# Patient Record
Sex: MALE | Race: WHITE | Employment: OTHER | URBAN - METROPOLITAN AREA
[De-identification: names, ages, dates, MRNs, and addresses within clinical notes are randomized per-mention and may not be internally consistent; named-entity substitution may affect disease eponyms.]

---

## 2024-02-07 ENCOUNTER — HOSPITAL ENCOUNTER (INPATIENT)
Facility: CLINIC | Age: 69
Setting detail: SURGERY ADMIT
End: 2024-02-07
Attending: NEUROLOGICAL SURGERY | Admitting: NEUROLOGICAL SURGERY
Payer: COMMERCIAL

## 2024-03-20 RX ORDER — TAMSULOSIN HYDROCHLORIDE 0.4 MG/1
0.4 CAPSULE ORAL DAILY
COMMUNITY
End: 2024-04-08 | Stop reason: HOSPADM

## 2024-03-20 RX ORDER — FAMOTIDINE 20 MG/1
20 TABLET, FILM COATED ORAL DAILY
COMMUNITY
End: 2024-04-08 | Stop reason: HOSPADM

## 2024-04-23 ENCOUNTER — TRANSFERRED RECORDS (OUTPATIENT)
Dept: HEALTH INFORMATION MANAGEMENT | Facility: CLINIC | Age: 69
End: 2024-04-23

## 2024-04-30 ENCOUNTER — TRANSFERRED RECORDS (OUTPATIENT)
Dept: HEALTH INFORMATION MANAGEMENT | Facility: CLINIC | Age: 69
End: 2024-04-30
Payer: COMMERCIAL

## 2024-04-30 ENCOUNTER — MEDICAL CORRESPONDENCE (OUTPATIENT)
Dept: TRANSPLANT | Facility: CLINIC | Age: 69
End: 2024-04-30
Payer: COMMERCIAL

## 2024-05-01 ENCOUNTER — PRE VISIT (OUTPATIENT)
Dept: ONCOLOGY | Facility: CLINIC | Age: 69
End: 2024-05-01
Payer: COMMERCIAL

## 2024-05-01 ENCOUNTER — PATIENT OUTREACH (OUTPATIENT)
Dept: ONCOLOGY | Facility: CLINIC | Age: 69
End: 2024-05-01
Payer: COMMERCIAL

## 2024-05-01 ENCOUNTER — TELEPHONE (OUTPATIENT)
Dept: TRANSPLANT | Facility: CLINIC | Age: 69
End: 2024-05-01
Payer: COMMERCIAL

## 2024-05-01 DIAGNOSIS — D46.9 MDS (MYELODYSPLASTIC SYNDROME) (H): Primary | ICD-10-CM

## 2024-05-01 NOTE — PROGRESS NOTES
New Patient Oncology Nurse Navigator Note     Referring provider: Dr. Luz Foster      Referring Clinic/Organization: [facility]      Referred to (specialty:) Hematology/Oncology     Requested provider (if applicable): NA     Date Referral Received: May 1, 2024     Evaluation for:  MDS vs. AML     Clinical History (per Nurse review of records provided):      Patient seen at [facility] on 4/30/24 at [facility] by Dr. Foster.         Records Location: Care Everywhere, Media, and See Bookmarked material     Records Needed: BMBX Biopsy done at [facility] on 4/23/24. Need STAT.      Additional testing needed prior to consult: ?    Payor: BCBS / Plan: BCBS MEDICARE ADVANTAGE / Product Type: Medicare /     May 1, 2024  Called patient this afternoon to introduce self and role of the nurse navigator.   Per patient, he was scheduled for back surgery and when in for pre-op and had labs drawn which where off and had further follow up with PCP. Had consultation with Dr. Foster at [facility] on 4/16/24, BMBX on 4/13 at [facility], and CT CAP done on 4/25/24. He had follow up with Dr. Foster on 4/30/24 and referral was placed for second opinion at the [facility].     Provided patient with contact information. Informed them that we are needing to get BMBX results for review. Once those have been reviewed we can work on scheduling consultation. Patient and wife verbalized understanding and were grateful for the call and coordination of care.     May 2, 2024    Case Report Bone Marrow Pathology Report                      Case: [id number]                                  Authorizing Provider:  Luz Foster MD   Collected:           04/23/2024 0925              Ordering Location:     [facility] CENTRAL LAB            Received:            04/23/2024 2056              Pathologist:           Roque Orr                                                                   "         MD                                                                          Specimens:   A) - Bone Marrow, RIGHT                                                                             B) - Bone Marrow Aspirate (Heparinized), RIGHT                                                     C) - Bone Marrow Core Biopsy, RIGHT                                                                 D) - Peripheral Blood                                                                   Final Diagnosis BONE MARROW AND PERIPHERAL BLOOD:  1. Myelodysplastic syndrome with excess blasts-2 (WHO 2016); see comment for alternative classification of this myeloid neoplasm in forthcoming WHO/ICC 2022 classification systems     a. Bone marrow blasts:  10.8%     b. Bone marrow cellularity:  %     c. Peripheral blood:        - Blasts:  Rare on scanning (<1%)        - Mild normocytic anemia        - Left shifted neutropenia with dysgranulopoiesis        - Mild absolute lymphocytosis        - Moderate thrombocytopenia  2. Ancillary studies:     a. Cytogenetics:        - Provisional results reveal an apparently normal male karyotype        - Final results will be appended to this report     b. Molecular:        - [facility] Myeloid NGS comprehensive DNA gene panel: Positive for ASXL1, IDH1, RUNX1 and SRSF2 mutations (performed on N89-7089)  3. See comment   Electronically signed by Roque Orr MD on 4/28/2024 at  6:19 PM   Comment The classification of this myeloid neoplasm depends on the classification system used. In the current WHO 2016 system, this case is best classified as \"Myelodysplastic syndrome with excess blasts-2 (WHO 2016)\" and as \"Myelodysplastic neoplasm with increased blasts-2\" in the forthcoming WHO 2022 system. Of note, in the forthcoming ICC 2022 system, this case would be classified as \"Myelodysplastic syndrome/Acute myeloid leukemia with myelodysplasia related gene mutations (ASXL1, RUNX1, SRSF2)\". " Patients with 10-19% bone marrow blasts may be regarded as AML-equivalent for therapeutic considerations and from a clinical trial design perspective when appropriate.      Dr. Orr discussed the diagnosis with Dr. KEN Foster on 4/26/2024.    Case seen in consultation with Dr. Del Obrien.     Called patient this morning in follow up. BMBX results received. Patient transferred to [facility] to schedule at this time.     Jane THEODOREN, RN   Oncology Nurse Navigator   [facility]

## 2024-05-01 NOTE — TELEPHONE ENCOUNTER
Primary insurance is Medicare Parts A and B.Medicare number is [id number]    Patient is subscriber.  BCBS is secondary insurance.Policy number below is the Member ID Number.  Group number is [id number].

## 2024-05-01 NOTE — TELEPHONE ENCOUNTER
RECORDS STATUS - ALL OTHER DIAGNOSIS      Action    Action Taken 5/1/24  Spoke w/ pt - pt provided verbal authorization for [facility] - Pull. BMB report now viewable.     Pt advised only one image, conducted at [place] 4/2024  3:25 PM    5/2/24  Spoke w/ Bernardo @ [facility] - advised we have the most recent note & labs. Advised pt has appt scheduled 5/14/24 - after appt w/ Dr. Delcid.  10:55 AM       RECOR[facility]   DATE RECEIVED:    NOTES STATUS DETAILS   OFFICE NOTE from medical oncologist [facility] Dr. Luz Foster: 4/30/24   MEDICATION LIST [facility]   LABS     PATHOLOGY REPORTS [facility], Report in [facility] 4/23/24: [id number]   ANYTHING RELATED TO DIAGNOSIS Epic/ 4/25/24   GENONOMIC TESTING     TYPE: [facility] - [facility] 4/16/24: Myeloid NGS   IMAGING (NEED IMAGES & REPORT)     CT SCANS PACS 4/25/24: [facility]   FEDEX TRACKING ([facility] - Pathology)   TRACKING #: [id number]

## 2024-05-07 NOTE — PROGRESS NOTES
[facility]    HEMATOLOGY & ONCOLOGY  NEW CONSULTATION    Referring physician: Dr. Luz Foster MD ([facility])    PATIENT NAME: Leland Pearson MRN # [id number]  DATE OF VISIT: May 7, 2024 YOB: 1955    SUMMARY  Leland Pearson is a 69 year old male with PMH significant for GERD, HTN and recent diagnosis of MDS who presents for consultation.    3/27/24 Pt found to be thrombocytopenic on screening CBC prior to elective back surgery  WBC 4.5, Hb 13.7, MCV 86, Plts-clumped  LDH elevated 532  4/16/24 Consultation with Dr. Luz Foster ([facility]) prompted BMBx at [facility]   4/23/24 BMBx demonstrating MDS-EB2 (WHO 2016) with hypercellular marrow (%) with 10.8% blasts including rare circulating blasts  Karyotype: 46, XY  NGS:  ASXL1, IDH1, RUNX1, SRSF2 mutations  Counts: WBC 6.7, Hb 13.1, Plts 70, ANC 0.7  IPSS-M = High risk, IPSS-R = 5 = High risk    SUBJECTIVE  Pt reports that overall he has not noticed any significant changes in his health recently. Has had bruising but not spontaneous and not changed for many years in frequently or severity. Has lost weight but this was intention with effort to lose weight ~10-15lbs prior to back surgery. Denies any recent f/c/s or n/v/d. No bleeding.       PAST MEDICAL HISTORY  Past Medical History:   Diagnosis Date    Gastroesophageal reflux disease     Hypertension        FAMILY HISTORY  No family history on file.  Mother and sister with skin cancer ( basal cell)  Uncle passed from liver cancer     SOCIAL HISTORY  Social History     Socioeconomic History    Marital status:      Spouse name: Not on file    Number of children: Not on file    Years of education: Not on file    Highest education level: Not on file   Occupational History    Not on file   Tobacco Use    Smoking status: Never    Smokeless tobacco: Never   Substance and Sexual Activity    Alcohol use: Yes     Comment: socially    Drug use: No    Sexual activity: Not on file   Other Topics Concern  "   Parent/sibling w/ CABG, MI or angioplasty before 65F 55M? Not Asked   Social History Narrative    Not on file     Social Determinants of Health     Financial Resource Strain: Not on file   Food Insecurity: Not on file   Transportation Needs: Not on file   Physical Activity: Not on file   Stress: Not on file   Social Connections: Not on file   Interpersonal Safety: Not on file   Housing Stability: Not on file   5 brothers ages 53 thru 73. Two children. 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 Cut back on alcohol for back surger ~2 drinks a week. Never smoker     CURRENT OUTPATIENT MEDICATIONS  Current Outpatient Medications   Medication Sig Dispense Refill    glucosamine-chondroitin 500-400 MG CAPS Take 1 capsule by mouth daily      LISINOPRIL PO Take 5 mg by mouth daily       multivitamin, therapeutic with minerals (MULTI-VITAMIN) TABS Take 1 tablet by mouth daily         ALLERGIES  No Known Allergies    REVIEW OF SYSTEMS  A complete ROS was performed and was negative except as mentioned in HPI    PHYSICAL EXAM  BP (!) 145/82 (BP Location: Right arm, Patient Position: Sitting, Cuff Size: Adult Regular)   Pulse 81   Temp 97.9  F (36.6  C) (Oral)   Resp 16   Ht 1.837 m (6' 0.34\")   Wt 91.9 kg (202 lb 11.2 oz)   SpO2 98%   BMI 27.23 kg/m    @LASTSAO2(4)@  Wt Readings from Last 3 Encounters:   05/08/24 91.9 kg (202 lb 11.2 oz)   07/27/13 97.5 kg (215 lb)       Gen: alert, pleasant and conversational, NAD  HEET: NC/AT, EOMI w/ PERRL, anicteric sclera. OP clear. MMM.   Neck: supple no JVP  Lymph: No cervical, supraclavicular, axillary or inguinal LAD  CV: normal S1,S2 with RRR no m/r/g  Resp: lungs CTA bilaterally with adequate air movement to bases. No wheezes or crackles  Abd: soft NTND no organomegaly or masses. BS normoactive.   Ext: WWP no edema or cyanosis  Skin: no concerning lesions or rashes  Neuro: A&Ox4, no lateralizing sx. Grossly nonfocal.      LABORATORY AND IMAGING STUDIES     Latest Reference " Range & Units 05/08/24 11:56   Sodium 135 - 145 mmol/L 139   Potassium 3.4 - 5.3 mmol/L 4.4   Chloride 98 - 107 mmol/L 103   Carbon Dioxide (CO2) 22 - 29 mmol/L 27   Urea Nitrogen 8.0 - 23.0 mg/dL 14.7   Creatinine 0.67 - 1.17 mg/dL 0.84   GFR Estimate >60 mL/min/1.73m2 >90   Calcium 8.8 - 10.2 mg/dL 9.5   Anion Gap 7 - 15 mmol/L 9   Magnesium 1.7 - 2.3 mg/dL 2.1   Phosphorus 2.5 - 4.5 mg/dL 2.7   Albumin 3.5 - 5.2 g/dL 4.1   Protein Total 6.4 - 8.3 g/dL 7.6   Alkaline Phosphatase 40 - 150 U/L 82   ALT 0 - 70 U/L 21   AST 0 - 45 U/L 43   Bilirubin Total <=1.2 mg/dL 0.6   Ferritin 31 - 409 ng/mL 1,360 (H)   Glucose 70 - 99 mg/dL 110 (H)   Lactate Dehydrogenase 0 - 250 U/L 613 (H)   Uric Acid 3.4 - 7.0 mg/dL 5.2   WBC 4.0 - 11.0 10e3/uL 5.2   Hemoglobin 13.3 - 17.7 g/dL 13.4   Hematocrit 40.0 - 53.0 % 41.0   Platelet Count 150 - 450 10e3/uL 70 (L)   RBC Count 4.40 - 5.90 10e6/uL 4.59   MCV 78 - 100 fL 89   MCH 26.5 - 33.0 pg 29.2   MCHC 31.5 - 36.5 g/dL 32.7   RDW 10.0 - 15.0 % 15.9 (H)   % Neutrophils % 28 (P)   % Lymphocytes % 58 (P)   % Monocytes % 2 (P)   % Eosinophils % 1 (P)   % Basophils % 0 (P)   % Other Cells % 11 (P)   Absolute Basophils 0.0 - 0.2 10e3/uL 0.0 (P)   Absolute Neutrophil 1.6 - 8.3 10e3/uL 1.5 (L) (P)   Absolute Lymphocytes 0.8 - 5.3 10e3/uL 3.0 (P)   Absolute Monocytes 0.0 - 1.3 10e3/uL 0.1 (P)   Absolute Eosinophils 0.0 - 0.7 10e3/uL 0.1 (P)   Absolute Other Cells <=0.0 10e3/uL 0.6 (H) (P)   Absolute NRBCs 10e3/uL 0.0   NRBCs per 100 WBC <1 /100 0   RBC Morphology  Confirmed RBC Indices (P)   Platelet Morphology Automated Count Confirmed. Platelet morphology is normal.  Automated Count Confirmed. Platelet morphology is normal. (P)   Polychromasia None Seen  Slight ! (P)   Teardrop Cells None Seen  Slight ! (P)   Acanthocytes None Seen  Slight ! (P)   Dorota Cells None Seen  Slight ! (P)   (H): Data is abnormally high  (L): Data is abnormally low  !: Data is abnormal  (P): Preliminary      BMBx  "OS  Final Diagnosis BONE MARROW AND PERIPHERAL BLOOD:  1. Myelodysplastic syndrome with excess blasts-2 (WHO 2016); see comment for alternative classification of this myeloid neoplasm in forthcoming WHO/ICC 2022 classification systems     a. Bone marrow blasts:  10.8%     b. Bone marrow cellularity:  %     c. Peripheral blood:        - Blasts:  Rare on scanning (<1%)        - Mild normocytic anemia        - Left shifted neutropenia with dysgranulopoiesis        - Mild absolute lymphocytosis        - Moderate thrombocytopenia  2. Ancillary studies:     a. Cytogenetics:        - Provisional results reveal an apparently normal male karyotype        - Final results will be appended to this report     b. Molecular:        - [facility] Myeloid NGS comprehensive DNA gene panel: Positive for ASXL1, IDH1, RUNX1 and SRSF2 mutations (performed on Y12-0947)  3. See comment   Electronically signed by Roque Orr MD on 4/28/2024 at  6:19 PM   Comment The classification of this myeloid neoplasm depends on the classification system used. In the current WHO 2016 system, this case is best classified as \"Myelodysplastic syndrome with excess blasts-2 (WHO 2016)\" and as \"Myelodysplastic neoplasm with increased blasts-2\" in the forthcoming WHO 2022 system. Of note, in the forthcoming ICC 2022 system, this case would be classified as \"Myelodysplastic syndrome/Acute myeloid leukemia with myelodysplasia related gene mutations (ASXL1, RUNX1, SRSF2)\". Patients with 10-19% bone marrow blasts may be regarded as AML-equivalent for therapeutic considerations and from a clinical trial design perspective when appropriate.      Dr. Orr discussed the diagnosis with Dr. KEN Foster on 4/26/2024.    Case seen in consultation with Dr. Del Obrien.     BMBx differential    HEMATOLOGY PARAMETERS  Tested at:  [facility]           RESULTS  EXPECTED VALUES  WBC:     6.7      4.5-11x1000/cumm      RBC:     4.53     4.30-5.90 " mil/cumm  HGB:     13.1     13.5-17.5 gm/di     DECREASED  HCT:     41.3     37-53%                MCV:     91.0      fl           NORMOCYTIC  MCH:     28.9     26-34 pg              MCHC:    31.7     32-36 gm/dl         HYPOCHROMIC  RDW:     15.8     11.5-15.5%          ELEVATED  PLT:     70       140-440x1000/uL     DECREASED  MPV:     10.9     6.5-11 fl              Retic:   2.68     0.5-1.5%            ELEVATED    Differential    Tested at:  [facility]                  Absolute (%)    Expected (%)                  (x10*9/L)       (x10*9/L)  Neutrophils:    0.7 (10.4)      1.7-7.0 (42-72%)  DECREASED  Lymphocytes:    4.5 (67.2)      0.9-2.9 (20-44%)  ELEVATED  Monocytes:      0.3 (4.5)      <0.9 (0-11%)          Eosinophils:    0.1 (1.5)      <0.5 (0-2%)          Basophils:      0.0 (0)        <0.3 (<3.0%)          Imm Grans:      0.0 (0)        <0.3 (0-3%)           (Metas, Myelos,Pros)  Metamyelocytes: 0.6 (9)        <0.1 (<0.1%)       ELEVATED  Myelocytes:     0.5 (7.5)      <0.1 (<0.1%)       ELEVATED          BONE MARROW AND PERIPHERAL BLOOD:  1. Myelodysplastic syndrome with excess blasts-2 (WHO 2016); see comment for alternative classification of this myeloid neoplasm in forthcoming WHO/ICC 2022 classification systems     a. Bone marrow blasts:  10.8%     b. Bone marrow cellularity:  %     c. Peripheral blood:        - Blasts:  Rare on scanning (<1%)        - Mild normocytic anemia        - Left shifted neutropenia with dysgranulopoiesis        - Mild absolute lymphocytosis        - Moderate thrombocytopenia  2. Ancillary studies:     a. Cytogenetics:        - Provisional results reveal an apparently normal male karyotype        - Final results will be appended to this report     b. Molecular:        - [facility] Myeloid NGS comprehensive DNA gene panel: Positive for ASXL1, IDH1, RUNX1 and SRSF2 mutations (performed on E32-1099)  3. See comment     CT C/A/P 4/25/24 ([facility])  IMPRESSION:   1. No thoracic,  abdominal, or pelvic lymphadenopathy by size criteria.   2.  Juxtapleural pulmonary nodules along the left major fissure, likely   intrapulmonary lymph nodes. These do not require imaging follow-up, and are   considered benign.   3.  3 mm nephrolith in the left intrarenal collecting system. No delayed   nephrogram, hydronephrosis, or perinephric fluid collection.         ASSESSMENT AND PLAN  Leland Pearson is a 69 year old male with PMH significant for GERD, HTN and recent diagnosis of MDS who presents for consultation.    ECOG 2 / %    # High risk MDS - IPSS-M/R high risk with excess blasts.  NGS with high risk ASXL1, IDH1, RUNX1 as well as SRSF2 mutations.     We discussed at length the biology of MDS and the meaning of the high risk stratification. Per IPSS-M with todays labs he is high risk translating to median overall survival of 1.7 years and median time to AML transformation of 14.3% at 1 year. We discussed that this was an aggressive malignancy and is incurable with standard chemotherapy. Ultimately he will need to be considered for allogeneic transplant. I do not see any major risk factors for transplant and he has potential sibling donors. Will place referral.    In meantime, we discussed that in order to be considered for transplant we will need to debulk his disease as transplant requires blasts <5%. We discussed options including HMA and HMA + venetoclax. Given recent updates in Phase 1 studies showing accelerated and enhanced remission status in BMT candidates with addition of Venetoclax I recommended we include it pending insurance approval. Will reach out to Dr. Foster to see about coordinating care. I recommended Azacitidine as the HMA option and we described administration, schedule and potential side effects. Pts questions were answered.     PPx - plan for ACV. Add posa + levo with neutropenia.    # h/o Gout - no active flare. UA 5.2. Continue allopurinol.    Final plan  - Will reach out  to Dr. Foster to discuss coordinating care  - Plan for Azacitidine / Venetoclax. Will start PA process  - BMT referral      The longitudinal plan of care for the diagnosis(es)/condition(s) as documented were addressed during this visit. Due to the added complexity in care, I will continue to support Leland in the subsequent management and with ongoing continuity of care.      Total time spent on this encounter today including reviewing the EMR, documentation and direct patient care counselin minutes    Nael Delcid MD    Division of Hematology, Oncology and Transplantation  [facility]  P: [phone]

## 2024-05-08 ENCOUNTER — ONCOLOGY VISIT (OUTPATIENT)
Dept: ONCOLOGY | Facility: CLINIC | Age: 69
End: 2024-05-08
Attending: STUDENT IN AN ORGANIZED HEALTH CARE EDUCATION/TRAINING PROGRAM
Payer: COMMERCIAL

## 2024-05-08 ENCOUNTER — PATIENT OUTREACH (OUTPATIENT)
Dept: ONCOLOGY | Facility: CLINIC | Age: 69
End: 2024-05-08

## 2024-05-08 VITALS
HEIGHT: 72 IN | HEART RATE: 81 BPM | WEIGHT: 202.7 LBS | SYSTOLIC BLOOD PRESSURE: 145 MMHG | TEMPERATURE: 97.9 F | RESPIRATION RATE: 16 BRPM | OXYGEN SATURATION: 98 % | DIASTOLIC BLOOD PRESSURE: 82 MMHG | BODY MASS INDEX: 27.45 KG/M2

## 2024-05-08 DIAGNOSIS — D46.9 MDS (MYELODYSPLASTIC SYNDROME) (H): Primary | ICD-10-CM

## 2024-05-08 DIAGNOSIS — M10.9 GOUT, UNSPECIFIED CAUSE, UNSPECIFIED CHRONICITY, UNSPECIFIED SITE: ICD-10-CM

## 2024-05-08 LAB
ABO/RH(D): NORMAL
ALBUMIN SERPL BCG-MCNC: 4.1 G/DL (ref 3.5–5.2)
ALP SERPL-CCNC: 82 U/L (ref 40–150)
ALT SERPL W P-5'-P-CCNC: 21 U/L (ref 0–70)
ANION GAP SERPL CALCULATED.3IONS-SCNC: 9 MMOL/L (ref 7–15)
ANTIBODY SCREEN: NEGATIVE
AST SERPL W P-5'-P-CCNC: 43 U/L (ref 0–45)
BASOPHILS # BLD AUTO: ABNORMAL 10*3/UL
BASOPHILS NFR BLD AUTO: ABNORMAL %
BILIRUB SERPL-MCNC: 0.6 MG/DL
BUN SERPL-MCNC: 14.7 MG/DL (ref 8–23)
CALCIUM SERPL-MCNC: 9.5 MG/DL (ref 8.8–10.2)
CHLORIDE SERPL-SCNC: 103 MMOL/L (ref 98–107)
CREAT SERPL-MCNC: 0.84 MG/DL (ref 0.67–1.17)
DEPRECATED HCO3 PLAS-SCNC: 27 MMOL/L (ref 22–29)
EGFRCR SERPLBLD CKD-EPI 2021: >90 ML/MIN/1.73M2
EOSINOPHIL # BLD AUTO: ABNORMAL 10*3/UL
EOSINOPHIL NFR BLD AUTO: ABNORMAL %
ERYTHROCYTE [DISTWIDTH] IN BLOOD BY AUTOMATED COUNT: 15.9 % (ref 10–15)
FERRITIN SERPL-MCNC: 1360 NG/ML (ref 31–409)
GLUCOSE SERPL-MCNC: 110 MG/DL (ref 70–99)
HCT VFR BLD AUTO: 41 % (ref 40–53)
HGB BLD-MCNC: 13.4 G/DL (ref 13.3–17.7)
IMM GRANULOCYTES # BLD: ABNORMAL 10*3/UL
IMM GRANULOCYTES NFR BLD: ABNORMAL %
LDH SERPL L TO P-CCNC: 613 U/L (ref 0–250)
LYMPHOCYTES # BLD AUTO: ABNORMAL 10*3/UL
LYMPHOCYTES NFR BLD AUTO: ABNORMAL %
MAGNESIUM SERPL-MCNC: 2.1 MG/DL (ref 1.7–2.3)
MCH RBC QN AUTO: 29.2 PG (ref 26.5–33)
MCHC RBC AUTO-ENTMCNC: 32.7 G/DL (ref 31.5–36.5)
MCV RBC AUTO: 89 FL (ref 78–100)
MONOCYTES # BLD AUTO: ABNORMAL 10*3/UL
MONOCYTES NFR BLD AUTO: ABNORMAL %
NEUTROPHILS # BLD AUTO: ABNORMAL 10*3/UL
NEUTROPHILS NFR BLD AUTO: ABNORMAL %
NRBC # BLD AUTO: 0 10E3/UL
NRBC BLD AUTO-RTO: 0 /100
PHOSPHATE SERPL-MCNC: 2.7 MG/DL (ref 2.5–4.5)
PLATELET # BLD AUTO: 70 10E3/UL (ref 150–450)
POTASSIUM SERPL-SCNC: 4.4 MMOL/L (ref 3.4–5.3)
PROT SERPL-MCNC: 7.6 G/DL (ref 6.4–8.3)
RBC # BLD AUTO: 4.59 10E6/UL (ref 4.4–5.9)
SODIUM SERPL-SCNC: 139 MMOL/L (ref 135–145)
SPECIMEN EXPIRATION DATE: NORMAL
URATE SERPL-MCNC: 5.2 MG/DL (ref 3.4–7)
WBC # BLD AUTO: 5.2 10E3/UL (ref 4–11)

## 2024-05-08 PROCEDURE — 82728 ASSAY OF FERRITIN: CPT | Performed by: STUDENT IN AN ORGANIZED HEALTH CARE EDUCATION/TRAINING PROGRAM

## 2024-05-08 PROCEDURE — G2211 COMPLEX E/M VISIT ADD ON: HCPCS | Performed by: STUDENT IN AN ORGANIZED HEALTH CARE EDUCATION/TRAINING PROGRAM

## 2024-05-08 PROCEDURE — 36415 COLL VENOUS BLD VENIPUNCTURE: CPT | Performed by: STUDENT IN AN ORGANIZED HEALTH CARE EDUCATION/TRAINING PROGRAM

## 2024-05-08 PROCEDURE — G0463 HOSPITAL OUTPT CLINIC VISIT: HCPCS | Performed by: STUDENT IN AN ORGANIZED HEALTH CARE EDUCATION/TRAINING PROGRAM

## 2024-05-08 PROCEDURE — 85007 BL SMEAR W/DIFF WBC COUNT: CPT | Performed by: STUDENT IN AN ORGANIZED HEALTH CARE EDUCATION/TRAINING PROGRAM

## 2024-05-08 PROCEDURE — 83735 ASSAY OF MAGNESIUM: CPT | Performed by: STUDENT IN AN ORGANIZED HEALTH CARE EDUCATION/TRAINING PROGRAM

## 2024-05-08 PROCEDURE — 84550 ASSAY OF BLOOD/URIC ACID: CPT | Performed by: STUDENT IN AN ORGANIZED HEALTH CARE EDUCATION/TRAINING PROGRAM

## 2024-05-08 PROCEDURE — 86900 BLOOD TYPING SEROLOGIC ABO: CPT | Performed by: STUDENT IN AN ORGANIZED HEALTH CARE EDUCATION/TRAINING PROGRAM

## 2024-05-08 PROCEDURE — 84630 ASSAY OF ZINC: CPT | Performed by: STUDENT IN AN ORGANIZED HEALTH CARE EDUCATION/TRAINING PROGRAM

## 2024-05-08 PROCEDURE — 85027 COMPLETE CBC AUTOMATED: CPT | Performed by: STUDENT IN AN ORGANIZED HEALTH CARE EDUCATION/TRAINING PROGRAM

## 2024-05-08 PROCEDURE — 82525 ASSAY OF COPPER: CPT | Performed by: STUDENT IN AN ORGANIZED HEALTH CARE EDUCATION/TRAINING PROGRAM

## 2024-05-08 PROCEDURE — 84100 ASSAY OF PHOSPHORUS: CPT | Performed by: STUDENT IN AN ORGANIZED HEALTH CARE EDUCATION/TRAINING PROGRAM

## 2024-05-08 PROCEDURE — 82040 ASSAY OF SERUM ALBUMIN: CPT | Performed by: STUDENT IN AN ORGANIZED HEALTH CARE EDUCATION/TRAINING PROGRAM

## 2024-05-08 PROCEDURE — 81378 HLA I & II TYPING HR: CPT | Performed by: STUDENT IN AN ORGANIZED HEALTH CARE EDUCATION/TRAINING PROGRAM

## 2024-05-08 PROCEDURE — 99205 OFFICE O/P NEW HI 60 MIN: CPT | Performed by: STUDENT IN AN ORGANIZED HEALTH CARE EDUCATION/TRAINING PROGRAM

## 2024-05-08 PROCEDURE — 83615 LACTATE (LD) (LDH) ENZYME: CPT | Performed by: STUDENT IN AN ORGANIZED HEALTH CARE EDUCATION/TRAINING PROGRAM

## 2024-05-08 RX ORDER — FAMOTIDINE 20 MG/1
TABLET, FILM COATED ORAL
Status: ON HOLD | COMMUNITY
End: 2024-09-18

## 2024-05-08 RX ORDER — ALLOPURINOL 300 MG/1
TABLET ORAL
COMMUNITY
End: 2024-05-21

## 2024-05-08 RX ORDER — TAMSULOSIN HYDROCHLORIDE 0.4 MG/1
0.4 CAPSULE ORAL DAILY
COMMUNITY

## 2024-05-08 RX ORDER — CELECOXIB 200 MG/1
200 CAPSULE ORAL DAILY
Status: ON HOLD | COMMUNITY
Start: 2024-05-06 | End: 2024-09-18

## 2024-05-08 RX ORDER — LISINOPRIL 10 MG/1
1 TABLET ORAL DAILY
COMMUNITY
Start: 2024-04-12 | End: 2024-08-08

## 2024-05-08 ASSESSMENT — PAIN SCALES - GENERAL: PAINLEVEL: MILD PAIN (2)

## 2024-05-08 NOTE — LETTER
5/8/2024         RE: Leland Pearson  [address] [place] [address]        Dear Colleague,    Thank you for referring your patient, Leland Pearson, to the [facility]. Please see a copy of my visit note below.    [facility]    HEMATOLOGY & ONCOLOGY  NEW CONSULTATION    Referring physician: Dr. Luz Foster MD ([facility] [place]    PATIENT NAME: Leland Pearson MRN # [id number]  DATE OF VISIT: May 7, 2024 YOB: 1955    SUMMARY  Leland Pearson is a 69 year old male with PMH significant for GERD, HTN and recent diagnosis of MDS who presents for consultation.    3/27/24 Pt found to be thrombocytopenic on screening CBC prior to elective back surgery  WBC 4.5, Hb 13.7, MCV 86, Plts-clumped  LDH elevated 532  4/16/24 Consultation with Dr. Luz Foster ([facility]) prompted BMBx at [facility]   4/23/24 BMBx demonstrating MDS-EB2 (WHO 2016) with hypercellular marrow (%) with 10.8% blasts including rare circulating blasts  Karyotype: 46, XY  NGS:  ASXL1, IDH1, RUNX1, SRSF2 mutations  Counts: WBC 6.7, Hb 13.1, Plts 70, ANC 0.7  IPSS-M = High risk, IPSS-R = 5 = High risk    SUBJECTIVE  Pt reports that overall he has not noticed any significant changes in his health recently. Has had bruising but not spontaneous and not changed for many years in frequently or severity. Has lost weight but this was intention with effort to lose weight ~10-15lbs prior to back surgery. Denies any recent f/c/s or n/v/d. No bleeding.       PAST MEDICAL HISTORY  Past Medical History:   Diagnosis Date    Gastroesophageal reflux disease     Hypertension        FAMILY HISTORY  No family history on file.  Mother and sister with skin cancer ( basal cell)  Uncle passed from liver cancer     SOCIAL HISTORY  Social History     Socioeconomic History    Marital status:      Spouse name: Not on file    Number of children: Not on file    Years of education: Not on file    Highest education  "level: Not on file   Occupational History    Not on file   Tobacco Use    Smoking status: Never    Smokeless tobacco: Never   Substance and Sexual Activity    Alcohol use: Yes     Comment: socially    Drug use: No    Sexual activity: Not on file   Other Topics Concern    Parent/sibling w/ CABG, MI or angioplasty before 65F 55M? Not Asked   Social History Narrative    Not on file     Social Determinants of Health     Financial Resource Strain: Not on file   Food Insecurity: Not on file   Transportation Needs: Not on file   Physical Activity: Not on file   Stress: Not on file   Social Connections: Not on file   Interpersonal Safety: Not on file   Housing Stability: Not on file   5 brothers ages 53 thru 73. Two children. 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 Cut back on alcohol for back surger ~2 drinks a week. Never smoker     CURRENT OUTPATIENT MEDICATIONS  Current Outpatient Medications   Medication Sig Dispense Refill    glucosamine-chondroitin 500-400 MG CAPS Take 1 capsule by mouth daily      LISINOPRIL PO Take 5 mg by mouth daily       multivitamin, therapeutic with minerals (MULTI-VITAMIN) TABS Take 1 tablet by mouth daily         ALLERGIES  No Known Allergies    REVIEW OF SYSTEMS  A complete ROS was performed and was negative except as mentioned in HPI    PHYSICAL EXAM  BP (!) 145/82 (BP Location: Right arm, Patient Position: Sitting, Cuff Size: Adult Regular)   Pulse 81   Temp 97.9  F (36.6  C) (Oral)   Resp 16   Ht 1.837 m (6' 0.34\")   Wt 91.9 kg (202 lb 11.2 oz)   SpO2 98%   BMI 27.23 kg/m    @LASTSAO2(4)@  Wt Readings from Last 3 Encounters:   05/08/24 91.9 kg (202 lb 11.2 oz)   07/27/13 97.5 kg (215 lb)       Gen: alert, pleasant and conversational, NAD  HEET: NC/AT, EOMI w/ PERRL, anicteric sclera. OP clear. MMM.   Neck: supple no JVP  Lymph: No cervical, supraclavicular, axillary or inguinal LAD  CV: normal S1,S2 with RRR no m/r/g  Resp: lungs CTA bilaterally with adequate air movement to " bases. No wheezes or crackles  Abd: soft NTND no organomegaly or masses. BS normoactive.   Ext: WWP no edema or cyanosis  Skin: no concerning lesions or rashes  Neuro: A&Ox4, no lateralizing sx. Grossly nonfocal.      LABORATORY AND IMAGING STUDIES     Latest Reference Range & Units 05/08/24 11:56   Sodium 135 - 145 mmol/L 139   Potassium 3.4 - 5.3 mmol/L 4.4   Chloride 98 - 107 mmol/L 103   Carbon Dioxide (CO2) 22 - 29 mmol/L 27   Urea Nitrogen 8.0 - 23.0 mg/dL 14.7   Creatinine 0.67 - 1.17 mg/dL 0.84   GFR Estimate >60 mL/min/1.73m2 >90   Calcium 8.8 - 10.2 mg/dL 9.5   Anion Gap 7 - 15 mmol/L 9   Magnesium 1.7 - 2.3 mg/dL 2.1   Phosphorus 2.5 - 4.5 mg/dL 2.7   Albumin 3.5 - 5.2 g/dL 4.1   Protein Total 6.4 - 8.3 g/dL 7.6   Alkaline Phosphatase 40 - 150 U/L 82   ALT 0 - 70 U/L 21   AST 0 - 45 U/L 43   Bilirubin Total <=1.2 mg/dL 0.6   Ferritin 31 - 409 ng/mL 1,360 (H)   Glucose 70 - 99 mg/dL 110 (H)   Lactate Dehydrogenase 0 - 250 U/L 613 (H)   Uric Acid 3.4 - 7.0 mg/dL 5.2   WBC 4.0 - 11.0 10e3/uL 5.2   Hemoglobin 13.3 - 17.7 g/dL 13.4   Hematocrit 40.0 - 53.0 % 41.0   Platelet Count 150 - 450 10e3/uL 70 (L)   RBC Count 4.40 - 5.90 10e6/uL 4.59   MCV 78 - 100 fL 89   MCH 26.5 - 33.0 pg 29.2   MCHC 31.5 - 36.5 g/dL 32.7   RDW 10.0 - 15.0 % 15.9 (H)   % Neutrophils % 28 (P)   % Lymphocytes % 58 (P)   % Monocytes % 2 (P)   % Eosinophils % 1 (P)   % Basophils % 0 (P)   % Other Cells % 11 (P)   Absolute Basophils 0.0 - 0.2 10e3/uL 0.0 (P)   Absolute Neutrophil 1.6 - 8.3 10e3/uL 1.5 (L) (P)   Absolute Lymphocytes 0.8 - 5.3 10e3/uL 3.0 (P)   Absolute Monocytes 0.0 - 1.3 10e3/uL 0.1 (P)   Absolute Eosinophils 0.0 - 0.7 10e3/uL 0.1 (P)   Absolute Other Cells <=0.0 10e3/uL 0.6 (H) (P)   Absolute NRBCs 10e3/uL 0.0   NRBCs per 100 WBC <1 /100 0   RBC Morphology  Confirmed RBC Indices (P)   Platelet Morphology Automated Count Confirmed. Platelet morphology is normal.  Automated Count Confirmed. Platelet morphology is normal.  "(P)   Polychromasia None Seen  Slight ! (P)   Teardrop Cells None Seen  Slight ! (P)   Acanthocytes None Seen  Slight ! (P)   Dorota Cells None Seen  Slight ! (P)   (H): Data is abnormally high  (L): Data is abnormally low  !: Data is abnormal  (P): Preliminary      BMBx OSH  Final Diagnosis BONE MARROW AND PERIPHERAL BLOOD:  1. Myelodysplastic syndrome with excess blasts-2 (WHO 2016); see comment for alternative classification of this myeloid neoplasm in forthcoming WHO/ICC 2022 classification systems     a. Bone marrow blasts:  10.8%     b. Bone marrow cellularity:  %     c. Peripheral blood:        - Blasts:  Rare on scanning (<1%)        - Mild normocytic anemia        - Left shifted neutropenia with dysgranulopoiesis        - Mild absolute lymphocytosis        - Moderate thrombocytopenia  2. Ancillary studies:     a. Cytogenetics:        - Provisional results reveal an apparently normal male karyotype        - Final results will be appended to this report     b. Molecular:        - [facility] Myeloid NGS comprehensive DNA gene panel: Positive for ASXL1, IDH1, RUNX1 and SRSF2 mutations (performed on [id number])  3. See comment   Electronically signed by Roque Orr MD on 4/28/2024 at  6:19 PM   Comment The classification of this myeloid neoplasm depends on the classification system used. In the current WHO 2016 system, this case is best classified as \"Myelodysplastic syndrome with excess blasts-2 (WHO 2016)\" and as \"Myelodysplastic neoplasm with increased blasts-2\" in the forthcoming WHO 2022 system. Of note, in the forthcoming ICC 2022 system, this case would be classified as \"Myelodysplastic syndrome/Acute myeloid leukemia with myelodysplasia related gene mutations (ASXL1, RUNX1, SRSF2)\". Patients with 10-19% bone marrow blasts may be regarded as AML-equivalent for therapeutic considerations and from a clinical trial design perspective when appropriate.      Dr. Orr discussed the diagnosis " with Dr. KEN Foster on 4/26/2024.    Case seen in consultation with Dr. Del Obrien.     BMBx differential    HEMATOLOGY PARAMETERS  Tested at:  [facility]           RESULTS  EXPECTED VALUES  WBC:     6.7      4.5-11x1000/cumm      RBC:     4.53     4.30-5.90 mil/cumm  HGB:     13.1     13.5-17.5 gm/di     DECREASED  HCT:     41.3     37-53%                MCV:     91.0      fl           NORMOCYTIC  MCH:     28.9     26-34 pg              MCHC:    31.7     32-36 gm/dl         HYPOCHROMIC  RDW:     15.8     11.5-15.5%          ELEVATED  PLT:     70       140-440x1000/uL     DECREASED  MPV:     10.9     6.5-11 fl              Retic:   2.68     0.5-1.5%            ELEVATED    Differential    Tested at:  [facility]                  Absolute (%)    Expected (%)                  (x10*9/L)       (x10*9/L)  Neutrophils:    0.7 (10.4)      1.7-7.0 (42-72%)  DECREASED  Lymphocytes:    4.5 (67.2)      0.9-2.9 (20-44%)  ELEVATED  Monocytes:      0.3 (4.5)      <0.9 (0-11%)          Eosinophils:    0.1 (1.5)      <0.5 (0-2%)          Basophils:      0.0 (0)        <0.3 (<3.0%)          Imm Grans:      0.0 (0)        <0.3 (0-3%)           (Metas, Myelos,Pros)  Metamyelocytes: 0.6 (9)        <0.1 (<0.1%)       ELEVATED  Myelocytes:     0.5 (7.5)      <0.1 (<0.1%)       ELEVATED          BONE MARROW AND PERIPHERAL BLOOD:  1. Myelodysplastic syndrome with excess blasts-2 (WHO 2016); see comment for alternative classification of this myeloid neoplasm in forthcoming WHO/ICC 2022 classification systems     a. Bone marrow blasts:  10.8%     b. Bone marrow cellularity:  %     c. Peripheral blood:        - Blasts:  Rare on scanning (<1%)        - Mild normocytic anemia        - Left shifted neutropenia with dysgranulopoiesis        - Mild absolute lymphocytosis        - Moderate thrombocytopenia  2. Ancillary studies:     a. Cytogenetics:        - Provisional results reveal an apparently normal male karyotype        -  Final results will be appended to this report     b. Molecular:        - [facility] NGS comprehensive DNA gene panel: Positive for ASXL1, IDH1, RUNX1 and SRSF2 mutations (performed on [id number])  3. See comment     CT C/A/P 4/25/24 (OSH)  IMPRESSION:   1. No thoracic, abdominal, or pelvic lymphadenopathy by size criteria.   2.  Juxtapleural pulmonary nodules along the left major fissure, likely   intrapulmonary lymph nodes. These do not require imaging follow-up, and are   considered benign.   3.  3 mm nephrolith in the left intrarenal collecting system. No delayed   nephrogram, hydronephrosis, or perinephric fluid collection.         ASSESSMENT AND PLAN  Leland Pearson is a 69 year old male with PMH significant for GERD, HTN and recent diagnosis of MDS who presents for consultation.    ECOG 2 / %    # High risk MDS - IPSS-M/R high risk with excess blasts.  NGS with high risk ASXL1, IDH1, RUNX1 as well as SRSF2 mutations.     We discussed at length the biology of MDS and the meaning of the high risk stratification. Per IPSS-M with todays labs he is high risk translating to median overall survival of 1.7 years and median time to AML transformation of 14.3% at 1 year. We discussed that this was an aggressive malignancy and is incurable with standard chemotherapy. Ultimately he will need to be considered for allogeneic transplant. I do not see any major risk factors for transplant and he has potential sibling donors. Will place referral.    In meantime, we discussed that in order to be considered for transplant we will need to debulk his disease as transplant requires blasts <5%. We discussed options including HMA and HMA + venetoclax. Given recent updates in Phase 1 studies showing accelerated and enhanced remission status in BMT candidates with addition of Venetoclax I recommended we include it pending insurance approval. Will reach out to Dr. Foster to see about coordinating care. I recommended  Azacitidine as the HMA option and we described administration, schedule and potential side effects. Pts questions were answered.     PPx - plan for ACV. Add posa + levo with neutropenia.    # h/o Gout - no active flare. UA 5.2. Continue allopurinol.    Final plan  - Will reach out to Dr. Foster to discuss coordinating care  - Plan for Azacitidine / Venetoclax. Will start PA process  - BMT referral      The longitudinal plan of care for the diagnosis(es)/condition(s) as documented were addressed during this visit. Due to the added complexity in care, I will continue to support Leland in the subsequent management and with ongoing continuity of care.      Total time spent on this encounter today including reviewing the EMR, documentation and direct patient care counselin minutes    Nael Delcid MD    Division of Hematology, Oncology and Transplantation  [facility]  P: [phone]

## 2024-05-08 NOTE — NURSING NOTE
"Oncology Rooming Note    May 8, 2024 10:24 AM   Leland Pearson is a 69 year old male who presents for:    Chief Complaint   Patient presents with    Oncology Clinic Visit     New Onc Pt with MDS     Initial Vitals: BP (!) 145/82 (BP Location: Right arm, Patient Position: Sitting, Cuff Size: Adult Regular)   Pulse 81   Temp 97.9  F (36.6  C) (Oral)   Resp 16   Ht 1.837 m (6' 0.34\")   Wt 91.9 kg (202 lb 11.2 oz)   SpO2 98%   BMI 27.23 kg/m   Estimated body mass index is 27.23 kg/m  as calculated from the following:    Height as of this encounter: 1.837 m (6' 0.34\").    Weight as of this encounter: 91.9 kg (202 lb 11.2 oz). Body surface area is 2.17 meters squared.  Mild Pain (2) Comment: Data Unavailable   No LMP for male patient.  Allergies reviewed: Yes  Medications reviewed: Yes    Medications: Medication refills not needed today.  Pharmacy name entered into [employer]: [id number] [place] [address]    Frailty Screening:   Is the patient here for a new oncology consult visit in cancer care? 2. No      Clinical concerns:  None      Martin Isidro"

## 2024-05-08 NOTE — PROGRESS NOTES
"  [facility]: Cancer Care Initial Note                                    Discussion with Patient:                                                      Met with Leland and his Family after clinic visit/consultation appt with Dr. Delcid.   Pt  verbalizes understanding of Dr. Delcid's plan of care.       Introduced self and role of RN Care Coordinator at [facility].     Provided my contact information, [facility] phone number (which has options to talk with a Nurse available 24/7 - triage and RNCC via this option during business hours). Reviewed appropriate use of MyChart, not to be used for symptom reporting.   Reviewed [facility] care team members including midlevel providers in oncology dept and Dr. Delcid's usual clinic hours.     Provided overview of supportive services at [facility] including SW, oncology dietitian, oncology behavioural health providers (psychology, psychiatry, counseling), as well as the availability of [facility] as needed.     Reviewed sections of [facility] Cancer Care Guidebook, including \"Good to Know Numbers\" and \"When to Contact Your Provider.\"     Answered questions regarding: Plan for chemo next week.     Patient Voiced voiced understanding and appreciation of above information and denies any further questions, and he/she understands that I will follow and provide coordination as needed.             Assessment:                                                      Initial  Current living arrangement:: I live in a private home with family  Informal Support system:: Children;Significant other  Bed or wheelchair confined:: No  Mobility Status: Independent  Transportation means:: Regular car  Medication adherence problem (GOAL):: No  Knowledgeable about how to use meds:: Yes  Medication side effects suspected:: No  Pain Score: No Pain (0)    Plan of Care Education Review:   Assessment completed with:: Patient    Plan of Care Education   Yearly learning " assessment completed?: Yes (see Education tab)  Diagnosis:: MDS -High Risk  Does patient understand diagnosis?: Yes  Tx plan/regimen:: Aza 7 days with Hasmukh 14  Does patient understand treatment plan/regimen?: Yes  Preparing for treatment:: Reviewed treatment preparation information with patient (vascular access, day of chemo, visitor policy, what to bring, etc.)  Vascular access education provided for:: Peripheral IV (No IV on non lab days)  Side effect education:: Diarrhea/Constipation;Fatigue;Immune-mediated effects;Infection;Mylosuppression;Mouth sores;Lab value monitoring (anemia, neutropenia, thrombocytopenia);Nausea/Vomiting (Site tenderness)  Safety/self care at home reviewed with patient:: Yes  Coping - concerns/fears reviewed with patient:: Yes  Plan of Care:: MD follow-up appointment;Treatment schedule;Lab appointment  When to call provider:: Bleeding;Uncontrolled diarrhea/constipation;Increased shortness of breath;Uncontrolled nausea/vomiting;New/worsening pain;Shaking chills;Temperature >100.4F  Reasons for deferring treatment reviewed with patient:: Yes    Evaluation of Learning  Patient Education Provided: Yes  Readiness:: Acceptance  Method:: Explanation;Booklet/Handout;Literature  Response:: Verbalizes understanding           Intervention/Education provided during outreach:                                                       RNCC completed education and chemo teach on Aza and Hasmukh. Patient will receive an additional teach from oral chemo for Hasmukh. RNCC reviewed side effects, what to expect and plan for the days of chemo including lab work and eventually possible transfusions.      Reviewed when to call triage and triage phone number. Reviewed  not using RNCC voicemail or my chart for any urgent items. Patient stated an understanding of this information and did not have any other questions.        Follow up call in 1-2 weeks  Patient to follow up as scheduled at next appt  Patient to call/MyChart  message with updates  Confirmed patient has clinic and triage numbers    Signature:  Andria Shay RN

## 2024-05-09 ENCOUNTER — TELEPHONE (OUTPATIENT)
Dept: ONCOLOGY | Facility: CLINIC | Age: 69
End: 2024-05-09
Payer: COMMERCIAL

## 2024-05-09 ENCOUNTER — TELEPHONE (OUTPATIENT)
Dept: TRANSPLANT | Facility: CLINIC | Age: 69
End: 2024-05-09
Payer: COMMERCIAL

## 2024-05-09 ENCOUNTER — MYC MEDICAL ADVICE (OUTPATIENT)
Dept: ONCOLOGY | Facility: CLINIC | Age: 69
End: 2024-05-09
Payer: COMMERCIAL

## 2024-05-09 DIAGNOSIS — D46.9 MDS (MYELODYSPLASTIC SYNDROME) (H): Primary | ICD-10-CM

## 2024-05-09 DIAGNOSIS — Z79.899 ENCOUNTER FOR LONG-TERM (CURRENT) USE OF MEDICATIONS: ICD-10-CM

## 2024-05-09 NOTE — TELEPHONE ENCOUNTER
[employer] APPROVED    Medication: VENCLEXTA 100 MG PO TABS  Amount: $ 10,000  [facility] [place] Name: [facility] Phone: [phone]  [facility] Effective Date: 4/9/2024  Foundation Expiration Date: 4/8/2025  Patient Notified: Yes    Thank you,  Lupis Mejia Oncology Liaison  Phone: [phone]  Fax: [phone]

## 2024-05-09 NOTE — TELEPHONE ENCOUNTER
PA Initiation    Medication: VENCLEXTA 100 MG PO TABS  Insurance Company: [employer] - Phone [phone] Fax [phone]  Pharmacy Filling the Rx:    Filling Pharmacy Phone:    Filling Pharmacy Fax:    Start Date: 5/9/2024          Thank you,    No Schumacher  Oncology Pharmacy Liaison II  [email or web address]  Phone: [phone]  Fax: [phone]

## 2024-05-10 ENCOUNTER — TELEPHONE (OUTPATIENT)
Dept: ONCOLOGY | Facility: CLINIC | Age: 69
End: 2024-05-10
Payer: COMMERCIAL

## 2024-05-10 LAB
ACANTHOCYTES BLD QL SMEAR: SLIGHT
BASOPHILS # BLD MANUAL: 0 10E3/UL (ref 0–0.2)
BASOPHILS NFR BLD MANUAL: 0 %
BURR CELLS BLD QL SMEAR: SLIGHT
COPPER SERPL-MCNC: 108.1 UG/DL
DACRYOCYTES BLD QL SMEAR: SLIGHT
EOSINOPHIL # BLD MANUAL: 0.1 10E3/UL (ref 0–0.7)
EOSINOPHIL NFR BLD MANUAL: 1 %
LYMPHOCYTES # BLD MANUAL: 3 10E3/UL (ref 0.8–5.3)
LYMPHOCYTES NFR BLD MANUAL: 58 %
MONOCYTES # BLD MANUAL: 0.1 10E3/UL (ref 0–1.3)
MONOCYTES NFR BLD MANUAL: 2 %
NEUTROPHILS # BLD MANUAL: 1.5 10E3/UL (ref 1.6–8.3)
NEUTROPHILS NFR BLD MANUAL: 28 %
OTHER CELLS # BLD MANUAL: 0.6 10E3/UL
OTHER CELLS NFR BLD MANUAL: 11 %
PATH REV: ABNORMAL
PLAT MORPH BLD: ABNORMAL
POLYCHROMASIA BLD QL SMEAR: SLIGHT
RBC MORPH BLD: ABNORMAL
ZINC SERPL-MCNC: 74.7 UG/DL

## 2024-05-10 NOTE — ORAL ONC MGMT
Oral Chemotherapy Monitoring Program    [facility] provider: Dr Delcid  Drug: venetoclax    Lab Monitoring Plan        Subjective/Objective:  Leland Pearson is a 69 year old male contacted by phone for an initial visit for oral chemotherapy education.          Assessment:  Patient is appropriate to start therapy with first infusion appt (not scheduled yet, anticipated 5/20/24). Venetoclax order needs to be signed before can be released to pharmacy.    Plan:  Basic chemotherapy teaching was reviewed with the patient and the patient's family including indication, start date of therapy, dose, administration, adverse effects, missed doses, food and drug interactions, monitoring, side effect management, office contact information, and safe handling. Written materials were provided and all questions answered.    Follow-Up:  1 week following initial start of therapy    Robyn Eason PharmD  Oral Chemotherapy Monitoring Program  [facility]  [phone]  May 10, 2024            5/9/2024     9:00 AM 5/10/2024     2:00 PM   ORAL CHEMOTHERAPY   Assessment Type Initial Work up New Teach   Diagnosis Code Myelodysplastic Syndrome Myelodysplastic Syndrome   Providers Dr Bhavin Delcid   Clinic Name/Location [facility]   Is this patient followed by the [facility] OC team? No No   Drug Name Venclexta (venetoclax) Venclexta (venetoclax)   Dose 400 mg 400 mg   Current Schedule Daily Daily   Cycle Details 2 weeks on, 2 weeks off 2 weeks on, 2 weeks off   Planned next cycle start date 5/20/2024 5/20/2024   Any new drug interactions? No No   Is the dose as ordered appropriate for the patient?  Yes       Last PHQ-2 Score on record:        No data to display                Vitals:  BP:   BP Readings from Last 1 Encounters:   05/08/24 (!) 145/82     Wt Readings from Last 1 Encounters:   05/08/24 91.9 kg (202 lb 11.2 oz)     Estimated body surface area is 2.17 meters squared as calculated from the following:    Height as of  "5/8/24: 1.837 m (6' 0.34\").    Weight as of 5/8/24: 91.9 kg (202 lb 11.2 oz).    Labs:  _  Result Component Current Result Ref Range   Sodium 139 (5/8/2024) 135 - 145 mmol/L     _  Result Component Current Result Ref Range   Potassium 4.4 (5/8/2024) 3.4 - 5.3 mmol/L     _  Result Component Current Result Ref Range   Calcium 9.5 (5/8/2024) 8.8 - 10.2 mg/dL     _  Result Component Current Result Ref Range   Magnesium 2.1 (5/8/2024) 1.7 - 2.3 mg/dL     _  Result Component Current Result Ref Range   Phosphorus 2.7 (5/8/2024) 2.5 - 4.5 mg/dL     _  Result Component Current Result Ref Range   Albumin 4.1 (5/8/2024) 3.5 - 5.2 g/dL     _  Result Component Current Result Ref Range   Urea Nitrogen 14.7 (5/8/2024) 8.0 - 23.0 mg/dL     _  Result Component Current Result Ref Range   Creatinine 0.84 (5/8/2024) 0.67 - 1.17 mg/dL     _  Result Component Current Result Ref Range   AST 43 (5/8/2024) 0 - 45 U/L     _  Result Component Current Result Ref Range   ALT 21 (5/8/2024) 0 - 70 U/L     _  Result Component Current Result Ref Range   Bilirubin Total 0.6 (5/8/2024) <=1.2 mg/dL     _  Result Component Current Result Ref Range   WBC Count 5.2 (5/8/2024) 4.0 - 11.0 10e3/uL     _  Result Component Current Result Ref Range   Hemoglobin 13.4 (5/8/2024) 13.3 - 17.7 g/dL     _  Result Component Current Result Ref Range   Platelet Count 70 (L) (5/8/2024) 150 - 450 10e3/uL     _  Result Component Current Result Ref Range   Absolute Neutrophils 1.5 (L) (5/8/2024) 1.6 - 8.3 10e3/uL         "

## 2024-05-13 LAB
A*: NORMAL
A*LOCUS SEROLOGIC EQUIVALENT: 2
A*LOCUS: NORMAL
A*SEROLOGIC EQUIVALENT: 3
ABTEST METHOD: NORMAL
B*: NORMAL
B*LOCUS SEROLOGIC EQUIVALENT: 64
B*LOCUS: NORMAL
B*SEROLOGIC EQUIVALENT: 62
BW-1: NORMAL
C*: NORMAL
C*LOCUS SEROLOGIC EQUIVALENT: 10
C*LOCUS: NORMAL
C*SEROLOGIC EQUIVALENT: 8
DPA1*: NORMAL
DPB1*: NORMAL
DPB1*LOCUS NMDP: NORMAL
DPB1*LOCUS: NORMAL
DPB1*NMDP: NORMAL
DQA1*: NORMAL
DQA1*LOCUS: NORMAL
DQB1*: NORMAL
DQB1*LOCUS SEROLOGIC EQUIVALENT: 2
DQB1*LOCUS: NORMAL
DQB1*SEROLOGIC EQUIVALENT: 8
DRB1*: NORMAL
DRB1*LOCUS SEROLOGIC EQUIVALENT: 4
DRB1*LOCUS: NORMAL
DRB1*SEROLOGIC EQUIVALENT: 7
DRB4*: NORMAL
DRB4*LOCUS SEROLOGIC EQUIVALENT: 53
DRB4*LOCUS: NORMAL
DRB4*SEROLOGIC EQUIVALENT: 53
DRSSO TEST METHOD: NORMAL

## 2024-05-14 DIAGNOSIS — D46.9 MDS (MYELODYSPLASTIC SYNDROME) (H): Primary | ICD-10-CM

## 2024-05-15 ENCOUNTER — LAB (OUTPATIENT)
Dept: LAB | Facility: CLINIC | Age: 69
End: 2024-05-15
Payer: COMMERCIAL

## 2024-05-15 ENCOUNTER — PATIENT OUTREACH (OUTPATIENT)
Dept: ONCOLOGY | Facility: CLINIC | Age: 69
End: 2024-05-15

## 2024-05-15 DIAGNOSIS — D46.9 MDS (MYELODYSPLASTIC SYNDROME) (H): ICD-10-CM

## 2024-05-15 DIAGNOSIS — Z79.899 ENCOUNTER FOR LONG-TERM (CURRENT) USE OF MEDICATIONS: ICD-10-CM

## 2024-05-15 LAB
BASOPHILS # BLD AUTO: ABNORMAL 10*3/UL
BASOPHILS NFR BLD AUTO: ABNORMAL %
EOSINOPHIL # BLD AUTO: ABNORMAL 10*3/UL
EOSINOPHIL NFR BLD AUTO: ABNORMAL %
ERYTHROCYTE [DISTWIDTH] IN BLOOD BY AUTOMATED COUNT: 15.8 % (ref 10–15)
HCT VFR BLD AUTO: 40.8 % (ref 40–53)
HGB BLD-MCNC: 12.9 G/DL (ref 13.3–17.7)
IMM GRANULOCYTES # BLD: ABNORMAL 10*3/UL
IMM GRANULOCYTES NFR BLD: ABNORMAL %
LYMPHOCYTES # BLD AUTO: ABNORMAL 10*3/UL
LYMPHOCYTES NFR BLD AUTO: ABNORMAL %
MCH RBC QN AUTO: 28.9 PG (ref 26.5–33)
MCHC RBC AUTO-ENTMCNC: 31.6 G/DL (ref 31.5–36.5)
MCV RBC AUTO: 92 FL (ref 78–100)
MONOCYTES # BLD AUTO: ABNORMAL 10*3/UL
MONOCYTES NFR BLD AUTO: ABNORMAL %
NEUTROPHILS # BLD AUTO: ABNORMAL 10*3/UL
NEUTROPHILS NFR BLD AUTO: ABNORMAL %
NRBC # BLD AUTO: 0 10E3/UL
NRBC BLD AUTO-RTO: 0 /100
PLATELET # BLD AUTO: ABNORMAL 10*3/UL
RBC # BLD AUTO: 4.46 10E6/UL (ref 4.4–5.9)
WBC # BLD AUTO: 8 10E3/UL (ref 4–11)

## 2024-05-15 PROCEDURE — 85007 BL SMEAR W/DIFF WBC COUNT: CPT

## 2024-05-15 PROCEDURE — 84100 ASSAY OF PHOSPHORUS: CPT

## 2024-05-15 PROCEDURE — 84550 ASSAY OF BLOOD/URIC ACID: CPT

## 2024-05-15 PROCEDURE — 36415 COLL VENOUS BLD VENIPUNCTURE: CPT

## 2024-05-15 PROCEDURE — 85048 AUTOMATED LEUKOCYTE COUNT: CPT

## 2024-05-15 PROCEDURE — 80053 COMPREHEN METABOLIC PANEL: CPT

## 2024-05-15 PROCEDURE — 85041 AUTOMATED RBC COUNT: CPT

## 2024-05-15 PROCEDURE — 85014 HEMATOCRIT: CPT

## 2024-05-15 PROCEDURE — 85018 HEMOGLOBIN: CPT

## 2024-05-15 RX ORDER — ALLOPURINOL 300 MG/1
300 TABLET ORAL DAILY
Qty: 90 TABLET | Refills: 1 | Status: SHIPPED | OUTPATIENT
Start: 2024-05-15 | End: 2024-09-20

## 2024-05-15 RX ORDER — ACYCLOVIR 800 MG/1
800 TABLET ORAL EVERY 12 HOURS
Qty: 60 TABLET | Refills: 4 | Status: SHIPPED | OUTPATIENT
Start: 2024-05-15 | End: 2024-10-12

## 2024-05-15 NOTE — PROGRESS NOTES
[facility]: Cancer Care Plan of Care Education Note                                    Discussion with Patient:                                                      RNCC received my chart message from patient with a variety of questions. RNCC responded to all questions.          Goals          General     Functional (pt-stated)      Notes - Note created  5/17/2024  2:33 PM by Andria Shay RN     Goal Statement: I will use my clinic and care team resources as directed.  Date Goal set: 5/17/2024  Barriers: disease burden  Strengths: support, health awareness, and involvement with care team  Date to Achieve By: ongoing  Patient expressed understanding of goal: Yes  Action steps to achieve this goal:  I will contact triage with new, worsening or uncontrolled symptoms.   I will contact triage with temperature over 100.4  I will call with difficulties of scheduling and/or transportation.   I will request needed refills when there are 7 days of medication remaining.   I will not send urgent or symptomatic messages through [email or web address].   I will contact scheduling to arrange or make changes in my appointments.                 Assessment:                                                      Assessment completed with:: Patient    Plan of Care Education   Yearly learning assessment completed?: Yes (see Education tab)  Diagnosis:: MDS  Does patient understand diagnosis?: Yes  Tx plan/regimen:: Starting Aza/Hasmukh  Does patient understand treatment plan/regimen?: Yes  Safety/self care at home reviewed with patient:: Yes  Additional education provided for: : Neutropenic precautions;Other (comment) (Water intake, Air conditioning, being outside)    Evaluation of Learning  Patient Education Provided: Yes  Readiness:: Acceptance  Method:: Explanation  Response:: Verbalizes understanding    No assessment indicated    Intervention/Education provided during outreach:                                                       RNCC  educated patient on timing of dental appointments, Drinking extra water, the use of his air conditioning, using a cleanly lady and vaccines.       Patient to follow up as scheduled at next appt  Patient to call/[employer] message with updates  Confirmed patient has clinic and triage numbers    Signature:  Andria Shay RN

## 2024-05-16 LAB
ALBUMIN SERPL BCG-MCNC: 4.2 G/DL (ref 3.5–5.2)
ALP SERPL-CCNC: 86 U/L (ref 40–150)
ALT SERPL W P-5'-P-CCNC: 30 U/L (ref 0–70)
ANION GAP SERPL CALCULATED.3IONS-SCNC: 11 MMOL/L (ref 7–15)
AST SERPL W P-5'-P-CCNC: 47 U/L (ref 0–45)
BASOPHILS # BLD MANUAL: 0 10E3/UL (ref 0–0.2)
BASOPHILS NFR BLD MANUAL: 0 %
BILIRUB SERPL-MCNC: 0.6 MG/DL
BUN SERPL-MCNC: 13.2 MG/DL (ref 8–23)
CALCIUM SERPL-MCNC: 9.6 MG/DL (ref 8.8–10.2)
CHLORIDE SERPL-SCNC: 101 MMOL/L (ref 98–107)
CREAT SERPL-MCNC: 0.92 MG/DL (ref 0.67–1.17)
DEPRECATED HCO3 PLAS-SCNC: 26 MMOL/L (ref 22–29)
EGFRCR SERPLBLD CKD-EPI 2021: 89 ML/MIN/1.73M2
EOSINOPHIL # BLD MANUAL: 0 10E3/UL (ref 0–0.7)
EOSINOPHIL NFR BLD MANUAL: 0 %
GLUCOSE SERPL-MCNC: 92 MG/DL (ref 70–99)
LYMPHOCYTES # BLD MANUAL: 5 10E3/UL (ref 0.8–5.3)
LYMPHOCYTES NFR BLD MANUAL: 62 %
METAMYELOCYTES # BLD MANUAL: 0.1 10E3/UL
METAMYELOCYTES NFR BLD MANUAL: 1 %
MONOCYTES # BLD MANUAL: 0.6 10E3/UL (ref 0–1.3)
MONOCYTES NFR BLD MANUAL: 7 %
MYELOCYTES # BLD MANUAL: 0.1 10E3/UL
MYELOCYTES NFR BLD MANUAL: 1 %
NEUTROPHILS # BLD MANUAL: 2.3 10E3/UL (ref 1.6–8.3)
NEUTROPHILS NFR BLD MANUAL: 29 %
PATH REV: ABNORMAL
PHOSPHATE SERPL-MCNC: 3.6 MG/DL (ref 2.5–4.5)
PLAT MORPH BLD: ABNORMAL
POTASSIUM SERPL-SCNC: 4.6 MMOL/L (ref 3.4–5.3)
PROT SERPL-MCNC: 7.5 G/DL (ref 6.4–8.3)
RBC MORPH BLD: ABNORMAL
SMUDGE CELLS BLD QL SMEAR: PRESENT
SODIUM SERPL-SCNC: 138 MMOL/L (ref 135–145)
URATE SERPL-MCNC: 4.6 MG/DL (ref 3.4–7)

## 2024-05-17 ENCOUNTER — DOCUMENTATION ONLY (OUTPATIENT)
Dept: INFUSION THERAPY | Facility: CLINIC | Age: 69
End: 2024-05-17
Payer: COMMERCIAL

## 2024-05-17 ENCOUNTER — TELEPHONE (OUTPATIENT)
Dept: ONCOLOGY | Facility: CLINIC | Age: 69
End: 2024-05-17
Payer: COMMERCIAL

## 2024-05-17 ENCOUNTER — DOCUMENTATION ONLY (OUTPATIENT)
Facility: CLINIC | Age: 69
End: 2024-05-17
Payer: COMMERCIAL

## 2024-05-17 ENCOUNTER — PATIENT OUTREACH (OUTPATIENT)
Dept: ONCOLOGY | Facility: CLINIC | Age: 69
End: 2024-05-17
Payer: COMMERCIAL

## 2024-05-17 DIAGNOSIS — D46.9 MDS (MYELODYSPLASTIC SYNDROME) (H): Primary | ICD-10-CM

## 2024-05-17 RX ORDER — EPINEPHRINE 1 MG/ML
0.3 INJECTION, SOLUTION INTRAMUSCULAR; SUBCUTANEOUS EVERY 5 MIN PRN
Status: CANCELLED | OUTPATIENT
Start: 2024-05-24

## 2024-05-17 RX ORDER — DIPHENHYDRAMINE HYDROCHLORIDE 50 MG/ML
50 INJECTION INTRAMUSCULAR; INTRAVENOUS
Status: CANCELLED
Start: 2024-05-20

## 2024-05-17 RX ORDER — ALBUTEROL SULFATE 0.83 MG/ML
2.5 SOLUTION RESPIRATORY (INHALATION)
Status: CANCELLED | OUTPATIENT
Start: 2024-05-20

## 2024-05-17 RX ORDER — EPINEPHRINE 1 MG/ML
0.3 INJECTION, SOLUTION INTRAMUSCULAR; SUBCUTANEOUS EVERY 5 MIN PRN
Status: CANCELLED | OUTPATIENT
Start: 2024-05-22

## 2024-05-17 RX ORDER — METHYLPREDNISOLONE SODIUM SUCCINATE 125 MG/2ML
125 INJECTION, POWDER, LYOPHILIZED, FOR SOLUTION INTRAMUSCULAR; INTRAVENOUS
Status: CANCELLED
Start: 2024-05-24

## 2024-05-17 RX ORDER — METHYLPREDNISOLONE SODIUM SUCCINATE 125 MG/2ML
125 INJECTION, POWDER, LYOPHILIZED, FOR SOLUTION INTRAMUSCULAR; INTRAVENOUS
Status: CANCELLED
Start: 2024-05-26

## 2024-05-17 RX ORDER — EPINEPHRINE 1 MG/ML
0.3 INJECTION, SOLUTION INTRAMUSCULAR; SUBCUTANEOUS EVERY 5 MIN PRN
Status: CANCELLED | OUTPATIENT
Start: 2024-05-23

## 2024-05-17 RX ORDER — ALBUTEROL SULFATE 90 UG/1
1-2 AEROSOL, METERED RESPIRATORY (INHALATION)
Status: CANCELLED
Start: 2024-05-20

## 2024-05-17 RX ORDER — LORAZEPAM 2 MG/ML
0.5 INJECTION INTRAMUSCULAR EVERY 4 HOURS PRN
Status: CANCELLED | OUTPATIENT
Start: 2024-05-26

## 2024-05-17 RX ORDER — MEPERIDINE HYDROCHLORIDE 25 MG/ML
25 INJECTION INTRAMUSCULAR; INTRAVENOUS; SUBCUTANEOUS EVERY 30 MIN PRN
Status: CANCELLED | OUTPATIENT
Start: 2024-05-26

## 2024-05-17 RX ORDER — ALBUTEROL SULFATE 90 UG/1
1-2 AEROSOL, METERED RESPIRATORY (INHALATION)
Status: CANCELLED
Start: 2024-05-25

## 2024-05-17 RX ORDER — MEPERIDINE HYDROCHLORIDE 25 MG/ML
25 INJECTION INTRAMUSCULAR; INTRAVENOUS; SUBCUTANEOUS EVERY 30 MIN PRN
Status: CANCELLED | OUTPATIENT
Start: 2024-05-22

## 2024-05-17 RX ORDER — ALBUTEROL SULFATE 90 UG/1
1-2 AEROSOL, METERED RESPIRATORY (INHALATION)
Status: CANCELLED
Start: 2024-05-22

## 2024-05-17 RX ORDER — ALBUTEROL SULFATE 0.83 MG/ML
2.5 SOLUTION RESPIRATORY (INHALATION)
Status: CANCELLED | OUTPATIENT
Start: 2024-05-23

## 2024-05-17 RX ORDER — EPINEPHRINE 1 MG/ML
0.3 INJECTION, SOLUTION INTRAMUSCULAR; SUBCUTANEOUS EVERY 5 MIN PRN
Status: CANCELLED | OUTPATIENT
Start: 2024-05-26

## 2024-05-17 RX ORDER — ALBUTEROL SULFATE 0.83 MG/ML
2.5 SOLUTION RESPIRATORY (INHALATION)
Status: CANCELLED | OUTPATIENT
Start: 2024-05-22

## 2024-05-17 RX ORDER — MEPERIDINE HYDROCHLORIDE 25 MG/ML
25 INJECTION INTRAMUSCULAR; INTRAVENOUS; SUBCUTANEOUS EVERY 30 MIN PRN
Status: CANCELLED | OUTPATIENT
Start: 2024-05-23

## 2024-05-17 RX ORDER — MEPERIDINE HYDROCHLORIDE 25 MG/ML
25 INJECTION INTRAMUSCULAR; INTRAVENOUS; SUBCUTANEOUS EVERY 30 MIN PRN
Status: CANCELLED | OUTPATIENT
Start: 2024-05-24

## 2024-05-17 RX ORDER — ALBUTEROL SULFATE 90 UG/1
1-2 AEROSOL, METERED RESPIRATORY (INHALATION)
Status: CANCELLED
Start: 2024-05-21

## 2024-05-17 RX ORDER — DIPHENHYDRAMINE HYDROCHLORIDE 50 MG/ML
50 INJECTION INTRAMUSCULAR; INTRAVENOUS
Status: CANCELLED
Start: 2024-05-26

## 2024-05-17 RX ORDER — ALBUTEROL SULFATE 90 UG/1
1-2 AEROSOL, METERED RESPIRATORY (INHALATION)
Status: CANCELLED
Start: 2024-05-23

## 2024-05-17 RX ORDER — METHYLPREDNISOLONE SODIUM SUCCINATE 125 MG/2ML
125 INJECTION, POWDER, LYOPHILIZED, FOR SOLUTION INTRAMUSCULAR; INTRAVENOUS
Status: CANCELLED
Start: 2024-05-22

## 2024-05-17 RX ORDER — MEPERIDINE HYDROCHLORIDE 25 MG/ML
25 INJECTION INTRAMUSCULAR; INTRAVENOUS; SUBCUTANEOUS EVERY 30 MIN PRN
Status: CANCELLED | OUTPATIENT
Start: 2024-05-25

## 2024-05-17 RX ORDER — METHYLPREDNISOLONE SODIUM SUCCINATE 125 MG/2ML
125 INJECTION, POWDER, LYOPHILIZED, FOR SOLUTION INTRAMUSCULAR; INTRAVENOUS
Status: CANCELLED
Start: 2024-05-21

## 2024-05-17 RX ORDER — DIPHENHYDRAMINE HYDROCHLORIDE 50 MG/ML
50 INJECTION INTRAMUSCULAR; INTRAVENOUS
Status: CANCELLED
Start: 2024-05-22

## 2024-05-17 RX ORDER — EPINEPHRINE 1 MG/ML
0.3 INJECTION, SOLUTION INTRAMUSCULAR; SUBCUTANEOUS EVERY 5 MIN PRN
Status: CANCELLED | OUTPATIENT
Start: 2024-05-20

## 2024-05-17 RX ORDER — ALBUTEROL SULFATE 0.83 MG/ML
2.5 SOLUTION RESPIRATORY (INHALATION)
Status: CANCELLED | OUTPATIENT
Start: 2024-05-25

## 2024-05-17 RX ORDER — LORAZEPAM 2 MG/ML
0.5 INJECTION INTRAMUSCULAR EVERY 4 HOURS PRN
Status: CANCELLED | OUTPATIENT
Start: 2024-05-23

## 2024-05-17 RX ORDER — ALBUTEROL SULFATE 90 UG/1
1-2 AEROSOL, METERED RESPIRATORY (INHALATION)
Status: CANCELLED
Start: 2024-05-24

## 2024-05-17 RX ORDER — ALBUTEROL SULFATE 0.83 MG/ML
2.5 SOLUTION RESPIRATORY (INHALATION)
Status: CANCELLED | OUTPATIENT
Start: 2024-05-21

## 2024-05-17 RX ORDER — LORAZEPAM 2 MG/ML
0.5 INJECTION INTRAMUSCULAR EVERY 4 HOURS PRN
Status: CANCELLED | OUTPATIENT
Start: 2024-05-21

## 2024-05-17 RX ORDER — METHYLPREDNISOLONE SODIUM SUCCINATE 125 MG/2ML
125 INJECTION, POWDER, LYOPHILIZED, FOR SOLUTION INTRAMUSCULAR; INTRAVENOUS
Status: CANCELLED
Start: 2024-05-25

## 2024-05-17 RX ORDER — EPINEPHRINE 1 MG/ML
0.3 INJECTION, SOLUTION INTRAMUSCULAR; SUBCUTANEOUS EVERY 5 MIN PRN
Status: CANCELLED | OUTPATIENT
Start: 2024-05-21

## 2024-05-17 RX ORDER — LORAZEPAM 2 MG/ML
0.5 INJECTION INTRAMUSCULAR EVERY 4 HOURS PRN
Status: CANCELLED | OUTPATIENT
Start: 2024-05-24

## 2024-05-17 RX ORDER — METHYLPREDNISOLONE SODIUM SUCCINATE 125 MG/2ML
125 INJECTION, POWDER, LYOPHILIZED, FOR SOLUTION INTRAMUSCULAR; INTRAVENOUS
Status: CANCELLED
Start: 2024-05-20

## 2024-05-17 RX ORDER — LORAZEPAM 2 MG/ML
0.5 INJECTION INTRAMUSCULAR EVERY 4 HOURS PRN
Status: CANCELLED | OUTPATIENT
Start: 2024-05-22

## 2024-05-17 RX ORDER — DIPHENHYDRAMINE HYDROCHLORIDE 50 MG/ML
50 INJECTION INTRAMUSCULAR; INTRAVENOUS
Status: CANCELLED
Start: 2024-05-25

## 2024-05-17 RX ORDER — MEPERIDINE HYDROCHLORIDE 25 MG/ML
25 INJECTION INTRAMUSCULAR; INTRAVENOUS; SUBCUTANEOUS EVERY 30 MIN PRN
Status: CANCELLED | OUTPATIENT
Start: 2024-05-21

## 2024-05-17 RX ORDER — LORAZEPAM 2 MG/ML
0.5 INJECTION INTRAMUSCULAR EVERY 4 HOURS PRN
Status: CANCELLED | OUTPATIENT
Start: 2024-05-25

## 2024-05-17 RX ORDER — ALBUTEROL SULFATE 0.83 MG/ML
2.5 SOLUTION RESPIRATORY (INHALATION)
Status: CANCELLED | OUTPATIENT
Start: 2024-05-24

## 2024-05-17 RX ORDER — LORAZEPAM 2 MG/ML
0.5 INJECTION INTRAMUSCULAR EVERY 4 HOURS PRN
Status: CANCELLED | OUTPATIENT
Start: 2024-05-20

## 2024-05-17 RX ORDER — DIPHENHYDRAMINE HYDROCHLORIDE 50 MG/ML
50 INJECTION INTRAMUSCULAR; INTRAVENOUS
Status: CANCELLED
Start: 2024-05-21

## 2024-05-17 RX ORDER — MEPERIDINE HYDROCHLORIDE 25 MG/ML
25 INJECTION INTRAMUSCULAR; INTRAVENOUS; SUBCUTANEOUS EVERY 30 MIN PRN
Status: CANCELLED | OUTPATIENT
Start: 2024-05-20

## 2024-05-17 RX ORDER — ALBUTEROL SULFATE 0.83 MG/ML
2.5 SOLUTION RESPIRATORY (INHALATION)
Status: CANCELLED | OUTPATIENT
Start: 2024-05-26

## 2024-05-17 RX ORDER — ALBUTEROL SULFATE 90 UG/1
1-2 AEROSOL, METERED RESPIRATORY (INHALATION)
Status: CANCELLED
Start: 2024-05-26

## 2024-05-17 RX ORDER — DIPHENHYDRAMINE HYDROCHLORIDE 50 MG/ML
50 INJECTION INTRAMUSCULAR; INTRAVENOUS
Status: CANCELLED
Start: 2024-05-23

## 2024-05-17 RX ORDER — METHYLPREDNISOLONE SODIUM SUCCINATE 125 MG/2ML
125 INJECTION, POWDER, LYOPHILIZED, FOR SOLUTION INTRAMUSCULAR; INTRAVENOUS
Status: CANCELLED
Start: 2024-05-23

## 2024-05-17 RX ORDER — EPINEPHRINE 1 MG/ML
0.3 INJECTION, SOLUTION INTRAMUSCULAR; SUBCUTANEOUS EVERY 5 MIN PRN
Status: CANCELLED | OUTPATIENT
Start: 2024-05-25

## 2024-05-17 RX ORDER — DIPHENHYDRAMINE HYDROCHLORIDE 50 MG/ML
50 INJECTION INTRAMUSCULAR; INTRAVENOUS
Status: CANCELLED
Start: 2024-05-24

## 2024-05-17 NOTE — TELEPHONE ENCOUNTER
5/17/24 [facility] for patient to call me back to schedule his labs and vidaza.  Please forward patient to me to schedule.  Thank you,     Casie Mata  Complex   [facility]

## 2024-05-17 NOTE — PROGRESS NOTES
[facility]: Cancer Care Plan of Care Education Note                                    Discussion with Patient:                                                      RNCC called patient to discuss starting his on Acyclovir and the plan to repeat a bmbx after C1.         Goals          General     Functional (pt-stated)      Notes - Note created  5/17/2024  2:33 PM by Andria Shay RN     Goal Statement: I will use my clinic and care team resources as directed.  Date Goal set: 5/17/2024  Barriers: disease burden  Strengths: support, health awareness, and involvement with care team  Date to Achieve By: ongoing  Patient expressed understanding of goal: Yes  Action steps to achieve this goal:  I will contact triage with new, worsening or uncontrolled symptoms.   I will contact triage with temperature over 100.4  I will call with difficulties of scheduling and/or transportation.   I will request needed refills when there are 7 days of medication remaining.   I will not send urgent or symptomatic messages through [employer].   I will contact scheduling to arrange or make changes in my appointments.                 Assessment:                                                      Assessment completed with:: Patient    Plan of Care Education   Yearly learning assessment completed?: Yes (see Education tab)  Diagnosis:: MDS  Does patient understand diagnosis?: Yes  Tx plan/regimen:: Start Acyclovir  Does patient understand treatment plan/regimen?: Yes  Additional education provided for: : Change in medication/medication education (Start Acyclovir)    Evaluation of Learning  Patient Education Provided: Yes  Readiness:: Acceptance  Method:: Explanation  Response:: Verbalizes understanding    No assessment indicated    Intervention/Education provided during outreach:                                                       RNCC educated patient that we would like him to start on his Acyclovir now. RNCC stated that we will  have him still hold off on his Posa and Levo.   RNCC also stated that we will want to repeat a bmbx at the end of this cycle to ensure the chemo is effective.   Reviewed  his treatment schedule including times and dates for next week. Reviewed when to call triage and triage phone number. Reviewed  not using RNCC voicemail or my chart for any urgent items. Patient stated an understanding of this information and did not have any other questions.      Follow up call in 1-2 weeks  Patient to follow up as scheduled at next appt  Patient to call/[employer] message with updates  Confirmed patient has clinic and triage numbers    Signature:  Andria Shay RN   Nutrition, metabolism, and development symptoms

## 2024-05-20 ENCOUNTER — TELEPHONE (OUTPATIENT)
Dept: ONCOLOGY | Facility: CLINIC | Age: 69
End: 2024-05-20

## 2024-05-20 ENCOUNTER — TELEPHONE (OUTPATIENT)
Facility: CLINIC | Age: 69
End: 2024-05-20

## 2024-05-20 ENCOUNTER — INFUSION THERAPY VISIT (OUTPATIENT)
Dept: INFUSION THERAPY | Facility: CLINIC | Age: 69
End: 2024-05-20
Attending: STUDENT IN AN ORGANIZED HEALTH CARE EDUCATION/TRAINING PROGRAM
Payer: COMMERCIAL

## 2024-05-20 VITALS
TEMPERATURE: 98.4 F | SYSTOLIC BLOOD PRESSURE: 105 MMHG | HEART RATE: 84 BPM | RESPIRATION RATE: 16 BRPM | DIASTOLIC BLOOD PRESSURE: 68 MMHG | OXYGEN SATURATION: 98 %

## 2024-05-20 DIAGNOSIS — D46.9 MDS (MYELODYSPLASTIC SYNDROME) (H): Primary | ICD-10-CM

## 2024-05-20 DIAGNOSIS — T45.1X5A CHEMOTHERAPY-INDUCED NEUTROPENIA (H): ICD-10-CM

## 2024-05-20 DIAGNOSIS — D70.1 CHEMOTHERAPY-INDUCED NEUTROPENIA (H): ICD-10-CM

## 2024-05-20 LAB
ALBUMIN SERPL BCG-MCNC: 4 G/DL (ref 3.5–5.2)
ALP SERPL-CCNC: 87 U/L (ref 40–150)
ALT SERPL W P-5'-P-CCNC: 36 U/L (ref 0–70)
ANION GAP SERPL CALCULATED.3IONS-SCNC: 12 MMOL/L (ref 7–15)
AST SERPL W P-5'-P-CCNC: 47 U/L (ref 0–45)
BASOPHILS # BLD AUTO: ABNORMAL 10*3/UL
BASOPHILS # BLD MANUAL: 0 10E3/UL (ref 0–0.2)
BASOPHILS NFR BLD AUTO: ABNORMAL %
BASOPHILS NFR BLD MANUAL: 0 %
BILIRUB SERPL-MCNC: 1 MG/DL
BUN SERPL-MCNC: 11.6 MG/DL (ref 8–23)
CALCIUM SERPL-MCNC: 9.4 MG/DL (ref 8.8–10.2)
CHLORIDE SERPL-SCNC: 98 MMOL/L (ref 98–107)
CREAT SERPL-MCNC: 0.94 MG/DL (ref 0.67–1.17)
DEPRECATED HCO3 PLAS-SCNC: 25 MMOL/L (ref 22–29)
EGFRCR SERPLBLD CKD-EPI 2021: 87 ML/MIN/1.73M2
EOSINOPHIL # BLD AUTO: ABNORMAL 10*3/UL
EOSINOPHIL # BLD MANUAL: 0 10E3/UL (ref 0–0.7)
EOSINOPHIL NFR BLD AUTO: ABNORMAL %
EOSINOPHIL NFR BLD MANUAL: 0 %
ERYTHROCYTE [DISTWIDTH] IN BLOOD BY AUTOMATED COUNT: 15.6 % (ref 10–15)
GLUCOSE SERPL-MCNC: 114 MG/DL (ref 70–99)
HCT VFR BLD AUTO: 41.5 % (ref 40–53)
HGB BLD-MCNC: 13.4 G/DL (ref 13.3–17.7)
IMM GRANULOCYTES # BLD: ABNORMAL 10*3/UL
IMM GRANULOCYTES NFR BLD: ABNORMAL %
LYMPHOCYTES # BLD AUTO: ABNORMAL 10*3/UL
LYMPHOCYTES # BLD MANUAL: 2.4 10E3/UL (ref 0.8–5.3)
LYMPHOCYTES NFR BLD AUTO: ABNORMAL %
LYMPHOCYTES NFR BLD MANUAL: 71 %
MCH RBC QN AUTO: 29.2 PG (ref 26.5–33)
MCHC RBC AUTO-ENTMCNC: 32.3 G/DL (ref 31.5–36.5)
MCV RBC AUTO: 90 FL (ref 78–100)
METAMYELOCYTES # BLD MANUAL: 0.2 10E3/UL
METAMYELOCYTES NFR BLD MANUAL: 6 %
MONOCYTES # BLD AUTO: ABNORMAL 10*3/UL
MONOCYTES # BLD MANUAL: 0.1 10E3/UL (ref 0–1.3)
MONOCYTES NFR BLD AUTO: ABNORMAL %
MONOCYTES NFR BLD MANUAL: 4 %
MYELOCYTES # BLD MANUAL: 0.3 10E3/UL
MYELOCYTES NFR BLD MANUAL: 10 %
NEUTROPHILS # BLD AUTO: ABNORMAL 10*3/UL
NEUTROPHILS # BLD MANUAL: 0.3 10E3/UL (ref 1.6–8.3)
NEUTROPHILS NFR BLD AUTO: ABNORMAL %
NEUTROPHILS NFR BLD MANUAL: 9 %
NRBC # BLD AUTO: 0 10E3/UL
NRBC # BLD AUTO: 0 10E3/UL
NRBC BLD AUTO-RTO: 0 /100
NRBC BLD MANUAL-RTO: 1 %
PHOSPHATE SERPL-MCNC: 3 MG/DL (ref 2.5–4.5)
PLAT MORPH BLD: ABNORMAL
PLATELET # BLD AUTO: 73 10E3/UL (ref 150–450)
POTASSIUM SERPL-SCNC: 4.4 MMOL/L (ref 3.4–5.3)
PROT SERPL-MCNC: 7.6 G/DL (ref 6.4–8.3)
RBC # BLD AUTO: 4.59 10E6/UL (ref 4.4–5.9)
RBC MORPH BLD: ABNORMAL
SMUDGE CELLS BLD QL SMEAR: PRESENT
SODIUM SERPL-SCNC: 135 MMOL/L (ref 135–145)
URATE SERPL-MCNC: 4.9 MG/DL (ref 3.4–7)
WBC # BLD AUTO: 3.4 10E3/UL (ref 4–11)

## 2024-05-20 PROCEDURE — 85048 AUTOMATED LEUKOCYTE COUNT: CPT | Performed by: STUDENT IN AN ORGANIZED HEALTH CARE EDUCATION/TRAINING PROGRAM

## 2024-05-20 PROCEDURE — 85007 BL SMEAR W/DIFF WBC COUNT: CPT | Performed by: STUDENT IN AN ORGANIZED HEALTH CARE EDUCATION/TRAINING PROGRAM

## 2024-05-20 PROCEDURE — 250N000011 HC RX IP 250 OP 636: Mod: JW | Performed by: STUDENT IN AN ORGANIZED HEALTH CARE EDUCATION/TRAINING PROGRAM

## 2024-05-20 PROCEDURE — 84550 ASSAY OF BLOOD/URIC ACID: CPT | Performed by: STUDENT IN AN ORGANIZED HEALTH CARE EDUCATION/TRAINING PROGRAM

## 2024-05-20 PROCEDURE — 82040 ASSAY OF SERUM ALBUMIN: CPT | Performed by: STUDENT IN AN ORGANIZED HEALTH CARE EDUCATION/TRAINING PROGRAM

## 2024-05-20 PROCEDURE — 96401 CHEMO ANTI-NEOPL SQ/IM: CPT

## 2024-05-20 PROCEDURE — 84100 ASSAY OF PHOSPHORUS: CPT | Performed by: STUDENT IN AN ORGANIZED HEALTH CARE EDUCATION/TRAINING PROGRAM

## 2024-05-20 PROCEDURE — 36415 COLL VENOUS BLD VENIPUNCTURE: CPT | Performed by: STUDENT IN AN ORGANIZED HEALTH CARE EDUCATION/TRAINING PROGRAM

## 2024-05-20 RX ORDER — POSACONAZOLE 100 MG/1
300 TABLET, DELAYED RELEASE ORAL EVERY MORNING
Qty: 90 TABLET | Refills: 1 | Status: SHIPPED | OUTPATIENT
Start: 2024-05-20 | End: 2024-07-22

## 2024-05-20 RX ORDER — LEVOFLOXACIN 250 MG/1
250 TABLET, FILM COATED ORAL DAILY
Qty: 30 TABLET | Refills: 1 | Status: SHIPPED | OUTPATIENT
Start: 2024-05-20 | End: 2024-06-19

## 2024-05-20 RX ADMIN — AZACITIDINE 160 MG: 100 INJECTION, POWDER, LYOPHILIZED, FOR SOLUTION INTRAVENOUS; SUBCUTANEOUS at 10:15

## 2024-05-20 NOTE — TELEPHONE ENCOUNTER
Prior Authorization Retail Medication Request    Medication/Dose: Posaconazole  100mg   Diagnosis and ICD code (if different than what is on RX):  7417974  New/renewal/insurance change PA/secondary ins. PA:  Previously Tried and Failed:    Rationale:      Insurance   [place]   Insurance ID:  [id number]    Secondary (if applicable):  Insurance ID:      Pharmacy Information (if different than what is on RX)  Name:   Phone:    Fax:

## 2024-05-20 NOTE — TELEPHONE ENCOUNTER
Please advise on the following drug interaction that flagged with new rx for Posaconazole. Should we continue with this medication?       Title Venetoclax / Posaconazole    Risk Rating D: Consider therapy modification    Summary Posaconazole may increase the serum concentration of Venetoclax. Severity Major Reliability Rating Good    Patient Management Posaconazole is contraindicated during the initiation and ramp-up phase of venetoclax if used for chronic lymphocytic leukemia (CLL) or small lymphocytic lymphoma (SLL). If posaconazole use is unavoidable in patients being treated for CLL/SLL, the combination is contraindicated during initiation and ramp-up, but following the ramp-up phase, the venetoclax dose should be reduced to 70 mg daily if being used with posaconazole. If venetoclax is used for acute myeloid leukemia (AML), posaconazole may be used during the initiation and ramp-up phase, but the venetoclax dose should be reduced as follows: 10 mg on day 1, 20 mg on day 2, 50 mg on day 3, and 70 mg on day 4. The steady daily dose of venetoclax should be 70 mg/day when used together with posaconazole in patients with AML. Monitor patients receiving these combinations closely for evidence of excessive venetoclax effects (eg, hematologic toxicity, tumor lysis syndrome). Resume the previous venetoclax dose 2 to 3 days after posaconazole discontinuation.    Discussion In a clinical study summarized in venetoclax prescribing information, coadministration of the strong CY inhibitor posaconazole (300 mg daily) with low-dose venetoclax (50 mg or 100 mg daily) for 7 days resulted in a venetoclax Cmax and AUC that were 1.6- to 1.9-fold and 1.9- to 2.4-fold higher, respectively, compared with use of venetoclax 400 mg alone.1    Venetoclax prescribing information states that for patients being treated for CLL/SLL, use of posaconazole is contraindicated during the initiation and ramp-up phase; after the ramp-up phase the  steady daily dose of venetoclax should be reduced to 70 mg if used concomitantly with posaconazole.1 Among patients being treated for AML, posaconazole may be used during the initiation and ramp-up phase, but the venetoclax dose should be reduced to 10 mg on day 1, 20 mg on day 2, 50 mg on day 3, and 70 mg on day 4.1 The steady daily dose of venetoclax should be reduced to 70 mg in patients with AML receiving concomitant posaconazole. This dose adjustment was also evaluated in PBPK modeling study showing that venetoclax 70 mg combined with posaconazole (up to 500 mg daily) results in similar exposures to use of venetoclax alone.2    In contrast, in one small study of 40 patients with AML treated with venetoclax and taking CY inhibitors (posaconazole [90%], voriconazole [5%], fluconazole [5%]), no initiation and ramp-up phase dose reduction of venetoclax was employed during concomitant therapy and no patients experienced tumor lysis syndrome.3 Additionally, in that study and another, use of venetoclax 100 mg for the steady daily dose in combination with posaconazole was well tolerated.3,4 Use of a venetoclax dose of 100 mg was utilized due to improved feasibility (eg, minimize tablet burden, or in case of difficulty obtaining 70 mg dose).5    The likely mechanism for this interaction is inhibition of CY, an enzyme responsible for venetoclax metabolism    Sarah Miller, PharmD, [facility]  Pharmacy Specialist  [facility]  [phone]

## 2024-05-20 NOTE — TELEPHONE ENCOUNTER
Okay, thank you. We will fill order as written.     This rx does not require a prior authorization. It went thru insurance for $293.11 for 1 month supply. Please disregard prior authorization information needed.     Sarah Miller, PharmD, [facility]  Pharmacy Specialist  [facility]  [phone]

## 2024-05-20 NOTE — TELEPHONE ENCOUNTER
Hello, we received new rx for Posaconazole and drug interaction flags with Venclexta.  Please advise if would like to resume with medication.     Title Venetoclax / Posaconazole    Risk Rating D: Consider therapy modification    Summary Posaconazole may increase the serum concentration of Venetoclax. Severity Major Reliability Rating Good    Patient Management Posaconazole is contraindicated during the initiation and ramp-up phase of venetoclax if used for chronic lymphocytic leukemia (CLL) or small lymphocytic lymphoma (SLL). If posaconazole use is unavoidable in patients being treated for CLL/SLL, the combination is contraindicated during initiation and ramp-up, but following the ramp-up phase, the venetoclax dose should be reduced to 70 mg daily if being used with posaconazole. If venetoclax is used for acute myeloid leukemia (AML), posaconazole may be used during the initiation and ramp-up phase, but the venetoclax dose should be reduced as follows: 10 mg on day 1, 20 mg on day 2, 50 mg on day 3, and 70 mg on day 4. The steady daily dose of venetoclax should be 70 mg/day when used together with posaconazole in patients with AML. Monitor patients receiving these combinations closely for evidence of excessive venetoclax effects (eg, hematologic toxicity, tumor lysis syndrome). Resume the previous venetoclax dose 2 to 3 days after posaconazole discontinuation.    Discussion In a clinical study summarized in venetoclax prescribing information, coadministration of the strong CY inhibitor posaconazole (300 mg daily) with low-dose venetoclax (50 mg or 100 mg daily) for 7 days resulted in a venetoclax Cmax and AUC that were 1.6- to 1.9-fold and 1.9- to 2.4-fold higher, respectively, compared with use of venetoclax 400 mg alone.1    Venetoclax prescribing information states that for patients being treated for CLL/SLL, use of posaconazole is contraindicated during the initiation and ramp-up phase; after the ramp-up  phase the steady daily dose of venetoclax should be reduced to 70 mg if used concomitantly with posaconazole.1 Among patients being treated for AML, posaconazole may be used during the initiation and ramp-up phase, but the venetoclax dose should be reduced to 10 mg on day 1, 20 mg on day 2, 50 mg on day 3, and 70 mg on day 4.1 The steady daily dose of venetoclax should be reduced to 70 mg in patients with AML receiving concomitant posaconazole. This dose adjustment was also evaluated in PBPK modeling study showing that venetoclax 70 mg combined with posaconazole (up to 500 mg daily) results in similar exposures to use of venetoclax alone.2    In contrast, in one small study of 40 patients with AML treated with venetoclax and taking CY inhibitors (posaconazole [90%], voriconazole [5%], fluconazole [5%]), no initiation and ramp-up phase dose reduction of venetoclax was employed during concomitant therapy and no patients experienced tumor lysis syndrome.3 Additionally, in that study and another, use of venetoclax 100 mg for the steady daily dose in combination with posaconazole was well tolerated.3,4 Use of a venetoclax dose of 100 mg was utilized due to improved feasibility (eg, minimize tablet burden, or in case of difficulty obtaining 70 mg dose).5    The likely mechanism for this interaction is inhibition of CY, an enzyme responsible for venetoclax metabolism    Sarah Miller, PharmD, [facility]  Pharmacy Specialist  [facility]  [phone]

## 2024-05-20 NOTE — TELEPHONE ENCOUNTER
See other 5/20 encounter.     Thank you,    No Schumacher  Oncology Pharmacy Liaison THONG [email or web address]  Phone: [phone]  Fax: [phone],

## 2024-05-20 NOTE — TELEPHONE ENCOUNTER
Retail Pharmacy Prior Authorization Team   Phone: [phone]    PA Initiation    Med[facility]: [facility]  Insurance Company: BCBS Platinum Blue - Phone [phone] Fax [phone]  Pharmacy Filling the Rx: [facility] [place]  [place] [address] [facility]  Filling Pharmacy Phone: [phone]  Filling Pharmacy Fax: [phone]  Start Date: 5/20/2024

## 2024-05-20 NOTE — PROGRESS NOTES
Infusion Nursing Note:  Leland Pearson presents today for C1D1 Vidaza/Venetoclax.    Patient seen by provider today: No   present during visit today: Not Applicable.    Note: ANC 0.3. Per ciera Cui to proceed with treatment today. Will start LevAndria oneal RNCC will contact pt about picking up prescription.   Labs are primarily for Venetoclax ramp up, do not need to wait for future labs to give Vidaza at infusion appointments. [facility] pharmacists will be watching labs daily.      Intravenous Access:  Lab draw site L AC, Needle type butterfly, Gauge 23.    Treatment Conditions:  Lab Results   Component Value Date    HGB 13.4 05/20/2024    WBC 3.4 (L) 05/20/2024    ANEU 0.3 (LL) 05/20/2024    PLT 73 (L) 05/20/2024        Lab Results   Component Value Date     05/20/2024    POTASSIUM 4.4 05/20/2024    MAG 2.1 05/08/2024    CR 0.94 05/20/2024    HERBERT 9.4 05/20/2024    BILITOTAL 1.0 05/20/2024    ALBUMIN 4.0 05/20/2024    ALT 36 05/20/2024    AST 47 (H) 05/20/2024       Results reviewed, labs did NOT meet treatment parameters: ANC 0.3, see above.      Post Infusion Assessment:  Patient tolerated injection without incident.  Site patent and intact, free from redness, edema or discomfort.  No evidence of extravasations.       Discharge Plan:   Discharge instructions reviewed with: Patient and Family.  Patient and/or family verbalized understanding of discharge instructions and all questions answered.  AVS to patient via [employer].  Patient will return 5/21/24 for next appointment.   Patient discharged in stable condition accompanied by: wife.  Departure Mode: Ambulatory.      Lupis Jeter RN

## 2024-05-21 ENCOUNTER — INFUSION THERAPY VISIT (OUTPATIENT)
Dept: INFUSION THERAPY | Facility: CLINIC | Age: 69
End: 2024-05-21
Attending: STUDENT IN AN ORGANIZED HEALTH CARE EDUCATION/TRAINING PROGRAM
Payer: COMMERCIAL

## 2024-05-21 VITALS
DIASTOLIC BLOOD PRESSURE: 75 MMHG | RESPIRATION RATE: 18 BRPM | SYSTOLIC BLOOD PRESSURE: 121 MMHG | OXYGEN SATURATION: 97 % | TEMPERATURE: 98.2 F | HEART RATE: 97 BPM

## 2024-05-21 DIAGNOSIS — D46.9 MDS (MYELODYSPLASTIC SYNDROME) (H): Primary | ICD-10-CM

## 2024-05-21 LAB
ALBUMIN SERPL BCG-MCNC: 3.9 G/DL (ref 3.5–5.2)
ALP SERPL-CCNC: 82 U/L (ref 40–150)
ALT SERPL W P-5'-P-CCNC: 31 U/L (ref 0–70)
ANION GAP SERPL CALCULATED.3IONS-SCNC: 11 MMOL/L (ref 7–15)
AST SERPL W P-5'-P-CCNC: 41 U/L (ref 0–45)
BILIRUB SERPL-MCNC: 1 MG/DL
BUN SERPL-MCNC: 16.6 MG/DL (ref 8–23)
CALCIUM SERPL-MCNC: 9.6 MG/DL (ref 8.8–10.2)
CHLORIDE SERPL-SCNC: 98 MMOL/L (ref 98–107)
CREAT SERPL-MCNC: 0.96 MG/DL (ref 0.67–1.17)
DEPRECATED HCO3 PLAS-SCNC: 24 MMOL/L (ref 22–29)
EGFRCR SERPLBLD CKD-EPI 2021: 85 ML/MIN/1.73M2
ERYTHROCYTE [DISTWIDTH] IN BLOOD BY AUTOMATED COUNT: 15.5 % (ref 10–15)
GLUCOSE SERPL-MCNC: 111 MG/DL (ref 70–99)
HCT VFR BLD AUTO: 38.4 % (ref 40–53)
HGB BLD-MCNC: 12.9 G/DL (ref 13.3–17.7)
MCH RBC QN AUTO: 29.8 PG (ref 26.5–33)
MCHC RBC AUTO-ENTMCNC: 33.6 G/DL (ref 31.5–36.5)
MCV RBC AUTO: 89 FL (ref 78–100)
PHOSPHATE SERPL-MCNC: 2.9 MG/DL (ref 2.5–4.5)
PLATELET # BLD AUTO: 72 10E3/UL (ref 150–450)
POTASSIUM SERPL-SCNC: 4.3 MMOL/L (ref 3.4–5.3)
PROT SERPL-MCNC: 7.4 G/DL (ref 6.4–8.3)
RBC # BLD AUTO: 4.33 10E6/UL (ref 4.4–5.9)
SODIUM SERPL-SCNC: 133 MMOL/L (ref 135–145)
URATE SERPL-MCNC: 5.1 MG/DL (ref 3.4–7)
WBC # BLD AUTO: 4 10E3/UL (ref 4–11)

## 2024-05-21 PROCEDURE — 85007 BL SMEAR W/DIFF WBC COUNT: CPT | Performed by: STUDENT IN AN ORGANIZED HEALTH CARE EDUCATION/TRAINING PROGRAM

## 2024-05-21 PROCEDURE — 82040 ASSAY OF SERUM ALBUMIN: CPT | Performed by: STUDENT IN AN ORGANIZED HEALTH CARE EDUCATION/TRAINING PROGRAM

## 2024-05-21 PROCEDURE — 36415 COLL VENOUS BLD VENIPUNCTURE: CPT | Performed by: STUDENT IN AN ORGANIZED HEALTH CARE EDUCATION/TRAINING PROGRAM

## 2024-05-21 PROCEDURE — 85027 COMPLETE CBC AUTOMATED: CPT | Performed by: STUDENT IN AN ORGANIZED HEALTH CARE EDUCATION/TRAINING PROGRAM

## 2024-05-21 PROCEDURE — 84550 ASSAY OF BLOOD/URIC ACID: CPT | Performed by: STUDENT IN AN ORGANIZED HEALTH CARE EDUCATION/TRAINING PROGRAM

## 2024-05-21 PROCEDURE — 84100 ASSAY OF PHOSPHORUS: CPT | Performed by: STUDENT IN AN ORGANIZED HEALTH CARE EDUCATION/TRAINING PROGRAM

## 2024-05-21 PROCEDURE — 96401 CHEMO ANTI-NEOPL SQ/IM: CPT

## 2024-05-21 PROCEDURE — 250N000011 HC RX IP 250 OP 636: Mod: JW | Performed by: STUDENT IN AN ORGANIZED HEALTH CARE EDUCATION/TRAINING PROGRAM

## 2024-05-21 RX ORDER — PROCHLORPERAZINE MALEATE 5 MG
5 TABLET ORAL EVERY 6 HOURS PRN
Qty: 30 TABLET | Refills: 1 | Status: SHIPPED | OUTPATIENT
Start: 2024-05-21 | End: 2024-06-25

## 2024-05-21 RX ADMIN — AZACITIDINE 160 MG: 100 INJECTION, POWDER, LYOPHILIZED, FOR SOLUTION INTRAVENOUS; SUBCUTANEOUS at 10:41

## 2024-05-21 NOTE — PROGRESS NOTES
Oral Chemotherapy Monitoring Program  Lab Follow Up    Reviewed lab results from 5/21/24.        5/9/2024     9:00 AM 5/10/2024     2:00 PM 5/14/2024     9:00 AM 5/21/2024     1:00 PM   ORAL CHEMOTHERAPY   Assessment Type Initial Work up New Teach Refill Lab Monitoring   Diagnosis Code Myelodysplastic Syndrome Myelodysplastic Syndrome Myelodysplastic Syndrome Myelodysplastic Syndrome   Providers Dr Bhavin Delcid   [facility]   Is this patient followed by the [facility] OC team? No No No No   Drug Name Venclexta (venetoclax) Venclexta (venetoclax) Venclexta (venetoclax) Venclexta (venetoclax)   Dose 400 mg 400 mg 400 mg 400 mg   Current Schedule Daily Daily Daily Daily   Cycle Details 2 weeks on, 2 weeks off 2 weeks on, 2 weeks off 2 weeks on, 2 weeks off 2 weeks on, 2 weeks off   Planned next cycle start date 5/20/2024 5/20/2024 5/20/2024 5/20/2024   Any new drug interactions? No No     Is the dose as ordered appropriate for the patient?  Yes         Labs:  _  Result Component Current Result Ref Range   Sodium 133 (L) (5/21/2024) 135 - 145 mmol/L     _  Result Component Current Result Ref Range   Potassium 4.3 (5/21/2024) 3.4 - 5.3 mmol/L     _  Result Component Current Result Ref Range   Calcium 9.6 (5/21/2024) 8.8 - 10.2 mg/dL     _  Result Component Current Result Ref Range   Magnesium 2.1 (5/8/2024) 1.7 - 2.3 mg/dL     _  Result Component Current Result Ref Range   Phosphorus 2.9 (5/21/2024) 2.5 - 4.5 mg/dL     _  Result Component Current Result Ref Range   Albumin 3.9 (5/21/2024) 3.5 - 5.2 g/dL     _  Result Component Current Result Ref Range   Urea Nitrogen 16.6 (5/21/2024) 8.0 - 23.0 mg/dL     _  Result Component Current Result Ref Range   Creatinine 0.96 (5/21/2024) 0.67 - 1.17 mg/dL     _  Result Component Current Result Ref Range   AST 41 (5/21/2024) 0 - 45 U/L     _  Result Component Current Result Ref Range   ALT 31 (5/21/2024) 0 - 70 U/L      _  Result Component Current Result Ref Range   Bilirubin Total 1.0 (5/21/2024) <=1.2 mg/dL     _  Result Component Current Result Ref Range   WBC Count 4.0 (5/21/2024) 4.0 - 11.0 10e3/uL     _  Result Component Current Result Ref Range   Hemoglobin 12.9 (L) (5/21/2024) 13.3 - 17.7 g/dL     _  Result Component Current Result Ref Range   Platelet Count 72 (L) (5/21/2024) 150 - 450 10e3/uL     _  Result Component Current Result Ref Range   Absolute Neutrophils 0.6 (L) (5/21/2024) 1.6 - 8.3 10e3/uL     No results found for ANC within last 30 days.        Assessment & Plan:  No concerning lab abnormalities.    No changes with Venclexta needed at this time.    [employer] message sent to patient.      Roselyn Samaniego, PharmD, BCPS, BCOP  Oncology Clinical Pharmacy Specialist  [facility]  [phone]

## 2024-05-21 NOTE — PROGRESS NOTES
Pt reporting 1 episode of nausea and emesis. Otherwise doing well    Rx for compazine placed    Nael Delcid MD     Division of Hematology, Oncology and Transplantation  [facility]  P: [phone]

## 2024-05-22 ENCOUNTER — PATIENT OUTREACH (OUTPATIENT)
Dept: ONCOLOGY | Facility: CLINIC | Age: 69
End: 2024-05-22

## 2024-05-22 ENCOUNTER — INFUSION THERAPY VISIT (OUTPATIENT)
Dept: INFUSION THERAPY | Facility: CLINIC | Age: 69
End: 2024-05-22
Attending: STUDENT IN AN ORGANIZED HEALTH CARE EDUCATION/TRAINING PROGRAM
Payer: COMMERCIAL

## 2024-05-22 VITALS
OXYGEN SATURATION: 97 % | TEMPERATURE: 98.3 F | HEART RATE: 100 BPM | DIASTOLIC BLOOD PRESSURE: 74 MMHG | RESPIRATION RATE: 16 BRPM | SYSTOLIC BLOOD PRESSURE: 114 MMHG

## 2024-05-22 DIAGNOSIS — D46.9 MDS (MYELODYSPLASTIC SYNDROME) (H): Primary | ICD-10-CM

## 2024-05-22 LAB
ALBUMIN SERPL BCG-MCNC: 4 G/DL (ref 3.5–5.2)
ALP SERPL-CCNC: 77 U/L (ref 40–150)
ALT SERPL W P-5'-P-CCNC: 32 U/L (ref 0–70)
ANION GAP SERPL CALCULATED.3IONS-SCNC: 11 MMOL/L (ref 7–15)
AST SERPL W P-5'-P-CCNC: 46 U/L (ref 0–45)
BASOPHILS # BLD AUTO: ABNORMAL 10*3/UL
BASOPHILS # BLD MANUAL: 0 10E3/UL (ref 0–0.2)
BASOPHILS # BLD MANUAL: 0 10E3/UL (ref 0–0.2)
BASOPHILS NFR BLD AUTO: ABNORMAL %
BASOPHILS NFR BLD MANUAL: 0 %
BASOPHILS NFR BLD MANUAL: 0 %
BILIRUB SERPL-MCNC: 1 MG/DL
BLASTS # BLD MANUAL: 0.4 10E3/UL
BLASTS NFR BLD MANUAL: 9 %
BUN SERPL-MCNC: 17.7 MG/DL (ref 8–23)
CALCIUM SERPL-MCNC: 9.4 MG/DL (ref 8.8–10.2)
CHLORIDE SERPL-SCNC: 96 MMOL/L (ref 98–107)
CREAT SERPL-MCNC: 0.97 MG/DL (ref 0.67–1.17)
DEPRECATED HCO3 PLAS-SCNC: 24 MMOL/L (ref 22–29)
EGFRCR SERPLBLD CKD-EPI 2021: 84 ML/MIN/1.73M2
EOSINOPHIL # BLD AUTO: ABNORMAL 10*3/UL
EOSINOPHIL # BLD MANUAL: 0 10E3/UL (ref 0–0.7)
EOSINOPHIL # BLD MANUAL: 0 10E3/UL (ref 0–0.7)
EOSINOPHIL NFR BLD AUTO: ABNORMAL %
EOSINOPHIL NFR BLD MANUAL: 0 %
EOSINOPHIL NFR BLD MANUAL: 0 %
ERYTHROCYTE [DISTWIDTH] IN BLOOD BY AUTOMATED COUNT: 15.3 % (ref 10–15)
GLUCOSE SERPL-MCNC: 131 MG/DL (ref 70–99)
HCT VFR BLD AUTO: 38.5 % (ref 40–53)
HGB BLD-MCNC: 13.1 G/DL (ref 13.3–17.7)
IMM GRANULOCYTES # BLD: ABNORMAL 10*3/UL
IMM GRANULOCYTES NFR BLD: ABNORMAL %
LYMPHOCYTES # BLD AUTO: ABNORMAL 10*3/UL
LYMPHOCYTES # BLD MANUAL: 1.2 10E3/UL (ref 0.8–5.3)
LYMPHOCYTES # BLD MANUAL: 2 10E3/UL (ref 0.8–5.3)
LYMPHOCYTES NFR BLD AUTO: ABNORMAL %
LYMPHOCYTES NFR BLD MANUAL: 42 %
LYMPHOCYTES NFR BLD MANUAL: 51 %
MCH RBC QN AUTO: 29.6 PG (ref 26.5–33)
MCHC RBC AUTO-ENTMCNC: 34 G/DL (ref 31.5–36.5)
MCV RBC AUTO: 87 FL (ref 78–100)
METAMYELOCYTES # BLD MANUAL: 0 10E3/UL
METAMYELOCYTES # BLD MANUAL: 0.4 10E3/UL
METAMYELOCYTES NFR BLD MANUAL: 1 %
METAMYELOCYTES NFR BLD MANUAL: 15 %
MONOCYTES # BLD AUTO: ABNORMAL 10*3/UL
MONOCYTES # BLD MANUAL: 0.2 10E3/UL (ref 0–1.3)
MONOCYTES # BLD MANUAL: 0.5 10E3/UL (ref 0–1.3)
MONOCYTES NFR BLD AUTO: ABNORMAL %
MONOCYTES NFR BLD MANUAL: 13 %
MONOCYTES NFR BLD MANUAL: 8 %
MYELOCYTES # BLD MANUAL: 0.3 10E3/UL
MYELOCYTES # BLD MANUAL: 0.4 10E3/UL
MYELOCYTES NFR BLD MANUAL: 11 %
MYELOCYTES NFR BLD MANUAL: 12 %
NEUTROPHILS # BLD AUTO: ABNORMAL 10*3/UL
NEUTROPHILS # BLD MANUAL: 0.6 10E3/UL (ref 1.6–8.3)
NEUTROPHILS # BLD MANUAL: 0.7 10E3/UL (ref 1.6–8.3)
NEUTROPHILS NFR BLD AUTO: ABNORMAL %
NEUTROPHILS NFR BLD MANUAL: 15 %
NEUTROPHILS NFR BLD MANUAL: 23 %
NRBC # BLD AUTO: 0 10E3/UL
NRBC BLD AUTO-RTO: 0 /100
PATH REV: ABNORMAL
PHOSPHATE SERPL-MCNC: 2.8 MG/DL (ref 2.5–4.5)
PLAT MORPH BLD: ABNORMAL
PLAT MORPH BLD: ABNORMAL
PLATELET # BLD AUTO: 68 10E3/UL (ref 150–450)
POLYCHROMASIA BLD QL SMEAR: SLIGHT
POTASSIUM SERPL-SCNC: 4.3 MMOL/L (ref 3.4–5.3)
PROT SERPL-MCNC: 7.6 G/DL (ref 6.4–8.3)
RBC # BLD AUTO: 4.43 10E6/UL (ref 4.4–5.9)
RBC MORPH BLD: ABNORMAL
RBC MORPH BLD: ABNORMAL
SMUDGE CELLS BLD QL SMEAR: PRESENT
SODIUM SERPL-SCNC: 131 MMOL/L (ref 135–145)
URATE SERPL-MCNC: 5.1 MG/DL (ref 3.4–7)
WBC # BLD AUTO: 2.9 10E3/UL (ref 4–11)

## 2024-05-22 PROCEDURE — 85027 COMPLETE CBC AUTOMATED: CPT | Performed by: STUDENT IN AN ORGANIZED HEALTH CARE EDUCATION/TRAINING PROGRAM

## 2024-05-22 PROCEDURE — 250N000011 HC RX IP 250 OP 636: Mod: JW | Performed by: STUDENT IN AN ORGANIZED HEALTH CARE EDUCATION/TRAINING PROGRAM

## 2024-05-22 PROCEDURE — 85007 BL SMEAR W/DIFF WBC COUNT: CPT | Performed by: STUDENT IN AN ORGANIZED HEALTH CARE EDUCATION/TRAINING PROGRAM

## 2024-05-22 PROCEDURE — 96401 CHEMO ANTI-NEOPL SQ/IM: CPT

## 2024-05-22 PROCEDURE — 84100 ASSAY OF PHOSPHORUS: CPT | Performed by: STUDENT IN AN ORGANIZED HEALTH CARE EDUCATION/TRAINING PROGRAM

## 2024-05-22 PROCEDURE — 84550 ASSAY OF BLOOD/URIC ACID: CPT | Performed by: STUDENT IN AN ORGANIZED HEALTH CARE EDUCATION/TRAINING PROGRAM

## 2024-05-22 PROCEDURE — 82040 ASSAY OF SERUM ALBUMIN: CPT | Performed by: STUDENT IN AN ORGANIZED HEALTH CARE EDUCATION/TRAINING PROGRAM

## 2024-05-22 PROCEDURE — 36415 COLL VENOUS BLD VENIPUNCTURE: CPT | Performed by: STUDENT IN AN ORGANIZED HEALTH CARE EDUCATION/TRAINING PROGRAM

## 2024-05-22 RX ADMIN — AZACITIDINE 160 MG: 100 INJECTION, POWDER, LYOPHILIZED, FOR SOLUTION INTRAVENOUS; SUBCUTANEOUS at 08:54

## 2024-05-22 NOTE — PROGRESS NOTES
[facility]: Cancer Care Plan of Care Education Note                                    Discussion with Patient:                                                      RNCC calling patient to check in post chemo and increased Nausea.   RNCC spoke with patient and his wife. He stated he started the compazine and is feeling much better. RNCC spoke with patient about his POSA and Levo.     RNCC also stated we will plan to complete a bmbx after C1 and meet with Dr. Delcid.     RNCC stated patients sodium was also low.        Goals          General     Functional (pt-stated)      Notes - Note created  5/17/2024  2:33 PM by Andria Shay, RN     Goal Statement: I will use my clinic and care team resources as directed.  Date Goal set: 5/17/2024  Barriers: disease burden  Strengths: support, health awareness, and involvement with care team  Date to Achieve By: ongoing  Patient expressed understanding of goal: Yes  Action steps to achieve this goal:  I will contact triage with new, worsening or uncontrolled symptoms.   I will contact triage with temperature over 100.4  I will call with difficulties of scheduling and/or transportation.   I will request needed refills when there are 7 days of medication remaining.   I will not send urgent or symptomatic messages through [employer].   I will contact scheduling to arrange or make changes in my appointments.                 Assessment:                                                      Assessment completed with:: Patient    Plan of Care Education   Yearly learning assessment completed?: Yes (see Education tab)  Diagnosis:: MDS on Aza/Hasmukh  Does patient understand diagnosis?: Yes  Tx plan/regimen:: Low sodium, plan for bmbx after C1  Does patient understand treatment plan/regimen?: Yes  Side effect education:: Nausea/Vomiting  Safety/self care at home reviewed with patient:: Yes  Coping - concerns/fears reviewed with patient:: Yes  Plan of Care:: MD follow-up  appointment;Lab appointment  When to call provider:: Bleeding;Uncontrolled diarrhea/constipation;Increased shortness of breath;Uncontrolled nausea/vomiting;New/worsening pain;Shaking chills;Temperature >100.4F  Reasons for deferring treatment reviewed with patient:: Yes  Additional education provided for: : Other (comment) (Increase pedialyte)    Evaluation of Learning  Patient Education Provided: Yes  Readiness:: Acceptance  Method:: Explanation  Response:: Verbalizes understanding    No assessment indicated    Intervention/Education provided during outreach:                                                       RNCC provided education to patient. RNCC stated he should continue to take the Levo but do not start the POSA yet. RNCC stated he needed to mix in some pedialyte with all of his water. RNCC stated we will complete a bmbx at the end of C1.    Reviewed when to call triage and triage phone number. Reviewed  not using RNCC voicemail or my chart for any urgent items. Patient stated an understanding of this information and did not have any other questions.           Patient to follow up as scheduled at next appt  Patient to call/[employer] message with updates  Confirmed patient has clinic and triage numbers    Signature:  Andria Shay RN

## 2024-05-22 NOTE — TELEPHONE ENCOUNTER
Prior Authorization Approval    Medication: POSACONAZOLE 100 MG PO [facility]  Authorization Effective Date: 5/20/2024  Authorization Expiration Date: 11/20/2024  Approved Dose/Quantity:   Reference #:     Insurance Company: [employer] Platinum Blue - Phone [phone] Fax [phone]  Expected CoPay: $    CoPay Card Available:      Financial Assistance Needed:   Which Pharmacy is filling the prescription: [facility] [place] - [address] [address]  Pharmacy Notified:   Patient Notified:

## 2024-05-22 NOTE — PROGRESS NOTES
Infusion Nursing Note:  Leland Pearson presents today for C1D3 Vidaza.    Patient seen by provider today: No   present during visit today: Not Applicable.    Note: N/A.      Intravenous Access:  Lab draw site Right AC, Needle type Butterfly, Gauge 23.  Labs drawn without difficulty.    Treatment Conditions:  Not Applicable.      Post Infusion Assessment:  Patient tolerated injection without incident.  Site patent and intact, free from redness, edema or discomfort.  No evidence of extravasations.       Discharge Plan:   Patient declined prescription refills.  Discharge instructions reviewed with: Patient.  Patient and/or family verbalized understanding of discharge instructions and all questions answered.  AVS to patient via [employer].  Patient will return 5/23 for next appointment.   Patient discharged in stable condition accompanied by: self and wife.  Departure Mode: Ambulatory.      Charli Cohen RN

## 2024-05-23 ENCOUNTER — INFUSION THERAPY VISIT (OUTPATIENT)
Dept: INFUSION THERAPY | Facility: CLINIC | Age: 69
End: 2024-05-23
Attending: STUDENT IN AN ORGANIZED HEALTH CARE EDUCATION/TRAINING PROGRAM
Payer: COMMERCIAL

## 2024-05-23 ENCOUNTER — DOCUMENTATION ONLY (OUTPATIENT)
Dept: ONCOLOGY | Facility: CLINIC | Age: 69
End: 2024-05-23

## 2024-05-23 VITALS
DIASTOLIC BLOOD PRESSURE: 67 MMHG | OXYGEN SATURATION: 98 % | TEMPERATURE: 98.1 F | SYSTOLIC BLOOD PRESSURE: 100 MMHG | HEART RATE: 102 BPM | RESPIRATION RATE: 16 BRPM

## 2024-05-23 DIAGNOSIS — D46.9 MDS (MYELODYSPLASTIC SYNDROME) (H): Primary | ICD-10-CM

## 2024-05-23 LAB
ALBUMIN SERPL BCG-MCNC: 3.8 G/DL (ref 3.5–5.2)
ALP SERPL-CCNC: 81 U/L (ref 40–150)
ALT SERPL W P-5'-P-CCNC: 28 U/L (ref 0–70)
ANION GAP SERPL CALCULATED.3IONS-SCNC: 11 MMOL/L (ref 7–15)
AST SERPL W P-5'-P-CCNC: 34 U/L (ref 0–45)
BASOPHILS # BLD AUTO: ABNORMAL 10*3/UL
BASOPHILS # BLD MANUAL: 0 10E3/UL (ref 0–0.2)
BASOPHILS NFR BLD AUTO: ABNORMAL %
BASOPHILS NFR BLD MANUAL: 0 %
BILIRUB SERPL-MCNC: 0.8 MG/DL
BUN SERPL-MCNC: 19.5 MG/DL (ref 8–23)
CALCIUM SERPL-MCNC: 9.5 MG/DL (ref 8.8–10.2)
CHLORIDE SERPL-SCNC: 97 MMOL/L (ref 98–107)
CREAT SERPL-MCNC: 1.02 MG/DL (ref 0.67–1.17)
DEPRECATED HCO3 PLAS-SCNC: 23 MMOL/L (ref 22–29)
EGFRCR SERPLBLD CKD-EPI 2021: 79 ML/MIN/1.73M2
EOSINOPHIL # BLD AUTO: ABNORMAL 10*3/UL
EOSINOPHIL # BLD MANUAL: 0 10E3/UL (ref 0–0.7)
EOSINOPHIL NFR BLD AUTO: ABNORMAL %
EOSINOPHIL NFR BLD MANUAL: 0 %
ERYTHROCYTE [DISTWIDTH] IN BLOOD BY AUTOMATED COUNT: 15 % (ref 10–15)
GLUCOSE SERPL-MCNC: 123 MG/DL (ref 70–99)
HCT VFR BLD AUTO: 39.1 % (ref 40–53)
HGB BLD-MCNC: 13 G/DL (ref 13.3–17.7)
IMM GRANULOCYTES # BLD: ABNORMAL 10*3/UL
IMM GRANULOCYTES NFR BLD: ABNORMAL %
LYMPHOCYTES # BLD AUTO: ABNORMAL 10*3/UL
LYMPHOCYTES # BLD MANUAL: 1.3 10E3/UL (ref 0.8–5.3)
LYMPHOCYTES NFR BLD AUTO: ABNORMAL %
LYMPHOCYTES NFR BLD MANUAL: 48 %
MCH RBC QN AUTO: 29.7 PG (ref 26.5–33)
MCHC RBC AUTO-ENTMCNC: 33.2 G/DL (ref 31.5–36.5)
MCV RBC AUTO: 89 FL (ref 78–100)
METAMYELOCYTES # BLD MANUAL: 0.1 10E3/UL
METAMYELOCYTES NFR BLD MANUAL: 3 %
MONOCYTES # BLD AUTO: ABNORMAL 10*3/UL
MONOCYTES # BLD MANUAL: 0.4 10E3/UL (ref 0–1.3)
MONOCYTES NFR BLD AUTO: ABNORMAL %
MONOCYTES NFR BLD MANUAL: 16 %
MYELOCYTES # BLD MANUAL: 0.3 10E3/UL
MYELOCYTES NFR BLD MANUAL: 10 %
NEUTROPHILS # BLD AUTO: ABNORMAL 10*3/UL
NEUTROPHILS # BLD MANUAL: 0.6 10E3/UL (ref 1.6–8.3)
NEUTROPHILS NFR BLD AUTO: ABNORMAL %
NEUTROPHILS NFR BLD MANUAL: 23 %
NRBC # BLD AUTO: 0 10E3/UL
NRBC BLD AUTO-RTO: 0 /100
PHOSPHATE SERPL-MCNC: 2.9 MG/DL (ref 2.5–4.5)
PLAT MORPH BLD: ABNORMAL
PLATELET # BLD AUTO: 65 10E3/UL (ref 150–450)
POTASSIUM SERPL-SCNC: 4.4 MMOL/L (ref 3.4–5.3)
PROT SERPL-MCNC: 7.4 G/DL (ref 6.4–8.3)
RBC # BLD AUTO: 4.38 10E6/UL (ref 4.4–5.9)
RBC MORPH BLD: ABNORMAL
SMUDGE CELLS BLD QL SMEAR: PRESENT
SODIUM SERPL-SCNC: 131 MMOL/L (ref 135–145)
URATE SERPL-MCNC: 5.1 MG/DL (ref 3.4–7)
WBC # BLD AUTO: 2.7 10E3/UL (ref 4–11)

## 2024-05-23 PROCEDURE — 250N000011 HC RX IP 250 OP 636: Performed by: STUDENT IN AN ORGANIZED HEALTH CARE EDUCATION/TRAINING PROGRAM

## 2024-05-23 PROCEDURE — 85007 BL SMEAR W/DIFF WBC COUNT: CPT | Performed by: STUDENT IN AN ORGANIZED HEALTH CARE EDUCATION/TRAINING PROGRAM

## 2024-05-23 PROCEDURE — 36415 COLL VENOUS BLD VENIPUNCTURE: CPT | Performed by: STUDENT IN AN ORGANIZED HEALTH CARE EDUCATION/TRAINING PROGRAM

## 2024-05-23 PROCEDURE — 96401 CHEMO ANTI-NEOPL SQ/IM: CPT

## 2024-05-23 PROCEDURE — 84550 ASSAY OF BLOOD/URIC ACID: CPT | Performed by: STUDENT IN AN ORGANIZED HEALTH CARE EDUCATION/TRAINING PROGRAM

## 2024-05-23 PROCEDURE — 85027 COMPLETE CBC AUTOMATED: CPT | Performed by: STUDENT IN AN ORGANIZED HEALTH CARE EDUCATION/TRAINING PROGRAM

## 2024-05-23 PROCEDURE — 82040 ASSAY OF SERUM ALBUMIN: CPT | Performed by: STUDENT IN AN ORGANIZED HEALTH CARE EDUCATION/TRAINING PROGRAM

## 2024-05-23 PROCEDURE — 84100 ASSAY OF PHOSPHORUS: CPT | Performed by: STUDENT IN AN ORGANIZED HEALTH CARE EDUCATION/TRAINING PROGRAM

## 2024-05-23 RX ADMIN — AZACITIDINE 160 MG: 100 INJECTION, POWDER, LYOPHILIZED, FOR SOLUTION INTRAVENOUS; SUBCUTANEOUS at 09:32

## 2024-05-23 NOTE — PROGRESS NOTES
Infusion Nursing Note:  Leland THOMPSON Michel presents today for C1D4 Vidaza/Labs.    Patient seen by provider today: No   present during visit today: Not Applicable.    Note: Patient reports feeling well today; nausea has been controlled with Zofran, which patient took this morning prior to his appt.      Intravenous Access:  Lab draw site RAC, Needle type BF, Gauge 23.  Labs drawn without difficulty.    Treatment Conditions:  Not Applicable.      Post Infusion Assessment:  Patient tolerated injection without incident.       Discharge Plan:   Discharge instructions reviewed with: Patient and Family.  Patient and/or family verbalized understanding of discharge instructions and all questions answered.  AVS to patient via SCHAD.  Patient will return 5/24 at [facility] for next appointment.   Patient discharged in stable condition accompanied by: wife.  Departure Mode: Ambulatory.      Mario Abraham RN

## 2024-05-24 ENCOUNTER — INFUSION THERAPY VISIT (OUTPATIENT)
Dept: INFUSION THERAPY | Facility: CLINIC | Age: 69
End: 2024-05-24
Attending: STUDENT IN AN ORGANIZED HEALTH CARE EDUCATION/TRAINING PROGRAM
Payer: COMMERCIAL

## 2024-05-24 VITALS
HEART RATE: 88 BPM | TEMPERATURE: 98.7 F | OXYGEN SATURATION: 99 % | DIASTOLIC BLOOD PRESSURE: 78 MMHG | RESPIRATION RATE: 16 BRPM | SYSTOLIC BLOOD PRESSURE: 128 MMHG

## 2024-05-24 DIAGNOSIS — D46.9 MDS (MYELODYSPLASTIC SYNDROME) (H): Primary | ICD-10-CM

## 2024-05-24 PROCEDURE — 250N000011 HC RX IP 250 OP 636: Performed by: STUDENT IN AN ORGANIZED HEALTH CARE EDUCATION/TRAINING PROGRAM

## 2024-05-24 PROCEDURE — 96401 CHEMO ANTI-NEOPL SQ/IM: CPT

## 2024-05-24 RX ADMIN — AZACITIDINE 160 MG: 100 INJECTION, POWDER, LYOPHILIZED, FOR SOLUTION INTRAVENOUS; SUBCUTANEOUS at 13:00

## 2024-05-24 NOTE — PROGRESS NOTES
Echo tech in room.    Infusion Nursing Note:  Leland Pearson presents today for Vidaza.    Patient seen by provider today: No   present during visit today: Not Applicable.    Note: Patient reports having significant nausea after the first two doses. Started using Zofran with adequate relief.      Intravenous Access:  No Intravenous access/labs at this visit.    Treatment Conditions:  Labs noted from 5/23.      Post Infusion Assessment:  Patient tolerated injection without incident.       Discharge Plan:   [facility]  Patient will return 5/25 for next appointment.   Patient discharged in stable condition accompanied by: wife.  Departure Mode: Ambulatory.      Demetria Sharma RN

## 2024-05-25 ENCOUNTER — INFUSION THERAPY VISIT (OUTPATIENT)
Dept: ONCOLOGY | Facility: CLINIC | Age: 69
End: 2024-05-25
Attending: STUDENT IN AN ORGANIZED HEALTH CARE EDUCATION/TRAINING PROGRAM
Payer: COMMERCIAL

## 2024-05-25 VITALS
TEMPERATURE: 98.5 F | SYSTOLIC BLOOD PRESSURE: 115 MMHG | OXYGEN SATURATION: 98 % | RESPIRATION RATE: 16 BRPM | HEART RATE: 98 BPM | DIASTOLIC BLOOD PRESSURE: 76 MMHG

## 2024-05-25 DIAGNOSIS — D46.9 MDS (MYELODYSPLASTIC SYNDROME) (H): Primary | ICD-10-CM

## 2024-05-25 PROCEDURE — 250N000011 HC RX IP 250 OP 636: Mod: JZ | Performed by: STUDENT IN AN ORGANIZED HEALTH CARE EDUCATION/TRAINING PROGRAM

## 2024-05-25 PROCEDURE — 96401 CHEMO ANTI-NEOPL SQ/IM: CPT

## 2024-05-25 RX ADMIN — AZACITIDINE 160 MG: 100 INJECTION, POWDER, LYOPHILIZED, FOR SOLUTION INTRAVENOUS; SUBCUTANEOUS at 10:42

## 2024-05-25 NOTE — PROGRESS NOTES
Infusion Nursing Note:  Leland Pearson presents today for Cycle 1 Day 6 Azacitidine.    Patient seen by provider today: No   present during visit today: Not Applicable.    Note: Pt presents to infusion today feeling well. Pt denies having any fevers/chills, chest pain, sob, nausea/vomiting, bladder or bowel concerns.    Intravenous Access:  No Intravenous access/labs at this visit.    Treatment Conditions:  Not Applicable.      Post Infusion Assessment:  Patient tolerated injection without incident.       Discharge Plan:   Patient declined prescription refills.  Discharge instructions reviewed with: Patient and Family.  Patient and/or family verbalized understanding of discharge instructions and all questions answered.  AVS to patient via [employer].  Patient will return 5/26 for next appointment.   Patient discharged in stable condition accompanied by: self and wife.  Departure Mode: Ambulatory.      Manda David RN

## 2024-05-25 NOTE — PATIENT INSTRUCTIONS
[facility] Triage and after hours / weekends / holidays:  [phone]    Please call the triage or after hours line if you experience a temperature greater than or equal to 100.4, shaking chills, have uncontrolled nausea, vomiting and/or diarrhea, dizziness, shortness of breath, chest pain, bleeding, unexplained bruising, or if you have any other new/concerning symptoms, questions or concerns.      If you are having any concerning symptoms or wish to speak to a provider before your next infusion visit, please call triage to notify them so we can adequately serve you.     If you need a refill on a narcotic prescription or other medication, please call before your infusion appointment.

## 2024-05-26 ENCOUNTER — INFUSION THERAPY VISIT (OUTPATIENT)
Dept: ONCOLOGY | Facility: CLINIC | Age: 69
End: 2024-05-26
Attending: STUDENT IN AN ORGANIZED HEALTH CARE EDUCATION/TRAINING PROGRAM
Payer: COMMERCIAL

## 2024-05-26 VITALS
DIASTOLIC BLOOD PRESSURE: 78 MMHG | OXYGEN SATURATION: 99 % | RESPIRATION RATE: 16 BRPM | TEMPERATURE: 97.6 F | SYSTOLIC BLOOD PRESSURE: 131 MMHG | WEIGHT: 199.9 LBS | BODY MASS INDEX: 26.85 KG/M2 | HEART RATE: 79 BPM

## 2024-05-26 DIAGNOSIS — D46.9 MDS (MYELODYSPLASTIC SYNDROME) (H): Primary | ICD-10-CM

## 2024-05-26 PROCEDURE — 250N000011 HC RX IP 250 OP 636: Performed by: STUDENT IN AN ORGANIZED HEALTH CARE EDUCATION/TRAINING PROGRAM

## 2024-05-26 PROCEDURE — 96401 CHEMO ANTI-NEOPL SQ/IM: CPT

## 2024-05-26 RX ADMIN — AZACITIDINE 160 MG: 100 INJECTION, POWDER, LYOPHILIZED, FOR SOLUTION INTRAVENOUS; SUBCUTANEOUS at 10:24

## 2024-05-26 NOTE — PROGRESS NOTES
Infusion Nursing Note:  Leland Pearson presents today for Cycle 1 Day 7 azacitidine.    Patient seen by provider today: No   present during visit today: Not Applicable.    Note: Pt presents to infusion today feeling well. Pt denies having any fevers/chills, chest pain, sob, nausea/vomiting, bladder or bowel concerns.      Intravenous Access:  No Intravenous access/labs at this visit.    Treatment Conditions:  Not Applicable.      Post Infusion Assessment:  Patient tolerated injections to bilateral lower quadrants without incident.       Discharge Plan:   Patient declined prescription refills.  Discharge instructions reviewed with: Patient and Family.  Patient and/or family verbalized understanding of discharge instructions and all questions answered.  AVS to patient via [employer].  Patient will return 6/18 to [facility] for next appointment.   Patient discharged in stable condition accompanied by: self and wife.  Departure Mode: Ambulatory.      Manda David RN

## 2024-05-26 NOTE — PATIENT INSTRUCTIONS
[facility] Triage and after hours / weekends / holidays:  [phone]    Please call the triage or after hours line if you experience a temperature greater than or equal to 100.4, shaking chills, have uncontrolled nausea, vomiting and/or diarrhea, dizziness, shortness of breath, chest pain, bleeding, unexplained bruising, or if you have any other new/concerning symptoms, questions or concerns.      If you are having any concerning symptoms or wish to speak to a provider before your next infusion visit, please call triage to notify them so we can adequately serve you.     If you need a refill on a narcotic prescription or other medication, please call before your infusion appointment.                May 2024      Vince Monday Tuesday Wednesday Thursday Friday Saturday                  1     2     3     4       5     6     7     8    NEW ONCOLOGY  10:15 AM   (60 min.)   Nael Delcid MD   [facility] 9     10     11       12     13     14     15    LAB   1:45 PM   (15 min.)   EC LAB   [facility] 16     17     18       19     20    INFUSION 2 HR (120 MIN)   8:00 AM   (120 min.)    CANCER INFUSION NURSE   [facility] 21    INFUSION 1 HR (60 MIN)  10:00 AM   (60 min.)    CANCER INFUSION NURSE   [facility] 22    INFUSION 1 HR (60 MIN)   8:30 AM   (60 min.)    CANCER INFUSION NURSE   [facility] 23    INFUSION 1 HR (60 MIN)   9:00 AM   (60 min.)   SH CANCER INFUSION NURSE   [facility] 24    INFUSION 1 HR (60 MIN)  12:30 PM   (60 min.)   RH INFUSION CHAIR   [facility] 25    ONC INFUSION 1 HR (60 MIN)  10:00 AM   (60 min.)    ONC INFUSION NURSE   [facility]   26    ONC INFUSION 1 HR (60 MIN)  10:00 AM   (60 min.)    ONC INFUSION NURSE   [facility]  27     28    LAB   1:45 PM   (15 min.)   EC LAB   [facility] 29     30    LAB   1:45 PM   (15 min.)   EC LAB   [facility] 31 June 2024 Sunday Monday Tuesday Wednesday Thursday Friday Saturday                                 1       2     3    LAB   1:15 PM   (15 min.)   EC LAB   [facility] 4     5    LAB   1:45 PM   (15 min.)   EC LAB   [facility] 6     7     8       9     10     11    BMT NEW   2:00 PM   (60 min.)   Brenda Murphy MBBS   [facility] [place]    BMT NURSE COORD   3:00 PM   (30 min.)   Marybeth Chakraborty RN   [facility]    NURSE ONLY   3:15 PM   (30 min.)   1,  Bmt Nurse   [facility]    LAB PERIPHERAL   3:45 PM   (15 min.)   [facility] DRAW   [facility] 12    SOCIAL WORK TELEPHONE VISIT   1:45 PM   (60 min.)   Teena Rosa MSW   [facility] [place] 13    LAB PERIPHERAL   7:00 AM   (15 min.)   [facility] DRAW   [facility]    UMP BONE MARROW BIOPSY   7:45 AM   (90 min.)   Ela Dowling PA-C   [facility] 14     15       16     17     18    INFUSION 1 HR (60 MIN)   9:30 AM   (60 min.)    CANCER INFUSION NURSE   [facility] 19    INFUSION 1 HR (60 MIN)   9:30 AM   (60 min.)    CANCER INFUSION NURSE   [facility]    LAB PERIPHERAL  12:30 PM   (15 min.)   [facility] DRAW   [facility]    RETURN CCSL  12:45 PM   (30 min.)   Nael Delcid MD   [facility] 20    INFUSION 1 HR (60 MIN)   9:30 AM   (60 min.)    CANCER  INFUSION NURSE   [facility] 21    INFUSION 1 HR (60 MIN)   9:30 AM   (60 min.)    CANCER INFUSION NURSE   [facility] 22       23     24     25     26     27     28     29       30

## 2024-05-28 ENCOUNTER — TELEPHONE (OUTPATIENT)
Dept: ONCOLOGY | Facility: CLINIC | Age: 69
End: 2024-05-28

## 2024-05-28 ENCOUNTER — LAB (OUTPATIENT)
Dept: LAB | Facility: CLINIC | Age: 69
End: 2024-05-28
Payer: COMMERCIAL

## 2024-05-28 ENCOUNTER — NURSE TRIAGE (OUTPATIENT)
Dept: ONCOLOGY | Facility: CLINIC | Age: 69
End: 2024-05-28

## 2024-05-28 DIAGNOSIS — Z79.899 ENCOUNTER FOR LONG-TERM (CURRENT) USE OF MEDICATIONS: ICD-10-CM

## 2024-05-28 DIAGNOSIS — D46.9 MDS (MYELODYSPLASTIC SYNDROME) (H): ICD-10-CM

## 2024-05-28 LAB
BASOPHILS # BLD AUTO: ABNORMAL 10*3/UL
BASOPHILS # BLD MANUAL: 0 10E3/UL (ref 0–0.2)
BASOPHILS NFR BLD AUTO: ABNORMAL %
BASOPHILS NFR BLD MANUAL: 0 %
EOSINOPHIL # BLD AUTO: ABNORMAL 10*3/UL
EOSINOPHIL # BLD MANUAL: 0 10E3/UL (ref 0–0.7)
EOSINOPHIL NFR BLD AUTO: ABNORMAL %
EOSINOPHIL NFR BLD MANUAL: 0 %
ERYTHROCYTE [DISTWIDTH] IN BLOOD BY AUTOMATED COUNT: 15.4 % (ref 10–15)
HCT VFR BLD AUTO: 36.6 % (ref 40–53)
HGB BLD-MCNC: 11.8 G/DL (ref 13.3–17.7)
IMM GRANULOCYTES # BLD: ABNORMAL 10*3/UL
IMM GRANULOCYTES NFR BLD: ABNORMAL %
LYMPHOCYTES # BLD AUTO: ABNORMAL 10*3/UL
LYMPHOCYTES # BLD MANUAL: 2.8 10E3/UL (ref 0.8–5.3)
LYMPHOCYTES NFR BLD AUTO: ABNORMAL %
LYMPHOCYTES NFR BLD MANUAL: 67 %
MCH RBC QN AUTO: 29.5 PG (ref 26.5–33)
MCHC RBC AUTO-ENTMCNC: 32.2 G/DL (ref 31.5–36.5)
MCV RBC AUTO: 92 FL (ref 78–100)
METAMYELOCYTES # BLD MANUAL: 0 10E3/UL
METAMYELOCYTES NFR BLD MANUAL: 1 %
MONOCYTES # BLD AUTO: ABNORMAL 10*3/UL
MONOCYTES # BLD MANUAL: 0.1 10E3/UL (ref 0–1.3)
MONOCYTES NFR BLD AUTO: ABNORMAL %
MONOCYTES NFR BLD MANUAL: 3 %
MYELOCYTES # BLD MANUAL: 0.3 10E3/UL
MYELOCYTES NFR BLD MANUAL: 8 %
NEUTROPHILS # BLD AUTO: ABNORMAL 10*3/UL
NEUTROPHILS # BLD MANUAL: 0.9 10E3/UL (ref 1.6–8.3)
NEUTROPHILS NFR BLD AUTO: ABNORMAL %
NEUTROPHILS NFR BLD MANUAL: 21 %
NRBC # BLD AUTO: 0 10E3/UL
NRBC # BLD AUTO: 0.1 10E3/UL
NRBC BLD AUTO-RTO: 0 /100
NRBC BLD MANUAL-RTO: 2 %
PLAT MORPH BLD: ABNORMAL
PLATELET # BLD AUTO: 51 10E3/UL (ref 150–450)
RBC # BLD AUTO: 4 10E6/UL (ref 4.4–5.9)
RBC MORPH BLD: ABNORMAL
VARIANT LYMPHS BLD QL SMEAR: PRESENT
WBC # BLD AUTO: 4.2 10E3/UL (ref 4–11)

## 2024-05-28 PROCEDURE — 85007 BL SMEAR W/DIFF WBC COUNT: CPT

## 2024-05-28 PROCEDURE — 84550 ASSAY OF BLOOD/URIC ACID: CPT

## 2024-05-28 PROCEDURE — 85027 COMPLETE CBC AUTOMATED: CPT

## 2024-05-28 PROCEDURE — 80053 COMPREHEN METABOLIC PANEL: CPT

## 2024-05-28 PROCEDURE — 36415 COLL VENOUS BLD VENIPUNCTURE: CPT

## 2024-05-28 PROCEDURE — 84100 ASSAY OF PHOSPHORUS: CPT

## 2024-05-28 NOTE — TELEPHONE ENCOUNTER
DATE/TIME OF CALL RECEIVED FROM LAB:  05/28/24 at 2:00 PM   LAB TEST:  Plt 47 (preliminary)  Other labs: ANC 1.3, WBC 4.15  Last LAB VALUE:  05/23/24 Plt 65  PROVIDER NOTIFIED?: Yes  PROVIDER NAME:   DATE/TIME LAB VALUE REPORTED TO PROVIDER: 5/28/2024 1413  MECHANISM OF PROVIDER NOTIFICATION:  Message through [employer]  PROVIDER RESPONSE:   [id number]  acknowledging critical value. Nothing further needed from triage.

## 2024-05-28 NOTE — ORAL ONC MGMT
Oral Chemotherapy Monitoring Program    Subjective/Objective:  Leland Pearson is a 69 year old male contacted by phone for a follow-up visit for oral chemotherapy. Leland confirms taking venetoclax 400 mg daily for 14 days of each cycle. He is tolerating this well, though he has experienced some nausea with his cycle, especially at the beginning with his azacitidine injections. He is taking prochlorperazine 3 times daily, and is no longer having nausea. We discussed that he can probably try to decrease his prochlorperzine use, and he will see if dropping his midday dose makes any difference. Leland confirmed he is not taking posaconazole and asked whether he would be off it this whole cycle or when he should start. [employer] sent to Dr. Delcid to follow up on this.         5/9/2024     9:00 AM 5/10/2024     2:00 PM 5/14/2024     9:00 AM 5/21/2024     1:00 PM 5/23/2024     2:00 PM 5/28/2024    12:00 PM   ORAL CHEMOTHERAPY   Assessment Type Initial Work up New Teach Refill Lab Monitoring Lab Monitoring Initial Follow up   Diagnosis Code Myelodysplastic Syndrome Myelodysplastic Syndrome Myelodysplastic Syndrome Myelodysplastic Syndrome Myelodysplastic Syndrome Myelodysplastic Syndrome   Providers Dr Bhavin Delcid   [facility]   Is this patient followed by the [facility] OC team? No No No No No No   Drug Name Venclexta (venetoclax) Venclexta (venetoclax) Venclexta (venetoclax) Venclexta (venetoclax) Venclexta (venetoclax) Venclexta (venetoclax)   Dose 400 mg 400 mg 400 mg 400 mg 400 mg 400 mg   Current Schedule Daily Daily Daily Daily Daily Daily   Cycle Details 2 weeks on, 2 weeks off 2 weeks on, 2 weeks off 2 weeks on, 2 weeks off 2 weeks on, 2 weeks off 2 weeks on, 2 weeks off 2 weeks on, 2 weeks off   Start Date of Last Cycle      5/20/2024   Planned next cycle start date 5/20/2024 5/20/2024 5/20/2024 5/20/2024 5/20/2024  "6/17/2024   Doses missed in last 2 weeks      0   Adherence Assessment      Adherent   Adverse Effects     Neutropenia;Thrombocytopenia Nausea   Nausea      Resolved due to intervention   Neutropenia     Grade 3    Pharmacist Intervention(neutropenia)     No    Thrombocytopenia     Grade 2    Pharmacist Intervention(thrombocytopenia)     No    Any new drug interactions? No No       Is the dose as ordered appropriate for the patient?  Yes   Yes        Last PHQ-2 Score on record:        No data to display                Vitals:  BP:   BP Readings from Last 1 Encounters:   05/26/24 131/78     Wt Readings from Last 1 Encounters:   05/26/24 90.7 kg (199 lb 14.4 oz)     Estimated body surface area is 2.15 meters squared as calculated from the following:    Height as of 5/8/24: 1.837 m (6' 0.34\").    Weight as of 5/26/24: 90.7 kg (199 lb 14.4 oz).    Labs:  _  Result Component Current Result Ref Range   Sodium 131 (L) (5/23/2024) 135 - 145 mmol/L     _  Result Component Current Result Ref Range   Potassium 4.4 (5/23/2024) 3.4 - 5.3 mmol/L     _  Result Component Current Result Ref Range   Calcium 9.5 (5/23/2024) 8.8 - 10.2 mg/dL     _  Result Component Current Result Ref Range   Magnesium 2.1 (5/8/2024) 1.7 - 2.3 mg/dL     _  Result Component Current Result Ref Range   Phosphorus 2.9 (5/23/2024) 2.5 - 4.5 mg/dL     _  Result Component Current Result Ref Range   Albumin 3.8 (5/23/2024) 3.5 - 5.2 g/dL     _  Result Component Current Result Ref Range   Urea Nitrogen 19.5 (5/23/2024) 8.0 - 23.0 mg/dL     _  Result Component Current Result Ref Range   Creatinine 1.02 (5/23/2024) 0.67 - 1.17 mg/dL     _  Result Component Current Result Ref Range   AST 34 (5/23/2024) 0 - 45 U/L     _  Result Component Current Result Ref Range   ALT 28 (5/23/2024) 0 - 70 U/L     _  Result Component Current Result Ref Range   Bilirubin Total 0.8 (5/23/2024) <=1.2 mg/dL     _  Result Component Current Result Ref Range   WBC Count 2.7 (L) (5/23/2024) " 4.0 - 11.0 10e3/uL     _  Result Component Current Result Ref Range   Hemoglobin 13.0 (L) (5/23/2024) 13.3 - 17.7 g/dL     _  Result Component Current Result Ref Range   Platelet Count 65 (L) (5/23/2024) 150 - 450 10e3/uL     _  Result Component Current Result Ref Range   Absolute Neutrophils 0.6 (L) (5/23/2024) 1.6 - 8.3 10e3/uL     No results found for ANC within last 30 days.          Assessment/Plan:  Leland is started venetoclax on 5/20 and is tolerating well, with mild nausea completely controlled with prochlorperazine.     Follow-Up:  Inbasket to Dr. Delcid regarding posaconazole plan  5/28 and 5/30: labs    Refill Due:  6/17    Khloe Mattson, PharmD, BCOP  Hematology/Oncology Clinical Pharmacist  [facility]  [facility]  [phone]

## 2024-05-29 ENCOUNTER — TELEPHONE (OUTPATIENT)
Dept: ONCOLOGY | Facility: CLINIC | Age: 69
End: 2024-05-29
Payer: COMMERCIAL

## 2024-05-29 LAB
ALBUMIN SERPL BCG-MCNC: 4 G/DL (ref 3.5–5.2)
ALP SERPL-CCNC: 86 U/L (ref 40–150)
ALT SERPL W P-5'-P-CCNC: 25 U/L (ref 0–70)
ANION GAP SERPL CALCULATED.3IONS-SCNC: 7 MMOL/L (ref 7–15)
AST SERPL W P-5'-P-CCNC: 32 U/L (ref 0–45)
BILIRUB SERPL-MCNC: 0.6 MG/DL
BUN SERPL-MCNC: 16.9 MG/DL (ref 8–23)
CALCIUM SERPL-MCNC: 9.3 MG/DL (ref 8.8–10.2)
CHLORIDE SERPL-SCNC: 99 MMOL/L (ref 98–107)
CREAT SERPL-MCNC: 0.94 MG/DL (ref 0.67–1.17)
DEPRECATED HCO3 PLAS-SCNC: 27 MMOL/L (ref 22–29)
EGFRCR SERPLBLD CKD-EPI 2021: 87 ML/MIN/1.73M2
GLUCOSE SERPL-MCNC: 116 MG/DL (ref 70–99)
PHOSPHATE SERPL-MCNC: 3.1 MG/DL (ref 2.5–4.5)
POTASSIUM SERPL-SCNC: 4.7 MMOL/L (ref 3.4–5.3)
PROT SERPL-MCNC: 7 G/DL (ref 6.4–8.3)
SODIUM SERPL-SCNC: 133 MMOL/L (ref 135–145)
URATE SERPL-MCNC: 4 MG/DL (ref 3.4–7)

## 2024-05-30 ENCOUNTER — NURSE TRIAGE (OUTPATIENT)
Facility: CLINIC | Age: 69
End: 2024-05-30

## 2024-05-30 ENCOUNTER — LAB (OUTPATIENT)
Dept: LAB | Facility: CLINIC | Age: 69
End: 2024-05-30
Payer: COMMERCIAL

## 2024-05-30 DIAGNOSIS — D46.9 MDS (MYELODYSPLASTIC SYNDROME) (H): ICD-10-CM

## 2024-05-30 DIAGNOSIS — Z79.899 ENCOUNTER FOR LONG-TERM (CURRENT) USE OF MEDICATIONS: ICD-10-CM

## 2024-05-30 LAB
BASOPHILS # BLD AUTO: ABNORMAL 10*3/UL
BASOPHILS # BLD MANUAL: 0 10E3/UL (ref 0–0.2)
BASOPHILS NFR BLD AUTO: ABNORMAL %
BASOPHILS NFR BLD MANUAL: 0 %
EOSINOPHIL # BLD AUTO: ABNORMAL 10*3/UL
EOSINOPHIL # BLD MANUAL: 0 10E3/UL (ref 0–0.7)
EOSINOPHIL NFR BLD AUTO: ABNORMAL %
EOSINOPHIL NFR BLD MANUAL: 0 %
ERYTHROCYTE [DISTWIDTH] IN BLOOD BY AUTOMATED COUNT: 15.8 % (ref 10–15)
HCT VFR BLD AUTO: 35.4 % (ref 40–53)
HGB BLD-MCNC: 11.3 G/DL (ref 13.3–17.7)
IMM GRANULOCYTES # BLD: ABNORMAL 10*3/UL
IMM GRANULOCYTES NFR BLD: ABNORMAL %
LYMPHOCYTES # BLD AUTO: ABNORMAL 10*3/UL
LYMPHOCYTES # BLD MANUAL: 2.1 10E3/UL (ref 0.8–5.3)
LYMPHOCYTES NFR BLD AUTO: ABNORMAL %
LYMPHOCYTES NFR BLD MANUAL: 57 %
MCH RBC QN AUTO: 29.4 PG (ref 26.5–33)
MCHC RBC AUTO-ENTMCNC: 31.9 G/DL (ref 31.5–36.5)
MCV RBC AUTO: 92 FL (ref 78–100)
MONOCYTES # BLD AUTO: ABNORMAL 10*3/UL
MONOCYTES # BLD MANUAL: 0.1 10E3/UL (ref 0–1.3)
MONOCYTES NFR BLD AUTO: ABNORMAL %
MONOCYTES NFR BLD MANUAL: 4 %
MYELOCYTES # BLD MANUAL: 0.6 10E3/UL
MYELOCYTES NFR BLD MANUAL: 17 %
NEUTROPHILS # BLD AUTO: ABNORMAL 10*3/UL
NEUTROPHILS # BLD MANUAL: 0.8 10E3/UL (ref 1.6–8.3)
NEUTROPHILS NFR BLD AUTO: ABNORMAL %
NEUTROPHILS NFR BLD MANUAL: 22 %
NRBC # BLD AUTO: 0 10E3/UL
NRBC BLD AUTO-RTO: 0 /100
PLAT MORPH BLD: ABNORMAL
PLATELET # BLD AUTO: 42 10E3/UL (ref 150–450)
RBC # BLD AUTO: 3.85 10E6/UL (ref 4.4–5.9)
RBC MORPH BLD: ABNORMAL
SMUDGE CELLS BLD QL SMEAR: PRESENT
WBC # BLD AUTO: 3.6 10E3/UL (ref 4–11)

## 2024-05-30 PROCEDURE — 85007 BL SMEAR W/DIFF WBC COUNT: CPT

## 2024-05-30 PROCEDURE — 85027 COMPLETE CBC AUTOMATED: CPT

## 2024-05-30 PROCEDURE — 84550 ASSAY OF BLOOD/URIC ACID: CPT

## 2024-05-30 PROCEDURE — 80053 COMPREHEN METABOLIC PANEL: CPT

## 2024-05-30 PROCEDURE — 36415 COLL VENOUS BLD VENIPUNCTURE: CPT

## 2024-05-30 PROCEDURE — 84100 ASSAY OF PHOSPHORUS: CPT

## 2024-05-30 NOTE — ORAL ONC MGMT
Oral Chemotherapy Monitoring Program     Spoke with Leland and his wife Vani about Leland's venetoclax and posaconazole plan. Per Dr. Delcid, okay for Leland to start posaconazole now given he is beyond his venetoclax ramp-up doses. Leland confirmed that he had posaconazole on hand and would start his first dose in the evening 5/29. Explained in plain language that there is a drug-drug interaction between these medications resulting in effectively higher concentrations of venetoclax in the body and necessitating a dose reduction. Instructed Leland to decrease venetoclax to 100 mg with the start of posaconazole. He expressed understanding of this plan and confirmed he would take 1 tablet (100 mg) with his next dose on 5/29 (he takes the medication with his evening meal).    Also informed Leland and Vani that it will be important to keep posaconazole going throughout therapy to ensure adequate venetoclax levels at the 100 mg daily dose. Reviewed that his future cycles and refills will be adjusted to reflect 100 mg daily for this interaction. They are aware that his dose may change in the future if his antifungal plan/medication changes from posaconazole to an alternative.    Leland will have approximately 15 tablets of venetoclax leftover given this dose reduction, and he is aware to hold on to these for his next cycle. We will confirm the exact amount of medication he has on hand closer to his next cycle start date.    Gertrude Joshi, PharmD, [facility]  Oral Chemotherapy Monitoring Program  [facility]  [phone]

## 2024-05-30 NOTE — TELEPHONE ENCOUNTER
DATE:  5/30/2024   TIME OF RECEIPT FROM LAB:  1429  Critical lab value:  Plt 38, ; Other Hgb: 11.5, WBC: 3.64, ANC: 1.21, preliminary  Last lab values:  5/28/24: Plts: 51, Hgb: 11.8, WBC: 4.2, ANC: 0.9  No active blood/platelet plan  Provider Notified: Yes  Provider Name: Nael Delcid MD  Date/Time lab value reported to provider: 5/30/24 at 1431  Mechanism of provider notification: Annalise  Provider response: 1440: Acknowledged, no change in care at this time.  Routed to Care Team.

## 2024-05-31 LAB
ALBUMIN SERPL BCG-MCNC: 3.9 G/DL (ref 3.5–5.2)
ALP SERPL-CCNC: 82 U/L (ref 40–150)
ALT SERPL W P-5'-P-CCNC: 24 U/L (ref 0–70)
ANION GAP SERPL CALCULATED.3IONS-SCNC: 8 MMOL/L (ref 7–15)
AST SERPL W P-5'-P-CCNC: 31 U/L (ref 0–45)
BILIRUB SERPL-MCNC: 0.8 MG/DL
BUN SERPL-MCNC: 14.8 MG/DL (ref 8–23)
CALCIUM SERPL-MCNC: 9.2 MG/DL (ref 8.8–10.2)
CHLORIDE SERPL-SCNC: 101 MMOL/L (ref 98–107)
CREAT SERPL-MCNC: 1.05 MG/DL (ref 0.67–1.17)
DEPRECATED HCO3 PLAS-SCNC: 27 MMOL/L (ref 22–29)
EGFRCR SERPLBLD CKD-EPI 2021: 76 ML/MIN/1.73M2
GLUCOSE SERPL-MCNC: 95 MG/DL (ref 70–99)
PHOSPHATE SERPL-MCNC: 3.7 MG/DL (ref 2.5–4.5)
POTASSIUM SERPL-SCNC: 4.7 MMOL/L (ref 3.4–5.3)
PROT SERPL-MCNC: 6.8 G/DL (ref 6.4–8.3)
SODIUM SERPL-SCNC: 136 MMOL/L (ref 135–145)
URATE SERPL-MCNC: 4.7 MG/DL (ref 3.4–7)

## 2024-06-03 ENCOUNTER — LAB (OUTPATIENT)
Dept: LAB | Facility: CLINIC | Age: 69
End: 2024-06-03
Payer: COMMERCIAL

## 2024-06-03 ENCOUNTER — MYC MEDICAL ADVICE (OUTPATIENT)
Dept: ONCOLOGY | Facility: CLINIC | Age: 69
End: 2024-06-03

## 2024-06-03 ENCOUNTER — NURSE TRIAGE (OUTPATIENT)
Dept: ONCOLOGY | Facility: CLINIC | Age: 69
End: 2024-06-03

## 2024-06-03 DIAGNOSIS — Z79.899 ENCOUNTER FOR LONG-TERM (CURRENT) USE OF MEDICATIONS: ICD-10-CM

## 2024-06-03 DIAGNOSIS — D46.9 MDS (MYELODYSPLASTIC SYNDROME) (H): ICD-10-CM

## 2024-06-03 LAB
BASOPHILS # BLD AUTO: ABNORMAL 10*3/UL
BASOPHILS # BLD MANUAL: 0 10E3/UL (ref 0–0.2)
BASOPHILS NFR BLD AUTO: ABNORMAL %
BASOPHILS NFR BLD MANUAL: 0 %
EOSINOPHIL # BLD AUTO: ABNORMAL 10*3/UL
EOSINOPHIL # BLD MANUAL: 0 10E3/UL (ref 0–0.7)
EOSINOPHIL NFR BLD AUTO: ABNORMAL %
EOSINOPHIL NFR BLD MANUAL: 0 %
ERYTHROCYTE [DISTWIDTH] IN BLOOD BY AUTOMATED COUNT: 16 % (ref 10–15)
HCT VFR BLD AUTO: 32.9 % (ref 40–53)
HGB BLD-MCNC: 10.8 G/DL (ref 13.3–17.7)
IMM GRANULOCYTES # BLD: ABNORMAL 10*3/UL
IMM GRANULOCYTES NFR BLD: ABNORMAL %
LYMPHOCYTES # BLD AUTO: ABNORMAL 10*3/UL
LYMPHOCYTES # BLD MANUAL: 1.1 10E3/UL (ref 0.8–5.3)
LYMPHOCYTES NFR BLD AUTO: ABNORMAL %
LYMPHOCYTES NFR BLD MANUAL: 50 %
MCH RBC QN AUTO: 30.4 PG (ref 26.5–33)
MCHC RBC AUTO-ENTMCNC: 32.8 G/DL (ref 31.5–36.5)
MCV RBC AUTO: 93 FL (ref 78–100)
METAMYELOCYTES # BLD MANUAL: 0.2 10E3/UL
METAMYELOCYTES NFR BLD MANUAL: 7 %
MONOCYTES # BLD AUTO: ABNORMAL 10*3/UL
MONOCYTES # BLD MANUAL: 0.2 10E3/UL (ref 0–1.3)
MONOCYTES NFR BLD AUTO: ABNORMAL %
MONOCYTES NFR BLD MANUAL: 8 %
MYELOCYTES # BLD MANUAL: 0.2 10E3/UL
MYELOCYTES NFR BLD MANUAL: 7 %
NEUTROPHILS # BLD AUTO: ABNORMAL 10*3/UL
NEUTROPHILS # BLD MANUAL: 0.6 10E3/UL (ref 1.6–8.3)
NEUTROPHILS NFR BLD AUTO: ABNORMAL %
NEUTROPHILS NFR BLD MANUAL: 28 %
NRBC # BLD AUTO: 0 10E3/UL
NRBC BLD AUTO-RTO: 0 /100
PLAT MORPH BLD: ABNORMAL
PLATELET # BLD AUTO: 23 10E3/UL (ref 150–450)
RBC # BLD AUTO: 3.55 10E6/UL (ref 4.4–5.9)
RBC MORPH BLD: ABNORMAL
WBC # BLD AUTO: 2.2 10E3/UL (ref 4–11)

## 2024-06-03 PROCEDURE — 84550 ASSAY OF BLOOD/URIC ACID: CPT

## 2024-06-03 PROCEDURE — 80053 COMPREHEN METABOLIC PANEL: CPT

## 2024-06-03 PROCEDURE — 84100 ASSAY OF PHOSPHORUS: CPT

## 2024-06-03 PROCEDURE — 85007 BL SMEAR W/DIFF WBC COUNT: CPT

## 2024-06-03 PROCEDURE — 85027 COMPLETE CBC AUTOMATED: CPT

## 2024-06-03 PROCEDURE — 36415 COLL VENOUS BLD VENIPUNCTURE: CPT

## 2024-06-03 RX ORDER — EPINEPHRINE 1 MG/ML
0.3 INJECTION, SOLUTION INTRAMUSCULAR; SUBCUTANEOUS EVERY 5 MIN PRN
Status: CANCELLED | OUTPATIENT
Start: 2024-06-03

## 2024-06-03 RX ORDER — HEPARIN SODIUM,PORCINE 10 UNIT/ML
5-20 VIAL (ML) INTRAVENOUS DAILY PRN
Status: CANCELLED | OUTPATIENT
Start: 2024-06-03

## 2024-06-03 RX ORDER — DIPHENHYDRAMINE HYDROCHLORIDE 50 MG/ML
50 INJECTION INTRAMUSCULAR; INTRAVENOUS
Status: CANCELLED
Start: 2024-06-03

## 2024-06-03 RX ORDER — HEPARIN SODIUM (PORCINE) LOCK FLUSH IV SOLN 100 UNIT/ML 100 UNIT/ML
5 SOLUTION INTRAVENOUS
Status: CANCELLED | OUTPATIENT
Start: 2024-06-03

## 2024-06-03 NOTE — TELEPHONE ENCOUNTER
Oral Chemotherapy Monitoring Program  Lab Follow Up    Reviewed lab results from 5/30/24.        5/10/2024     2:00 PM 5/14/2024     9:00 AM 5/21/2024     1:00 PM 5/23/2024     2:00 PM 5/28/2024    12:00 PM 5/30/2024    10:00 AM 6/3/2024     1:00 PM   ORAL CHEMOTHERAPY   Assessment Type New Teach Refill Lab Monitoring Lab Monitoring Initial Follow up Other Lab Monitoring   Diagnosis Code Myelodysplastic Syndrome Myelodysplastic Syndrome Myelodysplastic Syndrome Myelodysplastic Syndrome Myelodysplastic Syndrome Myelodysplastic Syndrome Myelodysplastic Syndrome   Providers Dr Bhavin Delcid   [facility]   Is this patient followed by the [facility] OC team? No No No No No No No   Drug Name Venclexta (venetoclax) Venclexta (venetoclax) Venclexta (venetoclax) Venclexta (venetoclax) Venclexta (venetoclax) Venclexta (venetoclax) Venclexta (venetoclax)   Dose 400 mg 400 mg 400 mg 400 mg 400 mg 100 mg 100 mg   Current Schedule Daily Daily Daily Daily Daily Daily Daily   Cycle Details 2 weeks on, 2 weeks off 2 weeks on, 2 weeks off 2 weeks on, 2 weeks off 2 weeks on, 2 weeks off 2 weeks on, 2 weeks off 2 weeks on, 2 weeks off 2 weeks on, 2 weeks off   Start Date of Last Cycle     5/20/2024 5/20/2024    Planned next cycle start date 5/20/2024 5/20/2024 5/20/2024 5/20/2024 6/17/2024 6/17/2024    Doses missed in last 2 weeks     0     Adherence Assessment     Adherent     Adverse Effects    Neutropenia;Thrombocytopenia Nausea     Nausea     Resolved due to intervention     Neutropenia    Grade 3      Pharmacist Intervention(neutropenia)    No      Thrombocytopenia    Grade 2      Pharmacist Intervention(thrombocytopenia)    No      Any new drug interactions? No    No     Is the dose as ordered appropriate for the patient? Yes   Yes Yes         Labs:  _  Result Component Current Result Ref Range   Sodium 136 (5/30/2024)  135 - 145 mmol/L     _  Result Component Current Result Ref Range   Potassium 4.7 (5/30/2024) 3.4 - 5.3 mmol/L     _  Result Component Current Result Ref Range   Calcium 9.2 (5/30/2024) 8.8 - 10.2 mg/dL     _  Result Component Current Result Ref Range   Magnesium 2.1 (5/8/2024) 1.7 - 2.3 mg/dL     _  Result Component Current Result Ref Range   Phosphorus 3.7 (5/30/2024) 2.5 - 4.5 mg/dL     _  Result Component Current Result Ref Range   Albumin 3.9 (5/30/2024) 3.5 - 5.2 g/dL     _  Result Component Current Result Ref Range   Urea Nitrogen 14.8 (5/30/2024) 8.0 - 23.0 mg/dL     _  Result Component Current Result Ref Range   Creatinine 1.05 (5/30/2024) 0.67 - 1.17 mg/dL     _  Result Component Current Result Ref Range   AST 31 (5/30/2024) 0 - 45 U/L     _  Result Component Current Result Ref Range   ALT 24 (5/30/2024) 0 - 70 U/L     _  Result Component Current Result Ref Range   Bilirubin Total 0.8 (5/30/2024) <=1.2 mg/dL     _  Result Component Current Result Ref Range   WBC Count 3.6 (L) (5/30/2024) 4.0 - 11.0 10e3/uL     _  Result Component Current Result Ref Range   Hemoglobin 11.3 (L) (5/30/2024) 13.3 - 17.7 g/dL     _  Result Component Current Result Ref Range   Platelet Count 42 (LL) (5/30/2024) 150 - 450 10e3/uL     _  Result Component Current Result Ref Range   Absolute Neutrophils 0.8 (L) (5/30/2024) 1.6 - 8.3 10e3/uL     No results found for ANC within last 30 days.        Assessment & Plan:  No new concerning lab abnormalities.    No changes with Venclexta needed at this time.    [employer] message sent to patient.      Roselyn Samaniego, PharmD, BCPS, BCOP  Oncology Clinical Pharmacy Specialist  [facility]  [phone]

## 2024-06-03 NOTE — TELEPHONE ENCOUNTER
DATE/TIME OF CALL RECEIVED FROM LAB:  06/03/24 at 1:32 PM     LAB TEST & LAB VALUE:  Critical Plts 22  Other Hgb 10.7    Previous Labs from May 30th Plts 42    PROVIDER NOTIFIED?: Yes    PROVIDER NAME: Nael Bhavin     TIME LAB VALUE REPORTED TO PROVIDER:   2879    MECHANISM OF PROVIDER NOTIFICATION: Page    PROVIDER RESPONSE:   [id number] Per Dr Delcid aware, labs and infusion planned for Wed 6/5, care team has already made request for appts.     1534 This writer spoke to Leland, No active s/s of bleeding.  Leland and Vani wondering what levels for platelets to be concerned. Reminded pt to monitor for s/s of bleeding or bruising including new, unknown source of bruising, or bruises growing in size, petechia (looks like no none raised rash like someone dotted area with red/pink, fine point marker), nose bleeding, bleeding gums, black tarry stool, blood in urine, or any other bleeding re: potentially dropping plt count and may need to be rechecked earlier than planned.  Leland and Vani were asking since they are new to this, what other lab values or other concerns to be watchful of at home.   This writer educated on main lab values WBC, Hemoglobin, Platelets, ANC any values labeled as critical. Also explained role of triage nurses e.g if need refill on antinause med to call [phone] phone number and ask to speak to a triage nurse about refill of nausea meds.   Leland and Vani are also flexible with times for Wednesday 6/5 if that helps get pt scheduled for labs and transfusion for 6/5.

## 2024-06-04 ENCOUNTER — PATIENT OUTREACH (OUTPATIENT)
Dept: ONCOLOGY | Facility: CLINIC | Age: 69
End: 2024-06-04
Payer: COMMERCIAL

## 2024-06-04 LAB
ALBUMIN SERPL BCG-MCNC: 3.6 G/DL (ref 3.5–5.2)
ALP SERPL-CCNC: 89 U/L (ref 40–150)
ALT SERPL W P-5'-P-CCNC: 28 U/L (ref 0–70)
ANION GAP SERPL CALCULATED.3IONS-SCNC: 10 MMOL/L (ref 7–15)
AST SERPL W P-5'-P-CCNC: 33 U/L (ref 0–45)
BILIRUB SERPL-MCNC: 0.8 MG/DL
BUN SERPL-MCNC: 15.9 MG/DL (ref 8–23)
CALCIUM SERPL-MCNC: 9 MG/DL (ref 8.8–10.2)
CHLORIDE SERPL-SCNC: 101 MMOL/L (ref 98–107)
CREAT SERPL-MCNC: 0.9 MG/DL (ref 0.67–1.17)
DEPRECATED HCO3 PLAS-SCNC: 23 MMOL/L (ref 22–29)
EGFRCR SERPLBLD CKD-EPI 2021: >90 ML/MIN/1.73M2
GLUCOSE SERPL-MCNC: 97 MG/DL (ref 70–99)
PHOSPHATE SERPL-MCNC: 3.1 MG/DL (ref 2.5–4.5)
POTASSIUM SERPL-SCNC: 4.3 MMOL/L (ref 3.4–5.3)
PROT SERPL-MCNC: 6.7 G/DL (ref 6.4–8.3)
SODIUM SERPL-SCNC: 134 MMOL/L (ref 135–145)
URATE SERPL-MCNC: 4.9 MG/DL (ref 3.4–7)

## 2024-06-04 NOTE — PROGRESS NOTES
[facility]: Cancer Care Plan of Care Education Note                                    Discussion with Patient:                                                      RNCC called and spoke with patient.   RNCC stated that we need to plan to repeat labs tomorrow.        Goals          General     Functional (pt-stated)      Notes - Note created  5/17/2024  2:33 PM by Andria Shay RN     Goal Statement: I will use my clinic and care team resources as directed.  Date Goal set: 5/17/2024  Barriers: disease burden  Strengths: support, health awareness, and involvement with care team  Date to Achieve By: ongoing  Patient expressed understanding of goal: Yes  Action steps to achieve this goal:  I will contact triage with new, worsening or uncontrolled symptoms.   I will contact triage with temperature over 100.4  I will call with difficulties of scheduling and/or transportation.   I will request needed refills when there are 7 days of medication remaining.   I will not send urgent or symptomatic messages through [employer].   I will contact scheduling to arrange or make changes in my appointments.                 Assessment:                                                      Assessment completed with:: Patient    Plan of Care Education   Yearly learning assessment completed?: Yes (see Education tab)  Diagnosis:: MDS  Does patient understand diagnosis?: Yes  Tx plan/regimen:: Repeat labs Wednesday  Does patient understand treatment plan/regimen?: Yes  When to call provider:: Bleeding;Increased shortness of breath;Uncontrolled nausea/vomiting;New/worsening pain;Shaking chills;Temperature >100.4F;Uncontrolled diarrhea/constipation  Additional education provided for: : Neutropenic precautions;Bleeding precautions    Evaluation of Learning  Patient Education Provided: Yes  Readiness:: Acceptance  Method:: Explanation  Response:: Verbalizes understanding    No assessment indicated    Intervention/Education provided  during outreach:                                                       RNCC spoke with patient and stated that he need to plan to repeat his labs wednesday as his counts are slowly dropping.     RNCC educated him that yes this is to be expected, reminded him of his bleeding and neutropenic precautions.     Reviewed when to call triage and triage phone number. Reviewed  not using RNCC voicemail or my chart for any urgent items. Patient stated an understanding of this information and did not have any other questions.           Patient to follow up as scheduled at next appt  Patient to call/[employer] message with updates    Signature:  Andria Shay RN

## 2024-06-05 ENCOUNTER — LAB (OUTPATIENT)
Dept: LAB | Facility: CLINIC | Age: 69
End: 2024-06-05
Payer: COMMERCIAL

## 2024-06-05 ENCOUNTER — DOCUMENTATION ONLY (OUTPATIENT)
Dept: ONCOLOGY | Facility: CLINIC | Age: 69
End: 2024-06-05

## 2024-06-05 ENCOUNTER — NURSE TRIAGE (OUTPATIENT)
Facility: CLINIC | Age: 69
End: 2024-06-05

## 2024-06-05 DIAGNOSIS — Z79.899 ENCOUNTER FOR LONG-TERM (CURRENT) USE OF MEDICATIONS: ICD-10-CM

## 2024-06-05 DIAGNOSIS — D46.9 MDS (MYELODYSPLASTIC SYNDROME) (H): ICD-10-CM

## 2024-06-05 LAB
BASO STIPL BLD QL SMEAR: PRESENT
BASOPHILS # BLD AUTO: ABNORMAL 10*3/UL
BASOPHILS # BLD MANUAL: 0 10E3/UL (ref 0–0.2)
BASOPHILS NFR BLD AUTO: ABNORMAL %
BASOPHILS NFR BLD MANUAL: 0 %
EOSINOPHIL # BLD AUTO: ABNORMAL 10*3/UL
EOSINOPHIL # BLD MANUAL: 0 10E3/UL (ref 0–0.7)
EOSINOPHIL NFR BLD AUTO: ABNORMAL %
EOSINOPHIL NFR BLD MANUAL: 0 %
ERYTHROCYTE [DISTWIDTH] IN BLOOD BY AUTOMATED COUNT: 15.9 % (ref 10–15)
HCT VFR BLD AUTO: 33.4 % (ref 40–53)
HGB BLD-MCNC: 10.7 G/DL (ref 13.3–17.7)
IMM GRANULOCYTES # BLD: ABNORMAL 10*3/UL
IMM GRANULOCYTES NFR BLD: ABNORMAL %
LYMPHOCYTES # BLD AUTO: ABNORMAL 10*3/UL
LYMPHOCYTES # BLD MANUAL: 0.9 10E3/UL (ref 0.8–5.3)
LYMPHOCYTES NFR BLD AUTO: ABNORMAL %
LYMPHOCYTES NFR BLD MANUAL: 44 %
MCH RBC QN AUTO: 29.6 PG (ref 26.5–33)
MCHC RBC AUTO-ENTMCNC: 32 G/DL (ref 31.5–36.5)
MCV RBC AUTO: 93 FL (ref 78–100)
MONOCYTES # BLD AUTO: ABNORMAL 10*3/UL
MONOCYTES # BLD MANUAL: 0.1 10E3/UL (ref 0–1.3)
MONOCYTES NFR BLD AUTO: ABNORMAL %
MONOCYTES NFR BLD MANUAL: 3 %
MYELOCYTES # BLD MANUAL: 0 10E3/UL
MYELOCYTES NFR BLD MANUAL: 2 %
NEUTROPHILS # BLD AUTO: ABNORMAL 10*3/UL
NEUTROPHILS # BLD MANUAL: 1.1 10E3/UL (ref 1.6–8.3)
NEUTROPHILS NFR BLD AUTO: ABNORMAL %
NEUTROPHILS NFR BLD MANUAL: 51 %
NRBC # BLD AUTO: 0 10E3/UL
NRBC BLD AUTO-RTO: 0 /100
PLAT MORPH BLD: ABNORMAL
PLATELET # BLD AUTO: 19 10E3/UL (ref 150–450)
RBC # BLD AUTO: 3.61 10E6/UL (ref 4.4–5.9)
RBC MORPH BLD: ABNORMAL
VARIANT LYMPHS BLD QL SMEAR: PRESENT
WBC # BLD AUTO: 2.1 10E3/UL (ref 4–11)

## 2024-06-05 PROCEDURE — 85007 BL SMEAR W/DIFF WBC COUNT: CPT

## 2024-06-05 PROCEDURE — 84100 ASSAY OF PHOSPHORUS: CPT

## 2024-06-05 PROCEDURE — 36415 COLL VENOUS BLD VENIPUNCTURE: CPT

## 2024-06-05 PROCEDURE — 80053 COMPREHEN METABOLIC PANEL: CPT

## 2024-06-05 PROCEDURE — 85027 COMPLETE CBC AUTOMATED: CPT

## 2024-06-05 PROCEDURE — 84550 ASSAY OF BLOOD/URIC ACID: CPT

## 2024-06-05 NOTE — TELEPHONE ENCOUNTER
DATE:  6/5/2024   TIME OF RECEIPT FROM LAB:  1357  Critical lab value:  Plts: 17; Other Hgb: 11.0, WBC: 2.2, ANC: 0.81, all preliminary  Last lab values:  6/3/24: Plts: 23, Hgb: 10.8, WBC: 2.2, ANC: 0.6  Blood/Platelet Plan:  Hgb: </= 7.5, Plts: </= 15,000  No scheduled platelet transfusions.  Called pt who denies gum, nosebleeds; No petechiae, bruises; no headaches; no blood in urine or stool; Discussed thrombocytopenic precautions and pt advised to call back w/sx of bleeding; Pt and his wife voiced understanding.  Provider Notified: Yes  Provider Name: Nael Delcid MD  Date/Time lab value reported to provider: 6/5/24 at 1407  Mechanism of provider notification: Secure Chat  Provider response: [id number]: Per Dr. Delcid--needs labs and transfusion on Friday; pt is on the wait list now. Needs labs/transfusion scheduled for Monday--Per Andria, pt is on the wait list (infusion) for Monday and is aware of needing labs Monday.  Per Andria, she will call Leland and will inform him of plan of care for Friday.

## 2024-06-06 ENCOUNTER — PATIENT OUTREACH (OUTPATIENT)
Dept: ONCOLOGY | Facility: CLINIC | Age: 69
End: 2024-06-06
Payer: COMMERCIAL

## 2024-06-06 LAB
ALBUMIN SERPL BCG-MCNC: 3.6 G/DL (ref 3.5–5.2)
ALP SERPL-CCNC: 89 U/L (ref 40–150)
ALT SERPL W P-5'-P-CCNC: 26 U/L (ref 0–70)
ANION GAP SERPL CALCULATED.3IONS-SCNC: 8 MMOL/L (ref 7–15)
AST SERPL W P-5'-P-CCNC: 31 U/L (ref 0–45)
BILIRUB SERPL-MCNC: 0.8 MG/DL
BUN SERPL-MCNC: 13.7 MG/DL (ref 8–23)
CALCIUM SERPL-MCNC: 8.9 MG/DL (ref 8.8–10.2)
CHLORIDE SERPL-SCNC: 102 MMOL/L (ref 98–107)
CREAT SERPL-MCNC: 0.89 MG/DL (ref 0.67–1.17)
DEPRECATED HCO3 PLAS-SCNC: 24 MMOL/L (ref 22–29)
EGFRCR SERPLBLD CKD-EPI 2021: >90 ML/MIN/1.73M2
GLUCOSE SERPL-MCNC: 102 MG/DL (ref 70–99)
PHOSPHATE SERPL-MCNC: 3.2 MG/DL (ref 2.5–4.5)
POTASSIUM SERPL-SCNC: 4.8 MMOL/L (ref 3.4–5.3)
PROT SERPL-MCNC: 7 G/DL (ref 6.4–8.3)
SODIUM SERPL-SCNC: 134 MMOL/L (ref 135–145)
URATE SERPL-MCNC: 4.4 MG/DL (ref 3.4–7)

## 2024-06-06 NOTE — PROGRESS NOTES
[facility]: Cancer Care                                                                                          RNCC called to give patient a time for his infusion tomorrow. RNCC called and spoke to Leland. RNCC stated that we will plan for him to get repeat labs and a possible transfusion at 930 am tomorrow at [facility].     RNCC stated that we will base the plan for next week off of his lab values tomorrow.  Reviewed when to call triage and triage phone number. Reviewed  not using RNCC voicemail or my chart for any urgent items. Patient stated an understanding of this information and did not have any other questions.        Signature:  Andria Shay RN

## 2024-06-07 ENCOUNTER — PATIENT OUTREACH (OUTPATIENT)
Dept: ONCOLOGY | Facility: CLINIC | Age: 69
End: 2024-06-07

## 2024-06-07 ENCOUNTER — DOCUMENTATION ONLY (OUTPATIENT)
Dept: ONCOLOGY | Facility: CLINIC | Age: 69
End: 2024-06-07
Payer: COMMERCIAL

## 2024-06-07 ENCOUNTER — INFUSION THERAPY VISIT (OUTPATIENT)
Dept: INFUSION THERAPY | Facility: CLINIC | Age: 69
End: 2024-06-07
Attending: STUDENT IN AN ORGANIZED HEALTH CARE EDUCATION/TRAINING PROGRAM
Payer: COMMERCIAL

## 2024-06-07 VITALS
DIASTOLIC BLOOD PRESSURE: 69 MMHG | SYSTOLIC BLOOD PRESSURE: 121 MMHG | TEMPERATURE: 98.6 F | HEART RATE: 85 BPM | OXYGEN SATURATION: 97 %

## 2024-06-07 DIAGNOSIS — D46.9 MDS (MYELODYSPLASTIC SYNDROME) (H): Primary | ICD-10-CM

## 2024-06-07 LAB
ABO/RH(D): NORMAL
ACANTHOCYTES BLD QL SMEAR: NORMAL
ANTIBODY SCREEN: NEGATIVE
AUER BODIES BLD QL SMEAR: NORMAL
BASO STIPL BLD QL SMEAR: NORMAL
BASOPHILS # BLD AUTO: 0 10E3/UL (ref 0–0.2)
BASOPHILS NFR BLD AUTO: 0 %
BITE CELLS BLD QL SMEAR: NORMAL
BLISTER CELLS BLD QL SMEAR: NORMAL
BURR CELLS BLD QL SMEAR: NORMAL
DACRYOCYTES BLD QL SMEAR: NORMAL
ELLIPTOCYTES BLD QL SMEAR: NORMAL
EOSINOPHIL # BLD AUTO: 0 10E3/UL (ref 0–0.7)
EOSINOPHIL NFR BLD AUTO: 0 %
ERYTHROCYTE [DISTWIDTH] IN BLOOD BY AUTOMATED COUNT: 15.9 % (ref 10–15)
FRAGMENTS BLD QL SMEAR: NORMAL
HCT VFR BLD AUTO: 33.4 % (ref 40–53)
HGB BLD-MCNC: 11 G/DL (ref 13.3–17.7)
HGB C CRYSTALS: NORMAL
HOWELL-JOLLY BOD BLD QL SMEAR: NORMAL
IMM GRANULOCYTES # BLD: 0 10E3/UL
IMM GRANULOCYTES NFR BLD: 0 %
LYMPHOCYTES # BLD AUTO: 0.7 10E3/UL (ref 0.8–5.3)
LYMPHOCYTES NFR BLD AUTO: 54 %
MCH RBC QN AUTO: 30.2 PG (ref 26.5–33)
MCHC RBC AUTO-ENTMCNC: 32.9 G/DL (ref 31.5–36.5)
MCV RBC AUTO: 92 FL (ref 78–100)
MONOCYTES # BLD AUTO: 0.1 10E3/UL (ref 0–1.3)
MONOCYTES NFR BLD AUTO: 6 %
NEUTROPHILS # BLD AUTO: 0.5 10E3/UL (ref 1.6–8.3)
NEUTROPHILS NFR BLD AUTO: 40 %
NEUTS HYPERSEG BLD QL SMEAR: NORMAL
NRBC # BLD AUTO: 0 10E3/UL
NRBC BLD AUTO-RTO: 0 /100
PLAT MORPH BLD: NORMAL
PLATELET # BLD AUTO: 20 10E3/UL (ref 150–450)
POLYCHROMASIA BLD QL SMEAR: NORMAL
RBC # BLD AUTO: 3.64 10E6/UL (ref 4.4–5.9)
RBC AGGLUT BLD QL: NORMAL
RBC MORPH BLD: NORMAL
ROULEAUX BLD QL SMEAR: NORMAL
SICKLE CELLS BLD QL SMEAR: NORMAL
SMUDGE CELLS BLD QL SMEAR: NORMAL
SPECIMEN EXPIRATION DATE: NORMAL
SPHEROCYTES BLD QL SMEAR: NORMAL
STOMATOCYTES BLD QL SMEAR: NORMAL
TARGETS BLD QL SMEAR: NORMAL
TOXIC GRANULES BLD QL SMEAR: NORMAL
VARIANT LYMPHS BLD QL SMEAR: NORMAL
WBC # BLD AUTO: 1.3 10E3/UL (ref 4–11)

## 2024-06-07 PROCEDURE — 85025 COMPLETE CBC W/AUTO DIFF WBC: CPT | Performed by: STUDENT IN AN ORGANIZED HEALTH CARE EDUCATION/TRAINING PROGRAM

## 2024-06-07 PROCEDURE — 86923 COMPATIBILITY TEST ELECTRIC: CPT | Performed by: STUDENT IN AN ORGANIZED HEALTH CARE EDUCATION/TRAINING PROGRAM

## 2024-06-07 PROCEDURE — 86900 BLOOD TYPING SEROLOGIC ABO: CPT | Performed by: STUDENT IN AN ORGANIZED HEALTH CARE EDUCATION/TRAINING PROGRAM

## 2024-06-07 PROCEDURE — 36415 COLL VENOUS BLD VENIPUNCTURE: CPT | Performed by: STUDENT IN AN ORGANIZED HEALTH CARE EDUCATION/TRAINING PROGRAM

## 2024-06-07 RX ORDER — HEPARIN SODIUM (PORCINE) LOCK FLUSH IV SOLN 100 UNIT/ML 100 UNIT/ML
5 SOLUTION INTRAVENOUS
Status: CANCELLED | OUTPATIENT
Start: 2024-06-07

## 2024-06-07 RX ORDER — DIPHENHYDRAMINE HYDROCHLORIDE 50 MG/ML
50 INJECTION INTRAMUSCULAR; INTRAVENOUS
Status: CANCELLED
Start: 2024-06-07

## 2024-06-07 RX ORDER — EPINEPHRINE 1 MG/ML
0.3 INJECTION, SOLUTION INTRAMUSCULAR; SUBCUTANEOUS EVERY 5 MIN PRN
Status: CANCELLED | OUTPATIENT
Start: 2024-06-07

## 2024-06-07 RX ORDER — HEPARIN SODIUM,PORCINE 10 UNIT/ML
5-20 VIAL (ML) INTRAVENOUS DAILY PRN
Status: CANCELLED | OUTPATIENT
Start: 2024-06-07

## 2024-06-07 NOTE — PROGRESS NOTES
Oral Chemotherapy Monitoring Program  Lab Follow Up    Reviewed lab results from 6/5.        5/14/2024     9:00 AM 5/21/2024     1:00 PM 5/23/2024     2:00 PM 5/28/2024    12:00 PM 5/30/2024    10:00 AM 6/3/2024     1:00 PM 6/7/2024     9:00 AM   ORAL CHEMOTHERAPY   Assessment Type Refill Lab Monitoring Lab Monitoring Initial Follow up Other Lab Monitoring Lab Monitoring   Diagnosis Code Myelodysplastic Syndrome Myelodysplastic Syndrome Myelodysplastic Syndrome Myelodysplastic Syndrome Myelodysplastic Syndrome Myelodysplastic Syndrome Myelodysplastic Syndrome   Providers Dr Bhavin Delcid   [facility]   Is this patient followed by the [facility] team? No No No No No No No   Drug Name Venclexta (venetoclax) Venclexta (venetoclax) Venclexta (venetoclax) Venclexta (venetoclax) Venclexta (venetoclax) Venclexta (venetoclax) Venclexta (venetoclax)   Dose 400 mg 400 mg 400 mg 400 mg 100 mg 100 mg 100 mg   Current Schedule Daily Daily Daily Daily Daily Daily Daily   Cycle Details 2 weeks on, 2 weeks off 2 weeks on, 2 weeks off 2 weeks on, 2 weeks off 2 weeks on, 2 weeks off 2 weeks on, 2 weeks off 2 weeks on, 2 weeks off 2 weeks on, 2 weeks off   Start Date of Last Cycle    5/20/2024 5/20/2024     Planned next cycle start date 5/20/2024 5/20/2024 5/20/2024 6/17/2024 6/17/2024     Doses missed in last 2 weeks    0      Adherence Assessment    Adherent      Adverse Effects   Neutropenia;Thrombocytopenia Nausea      Nausea    Resolved due to intervention      Neutropenia   Grade 3       Pharmacist Intervention(neutropenia)   No       Thrombocytopenia   Grade 2       Pharmacist Intervention(thrombocytopenia)   No       Any new drug interactions?    No      Is the dose as ordered appropriate for the patient?   Yes Yes          Labs:  _  Result Component Current Result Ref Range   Sodium 134 (L) (6/5/2024) 135 - 145  mmol/L     _  Result Component Current Result Ref Range   Potassium 4.8 (6/5/2024) 3.4 - 5.3 mmol/L     _  Result Component Current Result Ref Range   Calcium 8.9 (6/5/2024) 8.8 - 10.2 mg/dL     _  Result Component Current Result Ref Range   Magnesium 2.1 (5/8/2024) 1.7 - 2.3 mg/dL     _  Result Component Current Result Ref Range   Phosphorus 3.2 (6/5/2024) 2.5 - 4.5 mg/dL     _  Result Component Current Result Ref Range   Albumin 3.6 (6/5/2024) 3.5 - 5.2 g/dL     _  Result Component Current Result Ref Range   Urea Nitrogen 13.7 (6/5/2024) 8.0 - 23.0 mg/dL     _  Result Component Current Result Ref Range   Creatinine 0.89 (6/5/2024) 0.67 - 1.17 mg/dL     _  Result Component Current Result Ref Range   AST 31 (6/5/2024) 0 - 45 U/L     _  Result Component Current Result Ref Range   ALT 26 (6/5/2024) 0 - 70 U/L     _  Result Component Current Result Ref Range   Bilirubin Total 0.8 (6/5/2024) <=1.2 mg/dL     _  Result Component Current Result Ref Range   WBC Count 2.1 (L) (6/5/2024) 4.0 - 11.0 10e3/uL     _  Result Component Current Result Ref Range   Hemoglobin 10.7 (L) (6/5/2024) 13.3 - 17.7 g/dL     _  Result Component Current Result Ref Range   Platelet Count 19 (LL) (6/5/2024) 150 - 450 10e3/uL     _  Result Component Current Result Ref Range   Absolute Neutrophils 1.1 (L) (6/5/2024) 1.6 - 8.3 10e3/uL     No results found for ANC within last 30 days.        Assessment & Plan:  No new concerning abnormalities. Low Platelet count is to be expected at this point in Leland's cycle.    Patient was contacted regarding their 6/5 lab results. They were also advised to monitor abnormal bleeding and bruising.    Follow-Up:  Leland's next lab draw and appointment are on 6/13    Noe Mann  Pharmacy Intern  Oral Chemotherapy Monitoring Program  [facility]  [phone]

## 2024-06-07 NOTE — PROGRESS NOTES
[facility]: Cancer Care                                                                                          RNCC called Leland and his wife to state that he will need to plan for labs and a possible platelet transfusion on Sunday at 10 am at the [facility].     Patient stated an understanding and had no other questions.       Signature:  Andria Shay RN

## 2024-06-07 NOTE — PROGRESS NOTES
Infusion Nursing Note:  Leland Pearson presents today for labs and possible blood/pl transfusion.    Patient seen by provider today: No   present during visit today: Not Applicable.    Note: Pt presents for labs and possible blood/platelet transfusion.  Did not meet parameters for transfusion.  Pt experiencing fatigue and some beginning mouth sores.  Counseled on continuing the rinses and to contact MD if worsens.  Please note transfusion consent witness signature missing, pt was asked if the signature was his on consent and RN signed as witness, this document to be sent for scanning.        Intravenous Access:  Lab draw site L AC, Needle type PIV, Gauge 22.  Peripheral IV placed.    Treatment Conditions:  Lab Results   Component Value Date    HGB 11.0 (L) 06/07/2024    WBC 1.3 (L) 06/07/2024    ANEU 1.1 (L) 06/05/2024    ANEUTAUTO 0.5 (L) 06/07/2024    PLT 20 (LL) 06/07/2024        Results reviewed, labs did NOT meet treatment parameters: Pl and Hgb did not meet parameters.      Post Infusion Assessment:  Site patent and intact, free from redness, edema or discomfort.  No evidence of extravasations.  Access discontinued per protocol.       Discharge Plan:   Patient declined prescription refills.  Discharge instructions reviewed with: Patient and Family.  Patient and/or family verbalized understanding of discharge instructions and all questions answered.  AVS to patient via [employer].  Patient will return 6/11/24 for next appointment.   Patient discharged in stable condition accompanied by: self and wife.  Departure Mode: Ambulatory.      Hillary Aviles RN

## 2024-06-08 LAB
BLD PROD TYP BPU: NORMAL
BLD PROD TYP BPU: NORMAL
BLOOD COMPONENT TYPE: NORMAL
BLOOD COMPONENT TYPE: NORMAL
CODING SYSTEM: NORMAL
CODING SYSTEM: NORMAL
CROSSMATCH: NORMAL
ISSUE DATE AND TIME: NORMAL
UNIT ABO/RH: NORMAL
UNIT ABO/RH: NORMAL
UNIT NUMBER: NORMAL
UNIT NUMBER: NORMAL
UNIT STATUS: NORMAL
UNIT STATUS: NORMAL
UNIT TYPE ISBT: 5100
UNIT TYPE ISBT: 5100

## 2024-06-08 RX ORDER — DIPHENHYDRAMINE HYDROCHLORIDE 50 MG/ML
50 INJECTION INTRAMUSCULAR; INTRAVENOUS
Status: CANCELLED
Start: 2024-06-08

## 2024-06-08 RX ORDER — HEPARIN SODIUM (PORCINE) LOCK FLUSH IV SOLN 100 UNIT/ML 100 UNIT/ML
5 SOLUTION INTRAVENOUS
Status: CANCELLED | OUTPATIENT
Start: 2024-06-08

## 2024-06-08 RX ORDER — EPINEPHRINE 1 MG/ML
0.3 INJECTION, SOLUTION INTRAMUSCULAR; SUBCUTANEOUS EVERY 5 MIN PRN
Status: CANCELLED | OUTPATIENT
Start: 2024-06-08

## 2024-06-08 RX ORDER — HEPARIN SODIUM,PORCINE 10 UNIT/ML
5-20 VIAL (ML) INTRAVENOUS DAILY PRN
Status: CANCELLED | OUTPATIENT
Start: 2024-06-08

## 2024-06-09 ENCOUNTER — INFUSION THERAPY VISIT (OUTPATIENT)
Dept: ONCOLOGY | Facility: CLINIC | Age: 69
End: 2024-06-09
Attending: STUDENT IN AN ORGANIZED HEALTH CARE EDUCATION/TRAINING PROGRAM
Payer: COMMERCIAL

## 2024-06-09 ENCOUNTER — APPOINTMENT (OUTPATIENT)
Dept: LAB | Facility: CLINIC | Age: 69
End: 2024-06-09
Attending: STUDENT IN AN ORGANIZED HEALTH CARE EDUCATION/TRAINING PROGRAM
Payer: COMMERCIAL

## 2024-06-09 VITALS
HEART RATE: 72 BPM | SYSTOLIC BLOOD PRESSURE: 126 MMHG | WEIGHT: 197.7 LBS | BODY MASS INDEX: 26.56 KG/M2 | OXYGEN SATURATION: 100 % | DIASTOLIC BLOOD PRESSURE: 76 MMHG | RESPIRATION RATE: 16 BRPM | TEMPERATURE: 98.2 F

## 2024-06-09 DIAGNOSIS — Z79.899 ENCOUNTER FOR LONG-TERM (CURRENT) USE OF MEDICATIONS: ICD-10-CM

## 2024-06-09 DIAGNOSIS — D46.9 MDS (MYELODYSPLASTIC SYNDROME) (H): Primary | ICD-10-CM

## 2024-06-09 LAB
ACANTHOCYTES BLD QL SMEAR: NORMAL
ALBUMIN SERPL BCG-MCNC: 3.7 G/DL (ref 3.5–5.2)
ALP SERPL-CCNC: 85 U/L (ref 40–150)
ALT SERPL W P-5'-P-CCNC: 21 U/L (ref 0–70)
ANION GAP SERPL CALCULATED.3IONS-SCNC: 9 MMOL/L (ref 7–15)
AST SERPL W P-5'-P-CCNC: 36 U/L (ref 0–45)
AUER BODIES BLD QL SMEAR: NORMAL
BASO STIPL BLD QL SMEAR: NORMAL
BASOPHILS # BLD AUTO: 0 10E3/UL (ref 0–0.2)
BASOPHILS NFR BLD AUTO: 0 %
BILIRUB SERPL-MCNC: 0.7 MG/DL
BITE CELLS BLD QL SMEAR: NORMAL
BLISTER CELLS BLD QL SMEAR: NORMAL
BUN SERPL-MCNC: 12.9 MG/DL (ref 8–23)
BURR CELLS BLD QL SMEAR: NORMAL
CALCIUM SERPL-MCNC: 8.8 MG/DL (ref 8.8–10.2)
CHLORIDE SERPL-SCNC: 105 MMOL/L (ref 98–107)
CREAT SERPL-MCNC: 0.79 MG/DL (ref 0.67–1.17)
DACRYOCYTES BLD QL SMEAR: NORMAL
DEPRECATED HCO3 PLAS-SCNC: 23 MMOL/L (ref 22–29)
EGFRCR SERPLBLD CKD-EPI 2021: >90 ML/MIN/1.73M2
ELLIPTOCYTES BLD QL SMEAR: NORMAL
EOSINOPHIL # BLD AUTO: 0 10E3/UL (ref 0–0.7)
EOSINOPHIL NFR BLD AUTO: 0 %
ERYTHROCYTE [DISTWIDTH] IN BLOOD BY AUTOMATED COUNT: 16 % (ref 10–15)
FRAGMENTS BLD QL SMEAR: NORMAL
GLUCOSE SERPL-MCNC: 108 MG/DL (ref 70–99)
HCT VFR BLD AUTO: 32.8 % (ref 40–53)
HGB BLD-MCNC: 10.9 G/DL (ref 13.3–17.7)
HGB C CRYSTALS: NORMAL
HOWELL-JOLLY BOD BLD QL SMEAR: NORMAL
IMM GRANULOCYTES # BLD: 0 10E3/UL
IMM GRANULOCYTES NFR BLD: 4 %
LYMPHOCYTES # BLD AUTO: 0.6 10E3/UL (ref 0.8–5.3)
LYMPHOCYTES NFR BLD AUTO: 56 %
MCH RBC QN AUTO: 29.9 PG (ref 26.5–33)
MCHC RBC AUTO-ENTMCNC: 33.2 G/DL (ref 31.5–36.5)
MCV RBC AUTO: 90 FL (ref 78–100)
MONOCYTES # BLD AUTO: 0.1 10E3/UL (ref 0–1.3)
MONOCYTES NFR BLD AUTO: 5 %
NEUTROPHILS # BLD AUTO: 0.4 10E3/UL (ref 1.6–8.3)
NEUTROPHILS NFR BLD AUTO: 35 %
NEUTS HYPERSEG BLD QL SMEAR: NORMAL
NRBC # BLD AUTO: 0 10E3/UL
NRBC BLD AUTO-RTO: 0 /100
PHOSPHATE SERPL-MCNC: 2.3 MG/DL (ref 2.5–4.5)
PLAT MORPH BLD: NORMAL
PLATELET # BLD AUTO: 18 10E3/UL (ref 150–450)
POLYCHROMASIA BLD QL SMEAR: NORMAL
POTASSIUM SERPL-SCNC: 4 MMOL/L (ref 3.4–5.3)
PROT SERPL-MCNC: 6.9 G/DL (ref 6.4–8.3)
RBC # BLD AUTO: 3.65 10E6/UL (ref 4.4–5.9)
RBC AGGLUT BLD QL: NORMAL
RBC MORPH BLD: NORMAL
ROULEAUX BLD QL SMEAR: NORMAL
SICKLE CELLS BLD QL SMEAR: NORMAL
SMUDGE CELLS BLD QL SMEAR: NORMAL
SODIUM SERPL-SCNC: 137 MMOL/L (ref 135–145)
SPHEROCYTES BLD QL SMEAR: NORMAL
STOMATOCYTES BLD QL SMEAR: NORMAL
TARGETS BLD QL SMEAR: NORMAL
TOXIC GRANULES BLD QL SMEAR: NORMAL
URATE SERPL-MCNC: 4.2 MG/DL (ref 3.4–7)
VARIANT LYMPHS BLD QL SMEAR: NORMAL
WBC # BLD AUTO: 1.1 10E3/UL (ref 4–11)

## 2024-06-09 PROCEDURE — 87529 HSV DNA AMP PROBE: CPT | Performed by: STUDENT IN AN ORGANIZED HEALTH CARE EDUCATION/TRAINING PROGRAM

## 2024-06-09 PROCEDURE — 36430 TRANSFUSION BLD/BLD COMPNT: CPT

## 2024-06-09 PROCEDURE — P9037 PLATE PHERES LEUKOREDU IRRAD: HCPCS | Performed by: STUDENT IN AN ORGANIZED HEALTH CARE EDUCATION/TRAINING PROGRAM

## 2024-06-09 PROCEDURE — 84550 ASSAY OF BLOOD/URIC ACID: CPT

## 2024-06-09 PROCEDURE — 85025 COMPLETE CBC W/AUTO DIFF WBC: CPT

## 2024-06-09 PROCEDURE — 87798 DETECT AGENT NOS DNA AMP: CPT | Performed by: STUDENT IN AN ORGANIZED HEALTH CARE EDUCATION/TRAINING PROGRAM

## 2024-06-09 PROCEDURE — 84100 ASSAY OF PHOSPHORUS: CPT

## 2024-06-09 PROCEDURE — 36415 COLL VENOUS BLD VENIPUNCTURE: CPT

## 2024-06-09 PROCEDURE — 80053 COMPREHEN METABOLIC PANEL: CPT

## 2024-06-09 ASSESSMENT — PAIN SCALES - GENERAL: PAINLEVEL: NO PAIN (0)

## 2024-06-09 NOTE — NURSING NOTE
Chief Complaint   Patient presents with    Blood Draw     Labs drawn via PIV by RN in lab.  VS taken       Labs drawn from PIV placed by RN. Line flushed with saline. Vitals taken. Pt checked in for appointment(s).    Jessica Jeter RN

## 2024-06-09 NOTE — PATIENT INSTRUCTIONS
Contact Numbers  [facility]: [phone] (for symptom and scheduling needs)    Please call the [facility] Triage line if you experience a temperature greater than or equal to 100.4, shaking chills, have uncontrolled nausea, vomiting and/or diarrhea, dizziness, shortness of breath, chest pain, bleeding, unexplained bruising, or if you have any other new/concerning symptoms, questions or concerns.     If you are having any concerning symptoms or wish to speak to a provider before your next infusion visit, please call your care triage to notify them so we can adequately serve you.     If you need a refill on a narcotic prescription or other medication, please call triage before your infusion appointment.

## 2024-06-10 ENCOUNTER — CARE COORDINATION (OUTPATIENT)
Dept: TRANSPLANT | Facility: CLINIC | Age: 69
End: 2024-06-10

## 2024-06-10 DIAGNOSIS — D46.9 MDS (MYELODYSPLASTIC SYNDROME) (H): Primary | ICD-10-CM

## 2024-06-10 LAB
ABO/RH(D): NORMAL
ANTIBODY SCREEN: NEGATIVE
HSV1 DNA SPEC QL NAA+PROBE: NOT DETECTED
HSV2 DNA SPEC QL NAA+PROBE: NOT DETECTED
SPECIMEN EXPIRATION DATE: NORMAL
VZV DNA SPEC QL NAA+PROBE: NOT DETECTED

## 2024-06-10 NOTE — PROGRESS NOTES
[facility] BMT and Cell Therapy Program  RN Coordinator Pre-Visit Documentation      Leland Pearson is a 69 year old male who has been referred to the [facility] BMT and Cell Therapy Program for hematopoietic cell transplant or immune effector cell therapy.      Referring MD Name: Dr Nael Delcid   Referring RN Coordinator: ANDRIA ZAMORANO     Reason for referral: MDS    Link to BMT & CT Program Algorithms      For allos only:    Previous HLA typing? Yes    Previous formal donor search? No    PRA needed? Yes    CMV IGG needed? Yes    and ABO needed? Yes    Potential family donors to type? Unknown    Need URD consents? Yes      All relevant clinical notes, labs, imaging, and pathology may be reviewed in Epic Bookmarks under name: Marybeth      Patient Care Team         Relationship Specialty Notifications Start End    Gunnar Lundy PCP - General   7/27/13     Phone: [phone] Fax: [phone]         [place] [id number]    Nael Delcid MD MD Hematology & Oncology Admissions 5/2/24     Phone: [phone] Fax: [phone]         [address] [facility] [address]    Andria Zamorano, RN Specialty Care Coordinator Hematology & Oncology All results, Admissions 5/17/24     Nael Delcid MD Assigned Cancer Care Provider   5/23/24     Phone: [phone] Fax: [phone] [address] [facility] [address]              Marybeth Chakraborty RN

## 2024-06-10 NOTE — PROGRESS NOTES
BMT/Cell Therapy Note  Jun 11, 2024     Referring provider: Dr. 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    4/1/24: WBC 6.4, Hb 13.5, Plts 64, ANC 1.89. LDH elevated 532    4/16/24 Consultation with Dr. Luz Foster ([facility]) prompted BMBx   4/23/24: BMBx demonstrating MDS-EB2 (WHO 2016) with hypercellular marrow (%) with 10.8% blasts including rare circulating blasts.  Flow myeloid blasts (4.1%)  Karyotype: 46, XY  NGS:  ASXL1, IDH1, RUNX1, SRSF2 mutations  Counts: WBC 6.7, Hb 13.1, Plts 70, ANC 0.7  IPSS-M = High risk (0.85)    BMBx planned for Thursday.    History     History of Present Illness/ Summary  Leland Pearson is a 69 year old male who presents for a new transplant evaluation. Here with wife, two daughters and son in law.     He started having drenching night sweats and fatigue in Jan 2024. Intentional weight loss. He was found to have thrombocytopenia on routine CBC done prior to back surgery. 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     His main limitation in activity is due to his back pain. He has been riding the stationary bike 20 min/ day for the past 3 months. Does not walk much, thinks that he can walk 15min. He feels like he gets more tired now than in the past after household work. Independent in ADLs. Wife reminds him to take his medications. Still drives.      Review of Systems: Negative except as mentioned above    Past Medical History   Back pain due to spinal stenosis, lumbosacral radiculopathy. Now improved.   Gout. Most recent flare was in April 2024  GERD  HTN  BPH  Right knee ACL repair in 2014  Low  back surgery in 2004 for herniated disc, complicated by infection requiring prolonged IV abx   Coronary artery calcifications seen on CT, stress test in 2023 negative    Family History    Mother, skin cancer at 85  Sister, skin cancer at 55  Paternal uncle, liver cancer    Social History   . Never smoker. Alcohol, socially - 5x/week, none since April 2024  5 brothers, 1 sister.  - Toi, 65. Kidney stones  - Mindi, 60. Hx of skin cancer, basal cell.   - Tj, 52. Has diabetes, type 1.     Two children: 43 years and 36 years, both daughters are here and healthy.    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 Was still working part time till last summer at [employer], 2-3 hours per day.     Medications   Current Outpatient Medications   Medication Sig Dispense Refill    acyclovir (ZOVIRAX) 800 MG tablet Take 1 tablet (800 mg) by mouth every 12 hours for 150 days 60 tablet 4    allopurinol (ZYLOPRIM) 300 MG tablet Take 1 tablet (300 mg) by mouth daily 90 tablet 1    celecoxib (CELEBREX) 200 MG capsule Take 200 mg by mouth daily      famotidine (PEPCID) 20 MG tablet Famotidine Oral    daily    active      glucosamine-chondroitin 500-400 MG CAPS Take 1 capsule by mouth daily (Patient not taking: Reported on 6/15/2024)      levofloxacin (LEVAQUIN) 250 MG tablet Take 1 tablet (250 mg) by mouth daily 30 tablet 1    lisinopril (ZESTRIL) 10 MG tablet Take 1 tablet by mouth daily at 2 pm      multivitamin, therapeutic with minerals (MULTI-VITAMIN) TABS Take 1 tablet by mouth daily      posaconazole (NOXAFIL) 100 MG DR tablet Take 3 tablets (300 mg) by mouth every morning 90 tablet 1    prochlorperazine (COMPAZINE) 5 MG tablet Take 1 tablet (5 mg) by mouth every 6 hours as needed for nausea or vomiting 30 tablet 1    tamsulosin (FLOMAX) 0.4 MG capsule Tamsulosin Oral    daily    active      [START ON 6/30/2024] venetoclax (VENCLEXTA) 100 MG tablet Take 1 tablet (100 mg) by mouth daily for 14 days Do not start  cycle until directed by care team. Take with food and water. (Patient not taking: Reported on 6/15/2024) 14 tablet 0    venetoclax (VENCLEXTA) 100 MG tablet Take 1 tablet (100 mg) on day 1, then 2 tablets (200 mg) on day 2, then 4 tablets (400 mg) once daily from day 3 to day 14. Do not start cycle until directed by care team. Take with food and water. 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 Not frail.     We discussed the process of transplant in detail. We discussed that the first step in the transplant process is identification of a donor. We will proceed with an unrelated donor search. I outlined the steps of the transplant process including pre-transplant workup, conditioning therapy, engraftment, post-transplant monitoring. We discussed the risks including GVHD, relapse, infections, graft failure and severe organ toxicities. Also dicussed the requirements of staying close to hospital and having a dedicated caregiver for the first 100 days post transplant.     We discussed that allogenic stem cell transplant is the only modality that offers a chance at long-term survival and potential cure. The estimated one year overall survival according to the CIBMTR calculator is 71%. The risk of relapse is 30-40%, the incidence of grade III and IV acute GVHD and moderate/severe chronic GVHD is 10-15% and non relapse mortality is 10-20%. We discussed that we cannot individualize a statistic.      Recommendations  Unrelated donor search, also type daughters  Increase activity, at least 150min/week of moderate aerobic exercise.   Cardiology consultation for calcifications in LAD     Physical Exam     Vital Signs: BP (!) 154/80 (BP Location: Right arm, Patient Position: Sitting, Cuff Size: Adult Regular)   Pulse 60   Temp 97.9  F (36.6  C) (Oral)   " Resp 16   Ht 1.843 m (6' 0.56\")   Wt 90.6 kg (199 lb 12.8 oz)   BMI 26.68 kg/m    Wt Readings from Last 4 Encounters:   06/13/24 89.5 kg (197 lb 6.4 oz)   06/12/24 89.9 kg (198 lb 4.8 oz)   06/11/24 90.6 kg (199 lb 12.8 oz)   06/09/24 89.7 kg (197 lb 11.2 oz)     KPS: 80% Can perform normal activity with effort, some signs of disease    General: Alert, no distress  Eyes: Conjunctiva normal  Respiratory: Normal respiratory effort.  Cardiovascular: Normal perfusion, no significant edema.  Psych: Mood and affect are appropriate.    BMT Fried Frailty          6/11/2024    16:09   Fried Frailty   Lost>10 pounds unintenionally last year N   Exhaustion Score 0   Slowness Score 0   Weakness/ Strength Score 0   Low Activity Level Score 0   Final Score Not Frail   Final Score Number 0   Sit Stand Assessment   Patient able to perform 5 chair stands Y   Chair Stands in seconds 19   Patient is able to perform stand with Feet Side by Side? Y   First attempt (in seconds): 10   Patient is able to perform Semi-Tandem Stand? Y   First attemp (in seconds): 10   Patient is able to perform Tandem Stand? Y   First attemp (in seconds): 10        Labs, Pathology and Imaging   LABS  Hematology Studies   Recent Labs   Lab Test 06/17/24  0814 06/15/24  0800 06/13/24  0650 06/12/24  1345 06/09/24  0948 06/07/24  0943 05/22/24  0838 05/21/24  1016 05/15/24  1339 05/08/24  1156   WBC 0.4* 0.5* 1.0*   < > 1.1* 1.3*   < > 4.0   < > 5.2   HGB 12.1* 11.1* 11.3*   < > 10.9* 11.0*   < > 12.9*   < > 13.4   HCT 36.2* 33.8* 33.7*   < > 32.8* 33.4*   < > 38.4*   < > 41.0   PLT 18* 15* 30*   < > 18* 20*   < > 72*   < > 70*   ANEUTAUTO  --   --   --   --  0.4* 0.5*  --   --   --   --    ALYMPAUTO  --   --   --   --  0.6* 0.7*  --   --   --   --    AMONOAUTO  --   --   --   --  0.1 0.1  --   --   --   --    AEOSAUTO  --   --   --   --  0.0 0.0  --   --   --   --    ABSBASO  --   --   --   --  0.0 0.0  --   --   --   --    JUSTINBCMAN 0.0 0.0 0.0   < > " 0.0 0.0   < >  --    < > 0.0   ABLA  --   --   --   --   --   --   --  0.4*  --   --     < > = values in this interval not displayed.     Metabolic Studies    Recent Labs   Lab Test 06/12/24  1345 06/09/24  0948 06/05/24  1348    137 134*   POTASSIUM 3.9 4.0 4.8   CHLORIDE 104 105 102   CO2 24 23 24   BUN 19.3 12.9 13.7   CR 0.79 0.79 0.89   * 108* 102*     Recent Labs   Lab Test 06/12/24  1345 06/09/24  0948 06/05/24  1348 05/15/24  1339 05/08/24  1156   MAG  --   --   --   --  2.1   PHOS 2.8 2.3* 3.2   < > 2.7    < > = values in this interval not displayed.      Hepatic Studies    Recent Labs   Lab Test 06/12/24  1345 06/09/24  0948 06/05/24  1348   BILITOTAL 0.7 0.7 0.8   ALKPHOS 90 85 89   AST 36 36 31   ALT 20 21 26   ALBUMIN 3.9 3.7 3.6       IMAGING  CT c/a/p 4/25/24    CHEST   Airways: The intrathoracic trachea is patent. There is no endobronchial mass or resorptive atelectasis.   Lungs: There is linear scarring in the inferior segment of the lingula.   Juxtapleural pulmonary nodules adjacent to the left major fissure may represent intrapulmonary lymph nodes. These measure 3 to 4 mm. These do not appear suspicious. 1 to 2 mm right upper lobe pulmonary nodule on series 3, image 53, considered benign.   Pleural spaces: No pleural or pericardial effusion.   Heart: Coronary artery calcifications, particularly of the LAD.   Pulmonary arteries: Normal caliber main pulmonary artery.   Aorta: Normal caliber thoracic aorta.   Lymph nodes: Nonenlarged axillary lymph nodes. In the left axilla, these measure up to 8 mm in short axis dimension. No mediastinal or hilar lymphadenopathy. No internal mammary adenopathy.   Thyroid: Thyroid gland is symmetric. No mass at the thoracic inlet.   Esophagus: There is no esophageal wall thickening or mass.   Chest wall: There is no chest wall mass or fluid collection.   Osseous: Degenerative disc disease at the endplates of the thoracic spine. No suspicious bone lesions  are seen. Manubrium/body of the sternum appear intact.     ABDOMEN AND PELVIS   Liver: Liver morphology is noncirrhotic. No focal liver lesion.   Biliary: Normal caliber biliary tree. No inflammatory changes adjacent to the gallbladder.   Pancreas: There is no pancreatic mass or pancreatic duct dilation. No glandular atrophy.   Spleen: Normal spleen size. No focal splenic lesion. The spleen measures up to 13.8 cm in maximum length.   Adrenals: No adrenal mass.   Kidneys: The nephrograms are symmetric. There is a 3 mm stone in the left kidney on series 7, image 47. No solid renal mass. No delayed nephrogram.   Urinary tracts: There is no obstructive urolith. No hydroureter or urothelial   thickening.   Urinary bladder: Urinary bladder and perivesical fat are normal. Reproductive:   Prostate and seminal vesicles are within normal limits.   Colon: There is colonic diverticulosis. No findings for diverticulitis. No mural thickening. No pericolonic edema.   Small bowel: Normal caliber small bowel. No mural stratification.   Appendix: No secondary signs for appendicitis. No calcified appendicolith.   Stomach: There is no gastric wall thickening or evidence for gastric outlet   obstruction.   Peritoneal cavity: No abdominal or pelvic ascites.   Lymph nodes: There is no inguinal, pelvic sidewall, or gastrohepatic ligament adenopathy.   Vasculature: Normal caliber abdominal aorta. Celiac artery and SMA are patent.   ISIS is patent.   Body wall: No abdominal wall mass or fluid collection.   Osseous: Degenerative disc disease in the lumbar spine. Discogenic sclerosis and disc height loss at multiple levels, particularly at the lumbosacral junction.     IMPRESSION:   1. No thoracic, abdominal, or pelvic lymphadenopathy by size criteria.   2.  Juxtapleural pulmonary nodules along the left major fissure, likely   intrapulmonary lymph nodes. These do not require imaging follow-up, and are considered benign.   3.  3 mm nephrolith in  the left intrarenal collecting system. No delayed   nephrogram, hydronephrosis, or perinephric fluid collection.     I spent 90 minutes in the care of this patient today, which included time necessary for preparation for the visit, obtaining history, ordering medications/tests/procedures as medically indicated, review of pertinent medical literature, counseling of the patient, communication of recommendations to the care team, and documentation time.    Brenda Murphy  Department of Hematology, Oncology and Transplantation  Pager 1300/Text via [employer]

## 2024-06-11 ENCOUNTER — LAB (OUTPATIENT)
Dept: LAB | Facility: CLINIC | Age: 69
End: 2024-06-11
Attending: STUDENT IN AN ORGANIZED HEALTH CARE EDUCATION/TRAINING PROGRAM
Payer: COMMERCIAL

## 2024-06-11 ENCOUNTER — ALLIED HEALTH/NURSE VISIT (OUTPATIENT)
Dept: TRANSPLANT | Facility: CLINIC | Age: 69
End: 2024-06-11
Attending: STUDENT IN AN ORGANIZED HEALTH CARE EDUCATION/TRAINING PROGRAM
Payer: COMMERCIAL

## 2024-06-11 ENCOUNTER — MEDICAL CORRESPONDENCE (OUTPATIENT)
Dept: TRANSPLANT | Facility: CLINIC | Age: 69
End: 2024-06-11

## 2024-06-11 VITALS
RESPIRATION RATE: 16 BRPM | BODY MASS INDEX: 26.48 KG/M2 | WEIGHT: 199.8 LBS | HEIGHT: 73 IN | SYSTOLIC BLOOD PRESSURE: 154 MMHG | DIASTOLIC BLOOD PRESSURE: 80 MMHG | HEART RATE: 60 BPM | TEMPERATURE: 97.9 F

## 2024-06-11 DIAGNOSIS — D46.9 MDS (MYELODYSPLASTIC SYNDROME) (H): Primary | ICD-10-CM

## 2024-06-11 DIAGNOSIS — D46.9 MDS (MYELODYSPLASTIC SYNDROME) (H): ICD-10-CM

## 2024-06-11 PROCEDURE — 86828 HLA CLASS I&II ANTIBODY QUAL: CPT

## 2024-06-11 PROCEDURE — 99417 PROLNG OP E/M EACH 15 MIN: CPT | Performed by: STUDENT IN AN ORGANIZED HEALTH CARE EDUCATION/TRAINING PROGRAM

## 2024-06-11 PROCEDURE — 86900 BLOOD TYPING SEROLOGIC ABO: CPT

## 2024-06-11 PROCEDURE — G0463 HOSPITAL OUTPT CLINIC VISIT: HCPCS | Performed by: STUDENT IN AN ORGANIZED HEALTH CARE EDUCATION/TRAINING PROGRAM

## 2024-06-11 PROCEDURE — 86644 CMV ANTIBODY: CPT

## 2024-06-11 PROCEDURE — 99215 OFFICE O/P EST HI 40 MIN: CPT | Performed by: STUDENT IN AN ORGANIZED HEALTH CARE EDUCATION/TRAINING PROGRAM

## 2024-06-11 PROCEDURE — 36415 COLL VENOUS BLD VENIPUNCTURE: CPT

## 2024-06-11 ASSESSMENT — PAIN SCALES - GENERAL: PAINLEVEL: NO PAIN (0)

## 2024-06-11 NOTE — NURSING NOTE
Chief Complaint   Patient presents with    Labs Only     Labs drawn via  by RN in lab.       Labs collected from venipuncture by RN.     Jessica Jeter RN

## 2024-06-11 NOTE — PROGRESS NOTES
Blood and Marrow Transplant - New Evaluation Appointment    Spoke with Catracho, patient's spouse and Daughters and Son in Law, following visit with Dr. Murphy. I explained the role of the nurse coordinator throughout the process, as well as general time line and expectations for next steps. We discussed the necessity of a caregiver and the program's proximity requirements. All questions were answered.     Plan: Allogeneic Transplant, pending results of upcoming BMBX,  Timeline Notes:If BMBX is good, bring to work up after next cycle, otherwise additional cycles may be indicated prior to txt.     Contact information provided for :  yes    Allo:  HLA typing drawn: Yes    PRA typing drawn:  Yes    CMV-IgG and ABO-Rh drawn or in record: Yes    Contact information provided for : Yes    Will sibling typing kits need to be sent? Yes- two daughters. They were present and provided with the office number to get kits coordinated.     Financial Release for URD search obtained:  Yes    Phase Status updated: yes

## 2024-06-11 NOTE — NURSING NOTE
"Oncology Rooming Note    June 11, 2024 2:00 PM   Leland Pearson is a 69 year old male who presents for:    Chief Complaint   Patient presents with    Oncology Clinic Visit     Myelodysplastic syndrome      Initial Vitals: BP (!) 154/80 (BP Location: Right arm, Patient Position: Sitting, Cuff Size: Adult Regular)   Pulse 60   Temp 97.9  F (36.6  C) (Oral)   Resp 16   Ht 1.843 m (6' 0.56\")   Wt 90.6 kg (199 lb 12.8 oz)   BMI 26.68 kg/m   Estimated body mass index is 26.68 kg/m  as calculated from the following:    Height as of this encounter: 1.843 m (6' 0.56\").    Weight as of this encounter: 90.6 kg (199 lb 12.8 oz). Body surface area is 2.15 meters squared.  No Pain (0) Comment: Data Unavailable   No LMP for male patient.  Allergies reviewed: Yes  Medications reviewed: Yes    Medications: Medication refills not needed today.  Pharmacy name entered into foc.us:    [id number] [place] [facility] [address] [facility] - [place], [place] - [facility] [address]    Frailty Screening:   Is the patient here for a new oncology consult visit in cancer care? 1. Yes. Over the past month, have you experienced difficulty or required a caregiver to assist with:   1. Balance, walking or general mobility (including any falls)? NO  2. Completion of self-care tasks such as bathing, dressing, toileting, grooming/hygiene?  NO  3. Concentration or memory that affects your daily life?  NO       Clinical concerns: none    Praveena Hodges"

## 2024-06-11 NOTE — LETTER
6/11/2024      Leland Pearson  [address] [place] [address]      Dear Colleague,    Thank you for referring your patient, Leland Pearson, to the [facility] BLOOD AND MARROW TRANSPLANT PROGRAM [place]. Please see a copy of my visit note below.    BMT/Cell Therapy Note  Jun 11, 2024     Referring provider: Dr. 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    4/1/24: WBC 6.4, Hb 13.5, Plts 64, ANC 1.89. LDH elevated 532    4/16/24 Consultation with Dr. Luz Foster ([facility]) prompted BMBx   4/23/24: BMBx demonstrating MDS-EB2 (WHO 2016) with hypercellular marrow (%) with 10.8% blasts including rare circulating blasts.  Flow myeloid blasts (4.1%)  Karyotype: 46, XY  NGS:  ASXL1, IDH1, RUNX1, SRSF2 mutations  Counts: WBC 6.7, Hb 13.1, Plts 70, ANC 0.7  IPSS-M = High risk (0.85)    BMBx planned for Thursday.    History     History of Present Illness/ Summary  Leland Pearson is a 69 year old male who presents for a new transplant evaluation. Here with wife, two daughters and son in law.     He started having drenching night sweats and fatigue in Jan 2024. Intentional weight loss. He was found to have thrombocytopenia on routine CBC done prior to back surgery. 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     His main limitation in activity is due to his back pain. He has been riding the stationary bike 20 min/ day for the past 3 months. Does not walk much, thinks that he can walk 15min. He feels like he gets more tired now than in the past after household work. Independent in ADLs. Wife reminds him to take his  medications. Still drives.      Review of Systems: Negative except as mentioned above    Past Medical History  Back pain due to spinal stenosis, lumbosacral radiculopathy. Now improved.   Gout. Most recent flare was in April 2024  GERD  HTN  BPH  Right knee ACL repair in 2014  Low back surgery in 2004 for herniated disc, complicated by infection requiring prolonged IV abx   Coronary artery calcifications seen on CT, stress test in 2023 negative    Family History   Mother, skin cancer at 85  Sister, skin cancer at 55  Paternal uncle, liver cancer    Social History  . Never smoker. Alcohol, socially - 5x/week, none since April 2024  5 brothers, 1 sister.  - Toi, 65. Kidney stones  - Mindi, 60. Hx of skin cancer, basal cell.   - Tj, 52. Has diabetes, type 1.     Two children: 43 years and 36 years, both daughters are here and healthy.    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 Was still working part time till last summer at [employer], 2-3 hours per day.     Medications  Current Outpatient Medications   Medication Sig Dispense Refill    acyclovir (ZOVIRAX) 800 MG tablet Take 1 tablet (800 mg) by mouth every 12 hours for 150 days 60 tablet 4    allopurinol (ZYLOPRIM) 300 MG tablet Take 1 tablet (300 mg) by mouth daily 90 tablet 1    celecoxib (CELEBREX) 200 MG capsule Take 200 mg by mouth daily      famotidine (PEPCID) 20 MG tablet Famotidine Oral    daily    active      glucosamine-chondroitin 500-400 MG CAPS Take 1 capsule by mouth daily (Patient not taking: Reported on 6/15/2024)      levofloxacin (LEVAQUIN) 250 MG tablet Take 1 tablet (250 mg) by mouth daily 30 tablet 1    lisinopril (ZESTRIL) 10 MG tablet Take 1 tablet by mouth daily at 2 pm      multivitamin, therapeutic with minerals (MULTI-VITAMIN) TABS Take 1 tablet by mouth daily      posaconazole (NOXAFIL) 100 MG DR tablet Take 3 tablets (300 mg) by mouth every morning 90 tablet 1    prochlorperazine (COMPAZINE) 5 MG tablet Take 1  tablet (5 mg) by mouth every 6 hours as needed for nausea or vomiting 30 tablet 1    tamsulosin (FLOMAX) 0.4 MG capsule Tamsulosin Oral    daily    active      [START ON 6/30/2024] venetoclax (VENCLEXTA) 100 MG tablet Take 1 tablet (100 mg) by mouth daily for 14 days Do not start cycle until directed by care team. Take with food and water. (Patient not taking: Reported on 6/15/2024) 14 tablet 0    venetoclax (VENCLEXTA) 100 MG tablet Take 1 tablet (100 mg) on day 1, then 2 tablets (200 mg) on day 2, then 4 tablets (400 mg) once daily from day 3 to day 14. Do not start cycle until directed by care team. Take with food and water. (Patient not taking: Reported on 6/9/2024) 51 tablet 0      Allergies   No Known Allergies         Assessment and Plan     MDS-EB2, IPSS-M 0.85 (high risk)  HCTCI score 1 (CAD, treated medically; assuming normal PFTs). Not frail.     We discussed the process of transplant in detail. We discussed that the first step in the transplant process is identification of a donor. We will proceed with an unrelated donor search. I outlined the steps of the transplant process including pre-transplant workup, conditioning therapy, engraftment, post-transplant monitoring. We discussed the risks including GVHD, relapse, infections, graft failure and severe organ toxicities. Also dicussed the requirements of staying close to hospital and having a dedicated caregiver for the first 100 days post transplant.     We discussed that allogenic stem cell transplant is the only modality that offers a chance at long-term survival and potential cure. The estimated one year overall survival according to the CIBMTR calculator is 71%. The risk of relapse is 30-40%, the incidence of grade III and IV acute GVHD and moderate/severe chronic GVHD is 10-15% and non relapse mortality is 10-20%. We discussed that we cannot individualize a statistic.      Recommendations  Unrelated donor search, also type daughters  Increase  "activity, at least 150min/week of moderate aerobic exercise.   Cardiology consultation for calcifications in LAD     Physical Exam     Vital Signs: BP (!) 154/80 (BP Location: Right arm, Patient Position: Sitting, Cuff Size: Adult Regular)   Pulse 60   Temp 97.9  F (36.6  C) (Oral)   Resp 16   Ht 1.843 m (6' 0.56\")   Wt 90.6 kg (199 lb 12.8 oz)   BMI 26.68 kg/m    Wt Readings from Last 4 Encounters:   06/13/24 89.5 kg (197 lb 6.4 oz)   06/12/24 89.9 kg (198 lb 4.8 oz)   06/11/24 90.6 kg (199 lb 12.8 oz)   06/09/24 89.7 kg (197 lb 11.2 oz)     KPS: 80% Can perform normal activity with effort, some signs of disease    General: Alert, no distress  Eyes: Conjunctiva normal  Respiratory: Normal respiratory effort.  Cardiovascular: Normal perfusion, no significant edema.  Psych: Mood and affect are appropriate.    BMT Fried Frailty          6/11/2024    16:09   Fried Frailty   Lost>10 pounds unintenionally last year N   Exhaustion Score 0   Slowness Score 0   Weakness/ Strength Score 0   Low Activity Level Score 0   Final Score Not Frail   Final Score Number 0   Sit Stand Assessment   Patient able to perform 5 chair stands Y   Chair Stands in seconds 19   Patient is able to perform stand with Feet Side by Side? Y   First attempt (in seconds): 10   Patient is able to perform Semi-Tandem Stand? Y   First attemp (in seconds): 10   Patient is able to perform Tandem Stand? Y   First attemp (in seconds): 10        Labs, Pathology and Imaging   LABS  Hematology Studies   Recent Labs   Lab Test 06/17/24  0814 06/15/24  0800 06/13/24  0650 06/12/24  1345 06/09/24  0948 06/07/24  0943 05/22/24  0838 05/21/24  1016 05/15/24  1339 05/08/24  1156   WBC 0.4* 0.5* 1.0*   < > 1.1* 1.3*   < > 4.0   < > 5.2   HGB 12.1* 11.1* 11.3*   < > 10.9* 11.0*   < > 12.9*   < > 13.4   HCT 36.2* 33.8* 33.7*   < > 32.8* 33.4*   < > 38.4*   < > 41.0   PLT 18* 15* 30*   < > 18* 20*   < > 72*   < > 70*   ANEUTAUTO  --   --   --   --  0.4* 0.5*  " --   --   --   --    ALYMPAUTO  --   --   --   --  0.6* 0.7*  --   --   --   --    AMONOAUTO  --   --   --   --  0.1 0.1  --   --   --   --    AEOSAUTO  --   --   --   --  0.0 0.0  --   --   --   --    ABSBASO  --   --   --   --  0.0 0.0  --   --   --   --    NRBCMAN 0.0 0.0 0.0   < > 0.0 0.0   < >  --    < > 0.0   ABLA  --   --   --   --   --   --   --  0.4*  --   --     < > = values in this interval not displayed.     Metabolic Studies    Recent Labs   Lab Test 06/12/24  1345 06/09/24  0948 06/05/24  1348    137 134*   POTASSIUM 3.9 4.0 4.8   CHLORIDE 104 105 102   CO2 24 23 24   BUN 19.3 12.9 13.7   CR 0.79 0.79 0.89   * 108* 102*     Recent Labs   Lab Test 06/12/24  1345 06/09/24  0948 06/05/24  1348 05/15/24  1339 05/08/24  1156   MAG  --   --   --   --  2.1   PHOS 2.8 2.3* 3.2   < > 2.7    < > = values in this interval not displayed.      Hepatic Studies    Recent Labs   Lab Test 06/12/24  1345 06/09/24  0948 06/05/24  1348   BILITOTAL 0.7 0.7 0.8   ALKPHOS 90 85 89   AST 36 36 31   ALT 20 21 26   ALBUMIN 3.9 3.7 3.6       IMAGING  CT c/a/p 4/25/24    CHEST   Airways: The intrathoracic trachea is patent. There is no endobronchial mass or resorptive atelectasis.   Lungs: There is linear scarring in the inferior segment of the lingula.   Juxtapleural pulmonary nodules adjacent to the left major fissure may represent intrapulmonary lymph nodes. These measure 3 to 4 mm. These do not appear suspicious. 1 to 2 mm right upper lobe pulmonary nodule on series 3, image 53, considered benign.   Pleural spaces: No pleural or pericardial effusion.   Heart: Coronary artery calcifications, particularly of the LAD.   Pulmonary arteries: Normal caliber main pulmonary artery.   Aorta: Normal caliber thoracic aorta.   Lymph nodes: Nonenlarged axillary lymph nodes. In the left axilla, these measure up to 8 mm in short axis dimension. No mediastinal or hilar lymphadenopathy. No internal mammary adenopathy.   Thyroid:  Thyroid gland is symmetric. No mass at the thoracic inlet.   Esophagus: There is no esophageal wall thickening or mass.   Chest wall: There is no chest wall mass or fluid collection.   Osseous: Degenerative disc disease at the endplates of the thoracic spine. No suspicious bone lesions are seen. Manubrium/body of the sternum appear intact.     ABDOMEN AND PELVIS   Liver: Liver morphology is noncirrhotic. No focal liver lesion.   Biliary: Normal caliber biliary tree. No inflammatory changes adjacent to the gallbladder.   Pancreas: There is no pancreatic mass or pancreatic duct dilation. No glandular atrophy.   Spleen: Normal spleen size. No focal splenic lesion. The spleen measures up to 13.8 cm in maximum length.   Adrenals: No adrenal mass.   Kidneys: The nephrograms are symmetric. There is a 3 mm stone in the left kidney on series 7, image 47. No solid renal mass. No delayed nephrogram.   Urinary tracts: There is no obstructive urolith. No hydroureter or urothelial   thickening.   Urinary bladder: Urinary bladder and perivesical fat are normal. Reproductive:   Prostate and seminal vesicles are within normal limits.   Colon: There is colonic diverticulosis. No findings for diverticulitis. No mural thickening. No pericolonic edema.   Small bowel: Normal caliber small bowel. No mural stratification.   Appendix: No secondary signs for appendicitis. No calcified appendicolith.   Stomach: There is no gastric wall thickening or evidence for gastric outlet   obstruction.   Peritoneal cavity: No abdominal or pelvic ascites.   Lymph nodes: There is no inguinal, pelvic sidewall, or gastrohepatic ligament adenopathy.   Vasculature: Normal caliber abdominal aorta. Celiac artery and SMA are patent.   ISIS is patent.   Body wall: No abdominal wall mass or fluid collection.   Osseous: Degenerative disc disease in the lumbar spine. Discogenic sclerosis and disc height loss at multiple levels, particularly at the lumbosacral  junction.     IMPRESSION:   1. No thoracic, abdominal, or pelvic lymphadenopathy by size criteria.   2.  Juxtapleural pulmonary nodules along the left major fissure, likely   intrapulmonary lymph nodes. These do not require imaging follow-up, and are considered benign.   3.  3 mm nephrolith in the left intrarenal collecting system. No delayed   nephrogram, hydronephrosis, or perinephric fluid collection.     I spent 90 minutes in the care of this patient today, which included time necessary for preparation for the visit, obtaining history, ordering medications/tests/procedures as medically indicated, review of pertinent medical literature, counseling of the patient, communication of recommendations to the care team, and documentation time.    Brenda Murphy  Department of Hematology, Oncology and Transplantation  Pager 1300/Text via [employer]

## 2024-06-11 NOTE — NURSING NOTE
[facility] Frailty assessment completed with patient in clinic. Patient had no questions and showed understanding of what assessment was being done. Paperwork given to provider.    Gabe Abarca on 6/11/2024 at 4:22 PM

## 2024-06-12 ENCOUNTER — VIRTUAL VISIT (OUTPATIENT)
Dept: TRANSPLANT | Facility: CLINIC | Age: 69
End: 2024-06-12
Attending: STUDENT IN AN ORGANIZED HEALTH CARE EDUCATION/TRAINING PROGRAM
Payer: COMMERCIAL

## 2024-06-12 ENCOUNTER — APPOINTMENT (OUTPATIENT)
Dept: LAB | Facility: CLINIC | Age: 69
End: 2024-06-12
Attending: STUDENT IN AN ORGANIZED HEALTH CARE EDUCATION/TRAINING PROGRAM
Payer: COMMERCIAL

## 2024-06-12 ENCOUNTER — PATIENT OUTREACH (OUTPATIENT)
Dept: ONCOLOGY | Facility: CLINIC | Age: 69
End: 2024-06-12

## 2024-06-12 ENCOUNTER — INFUSION THERAPY VISIT (OUTPATIENT)
Dept: INFUSION THERAPY | Facility: CLINIC | Age: 69
End: 2024-06-12
Attending: STUDENT IN AN ORGANIZED HEALTH CARE EDUCATION/TRAINING PROGRAM
Payer: COMMERCIAL

## 2024-06-12 VITALS
OXYGEN SATURATION: 100 % | SYSTOLIC BLOOD PRESSURE: 126 MMHG | BODY MASS INDEX: 26.48 KG/M2 | WEIGHT: 198.3 LBS | TEMPERATURE: 98.1 F | DIASTOLIC BLOOD PRESSURE: 77 MMHG | RESPIRATION RATE: 15 BRPM | HEART RATE: 69 BPM

## 2024-06-12 DIAGNOSIS — D46.9 MDS (MYELODYSPLASTIC SYNDROME) (H): Primary | ICD-10-CM

## 2024-06-12 DIAGNOSIS — Z71.9 VISIT FOR COUNSELING: Primary | ICD-10-CM

## 2024-06-12 LAB
ALBUMIN SERPL BCG-MCNC: 3.9 G/DL (ref 3.5–5.2)
ALP SERPL-CCNC: 90 U/L (ref 40–150)
ALT SERPL W P-5'-P-CCNC: 20 U/L (ref 0–70)
ANION GAP SERPL CALCULATED.3IONS-SCNC: 9 MMOL/L (ref 7–15)
AST SERPL W P-5'-P-CCNC: 36 U/L (ref 0–45)
BASOPHILS # BLD AUTO: ABNORMAL 10*3/UL
BASOPHILS # BLD MANUAL: 0 10E3/UL (ref 0–0.2)
BASOPHILS NFR BLD AUTO: ABNORMAL %
BASOPHILS NFR BLD MANUAL: 0 %
BILIRUB SERPL-MCNC: 0.7 MG/DL
BLD PROD TYP BPU: NORMAL
BLOOD COMPONENT TYPE: NORMAL
BUN SERPL-MCNC: 19.3 MG/DL (ref 8–23)
CALCIUM SERPL-MCNC: 9 MG/DL (ref 8.8–10.2)
CHLORIDE SERPL-SCNC: 104 MMOL/L (ref 98–107)
CMV IGG SERPL IA-ACNC: 0.32 U/ML
CMV IGG SERPL IA-ACNC: NORMAL
CODING SYSTEM: NORMAL
CREAT SERPL-MCNC: 0.79 MG/DL (ref 0.67–1.17)
DEPRECATED HCO3 PLAS-SCNC: 24 MMOL/L (ref 22–29)
EGFRCR SERPLBLD CKD-EPI 2021: >90 ML/MIN/1.73M2
EOSINOPHIL # BLD AUTO: ABNORMAL 10*3/UL
EOSINOPHIL # BLD MANUAL: 0 10E3/UL (ref 0–0.7)
EOSINOPHIL NFR BLD AUTO: ABNORMAL %
EOSINOPHIL NFR BLD MANUAL: 0 %
ERYTHROCYTE [DISTWIDTH] IN BLOOD BY AUTOMATED COUNT: 15.8 % (ref 10–15)
GLUCOSE SERPL-MCNC: 109 MG/DL (ref 70–99)
HCT VFR BLD AUTO: 33.2 % (ref 40–53)
HGB BLD-MCNC: 11.1 G/DL (ref 13.3–17.7)
IMM GRANULOCYTES # BLD: ABNORMAL 10*3/UL
IMM GRANULOCYTES NFR BLD: ABNORMAL %
ISSUE DATE AND TIME: NORMAL
LYMPHOCYTES # BLD AUTO: ABNORMAL 10*3/UL
LYMPHOCYTES # BLD MANUAL: 0.5 10E3/UL (ref 0.8–5.3)
LYMPHOCYTES NFR BLD AUTO: ABNORMAL %
LYMPHOCYTES NFR BLD MANUAL: 64 %
MCH RBC QN AUTO: 29.5 PG (ref 26.5–33)
MCHC RBC AUTO-ENTMCNC: 33.4 G/DL (ref 31.5–36.5)
MCV RBC AUTO: 88 FL (ref 78–100)
MONOCYTES # BLD AUTO: ABNORMAL 10*3/UL
MONOCYTES # BLD MANUAL: 0 10E3/UL (ref 0–1.3)
MONOCYTES NFR BLD AUTO: ABNORMAL %
MONOCYTES NFR BLD MANUAL: 0 %
NEUTROPHILS # BLD AUTO: ABNORMAL 10*3/UL
NEUTROPHILS # BLD MANUAL: 0.3 10E3/UL (ref 1.6–8.3)
NEUTROPHILS NFR BLD AUTO: ABNORMAL %
NEUTROPHILS NFR BLD MANUAL: 36 %
NRBC # BLD AUTO: 0 10E3/UL
NRBC BLD AUTO-RTO: 0 /100
PHOSPHATE SERPL-MCNC: 2.8 MG/DL (ref 2.5–4.5)
PLAT MORPH BLD: ABNORMAL
PLATELET # BLD AUTO: 18 10E3/UL (ref 150–450)
POTASSIUM SERPL-SCNC: 3.9 MMOL/L (ref 3.4–5.3)
PROT SERPL-MCNC: 7.1 G/DL (ref 6.4–8.3)
RBC # BLD AUTO: 3.76 10E6/UL (ref 4.4–5.9)
RBC MORPH BLD: ABNORMAL
SCR 1 TEST METHOD: NORMAL
SCR1 CELL: NORMAL
SCR1 COMMENTS: NORMAL
SCR1 RESULT: NORMAL
SCR2 CELL: NORMAL
SCR2 COMMENTS: NORMAL
SCR2 RESULT: NORMAL
SCR2 TEST METHOD: NORMAL
SODIUM SERPL-SCNC: 137 MMOL/L (ref 135–145)
UNIT ABO/RH: NORMAL
UNIT NUMBER: NORMAL
UNIT STATUS: NORMAL
UNIT TYPE ISBT: 1700
URATE SERPL-MCNC: 4.7 MG/DL (ref 3.4–7)
WBC # BLD AUTO: 0.8 10E3/UL (ref 4–11)

## 2024-06-12 PROCEDURE — 80053 COMPREHEN METABOLIC PANEL: CPT | Performed by: PHYSICIAN ASSISTANT

## 2024-06-12 PROCEDURE — 85027 COMPLETE CBC AUTOMATED: CPT | Performed by: STUDENT IN AN ORGANIZED HEALTH CARE EDUCATION/TRAINING PROGRAM

## 2024-06-12 PROCEDURE — 84100 ASSAY OF PHOSPHORUS: CPT | Performed by: PHYSICIAN ASSISTANT

## 2024-06-12 PROCEDURE — 36430 TRANSFUSION BLD/BLD COMPNT: CPT

## 2024-06-12 PROCEDURE — 84550 ASSAY OF BLOOD/URIC ACID: CPT | Performed by: PHYSICIAN ASSISTANT

## 2024-06-12 PROCEDURE — P9037 PLATE PHERES LEUKOREDU IRRAD: HCPCS | Performed by: STUDENT IN AN ORGANIZED HEALTH CARE EDUCATION/TRAINING PROGRAM

## 2024-06-12 PROCEDURE — 36415 COLL VENOUS BLD VENIPUNCTURE: CPT | Performed by: STUDENT IN AN ORGANIZED HEALTH CARE EDUCATION/TRAINING PROGRAM

## 2024-06-12 PROCEDURE — 85007 BL SMEAR W/DIFF WBC COUNT: CPT | Performed by: STUDENT IN AN ORGANIZED HEALTH CARE EDUCATION/TRAINING PROGRAM

## 2024-06-12 RX ORDER — HEPARIN SODIUM,PORCINE 10 UNIT/ML
5-20 VIAL (ML) INTRAVENOUS DAILY PRN
Status: CANCELLED | OUTPATIENT
Start: 2024-06-12

## 2024-06-12 RX ORDER — DIPHENHYDRAMINE HYDROCHLORIDE 50 MG/ML
50 INJECTION INTRAMUSCULAR; INTRAVENOUS
Status: CANCELLED
Start: 2024-06-12

## 2024-06-12 RX ORDER — EPINEPHRINE 1 MG/ML
0.3 INJECTION, SOLUTION INTRAMUSCULAR; SUBCUTANEOUS EVERY 5 MIN PRN
Status: CANCELLED | OUTPATIENT
Start: 2024-06-12

## 2024-06-12 RX ORDER — HEPARIN SODIUM (PORCINE) LOCK FLUSH IV SOLN 100 UNIT/ML 100 UNIT/ML
5 SOLUTION INTRAVENOUS
Status: CANCELLED | OUTPATIENT
Start: 2024-06-12

## 2024-06-12 ASSESSMENT — PAIN SCALES - GENERAL: PAINLEVEL: MILD PAIN (2)

## 2024-06-12 NOTE — PROGRESS NOTES
Time of Call: 2:06 PM    Person reporting results: Lab    Depart/facility results are coming from:Lab    Phone number:      CRITICAL RESULTS:   Platelet Count   Date Value Ref Range Status   2024 18 (LL) 150 - 450 10e3/uL Preliminary     WBC Count   Date Value Ref Range Status   2024 0.8 (LL) 4.0 - 11.0 10e3/uL Preliminary        Results taken by: Fabby Galindo RN                Diagnosis:       Ordering provider:  Ferny Rangel    Course of Action: notified provider, labs similar to previous results.     Blood Product Transfusion Nursing Note:    Leland Pearson presents today to [facility] for a PLT transfusion.  During today's [facility] appointment orders from Nael Lynn were completed.    Progress note:  ID verified by name and .  Assessment completed.  Vitals were stable throughout time in [facility].    verbal education given to patient/representative regarding transfusion and possible side effects.  Patient/representative verbalized understanding.     present during visit today: Not Applicable.    All pertinent labs reviewed prior to infusion: YES (see above)    Date of consent or authorization: 2024    Patient tolerated the procedure well    Transfusion given over approximately 1 hour     Discharge Plan:    Discharge instructions were reviewed with patient Yes  Patient/representative verbalized understanding of discharge instructions and all questions answered Yes.        Fabby Galindo RN    /63   Pulse 75   Temp 98.3  F (36.8  C) (Oral)   Resp 16   Wt 89.9 kg (198 lb 4.8 oz)   SpO2 98%   BMI 26.48 kg/m

## 2024-06-12 NOTE — PROGRESS NOTES
[facility]: Cancer Care                                                                                          RNCC called and spoke with patient. RNCC informed him we needed to proceed with labs and a possible transfusion today to ensure he met parameters tomorrow for his bmbx.     RNCC proposing patient come at 1:30 for labs and a 2:00 pm infusion. He is in agreement and stated an understanding of plan.     RNCC encouraged him to call with any questions or concerns.     Signature:  Andria Shay RN

## 2024-06-12 NOTE — NURSING NOTE
Chief Complaint   Patient presents with    Blood Draw     Vitals, blood draw, piv placed by MA. Pt checked into appt.     Brittni Andrade MA

## 2024-06-12 NOTE — PATIENT INSTRUCTIONS
Dear Leland Pearson    Thank you for choosing [facility] ([facility]) for your transfusion.  The following information is a summary of our appointment as well as important reminders.      If you are a transplant patient and require transplant education, please click on this link: [email or web address].    We look forward in seeing you on your next appointment here at [facility] ([facility]).  Please don t hesitate to call us at [phone] to reschedule any of your appointments or to speak with one of the [facility] registered nurses.  It was a pleasure taking care of you today.    Sincerely,    [facility]  [facility]  [address] [place] [address]  [id number]  Phone:  [phone]

## 2024-06-12 NOTE — LETTER
2024      Leland Pearson  [address]  [place] [address]      Dear Colleague,    Thank you for referring your patient, Leland Pearson, to the [facility] BLOOD AND MARROW TRANSPLANT PROGRAM [place]. Please see a copy of my visit note below.    Blood and Marrow Transplant   New Transplant Visit with   Clinical     Assessment completed on 2024 via virtual visit of living situation, support system, financial status, functional status, coping, stressors, need for resources and social work intervention provided as needed. Information for this assessment was provided by pt and pt's spouse Vani's report, consultation with medical team, and medical chart review.       Present:  Patient: Leland Pearson  Spouse: Vani Pearson  : KAMERON Jj, [facility]    Medical Team   Nurse Coordinator: Marybeth Chakraborty RN  BMT Physician: Brenda Murphy MD  Referring Physician: Nael Delcid MD    Diagnosis: Myelodysplastic Syndrome (MDS)  Diagnosis Date: 2024    Presenting Information:  Pt is a 69 year old male diagnosed with MDS. Pt was diagnosed on 2024. Pt presents for Allogeneic stem cell transplant discussion.    Contact Information:  Cell/Home Phone: [phone]    Special Needs:   No needs identified at this time.     Relocation Requirement:   Pt lives in [place], [place] ([place] minutes from [facility]) which is within the required distance of the hospital. Pt does not need to relocate.     Living Situation:   Pt lives in [place], [place] with spouse Vani.    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    Spouse/Partner Employed: No-Retired  Previous Occupation:      Insurance:   BCBS Medicare Advantage. No insurance concerns identified at this time. MYA provided information regarding the insurance authorization process and the role of the BMT Financial . SW provided contact info for the BMT Financial  and referred pt to them for future insurance questions.     Finances:   Pt's source of income is Social Security/Spouse's Pension. No financial concerns identified at this time.    Caregiver:   MYA discussed with the pt/spouse the caregiver role and expectation at length. Pt is agreeable to having a full time caregiver for the minimum of 100 days until cleared by the BMT Physician. Pt's identified caregivers are spouse Vani and family members as backup. Caregiver education and information provided. No caregiver concerns identified.     Healthcare Directive:  Yes. Pt has a health care directive and will bring it when they return to the BMT program.     Resources Provided:  -BMT Information Book  -BMT Resources Packet  -Healthcare Directive  -Honoring Choices - Your Rights: Making Your Own Health Care Treatment Decisions  -Caregiver Contract/Description  -Transplant Unit Description and Information   -Lodging Resources    Of Note: Resources to be mailed on 6/17/2024.    Identified Concerns:  No concerns identified at this time.     Summary:  Pt presents to [facility] regarding an Allogeneic stem cell transplant. Pt and pt's spouse asked good/appropriate questions regarding psychosocial factors related to BMT; all questions were addressed. Pt presented as pleasant. Pt's affect was appropriate. Patient indicated they are currently feeling overwhelmed with information.  CSW validated emotions and offered continued support as needed. Family's affect was pleasant and appropriate.    Plan:   MYA provided contact information and encouraged pt  to contact SW with any additional questions, concerns, resources and/or for support. SW will continue to follow pt to provide support and guidance with resources as needed.     KAMERON Jj, LGSW  [facility]  Adult Blood & Marrow Transplant   Phone: [phone]  VOCERA: BMT SW #4  Securely message with Vocera   Support Groups at [facility]: Social Work Services for Cancer Patients ([email or web address])

## 2024-06-12 NOTE — PROGRESS NOTES
Blood and Marrow Transplant   New Transplant Visit with   Clinical     Assessment completed on 2024 via virtual visit of living situation, support system, financial status, functional status, coping, stressors, need for resources and social work intervention provided as needed. Information for this assessment was provided by pt and pt's spouse Vani's report, consultation with medical team, and medical chart review.       Present:  Patient: Leland Pearson  Spouse: Vani Pearson  : KAMERON Jj, [facility]    Medical Team   Nurse Coordinator: Marybeth Chakraborty RN  BMT Physician: Brenda Murphy MD  Referring Physician: Nael Delcid MD    Diagnosis: Myelodysplastic Syndrome (MDS)  Diagnosis Date: 2024    Presenting Information:  Pt is a 69 year old male diagnosed with MDS. Pt was diagnosed on 2024. Pt presents for Allogeneic stem cell transplant discussion.    Contact Information:  Cell/Home Phone: [phone]    Special Needs:   No needs identified at this time.     Relocation Requirement:   Pt lives in [place], [place] (approximately [place] minutes from [facility]) which is within the required distance of the hospital. Pt does not need to relocate.     Living Situation:   Pt lives in [place], [place] with spouse Vani.    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    Spouse/Partner Employed: No-Retired  Previous Occupation:      Insurance:   [employer] Medicare Advantage. No insurance concerns identified at this time. SW provided information regarding the insurance authorization process and the role of the BMT  Financial . SW provided contact info for the BMT Financial  and referred pt to them for future insurance questions.     Finances:   Pt's source of income is Social Security/Spouse's Pension. No financial concerns identified at this time.    Caregiver:   SW discussed with the pt/spouse the caregiver role and expectation at length. Pt is agreeable to having a full time caregiver for the minimum of 100 days until cleared by the BMT Physician. Pt's identified caregivers are spouse Vani and family members as backup. Caregiver education and information provided. No caregiver concerns identified.     Healthcare Directive:  Yes. Pt has a health care directive and will bring it when they return to the BMT program.     Resources Provided:  -BMT Information Book  -BMT Resources Packet  -Healthcare Directive  -Honoring Choices - Your Rights: Making Your Own Health Care Treatment Decisions  -Caregiver Contract/Description  -Transplant Unit Description and Information   -Lodging Resources    Of Note: Resources to be mailed on 6/17/2024.    Identified Concerns:  No concerns identified at this time.     Summary:  Pt presents to [facility] regarding an Allogeneic stem cell transplant. Pt and pt's spouse asked good/appropriate questions regarding psychosocial factors related to BMT; all questions were addressed. Pt presented as pleasant. Pt's affect was appropriate. Patient indicated they are currently feeling overwhelmed with information.  CSW validated emotions and offered continued support as needed. Family's affect was pleasant and appropriate.    Plan:   SW provided contact information and encouraged pt to contact SW with any additional questions, concerns, resources and/or for support. SW will continue to follow pt to provide support and guidance with resources as needed.     KAMERON Jj, LGSW  [facility]  Adult Blood & Marrow Transplant   Phone:  [phone]  VOCERA: BMT SW #4  Securely message with Vocera   Support Groups at [facility]: Social Work Services for Cancer Patients ([email or web address])

## 2024-06-13 ENCOUNTER — OFFICE VISIT (OUTPATIENT)
Dept: ONCOLOGY | Facility: CLINIC | Age: 69
End: 2024-06-13
Attending: STUDENT IN AN ORGANIZED HEALTH CARE EDUCATION/TRAINING PROGRAM
Payer: COMMERCIAL

## 2024-06-13 ENCOUNTER — LAB (OUTPATIENT)
Dept: LAB | Facility: CLINIC | Age: 69
End: 2024-06-13
Attending: STUDENT IN AN ORGANIZED HEALTH CARE EDUCATION/TRAINING PROGRAM
Payer: COMMERCIAL

## 2024-06-13 VITALS
BODY MASS INDEX: 26.36 KG/M2 | WEIGHT: 197.4 LBS | OXYGEN SATURATION: 97 % | HEART RATE: 66 BPM | DIASTOLIC BLOOD PRESSURE: 94 MMHG | SYSTOLIC BLOOD PRESSURE: 123 MMHG | TEMPERATURE: 97.8 F | RESPIRATION RATE: 16 BRPM

## 2024-06-13 DIAGNOSIS — Z79.899 ENCOUNTER FOR LONG-TERM (CURRENT) USE OF MEDICATIONS: ICD-10-CM

## 2024-06-13 DIAGNOSIS — D46.9 MDS (MYELODYSPLASTIC SYNDROME) (H): ICD-10-CM

## 2024-06-13 DIAGNOSIS — D46.9 MDS (MYELODYSPLASTIC SYNDROME) (H): Primary | ICD-10-CM

## 2024-06-13 LAB
ABO/RH(D): NORMAL
ANTIBODY SCREEN: NEGATIVE
BASOPHILS # BLD AUTO: ABNORMAL 10*3/UL
BASOPHILS # BLD MANUAL: 0 10E3/UL (ref 0–0.2)
BASOPHILS NFR BLD AUTO: ABNORMAL %
BASOPHILS NFR BLD MANUAL: 0 %
EOSINOPHIL # BLD AUTO: ABNORMAL 10*3/UL
EOSINOPHIL # BLD MANUAL: 0 10E3/UL (ref 0–0.7)
EOSINOPHIL NFR BLD AUTO: ABNORMAL %
EOSINOPHIL NFR BLD MANUAL: 0 %
ERYTHROCYTE [DISTWIDTH] IN BLOOD BY AUTOMATED COUNT: 15.7 % (ref 10–15)
HCT VFR BLD AUTO: 33.7 % (ref 40–53)
HGB BLD-MCNC: 11.3 G/DL (ref 13.3–17.7)
IMM GRANULOCYTES # BLD: ABNORMAL 10*3/UL
IMM GRANULOCYTES NFR BLD: ABNORMAL %
INTERPRETATION: NORMAL
LOCATION OF TASK: NORMAL
LYMPHOCYTES # BLD AUTO: ABNORMAL 10*3/UL
LYMPHOCYTES # BLD MANUAL: 0.6 10E3/UL (ref 0.8–5.3)
LYMPHOCYTES NFR BLD AUTO: ABNORMAL %
LYMPHOCYTES NFR BLD MANUAL: 56 %
MCH RBC QN AUTO: 29.5 PG (ref 26.5–33)
MCHC RBC AUTO-ENTMCNC: 33.5 G/DL (ref 31.5–36.5)
MCV RBC AUTO: 88 FL (ref 78–100)
MONOCYTES # BLD AUTO: ABNORMAL 10*3/UL
MONOCYTES # BLD MANUAL: 0 10E3/UL (ref 0–1.3)
MONOCYTES NFR BLD AUTO: ABNORMAL %
MONOCYTES NFR BLD MANUAL: 0 %
NEUTROPHILS # BLD AUTO: ABNORMAL 10*3/UL
NEUTROPHILS # BLD MANUAL: 0.4 10E3/UL (ref 1.6–8.3)
NEUTROPHILS NFR BLD AUTO: ABNORMAL %
NEUTROPHILS NFR BLD MANUAL: 44 %
NRBC # BLD AUTO: 0 10E3/UL
NRBC BLD AUTO-RTO: 0 /100
PLAT MORPH BLD: ABNORMAL
PLATELET # BLD AUTO: 30 10E3/UL (ref 150–450)
RBC # BLD AUTO: 3.83 10E6/UL (ref 4.4–5.9)
RBC MORPH BLD: ABNORMAL
SIGNIFICANT RESULTS: NORMAL
SPECIMEN DESCRIPTION: NORMAL
SPECIMEN EXPIRATION DATE: NORMAL
TEST DETAILS, MDL: NORMAL
WBC # BLD AUTO: 1 10E3/UL (ref 4–11)

## 2024-06-13 PROCEDURE — 86900 BLOOD TYPING SEROLOGIC ABO: CPT

## 2024-06-13 PROCEDURE — 88305 TISSUE EXAM BY PATHOLOGIST: CPT | Mod: 26 | Performed by: PATHOLOGY

## 2024-06-13 PROCEDURE — 88311 DECALCIFY TISSUE: CPT | Mod: 26 | Performed by: PATHOLOGY

## 2024-06-13 PROCEDURE — 38222 DX BONE MARROW BX & ASPIR: CPT | Performed by: PHYSICIAN ASSISTANT

## 2024-06-13 PROCEDURE — 36415 COLL VENOUS BLD VENIPUNCTURE: CPT | Performed by: PHYSICIAN ASSISTANT

## 2024-06-13 PROCEDURE — 88275 CYTOGENETICS 100-300: CPT | Performed by: PHYSICIAN ASSISTANT

## 2024-06-13 PROCEDURE — 250N000011 HC RX IP 250 OP 636: Mod: JW | Performed by: PHYSICIAN ASSISTANT

## 2024-06-13 PROCEDURE — 88184 FLOWCYTOMETRY/ TC 1 MARKER: CPT | Performed by: PHYSICIAN ASSISTANT

## 2024-06-13 PROCEDURE — 88313 SPECIAL STAINS GROUP 2: CPT | Mod: 26 | Performed by: PATHOLOGY

## 2024-06-13 PROCEDURE — 96374 THER/PROPH/DIAG INJ IV PUSH: CPT | Mod: 59 | Performed by: PHYSICIAN ASSISTANT

## 2024-06-13 PROCEDURE — 99207 PR NO CHARGE LOS: CPT | Mod: 91 | Performed by: PHYSICIAN ASSISTANT

## 2024-06-13 PROCEDURE — 88185 FLOWCYTOMETRY/TC ADD-ON: CPT | Performed by: PHYSICIAN ASSISTANT

## 2024-06-13 PROCEDURE — 85027 COMPLETE CBC AUTOMATED: CPT | Performed by: PHYSICIAN ASSISTANT

## 2024-06-13 PROCEDURE — 85007 BL SMEAR W/DIFF WBC COUNT: CPT | Performed by: PHYSICIAN ASSISTANT

## 2024-06-13 PROCEDURE — 88291 CYTO/MOLECULAR REPORT: CPT | Performed by: MEDICAL GENETICS

## 2024-06-13 PROCEDURE — 88342 IMHCHEM/IMCYTCHM 1ST ANTB: CPT | Mod: TC,XU | Performed by: PHYSICIAN ASSISTANT

## 2024-06-13 PROCEDURE — 88341 IMHCHEM/IMCYTCHM EA ADD ANTB: CPT | Mod: 26 | Performed by: PATHOLOGY

## 2024-06-13 PROCEDURE — 86923 COMPATIBILITY TEST ELECTRIC: CPT | Performed by: STUDENT IN AN ORGANIZED HEALTH CARE EDUCATION/TRAINING PROGRAM

## 2024-06-13 PROCEDURE — 88342 IMHCHEM/IMCYTCHM 1ST ANTB: CPT | Mod: 26 | Performed by: PATHOLOGY

## 2024-06-13 PROCEDURE — 81450 HL NEO GSAP 5-50DNA/DNA&RNA: CPT | Performed by: PHYSICIAN ASSISTANT

## 2024-06-13 PROCEDURE — 85097 BONE MARROW INTERPRETATION: CPT | Performed by: PATHOLOGY

## 2024-06-13 PROCEDURE — 88311 DECALCIFY TISSUE: CPT | Mod: TC | Performed by: PHYSICIAN ASSISTANT

## 2024-06-13 PROCEDURE — G0452 MOLECULAR PATHOLOGY INTERPR: HCPCS | Mod: 26 | Performed by: STUDENT IN AN ORGANIZED HEALTH CARE EDUCATION/TRAINING PROGRAM

## 2024-06-13 PROCEDURE — 88237 TISSUE CULTURE BONE MARROW: CPT | Performed by: PHYSICIAN ASSISTANT

## 2024-06-13 PROCEDURE — 88189 FLOWCYTOMETRY/READ 16 & >: CPT | Mod: GC | Performed by: PATHOLOGY

## 2024-06-13 RX ADMIN — MIDAZOLAM HYDROCHLORIDE 1 MG: 1 INJECTION, SOLUTION INTRAMUSCULAR; INTRAVENOUS at 08:35

## 2024-06-13 ASSESSMENT — PAIN SCALES - GENERAL: PAINLEVEL: MILD PAIN (2)

## 2024-06-13 NOTE — PROGRESS NOTES
BMT ONC Adult Bone Marrow Biopsy Procedure Note  June 13, 2024  /78   Pulse 80   Temp 98.1  F (36.7  C) (Oral)   Resp 16   Wt 89.5 kg (197 lb 6.4 oz)   SpO2 99%   BMI 26.36 kg/m       Learning needs assessment complete within 12 months? YES    DIAGNOSIS: MDS     PROCEDURE: Unilateral Bone Marrow Biopsy and Unilateral Aspirate    LOCATION: [facility]    Patient s identification was positively verified by verbal identification and invasive procedure safety checklist was completed. Informed consent was obtained. Following the administration of Midazolam as pre-medication, patient was placed in the prone position and prepped and draped in a sterile manner. Approximately 10 cc of 1% Lidocaine was used over the left posterior iliac spine. Following this a 3 mm incision was made. Trephine bone marrow core(s) was (were) obtained from the [facility]. Bone marrow aspirates were obtained from the [facility]. Aspirates were sent for morphology, immunophenotyping, cytogenetics, and molecular diagnostics NGS. A total of approximately 14 ml of marrow was aspirated. Following this procedure a sterile dressing was applied to the bone marrow biopsy site(s). The patient was placed in the supine position to maintain pressure on the biopsy site. Post-procedure wound care instructions were given.     Complications: NO    Post-procedural pain assessment: 0 out of 10 on the numeric pain rating scale.     Interventions: NO    Length of procedure:20 minutes or less      Procedure performed by:   Ela Dowling PA-C  [facility]  [address] [place] [address]  [phone]

## 2024-06-13 NOTE — LETTER
6/13/2024      Leland Pearson  [address] [place] [address]      Dear Colleague,    Thank you for referring your patient, Leland Pearson, to the [facility]. Please see a copy of my visit note below.          BMT ONC Adult Bone Marrow Biopsy Procedure Note  June 13, 2024  /78   Pulse 80   Temp 98.1  F (36.7  C) (Oral)   Resp 16   Wt 89.5 kg (197 lb 6.4 oz)   SpO2 99%   BMI 26.36 kg/m       Learning needs assessment complete within 12 months? YES    DIAGNOSIS: MDS     PROCEDURE: Unilateral Bone Marrow Biopsy and Unilateral Aspirate    LOCATION: [facility] 2nd [facility]    Patient s identification was positively verified by verbal identification and invasive procedure safety checklist was completed. Informed consent was obtained. Following the administration of Midazolam as pre-medication, patient was placed in the prone position and prepped and draped in a sterile manner. Approximately 10 cc of 1% Lidocaine was used over the left posterior iliac spine. Following this a 3 mm incision was made. Trephine bone marrow core(s) was (were) obtained from the IC. Bone marrow aspirates were obtained from the [facility]. Aspirates were sent for morphology, immunophenotyping, cytogenetics, and molecular diagnostics NGS. A total of approximately 14 ml of marrow was aspirated. Following this procedure a sterile dressing was applied to the bone marrow biopsy site(s). The patient was placed in the supine position to maintain pressure on the biopsy site. Post-procedure wound care instructions were given.     Complications: NO    Post-procedural pain assessment: 0 out of 10 on the numeric pain rating scale.     Interventions: NO    Length of procedure:20 minutes or less      Procedure performed by:   Ela Dowling PA-C  [facility]  [address] [place] [id number]  [phone]        Again, thank you for allowing me to participate in the care of your patient.         Sincerely,        Ela Dowling PA-C

## 2024-06-13 NOTE — NURSING NOTE
"Oncology Rooming Note    June 13, 2024 7:39 AM   Leland Pearson is a 69 year old male who presents for:    Chief Complaint   Patient presents with    Blood Draw     Labs drawn via PIV by RN in lab. VS taken.      Initial Vitals: /78   Pulse 80   Temp 98.1  F (36.7  C) (Oral)   Resp 16   Wt 89.5 kg (197 lb 6.4 oz)   SpO2 99%   BMI 26.36 kg/m   Estimated body mass index is 26.36 kg/m  as calculated from the following:    Height as of 6/11/24: 1.843 m (6' 0.56\").    Weight as of this encounter: 89.5 kg (197 lb 6.4 oz). Body surface area is 2.14 meters squared.  Mild Pain (2) Comment: Data Unavailable   No LMP for male patient.  Allergies reviewed: Yes  Medications reviewed: Yes    Medications: Medication refills not needed today.  Pharmacy name entered into [employer]:    [id number] [address] [facility] - [place] [address]    Frailty Screening:   Is the patient here for a new oncology consult visit in cancer care? 2. No      Clinical concerns:N/A  Ela Dowling was NOT notified.    BMBX Teaching and Assessment       Teaching concerns addressed: Bone marrow biopsy and infection prevention.     Person(s) involved in teaching: Patient  Motivation Level  Asks Questions: Yes  Eager to Learn: Yes  Cooperative: Yes  Receptive (willing/able to accept information): Yes    Patient demonstrates understanding of the following:     Reason for the appointment, diagnosis and treatment plan: Yes  Knowledge of proper use of medications and conditions for which they are ordered (with special attention to potential side effects or drug interactions): Yes  Which situations necessitate calling provider and whom to contact: Yes    Teaching concerns addressed:   Reviewed activity restrictions if received premeds, potential for bleeding and actions to take if develops any of the issues below    Pain management techniques: Yes  Patient instructed on hand " hygiene: Yes  How and/when to access community resources: Yes    Infection Control:  Patient demonstrates understanding of the following:   Bone marrow procedure site care taught: Yes  Signs and symptoms of infection taught: Yes       Instructional Materials Used/Given: Pt instructed to keep bmbx site clean and dry for 24hrs. Pt educated to monitor site for signs of infection such as redness, rash, oozing, puss, bleeding, pain, and elevated temp. Pt instructed to go to call the [facility] triage line or go to the ER if any signs of infection should occur. Pt educated to not operate machinery if receiving versed. Pt and wife-Vani verbalize understanding.     Pre-procedure labs drawn via PIV. Post procedure: Patient vital signs stable, ambulating, site is clean, dry and intact prior to discharge and line removed. Pt discharged with Vani as .      Provider order received to administer Versed 1mg IVP as premed for BMBX. Procedural consent discussed and pt's signature obtained.  Allergies reviewed.  PT currently alert and oriented to plan of care.  Pt lying prone in stretcher.  Call light w/in reach.  Provider and  at bedside.      Ashlee Osei RN

## 2024-06-13 NOTE — NURSING NOTE
Chief Complaint   Patient presents with    Blood Draw     Labs drawn via PIV by RN in lab. VS taken.      Labs drawn via peripheral IV. Vital signs taken. Checked into next appointment.   Daphne Shipley RN

## 2024-06-14 LAB
BLD PROD TYP BPU: NORMAL
BLD PROD TYP BPU: NORMAL
BLOOD COMPONENT TYPE: NORMAL
BLOOD COMPONENT TYPE: NORMAL
CODING SYSTEM: NORMAL
CODING SYSTEM: NORMAL
CROSSMATCH: NORMAL
ISSUE DATE AND TIME: NORMAL
PATH REPORT.COMMENTS IMP SPEC: NORMAL
PATH REPORT.FINAL DX SPEC: NORMAL
PATH REPORT.FINAL DX SPEC: NORMAL
PATH REPORT.GROSS SPEC: NORMAL
PATH REPORT.MICROSCOPIC SPEC OTHER STN: NORMAL
PATH REPORT.RELEVANT HX SPEC: NORMAL
PATH REPORT.RELEVANT HX SPEC: NORMAL
UNIT ABO/RH: NORMAL
UNIT ABO/RH: NORMAL
UNIT NUMBER: NORMAL
UNIT NUMBER: NORMAL
UNIT STATUS: NORMAL
UNIT STATUS: NORMAL
UNIT TYPE ISBT: 5100
UNIT TYPE ISBT: 5100

## 2024-06-14 RX ORDER — HEPARIN SODIUM,PORCINE 10 UNIT/ML
5-20 VIAL (ML) INTRAVENOUS DAILY PRN
Status: CANCELLED | OUTPATIENT
Start: 2024-06-14

## 2024-06-14 RX ORDER — EPINEPHRINE 1 MG/ML
0.3 INJECTION, SOLUTION INTRAMUSCULAR; SUBCUTANEOUS EVERY 5 MIN PRN
Status: CANCELLED | OUTPATIENT
Start: 2024-06-14

## 2024-06-14 RX ORDER — DIPHENHYDRAMINE HYDROCHLORIDE 50 MG/ML
50 INJECTION INTRAMUSCULAR; INTRAVENOUS
Status: CANCELLED
Start: 2024-06-14

## 2024-06-14 RX ORDER — HEPARIN SODIUM (PORCINE) LOCK FLUSH IV SOLN 100 UNIT/ML 100 UNIT/ML
5 SOLUTION INTRAVENOUS
Status: CANCELLED | OUTPATIENT
Start: 2024-06-14

## 2024-06-15 ENCOUNTER — INFUSION THERAPY VISIT (OUTPATIENT)
Dept: ONCOLOGY | Facility: CLINIC | Age: 69
End: 2024-06-15
Attending: STUDENT IN AN ORGANIZED HEALTH CARE EDUCATION/TRAINING PROGRAM
Payer: COMMERCIAL

## 2024-06-15 VITALS
HEART RATE: 71 BPM | OXYGEN SATURATION: 98 % | SYSTOLIC BLOOD PRESSURE: 120 MMHG | RESPIRATION RATE: 16 BRPM | DIASTOLIC BLOOD PRESSURE: 74 MMHG | TEMPERATURE: 98.1 F

## 2024-06-15 DIAGNOSIS — D46.9 MDS (MYELODYSPLASTIC SYNDROME) (H): Primary | ICD-10-CM

## 2024-06-15 LAB
ABO/RH(D): NORMAL
ANTIBODY SCREEN: NEGATIVE
BASOPHILS # BLD AUTO: ABNORMAL 10*3/UL
BASOPHILS # BLD MANUAL: 0 10E3/UL (ref 0–0.2)
BASOPHILS NFR BLD AUTO: ABNORMAL %
BASOPHILS NFR BLD MANUAL: 0 %
EOSINOPHIL # BLD AUTO: ABNORMAL 10*3/UL
EOSINOPHIL # BLD MANUAL: 0 10E3/UL (ref 0–0.7)
EOSINOPHIL NFR BLD AUTO: ABNORMAL %
EOSINOPHIL NFR BLD MANUAL: 1 %
ERYTHROCYTE [DISTWIDTH] IN BLOOD BY AUTOMATED COUNT: 15.4 % (ref 10–15)
HCT VFR BLD AUTO: 33.8 % (ref 40–53)
HGB BLD-MCNC: 11.1 G/DL (ref 13.3–17.7)
IMM GRANULOCYTES # BLD: ABNORMAL 10*3/UL
IMM GRANULOCYTES NFR BLD: ABNORMAL %
LYMPHOCYTES # BLD AUTO: ABNORMAL 10*3/UL
LYMPHOCYTES # BLD MANUAL: 0.3 10E3/UL (ref 0.8–5.3)
LYMPHOCYTES NFR BLD AUTO: ABNORMAL %
LYMPHOCYTES NFR BLD MANUAL: 68 %
MCH RBC QN AUTO: 29.2 PG (ref 26.5–33)
MCHC RBC AUTO-ENTMCNC: 32.8 G/DL (ref 31.5–36.5)
MCV RBC AUTO: 89 FL (ref 78–100)
MONOCYTES # BLD AUTO: ABNORMAL 10*3/UL
MONOCYTES # BLD MANUAL: 0 10E3/UL (ref 0–1.3)
MONOCYTES NFR BLD AUTO: ABNORMAL %
MONOCYTES NFR BLD MANUAL: 1 %
NEUTROPHILS # BLD AUTO: ABNORMAL 10*3/UL
NEUTROPHILS # BLD MANUAL: 0.2 10E3/UL (ref 1.6–8.3)
NEUTROPHILS NFR BLD AUTO: ABNORMAL %
NEUTROPHILS NFR BLD MANUAL: 30 %
NRBC # BLD AUTO: 0 10E3/UL
NRBC # BLD AUTO: 0 10E3/UL
NRBC BLD AUTO-RTO: 0 /100
NRBC BLD MANUAL-RTO: 1 %
PLAT MORPH BLD: ABNORMAL
PLATELET # BLD AUTO: 15 10E3/UL (ref 150–450)
RBC # BLD AUTO: 3.8 10E6/UL (ref 4.4–5.9)
RBC MORPH BLD: ABNORMAL
SPECIMEN EXPIRATION DATE: NORMAL
WBC # BLD AUTO: 0.5 10E3/UL (ref 4–11)

## 2024-06-15 PROCEDURE — 36430 TRANSFUSION BLD/BLD COMPNT: CPT

## 2024-06-15 PROCEDURE — 85014 HEMATOCRIT: CPT | Performed by: STUDENT IN AN ORGANIZED HEALTH CARE EDUCATION/TRAINING PROGRAM

## 2024-06-15 PROCEDURE — 85007 BL SMEAR W/DIFF WBC COUNT: CPT | Performed by: STUDENT IN AN ORGANIZED HEALTH CARE EDUCATION/TRAINING PROGRAM

## 2024-06-15 PROCEDURE — 36415 COLL VENOUS BLD VENIPUNCTURE: CPT | Performed by: STUDENT IN AN ORGANIZED HEALTH CARE EDUCATION/TRAINING PROGRAM

## 2024-06-15 PROCEDURE — 86900 BLOOD TYPING SEROLOGIC ABO: CPT | Performed by: STUDENT IN AN ORGANIZED HEALTH CARE EDUCATION/TRAINING PROGRAM

## 2024-06-15 PROCEDURE — P9037 PLATE PHERES LEUKOREDU IRRAD: HCPCS | Performed by: STUDENT IN AN ORGANIZED HEALTH CARE EDUCATION/TRAINING PROGRAM

## 2024-06-15 NOTE — PROGRESS NOTES
Infusion Nursing Note:  Leland Pearson presents today for Platelet transfusion.  Patient spoke with provider today: No    Treatment Conditions:  Component      Latest Ref Rng 6/15/2024  8:00 AM   WBC      4.0 - 11.0 10e3/uL 0.5 (LL) (P)   RBC Count      4.40 - 5.90 10e6/uL 3.80 (L) (P)   Hemoglobin      13.3 - 17.7 g/dL 11.1 (L) (P)   Hematocrit      40.0 - 53.0 % 33.8 (L) (P)   MCV      78 - 100 fL 89 (P)   MCH      26.5 - 33.0 pg 29.2 (P)   MCHC      31.5 - 36.5 g/dL 32.8 (P)   RDW      10.0 - 15.0 % 15.4 (H) (P)   Platelet Count      150 - 450 10e3/uL 15 (LL) (P)   NRBCs per 100 WBC      <1 /100 0 (P)   Absolute NRBCs      10e3/uL 0.0 (P)      Legend:  (LL) Low Panic  (L) Low  (H) High  (P) Preliminary    Note:    No fevers/chills, SOB or cough. Reports fatigue. Mouth sores improving. No gum bleeding or nose bleeds.    Will get 1 unit platelets today for platelet count 15K.    Consent signed 6/7/24    Intravenous Access:  Peripheral IV placed.    Post Infusion Assessment:  Patient tolerated infusion without incident.  Blood return noted pre and post infusion.  Access discontinued per protocol.    Discharge Plan:   Patient declined prescription refills.  Discharge instructions reviewed with: Patient.  Patient and/or family verbalized understanding of discharge instructions and all questions answered.  AVS to patient via [employer].  Patient will return 6/17/24 for next appointment.   Patient discharged in stable condition accompanied by: wife.  Departure Mode: Ambulatory.    Catarina Mckeon RN

## 2024-06-17 ENCOUNTER — INFUSION THERAPY VISIT (OUTPATIENT)
Dept: INFUSION THERAPY | Facility: CLINIC | Age: 69
End: 2024-06-17
Attending: STUDENT IN AN ORGANIZED HEALTH CARE EDUCATION/TRAINING PROGRAM
Payer: COMMERCIAL

## 2024-06-17 VITALS
RESPIRATION RATE: 18 BRPM | SYSTOLIC BLOOD PRESSURE: 110 MMHG | OXYGEN SATURATION: 98 % | HEART RATE: 61 BPM | TEMPERATURE: 97.9 F | DIASTOLIC BLOOD PRESSURE: 73 MMHG

## 2024-06-17 DIAGNOSIS — D46.9 MDS (MYELODYSPLASTIC SYNDROME) (H): Primary | ICD-10-CM

## 2024-06-17 LAB
ACANTHOCYTES BLD QL SMEAR: NORMAL
AUER BODIES BLD QL SMEAR: NORMAL
BASO STIPL BLD QL SMEAR: NORMAL
BITE CELLS BLD QL SMEAR: NORMAL
BLD PROD TYP BPU: NORMAL
BLISTER CELLS BLD QL SMEAR: NORMAL
BLOOD COMPONENT TYPE: NORMAL
BURR CELLS BLD QL SMEAR: NORMAL
CODING SYSTEM: NORMAL
DACRYOCYTES BLD QL SMEAR: NORMAL
ELLIPTOCYTES BLD QL SMEAR: NORMAL
ERYTHROCYTE [DISTWIDTH] IN BLOOD BY AUTOMATED COUNT: 15.1 % (ref 10–15)
FRAGMENTS BLD QL SMEAR: NORMAL
GIANT PLATELETS BLD QL SMEAR: NORMAL
HCT VFR BLD AUTO: 36.2 % (ref 40–53)
HGB BLD-MCNC: 12.1 G/DL (ref 13.3–17.7)
HGB C CRYSTALS: NORMAL
HOWELL-JOLLY BOD BLD QL SMEAR: NORMAL
ISSUE DATE AND TIME: NORMAL
MCH RBC QN AUTO: 29.8 PG (ref 26.5–33)
MCHC RBC AUTO-ENTMCNC: 33.4 G/DL (ref 31.5–36.5)
MCV RBC AUTO: 89 FL (ref 78–100)
NEUTS HYPERSEG BLD QL SMEAR: NORMAL
NRBC # BLD AUTO: 0 10E3/UL
NRBC BLD AUTO-RTO: 0 /100
PATH REV: NORMAL
PLAT MORPH BLD: NORMAL
PLATELET # BLD AUTO: 18 10E3/UL (ref 150–450)
POLYCHROMASIA BLD QL SMEAR: NORMAL
RBC # BLD AUTO: 4.06 10E6/UL (ref 4.4–5.9)
RBC AGGLUT BLD QL: NORMAL
RBC MORPH BLD: NORMAL
ROULEAUX BLD QL SMEAR: NORMAL
SICKLE CELLS BLD QL SMEAR: NORMAL
SMUDGE CELLS BLD QL SMEAR: NORMAL
SPHEROCYTES BLD QL SMEAR: NORMAL
STOMATOCYTES BLD QL SMEAR: NORMAL
TARGETS BLD QL SMEAR: NORMAL
TOXIC GRANULES BLD QL SMEAR: NORMAL
UNIT ABO/RH: NORMAL
UNIT NUMBER: NORMAL
UNIT STATUS: NORMAL
UNIT TYPE ISBT: 6200
VARIANT LYMPHS BLD QL SMEAR: NORMAL
WBC # BLD AUTO: 0.4 10E3/UL (ref 4–11)

## 2024-06-17 PROCEDURE — P9037 PLATE PHERES LEUKOREDU IRRAD: HCPCS | Performed by: STUDENT IN AN ORGANIZED HEALTH CARE EDUCATION/TRAINING PROGRAM

## 2024-06-17 PROCEDURE — 85041 AUTOMATED RBC COUNT: CPT | Performed by: STUDENT IN AN ORGANIZED HEALTH CARE EDUCATION/TRAINING PROGRAM

## 2024-06-17 PROCEDURE — 36430 TRANSFUSION BLD/BLD COMPNT: CPT

## 2024-06-17 PROCEDURE — 36415 COLL VENOUS BLD VENIPUNCTURE: CPT | Performed by: STUDENT IN AN ORGANIZED HEALTH CARE EDUCATION/TRAINING PROGRAM

## 2024-06-17 RX ORDER — HEPARIN SODIUM,PORCINE 10 UNIT/ML
5-20 VIAL (ML) INTRAVENOUS DAILY PRN
Status: CANCELLED | OUTPATIENT
Start: 2024-06-17

## 2024-06-17 RX ORDER — DIPHENHYDRAMINE HYDROCHLORIDE 50 MG/ML
50 INJECTION INTRAMUSCULAR; INTRAVENOUS
Status: CANCELLED
Start: 2024-06-17

## 2024-06-17 RX ORDER — EPINEPHRINE 1 MG/ML
0.3 INJECTION, SOLUTION INTRAMUSCULAR; SUBCUTANEOUS EVERY 5 MIN PRN
Status: CANCELLED | OUTPATIENT
Start: 2024-06-17

## 2024-06-17 RX ORDER — HEPARIN SODIUM (PORCINE) LOCK FLUSH IV SOLN 100 UNIT/ML 100 UNIT/ML
5 SOLUTION INTRAVENOUS
Status: CANCELLED | OUTPATIENT
Start: 2024-06-17

## 2024-06-17 NOTE — PROGRESS NOTES
Infusion Nursing Note:  Leland Pearson presents today for labs, possible transfusions.    Patient seen by provider today: No   present during visit today: Not Applicable.    Note: na.      Intravenous Access:  Labs drawn without difficulty.  Peripheral IV placed.    Treatment Conditions:  Lab Results   Component Value Date    HGB 12.1 (L) 06/17/2024    WBC 0.4 (LL) 06/17/2024    ANEU 0.2 (LL) 06/15/2024    ANEUTAUTO 0.4 (LL) 06/09/2024    PLT 18 (LL) 06/17/2024        Blood transfusion consent signed 5/8/24.      Post Infusion Assessment:  Patient tolerated infusion without incident.  Blood return noted pre and post infusion.  Site patent and intact, free from redness, edema or discomfort.  No evidence of extravasations.  Access discontinued per protocol.       Discharge Plan:   Discharge instructions reviewed with: Patient and Family.  Patient and/or family verbalized understanding of discharge instructions and all questions answered.  AVS to patient via [employer].  Patient will return next week for next appointment.   Patient discharged in stable condition accompanied by: wife.  Departure Mode: Ambulatory.      Doug Cervantes, RN

## 2024-06-19 ENCOUNTER — ONCOLOGY VISIT (OUTPATIENT)
Dept: ONCOLOGY | Facility: CLINIC | Age: 69
End: 2024-06-19
Attending: STUDENT IN AN ORGANIZED HEALTH CARE EDUCATION/TRAINING PROGRAM
Payer: COMMERCIAL

## 2024-06-19 ENCOUNTER — APPOINTMENT (OUTPATIENT)
Dept: LAB | Facility: CLINIC | Age: 69
End: 2024-06-19
Attending: STUDENT IN AN ORGANIZED HEALTH CARE EDUCATION/TRAINING PROGRAM
Payer: COMMERCIAL

## 2024-06-19 VITALS
HEART RATE: 91 BPM | DIASTOLIC BLOOD PRESSURE: 80 MMHG | RESPIRATION RATE: 18 BRPM | OXYGEN SATURATION: 99 % | TEMPERATURE: 98 F | WEIGHT: 200.1 LBS | BODY MASS INDEX: 26.72 KG/M2 | SYSTOLIC BLOOD PRESSURE: 147 MMHG

## 2024-06-19 DIAGNOSIS — D46.9 MDS (MYELODYSPLASTIC SYNDROME) (H): ICD-10-CM

## 2024-06-19 PROCEDURE — G2211 COMPLEX E/M VISIT ADD ON: HCPCS | Performed by: STUDENT IN AN ORGANIZED HEALTH CARE EDUCATION/TRAINING PROGRAM

## 2024-06-19 PROCEDURE — 99215 OFFICE O/P EST HI 40 MIN: CPT | Performed by: STUDENT IN AN ORGANIZED HEALTH CARE EDUCATION/TRAINING PROGRAM

## 2024-06-19 PROCEDURE — G0463 HOSPITAL OUTPT CLINIC VISIT: HCPCS | Performed by: STUDENT IN AN ORGANIZED HEALTH CARE EDUCATION/TRAINING PROGRAM

## 2024-06-19 RX ORDER — MEPERIDINE HYDROCHLORIDE 25 MG/ML
25 INJECTION INTRAMUSCULAR; INTRAVENOUS; SUBCUTANEOUS EVERY 30 MIN PRN
Status: CANCELLED | OUTPATIENT
Start: 2024-07-02

## 2024-06-19 RX ORDER — ALBUTEROL SULFATE 90 UG/1
1-2 AEROSOL, METERED RESPIRATORY (INHALATION)
Status: CANCELLED
Start: 2024-07-08

## 2024-06-19 RX ORDER — METHYLPREDNISOLONE SODIUM SUCCINATE 125 MG/2ML
125 INJECTION, POWDER, LYOPHILIZED, FOR SOLUTION INTRAMUSCULAR; INTRAVENOUS
Status: CANCELLED
Start: 2024-07-03

## 2024-06-19 RX ORDER — DIPHENHYDRAMINE HYDROCHLORIDE 50 MG/ML
50 INJECTION INTRAMUSCULAR; INTRAVENOUS
Status: CANCELLED
Start: 2024-07-03

## 2024-06-19 RX ORDER — EPINEPHRINE 1 MG/ML
0.3 INJECTION, SOLUTION INTRAMUSCULAR; SUBCUTANEOUS EVERY 5 MIN PRN
Status: CANCELLED | OUTPATIENT
Start: 2024-07-05

## 2024-06-19 RX ORDER — DIPHENHYDRAMINE HYDROCHLORIDE 50 MG/ML
50 INJECTION INTRAMUSCULAR; INTRAVENOUS
Status: CANCELLED
Start: 2024-07-07

## 2024-06-19 RX ORDER — EPINEPHRINE 1 MG/ML
0.3 INJECTION, SOLUTION INTRAMUSCULAR; SUBCUTANEOUS EVERY 5 MIN PRN
Status: CANCELLED | OUTPATIENT
Start: 2024-07-08

## 2024-06-19 RX ORDER — LEVOFLOXACIN 250 MG/1
250 TABLET, FILM COATED ORAL DAILY
Qty: 30 TABLET | Refills: 1 | Status: ON HOLD | OUTPATIENT
Start: 2024-06-19 | End: 2024-09-18

## 2024-06-19 RX ORDER — METHYLPREDNISOLONE SODIUM SUCCINATE 125 MG/2ML
125 INJECTION, POWDER, LYOPHILIZED, FOR SOLUTION INTRAMUSCULAR; INTRAVENOUS
Status: CANCELLED
Start: 2024-07-07

## 2024-06-19 RX ORDER — EPINEPHRINE 1 MG/ML
0.3 INJECTION, SOLUTION INTRAMUSCULAR; SUBCUTANEOUS EVERY 5 MIN PRN
Status: CANCELLED | OUTPATIENT
Start: 2024-07-04

## 2024-06-19 RX ORDER — EPINEPHRINE 1 MG/ML
0.3 INJECTION, SOLUTION INTRAMUSCULAR; SUBCUTANEOUS EVERY 5 MIN PRN
Status: CANCELLED | OUTPATIENT
Start: 2024-07-06

## 2024-06-19 RX ORDER — METHYLPREDNISOLONE SODIUM SUCCINATE 125 MG/2ML
125 INJECTION, POWDER, LYOPHILIZED, FOR SOLUTION INTRAMUSCULAR; INTRAVENOUS
Status: CANCELLED
Start: 2024-07-08

## 2024-06-19 RX ORDER — LORAZEPAM 2 MG/ML
0.5 INJECTION INTRAMUSCULAR EVERY 4 HOURS PRN
Status: CANCELLED | OUTPATIENT
Start: 2024-07-05

## 2024-06-19 RX ORDER — ALBUTEROL SULFATE 90 UG/1
1-2 AEROSOL, METERED RESPIRATORY (INHALATION)
Status: CANCELLED
Start: 2024-07-02

## 2024-06-19 RX ORDER — LORAZEPAM 2 MG/ML
0.5 INJECTION INTRAMUSCULAR EVERY 4 HOURS PRN
Status: CANCELLED | OUTPATIENT
Start: 2024-07-08

## 2024-06-19 RX ORDER — DIPHENHYDRAMINE HYDROCHLORIDE 50 MG/ML
50 INJECTION INTRAMUSCULAR; INTRAVENOUS
Status: CANCELLED
Start: 2024-07-04

## 2024-06-19 RX ORDER — EPINEPHRINE 1 MG/ML
0.3 INJECTION, SOLUTION INTRAMUSCULAR; SUBCUTANEOUS EVERY 5 MIN PRN
Status: CANCELLED | OUTPATIENT
Start: 2024-07-03

## 2024-06-19 RX ORDER — METHYLPREDNISOLONE SODIUM SUCCINATE 125 MG/2ML
125 INJECTION, POWDER, LYOPHILIZED, FOR SOLUTION INTRAMUSCULAR; INTRAVENOUS
Status: CANCELLED
Start: 2024-07-06

## 2024-06-19 RX ORDER — METHYLPREDNISOLONE SODIUM SUCCINATE 125 MG/2ML
125 INJECTION, POWDER, LYOPHILIZED, FOR SOLUTION INTRAMUSCULAR; INTRAVENOUS
Status: CANCELLED
Start: 2024-07-05

## 2024-06-19 RX ORDER — DIPHENHYDRAMINE HYDROCHLORIDE 50 MG/ML
50 INJECTION INTRAMUSCULAR; INTRAVENOUS
Status: CANCELLED
Start: 2024-07-06

## 2024-06-19 RX ORDER — ALBUTEROL SULFATE 0.83 MG/ML
2.5 SOLUTION RESPIRATORY (INHALATION)
Status: CANCELLED | OUTPATIENT
Start: 2024-07-08

## 2024-06-19 RX ORDER — ALBUTEROL SULFATE 0.83 MG/ML
2.5 SOLUTION RESPIRATORY (INHALATION)
Status: CANCELLED | OUTPATIENT
Start: 2024-07-02

## 2024-06-19 RX ORDER — DIPHENHYDRAMINE HYDROCHLORIDE 50 MG/ML
50 INJECTION INTRAMUSCULAR; INTRAVENOUS
Status: CANCELLED
Start: 2024-07-08

## 2024-06-19 RX ORDER — MEPERIDINE HYDROCHLORIDE 25 MG/ML
25 INJECTION INTRAMUSCULAR; INTRAVENOUS; SUBCUTANEOUS EVERY 30 MIN PRN
Status: CANCELLED | OUTPATIENT
Start: 2024-07-05

## 2024-06-19 RX ORDER — ALBUTEROL SULFATE 90 UG/1
1-2 AEROSOL, METERED RESPIRATORY (INHALATION)
Status: CANCELLED
Start: 2024-07-06

## 2024-06-19 RX ORDER — ALBUTEROL SULFATE 90 UG/1
1-2 AEROSOL, METERED RESPIRATORY (INHALATION)
Status: CANCELLED
Start: 2024-07-07

## 2024-06-19 RX ORDER — LORAZEPAM 2 MG/ML
0.5 INJECTION INTRAMUSCULAR EVERY 4 HOURS PRN
Status: CANCELLED | OUTPATIENT
Start: 2024-07-03

## 2024-06-19 RX ORDER — ALBUTEROL SULFATE 0.83 MG/ML
2.5 SOLUTION RESPIRATORY (INHALATION)
Status: CANCELLED | OUTPATIENT
Start: 2024-07-07

## 2024-06-19 RX ORDER — LORAZEPAM 2 MG/ML
0.5 INJECTION INTRAMUSCULAR EVERY 4 HOURS PRN
Status: CANCELLED | OUTPATIENT
Start: 2024-07-06

## 2024-06-19 RX ORDER — LORAZEPAM 2 MG/ML
0.5 INJECTION INTRAMUSCULAR EVERY 4 HOURS PRN
Status: CANCELLED | OUTPATIENT
Start: 2024-07-07

## 2024-06-19 RX ORDER — EPINEPHRINE 1 MG/ML
0.3 INJECTION, SOLUTION INTRAMUSCULAR; SUBCUTANEOUS EVERY 5 MIN PRN
Status: CANCELLED | OUTPATIENT
Start: 2024-07-02

## 2024-06-19 RX ORDER — LORAZEPAM 2 MG/ML
0.5 INJECTION INTRAMUSCULAR EVERY 4 HOURS PRN
Status: CANCELLED | OUTPATIENT
Start: 2024-07-04

## 2024-06-19 RX ORDER — METHYLPREDNISOLONE SODIUM SUCCINATE 125 MG/2ML
125 INJECTION, POWDER, LYOPHILIZED, FOR SOLUTION INTRAMUSCULAR; INTRAVENOUS
Status: CANCELLED
Start: 2024-07-04

## 2024-06-19 RX ORDER — ALBUTEROL SULFATE 0.83 MG/ML
2.5 SOLUTION RESPIRATORY (INHALATION)
Status: CANCELLED | OUTPATIENT
Start: 2024-07-05

## 2024-06-19 RX ORDER — ALBUTEROL SULFATE 0.83 MG/ML
2.5 SOLUTION RESPIRATORY (INHALATION)
Status: CANCELLED | OUTPATIENT
Start: 2024-07-03

## 2024-06-19 RX ORDER — LORAZEPAM 2 MG/ML
0.5 INJECTION INTRAMUSCULAR EVERY 4 HOURS PRN
Status: CANCELLED | OUTPATIENT
Start: 2024-07-02

## 2024-06-19 RX ORDER — DIPHENHYDRAMINE HYDROCHLORIDE 50 MG/ML
50 INJECTION INTRAMUSCULAR; INTRAVENOUS
Status: CANCELLED
Start: 2024-07-02

## 2024-06-19 RX ORDER — MEPERIDINE HYDROCHLORIDE 25 MG/ML
25 INJECTION INTRAMUSCULAR; INTRAVENOUS; SUBCUTANEOUS EVERY 30 MIN PRN
Status: CANCELLED | OUTPATIENT
Start: 2024-07-03

## 2024-06-19 RX ORDER — ALBUTEROL SULFATE 90 UG/1
1-2 AEROSOL, METERED RESPIRATORY (INHALATION)
Status: CANCELLED
Start: 2024-07-05

## 2024-06-19 RX ORDER — MEPERIDINE HYDROCHLORIDE 25 MG/ML
25 INJECTION INTRAMUSCULAR; INTRAVENOUS; SUBCUTANEOUS EVERY 30 MIN PRN
Status: CANCELLED | OUTPATIENT
Start: 2024-07-07

## 2024-06-19 RX ORDER — METHYLPREDNISOLONE SODIUM SUCCINATE 125 MG/2ML
125 INJECTION, POWDER, LYOPHILIZED, FOR SOLUTION INTRAMUSCULAR; INTRAVENOUS
Status: CANCELLED
Start: 2024-07-02

## 2024-06-19 RX ORDER — MEPERIDINE HYDROCHLORIDE 25 MG/ML
25 INJECTION INTRAMUSCULAR; INTRAVENOUS; SUBCUTANEOUS EVERY 30 MIN PRN
Status: CANCELLED | OUTPATIENT
Start: 2024-07-04

## 2024-06-19 RX ORDER — MEPERIDINE HYDROCHLORIDE 25 MG/ML
25 INJECTION INTRAMUSCULAR; INTRAVENOUS; SUBCUTANEOUS EVERY 30 MIN PRN
Status: CANCELLED | OUTPATIENT
Start: 2024-07-06

## 2024-06-19 RX ORDER — MEPERIDINE HYDROCHLORIDE 25 MG/ML
25 INJECTION INTRAMUSCULAR; INTRAVENOUS; SUBCUTANEOUS EVERY 30 MIN PRN
Status: CANCELLED | OUTPATIENT
Start: 2024-07-08

## 2024-06-19 RX ORDER — EPINEPHRINE 1 MG/ML
0.3 INJECTION, SOLUTION INTRAMUSCULAR; SUBCUTANEOUS EVERY 5 MIN PRN
Status: CANCELLED | OUTPATIENT
Start: 2024-07-07

## 2024-06-19 RX ORDER — ALBUTEROL SULFATE 0.83 MG/ML
2.5 SOLUTION RESPIRATORY (INHALATION)
Status: CANCELLED | OUTPATIENT
Start: 2024-07-04

## 2024-06-19 RX ORDER — ALBUTEROL SULFATE 90 UG/1
1-2 AEROSOL, METERED RESPIRATORY (INHALATION)
Status: CANCELLED
Start: 2024-07-03

## 2024-06-19 RX ORDER — ALBUTEROL SULFATE 0.83 MG/ML
2.5 SOLUTION RESPIRATORY (INHALATION)
Status: CANCELLED | OUTPATIENT
Start: 2024-07-06

## 2024-06-19 RX ORDER — ALBUTEROL SULFATE 90 UG/1
1-2 AEROSOL, METERED RESPIRATORY (INHALATION)
Status: CANCELLED
Start: 2024-07-04

## 2024-06-19 RX ORDER — DIPHENHYDRAMINE HYDROCHLORIDE 50 MG/ML
50 INJECTION INTRAMUSCULAR; INTRAVENOUS
Status: CANCELLED
Start: 2024-07-05

## 2024-06-19 ASSESSMENT — PAIN SCALES - GENERAL: PAINLEVEL: NO PAIN (0)

## 2024-06-19 NOTE — NURSING NOTE
Chief Complaint   Patient presents with    Blood Draw     Labs drawn with PIV start by RN. Pt tolerated well. Vitals taken. Patient checked into next appointment.       Mary Bradley RN

## 2024-06-19 NOTE — PROGRESS NOTES
[facility]    HEMATOLOGY & ONCOLOGY  Follow up    Referring physician: Dr. Luz Foster MD ([facility])    PATIENT NAME: Leland Pearson MRN # [id number]  DATE OF VISIT: June 19, 2024 YOB: 1955    SUMMARY  Leland Pearson is a 69 year old male with PMH significant for GERD, HTN and recent diagnosis of MDS who presents for consultation.    3/27/24 Pt found to be thrombocytopenic on screening CBC prior to elective back surgery  WBC 4.5, Hb 13.7, MCV 86, Plts-clumped  LDH elevated 532  4/16/24 Consultation with Dr. 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 Murphy recommending transplant.  6/13/24 BMBx showing persistent MDS with 95% cellularity and <1% blasts  Flow: No increase in blasts and no abnormal immunophenotype  NGS/FISH/Cyto: Pending        SUBJECTIVE  Leland reports overall feeling ok. More fatigued after starting therapy but continues to have good appetite. Denies f/c/s or n/v/d.     CURRENT OUTPATIENT MEDICATIONS  Current Outpatient Medications   Medication Sig Dispense Refill    acyclovir (ZOVIRAX) 800 MG tablet Take 1 tablet (800 mg) by mouth every 12 hours for 150 days 60 tablet 4    allopurinol (ZYLOPRIM) 300 MG tablet Take 1 tablet (300 mg) by mouth daily 90 tablet 1    celecoxib (CELEBREX) 200 MG capsule Take 200 mg by mouth daily      famotidine (PEPCID) 20 MG tablet Famotidine Oral    daily    active      levofloxacin (LEVAQUIN) 250 MG tablet Take 1 tablet (250 mg) by mouth daily 30 tablet 1    lisinopril (ZESTRIL) 10 MG tablet Take 1 tablet by mouth daily at 2 pm      multivitamin, therapeutic with minerals (MULTI-VITAMIN) TABS  Take 1 tablet by mouth daily      posaconazole (NOXAFIL) 100 MG DR tablet Take 3 tablets (300 mg) by mouth every morning 90 tablet 1    prochlorperazine (COMPAZINE) 5 MG tablet Take 1 tablet (5 mg) by mouth every 6 hours as needed for nausea or vomiting 30 tablet 1    tamsulosin (FLOMAX) 0.4 MG capsule Tamsulosin Oral    daily    active      glucosamine-chondroitin 500-400 MG CAPS Take 1 capsule by mouth daily (Patient not taking: Reported on 6/15/2024)      [START ON 6/30/2024] venetoclax (VENCLEXTA) 100 MG tablet Take 1 tablet (100 mg) by mouth daily for 14 days Do not start cycle until directed by care team. Take with food and water. (Patient not taking: Reported on 6/15/2024) 14 tablet 0    venetoclax (VENCLEXTA) 100 MG tablet Take 1 tablet (100 mg) on day 1, then 2 tablets (200 mg) on day 2, then 4 tablets (400 mg) once daily from day 3 to day 14. Do not start cycle until directed by care team. Take with food and water. (Patient not taking: Reported on 6/9/2024) 51 tablet 0       ALLERGIES  No Known Allergies    REVIEW OF SYSTEMS  A complete ROS was performed and was negative except as mentioned in HPI    PHYSICAL EXAM  BP (!) 147/80 (BP Location: Right arm, Patient Position: Sitting, Cuff Size: Adult Regular)   Pulse 91   Temp 98  F (36.7  C) (Oral)   Resp 18   Wt 90.8 kg (200 lb 1.6 oz)   SpO2 99%   BMI 26.72 kg/m    @LASTSAO2(4)@  Wt Readings from Last 3 Encounters:   06/19/24 90.8 kg (200 lb 1.6 oz)   06/13/24 89.5 kg (197 lb 6.4 oz)   06/12/24 89.9 kg (198 lb 4.8 oz)       Gen: alert, pleasant and conversational, NAD  HEENT: NC/AT, EOMI, anicteric sclera. MMM.   CV: warm and well perfused  Resp: breathing comfortably on RA, no wheezes or cough  Abd: nondistended.   Skin: no concerning lesions or rashes on exposed skin  Neuro: A&Ox4, no lateralizing sx. Grossly nonfocal.  Psych: TP linear, mood/affect appropriate          LABORATORY AND IMAGING STUDIES     Latest Reference Range & Units 06/19/24 12:29    WBC 4.0 - 11.0 10e3/uL 0.6 (LL)   Hemoglobin 13.3 - 17.7 g/dL 11.8 (L)   Hematocrit 40.0 - 53.0 % 36.0 (L)   Platelet Count 150 - 450 10e3/uL 24 (LL)   RBC Count 4.40 - 5.90 10e6/uL 4.09 (L)   MCV 78 - 100 fL 88   MCH 26.5 - 33.0 pg 28.9   MCHC 31.5 - 36.5 g/dL 32.8   RDW 10.0 - 15.0 % 15.1 (H)   % Neutrophils % 10   % Lymphocytes % 84   % Monocytes % 6   % Eosinophils % 0   % Basophils % 0   Absolute Basophils 0.0 - 0.2 10e3/uL 0.0   Absolute Neutrophil 1.6 - 8.3 10e3/uL 0.1 (LL)   Absolute Lymphocytes 0.8 - 5.3 10e3/uL 0.5 (L)   Absolute Monocytes 0.0 - 1.3 10e3/uL 0.0   Absolute Eosinophils 0.0 - 0.7 10e3/uL 0.0   Absolute NRBCs 10e3/uL 0.0   NRBCs per 100 WBC <1 /100 0   RBC Morphology  Confirmed RBC Indices   Platelet Morphology Automated Count Confirmed. Platelet morphology is normal.  Automated Count Confirmed. Platelet morphology is normal.   ABO/Rh(D)  O POS   Antibody Screen Negative  Negative   SPECIMEN EXPIRATION DATE  [id number]   (LL): Data is critically low  (L): Data is abnormally low  (H): Data is abnormally high     Latest Reference Range & Units 06/12/24 13:45   Sodium 135 - 145 mmol/L 137   Potassium 3.4 - 5.3 mmol/L 3.9   Chloride 98 - 107 mmol/L 104   Carbon Dioxide (CO2) 22 - 29 mmol/L 24   Urea Nitrogen 8.0 - 23.0 mg/dL 19.3   Creatinine 0.67 - 1.17 mg/dL 0.79   GFR Estimate >60 mL/min/1.73m2 >90   Calcium 8.8 - 10.2 mg/dL 9.0   Anion Gap 7 - 15 mmol/L 9   Phosphorus 2.5 - 4.5 mg/dL 2.8   Albumin 3.5 - 5.2 g/dL 3.9   Protein Total 6.4 - 8.3 g/dL 7.1   Alkaline Phosphatase 40 - 150 U/L 90   ALT 0 - 70 U/L 20   AST 0 - 45 U/L 36   Bilirubin Total <=1.2 mg/dL 0.7   Glucose 70 - 99 mg/dL 109 (H)   Uric Acid 3.4 - 7.0 mg/dL 4.7   (H): Data is abnormally high      6/13/24 BMBx  Final Diagnosis   Bone marrow, posterior iliac crest, left decalcified trephine biopsy and touch imprint; left direct aspirate smear, concentrate aspirate smear, and peripheral blood smear:  Persistent/Recurrent  myelodysplastic syndrome with <1% blasts and the following features  - Hypercellular marrow (95% estimated cellularity), with therapy effect and trilineage hematopoesis showing rare dysgranulopoiesis, 4% ring sideroblasts, and no increase in blasts (<1%)  - Peripheral blood showing slight normochromic, normocytic anemia; marked leukopenia; neutropenia; lymphocytopenia; marked thrombocytopenia  - See comment   Electronically signed by Osbaldo Figueredo MD on 6/14/2024 at 11:57 AM   Comment  [facility]MAYO   The overall features are consistent with persistent/recurrent myelodysplastic syndrome. The cytologic features are not at a level definitve for dysplasia, and some of the features could be explained by recent treatment. However, the rare cytologic findings are similar to that reported in the diagnostic marrow and the bone marrow is markedly hypercellular indicative of ineffective hematopoiesis. Of note, blasts are not increased (<1%) and there is therapy effect present with many scattered macrophages throughout the marrow. Given the molecular findings at diagnosis, correlation with results of all ancillary blood and bone marrow studies, especially molecular, and clinical findings is recommended.      By separate report, ([id number]) concurrent flow cytometric immunophenotyping performed on the marrow aspirate shows no increase in myeloid blasts and no abnormal myeloid blast population.          ASSESSMENT AND PLAN  Leland Pearson is a 69 year old male with PMH significant for GERD, HTN and recent diagnosis of MDS who presents for ongoing mangement of High risk MDS.    # High risk MDS - IPSS-M/R high risk with excess blasts.  NGS with high risk ASXL1, IDH1, RUNX1 as well as SRSF2 mutations now s/p cycle #1 Aza/Hasmukh    We revived the bone marrow biopsy. While the cellularity and dysplasia hasn't had significant change, blasts dropped from 10.8% with abnormal immunophenotype to <1% with no abnormal immunophenotype detected by  flow. This is highly encouraging that the malignant stem cells are sensitive to BCL2 inhibition. The dysplasia will likely take time to filter out with the hopeful reestablishment of more normal hematopoiesis.     Plan will be to give 1 additional week to recover counts. Based on recovery rate may decrease dose of next cycle.        PPx - ACV. Posa + levo with neutropenia.    # h/o Gout - no active flare. Continue allopurinol.    Final plan  - Continue to C2 Hasmukh/Aza. Dose adjustment TBD  - Sent message to Dr. Murphy's team outlining response for planning purposes      The longitudinal plan of care for the diagnosis(es)/condition(s) as documented were addressed during this visit. Due to the added complexity in care, I will continue to support Leland in the subsequent management and with ongoing continuity of care.      Total time spent on this encounter today including reviewing the EMR, documentation and direct patient care counselin minutes    Nael Delcid MD    Division of Hematology, Oncology and Transplantation  [facility]  P: [phone]

## 2024-06-19 NOTE — LETTER
6/19/2024      Leland Pearson  [address] [place] [address]      Dear Colleague,    Thank you for referring your patient, Leland Pearson, to the [facility]. Please see a copy of my visit note below.    [facility]    HEMATOLOGY & ONCOLOGY  Follow up    Referring physician: Dr. Luz Foster MD ([facility] [place])    PATIENT NAME: Leland Pearson MRN # [id number]  DATE OF VISIT: June 19, 2024 YOB: 1955    SUMMARY  Leland Pearson is a 69 year old male with PMH significant for GERD, HTN and recent diagnosis of MDS who presents for consultation.    3/27/24 Pt found to be thrombocytopenic on screening CBC prior to elective back surgery  WBC 4.5, Hb 13.7, MCV 86, Plts-clumped  LDH elevated 532  4/16/24 Consultation with Dr. 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 Murphy recommending transplant.  6/13/24 BMBx showing persistent MDS with 95% cellularity and <1% blasts  Flow: No increase in blasts and no abnormal immunophenotype  NGS/FISH/Cyto: Pending        SUBJECTIVE  Leland reports overall feeling ok. More fatigued after starting therapy but continues to have good appetite. Denies f/c/s or n/v/d.     CURRENT OUTPATIENT MEDICATIONS  Current Outpatient Medications   Medication Sig Dispense Refill     acyclovir (ZOVIRAX) 800 MG tablet Take 1 tablet (800 mg) by mouth every 12 hours for 150 days 60 tablet 4     allopurinol (ZYLOPRIM) 300 MG tablet Take 1 tablet (300 mg) by mouth daily 90 tablet 1     celecoxib (CELEBREX) 200 MG capsule Take 200 mg by mouth daily       famotidine (PEPCID) 20 MG tablet  Famotidine Oral    daily    active       levofloxacin (LEVAQUIN) 250 MG tablet Take 1 tablet (250 mg) by mouth daily 30 tablet 1     lisinopril (ZESTRIL) 10 MG tablet Take 1 tablet by mouth daily at 2 pm       multivitamin, therapeutic with minerals (MULTI-VITAMIN) TABS Take 1 tablet by mouth daily       posaconazole (NOXAFIL) 100 MG DR tablet Take 3 tablets (300 mg) by mouth every morning 90 tablet 1     prochlorperazine (COMPAZINE) 5 MG tablet Take 1 tablet (5 mg) by mouth every 6 hours as needed for nausea or vomiting 30 tablet 1     tamsulosin (FLOMAX) 0.4 MG capsule Tamsulosin Oral    daily    active       glucosamine-chondroitin 500-400 MG CAPS Take 1 capsule by mouth daily (Patient not taking: Reported on 6/15/2024)       [START ON 6/30/2024] venetoclax (VENCLEXTA) 100 MG tablet Take 1 tablet (100 mg) by mouth daily for 14 days Do not start cycle until directed by care team. Take with food and water. (Patient not taking: Reported on 6/15/2024) 14 tablet 0     venetoclax (VENCLEXTA) 100 MG tablet Take 1 tablet (100 mg) on day 1, then 2 tablets (200 mg) on day 2, then 4 tablets (400 mg) once daily from day 3 to day 14. Do not start cycle until directed by care team. Take with food and water. (Patient not taking: Reported on 6/9/2024) 51 tablet 0       ALLERGIES  No Known Allergies    REVIEW OF SYSTEMS  A complete ROS was performed and was negative except as mentioned in HPI    PHYSICAL EXAM  BP (!) 147/80 (BP Location: Right arm, Patient Position: Sitting, Cuff Size: Adult Regular)   Pulse 91   Temp 98  F (36.7  C) (Oral)   Resp 18   Wt 90.8 kg (200 lb 1.6 oz)   SpO2 99%   BMI 26.72 kg/m    @LASTSAO2(4)@  Wt Readings from Last 3 Encounters:   06/19/24 90.8 kg (200 lb 1.6 oz)   06/13/24 89.5 kg (197 lb 6.4 oz)   06/12/24 89.9 kg (198 lb 4.8 oz)       Gen: alert, pleasant and conversational, NAD  HEENT: NC/AT, EOMI, anicteric sclera. MMM.   CV: warm and well perfused  Resp: breathing comfortably on RA, no  wheezes or cough  Abd: nondistended.   Skin: no concerning lesions or rashes on exposed skin  Neuro: A&Ox4, no lateralizing sx. Grossly nonfocal.  Psych: TP linear, mood/affect appropriate          LABORATORY AND IMAGING STUDIES     Latest Reference Range & Units 06/19/24 12:29   WBC 4.0 - 11.0 10e3/uL 0.6 (LL)   Hemoglobin 13.3 - 17.7 g/dL 11.8 (L)   Hematocrit 40.0 - 53.0 % 36.0 (L)   Platelet Count 150 - 450 10e3/uL 24 (LL)   RBC Count 4.40 - 5.90 10e6/uL 4.09 (L)   MCV 78 - 100 fL 88   MCH 26.5 - 33.0 pg 28.9   MCHC 31.5 - 36.5 g/dL 32.8   RDW 10.0 - 15.0 % 15.1 (H)   % Neutrophils % 10   % Lymphocytes % 84   % Monocytes % 6   % Eosinophils % 0   % Basophils % 0   Absolute Basophils 0.0 - 0.2 10e3/uL 0.0   Absolute Neutrophil 1.6 - 8.3 10e3/uL 0.1 (LL)   Absolute Lymphocytes 0.8 - 5.3 10e3/uL 0.5 (L)   Absolute Monocytes 0.0 - 1.3 10e3/uL 0.0   Absolute Eosinophils 0.0 - 0.7 10e3/uL 0.0   Absolute NRBCs 10e3/uL 0.0   NRBCs per 100 WBC <1 /100 0   RBC Morphology  Confirmed RBC Indices   Platelet Morphology Automated Count Confirmed. Platelet morphology is normal.  Automated Count Confirmed. Platelet morphology is normal.   ABO/Rh(D)  O POS   Antibody Screen Negative  Negative   SPECIMEN EXPIRATION DATE  [id number]   (LL): Data is critically low  (L): Data is abnormally low  (H): Data is abnormally high     Latest Reference Range & Units 06/12/24 13:45   Sodium 135 - 145 mmol/L 137   Potassium 3.4 - 5.3 mmol/L 3.9   Chloride 98 - 107 mmol/L 104   Carbon Dioxide (CO2) 22 - 29 mmol/L 24   Urea Nitrogen 8.0 - 23.0 mg/dL 19.3   Creatinine 0.67 - 1.17 mg/dL 0.79   GFR Estimate >60 mL/min/1.73m2 >90   Calcium 8.8 - 10.2 mg/dL 9.0   Anion Gap 7 - 15 mmol/L 9   Phosphorus 2.5 - 4.5 mg/dL 2.8   Albumin 3.5 - 5.2 g/dL 3.9   Protein Total 6.4 - 8.3 g/dL 7.1   Alkaline Phosphatase 40 - 150 U/L 90   ALT 0 - 70 U/L 20   AST 0 - 45 U/L 36   Bilirubin Total <=1.2 mg/dL 0.7   Glucose 70 - 99 mg/dL 109 (H)   Uric Acid 3.4 - 7.0  mg/dL 4.7   (H): Data is abnormally high      6/13/24 BMBx  Final Diagnosis   Bone marrow, posterior iliac crest, left decalcified trephine biopsy and touch imprint; left direct aspirate smear, concentrate aspirate smear, and peripheral blood smear:  Persistent/Recurrent myelodysplastic syndrome with <1% blasts and the following features  - Hypercellular marrow (95% estimated cellularity), with therapy effect and trilineage hematopoesis showing rare dysgranulopoiesis, 4% ring sideroblasts, and no increase in blasts (<1%)  - Peripheral blood showing slight normochromic, normocytic anemia; marked leukopenia; neutropenia; lymphocytopenia; marked thrombocytopenia  - See comment   Electronically signed by Osbaldo Figueredo MD on 6/14/2024 at 11:57 AM   Comment  [facility]MAY[facility]   The overall features are consistent with persistent/recurrent myelodysplastic syndrome. The cytologic features are not at a level definitve for dysplasia, and some of the features could be explained by recent treatment. However, the rare cytologic findings are similar to that reported in the diagnostic marrow and the bone marrow is markedly hypercellular indicative of ineffective hematopoiesis. Of note, blasts are not increased (<1%) and there is therapy effect present with many scattered macrophages throughout the marrow. Given the molecular findings at diagnosis, correlation with results of all ancillary blood and bone marrow studies, especially molecular, and clinical findings is recommended.      By separate report, ([id number]) concurrent flow cytometric immunophenotyping performed on the marrow aspirate shows no increase in myeloid blasts and no abnormal myeloid blast population.          ASSESSMENT AND PLAN  Leland Pearson is a 69 year old male with PMH significant for GERD, HTN and recent diagnosis of MDS who presents for ongoing mangement of High risk MDS.    # High risk MDS - IPSS-M/R high risk with excess blasts.  NGS with high risk ASXL1, IDH1,  RUNX1 as well as SRSF2 mutations now s/p cycle #1 Aza/Hasmukh    We revived the bone marrow biopsy. While the cellularity and dysplasia hasn't had significant change, blasts dropped from 10.8% with abnormal immunophenotype to <1% with no abnormal immunophenotype detected by flow. This is highly encouraging that the malignant stem cells are sensitive to BCL2 inhibition. The dysplasia will likely take time to filter out with the hopeful reestablishment of more normal hematopoiesis.     Plan will be to give 1 additional week to recover counts. Based on recovery rate may decrease dose of next cycle.        PPx - ACV. Posa + levo with neutropenia.    # h/o Gout - no active flare. Continue allopurinol.    Final plan  - Continue to C2 Hasmukh/Aza. Dose adjustment TBD  - Sent message to Dr. Murphy's team outlining response for planning purposes      The longitudinal plan of care for the diagnosis(es)/condition(s) as documented were addressed during this visit. Due to the added complexity in care, I will continue to support Leland in the subsequent management and with ongoing continuity of care.      Total time spent on this encounter today including reviewing the EMR, documentation and direct patient care counselin minutes    Nael Delcid MD    Division of Hematology, Oncology and Transplantation  [facility]  P: [phone]        Again, thank you for allowing me to participate in the care of your patient.        Sincerely,        Nael Delcid MD

## 2024-06-19 NOTE — NURSING NOTE
"Oncology Rooming Note    June 19, 2024 12:54 PM   Leland Pearson is a 69 year old male who presents for:    Chief Complaint   Patient presents with    Blood Draw     Labs drawn with PIV start by RN. Pt tolerated well. Vitals taken. Patient checked into next appointment.      Oncology Clinic Visit     MDS (myelodysplastic syndrome)     Initial Vitals: BP (!) 147/80 (BP Location: Right arm, Patient Position: Sitting, Cuff Size: Adult Regular)   Pulse 91   Temp 98  F (36.7  C) (Oral)   Resp 18   Wt 90.8 kg (200 lb 1.6 oz)   SpO2 99%   BMI 26.72 kg/m   Estimated body mass index is 26.72 kg/m  as calculated from the following:    Height as of 6/11/24: 1.843 m (6' 0.56\").    Weight as of this encounter: 90.8 kg (200 lb 1.6 oz). Body surface area is 2.16 meters squared.  No Pain (0) Comment: Data Unavailable   No LMP for male patient.  Allergies reviewed: Yes  Medications reviewed: Yes    Medications: Medication refills not needed today.  Pharmacy name entered into [employer]:    [id number] - [place], [place] - [address] [place] [address] [facility] [place], [place] - [address]    Frailty Screening:   Is the patient here for a new oncology consult visit in cancer care? 2. No      Clinical concerns: Sometimes gassy (burping) after eating.      Marika Leonardo, EMT  6/19/2024              "

## 2024-06-21 ENCOUNTER — INFUSION THERAPY VISIT (OUTPATIENT)
Dept: INFUSION THERAPY | Facility: CLINIC | Age: 69
End: 2024-06-21
Attending: INTERNAL MEDICINE
Payer: COMMERCIAL

## 2024-06-21 DIAGNOSIS — D46.9 MDS (MYELODYSPLASTIC SYNDROME) (H): Primary | ICD-10-CM

## 2024-06-21 LAB
ALBUMIN SERPL BCG-MCNC: 3.8 G/DL (ref 3.5–5.2)
ALP SERPL-CCNC: 121 U/L (ref 40–150)
ALT SERPL W P-5'-P-CCNC: 36 U/L (ref 0–70)
ANION GAP SERPL CALCULATED.3IONS-SCNC: 8 MMOL/L (ref 7–15)
AST SERPL W P-5'-P-CCNC: 34 U/L (ref 0–45)
BASOPHILS # BLD AUTO: 0 10E3/UL (ref 0–0.2)
BASOPHILS # BLD MANUAL: 0 10E3/UL (ref 0–0.2)
BASOPHILS NFR BLD AUTO: 0 %
BASOPHILS NFR BLD MANUAL: 0 %
BILIRUB SERPL-MCNC: 0.7 MG/DL
BUN SERPL-MCNC: 10.7 MG/DL (ref 8–23)
CALCIUM SERPL-MCNC: 9.1 MG/DL (ref 8.8–10.2)
CHLORIDE SERPL-SCNC: 103 MMOL/L (ref 98–107)
CREAT SERPL-MCNC: 0.77 MG/DL (ref 0.67–1.17)
DEPRECATED HCO3 PLAS-SCNC: 26 MMOL/L (ref 22–29)
EGFRCR SERPLBLD CKD-EPI 2021: >90 ML/MIN/1.73M2
EOSINOPHIL # BLD AUTO: 0 10E3/UL (ref 0–0.7)
EOSINOPHIL # BLD MANUAL: 0 10E3/UL (ref 0–0.7)
EOSINOPHIL NFR BLD AUTO: 0 %
EOSINOPHIL NFR BLD MANUAL: 0 %
ERYTHROCYTE [DISTWIDTH] IN BLOOD BY AUTOMATED COUNT: 14.8 % (ref 10–15)
GLUCOSE SERPL-MCNC: 87 MG/DL (ref 70–99)
HCT VFR BLD AUTO: 35.6 % (ref 40–53)
HGB BLD-MCNC: 11.7 G/DL (ref 13.3–17.7)
IMM GRANULOCYTES # BLD: 0 10E3/UL
IMM GRANULOCYTES NFR BLD: 0 %
LYMPHOCYTES # BLD AUTO: 0.6 10E3/UL (ref 0.8–5.3)
LYMPHOCYTES # BLD MANUAL: 0.7 10E3/UL (ref 0.8–5.3)
LYMPHOCYTES NFR BLD AUTO: 66 %
LYMPHOCYTES NFR BLD MANUAL: 74 %
MCH RBC QN AUTO: 29.5 PG (ref 26.5–33)
MCHC RBC AUTO-ENTMCNC: 32.9 G/DL (ref 31.5–36.5)
MCV RBC AUTO: 90 FL (ref 78–100)
MONOCYTES # BLD AUTO: 0.3 10E3/UL (ref 0–1.3)
MONOCYTES # BLD MANUAL: 0.2 10E3/UL (ref 0–1.3)
MONOCYTES NFR BLD AUTO: 28 %
MONOCYTES NFR BLD MANUAL: 21 %
NEUTROPHILS # BLD AUTO: 0.1 10E3/UL (ref 1.6–8.3)
NEUTROPHILS # BLD MANUAL: 0 10E3/UL (ref 1.6–8.3)
NEUTROPHILS NFR BLD AUTO: 6 %
NEUTROPHILS NFR BLD MANUAL: 5 %
NRBC # BLD AUTO: 0 10E3/UL
NRBC BLD AUTO-RTO: 0 /100
PHOSPHATE SERPL-MCNC: 2.1 MG/DL (ref 2.5–4.5)
PLAT MORPH BLD: ABNORMAL
PLATELET # BLD AUTO: 36 10E3/UL (ref 150–450)
POTASSIUM SERPL-SCNC: 4.1 MMOL/L (ref 3.4–5.3)
PROT SERPL-MCNC: 6.7 G/DL (ref 6.4–8.3)
RBC # BLD AUTO: 3.96 10E6/UL (ref 4.4–5.9)
RBC MORPH BLD: ABNORMAL
SODIUM SERPL-SCNC: 137 MMOL/L (ref 135–145)
URATE SERPL-MCNC: 3.7 MG/DL (ref 3.4–7)
WBC # BLD AUTO: 0.9 10E3/UL (ref 4–11)

## 2024-06-21 PROCEDURE — 84550 ASSAY OF BLOOD/URIC ACID: CPT | Performed by: STUDENT IN AN ORGANIZED HEALTH CARE EDUCATION/TRAINING PROGRAM

## 2024-06-21 PROCEDURE — 84100 ASSAY OF PHOSPHORUS: CPT | Performed by: STUDENT IN AN ORGANIZED HEALTH CARE EDUCATION/TRAINING PROGRAM

## 2024-06-21 PROCEDURE — 85007 BL SMEAR W/DIFF WBC COUNT: CPT | Performed by: INTERNAL MEDICINE

## 2024-06-21 PROCEDURE — 82565 ASSAY OF CREATININE: CPT | Performed by: STUDENT IN AN ORGANIZED HEALTH CARE EDUCATION/TRAINING PROGRAM

## 2024-06-21 PROCEDURE — 36415 COLL VENOUS BLD VENIPUNCTURE: CPT | Performed by: STUDENT IN AN ORGANIZED HEALTH CARE EDUCATION/TRAINING PROGRAM

## 2024-06-21 PROCEDURE — 85004 AUTOMATED DIFF WBC COUNT: CPT | Performed by: STUDENT IN AN ORGANIZED HEALTH CARE EDUCATION/TRAINING PROGRAM

## 2024-06-21 PROCEDURE — 82374 ASSAY BLOOD CARBON DIOXIDE: CPT | Performed by: STUDENT IN AN ORGANIZED HEALTH CARE EDUCATION/TRAINING PROGRAM

## 2024-06-21 NOTE — PROGRESS NOTES
Infusion Nursing Note:  Leland THOMPSON Michel presents today for labs/possible transfusion.    Patient seen by provider today: No   present during visit today: Not Applicable.    Note: Pt does not qualify for transfusion today.      Intravenous Access:  Labs drawn without difficulty.  Peripheral IV placed.    Treatment Conditions:  Lab Results   Component Value Date    HGB 11.7 (L) 06/21/2024    WBC 0.9 (LL) 06/21/2024    ANEU 0.1 (LL) 06/19/2024    ANEUTAUTO 0.1 (LL) 06/21/2024    PLT 36 (LL) 06/21/2024        Results reviewed, labs did NOT meet treatment parameters: platelet<20k.      Post Infusion Assessment:  Access discontinued per protocol.       Discharge Plan:   Discharge instructions reviewed with: Patient.  Patient and/or family verbalized understanding of discharge instructions and all questions answered.  AVS to patient via [email or web address].  Patient will return 6/24/24 for next appointment.   Patient discharged in stable condition accompanied by: self and wife.  Departure Mode: Ambulatory.      Jennifer Mccollum RN

## 2024-06-23 ENCOUNTER — HEALTH MAINTENANCE LETTER (OUTPATIENT)
Age: 69
End: 2024-06-23

## 2024-06-24 ENCOUNTER — INFUSION THERAPY VISIT (OUTPATIENT)
Dept: INFUSION THERAPY | Facility: CLINIC | Age: 69
End: 2024-06-24
Attending: STUDENT IN AN ORGANIZED HEALTH CARE EDUCATION/TRAINING PROGRAM
Payer: COMMERCIAL

## 2024-06-24 VITALS
OXYGEN SATURATION: 98 % | DIASTOLIC BLOOD PRESSURE: 76 MMHG | SYSTOLIC BLOOD PRESSURE: 126 MMHG | TEMPERATURE: 98.7 F | RESPIRATION RATE: 18 BRPM | HEART RATE: 74 BPM

## 2024-06-24 DIAGNOSIS — D46.9 MDS (MYELODYSPLASTIC SYNDROME) (H): ICD-10-CM

## 2024-06-24 LAB
ACANTHOCYTES BLD QL SMEAR: NORMAL
ALBUMIN SERPL BCG-MCNC: 3.8 G/DL (ref 3.5–5.2)
ALP SERPL-CCNC: 117 U/L (ref 40–150)
ALT SERPL W P-5'-P-CCNC: 29 U/L (ref 0–70)
ANION GAP SERPL CALCULATED.3IONS-SCNC: 10 MMOL/L (ref 7–15)
AST SERPL W P-5'-P-CCNC: 33 U/L (ref 0–45)
AUER BODIES BLD QL SMEAR: NORMAL
BASO STIPL BLD QL SMEAR: NORMAL
BASOPHILS # BLD AUTO: 0 10E3/UL (ref 0–0.2)
BASOPHILS NFR BLD AUTO: 1 %
BILIRUB SERPL-MCNC: 0.8 MG/DL
BITE CELLS BLD QL SMEAR: NORMAL
BLISTER CELLS BLD QL SMEAR: NORMAL
BUN SERPL-MCNC: 10 MG/DL (ref 8–23)
BURR CELLS BLD QL SMEAR: NORMAL
CALCIUM SERPL-MCNC: 9.2 MG/DL (ref 8.8–10.2)
CHLORIDE SERPL-SCNC: 105 MMOL/L (ref 98–107)
CREAT SERPL-MCNC: 0.77 MG/DL (ref 0.67–1.17)
DACRYOCYTES BLD QL SMEAR: NORMAL
DEPRECATED HCO3 PLAS-SCNC: 25 MMOL/L (ref 22–29)
EGFRCR SERPLBLD CKD-EPI 2021: >90 ML/MIN/1.73M2
ELLIPTOCYTES BLD QL SMEAR: NORMAL
EOSINOPHIL # BLD AUTO: 0 10E3/UL (ref 0–0.7)
EOSINOPHIL NFR BLD AUTO: 0 %
ERYTHROCYTE [DISTWIDTH] IN BLOOD BY AUTOMATED COUNT: 14.9 % (ref 10–15)
FRAGMENTS BLD QL SMEAR: NORMAL
GIANT PLATELETS BLD QL SMEAR: NORMAL
GLUCOSE SERPL-MCNC: 124 MG/DL (ref 70–99)
HCT VFR BLD AUTO: 36.2 % (ref 40–53)
HGB BLD-MCNC: 12 G/DL (ref 13.3–17.7)
HGB C CRYSTALS: NORMAL
HOWELL-JOLLY BOD BLD QL SMEAR: NORMAL
IMM GRANULOCYTES # BLD: 0 10E3/UL
IMM GRANULOCYTES NFR BLD: 0 %
LYMPHOCYTES # BLD AUTO: 0.7 10E3/UL (ref 0.8–5.3)
LYMPHOCYTES NFR BLD AUTO: 57 %
MCH RBC QN AUTO: 29.3 PG (ref 26.5–33)
MCHC RBC AUTO-ENTMCNC: 33.1 G/DL (ref 31.5–36.5)
MCV RBC AUTO: 88 FL (ref 78–100)
MONOCYTES # BLD AUTO: 0.5 10E3/UL (ref 0–1.3)
MONOCYTES NFR BLD AUTO: 39 %
NEUTROPHILS # BLD AUTO: 0 10E3/UL (ref 1.6–8.3)
NEUTROPHILS NFR BLD AUTO: 3 %
NEUTS HYPERSEG BLD QL SMEAR: NORMAL
NRBC # BLD AUTO: 0 10E3/UL
NRBC BLD AUTO-RTO: 0 /100
PATH REV: NORMAL
PLAT MORPH BLD: NORMAL
PLATELET # BLD AUTO: 92 10E3/UL (ref 150–450)
POLYCHROMASIA BLD QL SMEAR: NORMAL
POTASSIUM SERPL-SCNC: 4.5 MMOL/L (ref 3.4–5.3)
PROT SERPL-MCNC: 6.9 G/DL (ref 6.4–8.3)
RBC # BLD AUTO: 4.1 10E6/UL (ref 4.4–5.9)
RBC AGGLUT BLD QL: NORMAL
RBC MORPH BLD: NORMAL
ROULEAUX BLD QL SMEAR: NORMAL
SICKLE CELLS BLD QL SMEAR: NORMAL
SMUDGE CELLS BLD QL SMEAR: NORMAL
SODIUM SERPL-SCNC: 140 MMOL/L (ref 135–145)
SPHEROCYTES BLD QL SMEAR: NORMAL
STOMATOCYTES BLD QL SMEAR: NORMAL
TARGETS BLD QL SMEAR: NORMAL
TOXIC GRANULES BLD QL SMEAR: NORMAL
VARIANT LYMPHS BLD QL SMEAR: NORMAL
WBC # BLD AUTO: 1.2 10E3/UL (ref 4–11)

## 2024-06-24 PROCEDURE — 36415 COLL VENOUS BLD VENIPUNCTURE: CPT

## 2024-06-24 PROCEDURE — 80053 COMPREHEN METABOLIC PANEL: CPT | Performed by: STUDENT IN AN ORGANIZED HEALTH CARE EDUCATION/TRAINING PROGRAM

## 2024-06-24 PROCEDURE — 85025 COMPLETE CBC W/AUTO DIFF WBC: CPT | Performed by: STUDENT IN AN ORGANIZED HEALTH CARE EDUCATION/TRAINING PROGRAM

## 2024-06-24 NOTE — PROGRESS NOTES
Infusion Nursing Note:  Leland Pearson presents today for labs, possible transfusions.    Patient seen by provider today: No   present during visit today: Not Applicable.    Note: No transfusions needed today per parameters.      Intravenous Access:  Labs drawn without difficulty.  Peripheral IV placed.    Treatment Conditions:  Lab Results   Component Value Date    HGB 12.0 (L) 06/24/2024    WBC 1.2 (L) 06/24/2024    ANEU 0.0 (LL) 06/21/2024    ANEUTAUTO 0.1 (LL) 06/21/2024    PLT 92 (L) 06/24/2024        Lab Results   Component Value Date     06/24/2024    POTASSIUM 4.5 06/24/2024    MAG 2.1 05/08/2024    CR 0.77 06/24/2024    HERBERT 9.2 06/24/2024    BILITOTAL 0.8 06/24/2024    ALBUMIN 3.8 06/24/2024    ALT 29 06/24/2024    AST 33 06/24/2024         Post Infusion Assessment:  Site patent and intact, free from redness, edema or discomfort.  No evidence of extravasations.  Access discontinued per protocol.       Discharge Plan:   AVS to patient via MYCHART.  Patient will return as prev bianca for next appointment.   Patient discharged in stable condition accompanied by: wife.  Departure Mode: Ambulatory.      Doug Cervantes RN

## 2024-06-25 ENCOUNTER — MYC REFILL (OUTPATIENT)
Dept: ONCOLOGY | Facility: CLINIC | Age: 69
End: 2024-06-25
Payer: COMMERCIAL

## 2024-06-25 DIAGNOSIS — D46.9 MDS (MYELODYSPLASTIC SYNDROME) (H): ICD-10-CM

## 2024-06-25 NOTE — TELEPHONE ENCOUNTER
Pending Prescriptions:                       Disp   Refills    prochlorperazine (COMPAZINE) 5 MG tablet  30 tab*1            Sig: Take 1 tablet (5 mg) by mouth every 6 hours as           needed for nausea or vomiting    Last prescribing provider:     Last clinic visit date: 06/19/24 w/    Recommendations for requested medication (if none, N/A): N/A    Any other pertinent information (if none, N/A): N/A    Refilled: Y/N, if NO, why?

## 2024-06-26 RX ORDER — PROCHLORPERAZINE MALEATE 5 MG
5 TABLET ORAL EVERY 6 HOURS PRN
Qty: 30 TABLET | Refills: 1 | Status: SHIPPED | OUTPATIENT
Start: 2024-06-26 | End: 2024-07-29

## 2024-06-26 NOTE — ORAL ONC MGMT
Oral Chemotherapy Monitoring Program  Lab Follow Up    Reviewed lab results from 5/24.    Assessment & Plan:  No concerning abnormalities. Cytopenias are continuing to slowly recover as expected.  Per Dr. Delcid's note from 6/19, duration of next cycle may be adjusted pending count recovery - will watch for updates.  Patient was not contacted given he was seen in infusion.    Follow-Up:  7/1 tentative Vidaza C2D1    Gertrude Joshi, PharmD, BCOP  Oral Chemotherapy Monitoring Program  [facility]  [phone]            5/23/2024     2:00 PM 5/28/2024    12:00 PM 5/30/2024    10:00 AM 6/3/2024     1:00 PM 6/7/2024     9:00 AM 6/10/2024     9:00 AM 6/26/2024     3:00 PM   ORAL CHEMOTHERAPY   Assessment Type Lab Monitoring Initial Follow up Other Lab Monitoring Lab Monitoring Refill Lab Monitoring   Diagnosis Code Myelodysplastic Syndrome Myelodysplastic Syndrome Myelodysplastic Syndrome Myelodysplastic Syndrome Myelodysplastic Syndrome Myelodysplastic Syndrome Myelodysplastic Syndrome   Providers Dr Bhavin Delcid   [facility]   Is this patient followed by the [facility] OC team? No No No No No No No   Drug Name Venclexta (venetoclax) Venclexta (venetoclax) Venclexta (venetoclax) Venclexta (venetoclax) Venclexta (venetoclax) Venclexta (venetoclax) Venclexta (venetoclax)   Dose 400 mg 400 mg 100 mg 100 mg 100 mg 100 mg 100 mg   Current Schedule Daily Daily Daily Daily Daily Daily Daily   Cycle Details 2 weeks on, 2 weeks off 2 weeks on, 2 weeks off 2 weeks on, 2 weeks off 2 weeks on, 2 weeks off 2 weeks on, 2 weeks off 2 weeks on, 2 weeks off 2 weeks on, 2 weeks off   Start Date of Last Cycle  5/20/2024 5/20/2024 5/20/2024   Planned next cycle start date 5/20/2024 6/17/2024 6/17/2024       Doses missed in last 2 weeks  0        Adherence Assessment  Adherent        Adverse Effects  Neutropenia;Thrombocytopenia Nausea        Nausea  Resolved due to intervention        Neutropenia Grade 3         Pharmacist Intervention(neutropenia) No         Thrombocytopenia Grade 2         Pharmacist Intervention(thrombocytopenia) No         Any new drug interactions?  No        Is the dose as ordered appropriate for the patient? Yes Yes            Labs:  Result Component Current Result Ref Range   Sodium 140 (6/24/2024) 135 - 145 mmol/L     Result Component Current Result Ref Range   Potassium 4.5 (6/24/2024) 3.4 - 5.3 mmol/L     Result Component Current Result Ref Range   Calcium 9.2 (6/24/2024) 8.8 - 10.2 mg/dL     No results found for Mag within last 30 days.     Result Component Current Result Ref Range   Phosphorus 2.1 (L) (6/21/2024) 2.5 - 4.5 mg/dL     Result Component Current Result Ref Range   Albumin 3.8 (6/24/2024) 3.5 - 5.2 g/dL     Result Component Current Result Ref Range   Urea Nitrogen 10.0 (6/24/2024) 8.0 - 23.0 mg/dL     Result Component Current Result Ref Range   Creatinine 0.77 (6/24/2024) 0.67 - 1.17 mg/dL     Result Component Current Result Ref Range   AST 33 (6/24/2024) 0 - 45 U/L     Result Component Current Result Ref Range   ALT 29 (6/24/2024) 0 - 70 U/L     Result Component Current Result Ref Range   Bilirubin Total 0.8 (6/24/2024) <=1.2 mg/dL     Result Component Current Result Ref Range   WBC Count 1.2 (L) (6/24/2024) 4.0 - 11.0 10e3/uL     Result Component Current Result Ref Range   Hemoglobin 12.0 (L) (6/24/2024) 13.3 - 17.7 g/dL     Result Component Current Result Ref Range   Platelet Count 92 (L) (6/24/2024) 150 - 450 10e3/uL     Result Component Current Result Ref Range   Absolute Neutrophils 0.0 (LL) (6/21/2024) 1.6 - 8.3 10e3/uL     Result Component Current Result Ref Range   Absolute Neutrophils 0.0 (LL) (6/24/2024) 1.6 - 8.3 10e3/uL

## 2024-06-27 ENCOUNTER — INFUSION THERAPY VISIT (OUTPATIENT)
Dept: INFUSION THERAPY | Facility: CLINIC | Age: 69
End: 2024-06-27
Attending: STUDENT IN AN ORGANIZED HEALTH CARE EDUCATION/TRAINING PROGRAM
Payer: COMMERCIAL

## 2024-06-27 VITALS
TEMPERATURE: 98.3 F | HEART RATE: 66 BPM | SYSTOLIC BLOOD PRESSURE: 137 MMHG | RESPIRATION RATE: 20 BRPM | DIASTOLIC BLOOD PRESSURE: 80 MMHG | OXYGEN SATURATION: 99 %

## 2024-06-27 DIAGNOSIS — D46.9 MDS (MYELODYSPLASTIC SYNDROME) (H): Primary | ICD-10-CM

## 2024-06-27 LAB
BASOPHILS # BLD AUTO: 0 10E3/UL (ref 0–0.2)
BASOPHILS NFR BLD AUTO: 1 %
EOSINOPHIL # BLD AUTO: 0 10E3/UL (ref 0–0.7)
EOSINOPHIL NFR BLD AUTO: 0 %
ERYTHROCYTE [DISTWIDTH] IN BLOOD BY AUTOMATED COUNT: 14.9 % (ref 10–15)
HCT VFR BLD AUTO: 37.3 % (ref 40–53)
HGB BLD-MCNC: 12.1 G/DL (ref 13.3–17.7)
IMM GRANULOCYTES # BLD: 0 10E3/UL
IMM GRANULOCYTES NFR BLD: 0 %
LYMPHOCYTES # BLD AUTO: 0.9 10E3/UL (ref 0.8–5.3)
LYMPHOCYTES NFR BLD AUTO: 40 %
MCH RBC QN AUTO: 28.8 PG (ref 26.5–33)
MCHC RBC AUTO-ENTMCNC: 32.4 G/DL (ref 31.5–36.5)
MCV RBC AUTO: 89 FL (ref 78–100)
MONOCYTES # BLD AUTO: 1 10E3/UL (ref 0–1.3)
MONOCYTES NFR BLD AUTO: 45 %
NEUTROPHILS # BLD AUTO: 0.3 10E3/UL (ref 1.6–8.3)
NEUTROPHILS NFR BLD AUTO: 15 %
NRBC # BLD AUTO: 0 10E3/UL
NRBC BLD AUTO-RTO: 0 /100
PLAT MORPH BLD: NORMAL
PLATELET # BLD AUTO: 177 10E3/UL (ref 150–450)
RBC # BLD AUTO: 4.2 10E6/UL (ref 4.4–5.9)
RBC MORPH BLD: NORMAL
WBC # BLD AUTO: 2.2 10E3/UL (ref 4–11)

## 2024-06-27 PROCEDURE — 85025 COMPLETE CBC W/AUTO DIFF WBC: CPT | Performed by: STUDENT IN AN ORGANIZED HEALTH CARE EDUCATION/TRAINING PROGRAM

## 2024-06-27 PROCEDURE — 36415 COLL VENOUS BLD VENIPUNCTURE: CPT | Performed by: STUDENT IN AN ORGANIZED HEALTH CARE EDUCATION/TRAINING PROGRAM

## 2024-06-27 RX ORDER — EPINEPHRINE 1 MG/ML
0.3 INJECTION, SOLUTION INTRAMUSCULAR; SUBCUTANEOUS EVERY 5 MIN PRN
Status: CANCELLED | OUTPATIENT
Start: 2024-06-27

## 2024-06-27 RX ORDER — HEPARIN SODIUM (PORCINE) LOCK FLUSH IV SOLN 100 UNIT/ML 100 UNIT/ML
5 SOLUTION INTRAVENOUS
Status: CANCELLED | OUTPATIENT
Start: 2024-06-27

## 2024-06-27 RX ORDER — HEPARIN SODIUM,PORCINE 10 UNIT/ML
5-20 VIAL (ML) INTRAVENOUS DAILY PRN
Status: CANCELLED | OUTPATIENT
Start: 2024-06-27

## 2024-06-27 RX ORDER — DIPHENHYDRAMINE HYDROCHLORIDE 50 MG/ML
50 INJECTION INTRAMUSCULAR; INTRAVENOUS
Status: CANCELLED
Start: 2024-06-27

## 2024-06-27 ASSESSMENT — PAIN SCALES - GENERAL: PAINLEVEL: MODERATE PAIN (4)

## 2024-06-27 NOTE — PROGRESS NOTES
Infusion Nursing Note:  Leland Pearson presents today for labs and possible transfusion.    Patient seen by provider today: No   present during visit today: Not Applicable.    Note: Patient reports that he has active gout in his left big toe. Patient also states that he is taking his Allopurinol as prescribed. Dr. Delcid notified of patient's gout. No further orders received.       Intravenous Access:  Lab draw site RAC, Needle type Butterfly, Gauge 23.    Treatment Conditions:  Lab Results   Component Value Date    HGB 12.1 (L) 06/27/2024    WBC 2.2 (L) 06/27/2024    ANEU 0.0 (LL) 06/21/2024    ANEUTAUTO 0.3 (LL) 06/27/2024     06/27/2024            Post Infusion Assessment:  Patient did not require transfusion today.       Discharge Plan:   Prescription refills given for posaconazole.  Discharge instructions reviewed with: Patient and spouse.  Patient and family verbalized understanding of discharge instructions and all questions answered.  AVS to patient via [employer].  Patient will return 7/1/24 for next appointment.   Patient discharged in stable condition accompanied by: self and wife.  Departure Mode: Ambulatory.      Lorrie Aguilar RN

## 2024-07-01 ENCOUNTER — TELEPHONE (OUTPATIENT)
Dept: ONCOLOGY | Facility: CLINIC | Age: 69
End: 2024-07-01

## 2024-07-01 ENCOUNTER — INFUSION THERAPY VISIT (OUTPATIENT)
Dept: INFUSION THERAPY | Facility: CLINIC | Age: 69
End: 2024-07-01
Attending: STUDENT IN AN ORGANIZED HEALTH CARE EDUCATION/TRAINING PROGRAM
Payer: COMMERCIAL

## 2024-07-01 VITALS
OXYGEN SATURATION: 100 % | BODY MASS INDEX: 26.8 KG/M2 | HEIGHT: 73 IN | WEIGHT: 202.2 LBS | DIASTOLIC BLOOD PRESSURE: 75 MMHG | RESPIRATION RATE: 16 BRPM | SYSTOLIC BLOOD PRESSURE: 132 MMHG | HEART RATE: 6 BPM | TEMPERATURE: 98.1 F

## 2024-07-01 DIAGNOSIS — D46.9 MDS (MYELODYSPLASTIC SYNDROME) (H): Primary | ICD-10-CM

## 2024-07-01 DIAGNOSIS — Z79.899 ENCOUNTER FOR LONG-TERM (CURRENT) USE OF MEDICATIONS: ICD-10-CM

## 2024-07-01 LAB
ALBUMIN SERPL BCG-MCNC: 3.8 G/DL (ref 3.5–5.2)
ALP SERPL-CCNC: 137 U/L (ref 40–150)
ALT SERPL W P-5'-P-CCNC: 34 U/L (ref 0–70)
ANION GAP SERPL CALCULATED.3IONS-SCNC: 5 MMOL/L (ref 7–15)
AST SERPL W P-5'-P-CCNC: 54 U/L (ref 0–45)
BASOPHILS # BLD AUTO: 0 10E3/UL (ref 0–0.2)
BASOPHILS NFR BLD AUTO: 1 %
BILIRUB SERPL-MCNC: 0.4 MG/DL
BUN SERPL-MCNC: 9.2 MG/DL (ref 8–23)
CALCIUM SERPL-MCNC: 9.4 MG/DL (ref 8.8–10.2)
CHLORIDE SERPL-SCNC: 104 MMOL/L (ref 98–107)
CREAT SERPL-MCNC: 0.84 MG/DL (ref 0.67–1.17)
DEPRECATED HCO3 PLAS-SCNC: 30 MMOL/L (ref 22–29)
EGFRCR SERPLBLD CKD-EPI 2021: >90 ML/MIN/1.73M2
EOSINOPHIL # BLD AUTO: 0 10E3/UL (ref 0–0.7)
EOSINOPHIL NFR BLD AUTO: 0 %
ERYTHROCYTE [DISTWIDTH] IN BLOOD BY AUTOMATED COUNT: 14.7 % (ref 10–15)
GLUCOSE SERPL-MCNC: 107 MG/DL (ref 70–99)
HCT VFR BLD AUTO: 40.8 % (ref 40–53)
HGB BLD-MCNC: 13.2 G/DL (ref 13.3–17.7)
IMM GRANULOCYTES # BLD: 0 10E3/UL
IMM GRANULOCYTES NFR BLD: 1 %
LYMPHOCYTES # BLD AUTO: 0.9 10E3/UL (ref 0.8–5.3)
LYMPHOCYTES NFR BLD AUTO: 23 %
MCH RBC QN AUTO: 28.8 PG (ref 26.5–33)
MCHC RBC AUTO-ENTMCNC: 32.4 G/DL (ref 31.5–36.5)
MCV RBC AUTO: 89 FL (ref 78–100)
MONOCYTES # BLD AUTO: 1.1 10E3/UL (ref 0–1.3)
MONOCYTES NFR BLD AUTO: 29 %
NEUTROPHILS # BLD AUTO: 1.8 10E3/UL (ref 1.6–8.3)
NEUTROPHILS NFR BLD AUTO: 47 %
NRBC # BLD AUTO: 0 10E3/UL
NRBC BLD AUTO-RTO: 0 /100
PHOSPHATE SERPL-MCNC: 2.8 MG/DL (ref 2.5–4.5)
PLATELET # BLD AUTO: 255 10E3/UL (ref 150–450)
POTASSIUM SERPL-SCNC: 5.3 MMOL/L (ref 3.4–5.3)
PROT SERPL-MCNC: 7.1 G/DL (ref 6.4–8.3)
RBC # BLD AUTO: 4.59 10E6/UL (ref 4.4–5.9)
SODIUM SERPL-SCNC: 139 MMOL/L (ref 135–145)
URATE SERPL-MCNC: 4.1 MG/DL (ref 3.4–7)
WBC # BLD AUTO: 3.8 10E3/UL (ref 4–11)

## 2024-07-01 PROCEDURE — 80053 COMPREHEN METABOLIC PANEL: CPT | Performed by: STUDENT IN AN ORGANIZED HEALTH CARE EDUCATION/TRAINING PROGRAM

## 2024-07-01 PROCEDURE — 84100 ASSAY OF PHOSPHORUS: CPT | Performed by: STUDENT IN AN ORGANIZED HEALTH CARE EDUCATION/TRAINING PROGRAM

## 2024-07-01 PROCEDURE — 96401 CHEMO ANTI-NEOPL SQ/IM: CPT

## 2024-07-01 PROCEDURE — 84550 ASSAY OF BLOOD/URIC ACID: CPT | Performed by: STUDENT IN AN ORGANIZED HEALTH CARE EDUCATION/TRAINING PROGRAM

## 2024-07-01 PROCEDURE — 250N000011 HC RX IP 250 OP 636: Performed by: STUDENT IN AN ORGANIZED HEALTH CARE EDUCATION/TRAINING PROGRAM

## 2024-07-01 PROCEDURE — 36415 COLL VENOUS BLD VENIPUNCTURE: CPT

## 2024-07-01 PROCEDURE — 85025 COMPLETE CBC W/AUTO DIFF WBC: CPT | Performed by: STUDENT IN AN ORGANIZED HEALTH CARE EDUCATION/TRAINING PROGRAM

## 2024-07-01 RX ADMIN — AZACITIDINE 160 MG: 100 INJECTION, POWDER, LYOPHILIZED, FOR SOLUTION INTRAVENOUS; SUBCUTANEOUS at 13:34

## 2024-07-01 ASSESSMENT — PAIN SCALES - GENERAL: PAINLEVEL: NO PAIN (0)

## 2024-07-01 NOTE — TELEPHONE ENCOUNTER
Oral Chemotherapy Monitoring Program    Subjective/Objective:  Leland Pearson is a 69 year old male contacted by phone for a follow-up visit for oral chemotherapy.  Leland confirms continuing his current venetoclax (Venclexta) regimen of 100 mg daily for 2 weeks and then off for 2 weeks. He plans on starting his new cycle today. Leland experienced nausea and vomiting during the first 2 days of his last cycle and used took Compazine. He took 1 tablet of Compazine today before starting his new cycle in case he experiences nausea and vomiting again. Leland denies starting any new OTC medications and any missed doses. He denies diarrhea, constipation, fatigue. He has also not noticed any unusual bruising or bleeding.        5/28/2024    12:00 PM 5/30/2024    10:00 AM 6/3/2024     1:00 PM 6/7/2024     9:00 AM 6/10/2024     9:00 AM 6/26/2024     3:00 PM 7/1/2024     3:00 PM   ORAL CHEMOTHERAPY   Assessment Type Initial Follow up Other Lab Monitoring Lab Monitoring Refill Lab Monitoring Lab Monitoring;Monthly Follow up   Diagnosis Code Myelodysplastic Syndrome Myelodysplastic Syndrome Myelodysplastic Syndrome Myelodysplastic Syndrome Myelodysplastic Syndrome Myelodysplastic Syndrome Myelodysplastic Syndrome   Providers Dr Bhavin Delcid   [facility]   Is this patient followed by the [facility] OC team? No No No No No No No   Drug Name Venclexta (venetoclax) Venclexta (venetoclax) Venclexta (venetoclax) Venclexta (venetoclax) Venclexta (venetoclax) Venclexta (venetoclax) Venclexta (venetoclax)   Dose 400 mg 100 mg 100 mg 100 mg 100 mg 100 mg 100 mg   Current Schedule Daily Daily Daily Daily Daily Daily Daily   Cycle Details 2 weeks on, 2 weeks off 2 weeks on, 2 weeks off 2 weeks on, 2 weeks off 2 weeks on, 2 weeks off 2 weeks on, 2 weeks off 2 weeks on, 2 weeks off 2 weeks on, 2 weeks off   Start Date of Last Cycle  "5/20/2024 5/20/2024 5/20/2024    Planned next cycle start date 6/17/2024 6/17/2024 7/1/2024   Doses missed in last 2 weeks 0      0   Adherence Assessment Adherent      Adherent   Adverse Effects Nausea         Nausea Resolved due to intervention         Any new drug interactions? No         Is the dose as ordered appropriate for the patient? Yes             Last PHQ-2 Score on record:        No data to display                Vitals:  BP:   BP Readings from Last 1 Encounters:   07/01/24 132/75     Wt Readings from Last 1 Encounters:   07/01/24 91.7 kg (202 lb 3.2 oz)     Estimated body surface area is 2.17 meters squared as calculated from the following:    Height as of an earlier encounter on 7/1/24: 1.843 m (6' 0.56\").    Weight as of an earlier encounter on 7/1/24: 91.7 kg (202 lb 3.2 oz).    Labs:  _  Result Component Current Result Ref Range   Sodium 139 (7/1/2024) 135 - 145 mmol/L     _  Result Component Current Result Ref Range   Potassium 5.3 (7/1/2024) 3.4 - 5.3 mmol/L     _  Result Component Current Result Ref Range   Calcium 9.4 (7/1/2024) 8.8 - 10.2 mg/dL     No results found for Mag within last 30 days.     _  Result Component Current Result Ref Range   Phosphorus 2.8 (7/1/2024) 2.5 - 4.5 mg/dL     _  Result Component Current Result Ref Range   Albumin 3.8 (7/1/2024) 3.5 - 5.2 g/dL     _  Result Component Current Result Ref Range   Urea Nitrogen 9.2 (7/1/2024) 8.0 - 23.0 mg/dL     _  Result Component Current Result Ref Range   Creatinine 0.84 (7/1/2024) 0.67 - 1.17 mg/dL     _  Result Component Current Result Ref Range   AST 54 (H) (7/1/2024) 0 - 45 U/L     _  Result Component Current Result Ref Range   ALT 34 (7/1/2024) 0 - 70 U/L     _  Result Component Current Result Ref Range   Bilirubin Total 0.4 (7/1/2024) <=1.2 mg/dL     _  Result Component Current Result Ref Range   WBC Count 3.8 (L) (7/1/2024) 4.0 - 11.0 10e3/uL     _  Result Component Current Result Ref Range   Hemoglobin 13.2 (L) " (7/1/2024) 13.3 - 17.7 g/dL     _  Result Component Current Result Ref Range   Platelet Count 255 (7/1/2024) 150 - 450 10e3/uL     _  Result Component Current Result Ref Range   Absolute Neutrophils 0.0 (LL) (6/21/2024) 1.6 - 8.3 10e3/uL     _  Result Component Current Result Ref Range   Absolute Neutrophils 1.8 (7/1/2024) 1.6 - 8.3 10e3/uL          Assessment/Plan:  Patient is doing well on venetoclax. No concerning abnormalities with labs as well. Continue venetoclax as prescribed.    Follow-Up:  7/8 labs    Moraima Canas  Pharmacy Intern  Oral Chemotherapy Monitoring Program  [facility]  [phone].

## 2024-07-01 NOTE — PROGRESS NOTES
Infusion Nursing Note:  Leland Pearson presents today for labs, Vidaza.    Patient seen by provider today: No   present during visit today: Not Applicable.    Note: pt reports no new concerns.      Intravenous Access:  Lab draw site Left AC, Needle type bf, Gauge 23.    Treatment Conditions:  Lab Results   Component Value Date    HGB 13.2 (L) 07/01/2024    WBC 3.8 (L) 07/01/2024    ANEU 0.0 (LL) 06/21/2024    ANEUTAUTO 1.8 07/01/2024     07/01/2024        Lab Results   Component Value Date     07/01/2024    POTASSIUM 5.3 07/01/2024    MAG 2.1 05/08/2024    CR 0.84 07/01/2024    HERBERT 9.4 07/01/2024    BILITOTAL 0.4 07/01/2024    ALBUMIN 3.8 07/01/2024    ALT 34 07/01/2024    AST 54 (H) 07/01/2024       Results reviewed, labs MET treatment parameters, ok to proceed with treatment.      Post Infusion Assessment:  Patient tolerated injections without incident.       Discharge Plan:   Discharge instructions reviewed with: Patient and Family.  Patient and/or family verbalized understanding of discharge instructions and all questions answered.  Patient discharged in stable condition accompanied by: wife.  Departure Mode: Ambulatory.      Lucille Bertrand RN

## 2024-07-02 ENCOUNTER — INFUSION THERAPY VISIT (OUTPATIENT)
Dept: INFUSION THERAPY | Facility: CLINIC | Age: 69
End: 2024-07-02
Attending: INTERNAL MEDICINE
Payer: COMMERCIAL

## 2024-07-02 VITALS
DIASTOLIC BLOOD PRESSURE: 80 MMHG | OXYGEN SATURATION: 99 % | HEART RATE: 64 BPM | SYSTOLIC BLOOD PRESSURE: 132 MMHG | RESPIRATION RATE: 16 BRPM | TEMPERATURE: 97.8 F

## 2024-07-02 DIAGNOSIS — D46.9 MDS (MYELODYSPLASTIC SYNDROME) (H): Primary | ICD-10-CM

## 2024-07-02 PROCEDURE — 250N000011 HC RX IP 250 OP 636: Performed by: STUDENT IN AN ORGANIZED HEALTH CARE EDUCATION/TRAINING PROGRAM

## 2024-07-02 PROCEDURE — 96401 CHEMO ANTI-NEOPL SQ/IM: CPT

## 2024-07-02 RX ADMIN — AZACITIDINE 160 MG: 100 INJECTION, POWDER, LYOPHILIZED, FOR SOLUTION INTRAVENOUS; SUBCUTANEOUS at 10:07

## 2024-07-02 NOTE — PROGRESS NOTES
Infusion Nursing Note:  Leland THOMPSON Michel presents today for C2D2: Vidaza.    Patient seen by provider today: No   present during visit today: Not Applicable.    Note: Patient reports to having a difficult time last night with increased nausea with one episode of nausea.  Patient has been taking Compazine every 6 hours but still has nausea.  Patient is wanting to try taking the Compazine again tonight but if he gets nausea and or has vomiting again, he is open to trying a new antiemetic tomorrow. Inbasket sent to Dr. Delcid to see if a new antiemetic should be tried.    Also reporting in pain in his right wrist.  Patient thinks it might be r/t sleeping on his hand awkardly or possible gout.  Is currently on Allopurinol. Patient denies need for pain medication at this time.        Intravenous Access:  No Intravenous access/labs at this visit.    Treatment Conditions:  Lab Results   Component Value Date    HGB 13.2 (L) 07/01/2024    WBC 3.8 (L) 07/01/2024    ANEU 0.0 (LL) 06/21/2024    ANEUTAUTO 1.8 07/01/2024     07/01/2024        Lab Results   Component Value Date     07/01/2024    POTASSIUM 5.3 07/01/2024    MAG 2.1 05/08/2024    CR 0.84 07/01/2024    HERBERT 9.4 07/01/2024    BILITOTAL 0.4 07/01/2024    ALBUMIN 3.8 07/01/2024    ALT 34 07/01/2024    AST 54 (H) 07/01/2024       Results reviewed, labs MET treatment parameters, ok to proceed with treatment.      Post Infusion Assessment:  Patient tolerated 2 vidaza injections to bilateral sides of the abdomin without incident.       Discharge Plan:   Patient declined prescription refills.  Discharge instructions reviewed with: Patient and Family.  Patient and/or family verbalized understanding of discharge instructions and all questions answered.  AVS to patient via OpenDNS.  Patient will return 7/3/24 for next appointment.   Patient discharged in stable condition accompanied by: wife.  Departure Mode: Ambulatory.      Aria Zhou RN

## 2024-07-03 ENCOUNTER — INFUSION THERAPY VISIT (OUTPATIENT)
Dept: INFUSION THERAPY | Facility: CLINIC | Age: 69
End: 2024-07-03
Attending: STUDENT IN AN ORGANIZED HEALTH CARE EDUCATION/TRAINING PROGRAM
Payer: COMMERCIAL

## 2024-07-03 ENCOUNTER — PATIENT OUTREACH (OUTPATIENT)
Dept: ONCOLOGY | Facility: CLINIC | Age: 69
End: 2024-07-03
Payer: COMMERCIAL

## 2024-07-03 VITALS
TEMPERATURE: 98.2 F | SYSTOLIC BLOOD PRESSURE: 138 MMHG | HEART RATE: 77 BPM | OXYGEN SATURATION: 97 % | RESPIRATION RATE: 16 BRPM | DIASTOLIC BLOOD PRESSURE: 73 MMHG

## 2024-07-03 DIAGNOSIS — D46.9 MDS (MYELODYSPLASTIC SYNDROME) (H): Primary | ICD-10-CM

## 2024-07-03 LAB
ALBUMIN SERPL BCG-MCNC: 3.7 G/DL (ref 3.5–5.2)
ALP SERPL-CCNC: 118 U/L (ref 40–150)
ALT SERPL W P-5'-P-CCNC: 29 U/L (ref 0–70)
ANION GAP SERPL CALCULATED.3IONS-SCNC: 8 MMOL/L (ref 7–15)
AST SERPL W P-5'-P-CCNC: 43 U/L (ref 0–45)
BASOPHILS # BLD AUTO: 0 10E3/UL (ref 0–0.2)
BASOPHILS NFR BLD AUTO: 1 %
BILIRUB SERPL-MCNC: 0.5 MG/DL
BUN SERPL-MCNC: 16.6 MG/DL (ref 8–23)
CALCIUM SERPL-MCNC: 9.3 MG/DL (ref 8.8–10.2)
CHLORIDE SERPL-SCNC: 104 MMOL/L (ref 98–107)
CREAT SERPL-MCNC: 0.88 MG/DL (ref 0.67–1.17)
DEPRECATED HCO3 PLAS-SCNC: 26 MMOL/L (ref 22–29)
EGFRCR SERPLBLD CKD-EPI 2021: >90 ML/MIN/1.73M2
EOSINOPHIL # BLD AUTO: 0 10E3/UL (ref 0–0.7)
EOSINOPHIL NFR BLD AUTO: 0 %
ERYTHROCYTE [DISTWIDTH] IN BLOOD BY AUTOMATED COUNT: 14.6 % (ref 10–15)
GLUCOSE SERPL-MCNC: 107 MG/DL (ref 70–99)
HCT VFR BLD AUTO: 39.1 % (ref 40–53)
HGB BLD-MCNC: 12.8 G/DL (ref 13.3–17.7)
IMM GRANULOCYTES # BLD: 0 10E3/UL
IMM GRANULOCYTES NFR BLD: 0 %
LYMPHOCYTES # BLD AUTO: 0.6 10E3/UL (ref 0.8–5.3)
LYMPHOCYTES NFR BLD AUTO: 20 %
MCH RBC QN AUTO: 28.5 PG (ref 26.5–33)
MCHC RBC AUTO-ENTMCNC: 32.7 G/DL (ref 31.5–36.5)
MCV RBC AUTO: 87 FL (ref 78–100)
MONOCYTES # BLD AUTO: 0.8 10E3/UL (ref 0–1.3)
MONOCYTES NFR BLD AUTO: 25 %
NEUTROPHILS # BLD AUTO: 1.7 10E3/UL (ref 1.6–8.3)
NEUTROPHILS NFR BLD AUTO: 53 %
NRBC # BLD AUTO: 0 10E3/UL
NRBC BLD AUTO-RTO: 0 /100
PLATELET # BLD AUTO: 230 10E3/UL (ref 150–450)
POTASSIUM SERPL-SCNC: 4.4 MMOL/L (ref 3.4–5.3)
PROT SERPL-MCNC: 7 G/DL (ref 6.4–8.3)
RBC # BLD AUTO: 4.49 10E6/UL (ref 4.4–5.9)
SODIUM SERPL-SCNC: 138 MMOL/L (ref 135–145)
WBC # BLD AUTO: 3.1 10E3/UL (ref 4–11)

## 2024-07-03 PROCEDURE — 250N000011 HC RX IP 250 OP 636: Performed by: STUDENT IN AN ORGANIZED HEALTH CARE EDUCATION/TRAINING PROGRAM

## 2024-07-03 PROCEDURE — 36415 COLL VENOUS BLD VENIPUNCTURE: CPT

## 2024-07-03 PROCEDURE — 80053 COMPREHEN METABOLIC PANEL: CPT | Performed by: STUDENT IN AN ORGANIZED HEALTH CARE EDUCATION/TRAINING PROGRAM

## 2024-07-03 PROCEDURE — 85025 COMPLETE CBC W/AUTO DIFF WBC: CPT | Performed by: STUDENT IN AN ORGANIZED HEALTH CARE EDUCATION/TRAINING PROGRAM

## 2024-07-03 PROCEDURE — 96401 CHEMO ANTI-NEOPL SQ/IM: CPT

## 2024-07-03 RX ADMIN — AZACITIDINE 160 MG: 100 INJECTION, POWDER, LYOPHILIZED, FOR SOLUTION INTRAVENOUS; SUBCUTANEOUS at 13:13

## 2024-07-03 NOTE — PROGRESS NOTES
[facility]: Cancer Care Follow-Up Note                                    Discussion with Patient:                                                      RNCC received message from Mary CARROLL at the [facility] yesterday stating that patient had a lot of nausea on Day 1 and 2 of his Cycle. She instructed him to take his compazine as ordered every 6 hours vs just when needed.   RNCC calling patient to check in and follow up.         Goals          General     Functional (pt-stated)      Notes - Note created  5/17/2024  2:33 PM by Andria Shay RN     Goal Statement: I will use my clinic and care team resources as directed.  Date Goal set: 5/17/2024  Barriers: disease burden  Strengths: support, health awareness, and involvement with care team  Date to Achieve By: ongoing  Patient expressed understanding of goal: Yes  Action steps to achieve this goal:  I will contact triage with new, worsening or uncontrolled symptoms.   I will contact triage with temperature over 100.4  I will call with difficulties of scheduling and/or transportation.   I will request needed refills when there are 7 days of medication remaining.   I will not send urgent or symptomatic messages through LoginRadius.   I will contact scheduling to arrange or make changes in my appointments.                 Dates of Treatment:                                                      Infusion given in last 28 days       Administered MAR Action Medication Dose Rate Visit    07/01/2024 13:34 Given azaCITIDine (VIDAZA) subcutaneous injection 160 mg 160 mg   Infusion Therapy Visit on 07/01/2024 in [facility]    07/02/2024 10:07 Given azaCITIDine (VIDAZA) subcutaneous injection 160 mg 160 mg   Infusion Therapy Visit on 07/02/2024 in [facility]            Assessment:                                                      Patient stating he has turned around. Is not having any nausea or  vomiting. Has been able to tolerate a regular diet and keep everything down.       Intervention/Education provided during outreach:                                                       RNCC Reviewed when to call triage and triage phone number. RNCC stated that if anything changes he needs to call back. Reviewed  not using RNCC voicemail or my chart for any urgent items. Patient stated an understanding of this information and did not have any other questions.        Patient to follow up as scheduled at next appt  Patient to [email or web address] message with updates  Confirmed patient has clinic and triage numbers    Signature:  Andria Shay RN

## 2024-07-03 NOTE — PROGRESS NOTES
Infusion Nursing Note:  Leland DONNA Pearson presents today for Vidaza and labs.    Patient seen by provider today: No   present during visit today: Not Applicable.    Note: Pt reports nausea he was having yesterday has improved. He has been able to eat with out any issues. Also reports right wrist pain has improved a bit.      Intravenous Access:  Lab draw site L AC, Needle type butterfly, Gauge 23.    Treatment Conditions:  Not Applicable.      Post Infusion Assessment:  Patient tolerated injection without incident.  Site patent and intact, free from redness, edema or discomfort.  No evidence of extravasations.  Access discontinued per protocol.       Discharge Plan:   Discharge instructions reviewed with: Patient and Family.  Patient and/or family verbalized understanding of discharge instructions and all questions answered.  AVS to patient via Ticketfly.  Patient will return 7/4/24 to [facility] for next appointment.   Patient discharged in stable condition accompanied by: wife.  Departure Mode: Ambulatory.      Lupis Jeter RN

## 2024-07-04 ENCOUNTER — INFUSION THERAPY VISIT (OUTPATIENT)
Dept: ONCOLOGY | Facility: CLINIC | Age: 69
End: 2024-07-04
Attending: STUDENT IN AN ORGANIZED HEALTH CARE EDUCATION/TRAINING PROGRAM
Payer: COMMERCIAL

## 2024-07-04 VITALS
RESPIRATION RATE: 16 BRPM | SYSTOLIC BLOOD PRESSURE: 136 MMHG | TEMPERATURE: 98 F | DIASTOLIC BLOOD PRESSURE: 83 MMHG | HEART RATE: 68 BPM | OXYGEN SATURATION: 98 %

## 2024-07-04 DIAGNOSIS — D46.9 MDS (MYELODYSPLASTIC SYNDROME) (H): Primary | ICD-10-CM

## 2024-07-04 PROCEDURE — 96401 CHEMO ANTI-NEOPL SQ/IM: CPT

## 2024-07-04 PROCEDURE — 250N000011 HC RX IP 250 OP 636: Mod: JW | Performed by: STUDENT IN AN ORGANIZED HEALTH CARE EDUCATION/TRAINING PROGRAM

## 2024-07-04 RX ADMIN — AZACITIDINE 160 MG: 100 INJECTION, POWDER, LYOPHILIZED, FOR SOLUTION INTRAVENOUS; SUBCUTANEOUS at 11:32

## 2024-07-04 ASSESSMENT — PAIN SCALES - GENERAL: PAINLEVEL: NO PAIN (0)

## 2024-07-04 NOTE — PROGRESS NOTES
Infusion Nursing Note:  Leland Pearson presents today for Cycle 2 Day 4 Azacitidine.    Patient seen by provider today: No   present during visit today: Not Applicable.    Note: Patient denies any signs or symptoms of infection. Patient offers no changes or concerns overnight. Patient agreeable to treatment plan today.    Patient confirms he took PO Zofran at home prior to infusion.    Intravenous Access:  No Intravenous access/labs at this visit.    Treatment Conditions:  Lab Results   Component Value Date    HGB 12.8 (L) 07/03/2024    WBC 3.1 (L) 07/03/2024    ANEU 0.0 (LL) 06/21/2024    ANEUTAUTO 1.7 07/03/2024     07/03/2024        Lab Results   Component Value Date     07/03/2024    POTASSIUM 4.4 07/03/2024    MAG 2.1 05/08/2024    CR 0.88 07/03/2024    HERBERT 9.3 07/03/2024    BILITOTAL 0.5 07/03/2024    ALBUMIN 3.7 07/03/2024    ALT 29 07/03/2024    AST 43 07/03/2024     Results reviewed, labs MET treatment parameters, ok to proceed with treatment.    Post Infusion Assessment:  Patient tolerated 2 Azacitidine injections without incident into his bilateral abdominal tissue.     Discharge Plan:   Patient declined prescription refills.  Discharge instructions reviewed with: Patient.  Patient and/or family verbalized understanding of discharge instructions and all questions answered.  AVS to patient via writewithT.  Patient will return 07/05/24 for next appointment.   Patient discharged in stable condition accompanied by: self.  Departure Mode: Ambulatory.      Aria Lubin RN

## 2024-07-04 NOTE — PATIENT INSTRUCTIONS
[facility] Triage and after hours / weekends / holidays:  [phone]    Please call the triage or after hours line if you experience a temperature greater than or equal to 100.4, shaking chills, have uncontrolled nausea, vomiting and/or diarrhea, dizziness, shortness of breath, chest pain, bleeding, unexplained bruising, or if you have any other new/concerning symptoms, questions or concerns.      If you are having any concerning symptoms or wish to speak to a provider before your next infusion visit, please call triage to notify them so we can adequately serve you.     If you need a refill on a narcotic prescription or other medication, please call before your infusion appointment.

## 2024-07-05 ENCOUNTER — INFUSION THERAPY VISIT (OUTPATIENT)
Dept: INFUSION THERAPY | Facility: CLINIC | Age: 69
End: 2024-07-05
Attending: STUDENT IN AN ORGANIZED HEALTH CARE EDUCATION/TRAINING PROGRAM
Payer: COMMERCIAL

## 2024-07-05 VITALS
OXYGEN SATURATION: 98 % | SYSTOLIC BLOOD PRESSURE: 130 MMHG | DIASTOLIC BLOOD PRESSURE: 76 MMHG | BODY MASS INDEX: 26.64 KG/M2 | RESPIRATION RATE: 16 BRPM | HEIGHT: 73 IN | TEMPERATURE: 97.5 F | HEART RATE: 61 BPM | WEIGHT: 201 LBS

## 2024-07-05 DIAGNOSIS — D46.9 MDS (MYELODYSPLASTIC SYNDROME) (H): Primary | ICD-10-CM

## 2024-07-05 LAB
ALBUMIN SERPL BCG-MCNC: 3.7 G/DL (ref 3.5–5.2)
ALP SERPL-CCNC: 130 U/L (ref 40–150)
ALT SERPL W P-5'-P-CCNC: 28 U/L (ref 0–70)
ANION GAP SERPL CALCULATED.3IONS-SCNC: 8 MMOL/L (ref 7–15)
AST SERPL W P-5'-P-CCNC: 34 U/L (ref 0–45)
BASOPHILS # BLD AUTO: 0 10E3/UL (ref 0–0.2)
BASOPHILS NFR BLD AUTO: 1 %
BILIRUB SERPL-MCNC: 0.6 MG/DL
BUN SERPL-MCNC: 14.3 MG/DL (ref 8–23)
CALCIUM SERPL-MCNC: 9.3 MG/DL (ref 8.8–10.2)
CHLORIDE SERPL-SCNC: 105 MMOL/L (ref 98–107)
CREAT SERPL-MCNC: 0.91 MG/DL (ref 0.67–1.17)
DEPRECATED HCO3 PLAS-SCNC: 25 MMOL/L (ref 22–29)
EGFRCR SERPLBLD CKD-EPI 2021: >90 ML/MIN/1.73M2
EOSINOPHIL # BLD AUTO: 0 10E3/UL (ref 0–0.7)
EOSINOPHIL NFR BLD AUTO: 0 %
ERYTHROCYTE [DISTWIDTH] IN BLOOD BY AUTOMATED COUNT: 14.8 % (ref 10–15)
GLUCOSE SERPL-MCNC: 112 MG/DL (ref 70–99)
HCT VFR BLD AUTO: 40.6 % (ref 40–53)
HGB BLD-MCNC: 13.2 G/DL (ref 13.3–17.7)
IMM GRANULOCYTES # BLD: 0 10E3/UL
IMM GRANULOCYTES NFR BLD: 0 %
LYMPHOCYTES # BLD AUTO: 0.8 10E3/UL (ref 0.8–5.3)
LYMPHOCYTES NFR BLD AUTO: 22 %
MCH RBC QN AUTO: 28.4 PG (ref 26.5–33)
MCHC RBC AUTO-ENTMCNC: 32.5 G/DL (ref 31.5–36.5)
MCV RBC AUTO: 87 FL (ref 78–100)
MONOCYTES # BLD AUTO: 1 10E3/UL (ref 0–1.3)
MONOCYTES NFR BLD AUTO: 26 %
NEUTROPHILS # BLD AUTO: 2 10E3/UL (ref 1.6–8.3)
NEUTROPHILS NFR BLD AUTO: 51 %
NRBC # BLD AUTO: 0 10E3/UL
NRBC BLD AUTO-RTO: 0 /100
PLATELET # BLD AUTO: 209 10E3/UL (ref 150–450)
POTASSIUM SERPL-SCNC: 4.3 MMOL/L (ref 3.4–5.3)
PROT SERPL-MCNC: 6.7 G/DL (ref 6.4–8.3)
RBC # BLD AUTO: 4.65 10E6/UL (ref 4.4–5.9)
SODIUM SERPL-SCNC: 138 MMOL/L (ref 135–145)
WBC # BLD AUTO: 3.8 10E3/UL (ref 4–11)

## 2024-07-05 PROCEDURE — 36415 COLL VENOUS BLD VENIPUNCTURE: CPT

## 2024-07-05 PROCEDURE — 84450 TRANSFERASE (AST) (SGOT): CPT | Performed by: STUDENT IN AN ORGANIZED HEALTH CARE EDUCATION/TRAINING PROGRAM

## 2024-07-05 PROCEDURE — 96401 CHEMO ANTI-NEOPL SQ/IM: CPT

## 2024-07-05 PROCEDURE — 85025 COMPLETE CBC W/AUTO DIFF WBC: CPT | Performed by: STUDENT IN AN ORGANIZED HEALTH CARE EDUCATION/TRAINING PROGRAM

## 2024-07-05 PROCEDURE — 250N000011 HC RX IP 250 OP 636: Performed by: STUDENT IN AN ORGANIZED HEALTH CARE EDUCATION/TRAINING PROGRAM

## 2024-07-05 PROCEDURE — 82040 ASSAY OF SERUM ALBUMIN: CPT | Performed by: STUDENT IN AN ORGANIZED HEALTH CARE EDUCATION/TRAINING PROGRAM

## 2024-07-05 RX ADMIN — AZACITIDINE 160 MG: 100 INJECTION, POWDER, LYOPHILIZED, FOR SOLUTION INTRAVENOUS; SUBCUTANEOUS at 09:18

## 2024-07-05 NOTE — PROGRESS NOTES
Infusion Nursing Note:  Leland Pearson presents today for Labs+C2D5 Vidaza injections.    Patient seen by provider today: No   present during visit today: Not Applicable.    Note: Patient reports no new health changes or concerns since yesterday. Denies any signs or symptoms of infection. Patient agreeable to treatment plan today. Patient confirms he took PO Zofran at home prior to infusion.         Intravenous Access:  Lab draw site R AC, Needle type Butterfly, Gauge 23.  Labs drawn without difficulty.    Treatment Conditions:  Lab Results   Component Value Date    HGB 13.2 (L) 07/05/2024    WBC 3.8 (L) 07/05/2024    ANEU 0.0 (LL) 06/21/2024    ANEUTAUTO 2.0 07/05/2024     07/05/2024        Component Value Date     07/05/2024    POTASSIUM 4.3 07/05/2024    MAG 2.1 05/08/2024    CR 0.91 07/05/2024    HERBERT 9.3 07/05/2024    BILITOTAL 0.6 07/05/2024    ALBUMIN 3.7 07/05/2024    ALT 28 07/05/2024    AST 34 07/05/2024     Results reviewed, labs MET treatment parameters, ok to proceed with treatment.    Post Infusion Assessment:  Patient tolerated injection without incident.  Site patent and intact, free from redness, edema or discomfort.  Access discontinued per protocol.       Discharge Plan:   Patient declined prescription refills.  Discharge instructions reviewed with: Patient and Family.  Patient and/or family verbalized understanding of discharge instructions and all questions answered.  AVS to patient via AdsWizzT.  Patient will return 7/6/24 for next appointment.   Patient discharged in stable condition accompanied by: self.  Departure Mode: Ambulatory.      Liana Griffin, CARLY       I

## 2024-07-06 ENCOUNTER — INFUSION THERAPY VISIT (OUTPATIENT)
Dept: ONCOLOGY | Facility: CLINIC | Age: 69
End: 2024-07-06
Attending: STUDENT IN AN ORGANIZED HEALTH CARE EDUCATION/TRAINING PROGRAM
Payer: COMMERCIAL

## 2024-07-06 VITALS
OXYGEN SATURATION: 97 % | DIASTOLIC BLOOD PRESSURE: 84 MMHG | RESPIRATION RATE: 17 BRPM | HEART RATE: 68 BPM | SYSTOLIC BLOOD PRESSURE: 132 MMHG | TEMPERATURE: 97.6 F

## 2024-07-06 DIAGNOSIS — D46.9 MDS (MYELODYSPLASTIC SYNDROME) (H): Primary | ICD-10-CM

## 2024-07-06 PROCEDURE — 250N000011 HC RX IP 250 OP 636: Performed by: STUDENT IN AN ORGANIZED HEALTH CARE EDUCATION/TRAINING PROGRAM

## 2024-07-06 PROCEDURE — 96401 CHEMO ANTI-NEOPL SQ/IM: CPT

## 2024-07-06 RX ADMIN — AZACITIDINE 160 MG: 100 INJECTION, POWDER, LYOPHILIZED, FOR SOLUTION INTRAVENOUS; SUBCUTANEOUS at 10:29

## 2024-07-06 ASSESSMENT — PAIN SCALES - GENERAL: PAINLEVEL: NO PAIN (0)

## 2024-07-06 NOTE — PROGRESS NOTES
Infusion Nursing Note:  Leland Pearson presents today for Cycle 2 Day 6 Azacitidine.    Patient seen by provider today: No   present during visit today: Not Applicable.    Note: Patient denies any signs or symptoms of infection. Patient offers no complaints or concerns. Patient agreeable to treatment plan today.    Patient confirms taking PO Zofran at home prior to infusion.    Intravenous Access:  No Intravenous access/labs at this visit.    Treatment Conditions:  Lab Results   Component Value Date    HGB 13.2 (L) 07/05/2024    WBC 3.8 (L) 07/05/2024    ANEU 0.0 (LL) 06/21/2024    ANEUTAUTO 2.0 07/05/2024     07/05/2024        Lab Results   Component Value Date     07/05/2024    POTASSIUM 4.3 07/05/2024    MAG 2.1 05/08/2024    CR 0.91 07/05/2024    HERBERT 9.3 07/05/2024    BILITOTAL 0.6 07/05/2024    ALBUMIN 3.7 07/05/2024    ALT 28 07/05/2024    AST 34 07/05/2024     Results reviewed, labs MET treatment parameters, ok to proceed with treatment.    Post Infusion Assessment:  Patient tolerated 2 Azacitidine injections without incident into bilateral abdominal tissue.     Discharge Plan:   Patient declined prescription refills.  Discharge instructions reviewed with: Patient and Family.  Patient and/or family verbalized understanding of discharge instructions and all questions answered.  AVS to patient via Mashed PixelT.  Patient will return 07/07/24 for next appointment.   Patient discharged in stable condition accompanied by: self and wife.  Departure Mode: Ambulatory.      Aria Lubin RN

## 2024-07-06 NOTE — PATIENT INSTRUCTIONS
[facility] Triage and after hours / weekends / holidays:  [phone]    Please call the triage or after hours line if you experience a temperature greater than or equal to 100.4, shaking chills, have uncontrolled nausea, vomiting and/or diarrhea, dizziness, shortness of breath, chest pain, bleeding, unexplained bruising, or if you have any other new/concerning symptoms, questions or concerns.      If you are having any concerning symptoms or wish to speak to a provider before your next infusion visit, please call triage to notify them so we can adequately serve you.     If you need a refill on a narcotic prescription or other medication, please call before your infusion appointment.

## 2024-07-07 ENCOUNTER — INFUSION THERAPY VISIT (OUTPATIENT)
Dept: ONCOLOGY | Facility: CLINIC | Age: 69
End: 2024-07-07
Attending: STUDENT IN AN ORGANIZED HEALTH CARE EDUCATION/TRAINING PROGRAM
Payer: COMMERCIAL

## 2024-07-07 VITALS
TEMPERATURE: 97.7 F | DIASTOLIC BLOOD PRESSURE: 88 MMHG | OXYGEN SATURATION: 97 % | RESPIRATION RATE: 16 BRPM | SYSTOLIC BLOOD PRESSURE: 147 MMHG | HEART RATE: 70 BPM

## 2024-07-07 DIAGNOSIS — D46.9 MDS (MYELODYSPLASTIC SYNDROME) (H): Primary | ICD-10-CM

## 2024-07-07 PROCEDURE — 250N000011 HC RX IP 250 OP 636: Performed by: STUDENT IN AN ORGANIZED HEALTH CARE EDUCATION/TRAINING PROGRAM

## 2024-07-07 PROCEDURE — 96401 CHEMO ANTI-NEOPL SQ/IM: CPT

## 2024-07-07 RX ADMIN — AZACITIDINE 160 MG: 100 INJECTION, POWDER, LYOPHILIZED, FOR SOLUTION INTRAVENOUS; SUBCUTANEOUS at 10:21

## 2024-07-07 ASSESSMENT — PAIN SCALES - GENERAL: PAINLEVEL: MILD PAIN (2)

## 2024-07-07 NOTE — PROGRESS NOTES
Infusion Nursing Note:  Leland Pearson presents today for Cycle 2 Day 7 Azacitidine.    Patient seen by provider today: No   present during visit today: Not Applicable.    Note: Patient denies any signs or symptoms of infection. Patient endorses 2/10 right hip and lower back pain. Patient is currently taking Celebrex for the pain and denies any needs for pain intervention in infusion today. Patient offers no other complains or concerns. Patient agreeable to treatment plan today.    Intravenous Access:  No Intravenous access/labs at this visit.    Treatment Conditions:  Lab Results   Component Value Date    HGB 13.2 (L) 07/05/2024    WBC 3.8 (L) 07/05/2024    ANEU 0.0 (LL) 06/21/2024    ANEUTAUTO 2.0 07/05/2024     07/05/2024        Lab Results   Component Value Date     07/05/2024    POTASSIUM 4.3 07/05/2024    MAG 2.1 05/08/2024    CR 0.91 07/05/2024    HERBERT 9.3 07/05/2024    BILITOTAL 0.6 07/05/2024    ALBUMIN 3.7 07/05/2024    ALT 28 07/05/2024    AST 34 07/05/2024     Results reviewed, labs MET treatment parameters, ok to proceed with treatment.    Post Infusion Assessment:  Patient tolerated 2 Azacitidine injections without incident into bilateral abdominal tissue.     Discharge Plan:   Patient declined prescription refills.  Discharge instructions reviewed with: Patient and Family.  Patient and/or family verbalized understanding of discharge instructions and all questions answered.  AVS to patient via Pure360.  Patient will return 07/08/24 for a lab appointment.   Patient discharged in stable condition accompanied by: self and wife.  Departure Mode: Ambulatory.      Aria Lubin RN

## 2024-07-07 NOTE — PATIENT INSTRUCTIONS
[facility] Triage and after hours / weekends / holidays:  [phone]    Please call the triage or after hours line if you experience a temperature greater than or equal to 100.4, shaking chills, have uncontrolled nausea, vomiting and/or diarrhea, dizziness, shortness of breath, chest pain, bleeding, unexplained bruising, or if you have any other new/concerning symptoms, questions or concerns.      If you are having any concerning symptoms or wish to speak to a provider before your next infusion visit, please call triage to notify them so we can adequately serve you.     If you need a refill on a narcotic prescription or other medication, please call before your infusion appointment.

## 2024-07-08 ENCOUNTER — DOCUMENTATION ONLY (OUTPATIENT)
Dept: ONCOLOGY | Facility: CLINIC | Age: 69
End: 2024-07-08

## 2024-07-08 ENCOUNTER — LAB (OUTPATIENT)
Dept: INFUSION THERAPY | Facility: CLINIC | Age: 69
End: 2024-07-08
Attending: STUDENT IN AN ORGANIZED HEALTH CARE EDUCATION/TRAINING PROGRAM
Payer: COMMERCIAL

## 2024-07-08 DIAGNOSIS — D46.9 MDS (MYELODYSPLASTIC SYNDROME) (H): Primary | ICD-10-CM

## 2024-07-08 LAB
ABO/RH(D): NORMAL
ANTIBODY SCREEN: NEGATIVE
BASOPHILS # BLD AUTO: 0 10E3/UL (ref 0–0.2)
BASOPHILS NFR BLD AUTO: 0 %
CULTURE HARVEST COMPLETE DATE: NORMAL
EOSINOPHIL # BLD AUTO: 0 10E3/UL (ref 0–0.7)
EOSINOPHIL NFR BLD AUTO: 0 %
ERYTHROCYTE [DISTWIDTH] IN BLOOD BY AUTOMATED COUNT: 14.9 % (ref 10–15)
HCT VFR BLD AUTO: 42.6 % (ref 40–53)
HGB BLD-MCNC: 14.1 G/DL (ref 13.3–17.7)
IMM GRANULOCYTES # BLD: 0 10E3/UL
IMM GRANULOCYTES NFR BLD: 0 %
LYMPHOCYTES # BLD AUTO: 0.9 10E3/UL (ref 0.8–5.3)
LYMPHOCYTES NFR BLD AUTO: 24 %
MCH RBC QN AUTO: 28.5 PG (ref 26.5–33)
MCHC RBC AUTO-ENTMCNC: 33.1 G/DL (ref 31.5–36.5)
MCV RBC AUTO: 86 FL (ref 78–100)
MONOCYTES # BLD AUTO: 0.7 10E3/UL (ref 0–1.3)
MONOCYTES NFR BLD AUTO: 18 %
NEUTROPHILS # BLD AUTO: 2.3 10E3/UL (ref 1.6–8.3)
NEUTROPHILS NFR BLD AUTO: 58 %
NRBC # BLD AUTO: 0 10E3/UL
NRBC BLD AUTO-RTO: 0 /100
PLATELET # BLD AUTO: 178 10E3/UL (ref 150–450)
RBC # BLD AUTO: 4.95 10E6/UL (ref 4.4–5.9)
SPECIMEN EXPIRATION DATE: NORMAL
WBC # BLD AUTO: 3.9 10E3/UL (ref 4–11)

## 2024-07-08 PROCEDURE — 86900 BLOOD TYPING SEROLOGIC ABO: CPT | Performed by: STUDENT IN AN ORGANIZED HEALTH CARE EDUCATION/TRAINING PROGRAM

## 2024-07-08 PROCEDURE — 85025 COMPLETE CBC W/AUTO DIFF WBC: CPT | Performed by: STUDENT IN AN ORGANIZED HEALTH CARE EDUCATION/TRAINING PROGRAM

## 2024-07-08 PROCEDURE — 36415 COLL VENOUS BLD VENIPUNCTURE: CPT | Performed by: STUDENT IN AN ORGANIZED HEALTH CARE EDUCATION/TRAINING PROGRAM

## 2024-07-08 RX ORDER — HEPARIN SODIUM,PORCINE 10 UNIT/ML
5-20 VIAL (ML) INTRAVENOUS DAILY PRN
Status: CANCELLED | OUTPATIENT
Start: 2024-07-08

## 2024-07-08 RX ORDER — DIPHENHYDRAMINE HYDROCHLORIDE 50 MG/ML
50 INJECTION INTRAMUSCULAR; INTRAVENOUS
Status: CANCELLED
Start: 2024-07-08

## 2024-07-08 RX ORDER — EPINEPHRINE 1 MG/ML
0.3 INJECTION, SOLUTION INTRAMUSCULAR; SUBCUTANEOUS EVERY 5 MIN PRN
Status: CANCELLED | OUTPATIENT
Start: 2024-07-08

## 2024-07-08 RX ORDER — HEPARIN SODIUM (PORCINE) LOCK FLUSH IV SOLN 100 UNIT/ML 100 UNIT/ML
5 SOLUTION INTRAVENOUS
Status: CANCELLED | OUTPATIENT
Start: 2024-07-08

## 2024-07-08 NOTE — PROGRESS NOTES
Medical Assistant Note:  Leland Pearson presents today for blood draw.    Patient seen by provider today: No.   present during visit today: Not Applicable.    Concerns: No Concerns.    Procedure:  Lab draw site: rac, Needle type: bf, Gauge: 23.    Post Assessment:  Labs drawn without difficulty: Yes.    Discharge Plan:  Departure Mode: Ambulatory.    Face to Face Time: 5 min.    Yocasta Castillo, CMA

## 2024-07-08 NOTE — PROGRESS NOTES
Oral Chemotherapy Monitoring Program  Lab Follow Up    Reviewed lab results from 7/8.        5/30/2024    10:00 AM 6/3/2024     1:00 PM 6/7/2024     9:00 AM 6/10/2024     9:00 AM 6/26/2024     3:00 PM 7/1/2024     3:00 PM 7/8/2024     3:00 PM   ORAL CHEMOTHERAPY   Assessment Type Other Lab Monitoring Lab Monitoring Refill Lab Monitoring Lab Monitoring;Monthly Follow up Lab Monitoring   Diagnosis Code Myelodysplastic Syndrome Myelodysplastic Syndrome Myelodysplastic Syndrome Myelodysplastic Syndrome Myelodysplastic Syndrome Myelodysplastic Syndrome Myelodysplastic Syndrome   Providers Dr Bhavin Delcid   [facility]   Is this patient followed by the [facility] OC team? No No No No No No No   Drug Name Venclexta (venetoclax) Venclexta (venetoclax) Venclexta (venetoclax) Venclexta (venetoclax) Venclexta (venetoclax) Venclexta (venetoclax) Venclexta (venetoclax)   Dose 100 mg 100 mg 100 mg 100 mg 100 mg 100 mg 100 mg   Current Schedule Daily Daily Daily Daily Daily Daily Daily   Cycle Details 2 weeks on, 2 weeks off 2 weeks on, 2 weeks off 2 weeks on, 2 weeks off 2 weeks on, 2 weeks off 2 weeks on, 2 weeks off 2 weeks on, 2 weeks off 2 weeks on, 2 weeks off   Start Date of Last Cycle 5/20/2024 5/20/2024     Planned next cycle start date 6/17/2024 7/1/2024    Doses missed in last 2 weeks      0    Adherence Assessment      Adherent        Labs:  _  Result Component Current Result Ref Range   Sodium 138 (7/5/2024) 135 - 145 mmol/L     _  Result Component Current Result Ref Range   Potassium 4.3 (7/5/2024) 3.4 - 5.3 mmol/L     _  Result Component Current Result Ref Range   Calcium 9.3 (7/5/2024) 8.8 - 10.2 mg/dL     No results found for Mag within last 30 days.     _  Result Component Current Result Ref Range   Phosphorus 2.8 (7/1/2024) 2.5 - 4.5 mg/dL     _  Result Component Current Result Ref Range    Albumin 3.7 (7/5/2024) 3.5 - 5.2 g/dL     _  Result Component Current Result Ref Range   Urea Nitrogen 14.3 (7/5/2024) 8.0 - 23.0 mg/dL     _  Result Component Current Result Ref Range   Creatinine 0.91 (7/5/2024) 0.67 - 1.17 mg/dL     _  Result Component Current Result Ref Range   AST 34 (7/5/2024) 0 - 45 U/L     _  Result Component Current Result Ref Range   ALT 28 (7/5/2024) 0 - 70 U/L     _  Result Component Current Result Ref Range   Bilirubin Total 0.6 (7/5/2024) <=1.2 mg/dL     _  Result Component Current Result Ref Range   WBC Count 3.9 (L) (7/8/2024) 4.0 - 11.0 10e3/uL     _  Result Component Current Result Ref Range   Hemoglobin 14.1 (7/8/2024) 13.3 - 17.7 g/dL     _  Result Component Current Result Ref Range   Platelet Count 178 (7/8/2024) 150 - 450 10e3/uL     _  Result Component Current Result Ref Range   Absolute Neutrophils 0.0 (LL) (6/21/2024) 1.6 - 8.3 10e3/uL     _  Result Component Current Result Ref Range   Absolute Neutrophils 2.3 (7/8/2024) 1.6 - 8.3 10e3/uL        Assessment & Plan:  No concerning abnormalities.    Patient was contacted via Minitrade to continue to take their venetoclax during this time.    Follow-Up:  Lab appt: 7/11    Onc appt:not scheduled    Noe Mann  Pharmacy Intern  Oral Chemotherapy Monitoring Program  [facility]  [phone]

## 2024-07-10 LAB
ABO/RH(D): NORMAL
ANTIBODY SCREEN: NEGATIVE
INTERPRETATION: NORMAL
ISCN: NORMAL
METHODS: NORMAL
SPECIMEN EXPIRATION DATE: NORMAL

## 2024-07-11 ENCOUNTER — INFUSION THERAPY VISIT (OUTPATIENT)
Dept: INFUSION THERAPY | Facility: CLINIC | Age: 69
End: 2024-07-11
Attending: STUDENT IN AN ORGANIZED HEALTH CARE EDUCATION/TRAINING PROGRAM
Payer: COMMERCIAL

## 2024-07-11 DIAGNOSIS — D46.9 MDS (MYELODYSPLASTIC SYNDROME) (H): Primary | ICD-10-CM

## 2024-07-11 LAB
ALBUMIN SERPL BCG-MCNC: 3.8 G/DL (ref 3.5–5.2)
ALP SERPL-CCNC: 133 U/L (ref 40–150)
ALT SERPL W P-5'-P-CCNC: 36 U/L (ref 0–70)
ANION GAP SERPL CALCULATED.3IONS-SCNC: 6 MMOL/L (ref 7–15)
AST SERPL W P-5'-P-CCNC: 41 U/L (ref 0–45)
BASOPHILS # BLD AUTO: 0 10E3/UL (ref 0–0.2)
BASOPHILS NFR BLD AUTO: 0 %
BILIRUB SERPL-MCNC: 0.8 MG/DL
BUN SERPL-MCNC: 13.3 MG/DL (ref 8–23)
CALCIUM SERPL-MCNC: 9.4 MG/DL (ref 8.8–10.2)
CHLORIDE SERPL-SCNC: 104 MMOL/L (ref 98–107)
CREAT SERPL-MCNC: 0.88 MG/DL (ref 0.67–1.17)
DEPRECATED HCO3 PLAS-SCNC: 30 MMOL/L (ref 22–29)
EGFRCR SERPLBLD CKD-EPI 2021: >90 ML/MIN/1.73M2
EOSINOPHIL # BLD AUTO: 0 10E3/UL (ref 0–0.7)
EOSINOPHIL NFR BLD AUTO: 0 %
ERYTHROCYTE [DISTWIDTH] IN BLOOD BY AUTOMATED COUNT: 14.9 % (ref 10–15)
GLUCOSE SERPL-MCNC: 119 MG/DL (ref 70–99)
HCT VFR BLD AUTO: 40.6 % (ref 40–53)
HGB BLD-MCNC: 13.5 G/DL (ref 13.3–17.7)
IMM GRANULOCYTES # BLD: 0 10E3/UL
IMM GRANULOCYTES NFR BLD: 0 %
LYMPHOCYTES # BLD AUTO: 0.9 10E3/UL (ref 0.8–5.3)
LYMPHOCYTES NFR BLD AUTO: 24 %
MCH RBC QN AUTO: 28.4 PG (ref 26.5–33)
MCHC RBC AUTO-ENTMCNC: 33.3 G/DL (ref 31.5–36.5)
MCV RBC AUTO: 85 FL (ref 78–100)
MONOCYTES # BLD AUTO: 0.6 10E3/UL (ref 0–1.3)
MONOCYTES NFR BLD AUTO: 16 %
NEUTROPHILS # BLD AUTO: 2.1 10E3/UL (ref 1.6–8.3)
NEUTROPHILS NFR BLD AUTO: 59 %
NRBC # BLD AUTO: 0 10E3/UL
NRBC BLD AUTO-RTO: 0 /100
PLATELET # BLD AUTO: 142 10E3/UL (ref 150–450)
POTASSIUM SERPL-SCNC: 3.9 MMOL/L (ref 3.4–5.3)
PROT SERPL-MCNC: 6.7 G/DL (ref 6.4–8.3)
RBC # BLD AUTO: 4.76 10E6/UL (ref 4.4–5.9)
SODIUM SERPL-SCNC: 140 MMOL/L (ref 135–145)
WBC # BLD AUTO: 3.5 10E3/UL (ref 4–11)

## 2024-07-11 PROCEDURE — 82040 ASSAY OF SERUM ALBUMIN: CPT | Performed by: STUDENT IN AN ORGANIZED HEALTH CARE EDUCATION/TRAINING PROGRAM

## 2024-07-11 PROCEDURE — 85025 COMPLETE CBC W/AUTO DIFF WBC: CPT | Performed by: STUDENT IN AN ORGANIZED HEALTH CARE EDUCATION/TRAINING PROGRAM

## 2024-07-11 PROCEDURE — 86900 BLOOD TYPING SEROLOGIC ABO: CPT | Performed by: STUDENT IN AN ORGANIZED HEALTH CARE EDUCATION/TRAINING PROGRAM

## 2024-07-11 PROCEDURE — 36415 COLL VENOUS BLD VENIPUNCTURE: CPT | Performed by: STUDENT IN AN ORGANIZED HEALTH CARE EDUCATION/TRAINING PROGRAM

## 2024-07-11 NOTE — PROGRESS NOTES
Medical Assistant Note:  Leland Pearson presents today for lab draw.    Patient seen by provider today: No.   present during visit today: Not Applicable.    Concerns: No Concerns.    Procedure:  Lab draw site: LAC, Needle type: BF, Gauge: 21. Gauze and coban applied    Post Assessment:  Labs drawn without difficulty: Yes.    Discharge Plan:  Departure Mode: Ambulatory.    Face to Face Time: 5.    Patsy Brantley CMA

## 2024-07-11 NOTE — ORAL ONC MGMT
Oral Chemotherapy Monitoring Program  Lab Follow Up    Reviewed lab results from 7/11.        6/3/2024     1:00 PM 6/7/2024     9:00 AM 6/10/2024     9:00 AM 6/26/2024     3:00 PM 7/1/2024     3:00 PM 7/8/2024     3:00 PM 7/11/2024    12:00 PM   ORAL CHEMOTHERAPY   Assessment Type Lab Monitoring Lab Monitoring Refill Lab Monitoring Lab Monitoring;Monthly Follow up Lab Monitoring Lab Monitoring   Diagnosis Code Myelodysplastic Syndrome Myelodysplastic Syndrome Myelodysplastic Syndrome Myelodysplastic Syndrome Myelodysplastic Syndrome Myelodysplastic Syndrome Myelodysplastic Syndrome   Providers Dr Bhavin Delcid   [facility]   Is this patient followed by the [facility] OC team? No No No No No No No   Drug Name Venclexta (venetoclax) Venclexta (venetoclax) Venclexta (venetoclax) Venclexta (venetoclax) Venclexta (venetoclax) Venclexta (venetoclax) Venclexta (venetoclax)   Dose 100 mg 100 mg 100 mg 100 mg 100 mg 100 mg 100 mg   Current Schedule Daily Daily Daily Daily Daily Daily Daily   Cycle Details 2 weeks on, 2 weeks off 2 weeks on, 2 weeks off 2 weeks on, 2 weeks off 2 weeks on, 2 weeks off 2 weeks on, 2 weeks off 2 weeks on, 2 weeks off 2 weeks on, 2 weeks off   Start Date of Last Cycle    5/20/2024      Planned next cycle start date     7/1/2024     Doses missed in last 2 weeks     0     Adherence Assessment     Adherent         Labs:  _  Result Component Current Result Ref Range   Sodium 140 (7/11/2024) 135 - 145 mmol/L     _  Result Component Current Result Ref Range   Potassium 3.9 (7/11/2024) 3.4 - 5.3 mmol/L     _  Result Component Current Result Ref Range   Calcium 9.4 (7/11/2024) 8.8 - 10.2 mg/dL     No results found for Mag within last 30 days.     _  Result Component Current Result Ref Range   Phosphorus 2.8 (7/1/2024) 2.5 - 4.5 mg/dL     _  Result Component Current Result Ref Range   Albumin  3.8 (7/11/2024) 3.5 - 5.2 g/dL     _  Result Component Current Result Ref Range   Urea Nitrogen 13.3 (7/11/2024) 8.0 - 23.0 mg/dL     _  Result Component Current Result Ref Range   Creatinine 0.88 (7/11/2024) 0.67 - 1.17 mg/dL     _  Result Component Current Result Ref Range   AST 41 (7/11/2024) 0 - 45 U/L     _  Result Component Current Result Ref Range   ALT 36 (7/11/2024) 0 - 70 U/L     _  Result Component Current Result Ref Range   Bilirubin Total 0.8 (7/11/2024) <=1.2 mg/dL     _  Result Component Current Result Ref Range   WBC Count 3.5 (L) (7/11/2024) 4.0 - 11.0 10e3/uL     _  Result Component Current Result Ref Range   Hemoglobin 13.5 (7/11/2024) 13.3 - 17.7 g/dL     _  Result Component Current Result Ref Range   Platelet Count 142 (L) (7/11/2024) 150 - 450 10e3/uL     _  Result Component Current Result Ref Range   Absolute Neutrophils 0.0 (LL) (6/21/2024) 1.6 - 8.3 10e3/uL     _  Result Component Current Result Ref Range   Absolute Neutrophils 2.1 (7/11/2024) 1.6 - 8.3 10e3/uL        Assessment & Plan:  Results are concerning for Grade 1 thrombocytopenia. No dose adjustments needed at this time. Continue to monitor for possible platelet transfusion.    Continue Venclexta as prescribed.    Patient not contacted. Seen in infusion.      Follow-Up:  7/15 & 7/22 labs and possible transfusion  Cycle 3 tentatively 7/29    Tracie Lr PharmD  Oral Chemotherapy Monitoring Program  [facility]  [phone]

## 2024-07-11 NOTE — PROGRESS NOTES
Infusion Nursing Note:  Leland Pearson presents today for possible transfusion.    Patient seen by provider today: No   present during visit today: Not Applicable.    Note: pt does not meet parameters for transfusion. Lab results reviewed with pt. .      Intravenous Access:  No Intravenous access/labs at this visit.    Treatment Conditions:  Lab Results   Component Value Date    HGB 13.5 07/11/2024    WBC 3.5 (L) 07/11/2024    ANEU 0.0 (LL) 06/21/2024    ANEUTAUTO 2.1 07/11/2024     (L) 07/11/2024        Results reviewed, labs did NOT meet treatment parameters: for plt or prbc transfusion.      Post Infusion Assessment:  NA.       Discharge Plan:   Discharge instructions reviewed with: Patient and Family.  Patient and/or family verbalized understanding of discharge instructions and all questions answered.  Patient discharged in stable condition accompanied by: wife.  Departure Mode: Ambulatory.      Lucille Bertrand RN

## 2024-07-15 ENCOUNTER — INFUSION THERAPY VISIT (OUTPATIENT)
Dept: INFUSION THERAPY | Facility: CLINIC | Age: 69
End: 2024-07-15
Attending: INTERNAL MEDICINE
Payer: COMMERCIAL

## 2024-07-15 DIAGNOSIS — D46.9 MDS (MYELODYSPLASTIC SYNDROME) (H): Primary | ICD-10-CM

## 2024-07-15 LAB
ABO/RH(D): NORMAL
ALBUMIN SERPL BCG-MCNC: 3.8 G/DL (ref 3.5–5.2)
ALP SERPL-CCNC: 128 U/L (ref 40–150)
ALT SERPL W P-5'-P-CCNC: 35 U/L (ref 0–70)
ANION GAP SERPL CALCULATED.3IONS-SCNC: 7 MMOL/L (ref 7–15)
ANTIBODY SCREEN: NEGATIVE
AST SERPL W P-5'-P-CCNC: 49 U/L (ref 0–45)
BASOPHILS # BLD AUTO: 0 10E3/UL (ref 0–0.2)
BASOPHILS NFR BLD AUTO: 0 %
BILIRUB SERPL-MCNC: 0.7 MG/DL
BUN SERPL-MCNC: 10.1 MG/DL (ref 8–23)
CALCIUM SERPL-MCNC: 9.2 MG/DL (ref 8.8–10.2)
CHLORIDE SERPL-SCNC: 106 MMOL/L (ref 98–107)
CREAT SERPL-MCNC: 0.81 MG/DL (ref 0.67–1.17)
EGFRCR SERPLBLD CKD-EPI 2021: >90 ML/MIN/1.73M2
EOSINOPHIL # BLD AUTO: 0 10E3/UL (ref 0–0.7)
EOSINOPHIL NFR BLD AUTO: 0 %
ERYTHROCYTE [DISTWIDTH] IN BLOOD BY AUTOMATED COUNT: 15.8 % (ref 10–15)
GLUCOSE SERPL-MCNC: 104 MG/DL (ref 70–99)
HCO3 SERPL-SCNC: 26 MMOL/L (ref 22–29)
HCT VFR BLD AUTO: 39.5 % (ref 40–53)
HGB BLD-MCNC: 12.9 G/DL (ref 13.3–17.7)
IMM GRANULOCYTES # BLD: 0 10E3/UL
IMM GRANULOCYTES NFR BLD: 0 %
LYMPHOCYTES # BLD AUTO: 0.8 10E3/UL (ref 0.8–5.3)
LYMPHOCYTES NFR BLD AUTO: 30 %
MCH RBC QN AUTO: 27.8 PG (ref 26.5–33)
MCHC RBC AUTO-ENTMCNC: 32.7 G/DL (ref 31.5–36.5)
MCV RBC AUTO: 85 FL (ref 78–100)
MONOCYTES # BLD AUTO: 0.5 10E3/UL (ref 0–1.3)
MONOCYTES NFR BLD AUTO: 22 %
NEUTROPHILS # BLD AUTO: 1.2 10E3/UL (ref 1.6–8.3)
NEUTROPHILS NFR BLD AUTO: 48 %
NRBC # BLD AUTO: 0 10E3/UL
NRBC BLD AUTO-RTO: 0 /100
PLATELET # BLD AUTO: 119 10E3/UL (ref 150–450)
POTASSIUM SERPL-SCNC: 4.4 MMOL/L (ref 3.4–5.3)
PROT SERPL-MCNC: 6.8 G/DL (ref 6.4–8.3)
RBC # BLD AUTO: 4.64 10E6/UL (ref 4.4–5.9)
SODIUM SERPL-SCNC: 139 MMOL/L (ref 135–145)
SPECIMEN EXPIRATION DATE: NORMAL
WBC # BLD AUTO: 2.5 10E3/UL (ref 4–11)

## 2024-07-15 PROCEDURE — 36415 COLL VENOUS BLD VENIPUNCTURE: CPT | Performed by: STUDENT IN AN ORGANIZED HEALTH CARE EDUCATION/TRAINING PROGRAM

## 2024-07-15 PROCEDURE — 80053 COMPREHEN METABOLIC PANEL: CPT | Performed by: STUDENT IN AN ORGANIZED HEALTH CARE EDUCATION/TRAINING PROGRAM

## 2024-07-15 PROCEDURE — 86900 BLOOD TYPING SEROLOGIC ABO: CPT | Performed by: STUDENT IN AN ORGANIZED HEALTH CARE EDUCATION/TRAINING PROGRAM

## 2024-07-15 PROCEDURE — 85025 COMPLETE CBC W/AUTO DIFF WBC: CPT | Performed by: STUDENT IN AN ORGANIZED HEALTH CARE EDUCATION/TRAINING PROGRAM

## 2024-07-15 RX ORDER — HEPARIN SODIUM (PORCINE) LOCK FLUSH IV SOLN 100 UNIT/ML 100 UNIT/ML
5 SOLUTION INTRAVENOUS
Status: CANCELLED | OUTPATIENT
Start: 2024-07-15

## 2024-07-15 RX ORDER — DIPHENHYDRAMINE HYDROCHLORIDE 50 MG/ML
50 INJECTION INTRAMUSCULAR; INTRAVENOUS
Status: CANCELLED
Start: 2024-07-15

## 2024-07-15 RX ORDER — HEPARIN SODIUM,PORCINE 10 UNIT/ML
5-20 VIAL (ML) INTRAVENOUS DAILY PRN
Status: CANCELLED | OUTPATIENT
Start: 2024-07-15

## 2024-07-15 RX ORDER — EPINEPHRINE 1 MG/ML
0.3 INJECTION, SOLUTION INTRAMUSCULAR; SUBCUTANEOUS EVERY 5 MIN PRN
Status: CANCELLED | OUTPATIENT
Start: 2024-07-15

## 2024-07-15 NOTE — PROGRESS NOTES
Infusion Nursing Note:  Leland Pearson presents today for PIV labs and possible blood transfusion.    Patient seen by provider today: No   present during visit today: Not Applicable.    Note: Patient did not meet parameters for blood or platelet transfusion.      Intravenous Access:  Lab draw site R AC, Needle type butterfly, Gauge 23.  Labs drawn without difficulty.    Treatment Conditions:  Lab Results   Component Value Date    HGB 12.9 (L) 07/15/2024    WBC 2.5 (L) 07/15/2024    ANEU 0.0 (LL) 06/21/2024    ANEUTAUTO 1.2 (L) 07/15/2024     (L) 07/15/2024        Lab Results   Component Value Date     07/15/2024    POTASSIUM 4.4 07/15/2024    MAG 2.1 05/08/2024    CR 0.81 07/15/2024    HERBERT 9.2 07/15/2024    BILITOTAL 0.7 07/15/2024    ALBUMIN 3.8 07/15/2024    ALT 35 07/15/2024    AST 49 (H) 07/15/2024       Results reviewed, labs did NOT meet treatment parameters: Hgb 12.9 and Plt 119      Post Infusion Assessment:  Site patent and intact, free from redness, edema or discomfort.  No evidence of extravasations.       Discharge Plan:   Discharge instructions reviewed with: Patient.  Patient and/or family verbalized understanding of discharge instructions and all questions answered.  AVS to patient via Trilliant[employer].  Patient will return 7/18 for next appointment.   Patient discharged in stable condition accompanied by: self and wife.  Departure Mode: Ambulatory.      Steffany Hammond RN

## 2024-07-18 ENCOUNTER — INFUSION THERAPY VISIT (OUTPATIENT)
Dept: INFUSION THERAPY | Facility: CLINIC | Age: 69
End: 2024-07-18
Attending: STUDENT IN AN ORGANIZED HEALTH CARE EDUCATION/TRAINING PROGRAM
Payer: COMMERCIAL

## 2024-07-18 VITALS
HEART RATE: 63 BPM | DIASTOLIC BLOOD PRESSURE: 87 MMHG | OXYGEN SATURATION: 98 % | RESPIRATION RATE: 16 BRPM | SYSTOLIC BLOOD PRESSURE: 153 MMHG | TEMPERATURE: 97.8 F

## 2024-07-18 DIAGNOSIS — D46.9 MDS (MYELODYSPLASTIC SYNDROME) (H): ICD-10-CM

## 2024-07-18 LAB
ALBUMIN SERPL BCG-MCNC: 3.8 G/DL (ref 3.5–5.2)
ALP SERPL-CCNC: 136 U/L (ref 40–150)
ALT SERPL W P-5'-P-CCNC: 30 U/L (ref 0–70)
ANION GAP SERPL CALCULATED.3IONS-SCNC: 6 MMOL/L (ref 7–15)
AST SERPL W P-5'-P-CCNC: 43 U/L (ref 0–45)
BASOPHILS # BLD AUTO: 0 10E3/UL (ref 0–0.2)
BASOPHILS NFR BLD AUTO: 0 %
BILIRUB SERPL-MCNC: 0.6 MG/DL
BUN SERPL-MCNC: 10.8 MG/DL (ref 8–23)
CALCIUM SERPL-MCNC: 9.2 MG/DL (ref 8.8–10.4)
CHLORIDE SERPL-SCNC: 102 MMOL/L (ref 98–107)
CREAT SERPL-MCNC: 0.79 MG/DL (ref 0.67–1.17)
EGFRCR SERPLBLD CKD-EPI 2021: >90 ML/MIN/1.73M2
EOSINOPHIL # BLD AUTO: 0 10E3/UL (ref 0–0.7)
EOSINOPHIL NFR BLD AUTO: 0 %
ERYTHROCYTE [DISTWIDTH] IN BLOOD BY AUTOMATED COUNT: 15.9 % (ref 10–15)
GLUCOSE SERPL-MCNC: 93 MG/DL (ref 70–99)
HCO3 SERPL-SCNC: 28 MMOL/L (ref 22–29)
HCT VFR BLD AUTO: 39.7 % (ref 40–53)
HGB BLD-MCNC: 13.1 G/DL (ref 13.3–17.7)
IMM GRANULOCYTES # BLD: 0 10E3/UL
IMM GRANULOCYTES NFR BLD: 0 %
LYMPHOCYTES # BLD AUTO: 0.7 10E3/UL (ref 0.8–5.3)
LYMPHOCYTES NFR BLD AUTO: 28 %
MCH RBC QN AUTO: 28 PG (ref 26.5–33)
MCHC RBC AUTO-ENTMCNC: 33 G/DL (ref 31.5–36.5)
MCV RBC AUTO: 85 FL (ref 78–100)
MONOCYTES # BLD AUTO: 0.7 10E3/UL (ref 0–1.3)
MONOCYTES NFR BLD AUTO: 31 %
NEUTROPHILS # BLD AUTO: 0.9 10E3/UL (ref 1.6–8.3)
NEUTROPHILS NFR BLD AUTO: 40 %
NRBC # BLD AUTO: 0 10E3/UL
NRBC BLD AUTO-RTO: 0 /100
PLATELET # BLD AUTO: 96 10E3/UL (ref 150–450)
POTASSIUM SERPL-SCNC: 4.2 MMOL/L (ref 3.4–5.3)
PROT SERPL-MCNC: 6.7 G/DL (ref 6.4–8.3)
RBC # BLD AUTO: 4.68 10E6/UL (ref 4.4–5.9)
SODIUM SERPL-SCNC: 136 MMOL/L (ref 135–145)
WBC # BLD AUTO: 2.3 10E3/UL (ref 4–11)

## 2024-07-18 PROCEDURE — 85049 AUTOMATED PLATELET COUNT: CPT | Performed by: STUDENT IN AN ORGANIZED HEALTH CARE EDUCATION/TRAINING PROGRAM

## 2024-07-18 PROCEDURE — 82040 ASSAY OF SERUM ALBUMIN: CPT | Performed by: STUDENT IN AN ORGANIZED HEALTH CARE EDUCATION/TRAINING PROGRAM

## 2024-07-18 PROCEDURE — 36415 COLL VENOUS BLD VENIPUNCTURE: CPT

## 2024-07-18 NOTE — PROGRESS NOTES
Infusion Nursing Note:  Leland Pearson presents today for labs, possible transfusions.    Patient seen by provider today: No   present during visit today: Not Applicable.    Note: Patient will reach out to Dr. Delcid's nurse for an update as there is no appts for follow up with Bhavin or another round of vidaza. The hope is for a BMT soon.      Intravenous Access:  Lab draw site rac, Needle type bf, Gauge 23.  Labs drawn without difficulty.    Treatment Conditions:  Lab Results   Component Value Date    HGB 13.1 (L) 07/18/2024    WBC 2.3 (L) 07/18/2024    ANEU 0.0 (LL) 06/21/2024    ANEUTAUTO 0.9 (L) 07/18/2024    PLT 96 (L) 07/18/2024        Results reviewed, labs did NOT meet treatment parameters: no transfusions needed today.      Post Infusion Assessment:  Access discontinued per protocol.       Discharge Plan:   AVS to patient via MYCHART.  Patient will return as prev bianca for next appointment.   Patient discharged in stable condition accompanied by: wife.  Departure Mode: Ambulatory.      Doug Cervantes RN

## 2024-07-22 ENCOUNTER — INFUSION THERAPY VISIT (OUTPATIENT)
Dept: INFUSION THERAPY | Facility: CLINIC | Age: 69
End: 2024-07-22
Attending: STUDENT IN AN ORGANIZED HEALTH CARE EDUCATION/TRAINING PROGRAM
Payer: COMMERCIAL

## 2024-07-22 ENCOUNTER — DOCUMENTATION ONLY (OUTPATIENT)
Dept: ONCOLOGY | Facility: CLINIC | Age: 69
End: 2024-07-22

## 2024-07-22 ENCOUNTER — MYC MEDICAL ADVICE (OUTPATIENT)
Dept: ONCOLOGY | Facility: CLINIC | Age: 69
End: 2024-07-22

## 2024-07-22 VITALS
HEART RATE: 64 BPM | TEMPERATURE: 98.2 F | SYSTOLIC BLOOD PRESSURE: 146 MMHG | RESPIRATION RATE: 16 BRPM | OXYGEN SATURATION: 98 % | DIASTOLIC BLOOD PRESSURE: 82 MMHG

## 2024-07-22 DIAGNOSIS — D46.9 MDS (MYELODYSPLASTIC SYNDROME) (H): ICD-10-CM

## 2024-07-22 DIAGNOSIS — T45.1X5A CHEMOTHERAPY-INDUCED NEUTROPENIA (H): ICD-10-CM

## 2024-07-22 DIAGNOSIS — D70.1 CHEMOTHERAPY-INDUCED NEUTROPENIA (H): ICD-10-CM

## 2024-07-22 LAB
ALBUMIN SERPL BCG-MCNC: 3.9 G/DL (ref 3.5–5.2)
ALP SERPL-CCNC: 140 U/L (ref 40–150)
ALT SERPL W P-5'-P-CCNC: 26 U/L (ref 0–70)
ANION GAP SERPL CALCULATED.3IONS-SCNC: 8 MMOL/L (ref 7–15)
AST SERPL W P-5'-P-CCNC: 35 U/L (ref 0–45)
BASOPHILS # BLD AUTO: 0 10E3/UL (ref 0–0.2)
BASOPHILS NFR BLD AUTO: 1 %
BILIRUB SERPL-MCNC: 0.7 MG/DL
BUN SERPL-MCNC: 10.4 MG/DL (ref 8–23)
CALCIUM SERPL-MCNC: 9.5 MG/DL (ref 8.8–10.4)
CHLORIDE SERPL-SCNC: 103 MMOL/L (ref 98–107)
CREAT SERPL-MCNC: 0.82 MG/DL (ref 0.67–1.17)
EGFRCR SERPLBLD CKD-EPI 2021: >90 ML/MIN/1.73M2
EOSINOPHIL # BLD AUTO: 0 10E3/UL (ref 0–0.7)
EOSINOPHIL NFR BLD AUTO: 0 %
ERYTHROCYTE [DISTWIDTH] IN BLOOD BY AUTOMATED COUNT: 16.6 % (ref 10–15)
GLUCOSE SERPL-MCNC: 112 MG/DL (ref 70–99)
HCO3 SERPL-SCNC: 27 MMOL/L (ref 22–29)
HCT VFR BLD AUTO: 43.2 % (ref 40–53)
HGB BLD-MCNC: 14.8 G/DL (ref 13.3–17.7)
IMM GRANULOCYTES # BLD: 0 10E3/UL
IMM GRANULOCYTES NFR BLD: 1 %
LYMPHOCYTES # BLD AUTO: 0.7 10E3/UL (ref 0.8–5.3)
LYMPHOCYTES NFR BLD AUTO: 34 %
MCH RBC QN AUTO: 29 PG (ref 26.5–33)
MCHC RBC AUTO-ENTMCNC: 34.3 G/DL (ref 31.5–36.5)
MCV RBC AUTO: 85 FL (ref 78–100)
MONOCYTES # BLD AUTO: 0.4 10E3/UL (ref 0–1.3)
MONOCYTES NFR BLD AUTO: 21 %
NEUTROPHILS # BLD AUTO: 1 10E3/UL (ref 1.6–8.3)
NEUTROPHILS NFR BLD AUTO: 44 %
NRBC # BLD AUTO: 0 10E3/UL
NRBC BLD AUTO-RTO: 0 /100
PLATELET # BLD AUTO: 80 10E3/UL (ref 150–450)
POTASSIUM SERPL-SCNC: 4.2 MMOL/L (ref 3.4–5.3)
PROT SERPL-MCNC: 6.9 G/DL (ref 6.4–8.3)
RBC # BLD AUTO: 5.1 10E6/UL (ref 4.4–5.9)
SODIUM SERPL-SCNC: 138 MMOL/L (ref 135–145)
WBC # BLD AUTO: 2.2 10E3/UL (ref 4–11)

## 2024-07-22 PROCEDURE — 85025 COMPLETE CBC W/AUTO DIFF WBC: CPT | Performed by: STUDENT IN AN ORGANIZED HEALTH CARE EDUCATION/TRAINING PROGRAM

## 2024-07-22 PROCEDURE — 82040 ASSAY OF SERUM ALBUMIN: CPT | Performed by: STUDENT IN AN ORGANIZED HEALTH CARE EDUCATION/TRAINING PROGRAM

## 2024-07-22 PROCEDURE — 36415 COLL VENOUS BLD VENIPUNCTURE: CPT

## 2024-07-22 RX ORDER — POSACONAZOLE 100 MG/1
300 TABLET, DELAYED RELEASE ORAL EVERY MORNING
Qty: 90 TABLET | Refills: 1 | Status: ON HOLD | OUTPATIENT
Start: 2024-07-22 | End: 2024-09-18

## 2024-07-22 NOTE — PROGRESS NOTES
Infusion Nursing Note:  Leland THOMPSON Michel presents today for labs, possible transfusion.    Patient seen by provider today: No   present during visit today: Not Applicable.    Note: Still awaiting bone marrow transplant workup. Will continue bi weekly labs until workup begins.      Intravenous Access:  Lab draw site rac, Needle type bf, Gauge 23.  Labs drawn without difficulty.    Treatment Conditions:  Lab Results   Component Value Date    HGB 14.8 07/22/2024    WBC 2.2 (L) 07/22/2024    ANEU 0.0 (LL) 06/21/2024    ANEUTAUTO 1.0 (L) 07/22/2024    PLT 80 (L) 07/22/2024            Post Infusion Assessment:  N/A.       Discharge Plan:   [facility]  Patient will return 7/25 for next appointment.   Patient discharged in stable condition accompanied by: wife.  Departure Mode: Ambulatory.      Doug Cervantes RN     There are no Wet Read(s) to document.

## 2024-07-22 NOTE — TELEPHONE ENCOUNTER
Medication:posconazole  Last prescribing provider:Dr. rucker  Last clinic visit date: 6/19/2024   Recommendations for requested medication:ppx  Any other pertinent information:routed to Dr. Rucker for review

## 2024-07-22 NOTE — PROGRESS NOTES
Oral Chemotherapy Monitoring Program  Lab Follow Up    Reviewed lab results from 7/22/24 and 7/18/24.        6/7/2024     9:00 AM 6/10/2024     9:00 AM 6/26/2024     3:00 PM 7/1/2024     3:00 PM 7/8/2024     3:00 PM 7/11/2024    12:00 PM 7/22/2024     2:00 PM   ORAL CHEMOTHERAPY   Assessment Type Lab Monitoring Refill Lab Monitoring Lab Monitoring;Monthly Follow up Lab Monitoring Lab Monitoring Lab Monitoring   Diagnosis Code Myelodysplastic Syndrome Myelodysplastic Syndrome Myelodysplastic Syndrome Myelodysplastic Syndrome Myelodysplastic Syndrome Myelodysplastic Syndrome Myelodysplastic Syndrome   Providers Dr Bhavin Delcid   [facility]   Is this patient followed by the [facility] OC team? No No No No No No No   Drug Name Venclexta (venetoclax) Venclexta (venetoclax) Venclexta (venetoclax) Venclexta (venetoclax) Venclexta (venetoclax) Venclexta (venetoclax) Venclexta (venetoclax)   Dose 100 mg 100 mg 100 mg 100 mg 100 mg 100 mg 100 mg   Current Schedule Daily Daily Daily Daily Daily Daily Daily   Cycle Details 2 weeks on, 2 weeks off 2 weeks on, 2 weeks off 2 weeks on, 2 weeks off 2 weeks on, 2 weeks off 2 weeks on, 2 weeks off 2 weeks on, 2 weeks off 2 weeks on, 2 weeks off   Start Date of Last Cycle   5/20/2024       Planned next cycle start date    7/1/2024      Doses missed in last 2 weeks    0      Adherence Assessment    Adherent      Adverse Effects       Neutropenia;Thrombocytopenia   Neutropenia       Grade 2   Thrombocytopenia       Grade 1       Labs:  _  Result Component Current Result Ref Range   Sodium 138 (7/22/2024) 135 - 145 mmol/L     _  Result Component Current Result Ref Range   Potassium 4.2 (7/22/2024) 3.4 - 5.3 mmol/L     _  Result Component Current Result Ref Range   Calcium 9.5 (7/22/2024) 8.8 - 10.4 mg/dL     No results found for Mag within last 30 days.     _  Result Component  Current Result Ref Range   Phosphorus 2.8 (7/1/2024) 2.5 - 4.5 mg/dL     _  Result Component Current Result Ref Range   Albumin 3.9 (7/22/2024) 3.5 - 5.2 g/dL     _  Result Component Current Result Ref Range   Urea Nitrogen 10.4 (7/22/2024) 8.0 - 23.0 mg/dL     _  Result Component Current Result Ref Range   Creatinine 0.82 (7/22/2024) 0.67 - 1.17 mg/dL     _  Result Component Current Result Ref Range   AST 35 (7/22/2024) 0 - 45 U/L     _  Result Component Current Result Ref Range   ALT 26 (7/22/2024) 0 - 70 U/L     _  Result Component Current Result Ref Range   Bilirubin Total 0.7 (7/22/2024) <=1.2 mg/dL     _  Result Component Current Result Ref Range   WBC Count 2.2 (L) (7/22/2024) 4.0 - 11.0 10e3/uL     _  Result Component Current Result Ref Range   Hemoglobin 14.8 (7/22/2024) 13.3 - 17.7 g/dL     _  Result Component Current Result Ref Range   Platelet Count 80 (L) (7/22/2024) 150 - 450 10e3/uL     No results found for ANC within last 30 days.     _  Result Component Current Result Ref Range   Absolute Neutrophils 1.0 (L) (7/22/2024) 1.6 - 8.3 10e3/uL        Assessment & Plan:  Results are notable for thrombocytopenia grade 1 and neutropenia grade 2 on 7/22. No adjustments needed for now. Continue to monitor for possible platelet transfusion.   Patient is not contacted as was seen in infusion.    Follow-Up:  Lab draw with infusion 7/29    Jacquelyn Wray  Pharmacy Intern  Oral Chemotherapy Monitoring Program  [facility]  [phone]

## 2024-07-23 ENCOUNTER — TELEPHONE (OUTPATIENT)
Dept: CARDIOLOGY | Facility: CLINIC | Age: 69
End: 2024-07-23
Payer: COMMERCIAL

## 2024-07-23 DIAGNOSIS — Z01.818 PREOP EXAMINATION: Primary | ICD-10-CM

## 2024-07-23 DIAGNOSIS — D46.9 MDS (MYELODYSPLASTIC SYNDROME) (H): ICD-10-CM

## 2024-07-23 DIAGNOSIS — Z86.2 PERSONAL HISTORY OF DISEASES OF BLOOD AND BLOOD-FORMING ORGANS: ICD-10-CM

## 2024-07-23 DIAGNOSIS — Z11.59 SCREENING FOR VIRAL DISEASE: ICD-10-CM

## 2024-07-23 LAB — CULTURE HARVEST COMPLETE DATE: NORMAL

## 2024-07-23 NOTE — TELEPHONE ENCOUNTER
[facility] Call Center    Phone Message    May a detailed message be left on voicemail: yes     Reason for Call: Appointment Intake      Referring Provider Name: Brenda Murphy MBBS in  BMT     Diagnosis and/or Symptoms: Pre-Op for BMT transplant     Sending for review of scheduling - PT said he is having BMT tx - admit on 8/16, and TX on /23    Action Taken: Message routed to:  [facility] Cardio    Travel Screening: Not Applicable     Date of Service:

## 2024-07-23 NOTE — TELEPHONE ENCOUNTER
7/23/2024 6:14PM Whitley Arias  Patient confirmed scheduled appointment:  Date: 7/29/2024  Time: 3PM  Visit type: New Cardiology  Provider: Dr. Tran  [place] [address] [place] [id number]  Testing/imaging: NA  Additional notes: 7/23 Scheduled New Cardiology w/ Dr. Tran 7/29 at [facility]. Ok per Blayne PRADHAN to use held spot. Removed hold. CHRISTIANO Arias 7/23/2024 6:14PM

## 2024-07-24 ENCOUNTER — TELEPHONE (OUTPATIENT)
Dept: TRANSPLANT | Facility: CLINIC | Age: 69
End: 2024-07-24
Payer: COMMERCIAL

## 2024-07-24 NOTE — TELEPHONE ENCOUNTER
RECORDS RECEIVED FROM:    DATE RECEIVED:    NOTES STATUS DETAILS   OFFICE NOTE from referring provider  Internal Dr. Murphy   OFFICE NOTE from other cardiologists  N/A    RECORDS from hospital/ED N/A    MEDICATION LIST Internal    GENERAL CARDIO RECORDS   (ALL APPOINTMENT TYPES)     LABS (CBC,BMP,CMP, TSH) Internal    EKG (STRIPS & REPORTS) Received 3-27-24 in Epic   MONITORS (STRIPS & REPORTS) N/A    ECHOS (IMAGES AND REPORTS) In process 8-30-23 Requested   STRESS TESTS (IMAGES AND REPORTS) In process 8-30-23 Requested   cMRI (IMAGES AND REPORTS) N/A    Cardiac cath (IMAGES AND REPORTS) N/A    CT/CTA (IMAGES AND REPORTS) Received 4-25-24 in PACS     Action 7/24/24 [facility]  Fax #[phone]   Action Taken Requested echo stress 8-30-23    Resolved images in PACS 7/24

## 2024-07-25 ENCOUNTER — INFUSION THERAPY VISIT (OUTPATIENT)
Dept: INFUSION THERAPY | Facility: CLINIC | Age: 69
End: 2024-07-25
Attending: STUDENT IN AN ORGANIZED HEALTH CARE EDUCATION/TRAINING PROGRAM
Payer: COMMERCIAL

## 2024-07-25 DIAGNOSIS — D46.9 MDS (MYELODYSPLASTIC SYNDROME) (H): ICD-10-CM

## 2024-07-25 LAB
ALBUMIN SERPL BCG-MCNC: 3.9 G/DL (ref 3.5–5.2)
ALP SERPL-CCNC: 126 U/L (ref 40–150)
ALT SERPL W P-5'-P-CCNC: 28 U/L (ref 0–70)
ANION GAP SERPL CALCULATED.3IONS-SCNC: 5 MMOL/L (ref 7–15)
AST SERPL W P-5'-P-CCNC: 32 U/L (ref 0–45)
BASOPHILS # BLD AUTO: 0 10E3/UL (ref 0–0.2)
BASOPHILS NFR BLD AUTO: 1 %
BILIRUB SERPL-MCNC: 0.7 MG/DL
BUN SERPL-MCNC: 12 MG/DL (ref 8–23)
CALCIUM SERPL-MCNC: 9.3 MG/DL (ref 8.8–10.4)
CHLORIDE SERPL-SCNC: 103 MMOL/L (ref 98–107)
CREAT SERPL-MCNC: 0.85 MG/DL (ref 0.67–1.17)
EGFRCR SERPLBLD CKD-EPI 2021: >90 ML/MIN/1.73M2
EOSINOPHIL # BLD AUTO: 0 10E3/UL (ref 0–0.7)
EOSINOPHIL NFR BLD AUTO: 1 %
ERYTHROCYTE [DISTWIDTH] IN BLOOD BY AUTOMATED COUNT: 16.6 % (ref 10–15)
GLUCOSE SERPL-MCNC: 92 MG/DL (ref 70–99)
HCO3 SERPL-SCNC: 30 MMOL/L (ref 22–29)
HCT VFR BLD AUTO: 42 % (ref 40–53)
HGB BLD-MCNC: 14.3 G/DL (ref 13.3–17.7)
IMM GRANULOCYTES # BLD: 0 10E3/UL
IMM GRANULOCYTES NFR BLD: 1 %
LYMPHOCYTES # BLD AUTO: 0.6 10E3/UL (ref 0.8–5.3)
LYMPHOCYTES NFR BLD AUTO: 31 %
MCH RBC QN AUTO: 29.2 PG (ref 26.5–33)
MCHC RBC AUTO-ENTMCNC: 34 G/DL (ref 31.5–36.5)
MCV RBC AUTO: 86 FL (ref 78–100)
MONOCYTES # BLD AUTO: 0.1 10E3/UL (ref 0–1.3)
MONOCYTES NFR BLD AUTO: 5 %
NEUTROPHILS # BLD AUTO: 1.3 10E3/UL (ref 1.6–8.3)
NEUTROPHILS NFR BLD AUTO: 63 %
NRBC # BLD AUTO: 0 10E3/UL
NRBC BLD AUTO-RTO: 0 /100
PLATELET # BLD AUTO: 53 10E3/UL (ref 150–450)
POTASSIUM SERPL-SCNC: 4.1 MMOL/L (ref 3.4–5.3)
PROT SERPL-MCNC: 6.7 G/DL (ref 6.4–8.3)
RBC # BLD AUTO: 4.9 10E6/UL (ref 4.4–5.9)
SODIUM SERPL-SCNC: 138 MMOL/L (ref 135–145)
WBC # BLD AUTO: 2 10E3/UL (ref 4–11)

## 2024-07-25 PROCEDURE — 36415 COLL VENOUS BLD VENIPUNCTURE: CPT

## 2024-07-25 PROCEDURE — 82040 ASSAY OF SERUM ALBUMIN: CPT | Performed by: STUDENT IN AN ORGANIZED HEALTH CARE EDUCATION/TRAINING PROGRAM

## 2024-07-25 PROCEDURE — 85025 COMPLETE CBC W/AUTO DIFF WBC: CPT | Performed by: STUDENT IN AN ORGANIZED HEALTH CARE EDUCATION/TRAINING PROGRAM

## 2024-07-25 NOTE — PROGRESS NOTES
Leland Pearson presents today for labs, no transfusion needed.    Patient seen by provider today: No   present during visit today: Not Applicable.     Note: N/A.     Intravenous Access:  Lab draw site right ac, Needle type butterfly, Gauge 23  .  Labs drawn without difficulty.     Discharge Plan:   Patient was sent to home for future appointment.     Ecoh Thorne RN

## 2024-07-28 LAB
ABO/RH(D): NORMAL
ANTIBODY SCREEN: NEGATIVE
SPECIMEN EXPIRATION DATE: NORMAL

## 2024-07-29 ENCOUNTER — PRE VISIT (OUTPATIENT)
Dept: CARDIOLOGY | Facility: CLINIC | Age: 69
End: 2024-07-29
Payer: COMMERCIAL

## 2024-07-29 ENCOUNTER — LAB (OUTPATIENT)
Dept: INFUSION THERAPY | Facility: CLINIC | Age: 69
End: 2024-07-29
Attending: STUDENT IN AN ORGANIZED HEALTH CARE EDUCATION/TRAINING PROGRAM
Payer: COMMERCIAL

## 2024-07-29 ENCOUNTER — OFFICE VISIT (OUTPATIENT)
Dept: CARDIOLOGY | Facility: CLINIC | Age: 69
End: 2024-07-29
Attending: INTERNAL MEDICINE
Payer: COMMERCIAL

## 2024-07-29 VITALS
BODY MASS INDEX: 26.71 KG/M2 | HEART RATE: 76 BPM | WEIGHT: 200 LBS | OXYGEN SATURATION: 98 % | DIASTOLIC BLOOD PRESSURE: 95 MMHG | SYSTOLIC BLOOD PRESSURE: 169 MMHG

## 2024-07-29 DIAGNOSIS — D46.9 MDS (MYELODYSPLASTIC SYNDROME) (H): Primary | ICD-10-CM

## 2024-07-29 DIAGNOSIS — Z01.810 PRE-OPERATIVE CARDIOVASCULAR EXAMINATION: Primary | ICD-10-CM

## 2024-07-29 DIAGNOSIS — Z11.59 SCREENING FOR VIRAL DISEASE: ICD-10-CM

## 2024-07-29 DIAGNOSIS — D46.9 MDS (MYELODYSPLASTIC SYNDROME) (H): ICD-10-CM

## 2024-07-29 DIAGNOSIS — I10 BENIGN ESSENTIAL HYPERTENSION: ICD-10-CM

## 2024-07-29 DIAGNOSIS — Z01.818 PREOP EXAMINATION: ICD-10-CM

## 2024-07-29 DIAGNOSIS — Z79.899 ENCOUNTER FOR LONG-TERM (CURRENT) USE OF MEDICATIONS: ICD-10-CM

## 2024-07-29 DIAGNOSIS — Z86.2 PERSONAL HISTORY OF DISEASES OF BLOOD AND BLOOD-FORMING ORGANS: ICD-10-CM

## 2024-07-29 LAB
ALBUMIN SERPL BCG-MCNC: 3.9 G/DL (ref 3.5–5.2)
ALP SERPL-CCNC: 128 U/L (ref 40–150)
ALT SERPL W P-5'-P-CCNC: 24 U/L (ref 0–70)
ANION GAP SERPL CALCULATED.3IONS-SCNC: 5 MMOL/L (ref 7–15)
AST SERPL W P-5'-P-CCNC: 30 U/L (ref 0–45)
BASOPHILS # BLD AUTO: 0 10E3/UL (ref 0–0.2)
BASOPHILS NFR BLD AUTO: 1 %
BILIRUB SERPL-MCNC: 0.9 MG/DL
BUN SERPL-MCNC: 12.9 MG/DL (ref 8–23)
CALCIUM SERPL-MCNC: 9.3 MG/DL (ref 8.8–10.4)
CHLORIDE SERPL-SCNC: 103 MMOL/L (ref 98–107)
CREAT SERPL-MCNC: 0.92 MG/DL (ref 0.67–1.17)
EGFRCR SERPLBLD CKD-EPI 2021: 89 ML/MIN/1.73M2
EOSINOPHIL # BLD AUTO: 0 10E3/UL (ref 0–0.7)
EOSINOPHIL NFR BLD AUTO: 2 %
ERYTHROCYTE [DISTWIDTH] IN BLOOD BY AUTOMATED COUNT: 16.8 % (ref 10–15)
GLUCOSE SERPL-MCNC: 111 MG/DL (ref 70–99)
HCO3 SERPL-SCNC: 30 MMOL/L (ref 22–29)
HCT VFR BLD AUTO: 42.8 % (ref 40–53)
HGB BLD-MCNC: 14.4 G/DL (ref 13.3–17.7)
IMM GRANULOCYTES # BLD: 0 10E3/UL
IMM GRANULOCYTES NFR BLD: 0 %
LYMPHOCYTES # BLD AUTO: 0.7 10E3/UL (ref 0.8–5.3)
LYMPHOCYTES NFR BLD AUTO: 53 %
MCH RBC QN AUTO: 28.7 PG (ref 26.5–33)
MCHC RBC AUTO-ENTMCNC: 33.6 G/DL (ref 31.5–36.5)
MCV RBC AUTO: 85 FL (ref 78–100)
MONOCYTES # BLD AUTO: 0 10E3/UL (ref 0–1.3)
MONOCYTES NFR BLD AUTO: 2 %
NEUTROPHILS # BLD AUTO: 0.6 10E3/UL (ref 1.6–8.3)
NEUTROPHILS NFR BLD AUTO: 42 %
NRBC # BLD AUTO: 0 10E3/UL
NRBC BLD AUTO-RTO: 0 /100
PHOSPHATE SERPL-MCNC: 3 MG/DL (ref 2.5–4.5)
PLAT MORPH BLD: NORMAL
PLATELET # BLD AUTO: 23 10E3/UL (ref 150–450)
POTASSIUM SERPL-SCNC: 4.2 MMOL/L (ref 3.4–5.3)
PROT SERPL-MCNC: 6.9 G/DL (ref 6.4–8.3)
RBC # BLD AUTO: 5.02 10E6/UL (ref 4.4–5.9)
RBC MORPH BLD: NORMAL
SODIUM SERPL-SCNC: 138 MMOL/L (ref 135–145)
URATE SERPL-MCNC: 4.5 MG/DL (ref 3.4–7)
WBC # BLD AUTO: 1.3 10E3/UL (ref 4–11)

## 2024-07-29 PROCEDURE — 86900 BLOOD TYPING SEROLOGIC ABO: CPT | Performed by: STUDENT IN AN ORGANIZED HEALTH CARE EDUCATION/TRAINING PROGRAM

## 2024-07-29 PROCEDURE — 80053 COMPREHEN METABOLIC PANEL: CPT | Performed by: STUDENT IN AN ORGANIZED HEALTH CARE EDUCATION/TRAINING PROGRAM

## 2024-07-29 PROCEDURE — 84550 ASSAY OF BLOOD/URIC ACID: CPT | Performed by: STUDENT IN AN ORGANIZED HEALTH CARE EDUCATION/TRAINING PROGRAM

## 2024-07-29 PROCEDURE — G0463 HOSPITAL OUTPT CLINIC VISIT: HCPCS | Performed by: INTERNAL MEDICINE

## 2024-07-29 PROCEDURE — 36415 COLL VENOUS BLD VENIPUNCTURE: CPT

## 2024-07-29 PROCEDURE — 85025 COMPLETE CBC W/AUTO DIFF WBC: CPT | Performed by: STUDENT IN AN ORGANIZED HEALTH CARE EDUCATION/TRAINING PROGRAM

## 2024-07-29 PROCEDURE — 99204 OFFICE O/P NEW MOD 45 MIN: CPT | Performed by: INTERNAL MEDICINE

## 2024-07-29 PROCEDURE — 84100 ASSAY OF PHOSPHORUS: CPT | Performed by: STUDENT IN AN ORGANIZED HEALTH CARE EDUCATION/TRAINING PROGRAM

## 2024-07-29 ASSESSMENT — PAIN SCALES - GENERAL: PAINLEVEL: NO PAIN (0)

## 2024-07-29 NOTE — PATIENT INSTRUCTIONS
Thank you for visiting the [facility] today.      Today you met with:   Dr. Tran      Medication Changes:   None      Follow up Appointment Information:  Follow up with Dr. Tran as needed      Additional Instructions:    1. Continue staying active and eat a heart healthy, low sodium diet.     2. Please keep current list of medications with you at all times.     3. Remember to weigh yourself daily after voiding and write it down on a log. If you have gained/lost 2 pounds overnight or 5 pounds in a week contact us for medication adjustments or further instructions.    4. Please call us immediately if you have syncope (fainting or passing out), chest pain, worsening edema (swelling or weight gain), or general worsening in how you are feeling.     ---------------------------------------------------------------------------------------------------------------    If you have questions or concerns please contact us at: [phone], Option #1

## 2024-07-29 NOTE — NURSING NOTE
Reviewed Med list  Completed AVS  After patient left clinic, I sent patient Magic Leaphart message that Dr. Tran would like to to check BP's at home for a couple weeks & forward us those results as he may need to adjust his medications.  Sent staff msg to Dakotah PRADHAN LPN for follow up.  Hillary Hernandez RN on 7/29/2024 at 3:34 PM

## 2024-07-29 NOTE — NURSING NOTE
Chief Complaint   Patient presents with    New Patient     new - BMT eval       Vitals were taken, medications reconciled.     Fletcher Isidro, Visit Facilitator    2:54 PM

## 2024-07-29 NOTE — PROGRESS NOTES
I am delighted to see Leland Pearson in consultation.The primary encounter diagnosis was Pre-operative cardiovascular examination. Diagnoses of Preop examination, Personal history of diseases of blood and blood-forming organs, Screening for viral disease, MDS (myelodysplastic syndrome) (H), and Benign essential hypertension were also pertinent to this visit.   As you know, the patient is a 69 year old  male. He   has a past medical history of Gastroesophageal reflux disease and Hypertension..    On this visit, the patient states that he has excellent exercise tolerance.  The patient denies chest pressure/discomfort, dyspnea, palpitations, near-syncope, syncope, orthopnea, paroxysmal nocturnal dyspnea, and lower extermity edema.    The patient's cardiovascular risk factors include hypertension.    The following portions of the patient's history were reviewed and updated as appropriate: allergies, current medications, past family history, past medical history, past social history, past surgical history, and the problem list.    PMH: The patient's past medical history includes:    Past Medical History:   Diagnosis Date    Gastroesophageal reflux disease     Hypertension       Past Surgical History:   Procedure Laterality Date    COLONOSCOPY      ESOPHAGOSCOPY, GASTROSCOPY, DUODENOSCOPY (EGD), COMBINED  7/28/2013    Procedure: COMBINED ESOPHAGOSCOPY, GASTROSCOPY, DUODENOSCOPY (EGD), BIOPSY SINGLE OR MULTIPLE;;  Surgeon: Franklin Barrera MD;  Location: [facility]    ESOPHAGOSCOPY, GASTROSCOPY, DUODENOSCOPY (EGD), COMBINED  7/28/2013    Procedure: COMBINED ESOPHAGOSCOPY, GASTROSCOPY, DUODENOSCOPY (EGD), REMOVE FOREIGN BODY;;  Surgeon: Franklin Barrera MD;  Location: [facility]    ORTHOPEDIC SURGERY      04 micro discectomy, acl  repair       The patient's medications as of the current encounter are:     Current Outpatient Medications   Medication Sig Dispense Refill    acyclovir (ZOVIRAX) 800 MG tablet Take 1 tablet (800  mg) by mouth every 12 hours for 150 days 60 tablet 4    allopurinol (ZYLOPRIM) 300 MG tablet Take 1 tablet (300 mg) by mouth daily 90 tablet 1    celecoxib (CELEBREX) 200 MG capsule Take 200 mg by mouth daily      famotidine (PEPCID) 20 MG tablet Famotidine Oral    daily    active      levofloxacin (LEVAQUIN) 250 MG tablet Take 1 tablet (250 mg) by mouth daily 30 tablet 1    lisinopril (ZESTRIL) 10 MG tablet Take 1 tablet by mouth daily at 2 pm      multivitamin, therapeutic with minerals (MULTI-VITAMIN) TABS Take 1 tablet by mouth daily      posaconazole (NOXAFIL) 100 MG DR tablet Take 3 tablets (300 mg) by mouth every morning 90 tablet 1    tamsulosin (FLOMAX) 0.4 MG capsule Tamsulosin Oral    daily    active         Labs:     Lab on 07/29/2024   Component Date Value Ref Range Status    Uric Acid 07/29/2024 4.5  3.4 - 7.0 mg/dL Final    Phosphorus 07/29/2024 3.0  2.5 - 4.5 mg/dL Final    Sodium 07/29/2024 138  135 - 145 mmol/L Final    Potassium 07/29/2024 4.2  3.4 - 5.3 mmol/L Final    Carbon Dioxide (CO2) 07/29/2024 30 (H)  22 - 29 mmol/L Final    Anion Gap 07/29/2024 5 (L)  7 - 15 mmol/L Final    Urea Nitrogen 07/29/2024 12.9  8.0 - 23.0 mg/dL Final    Creatinine 07/29/2024 0.92  0.67 - 1.17 mg/dL Final    GFR Estimate 07/29/2024 89  >60 mL/min/1.73m2 Final    eGFR calculated using 2021 CKD-EPI equation.    Calcium 07/29/2024 9.3  8.8 - 10.4 mg/dL Final    Reference intervals for this test were updated on 7/16/2024 to reflect our healthy population more accurately. There may be differences in the flagging of prior results with similar values performed with this method. Those prior results can be interpreted in the context of the updated reference intervals.    Chloride 07/29/2024 103  98 - 107 mmol/L Final    Glucose 07/29/2024 111 (H)  70 - 99 mg/dL Final    Alkaline Phosphatase 07/29/2024 128  40 - 150 U/L Final    AST 07/29/2024 30  0 - 45 U/L Final    ALT 07/29/2024 24  0 - 70 U/L Final    Protein Total  07/29/2024 6.9  6.4 - 8.3 g/dL Final    Albumin 07/29/2024 3.9  3.5 - 5.2 g/dL Final    Bilirubin Total 07/29/2024 0.9  <=1.2 mg/dL Final    WBC Count 07/29/2024 1.3 (L)  4.0 - 11.0 10e3/uL Final    Preliminary ANC is 0.55    RBC Count 07/29/2024 5.02  4.40 - 5.90 10e6/uL Final    Hemoglobin 07/29/2024 14.4  13.3 - 17.7 g/dL Final    Hematocrit 07/29/2024 42.8  40.0 - 53.0 % Final    MCV 07/29/2024 85  78 - 100 fL Final    MCH 07/29/2024 28.7  26.5 - 33.0 pg Final    MCHC 07/29/2024 33.6  31.5 - 36.5 g/dL Final    RDW 07/29/2024 16.8 (H)  10.0 - 15.0 % Final    Platelet Count 07/29/2024 23 (LL)  150 - 450 10e3/uL Final    % Neutrophils 07/29/2024 42  % Final    % Lymphocytes 07/29/2024 53  % Final    % Monocytes 07/29/2024 2  % Final    % Eosinophils 07/29/2024 2  % Final    % Basophils 07/29/2024 1  % Final    % Immature Granulocytes 07/29/2024 0  % Final    NRBCs per 100 WBC 07/29/2024 0  <1 /100 Final    Absolute Neutrophils 07/29/2024 0.6 (L)  1.6 - 8.3 10e3/uL Final    Absolute Lymphocytes 07/29/2024 0.7 (L)  0.8 - 5.3 10e3/uL Final    Absolute Monocytes 07/29/2024 0.0  0.0 - 1.3 10e3/uL Final    Absolute Eosinophils 07/29/2024 0.0  0.0 - 0.7 10e3/uL Final    Absolute Basophils 07/29/2024 0.0  0.0 - 0.2 10e3/uL Final    Absolute Immature Granulocytes 07/29/2024 0.0  <=0.4 10e3/uL Final    Absolute NRBCs 07/29/2024 0.0  10e3/uL Final    ABO/RH(D) 07/29/2024 O POS   Final    Antibody Screen 07/29/2024 Negative  Negative Final    SPECIMEN EXPIRATION DATE 07/29/2024 [id number]   Final    RBC Morphology 07/29/2024 Confirmed RBC Indices   Final    Platelet Assessment 07/29/2024 Automated Count Confirmed. Platelet morphology is normal.  Automated Count Confirmed. Platelet morphology is normal. Final       Allergies:  No Known Allergies    Family History:   Family History   Problem Relation Age of Onset    No Known Problems Brother     No Known Problems Brother     No Known Problems Brother     No Known Problems  Brother     Diabetes Brother     No Known Problems Sister     No Known Problems Daughter     No Known Problems Daughter        Psychosocial history:  reports that he has never smoked. He has never been exposed to tobacco smoke. He has never used smokeless tobacco. He reports current alcohol use. He reports that he does not use drugs.    Review of systems: negative for, palpitations, exertional chest pain or pressure, paroxysmal nocturnal dyspnea, dyspnea on exertion, orthopnea, lower extremity edema, syncope or near-syncope, and exercise intolerance    In addition,   General: No change in weight, sleep or appetite.  Normal energy.  No fever or chills  Eyes: Negative for vision changes or eye problems  ENT: No problems with ears, nose or throat.  No difficulty swallowing.  Resp: No coughing, wheezing or shortness of breath  GI: No nausea, vomiting,  heartburn, abdominal pain, diarrhea, constipation or change in bowel habits  : No urinary frequency or dysuria, bladder or kidney problems  Musculoskeletal: No significant muscle or joint pains  Neurologic: No headaches, numbness, tingling, weakness, problems with balance or coordination  Psychiatric: No problems with anxiety, depression or mental health  Heme/immune/allergy: MDS  Endocrine: No history of thyroid disease, diabetes or other endocrine disorders  Skin: No rashes,worrisome lesions or skin problems  Vascular:  No claudication, lifestyle limiting or otherwise; no ischemic rest pain; no non-healing ulcers. No weakness, No loss of sensation        Physical examination  Vitals: BP (!) 184/100 (BP Location: Right arm, Patient Position: Chair, Cuff Size: Adult Regular)   Pulse 78   Wt 90.7 kg (200 lb)   SpO2 98%   BMI 26.71 kg/m    BMI= Body mass index is 26.71 kg/m .    In general, the patient is a pleasant male in no apparent distress.    HEENT: Normiocephalic and atraumatic.  PERRLA.  EOMI.  Sclerae white, not injected.     Neck: No adenopathy.  No  thyromegaly. Carotids +2/2 bilaterally without bruits.  No jugular venous distension.   Heart:  The PMI is in the 5th ICS in the midclavicular line. There is no heave. Regular rate and rhythm. Normal S1, S2 splits physiologically. No murmur, rub, click, or gallop.    Lungs: Clear to asculation.  No ronchi, wheezes, rales.  No dullness to percussion.   Abdomen: Soft, nontender, nondistended. No organomegaly. No AAA.  No bruits.   Extremities: No clubbing, cyanosis, or edema. The pulses were intact bilaterally.   Neurological: The neurological examination reveal a patient who was oriented to person, place, and time.  The remainder of the examination was nonfocal.    Cardiac tests include:    8/30/23 - stress echo - normal EF, no ischemia, exercise duration 8:59 on the Derek protocol    Assessment and Plan    Preop - The stress test is normal suggesting that this patient has a low risk of cardiovascular complications during BMT.   HTN - patients states his BP at home is 140/90  - consider increasing lisinopril  - repeat BP - 169/95   - told to follow up with PCP    The patient is to return  prn. The patient understood the treatment plan as outlined above.  There were no barriers to learning. Patient accompanied by wife.      Fadi Tran MD

## 2024-07-29 NOTE — PROGRESS NOTES
Medical Assistant Note:  Leland Paerson presents today for lab draw.    Patient seen by provider today: No.   present during visit today: Not Applicable.    Concerns: No Concerns.    Procedure:  Lab draw site: LAC, Needle type: BF, Gauge: 21. Gauze and coban applied    Post Assessment:  Labs drawn without difficulty: Yes.    Discharge Plan:  Departure Mode: Ambulatory.    Face to Face Time: 5.    Patsy Brantley CMA

## 2024-07-29 NOTE — LETTER
7/29/2024      RE: Leland Pearson  [address] [place] [address]       Dear Colleague,    Thank you for the opportunity to participate in the care of your patient, Leland Pearson, at the [facility] [place] at [facility]. Please see a copy of my visit note below.    I am delighted to see Leland Pearson in consultation.The primary encounter diagnosis was Pre-operative cardiovascular examination. Diagnoses of Preop examination, Personal history of diseases of blood and blood-forming organs, Screening for viral disease, MDS (myelodysplastic syndrome) (H), and Benign essential hypertension were also pertinent to this visit.   As you know, the patient is a 69 year old  male. He   has a past medical history of Gastroesophageal reflux disease and Hypertension..    On this visit, the patient states that he has excellent exercise tolerance.  The patient denies chest pressure/discomfort, dyspnea, palpitations, near-syncope, syncope, orthopnea, paroxysmal nocturnal dyspnea, and lower extermity edema.    The patient's cardiovascular risk factors include hypertension.    The following portions of the patient's history were reviewed and updated as appropriate: allergies, current medications, past family history, past medical history, past social history, past surgical history, and the problem list.    PMH: The patient's past medical history includes:    Past Medical History:   Diagnosis Date     Gastroesophageal reflux disease      Hypertension       Past Surgical History:   Procedure Laterality Date     COLONOSCOPY       ESOPHAGOSCOPY, GASTROSCOPY, DUODENOSCOPY (EGD), COMBINED  7/28/2013    Procedure: COMBINED ESOPHAGOSCOPY, GASTROSCOPY, DUODENOSCOPY (EGD), BIOPSY SINGLE OR MULTIPLE;;  Surgeon: Franklin Barrera MD;  Location: [facility]     ESOPHAGOSCOPY, GASTROSCOPY, DUODENOSCOPY (EGD), COMBINED  7/28/2013    Procedure: COMBINED  ESOPHAGOSCOPY, GASTROSCOPY, DUODENOSCOPY (EGD), REMOVE FOREIGN BODY;;  Surgeon: Franklin Barrera MD;  Location:  GI     ORTHOPEDIC SURGERY      04 micro discectomy, acl  repair       The patient's medications as of the current encounter are:     Current Outpatient Medications   Medication Sig Dispense Refill     acyclovir (ZOVIRAX) 800 MG tablet Take 1 tablet (800 mg) by mouth every 12 hours for 150 days 60 tablet 4     allopurinol (ZYLOPRIM) 300 MG tablet Take 1 tablet (300 mg) by mouth daily 90 tablet 1     celecoxib (CELEBREX) 200 MG capsule Take 200 mg by mouth daily       famotidine (PEPCID) 20 MG tablet Famotidine Oral    daily    active       levofloxacin (LEVAQUIN) 250 MG tablet Take 1 tablet (250 mg) by mouth daily 30 tablet 1     lisinopril (ZESTRIL) 10 MG tablet Take 1 tablet by mouth daily at 2 pm       multivitamin, therapeutic with minerals (MULTI-VITAMIN) TABS Take 1 tablet by mouth daily       posaconazole (NOXAFIL) 100 MG DR tablet Take 3 tablets (300 mg) by mouth every morning 90 tablet 1     tamsulosin (FLOMAX) 0.4 MG capsule Tamsulosin Oral    daily    active         Labs:     Lab on 07/29/2024   Component Date Value Ref Range Status     Uric Acid 07/29/2024 4.5  3.4 - 7.0 mg/dL Final     Phosphorus 07/29/2024 3.0  2.5 - 4.5 mg/dL Final     Sodium 07/29/2024 138  135 - 145 mmol/L Final     Potassium 07/29/2024 4.2  3.4 - 5.3 mmol/L Final     Carbon Dioxide (CO2) 07/29/2024 30 (H)  22 - 29 mmol/L Final     Anion Gap 07/29/2024 5 (L)  7 - 15 mmol/L Final     Urea Nitrogen 07/29/2024 12.9  8.0 - 23.0 mg/dL Final     Creatinine 07/29/2024 0.92  0.67 - 1.17 mg/dL Final     GFR Estimate 07/29/2024 89  >60 mL/min/1.73m2 Final    eGFR calculated using 2021 CKD-EPI equation.     Calcium 07/29/2024 9.3  8.8 - 10.4 mg/dL Final    Reference intervals for this test were updated on 7/16/2024 to reflect our healthy population more accurately. There may be differences in the flagging of prior results with  similar values performed with this method. Those prior results can be interpreted in the context of the updated reference intervals.     Chloride 07/29/2024 103  98 - 107 mmol/L Final     Glucose 07/29/2024 111 (H)  70 - 99 mg/dL Final     Alkaline Phosphatase 07/29/2024 128  40 - 150 U/L Final     AST 07/29/2024 30  0 - 45 U/L Final     ALT 07/29/2024 24  0 - 70 U/L Final     Protein Total 07/29/2024 6.9  6.4 - 8.3 g/dL Final     Albumin 07/29/2024 3.9  3.5 - 5.2 g/dL Final     Bilirubin Total 07/29/2024 0.9  <=1.2 mg/dL Final     WBC Count 07/29/2024 1.3 (L)  4.0 - 11.0 10e3/uL Final    Preliminary ANC is 0.55     RBC Count 07/29/2024 5.02  4.40 - 5.90 10e6/uL Final     Hemoglobin 07/29/2024 14.4  13.3 - 17.7 g/dL Final     Hematocrit 07/29/2024 42.8  40.0 - 53.0 % Final     MCV 07/29/2024 85  78 - 100 fL Final     MCH 07/29/2024 28.7  26.5 - 33.0 pg Final     MCHC 07/29/2024 33.6  31.5 - 36.5 g/dL Final     RDW 07/29/2024 16.8 (H)  10.0 - 15.0 % Final     Platelet Count 07/29/2024 23 (LL)  150 - 450 10e3/uL Final     % Neutrophils 07/29/2024 42  % Final     % Lymphocytes 07/29/2024 53  % Final     % Monocytes 07/29/2024 2  % Final     % Eosinophils 07/29/2024 2  % Final     % Basophils 07/29/2024 1  % Final     % Immature Granulocytes 07/29/2024 0  % Final     NRBCs per 100 WBC 07/29/2024 0  <1 /100 Final     Absolute Neutrophils 07/29/2024 0.6 (L)  1.6 - 8.3 10e3/uL Final     Absolute Lymphocytes 07/29/2024 0.7 (L)  0.8 - 5.3 10e3/uL Final     Absolute Monocytes 07/29/2024 0.0  0.0 - 1.3 10e3/uL Final     Absolute Eosinophils 07/29/2024 0.0  0.0 - 0.7 10e3/uL Final     Absolute Basophils 07/29/2024 0.0  0.0 - 0.2 10e3/uL Final     Absolute Immature Granulocytes 07/29/2024 0.0  <=0.4 10e3/uL Final     Absolute NRBCs 07/29/2024 0.0  10e3/uL Final     ABO/RH(D) 07/29/2024 O POS   Final     Antibody Screen 07/29/2024 Negative  Negative Final     SPECIMEN EXPIRATION DATE 07/29/2024 [id number]   Final     RBC  Morphology 07/29/2024 Confirmed RBC Indices   Final     Platelet Assessment 07/29/2024 Automated Count Confirmed. Platelet morphology is normal.  Automated Count Confirmed. Platelet morphology is normal. Final       Allergies:  No Known Allergies    Family History:   Family History   Problem Relation Age of Onset     No Known Problems Brother      No Known Problems Brother      No Known Problems Brother      No Known Problems Brother      Diabetes Brother      No Known Problems Sister      No Known Problems Daughter      No Known Problems Daughter        Psychosocial history:  reports that he has never smoked. He has never been exposed to tobacco smoke. He has never used smokeless tobacco. He reports current alcohol use. He reports that he does not use drugs.    Review of systems: negative for, palpitations, exertional chest pain or pressure, paroxysmal nocturnal dyspnea, dyspnea on exertion, orthopnea, lower extremity edema, syncope or near-syncope, and exercise intolerance    In addition,   General: No change in weight, sleep or appetite.  Normal energy.  No fever or chills  Eyes: Negative for vision changes or eye problems  ENT: No problems with ears, nose or throat.  No difficulty swallowing.  Resp: No coughing, wheezing or shortness of breath  GI: No nausea, vomiting,  heartburn, abdominal pain, diarrhea, constipation or change in bowel habits  : No urinary frequency or dysuria, bladder or kidney problems  Musculoskeletal: No significant muscle or joint pains  Neurologic: No headaches, numbness, tingling, weakness, problems with balance or coordination  Psychiatric: No problems with anxiety, depression or mental health  Heme/immune/allergy: MDS  Endocrine: No history of thyroid disease, diabetes or other endocrine disorders  Skin: No rashes,worrisome lesions or skin problems  Vascular:  No claudication, lifestyle limiting or otherwise; no ischemic rest pain; no non-healing ulcers. No weakness, No loss of  sensation        Physical examination  Vitals: BP (!) 184/100 (BP Location: Right arm, Patient Position: Chair, Cuff Size: Adult Regular)   Pulse 78   Wt 90.7 kg (200 lb)   SpO2 98%   BMI 26.71 kg/m    BMI= Body mass index is 26.71 kg/m .    In general, the patient is a pleasant male in no apparent distress.    HEENT: Normiocephalic and atraumatic.  PERRLA.  EOMI.  Sclerae white, not injected.     Neck: No adenopathy.  No thyromegaly. Carotids +2/2 bilaterally without bruits.  No jugular venous distension.   Heart:  The PMI is in the 5th ICS in the midclavicular line. There is no heave. Regular rate and rhythm. Normal S1, S2 splits physiologically. No murmur, rub, click, or gallop.    Lungs: Clear to asculation.  No ronchi, wheezes, rales.  No dullness to percussion.   Abdomen: Soft, nontender, nondistended. No organomegaly. No AAA.  No bruits.   Extremities: No clubbing, cyanosis, or edema. The pulses were intact bilaterally.   Neurological: The neurological examination reveal a patient who was oriented to person, place, and time.  The remainder of the examination was nonfocal.    Cardiac tests include:    8/30/23 - stress echo - normal EF, no ischemia, exercise duration 8:59 on the Derek protocol    Assessment and Plan    Preop - The stress test is normal suggesting that this patient has a low risk of cardiovascular complications during BMT.   HTN - patients states his BP at home is 140/90  - consider increasing lisinopril  - repeat BP - 169/95   - told to follow up with PCP    The patient is to return  prn. The patient understood the treatment plan as outlined above.  There were no barriers to learning. Patient accompanied by wife.      Fadi Tran MD     Please do not hesitate to contact me if you have any questions/concerns.     Sincerely,     Fadi Tran MD

## 2024-07-31 LAB
ABO/RH(D): NORMAL
ANTIBODY SCREEN: NEGATIVE
SPECIMEN EXPIRATION DATE: NORMAL

## 2024-08-01 ENCOUNTER — INFUSION THERAPY VISIT (OUTPATIENT)
Dept: INFUSION THERAPY | Facility: CLINIC | Age: 69
End: 2024-08-01
Attending: STUDENT IN AN ORGANIZED HEALTH CARE EDUCATION/TRAINING PROGRAM
Payer: COMMERCIAL

## 2024-08-01 VITALS
OXYGEN SATURATION: 100 % | SYSTOLIC BLOOD PRESSURE: 175 MMHG | RESPIRATION RATE: 16 BRPM | DIASTOLIC BLOOD PRESSURE: 78 MMHG | HEART RATE: 59 BPM | TEMPERATURE: 98 F

## 2024-08-01 DIAGNOSIS — Z11.59 SCREENING FOR VIRAL DISEASE: ICD-10-CM

## 2024-08-01 DIAGNOSIS — Z01.818 PREOP EXAMINATION: ICD-10-CM

## 2024-08-01 DIAGNOSIS — D46.9 MDS (MYELODYSPLASTIC SYNDROME) (H): Primary | ICD-10-CM

## 2024-08-01 DIAGNOSIS — Z86.2 PERSONAL HISTORY OF DISEASES OF BLOOD AND BLOOD-FORMING ORGANS: ICD-10-CM

## 2024-08-01 DIAGNOSIS — D46.9 MDS (MYELODYSPLASTIC SYNDROME) (H): ICD-10-CM

## 2024-08-01 LAB
BASOPHILS # BLD AUTO: 0 10E3/UL (ref 0–0.2)
BASOPHILS NFR BLD AUTO: 2 %
BLD PROD TYP BPU: NORMAL
BLOOD COMPONENT TYPE: NORMAL
CODING SYSTEM: NORMAL
EOSINOPHIL # BLD AUTO: 0 10E3/UL (ref 0–0.7)
EOSINOPHIL NFR BLD AUTO: 3 %
ERYTHROCYTE [DISTWIDTH] IN BLOOD BY AUTOMATED COUNT: 16.8 % (ref 10–15)
FERRITIN SERPL-MCNC: 460 NG/ML (ref 31–409)
HCT VFR BLD AUTO: 42.7 % (ref 40–53)
HGB BLD-MCNC: 14.6 G/DL (ref 13.3–17.7)
IMM GRANULOCYTES # BLD: 0 10E3/UL
IMM GRANULOCYTES NFR BLD: 0 %
ISSUE DATE AND TIME: NORMAL
LYMPHOCYTES # BLD AUTO: 0.6 10E3/UL (ref 0.8–5.3)
LYMPHOCYTES NFR BLD AUTO: 85 %
MCH RBC QN AUTO: 28.7 PG (ref 26.5–33)
MCHC RBC AUTO-ENTMCNC: 34.2 G/DL (ref 31.5–36.5)
MCV RBC AUTO: 84 FL (ref 78–100)
MONOCYTES # BLD AUTO: 0 10E3/UL (ref 0–1.3)
MONOCYTES NFR BLD AUTO: 3 %
NEUTROPHILS # BLD AUTO: 0.1 10E3/UL (ref 1.6–8.3)
NEUTROPHILS NFR BLD AUTO: 7 %
NRBC # BLD AUTO: 0 10E3/UL
NRBC BLD AUTO-RTO: 0 /100
PLAT MORPH BLD: NORMAL
PLATELET # BLD AUTO: 12 10E3/UL (ref 150–450)
RBC # BLD AUTO: 5.08 10E6/UL (ref 4.4–5.9)
RBC MORPH BLD: NORMAL
UNIT ABO/RH: NORMAL
UNIT NUMBER: NORMAL
UNIT STATUS: NORMAL
UNIT TYPE ISBT: 6200
WBC # BLD AUTO: 0.7 10E3/UL (ref 4–11)

## 2024-08-01 PROCEDURE — 86900 BLOOD TYPING SEROLOGIC ABO: CPT | Performed by: STUDENT IN AN ORGANIZED HEALTH CARE EDUCATION/TRAINING PROGRAM

## 2024-08-01 PROCEDURE — 82728 ASSAY OF FERRITIN: CPT | Performed by: STUDENT IN AN ORGANIZED HEALTH CARE EDUCATION/TRAINING PROGRAM

## 2024-08-01 PROCEDURE — P9037 PLATE PHERES LEUKOREDU IRRAD: HCPCS | Performed by: STUDENT IN AN ORGANIZED HEALTH CARE EDUCATION/TRAINING PROGRAM

## 2024-08-01 PROCEDURE — 36430 TRANSFUSION BLD/BLD COMPNT: CPT

## 2024-08-01 PROCEDURE — 85025 COMPLETE CBC W/AUTO DIFF WBC: CPT | Performed by: STUDENT IN AN ORGANIZED HEALTH CARE EDUCATION/TRAINING PROGRAM

## 2024-08-01 PROCEDURE — 36415 COLL VENOUS BLD VENIPUNCTURE: CPT

## 2024-08-01 RX ORDER — DIPHENHYDRAMINE HYDROCHLORIDE 50 MG/ML
50 INJECTION INTRAMUSCULAR; INTRAVENOUS
Status: CANCELLED
Start: 2024-08-01

## 2024-08-01 RX ORDER — EPINEPHRINE 1 MG/ML
0.3 INJECTION, SOLUTION INTRAMUSCULAR; SUBCUTANEOUS EVERY 5 MIN PRN
Status: CANCELLED | OUTPATIENT
Start: 2024-08-01

## 2024-08-01 RX ORDER — HEPARIN SODIUM,PORCINE 10 UNIT/ML
5-20 VIAL (ML) INTRAVENOUS DAILY PRN
Status: CANCELLED | OUTPATIENT
Start: 2024-08-01

## 2024-08-01 RX ORDER — HEPARIN SODIUM (PORCINE) LOCK FLUSH IV SOLN 100 UNIT/ML 100 UNIT/ML
5 SOLUTION INTRAVENOUS
Status: CANCELLED | OUTPATIENT
Start: 2024-08-01

## 2024-08-01 NOTE — PROGRESS NOTES
Medical Assistant Note:  Leland Pearson presents today for lab draw.    Patient seen by provider today: No.   present during visit today: Not Applicable.    Concerns: No Concerns.    Procedure:  Lab draw site: RAC, Needle type: BF, Gauge: 21. Gauze and coban applied    Post Assessment:  Labs drawn without difficulty: Yes.    Discharge Plan:  Departure Mode: Ambulatory.    Face to Face Time: 5.    Patsy Brantley CMA

## 2024-08-01 NOTE — PROGRESS NOTES
Infusion Nursing Note:  Leland Pearson presents today for 1 unit platelets.    Patient seen by provider today: No   present during visit today: Not Applicable.    Note: Pt reports to having constipation, having BM about 2 times a day but it's hard and he has to push hard. Advised pt try Miralax and contact provider if does not help. Thrombocytopenia (bleeding) precaution reviewed with pt.    Pt hypertensive, taking Lisinopril- been followed by cardiologist.       Intravenous Access:  Peripheral IV placed.    Treatment Conditions:  Lab Results   Component Value Date    HGB 14.6 08/01/2024    WBC 0.7 (LL) 08/01/2024    ANEU 0.0 (LL) 06/21/2024    ANEUTAUTO 0.1 (LL) 08/01/2024    PLT 12 (LL) 08/01/2024        Results reviewed, labs MET treatment parameters, ok to proceed with treatment.  Blood transfusion consent signed 6/7/2024.      Post Infusion Assessment:  Patient tolerated infusion without incident.  Blood return noted pre and post infusion.  Site patent and intact, free from redness, edema or discomfort.  No evidence of extravasations.  Access discontinued per protocol.       Discharge Plan:   Discharge instructions reviewed with: Patient and Family.  Patient and/or family verbalized understanding of discharge instructions and all questions answered.  AVS to patient via [employer].  Patient will return to [facility] for next appointment.   Patient discharged in stable condition accompanied by: self and wife.  Departure Mode: Ambulatory.      Jennifer Mccollum RN

## 2024-08-02 ENCOUNTER — MEDICAL CORRESPONDENCE (OUTPATIENT)
Dept: TRANSPLANT | Facility: CLINIC | Age: 69
End: 2024-08-02

## 2024-08-02 ENCOUNTER — OFFICE VISIT (OUTPATIENT)
Dept: ONCOLOGY | Facility: CLINIC | Age: 69
End: 2024-08-02
Attending: STUDENT IN AN ORGANIZED HEALTH CARE EDUCATION/TRAINING PROGRAM
Payer: COMMERCIAL

## 2024-08-02 ENCOUNTER — APPOINTMENT (OUTPATIENT)
Dept: LAB | Facility: CLINIC | Age: 69
End: 2024-08-02
Attending: STUDENT IN AN ORGANIZED HEALTH CARE EDUCATION/TRAINING PROGRAM
Payer: COMMERCIAL

## 2024-08-02 ENCOUNTER — MEDICAL CORRESPONDENCE (OUTPATIENT)
Dept: TRANSPLANT | Facility: CLINIC | Age: 69
End: 2024-08-02
Payer: COMMERCIAL

## 2024-08-02 VITALS
SYSTOLIC BLOOD PRESSURE: 163 MMHG | DIASTOLIC BLOOD PRESSURE: 81 MMHG | OXYGEN SATURATION: 98 % | RESPIRATION RATE: 16 BRPM | TEMPERATURE: 98.3 F | HEART RATE: 81 BPM

## 2024-08-02 DIAGNOSIS — D46.9 MDS (MYELODYSPLASTIC SYNDROME) (H): ICD-10-CM

## 2024-08-02 DIAGNOSIS — D46.9 MDS (MYELODYSPLASTIC SYNDROME) (H): Primary | ICD-10-CM

## 2024-08-02 LAB
ALBUMIN SERPL BCG-MCNC: 4.1 G/DL (ref 3.5–5.2)
ALP SERPL-CCNC: 132 U/L (ref 40–150)
ALT SERPL W P-5'-P-CCNC: 19 U/L (ref 0–70)
ANION GAP SERPL CALCULATED.3IONS-SCNC: 10 MMOL/L (ref 7–15)
AST SERPL W P-5'-P-CCNC: 32 U/L (ref 0–45)
BASOPHILS # BLD AUTO: ABNORMAL 10*3/UL
BASOPHILS # BLD MANUAL: 0 10E3/UL (ref 0–0.2)
BASOPHILS NFR BLD AUTO: ABNORMAL %
BASOPHILS NFR BLD MANUAL: 2 %
BILIRUB SERPL-MCNC: 0.9 MG/DL
BUN SERPL-MCNC: 12.4 MG/DL (ref 8–23)
CALCIUM SERPL-MCNC: 9.3 MG/DL (ref 8.8–10.4)
CHLORIDE SERPL-SCNC: 104 MMOL/L (ref 98–107)
CREAT SERPL-MCNC: 0.74 MG/DL (ref 0.67–1.17)
EGFRCR SERPLBLD CKD-EPI 2021: >90 ML/MIN/1.73M2
EOSINOPHIL # BLD AUTO: ABNORMAL 10*3/UL
EOSINOPHIL # BLD MANUAL: 0 10E3/UL (ref 0–0.7)
EOSINOPHIL NFR BLD AUTO: ABNORMAL %
EOSINOPHIL NFR BLD MANUAL: 2 %
ERYTHROCYTE [DISTWIDTH] IN BLOOD BY AUTOMATED COUNT: 17.1 % (ref 10–15)
GLUCOSE SERPL-MCNC: 106 MG/DL (ref 70–99)
HCO3 SERPL-SCNC: 24 MMOL/L (ref 22–29)
HCT VFR BLD AUTO: 40.5 % (ref 40–53)
HGB BLD-MCNC: 14 G/DL (ref 13.3–17.7)
IMM GRANULOCYTES # BLD: ABNORMAL 10*3/UL
IMM GRANULOCYTES NFR BLD: ABNORMAL %
INTERPRETATION: NORMAL
LOCATION OF TASK: NORMAL
LYMPHOCYTES # BLD AUTO: ABNORMAL 10*3/UL
LYMPHOCYTES # BLD MANUAL: 0.5 10E3/UL (ref 0.8–5.3)
LYMPHOCYTES NFR BLD AUTO: ABNORMAL %
LYMPHOCYTES NFR BLD MANUAL: 86 %
MCH RBC QN AUTO: 28.7 PG (ref 26.5–33)
MCHC RBC AUTO-ENTMCNC: 34.6 G/DL (ref 31.5–36.5)
MCV RBC AUTO: 83 FL (ref 78–100)
METAMYELOCYTES # BLD MANUAL: 0 10E3/UL
METAMYELOCYTES NFR BLD MANUAL: 2 %
MONOCYTES # BLD AUTO: ABNORMAL 10*3/UL
MONOCYTES # BLD MANUAL: 0 10E3/UL (ref 0–1.3)
MONOCYTES NFR BLD AUTO: ABNORMAL %
MONOCYTES NFR BLD MANUAL: 4 %
NEUTROPHILS # BLD AUTO: ABNORMAL 10*3/UL
NEUTROPHILS # BLD MANUAL: 0 10E3/UL (ref 1.6–8.3)
NEUTROPHILS NFR BLD AUTO: ABNORMAL %
NEUTROPHILS NFR BLD MANUAL: 4 %
NRBC # BLD AUTO: 0 10E3/UL
NRBC BLD AUTO-RTO: 0 /100
PATH REPORT.COMMENTS IMP SPEC: NORMAL
PATH REPORT.FINAL DX SPEC: NORMAL
PATH REPORT.MICROSCOPIC SPEC OTHER STN: NORMAL
PATH REPORT.RELEVANT HX SPEC: NORMAL
PLAT MORPH BLD: ABNORMAL
PLATELET # BLD AUTO: 24 10E3/UL (ref 150–450)
POTASSIUM SERPL-SCNC: 4.3 MMOL/L (ref 3.4–5.3)
PROT SERPL-MCNC: 7 G/DL (ref 6.4–8.3)
RBC # BLD AUTO: 4.88 10E6/UL (ref 4.4–5.9)
RBC MORPH BLD: ABNORMAL
SIGNIFICANT RESULTS: NORMAL
SODIUM SERPL-SCNC: 138 MMOL/L (ref 135–145)
SPECIMEN DESCRIPTION: NORMAL
TEST DETAILS, MDL: NORMAL
WBC # BLD AUTO: 0.6 10E3/UL (ref 4–11)

## 2024-08-02 PROCEDURE — 88189 FLOWCYTOMETRY/READ 16 & >: CPT | Mod: GC | Performed by: PATHOLOGY

## 2024-08-02 PROCEDURE — 85097 BONE MARROW INTERPRETATION: CPT | Performed by: PATHOLOGY

## 2024-08-02 PROCEDURE — 81175 ASXL1 FULL GENE SEQUENCE: CPT

## 2024-08-02 PROCEDURE — 85027 COMPLETE CBC AUTOMATED: CPT | Performed by: PHYSICIAN ASSISTANT

## 2024-08-02 PROCEDURE — 88291 CYTO/MOLECULAR REPORT: CPT | Mod: XU | Performed by: MEDICAL GENETICS

## 2024-08-02 PROCEDURE — 88342 IMHCHEM/IMCYTCHM 1ST ANTB: CPT | Mod: 26 | Performed by: PATHOLOGY

## 2024-08-02 PROCEDURE — 88311 DECALCIFY TISSUE: CPT | Mod: TC | Performed by: PHYSICIAN ASSISTANT

## 2024-08-02 PROCEDURE — 96374 THER/PROPH/DIAG INJ IV PUSH: CPT | Mod: 59 | Performed by: PHYSICIAN ASSISTANT

## 2024-08-02 PROCEDURE — 250N000011 HC RX IP 250 OP 636: Mod: JW | Performed by: PHYSICIAN ASSISTANT

## 2024-08-02 PROCEDURE — 88271 CYTOGENETICS DNA PROBE: CPT | Performed by: PHYSICIAN ASSISTANT

## 2024-08-02 PROCEDURE — 38222 DX BONE MARROW BX & ASPIR: CPT | Performed by: PHYSICIAN ASSISTANT

## 2024-08-02 PROCEDURE — 81120 IDH1 COMMON VARIANTS: CPT

## 2024-08-02 PROCEDURE — 88184 FLOWCYTOMETRY/ TC 1 MARKER: CPT | Performed by: PHYSICIAN ASSISTANT

## 2024-08-02 PROCEDURE — 88185 FLOWCYTOMETRY/TC ADD-ON: CPT | Performed by: PHYSICIAN ASSISTANT

## 2024-08-02 PROCEDURE — G0452 MOLECULAR PATHOLOGY INTERPR: HCPCS | Mod: 26 | Performed by: STUDENT IN AN ORGANIZED HEALTH CARE EDUCATION/TRAINING PROGRAM

## 2024-08-02 PROCEDURE — 88305 TISSUE EXAM BY PATHOLOGIST: CPT | Mod: 26 | Performed by: PATHOLOGY

## 2024-08-02 PROCEDURE — 81450 HL NEO GSAP 5-50DNA/DNA&RNA: CPT | Performed by: PHYSICIAN ASSISTANT

## 2024-08-02 PROCEDURE — 88311 DECALCIFY TISSUE: CPT | Mod: 26 | Performed by: PATHOLOGY

## 2024-08-02 PROCEDURE — 36415 COLL VENOUS BLD VENIPUNCTURE: CPT | Performed by: PHYSICIAN ASSISTANT

## 2024-08-02 PROCEDURE — 88313 SPECIAL STAINS GROUP 2: CPT | Mod: 26 | Performed by: PATHOLOGY

## 2024-08-02 PROCEDURE — 88237 TISSUE CULTURE BONE MARROW: CPT | Performed by: PHYSICIAN ASSISTANT

## 2024-08-02 PROCEDURE — 80053 COMPREHEN METABOLIC PANEL: CPT | Performed by: PHYSICIAN ASSISTANT

## 2024-08-02 PROCEDURE — 88341 IMHCHEM/IMCYTCHM EA ADD ANTB: CPT | Mod: 26 | Performed by: PATHOLOGY

## 2024-08-02 PROCEDURE — 85007 BL SMEAR W/DIFF WBC COUNT: CPT | Performed by: PHYSICIAN ASSISTANT

## 2024-08-02 RX ORDER — PROCHLORPERAZINE MALEATE 5 MG
10 TABLET ORAL EVERY 6 HOURS PRN
Status: ON HOLD | COMMUNITY
End: 2024-09-18

## 2024-08-02 RX ADMIN — MIDAZOLAM HYDROCHLORIDE 1 MG: 1 INJECTION, SOLUTION INTRAMUSCULAR; INTRAVENOUS at 10:00

## 2024-08-02 NOTE — NURSING NOTE
"Oncology Rooming Note    August 2, 2024 10:23 AM   Leland Pearson is a 69 year old male who presents for:    Chief Complaint   Patient presents with    Oncology Clinic Visit     BMBX related to MDS     Initial Vitals: BP (!) 173/88   Pulse 84   Temp 98.6  F (37  C) (Oral)   Resp 18   SpO2 98%  Estimated body mass index is 26.71 kg/m  as calculated from the following:    Height as of 7/5/24: 1.843 m (6' 0.56\").    Weight as of 7/29/24: 90.7 kg (200 lb). There is no height or weight on file to calculate BSA.  Data Unavailable Comment: Data Unavailable   No LMP for male patient.  Allergies reviewed: Yes  Medications reviewed: Yes    Medications: Medication refills not needed today.  Pharmacy name entered into "[employer]:    [id number] [address] [facility] - [address]     Frailty Screening:   Is the patient here for a new oncology consult visit in cancer care? 2. No      Clinical concerns: NA. Pt hypertensive. Pt states he's been checking blood pressure at home and is following recommendations from Cardiology.  Ela ANGEL was NOT notified.      Mark Grider RN      Pre-procedure labs drawn via right AC. Post procedure: pt tolerated 30 minute bedrest without issues. Patient vital signs stable, ambulating, site is clean, dry and intact prior to discharge and line removed. Pt discharged with wife Vani as .       Provider order received to administer Versed 1mg IVP as premed for BMBX. Procedural consent discussed and pt's signature obtained.  Allergies reviewed.  PT currently alert and oriented to plan of care.  Pt lying prone in stretcher.  Call light w/in reach.  Provider and  at bedside.       BMBX Teaching and Assessment       Teaching concerns addressed: Bone marrow biopsy and infection prevention.     Person(s) involved in teaching: Patient  Motivation Level  Asks Questions: Yes  Eager to Learn: " Yes  Cooperative: Yes  Receptive (willing/able to accept information): Yes    Patient demonstrates understanding of the following:     Reason for the appointment, diagnosis and treatment plan: Yes  Knowledge of proper use of medications and conditions for which they are ordered (with special attention to potential side effects or drug interactions): Yes  Which situations necessitate calling provider and whom to contact: Yes    Teaching concerns addressed:   Reviewed activity restrictions if received premeds, potential for bleeding and actions to take if develops any of the issues below    Pain management techniques: Yes  Patient instructed on hand hygiene: Yes  How and/when to access community resources: Yes    Infection Control:  Patient demonstrates understanding of the following:   Bone marrow procedure site care taught: Yes  Signs and symptoms of infection taught: Yes       Instructional Materials Used/Given: pt tolerated bedrest x30 minutes. Pt instructed to keep bmbx site clean and dry for 24hrs. Pt educated to monitor site for signs of infection such as redness, rash, oozing, puss, bleeding, pain, and elevated temp. Pt instructed to go to call the [facility] triage line or go to the ER if any signs of infection should occur. Pt educated to not operate machinery if receiving versed. Pt and wife Vani verbalize understanding.

## 2024-08-02 NOTE — PROGRESS NOTES
BMT ONC Adult Bone Marrow Biopsy Procedure Note  August 2, 2024  BP (!) 173/88   Pulse 84   Temp 98.6  F (37  C) (Oral)   Resp 18   SpO2 98%      Learning needs assessment complete within 12 months? YES    DIAGNOSIS: MDS     PROCEDURE: Unilateral Bone Marrow Biopsy and Unilateral Aspirate    LOCATION: [facility]  Patient s identification was positively verified by verbal identification and invasive procedure safety checklist was completed. Informed consent was obtained. Following the administration of 1 mg Midazolam as pre-medication, patient was placed in the prone position and prepped and draped in a sterile manner. Approximately 10 cc of 1% Lidocaine was used over the left posterior iliac spine. Following this a 3 mm incision was made. Trephine bone marrow core(s) was (were) obtained from the [facility]. Bone marrow aspirates were obtained from the IC. Aspirates were sent for morphology, immunophenotyping, cytogenetics, and molecular diagnostics NGS. A total of approximately 22 ml of marrow was aspirated. Following this procedure a sterile dressing was applied to the bone marrow biopsy site(s). The patient was placed in the supine position to maintain pressure on the biopsy site. Post-procedure wound care instructions were given.     Complications: NO    Post-procedural pain assessment: 1 out of 10 on the numeric pain rating scale.     Interventions: NO, declined need for Tylenol or ice pack    Length of procedure:20 minutes or less      Procedure performed by:   Ela Dowling PA-C, assisted by Sue DAHL   [facility] [address] [address]  [phone]

## 2024-08-02 NOTE — NURSING NOTE
"Oncology Rooming Note    August 2, 2024 11:26 AM   Leland Pearson is a 69 year old male who presents for:    Chief Complaint   Patient presents with    Oncology Clinic Visit     BMBX related to MDS     Initial Vitals: BP (!) 163/81   Pulse 81   Temp 98.3  F (36.8  C) (Oral)   Resp 16   SpO2 98%  Estimated body mass index is 26.71 kg/m  as calculated from the following:    Height as of 7/5/24: 1.843 m (6' 0.56\").    Weight as of 7/29/24: 90.7 kg (200 lb). There is no height or weight on file to calculate BSA.  Data Unavailable Comment: Data Unavailable   No LMP for male patient.  Allergies reviewed: Yes  Medications reviewed: Yes    Medications: Medication refills not needed today.  Pharmacy name entered into [employer]:    [id number] - [address] [facility] - [facility] [address]    Frailty Screening:   Is the patient here for a new oncology consult visit in cancer care? {Frailty screening Yes/No:230052}      Clinical concerns: *** {PROVIDER NOTIFIED?:605593}      Mark Grider RN              "

## 2024-08-02 NOTE — Clinical Note
8/2/2024      Leland Pearson  [address] [place] [address]      Dear Colleague,    Thank you for referring your patient, Leland Pearson, to the [facility]. Please see a copy of my visit note below.    BMT ONC Adult Bone Marrow Biopsy Procedure Note  August 2, 2024  BP (!) 173/88   Pulse 84   Temp 98.6  F (37  C) (Oral)   Resp 18   SpO2 98%      Learning needs assessment complete within 12 months? YES    DIAGNOSIS: MDS     PROCEDURE: Unilateral Bone Marrow Biopsy and Unilateral Aspirate    LOCATION: [facility] [place] [facility] 2nd [facility]  Patient s identification was positively verified by verbal identification and invasive procedure safety checklist was completed. Informed consent was obtained. Following the administration of 1 mg Midazolam as pre-medication, patient was placed in the prone position and prepped and draped in a sterile manner. Approximately 10 cc of 1% Lidocaine was used over the left posterior iliac spine. Following this a 3 mm incision was made. Trephine bone marrow core(s) was (were) obtained from the [facility]. Bone marrow aspirates were obtained from the [facility]. Aspirates were sent for morphology, immunophenotyping, cytogenetics, and molecular diagnostics NGS. A total of approximately 22 ml of marrow was aspirated. Following this procedure a sterile dressing was applied to the bone marrow biopsy site(s). The patient was placed in the supine position to maintain pressure on the biopsy site. Post-procedure wound care instructions were given.     Complications: NO    Post-procedural pain assessment: 1 out of 10 on the numeric pain rating scale.     Interventions: NO, declined need for Tylenol or ice pack    Length of procedure:20 minutes or less      Procedure performed by:   Ela Dowling PA-C  [facility]  [address] [place] [id number]  [phone]          Again, thank you for allowing me to participate in the care of your patient.         Sincerely,        Ela Dowling PA-C

## 2024-08-03 ENCOUNTER — NURSE TRIAGE (OUTPATIENT)
Dept: NURSING | Facility: CLINIC | Age: 69
End: 2024-08-03
Payer: COMMERCIAL

## 2024-08-03 NOTE — TELEPHONE ENCOUNTER
Nurse Triage SBAR    Is this a 2nd Level Triage? NO    Situation: sore throat     Background:   BM biopsy yesterday at 10:30 am    Assessment:   After dinner last night, pt developed red sore on the side of his tongue  Mild to moderate throat pain. Pt has done salt and baking soda rinses. Pt now on soft diet due to painful swallowing    T 97.9F  Neutrophils 0    Protocol Recommended Disposition:   See PCP Within 24 Hours    Recommendation:     Paged to provider      Provider consult indicated.     Reason for page: sore throat    Page sent to Dr. Brown by Answering Service at 11:17 am.     Provider called back at 11:24 am. Will review chart and will call FNA back.    Mary Ann Butler RN                Reason for Disposition   Diabetes mellitus or weak immune system (e.g., HIV positive, cancer chemo, splenectomy, organ transplant, chronic steroids)    Additional Information   Negative: SEVERE difficulty breathing (e.g., struggling for each breath, speaks in single words, stridor)   Negative: Sounds like a life-threatening emergency to the triager   Negative: [1] Diagnosed strep throat AND [2] taking antibiotic AND [3] symptoms continue   Negative: Throat culture results, call about   Negative: Productive cough is main symptom   Negative: Non-productive cough is main symptom   Negative: Hoarseness is main symptom   Negative: Runny nose is main symptom   Negative: Uvula swelling is main symptom   Negative: [1] Drooling or spitting out saliva (because can't swallow) AND [2] normal breathing   Negative: Unable to open mouth completely   Negative: [1] Difficulty breathing AND [2] not severe   Negative: Fever > 104 F (40 C)   Negative: [1] Refuses to drink anything AND [2] for > 12 hours   Negative: [1] Drinking very little AND [2] dehydration suspected (e.g., no urine > 12 hours, very dry mouth, very lightheaded)   Negative: Patient sounds very sick or weak to the triager   Negative: [1] Pus on tonsils (back of throat)  "AND [2]  fever AND [3] swollen neck lymph nodes (\"glands\")   Negative: SEVERE (e.g., excruciating) throat pain   Negative: [1] Rash AND [2] widespread (especially chest and abdomen)   Negative: Earache also present   Negative: Fever present > 3 days (72 hours)    Protocols used: Sore Throat-A-AH    "

## 2024-08-03 NOTE — TELEPHONE ENCOUNTER
Call received from on-call at 11:47 am.     Per Dr. Brown, continue to monitor if he develops any new symptoms like cough, diarrhea o fever. If these occur, call FNA back to page for on call Heme/onc    Okay to take ES Tylenol for sore throat.    Provider Recommendation Follow Up:   Reached patient/caregiver. Informed of provider's recommendations. Patient verbalized understanding and agrees with the plan.     Pt just took Celebrex at 12nn, FNA advised to wait 3 hrs. If still having sore throat, okay to take Tylenol 1000 mg TID PRN for sore throat.          Mary Ann Butler RN/[place] Nurse Advisor

## 2024-08-05 ENCOUNTER — LAB (OUTPATIENT)
Dept: LAB | Facility: CLINIC | Age: 69
End: 2024-08-05
Payer: COMMERCIAL

## 2024-08-05 ENCOUNTER — OFFICE VISIT (OUTPATIENT)
Dept: TRANSPLANT | Facility: CLINIC | Age: 69
End: 2024-08-05
Attending: STUDENT IN AN ORGANIZED HEALTH CARE EDUCATION/TRAINING PROGRAM
Payer: COMMERCIAL

## 2024-08-05 ENCOUNTER — APPOINTMENT (OUTPATIENT)
Dept: LAB | Facility: CLINIC | Age: 69
End: 2024-08-05
Attending: STUDENT IN AN ORGANIZED HEALTH CARE EDUCATION/TRAINING PROGRAM
Payer: COMMERCIAL

## 2024-08-05 VITALS — SYSTOLIC BLOOD PRESSURE: 140 MMHG | DIASTOLIC BLOOD PRESSURE: 86 MMHG

## 2024-08-05 VITALS
TEMPERATURE: 98 F | DIASTOLIC BLOOD PRESSURE: 85 MMHG | SYSTOLIC BLOOD PRESSURE: 187 MMHG | OXYGEN SATURATION: 97 % | RESPIRATION RATE: 16 BRPM | WEIGHT: 198 LBS | HEART RATE: 60 BPM | BODY MASS INDEX: 26.44 KG/M2

## 2024-08-05 DIAGNOSIS — D46.9 MDS (MYELODYSPLASTIC SYNDROME) (H): ICD-10-CM

## 2024-08-05 DIAGNOSIS — Z01.818 PREOP EXAMINATION: ICD-10-CM

## 2024-08-05 DIAGNOSIS — Z11.59 SCREENING FOR VIRAL DISEASE: ICD-10-CM

## 2024-08-05 DIAGNOSIS — Z86.2 PERSONAL HISTORY OF DISEASES OF BLOOD AND BLOOD-FORMING ORGANS: ICD-10-CM

## 2024-08-05 DIAGNOSIS — Z71.9 VISIT FOR COUNSELING: Primary | ICD-10-CM

## 2024-08-05 DIAGNOSIS — Z76.82 STEM CELL TRANSPLANT CANDIDATE: ICD-10-CM

## 2024-08-05 DIAGNOSIS — D46.9 MDS (MYELODYSPLASTIC SYNDROME) (H): Primary | ICD-10-CM

## 2024-08-05 DIAGNOSIS — Z79.899 ENCOUNTER FOR LONG-TERM (CURRENT) USE OF MEDICATIONS: ICD-10-CM

## 2024-08-05 LAB
ACANTHOCYTES BLD QL SMEAR: SLIGHT
ALBUMIN SERPL BCG-MCNC: 4.3 G/DL (ref 3.5–5.2)
ALBUMIN UR-MCNC: NEGATIVE MG/DL
ALP SERPL-CCNC: 128 U/L (ref 40–150)
ALT SERPL W P-5'-P-CCNC: 21 U/L (ref 0–70)
ANION GAP SERPL CALCULATED.3IONS-SCNC: 8 MMOL/L (ref 7–15)
APPEARANCE UR: CLEAR
APTT PPP: 33 SECONDS (ref 22–38)
AST SERPL W P-5'-P-CCNC: 31 U/L (ref 0–45)
AUER BODIES BLD QL SMEAR: ABNORMAL
BASO STIPL BLD QL SMEAR: ABNORMAL
BASOPHILS # BLD AUTO: 0 10E3/UL (ref 0–0.2)
BASOPHILS NFR BLD AUTO: 0 %
BILIRUB SERPL-MCNC: 1 MG/DL
BILIRUB UR QL STRIP: NEGATIVE
BITE CELLS BLD QL SMEAR: ABNORMAL
BLD PROD TYP BPU: NORMAL
BLISTER CELLS BLD QL SMEAR: ABNORMAL
BLOOD COMPONENT TYPE: NORMAL
BUN SERPL-MCNC: 14.8 MG/DL (ref 8–23)
BURR CELLS BLD QL SMEAR: ABNORMAL
CALCIUM SERPL-MCNC: 9.6 MG/DL (ref 8.8–10.4)
CHLORIDE SERPL-SCNC: 102 MMOL/L (ref 98–107)
CMV IGG SERPL IA-ACNC: 0.28 U/ML
CMV IGG SERPL IA-ACNC: NORMAL
CODING SYSTEM: NORMAL
COLOR UR AUTO: ABNORMAL
CREAT SERPL-MCNC: 0.82 MG/DL (ref 0.67–1.17)
DACRYOCYTES BLD QL SMEAR: ABNORMAL
EBV VCA IGG SER IA-ACNC: >750 U/ML
EBV VCA IGG SER IA-ACNC: POSITIVE
EGFRCR SERPLBLD CKD-EPI 2021: >90 ML/MIN/1.73M2
ELLIPTOCYTES BLD QL SMEAR: ABNORMAL
EOSINOPHIL # BLD AUTO: 0 10E3/UL (ref 0–0.7)
EOSINOPHIL NFR BLD AUTO: 4 %
ERYTHROCYTE [DISTWIDTH] IN BLOOD BY AUTOMATED COUNT: 17.2 % (ref 10–15)
FRAGMENTS BLD QL SMEAR: ABNORMAL
GLUCOSE SERPL-MCNC: 102 MG/DL (ref 70–99)
GLUCOSE UR STRIP-MCNC: NEGATIVE MG/DL
HBV CORE AB SERPL QL IA: NONREACTIVE
HBV SURFACE AB SERPL IA-ACNC: 89.1 M[IU]/ML
HBV SURFACE AB SERPL IA-ACNC: REACTIVE M[IU]/ML
HBV SURFACE AG SERPL QL IA: NONREACTIVE
HCO3 SERPL-SCNC: 28 MMOL/L (ref 22–29)
HCT VFR BLD AUTO: 41.6 % (ref 40–53)
HCV AB SERPL QL IA: NONREACTIVE
HGB BLD-MCNC: 13.9 G/DL (ref 13.3–17.7)
HGB C CRYSTALS: ABNORMAL
HGB UR QL STRIP: ABNORMAL
HIV 1+2 AB+HIV1 P24 AG SERPL QL IA: NONREACTIVE
HOWELL-JOLLY BOD BLD QL SMEAR: ABNORMAL
HSV1 IGG SERPL QL IA: 0.25 INDEX
HSV1 IGG SERPL QL IA: NORMAL
HSV2 IGG SERPL QL IA: 0.06 INDEX
HSV2 IGG SERPL QL IA: NORMAL
IMM GRANULOCYTES # BLD: 0 10E3/UL
IMM GRANULOCYTES NFR BLD: 0 %
INR PPP: 0.99 (ref 0.85–1.15)
ISSUE DATE AND TIME: NORMAL
KETONES UR STRIP-MCNC: NEGATIVE MG/DL
LAB DIRECTOR DISCLAIMER: NORMAL
LAB DIRECTOR METHODOLOGY: NORMAL
LAB DIRECTOR RESULTS: NORMAL
LEUKOCYTE ESTERASE UR QL STRIP: NEGATIVE
LYMPHOCYTES # BLD AUTO: 0.6 10E3/UL (ref 0.8–5.3)
LYMPHOCYTES NFR BLD AUTO: 84 %
MCH RBC QN AUTO: 28.2 PG (ref 26.5–33)
MCHC RBC AUTO-ENTMCNC: 33.4 G/DL (ref 31.5–36.5)
MCV RBC AUTO: 84 FL (ref 78–100)
MONOCYTES # BLD AUTO: 0 10E3/UL (ref 0–1.3)
MONOCYTES NFR BLD AUTO: 4 %
NEUTROPHILS # BLD AUTO: 0.1 10E3/UL (ref 1.6–8.3)
NEUTROPHILS NFR BLD AUTO: 8 %
NEUTS HYPERSEG BLD QL SMEAR: ABNORMAL
NITRATE UR QL: NEGATIVE
NRBC # BLD AUTO: 0 10E3/UL
NRBC BLD AUTO-RTO: 0 /100
PATH REPORT.COMMENTS IMP SPEC: NORMAL
PATH REPORT.COMMENTS IMP SPEC: NORMAL
PATH REPORT.FINAL DX SPEC: NORMAL
PATH REPORT.GROSS SPEC: NORMAL
PATH REPORT.MICROSCOPIC SPEC OTHER STN: NORMAL
PATH REPORT.MICROSCOPIC SPEC OTHER STN: NORMAL
PATH REPORT.RELEVANT HX SPEC: NORMAL
PH UR STRIP: 6.5 [PH] (ref 5–7)
PHOSPHATE SERPL-MCNC: 3.5 MG/DL (ref 2.5–4.5)
PLAT MORPH BLD: ABNORMAL
PLATELET # BLD AUTO: 12 10E3/UL (ref 150–450)
POLYCHROMASIA BLD QL SMEAR: ABNORMAL
POTASSIUM SERPL-SCNC: 4.6 MMOL/L (ref 3.4–5.3)
PROT SERPL-MCNC: 7.5 G/DL (ref 6.4–8.3)
RBC # BLD AUTO: 4.93 10E6/UL (ref 4.4–5.9)
RBC AGGLUT BLD QL: ABNORMAL
RBC MORPH BLD: ABNORMAL
RBC URINE: 1 /HPF
ROULEAUX BLD QL SMEAR: ABNORMAL
SICKLE CELLS BLD QL SMEAR: ABNORMAL
SMUDGE CELLS BLD QL SMEAR: ABNORMAL
SODIUM SERPL-SCNC: 138 MMOL/L (ref 135–145)
SP GR UR STRIP: 1.01 (ref 1–1.03)
SPECIMEN DESCRIPTION: NORMAL
SPHEROCYTES BLD QL SMEAR: ABNORMAL
STOMATOCYTES BLD QL SMEAR: ABNORMAL
T PALLIDUM AB SER QL: NONREACTIVE
TARGETS BLD QL SMEAR: ABNORMAL
TOXIC GRANULES BLD QL SMEAR: ABNORMAL
UNIT ABO/RH: NORMAL
UNIT NUMBER: NORMAL
UNIT STATUS: NORMAL
UNIT TYPE ISBT: 5100
URATE SERPL-MCNC: 4.1 MG/DL (ref 3.4–7)
UROBILINOGEN UR STRIP-MCNC: NORMAL MG/DL
VARIANT LYMPHS BLD QL SMEAR: ABNORMAL
VZV IGG SER QL IA: 3760 INDEX
VZV IGG SER QL IA: POSITIVE
WBC # BLD AUTO: 0.7 10E3/UL (ref 4–11)
WBC URINE: <1 /HPF

## 2024-08-05 PROCEDURE — 86695 HERPES SIMPLEX TYPE 1 TEST: CPT

## 2024-08-05 PROCEDURE — 86803 HEPATITIS C AB TEST: CPT

## 2024-08-05 PROCEDURE — 86665 EPSTEIN-BARR CAPSID VCA: CPT

## 2024-08-05 PROCEDURE — 86828 HLA CLASS I&II ANTIBODY QUAL: CPT

## 2024-08-05 PROCEDURE — 81378 HLA I & II TYPING HR: CPT

## 2024-08-05 PROCEDURE — 36415 COLL VENOUS BLD VENIPUNCTURE: CPT

## 2024-08-05 PROCEDURE — 87340 HEPATITIS B SURFACE AG IA: CPT

## 2024-08-05 PROCEDURE — G2211 COMPLEX E/M VISIT ADD ON: HCPCS

## 2024-08-05 PROCEDURE — 84550 ASSAY OF BLOOD/URIC ACID: CPT

## 2024-08-05 PROCEDURE — 85730 THROMBOPLASTIN TIME PARTIAL: CPT

## 2024-08-05 PROCEDURE — 84100 ASSAY OF PHOSPHORUS: CPT

## 2024-08-05 PROCEDURE — 86777 TOXOPLASMA ANTIBODY: CPT

## 2024-08-05 PROCEDURE — 81001 URINALYSIS AUTO W/SCOPE: CPT

## 2024-08-05 PROCEDURE — 87516 HEPATITIS B DNA AMP PROBE: CPT

## 2024-08-05 PROCEDURE — 86787 VARICELLA-ZOSTER ANTIBODY: CPT

## 2024-08-05 PROCEDURE — 999N001093 HLA VIRTUAL CROSSMATCH (VXM), LIVING DONOR

## 2024-08-05 PROCEDURE — 86780 TREPONEMA PALLIDUM: CPT

## 2024-08-05 PROCEDURE — 86704 HEP B CORE ANTIBODY TOTAL: CPT

## 2024-08-05 PROCEDURE — 85610 PROTHROMBIN TIME: CPT

## 2024-08-05 PROCEDURE — 99215 OFFICE O/P EST HI 40 MIN: CPT

## 2024-08-05 PROCEDURE — 81265 STR MARKERS SPECIMEN ANAL: CPT

## 2024-08-05 PROCEDURE — 82040 ASSAY OF SERUM ALBUMIN: CPT

## 2024-08-05 PROCEDURE — 86790 VIRUS ANTIBODY NOS: CPT

## 2024-08-05 PROCEDURE — 87389 HIV-1 AG W/HIV-1&-2 AB AG IA: CPT

## 2024-08-05 PROCEDURE — 86644 CMV ANTIBODY: CPT

## 2024-08-05 PROCEDURE — 86753 PROTOZOA ANTIBODY NOS: CPT

## 2024-08-05 PROCEDURE — 83020 HEMOGLOBIN ELECTROPHORESIS: CPT

## 2024-08-05 PROCEDURE — 86696 HERPES SIMPLEX TYPE 2 TEST: CPT

## 2024-08-05 PROCEDURE — 85025 COMPLETE CBC W/AUTO DIFF WBC: CPT

## 2024-08-05 PROCEDURE — G0463 HOSPITAL OUTPT CLINIC VISIT: HCPCS

## 2024-08-05 PROCEDURE — 93010 ELECTROCARDIOGRAM REPORT: CPT | Performed by: INTERNAL MEDICINE

## 2024-08-05 PROCEDURE — 93005 ELECTROCARDIOGRAM TRACING: CPT

## 2024-08-05 PROCEDURE — 86706 HEP B SURFACE ANTIBODY: CPT

## 2024-08-05 RX ORDER — HEPARIN SODIUM,PORCINE 10 UNIT/ML
5-20 VIAL (ML) INTRAVENOUS DAILY PRN
Status: CANCELLED | OUTPATIENT
Start: 2024-08-05

## 2024-08-05 RX ORDER — HEPARIN SODIUM (PORCINE) LOCK FLUSH IV SOLN 100 UNIT/ML 100 UNIT/ML
5 SOLUTION INTRAVENOUS
Status: CANCELLED | OUTPATIENT
Start: 2024-08-05

## 2024-08-05 RX ORDER — EPINEPHRINE 1 MG/ML
0.3 INJECTION, SOLUTION INTRAMUSCULAR; SUBCUTANEOUS EVERY 5 MIN PRN
Status: CANCELLED | OUTPATIENT
Start: 2024-08-05

## 2024-08-05 RX ORDER — DIPHENHYDRAMINE HYDROCHLORIDE 50 MG/ML
50 INJECTION INTRAMUSCULAR; INTRAVENOUS
Status: CANCELLED
Start: 2024-08-05

## 2024-08-05 ASSESSMENT — ANXIETY QUESTIONNAIRES
3. WORRYING TOO MUCH ABOUT DIFFERENT THINGS: NOT AT ALL
GAD7 TOTAL SCORE: 1
GAD7 TOTAL SCORE: 1
2. NOT BEING ABLE TO STOP OR CONTROL WORRYING: NOT AT ALL
6. BECOMING EASILY ANNOYED OR IRRITABLE: NOT AT ALL
5. BEING SO RESTLESS THAT IT IS HARD TO SIT STILL: NOT AT ALL
1. FEELING NERVOUS, ANXIOUS, OR ON EDGE: SEVERAL DAYS
7. FEELING AFRAID AS IF SOMETHING AWFUL MIGHT HAPPEN: NOT AT ALL
IF YOU CHECKED OFF ANY PROBLEMS ON THIS QUESTIONNAIRE, HOW DIFFICULT HAVE THESE PROBLEMS MADE IT FOR YOU TO DO YOUR WORK, TAKE CARE OF THINGS AT HOME, OR GET ALONG WITH OTHER PEOPLE: NOT DIFFICULT AT ALL

## 2024-08-05 ASSESSMENT — PAIN SCALES - GENERAL: PAINLEVEL: MILD PAIN (2)

## 2024-08-05 ASSESSMENT — PATIENT HEALTH QUESTIONNAIRE - PHQ9
5. POOR APPETITE OR OVEREATING: NOT AT ALL
SUM OF ALL RESPONSES TO PHQ QUESTIONS 1-9: 1

## 2024-08-05 NOTE — LETTER
8/5/2024      Leland Pearson  [address] [place] [address]      Dear Colleague,    Thank you for referring your patient, Leland Pearson, to the [facility] [place]. Please see a copy of my visit note below.    [facility]  BMTCT OPEN VISIT    August 5, 2024      Leland Pearson is a 69 year old male undergoing evaluation prior to hematopoietic cell transplant or immune effector cell therapy.    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 If yes, why? No    Treatment for diabetes? No    Today, the patient notes the following symptoms:  Review Of Systems  10pt ROS otherwise neg      Leland Pearson's History    Past Medical History:   Diagnosis Date     Gastroesophageal reflux disease      Hypertension        Past Surgical History:   Procedure Laterality Date     COLONOSCOPY       ESOPHAGOSCOPY, GASTROSCOPY, DUODENOSCOPY (EGD), COMBINED  7/28/2013    Procedure: COMBINED ESOPHAGOSCOPY, GASTROSCOPY, DUODENOSCOPY (EGD), BIOPSY SINGLE OR MULTIPLE;;  Surgeon: Franklin Barrera MD;  Location:  GI     ESOPHAGOSCOPY, GASTROSCOPY, DUODENOSCOPY (EGD), COMBINED  7/28/2013    Procedure: COMBINED ESOPHAGOSCOPY, GASTROSCOPY, DUODENOSCOPY (EGD), REMOVE FOREIGN BODY;;  Surgeon: Franklin Barrera MD;  Location:  GI     ORTHOPEDIC SURGERY      04 micro discectomy, acl  repair       Family History   Problem Relation Age of Onset     No Known Problems Brother      No Known Problems Brother      No Known Problems Brother      No Known Problems Brother      Diabetes Brother      No Known Problems Sister      No Known Problems Daughter      No Known Problems Daughter        Social History     Tobacco Use     Smoking status: Never     Passive exposure: Never      Smokeless tobacco: Never   Substance Use Topics     Alcohol use: Yes     Comment: socially           Leland Pearson's Medications and Allergies    Current Outpatient Medications   Medication Sig Dispense Refill     acyclovir (ZOVIRAX) 800 MG tablet Take 1 tablet (800 mg) by mouth every 12 hours for 150 days 60 tablet 4     allopurinol (ZYLOPRIM) 300 MG tablet Take 1 tablet (300 mg) by mouth daily 90 tablet 1     celecoxib (CELEBREX) 200 MG capsule Take 200 mg by mouth daily       famotidine (PEPCID) 20 MG tablet Famotidine Oral    daily    active       levofloxacin (LEVAQUIN) 250 MG tablet Take 1 tablet (250 mg) by mouth daily 30 tablet 1     lisinopril (ZESTRIL) 10 MG tablet Take 1 tablet by mouth daily At 12:00pm       multivitamin, therapeutic with minerals (MULTI-VITAMIN) TABS Take 1 tablet by mouth daily       posaconazole (NOXAFIL) 100 MG DR tablet Take 3 tablets (300 mg) by mouth every morning 90 tablet 1     prochlorperazine (COMPAZINE) 5 MG tablet Take 10 mg by mouth every 6 hours as needed for nausea or vomiting       tamsulosin (FLOMAX) 0.4 MG capsule Tamsulosin Oral    daily    active       No current facility-administered medications for this visit.        No Known Allergies        Physical Examination    BP (!) 187/85 (BP Location: Right arm, Patient Position: Sitting, Cuff Size: Adult Large)   Pulse 60   Temp 98  F (36.7  C) (Oral)   Resp 16   Wt 89.8 kg (198 lb)   SpO2 97%   BMI 26.44 kg/m      Exam:  Constitutional: healthy, alert, and no distress  Head: Normocephalic. No masses, lesions, tenderness or abnormalities  ENT: ENT exam normal, no neck nodes or sinus tenderness  Cardiovascular: negative  Respiratory: negative  Gastrointestinal: Abdomen soft, non-tender. BS normal. No masses, organomegaly  : Deferred  Musculoskeletal: extremities normal- no gross deformities noted, gait normal, and normal muscle tone  Skin: no suspicious lesions or rashes  Neurologic: Gait normal. Reflexes  normal and symmetric. Sensation grossly WNL.  Psychiatric: mentation appears normal and affect normal/bright         Frailty Screening  BMT Fried Frailty          8/5/2024    12:30 6/11/2024    16:09   Fried Frailty   Lost>10 pounds unintenionally last year N N   Exhaustion Score 0 0   Slowness Score 0 0   Weakness/ Strength Score 0 0   Low Activity Level Score 0 0   Final Score Not Frail Not Frail   Final Score Number 0 0   Sit Stand Assessment   Patient able to perform 5 chair stands Y Y   Chair Stands in seconds 7 19   Patient is able to perform stand with Feet Side by Side? Y Y   First attempt (in seconds): 10 10   Patient is able to perform Semi-Tandem Stand? Y Y   First attemp (in seconds): 10 10   Patient is able to perform Tandem Stand? Y Y   First attemp (in seconds): 10 10                Overall Assessment       I have reviewed the diagnostic data, medications, frailty screening, and general processes prior to BMTCT.  I have notified the Primary BMT Physician/and or Attending Physician in the clinic of any issues. We also discussed in detail the database and biorepository research for which Leland Pearson is eligible. We discussed the potential risks and potential benefits of each of these protocols individually. We explained potential alternatives to the protocols discussed. We explained to the patient that participation is voluntary and that consent may be withdrawn at any time.       Consents Signed:   Blood transfusion consent form-not available  Ethnicity form-not available  [facility] database  [facility] biorepository    [facility] BMTCT Database    Present during the discussion were pt and spouse. Copies of the signed consent forms will be provided to the patient on admission. No procedures specific to any studies were performed prior to the patient signing the consent form.    Leland Perason had the opportunity to ask questions, and I answered all of the questions to the best of my ability.    I spent 40  minutes in the care of this patient today, which included time necessary for preparation for the visit, obtaining history, ordering medications/tests/procedures as medically indicated, review of pertinent medical literature, counseling of the patient, communication of recommendations to the care team, and documentation time.    The longitudinal plan of care for the diagnosis(es)/condition(s) as documented were addressed during this visit. Due to the added complexity in care, I will continue to support Leland in the subsequent management and with ongoing continuity of care.      IFEANYI Haynes CNP      Again, thank you for allowing me to participate in the care of your patient.        Sincerely,        BMT Advanced Practice Provider

## 2024-08-05 NOTE — PROGRESS NOTES
[facility]  BMTCT OPEN VISIT    August 5, 2024      Leland Pearson is a 69 year old male undergoing evaluation prior to hematopoietic cell transplant or immune effector cell therapy.    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 If yes, why? No    Treatment for diabetes? No    Today, the patient notes the following symptoms:  Review Of Systems  10pt ROS otherwise neg      Leland Pearson's History    Past Medical History:   Diagnosis Date    Gastroesophageal reflux disease     Hypertension        Past Surgical History:   Procedure Laterality Date    COLONOSCOPY      ESOPHAGOSCOPY, GASTROSCOPY, DUODENOSCOPY (EGD), COMBINED  7/28/2013    Procedure: COMBINED ESOPHAGOSCOPY, GASTROSCOPY, DUODENOSCOPY (EGD), BIOPSY SINGLE OR MULTIPLE;;  Surgeon: Franklin Barrera MD;  Location:  GI    ESOPHAGOSCOPY, GASTROSCOPY, DUODENOSCOPY (EGD), COMBINED  7/28/2013    Procedure: COMBINED ESOPHAGOSCOPY, GASTROSCOPY, DUODENOSCOPY (EGD), REMOVE FOREIGN BODY;;  Surgeon: Franklin Barrera MD;  Location:  GI    ORTHOPEDIC SURGERY      04 micro discectomy, acl  repair       Family History   Problem Relation Age of Onset    No Known Problems Brother     No Known Problems Brother     No Known Problems Brother     No Known Problems Brother     Diabetes Brother     No Known Problems Sister     No Known Problems Daughter     No Known Problems Daughter        Social History     Tobacco Use    Smoking status: Never     Passive exposure: Never    Smokeless tobacco: Never   Substance Use Topics    Alcohol use: Yes     Comment: socially           Leland Pearson's Medications and Allergies    Current Outpatient Medications   Medication Sig Dispense Refill    acyclovir (ZOVIRAX) 800 MG tablet Take 1 tablet (800 mg) by mouth every 12 hours  for 150 days 60 tablet 4    allopurinol (ZYLOPRIM) 300 MG tablet Take 1 tablet (300 mg) by mouth daily 90 tablet 1    celecoxib (CELEBREX) 200 MG capsule Take 200 mg by mouth daily      famotidine (PEPCID) 20 MG tablet Famotidine Oral    daily    active      levofloxacin (LEVAQUIN) 250 MG tablet Take 1 tablet (250 mg) by mouth daily 30 tablet 1    lisinopril (ZESTRIL) 10 MG tablet Take 1 tablet by mouth daily At 12:00pm      multivitamin, therapeutic with minerals (MULTI-VITAMIN) TABS Take 1 tablet by mouth daily      posaconazole (NOXAFIL) 100 MG DR tablet Take 3 tablets (300 mg) by mouth every morning 90 tablet 1    prochlorperazine (COMPAZINE) 5 MG tablet Take 10 mg by mouth every 6 hours as needed for nausea or vomiting      tamsulosin (FLOMAX) 0.4 MG capsule Tamsulosin Oral    daily    active       No current facility-administered medications for this visit.        No Known Allergies        Physical Examination    BP (!) 187/85 (BP Location: Right arm, Patient Position: Sitting, Cuff Size: Adult Large)   Pulse 60   Temp 98  F (36.7  C) (Oral)   Resp 16   Wt 89.8 kg (198 lb)   SpO2 97%   BMI 26.44 kg/m      Exam:  Constitutional: healthy, alert, and no distress  Head: Normocephalic. No masses, lesions, tenderness or abnormalities  ENT: ENT exam normal, no neck nodes or sinus tenderness  Cardiovascular: negative  Respiratory: negative  Gastrointestinal: Abdomen soft, non-tender. BS normal. No masses, organomegaly  : Deferred  Musculoskeletal: extremities normal- no gross deformities noted, gait normal, and normal muscle tone  Skin: no suspicious lesions or rashes  Neurologic: Gait normal. Reflexes normal and symmetric. Sensation grossly WNL.  Psychiatric: mentation appears normal and affect normal/bright         Frailty Screening  BMT Fried Frailty          8/5/2024    12:30 6/11/2024    16:09   Fried Frailty   Lost>10 pounds unintenionally last year N N   Exhaustion Score 0 0   Slowness Score 0 0    Weakness/ Strength Score 0 0   Low Activity Level Score 0 0   Final Score Not Frail Not Frail   Final Score Number 0 0   Sit Stand Assessment   Patient able to perform 5 chair stands Y Y   Chair Stands in seconds 7 19   Patient is able to perform stand with Feet Side by Side? Y Y   First attempt (in seconds): 10 10   Patient is able to perform Semi-Tandem Stand? Y Y   First attemp (in seconds): 10 10   Patient is able to perform Tandem Stand? Y Y   First attemp (in seconds): 10 10                Overall Assessment       I have reviewed the diagnostic data, medications, frailty screening, and general processes prior to BMTCT.  I have notified the Primary BMT Physician/and or Attending Physician in the clinic of any issues. We also discussed in detail the database and biorepository research for which Leland Pearson is eligible. We discussed the potential risks and potential benefits of each of these protocols individually. We explained potential alternatives to the protocols discussed. We explained to the patient that participation is voluntary and that consent may be withdrawn at any time.       Consents Signed:   Blood transfusion consent form-not available  Ethnicity form-not available  [facility] database  [facility] biorepository    [facility] BMTCT Database    Present during the discussion were pt and spouse. Copies of the signed consent forms will be provided to the patient on admission. No procedures specific to any studies were performed prior to the patient signing the consent form.    Leland Pearson had the opportunity to ask questions, and I answered all of the questions to the best of my ability.    I spent 40 minutes in the care of this patient today, which included time necessary for preparation for the visit, obtaining history, ordering medications/tests/procedures as medically indicated, review of pertinent medical literature, counseling of the patient, communication of recommendations to the care team,  and documentation time.    The longitudinal plan of care for the diagnosis(es)/condition(s) as documented were addressed during this visit. Due to the added complexity in care, I will continue to support Leland in the subsequent management and with ongoing continuity of care.      IFEANYI Haynes CNP

## 2024-08-05 NOTE — NURSING NOTE
Chief Complaint   Patient presents with    Blood Draw     Labs drawn with  by rn.  VS taken.     Labs drawn with  by rn.  Pt tolerated well.  VS taken.  Pt checked in for next appt.    Urine collected and sent as well.    Neyda Swift RN

## 2024-08-05 NOTE — NURSING NOTE
I verified name and date of birth. Then performed an EKG and transmitted  the results right away.  Ivett Chapa MA      Pt performed the Fraility exercises. Questionnaire was filled out.     Ivett Chapa MA

## 2024-08-05 NOTE — PROGRESS NOTES
"BMT Teaching Flowsheet    Leland Pearson is a 69 year old male  The primary encounter diagnosis was MDS (myelodysplastic syndrome) (H). A diagnosis of Stem cell transplant candidate was also pertinent to this visit.    Teaching Topic: MT 2022-52    Person(s) involved in teaching: Patient, Spouse, and Children    Motivation Level  Asks Questions: Yes  Eager to Learn: Yes  Cooperative: Yes  Receptive (willing/able to accept information): Yes  Any cultural factors/Pent[employer]al beliefs that may influence understanding or compliance? No    Patient, Family, and Caregiver demonstrates understanding of the following:   Reason for the appointment, diagnosis and treatment plan: Yes  Knowledge of proper use of medications and conditions for which they are ordered (with special attention to potential side effects or drug interactions): Yes  Which situations necessitate calling provider and whom to contact: Yes  Proper use and care of (medical equipment, care aids, etc.) Yes  Pain management techniques: Yes  How and/when to access community resources: Yes    Infection Control:  Patient, Family, and Caregiver instructed on hand hygiene: Yes  Signs and symptoms of infection taught: Yes    Patient was given and reviewed BMT Allo Transplant Teaching Binder, including: medication pamphlets, sample treatment calendars, consents, contact information for \"When to Call for Help\" (including after-hours contact), hospital inpatient information, graft versus host disease (GVHD) informational, and post-transplant precautions and guidelines.  Signs, symptoms, and treatment of GVHD was reviewed specifically.  Patient was encouraged to call with any additional questions.    Patient was provided with handouts for caring for their central venous catheter (proper hand hygiene, changing end caps, flushing line, changing CVC dressing). Yes    Patient was encouraged to view step-by-step instructional videos for central venous catheter care and report " any questions or concerns. Yes    Time spent with patient: 120 minutes.  Specific Concerns: Patient had questions about prior authorizations for medications. Writer reached out to BMT finance team who will call patient.

## 2024-08-05 NOTE — LETTER
"8/5/2024      Leland Pearson  [address] [place] [address]      Dear Colleague,    Thank you for referring your patient, Leland Pearson, to the [facility] BLOOD AND MARROW TRANSPLANT PROGRAM [place]. Please see a copy of my visit note below.    BMT Teaching Flowsheet    Leland Pearson is a 69 year old male  The primary encounter diagnosis was MDS (myelodysplastic syndrome) (H). A diagnosis of Stem cell transplant candidate was also pertinent to this visit.    Teaching Topic: MT 2022-52    Person(s) involved in teaching: Patient, Spouse, and Children    Motivation Level  Asks Questions: Yes  Eager to Learn: Yes  Cooperative: Yes  Receptive (willing/able to accept information): Yes  Any cultural factors/Pent[employer]al beliefs that may influence understanding or compliance? No    Patient, Family, and Caregiver demonstrates understanding of the following:   Reason for the appointment, diagnosis and treatment plan: Yes  Knowledge of proper use of medications and conditions for which they are ordered (with special attention to potential side effects or drug interactions): Yes  Which situations necessitate calling provider and whom to contact: Yes  Proper use and care of (medical equipment, care aids, etc.) Yes  Pain management techniques: Yes  How and/when to access community resources: Yes    Infection Control:  Patient, Family, and Caregiver instructed on hand hygiene: Yes  Signs and symptoms of infection taught: Yes    Patient was given and reviewed BMT Allo Transplant Teaching Binder, including: medication pamphlets, sample treatment calendars, consents, contact information for \"When to Call for Help\" (including after-hours contact), hospital inpatient information, graft versus host disease (GVHD) informational, and post-transplant precautions and guidelines.  Signs, symptoms, and treatment of GVHD was reviewed specifically.  Patient was encouraged to call with any additional " questions.    Patient was provided with handouts for caring for their central venous catheter (proper hand hygiene, changing end caps, flushing line, changing CVC dressing). Yes    Patient was encouraged to view step-by-step instructional videos for central venous catheter care and report any questions or concerns. Yes    Time spent with patient: 120 minutes.  Specific Concerns: Patient had questions about prior authorizations for medications. Writer reached out to BMT finance team who will call patient.      Again, thank you for allowing me to participate in the care of your patient.        Sincerely,        Jignesh Aranda RN

## 2024-08-05 NOTE — PROGRESS NOTES
CLINICAL SOCIAL WORK   PSYCHOSOCIAL ASSESSMENT  BLOOD AND MARROW TRANSPLANT SERVICE      Assessment completed on August 5, 2024 of living situation, support system, financial status, functional status, coping, stressors, need for resources and social work intervention provided as needed.  Information for this assessment was provided by Pt and family report in addition to medical chart review and consultation with medical team.     Present at assessment: Patient, Leland Pearson, Vani Pearson, Vanesa Parmjit and Jennyamada Caballero were present for this assessment conducted by Mayi Busch, [facility] .     Diagnosis: Myelodysplastic Syndrome (MDS)    Date of Diagnosis: 5/2024    Transplant type: Allogeneic    Donor: Unrelated  allogeneic donor stem cell transplant     Physician: Brenda Murphy MD    Nurse Coordinator: Jignesh Aranda RN    Work-up Nurse Coordinator: Jignesh Aranda RN    : KAMERON Coelho, [facility]     Permanent Address:   [address] [place] [address] [address]  [place] [address]    Contact Information:  Pt Cell Phone: [phone]  Pt Email: [email or web address]  Pt's wife Vani Phone: [phone]    Presenting Information:  Leland is a 69 year old male diagnosed with MDS who presents for evaluation for an allogeneic transplant at the [facility] ([facility]).  Pt was accompanied to today's visit by his wife and 2 daughters.     Decision Making:   Self     Health Care Directive:   Provided Copy and sent for scanning into the pt's EMR    Relationship Status:    to his wife Vani for 49 years. Both indicate their relationship stable and supportive.    Special Lodging Needs: None identified at this time. Leland lives in [place], [place] and does not need to relocate. Vani noted that she is very concerned about driving alone to the hospital/clinic. Vani shared that she does not feel comfortable driving and although does feel better with someone with her, she  "still does not find this something she wants to do. She discussed possible options to stay closer, both while the pt is IP, as well as post BMT. We discussed the [facility], reviewed the expectations of the [facility] as well as local hotels. Ela and Vanesa also offered to help drive the pt, or have her stay with them to allow less stress for the pt. They will talk over options that will work for them.    Family/Support System: Pt endorsed a good support system including family and close friends who will be available to support Pt throughout transplant process.     Spouse: Vani Pearson    Children: Sarah \"Vanesa\" Parmjit and Jenny Caballero    Grandchildren: 1 Marlene    Parents:     Siblings: 5 brothers and 1 sister    Friends: Some close friends and family    Caregiver: SW discussed with pt the caregiver role and expectation at length. Pt is agreeable to having a full time caregiver for a minimum of 100 days until cleared by the BMT physician. Pt's identified caregivers are his wife and daughters. Pt signed the caregiver contract which will be scanned into the EMR. Caregiver education and resources provided. No caregiver concerns identified. Pt and Pt's wife confirmed understanding caregiving requirement, including driving restrictions, as discussed during psychosocial assessment.     Name & Numbers  Vani Caballero    Transportation Mode:  Private Car . Pt is aware of driving restrictions post-BMT and the need for the caregiver is to drive until cleared to drive by the  BMT physician. SW provided information on parking info and monthly parking pass options. Pt will utilize the hospital security shuttle for transportation to and from the [facility] and BMT Clinic/Hospital.    Insurance:  No Insurance issues identified.  Pt denied specific insurance concerns at this time. SW reiterated information about the BMT [employer]  should specific insurance questions " arise as Pt moves through transplant process.  Leland and Vani did note that he is in his donut hole right now, they are hopeful to be done with this soon.    Leland did shared that he got a denial for his bmbx- provided denial letter. This letter was sent to billing to review.     Sources of Income:  No income concerns identified  Leland is supported by savings and [employer]. Pt denied anticipation of financial hardship related to BMT at this time.  SW encouraged Pt to contact this SW for additional potential resources should financial situation change.     Employment:   Employer: Retired  Position: [employer]     Spouse's Employment:  Employer:  Retired  Position:     Mental Health: No mental health issues identified       PHQ-9 assessment, score was 1 ,which indicates no current signs of depression.  GAD7 assessment, score was 1, which indicates no current signs of anxiety.    Leland shared that he does not have a history of anxiety or depression. Overall he feels very well supported in his treatment so far. He shared that he does worry at time, or have some anxiety regarding what is to come, but is able to easily process these thoughts and feelings.     We talked about how some patients may see an increase in feelings of anxiety or depression while hospitalized for extended periods along with isolation. Encouraged Leland and Vani to let us know if they are noticing an increase in symptoms. We talked about the variety of modalities available to use as coping mechanisms (including but not limited to guided imagery, relaxation techniques, progressive muscle relaxation, counseling/talk therapy and medication).    Chemical Use: No issues identified.  Leland denied the use of tobacco, alcohol, marijuana or other drugs.   Based on the information provided, there appear to be no specific risks or concerns identified at this time.     Trauma/Loss/Abuse History: Multiple losses associated with cancer diagnosis  "and treatment, including health, employment, changes to physical appearance, etc.     Spirituality:  Patient does not identify with [place] community. Leland is not interested in seeing a vicky.     Coping: Pt noted that he is currently feeling \"positive, nervous, excited, and ready to begin\".  Pt shared that his main coping mechanisms are talking with friends and family, reading books, and exercise.  Pt noted that he also sharee by taking naps. SW and Pt discussed additional positive coping mechanisms that Pt can utilize while in the hospital.     Caregiver Coping: Pt's wife Vani noted that she is feeling \"positive, fearful, hopeful, worried, nervous, excited, ready to begin and focused\" at this time.  Vani noted that she sharee by talking with friends and family, reading books, and exercise.     Education Provided: Transplant process expectations, Caregiver requirements, Caregiver self-care, Financial issues related to transplant, Financial resources/grants available, Common psychosocial stressors pre/post transplant, Support group(s) available, Tour/layout of the inpatient unit/non-use of cell phones, Hospital resources available, Web site information, Resources for transplant patients and their families as well as the Clinical Social Work role.     Interventions Provided: Supportive counseling and education     Recreation/Leisure Activities:  Leland shared that he enjoys golfing, gardening, traveling, hunting, going to concerts and biking.    Plans for Hospital Stay Leisure:  During his IP stay Leland shared he enjoys reading, watching sports and playing cribbage.     Assessment and Recommendations for Team:  Pt is a 69 year old male diagnosed with MDS who is here undergoing preparation for a planned allogeneic transplant.     Pt is a pleasant and articulate male who feels comfortable communicating with the medical team. Pt is pleasant, calm and able to articulate concerns/coping mechanisms in an appropriate manner. " Leland was alert, interactive, and affect was full, they displayed appropriate eye contact, memory and thought processes. Pt has a strong supportive network of family and friends who are involved.     Pt will benefit from ongoing psychosocial support in regards to coping with the adjustment to the BMT process. CSW has discussed  psychosocial support options in regards to coping with the adjustment to the BMT process and support groups opportunities.      Pt has a good support system and a good caregiver plan. Pt verbalizes understanding of the transplant process and wanting to proceed. SW provided contact information and encouraged Pt to contact SW with questions, concerns, resources and for support. Per this assessment, I did not identify any barriers to this patient moving forward with transplant.        Important Information:   - Leland provided his HCD and this was sent for scanning  - Vani may want to stay at the [facility] while the pt is IP.    Follow up Planned:   Psychosocial support  Lodging referrals      KAMERON Coelho, DUKESW  [facility]  Phone: [phone]  Vocera- BMT SW 3

## 2024-08-06 ENCOUNTER — APPOINTMENT (OUTPATIENT)
Dept: LAB | Facility: CLINIC | Age: 69
End: 2024-08-06
Attending: STUDENT IN AN ORGANIZED HEALTH CARE EDUCATION/TRAINING PROGRAM
Payer: COMMERCIAL

## 2024-08-06 ENCOUNTER — ANCILLARY PROCEDURE (OUTPATIENT)
Dept: CT IMAGING | Facility: CLINIC | Age: 69
End: 2024-08-06
Attending: STUDENT IN AN ORGANIZED HEALTH CARE EDUCATION/TRAINING PROGRAM
Payer: COMMERCIAL

## 2024-08-06 ENCOUNTER — ANCILLARY PROCEDURE (OUTPATIENT)
Dept: CARDIOLOGY | Facility: CLINIC | Age: 69
End: 2024-08-06
Attending: STUDENT IN AN ORGANIZED HEALTH CARE EDUCATION/TRAINING PROGRAM
Payer: COMMERCIAL

## 2024-08-06 ENCOUNTER — INFUSION THERAPY VISIT (OUTPATIENT)
Dept: TRANSPLANT | Facility: CLINIC | Age: 69
End: 2024-08-06
Attending: STUDENT IN AN ORGANIZED HEALTH CARE EDUCATION/TRAINING PROGRAM
Payer: COMMERCIAL

## 2024-08-06 ENCOUNTER — ANCILLARY PROCEDURE (OUTPATIENT)
Dept: GENERAL RADIOLOGY | Facility: CLINIC | Age: 69
End: 2024-08-06
Attending: STUDENT IN AN ORGANIZED HEALTH CARE EDUCATION/TRAINING PROGRAM
Payer: COMMERCIAL

## 2024-08-06 VITALS
WEIGHT: 199 LBS | BODY MASS INDEX: 26.57 KG/M2 | DIASTOLIC BLOOD PRESSURE: 94 MMHG | RESPIRATION RATE: 16 BRPM | HEART RATE: 67 BPM | OXYGEN SATURATION: 100 % | SYSTOLIC BLOOD PRESSURE: 158 MMHG | TEMPERATURE: 98.1 F

## 2024-08-06 DIAGNOSIS — D46.9 MYELODYSPLASTIC SYNDROME, UNSPECIFIED (H): Primary | ICD-10-CM

## 2024-08-06 DIAGNOSIS — Z11.59 SCREENING FOR VIRAL DISEASE: ICD-10-CM

## 2024-08-06 DIAGNOSIS — Z01.818 PREOP EXAMINATION: ICD-10-CM

## 2024-08-06 DIAGNOSIS — D46.9 MDS (MYELODYSPLASTIC SYNDROME) (H): ICD-10-CM

## 2024-08-06 DIAGNOSIS — Z86.2 PERSONAL HISTORY OF DISEASES OF BLOOD AND BLOOD-FORMING ORGANS: ICD-10-CM

## 2024-08-06 DIAGNOSIS — D46.9 MDS (MYELODYSPLASTIC SYNDROME) (H): Primary | ICD-10-CM

## 2024-08-06 LAB
ACANTHOCYTES BLD QL SMEAR: NORMAL
AUER BODIES BLD QL SMEAR: NORMAL
BASO STIPL BLD QL SMEAR: NORMAL
BASOPHILS # BLD AUTO: 0 10E3/UL (ref 0–0.2)
BASOPHILS NFR BLD AUTO: 0 %
BITE CELLS BLD QL SMEAR: NORMAL
BLISTER CELLS BLD QL SMEAR: NORMAL
BURR CELLS BLD QL SMEAR: NORMAL
DACRYOCYTES BLD QL SMEAR: NORMAL
ELLIPTOCYTES BLD QL SMEAR: NORMAL
EOSINOPHIL # BLD AUTO: 0 10E3/UL (ref 0–0.7)
EOSINOPHIL NFR BLD AUTO: 4 %
ERYTHROCYTE [DISTWIDTH] IN BLOOD BY AUTOMATED COUNT: 16.9 % (ref 10–15)
FLOWPRA1 CELL: NORMAL
FLOWPRA1 COMMENTS: NORMAL
FLOWPRA1 RESULT: NORMAL
FLOWPRA1 TEST METHOD: NORMAL
FLOWPRA2 CELL: NORMAL
FLOWPRA2 COMMENTS: NORMAL
FLOWPRA2 RESULT: NORMAL
FLOWPRA2 TEST METHOD: NORMAL
FRAGMENTS BLD QL SMEAR: NORMAL
HCT VFR BLD AUTO: 39.8 % (ref 40–53)
HGB BLD-MCNC: 13.5 G/DL (ref 13.3–17.7)
HGB C CRYSTALS: NORMAL
HGB S BLD QL: NEGATIVE
HOWELL-JOLLY BOD BLD QL SMEAR: NORMAL
HTLV I+II AB SER QL IA: NEGATIVE
IMM GRANULOCYTES # BLD: 0 10E3/UL
IMM GRANULOCYTES NFR BLD: 0 %
LVEF ECHO: NORMAL
LYMPHOCYTES # BLD AUTO: 0.6 10E3/UL (ref 0.8–5.3)
LYMPHOCYTES NFR BLD AUTO: 84 %
MCH RBC QN AUTO: 28 PG (ref 26.5–33)
MCHC RBC AUTO-ENTMCNC: 33.9 G/DL (ref 31.5–36.5)
MCV RBC AUTO: 82 FL (ref 78–100)
MONOCYTES # BLD AUTO: 0 10E3/UL (ref 0–1.3)
MONOCYTES NFR BLD AUTO: 6 %
NEUTROPHILS # BLD AUTO: 0 10E3/UL (ref 1.6–8.3)
NEUTROPHILS NFR BLD AUTO: 6 %
NEUTS HYPERSEG BLD QL SMEAR: NORMAL
NRBC # BLD AUTO: 0 10E3/UL
NRBC BLD AUTO-RTO: 0 /100
PLAT MORPH BLD: NORMAL
PLATELET # BLD AUTO: 9 10E3/UL (ref 150–450)
POLYCHROMASIA BLD QL SMEAR: NORMAL
RBC # BLD AUTO: 4.83 10E6/UL (ref 4.4–5.9)
RBC AGGLUT BLD QL: NORMAL
RBC MORPH BLD: NORMAL
ROULEAUX BLD QL SMEAR: NORMAL
SICKLE CELLS BLD QL SMEAR: NORMAL
SMUDGE CELLS BLD QL SMEAR: NORMAL
SPHEROCYTES BLD QL SMEAR: NORMAL
STOMATOCYTES BLD QL SMEAR: NORMAL
T GONDII IGG SER-ACNC: <3 IU/ML
T GONDII IGM SER-ACNC: <3 AU/ML
TARGETS BLD QL SMEAR: NORMAL
TOXIC GRANULES BLD QL SMEAR: NORMAL
VARIANT LYMPHS BLD QL SMEAR: NORMAL
WBC # BLD AUTO: 0.7 10E3/UL (ref 4–11)

## 2024-08-06 PROCEDURE — 88321 CONSLTJ&REPRT SLD PREP ELSWR: CPT | Performed by: STUDENT IN AN ORGANIZED HEALTH CARE EDUCATION/TRAINING PROGRAM

## 2024-08-06 PROCEDURE — 36430 TRANSFUSION BLD/BLD COMPNT: CPT

## 2024-08-06 PROCEDURE — 93306 TTE W/DOPPLER COMPLETE: CPT | Performed by: INTERNAL MEDICINE

## 2024-08-06 PROCEDURE — 36415 COLL VENOUS BLD VENIPUNCTURE: CPT

## 2024-08-06 PROCEDURE — P9037 PLATE PHERES LEUKOREDU IRRAD: HCPCS | Performed by: STUDENT IN AN ORGANIZED HEALTH CARE EDUCATION/TRAINING PROGRAM

## 2024-08-06 PROCEDURE — 71046 X-RAY EXAM CHEST 2 VIEWS: CPT | Performed by: RADIOLOGY

## 2024-08-06 PROCEDURE — 85025 COMPLETE CBC W/AUTO DIFF WBC: CPT

## 2024-08-06 PROCEDURE — 71250 CT THORAX DX C-: CPT | Mod: GC | Performed by: RADIOLOGY

## 2024-08-06 RX ORDER — EPINEPHRINE 1 MG/ML
0.3 INJECTION, SOLUTION INTRAMUSCULAR; SUBCUTANEOUS EVERY 5 MIN PRN
Status: CANCELLED | OUTPATIENT
Start: 2024-08-06

## 2024-08-06 RX ORDER — HEPARIN SODIUM (PORCINE) LOCK FLUSH IV SOLN 100 UNIT/ML 100 UNIT/ML
5 SOLUTION INTRAVENOUS
Status: CANCELLED | OUTPATIENT
Start: 2024-08-06

## 2024-08-06 RX ORDER — DIPHENHYDRAMINE HYDROCHLORIDE 50 MG/ML
50 INJECTION INTRAMUSCULAR; INTRAVENOUS
Status: CANCELLED
Start: 2024-08-06

## 2024-08-06 RX ORDER — HEPARIN SODIUM,PORCINE 10 UNIT/ML
5-20 VIAL (ML) INTRAVENOUS DAILY PRN
Status: CANCELLED | OUTPATIENT
Start: 2024-08-06

## 2024-08-06 ASSESSMENT — PAIN SCALES - GENERAL: PAINLEVEL: NO PAIN (0)

## 2024-08-06 NOTE — PROGRESS NOTES
Infusion Nursing Note:  Leland Pearson presents today for possible infusion.    Patient seen by provider today: No   present during visit today: Not Applicable.    Note: Patient arrived for possible transfusion. Reports ongoing mouth sores but unchanged from yesterday. Denies chest pain, SOB, N/V, fever. ONC toxicity assessment completed, home medications and allergies reviewed. Labs monitored. Patient received 1U Platelets for count of 9K. No premedication given.      Intravenous Access:  Peripheral IV placed.    Treatment Conditions:  Lab Results   Component Value Date    HGB 13.5 08/06/2024    WBC 0.7 (LL) 08/06/2024    ANEU 0.0 (LL) 08/02/2024    ANEUTAUTO 0.0 (LL) 08/06/2024    PLT 9 (LL) 08/06/2024            Post Infusion Assessment:  Patient tolerated infusion without incident.  Blood return noted pre and post infusion.  Site patent and intact, free from redness, edema or discomfort.  No evidence of extravasations.  Access discontinued per protocol.       Discharge Plan:   Patient discharged in stable condition accompanied by: wife.  Departure Mode: Ambulatory.      Daphne Shipley RN

## 2024-08-06 NOTE — NURSING NOTE
Chief Complaint   Patient presents with    Blood Draw     Labs drawn from PIV placed by RN. Line flushed with saline. Vitals taken. Pt checked in for appointment(s).      Labs drawn from PIV placed by RN. Line flushed with saline. Vitals taken. Pt checked in for appointment(s).     Malu Bush RN

## 2024-08-07 ENCOUNTER — OFFICE VISIT (OUTPATIENT)
Dept: PULMONOLOGY | Facility: CLINIC | Age: 69
End: 2024-08-07
Payer: COMMERCIAL

## 2024-08-07 ENCOUNTER — APPOINTMENT (OUTPATIENT)
Dept: LAB | Facility: CLINIC | Age: 69
End: 2024-08-07
Attending: STUDENT IN AN ORGANIZED HEALTH CARE EDUCATION/TRAINING PROGRAM
Payer: COMMERCIAL

## 2024-08-07 ENCOUNTER — OFFICE VISIT (OUTPATIENT)
Dept: TRANSPLANT | Facility: CLINIC | Age: 69
End: 2024-08-07
Attending: STUDENT IN AN ORGANIZED HEALTH CARE EDUCATION/TRAINING PROGRAM
Payer: COMMERCIAL

## 2024-08-07 VITALS
OXYGEN SATURATION: 98 % | HEART RATE: 72 BPM | TEMPERATURE: 98 F | RESPIRATION RATE: 16 BRPM | DIASTOLIC BLOOD PRESSURE: 90 MMHG | WEIGHT: 200.2 LBS | BODY MASS INDEX: 26.74 KG/M2 | SYSTOLIC BLOOD PRESSURE: 181 MMHG

## 2024-08-07 DIAGNOSIS — D46.9 MDS (MYELODYSPLASTIC SYNDROME) (H): ICD-10-CM

## 2024-08-07 DIAGNOSIS — Z01.818 PREOP EXAMINATION: ICD-10-CM

## 2024-08-07 DIAGNOSIS — D46.9 MDS (MYELODYSPLASTIC SYNDROME) (H): Primary | ICD-10-CM

## 2024-08-07 DIAGNOSIS — Z11.59 SCREENING FOR VIRAL DISEASE: ICD-10-CM

## 2024-08-07 DIAGNOSIS — Z86.2 PERSONAL HISTORY OF DISEASES OF BLOOD AND BLOOD-FORMING ORGANS: ICD-10-CM

## 2024-08-07 DIAGNOSIS — Z76.82 STEM CELL TRANSPLANT CANDIDATE: ICD-10-CM

## 2024-08-07 LAB
ACANTHOCYTES BLD QL SMEAR: SLIGHT
ALBUMIN SERPL BCG-MCNC: 4.2 G/DL (ref 3.5–5.2)
ALP SERPL-CCNC: 132 U/L (ref 40–150)
ALT SERPL W P-5'-P-CCNC: 21 U/L (ref 0–70)
ANION GAP SERPL CALCULATED.3IONS-SCNC: 10 MMOL/L (ref 7–15)
AST SERPL W P-5'-P-CCNC: 33 U/L (ref 0–45)
ATRIAL RATE - MUSE: 59 BPM
BASOPHILS # BLD AUTO: ABNORMAL 10*3/UL
BASOPHILS # BLD MANUAL: 0 10E3/UL (ref 0–0.2)
BASOPHILS NFR BLD AUTO: ABNORMAL %
BASOPHILS NFR BLD MANUAL: 0 %
BILIRUB SERPL-MCNC: 0.9 MG/DL
BUN SERPL-MCNC: 16.2 MG/DL (ref 8–23)
CALCIUM SERPL-MCNC: 9.6 MG/DL (ref 8.8–10.4)
CHLORIDE SERPL-SCNC: 103 MMOL/L (ref 98–107)
COLLECT DURATION TIME UR: 24 H
COMMENT VXMB1: NORMAL
COMMENT VXMB2: NORMAL
CREAT 24H UR-MRATE: 1.57 G/SPEC (ref 0.98–2.2)
CREAT CL 24H UR+SERPL-VRATE: 133 ML/MIN (ref 66–143)
CREAT CL/1.73 SQ M 24H UR+SERPL-ARVRAT: 107 ML/MIN/1.7M2 (ref 110–180)
CREAT SERPL-MCNC: 0.82 MG/DL (ref 0.67–1.17)
CREAT SERPL-MCNC: 0.82 MG/DL (ref 0.67–1.17)
CREAT UR-MCNC: 51.3 MG/DL
CROSSMATCHDATEVXM: NORMAL
DIASTOLIC BLOOD PRESSURE - MUSE: NORMAL MMHG
DONOR VXM: NORMAL
EGFRCR SERPLBLD CKD-EPI 2021: >90 ML/MIN/1.73M2
EOSINOPHIL # BLD AUTO: ABNORMAL 10*3/UL
EOSINOPHIL # BLD MANUAL: 0 10E3/UL (ref 0–0.7)
EOSINOPHIL NFR BLD AUTO: ABNORMAL %
EOSINOPHIL NFR BLD MANUAL: 4 %
ERYTHROCYTE [DISTWIDTH] IN BLOOD BY AUTOMATED COUNT: 16.9 % (ref 10–15)
GLUCOSE SERPL-MCNC: 101 MG/DL (ref 70–99)
HBV DNA SERPL QL NAA+PROBE: NORMAL
HCO3 SERPL-SCNC: 26 MMOL/L (ref 22–29)
HCT VFR BLD AUTO: 38.5 % (ref 40–53)
HCV RNA SERPL QL NAA+PROBE: NORMAL
HGB BLD-MCNC: 13 G/DL (ref 13.3–17.7)
HIV1+2 RNA SERPL QL NAA+PROBE: NORMAL
IMM GRANULOCYTES # BLD: ABNORMAL 10*3/UL
IMM GRANULOCYTES NFR BLD: ABNORMAL %
INTERPRETATION ECG - MUSE: NORMAL
LYMPHOCYTES # BLD AUTO: ABNORMAL 10*3/UL
LYMPHOCYTES # BLD MANUAL: 0.6 10E3/UL (ref 0.8–5.3)
LYMPHOCYTES NFR BLD AUTO: ABNORMAL %
LYMPHOCYTES NFR BLD MANUAL: 80 %
MCH RBC QN AUTO: 28.2 PG (ref 26.5–33)
MCHC RBC AUTO-ENTMCNC: 33.8 G/DL (ref 31.5–36.5)
MCV RBC AUTO: 84 FL (ref 78–100)
MONOCYTES # BLD AUTO: ABNORMAL 10*3/UL
MONOCYTES # BLD MANUAL: 0 10E3/UL (ref 0–1.3)
MONOCYTES NFR BLD AUTO: ABNORMAL %
MONOCYTES NFR BLD MANUAL: 5 %
NEUTROPHILS # BLD AUTO: ABNORMAL 10*3/UL
NEUTROPHILS # BLD MANUAL: 0.1 10E3/UL (ref 1.6–8.3)
NEUTROPHILS NFR BLD AUTO: ABNORMAL %
NEUTROPHILS NFR BLD MANUAL: 11 %
NRBC # BLD AUTO: 0 10E3/UL
NRBC BLD AUTO-RTO: 0 /100
P AXIS - MUSE: -5 DEGREES
PLAT MORPH BLD: ABNORMAL
PLATELET # BLD AUTO: 40 10E3/UL (ref 150–450)
POTASSIUM SERPL-SCNC: 4.3 MMOL/L (ref 3.4–5.3)
PR INTERVAL - MUSE: 198 MS
PROT SERPL-MCNC: 7.3 G/DL (ref 6.4–8.3)
QRS DURATION - MUSE: 66 MS
QT - MUSE: 426 MS
QTC - MUSE: 421 MS
R AXIS - MUSE: -23 DEGREES
RBC # BLD AUTO: 4.61 10E6/UL (ref 4.4–5.9)
RBC MORPH BLD: ABNORMAL
RESULT VXM B1: NORMAL
RESULT VXM B2: NORMAL
RESULT VXM T1: NORMAL
RESULT VXM T2: NORMAL
SERUM DATE VXM B1: NORMAL
SERUM DATE VXM B2: NORMAL
SERUM DATE VXM T1: NORMAL
SERUM DATE VXM T2: NORMAL
SODIUM SERPL-SCNC: 139 MMOL/L (ref 135–145)
SPECIMEN VOL UR: 3067 ML
SYSTOLIC BLOOD PRESSURE - MUSE: NORMAL MMHG
T AXIS - MUSE: 8 DEGREES
VENTRICULAR RATE- MUSE: 59 BPM
WBC # BLD AUTO: 0.8 10E3/UL (ref 4–11)
WNV RNA SERPL DONR QL NAA+PROBE: NORMAL

## 2024-08-07 PROCEDURE — 94726 PLETHYSMOGRAPHY LUNG VOLUMES: CPT | Performed by: INTERNAL MEDICINE

## 2024-08-07 PROCEDURE — 82575 CREATININE CLEARANCE TEST: CPT | Performed by: PATHOLOGY

## 2024-08-07 PROCEDURE — 94375 RESPIRATORY FLOW VOLUME LOOP: CPT | Performed by: INTERNAL MEDICINE

## 2024-08-07 PROCEDURE — 85007 BL SMEAR W/DIFF WBC COUNT: CPT

## 2024-08-07 PROCEDURE — 85027 COMPLETE CBC AUTOMATED: CPT

## 2024-08-07 PROCEDURE — 36415 COLL VENOUS BLD VENIPUNCTURE: CPT

## 2024-08-07 PROCEDURE — 94729 DIFFUSING CAPACITY: CPT | Performed by: INTERNAL MEDICINE

## 2024-08-07 PROCEDURE — 84155 ASSAY OF PROTEIN SERUM: CPT

## 2024-08-07 ASSESSMENT — PAIN SCALES - GENERAL: PAINLEVEL: NO PAIN (0)

## 2024-08-07 NOTE — PROGRESS NOTES
Infusion Nursing Note:  Leland Pearson presents today for possible transfusion.    Patient seen by provider today: No   present during visit today: Not Applicable.    Note: Leland here feeling well, labs monitored, no infusion needs identified. Discussed results with pt/spouse, no concerns. PIV discontinued and pt discharged.      Intravenous Access:  Peripheral IV placed.    Treatment Conditions:  Lab Results   Component Value Date    HGB 13.0 (L) 08/07/2024    WBC 0.8 (LL) 08/07/2024    ANEU 0.1 (LL) 08/07/2024    ANEUTAUTO 0.0 (LL) 08/06/2024    PLT 40 (LL) 08/07/2024        Lab Results   Component Value Date     08/07/2024    POTASSIUM 4.3 08/07/2024    MAG 2.1 05/08/2024    CR 0.82 08/07/2024    HERBERT 9.6 08/07/2024    BILITOTAL 0.9 08/07/2024    ALBUMIN 4.2 08/07/2024    ALT 21 08/07/2024    AST 33 08/07/2024         Post Infusion Assessment:  Access discontinued per protocol.       Discharge Plan:   Patient discharged in stable condition accompanied by: wife.  Departure Mode: Ambulatory.      Siri Mei RN

## 2024-08-07 NOTE — PROGRESS NOTES
"Pharmacy Assessment - Pre-Stem Cell Transplant    Assessments & Recommendations:  1) Hold posaconazole for 7 days prior to admission.  2) Continue allopurinol indefinitely for gout.  3) Closely monitor platelets with celecoxib use. Consider holding on admission and use alternative while getting chemotherapy.  4) Closely monitor blood pressure with lisinopril and tamsulosin during admission. May need to hold or decrease dosing.    If this patient is admitted under observation, the patient may bring in their own supply of the following medication for use in the hospital:  1) None  -Per \"Medications Not Supplied by Pharmacy\" policy (available on [employer])    History of Present Illness:  Leland Pearson is a 69 year old year old male diagnosed with MDS.  he has been treated with Vidaza and venetoclax.  he is now being work up for Allogeneic Stem Cell Transplant on protocol 2022-52, which utilizes fludarabine/cyclophosphamide/TBI as a conditioning regimen.    Pertinent labs/tests:  Viral Testing:  CMV(-) / HSV(-) / EBV(+) / VZV (+)  Ejection Fraction: 55-60% (8/6/24)  QTc: 421 msec (8/5/24)    Weights:   Wt Readings from Last 3 Encounters:   08/07/24 90.8 kg (200 lb 3.2 oz)   08/06/24 90.3 kg (199 lb)   08/05/24 89.8 kg (198 lb)   Ideal body weight: 78.9 kg (173 lb 14.6 oz)  Adjusted ideal body weight: 83.7 kg (184 lb 6.8 oz)  % IBW:  114  There is no height or weight on file to calculate BMI.    Primary BMT Physician: Dr. Murphy  BMT RN Coordinator:  Jignesh Aranda    Past Medical History:  Past Medical History:   Diagnosis Date    Gastroesophageal reflux disease     Hypertension        Medication Allergies:  No Known Allergies    Current Medications (pre-admit):  Current Outpatient Medications   Medication Sig Dispense Refill    acyclovir (ZOVIRAX) 800 MG tablet Take 1 tablet (800 mg) by mouth every 12 hours for 150 days 60 tablet 4    allopurinol (ZYLOPRIM) 300 MG tablet Take 1 tablet (300 mg) by mouth daily 90 " tablet 1    celecoxib (CELEBREX) 200 MG capsule Take 200 mg by mouth daily      famotidine (PEPCID) 20 MG tablet Famotidine Oral    daily    active      levofloxacin (LEVAQUIN) 250 MG tablet Take 1 tablet (250 mg) by mouth daily 30 tablet 1    lisinopril (ZESTRIL) 10 MG tablet Take 1 tablet by mouth daily At 12:00pm      multivitamin, therapeutic with minerals (MULTI-VITAMIN) TABS Take 1 tablet by mouth daily      posaconazole (NOXAFIL) 100 MG DR tablet Take 3 tablets (300 mg) by mouth every morning 90 tablet 1    prochlorperazine (COMPAZINE) 5 MG tablet Take 10 mg by mouth every 6 hours as needed for nausea or vomiting      tamsulosin (FLOMAX) 0.4 MG capsule Tamsulosin Oral    daily    active         Herbal Medication/Nutritional Supplements:  Multivitamin    Smoking/Past Drug Use:  None    Nausea/Vomiting, Pain, or other issues:   None    Summary:  I met with Leland Pearson for approximately 45 minutes.  We discussed allergies, home medications, preparative regimen (fludarabine, cyclophosphamide, TBI), GVHD prophylaxis (post tx cyclophosphamide, sirolimus, mycophenolate), anti-infective prophylaxis (acyclovir, levofloxacin, micafungin, Bactrim), supportive medications (allopurinol, ursodiol, mucositis management, antiemetics, filgrastim), vaccines, med box/pharmacy expectations. I reviewed and updated home medication list and drug allergies.    MARI CUNNINGHAM [facility]

## 2024-08-07 NOTE — LETTER
"8/7/2024      Leland Pearson  [address] [place] [address]      Dear Colleague,    Thank you for referring your patient, Leland Pearson, to the [facility] BLOOD AND MARROW TRANSPLANT PROGRAM [place]. Please see a copy of my visit note below.    Pharmacy Assessment - Pre-Stem Cell Transplant    Assessments & Recommendations:  1) Hold posaconazole for 7 days prior to admission.  2) Continue allopurinol indefinitely for gout.  3) Closely monitor platelets with celecoxib use. Consider holding on admission and use alternative while getting chemotherapy.  4) Closely monitor blood pressure with lisinopril and tamsulosin during admission. May need to hold or decrease dosing.    If this patient is admitted under observation, the patient may bring in their own supply of the following medication for use in the hospital:  1) None  -Per \"Medications Not Supplied by Pharmacy\" policy (available on "[employer]    History of Present Illness:  Leland Pearson is a 69 year old year old male diagnosed with MDS.  he has been treated with Vidaza and venetoclax.  he is now being work up for Allogeneic Stem Cell Transplant on protocol 2022-52, which utilizes fludarabine/cyclophosphamide/TBI as a conditioning regimen.    Pertinent labs/tests:  Viral Testing:  CMV(-) / HSV(-) / EBV(+) / VZV (+)  Ejection Fraction: 55-60% (8/6/24)  QTc: 421 msec (8/5/24)    Weights:   Wt Readings from Last 3 Encounters:   08/07/24 90.8 kg (200 lb 3.2 oz)   08/06/24 90.3 kg (199 lb)   08/05/24 89.8 kg (198 lb)   Ideal body weight: 78.9 kg (173 lb 14.6 oz)  Adjusted ideal body weight: 83.7 kg (184 lb 6.8 oz)  % IBW:  114  There is no height or weight on file to calculate BMI.    Primary BMT Physician: Dr. Murphy  BMT RN Coordinator:  Jignesh Aranda    Past Medical History:  Past Medical History:   Diagnosis Date     Gastroesophageal reflux disease      Hypertension        Medication Allergies:  No Known Allergies    Current " Medications (pre-admit):  Current Outpatient Medications   Medication Sig Dispense Refill     acyclovir (ZOVIRAX) 800 MG tablet Take 1 tablet (800 mg) by mouth every 12 hours for 150 days 60 tablet 4     allopurinol (ZYLOPRIM) 300 MG tablet Take 1 tablet (300 mg) by mouth daily 90 tablet 1     celecoxib (CELEBREX) 200 MG capsule Take 200 mg by mouth daily       famotidine (PEPCID) 20 MG tablet Famotidine Oral    daily    active       levofloxacin (LEVAQUIN) 250 MG tablet Take 1 tablet (250 mg) by mouth daily 30 tablet 1     lisinopril (ZESTRIL) 10 MG tablet Take 1 tablet by mouth daily At 12:00pm       multivitamin, therapeutic with minerals (MULTI-VITAMIN) TABS Take 1 tablet by mouth daily       posaconazole (NOXAFIL) 100 MG DR tablet Take 3 tablets (300 mg) by mouth every morning 90 tablet 1     prochlorperazine (COMPAZINE) 5 MG tablet Take 10 mg by mouth every 6 hours as needed for nausea or vomiting       tamsulosin (FLOMAX) 0.4 MG capsule Tamsulosin Oral    daily    active         Herbal Medication/Nutritional Supplements:  Multivitamin    Smoking/Past Drug Use:  None    Nausea/Vomiting, Pain, or other issues:   None    Summary:  I met with Leland Pearson for approximately 45 minutes.  We discussed allergies, home medications, preparative regimen (fludarabine, cyclophosphamide, TBI), GVHD prophylaxis (post tx cyclophosphamide, sirolimus, mycophenolate), anti-infective prophylaxis (acyclovir, levofloxacin, micafungin, Bactrim), supportive medications (allopurinol, ursodiol, mucositis management, antiemetics, filgrastim), vaccines, med box/pharmacy expectations. I reviewed and updated home medication list and drug allergies.    MARI CUNNINGHAM [facility]           Again, thank you for allowing me to participate in the care of your patient.        Sincerely,        BMT Pharm D, [facility]

## 2024-08-08 ENCOUNTER — APPOINTMENT (OUTPATIENT)
Dept: RADIATION ONCOLOGY | Facility: CLINIC | Age: 69
End: 2024-08-08
Attending: STUDENT IN AN ORGANIZED HEALTH CARE EDUCATION/TRAINING PROGRAM
Payer: COMMERCIAL

## 2024-08-08 ENCOUNTER — OFFICE VISIT (OUTPATIENT)
Dept: TRANSPLANT | Facility: CLINIC | Age: 69
End: 2024-08-08
Attending: STUDENT IN AN ORGANIZED HEALTH CARE EDUCATION/TRAINING PROGRAM
Payer: COMMERCIAL

## 2024-08-08 VITALS
DIASTOLIC BLOOD PRESSURE: 94 MMHG | OXYGEN SATURATION: 100 % | TEMPERATURE: 97.9 F | SYSTOLIC BLOOD PRESSURE: 179 MMHG | RESPIRATION RATE: 16 BRPM | HEART RATE: 88 BPM | BODY MASS INDEX: 26.59 KG/M2 | WEIGHT: 199.08 LBS

## 2024-08-08 VITALS
SYSTOLIC BLOOD PRESSURE: 178 MMHG | BODY MASS INDEX: 26.57 KG/M2 | HEART RATE: 88 BPM | WEIGHT: 199 LBS | DIASTOLIC BLOOD PRESSURE: 98 MMHG

## 2024-08-08 DIAGNOSIS — Z01.818 PREOP EXAMINATION: ICD-10-CM

## 2024-08-08 DIAGNOSIS — D46.9 MDS (MYELODYSPLASTIC SYNDROME) (H): Primary | ICD-10-CM

## 2024-08-08 DIAGNOSIS — D46.9 MDS (MYELODYSPLASTIC SYNDROME) (H): ICD-10-CM

## 2024-08-08 DIAGNOSIS — Z86.2 PERSONAL HISTORY OF DISEASES OF BLOOD AND BLOOD-FORMING ORGANS: ICD-10-CM

## 2024-08-08 DIAGNOSIS — Z11.59 SCREENING FOR VIRAL DISEASE: ICD-10-CM

## 2024-08-08 LAB
BLD PROD TYP BPU: NORMAL
BLOOD COMPONENT TYPE: NORMAL
CODING SYSTEM: NORMAL
TRYPANOSOMA CRUZI: NORMAL
UNIT ABO/RH: NORMAL
UNIT NUMBER: NORMAL
UNIT STATUS: NORMAL
UNIT TYPE ISBT: 5100

## 2024-08-08 PROCEDURE — 77290 THER RAD SIMULAJ FIELD CPLX: CPT | Mod: 26 | Performed by: RADIOLOGY

## 2024-08-08 PROCEDURE — 77263 THER RADIOLOGY TX PLNG CPLX: CPT | Performed by: RADIOLOGY

## 2024-08-08 PROCEDURE — 99205 OFFICE O/P NEW HI 60 MIN: CPT | Mod: 25 | Performed by: RADIOLOGY

## 2024-08-08 PROCEDURE — 77290 THER RAD SIMULAJ FIELD CPLX: CPT | Performed by: RADIOLOGY

## 2024-08-08 PROCEDURE — G0463 HOSPITAL OUTPT CLINIC VISIT: HCPCS | Mod: 27 | Performed by: STUDENT IN AN ORGANIZED HEALTH CARE EDUCATION/TRAINING PROGRAM

## 2024-08-08 PROCEDURE — G0463 HOSPITAL OUTPT CLINIC VISIT: HCPCS | Performed by: RADIOLOGY

## 2024-08-08 PROCEDURE — 77470 SPECIAL RADIATION TREATMENT: CPT | Performed by: RADIOLOGY

## 2024-08-08 PROCEDURE — 99417 PROLNG OP E/M EACH 15 MIN: CPT | Performed by: STUDENT IN AN ORGANIZED HEALTH CARE EDUCATION/TRAINING PROGRAM

## 2024-08-08 PROCEDURE — 99215 OFFICE O/P EST HI 40 MIN: CPT | Performed by: STUDENT IN AN ORGANIZED HEALTH CARE EDUCATION/TRAINING PROGRAM

## 2024-08-08 RX ORDER — EPINEPHRINE 1 MG/ML
0.3 INJECTION, SOLUTION INTRAMUSCULAR; SUBCUTANEOUS EVERY 5 MIN PRN
Status: CANCELLED | OUTPATIENT
Start: 2024-08-08

## 2024-08-08 RX ORDER — HEPARIN SODIUM (PORCINE) LOCK FLUSH IV SOLN 100 UNIT/ML 100 UNIT/ML
5 SOLUTION INTRAVENOUS
Status: CANCELLED | OUTPATIENT
Start: 2024-08-08

## 2024-08-08 RX ORDER — LISINOPRIL 5 MG/1
15 TABLET ORAL DAILY
Qty: 90 TABLET | Refills: 0 | Status: ON HOLD | OUTPATIENT
Start: 2024-08-08 | End: 2024-09-18

## 2024-08-08 RX ORDER — HEPARIN SODIUM,PORCINE 10 UNIT/ML
5-20 VIAL (ML) INTRAVENOUS DAILY PRN
Status: CANCELLED | OUTPATIENT
Start: 2024-08-08

## 2024-08-08 RX ORDER — DIPHENHYDRAMINE HYDROCHLORIDE 50 MG/ML
50 INJECTION INTRAMUSCULAR; INTRAVENOUS
Status: CANCELLED
Start: 2024-08-08

## 2024-08-08 ASSESSMENT — PAIN SCALES - GENERAL: PAINLEVEL: NO PAIN (0)

## 2024-08-08 NOTE — PROGRESS NOTES
Aug 8, 2024     Leland Pearson  [phone] (home)   : 1955  MRN: [id number]       RADIATION ONCOLOGY CONSULT    CC: total body irradiation as part of bone marrow transplant procedure    History of Present Illness:  Mr. 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 He has been treated with Vidaza and venetoclax.  He is now being work up for Allogeneic Stem Cell Transplant on protocol . His history is as follows: He was found to be thrombocytopenic (WBC 4.5, Hb 13.7, MCV 86, Plts clumped) on screening CBC 3/27/24 prior to elective back surgery. He eventually underwent bone marrow biopsy 24 showing hypercellular marrow (%) with 10.8% blasts including rare circulating blasts. He started Aza (7) /Hasmukh (14) on 24. He had a consultation with Dr. Murphy on 24 recommending transplant. Repeat marrow biopsy on 24 showed persistent MDS with 95% cellularity and <1% blasts. Another marrow biopsy on 24 showed no definitive morphologic evidence of myelodysplastic syndrome.    Review of Systems: As per HPI; otherwise, a 10 system review is unremarkable    Past Medical History:   Diagnosis Date    Gastroesophageal reflux disease     Hypertension         Past Surgical History:   Procedure Laterality Date    COLONOSCOPY      ESOPHAGOSCOPY, GASTROSCOPY, DUODENOSCOPY (EGD), COMBINED  2013    Procedure: COMBINED ESOPHAGOSCOPY, GASTROSCOPY, DUODENOSCOPY (EGD), BIOPSY SINGLE OR MULTIPLE;;  Surgeon: Franklin Barrera MD;  Location:  GI    ESOPHAGOSCOPY, GASTROSCOPY, DUODENOSCOPY (EGD), COMBINED  2013    Procedure: COMBINED ESOPHAGOSCOPY, GASTROSCOPY, DUODENOSCOPY (EGD), REMOVE FOREIGN BODY;;  Surgeon: Franklin Barrera MD;  Location:  GI    ORTHOPEDIC SURGERY      04 micro discectomy, acl  repair        Current Medications:    Current Outpatient Medications:     acyclovir (ZOVIRAX) 800 MG tablet, Take 1 tablet (800 mg) by mouth every 12  hours for 150 days, Disp: 60 tablet, Rfl: 4    allopurinol (ZYLOPRIM) 300 MG tablet, Take 1 tablet (300 mg) by mouth daily, Disp: 90 tablet, Rfl: 1    celecoxib (CELEBREX) 200 MG capsule, Take 200 mg by mouth daily, Disp: , Rfl:     famotidine (PEPCID) 20 MG tablet, Famotidine Oral    daily    active, Disp: , Rfl:     levofloxacin (LEVAQUIN) 250 MG tablet, Take 1 tablet (250 mg) by mouth daily, Disp: 30 tablet, Rfl: 1    lisinopril (ZESTRIL) 10 MG tablet, Take 1 tablet by mouth daily At 12:00pm, Disp: , Rfl:     multivitamin, therapeutic with minerals (MULTI-VITAMIN) TABS, Take 1 tablet by mouth daily, Disp: , Rfl:     posaconazole (NOXAFIL) 100 MG DR tablet, Take 3 tablets (300 mg) by mouth every morning, Disp: 90 tablet, Rfl: 1    prochlorperazine (COMPAZINE) 5 MG tablet, Take 10 mg by mouth every 6 hours as needed for nausea or vomiting, Disp: , Rfl:     tamsulosin (FLOMAX) 0.4 MG capsule, Tamsulosin Oral    daily    active, Disp: , Rfl:     Allergies:  No Known Allergies     Social History:  Social History     Socioeconomic History    Marital status:      Spouse name: None    Number of children: None    Years of education: None    Highest education level: None   Tobacco Use    Smoking status: Never     Passive exposure: Never    Smokeless tobacco: Never   Substance and Sexual Activity    Alcohol use: Yes     Comment: socially    Drug use: No        Family History   Problem Relation Age of Onset    No Known Problems Brother     No Known Problems Brother     No Known Problems Brother     No Known Problems Brother     Diabetes Brother     No Known Problems Sister     No Known Problems Daughter     No Known Problems Daughter          Physical Exam:  ECO  BP (!) 178/98   Pulse 88   Wt 90.3 kg (199 lb)   BMI 26.57 kg/m  , Pain Score No Pain (0)/10  General: Alert and in no acute distress.  HEENT: Normocephalic, atraumatic. No visible scleral icterus.  Neck: Apparent full range of motion.  CV: Appears  well-perfused, with no visible cyanosis.  Lungs: Breathing easily on room air, with no difficulty completing full sentences  Extremities:  No visible edema of the upper extremities. Full range of motion at the shoulders and elbows.    Neuro: Alert and oriented; grossly nonfocal. Normal speech. Moving upper extremities equally.  Skin: No visible jaundice. No suspicious lesions of the visualized integuments.  Psych: Mood and affect are appropriate to given situation. Answers questions appropriately.      Labs:  Lab Results   Component Value Date    WBC 0.8 (LL) 08/07/2024    HGB 13.0 (L) 08/07/2024    HCT 38.5 (L) 08/07/2024    PLT 40 (LL) 08/07/2024    ALT 21 08/07/2024    AST 33 08/07/2024     08/07/2024    BUN 16.2 08/07/2024    CO2 26 08/07/2024    INR 0.99 08/05/2024    ALKPHOS 132 08/07/2024       Radiation Oncology Pre-Treatment Evaluation:  Pacemaker: denies  Prior radiation: denies  Pregnancy status: na  History of lupus, scleroderma, connective tissue disorders and collagen vascular disease: denies  Pain: 0/10  Pain Plan: NA  Intent of treatment: curative/palliative: curative, part of planned bone marrow transplant procedure  Side Effects of Radiation: We discussed in detail the management of treatment side effects and provided information regarding the self-care of the most common side effects.    Impression and Plan:   Leland Pearson is a 69 year old with MDS, seen for consultation to discuss the potential role for total body irradiation as part of planned bone marrow transplant procedure, as per protocol 2022-52    As per the protocol, I recommend 200 cGy to be delivered as total body irradiation (TBI) using photons prescribed to the midplane at the level of the umbillicus. The treatment will consist of a single treatment (fraction) using right and left lateral fields with the patient in in a semi-cumbent position with compensators.       We discussed the risks (short and long term as listed on  consent), benefits, and alternatives to radiotherapy.  The risks of radiation include but are not limited to: fatigue, skin irritation, hair loss, nausea/vomiting, diarrhea, mouth sores, parotitis, lowered blood cell counts, sterility/infertility, risk of injury to the organs of the body, hormonal and growth disturbances, cataracts, and tumors caused by radiation.  We provided educational material regarding self care of the most common side effects.     Mr. Pearson expressed clear understanding of intent and side effects of radiation. Mr. Pearson is in agreement with this plan and will proceed with simulation for radiation planning immediately after this visit.    We appreciate the opportunity to participate in Mr. Pearson's care. Mr. Pearson was encouraged to contact me with questions or concerns should they arise.    Laboratory data and pathology as noted above was reviewed. I reviewed previous medical records, which are summarized in the HPI. A total of 60 minutes were spent (including face to face time) during this visit, and more than 50% of the time was spent in counseling and coordination of care.     Bruno Ramos MD  Department of Radiation Oncology

## 2024-08-08 NOTE — PROGRESS NOTES
"Oncology Rooming Note    August 8, 2024 12:39 PM   Leland Pearson is a 69 year old male who presents for:    Chief Complaint   Patient presents with    Oncology Clinic Visit     Consult     Initial Vitals: There were no vitals taken for this visit. Estimated body mass index is 26.74 kg/m  as calculated from the following:    Height as of 7/5/24: 1.843 m (6' 0.56\").    Weight as of 8/7/24: 90.8 kg (200 lb 3.2 oz). There is no height or weight on file to calculate BSA.    No LMP for male patient.  Allergies reviewed: Yes  Medications reviewed: Yes    Medications: Medication refills not needed today.  Pharmacy name entered into [employer]:    [id number] [address] [facility] - [place], [place] - [facility] [address]    Frailty Screening:   Is the patient here for a new oncology consult visit in cancer care? 2. No      Clinical concerns: Consultation  Considerations for radiation treatment   Pregnancy status: Male   Implanted Cardiac Devices: No   Any previous radiation therapy: No      Carmita Desai RN              "

## 2024-08-08 NOTE — LETTER
2024      Leland Pearson  [address] [place] [address]      Dear Colleague,    Thank you for referring your patient, Leland Pearson, to the [facility] RADIATION ONCOLOGY. Please see a copy of my visit note below.    Aug 8, 2024     Leland Pearson  [phone] (home)   : 1955  MRN: [id number]       RADIATION ONCOLOGY CONSULT    CC: total body irradiation as part of bone marrow transplant procedure    History of Present Illness:  Mr. 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 He has been treated with Vidaza and venetoclax.  He is now being work up for Allogeneic Stem Cell Transplant on protocol . His history is as follows: He was found to be thrombocytopenic (WBC 4.5, Hb 13.7, MCV 86, Plts clumped) on screening CBC 3/27/24 prior to elective back surgery. He eventually underwent bone marrow biopsy 24 showing hypercellular marrow (%) with 10.8% blasts including rare circulating blasts. He started Aza (7) /Hasmukh (14) on 24. He had a consultation with Dr. Murphy on 24 recommending transplant. Repeat marrow biopsy on 24 showed persistent MDS with 95% cellularity and <1% blasts. Another marrow biopsy on 24 showed no definitive morphologic evidence of myelodysplastic syndrome.    Review of Systems: As per HPI; otherwise, a 10 system review is unremarkable    Past Medical History:   Diagnosis Date     Gastroesophageal reflux disease      Hypertension         Past Surgical History:   Procedure Laterality Date     COLONOSCOPY       ESOPHAGOSCOPY, GASTROSCOPY, DUODENOSCOPY (EGD), COMBINED  2013    Procedure: COMBINED ESOPHAGOSCOPY, GASTROSCOPY, DUODENOSCOPY (EGD), BIOPSY SINGLE OR MULTIPLE;;  Surgeon: Franklin Barrera MD;  Location:  GI     ESOPHAGOSCOPY, GASTROSCOPY, DUODENOSCOPY (EGD), COMBINED  2013    Procedure: COMBINED ESOPHAGOSCOPY, GASTROSCOPY, DUODENOSCOPY (EGD), REMOVE  FOREIGN BODY;;  Surgeon: Franklin Barrera MD;  Location:  GI     ORTHOPEDIC SURGERY      04 micro discectomy, acl  repair        Current Medications:    Current Outpatient Medications:      acyclovir (ZOVIRAX) 800 MG tablet, Take 1 tablet (800 mg) by mouth every 12 hours for 150 days, Disp: 60 tablet, Rfl: 4     allopurinol (ZYLOPRIM) 300 MG tablet, Take 1 tablet (300 mg) by mouth daily, Disp: 90 tablet, Rfl: 1     celecoxib (CELEBREX) 200 MG capsule, Take 200 mg by mouth daily, Disp: , Rfl:      famotidine (PEPCID) 20 MG tablet, Famotidine Oral    daily    active, Disp: , Rfl:      levofloxacin (LEVAQUIN) 250 MG tablet, Take 1 tablet (250 mg) by mouth daily, Disp: 30 tablet, Rfl: 1     lisinopril (ZESTRIL) 10 MG tablet, Take 1 tablet by mouth daily At 12:00pm, Disp: , Rfl:      multivitamin, therapeutic with minerals (MULTI-VITAMIN) TABS, Take 1 tablet by mouth daily, Disp: , Rfl:      posaconazole (NOXAFIL) 100 MG DR tablet, Take 3 tablets (300 mg) by mouth every morning, Disp: 90 tablet, Rfl: 1     prochlorperazine (COMPAZINE) 5 MG tablet, Take 10 mg by mouth every 6 hours as needed for nausea or vomiting, Disp: , Rfl:      tamsulosin (FLOMAX) 0.4 MG capsule, Tamsulosin Oral    daily    active, Disp: , Rfl:     Allergies:  No Known Allergies     Social History:  Social History     Socioeconomic History     Marital status:      Spouse name: None     Number of children: None     Years of education: None     Highest education level: None   Tobacco Use     Smoking status: Never     Passive exposure: Never     Smokeless tobacco: Never   Substance and Sexual Activity     Alcohol use: Yes     Comment: socially     Drug use: No        Family History   Problem Relation Age of Onset     No Known Problems Brother      No Known Problems Brother      No Known Problems Brother      No Known Problems Brother      Diabetes Brother      No Known Problems Sister      No Known Problems Daughter      No Known Problems  Daughter          Physical Exam:  ECO  BP (!) 178/98   Pulse 88   Wt 90.3 kg (199 lb)   BMI 26.57 kg/m  , Pain Score No Pain (0)/10  General: Alert and in no acute distress.  HEENT: Normocephalic, atraumatic. No visible scleral icterus.  Neck: Apparent full range of motion.  CV: Appears well-perfused, with no visible cyanosis.  Lungs: Breathing easily on room air, with no difficulty completing full sentences  Extremities:  No visible edema of the upper extremities. Full range of motion at the shoulders and elbows.    Neuro: Alert and oriented; grossly nonfocal. Normal speech. Moving upper extremities equally.  Skin: No visible jaundice. No suspicious lesions of the visualized integuments.  Psych: Mood and affect are appropriate to given situation. Answers questions appropriately.      Labs:  Lab Results   Component Value Date    WBC 0.8 (LL) 2024    HGB 13.0 (L) 2024    HCT 38.5 (L) 2024    PLT 40 (LL) 2024    ALT 21 2024    AST 33 2024     2024    BUN 16.2 2024    CO2 26 2024    INR 0.99 2024    ALKPHOS 132 2024       Radiation Oncology Pre-Treatment Evaluation:  Pacemaker: denies  Prior radiation: denies  Pregnancy status: na  History of lupus, scleroderma, connective tissue disorders and collagen vascular disease: denies  Pain: 0/10  Pain Plan: NA  Intent of treatment: curative/palliative: curative, part of planned bone marrow transplant procedure  Side Effects of Radiation: We discussed in detail the management of treatment side effects and provided information regarding the self-care of the most common side effects.    Impression and Plan:   Leland Pearson is a 69 year old with MDS, seen for consultation to discuss the potential role for total body irradiation as part of planned bone marrow transplant procedure, as per protocol     As per the protocol, I recommend 200 cGy to be delivered as total body irradiation (TBI) using  "photons prescribed to the midplane at the level of the umbillicus. The treatment will consist of a single treatment (fraction) using right and left lateral fields with the patient in in a semi-cumbent position with compensators.       We discussed the risks (short and long term as listed on consent), benefits, and alternatives to radiotherapy.  The risks of radiation include but are not limited to: fatigue, skin irritation, hair loss, nausea/vomiting, diarrhea, mouth sores, parotitis, lowered blood cell counts, sterility/infertility, risk of injury to the organs of the body, hormonal and growth disturbances, cataracts, and tumors caused by radiation.  We provided educational material regarding self care of the most common side effects.     Mr. Pearson expressed clear understanding of intent and side effects of radiation. Mr. Pearson is in agreement with this plan and will proceed with simulation for radiation planning immediately after this visit.    We appreciate the opportunity to participate in Mr. Pearson's care. Mr. Pearson was encouraged to contact me with questions or concerns should they arise.    Laboratory data and pathology as noted above was reviewed. I reviewed previous medical records, which are summarized in the HPI. A total of 60 minutes were spent (including face to face time) during this visit, and more than 50% of the time was spent in counseling and coordination of care.     Bruno Ramos MD  Department of Radiation Oncology     Oncology Rooming Note    August 8, 2024 12:39 PM   Leland Pearson is a 69 year old male who presents for:    Chief Complaint   Patient presents with     Oncology Clinic Visit     Consult     Initial Vitals: There were no vitals taken for this visit. Estimated body mass index is 26.74 kg/m  as calculated from the following:    Height as of 7/5/24: 1.843 m (6' 0.56\").    Weight as of 8/7/24: 90.8 kg (200 lb 3.2 oz). There is no height or weight on file to " calculate BSA.    No LMP for male patient.  Allergies reviewed: Yes  Medications reviewed: Yes    Medications: Medication refills not needed today.  Pharmacy name entered into [employer]:    [id number] - [place], [place] - [address] [place] [address] [facility] - [place], [place] - [facility] [address]    Frailty Screening:   Is the patient here for a new oncology consult visit in cancer care? 2. No      Clinical concerns: Consultation  Considerations for radiation treatment   Pregnancy status: Male   Implanted Cardiac Devices: No   Any previous radiation therapy: No      Carmita Desai RN                Again, thank you for allowing me to participate in the care of your patient.        Sincerely,        Bruno Ramos MD

## 2024-08-08 NOTE — NURSING NOTE
"Oncology Rooming Note    August 8, 2024 3:50 PM   Leland Pearson is a 69 year old male who presents for:    Chief Complaint   Patient presents with    Oncology Clinic Visit     MDS (myelodysplastic syndrome)         Initial Vitals: BP (!) 179/94 (BP Location: Right arm, Patient Position: Sitting, Cuff Size: Adult Regular)   Pulse 88   Temp 97.9  F (36.6  C) (Oral)   Resp 16   Wt 90.3 kg (199 lb 1.2 oz)   SpO2 100%   BMI 26.59 kg/m   Estimated body mass index is 26.59 kg/m  as calculated from the following:    Height as of 7/5/24: 1.843 m (6' 0.56\").    Weight as of this encounter: 90.3 kg (199 lb 1.2 oz). Body surface area is 2.15 meters squared.  No Pain (0) Comment: Data Unavailable   No LMP for male patient.  Allergies reviewed: Yes  Medications reviewed: Yes    Medications: Medication refills not needed today.  Pharmacy name entered into [employer]:    [id number] - [place], [place] - [address] [place] [address] [facility] - [place], [place] - [address]    Frailty Screening:   Is the patient here for a new oncology consult visit in cancer care? 2. No      Clinical concerns: none     Celina Mason            "

## 2024-08-08 NOTE — PROGRESS NOTES
BMT/Cell Therapy Note  Aug 8, 2024     Referring provider: Dr. 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 Intentional weight loss. He was found to have thrombocytopenia on routine CBC done prior to back surgery (was planned for laminectomy and fusion). On 4/1/24, WBC 6.4, Hb 13.5, Plts 64, ANC 1.89. LDH elevated 532. He underwent bone marrow biopsy (4/23/24) which showed MDS-EB2. Hypercellular marrow (%) with 10.8% blasts including rare circulating blasts. Flow showed 4% myeloid blasts. Normal karyotype. NGS (peripheral blood) was positive for ASXL1, IDH1, RUNX1, SRSF2 mutations (full report not available). Counts: WBC 6.7, Hb 13.1, Plts 70, ANC 0.7. IPSS-M = High risk (0.85).  He was treated with azacitidine 75mg/m2 for 7 days and venetoclax for 14 days (C1 on 5/20/24). Bone marrow after first cycle (6/13/24) showed persistent MDS, with hypercellular marrow with therapy effect, no increase in blasts. Normal karyotype. NGS: ASXL1 (38%), IDH1 (43%), RUNX1 (41%), SRSF1 (38%).   He received second cycle of aza/ angelia on 7/1/24. His pre-transplant BMBx was done on 8/2/24 and he was cytopenic at the time (CBC with WBC 0.6, Hb 14, platelet count 24). Hypocellular marrow (5%) with no increase in blasts. Cytogenetics, FISH and NGS is pending.     History     History of Present Illness/ Summary  Leland Pearson is a 69 year old male who presents for a pre-transplant evaluation. Here with wife, and daughter.     BP high in clinic. Has been checking it at home, usually in 140/90s. Takes lisinopril 10mg in the afternoon.  Asked him to increase it to 15mg daily. He is to follow up with his BP readings with cardiology as well.     Functional status has been fairly good. 25min on stationary bike 5x per week since Jan. Gets winded when going up two flights. Helps with the dishes and laundry. He can stand for only 10-15min limited by back pain. Can walk a couple of blocks but does not go out for walks, again due to back pain. Independent in ADLs. Wife reminds him to take his medications. Drives, manages finances, would be able to go to a grocery store.      Review of Systems: Negative except as mentioned above    Past Medical History   Back pain due to spinal stenosis, lumbosacral radiculopathy.   Gout. Most recent flare was in April 2024  GERD  HTN  BPH, on tamsulosin   Right knee ACL repair in 2014  Low back surgery in 2004 for herniated disc, complicated by infection requiring prolonged IV abx   Coronary artery calcifications seen on CT, stress test in 2023 negative    Family History    Mother, skin cancer at 85  Sister, skin cancer at 55  Paternal uncle, liver cancer    Social History   . Never smoker. Alcohol, socially - 5x/week, none since April 2024  5 brothers, 1 sister.  - Toi, 65. Kidney stones  - Mindi, 60. Hx of skin cancer, basal cell.   - Tj, 52. Has diabetes, type 1.     Two children: 43 years and 36 years, both daughters are healthy.    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 Was still working part time till last summer at [employer], 2-3 hours per day.     Medications   Current Outpatient Medications   Medication Sig Dispense Refill    lisinopril (ZESTRIL) 5 MG tablet Take 3 tablets (15 mg) by mouth daily for 30 days 90 tablet 0    acyclovir (ZOVIRAX) 800 MG tablet Take 1 tablet (800 mg) by mouth every 12 hours for 150 days 60 tablet 4    allopurinol (ZYLOPRIM) 300 MG tablet Take 1 tablet (300 mg) by mouth daily 90 tablet 1    celecoxib (CELEBREX) 200 MG capsule Take 200 mg by mouth daily       famotidine (PEPCID) 20 MG tablet Famotidine Oral    daily    active      levofloxacin (LEVAQUIN) 250 MG tablet Take 1 tablet (250 mg) by mouth daily 30 tablet 1    multivitamin, therapeutic with minerals (MULTI-VITAMIN) TABS Take 1 tablet by mouth daily      posaconazole (NOXAFIL) 100 MG DR tablet Take 3 tablets (300 mg) by mouth every morning 90 tablet 1    prochlorperazine (COMPAZINE) 5 MG tablet Take 10 mg by mouth every 6 hours as needed for nausea or vomiting      tamsulosin (FLOMAX) 0.4 MG capsule Tamsulosin Oral    daily    active        Allergies    No Known Allergies         Physical Exam     Vital Signs: BP (!) 179/94 (BP Location: Right arm, Patient Position: Sitting, Cuff Size: Adult Regular)   Pulse 88   Temp 97.9  F (36.6  C) (Oral)   Resp 16   Wt 90.3 kg (199 lb 1.2 oz)   SpO2 100%   BMI 26.59 kg/m    Wt Readings from Last 4 Encounters:   08/08/24 90.3 kg (199 lb 1.2 oz)   08/08/24 90.3 kg (199 lb)   08/07/24 90.8 kg (200 lb 3.2 oz)   08/06/24 90.3 kg (199 lb)     KPS: 80% Can perform normal activity with effort, some signs of disease    General: Alert, no distress  Eyes: Conjunctiva normal  Respiratory: Normal respiratory effort.  Cardiovascular: Normal perfusion, no significant edema.  Psych: Mood and affect are appropriate.    BMT Fried Frailty          8/5/2024    12:30 6/11/2024    16:09   Fried Frailty   Lost>10 pounds unintenionally last year N N   Exhaustion Score 0 0   Slowness Score 0 0   Weakness/ Strength Score 0 0   Low Activity Level Score 0 0   Final Score Not Frail Not Frail   Final Score Number 0 0   Sit Stand Assessment   Patient able to perform 5 chair stands Y Y   Chair Stands in seconds 7 19   Patient is able to perform stand with Feet Side by Side? Y Y   First attempt (in seconds): 10 10   Patient is able to perform Semi-Tandem Stand? Y Y   First attemp (in seconds): 10 10   Patient is able to perform Tandem Stand? Y Y   First attemp (in seconds): 10 10         Assessment and Plan     Leland Pearson is a 69 year old male with MDS-EB2 , currently     BMT/IEC PROTOCOL for MDS  - Chemo protocol: MT 2022-5  D-6: Flu 30mg/m2, Cy 50mg/kg  D-5 to D-2: Flu  D-1: TBI  D0: stem cell infusion  - Peripheral blood stem cell graft from 8/8 [facility] donor, ABO matched, Cell dose: TBD  - Engraftment monitoring: Peripheral blood and bone marrow chimerisms on D28, D100, 6 months, 1 and 2 years  - Restaging plan: BMBx with FISH, cytogenetics and NGS on D28, D100, 6 months, 1 and 2 years    HEME/COAG  - GCSF starts day +5, continue until ANC > 1500 for 3 consecutive days.   - Transfusion parameters: hemoglobin <7, platelets <10  - Relevant thrombosis or bleeding history: none    RISK OF GVHD  - Prophylaxis: PT Cy 50mg/kg on D+3 and D+4; MMF (D+5 to 35); Sirolimus (starting D+5, target level 5-10).    - Acute GVHD Treatment: None to date  - Chronic GVHD Treatment: None to date    ID  Prophylaxis plan:   - Bacterial: Levaquin 250mg from D-1 till engraftment (cefpodoxime if allergic)   - Fungal: micafungin 300mg 3x/week until D+45, followed by mold active azole. Pulm nodules present.   - PJP: Bactrim or Atovaquone or Pentamidine from D28. Toxoplasma negative   - Viral: Acyclovir     Monitoring:   - CMV weekly  - EBV every 2 weeks from D30 to D180    GI/NUTRITION  # GERD: Currently on famotidine, may hold once started on PPI  - Anti-emetics:  Per protocol  - Ulcer prophy: PPI daily   - VOD prophy: Ursodiol 300mg TID  - Dietician support to prevent malnutrition.    CARDIOVASCULAR  # HTN  - BP has been high (160-170s/ 70s) in recent clinic appointments. Increase lisinopril to 15mg daily     #  CAD: Coronary artery calcifications seen on CT, stress test in 2023 negative    - Risk of cardiomyopathy:  Baseline EF 55-60%.   - Risk of arrhythmia: Baseline EKG showed 421     RESPIRATORY  - Baseline PFTs: Normal   - Risk of respiratory complications: Frequent ambulation and incentive  spirometer.    RENAL/ELECTROLYTES/  - Risk of renal injury: IV hydration   - Electrolyte management: replace per sliding scale  - BPH: On flomax     DIABETES/ENDOCRINE  - Risk of steroid-induced hyperglyemia: Monitor BG, sliding scale if needed    MUSCULOSKELETAL/FRAILTY  - Baseline Frailty Score: Not frail (0)  - Daily PT/OT as needed while inpatient  - Gout: continue allopurinol     SYMPTOM MANAGEMENT  - Pain management: Takes celecoxib for MSK pain, once a day. Will need alternate pain management strategies once admitted    SOCIAL DETERMINANTS  - Caregiver: wife, Vani. Lives within the required distance from hospital but may elect to stay in [facility].   - Financial/insurance concerns: None    BMT and Cell Therapy Informed Consent Discussion     In today's visit, we discussed in detail the research for which Leland Pearson is eligible. We discussed the potential risks and potential benefits of each protocol individually. We explained potential alternatives to the protocols discussed. We explained to the patient that participation is voluntary and that consent may be withdrawn at any time.     The donor is ineligible since he got two tattoos in the past 12 months    The patient completed the last round of treatment on 7/1/24.    HCT-CI score: 1 (CAD) We counseled the patient about the impact of this on the risk of treatment related and overall mortality. The score fit within treatment protocol eligibility criteria.    KPS: 80% Can perform normal activity with effort, some signs of disease    Active infections:  none.    Prior infections that require additional special prophylaxis considerations: High dose micafungin followed by mold active azole due to presence of lung nodules.  I reviewed and discussed infectious disease evaluation with the patient and the management plan during treatment.    Reproductive status: What methods of birth control does the patient plan to use during the treatment period  beginning with conditioning and ending with the discontinuation of immune suppression (indicate with an X all that apply):  __ The patient is confirmed to be sterile or post-menopausal  _x_ Sexual abstinence  _x_ Condoms  __ Implants  __ Injectables  __ Oral contraceptives  __ Intrauterine devices (IUD)  __ Other (describe)    The patient received appropriate reproductive counseling and agreed with the need for effective contraception during the treatment procedures.    Pharmacy recommendations  1) Hold posaconazole for 7 days prior to admission.  2) Continue allopurinol indefinitely for gout.  3) Closely monitor platelets with celecoxib use. Consider holding on admission and use alternative while getting chemotherapy.  4) Closely monitor blood pressure with lisinopril and tamsulosin during admission. May need to hold or decrease dosing.    Dental health suitable to proceed: Yes      The informed consent process has occurred, as I have provided the patient with an explanation of their diagnosis as well as the prognosis of their disease. I discussed the risks, benefits and alternatives to treatment. Common and less common risks discussed with patient. The patient was given the opportunity to ask questions, and their questions were answered. .    After our detailed discussion above, the patient signed the following consents for treatment and protocols:  MT 2022-52    Known issues that I take into account for medical decisions, with salient changes to the plan considering these complexities noted above.    Patient Active Problem List   Diagnosis    MDS (myelodysplastic syndrome) (H)    Pre-operative cardiovascular examination    Benign essential hypertension         I spent 60 minutes in the care of this patient today, which included time necessary for preparation for the visit, obtaining history, ordering medications/tests/procedures as medically indicated, review of pertinent medical literature, counseling of the  patient, communication of recommendations to the care team, and documentation time.      ____________________________________________________________________    BMT/Cell Therapy Workup Summary    Workup Nurse Coordinator: Jignesh Aranda    Primary BMT Physician: Brenda Murphy  [facility] BMT Physician (if different):   Date of Summary:  08/08/2024    Patient Demographics     Patient ID:  Leland Pearson   Age:  69 year old   Sex:  male  Reason for Transplant: MDS  Protocol: MT 2022-52    Donor Characteristics     Self or Related or Unrelated Donor: [facility]  Donor Match: 8/8, DP permissive   Donor Age: 24  Donor Sex: F  Donor ABO/Rh: O+ (matched)  Donor CMV Serostatus: negative (matched)    Donor-Specific Antibodies:  No lab results found.    Virtual Crossmatch:    Recent Labs   Lab Test 08/05/24  1137   RESVXMT1 No Interp   RESVXMB1 No Interp       Blood Counts     Recent Labs   Lab Test 08/07/24  1328 08/06/24  1239 08/05/24  1200 08/02/24  0919 08/01/24  0805 05/22/24  0838 05/21/24  1016   WBC 0.8* 0.7* 0.7*   < > 0.7*   < > 4.0   HGB 13.0* 13.5 13.9   < > 14.6   < > 12.9*   HCT 38.5* 39.8* 41.6   < > 42.7   < > 38.4*   PLT 40* 9* 12*   < > 12*   < > 72*   ANEUTAUTO  --  0.0* 0.1*  --  0.1*   < >  --    ALYMPAUTO  --  0.6* 0.6*  --  0.6*   < >  --    AMONOAUTO  --  0.0 0.0  --  0.0   < >  --    AEOSAUTO  --  0.0 0.0  --  0.0   < >  --    ABSBASO  --  0.0 0.0  --  0.0   < >  --    NRBCMAN 0.0 0.0 0.0   < > 0.0   < >  --    ABLA  --   --   --   --   --   --  0.4*    < > = values in this interval not displayed.       Recent Labs   Lab Test 08/01/24  0805   ABORH O POS       Chemistries     Basic Panel  Recent Labs   Lab Test 08/07/24  1328 08/07/24  1313 08/05/24  1200 08/02/24  0919     --  138 138   POTASSIUM 4.3  --  4.6 4.3   CHLORIDE 103  --  102 104   CO2 26  --  28 24   BUN 16.2  --  14.8 12.4   CR 0.82 0.82 0.82 0.74   *  --  102* 106*        Calcium, Magnesium, Phosphorus  Recent Labs   Lab Test  08/07/24  1328 08/05/24  1200 08/02/24  0919 07/29/24  0811 07/03/24  1303 07/01/24  1208 05/15/24  1339 05/08/24  1156   HERBERT 9.6 9.6 9.3 9.3   < > 9.4   < > 9.5   MAG  --   --   --   --   --   --   --  2.1   PHOS  --  3.5  --  3.0  --  2.8   < > 2.7    < > = values in this interval not displayed.        LFTs  Recent Labs   Lab Test 08/07/24  1328 08/05/24  1200 08/02/24  0919   BILITOTAL 0.9 1.0 0.9   ALKPHOS 132 128 132   AST 33 31 32   ALT 21 21 19   ALBUMIN 4.2 4.3 4.1       LDH  Recent Labs   Lab Test 05/08/24  1156   *       Urine Studies     Recent Labs   Lab Test 08/05/24  1200   COLOR Straw   APPEARANCE Clear   URINEGLC Negative   URINEBILI Negative   URINEKETONE Negative   SG 1.006   UBLD Trace*   URINEPH 6.5   PROTEIN Negative   UUROI Normal   NITRITE Negative   LEUKEST Negative   RBCU 1   WBCU <1       Creatinine Clearance    Recent Labs   Lab Test 08/07/24  1313      WT 90.0      *   VOL 3,067   DUR 24.0   UCRR 51.3   UCR24 1.57       Infectious Disease Markers     [facility] IDM  Recent Labs   Lab Test 08/05/24  1200   TCRUZI Non-reactive       CMV  Recent Labs   Lab Test 08/05/24  1200   CMVIGG No detectable antibody.       EBV  Recent Labs   Lab Test 08/05/24  1200   EBVCAG Positive*       HSV 1/2  Recent Labs   Lab Test 08/05/24  1200   O9WVKRH 0.25   H1IGG No HSV-1 IgG antibodies detected.   W3ANBMX 0.06   H2IGG No HSV-2 IgG antibodies detected.       VZV  Recent Labs   Lab Test 08/05/24  1200   VZVIGG Positive       HTLV  No lab results found.    Toxoplasma  Recent Labs   Lab Test 08/05/24  1200   TOXAM <3.0   TOXGONGIAB <3.0       Bone Marrow Biopsy     Results for orders placed or performed in visit on 08/02/24 (from the past 8760 hour(s))   Bone marrow biopsy   Result Value    Final Diagnosis      Bone marrow, posterior iliac crest, left decalcified trephine biopsy and touch imprint; direct aspirate smear, and concentrated aspirate smear; and peripheral  blood smear:    -No definitive morphologic evidence of myelodysplastic syndrome  -Hypocellular marrow for age (cellularity estimated at 5%) with markedly reduced erythroid predominant hematopoiesis; megakaryopoiesis is essentially absent, and granulopoiesis is markedly reduced;  no overt dysplasia, and no increase in blasts  -Peripheral blood showing marked leukopenia with neutropenia and lymphopenia; marked thrombocytopenia  -See comment         Comment      Concurrent flow cytometry ([id number]) showed there was no increase in myeloid blasts or abnormal myeloid blast population.    Concurrent ancillary studies are in progress and will be reported separately. Correlation with the results of ancillary tests (including NGS) and clinical findings is recommended.      Clinical Information      From Epic electronic medical record; 69 year old male with a history of  MDS-EB2 with ASXL1, IDH1, RUNX1, an SRSF2 mutations . They have required transfusion support including multiple units  of platelets (most recent transfusion 8/1/2024). The most recent bone marrow biopsy ([id number]; 6/13/2024) showed a hypercellular marrow with trilineage hematopoiesis,  rare dysgranulopoiesis ,no increase in blasts, and no morphologic or immunophenotypic evidence of residual disease. Flow cytometry studies from that biopsy ([id number]) showed no increase in myeloid blasts and no abnormal myeloid blasts were identified. Molecular studies from that biopsy ([id number]) showed ASXL1 G646fs at 38%, IDH1 R132C at 43%, RUNX1 S402fs at 41%, and SRSF2 P95L at 38%. This bone marrow biopsy is being worked up for possible transplant.      Peripheral Hematologic Data      CBC WITH MANUAL DIFFERENTIAL (08/02/2024 09:33 AM CDT):     RESULT VALUE REF. RANGE UNITS    WBC Count   Hemoglobin  Hematocrit   Platelet Count   RBC Count  MCV  MCH  MCHC   RDW   0.6  ( LL )    14.0 (NORMAL)     40.5 (NORMAL)   24  ( LL )  4.88 (NORMAL)       83 (NORMAL)      28.7 (NORMAL)     34.6 (NORMAL)      17.1  ( H ) 4.0-11.0  13.3-17.7  40.0-53.0  150-450  4.40-5.90    26.5-33.0  31.5-36.5  10.0-15.0 10e3/uL  g/dL  %  10e3/uL  10e6/uL  fL  pg  g/dL  %   % Neutrophils  % Lymphocytes  % Monocytes  % Eosinophils  % Basophils  % Metamyelocytes  % Myelocytes  % Promyelocytes  % Blasts  % Plasma Cells  % Other Cells   Absolute Neutrophils    Absolute Lymphocytes   Absolute Monocytes  Absolute Eosinophils  Absolute Basophils  Absolute Metamyelocytes  Absolute Myelocytes  Absolute Promyelocytes  Absolute Blasts  Absolute Plasma Cells  Absolute Other Cells  NRBCs per 100 WBC  Absolute NRBCs 4  86  4  2  2  2              0.0  ( LL )      0.5  ( L )     0.0 (NORMAL)     0.0 (NORMAL)     0.0 (NORMAL)  0.0 (NORMAL)   ()   ()       ()   ()   ()   ()      () N/A  N/A  N/A  N/A  N/A  N/A  N/A  N/A  N/A  N/A  N/A  1.6-8.3  0.8-5.3  0.0-1.3  0.0-0.7  0.0-0.2  <=0.0  <=0.0  <=0.0  <=0.0  <=0.0  <=0.0  <=0  <=0.0 %  %  %  %  %  %  %  %  %  %  %  10e3/uL  10e3/uL  10e3/uL  10e3/uL  10e3/uL  10e3/uL  10e3/uL  10e3/uL  10e3/uL  10e3/uL  10e3/uL  %  10e3/uL               Microscopic Description      PERIPHERAL BLOOD  The red cells appear normochromic.  Poikilocytosis includes numerous echinocytes and occasional fragmented red blood cells (schistocytes, helmet cells and keratocytes [horn cells]). Polychromasia is not increased. Rouleaux formation is not increased. The morphology of the platelets is normal. Lymphocytes are polymorphous. Neutrophils show unremarkable cytoplasmic granularity and nuclear morphology.    BONE MARROW   Bone marrow aspirates and touch imprints of bone biopsy are reviewed.  (500 cells on bone marrow biopsy touch imprints  by AM)  Percent (%) Cell Population Reference Range (%)   0.4 Blasts  (0 - 1)   0.2 Neutrophil promyelocytes (2 - 4)   3.2 Neutrophils and precursors (54 - 63)   44.8 Erythroid precursors (18 - 24)   0.4 Monocytes (1- 1.5)   1.4 Eosinophils (1 - 3)   0  Basophils (0 - 1)   49.2 Lymphocytes (8 - 12)   0.4 Plasma cells (0 - 1.5)     The aspirate smears are hemodilute, therefore the differential is performed on the touch imprints.    Granulocytes are relatively decreased in number with progressive and complete maturation; no overt dysplasia is observed. Erythroid precursors are relatively increased in number with progressive and complete maturation;  rare multinucleated forms are  observed. Megakaryocytes are present and show an overall normal morphologic spectrum.      IRON STAINS:   Dacie iron stain on concentrate smears:   No definitive ringed sideroblasts are seen on scanning.     TREPHINE SECTIONS:  Hematoxylin and eosin stains are reviewed. The trephine core biopsy is suboptimal. The marrow cellularity is estimated at 5%. The cellular composition reflects the the marrow touch imprints differential. Megakaryocytes are rare to absent.    SPECIAL STAINS:  Special stains are performed on the core biopsy sections with appropriately reactive control tissues.    IMMUNOHISTOCHEMISTRY  Immunohistochemical stains are performed on the paraffin-embedded trephine sections with appropriately reactive control tissues.    CD34 - mostly stains vessels - very rare blasts    CD61 - megakaryocytes are rare to absent    GLUT-1 stains erythroid precursors within islands, as well as anucleate RBCs    Note: These immunohistochemical stains are deemed medically necessary. Some of the antigens may also be evaluated by flow cytometry. Concurrent evaluation by immunohistochemistry on clot and/or trephine sections is indicated in this case in order to correlate immunophenotype with cell morphology and determine extent of involvement, spatial pattern, and focality of potential disease distribution.      Gross Description      Procedure/Gross Description   Aspirate(s) and trephine(s) procured by STANLEY Aguirre    Specimen sent for Special Studies:         Flow Cytometry: left aspirate         Cytogenetics: left aspirate        Molecular Diagnostics: left aspirate          Biopsy aspiration site: left posterior iliac crest                                                      (Reference Range)          Amount of aspirate           4.7   mL        Fat and P.V. cell layer        trace    %               (1 - 3)        Particles                             trace   %        Myeloid-erythroid layer    trace    %               (5 - 8)          Clot Section: no    Trephine biopsy site: left posterior iliac crest    Designated left posterior iliac crest is 1 cylinder of gritty tissue, labeled with the patient's name and hospital number, obtained with 11 gauge needle and a length of 23 mm; entirely submitted in 1 cassette; acetic zinc formalin fixed, decalcified, processed, and stained for hematoxylin and eosin per laboratory protocol.        Performing Labs      The technical component of this testing was completed at [facility].     Stain controls for all stains resulted within this report have been reviewed and show appropriate reactivity.            Flow   Lab Results   Component Value Date    FLINTERP  08/02/2024     A. Iliac Crest, Bone Marrow Aspirate, Left:  -No increase in myeloid blasts and no abnormal myeloid blast population      COMDX  08/02/2024     Final interpretation requires correlation with results of other ancillary studies, morphologic, and clinical features.          Molecular Studies: Pending    Chest X-Ray - 2 view     Results for orders placed in visit on 08/06/24    XR CHEST 2 VIEWS    Status: Normal 8/6/2024    Narrative  Chest 2 views    INDICATION: Preoperative. Myelodysplastic syndrome.    COMPARISON: Outside CT chest abdomen pelvis 4/25/2024 there are no  prior plain films on PACS.    FINDINGS: Heart size and shape appear normal. The lungs and pulmonary  vasculature appear normal. Bony structures show mild  degenerative  changes in the thoracic spine without suspicious density pattern.    Impression  IMPRESSION: Mild thoracic spondylosis otherwise negative    BLAKE WESLEY MD      SYSTEM ID:  [id number]      Chest CT without Contrast     Results for orders placed in visit on 08/06/24    CT CHEST W/O CONTRAST    Status: Normal 8/6/2024    Narrative  CT CHEST W/O CONTRAST 8/6/2024 4:15 PM    History: Preop examination; Personal history of diseases of blood and  blood-forming organs; Screening for viral disease; MDS  (myelodysplastic syndrome) (H)    Comparison: None.    Technique: CT of the chest was obtained without intravenous contrast.  Axial, coronal, and sagittal reconstructions were obtained and  reviewed.    Contrast: None    Findings:    Lungs: Stable 2.4 mm nodule right lower lobe image 184 series 4.  Stable 4.5 mm nodule in the right major fissure image 107 series 4. No  pneumothorax, pleural effusion, focal airspace opacity, or suspicious  pulmonary nodule. Minimal atelectasis in the lingula.  Airways: Central tracheobronchial tree is clear.  Vessels: Main pulmonary artery and aorta are normal in caliber. Normal  three-vessel arch  Heart: Heart size is normal without pericardial effusion  Lymph nodes: No suspicious mediastinal or hilar lymphadenopathy.  Thyroid: Within normal limits.  Esophagus: Within normal limits    Upper abdomen: 5 mm renal stone in the left kidney. Minimal vascular  calcifications.    Bones and soft tissues: No suspicious axillary lymphadenopathy or soft  tissue mass. No suspicious osseous lesion. Multilevel degenerative  changes of the spine.    Impression  Impression:  1. No acute lung finding.  2. Left-sided 5 mm renal stone.    I have personally reviewed the examination and initial interpretation  and I agree with the findings.    LEONOR APPIAH MD      SYSTEM ID:  [id number]      PFTs     FVC%  Recent Labs   Lab Test 08/07/24  1515   [id number] 95       FEV1%  Recent Labs   Lab Test  24  1515   [id number] 91       DLCO%  Recent Labs   Lab Test 24  1515    110       EKG     ECG results from 24   EKG 12-lead complete w/read - Clinics     Value    Systolic Blood Pressure     Diastolic Blood Pressure     Ventricular Rate 59    Atrial Rate 59    UT Interval 198    QRS Duration 66        QTc 421    P Axis -5    R AXIS -23    T Axis 8    Interpretation ECG      Sinus bradycardia  Inferior infarct , age undetermined  Abnormal ECG  No previous ECGs available  Confirmed by MD CAROL, CORKY () on 2024 11:09:57 AM         ECHOCARDIOGRAM     Results for orders placed in visit on 24    ECHOCARDIOGRAM COMPLETE    Status: Normal 2024    Narrative  [id number]  [id number]  [id number]  [id number]^JULIÁN^YSABEL    [facility]  Diagnostic and Treatment-3rd Floor  [address] [place] [id number]    Name: NISREEN TAPIA  MRN: [id number]  : 1955  Study Date: 2024 03:27 PM  Age: 69 yrs  Gender: Male  Patient Location: [facility]  Reason For Study: Preop examination, Personal history of diseases of blood and  blo  Ordering Physician: YSABEL GALLEGO  Referring Physician: YSABEL GALLEGO  Performed By: Kirti Aggarwal    BSA: 2.1 m2  Height: 72 in  Weight: 199 lb  BP: 154/90 mmHg  ______________________________________________________________________________  Procedure  Echocardiogram with two-dimensional, color and spectral Doppler performed.  Contrast Optison. Optison (NDC #4392-6358-34) given intravenously. Patient was  given 5 ml mixture of 3 ml Optison and 6 ml saline. 4 ml wasted.  ______________________________________________________________________________  Interpretation Summary  Left ventricular size, wall motion and function are normal. The ejection  fraction is 55-60%.  Right ventricular function, chamber size, wall motion, and thickness are  normal.  No significant valvular abnormalities  present.  Previous study not available for comparison.  ______________________________________________________________________________  Left Ventricle  Left ventricular size, wall motion and function are normal. The ejection  fraction is 55-60%. Left ventricular size cannot evaluate. Left ventricular  wall thickness is normal. Left ventricular diastolic function is normal.    Right Ventricle  Right ventricular function, chamber size, wall motion, and thickness are  normal.    Atria  Both atria appear normal.    Mitral Valve  The mitral valve is normal. Trace mitral insufficiency is present.    Aortic Valve  Aortic valve is normal in structure and function.    Tricuspid Valve  The tricuspid valve is normal.    Pulmonic Valve  The pulmonic valve is normal.    Vessels  The aorta root is normal. The inferior vena cava was normal in size with  preserved respiratory variability.    Pericardium  No pericardial effusion is present.    Miscellaneous  No significant valvular abnormalities present.    Compared to Previous Study  Previous study not available for comparison.  ______________________________________________________________________________  MMode/2D Measurements & Calculations  IVSd: 0.92 cm  LVIDd: 4.7 cm  LVIDs: 2.9 cm  LVPWd: 0.97 cm  FS: 37.9 %  LV mass(C)d: 154.6 grams  LV mass(C)dI: 72.7 grams/m2  Ao root diam: 3.2 cm  asc Aorta Diam: 4.0 cm  LVOT diam: 2.3 cm  LVOT area: 4.1 cm2  Ao root diam index Ht(cm/m): 1.8  Ao root diam index BSA (cm/m2): 1.5  Asc Ao diam index BSA (cm/m2): 1.9  Asc Ao diam index Ht(cm/m): 2.2  LA Volume (BP): 50.4 ml    LA Volume Index (BP): 23.7 ml/m2  RWT: 0.41  TAPSE: 1.9 cm    Doppler Measurements & Calculations  MV E max caty: 33.5 cm/sec  MV A max caty: 84.4 cm/sec  MV E/A: 0.40  MV dec time: 0.22 sec  Ao V2 max: 117.4 cm/sec  Ao max P.5 mmHg  Ao V2 mean: 87.5 cm/sec  Ao mean PG: 3.3 mmHg  Ao V2 VTI: 24.3 cm  SERAFIN(I,D): 2.6 cm2  SERAFIN(V,D): 2.7 cm2  LV V1 max P.4 mmHg  LV V1  max: 77.1 cm/sec  LV V1 VTI: 15.1 cm  SV(LVOT): 62.3 ml  SI(LVOT): 29.3 ml/m2  AV Hermann Ratio (DI): 0.66    SERAFIN Index (cm2/m2): 1.2  E/E' av.9  Lateral E/e': 4.8  Medial E/e': 11.0  RV S Hermann: 12.0 cm/sec    ______________________________________________________________________________  Report approved by: Daja ARREOLA 2024 03:57 PM

## 2024-08-08 NOTE — LETTER
8/8/2024      Leland Pearson  [address] [place] [address]      Dear Colleague,    Thank you for referring your patient, Leland Pearson, to the [facility] BLOOD AND MARROW TRANSPLANT PROGRAM [place]. Please see a copy of my visit note below.    BMT/Cell Therapy Note  Aug 8, 2024     Referring provider: Dr. 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 Intentional weight loss. He was found to have thrombocytopenia on routine CBC done prior to back surgery (was planned for laminectomy and fusion). On 4/1/24, WBC 6.4, Hb 13.5, Plts 64, ANC 1.89. LDH elevated 532. He underwent bone marrow biopsy (4/23/24) which showed MDS-EB2. Hypercellular marrow (%) with 10.8% blasts including rare circulating blasts. Flow showed 4% myeloid blasts. Normal karyotype. NGS (peripheral blood) was positive for ASXL1, IDH1, RUNX1, SRSF2 mutations (full report not available). Counts: WBC 6.7, Hb 13.1, Plts 70, ANC 0.7. IPSS-M = High risk (0.85).  He was treated with azacitidine 75mg/m2 for 7 days and venetoclax for 14 days (C1 on 5/20/24). Bone marrow after first cycle (6/13/24) showed persistent MDS, with hypercellular marrow with therapy effect, no increase in blasts. Normal karyotype. NGS: ASXL1 (38%), IDH1 (43%), RUNX1 (41%), SRSF1 (38%).   He received second cycle of aza/ angelia on 7/1/24. His pre-transplant BMBx was done on 8/2/24 and he was cytopenic at the time (CBC with WBC 0.6, Hb 14, platelet count 24). Hypocellular marrow (5%) with no increase in blasts. Cytogenetics, FISH and NGS is pending.      History     History of Present Illness/ Summary  Leland Pearson is a 69 year old male who presents for a pre-transplant evaluation. Here with wife, and daughter.     BP high in clinic. Has been checking it at home, usually in 140/90s. Takes lisinopril 10mg in the afternoon. Asked him to increase it to 15mg daily. He is to follow up with his BP readings with cardiology as well.     Functional status has been fairly good. 25min on stationary bike 5x per week since Jan. Gets winded when going up two flights. Helps with the dishes and laundry. He can stand for only 10-15min limited by back pain. Can walk a couple of blocks but does not go out for walks, again due to back pain. Independent in ADLs. Wife reminds him to take his medications. Drives, manages finances, would be able to go to a grocery store.      Review of Systems: Negative except as mentioned above    Past Medical History  Back pain due to spinal stenosis, lumbosacral radiculopathy.   Gout. Most recent flare was in April 2024  GERD  HTN  BPH, on tamsulosin   Right knee ACL repair in 2014  Low back surgery in 2004 for herniated disc, complicated by infection requiring prolonged IV abx   Coronary artery calcifications seen on CT, stress test in 2023 negative    Family History   Mother, skin cancer at 85  Sister, skin cancer at 55  Paternal uncle, liver cancer    Social History  . Never smoker. Alcohol, socially - 5x/week, none since April 2024  5 brothers, 1 sister.  - Toi, 65. Kidney stones  - Mindi, 60. Hx of skin cancer, basal cell.   - Tj, 52. Has diabetes, type 1.     Two children: 43 years and 36 years, both daughters are healthy.    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 Was still working part time till last summer at [employer], 2-3 hours per day.     Medications  Current Outpatient Medications   Medication Sig Dispense Refill     lisinopril (ZESTRIL) 5 MG tablet Take 3 tablets (15 mg) by mouth daily for 30 days 90  tablet 0     acyclovir (ZOVIRAX) 800 MG tablet Take 1 tablet (800 mg) by mouth every 12 hours for 150 days 60 tablet 4     allopurinol (ZYLOPRIM) 300 MG tablet Take 1 tablet (300 mg) by mouth daily 90 tablet 1     celecoxib (CELEBREX) 200 MG capsule Take 200 mg by mouth daily       famotidine (PEPCID) 20 MG tablet Famotidine Oral    daily    active       levofloxacin (LEVAQUIN) 250 MG tablet Take 1 tablet (250 mg) by mouth daily 30 tablet 1     multivitamin, therapeutic with minerals (MULTI-VITAMIN) TABS Take 1 tablet by mouth daily       posaconazole (NOXAFIL) 100 MG DR tablet Take 3 tablets (300 mg) by mouth every morning 90 tablet 1     prochlorperazine (COMPAZINE) 5 MG tablet Take 10 mg by mouth every 6 hours as needed for nausea or vomiting       tamsulosin (FLOMAX) 0.4 MG capsule Tamsulosin Oral    daily    active        Allergies   No Known Allergies         Physical Exam     Vital Signs: BP (!) 179/94 (BP Location: Right arm, Patient Position: Sitting, Cuff Size: Adult Regular)   Pulse 88   Temp 97.9  F (36.6  C) (Oral)   Resp 16   Wt 90.3 kg (199 lb 1.2 oz)   SpO2 100%   BMI 26.59 kg/m    Wt Readings from Last 4 Encounters:   08/08/24 90.3 kg (199 lb 1.2 oz)   08/08/24 90.3 kg (199 lb)   08/07/24 90.8 kg (200 lb 3.2 oz)   08/06/24 90.3 kg (199 lb)     KPS: 80% Can perform normal activity with effort, some signs of disease    General: Alert, no distress  Eyes: Conjunctiva normal  Respiratory: Normal respiratory effort.  Cardiovascular: Normal perfusion, no significant edema.  Psych: Mood and affect are appropriate.    BMT Fried Frailty          8/5/2024    12:30 6/11/2024    16:09   Fried Frailty   Lost>10 pounds unintenionally last year N N   Exhaustion Score 0 0   Slowness Score 0 0   Weakness/ Strength Score 0 0   Low Activity Level Score 0 0   Final Score Not Frail Not Frail   Final Score Number 0 0   Sit Stand Assessment   Patient able to perform 5 chair stands Y Y   Chair Stands in seconds 7  19   Patient is able to perform stand with Feet Side by Side? Y Y   First attempt (in seconds): 10 10   Patient is able to perform Semi-Tandem Stand? Y Y   First attemp (in seconds): 10 10   Patient is able to perform Tandem Stand? Y Y   First attemp (in seconds): 10 10        Assessment and Plan     Leland Pearson is a 69 year old male with MDS-EB2 , currently     BMT/IEC PROTOCOL for MDS  - Chemo protocol: MT 2022-5  D-6: Flu 30mg/m2, Cy 50mg/kg  D-5 to D-2: Flu  D-1: TBI  D0: stem cell infusion  - Peripheral blood stem cell graft from 8/8 URD donor, ABO matched, Cell dose: TBD  - Engraftment monitoring: Peripheral blood and bone marrow chimerisms on D28, D100, 6 months, 1 and 2 years  - Restaging plan: BMBx with FISH, cytogenetics and NGS on D28, D100, 6 months, 1 and 2 years    HEME/COAG  - GCSF starts day +5, continue until ANC > 1500 for 3 consecutive days.   - Transfusion parameters: hemoglobin <7, platelets <10  - Relevant thrombosis or bleeding history: none    RISK OF GVHD  - Prophylaxis: PT Cy 50mg/kg on D+3 and D+4; MMF (D+5 to 35); Sirolimus (starting D+5, target level 5-10).    - Acute GVHD Treatment: None to date  - Chronic GVHD Treatment: None to date    ID  Prophylaxis plan:   - Bacterial: Levaquin 250mg from D-1 till engraftment (cefpodoxime if allergic)   - Fungal: micafungin 300mg 3x/week until D+45, followed by mold active azole. Pulm nodules present.   - PJP: Bactrim or Atovaquone or Pentamidine from D28. Toxoplasma negative   - Viral: Acyclovir     Monitoring:   - CMV weekly  - EBV every 2 weeks from D30 to D180    GI/NUTRITION  # GERD: Currently on famotidine, may hold once started on PPI  - Anti-emetics:  Per protocol  - Ulcer prophy: PPI daily   - VOD prophy: Ursodiol 300mg TID  - Dietician support to prevent malnutrition.    CARDIOVASCULAR  # HTN  - BP has been high (160-170s/ 70s) in recent clinic appointments. Increase lisinopril to 15mg daily     #  CAD: Coronary artery  calcifications seen on CT, stress test in 2023 negative    - Risk of cardiomyopathy:  Baseline EF 55-60%.   - Risk of arrhythmia: Baseline EKG showed 421     RESPIRATORY  - Baseline PFTs: Normal   - Risk of respiratory complications: Frequent ambulation and incentive spirometer.    RENAL/ELECTROLYTES/  - Risk of renal injury: IV hydration   - Electrolyte management: replace per sliding scale  - BPH: On flomax     DIABETES/ENDOCRINE  - Risk of steroid-induced hyperglyemia: Monitor BG, sliding scale if needed    MUSCULOSKELETAL/FRAILTY  - Baseline Frailty Score: Not frail (0)  - Daily PT/OT as needed while inpatient  - Gout: continue allopurinol     SYMPTOM MANAGEMENT  - Pain management: Takes celecoxib for MSK pain, once a day. Will need alternate pain management strategies once admitted    SOCIAL DETERMINANTS  - Caregiver: wife, Vani. Lives within the required distance from hospital but may elect to stay in [place].   - Financial/insurance concerns: None    BMT and Cell Therapy Informed Consent Discussion     In today's visit, we discussed in detail the research for which Leland Pearson is eligible. We discussed the potential risks and potential benefits of each protocol individually. We explained potential alternatives to the protocols discussed. We explained to the patient that participation is voluntary and that consent may be withdrawn at any time.     The patient completed the last round of treatment on 7/1/24.    HCT-CI score: 1 (CAD) We counseled the patient about the impact of this on the risk of treatment related and overall mortality. The score fit within treatment protocol eligibility criteria.    KPS: 80% Can perform normal activity with effort, some signs of disease    Active infections:  none.    Prior infections that require additional special prophylaxis considerations: High dose micafungin followed by mold active azole due to presence of lung nodules.  I reviewed and discussed infectious  disease evaluation with the patient and the management plan during treatment.    Reproductive status: What methods of birth control does the patient plan to use during the treatment period beginning with conditioning and ending with the discontinuation of immune suppression (indicate with an X all that apply):  __ The patient is confirmed to be sterile or post-menopausal  _x_ Sexual abstinence  _x_ Condoms  __ Implants  __ Injectables  __ Oral contraceptives  __ Intrauterine devices (IUD)  __ Other (describe)    The patient received appropriate reproductive counseling and agreed with the need for effective contraception during the treatment procedures.    Pharmacy recommendations  1) Hold posaconazole for 7 days prior to admission.  2) Continue allopurinol indefinitely for gout.  3) Closely monitor platelets with celecoxib use. Consider holding on admission and use alternative while getting chemotherapy.  4) Closely monitor blood pressure with lisinopril and tamsulosin during admission. May need to hold or decrease dosing.    Dental health suitable to proceed: Yes      The informed consent process has occurred, as I have provided the patient with an explanation of their diagnosis as well as the prognosis of their disease. I discussed the risks, benefits and alternatives to treatment. Common and less common risks discussed with patient. The patient was given the opportunity to ask questions, and their questions were answered. .    After our detailed discussion above, the patient signed the following consents for treatment and protocols:  MT 2022-52    Known issues that I take into account for medical decisions, with salient changes to the plan considering these complexities noted above.    Patient Active Problem List   Diagnosis     MDS (myelodysplastic syndrome) (H)     Pre-operative cardiovascular examination     Benign essential hypertension         I spent 60 minutes in the care of this patient today, which  included time necessary for preparation for the visit, obtaining history, ordering medications/tests/procedures as medically indicated, review of pertinent medical literature, counseling of the patient, communication of recommendations to the care team, and documentation time.      ____________________________________________________________________    BMT/Cell Therapy Workup Summary    Workup Nurse Coordinator: Jignesh Aranda    Primary BMT Physician: Brenda Murphy  [facility] BMT Physician (if different):   Date of Summary:  08/08/2024    Patient Demographics     Patient ID:  Leland Pearson   Age:  69 year old   Sex:  male  Reason for Transplant: MDS  Protocol: MT 2022-52    Donor Characteristics     Self or Related or Unrelated Donor: URD  Donor Match: 8/8, DP permissive   Donor Age: 24  Donor Sex: F  Donor ABO/Rh: O+ (matched)  Donor CMV Serostatus: negative (matched)    Donor-Specific Antibodies:  No lab results found.    Virtual Crossmatch:    Recent Labs   Lab Test 08/05/24  1137   RESVXMT1 No Interp   RESVXMB1 No Interp       Blood Counts     Recent Labs   Lab Test 08/07/24  1328 08/06/24  1239 08/05/24  1200 08/02/24  0919 08/01/24  0805 05/22/24  0838 05/21/24  1016   WBC 0.8* 0.7* 0.7*   < > 0.7*   < > 4.0   HGB 13.0* 13.5 13.9   < > 14.6   < > 12.9*   HCT 38.5* 39.8* 41.6   < > 42.7   < > 38.4*   PLT 40* 9* 12*   < > 12*   < > 72*   ANEUTAUTO  --  0.0* 0.1*  --  0.1*   < >  --    ALYMPAUTO  --  0.6* 0.6*  --  0.6*   < >  --    AMONOAUTO  --  0.0 0.0  --  0.0   < >  --    AEOSAUTO  --  0.0 0.0  --  0.0   < >  --    ABSBASO  --  0.0 0.0  --  0.0   < >  --    NRBCMAN 0.0 0.0 0.0   < > 0.0   < >  --    ABLA  --   --   --   --   --   --  0.4*    < > = values in this interval not displayed.       Recent Labs   Lab Test 08/01/24  0805   ABORH O POS       Chemistries     Basic Panel  Recent Labs   Lab Test 08/07/24  1328 08/07/24  1313 08/05/24  1200 08/02/24  0919     --  138 138   POTASSIUM 4.3  --  4.6  4.3   CHLORIDE 103  --  102 104   CO2 26  --  28 24   BUN 16.2  --  14.8 12.4   CR 0.82 0.82 0.82 0.74   *  --  102* 106*        Calcium, Magnesium, Phosphorus  Recent Labs   Lab Test 08/07/24  1328 08/05/24  1200 08/02/24  0919 07/29/24  0811 07/03/24  1303 07/01/24  1208 05/15/24  1339 05/08/24  1156   HERBERT 9.6 9.6 9.3 9.3   < > 9.4   < > 9.5   MAG  --   --   --   --   --   --   --  2.1   PHOS  --  3.5  --  3.0  --  2.8   < > 2.7    < > = values in this interval not displayed.        LFTs  Recent Labs   Lab Test 08/07/24  1328 08/05/24  1200 08/02/24  0919   BILITOTAL 0.9 1.0 0.9   ALKPHOS 132 128 132   AST 33 31 32   ALT 21 21 19   ALBUMIN 4.2 4.3 4.1       LDH  Recent Labs   Lab Test 05/08/24  1156   *       Urine Studies     Recent Labs   Lab Test 08/05/24  1200   COLOR Straw   APPEARANCE Clear   URINEGLC Negative   URINEBILI Negative   URINEKETONE Negative   SG 1.006   UBLD Trace*   URINEPH 6.5   PROTEIN Negative   UUROI Normal   NITRITE Negative   LEUKEST Negative   RBCU 1   WBCU <1       Creatinine Clearance    Recent Labs   Lab Test 08/07/24  1313      WT 90.0      *   VOL 3,067   DUR 24.0   UCRR 51.3   UCR24 1.57       Infectious Disease Markers     [facility] IDM  Recent Labs   Lab Test 08/05/24  1200   TCRUZI Non-reactive       CMV  Recent Labs   Lab Test 08/05/24  1200   CMVIGG No detectable antibody.       EBV  Recent Labs   Lab Test 08/05/24  1200   EBVCAG Positive*       HSV 1/2  Recent Labs   Lab Test 08/05/24  1200   G0YXIAK 0.25   H1IGG No HSV-1 IgG antibodies detected.   P3KSJIV 0.06   H2IGG No HSV-2 IgG antibodies detected.       VZV  Recent Labs   Lab Test 08/05/24  1200   VZVIGG Positive       HTLV  No lab results found.    Toxoplasma  Recent Labs   Lab Test 08/05/24  1200   TOXAM <3.0   TOXGONGIAB <3.0       Bone Marrow Biopsy     Results for orders placed or performed in visit on 08/02/24 (from the past 8760 hour(s))   Bone marrow biopsy   Result  Value    Final Diagnosis      Bone marrow, posterior iliac crest, left decalcified trephine biopsy and touch imprint; direct aspirate smear, and concentrated aspirate smear; and peripheral blood smear:    -No definitive morphologic evidence of myelodysplastic syndrome  -Hypocellular marrow for age (cellularity estimated at 5%) with markedly reduced erythroid predominant hematopoiesis; megakaryopoiesis is essentially absent, and granulopoiesis is markedly reduced;  no overt dysplasia, and no increase in blasts  -Peripheral blood showing marked leukopenia with neutropenia and lymphopenia; marked thrombocytopenia  -See comment         Comment      Concurrent flow cytometry ([id number]) showed there was no increase in myeloid blasts or abnormal myeloid blast population.    Concurrent ancillary studies are in progress and will be reported separately. Correlation with the results of ancillary tests (including NGS) and clinical findings is recommended.      Clinical Information      From Epic electronic medical record; 69 year old male with a history of  MDS-EB2 with ASXL1, IDH1, RUNX1, an SRSF2 mutations . They have required transfusion support including multiple units  of platelets (most recent transfusion 8/1/2024). The most recent bone marrow biopsy ([id number]; 6/13/2024) showed a hypercellular marrow with trilineage hematopoiesis,  rare dysgranulopoiesis ,no increase in blasts, and no morphologic or immunophenotypic evidence of residual disease. Flow cytometry studies from that biopsy ([id number]) showed no increase in myeloid blasts and no abnormal myeloid blasts were identified. Molecular studies from that biopsy ([id number]) showed ASXL1 G646fs at 38%, IDH1 R132C at 43%, RUNX1 S402fs at 41%, and SRSF2 P95L at 38%. This bone marrow biopsy is being worked up for possible transplant.      Peripheral Hematologic Data      CBC WITH MANUAL DIFFERENTIAL (08/02/2024 09:33 AM CDT):     RESULT VALUE REF. RANGE UNITS     WBC Count   Hemoglobin  Hematocrit   Platelet Count   RBC Count  MCV  MCH  MCHC   RDW   0.6  ( LL )    14.0 (NORMAL)     40.5 (NORMAL)   24  ( LL )  4.88 (NORMAL)       83 (NORMAL)     28.7 (NORMAL)     34.6 (NORMAL)      17.1  ( H ) 4.0-11.0  13.3-17.7  40.0-53.0  150-450  4.40-5.90    26.5-33.0  31.5-36.5  10.0-15.0 10e3/uL  g/dL  %  10e3/uL  10e6/uL  fL  pg  g/dL  %   % Neutrophils  % Lymphocytes  % Monocytes  % Eosinophils  % Basophils  % Metamyelocytes  % Myelocytes  % Promyelocytes  % Blasts  % Plasma Cells  % Other Cells   Absolute Neutrophils    Absolute Lymphocytes   Absolute Monocytes  Absolute Eosinophils  Absolute Basophils  Absolute Metamyelocytes  Absolute Myelocytes  Absolute Promyelocytes  Absolute Blasts  Absolute Plasma Cells  Absolute Other Cells  NRBCs per 100 WBC  Absolute NRBCs 4  86  4  2  2  2              0.0  ( LL )      0.5  ( L )     0.0 (NORMAL)     0.0 (NORMAL)     0.0 (NORMAL)  0.0 (NORMAL)   ()   ()       ()   ()   ()   ()      () N/A  N/A  N/A  N/A  N/A  N/A  N/A  N/A  N/A  N/A  N/A  1.6-8.3  0.8-5.3  0.0-1.3  0.0-0.7  0.0-0.2  <=0.0  <=0.0  <=0.0  <=0.0  <=0.0  <=0.0  <=0  <=0.0 %  %  %  %  %  %  %  %  %  %  %  10e3/uL  10e3/uL  10e3/uL  10e3/uL  10e3/uL  10e3/uL  10e3/uL  10e3/uL  10e3/uL  10e3/uL  10e3/uL  %  10e3/uL               Microscopic Description      PERIPHERAL BLOOD  The red cells appear normochromic.  Poikilocytosis includes numerous echinocytes and occasional fragmented red blood cells (schistocytes, helmet cells and keratocytes [horn cells]). Polychromasia is not increased. Rouleaux formation is not increased. The morphology of the platelets is normal. Lymphocytes are polymorphous. Neutrophils show unremarkable cytoplasmic granularity and nuclear morphology.    BONE MARROW   Bone marrow aspirates and touch imprints of bone biopsy are reviewed.  (500 cells on bone marrow biopsy touch imprints  by AM)  Percent (%) Cell Population Reference Range (%)   0.4 Blasts   (0 - 1)   0.2 Neutrophil promyelocytes (2 - 4)   3.2 Neutrophils and precursors (54 - 63)   44.8 Erythroid precursors (18 - 24)   0.4 Monocytes (1- 1.5)   1.4 Eosinophils (1 - 3)   0 Basophils (0 - 1)   49.2 Lymphocytes (8 - 12)   0.4 Plasma cells (0 - 1.5)     The aspirate smears are hemodilute, therefore the differential is performed on the touch imprints.    Granulocytes are relatively decreased in number with progressive and complete maturation; no overt dysplasia is observed. Erythroid precursors are relatively increased in number with progressive and complete maturation;  rare multinucleated forms are  observed. Megakaryocytes are present and show an overall normal morphologic spectrum.      IRON STAINS:   Dacie iron stain on concentrate smears:   No definitive ringed sideroblasts are seen on scanning.     TREPHINE SECTIONS:  Hematoxylin and eosin stains are reviewed. The trephine core biopsy is suboptimal. The marrow cellularity is estimated at 5%. The cellular composition reflects the the marrow touch imprints differential. Megakaryocytes are rare to absent.    SPECIAL STAINS:  Special stains are performed on the core biopsy sections with appropriately reactive control tissues.    IMMUNOHISTOCHEMISTRY  Immunohistochemical stains are performed on the paraffin-embedded trephine sections with appropriately reactive control tissues.    CD34 - mostly stains vessels - very rare blasts    CD61 - megakaryocytes are rare to absent    GLUT-1 stains erythroid precursors within islands, as well as anucleate RBCs    Note: These immunohistochemical stains are deemed medically necessary. Some of the antigens may also be evaluated by flow cytometry. Concurrent evaluation by immunohistochemistry on clot and/or trephine sections is indicated in this case in order to correlate immunophenotype with cell morphology and determine extent of involvement, spatial pattern, and focality of potential disease distribution.      Gross  Description      Procedure/Gross Description   Aspirate(s) and trephine(s) procured by STAN[facility] Aguirre    Specimen sent for Special Studies:         Flow Cytometry: left aspirate        Cytogenetics: left aspirate        Molecular Diagnostics: left aspirate          Biopsy aspiration site: left posterior iliac crest                                                      (Reference Range)          Amount of aspirate           4.7   mL        Fat and P.V. cell layer        trace    %               (1 - 3)        Particles                             trace   %        Myeloid-erythroid layer    trace    %               (5 - 8)          Clot Section: no    Trephine biopsy site: left posterior iliac crest    Designated left posterior iliac crest is 1 cylinder of gritty tissue, labeled with the patient's name and hospital number, obtained with 11 gauge needle and a length of 23 mm; entirely submitted in 1 cassette; acetic zinc formalin fixed, decalcified, processed, and stained for hematoxylin and eosin per laboratory protocol.        Performing Labs      The technical component of this testing was completed at [facility].     Stain controls for all stains resulted within this report have been reviewed and show appropriate reactivity.            Flow   Lab Results   Component Value Date    FLINTERP  08/02/2024     A. Iliac Crest, Bone Marrow Aspirate, Left:  -No increase in myeloid blasts and no abnormal myeloid blast population      COMDX  08/02/2024     Final interpretation requires correlation with results of other ancillary studies, morphologic, and clinical features.          Molecular Studies: Pending    Chest X-Ray - 2 view     Results for orders placed in visit on 08/06/24    XR CHEST 2 VIEWS    Status: Normal 8/6/2024    Narrative  Chest 2 views    INDICATION: Preoperative. Myelodysplastic syndrome.    COMPARISON: Outside CT chest abdomen pelvis  4/25/2024 there are no  prior plain films on PACS.    FINDINGS: Heart size and shape appear normal. The lungs and pulmonary  vasculature appear normal. Bony structures show mild degenerative  changes in the thoracic spine without suspicious density pattern.    Impression  IMPRESSION: Mild thoracic spondylosis otherwise negative    BLAKE WESLEY MD      SYSTEM ID:  [id number]      Chest CT without Contrast     Results for orders placed in visit on 08/06/24    CT CHEST W/O CONTRAST    Status: Normal 8/6/2024    Narrative  CT CHEST W/O CONTRAST 8/6/2024 4:15 PM    History: Preop examination; Personal history of diseases of blood and  blood-forming organs; Screening for viral disease; MDS  (myelodysplastic syndrome) (H)    Comparison: None.    Technique: CT of the chest was obtained without intravenous contrast.  Axial, coronal, and sagittal reconstructions were obtained and  reviewed.    Contrast: None    Findings:    Lungs: Stable 2.4 mm nodule right lower lobe image 184 series 4.  Stable 4.5 mm nodule in the right major fissure image 107 series 4. No  pneumothorax, pleural effusion, focal airspace opacity, or suspicious  pulmonary nodule. Minimal atelectasis in the lingula.  Airways: Central tracheobronchial tree is clear.  Vessels: Main pulmonary artery and aorta are normal in caliber. Normal  three-vessel arch  Heart: Heart size is normal without pericardial effusion  Lymph nodes: No suspicious mediastinal or hilar lymphadenopathy.  Thyroid: Within normal limits.  Esophagus: Within normal limits    Upper abdomen: 5 mm renal stone in the left kidney. Minimal vascular  calcifications.    Bones and soft tissues: No suspicious axillary lymphadenopathy or soft  tissue mass. No suspicious osseous lesion. Multilevel degenerative  changes of the spine.    Impression  Impression:  1. No acute lung finding.  2. Left-sided 5 mm renal stone.    I have personally reviewed the examination and initial interpretation  and I  agree with the findings.    LEONOR APPIAH MD      SYSTEM ID:  [id number]      PFTs     FVC%  Recent Labs   Lab Test 24 95       FEV1%  Recent Labs   Lab Test 24       DLCO%  Recent Labs   Lab Test 24  151 110       EKG     ECG results from 24   EKG 12-lead complete w/read - Clinics     Value    Systolic Blood Pressure     Diastolic Blood Pressure     Ventricular Rate 59    Atrial Rate 59    ID Interval 198    QRS Duration 66        QTc 421    P Axis -5    R AXIS -23    T Axis 8    Interpretation ECG      Sinus bradycardia  Inferior infarct , age undetermined  Abnormal ECG  No previous ECGs available  Confirmed by MD CAROL, CORKY () on 2024 11:09:57 AM         ECHOCARDIOGRAM     Results for orders placed in visit on 24    ECHOCARDIOGRAM COMPLETE    Status: Normal 2024    Narrative  [id number]  [id number]  [id number]  [id number]^JULIÁN^YSABEL    [facility]  Diagnostic and Treatment-3rd Floor  [address] [place] [id number]    Name: NISREEN TAPIA  MRN: [id number]  : 1955  Study Date: 2024 03:27 PM  Age: 69 yrs  Gender: Male  Patient Location: [facility]  Reason For Study: Preop examination, Personal history of diseases of blood and  blo  Ordering Physician: YSABEL GALLEGO  Referring Physician: YSABEL GALLEGO  Performed By: Kirti Aggarwal    BSA: 2.1 m2  Height: 72 in  Weight: 199 lb  BP: 154/90 mmHg  ______________________________________________________________________________  Procedure  Echocardiogram with two-dimensional, color and spectral Doppler performed.  Contrast Optison. Optison (NDC #1405-6419-66) given intravenously. Patient was  given 5 ml mixture of 3 ml Optison and 6 ml saline. 4 ml wasted.  ______________________________________________________________________________  Interpretation Summary  Left ventricular size, wall motion and function are  normal. The ejection  fraction is 55-60%.  Right ventricular function, chamber size, wall motion, and thickness are  normal.  No significant valvular abnormalities present.  Previous study not available for comparison.  ______________________________________________________________________________  Left Ventricle  Left ventricular size, wall motion and function are normal. The ejection  fraction is 55-60%. Left ventricular size cannot evaluate. Left ventricular  wall thickness is normal. Left ventricular diastolic function is normal.    Right Ventricle  Right ventricular function, chamber size, wall motion, and thickness are  normal.    Atria  Both atria appear normal.    Mitral Valve  The mitral valve is normal. Trace mitral insufficiency is present.    Aortic Valve  Aortic valve is normal in structure and function.    Tricuspid Valve  The tricuspid valve is normal.    Pulmonic Valve  The pulmonic valve is normal.    Vessels  The aorta root is normal. The inferior vena cava was normal in size with  preserved respiratory variability.    Pericardium  No pericardial effusion is present.    Miscellaneous  No significant valvular abnormalities present.    Compared to Previous Study  Previous study not available for comparison.  ______________________________________________________________________________  MMode/2D Measurements & Calculations  IVSd: 0.92 cm  LVIDd: 4.7 cm  LVIDs: 2.9 cm  LVPWd: 0.97 cm  FS: 37.9 %  LV mass(C)d: 154.6 grams  LV mass(C)dI: 72.7 grams/m2  Ao root diam: 3.2 cm  asc Aorta Diam: 4.0 cm  LVOT diam: 2.3 cm  LVOT area: 4.1 cm2  Ao root diam index Ht(cm/m): 1.8  Ao root diam index BSA (cm/m2): 1.5  Asc Ao diam index BSA (cm/m2): 1.9  Asc Ao diam index Ht(cm/m): 2.2  LA Volume (BP): 50.4 ml    LA Volume Index (BP): 23.7 ml/m2  RWT: 0.41  TAPSE: 1.9 cm    Doppler Measurements & Calculations  MV E max caty: 33.5 cm/sec  MV A max caty: 84.4 cm/sec  MV E/A: 0.40  MV dec time: 0.22 sec  Ao V2 max: 117.4  cm/sec  Ao max P.5 mmHg  Ao V2 mean: 87.5 cm/sec  Ao mean PG: 3.3 mmHg  Ao V2 VTI: 24.3 cm  SERAFIN(I,D): 2.6 cm2  SERAFIN(V,D): 2.7 cm2  LV V1 max P.4 mmHg  LV V1 max: 77.1 cm/sec  LV V1 VTI: 15.1 cm  SV(LVOT): 62.3 ml  SI(LVOT): 29.3 ml/m2  AV Hermann Ratio (DI): 0.66    SERAFIN Index (cm2/m2): 1.2  E/E' av.9  Lateral E/e': 4.8  Medial E/e': 11.0  RV S Hermann: 12.0 cm/sec    ______________________________________________________________________________  Report approved by: Daja ARREOLA 2024 03:57 PM        Again, thank you for allowing me to participate in the care of your patient.        Sincerely,        ONEIL Montero

## 2024-08-09 ENCOUNTER — INFUSION THERAPY VISIT (OUTPATIENT)
Dept: TRANSPLANT | Facility: CLINIC | Age: 69
End: 2024-08-09
Attending: STUDENT IN AN ORGANIZED HEALTH CARE EDUCATION/TRAINING PROGRAM
Payer: COMMERCIAL

## 2024-08-09 ENCOUNTER — APPOINTMENT (OUTPATIENT)
Dept: LAB | Facility: CLINIC | Age: 69
End: 2024-08-09
Attending: STUDENT IN AN ORGANIZED HEALTH CARE EDUCATION/TRAINING PROGRAM
Payer: COMMERCIAL

## 2024-08-09 VITALS
BODY MASS INDEX: 26.48 KG/M2 | SYSTOLIC BLOOD PRESSURE: 159 MMHG | OXYGEN SATURATION: 98 % | HEART RATE: 86 BPM | RESPIRATION RATE: 18 BRPM | DIASTOLIC BLOOD PRESSURE: 97 MMHG | TEMPERATURE: 98.2 F | WEIGHT: 198.3 LBS

## 2024-08-09 DIAGNOSIS — D46.9 MDS (MYELODYSPLASTIC SYNDROME) (H): Primary | ICD-10-CM

## 2024-08-09 DIAGNOSIS — Z79.899 ENCOUNTER FOR LONG-TERM (CURRENT) USE OF MEDICATIONS: ICD-10-CM

## 2024-08-09 LAB
A*: NORMAL
A*LOCUS SEROLOGIC EQUIVALENT: 2
A*LOCUS: NORMAL
A*SEROLOGIC EQUIVALENT: 3
ABNGS COMMENTS: NORMAL
ABTEST METHOD: NORMAL
ACANTHOCYTES BLD QL SMEAR: NORMAL
ALBUMIN SERPL BCG-MCNC: 4 G/DL (ref 3.5–5.2)
ALP SERPL-CCNC: 135 U/L (ref 40–150)
ALT SERPL W P-5'-P-CCNC: 29 U/L (ref 0–70)
ANION GAP SERPL CALCULATED.3IONS-SCNC: 10 MMOL/L (ref 7–15)
AST SERPL W P-5'-P-CCNC: 35 U/L (ref 0–45)
AUER BODIES BLD QL SMEAR: NORMAL
B*: NORMAL
B*LOCUS SEROLOGIC EQUIVALENT: 64
B*LOCUS: NORMAL
B*SEROLOGIC EQUIVALENT: 62
BASO STIPL BLD QL SMEAR: NORMAL
BASOPHILS # BLD AUTO: 0 10E3/UL (ref 0–0.2)
BASOPHILS NFR BLD AUTO: 1 %
BILIRUB SERPL-MCNC: 0.8 MG/DL
BITE CELLS BLD QL SMEAR: NORMAL
BLISTER CELLS BLD QL SMEAR: NORMAL
BUN SERPL-MCNC: 21.7 MG/DL (ref 8–23)
BURR CELLS BLD QL SMEAR: NORMAL
BW-1: NORMAL
C*: NORMAL
C*LOCUS SEROLOGIC EQUIVALENT: 10
C*LOCUS: NORMAL
C*SEROLOGIC EQUIVALENT: 8
CALCIUM SERPL-MCNC: 9.3 MG/DL (ref 8.8–10.4)
CHLORIDE SERPL-SCNC: 106 MMOL/L (ref 98–107)
CREAT SERPL-MCNC: 0.8 MG/DL (ref 0.67–1.17)
DACRYOCYTES BLD QL SMEAR: NORMAL
DPA1*: NORMAL
DPB1*: NORMAL
DPB1*LOCUS NMDP: NORMAL
DPB1*LOCUS: NORMAL
DPB1*NMDP: NORMAL
DQA1*: NORMAL
DQA1*LOCUS: NORMAL
DQB1*: NORMAL
DQB1*LOCUS SEROLOGIC EQUIVALENT: 2
DQB1*LOCUS: NORMAL
DQB1*SEROLOGIC EQUIVALENT: 8
DRB1*: NORMAL
DRB1*LOCUS SEROLOGIC EQUIVALENT: 4
DRB1*LOCUS: NORMAL
DRB1*SEROLOGIC EQUIVALENT: 7
DRB4*: NORMAL
DRB4*LOCUS SEROLOGIC EQUIVALENT: 53
DRB4*LOCUS: NORMAL
DRB4*SEROLOGIC EQUIVALENT: 53
DRNGS COMMENTS: NORMAL
DRSSO TEST METHOD: NORMAL
EGFRCR SERPLBLD CKD-EPI 2021: >90 ML/MIN/1.73M2
ELLIPTOCYTES BLD QL SMEAR: NORMAL
EOSINOPHIL # BLD AUTO: 0.1 10E3/UL (ref 0–0.7)
EOSINOPHIL NFR BLD AUTO: 9 %
ERYTHROCYTE [DISTWIDTH] IN BLOOD BY AUTOMATED COUNT: 17.1 % (ref 10–15)
FRAGMENTS BLD QL SMEAR: NORMAL
GLUCOSE SERPL-MCNC: 115 MG/DL (ref 70–99)
HCO3 SERPL-SCNC: 24 MMOL/L (ref 22–29)
HCT VFR BLD AUTO: 36.9 % (ref 40–53)
HGB BLD-MCNC: 12.8 G/DL (ref 13.3–17.7)
HGB C CRYSTALS: NORMAL
HOWELL-JOLLY BOD BLD QL SMEAR: NORMAL
IMM GRANULOCYTES # BLD: 0 10E3/UL
IMM GRANULOCYTES NFR BLD: 0 %
LYMPHOCYTES # BLD AUTO: 0.6 10E3/UL (ref 0.8–5.3)
LYMPHOCYTES NFR BLD AUTO: 71 %
MCH RBC QN AUTO: 28.6 PG (ref 26.5–33)
MCHC RBC AUTO-ENTMCNC: 34.7 G/DL (ref 31.5–36.5)
MCV RBC AUTO: 83 FL (ref 78–100)
MONOCYTES # BLD AUTO: 0.1 10E3/UL (ref 0–1.3)
MONOCYTES NFR BLD AUTO: 7 %
NEUTROPHILS # BLD AUTO: 0.1 10E3/UL (ref 1.6–8.3)
NEUTROPHILS NFR BLD AUTO: 12 %
NEUTS HYPERSEG BLD QL SMEAR: NORMAL
NRBC # BLD AUTO: 0 10E3/UL
NRBC BLD AUTO-RTO: 0 /100
PHOSPHATE SERPL-MCNC: 3.1 MG/DL (ref 2.5–4.5)
PLAT MORPH BLD: NORMAL
PLATELET # BLD AUTO: 27 10E3/UL (ref 150–450)
POLYCHROMASIA BLD QL SMEAR: NORMAL
POTASSIUM SERPL-SCNC: 4.2 MMOL/L (ref 3.4–5.3)
PROT SERPL-MCNC: 7 G/DL (ref 6.4–8.3)
RBC # BLD AUTO: 4.47 10E6/UL (ref 4.4–5.9)
RBC AGGLUT BLD QL: NORMAL
RBC MORPH BLD: NORMAL
ROULEAUX BLD QL SMEAR: NORMAL
SICKLE CELLS BLD QL SMEAR: NORMAL
SMUDGE CELLS BLD QL SMEAR: NORMAL
SODIUM SERPL-SCNC: 140 MMOL/L (ref 135–145)
SPHEROCYTES BLD QL SMEAR: NORMAL
STOMATOCYTES BLD QL SMEAR: NORMAL
TARGETS BLD QL SMEAR: NORMAL
TOXIC GRANULES BLD QL SMEAR: NORMAL
URATE SERPL-MCNC: 3.9 MG/DL (ref 3.4–7)
VARIANT LYMPHS BLD QL SMEAR: NORMAL
WBC # BLD AUTO: 0.8 10E3/UL (ref 4–11)

## 2024-08-09 PROCEDURE — 84100 ASSAY OF PHOSPHORUS: CPT

## 2024-08-09 PROCEDURE — 36415 COLL VENOUS BLD VENIPUNCTURE: CPT

## 2024-08-09 PROCEDURE — 84550 ASSAY OF BLOOD/URIC ACID: CPT

## 2024-08-09 PROCEDURE — 80053 COMPREHEN METABOLIC PANEL: CPT

## 2024-08-09 PROCEDURE — 85025 COMPLETE CBC W/AUTO DIFF WBC: CPT

## 2024-08-09 ASSESSMENT — PAIN SCALES - GENERAL: PAINLEVEL: NO PAIN (0)

## 2024-08-09 NOTE — PROGRESS NOTES
Infusion Nursing Note:  Leland Pearson presents today for scheduled infusion.    Patient seen by provider today: No   present during visit today: Not Applicable.    Note: Labs monitored. No blood products needed today. Patient is feeling well today. Denies s/s of bleeding, denies shortness of breath, denies fatigue, denies fevers, denies nausea and vomiting. Patient states she is feeling overall well today and feels comfortable with plan to call triage over the weekend with any questions or concerns.      Intravenous Access:  Peripheral IV placed.    Treatment Conditions:  Lab Results   Component Value Date    HGB 12.8 (L) 08/09/2024    WBC 0.8 (LL) 08/09/2024    ANEU 0.1 (LL) 08/07/2024    ANEUTAUTO 0.1 (LL) 08/09/2024    PLT 27 (LL) 08/09/2024        Lab Results   Component Value Date     08/09/2024    POTASSIUM 4.2 08/09/2024    MAG 2.1 05/08/2024    CR 0.80 08/09/2024    HERBERT 9.3 08/09/2024    BILITOTAL 0.8 08/09/2024    ALBUMIN 4.0 08/09/2024    ALT 29 08/09/2024    AST 35 08/09/2024       Results reviewed, labs MET treatment parameters, ok to proceed with treatment.      Post Infusion Assessment:  Site patent and intact, free from redness, edema or discomfort.  Access discontinued per protocol.       Discharge Plan:   Patient declined prescription refills.  Patient discharged in stable condition accompanied by: self and wife.  Departure Mode: Ambulatory.      Camille Shore RN

## 2024-08-09 NOTE — NURSING NOTE
Chief Complaint   Patient presents with    Blood Draw     Labs drawn via PIV placed by RN in lab, vitals taken.       Labs drawn from PIV placed by RN. Line flushed with saline. Vitals taken. Pt checked in for appointment(s).     Soha Jeter RN

## 2024-08-10 ENCOUNTER — NURSE TRIAGE (OUTPATIENT)
Dept: NURSING | Facility: CLINIC | Age: 69
End: 2024-08-10
Payer: COMMERCIAL

## 2024-08-11 NOTE — TELEPHONE ENCOUNTER
"Patient calling. Yesterday he pinched the knuckle over his thumb. Today, it looks like a blood blister over the area that was pinched. He was able to manage the pain with ice and acetaminophen. He is wondering if he needs to be seen before Monday, when he is already coming in for an infusion.     He is a bone marrow transplant program. Temperature 97.4.    Blister is intact. Care advice given for home care. Patient was encouraged to keep it covered to protect/pad the blister.     Robyn Stuart RN  [facility]  August 10, 2024, 8:28 PM    Reason for Disposition   Normal blood blister from pinching injury to skin    Additional Information   Negative: Sounds like a life-threatening emergency to the triager   Negative: Followed a thermal burn (e.g., hot object, scald from boiling water)   Negative: Followed a chemical burn   Negative: Followed frostbite   Negative: Poison ivy, oak, or sumac rash suspected (e.g., itchy rash after contact with poison ivy)   Negative: Shingles suspected (i.e., painful rash, multiple small blisters grouped together in one area of body; dermatomal distribution)   Negative: [1] Diabetes mellitus AND [2] boil, ulcer, or infection of foot   Negative: Blisters on other parts of the body (besides hands and feet)   Negative: Patient sounds very sick or weak to the triager   Negative: [1] Looks infected (spreading redness, red streak, pus) AND [2] fever   Negative: [1] Looks infected AND [2] large red area (> 2 in. or 5 cm)   Negative: [1] Foul-smelling odor from foot AND [2] new or increased   Negative: [1] Purple or black skin on foot or toe AND [2] new or increased   Negative: Looks like a boil or deep ulcer   Negative: [1] Looks infected (spreading redness, red streak, pus) AND [2] no fever   Negative: [1] Blister on foot AND [2] diabetes mellitus or peripheral vascular disease (\"poor circulation\")   Negative: [1] Severe pain or large blister AND [2] caller wants doctor to drain " blister   Negative: [1] Cause unknown AND [2] painful blister on fingertip   Negative: [1] Cause unknown AND [2] new blisters are developing   Negative: [1] Cause unknown AND [2] no new blisters   Negative: [1] Hand blisters are a chronic problem (recurrent or ongoing AND present > 4 weeks) AND [2] cause unknown   Negative: Normal hot spot from friction   Negative: Normal blister from friction    Protocols used: Blister - Foot and Hand-A-AH

## 2024-08-12 ENCOUNTER — INFUSION THERAPY VISIT (OUTPATIENT)
Dept: INFUSION THERAPY | Facility: CLINIC | Age: 69
End: 2024-08-12
Attending: STUDENT IN AN ORGANIZED HEALTH CARE EDUCATION/TRAINING PROGRAM
Payer: COMMERCIAL

## 2024-08-12 VITALS
DIASTOLIC BLOOD PRESSURE: 91 MMHG | TEMPERATURE: 97.7 F | RESPIRATION RATE: 18 BRPM | OXYGEN SATURATION: 100 % | SYSTOLIC BLOOD PRESSURE: 148 MMHG | HEART RATE: 65 BPM

## 2024-08-12 DIAGNOSIS — D46.9 MDS (MYELODYSPLASTIC SYNDROME) (H): Primary | ICD-10-CM

## 2024-08-12 LAB
ACANTHOCYTES BLD QL SMEAR: SLIGHT
ACANTHOCYTES BLD QL SMEAR: SLIGHT
ALBUMIN SERPL BCG-MCNC: 4.1 G/DL (ref 3.5–5.2)
ALP SERPL-CCNC: 138 U/L (ref 40–150)
ALT SERPL W P-5'-P-CCNC: 29 U/L (ref 0–70)
ANION GAP SERPL CALCULATED.3IONS-SCNC: 8 MMOL/L (ref 7–15)
AST SERPL W P-5'-P-CCNC: 29 U/L (ref 0–45)
BASOPHILS # BLD MANUAL: 0 10E3/UL (ref 0–0.2)
BASOPHILS NFR BLD MANUAL: 0 %
BILIRUB SERPL-MCNC: 0.7 MG/DL
BLD PROD TYP BPU: NORMAL
BLOOD COMPONENT TYPE: NORMAL
BUN SERPL-MCNC: 12.2 MG/DL (ref 8–23)
CALCIUM SERPL-MCNC: 9.6 MG/DL (ref 8.8–10.4)
CHLORIDE SERPL-SCNC: 103 MMOL/L (ref 98–107)
CODING SYSTEM: NORMAL
CREAT SERPL-MCNC: 0.77 MG/DL (ref 0.67–1.17)
DLCOCOR-%PRED-PRE: 110 %
DLCOCOR-PRE: 30.91 ML/MIN/MMHG
DLCOUNC-%PRED-PRE: 104 %
DLCOUNC-PRE: 29.42 ML/MIN/MMHG
DLCOUNC-PRED: 28.1 ML/MIN/MMHG
EGFRCR SERPLBLD CKD-EPI 2021: >90 ML/MIN/1.73M2
EOSINOPHIL # BLD MANUAL: 0 10E3/UL (ref 0–0.7)
EOSINOPHIL NFR BLD MANUAL: 1 %
ERV-%PRED-PRE: 36 %
ERV-PRE: 0.59 L
ERV-PRED: 1.63 L
ERYTHROCYTE [DISTWIDTH] IN BLOOD BY AUTOMATED COUNT: 17 % (ref 10–15)
EXPTIME-PRE: 8.56 SEC
FEF2575-%PRED-PRE: 80 %
FEF2575-PRE: 2 L/SEC
FEF2575-PRED: 2.47 L/SEC
FEFMAX-%PRED-PRE: 93 %
FEFMAX-PRE: 8.46 L/SEC
FEFMAX-PRED: 9 L/SEC
FEV1-%PRED-PRE: 91 %
FEV1-PRE: 2.99 L
FEV1FEV6-PRE: 75 %
FEV1FEV6-PRED: 78 %
FEV1FVC-PRE: 72 %
FEV1FVC-PRED: 76 %
FEV1SVC-PRE: 72 %
FEV1SVC-PRED: 73 %
FIFMAX-PRE: 7.68 L/SEC
FRCPLETH-%PRED-PRE: 87 %
FRCPLETH-PRE: 3.41 L
FRCPLETH-PRED: 3.88 L
FVC-%PRED-PRE: 95 %
FVC-PRE: 4.12 L
FVC-PRED: 4.31 L
GLUCOSE SERPL-MCNC: 119 MG/DL (ref 70–99)
HCO3 SERPL-SCNC: 27 MMOL/L (ref 22–29)
HCT VFR BLD AUTO: 36.9 % (ref 40–53)
HGB BLD-MCNC: 13 G/DL (ref 13.3–17.7)
IC-%PRED-PRE: 108 %
IC-PRE: 3.54 L
IC-PRED: 3.25 L
ISSUE DATE AND TIME: NORMAL
LYMPHOCYTES # BLD MANUAL: 0.6 10E3/UL (ref 0.8–5.3)
LYMPHOCYTES NFR BLD MANUAL: 72 %
MCH RBC QN AUTO: 29 PG (ref 26.5–33)
MCHC RBC AUTO-ENTMCNC: 35.2 G/DL (ref 31.5–36.5)
MCV RBC AUTO: 82 FL (ref 78–100)
MONOCYTES # BLD MANUAL: 0 10E3/UL (ref 0–1.3)
MONOCYTES NFR BLD MANUAL: 6 %
NEUTROPHILS # BLD MANUAL: 0.2 10E3/UL (ref 1.6–8.3)
NEUTROPHILS NFR BLD MANUAL: 21 %
NRBC # BLD AUTO: 0 10E3/UL
NRBC BLD AUTO-RTO: 0 /100
PATH REPORT.COMMENTS IMP SPEC: ABNORMAL
PATH REPORT.COMMENTS IMP SPEC: YES
PATH REPORT.FINAL DX SPEC: ABNORMAL
PATH REPORT.GROSS SPEC: ABNORMAL
PATH REPORT.MICROSCOPIC SPEC OTHER STN: ABNORMAL
PATH REPORT.RELEVANT HX SPEC: ABNORMAL
PATH REPORT.RELEVANT HX SPEC: ABNORMAL
PATH REPORT.SITE OF ORIGIN SPEC: ABNORMAL
PLAT MORPH BLD: ABNORMAL
PLAT MORPH BLD: ABNORMAL
PLATELET # BLD AUTO: 11 10E3/UL (ref 150–450)
POTASSIUM SERPL-SCNC: 4.1 MMOL/L (ref 3.4–5.3)
PROT SERPL-MCNC: 7.2 G/DL (ref 6.4–8.3)
RBC # BLD AUTO: 4.48 10E6/UL (ref 4.4–5.9)
RBC MORPH BLD: ABNORMAL
RBC MORPH BLD: ABNORMAL
RVPLETH-%PRED-PRE: 102 %
RVPLETH-PRE: 2.82 L
RVPLETH-PRED: 2.75 L
SODIUM SERPL-SCNC: 138 MMOL/L (ref 135–145)
TLCPLETH-%PRED-PRE: 89 %
TLCPLETH-PRE: 6.95 L
TLCPLETH-PRED: 7.74 L
UNIT ABO/RH: NORMAL
UNIT NUMBER: NORMAL
UNIT STATUS: NORMAL
UNIT TYPE ISBT: 6200
VA-%PRED-PRE: 90 %
VA-PRE: 6.42 L
VC-%PRED-PRE: 91 %
VC-PRE: 4.13 L
VC-PRED: 4.51 L
WBC # BLD AUTO: 0.8 10E3/UL (ref 4–11)

## 2024-08-12 PROCEDURE — 80053 COMPREHEN METABOLIC PANEL: CPT | Performed by: STUDENT IN AN ORGANIZED HEALTH CARE EDUCATION/TRAINING PROGRAM

## 2024-08-12 PROCEDURE — 36430 TRANSFUSION BLD/BLD COMPNT: CPT

## 2024-08-12 PROCEDURE — 85007 BL SMEAR W/DIFF WBC COUNT: CPT | Performed by: STUDENT IN AN ORGANIZED HEALTH CARE EDUCATION/TRAINING PROGRAM

## 2024-08-12 PROCEDURE — 36415 COLL VENOUS BLD VENIPUNCTURE: CPT

## 2024-08-12 PROCEDURE — P9037 PLATE PHERES LEUKOREDU IRRAD: HCPCS | Performed by: STUDENT IN AN ORGANIZED HEALTH CARE EDUCATION/TRAINING PROGRAM

## 2024-08-12 PROCEDURE — 85027 COMPLETE CBC AUTOMATED: CPT | Performed by: STUDENT IN AN ORGANIZED HEALTH CARE EDUCATION/TRAINING PROGRAM

## 2024-08-12 RX ORDER — EPINEPHRINE 1 MG/ML
0.3 INJECTION, SOLUTION INTRAMUSCULAR; SUBCUTANEOUS EVERY 5 MIN PRN
Status: CANCELLED | OUTPATIENT
Start: 2024-08-12

## 2024-08-12 RX ORDER — HEPARIN SODIUM (PORCINE) LOCK FLUSH IV SOLN 100 UNIT/ML 100 UNIT/ML
5 SOLUTION INTRAVENOUS
Status: CANCELLED | OUTPATIENT
Start: 2024-08-12

## 2024-08-12 RX ORDER — DIPHENHYDRAMINE HYDROCHLORIDE 50 MG/ML
50 INJECTION INTRAMUSCULAR; INTRAVENOUS
Status: CANCELLED
Start: 2024-08-12

## 2024-08-12 RX ORDER — HEPARIN SODIUM,PORCINE 10 UNIT/ML
5-20 VIAL (ML) INTRAVENOUS DAILY PRN
Status: CANCELLED | OUTPATIENT
Start: 2024-08-12

## 2024-08-12 ASSESSMENT — PAIN SCALES - GENERAL
PAINLEVEL: NO PAIN (0)
PAINLEVEL: NO PAIN (0)

## 2024-08-12 NOTE — PROGRESS NOTES
Infusion Nursing Note:  Leland DONNA Pearson presents today for labs/1 dose platelets.    Patient seen by provider today: No   present during visit today: Not Applicable.    Note: N/A.      Intravenous Access:  Labs drawn without difficulty.  Peripheral IV placed.    Treatment Conditions:  Lab Results   Component Value Date    HGB 13.0 (L) 08/12/2024    WBC 0.8 (LL) 08/12/2024    ANEU 0.2 (LL) 08/12/2024    ANEUTAUTO 0.1 (LL) 08/09/2024    PLT 11 (LL) 08/12/2024        Blood transfusion consent signed 6/7/24.      Post Infusion Assessment:  Patient tolerated infusion without incident.  Blood return noted pre and post infusion.  Site patent and intact, free from redness, edema or discomfort.  No evidence of extravasations.  Access discontinued per protocol.       Discharge Plan:   Discharge instructions reviewed with: Patient and Family.  Patient and/or family verbalized understanding of discharge instructions and all questions answered.  AVS to patient via [employer].  Patient will return 8/15/24 for next appointment.   Patient discharged in stable condition accompanied by: wife.  Departure Mode: Ambulatory.      Mary Moreno RN

## 2024-08-13 ENCOUNTER — CARE COORDINATION (OUTPATIENT)
Dept: TRANSPLANT | Facility: CLINIC | Age: 69
End: 2024-08-13
Payer: COMMERCIAL

## 2024-08-13 DIAGNOSIS — D46.9 MDS (MYELODYSPLASTIC SYNDROME) (H): Primary | ICD-10-CM

## 2024-08-13 NOTE — PROGRESS NOTES
Received triage message from Oncology nurse regarding Leland's newly injured thumb (see picture). I called Leland regarding this, he states his thumb is still purple but not swollen. When he pinched it on Saturday it was throbbing which has since subsided. He declines throbbing itching or discharge from the site and feels it has improved overall since Saturday. Per Dr. Murphy, have patient continue to monitor which he was agreeable to.

## 2024-08-14 ENCOUNTER — CARE COORDINATION (OUTPATIENT)
Dept: TRANSPLANT | Facility: CLINIC | Age: 69
End: 2024-08-14
Payer: COMMERCIAL

## 2024-08-14 ENCOUNTER — DOCUMENTATION ONLY (OUTPATIENT)
Dept: OTHER | Facility: CLINIC | Age: 69
End: 2024-08-14
Payer: COMMERCIAL

## 2024-08-14 DIAGNOSIS — D46.9 MDS (MYELODYSPLASTIC SYNDROME) (H): Primary | ICD-10-CM

## 2024-08-14 LAB
ABO/RH(D): NORMAL
ANTIBODY SCREEN: NEGATIVE
SPECIMEN EXPIRATION DATE: NORMAL

## 2024-08-14 NOTE — PROGRESS NOTES
Pre-admission Nursing Assessment     Patient is contacted via telephone to review health status in preparation for planned admission on: 8/14.    Does patient endorse any of the following (If yes to any question MUST provide characteristics including: location, description, duration, onset as appropriate):    New or worsening pain? No  Recent fever or other infectious symptoms, including but not limited to: runny nose, cough, localized swelling, redness, abnormal discharge, swollen or tender lymph nodes, increased fatigue. No  New rash? No  New onset N/V/D? No  New bleeding from any site? No  New injury? Yes: see triage note from 8/13  Has the patient had any known contact in the past 3 days with a sick contact? No    Has your health changed in any ways since you were last seen in our facility by BMT provider/VJ? No    Patient has had or will have CBC/CMP draw within 7 days of admission on date: yes 8/12.     Inpatient Admission Information:      Admit Date:  8/16   Diagnosis:  MDS   Transplant Type:  Allo   Protocol:  [id number]   Sedated bmbx needed?  No   NMDP lab (lab 7033) needed?  Yes  **If YES, VJ to please order with admission labs.  **If YES, this lab MUST BE DRAWN PRIOR TO ANY BMT PREP (chemo/TBI).   Notes:         New Eval Work-Up   MD Jeffrey Aranda         Consult Type Date   1 Cardiology 7/29   2     3           Long Term Follow-Up   MD Jeffrey Aranda      EOC updated? Yes  Care team updated? Yes

## 2024-08-14 NOTE — ORAL ONC MGMT
[facility] Oral Chemotherapy Monitoring Program    Thank you for the opportunity to be a part in the care of this patient's oral chemotherapy. The oncology pharmacy will no longer be following this patient for oral chemotherapy. If there are any questions or the plan changes, feel free to contact us.        6/10/2024     9:00 AM 6/26/2024     3:00 PM 7/1/2024     3:00 PM 7/8/2024     3:00 PM 7/11/2024    12:00 PM 7/22/2024     2:00 PM 8/14/2024     2:00 PM   ORAL CHEMOTHERAPY   Assessment Type Refill Lab Monitoring Lab Monitoring;Monthly Follow up Lab Monitoring Lab Monitoring Lab Monitoring Discontinuation   Stop Date       8/14/2024   Reason for Discontinuation       Pursuing stem cell transplant   Diagnosis Code Myelodysplastic Syndrome Myelodysplastic Syndrome Myelodysplastic Syndrome Myelodysplastic Syndrome Myelodysplastic Syndrome Myelodysplastic Syndrome Myelodysplastic Syndrome   Providers Dr Bhavin Delcid   [facility]   Is this patient followed by the [facility] OC team? No No No No No No No   Drug Name Venclexta (venetoclax) Venclexta (venetoclax) Venclexta (venetoclax) Venclexta (venetoclax) Venclexta (venetoclax) Venclexta (venetoclax) Venclexta (venetoclax)   Dose 100 mg 100 mg 100 mg 100 mg 100 mg 100 mg    Current Schedule Daily Daily Daily Daily Daily Daily    Cycle Details 2 weeks on, 2 weeks off 2 weeks on, 2 weeks off 2 weeks on, 2 weeks off 2 weeks on, 2 weeks off 2 weeks on, 2 weeks off 2 weeks on, 2 weeks off    Start Date of Last Cycle  5/20/2024        Planned next cycle start date   7/1/2024       Doses missed in last 2 weeks   0       Adherence Assessment   Adherent       Adverse Effects      Neutropenia;Thrombocytopenia    Neutropenia      Grade 2    Thrombocytopenia      Grade 1        Tracie Ware, PharmD  Oral Chemotherapy  Monitoring Program  [facility]  [phone]

## 2024-08-15 ENCOUNTER — LAB (OUTPATIENT)
Dept: INFUSION THERAPY | Facility: CLINIC | Age: 69
End: 2024-08-15
Attending: STUDENT IN AN ORGANIZED HEALTH CARE EDUCATION/TRAINING PROGRAM
Payer: COMMERCIAL

## 2024-08-15 VITALS
DIASTOLIC BLOOD PRESSURE: 92 MMHG | RESPIRATION RATE: 16 BRPM | TEMPERATURE: 97.8 F | HEART RATE: 73 BPM | OXYGEN SATURATION: 98 % | SYSTOLIC BLOOD PRESSURE: 158 MMHG

## 2024-08-15 DIAGNOSIS — D46.9 MDS (MYELODYSPLASTIC SYNDROME) (H): Primary | ICD-10-CM

## 2024-08-15 LAB
BASOPHILS # BLD AUTO: 0 10E3/UL (ref 0–0.2)
BASOPHILS NFR BLD AUTO: 0 %
BLD PROD TYP BPU: NORMAL
BLOOD COMPONENT TYPE: NORMAL
CODING SYSTEM: NORMAL
EOSINOPHIL # BLD AUTO: 0 10E3/UL (ref 0–0.7)
EOSINOPHIL NFR BLD AUTO: 1 %
ERYTHROCYTE [DISTWIDTH] IN BLOOD BY AUTOMATED COUNT: 17.3 % (ref 10–15)
HCT VFR BLD AUTO: 39 % (ref 40–53)
HGB BLD-MCNC: 13.6 G/DL (ref 13.3–17.7)
IMM GRANULOCYTES # BLD: 0 10E3/UL
IMM GRANULOCYTES NFR BLD: 0 %
ISSUE DATE AND TIME: NORMAL
LYMPHOCYTES # BLD AUTO: 1 10E3/UL (ref 0.8–5.3)
LYMPHOCYTES NFR BLD AUTO: 71 %
MCH RBC QN AUTO: 29 PG (ref 26.5–33)
MCHC RBC AUTO-ENTMCNC: 34.9 G/DL (ref 31.5–36.5)
MCV RBC AUTO: 83 FL (ref 78–100)
MONOCYTES # BLD AUTO: 0.1 10E3/UL (ref 0–1.3)
MONOCYTES NFR BLD AUTO: 10 %
NEUTROPHILS # BLD AUTO: 0.2 10E3/UL (ref 1.6–8.3)
NEUTROPHILS NFR BLD AUTO: 17 %
NRBC # BLD AUTO: 0 10E3/UL
NRBC BLD AUTO-RTO: 0 /100
PLAT MORPH BLD: NORMAL
PLATELET # BLD AUTO: 14 10E3/UL (ref 150–450)
RBC # BLD AUTO: 4.69 10E6/UL (ref 4.4–5.9)
RBC MORPH BLD: NORMAL
UNIT ABO/RH: NORMAL
UNIT NUMBER: NORMAL
UNIT STATUS: NORMAL
UNIT TYPE ISBT: 6200
WBC # BLD AUTO: 1.4 10E3/UL (ref 4–11)

## 2024-08-15 PROCEDURE — 36430 TRANSFUSION BLD/BLD COMPNT: CPT

## 2024-08-15 PROCEDURE — 86900 BLOOD TYPING SEROLOGIC ABO: CPT | Performed by: STUDENT IN AN ORGANIZED HEALTH CARE EDUCATION/TRAINING PROGRAM

## 2024-08-15 PROCEDURE — 36415 COLL VENOUS BLD VENIPUNCTURE: CPT

## 2024-08-15 PROCEDURE — P9037 PLATE PHERES LEUKOREDU IRRAD: HCPCS | Performed by: STUDENT IN AN ORGANIZED HEALTH CARE EDUCATION/TRAINING PROGRAM

## 2024-08-15 PROCEDURE — 85025 COMPLETE CBC W/AUTO DIFF WBC: CPT | Performed by: STUDENT IN AN ORGANIZED HEALTH CARE EDUCATION/TRAINING PROGRAM

## 2024-08-15 RX ORDER — HEPARIN SODIUM,PORCINE 10 UNIT/ML
5-20 VIAL (ML) INTRAVENOUS DAILY PRN
Status: CANCELLED | OUTPATIENT
Start: 2024-08-15

## 2024-08-15 RX ORDER — DIPHENHYDRAMINE HYDROCHLORIDE 50 MG/ML
50 INJECTION INTRAMUSCULAR; INTRAVENOUS
Status: CANCELLED
Start: 2024-08-15

## 2024-08-15 RX ORDER — HEPARIN SODIUM (PORCINE) LOCK FLUSH IV SOLN 100 UNIT/ML 100 UNIT/ML
5 SOLUTION INTRAVENOUS
Status: CANCELLED | OUTPATIENT
Start: 2024-08-15

## 2024-08-15 RX ORDER — EPINEPHRINE 1 MG/ML
0.3 INJECTION, SOLUTION INTRAMUSCULAR; SUBCUTANEOUS EVERY 5 MIN PRN
Status: CANCELLED | OUTPATIENT
Start: 2024-08-15

## 2024-08-15 NOTE — PROGRESS NOTES
Infusion Nursing Note:  Leland Pearson presents today for Platelets.    Patient seen by provider today: No   present during visit today: Not Applicable.    Note: N/A.      Intravenous Access:  Peripheral IV placed.    Treatment Conditions:  Lab Results   Component Value Date    HGB 13.6 08/15/2024    WBC 1.4 (L) 08/15/2024    ANEU 0.2 (LL) 08/12/2024    ANEUTAUTO 0.1 (LL) 08/09/2024    PLT 14 (LL) 08/15/2024        Results reviewed, labs MET treatment parameters, ok to proceed with treatment.  Blood transfusion consent signed 5/8/24.      Post Infusion Assessment:  Patient tolerated infusion without incident.  Blood return noted pre and post infusion.  Site patent and intact, free from redness, edema or discomfort.  No evidence of extravasations.  Access discontinued per protocol.       Discharge Plan:   Discharge instructions reviewed with: Patient and Family.  Patient and/or family verbalized understanding of discharge instructions and all questions answered.  AVS to patient via [employer].  Patient will return PRN for next appointment.   Patient discharged in stable condition accompanied by: wife.  Departure Mode: Ambulatory.      Mario Abraham RN

## 2024-08-16 ENCOUNTER — HOSPITAL ENCOUNTER (INPATIENT)
Facility: CLINIC | Age: 69
LOS: 34 days | Discharge: HOME | DRG: 014 | End: 2024-09-19
Attending: INTERNAL MEDICINE | Admitting: INTERNAL MEDICINE
Payer: COMMERCIAL

## 2024-08-16 ENCOUNTER — MEDICAL CORRESPONDENCE (OUTPATIENT)
Dept: TRANSPLANT | Facility: CLINIC | Age: 69
End: 2024-08-16
Payer: COMMERCIAL

## 2024-08-16 ENCOUNTER — APPOINTMENT (OUTPATIENT)
Dept: INTERVENTIONAL RADIOLOGY/VASCULAR | Facility: CLINIC | Age: 69
DRG: 014 | End: 2024-08-16
Attending: STUDENT IN AN ORGANIZED HEALTH CARE EDUCATION/TRAINING PROGRAM
Payer: COMMERCIAL

## 2024-08-16 DIAGNOSIS — D46.9 MDS (MYELODYSPLASTIC SYNDROME) (H): Primary | ICD-10-CM

## 2024-08-16 DIAGNOSIS — Z94.84 HISTORY OF PERIPHERAL STEM CELL TRANSPLANT (H): ICD-10-CM

## 2024-08-16 LAB
ABO/RH(D): NORMAL
ACANTHOCYTES BLD QL SMEAR: NORMAL
ALBUMIN SERPL BCG-MCNC: 4.1 G/DL (ref 3.5–5.2)
ALP SERPL-CCNC: 124 U/L (ref 40–150)
ALT SERPL W P-5'-P-CCNC: 19 U/L (ref 0–70)
ANION GAP SERPL CALCULATED.3IONS-SCNC: 9 MMOL/L (ref 7–15)
ANTIBODY SCREEN: NEGATIVE
APTT PPP: 28 SECONDS (ref 22–38)
AST SERPL W P-5'-P-CCNC: 29 U/L (ref 0–45)
AUER BODIES BLD QL SMEAR: NORMAL
BASO STIPL BLD QL SMEAR: NORMAL
BASOPHILS # BLD AUTO: 0 10E3/UL (ref 0–0.2)
BASOPHILS NFR BLD AUTO: 0 %
BILIRUB SERPL-MCNC: 0.7 MG/DL
BITE CELLS BLD QL SMEAR: NORMAL
BLISTER CELLS BLD QL SMEAR: NORMAL
BUN SERPL-MCNC: 25.4 MG/DL (ref 8–23)
BURR CELLS BLD QL SMEAR: NORMAL
CALCIUM SERPL-MCNC: 9.5 MG/DL (ref 8.8–10.4)
CHLORIDE SERPL-SCNC: 104 MMOL/L (ref 98–107)
CREAT SERPL-MCNC: 0.98 MG/DL (ref 0.67–1.17)
CULTURE HARVEST COMPLETE DATE: NORMAL
CULTURE HARVEST COMPLETE DATE: NORMAL
DACRYOCYTES BLD QL SMEAR: NORMAL
EGFRCR SERPLBLD CKD-EPI 2021: 83 ML/MIN/1.73M2
ELLIPTOCYTES BLD QL SMEAR: NORMAL
EOSINOPHIL # BLD AUTO: 0 10E3/UL (ref 0–0.7)
EOSINOPHIL NFR BLD AUTO: 2 %
ERYTHROCYTE [DISTWIDTH] IN BLOOD BY AUTOMATED COUNT: 17.2 % (ref 10–15)
FRAGMENTS BLD QL SMEAR: NORMAL
GLUCOSE SERPL-MCNC: 107 MG/DL (ref 70–99)
HCO3 SERPL-SCNC: 24 MMOL/L (ref 22–29)
HCT VFR BLD AUTO: 37.3 % (ref 40–53)
HGB BLD-MCNC: 12.7 G/DL (ref 13.3–17.7)
HGB C CRYSTALS: NORMAL
HOWELL-JOLLY BOD BLD QL SMEAR: NORMAL
IMM GRANULOCYTES # BLD: 0 10E3/UL
IMM GRANULOCYTES NFR BLD: 1 %
INR PPP: 0.93 (ref 0.85–1.15)
LACTATE SERPL-SCNC: 0.8 MMOL/L (ref 0.7–2)
LYMPHOCYTES # BLD AUTO: 0.8 10E3/UL (ref 0.8–5.3)
LYMPHOCYTES NFR BLD AUTO: 61 %
MAGNESIUM SERPL-MCNC: 2 MG/DL (ref 1.7–2.3)
MCH RBC QN AUTO: 28.8 PG (ref 26.5–33)
MCHC RBC AUTO-ENTMCNC: 34 G/DL (ref 31.5–36.5)
MCV RBC AUTO: 85 FL (ref 78–100)
MONOCYTES # BLD AUTO: 0.2 10E3/UL (ref 0–1.3)
MONOCYTES NFR BLD AUTO: 13 %
NEUTROPHILS # BLD AUTO: 0.3 10E3/UL (ref 1.6–8.3)
NEUTROPHILS NFR BLD AUTO: 23 %
NEUTS HYPERSEG BLD QL SMEAR: NORMAL
NRBC # BLD AUTO: 0 10E3/UL
NRBC BLD AUTO-RTO: 0 /100
PLAT MORPH BLD: NORMAL
PLATELET # BLD AUTO: 35 10E3/UL (ref 150–450)
POLYCHROMASIA BLD QL SMEAR: NORMAL
POTASSIUM SERPL-SCNC: 4.4 MMOL/L (ref 3.4–5.3)
PROT SERPL-MCNC: 7 G/DL (ref 6.4–8.3)
RBC # BLD AUTO: 4.41 10E6/UL (ref 4.4–5.9)
RBC AGGLUT BLD QL: NORMAL
RBC MORPH BLD: NORMAL
ROULEAUX BLD QL SMEAR: NORMAL
SICKLE CELLS BLD QL SMEAR: NORMAL
SMUDGE CELLS BLD QL SMEAR: NORMAL
SODIUM SERPL-SCNC: 137 MMOL/L (ref 135–145)
SPECIMEN EXPIRATION DATE: NORMAL
SPECIMEN STATUS: NORMAL
SPHEROCYTES BLD QL SMEAR: NORMAL
STOMATOCYTES BLD QL SMEAR: NORMAL
TARGETS BLD QL SMEAR: NORMAL
TOXIC GRANULES BLD QL SMEAR: NORMAL
URATE SERPL-MCNC: 4.2 MG/DL (ref 3.4–7)
VARIANT LYMPHS BLD QL SMEAR: NORMAL
WBC # BLD AUTO: 1.3 10E3/UL (ref 4–11)

## 2024-08-16 PROCEDURE — 36415 COLL VENOUS BLD VENIPUNCTURE: CPT

## 2024-08-16 PROCEDURE — 99152 MOD SED SAME PHYS/QHP 5/>YRS: CPT | Mod: GC | Performed by: RADIOLOGY

## 2024-08-16 PROCEDURE — 250N000011 HC RX IP 250 OP 636

## 2024-08-16 PROCEDURE — 83605 ASSAY OF LACTIC ACID: CPT

## 2024-08-16 PROCEDURE — 76937 US GUIDE VASCULAR ACCESS: CPT | Mod: 26 | Performed by: RADIOLOGY

## 2024-08-16 PROCEDURE — 85730 THROMBOPLASTIN TIME PARTIAL: CPT

## 2024-08-16 PROCEDURE — 272N000504 HC NEEDLE CR4

## 2024-08-16 PROCEDURE — 87081 CULTURE SCREEN ONLY: CPT

## 2024-08-16 PROCEDURE — 250N000009 HC RX 250

## 2024-08-16 PROCEDURE — 250N000011 HC RX IP 250 OP 636: Performed by: NURSE PRACTITIONER

## 2024-08-16 PROCEDURE — 77001 FLUOROGUIDE FOR VEIN DEVICE: CPT | Mod: 26 | Performed by: RADIOLOGY

## 2024-08-16 PROCEDURE — 85041 AUTOMATED RBC COUNT: CPT

## 2024-08-16 PROCEDURE — S5010 5% DEXTROSE AND 0.45% SALINE: HCPCS

## 2024-08-16 PROCEDURE — 250N000011 HC RX IP 250 OP 636: Performed by: RADIOLOGY

## 2024-08-16 PROCEDURE — 258N000003 HC RX IP 258 OP 636

## 2024-08-16 PROCEDURE — 84550 ASSAY OF BLOOD/URIC ACID: CPT

## 2024-08-16 PROCEDURE — 206N000001 HC R&B BMT UMMC

## 2024-08-16 PROCEDURE — 83735 ASSAY OF MAGNESIUM: CPT

## 2024-08-16 PROCEDURE — C1751 CATH, INF, PER/CENT/MIDLINE: HCPCS

## 2024-08-16 PROCEDURE — 82040 ASSAY OF SERUM ALBUMIN: CPT

## 2024-08-16 PROCEDURE — C1769 GUIDE WIRE: HCPCS

## 2024-08-16 PROCEDURE — 999N000128 HC STATISTIC PERIPHERAL IV START W/O US GUIDANCE

## 2024-08-16 PROCEDURE — 02HV33Z INSERTION OF INFUSION DEVICE INTO SUPERIOR VENA CAVA, PERCUTANEOUS APPROACH: ICD-10-PCS | Performed by: RADIOLOGY

## 2024-08-16 PROCEDURE — 0JH63XZ INSERTION OF TUNNELED VASCULAR ACCESS DEVICE INTO CHEST SUBCUTANEOUS TISSUE AND FASCIA, PERCUTANEOUS APPROACH: ICD-10-PCS | Performed by: RADIOLOGY

## 2024-08-16 PROCEDURE — 99152 MOD SED SAME PHYS/QHP 5/>YRS: CPT

## 2024-08-16 PROCEDURE — 250N000013 HC RX MED GY IP 250 OP 250 PS 637: Performed by: INTERNAL MEDICINE

## 2024-08-16 PROCEDURE — 85610 PROTHROMBIN TIME: CPT

## 2024-08-16 PROCEDURE — 36558 INSERT TUNNELED CV CATH: CPT | Mod: GC | Performed by: RADIOLOGY

## 2024-08-16 PROCEDURE — 86900 BLOOD TYPING SEROLOGIC ABO: CPT

## 2024-08-16 PROCEDURE — 250N000013 HC RX MED GY IP 250 OP 250 PS 637

## 2024-08-16 RX ORDER — TRAMADOL HYDROCHLORIDE 50 MG/1
50 TABLET ORAL EVERY 6 HOURS PRN
Status: DISCONTINUED | OUTPATIENT
Start: 2024-08-16 | End: 2024-09-11

## 2024-08-16 RX ORDER — DEXAMETHASONE 4 MG/1
8 TABLET ORAL ONCE
Status: COMPLETED | OUTPATIENT
Start: 2024-08-21 | End: 2024-08-21

## 2024-08-16 RX ORDER — NALOXONE HYDROCHLORIDE 0.4 MG/ML
0.2 INJECTION, SOLUTION INTRAMUSCULAR; INTRAVENOUS; SUBCUTANEOUS
Status: DISCONTINUED | OUTPATIENT
Start: 2024-08-16 | End: 2024-08-16 | Stop reason: HOSPADM

## 2024-08-16 RX ORDER — URSODIOL 300 MG/1
300 CAPSULE ORAL 3 TIMES DAILY
Status: DISCONTINUED | OUTPATIENT
Start: 2024-08-16 | End: 2024-09-19 | Stop reason: HOSPADM

## 2024-08-16 RX ORDER — FLUMAZENIL 0.1 MG/ML
0.2 INJECTION, SOLUTION INTRAVENOUS
Status: DISCONTINUED | OUTPATIENT
Start: 2024-08-16 | End: 2024-08-16

## 2024-08-16 RX ORDER — FUROSEMIDE 10 MG/ML
10 INJECTION INTRAMUSCULAR; INTRAVENOUS
Status: DISPENSED | OUTPATIENT
Start: 2024-08-17 | End: 2024-08-18

## 2024-08-16 RX ORDER — NALOXONE HYDROCHLORIDE 0.4 MG/ML
0.2 INJECTION, SOLUTION INTRAMUSCULAR; INTRAVENOUS; SUBCUTANEOUS
Status: DISCONTINUED | OUTPATIENT
Start: 2024-08-16 | End: 2024-08-16

## 2024-08-16 RX ORDER — DIPHENHYDRAMINE HCL 25 MG
25 CAPSULE ORAL ONCE
Status: COMPLETED | OUTPATIENT
Start: 2024-08-23 | End: 2024-08-23

## 2024-08-16 RX ORDER — NALOXONE HYDROCHLORIDE 0.4 MG/ML
0.4 INJECTION, SOLUTION INTRAMUSCULAR; INTRAVENOUS; SUBCUTANEOUS
Status: DISCONTINUED | OUTPATIENT
Start: 2024-08-16 | End: 2024-08-16

## 2024-08-16 RX ORDER — ALLOPURINOL 300 MG/1
300 TABLET ORAL DAILY
Status: DISCONTINUED | OUTPATIENT
Start: 2024-08-16 | End: 2024-08-16

## 2024-08-16 RX ORDER — FUROSEMIDE 10 MG/ML
10 INJECTION INTRAMUSCULAR; INTRAVENOUS
Status: DISPENSED | OUTPATIENT
Start: 2024-08-26 | End: 2024-08-28

## 2024-08-16 RX ORDER — FLUMAZENIL 0.1 MG/ML
0.2 INJECTION, SOLUTION INTRAVENOUS
Status: DISCONTINUED | OUTPATIENT
Start: 2024-08-16 | End: 2024-08-16 | Stop reason: HOSPADM

## 2024-08-16 RX ORDER — DEXTROSE MONOHYDRATE AND SODIUM CHLORIDE 5; .45 G/100ML; G/100ML
1000 INJECTION, SOLUTION INTRAVENOUS CONTINUOUS
Status: DISCONTINUED | OUTPATIENT
Start: 2024-08-25 | End: 2024-08-26

## 2024-08-16 RX ORDER — TAMSULOSIN HYDROCHLORIDE 0.4 MG/1
0.4 CAPSULE ORAL DAILY
Status: DISCONTINUED | OUTPATIENT
Start: 2024-08-16 | End: 2024-09-19 | Stop reason: HOSPADM

## 2024-08-16 RX ORDER — PANTOPRAZOLE SODIUM 40 MG/1
40 TABLET, DELAYED RELEASE ORAL DAILY
Status: DISCONTINUED | OUTPATIENT
Start: 2024-08-16 | End: 2024-09-19 | Stop reason: HOSPADM

## 2024-08-16 RX ORDER — HEPARIN SODIUM,PORCINE 10 UNIT/ML
5 VIAL (ML) INTRAVENOUS
Status: COMPLETED | OUTPATIENT
Start: 2024-08-16 | End: 2024-08-16

## 2024-08-16 RX ORDER — ACETAMINOPHEN 325 MG/1
650 TABLET ORAL ONCE
Status: COMPLETED | OUTPATIENT
Start: 2024-08-23 | End: 2024-08-23

## 2024-08-16 RX ORDER — DEXTROSE MONOHYDRATE AND SODIUM CHLORIDE 5; .45 G/100ML; G/100ML
1000 INJECTION, SOLUTION INTRAVENOUS CONTINUOUS
Status: DISCONTINUED | OUTPATIENT
Start: 2024-08-16 | End: 2024-08-17

## 2024-08-16 RX ORDER — ACETAMINOPHEN 325 MG/1
325-650 TABLET ORAL EVERY 4 HOURS PRN
Status: DISCONTINUED | OUTPATIENT
Start: 2024-08-16 | End: 2024-09-19 | Stop reason: HOSPADM

## 2024-08-16 RX ORDER — CEFAZOLIN SODIUM 2 G/100ML
2 INJECTION, SOLUTION INTRAVENOUS
Status: COMPLETED | OUTPATIENT
Start: 2024-08-16 | End: 2024-08-16

## 2024-08-16 RX ORDER — CEFEPIME HYDROCHLORIDE 2 G/1
2 INJECTION, POWDER, FOR SOLUTION INTRAVENOUS
Status: DISCONTINUED | OUTPATIENT
Start: 2024-08-16 | End: 2024-09-02

## 2024-08-16 RX ORDER — ONDANSETRON 8 MG/1
8 TABLET, FILM COATED ORAL EVERY 8 HOURS
Status: COMPLETED | OUTPATIENT
Start: 2024-08-26 | End: 2024-08-29

## 2024-08-16 RX ORDER — FUROSEMIDE 10 MG/ML
20 INJECTION INTRAMUSCULAR; INTRAVENOUS EVERY 8 HOURS PRN
Status: DISPENSED | OUTPATIENT
Start: 2024-08-17 | End: 2024-08-18

## 2024-08-16 RX ORDER — FENTANYL CITRATE 50 UG/ML
25-50 INJECTION, SOLUTION INTRAMUSCULAR; INTRAVENOUS EVERY 5 MIN PRN
Status: DISCONTINUED | OUTPATIENT
Start: 2024-08-16 | End: 2024-08-16

## 2024-08-16 RX ORDER — SIROLIMUS 2 MG/1
12 TABLET, FILM COATED ORAL ONCE
Status: COMPLETED | OUTPATIENT
Start: 2024-08-28 | End: 2024-08-28

## 2024-08-16 RX ORDER — ALLOPURINOL 300 MG/1
300 TABLET ORAL DAILY
Status: DISCONTINUED | OUTPATIENT
Start: 2024-08-16 | End: 2024-09-19 | Stop reason: HOSPADM

## 2024-08-16 RX ORDER — MEPERIDINE HYDROCHLORIDE 50 MG/ML
25-50 INJECTION INTRAMUSCULAR; INTRAVENOUS; SUBCUTANEOUS
Status: ACTIVE | OUTPATIENT
Start: 2024-08-23 | End: 2024-08-24

## 2024-08-16 RX ORDER — ONDANSETRON 8 MG/1
8 TABLET, FILM COATED ORAL EVERY 8 HOURS
Status: COMPLETED | OUTPATIENT
Start: 2024-08-17 | End: 2024-08-23

## 2024-08-16 RX ORDER — SIROLIMUS 2 MG/1
6 TABLET, FILM COATED ORAL DAILY
Status: DISCONTINUED | OUTPATIENT
Start: 2024-08-29 | End: 2024-09-10

## 2024-08-16 RX ORDER — PROCHLORPERAZINE MALEATE 5 MG
5 TABLET ORAL EVERY 6 HOURS PRN
Status: DISCONTINUED | OUTPATIENT
Start: 2024-08-16 | End: 2024-09-19 | Stop reason: HOSPADM

## 2024-08-16 RX ORDER — FENTANYL CITRATE 50 UG/ML
25-50 INJECTION, SOLUTION INTRAMUSCULAR; INTRAVENOUS EVERY 5 MIN PRN
Status: DISCONTINUED | OUTPATIENT
Start: 2024-08-16 | End: 2024-08-16 | Stop reason: HOSPADM

## 2024-08-16 RX ORDER — FUROSEMIDE 10 MG/ML
20 INJECTION INTRAMUSCULAR; INTRAVENOUS EVERY 8 HOURS PRN
Status: DISPENSED | OUTPATIENT
Start: 2024-08-26 | End: 2024-08-28

## 2024-08-16 RX ORDER — URSODIOL 300 MG/1
300 CAPSULE ORAL 3 TIMES DAILY
Status: DISCONTINUED | OUTPATIENT
Start: 2024-08-16 | End: 2024-08-16

## 2024-08-16 RX ADMIN — MIDAZOLAM 0.5 MG: 1 INJECTION INTRAMUSCULAR; INTRAVENOUS at 15:28

## 2024-08-16 RX ADMIN — DEXTROSE AND SODIUM CHLORIDE 1000 ML: 5; 450 INJECTION, SOLUTION INTRAVENOUS at 21:33

## 2024-08-16 RX ADMIN — TAMSULOSIN HYDROCHLORIDE 0.4 MG: 0.4 CAPSULE ORAL at 17:48

## 2024-08-16 RX ADMIN — URSODIOL 300 MG: 300 CAPSULE ORAL at 17:53

## 2024-08-16 RX ADMIN — MIDAZOLAM 1 MG: 1 INJECTION INTRAMUSCULAR; INTRAVENOUS at 15:13

## 2024-08-16 RX ADMIN — FENTANYL CITRATE 50 MCG: 50 INJECTION, SOLUTION INTRAMUSCULAR; INTRAVENOUS at 15:23

## 2024-08-16 RX ADMIN — Medication 5 ML: at 15:29

## 2024-08-16 RX ADMIN — MIDAZOLAM 1 MG: 1 INJECTION INTRAMUSCULAR; INTRAVENOUS at 15:23

## 2024-08-16 RX ADMIN — PANTOPRAZOLE SODIUM 40 MG: 40 TABLET, DELAYED RELEASE ORAL at 17:45

## 2024-08-16 RX ADMIN — FENTANYL CITRATE 50 MCG: 50 INJECTION, SOLUTION INTRAMUSCULAR; INTRAVENOUS at 15:13

## 2024-08-16 RX ADMIN — LISINOPRIL 15 MG: 10 TABLET ORAL at 17:45

## 2024-08-16 RX ADMIN — CEFAZOLIN SODIUM 2 G: 2 INJECTION, SOLUTION INTRAVENOUS at 14:58

## 2024-08-16 RX ADMIN — URSODIOL 300 MG: 300 CAPSULE ORAL at 21:32

## 2024-08-16 RX ADMIN — LIDOCAINE HYDROCHLORIDE 10 ML: 10 INJECTION, SOLUTION EPIDURAL; INFILTRATION; INTRACAUDAL; PERINEURAL at 15:27

## 2024-08-16 RX ADMIN — FENTANYL CITRATE 50 MCG: 50 INJECTION, SOLUTION INTRAMUSCULAR; INTRAVENOUS at 15:28

## 2024-08-16 ASSESSMENT — ACTIVITIES OF DAILY LIVING (ADL)
VISION_MANAGEMENT: NEARSIGHTED
CHANGE_IN_FUNCTIONAL_STATUS_SINCE_ONSET_OF_CURRENT_ILLNESS/INJURY: NO
WEAR_GLASSES_OR_BLIND: YES
ADLS_ACUITY_SCORE: 20
TOILETING_ISSUES: NO
DOING_ERRANDS_INDEPENDENTLY_DIFFICULTY: NO
WALKING_OR_CLIMBING_STAIRS_DIFFICULTY: NO
DIFFICULTY_EATING/SWALLOWING: NO
ADLS_ACUITY_SCORE: 20
FALL_HISTORY_WITHIN_LAST_SIX_MONTHS: NO
ADLS_ACUITY_SCORE: 20
HEARING_DIFFICULTY_OR_DEAF: NO
DIFFICULTY_COMMUNICATING: NO
ADLS_ACUITY_SCORE: 20
ADLS_ACUITY_SCORE: 20
ADLS_ACUITY_SCORE: 33
CONCENTRATING,_REMEMBERING_OR_MAKING_DECISIONS_DIFFICULTY: NO
ADLS_ACUITY_SCORE: 35
ADLS_ACUITY_SCORE: 20
DRESSING/BATHING_DIFFICULTY: NO
ADLS_ACUITY_SCORE: 20

## 2024-08-16 NOTE — PROGRESS NOTES
"CLINICAL NUTRITION SERVICES - ASSESSMENT NOTE     Nutrition Prescription    RECOMMENDATIONS FOR MDs/PROVIDERS TO ORDER:  Recommend advancing diet as medically appropriate     Malnutrition Status:    Patient does not meet two of the established criteria necessary for diagnosing malnutrition    Recommendations already ordered by Registered Dietitian (RD):  None at this time due to NPO status     Future/Additional Recommendations:  -Monitor PO intake once diet is advanced  -Will order Ensure Enlive at 2pm daily once diet is advanced   -Monitor wt trends  -Monitor labs     REASON FOR ASSESSMENT  Leland Pearson is a/an 69 year old male assessed by the dietitian for Nutrition Risk Monitoring- new Allo        CLINICAL HISTORY  Per H&P, \" 69 year old male, currently day -7 GANGA MUD for MDS.\" PMH also significant for: GERD, HTN.       NUTRITION HISTORY  Met with pt and his wife at bedside. Pt reports appetite was good PTA (typically eats 2-3 meals + snacks + 1 premier protein shake daily). Pt notes he has had nausea during his first round of chemo, none since. Pt denies C/D or taste changes.     CURRENT NUTRITION ORDERS  Diet: NPO- line placement   Intake/Tolerance: N/A- pt just admitted to  today.     LABS  Labs reviewed. Notable for:  BUN 25.4  Glucose 107    MEDICATIONS  Medications reviewed.  Protonix  Ursodiol  PRN Compazine     ANTHROPOMETRICS  Height: 184 cm (6' .441\")  Most Recent Weight: 90.2 kg (198 lb 14.4 oz)- pt is currently 111.5% of IBW  IBW: 80.9 kg  BMI: Overweight BMI 25-29.9  Weight History: Pt reports UBW of ~195#, notes he weighed closer to 215# until the start of 2024. In January 2024, pt began intentionally losing wt in preparation for his previously planned back surgery (increased exercise).   Dosing Weight: 90.2 kg (actual wt)  Care Everywhere Records  05/08/24 91.9 kg (202 lb 11.2 oz)       ASSESSED NUTRITION NEEDS  Estimated Energy Needs: 7752-6034 kcals/day (25 - 30 kcals/kg)  Justification: " Increased needs  Estimated Protein Needs: 108-135 grams protein/day (1.2 - 1.5 grams of pro/kg)  Justification: Increased needs  Estimated Fluid Needs: 1170-0189 mL/day (25 - 30 mL/kg)   Justification: Maintenance    PHYSICAL FINDINGS  See malnutrition section below.     MALNUTRITION  % Intake: No decreased intake noted  % Weight Loss: Weight loss does not meet criteria  Subcutaneous Fat Loss: None observed  Muscle Loss: None observed  Fluid Accumulation/Edema: None noted  Malnutrition Diagnosis: Patient does not meet two of the established criteria necessary for diagnosing malnutrition    NUTRITION DIAGNOSIS  Predicted inadequate nutrient intake (kcal/protein) related to upcoming BMT with potential for side effects to affect ability to take adequate PO.       INTERVENTIONS  Implementation  Nutrition Education: Provided education on BMT nutrition, food safety, menu hacks, ONS options at [facility].         Goals  Once diet is advanced- Patient to consume % of nutritionally adequate meal trays TID, or the equivalent with supplements/snacks.     Monitoring/Evaluation  Progress toward goals will be monitored and evaluated per protocol.  Maire Reese RD (Maggie), LD- 5C Clinical Dietitian   Available on [employer]  No longer available by paging

## 2024-08-16 NOTE — PRE-PROCEDURE
GENERAL PRE-PROCEDURE:   Procedure:  Tunneled CVC placement  Date/Time:  8/16/2024 3:10 PM    Written consent obtained?: Yes    Risks and benefits: Risks, benefits and alternatives were discussed    Consent given by:  Patient  Patient states understanding of procedure being performed: Yes    Patient's understanding of procedure matches consent: Yes    Procedure consent matches procedure scheduled: Yes    Expected level of sedation:  Moderate  Appropriately NPO:  Yes  ASA Class:  2  Mallampati  :  Grade 1- soft palate, uvula, tonsillar pillars, and posterior pharyngeal wall visible  Lungs:  Lungs clear with good breath sounds bilaterally  Heart:  Normal heart sounds and rate  History & Physical reviewed:  History and physical reviewed and no updates needed  Statement of review:  I have reviewed the lab findings, diagnostic data, medications, and the plan for sedation

## 2024-08-16 NOTE — H&P
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     Transplant Essential Data:   Diagnosis MDS    BMTCT Type Allogeneic    Prep Regimen Flu/Cy/TBI    Donor Match and  Source 8/8 DP permissive PBSCT    GVHD Prophylaxis PtCy/MMF/Siro    Primary BMT MD Dr. Murphy    Clinical Trials [id number]       HPI: Leland presents today for admission for his transplant. He is feeling well with no new medical complaints. He has no recent sick contacts. He has back pain that limits his walking- he is really only able to tolerate standing for about 15 minutes at a time before requiring a break. He does complete about 25 minutes on a stationary bike at a time- would like a bike in his room if possible. No recent nausea. Appetite has been good. Wife is at bedside. He is working to try to be able to watch the [employer] while here in the hospital.       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      Virtual Crossmatch:    Recent Labs   Lab Test 08/05/24  1137   RESVXMT1 No Interp    RESVXMB1 No Interp         Blood Counts       Recent Labs   Lab Test 08/16/24  0920 08/15/24  0930 08/12/24  1028 08/09/24  0827 05/22/24  0838 05/21/24  1016   HGB 12.7* 13.6 13.0* 12.8*   < > 12.9*   HCT 37.3* 39.0* 36.9* 36.9*   < > 38.4*   WBC 1.3* 1.4* 0.8* 0.8*   < > 4.0   ANEUTAUTO 0.3* 0.2*  --  0.1*   < >  --    ALYMPAUTO 0.8 1.0  --  0.6*   < >  --    AMONOAUTO 0.2 0.1  --  0.1   < >  --    AEOSAUTO 0.0 0.0  --  0.1   < >  --    ABSBASO 0.0 0.0  --  0.0   < >  --    NRBCMAN 0.0 0.0 0.0 0.0   < >  --    ABLA  --   --   --   --   --  0.4*   PLT 35* 14* 11* 27*   < > 72*    < > = values in this interval not displayed.         Recent Labs   Lab Test 08/16/24  0920   ABORH O POS         No lab results found.      Chemistries     Basic Panel  Recent Labs   Lab Test 08/16/24  0920 08/12/24  1028 08/09/24  0827    138 140   POTASSIUM 4.4 4.1 4.2   CHLORIDE 104 103 106   CO2 24 27 24   BUN 25.4* 12.2 21.7   CR 0.98 0.77 0.80   * 119* 115*        Calcium, Magnesium, Phosphorus  Recent Labs   Lab Test 08/16/24  0920 08/12/24  1028 08/09/24  0827 08/07/24  1328 08/05/24  1200 08/02/24  0919 07/29/24  0811 05/15/24  1339 05/08/24  1156   HERBERT 9.5 9.6 9.3   < > 9.6   < > 9.3   < > 9.5   MAG 2.0  --   --   --   --   --   --   --  2.1   PHOS  --   --  3.1  --  3.5  --  3.0   < > 2.7    < > = values in this interval not displayed.        LFTs  Recent Labs   Lab Test 08/16/24  0920 08/12/24  1028 08/09/24  0827   BILITOTAL 0.7 0.7 0.8   ALKPHOS 124 138 135   AST 29 29 35   ALT 19 29 29   ALBUMIN 4.1 4.1 4.0       LDH  Recent Labs   Lab Test 05/08/24  1156   *       B2-Microglobulin  No lab results found.    Vitamin D  No lab results found.      Urine Studies       Recent Labs   Lab Test 08/05/24  1200   COLOR Straw   APPEARANCE Clear   URINEGLC Negative   URINEBILI Negative   URINEKETONE Negative   SG 1.006   UBLD Trace*   URINEPH 6.5   PROTEIN Negative   UUROI Normal   NITRITE Negative   LEUKEST  Negative   RBCU 1   WBCU <1       Creatinine Clearance    Recent Labs   Lab Test 08/07/24  1313      WT 90.0      *   VOL 3,067   DUR 24.0   UCRR 51.3   UCR24 1.57         Infectious Disease Markers     [facility] IDM    Recent Labs   Lab Test 08/05/24  1200   TCRUZI Non-reactive       CMV  Recent Labs   Lab Test 08/05/24  1200   CMVIGG No detectable antibody.         EBV    Recent Labs   Lab Test 08/05/24  1200   EBVCAG Positive*       HSV 1/2    Recent Labs   Lab Test 08/05/24  1200   I7EUTAE 0.25   H1IGG No HSV-1 IgG antibodies detected.   I2EDWFX 0.06   H2IGG No HSV-2 IgG antibodies detected.         VZV    Recent Labs   Lab Test 08/05/24  1200   VZVIGG Positive         HTLV    No lab results found.      Toxoplasma  (not routinely checked)      COVID    No lab results found.      Bone Marrow Biopsy       Morphology  Bone marrow, posterior iliac crest, left decalcified trephine biopsy and touch imprint; direct aspirate smear, and concentrated aspirate smear; and peripheral blood smear:     -No definitive morphologic evidence of myelodysplastic syndrome  -Hypocellular marrow for age (cellularity estimated at 5%) with markedly reduced erythroid predominant hematopoiesis; megakaryopoiesis is essentially absent, and granulopoiesis is markedly reduced;  no overt dysplasia, and no increase in blasts  -Peripheral blood showing marked leukopenia with neutropenia and lymphopenia; marked thrombocytopenia  -See comment                   Electronically signed by Jarrod Rosario MD on 8/5/2024 at  5:46 PM   Comment  [facility]   Concurrent flow cytometry ([id number]) showed there was no increase in myeloid blasts or abnormal myeloid blast population.     Concurrent ancillary studies are in progress and will be reported separately. Correlation with the results of ancillary tests (including NGS) and clinical findings is recommended.   Clinical Information  [facility]   From Epic electronic medical  record; 69 year old male with a history of  MDS-EB2 with ASXL1, IDH1, RUNX1, an SRSF2 mutations . They have required transfusion support including multiple units  of platelets (most recent transfusion 8/1/2024). The most recent bone marrow biopsy ([id number]; 6/13/2024) showed a hypercellular marrow with trilineage hematopoiesis,  rare dysgranulopoiesis ,no increase in blasts, and no morphologic or immunophenotypic evidence of residual disease. Flow cytometry studies from that biopsy ([id number]) showed no increase in myeloid blasts and no abnormal myeloid blasts were identified. Molecular studies from that biopsy ([id number]) showed ASXL1 G646fs at 38%, IDH1 R132C at 43%, RUNX1 S402fs at 41%, and SRSF2 P95L at 38%. This bone marrow biopsy is being worked up for possible transplant.   Peripheral Hematologic Data  [facility]   CBC WITH MANUAL DIFFERENTIAL (08/02/2024 09:33 AM CDT):                                               RESULT VALUE REF. RANGE UNITS    WBC Count   Hemoglobin  Hematocrit   Platelet Count   RBC Count  MCV  MCH  MCHC   RDW   0.6                  ( LL )    14.0                 (NORMAL)     40.5                 (NORMAL)   24                  ( LL )  4.88                 (NORMAL)       83                 (NORMAL)     28.7                 (NORMAL)     34.6                 (NORMAL)      17.1                  ( H ) 4.0-11.0  13.3-17.7  40.0-53.0  150-450  4.40-5.90    26.5-33.0  31.5-36.5  10.0-15.0 10e3/uL  g/dL  %  10e3/uL  10e6/uL  fL  pg  g/dL  %   % Neutrophils  % Lymphocytes  % Monocytes  % Eosinophils  % Basophils  % Metamyelocytes  % Myelocytes  % Promyelocytes  % Blasts  % Plasma Cells  % Other Cells   Absolute Neutrophils    Absolute Lymphocytes   Absolute Monocytes  Absolute Eosinophils  Absolute Basophils  Absolute Metamyelocytes  Absolute Myelocytes  Absolute Promyelocytes  Absolute Blasts  Absolute Plasma Cells  Absolute Other Cells  NRBCs per 100 WBC  Absolute NRBCs  4  86  4  2  2  2                                  0.0                  ( LL )      0.5                  ( L )     0.0                 (NORMAL)     0.0                 (NORMAL)     0.0                 (NORMAL)  0.0                 (NORMAL)                   ()                   ()                       ()                   ()                   ()                   ()                      () N/A  N/A  N/A  N/A  N/A  N/A  N/A  N/A  N/A  N/A  N/A  1.6-8.3  0.8-5.3  0.0-1.3  0.0-0.7  0.0-0.2  <=0.0  <=0.0  <=0.0  <=0.0  <=0.0  <=0.0  <=0  <=0.0 %  %  %  %  %  %  %  %  %  %  %  10e3/uL  10e3/uL  10e3/uL  10e3/uL  10e3/uL  10e3/uL  10e3/uL  10e3/uL  10e3/uL  10e3/uL  10e3/uL  %  10e3/uL                Microscopic Description  [facility]   PERIPHERAL BLOOD  The red cells appear normochromic.  Poikilocytosis includes numerous echinocytes and occasional fragmented red blood cells (schistocytes, helmet cells and keratocytes [horn cells]). Polychromasia is not increased. Rouleaux formation is not increased. The morphology of the platelets is normal. Lymphocytes are polymorphous. Neutrophils show unremarkable cytoplasmic granularity and nuclear morphology.     BONE MARROW   Bone marrow aspirates and touch imprints of bone biopsy are reviewed.  (500 cells on bone marrow biopsy touch imprints  by AM)  Percent (%) Cell Population Reference Range (%)   0.4 Blasts  (0 - 1)   0.2 Neutrophil promyelocytes (2 - 4)   3.2 Neutrophils and precursors (54 - 63)   44.8 Erythroid precursors (18 - 24)   0.4 Monocytes (1- 1.5)   1.4 Eosinophils (1 - 3)   0 Basophils (0 - 1)   49.2 Lymphocytes (8 - 12)   0.4 Plasma cells (0 - 1.5)      The aspirate smears are hemodilute, therefore the differential is performed on the touch imprints.     Granulocytes are relatively decreased in number with progressive and complete maturation; no overt dysplasia is observed. Erythroid precursors are relatively increased in number with progressive and complete  maturation;  rare multinucleated forms are  observed. Megakaryocytes are present and show an overall normal morphologic spectrum.       IRON STAINS:   Dacie iron stain on concentrate smears:   No definitive ringed sideroblasts are seen on scanning.      TREPHINE SECTIONS:  Hematoxylin and eosin stains are reviewed. The trephine core biopsy is suboptimal. The marrow cellularity is estimated at 5%. The cellular composition reflects the the marrow touch imprints differential. Megakaryocytes are rare to absent.     SPECIAL STAINS:  Special stains are performed on the core biopsy sections with appropriately reactive control tissues.     IMMUNOHISTOCHEMISTRY  Immunohistochemical stains are performed on the paraffin-embedded trephine sections with appropriately reactive control tissues.     CD34 - mostly stains vessels - very rare blasts     CD61 - megakaryocytes are rare to absent     GLUT-1 stains erythroid precursors within islands, as well as anucleate RBCs           Flow Cytometry  Flow Interpretation   A. Iliac Crest, Bone Marrow Aspirate, Left:  -No increase in myeloid blasts and no abnormal myeloid blast population   Electronically signed by Jarrod Rosario MD on 8/2/2024 at  2:24 PM   Comment    Final interpretation requires correlation with results of other ancillary studies, morphologic, and clinical features.    Flow Phenotypic Data    Unless otherwise indicated, percentages reported below are based on the total number of CD45 positive viable leukocytes. If applicable, percentage of plasma cells is from total viable nucleated cells.     1.7% cells in the blast gate (CD45 dim and low side scatter blast gate). There is no aberrant immunophenotype on the myeloid blasts.     0.2% CD34 positive blasts     Case was reviewed by the following:  Pathology Fellow: Gabriele Durand MD  A resident/fellow was involved in the selection of testing, review of flow scattergrams, and/or interpretation of this  case.  I, as the senior physician, attest that I: (i) confirmed appropriate testing, (ii) examined the relevant flow scattergrams for the specimen(s); and (ii) rendered or confirmed the interpretation(s).   Flow Processing Information    Multi-color flow analysis is performed for the following markers: CD3, CD7, CD10, CD11b, CD13, CD14, CD15, CD16, CD19, CD33, CD34, CD38, CD45, CD56, CD64, , and HLA-DR. Cells are gated to isolate populations (CD45 versus side scatter and forward scatter versus side scatter), to exclude debris (forward scatter versus side scatter) and to exclude cell doublets (forward scatter height versus forward scatter width and side scatter height versus side scatter width). Forward scatter varies with cell size. Side scatter varies with the amount of cytoplasmic granules. Intensity for CD45 usually increases as hematolymphoid cells mature.   Clinical Information    69 year old male with history of MDS         Molecular Studies    Significant Results    Detected Alterations of Known or Potential Pathogenicity: None     TMB Score: None   Interpretation    No mutations were identified in the genes listed below. Therefore, there is no molecular evidence of residual disease.     This patient's previously characterized pathogenic mutations in ASXL1, IDH1, RUNX1 and SRSF2 were not identified in the current bone marrow aspirate. See below for information regarding the validated limits of detection for this sequencing assay.     This result is consistent with concurrent morphologic and flow cytometry studies that reported no evidence of disease (see separate reports from same collection date).     Correlation with clinical information, morphology and other diagnostic tests is indicated.     ---------------------------------------------------------------------     Genes tested by Custom Panel V3 (Fiz):  ASXL1; IDH1; RUNX1; SRSF2              Chest X-Ray - 2 view     Results for orders placed in  visit on 08/06/24    XR CHEST 2 VIEWS    Status: Normal 8/6/2024    Narrative  Chest 2 views    INDICATION: Preoperative. Myelodysplastic syndrome.    COMPARISON: Outside CT chest abdomen pelvis 4/25/2024 there are no  prior plain films on PACS.    FINDINGS: Heart size and shape appear normal. The lungs and pulmonary  vasculature appear normal. Bony structures show mild degenerative  changes in the thoracic spine without suspicious density pattern.    Impression  IMPRESSION: Mild thoracic spondylosis otherwise negative    BLAKE WESLEY MD      SYSTEM ID:  [id number]        Chest CT without Contrast       Results for orders placed in visit on 08/06/24    CT CHEST W/O CONTRAST    Status: Normal 8/6/2024    Narrative  CT CHEST W/O CONTRAST 8/6/2024 4:15 PM    History: Preop examination; Personal history of diseases of blood and  blood-forming organs; Screening for viral disease; MDS  (myelodysplastic syndrome) (H)    Comparison: None.    Technique: CT of the chest was obtained without intravenous contrast.  Axial, coronal, and sagittal reconstructions were obtained and  reviewed.    Contrast: None    Findings:    Lungs: Stable 2.4 mm nodule right lower lobe image 184 series 4.  Stable 4.5 mm nodule in the right major fissure image 107 series 4. No  pneumothorax, pleural effusion, focal airspace opacity, or suspicious  pulmonary nodule. Minimal atelectasis in the lingula.  Airways: Central tracheobronchial tree is clear.  Vessels: Main pulmonary artery and aorta are normal in caliber. Normal  three-vessel arch  Heart: Heart size is normal without pericardial effusion  Lymph nodes: No suspicious mediastinal or hilar lymphadenopathy.  Thyroid: Within normal limits.  Esophagus: Within normal limits    Upper abdomen: 5 mm renal stone in the left kidney. Minimal vascular  calcifications.    Bones and soft tissues: No suspicious axillary lymphadenopathy or soft  tissue mass. No suspicious osseous lesion. Multilevel  degenerative  changes of the spine.    Impression  Impression:  1. No acute lung finding.  2. Left-sided 5 mm renal stone.    I have personally reviewed the examination and initial interpretation  and I agree with the findings.    LEONOR APPIAH MD      SYSTEM ID:  [id number]        PFTs     FVC%  Recent Labs   Lab Test 24  1515    95       FEV1%  Recent Labs   Lab Test 24  1515    91       DLCO%  Recent Labs   Lab Test 24  1515    110       EKG       ECG results from 24   EKG 12-lead complete w/read - Clinics     Value    Systolic Blood Pressure     Diastolic Blood Pressure     Ventricular Rate 59    Atrial Rate 59    ID Interval 198    QRS Duration 66        QTc 421    P Axis -5    R AXIS -23    T Axis 8    Interpretation ECG      Sinus bradycardia  Inferior infarct , age undetermined  Abnormal ECG  No previous ECGs available  Confirmed by MD CAROL, CORKY () on 2024 11:09:57 AM           ECHOCARDIOGRAM       Results for orders placed in visit on 24    ECHOCARDIOGRAM COMPLETE    Status: Normal 2024    Narrative  [id number]  [id number]  [id number]  [id number]^JULIÁN^YSABEL    [facility]  Diagnostic and Treatment-3rd Floor  [address] [place] [id number]    Name: NISREEN TAPIA  MRN: [id number]  : 1955  Study Date: 2024 03:27 PM  Age: 69 yrs  Gender: Male  Patient Location: [facility]  Reason For Study: Preop examination, Personal history of diseases of blood and  blo  Ordering Physician: YSABEL GALLEGO  Referring Physician: YSABEL GALLEGO  Performed By: Kirti Aggarwal    BSA: 2.1 m2  Height: 72 in  Weight: 199 lb  BP: 154/90 mmHg  ______________________________________________________________________________  Procedure  Echocardiogram with two-dimensional, color and spectral Doppler performed.  Contrast Optison. Optison (NDC #7536-1784-11) given intravenously. Patient was  given 5 ml  mixture of 3 ml Optison and 6 ml saline. 4 ml wasted.  ______________________________________________________________________________  Interpretation Summary  Left ventricular size, wall motion and function are normal. The ejection  fraction is 55-60%.  Right ventricular function, chamber size, wall motion, and thickness are  normal.  No significant valvular abnormalities present.  Previous study not available for comparison.  ______________________________________________________________________________  Left Ventricle  Left ventricular size, wall motion and function are normal. The ejection  fraction is 55-60%. Left ventricular size cannot evaluate. Left ventricular  wall thickness is normal. Left ventricular diastolic function is normal.    Right Ventricle  Right ventricular function, chamber size, wall motion, and thickness are  normal.    Atria  Both atria appear normal.    Mitral Valve  The mitral valve is normal. Trace mitral insufficiency is present.    Aortic Valve  Aortic valve is normal in structure and function.    Tricuspid Valve  The tricuspid valve is normal.    Pulmonic Valve  The pulmonic valve is normal.    Vessels  The aorta root is normal. The inferior vena cava was normal in size with  preserved respiratory variability.    Pericardium  No pericardial effusion is present.    Miscellaneous  No significant valvular abnormalities present.    Compared to Previous Study  Previous study not available for comparison.  ______________________________________________________________________________  MMode/2D Measurements & Calculations  IVSd: 0.92 cm  LVIDd: 4.7 cm  LVIDs: 2.9 cm  LVPWd: 0.97 cm  FS: 37.9 %  LV mass(C)d: 154.6 grams  LV mass(C)dI: 72.7 grams/m2  Ao root diam: 3.2 cm  asc Aorta Diam: 4.0 cm  LVOT diam: 2.3 cm  LVOT area: 4.1 cm2  Ao root diam index Ht(cm/m): 1.8  Ao root diam index BSA (cm/m2): 1.5  Asc Ao diam index BSA (cm/m2): 1.9  Asc Ao diam index Ht(cm/m): 2.2  LA Volume (BP): 50.4  ml    LA Volume Index (BP): 23.7 ml/m2  RWT: 0.41  TAPSE: 1.9 cm    Doppler Measurements & Calculations  MV E max hermann: 33.5 cm/sec  MV A max hermann: 84.4 cm/sec  MV E/A: 0.40  MV dec time: 0.22 sec  Ao V2 max: 117.4 cm/sec  Ao max P.5 mmHg  Ao V2 mean: 87.5 cm/sec  Ao mean PG: 3.3 mmHg  Ao V2 VTI: 24.3 cm  SERAFIN(I,D): 2.6 cm2  SERAFIN(V,D): 2.7 cm2  LV V1 max P.4 mmHg  LV V1 max: 77.1 cm/sec  LV V1 VTI: 15.1 cm  SV(LVOT): 62.3 ml  SI(LVOT): 29.3 ml/m2  AV Hermann Ratio (DI): 0.66    SERAFIN Index (cm2/m2): 1.2  E/E' av.9  Lateral E/e': 4.8  Medial E/e': 11.0  RV S Hermann: 12.0 cm/sec    ______________________________________________________________________________  Report approved by: Daja ARREOLA 2024 03:57 PM        I have assessed all abnormal lab values for their clinical significance and any values considered clinically significant have been addressed in the assessment and plan    Family History:   Family History   Problem Relation Age of Onset    No Known Problems Brother     No Known Problems Brother     No Known Problems Brother     No Known Problems Brother     Diabetes Brother     No Known Problems Sister     No Known Problems Daughter     No Known Problems Daughter        Social History:   Social History     Socioeconomic History    Marital status:      Spouse name: Not on file    Number of children: Not on file    Years of education: Not on file    Highest education level: Not on file   Occupational History    Not on file   Tobacco Use    Smoking status: Never     Passive exposure: Never    Smokeless tobacco: Never   Substance and Sexual Activity    Alcohol use: Yes     Comment: socially    Drug use: No    Sexual activity: Not on file   Other Topics Concern    Parent/sibling w/ CABG, MI or angioplasty before 65F 55M? Not Asked   Social History Narrative    Not on file     Social Determinants of Health     Financial Resource Strain: Not on file   Food Insecurity: Not on file   Transportation  Needs: Not on file   Physical Activity: Not on file   Stress: Not on file   Social Connections: Not on file   Interpersonal Safety: Not on file   Housing Stability: Not on file       Past Medical History:   Past Medical History:   Diagnosis Date    Gastroesophageal reflux disease     Hypertension         Past Surgical History:   Past Surgical History:   Procedure Laterality Date    COLONOSCOPY      ESOPHAGOSCOPY, GASTROSCOPY, DUODENOSCOPY (EGD), COMBINED  7/28/2013    Procedure: COMBINED ESOPHAGOSCOPY, GASTROSCOPY, DUODENOSCOPY (EGD), BIOPSY SINGLE OR MULTIPLE;;  Surgeon: Franklin Barrera MD;  Location:  GI    ESOPHAGOSCOPY, GASTROSCOPY, DUODENOSCOPY (EGD), COMBINED  7/28/2013    Procedure: COMBINED ESOPHAGOSCOPY, GASTROSCOPY, DUODENOSCOPY (EGD), REMOVE FOREIGN BODY;;  Surgeon: Franklin Barrera MD;  Location:  GI    ORTHOPEDIC SURGERY      04 micro discectomy, acl  repair       Allergies: No Known Allergies    Home Medications      Prior to Admission medications    Medication Sig Start Date End Date Taking? Authorizing Provider   acyclovir (ZOVIRAX) 800 MG tablet Take 1 tablet (800 mg) by mouth every 12 hours for 150 days 5/15/24 10/12/24 Yes Nael Delcid MD   allopurinol (ZYLOPRIM) 300 MG tablet Take 1 tablet (300 mg) by mouth daily 5/15/24  Yes Nael Delcid MD   celecoxib (CELEBREX) 200 MG capsule Take 200 mg by mouth daily 5/6/24  Yes Reported, Patient   famotidine (PEPCID) 20 MG tablet Famotidine Oral    daily    active   Yes Reported, Patient   levofloxacin (LEVAQUIN) 250 MG tablet Take 1 tablet (250 mg) by mouth daily 6/19/24  Yes Nael Delcid MD   lisinopril (ZESTRIL) 5 MG tablet Take 3 tablets (15 mg) by mouth daily for 30 days 8/8/24 9/7/24 Yes Brenda Murphy MBBS   multivitamin, therapeutic with minerals (MULTI-VITAMIN) TABS Take 1 tablet by mouth daily   Yes Reported, Patient   posaconazole (NOXAFIL) 100 MG DR tablet Take 3 tablets (300 mg) by mouth every morning 7/22/24  Yes Bhavin  "Nael MARIE MD   prochlorperazine (COMPAZINE) 5 MG tablet Take 10 mg by mouth every 6 hours as needed for nausea or vomiting   Yes Reported, Patient   tamsulosin (FLOMAX) 0.4 MG capsule Tamsulosin Oral    daily    active   Yes Reported, Patient       Review of Systems    Review of Systems:  CONSTITUTIONAL: NEGATIVE for fever, chills, change in weight  INTEGUMENTARY/SKIN: NEGATIVE for worrisome rashes, moles or lesions. Bruising noted to BUE.   EYES: NEGATIVE for vision changes or irritation  ENT/MOUTH: NEGATIVE for ear, mouth and throat problems  RESP: NEGATIVE for significant cough or SOB  CV: NEGATIVE for chest pain, palpitations or peripheral edema  GI: NEGATIVE for nausea, abdominal pain, heartburn, or change in bowel habits  : NEGATIVE for frequency, dysuria, or hematuria  MUSCULOSKELETAL:back pain  NEURO: NEGATIVE for weakness, dizziness or paresthesias  ENDOCRINE: NEGATIVE for temperature intolerance, skin/hair changes  HEME/ALLERGY: NEGATIVE for bleeding problems  PSYCHIATRIC: NEGATIVE for changes in mood or affect    PHYSICAL EXAM      Weight     Wt Readings from Last 3 Encounters:   08/16/24 90.2 kg (198 lb 14.4 oz)   08/09/24 89.9 kg (198 lb 4.8 oz)   08/08/24 90.3 kg (199 lb 1.2 oz)        KPS: 80    /70   Pulse 63   Temp 98  F (36.7  C) (Oral)   Resp 16   Ht 1.84 m (6' 0.44\")   Wt 90.2 kg (198 lb 14.4 oz)   SpO2 99%   BMI 26.65 kg/m       General: NAD   Eyes: MAYA, sclera anicteric   Nose/Mouth/Throat: OP clear, buccal mucosa moist, no ulcerations   Lungs: CTA bilaterally  Cardiovascular: RRR, no M/R/G   Abdominal/Rectal: +BS, soft, NT, ND  Lymphatics: No edema  Skin: No rashes or petechaie  Neuro: A&O x4  Additional Findings: Estrada site NT, no drainage.    Current aGVHD staging:  Skin 0, UGI 0, LGI 0, Liver 0 (keep in note through day +180 for allos)      LABS AND IMAGING: I have assessed all abnormal lab values for their clinical significance and any values considered clinically " significant have been addressed in the assessment and plan.        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     BMT/IEC PROTOCOL for MDS  - Chemo protocol: MT 2022-5  D-6: Flu 30mg/m2, Cy 50mg/kg  D-5 to D-2: Flu  D-1: TBI  D0: stem cell infusion  - Peripheral blood stem cell graft from 8/8 URD donor, ABO matched, Cell dose: TBD  - Engraftment monitoring: Peripheral blood and bone marrow chimerisms on D28, D100, 6 months, 1 and 2 years  - Restaging plan: BMBx with FISH, cytogenetics and NGS on D28, D100, 6 months, 1 and 2 years     HEME/COAG  -Pancytopenia secondary to disease process  - GCSF starts day +5, continue until ANC > 1500 for 3 consecutive days.   - Transfusion parameters: hemoglobin <7, platelets <10  - Relevant thrombosis or bleeding history: none     RISK OF GVHD  - Prophylaxis: PT Cy 50mg/kg on D+3 and D+4; MMF (D+5 to 35); Sirolimus (starting D+5, target level 5-10).    - Acute GVHD Treatment: None to date  - Chronic GVHD Treatment: None to date     ID  Prophylaxis plan:   - Bacterial: Levaquin 250mg from D-1 till engraftment    - Fungal: micafungin 300mg 3x/week until D+45, followed by mold active azole. Pulm nodules present on workup CT.   - PJP: Bactrim to start at D+28. Toxoplasma negative   - Viral: Acyclovir 800mg BID. No need for letermovir as donor and recip are CMV negative.      Monitoring:   - CMV weekly  - EBV every 2 weeks from  D30 to D180     GI/NUTRITION  # GERD: Protonix  - Anti-emetics:  Zofran, dex scheduled during chemotherapy. Compazine, lorazepam available PRN.   - Ulcer prophy: Protonix  - VOD prophy: Ursodiol 300mg TID  - Dietician support to prevent malnutrition.     CARDIOVASCULAR  # HTN  - BP has been high (160-170s/ 70s) in recent clinic appointments. Recently increased lisinopril to 15mg daily. Monitor and increase PRN.      #  CAD: Coronary artery calcifications seen on CT, stress test in 2023 negative     - Risk of cardiomyopathy:  Baseline EF 55-60%.   - Risk of arrhythmia: Baseline EKG showed 421      RESPIRATORY  - Baseline PFTs: Normal   - Risk of respiratory complications: Frequent ambulation and incentive spirometer.     RENAL/ELECTROLYTES/  - Risk of renal injury: IV hydration   - Electrolyte management: replace per sliding scale  - BPH: Continue home flomax.     DIABETES/ENDOCRINE  - Risk of steroid-induced hyperglyemia: Monitor BG, sliding scale if needed     MUSCULOSKELETAL/FRAILTY  - Baseline Frailty Score: Not frail (0)  - Daily PT/OT as needed while inpatient  - Gout: continue allopurinol      SYMPTOM MANAGEMENT  - Pain management: Outpatient has been taking celecoxib for MSK pain, once a day. Inpatient will try Tramadol - available PRN     SOCIAL DETERMINANTS  - Caregiver: wife, Vani. Lives within the required distance from hospital but may elect to stay in [facility].   - Financial/insurance concerns: None      Known issues that I take into account for medical decisions, with salient changes to the plan considering these complexities noted above.    Patient Active Problem List   Diagnosis    MDS (myelodysplastic syndrome) (H)    Pre-operative cardiovascular examination    Benign essential hypertension     Clinically Significant Risk Factors Present on Admission                # Thrombocytopenia: Lowest platelets = 14 in last 2 days, will monitor for bleeding   # Hypertension: Noted on problem list        "  # Overweight: Estimated body mass index is 26.65 kg/m  as calculated from the following:    Height as of this encounter: 1.84 m (6' 0.44\").    Weight as of this encounter: 90.2 kg (198 lb 14.4 oz).           Medically Ready for Discharge: Anticipated in 5+ Days     I spent 60 minutes in the care of this patient today, which included time necessary for preparation for the visit, obtaining history, ordering medications/tests/procedures as medically indicated, review of pertinent medical literature, counseling of the patient, communication of recommendations to the care team, and documentation time.      Kaitlyn Aguilar NP    "

## 2024-08-16 NOTE — PHARMACY-ADMISSION MEDICATION HISTORY
Pharmacist Admission Medication History    Admission medication history was completed in clinic dated 8/7/24. The information provided in this note is only as accurate as the sources available at the time of the update. Last doses prior to admission documented.    Information Source(s): Patient, Clinic records, Hospital records, and dispense report  via in-person    Pertinent Information: Patient held posaconazole prior to admission as directed in clinic.    Changes made to PTA medication list:  Added: None  Deleted: None  Changed: None    Allergies reviewed with patient and updates made in EHR: yes    Medication History Completed By: MARI CUNNINGHAM RPH 8/16/2024 11:26 AM    PTA Med List   Medication Sig Last Dose    acyclovir (ZOVIRAX) 800 MG tablet Take 1 tablet (800 mg) by mouth every 12 hours for 150 days 8/16/2024 at 0700    allopurinol (ZYLOPRIM) 300 MG tablet Take 1 tablet (300 mg) by mouth daily 8/16/2024 at 0630    celecoxib (CELEBREX) 200 MG capsule Take 200 mg by mouth daily Past Month    famotidine (PEPCID) 20 MG tablet Famotidine Oral    daily    active 8/16/2024 at 0630    levofloxacin (LEVAQUIN) 250 MG tablet Take 1 tablet (250 mg) by mouth daily 8/16/2024 at 063    lisinopril (ZESTRIL) 5 MG tablet Take 3 tablets (15 mg) by mouth daily for 30 days 8/15/2024 at 1200    multivitamin, therapeutic with minerals (MULTI-VITAMIN) TABS Take 1 tablet by mouth daily 8/15/2024 at 2100    posaconazole (NOXAFIL) 100 MG DR tablet Take 3 tablets (300 mg) by mouth every morning Past Month    prochlorperazine (COMPAZINE) 5 MG tablet Take 10 mg by mouth every 6 hours as needed for nausea or vomiting More than a month    tamsulosin (FLOMAX) 0.4 MG capsule Tamsulosin Oral    daily    active 8/15/2024 at 1700

## 2024-08-16 NOTE — CONSULTS
Assessment completed in BMT Clinic on 8/5/2024, please see info below for inpatient review.    CLINICAL SOCIAL WORK   PSYCHOSOCIAL ASSESSMENT  BLOOD AND MARROW TRANSPLANT SERVICE        Assessment completed on August 5, 2024 of living situation, support system, financial status, functional status, coping, stressors, need for resources and social work intervention provided as needed.  Information for this assessment was provided by Pt and family report in addition to medical chart review and consultation with medical team.      Present at assessment: Patient, Leland THOMPSON Michel, Vani Michel, Vanesa Parnell and Jenny Caballero were present for this assessment conducted by Mayi Busch, BMT .      Diagnosis: Myelodysplastic Syndrome (MDS)     Date of Diagnosis: 5/2024     Transplant type: Allogeneic     Donor: Unrelated  allogeneic donor stem cell transplant      Physician: Brenda Murphy MD     Nurse Coordinator: Jignesh Aranda RN     Work-up Nurse Coordinator: Jignesh Aranda RN     : Mayi Busch [facility]      Permanent Address:   [address] [place] [address] [address]  [place] [address]     Contact Information:  Pt Cell Phone: [phone]  Pt Email: [email or web address]  Pt's wife Vani Phone: [phone]     Presenting Information:  Leland is a 69 year old male diagnosed with MDS who presents for evaluation for an allogeneic transplant at the [facility] ([facility]  Pt was accompanied to today's visit by his wife and 2 daughters.      Decision Making:   Self      Health Care Directive:   Provided Copy and sent for scanning into the pt's EMR     Relationship Status:    to his wife Vani for 49 years. Both indicate their relationship stable and supportive.     Special Lodging Needs: None identified at this time. Leland lives in [place], [place] and does not need to relocate. Vani noted that she is very concerned about driving alone to the hospital/clinic.  "Vani shared that she does not feel comfortable driving and although does feel better with someone with her, she still does not find this something she wants to do. She discussed possible options to stay closer, both while the pt is IP, as well as post BMT. We discussed the [facility], reviewed the expectations of the [facility] as well as local hotels. Ela and Vanesa also offered to help drive the pt, or have her stay with them to allow less stress for the pt. They will talk over options that will work for them.     Family/Support System: Pt endorsed a good support system including family and close friends who will be available to support Pt throughout transplant process.      Spouse: Vani Michel     Children: Sarah \"Vanesa\" Parmjit and Jenny Federico     Grandchildren: 1 Marlene     Parents:      Siblings: 5 brothers and 1 sister     Friends: Some close friends and family     Caregiver: SW discussed with pt the caregiver role and expectation at length. Pt is agreeable to having a full time caregiver for a minimum of 100 days until cleared by the BMT physician. Pt's identified caregivers are his wife and daughters. Pt signed the caregiver contract which will be scanned into the EMR. Caregiver education and resources provided. No caregiver concerns identified. Pt and Pt's wife confirmed understanding caregiving requirement, including driving restrictions, as discussed during psychosocial assessment.      Name & Numbers  Vani Alexis Vivek  Ela Caballero     Transportation Mode:  Private Car . Pt is aware of driving restrictions post-BMT and the need for the caregiver is to drive until cleared to drive by the  BMT physician. SW provided information on parking info and monthly parking pass options. Pt will utilize the [employer] for transportation to and from the [facility] and BMT Clinic/Hospital.     Insurance:  No Insurance issues identified.  Pt denied specific insurance " concerns at this time. SW reiterated information about the BMT Financial  should specific insurance questions arise as Pt moves through transplant process.  Leland and Vani did note that he is in his donut hole right now, they are hopeful to be done with this soon.     Leland did shared that he got a denial for his bmbx- provided denial letter. This letter was sent to billing to review.      Sources of Income:  No income concerns identified  Leland is supported by savings and long term. Pt denied anticipation of financial hardship related to BMT at this time.  SW encouraged Pt to contact this SW for additional potential resources should financial situation change.      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  GAD7 assessment, score was 1, which indicates no current signs of anxiety.     Leland shared that he does not have a history of anxiety or depression. Overall he feels very well supported in his treatment so far. He shared that he does worry at time, or have some anxiety regarding what is to come, but is able to easily process these thoughts and feelings.      We talked about how some patients may see an increase in feelings of anxiety or depression while hospitalized for extended periods along with isolation. Encouraged Leland and Vani to let us know if they are noticing an increase in symptoms. We talked about the variety of modalities available to use as coping mechanisms (including but not limited to guided imagery, relaxation techniques, progressive muscle relaxation, counseling/talk therapy and medication).     Chemical Use: No issues identified.  Leland denied the use of tobacco, alcohol, marijuana or other drugs.   Based on the information provided, there appear to be no  "specific risks or concerns identified at this time.      Trauma/Loss/Abuse History: Multiple losses associated with cancer diagnosis and treatment, including health, employment, changes to physical appearance, etc.      Spirituality:  Patient does not identify with [place] community. Leland is not interested in seeing a vicky.      Coping: Pt noted that he is currently feeling \"positive, nervous, excited, and ready to begin\".  Pt shared that his main coping mechanisms are talking with friends and family, reading books, and exercise.  Pt noted that he also sharee by taking naps. SW and Pt discussed additional positive coping mechanisms that Pt can utilize while in the hospital.      Caregiver Coping: Pt's wife Vani noted that she is feeling \"positive, fearful, hopeful, worried, nervous, excited, ready to begin and focused\" at this time.  Vani noted that she sharee by talking with friends and family, reading books, and exercise.      Education Provided: Transplant process expectations, Caregiver requirements, Caregiver self-care, Financial issues related to transplant, Financial resources/grants available, Common psychosocial stressors pre/post transplant, Support group(s) available, Tour/layout of the inpatient unit/non-use of cell phones, Hospital resources available, Web site information, Resources for transplant patients and their families as well as the Clinical Social Work role.      Interventions Provided: Supportive counseling and education      Recreation/Leisure Activities:  Leland shared that he enjoys golfing, gardening, traveling, hunting, going to concerts and biking.     Plans for Hospital Stay Leisure:  During his IP stay Leland shared he enjoys reading, watching sports and playing cribbage.      Assessment and Recommendations for Team:  Pt is a 69 year old male diagnosed with MDS who is here undergoing preparation for a planned allogeneic transplant.      Pt is a pleasant and articulate male who feels " comfortable communicating with the medical team. Pt is pleasant, calm and able to articulate concerns/coping mechanisms in an appropriate manner. Leland was alert, interactive, and affect was full, they displayed appropriate eye contact, memory and thought processes. Pt has a strong supportive network of family and friends who are involved.      Pt will benefit from ongoing psychosocial support in regards to coping with the adjustment to the BMT process. CSW has discussed  psychosocial support options in regards to coping with the adjustment to the BMT process and support groups opportunities.       Pt has a good support system and a good caregiver plan. Pt verbalizes understanding of the transplant process and wanting to proceed. SW provided contact information and encouraged Pt to contact SW with questions, concerns, resources and for support. Per this assessment, I did not identify any barriers to this patient moving forward with transplant.          Important Information:   - Leland provided his HCD and this was sent for scanning  - Vani may want to stay at the [facility] while the pt is IP.     Follow up Planned:   Psychosocial support  Lodging referrals        KAMERON Coelho, [facility] Medical CenterSW  [facility]  Phone: [phone]  Vocera- BMT SW 3         KAMERON Jj, [facility]  Adult Blood & Marrow Transplant   Phone: [phone]  [email or web address] SEARCHABLE: BMT SW #4  Securely message with [employer]   Support Groups at [facility]: Social Work Services for Cancer Patients ([email or web address])

## 2024-08-16 NOTE — PROGRESS NOTES
Patient admitted to:  room 5435  Admitted from: home  Arrived by:personal vehicle  Reason for admission: chemotherapy, radiation and stem cell transplant  Patient accompanied by: wife Vani  Belongings:with patient in his room  Teaching:call light, bed controls, unit routines, labs draw time 0400 and mouth cares  Skin double check completed by: Blossom Shah RN and Pilo Reinoso RN

## 2024-08-16 NOTE — PROGRESS NOTES
BMT 5C Admission Care Coordination Note    Leland Pearson is a 69 year old male being admitted today for 2022-52.     Past Medical History:   Diagnosis Date    Gastroesophageal reflux disease     Hypertension      Last bmbx in clinic    Diagnosis: MDS    Protocol: 2022-52    Donor Source: Allogeneic - Unrelated Donor     Clinical Notes: n/a    Caregiver:   Vani Doyle  Ela Michel Caballero    Long-term BMT MD/NC/SW: Jeffery/Jignesh/Mayi    Discharge location: Home    [  ] Home Infusion Company: will send [facility] referral    [  ] Pharmacy needs: Micafungin (home vs clinic)     Sirolimus: PA needed     MMF: PA needed     Prevymis: CMV neg    [ x ] Can fill meds at [facility] pharmacy (BMT benefits soft check): Yes   If not, preferred pharmacy at discharge:     [  ] PT/OT recommendations: PT/OT consult placed    [ x ] 1st time discharge? Yes    [  ] Discharge teach    [  ] Micafungin teach    [  ] Weekly Lab Day Preference?    [  ] D0 BAN scheduling notification    [ x ] clonoSEQ testing needed? no    [ x ] Car-T wallet card- n/a    I will continue to follow patient for discharge planning.    Claudia Finch  BMT Nurse Coordinator  Phone: [phone]  Pager: [phone]

## 2024-08-16 NOTE — CONSULTS
"    Interventional Radiology  [facility] Consult Service Note  08/16/24   8:44 AM    Consult Requested: \"CVC for BMT, tunneled, nonvalved, double lumen CVC\"    Recommendations/Plan:    Patient is on IR schedule tentatively 8/16 for a TCVC placement for BMT (Estrada).   Labs pending. Platelets must be >20.  Orders entered for procedure, NPO status, and pre procedure IV antibiotics.   Consent will be done prior to procedure.     Please contact the IR charge RN at [phone] for estimated time of procedure. Timing of procedure TBD based on staffing/schedule and triage.    Case and imaging discussed with IR attending, Dr. Brambila.    History of Present Illness:  Leland Pearson is a 69 year old male with past medical history of MDS admitted for allo BMT. IR is consulted for TCVC placement for IV medications.    Pertinent Imaging Reviewed:         Expected date of discharge:  TBD    Vitals:   /87 (BP Location: Right arm)   Pulse 109   Temp 98.5  F (36.9  C) (Oral)   Resp 20   Ht 1.84 m (6' 0.44\")   Wt 90.2 kg (198 lb 14.4 oz)   SpO2 98%   BMI 26.65 kg/m      Pertinent Labs:   Lab Results   Component Value Date    WBC 1.4 (L) 08/15/2024    WBC 0.8 (LL) 08/12/2024    WBC 0.8 (LL) 08/09/2024     Lab Results   Component Value Date    HGB 13.6 08/15/2024    HGB 13.0 08/12/2024    HGB 12.8 08/09/2024     Lab Results   Component Value Date    PLT 14 08/15/2024    PLT 11 08/12/2024    PLT 27 08/09/2024     Lab Results   Component Value Date    INR 0.99 08/05/2024    PTT 33 08/05/2024     Lab Results   Component Value Date    POTASSIUM 4.1 08/12/2024        COVID-19 Antibody Results, Testing for Immunity           No data to display              COVID-19 PCR Results           No data to display                IFEANYI Kimbrough CNP  Interventional Radiology  Pager: [phone]    "

## 2024-08-16 NOTE — IR NOTE
Patient Name: Leland Pearson  Medical Record Number: [id number]  Today's Date: 8/16/2024    Procedure: Right tunneled line placement  Proceduralist: Dr. Brambila, Dr. Ivory  Pathology present: na    Procedure Start: 1515  Procedure end: 1538  Sedation medications administered:     Fentanyl 150 mcg  Versed 2.5 mg  Sedation time: 25 minutes (1513 - 1538)     Report given to: Pilo CARROLL  : gayle    Other Notes: Pt arrived to IR room 1 from . Consent reviewed. Pt denies any questions or concerns regarding procedure. Pt positioned supine and monitored per protocol.     Pt tolerated procedure without any noted complications.     9.5 Fr Estrada placed in the right chest with image guidance and ok for immediate use. Double lumen flushed with 2.5 mls of 10 units/ml of Heparin.    Pt transferred back to .

## 2024-08-17 LAB
ACANTHOCYTES BLD QL SMEAR: ABNORMAL
ANION GAP SERPL CALCULATED.3IONS-SCNC: 7 MMOL/L (ref 7–15)
AUER BODIES BLD QL SMEAR: ABNORMAL
BASO STIPL BLD QL SMEAR: ABNORMAL
BASOPHILS # BLD AUTO: 0 10E3/UL (ref 0–0.2)
BASOPHILS NFR BLD AUTO: 0 %
BITE CELLS BLD QL SMEAR: ABNORMAL
BLISTER CELLS BLD QL SMEAR: ABNORMAL
BUN SERPL-MCNC: 17.3 MG/DL (ref 8–23)
BURR CELLS BLD QL SMEAR: ABNORMAL
C DIFF TOX B STL QL: NEGATIVE
CALCIUM SERPL-MCNC: 8.8 MG/DL (ref 8.8–10.4)
CHLORIDE SERPL-SCNC: 103 MMOL/L (ref 98–107)
CREAT SERPL-MCNC: 0.74 MG/DL (ref 0.67–1.17)
DACRYOCYTES BLD QL SMEAR: ABNORMAL
EGFRCR SERPLBLD CKD-EPI 2021: >90 ML/MIN/1.73M2
ELLIPTOCYTES BLD QL SMEAR: ABNORMAL
EOSINOPHIL # BLD AUTO: 0 10E3/UL (ref 0–0.7)
EOSINOPHIL NFR BLD AUTO: 1 %
ERYTHROCYTE [DISTWIDTH] IN BLOOD BY AUTOMATED COUNT: 17.1 % (ref 10–15)
FRAGMENTS BLD QL SMEAR: ABNORMAL
GLUCOSE SERPL-MCNC: 104 MG/DL (ref 70–99)
HCO3 SERPL-SCNC: 26 MMOL/L (ref 22–29)
HCT VFR BLD AUTO: 31 % (ref 40–53)
HGB BLD-MCNC: 10.6 G/DL (ref 13.3–17.7)
HGB C CRYSTALS: ABNORMAL
HOWELL-JOLLY BOD BLD QL SMEAR: ABNORMAL
IMM GRANULOCYTES # BLD: 0 10E3/UL
IMM GRANULOCYTES NFR BLD: 0 %
LYMPHOCYTES # BLD AUTO: 0.7 10E3/UL (ref 0.8–5.3)
LYMPHOCYTES NFR BLD AUTO: 63 %
MAGNESIUM SERPL-MCNC: 2 MG/DL (ref 1.7–2.3)
MCH RBC QN AUTO: 28.3 PG (ref 26.5–33)
MCHC RBC AUTO-ENTMCNC: 34.2 G/DL (ref 31.5–36.5)
MCV RBC AUTO: 83 FL (ref 78–100)
MONOCYTES # BLD AUTO: 0.2 10E3/UL (ref 0–1.3)
MONOCYTES NFR BLD AUTO: 15 %
NEUTROPHILS # BLD AUTO: 0.2 10E3/UL (ref 1.6–8.3)
NEUTROPHILS NFR BLD AUTO: 21 %
NEUTS HYPERSEG BLD QL SMEAR: ABNORMAL
NRBC # BLD AUTO: 0 10E3/UL
NRBC BLD AUTO-RTO: 0 /100
PHOSPHATE SERPL-MCNC: 4.3 MG/DL (ref 2.5–4.5)
PLAT MORPH BLD: ABNORMAL
PLATELET # BLD AUTO: 23 10E3/UL (ref 150–450)
POLYCHROMASIA BLD QL SMEAR: SLIGHT
POTASSIUM SERPL-SCNC: 3.7 MMOL/L (ref 3.4–5.3)
POTASSIUM SERPL-SCNC: 3.9 MMOL/L (ref 3.4–5.3)
RBC # BLD AUTO: 3.74 10E6/UL (ref 4.4–5.9)
RBC AGGLUT BLD QL: ABNORMAL
RBC MORPH BLD: ABNORMAL
ROULEAUX BLD QL SMEAR: ABNORMAL
SICKLE CELLS BLD QL SMEAR: ABNORMAL
SMUDGE CELLS BLD QL SMEAR: ABNORMAL
SODIUM SERPL-SCNC: 132 MMOL/L (ref 135–145)
SODIUM SERPL-SCNC: 136 MMOL/L (ref 135–145)
SPHEROCYTES BLD QL SMEAR: ABNORMAL
STOMATOCYTES BLD QL SMEAR: ABNORMAL
TARGETS BLD QL SMEAR: SLIGHT
TOXIC GRANULES BLD QL SMEAR: ABNORMAL
VARIANT LYMPHS BLD QL SMEAR: ABNORMAL
WBC # BLD AUTO: 1.1 10E3/UL (ref 4–11)

## 2024-08-17 PROCEDURE — 80048 BASIC METABOLIC PNL TOTAL CA: CPT

## 2024-08-17 PROCEDURE — 83735 ASSAY OF MAGNESIUM: CPT | Performed by: INTERNAL MEDICINE

## 2024-08-17 PROCEDURE — 250N000013 HC RX MED GY IP 250 OP 250 PS 637: Performed by: INTERNAL MEDICINE

## 2024-08-17 PROCEDURE — 85049 AUTOMATED PLATELET COUNT: CPT

## 2024-08-17 PROCEDURE — 87493 C DIFF AMPLIFIED PROBE: CPT

## 2024-08-17 PROCEDURE — 84132 ASSAY OF SERUM POTASSIUM: CPT

## 2024-08-17 PROCEDURE — 3E04305 INTRODUCTION OF OTHER ANTINEOPLASTIC INTO CENTRAL VEIN, PERCUTANEOUS APPROACH: ICD-10-PCS | Performed by: INTERNAL MEDICINE

## 2024-08-17 PROCEDURE — 250N000013 HC RX MED GY IP 250 OP 250 PS 637

## 2024-08-17 PROCEDURE — 206N000001 HC R&B BMT UMMC

## 2024-08-17 PROCEDURE — S5010 5% DEXTROSE AND 0.45% SALINE: HCPCS

## 2024-08-17 PROCEDURE — 250N000011 HC RX IP 250 OP 636: Performed by: PEDIATRICS

## 2024-08-17 PROCEDURE — 84100 ASSAY OF PHOSPHORUS: CPT | Performed by: INTERNAL MEDICINE

## 2024-08-17 PROCEDURE — 84295 ASSAY OF SERUM SODIUM: CPT

## 2024-08-17 PROCEDURE — 258N000003 HC RX IP 258 OP 636

## 2024-08-17 PROCEDURE — 250N000011 HC RX IP 250 OP 636

## 2024-08-17 PROCEDURE — 250N000012 HC RX MED GY IP 250 OP 636 PS 637

## 2024-08-17 PROCEDURE — 258N000003 HC RX IP 258 OP 636: Performed by: INTERNAL MEDICINE

## 2024-08-17 PROCEDURE — 258N000003 HC RX IP 258 OP 636: Performed by: PEDIATRICS

## 2024-08-17 PROCEDURE — 250N000011 HC RX IP 250 OP 636: Performed by: INTERNAL MEDICINE

## 2024-08-17 PROCEDURE — 99207 PR NO BILLABLE SERVICE THIS VISIT: CPT | Performed by: PEDIATRICS

## 2024-08-17 RX ORDER — HEPARIN SODIUM,PORCINE 10 UNIT/ML
5-20 VIAL (ML) INTRAVENOUS EVERY 24 HOURS
Status: DISCONTINUED | OUTPATIENT
Start: 2024-08-17 | End: 2024-08-24

## 2024-08-17 RX ORDER — HEPARIN SODIUM,PORCINE 10 UNIT/ML
5-20 VIAL (ML) INTRAVENOUS
Status: DISCONTINUED | OUTPATIENT
Start: 2024-08-17 | End: 2024-09-19 | Stop reason: HOSPADM

## 2024-08-17 RX ORDER — POTASSIUM CHLORIDE 750 MG/1
20 TABLET, EXTENDED RELEASE ORAL ONCE
Status: COMPLETED | OUTPATIENT
Start: 2024-08-17 | End: 2024-08-17

## 2024-08-17 RX ORDER — FUROSEMIDE 10 MG/ML
20 INJECTION INTRAMUSCULAR; INTRAVENOUS ONCE
Status: COMPLETED | OUTPATIENT
Start: 2024-08-17 | End: 2024-08-17

## 2024-08-17 RX ORDER — POLYETHYLENE GLYCOL 3350 17 G/17G
17 POWDER, FOR SOLUTION ORAL 2 TIMES DAILY PRN
Status: DISCONTINUED | OUTPATIENT
Start: 2024-08-17 | End: 2024-09-11

## 2024-08-17 RX ORDER — SULFAMETHOXAZOLE/TRIMETHOPRIM 800-160 MG
1 TABLET ORAL
Status: DISCONTINUED | OUTPATIENT
Start: 2024-09-23 | End: 2024-09-19 | Stop reason: HOSPADM

## 2024-08-17 RX ORDER — SODIUM CHLORIDE 9 MG/ML
INJECTION, SOLUTION INTRAVENOUS CONTINUOUS
Status: ACTIVE | OUTPATIENT
Start: 2024-08-17 | End: 2024-08-18

## 2024-08-17 RX ORDER — SENNOSIDES 8.6 MG
8.6 TABLET ORAL 2 TIMES DAILY PRN
Status: DISCONTINUED | OUTPATIENT
Start: 2024-08-17 | End: 2024-09-11

## 2024-08-17 RX ORDER — LEVOFLOXACIN 250 MG/1
250 TABLET, FILM COATED ORAL
Status: DISCONTINUED | OUTPATIENT
Start: 2024-08-17 | End: 2024-09-02

## 2024-08-17 RX ORDER — ACYCLOVIR 400 MG/1
800 TABLET ORAL 2 TIMES DAILY
Status: DISCONTINUED | OUTPATIENT
Start: 2024-08-19 | End: 2024-09-18

## 2024-08-17 RX ADMIN — ONDANSETRON HYDROCHLORIDE 8 MG: 8 TABLET, FILM COATED ORAL at 16:19

## 2024-08-17 RX ADMIN — DEXTROSE AND SODIUM CHLORIDE 1000 ML: 5; 450 INJECTION, SOLUTION INTRAVENOUS at 03:45

## 2024-08-17 RX ADMIN — URSODIOL 300 MG: 300 CAPSULE ORAL at 19:56

## 2024-08-17 RX ADMIN — PANTOPRAZOLE SODIUM 40 MG: 40 TABLET, DELAYED RELEASE ORAL at 07:58

## 2024-08-17 RX ADMIN — SODIUM CHLORIDE: 9 INJECTION, SOLUTION INTRAVENOUS at 17:59

## 2024-08-17 RX ADMIN — FUROSEMIDE 20 MG: 10 INJECTION, SOLUTION INTRAMUSCULAR; INTRAVENOUS at 16:19

## 2024-08-17 RX ADMIN — LISINOPRIL 15 MG: 10 TABLET ORAL at 13:33

## 2024-08-17 RX ADMIN — MESNA 4510 MG: 100 INJECTION, SOLUTION INTRAVENOUS at 07:54

## 2024-08-17 RX ADMIN — CYCLOPHOSPHAMIDE 4510 MG: 2 INJECTION, POWDER, FOR SOLUTION INTRAVENOUS; ORAL at 10:13

## 2024-08-17 RX ADMIN — POTASSIUM CHLORIDE 20 MEQ: 750 TABLET, EXTENDED RELEASE ORAL at 07:59

## 2024-08-17 RX ADMIN — URSODIOL 300 MG: 300 CAPSULE ORAL at 07:58

## 2024-08-17 RX ADMIN — URSODIOL 300 MG: 300 CAPSULE ORAL at 13:34

## 2024-08-17 RX ADMIN — DEXAMETHASONE 10 MG: 2 TABLET ORAL at 07:59

## 2024-08-17 RX ADMIN — TAMSULOSIN HYDROCHLORIDE 0.4 MG: 0.4 CAPSULE ORAL at 16:19

## 2024-08-17 RX ADMIN — ONDANSETRON HYDROCHLORIDE 8 MG: 8 TABLET, FILM COATED ORAL at 07:59

## 2024-08-17 RX ADMIN — MICAFUNGIN SODIUM 150 MG: 50 INJECTION, POWDER, LYOPHILIZED, FOR SOLUTION INTRAVENOUS at 13:39

## 2024-08-17 RX ADMIN — ALLOPURINOL 300 MG: 300 TABLET ORAL at 07:58

## 2024-08-17 RX ADMIN — DEXTROSE AND SODIUM CHLORIDE 1000 ML: 5; 450 INJECTION, SOLUTION INTRAVENOUS at 16:20

## 2024-08-17 RX ADMIN — FLUDARABINE PHOSPHATE 65 MG: 25 INJECTION, SOLUTION INTRAVENOUS at 08:52

## 2024-08-17 RX ADMIN — LEVOFLOXACIN 250 MG: 250 TABLET, FILM COATED ORAL at 13:34

## 2024-08-17 RX ADMIN — ONDANSETRON HYDROCHLORIDE 8 MG: 8 TABLET, FILM COATED ORAL at 23:03

## 2024-08-17 RX ADMIN — FUROSEMIDE 20 MG: 10 INJECTION, SOLUTION INTRAMUSCULAR; INTRAVENOUS at 21:12

## 2024-08-17 ASSESSMENT — ACTIVITIES OF DAILY LIVING (ADL)
ADLS_ACUITY_SCORE: 20
ADLS_ACUITY_SCORE: 20
ADLS_ACUITY_SCORE: 21
ADLS_ACUITY_SCORE: 20
ADLS_ACUITY_SCORE: 21
ADLS_ACUITY_SCORE: 21
ADLS_ACUITY_SCORE: 20
ADLS_ACUITY_SCORE: 21
ADLS_ACUITY_SCORE: 20
ADLS_ACUITY_SCORE: 21
ADLS_ACUITY_SCORE: 20
ADLS_ACUITY_SCORE: 21

## 2024-08-17 NOTE — PROGRESS NOTES
Stop time on MAR & chart I & O  Chemo drug Fludarabine  Tolerated Well  Intervention Monitored infusion  Response  Plan: Give next dose tomorrow.

## 2024-08-17 NOTE — PROGRESS NOTES
Cross cover:   Sodium returned at 132.     I changed the D5 +NS to NS adjust IV hydration rate to achieve total IV fluid rate ([place] + IV fluid) equal to 225 ml/hr [normal total IV fluid rate is 100-150 ml/m2/hr (150-300 ml/hr)]. Notify MD for further IV fluid orders. To continue for 24 hrs after last Cyclophosphamide dose. Adjust IV hydration rate to achieve total IV fluid rate ([place] + IV fluid) equal to 225 ml/hr [normal total IV fluid rate is 100-150 ml/m2/hr (150-300 ml/hr)].     Kristen Brooks MD

## 2024-08-17 NOTE — PROGRESS NOTES
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     Diagnosis MDS/AML  HCT Type Allogeneic     Prep Regimen Flu/Cy/TBI  Donor Match & Source 8/8 DP permissive PBSC    GVHD Prophylaxis PTCy/MMF/Siro  Primary BMT MD Dr. Murphy    Clinical Trials [id number]       INTERVAL  HISTORY   Leland is doing well, feeling good. No new medical complaints. Of note, he has back pain that limits his walking. He is really only able to tolerate standing for about 15 minutes at a time before requiring a break. He does complete about 25 minutes on a stationary bike at a time and would like a bike in his room if possible. No recent nausea. Appetite has been good.     Review of Systems: 10 point ROS negative except as noted above.    Medications  Current Facility-Administered Medications   Medication Dose Route Frequency Provider Last Rate Last Admin    acetaminophen (TYLENOL) tablet 325-650 mg  325-650 mg Oral Q4H PRN Bessie Lazo PA-C        [START ON 8/23/2024] acetaminophen (TYLENOL) tablet 650 mg  650 mg Oral Once Kaitlyn Aguilar NP        allopurinol (ZYLOPRIM) tablet 300 mg  300 mg Oral Daily Kaitlyn Aguilar NP   300 mg at 08/17/24 0758    ceFEPIme (MAXIPIME) 2 g vial to attach to  mL bag for ADULTS or NS 50 mL bag for PEDS  2 g Intravenous Once PRN Bessie Lazo PA-C        Chemotherapy Infusing-Continuous Infusion   Does not apply Q8H Markus Mendez MD   Given at 08/17/24 0800    [START ON 8/26/2024] Chemotherapy Infusing-Continuous Infusion   Does not apply Q8H Markus Mendez MD        [START ON 8/26/2024] cycloPHOSphamide (CYTOXAN) 3,930 mg in sodium chloride 0.9 % 500 mL infusion  50 mg/kg (Treatment Plan Ideal) Intravenous Q24H Markus Mendez MD        cycloPHOSphamide (CYTOXAN) 4,510 mg in sodium chloride  0.9 % 500 mL infusion  50 mg/kg (Treatment Plan Recorded) Intravenous Once Markus Mendez  mL/hr at 08/17/24 1013 4,510 mg at 08/17/24 1013    dexAMETHasone (DECADRON) tablet 10 mg  10 mg Oral Daily Kaitlyn Aguilar NP   10 mg at 08/17/24 0759    [START ON 8/22/2024] dexAMETHasone (DECADRON) tablet 6 mg  6 mg Oral Once Kaitlyn Aguilar NP        [START ON 8/21/2024] dexAMETHasone (DECADRON) tablet 8 mg  8 mg Oral Once Kaitlyn Aguilar NP        dextrose 5% and 0.45% NaCl infusion  1,000 mL Intravenous Continuous Kaitlyn Aguilar  mL/hr at 08/17/24 0345 1,000 mL at 08/17/24 0345    [START ON 8/25/2024] dextrose 5% and 0.45% NaCl infusion  1,000 mL Intravenous Continuous Kaitlyn Aguilar NP        [START ON 8/23/2024] diphenhydrAMINE (BENADRYL) capsule 25 mg  25 mg Oral Once Kaitlyn Aguilar NP        [START ON 8/28/2024] filgrastim-aafi (NIVESTYM) injection 450 mcg  5 mcg/kg (Treatment Plan Recorded) Subcutaneous Daily at 8 pm Kaitlyn Aguilar NP        FLUdarabine (FLUDARA) 65 mg in sodium chloride 0.9 % 112.6 mL infusion  30 mg/m2 (Treatment Plan Recorded) Intravenous Q24H Markus Mendez .6 mL/hr at 08/17/24 0852 65 mg at 08/17/24 0852    furosemide (LASIX) injection 10 mg  10 mg Intravenous Q2H PRN Kaitlyn Aguilar NP        [START ON 8/26/2024] furosemide (LASIX) injection 10 mg  10 mg Intravenous Q2H PRN Kaitlyn Aguilar NP        furosemide (LASIX) injection 20 mg  20 mg Intravenous Q8H PRN Kaitlyn Aguilar NP        [START ON 8/26/2024] furosemide (LASIX) injection 20 mg  20 mg Intravenous Q8H PRN Kaitlyn Aguilar NP        heparin lock flush 10 unit/mL injection 5-20 mL  5-20 mL Intracatheter Q24H Markus Mendez MD        heparin lock flush 10 unit/mL injection 5-20 mL  5-20 mL Intracatheter Q1H PRN Markus Mendez MD        levofloxacin (LEVAQUIN) tablet 250 mg  250 mg Oral Daily at 10 am Kaitlyn Aguilar NP        lisinopril (ZESTRIL) tablet 15 mg  15 mg  Oral Daily Bessie Lazo PA-C   15 mg at 08/16/24 1745    [START ON 8/28/2024] MEDICATION INSTRUCTION   Does not apply Continuous PRN Kaitlyn Aguilar NP        [START ON 8/23/2024] meperidine (DEMEROL) injection 25-50 mg  25-50 mg Intravenous Once PRN Kaitlyn Aguilar NP        [START ON 8/26/2024] mesna (MESNEX) 3,930 mg in sodium chloride 0.9 % 1,089.3 mL infusion  50 mg/kg (Treatment Plan Ideal) Intravenous Q24H Markus Mendez MD        mesna (MESNEX) 4,510 mg in sodium chloride 0.9 % 1,095.1 mL infusion  50 mg/kg (Treatment Plan Recorded) Intravenous Once Markus Mendez MD 45.6 mL/hr at 08/17/24 0754 4,510 mg at 08/17/24 0754    micafungin (MYCAMINE) 150 mg in sodium chloride 0.9 % 100 mL intermittent infusion  150 mg Intravenous Daily Kaitlyn Aguilar NP        [START ON 8/28/2024] mycophenolate mofetil (CELLCEPT) 1,250 mg in D5W intermittent infusion  1,250 mg Intravenous Q12H [facility] (10/22) Kaitlyn Aguilar NP        ondansetron (ZOFRAN) tablet 8 mg  8 mg Oral Q8H Kaitlyn Aguilar NP   8 mg at 08/17/24 0759    [START ON 8/26/2024] ondansetron (ZOFRAN) tablet 8 mg  8 mg Oral Q8H Kaitlyn Aguilar NP        pantoprazole (PROTONIX) EC tablet 40 mg  40 mg Oral Daily Bessie Lazo PA-C   40 mg at 08/17/24 0758    polyethylene glycol (MIRALAX) Packet 17 g  17 g Oral BID PRN Aziza Mendieta APRN CNP        prochlorperazine (COMPAZINE) injection 5 mg  5 mg Intravenous Q6H PRN Bessie Lazo PA-C        Or    prochlorperazine (COMPAZINE) tablet 5 mg  5 mg Oral Q6H PRN Bessie Lazo PA-C        sennosides (SENOKOT) tablet 8.6 mg  8.6 mg Oral BID PRN Aziza Mendieta APRN CNP        [START ON 8/28/2024] sirolimus (GENERIC EQUIVALENT) tablet 12 mg  12 mg Oral Once Kaitlyn Aguilar NP        [START ON 8/29/2024] sirolimus (GENERIC EQUIVALENT) tablet 6 mg  6 mg Oral Daily Kaitlyn Aguilar NP        sodium chloride (PF) 0.9% PF flush 10-20 mL  10-20 mL Intracatheter q1 min prn Markus Mendez MD      "   sodium chloride (PF) 0.9% PF flush 10-40 mL  10-40 mL Intracatheter Q8H Markus Mendez MD   20 mL at 08/17/24 0336    [START ON 9/23/2024] sulfamethoxazole-trimethoprim (BACTRIM DS) 800-160 MG per tablet 1 tablet  1 tablet Oral Q Mon Tues BID Kaitlyn Aguilar, NP        tamsulosin (FLOMAX) capsule 0.4 mg  0.4 mg Oral Daily Bessie Lazo PA-C   0.4 mg at 08/16/24 1748    traMADol (ULTRAM) tablet 50 mg  50 mg Oral Q6H PRN Kaitlyn Aguilar NP        ursodiol (ACTIGALL) capsule 300 mg  300 mg Oral TID Markus Mendez MD   300 mg at 08/17/24 0758       PHYSICAL EXAM     Weight In/Out     Wt Readings from Last 3 Encounters:   08/17/24 90 kg (198 lb 8 oz)   08/09/24 89.9 kg (198 lb 4.8 oz)   08/08/24 90.3 kg (199 lb 1.2 oz)      I/O last 3 completed shifts:  In: 3240 [P.O.:1340; I.V.:1900]  Out: 2150 [Urine:2150]     KPS:  80    BP (!) 158/90 (BP Location: Right arm)   Pulse 80   Temp 97.7  F (36.5  C) (Oral)   Resp 16   Ht 1.84 m (6' 0.44\")   Wt 90 kg (198 lb 8 oz)   SpO2 97%   BMI 26.59 kg/m       General: Pleasant, resting comfortably, NAD   HEENT: MAYA, sclera anicteric, OP clear, buccal mucosa moist, no ulcerations   Lungs: CTA bilaterally  Cardiovascular: RRR, no M/R/G   Abdominal/Rectal: +BS, soft, NT, ND  Lymphatics: No edema  Skin: WWP, no rashes or petechiae  Neuro: A&O, appropriately interactive, no focal deficits  Additional Findings: Estrada site CDI, no drainage    Current aGVHD staging:  Will start after transplant OR Skin 0, UGI 0, LGI 0, Liver 0 (keep in note through day +180, delete this line if auto or CAR-T)    LABS AND IMAGING: I have assessed all abnormal lab values for their clinical significance and any values considered clinically significant have been addressed in the assessment and plan.      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    BMT/IEC PROTOCOL for MDS  - Chemo protocol: MT 2022-5  D-6: Flu 30mg/m2, Cy 50mg/kg  D-5 to D-2: Flu  D-1: TBI  D0: stem cell infusion  - Peripheral blood stem cell graft from 8/8 URD donor, ABO matched, Cell dose: TBD  - Engraftment monitoring: Peripheral blood and bone marrow chimerisms on D28, D100, 6 months, 1 and 2 years  - Restaging plan: BMBx with FISH, cytogenetics and NGS on D28, D100, 6 months, 1 and 2 years     HEME/COAG  # Pancytopenia secondary to disease process  - GCSF starts day +5, continue until ANC > 1500 for 3 consecutive days.   - Transfusion parameters: hemoglobin <7, platelets <10  - Relevant thrombosis or bleeding history: none     RISK OF GVHD  - Prophylaxis: PTCy 50mg/kg on D+3 and D+4; MMF (D+5 to 35); Sirolimus (starting D+5, target level 5-10)  - Acute GVHD Treatment: None to date  - Chronic GVHD Treatment: None to date     ID  Prophylaxis plan:   - Bacterial: Levaquin 250mg from D-1 till engraftment    - Fungal: micafungin 300mg 3x/week until D+45, followed by mold active azole. Pulm nodules present on workup CT.   - PJP: Bactrim to start at D+28. Toxoplasma negative.   - Viral: Acyclovir 800mg BID. No need for letermovir as donor and recipient are CMV negative.      Monitoring:   - CMV weekly  - EBV every 2 weeks from D30 to D180     GI/NUTRITION  # GERD: Protonix  - Anti-emetics: Zofran, dex scheduled during chemotherapy. Compazine, lorazepam available PRN.   - Ulcer prophy: Protonix  - VOD prophy: Ursodiol 300mg TID  - Dietician support to prevent malnutrition  -  "Miralax and senna PRN constipation - has been taking Miralax for constipation at home with good results     CARDIOVASCULAR  # HTN  - BP has been high (160-170s/ 70s) in recent clinic appointments. Recently increased lisinopril to 15mg daily. Monitor and increase PRN.      # CAD: Coronary artery calcifications seen on CT, stress test in 2023 negative     - Risk of cardiomyopathy:  Baseline EF 55-60%.   - Risk of arrhythmia: Baseline EKG showed 421      RESPIRATORY  - Baseline PFTs: Normal   - Risk of respiratory complications: Frequent ambulation and incentive spirometer     RENAL/ELECTROLYTES/  - Risk of renal injury: IV hydration   - Electrolyte management: replace per sliding scale  - BPH: Continue home flomax.     DIABETES/ENDOCRINE  - Risk of steroid-induced hyperglyemia: Monitor BG, sliding scale if needed     MUSCULOSKELETAL/FRAILTY  - Baseline Frailty Score: Not frail (0)  - Daily PT/OT as needed while inpatient  - Gout: continue allopurinol      SYMPTOM MANAGEMENT  - Pain management: Outpatient has been taking celecoxib for MSK pain, once a day. Inpatient will try Tramadol - available PRN     SOCIAL DETERMINANTS  - Caregiver: wife, Vani. Lives within the required distance from hospital but may elect to stay in [facility].   - Financial/insurance concerns: None    Clinically Significant Risk Factors                # Thrombocytopenia: Lowest platelets = 23 in last 2 days, will monitor for bleeding   # Hypertension: Noted on problem list           # Overweight: Estimated body mass index is 26.59 kg/m  as calculated from the following:    Height as of this encounter: 1.84 m (6' 0.44\").    Weight as of this encounter: 90 kg (198 lb 8 oz)., PRESENT ON ADMISSION                    I spent 30 minutes in the care of this patient today, which included time necessary for preparation for the visit, obtaining history, ordering medications/tests/procedures as medically indicated, review of pertinent medical literature, " counseling of the patient, communication of recommendations to the care team, and documentation time.    Aziza Mendieta DNP, APRN, NP-C  [facility] Mar[facility] Transplant

## 2024-08-17 NOTE — PLAN OF CARE
"BP (!) 142/88 (BP Location: Right arm)   Pulse 70   Temp 98.2  F (36.8  C) (Oral)   Resp 16   Ht 1.84 m (6' 0.44\")   Wt 90.2 kg (198 lb 14.4 oz)   SpO2 96%   BMI 26.65 kg/m        VSS. Afebrile. Alert and oriented 4x. Denies pain, no nausea and vomiting upon assessment noted. Independent In room. Cytoxan flush started at 225ml/hr. Patient educated of the procedure tonight.  Voids spontaneously and adequately.    Oral K replacement due in AM. Still needs a stool sample to rule out Cdiff.     Problem: Adult Inpatient Plan of Care  Goal: Plan of Care Review  Description: The Plan of Care Review/Shift note should be completed every shift.  The Outcome Evaluation is a brief statement about your assessment that the patient is improving, declining, or no change.  This information will be displayed automatically on your shift  note.  Outcome: Progressing  Goal: Patient-Specific Goal (Individualized)  Description: You can add care plan individualizations to a care plan. Examples of Individualization might be:  \"Parent requests to be called daily at 9am for status\", \"I have a hard time hearing out of my right ear\", or \"Do not touch me to wake me up as it startles  me\".  Outcome: Progressing  Goal: Absence of Hospital-Acquired Illness or Injury  Outcome: Progressing  Intervention: Identify and Manage Fall Risk  Recent Flowsheet Documentation  Taken 8/16/2024 2100 by Irma Boucher RN  Safety Promotion/Fall Prevention: safety round/check completed  Intervention: Prevent Skin Injury  Recent Flowsheet Documentation  Taken 8/16/2024 2100 by Irma Boucher RN  Body Position: position changed independently  Device Skin Pressure Protection: tubing/devices free from skin contact  Intervention: Prevent Infection  Recent Flowsheet Documentation  Taken 8/16/2024 2100 by Irma Boucher RN  Infection Prevention:   environmental surveillance performed   personal protective equipment utilized   rest/sleep " promoted  Goal: Optimal Comfort and Wellbeing  Outcome: Progressing  Goal: Readiness for Transition of Care  Outcome: Progressing   Goal Outcome Evaluation:

## 2024-08-17 NOTE — PLAN OF CARE
Goal Outcome Evaluation:    Patient with no complaints, eating and drinking well. Intake greater than output by greater than 800 ml for shift, next nurse to give 10 mg lasix.

## 2024-08-17 NOTE — PROGRESS NOTES
Stop time on MAR & chart I & O  Chemo drug Cytoxan  Tolerated Well  Intervention Monitored infusion and urine output  Response  Plan Monitor urine output and weight  Lab: Na, K at 1600  Wt/intervention This evening

## 2024-08-18 LAB
ANION GAP SERPL CALCULATED.3IONS-SCNC: 7 MMOL/L (ref 7–15)
BACTERIA SPEC CULT: NORMAL
BASOPHILS # BLD AUTO: 0 10E3/UL (ref 0–0.2)
BASOPHILS NFR BLD AUTO: 0 %
BLD PROD TYP BPU: NORMAL
BLOOD COMPONENT TYPE: NORMAL
BUN SERPL-MCNC: 15.7 MG/DL (ref 8–23)
CALCIUM SERPL-MCNC: 8.6 MG/DL (ref 8.8–10.4)
CHLORIDE SERPL-SCNC: 97 MMOL/L (ref 98–107)
CMV DNA SPEC NAA+PROBE-ACNC: NOT DETECTED IU/ML
CODING SYSTEM: NORMAL
CREAT SERPL-MCNC: 0.67 MG/DL (ref 0.67–1.17)
EGFRCR SERPLBLD CKD-EPI 2021: >90 ML/MIN/1.73M2
EOSINOPHIL # BLD AUTO: 0 10E3/UL (ref 0–0.7)
EOSINOPHIL NFR BLD AUTO: 0 %
ERYTHROCYTE [DISTWIDTH] IN BLOOD BY AUTOMATED COUNT: 16.8 % (ref 10–15)
GLUCOSE SERPL-MCNC: 121 MG/DL (ref 70–99)
HCO3 SERPL-SCNC: 25 MMOL/L (ref 22–29)
HCT VFR BLD AUTO: 28.3 % (ref 40–53)
HGB BLD-MCNC: 10.1 G/DL (ref 13.3–17.7)
IMM GRANULOCYTES # BLD: 0 10E3/UL
IMM GRANULOCYTES NFR BLD: 1 %
ISSUE DATE AND TIME: NORMAL
LYMPHOCYTES # BLD AUTO: 0.4 10E3/UL (ref 0.8–5.3)
LYMPHOCYTES NFR BLD AUTO: 28 %
MAGNESIUM SERPL-MCNC: 1.7 MG/DL (ref 1.7–2.3)
MCH RBC QN AUTO: 28.9 PG (ref 26.5–33)
MCHC RBC AUTO-ENTMCNC: 35.7 G/DL (ref 31.5–36.5)
MCV RBC AUTO: 81 FL (ref 78–100)
MONOCYTES # BLD AUTO: 0.2 10E3/UL (ref 0–1.3)
MONOCYTES NFR BLD AUTO: 12 %
NEUTROPHILS # BLD AUTO: 0.8 10E3/UL (ref 1.6–8.3)
NEUTROPHILS NFR BLD AUTO: 59 %
NRBC # BLD AUTO: 0 10E3/UL
NRBC BLD AUTO-RTO: 0 /100
PHOSPHATE SERPL-MCNC: 3.2 MG/DL (ref 2.5–4.5)
PLATELET # BLD AUTO: 16 10E3/UL (ref 150–450)
POTASSIUM SERPL-SCNC: 3.8 MMOL/L (ref 3.4–5.3)
POTASSIUM SERPL-SCNC: 3.8 MMOL/L (ref 3.4–5.3)
POTASSIUM SERPL-SCNC: 4 MMOL/L (ref 3.4–5.3)
RBC # BLD AUTO: 3.5 10E6/UL (ref 4.4–5.9)
SODIUM SERPL-SCNC: 129 MMOL/L (ref 135–145)
SODIUM SERPL-SCNC: 129 MMOL/L (ref 135–145)
SODIUM SERPL-SCNC: 136 MMOL/L (ref 135–145)
UNIT ABO/RH: NORMAL
UNIT NUMBER: NORMAL
UNIT STATUS: NORMAL
UNIT TYPE ISBT: 6200
WBC # BLD AUTO: 1.3 10E3/UL (ref 4–11)

## 2024-08-18 PROCEDURE — 206N000001 HC R&B BMT UMMC

## 2024-08-18 PROCEDURE — 258N000003 HC RX IP 258 OP 636: Performed by: PEDIATRICS

## 2024-08-18 PROCEDURE — 84295 ASSAY OF SERUM SODIUM: CPT

## 2024-08-18 PROCEDURE — 84132 ASSAY OF SERUM POTASSIUM: CPT

## 2024-08-18 PROCEDURE — 258N000003 HC RX IP 258 OP 636: Performed by: INTERNAL MEDICINE

## 2024-08-18 PROCEDURE — 250N000012 HC RX MED GY IP 250 OP 636 PS 637

## 2024-08-18 PROCEDURE — 250N000011 HC RX IP 250 OP 636

## 2024-08-18 PROCEDURE — 250N000011 HC RX IP 250 OP 636: Performed by: INTERNAL MEDICINE

## 2024-08-18 PROCEDURE — 250N000013 HC RX MED GY IP 250 OP 250 PS 637: Performed by: INTERNAL MEDICINE

## 2024-08-18 PROCEDURE — 250N000013 HC RX MED GY IP 250 OP 250 PS 637: Performed by: NURSE PRACTITIONER

## 2024-08-18 PROCEDURE — 83735 ASSAY OF MAGNESIUM: CPT | Performed by: INTERNAL MEDICINE

## 2024-08-18 PROCEDURE — 250N000013 HC RX MED GY IP 250 OP 250 PS 637

## 2024-08-18 PROCEDURE — P9037 PLATE PHERES LEUKOREDU IRRAD: HCPCS

## 2024-08-18 PROCEDURE — 80048 BASIC METABOLIC PNL TOTAL CA: CPT

## 2024-08-18 PROCEDURE — 85025 COMPLETE CBC W/AUTO DIFF WBC: CPT

## 2024-08-18 PROCEDURE — 84100 ASSAY OF PHOSPHORUS: CPT | Performed by: INTERNAL MEDICINE

## 2024-08-18 PROCEDURE — 258N000003 HC RX IP 258 OP 636

## 2024-08-18 RX ORDER — POLYETHYLENE GLYCOL 3350 17 G/17G
17 POWDER, FOR SOLUTION ORAL ONCE
Status: COMPLETED | OUTPATIENT
Start: 2024-08-18 | End: 2024-08-18

## 2024-08-18 RX ORDER — POLYETHYLENE GLYCOL 3350 17 G/17G
17 POWDER, FOR SOLUTION ORAL AT BEDTIME
Status: DISCONTINUED | OUTPATIENT
Start: 2024-08-18 | End: 2024-09-02

## 2024-08-18 RX ORDER — POTASSIUM CHLORIDE 29.8 MG/ML
20 INJECTION INTRAVENOUS ONCE
Status: COMPLETED | OUTPATIENT
Start: 2024-08-18 | End: 2024-08-18

## 2024-08-18 RX ORDER — FUROSEMIDE 10 MG/ML
40 INJECTION INTRAMUSCULAR; INTRAVENOUS ONCE
Status: COMPLETED | OUTPATIENT
Start: 2024-08-18 | End: 2024-08-18

## 2024-08-18 RX ORDER — METHOCARBAMOL 500 MG/1
500 TABLET, FILM COATED ORAL 3 TIMES DAILY
Status: DISCONTINUED | OUTPATIENT
Start: 2024-08-18 | End: 2024-09-19 | Stop reason: HOSPADM

## 2024-08-18 RX ADMIN — PANTOPRAZOLE SODIUM 40 MG: 40 TABLET, DELAYED RELEASE ORAL at 08:10

## 2024-08-18 RX ADMIN — Medication 5 ML: at 12:41

## 2024-08-18 RX ADMIN — ONDANSETRON HYDROCHLORIDE 8 MG: 8 TABLET, FILM COATED ORAL at 08:10

## 2024-08-18 RX ADMIN — FUROSEMIDE 10 MG: 10 INJECTION, SOLUTION INTRAMUSCULAR; INTRAVENOUS at 05:23

## 2024-08-18 RX ADMIN — URSODIOL 300 MG: 300 CAPSULE ORAL at 13:36

## 2024-08-18 RX ADMIN — Medication 5 ML: at 16:25

## 2024-08-18 RX ADMIN — SODIUM CHLORIDE: 9 INJECTION, SOLUTION INTRAVENOUS at 04:12

## 2024-08-18 RX ADMIN — Medication 5 ML: at 12:40

## 2024-08-18 RX ADMIN — ALLOPURINOL 300 MG: 300 TABLET ORAL at 08:10

## 2024-08-18 RX ADMIN — ONDANSETRON HYDROCHLORIDE 8 MG: 8 TABLET, FILM COATED ORAL at 16:22

## 2024-08-18 RX ADMIN — POLYETHYLENE GLYCOL 3350 17 G: 17 POWDER, FOR SOLUTION ORAL at 11:05

## 2024-08-18 RX ADMIN — ONDANSETRON HYDROCHLORIDE 8 MG: 8 TABLET, FILM COATED ORAL at 23:51

## 2024-08-18 RX ADMIN — SODIUM CHLORIDE: 9 INJECTION, SOLUTION INTRAVENOUS at 10:55

## 2024-08-18 RX ADMIN — URSODIOL 300 MG: 300 CAPSULE ORAL at 08:11

## 2024-08-18 RX ADMIN — FLUDARABINE PHOSPHATE 65 MG: 25 INJECTION, SOLUTION INTRAVENOUS at 09:05

## 2024-08-18 RX ADMIN — POTASSIUM CHLORIDE 20 MEQ: 29.8 INJECTION, SOLUTION INTRAVENOUS at 05:23

## 2024-08-18 RX ADMIN — LEVOFLOXACIN 250 MG: 250 TABLET, FILM COATED ORAL at 11:58

## 2024-08-18 RX ADMIN — LISINOPRIL 15 MG: 10 TABLET ORAL at 11:58

## 2024-08-18 RX ADMIN — URSODIOL 300 MG: 300 CAPSULE ORAL at 20:59

## 2024-08-18 RX ADMIN — TAMSULOSIN HYDROCHLORIDE 0.4 MG: 0.4 CAPSULE ORAL at 16:23

## 2024-08-18 RX ADMIN — DEXAMETHASONE 10 MG: 2 TABLET ORAL at 08:10

## 2024-08-18 RX ADMIN — METHOCARBAMOL 500 MG: 500 TABLET ORAL at 11:01

## 2024-08-18 RX ADMIN — METHOCARBAMOL 500 MG: 500 TABLET ORAL at 16:22

## 2024-08-18 RX ADMIN — FUROSEMIDE 40 MG: 10 INJECTION, SOLUTION INTRAVENOUS at 09:52

## 2024-08-18 RX ADMIN — MICAFUNGIN SODIUM 150 MG: 50 INJECTION, POWDER, LYOPHILIZED, FOR SOLUTION INTRAVENOUS at 10:55

## 2024-08-18 RX ADMIN — POLYETHYLENE GLYCOL 3350 17 G: 17 POWDER, FOR SOLUTION ORAL at 20:58

## 2024-08-18 ASSESSMENT — ACTIVITIES OF DAILY LIVING (ADL)
ADLS_ACUITY_SCORE: 20

## 2024-08-18 NOTE — PROGRESS NOTES
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     Diagnosis MDS/AML  HCT Type Allogeneic     Prep Regimen Flu/Cy/TBI  Donor Match & Source 8/8 DP permissive PBSC    GVHD Prophylaxis PTCy/MMF/Siro  Primary BMT MD Dr. Murphy    Clinical Trials [id number]       INTERVAL  HISTORY   Leland is doing well, continues to feel pretty good. Does endorse neck/shoulder tension that he thinks is contributing to headache (of note, has baseline back pain). Also reports constipation for which he'll take some Miralax today. No fever, chills, SOB, chest pain, nausea/vomiting. Appetite has been good. Up ad sergo. Mild ankle puffiness noted. Good UOP wth lasix.     Review of Systems: 10 point ROS negative except as noted above.    Medications  Current Facility-Administered Medications   Medication Dose Route Frequency Provider Last Rate Last Admin    acetaminophen (TYLENOL) tablet 325-650 mg  325-650 mg Oral Q4H PRN Bessie Lazo PA-C        [START ON 8/23/2024] acetaminophen (TYLENOL) tablet 650 mg  650 mg Oral Once Kaitlyn Aguilar NP        [START ON 8/19/2024] acyclovir (ZOVIRAX) tablet 800 mg  800 mg Oral BID Aziza Mendieta APRN CNP        allopurinol (ZYLOPRIM) tablet 300 mg  300 mg Oral Daily Kaitlyn Aguilar NP   300 mg at 08/18/24 0810    ceFEPIme (MAXIPIME) 2 g vial to attach to  mL bag for ADULTS or NS 50 mL bag for PEDS  2 g Intravenous Once PRN Bessie Lazo PA-C        [START ON 8/26/2024] Chemotherapy Infusing-Continuous Infusion   Does not apply Q8H Markus Mendez MD        [START ON 8/26/2024] cycloPHOSphamide (CYTOXAN) 3,930 mg in sodium chloride 0.9 % 500 mL infusion  50 mg/kg (Treatment Plan Ideal) Intravenous Q24H Markus Mendez MD        dexAMETHasone (DECADRON) tablet 10 mg  10 mg Oral Daily  Kaitlyn Aguilar NP   10 mg at 08/18/24 0810    [START ON 8/22/2024] dexAMETHasone (DECADRON) tablet 6 mg  6 mg Oral Once Kaitlyn Aguilar NP        [START ON 8/21/2024] dexAMETHasone (DECADRON) tablet 8 mg  8 mg Oral Once Kaitlyn Aguilar NP        [Held by provider] dextrose 5% and 0.45% NaCl infusion  1,000 mL Intravenous Continuous Kaitlyn Aguilar NP   Stopped at 08/18/24 0513    [START ON 8/23/2024] diphenhydrAMINE (BENADRYL) capsule 25 mg  25 mg Oral Once Kaitlyn Aguilar NP        [START ON 8/28/2024] filgrastim-aafi (NIVESTYM) injection 450 mcg  5 mcg/kg (Treatment Plan Recorded) Subcutaneous Daily at 8 pm Kaitlyn Aguilar NP        FLUdarabine (FLUDARA) 65 mg in sodium chloride 0.9 % 112.6 mL infusion  30 mg/m2 (Treatment Plan Recorded) Intravenous Q24H Markus Mendez .6 mL/hr at 08/18/24 0905 65 mg at 08/18/24 0905    furosemide (LASIX) injection 10 mg  10 mg Intravenous Q2H PRN Kaitlyn Aguilar NP   10 mg at 08/18/24 0523    [START ON 8/26/2024] furosemide (LASIX) injection 10 mg  10 mg Intravenous Q2H PRN Kaitlyn Aguilar NP        furosemide (LASIX) injection 20 mg  20 mg Intravenous Q8H PRN Kaitlyn Aguilar NP   20 mg at 08/17/24 1619    [START ON 8/26/2024] furosemide (LASIX) injection 20 mg  20 mg Intravenous Q8H PRN Kaitlyn Aguilar NP        heparin lock flush 10 unit/mL injection 5-20 mL  5-20 mL Intracatheter Q24H Markus Mendez MD        heparin lock flush 10 unit/mL injection 5-20 mL  5-20 mL Intracatheter Q1H PRN Markus Mendez MD        levofloxacin (LEVAQUIN) tablet 250 mg  250 mg Oral Daily at 10 am Kaitlyn Aguilar NP   250 mg at 08/17/24 1334    lisinopril (ZESTRIL) tablet 15 mg  15 mg Oral Daily Bessie Lazo PA-C   15 mg at 08/17/24 1333    [START ON 8/28/2024] MEDICATION INSTRUCTION   Does not apply Continuous PRN Kaitlyn Aguilar NP        [START ON 8/23/2024] meperidine (DEMEROL) injection 25-50 mg  25-50 mg Intravenous Once PRN Kaitlyn Aguilar  CIELO Ramsey        [START ON 8/26/2024] mesna (MESNEX) 3,930 mg in sodium chloride 0.9 % 1,089.3 mL infusion  50 mg/kg (Treatment Plan Ideal) Intravenous Q24H Markus Mendez MD        methocarbamol (ROBAXIN) tablet 500 mg  500 mg Oral TID Aziza Mendieta APRN CNP   500 mg at 08/18/24 1101    micafungin (MYCAMINE) 150 mg in sodium chloride 0.9 % 100 mL intermittent infusion  150 mg Intravenous Daily Kaitlyn Aguilar  mL/hr at 08/18/24 1055 150 mg at 08/18/24 1055    [START ON 8/28/2024] mycophenolate mofetil (CELLCEPT) 1,250 mg in D5W intermittent infusion  1,250 mg Intravenous Q12H [facility] (10/22) Kaitlyn Aguilar NP        ondansetron (ZOFRAN) tablet 8 mg  8 mg Oral Q8H Kaitlyn Aguilar NP   8 mg at 08/18/24 0810    [START ON 8/26/2024] ondansetron (ZOFRAN) tablet 8 mg  8 mg Oral Q8H Kaitlyn Aguilar NP        pantoprazole (PROTONIX) EC tablet 40 mg  40 mg Oral Daily Bessie Lazo PA-C   40 mg at 08/18/24 0810    polyethylene glycol (MIRALAX) Packet 17 g  17 g Oral At Bedtime Aziza Mendieta APRN CNP        polyethylene glycol (MIRALAX) Packet 17 g  17 g Oral BID PRN Aziza Mendieta APRN CNP        prochlorperazine (COMPAZINE) injection 5 mg  5 mg Intravenous Q6H PRN Bessie Lazo PA-C        Or    prochlorperazine (COMPAZINE) tablet 5 mg  5 mg Oral Q6H PRN Bessie Lazo PA-C        sennosides (SENOKOT) tablet 8.6 mg  8.6 mg Oral BID PRN Aziza Mendieta APRN CNP        [START ON 8/28/2024] sirolimus (GENERIC EQUIVALENT) tablet 12 mg  12 mg Oral Once Kaitlyn Aguilar NP        [START ON 8/29/2024] sirolimus (GENERIC EQUIVALENT) tablet 6 mg  6 mg Oral Daily Kaitlyn Aguilar, NP        sodium chloride (PF) 0.9% PF flush 10-20 mL  10-20 mL Intracatheter q1 min prn Markus Mendez MD        sodium chloride (PF) 0.9% PF flush 10-40 mL  10-40 mL Intracatheter Q8H Markus Mendez MD   20 mL at 08/17/24 0336    sodium chloride 0.9 % infusion   Intravenous Continuous Kristen Brooks   "mL/hr at 08/18/24 1055 New Bag at 08/18/24 1055    [START ON 9/23/2024] sulfamethoxazole-trimethoprim (BACTRIM DS) 800-160 MG per tablet 1 tablet  1 tablet Oral Q Mon Tues BID Kaitlyn Aguilar NP        tamsulosin (FLOMAX) capsule 0.4 mg  0.4 mg Oral Daily Bessie Lazo PA-C   0.4 mg at 08/17/24 1619    traMADol (ULTRAM) tablet 50 mg  50 mg Oral Q6H PRN Kaitlyn Aguilar NP        ursodiol (ACTIGALL) capsule 300 mg  300 mg Oral TID Markus Mendez MD   300 mg at 08/18/24 0811       PHYSICAL EXAM     Weight In/Out     Wt Readings from Last 3 Encounters:   08/17/24 93.6 kg (206 lb 6.4 oz)   08/09/24 89.9 kg (198 lb 4.8 oz)   08/08/24 90.3 kg (199 lb 1.2 oz)      I/O last 3 completed shifts:  In: 7970.8 [P.O.:2120; I.V.:4235; IV Piggyback:1615.8]  Out: 8300 [Urine:8300]     KPS:  80    /82 (BP Location: Left arm)   Pulse 77   Temp 97.7  F (36.5  C) (Oral)   Resp 16   Ht 1.84 m (6' 0.44\")   Wt 93.6 kg (206 lb 6.4 oz)   SpO2 97%   BMI 27.65 kg/m       General: Pleasant, up in chair, NAD   HEENT: MAYA, sclera anicteric, OP clear, buccal mucosa moist, no ulcerations   Lungs: CTA bilaterally  Cardiovascular: RRR, no M/R/G   Abdominal/Rectal: +BS, soft, NT, ND  Lymphatics: Trace b/l sock line edema  Skin: WWP, no rashes or petechiae  Neuro: A&O, appropriately interactive, speech clear, no focal deficits  Additional Findings: Estrada site CDI, no drainage    Current aGVHD staging:  Will start after transplant OR Skin 0, UGI 0, LGI 0, Liver 0 (keep in note through day +180, delete this line if auto or CAR-T)    LABS AND IMAGING: I have assessed all abnormal lab values for their clinical significance and any values considered clinically significant have been addressed in the assessment and plan.      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     BMT/IEC PROTOCOL for MDS  - Chemo protocol: MT 2022-5  D-6: Flu 30mg/m2, Cy 50mg/kg  D-5 to D-2: Flu  D-1: TBI  D0: stem cell infusion  - Peripheral blood stem cell graft from 8/8 URD donor, ABO matched, Cell dose: TBD  - Engraftment monitoring: Peripheral blood and bone marrow chimerisms on D28, D100, 6 months, 1 and 2 years  - Restaging plan: BMBx with FISH, cytogenetics and NGS on D28, D100, 6 months, 1 and 2 years     HEME/COAG  # Pancytopenia secondary to disease process  - GCSF starts day +5, continue until ANC > 1500 for 3 consecutive days  - Transfusion parameters: hemoglobin <7, platelets <10  - Relevant thrombosis or bleeding history: none     RISK OF GVHD  - Prophylaxis: PTCy 50mg/kg on D+3 and D+4; MMF (D+5 to 35); Sirolimus (starting D+5, target level 5-10)  - Acute GVHD Treatment: None to date  - Chronic GVHD Treatment: None to date     ID  Prophylaxis plan:   - Bacterial: Levaquin 250mg from D-1 till engraftment    - Fungal: Micafungin 300mg 3x/week until D+45, followed by mold active azole. Pulm nodules present on workup CT.   - PJP: Bactrim to start at D+28. Toxoplasma negative.   - Viral: Acyclovir 800mg BID. No need for letermovir as donor and recipient are CMV negative.      Monitoring:   - CMV weekly  - EBV every 2 weeks from D30 to D180     GI/NUTRITION  # GERD: Protonix  - Anti-emetics: Zofran, dex scheduled during chemotherapy. Compazine, lorazepam available PRN.   - Ulcer prophy: Protonix  - VOD prophy: Ursodiol 300mg TID  - Dietician  support to prevent malnutrition  - Miralax and senna PRN constipation. Add Miralax scheduled at bedtime.      CARDIOVASCULAR  # HTN  - BP has been high (160-170s/ 70s) in recent clinic appointments. Recently increased lisinopril to 15mg daily. Monitor and increase PRN.      # CAD: Coronary artery calcifications seen on CT, stress test in 2023 negative     - Risk of cardiomyopathy: Baseline EF 55-60%.   - Risk of arrhythmia: Baseline EKG showed 421      RESPIRATORY  - Baseline PFTs: Normal   - Risk of respiratory complications: Frequent ambulation and incentive spirometer     RENAL/ELECTROLYTES/  - Risk of renal injury: IV hydration   - Electrolyte management: replace per sliding scale  - BPH: Continue home flomax.  - Lasix x2 yesterday for low UOP during cytoxan. Give 40mg IV x1 this AM for weight gain and mild edema. Monitor.   - Na checks BID     DIABETES/ENDOCRINE  - Risk of steroid-induced hyperglyemia: Monitor BG, sliding scale if needed     MUSCULOSKELETAL/FRAILTY  - Baseline Frailty Score: Not frail (0)  - Daily PT/OT as needed while inpatient  - Gout: continue allopurinol      SYMPTOM MANAGEMENT  - Pain management: Outpatient has been taking celecoxib for MSK pain, once a day. Inpatient will try Tramadol - available PRN. Add robaxin PRN.      SOCIAL DETERMINANTS  - Caregiver: wife, Vani. Lives within the required distance from hospital but may elect to stay in [place].   - Financial/insurance concerns: None    Medically Ready for Discharge: Anticipated in 5+ Days      Clinically Significant Risk Factors         # Hyponatremia: Lowest Na = 129 mmol/L in last 2 days, will monitor as appropriate  # Hypocalcemia: Lowest Ca = 8.6 mg/dL in last 2 days, will monitor and replace as appropriate       # Thrombocytopenia: Lowest platelets = 16 in last 2 days, will monitor for bleeding   # Hypertension: Noted on problem list           # Overweight: Estimated body mass index is 27.65 kg/m  as calculated from the  "following:    Height as of this encounter: 1.84 m (6' 0.44\").    Weight as of this encounter: 93.6 kg (206 lb 6.4 oz)., PRESENT ON ADMISSION                I spent 35 minutes in the care of this patient today, which included time necessary for preparation for the visit, obtaining history, ordering medications/tests/procedures as medically indicated, review of pertinent medical literature, counseling of the patient, communication of recommendations to the care team, and documentation time.    Aziza Mendieta DNP, APRN, NP-C  [facility]                   "

## 2024-08-18 NOTE — PLAN OF CARE
Goal Outcome Evaluation:    Patient finished his IV flush at 1230. Given 40 mg lasix for elevated weight this morning. He had a good response to lasix, 3 liters out in a couple of hours. He will get a weight check this evening along with a NAK check. Robaxin given for neck discomfort and this did help alleviate it.

## 2024-08-18 NOTE — PLAN OF CARE
"Hours of care: 9594-3494    Neuro: A&Ox4.   Vitals: Afebrile, VSS.   Respiratory: RA, lung sounds clear, denies SOB  GI/: Voiding spontaneously, pt on cytoxan flush. Needed lasix for shift total 1x, missing 2 hr void 1x, and for increased weight 1x. No BM this shift.  Diet/appetite: Tolerating high calorie/ high protein diet . Denies nausea.   Activity: Up independently     Pain: Denies   Skin: No new deficits noted.  Lines: CVC, flush has NS + mesna running @ 225 mL/hr. Flush switched to NS for low Na. Cytoxan flush will finish at noon today.  Drains: none  Replacements: K and 1 unit of platelets given    Plan: Continue with current POC. Report changes to primary team.          Problem: Adult Inpatient Plan of Care  Goal: Plan of Care Review  Description: The Plan of Care Review/Shift note should be completed every shift.  The Outcome Evaluation is a brief statement about your assessment that the patient is improving, declining, or no change.  This information will be displayed automatically on your shift  note.  Outcome: Progressing  Flowsheets (Taken 8/17/2024 1912)  Plan of Care Reviewed With: patient  Overall Patient Progress: no change  Goal: Patient-Specific Goal (Individualized)  Description: You can add care plan individualizations to a care plan. Examples of Individualization might be:  \"Parent requests to be called daily at 9am for status\", \"I have a hard time hearing out of my right ear\", or \"Do not touch me to wake me up as it startles  me\".  Outcome: Progressing  Goal: Absence of Hospital-Acquired Illness or Injury  Outcome: Progressing  Intervention: Identify and Manage Fall Risk  Recent Flowsheet Documentation  Taken 8/17/2024 1600 by Ambreen Sales, RN  Safety Promotion/Fall Prevention: safety round/check completed  Intervention: Prevent Skin Injury  Recent Flowsheet Documentation  Taken 8/17/2024 1600 by Ambreen Sales, RN  Body Position: position changed independently  Goal: Optimal " Comfort and Wellbeing  Outcome: Progressing  Goal: Readiness for Transition of Care  Outcome: Progressing     Problem: Risk for Delirium  Goal: Optimal Coping  Outcome: Progressing  Goal: Improved Behavioral Control  Outcome: Progressing  Intervention: Minimize Safety Risk  Recent Flowsheet Documentation  Taken 8/17/2024 1600 by Ambreen Sales RN  Enhanced Safety Measures: room near unit station  Goal: Improved Attention and Thought Clarity  Outcome: Progressing  Goal: Improved Sleep  Outcome: Progressing     Problem: Stem Cell/Bone Marrow Transplant  Goal: Optimal Coping with Transplant  Outcome: Progressing  Goal: Symptom-Free Urinary Elimination  Outcome: Progressing  Goal: Diarrhea Symptom Control  Outcome: Progressing  Goal: Improved Activity Tolerance  Outcome: Progressing  Intervention: Promote Improved Energy  Recent Flowsheet Documentation  Taken 8/17/2024 1600 by Ambreen Sales RN  Activity Management: activity adjusted per tolerance  Goal: Blood Counts Within Acceptable Range  Outcome: Progressing  Intervention: Monitor and Manage Hematologic Symptoms  Recent Flowsheet Documentation  Taken 8/17/2024 1600 by Ambreen Sales RN  Medication Review/Management: medications reviewed  Goal: Absence of Hypersensitivity Reaction  Outcome: Progressing  Goal: Absence of Infection  Outcome: Progressing  Goal: Improved Oral Mucous Membrane Health and Integrity  Outcome: Progressing  Goal: Nausea and Vomiting Symptom Relief  Outcome: Progressing  Goal: Optimal Nutrition Intake  Outcome: Progressing   Goal Outcome Evaluation:      Plan of Care Reviewed With: patient    Overall Patient Progress: no changeOverall Patient Progress: no change

## 2024-08-18 NOTE — PROGRESS NOTES
Stop time on MAR & chart I & O  Chemo drug Fludarabine  Tolerated well  Intervention monitored infusion  Response   Plan give next dose tomorrow     Skin normal color for race, warm, dry and intact. No evidence of rash.

## 2024-08-18 NOTE — PROGRESS NOTES
Cross cover:  Called for increasing weight. Getting significant IV fluids. Was given IV lasix 20 mg at 4 pm due to poor output. Responded well > 2L out, but still rising weight (did have big dinner).    - repeat furosemide dose 20 mg now IV    Kristen Brooks MD

## 2024-08-19 ENCOUNTER — APPOINTMENT (OUTPATIENT)
Dept: OCCUPATIONAL THERAPY | Facility: CLINIC | Age: 69
DRG: 014 | End: 2024-08-19
Payer: COMMERCIAL

## 2024-08-19 LAB
ADDITIONAL COMMENTS: NORMAL
ANION GAP SERPL CALCULATED.3IONS-SCNC: 8 MMOL/L (ref 7–15)
BASOPHILS # BLD AUTO: 0 10E3/UL (ref 0–0.2)
BASOPHILS NFR BLD AUTO: 0 %
BUN SERPL-MCNC: 20 MG/DL (ref 8–23)
CALCIUM SERPL-MCNC: 9 MG/DL (ref 8.8–10.4)
CHLORIDE SERPL-SCNC: 102 MMOL/L (ref 98–107)
CREAT SERPL-MCNC: 0.76 MG/DL (ref 0.67–1.17)
EGFRCR SERPLBLD CKD-EPI 2021: >90 ML/MIN/1.73M2
EOSINOPHIL # BLD AUTO: 0 10E3/UL (ref 0–0.7)
EOSINOPHIL NFR BLD AUTO: 0 %
ERYTHROCYTE [DISTWIDTH] IN BLOOD BY AUTOMATED COUNT: 17.1 % (ref 10–15)
GLUCOSE SERPL-MCNC: 100 MG/DL (ref 70–99)
HCO3 SERPL-SCNC: 27 MMOL/L (ref 22–29)
HCT VFR BLD AUTO: 29.6 % (ref 40–53)
HGB BLD-MCNC: 10.1 G/DL (ref 13.3–17.7)
HOLD SPECIMEN: NORMAL
IMM GRANULOCYTES # BLD: 0 10E3/UL
IMM GRANULOCYTES NFR BLD: 1 %
INTERPRETATION: NORMAL
ISCN: NORMAL
LYMPHOCYTES # BLD AUTO: 0.1 10E3/UL (ref 0.8–5.3)
LYMPHOCYTES NFR BLD AUTO: 7 %
MAGNESIUM SERPL-MCNC: 2.1 MG/DL (ref 1.7–2.3)
MCH RBC QN AUTO: 28.1 PG (ref 26.5–33)
MCHC RBC AUTO-ENTMCNC: 34.1 G/DL (ref 31.5–36.5)
MCV RBC AUTO: 83 FL (ref 78–100)
METHODS: NORMAL
MONOCYTES # BLD AUTO: 0.1 10E3/UL (ref 0–1.3)
MONOCYTES NFR BLD AUTO: 6 %
NEUTROPHILS # BLD AUTO: 1.1 10E3/UL (ref 1.6–8.3)
NEUTROPHILS NFR BLD AUTO: 86 %
NRBC # BLD AUTO: 0 10E3/UL
NRBC BLD AUTO-RTO: 0 /100
PHOSPHATE SERPL-MCNC: 3.3 MG/DL (ref 2.5–4.5)
PLATELET # BLD AUTO: 46 10E3/UL (ref 150–450)
POTASSIUM SERPL-SCNC: 3.7 MMOL/L (ref 3.4–5.3)
POTASSIUM SERPL-SCNC: 3.7 MMOL/L (ref 3.4–5.3)
RBC # BLD AUTO: 3.59 10E6/UL (ref 4.4–5.9)
SODIUM SERPL-SCNC: 137 MMOL/L (ref 135–145)
SODIUM SERPL-SCNC: 137 MMOL/L (ref 135–145)
WBC # BLD AUTO: 1.3 10E3/UL (ref 4–11)

## 2024-08-19 PROCEDURE — 97530 THERAPEUTIC ACTIVITIES: CPT | Mod: GO

## 2024-08-19 PROCEDURE — 250N000011 HC RX IP 250 OP 636: Mod: JZ | Performed by: INTERNAL MEDICINE

## 2024-08-19 PROCEDURE — 84295 ASSAY OF SERUM SODIUM: CPT

## 2024-08-19 PROCEDURE — 258N000003 HC RX IP 258 OP 636

## 2024-08-19 PROCEDURE — 206N000001 HC R&B BMT UMMC

## 2024-08-19 PROCEDURE — 250N000013 HC RX MED GY IP 250 OP 250 PS 637

## 2024-08-19 PROCEDURE — 258N000003 HC RX IP 258 OP 636: Performed by: INTERNAL MEDICINE

## 2024-08-19 PROCEDURE — 250N000011 HC RX IP 250 OP 636

## 2024-08-19 PROCEDURE — 85025 COMPLETE CBC W/AUTO DIFF WBC: CPT

## 2024-08-19 PROCEDURE — 250N000013 HC RX MED GY IP 250 OP 250 PS 637: Performed by: INTERNAL MEDICINE

## 2024-08-19 PROCEDURE — 83735 ASSAY OF MAGNESIUM: CPT | Performed by: INTERNAL MEDICINE

## 2024-08-19 PROCEDURE — 250N000013 HC RX MED GY IP 250 OP 250 PS 637: Performed by: NURSE PRACTITIONER

## 2024-08-19 PROCEDURE — 97165 OT EVAL LOW COMPLEX 30 MIN: CPT | Mod: GO

## 2024-08-19 PROCEDURE — 80048 BASIC METABOLIC PNL TOTAL CA: CPT

## 2024-08-19 PROCEDURE — 84100 ASSAY OF PHOSPHORUS: CPT | Performed by: INTERNAL MEDICINE

## 2024-08-19 PROCEDURE — 250N000012 HC RX MED GY IP 250 OP 636 PS 637

## 2024-08-19 RX ORDER — POTASSIUM CHLORIDE 750 MG/1
20 TABLET, EXTENDED RELEASE ORAL ONCE
Status: COMPLETED | OUTPATIENT
Start: 2024-08-19 | End: 2024-08-19

## 2024-08-19 RX ADMIN — MICAFUNGIN SODIUM 150 MG: 50 INJECTION, POWDER, LYOPHILIZED, FOR SOLUTION INTRAVENOUS at 10:05

## 2024-08-19 RX ADMIN — METHOCARBAMOL 500 MG: 500 TABLET ORAL at 09:04

## 2024-08-19 RX ADMIN — METHOCARBAMOL 500 MG: 500 TABLET ORAL at 15:00

## 2024-08-19 RX ADMIN — URSODIOL 300 MG: 300 CAPSULE ORAL at 20:13

## 2024-08-19 RX ADMIN — LEVOFLOXACIN 250 MG: 250 TABLET, FILM COATED ORAL at 10:05

## 2024-08-19 RX ADMIN — ONDANSETRON HYDROCHLORIDE 8 MG: 8 TABLET, FILM COATED ORAL at 17:47

## 2024-08-19 RX ADMIN — ONDANSETRON HYDROCHLORIDE 8 MG: 8 TABLET, FILM COATED ORAL at 09:04

## 2024-08-19 RX ADMIN — POTASSIUM CHLORIDE 20 MEQ: 750 TABLET, EXTENDED RELEASE ORAL at 09:04

## 2024-08-19 RX ADMIN — ALLOPURINOL 300 MG: 300 TABLET ORAL at 09:04

## 2024-08-19 RX ADMIN — FLUDARABINE PHOSPHATE 65 MG: 25 INJECTION, SOLUTION INTRAVENOUS at 09:09

## 2024-08-19 RX ADMIN — METHOCARBAMOL 500 MG: 500 TABLET ORAL at 20:13

## 2024-08-19 RX ADMIN — Medication 5 ML: at 03:21

## 2024-08-19 RX ADMIN — PANTOPRAZOLE SODIUM 40 MG: 40 TABLET, DELAYED RELEASE ORAL at 09:04

## 2024-08-19 RX ADMIN — DEXAMETHASONE 10 MG: 2 TABLET ORAL at 09:03

## 2024-08-19 RX ADMIN — TAMSULOSIN HYDROCHLORIDE 0.4 MG: 0.4 CAPSULE ORAL at 17:47

## 2024-08-19 RX ADMIN — POLYETHYLENE GLYCOL 3350 17 G: 17 POWDER, FOR SOLUTION ORAL at 23:30

## 2024-08-19 RX ADMIN — URSODIOL 300 MG: 300 CAPSULE ORAL at 09:04

## 2024-08-19 RX ADMIN — LISINOPRIL 15 MG: 10 TABLET ORAL at 11:24

## 2024-08-19 RX ADMIN — URSODIOL 300 MG: 300 CAPSULE ORAL at 15:00

## 2024-08-19 RX ADMIN — ACYCLOVIR 800 MG: 800 TABLET ORAL at 20:13

## 2024-08-19 RX ADMIN — ACYCLOVIR 800 MG: 800 TABLET ORAL at 09:04

## 2024-08-19 RX ADMIN — ONDANSETRON HYDROCHLORIDE 8 MG: 8 TABLET, FILM COATED ORAL at 23:30

## 2024-08-19 ASSESSMENT — ACTIVITIES OF DAILY LIVING (ADL)
ADLS_ACUITY_SCORE: 20
PREVIOUS_RESPONSIBILITIES: HOUSEKEEPING;SHOPPING;YARDWORK;MEDICATION MANAGEMENT;FINANCES;DRIVING
ADLS_ACUITY_SCORE: 20

## 2024-08-19 NOTE — PLAN OF CARE
Goal Outcome Evaluation:      Plan of Care Reviewed With: patient, spouse    Overall Patient Progress: no changeOverall Patient Progress: no change     Day -5. Post Cytoxan flush completed on day shift. Large urine output this evening. Na recheck = 136. K+ recheck = 4.0. Lasix given on the day shift-weight is down 2.27kg from this morning. Close to baseline weight. Up independently. Minor aching pain on back of neck controlled with Robaxin. Declined this evenings second scheduled dose. Denies nausea. Good appetite. Will receive Fludarabine tomorrow. Continue Plan of Care.

## 2024-08-19 NOTE — PLAN OF CARE
"/87 (BP Location: Left arm, Cuff Size: Adult Regular)   Pulse 78   Temp 98.1  F (36.7  C) (Oral)   Resp 16   Ht 1.84 m (6' 0.44\")   Wt 90.3 kg (199 lb)   SpO2 99%   BMI 26.66 kg/m    Pt's AVSS, Al&OX4, maintaining sat's in the upper 90's, denied pain and nausea all night. Pt is up independently in room and voiding good amount. Right chest CVC intact and hep locked. Am lab stable with Hgb 10.1, Plt 46, WBC 1.3, Mag 2.1, Phos 3.3, , K+ 3.7 and Na+ 137.  Oral K+ replacement was order to be taken with AM meds. Patient had uneventful night. Keep monitoring pt as ordered and notify MD with any new changes.   Problem: Adult Inpatient Plan of Care  Goal: Plan of Care Review  Description: The Plan of Care Review/Shift note should be completed every shift.  The Outcome Evaluation is a brief statement about your assessment that the patient is improving, declining, or no change.  This information will be displayed automatically on your shift  note.  Outcome: Progressing  Goal: Patient-Specific Goal (Individualized)  Description: You can add care plan individualizations to a care plan. Examples of Individualization might be:  \"Parent requests to be called daily at 9am for status\", \"I have a hard time hearing out of my right ear\", or \"Do not touch me to wake me up as it startles  me\".  Outcome: Progressing  Goal: Absence of Hospital-Acquired Illness or Injury  Outcome: Progressing  Intervention: Identify and Manage Fall Risk  Recent Flowsheet Documentation  Taken 8/19/2024 0000 by Batsheva Whipple RN  Safety Promotion/Fall Prevention: clutter free environment maintained  Intervention: Prevent Skin Injury  Recent Flowsheet Documentation  Taken 8/19/2024 0000 by Batsheva Whipple RN  Body Position: position changed independently  Intervention: Prevent Infection  Recent Flowsheet Documentation  Taken 8/19/2024 0000 by Batsheva Whipple RN  Infection Prevention: rest/sleep promoted  Goal: Optimal Comfort and " Wellbeing  Outcome: Progressing  Goal: Readiness for Transition of Care  Outcome: Progressing     Problem: Risk for Delirium  Goal: Optimal Coping  Outcome: Progressing  Intervention: Optimize Psychosocial Adjustment to Delirium  Recent Flowsheet Documentation  Taken 8/19/2024 0000 by Batsheva Whipple RN  Supportive Measures: active listening utilized  Goal: Improved Behavioral Control  Outcome: Progressing  Goal: Improved Attention and Thought Clarity  Outcome: Progressing  Goal: Improved Sleep  Outcome: Progressing     Problem: Stem Cell/Bone Marrow Transplant  Goal: Optimal Coping with Transplant  Outcome: Progressing  Intervention: Optimize Patient/Family Adjustment to Transplant  Recent Flowsheet Documentation  Taken 8/19/2024 0000 by Batsheva Whipple RN  Supportive Measures: active listening utilized  Goal: Symptom-Free Urinary Elimination  Outcome: Progressing  Goal: Diarrhea Symptom Control  Outcome: Progressing  Goal: Improved Activity Tolerance  Outcome: Progressing  Intervention: Promote Improved Energy  Recent Flowsheet Documentation  Taken 8/19/2024 0000 by Batsheva Whipple RN  Activity Management: activity adjusted per tolerance  Environmental Support:   calm environment promoted   rest periods encouraged  Goal: Blood Counts Within Acceptable Range  Outcome: Progressing  Goal: Absence of Hypersensitivity Reaction  Outcome: Progressing  Goal: Absence of Infection  Outcome: Progressing  Intervention: Prevent and Manage Infection  Recent Flowsheet Documentation  Taken 8/19/2024 0000 by Batsheva Whipple RN  Infection Prevention: rest/sleep promoted  Goal: Improved Oral Mucous Membrane Health and Integrity  Outcome: Progressing  Goal: Nausea and Vomiting Symptom Relief  Outcome: Progressing  Goal: Optimal Nutrition Intake  Outcome: Progressing   Goal Outcome Evaluation:

## 2024-08-19 NOTE — PLAN OF CARE
VSS. Tmax: 98.2 F.  Circulatory: Electrolyte protocol, potassium given. Due for recheck in morning.  Respiratory: Sating >99 on RA.  GI/: Adequate urine output. BM not noted today.  Diet/appetite: Tolerating regular diet. Eating well.  Activity:  Independent, up to chair.  Pain: Denies pain today.  Skin: No new deficits noted.  LDA's: CVC DL, heparin locked.    Plan: Chemo due tomorrow. Continue with POC. Notify primary team with changes.

## 2024-08-19 NOTE — PROGRESS NOTES
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     Diagnosis MDS/AML  HCT Type Allogeneic     Prep Regimen Flu/Cy/TBI  Donor Match & Source 8/8 DP permissive PBSC    GVHD Prophylaxis PTCy/MMF/Siro  Primary BMT MD Dr. Murphy    Clinical Trials [id number]       INTERVAL  HISTORY   Leland is doing well, continues to feel pretty good. His neck/shoulder pain is improved with muscle relaxant. His stools have been loosening slightly. No fever, chills, SOB, chest pain, or nausea/vomiting. Walking in halls, eating well. Tolerating prep well.     Review of Systems: 10 point ROS negative except as noted above.    PHYSICAL EXAM     Weight In/Out     Wt Readings from Last 3 Encounters:   08/19/24 90.1 kg (198 lb 9.6 oz)   08/09/24 89.9 kg (198 lb 4.8 oz)   08/08/24 90.3 kg (199 lb 1.2 oz)      I/O last 3 completed shifts:  In: 4264.2 [P.O.:2220; I.V.:1382; IV Piggyback:180.2]  Out: 8340 [Urine:8340]     KPS:  80    BP (!) 142/83 (BP Location: Left arm)   Pulse 90   Temp 97.7  F (36.5  C) (Oral)   Resp 16   Ht 1.84 m (6' 0.44\")   Wt 90.1 kg (198 lb 9.6 oz)   SpO2 99%   BMI 26.61 kg/m       General: Pleasant, up in chair, NAD   HEENT: MAYA, sclera anicteric, OP clear, buccal mucosa moist, no ulcerations   Lungs: CTA bilaterally  Cardiovascular: RRR, no M/R/G   Abdominal/Rectal: +BS, soft, NT, ND  Lymphatics: No edema  Skin: WWP, no rashes or petechiae  Neuro: A&O, appropriately interactive, speech clear, no focal deficits  Additional Findings: Estrada site CDI, no drainage    Current aGVHD staging:  Will start after transplant OR Skin 0, UGI 0, LGI 0, Liver 0     LABS AND IMAGING: I have assessed all abnormal lab values for their clinical significance and any values considered clinically significant have been addressed in " the assessment and plan.      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     BMT/IEC PROTOCOL for MDS  - Chemo protocol: MT 2022-5  D-6: Flu 30mg/m2, Cy 50mg/kg  D-5 to D-2: Flu  D-1: TBI  D0: stem cell infusion  - Peripheral blood stem cell graft from 8/8 URD donor, ABO matched, Cell dose: TBD  - Engraftment monitoring: Peripheral blood and bone marrow chimerisms on D28, D100, 6 months, 1 and 2 years  - Restaging plan: BMBx with FISH, cytogenetics and NGS on D28, D100, 6 months, 1 and 2 years     HEME/COAG  # Pancytopenia secondary to disease process  - GCSF starts day +5, continue until ANC > 1500 for 3 consecutive days  - Transfusion parameters: hemoglobin <7, platelets <10  - Relevant thrombosis or bleeding history: none     RISK OF GVHD  - Prophylaxis: PTCy 50mg/kg on D+3 and D+4; MMF (D+5 to 35); Sirolimus (starting D+5, target level 5-10)  - Acute GVHD Treatment: None to date  - Chronic GVHD Treatment: None to date     ID  Prophylaxis plan:   - Bacterial: Levaquin 250mg from D-1 till engraftment    - Fungal: Micafungin 300mg 3x/week until D+45, followed by mold active azole. Pulm nodules present on workup CT.   - PJP: Bactrim to start at D+28. Toxoplasma negative.   - Viral: Acyclovir 800mg BID. No need for letermovir as donor and recipient are CMV negative.      Monitoring:   - CMV weekly  - EBV every 2  weeks from D30 to D180     GI/NUTRITION  # GERD: Protonix  - Anti-emetics: Zofran, dex scheduled during chemotherapy. Compazine, lorazepam available PRN.   - Ulcer prophy: Protonix  - VOD prophy: Ursodiol 300mg TID  - Dietician support to prevent malnutrition  - Miralax and senna PRN constipation. Add Miralax scheduled at bedtime.      CARDIOVASCULAR  # HTN  - BP has been high (160-170s/ 70s) in recent clinic appointments. Recently increased lisinopril to 15mg daily. Monitor and increase PRN.      # CAD: Coronary artery calcifications seen on CT, stress test in 2023 negative     - Risk of cardiomyopathy: Baseline EF 55-60%.   - Risk of arrhythmia: Baseline EKG showed 421      RESPIRATORY  - Baseline PFTs: Normal   - Risk of respiratory complications: Frequent ambulation and incentive spirometer     RENAL/ELECTROLYTES/  - Risk of renal injury: IV hydration   - Electrolyte management: replace per sliding scale  - BPH: Continue home flomax.  - Lasix x2 8/17 for low UOP during cytoxan. Give 40mg IV x1 8/18 for weight gain and mild edema. Monitor.   - Na checks BID with      DIABETES/ENDOCRINE  - Risk of steroid-induced hyperglyemia: Monitor BG, sliding scale if needed     MUSCULOSKELETAL/FRAILTY  - Baseline Frailty Score: Not frail (0)  - Daily PT/OT as needed while inpatient  - Gout: continue allopurinol      SYMPTOM MANAGEMENT  - Pain management: Outpatient has been taking celecoxib for MSK pain, once a day. Inpatient will try Tramadol - available PRN. Add robaxin PRN.      SOCIAL DETERMINANTS  - Caregiver: wife, Vani. Lives within the required distance from hospital but may elect to stay in [facility].   - Financial/insurance concerns: None    Medically Ready for Discharge: Anticipated in 5+ Days      Clinically Significant Risk Factors         # Hyponatremia: Lowest Na = 129 mmol/L in last 2 days, will monitor as appropriate        # Thrombocytopenia: Lowest platelets = 16 in last 2 days, will monitor for  "bleeding   # Hypertension: Noted on problem list           # Overweight: Estimated body mass index is 26.61 kg/m  as calculated from the following:    Height as of this encounter: 1.84 m (6' 0.44\").    Weight as of this encounter: 90.1 kg (198 lb 9.6 oz)., PRESENT ON ADMISSION           I spent 30 minutes in the care of this patient today, which included time necessary for preparation for the visit, obtaining history, ordering medications/tests/procedures as medically indicated, review of pertinent medical literature, counseling of the patient, communication of recommendations to the care team, and documentation time.    Kaitlyn Aguilar, CNP  [facility] or Pager [phone]                     "

## 2024-08-19 NOTE — PROGRESS NOTES
Chemo drug: Fludarabine.   Tolerated: Well; no immediate issues.   Intervention: Premeds administered as ordered. Positive blood return pre- and post- infusion.   Response: NA.   Plan: Continue to monitor. Fludarabine again tomorrow.   Lab: Routine labs as scheduled.

## 2024-08-20 LAB
ABO/RH(D): NORMAL
ANION GAP SERPL CALCULATED.3IONS-SCNC: 8 MMOL/L (ref 7–15)
ANTIBODY SCREEN: NEGATIVE
BASOPHILS # BLD AUTO: ABNORMAL 10*3/UL
BASOPHILS # BLD MANUAL: 0 10E3/UL (ref 0–0.2)
BASOPHILS NFR BLD AUTO: ABNORMAL %
BASOPHILS NFR BLD MANUAL: 0 %
BUN SERPL-MCNC: 19.6 MG/DL (ref 8–23)
CALCIUM SERPL-MCNC: 9.1 MG/DL (ref 8.8–10.4)
CHLORIDE SERPL-SCNC: 103 MMOL/L (ref 98–107)
CREAT SERPL-MCNC: 0.76 MG/DL (ref 0.67–1.17)
EGFRCR SERPLBLD CKD-EPI 2021: >90 ML/MIN/1.73M2
EOSINOPHIL # BLD AUTO: ABNORMAL 10*3/UL
EOSINOPHIL # BLD MANUAL: 0 10E3/UL (ref 0–0.7)
EOSINOPHIL NFR BLD AUTO: ABNORMAL %
EOSINOPHIL NFR BLD MANUAL: 0 %
ERYTHROCYTE [DISTWIDTH] IN BLOOD BY AUTOMATED COUNT: 17.3 % (ref 10–15)
GLUCOSE SERPL-MCNC: 96 MG/DL (ref 70–99)
HCO3 SERPL-SCNC: 27 MMOL/L (ref 22–29)
HCT VFR BLD AUTO: 30.6 % (ref 40–53)
HGB BLD-MCNC: 10.6 G/DL (ref 13.3–17.7)
IMM GRANULOCYTES # BLD: ABNORMAL 10*3/UL
IMM GRANULOCYTES NFR BLD: ABNORMAL %
LYMPHOCYTES # BLD AUTO: ABNORMAL 10*3/UL
LYMPHOCYTES # BLD MANUAL: 0 10E3/UL (ref 0.8–5.3)
LYMPHOCYTES NFR BLD AUTO: ABNORMAL %
LYMPHOCYTES NFR BLD MANUAL: 0 %
MAGNESIUM SERPL-MCNC: 2.2 MG/DL (ref 1.7–2.3)
MCH RBC QN AUTO: 28.5 PG (ref 26.5–33)
MCHC RBC AUTO-ENTMCNC: 34.6 G/DL (ref 31.5–36.5)
MCV RBC AUTO: 82 FL (ref 78–100)
MONOCYTES # BLD AUTO: ABNORMAL 10*3/UL
MONOCYTES # BLD MANUAL: 0 10E3/UL (ref 0–1.3)
MONOCYTES NFR BLD AUTO: ABNORMAL %
MONOCYTES NFR BLD MANUAL: 1 %
NEUTROPHILS # BLD AUTO: ABNORMAL 10*3/UL
NEUTROPHILS # BLD MANUAL: 1.2 10E3/UL (ref 1.6–8.3)
NEUTROPHILS NFR BLD AUTO: ABNORMAL %
NEUTROPHILS NFR BLD MANUAL: 99 %
NRBC # BLD AUTO: 0 10E3/UL
NRBC BLD AUTO-RTO: 0 /100
PHOSPHATE SERPL-MCNC: 3.4 MG/DL (ref 2.5–4.5)
PLAT MORPH BLD: ABNORMAL
PLATELET # BLD AUTO: 33 10E3/UL (ref 150–450)
POLYCHROMASIA BLD QL SMEAR: SLIGHT
POTASSIUM SERPL-SCNC: 4 MMOL/L (ref 3.4–5.3)
RBC # BLD AUTO: 3.72 10E6/UL (ref 4.4–5.9)
RBC MORPH BLD: ABNORMAL
SODIUM SERPL-SCNC: 138 MMOL/L (ref 135–145)
SPECIMEN EXPIRATION DATE: NORMAL
WBC # BLD AUTO: 1.2 10E3/UL (ref 4–11)

## 2024-08-20 PROCEDURE — 250N000013 HC RX MED GY IP 250 OP 250 PS 637: Performed by: NURSE PRACTITIONER

## 2024-08-20 PROCEDURE — 250N000013 HC RX MED GY IP 250 OP 250 PS 637

## 2024-08-20 PROCEDURE — 80048 BASIC METABOLIC PNL TOTAL CA: CPT

## 2024-08-20 PROCEDURE — 83735 ASSAY OF MAGNESIUM: CPT | Performed by: INTERNAL MEDICINE

## 2024-08-20 PROCEDURE — 258N000003 HC RX IP 258 OP 636

## 2024-08-20 PROCEDURE — 250N000013 HC RX MED GY IP 250 OP 250 PS 637: Performed by: INTERNAL MEDICINE

## 2024-08-20 PROCEDURE — 250N000012 HC RX MED GY IP 250 OP 636 PS 637

## 2024-08-20 PROCEDURE — 84100 ASSAY OF PHOSPHORUS: CPT | Performed by: INTERNAL MEDICINE

## 2024-08-20 PROCEDURE — 258N000003 HC RX IP 258 OP 636: Performed by: INTERNAL MEDICINE

## 2024-08-20 PROCEDURE — 86900 BLOOD TYPING SEROLOGIC ABO: CPT

## 2024-08-20 PROCEDURE — 85007 BL SMEAR W/DIFF WBC COUNT: CPT

## 2024-08-20 PROCEDURE — 206N000001 HC R&B BMT UMMC

## 2024-08-20 PROCEDURE — 250N000011 HC RX IP 250 OP 636

## 2024-08-20 PROCEDURE — 250N000011 HC RX IP 250 OP 636: Mod: JZ | Performed by: INTERNAL MEDICINE

## 2024-08-20 PROCEDURE — 85027 COMPLETE CBC AUTOMATED: CPT

## 2024-08-20 RX ADMIN — POLYETHYLENE GLYCOL 3350 17 G: 17 POWDER, FOR SOLUTION ORAL at 22:59

## 2024-08-20 RX ADMIN — LISINOPRIL 15 MG: 10 TABLET ORAL at 12:39

## 2024-08-20 RX ADMIN — ONDANSETRON HYDROCHLORIDE 8 MG: 8 TABLET, FILM COATED ORAL at 23:00

## 2024-08-20 RX ADMIN — ONDANSETRON HYDROCHLORIDE 8 MG: 8 TABLET, FILM COATED ORAL at 17:27

## 2024-08-20 RX ADMIN — URSODIOL 300 MG: 300 CAPSULE ORAL at 07:48

## 2024-08-20 RX ADMIN — FLUDARABINE PHOSPHATE 65 MG: 25 INJECTION, SOLUTION INTRAVENOUS at 09:05

## 2024-08-20 RX ADMIN — DEXAMETHASONE 10 MG: 2 TABLET ORAL at 07:48

## 2024-08-20 RX ADMIN — ALLOPURINOL 300 MG: 300 TABLET ORAL at 07:48

## 2024-08-20 RX ADMIN — PANTOPRAZOLE SODIUM 40 MG: 40 TABLET, DELAYED RELEASE ORAL at 07:48

## 2024-08-20 RX ADMIN — METHOCARBAMOL 500 MG: 500 TABLET ORAL at 07:48

## 2024-08-20 RX ADMIN — METHOCARBAMOL 500 MG: 500 TABLET ORAL at 14:44

## 2024-08-20 RX ADMIN — URSODIOL 300 MG: 300 CAPSULE ORAL at 20:44

## 2024-08-20 RX ADMIN — METHOCARBAMOL 500 MG: 500 TABLET ORAL at 20:44

## 2024-08-20 RX ADMIN — ACYCLOVIR 800 MG: 800 TABLET ORAL at 07:48

## 2024-08-20 RX ADMIN — MICAFUNGIN SODIUM 150 MG: 50 INJECTION, POWDER, LYOPHILIZED, FOR SOLUTION INTRAVENOUS at 10:36

## 2024-08-20 RX ADMIN — ONDANSETRON HYDROCHLORIDE 8 MG: 8 TABLET, FILM COATED ORAL at 07:48

## 2024-08-20 RX ADMIN — ACYCLOVIR 800 MG: 800 TABLET ORAL at 20:44

## 2024-08-20 RX ADMIN — TAMSULOSIN HYDROCHLORIDE 0.4 MG: 0.4 CAPSULE ORAL at 17:27

## 2024-08-20 RX ADMIN — LEVOFLOXACIN 250 MG: 250 TABLET, FILM COATED ORAL at 10:36

## 2024-08-20 RX ADMIN — URSODIOL 300 MG: 300 CAPSULE ORAL at 14:44

## 2024-08-20 ASSESSMENT — ACTIVITIES OF DAILY LIVING (ADL)
ADLS_ACUITY_SCORE: 20
ADLS_ACUITY_SCORE: 21
ADLS_ACUITY_SCORE: 20
ADLS_ACUITY_SCORE: 21

## 2024-08-20 NOTE — PROGRESS NOTES
VS baseline, no pain was expressed, tolerated well chemo therapy well, had good oral intake and voided well

## 2024-08-20 NOTE — PROGRESS NOTES
"   08/19/24 1400   Appointment Info   Signing Clinician's Name / Credentials (OT) Kiana Garza, OTR/L   Rehab Comments (OT) PT holding until D8 (8/31)   Living Environment   People in Home spouse   Current Living Arrangements house   Home Accessibility stairs within home   Number of Stairs, Within Home, Primary greater than 10 stairs   Transportation Anticipated family or friend will provide   Living Environment Comments Pt lives in multi level home w/ spouse in [place]. Flight of stairs to lower level where exercise area located, flight to upper level where bedroom/bathroom located. WIS in bathroom.   Self-Care   Usual Activity Tolerance good   Current Activity Tolerance moderate   Regular Exercise Yes   Activity/Exercise Type strength training;biking   Equipment Currently Used at Home none   Fall history within last six months no   Activity/Exercise/Self-Care Comment Pt reports IND w/ all ADL at baseline, active and reports exercising several times/wk w/ stationary bike and light hand weights.   Instrumental Activities of Daily Living (IADL)   Previous Responsibilities housekeeping;shopping;yardwork;medication management;finances;driving   IADL Comments Pt IND w/ all IADL at baseline, enjoys biking, gardening, golfing. Has been limited by low back pain over course of last year, but reports this has improved recently w/ change in activity.   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, RUNX1, SRSF2) admitted on 8/16/2024 for allogeneic transplant, currently day -4 of his HCT\"   Existing Precautions/Restrictions fall;immunosuppressed   Left Upper Extremity (Weight-bearing Status) " full weight-bearing (FWB)   Right Upper Extremity (Weight-bearing Status) full weight-bearing (FWB)   Left Lower Extremity (Weight-bearing Status) full weight-bearing (FWB)   Right Lower Extremity (Weight-bearing Status) full weight-bearing (FWB)   General Observations and Info Activity: ambulate BMT adult   Cognitive Status Examination   Orientation Status orientation to person, place and time   Cognitive Status Comments Cognition appears WNL based on interview and observation   Visual Perception   Visual Impairment/Limitations corrective lenses full-time   Sensory   Sensory Quick Adds sensation intact   Posture   Posture not impaired   Range of Motion Comprehensive   General Range of Motion no range of motion deficits identified   Strength Comprehensive (MMT)   General Manual Muscle Testing (MMT) Assessment no strength deficits identified   Coordination   Upper Extremity Coordination No deficits were identified   Bed Mobility   Comment (Bed Mobility) Per clinical judgement, IND   Transfers   Transfer Comments IND   Balance   Balance Comments IND no AE   Activities of Daily Living   BADL Assessment/Intervention bathing;lower body dressing;grooming;toileting   Bathing Assessment/Intervention   Comment, (Bathing) Per clinical judgement and pt report, IND   Lower Body Dressing Assessment/Training   Comment, (Lower Body Dressing) Per clinical judgement and pt report, IND   Grooming Assessment/Training   Comment, (Grooming) Per clinical judgement and pt report, IND   Toileting   Comment, (Toileting) Per clinical judgement and pt report, IND   Clinical Impression   Criteria for Skilled Therapeutic Interventions Met (OT) Yes, treatment indicated   OT Diagnosis Increased risk for deconditioning due to prolonged hospital stay   Influenced by the following impairments activity tolerance, fatigue, immunosuppression   OT Problem List-Impairments impacting ADL activity tolerance impaired;strength   Assessment of Occupational  Performance 1-3 Performance Deficits   Identified Performance Deficits home mgmt, higher level IADL   Planned Therapy Interventions (OT) strengthening;home program guidelines;progressive activity/exercise;risk factor education   Clinical Decision Making Complexity (OT) problem focused assessment/low complexity   Risk & Benefits of therapy have been explained evaluation/treatment results reviewed;care plan/treatment goals reviewed;risks/benefits reviewed;current/potential barriers reviewed;participants voiced agreement with care plan;participants included;patient;spouse/significant other   OT Total Evaluation Time   OT Eval, Low Complexity Minutes (69340) 8   OT Goals   Therapy Frequency (OT) 2 times/week   OT Predicted Duration/Target Date for Goal Attainment 09/16/24   OT Goals Aerobic Activity;OT Goal 1;OT Goal 2   OT: Perform aerobic activity with stable cardiovascular response 15 minutes;ambulation;NuStep   OT: Goal 1 Patient will independently state the impact of Hgb, Plt, and WBC levels on safe activity engagement.   OT: Goal 2 Patient will independently demonstrate full body strengthening home exercise program.   Therapeutic Activities   Therapeutic Activity Minutes (37381) 26   Symptoms noted during/after treatment none   Treatment Detail/Skilled Intervention Pt greeted sitting up in recliner upon arrival, agreeable to therapy session. OT eval completed and treatment initiated. Th edu on activity guidelines for allo SCT including the impact of Hgb, Plt, and WBC on safe activity engagement and fatigue levels. Discussed CRF and basic fatigue mgmt strategies, also discussed adapting activities to meet fatigue level, awareness of times of day energy levels may fluctuate and balancing activity and rest. Edu in therapy role while IP to help pt maintain strength, endurance, safety, and IND w/ all ADL and mobility. Pt reporting IND'ly ambulatitng hallways w/ spouse, interested in upright bike for room. Pt placed on  waitlist for equipment. Th edu in  BUE and BLE HEP, reviewing handouts. Pt expressing interest in hand weights to cont HEP already engaged in at baseline, Th providing 5# weight per pt request and edu in closely monitoring platelets prior to engagement in any resistive ex, pt and spouse verbalizing understanding. Ambulated to rehab gym and reviewed use of NuStep, oriented pt to adjustment of equipment for proper fit and use, pt verbalizing understanding. Ambulated remainder of full lap back to room, left up IND in room.   OT Discharge Planning   OT Plan hallway mob/NuStep; review BUE/BLE HEP (has 5# weights in room); pt interested in upright bike for room - placed on waitlist   OT Discharge Recommendation (DC Rec) home with assist   OT Rationale for DC Rec Pt currently IND and at ADL baseline, ambulating within room and hallways IND w/o device. Pending response to tx and medical status, anticipate pt will be safe to return home with assist from spouse for I/ADL completion as needed.   OT Brief overview of current status IND   Total Session Time   Timed Code Treatment Minutes 26   Total Session Time (sum of timed and untimed services) 34

## 2024-08-20 NOTE — PLAN OF CARE
Shift Events: Wife and daughter at bedside in early evening. Pt slept most of the night with no complaints.     Neuro: Alert & Oriented x4. Calm, pleasant, and cooperative.   Cardiac/Tele: Vital signs stable. -140's systolic, 80's diastolic. Sinus rhythm. Afebrile.  Respiratory: Sats >95% on room air.  GI/: Continent. Voids spontaneously with adequate urine output. No BM this shift.  Diet/Appetite: Regular diet, tolerating well. No reports of nausea.  Skin: No new deficits noted.   LDAs: Right double lumen PICC - both heparin locked. CVC - HD Line.   Activity: Independent in room, hallway, bathroom.  Pain: Denies any pain this shift.     P: Continue to monitor and notify team with changes.    Shift: 1900 - 0730.    Plan of Care Reviewed With: patient    Overall Patient Progress: no change

## 2024-08-20 NOTE — PROGRESS NOTES
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     Diagnosis MDS/AML  HCT Type Allogeneic     Prep Regimen Flu/Cy/TBI  Donor Match & Source 8/8 DP permissive PBSC    GVHD Prophylaxis PTCy/MMF/Siro  Primary BMT MD Dr. Murphy    Clinical Trials [id number]       INTERVAL  HISTORY   Leland continues to do well. He has no new complaints this morning. He was able to walk 4 laps this morning- he noted that his back starts to tighten up a bit after the third lap. No pain once he sat down. No n/v/d/c. No fevers. Tolerating chemo well.      Review of Systems: 10 point ROS negative except as noted above.    PHYSICAL EXAM     Weight In/Out     Wt Readings from Last 3 Encounters:   08/20/24 88.7 kg (195 lb 9.6 oz)   08/09/24 89.9 kg (198 lb 4.8 oz)   08/08/24 90.3 kg (199 lb 1.2 oz)      I/O last 3 completed shifts:  In: 2952 [P.O.:2740; I.V.:100; IV Piggyback:112]  Out: 5200 [Urine:5200]     KPS:  80    BP (!) 142/91 (BP Location: Left arm)   Pulse 74   Temp 97.9  F (36.6  C) (Oral)   Resp 16   Ht 1.84 m (6' 0.44\")   Wt 88.7 kg (195 lb 9.6 oz)   SpO2 98%   BMI 26.21 kg/m       General: Pleasant, up in chair, NAD   HEENT: MAYA, sclera anicteric, OP clear, buccal mucosa moist, no ulcerations   Lungs: CTA bilaterally  Cardiovascular: RRR, no M/R/G   Abdominal/Rectal: +BS, soft, NT, ND  Lymphatics: No edema  Skin: WWP, no rashes or petechiae  Neuro: A&O, appropriately interactive, speech clear, no focal deficits  Additional Findings: Estrada site CDI, no drainage    Current aGVHD staging:  Will start after transplant OR Skin 0, UGI 0, LGI 0, Liver 0     LABS AND IMAGING: I have assessed all abnormal lab values for their clinical significance and any values considered clinically significant have been addressed in the " assessment and plan.      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     BMT/IEC PROTOCOL for MDS  - Chemo protocol: MT 2022-5  D-6: Flu 30mg/m2, Cy 50mg/kg  D-5 to D-2: Flu  D-1: TBI  D0: stem cell infusion  - Peripheral blood stem cell graft from 8/8 URD donor, ABO matched, Cell dose: TBD  - Engraftment monitoring: Peripheral blood and bone marrow chimerisms on D28, D100, 6 months, 1 and 2 years  - Restaging plan: BMBx with FISH, cytogenetics and NGS on D28, D100, 6 months, 1 and 2 years     HEME/COAG  # Pancytopenia secondary to disease process  - GCSF starts day +5, continue until ANC > 1500 for 3 consecutive days  - Transfusion parameters: hemoglobin <7, platelets <10  - Relevant thrombosis or bleeding history: none     RISK OF GVHD  - Prophylaxis: PTCy 50mg/kg on D+3 and D+4; MMF (D+5 to 35); Sirolimus (starting D+5, target level 5-10)  - Acute GVHD Treatment: None to date  - Chronic GVHD Treatment: None to date     ID  Prophylaxis plan:   - Bacterial: Levaquin 250mg while neutropenic    - Fungal: Micafungin 300mg 3x/week until D+45, followed by mold active azole. Pulm nodules present on workup CT.   - PJP: Bactrim to start at D+28. Toxoplasma negative.   - Viral: Acyclovir 800mg BID. No need for letermovir as donor and recipient are CMV negative.      Monitoring:   - CMV weekly  - EBV every 2 weeks from D30 to D180     GI/NUTRITION  # GERD: Protonix  -  Anti-emetics: Zofran, dex scheduled during chemotherapy. Compazine, lorazepam available PRN.   - Ulcer prophy: Protonix  - VOD prophy: Ursodiol 300mg TID  - Dietician support to prevent malnutrition  - Miralax and senna PRN constipation. For now, Miralax scheduled at bedtime.      CARDIOVASCULAR  # HTN  - BP has been high (160-170s/ 70s) in recent clinic appointments. Recently increased lisinopril to 15mg daily. Monitor and increase PRN.      # CAD: Coronary artery calcifications seen on CT, stress test in 2023 negative     - Risk of cardiomyopathy: Baseline EF 55-60%.   - Risk of arrhythmia: Baseline EKG showed 421      RESPIRATORY  - Baseline PFTs: Normal   - Risk of respiratory complications: Frequent ambulation and incentive spirometer     RENAL/ELECTROLYTES/  - Risk of renal injury: IV hydration   - Electrolyte management: replace per sliding scale  - BPH: Continue home flomax.  - Lasix x2 8/17 for low UOP during cytoxan. Given 40mg IV x1 8/18 for weight gain and mild edema.  - Na checks BID with cytoxan flush.      DIABETES/ENDOCRINE  - Risk of steroid-induced hyperglyemia: Monitor BG, sliding scale if needed     MUSCULOSKELETAL/FRAILTY  - Baseline Frailty Score: Not frail (0)  - Daily PT/OT as needed while inpatient  - Gout: continue allopurinol      SYMPTOM MANAGEMENT  - Pain management: Outpatient has been taking celecoxib for MSK pain, once a day. Inpatient will try Tramadol - available PRN. Add robaxin PRN.      SOCIAL DETERMINANTS  - Caregiver: wife, Vani. Lives within the required distance from hospital but may elect to stay in [facility].   - Financial/insurance concerns: None    Medically Ready for Discharge: Anticipated in 5+ Days      Clinically Significant Risk Factors                # Thrombocytopenia: Lowest platelets = 33 in last 2 days, will monitor for bleeding   # Hypertension: Noted on problem list           # Overweight: Estimated body mass index is 26.21 kg/m  as calculated from the  "following:    Height as of this encounter: 1.84 m (6' 0.44\").    Weight as of this encounter: 88.7 kg (195 lb 9.6 oz).            I spent 40 minutes in the care of this patient today, which included time necessary for preparation for the visit, obtaining history, ordering medications/tests/procedures as medically indicated, review of pertinent medical literature, counseling of the patient, communication of recommendations to the care team, and documentation time.    Kaitlyn Aguilar, CNP  [facility] or Pager [phone]                     "

## 2024-08-21 ENCOUNTER — APPOINTMENT (OUTPATIENT)
Dept: OCCUPATIONAL THERAPY | Facility: CLINIC | Age: 69
DRG: 014 | End: 2024-08-21
Attending: INTERNAL MEDICINE
Payer: COMMERCIAL

## 2024-08-21 LAB
ANION GAP SERPL CALCULATED.3IONS-SCNC: 8 MMOL/L (ref 7–15)
BASOPHILS # BLD AUTO: ABNORMAL 10*3/UL
BASOPHILS # BLD MANUAL: 0 10E3/UL (ref 0–0.2)
BASOPHILS NFR BLD AUTO: ABNORMAL %
BASOPHILS NFR BLD MANUAL: 0 %
BUN SERPL-MCNC: 22.4 MG/DL (ref 8–23)
CALCIUM SERPL-MCNC: 9.1 MG/DL (ref 8.8–10.4)
CHLORIDE SERPL-SCNC: 101 MMOL/L (ref 98–107)
CREAT SERPL-MCNC: 0.77 MG/DL (ref 0.67–1.17)
EGFRCR SERPLBLD CKD-EPI 2021: >90 ML/MIN/1.73M2
EOSINOPHIL # BLD AUTO: ABNORMAL 10*3/UL
EOSINOPHIL # BLD MANUAL: 0 10E3/UL (ref 0–0.7)
EOSINOPHIL NFR BLD AUTO: ABNORMAL %
EOSINOPHIL NFR BLD MANUAL: 2 %
ERYTHROCYTE [DISTWIDTH] IN BLOOD BY AUTOMATED COUNT: 17.1 % (ref 10–15)
GLUCOSE SERPL-MCNC: 106 MG/DL (ref 70–99)
HCO3 SERPL-SCNC: 27 MMOL/L (ref 22–29)
HCT VFR BLD AUTO: 28.1 % (ref 40–53)
HGB BLD-MCNC: 9.9 G/DL (ref 13.3–17.7)
IMM GRANULOCYTES # BLD: ABNORMAL 10*3/UL
IMM GRANULOCYTES NFR BLD: ABNORMAL %
LYMPHOCYTES # BLD AUTO: ABNORMAL 10*3/UL
LYMPHOCYTES # BLD MANUAL: 0 10E3/UL (ref 0.8–5.3)
LYMPHOCYTES NFR BLD AUTO: ABNORMAL %
LYMPHOCYTES NFR BLD MANUAL: 6 %
MAGNESIUM SERPL-MCNC: 2 MG/DL (ref 1.7–2.3)
MCH RBC QN AUTO: 28.9 PG (ref 26.5–33)
MCHC RBC AUTO-ENTMCNC: 35.2 G/DL (ref 31.5–36.5)
MCV RBC AUTO: 82 FL (ref 78–100)
MONOCYTES # BLD AUTO: ABNORMAL 10*3/UL
MONOCYTES # BLD MANUAL: 0 10E3/UL (ref 0–1.3)
MONOCYTES NFR BLD AUTO: ABNORMAL %
MONOCYTES NFR BLD MANUAL: 0 %
NEUTROPHILS # BLD AUTO: ABNORMAL 10*3/UL
NEUTROPHILS # BLD MANUAL: 0.7 10E3/UL (ref 1.6–8.3)
NEUTROPHILS NFR BLD AUTO: ABNORMAL %
NEUTROPHILS NFR BLD MANUAL: 92 %
NRBC # BLD AUTO: 0 10E3/UL
NRBC BLD AUTO-RTO: 0 /100
PHOSPHATE SERPL-MCNC: 4.2 MG/DL (ref 2.5–4.5)
PLAT MORPH BLD: ABNORMAL
PLATELET # BLD AUTO: 23 10E3/UL (ref 150–450)
POTASSIUM SERPL-SCNC: 3.7 MMOL/L (ref 3.4–5.3)
RBC # BLD AUTO: 3.43 10E6/UL (ref 4.4–5.9)
RBC MORPH BLD: ABNORMAL
SODIUM SERPL-SCNC: 136 MMOL/L (ref 135–145)
TARGETS BLD QL SMEAR: SLIGHT
WBC # BLD AUTO: 0.8 10E3/UL (ref 4–11)

## 2024-08-21 PROCEDURE — 258N000003 HC RX IP 258 OP 636: Performed by: INTERNAL MEDICINE

## 2024-08-21 PROCEDURE — 250N000012 HC RX MED GY IP 250 OP 636 PS 637

## 2024-08-21 PROCEDURE — 250N000013 HC RX MED GY IP 250 OP 250 PS 637

## 2024-08-21 PROCEDURE — 85007 BL SMEAR W/DIFF WBC COUNT: CPT

## 2024-08-21 PROCEDURE — 250N000011 HC RX IP 250 OP 636: Mod: JZ | Performed by: INTERNAL MEDICINE

## 2024-08-21 PROCEDURE — 258N000003 HC RX IP 258 OP 636

## 2024-08-21 PROCEDURE — 250N000013 HC RX MED GY IP 250 OP 250 PS 637: Performed by: NURSE PRACTITIONER

## 2024-08-21 PROCEDURE — 97110 THERAPEUTIC EXERCISES: CPT | Mod: GO

## 2024-08-21 PROCEDURE — 83735 ASSAY OF MAGNESIUM: CPT | Performed by: STUDENT IN AN ORGANIZED HEALTH CARE EDUCATION/TRAINING PROGRAM

## 2024-08-21 PROCEDURE — 84100 ASSAY OF PHOSPHORUS: CPT | Performed by: STUDENT IN AN ORGANIZED HEALTH CARE EDUCATION/TRAINING PROGRAM

## 2024-08-21 PROCEDURE — 250N000013 HC RX MED GY IP 250 OP 250 PS 637: Performed by: INTERNAL MEDICINE

## 2024-08-21 PROCEDURE — 206N000001 HC R&B BMT UMMC

## 2024-08-21 PROCEDURE — 80048 BASIC METABOLIC PNL TOTAL CA: CPT

## 2024-08-21 PROCEDURE — 250N000011 HC RX IP 250 OP 636

## 2024-08-21 PROCEDURE — 85027 COMPLETE CBC AUTOMATED: CPT

## 2024-08-21 RX ORDER — POTASSIUM CHLORIDE 750 MG/1
20 TABLET, EXTENDED RELEASE ORAL ONCE
Status: COMPLETED | OUTPATIENT
Start: 2024-08-21 | End: 2024-08-21

## 2024-08-21 RX ADMIN — TAMSULOSIN HYDROCHLORIDE 0.4 MG: 0.4 CAPSULE ORAL at 16:12

## 2024-08-21 RX ADMIN — ACYCLOVIR 800 MG: 800 TABLET ORAL at 07:43

## 2024-08-21 RX ADMIN — URSODIOL 300 MG: 300 CAPSULE ORAL at 19:35

## 2024-08-21 RX ADMIN — ONDANSETRON HYDROCHLORIDE 8 MG: 8 TABLET, FILM COATED ORAL at 08:33

## 2024-08-21 RX ADMIN — ACYCLOVIR 800 MG: 800 TABLET ORAL at 19:35

## 2024-08-21 RX ADMIN — METHOCARBAMOL 500 MG: 500 TABLET ORAL at 14:11

## 2024-08-21 RX ADMIN — LISINOPRIL 15 MG: 10 TABLET ORAL at 11:51

## 2024-08-21 RX ADMIN — METHOCARBAMOL 500 MG: 500 TABLET ORAL at 19:35

## 2024-08-21 RX ADMIN — METHOCARBAMOL 500 MG: 500 TABLET ORAL at 07:43

## 2024-08-21 RX ADMIN — POTASSIUM CHLORIDE 20 MEQ: 750 TABLET, EXTENDED RELEASE ORAL at 07:43

## 2024-08-21 RX ADMIN — FLUDARABINE PHOSPHATE 65 MG: 25 INJECTION, SOLUTION INTRAVENOUS at 09:27

## 2024-08-21 RX ADMIN — PANTOPRAZOLE SODIUM 40 MG: 40 TABLET, DELAYED RELEASE ORAL at 07:43

## 2024-08-21 RX ADMIN — MICAFUNGIN SODIUM 150 MG: 50 INJECTION, POWDER, LYOPHILIZED, FOR SOLUTION INTRAVENOUS at 10:40

## 2024-08-21 RX ADMIN — URSODIOL 300 MG: 300 CAPSULE ORAL at 07:43

## 2024-08-21 RX ADMIN — URSODIOL 300 MG: 300 CAPSULE ORAL at 14:11

## 2024-08-21 RX ADMIN — DEXAMETHASONE 8 MG: 4 TABLET ORAL at 08:34

## 2024-08-21 RX ADMIN — ALLOPURINOL 300 MG: 300 TABLET ORAL at 07:43

## 2024-08-21 RX ADMIN — Medication 5 ML: at 03:29

## 2024-08-21 RX ADMIN — POLYETHYLENE GLYCOL 3350 17 G: 17 POWDER, FOR SOLUTION ORAL at 22:05

## 2024-08-21 RX ADMIN — ONDANSETRON HYDROCHLORIDE 8 MG: 8 TABLET, FILM COATED ORAL at 16:12

## 2024-08-21 RX ADMIN — LEVOFLOXACIN 250 MG: 250 TABLET, FILM COATED ORAL at 10:39

## 2024-08-21 RX ADMIN — Medication 5 ML: at 11:48

## 2024-08-21 ASSESSMENT — ACTIVITIES OF DAILY LIVING (ADL)
ADLS_ACUITY_SCORE: 21
ADLS_ACUITY_SCORE: 20
ADLS_ACUITY_SCORE: 21
ADLS_ACUITY_SCORE: 20
ADLS_ACUITY_SCORE: 21
ADLS_ACUITY_SCORE: 20
ADLS_ACUITY_SCORE: 21

## 2024-08-21 NOTE — PROGRESS NOTES
Hours of care: 5948-2352     Neuro: A&Ox4.   Vitals: Afebrile, VSS.    Respiratory: RA, lung sounds clear, denies SOB  GI/: Voiding spontaneously. No BM this shift.  Diet/appetite: Tolerating high calorie/ high protein diet . Denies nausea.   Activity: Up independently     Pain: Denies   Skin: No new deficits noted.  Lines: CVC heparin locked  Drains: none  Replacements: K tablets scheduled for 8 am.     Plan: Continue with current POC. Report changes to primary team.

## 2024-08-21 NOTE — PROGRESS NOTES
Stop time on MAR & chart I & O  Chemo drug: fludarabine  Tolerated: Well  Intervention: Blood return confirmed pre/post infusion, premeds decadron & zofran give.  Response: Denies N/V/other side effects at this time.   Plan: Continue w BMT calendar/plan of care & notify MD zuluaga changes. Plan for TBI x1 tomorrow.

## 2024-08-21 NOTE — PROGRESS NOTES
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     Diagnosis MDS/AML  HCT Type Allogeneic     Prep Regimen Flu/Cy/TBI  Donor Match & Source 8/8 DP permissive PBSC    GVHD Prophylaxis PTCy/MMF/Siro  Primary BMT MD Dr. Murphy    Clinical Trials [id number]       INTERVAL  HISTORY   Leland continues to do well. He has no new complaints this morning. Walking often in the hallway. No n/v/d/c. No fevers. Tolerating chemo well.      Review of Systems: 10 point ROS negative except as noted above.    PHYSICAL EXAM     Weight In/Out     Wt Readings from Last 3 Encounters:   08/21/24 89.8 kg (197 lb 14.4 oz)   08/09/24 89.9 kg (198 lb 4.8 oz)   08/08/24 90.3 kg (199 lb 1.2 oz)      I/O last 3 completed shifts:  In: 1300 [P.O.:1300]  Out: 4000 [Urine:4000]     KPS:  80    /86 (BP Location: Left arm)   Pulse 66   Temp 97.7  F (36.5  C) (Oral)   Resp 18   Ht 1.84 m (6' 0.44\")   Wt 89.8 kg (197 lb 14.4 oz)   SpO2 100%   BMI 26.51 kg/m       General: Pleasant, up in chair, NAD   HEENT: MAYA, sclera anicteric, OP clear, buccal mucosa moist, no ulcerations   Lungs: CTA bilaterally  Cardiovascular: RRR, no M/R/G   Abdominal/Rectal: +BS, soft, NT, ND  Lymphatics: No edema  Skin: WWP, no rashes or petechiae  Neuro: A&O, appropriately interactive, speech clear, no focal deficits  Additional Findings: Estrada site CDI, no drainage    Current aGVHD staging:  Will start after transplant OR Skin 0, UGI 0, LGI 0, Liver 0     LABS AND IMAGING: I have assessed all abnormal lab values for their clinical significance and any values considered clinically significant have been addressed in the assessment and plan.      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     BMT/IEC PROTOCOL for MDS  - Chemo protocol: MT 2022-5  D-6: Flu 30mg/m2, Cy 50mg/kg  D-5 to D-2: Flu  D-1: TBI  D0: stem cell infusion  - Peripheral blood stem cell graft from 8/8 URD donor, ABO matched, Cell dose: TBD  - Engraftment monitoring: Peripheral blood and bone marrow chimerisms on D28, D100, 6 months, 1 and 2 years  - Restaging plan: BMBx with FISH, cytogenetics and NGS on D28, D100, 6 months, 1 and 2 years     HEME/COAG  # Pancytopenia secondary to disease process  - GCSF starts day +5, continue until ANC > 1500 for 3 consecutive days  - Transfusion parameters: hemoglobin <7, platelets <10  - Relevant thrombosis or bleeding history: none     RISK OF GVHD  - Prophylaxis: PTCy 50mg/kg on D+3 and D+4; MMF (D+5 to 35); Sirolimus (starting D+5, target level 5-10)  - Acute GVHD Treatment: None to date  - Chronic GVHD Treatment: None to date     ID  Prophylaxis plan:   - Bacterial: Levaquin 250mg while neutropenic    - Fungal: Micafungin 300mg 3x/week until D+45, followed by mold active azole. Pulm nodules present on workup CT.   - PJP: Bactrim to start at D+28. Toxoplasma negative.   - Viral: Acyclovir 800mg BID. No need for letermovir as donor and recipient are CMV negative.      Monitoring:   - CMV weekly  - EBV every 2 weeks from D30 to D180     GI/NUTRITION  # GERD: Protonix  - Anti-emetics: Zofran, dex scheduled during chemotherapy. Compazine, lorazepam available PRN.   - Ulcer prophy: Protonix  - VOD prophy: Ursodiol  "300mg TID  - Dietician support to prevent malnutrition  - Miralax and senna PRN constipation. For now, Miralax scheduled at bedtime.      CARDIOVASCULAR  # HTN  - BP has been high (160-170s/ 70s) in recent clinic appointments. Recently increased lisinopril to 15mg daily. Monitor and increase PRN.      # CAD: Coronary artery calcifications seen on CT, stress test in 2023 negative     - Risk of cardiomyopathy: Baseline EF 55-60%.   - Risk of arrhythmia: Baseline EKG showed 421      RESPIRATORY  - Baseline PFTs: Normal   - Risk of respiratory complications: Frequent ambulation and incentive spirometer     RENAL/ELECTROLYTES/  - Risk of renal injury: IV hydration   - Electrolyte management: replace per sliding scale  - BPH: Continue home flomax.     DIABETES/ENDOCRINE  - Risk of steroid-induced hyperglyemia: Monitor BG, sliding scale if needed     MUSCULOSKELETAL/FRAILTY  - Baseline Frailty Score: Not frail (0)  - Daily PT/OT as needed while inpatient  - Gout: continue allopurinol      SYMPTOM MANAGEMENT  - Pain management: Outpatient has been taking celecoxib for MSK pain, once a day. Inpatient will try Tramadol - available PRN. Add robaxin PRN.      SOCIAL DETERMINANTS  - Caregiver: wife, Vani. Lives within the required distance from hospital but may elect to stay in [place].   - Financial/insurance concerns: None    Medically Ready for Discharge: Anticipated in 5+ Days      Clinically Significant Risk Factors                # Thrombocytopenia: Lowest platelets = 23 in last 2 days, will monitor for bleeding   # Hypertension: Noted on problem list           # Overweight: Estimated body mass index is 26.51 kg/m  as calculated from the following:    Height as of this encounter: 1.84 m (6' 0.44\").    Weight as of this encounter: 89.8 kg (197 lb 14.4 oz).            I spent 40 minutes in the care of this patient today, which included time necessary for preparation for the visit, obtaining history, ordering " medications/tests/procedures as medically indicated, review of pertinent medical literature, counseling of the patient, communication of recommendations to the care team, and documentation time.    Bashir Isidro PA-C [phone]

## 2024-08-21 NOTE — PLAN OF CARE
"/83 (BP Location: Left arm)   Pulse 75   Temp 97.9  F (36.6  C) (Oral)   Resp 18   Ht 1.84 m (6' 0.44\")   Wt 89.8 kg (197 lb 14.4 oz)   SpO2 99%   BMI 26.51 kg/m       Assumed cares [id number]    Afebrile. VSS on RA. A&Ox4, up independently.    Denies pain/N/V.     Drinking fluids well. Ate meals well. Received fludarabine this shift, blood return confirmed pre/post infusion, premedicated w zofran & decadron, tolerated well, denies side effects at this time. Per pt- did have some light headedness after other doses of chemo, but said he will call if this occurs again.    20 mEq Kcl given PO.    Voiding well. X3 medium-large soft BM's this shift.    Shower/CHG/linens/caps/dressing change done today. Plan for TBI x1 tomorrow. Continue w plan of care & notify MD zuluaga changes.     Problem: Adult Inpatient Plan of Care  Goal: Plan of Care Review  Description: The Plan of Care Review/Shift note should be completed every shift.  The Outcome Evaluation is a brief statement about your assessment that the patient is improving, declining, or no change.  This information will be displayed automatically on your shift  note.  Outcome: Progressing  Goal: Patient-Specific Goal (Individualized)  Description: You can add care plan individualizations to a care plan. Examples of Individualization might be:  \"Parent requests to be called daily at 9am for status\", \"I have a hard time hearing out of my right ear\", or \"Do not touch me to wake me up as it startles  me\".  Outcome: Progressing  Goal: Absence of Hospital-Acquired Illness or Injury  Outcome: Progressing  Intervention: Identify and Manage Fall Risk  Recent Flowsheet Documentation  Taken 8/21/2024 1600 by Jadyn Harrison RN  Safety Promotion/Fall Prevention:   assistive device/personal items within reach   chemotherapeutic precautions   clutter free environment maintained   nonskid shoes/slippers when out of bed   patient and family education   room near nurse's " station   room organization consistent   safety round/check completed  Taken 8/21/2024 1500 by Jadyn Harrison RN  Safety Promotion/Fall Prevention:   assistive device/personal items within reach   chemotherapeutic precautions   clutter free environment maintained   nonskid shoes/slippers when out of bed   patient and family education   room near nurse's station   room organization consistent   safety round/check completed  Taken 8/21/2024 1400 by Jadyn Harrison RN  Safety Promotion/Fall Prevention: safety round/check completed  Taken 8/21/2024 1200 by Jadyn Harrison RN  Safety Promotion/Fall Prevention:   assistive device/personal items within reach   chemotherapeutic precautions   clutter free environment maintained   nonskid shoes/slippers when out of bed   patient and family education   room near nurse's station   room organization consistent   safety round/check completed  Taken 8/21/2024 1000 by Jadyn Harrison RN  Safety Promotion/Fall Prevention: safety round/check completed  Taken 8/21/2024 0800 by Jadyn Harrison RN  Safety Promotion/Fall Prevention:   assistive device/personal items within reach   chemotherapeutic precautions   clutter free environment maintained   nonskid shoes/slippers when out of bed   patient and family education   room near nurse's station   room organization consistent   safety round/check completed  Intervention: Prevent Skin Injury  Recent Flowsheet Documentation  Taken 8/21/2024 1500 by aJdyn Harrison RN  Body Position: position changed independently  Skin Protection:   adhesive use limited   transparent dressing maintained  Device Skin Pressure Protection: adhesive use limited  Taken 8/21/2024 0800 by Jadyn Harrison RN  Body Position: position changed independently  Skin Protection:   adhesive use limited   transparent dressing maintained  Device Skin Pressure Protection: adhesive use limited  Intervention: Prevent and Manage VTE  (Venous Thromboembolism) Risk  Recent Flowsheet Documentation  Taken 8/21/2024 1500 by Jadyn Harrison RN  VTE Prevention/Management: SCDs off (sequential compression devices)  Taken 8/21/2024 0800 by Jadyn Harrison RN  VTE Prevention/Management: SCDs off (sequential compression devices)  Intervention: Prevent Infection  Recent Flowsheet Documentation  Taken 8/21/2024 1600 by Jadyn Harrison RN  Infection Prevention:   cohorting utilized   environmental surveillance performed   equipment surfaces disinfected   hand hygiene promoted   personal protective equipment utilized   rest/sleep promoted   single patient room provided   visitors restricted/screened  Taken 8/21/2024 1500 by Jadyn Harrison RN  Infection Prevention:   cohorting utilized   environmental surveillance performed   equipment surfaces disinfected   hand hygiene promoted   personal protective equipment utilized   rest/sleep promoted   single patient room provided   visitors restricted/screened  Taken 8/21/2024 1200 by Jadyn Harrison RN  Infection Prevention:   cohorting utilized   environmental surveillance performed   equipment surfaces disinfected   hand hygiene promoted   personal protective equipment utilized   rest/sleep promoted   single patient room provided   visitors restricted/screened  Taken 8/21/2024 0800 by Jadyn Harrison RN  Infection Prevention:   cohorting utilized   environmental surveillance performed   equipment surfaces disinfected   hand hygiene promoted   personal protective equipment utilized   rest/sleep promoted   single patient room provided   visitors restricted/screened  Goal: Optimal Comfort and Wellbeing  Outcome: Progressing  Goal: Readiness for Transition of Care  Outcome: Progressing     Problem: Risk for Delirium  Goal: Optimal Coping  Outcome: Progressing  Intervention: Optimize Psychosocial Adjustment to Delirium  Recent Flowsheet Documentation  Taken 8/21/2024 1500 by   Jadyn Saldivar RN  Supportive Measures:   active listening utilized   relaxation techniques promoted   self-care encouraged   verbalization of feelings encouraged  Family/Support System Care: involvement promoted  Taken 8/21/2024 0800 by Jadyn Harrison RN  Supportive Measures:   active listening utilized   relaxation techniques promoted   self-care encouraged   verbalization of feelings encouraged  Family/Support System Care: involvement promoted  Goal: Improved Behavioral Control  Outcome: Progressing  Intervention: Minimize Safety Risk  Recent Flowsheet Documentation  Taken 8/21/2024 1600 by Jadyn Harrison RN  Enhanced Safety Measures:   review medications for side effects with activity   room near unit station  Taken 8/21/2024 1500 by Jadyn Harrison RN  Enhanced Safety Measures:   review medications for side effects with activity   room near unit station  Taken 8/21/2024 1200 by Jadyn Harrison RN  Enhanced Safety Measures: room near unit station  Taken 8/21/2024 0800 by Jadyn Harrison RN  Enhanced Safety Measures:   review medications for side effects with activity   room near unit station  Goal: Improved Attention and Thought Clarity  Outcome: Progressing  Goal: Improved Sleep  Outcome: Progressing  Intervention: Promote Sleep  Recent Flowsheet Documentation  Taken 8/21/2024 1500 by Jadyn Harrison RN  Sleep/Rest Enhancement:   comfort measures   natural light exposure provided   relaxation techniques promoted  Taken 8/21/2024 0800 by Jadyn Harrison RN  Sleep/Rest Enhancement:   comfort measures   natural light exposure provided   relaxation techniques promoted     Problem: Stem Cell/Bone Marrow Transplant  Goal: Optimal Coping with Transplant  Outcome: Progressing  Intervention: Optimize Patient/Family Adjustment to Transplant  Recent Flowsheet Documentation  Taken 8/21/2024 1500 by Jadyn Harrison RN  Supportive Measures:   active listening utilized    relaxation techniques promoted   self-care encouraged   verbalization of feelings encouraged  Family/Support System Care: involvement promoted  Taken 8/21/2024 0800 by Jadyn Harrison RN  Supportive Measures:   active listening utilized   relaxation techniques promoted   self-care encouraged   verbalization of feelings encouraged  Family/Support System Care: involvement promoted  Goal: Symptom-Free Urinary Elimination  Outcome: Progressing  Intervention: Prevent or Manage Bladder Irritation  Recent Flowsheet Documentation  Taken 8/21/2024 1500 by Jadyn Harrison RN  Urinary Elimination Promotion: frequent voiding encouraged  Hyperhydration Management: fluids provided  Taken 8/21/2024 0800 by Jadyn Harrison RN  Urinary Elimination Promotion: frequent voiding encouraged  Hyperhydration Management: fluids provided  Goal: Diarrhea Symptom Control  Outcome: Progressing  Intervention: Manage Diarrhea  Recent Flowsheet Documentation  Taken 8/21/2024 1500 by Jadyn Harrison RN  Skin Protection:   adhesive use limited   transparent dressing maintained  Fluid/Electrolyte Management: fluids provided  Perineal Care: perineal hygiene encouraged  Taken 8/21/2024 0800 by Jadyn Harrison RN  Skin Protection:   adhesive use limited   transparent dressing maintained  Fluid/Electrolyte Management: fluids provided  Perineal Care: perineal hygiene encouraged  Goal: Improved Activity Tolerance  Outcome: Progressing  Intervention: Promote Improved Energy  Recent Flowsheet Documentation  Taken 8/21/2024 1500 by Jadyn Harrison RN  Fatigue Management:   paced activity encouraged   frequent rest breaks encouraged  Sleep/Rest Enhancement:   comfort measures   natural light exposure provided   relaxation techniques promoted  Activity Management:   activity adjusted per tolerance   up ad sergo  Environmental Support:   calm environment promoted   distractions minimized   environmental consistency promoted    personal routine supported   rest periods encouraged  Taken 8/21/2024 0800 by Jadyn Harrison RN  Fatigue Management:   paced activity encouraged   frequent rest breaks encouraged  Sleep/Rest Enhancement:   comfort measures   natural light exposure provided   relaxation techniques promoted  Activity Management:   activity adjusted per tolerance   up ad sergo  Environmental Support:   calm environment promoted   distractions minimized   environmental consistency promoted   personal routine supported   rest periods encouraged  Goal: Blood Counts Within Acceptable Range  Outcome: Progressing  Intervention: Monitor and Manage Hematologic Symptoms  Recent Flowsheet Documentation  Taken 8/21/2024 1600 by Jadyn Harrison RN  Bleeding Precautions:   blood pressure closely monitored   gentle oral care promoted   monitored for signs of bleeding  Medication Review/Management:   medications reviewed   high-risk medications identified  Taken 8/21/2024 1500 by Jadyn Harrison RN  Sleep/Rest Enhancement:   comfort measures   natural light exposure provided   relaxation techniques promoted  Bleeding Precautions:   blood pressure closely monitored   gentle oral care promoted   monitored for signs of bleeding  Medication Review/Management:   medications reviewed   high-risk medications identified  Taken 8/21/2024 1200 by Jadyn Harrison RN  Bleeding Precautions: blood pressure closely monitored  Medication Review/Management: medications reviewed  Taken 8/21/2024 0800 by Jadyn Harrison RN  Sleep/Rest Enhancement:   comfort measures   natural light exposure provided   relaxation techniques promoted  Bleeding Precautions:   blood pressure closely monitored   gentle oral care promoted   monitored for signs of bleeding  Medication Review/Management:   medications reviewed   high-risk medications identified  Goal: Absence of Hypersensitivity Reaction  Outcome: Progressing  Goal: Absence of Infection  Outcome:  Progressing  Intervention: Prevent and Manage Infection  Recent Flowsheet Documentation  Taken 8/21/2024 1600 by Jadyn Harrison RN  Infection Prevention:   cohorting utilized   environmental surveillance performed   equipment surfaces disinfected   hand hygiene promoted   personal protective equipment utilized   rest/sleep promoted   single patient room provided   visitors restricted/screened  Infection Management: aseptic technique maintained  Isolation Precautions:   cytotoxic precautions maintained   protective environment maintained  Taken 8/21/2024 1500 by Jadyn Harrison RN  Infection Prevention:   cohorting utilized   environmental surveillance performed   equipment surfaces disinfected   hand hygiene promoted   personal protective equipment utilized   rest/sleep promoted   single patient room provided   visitors restricted/screened  Infection Management: aseptic technique maintained  Isolation Precautions:   cytotoxic precautions maintained   protective environment maintained  Taken 8/21/2024 1200 by Jadyn Harrison RN  Infection Prevention:   cohorting utilized   environmental surveillance performed   equipment surfaces disinfected   hand hygiene promoted   personal protective equipment utilized   rest/sleep promoted   single patient room provided   visitors restricted/screened  Infection Management: aseptic technique maintained  Isolation Precautions:   cytotoxic precautions maintained   protective environment maintained  Taken 8/21/2024 0800 by Jadyn Harrison RN  Infection Prevention:   cohorting utilized   environmental surveillance performed   equipment surfaces disinfected   hand hygiene promoted   personal protective equipment utilized   rest/sleep promoted   single patient room provided   visitors restricted/screened  Infection Management: aseptic technique maintained  Isolation Precautions:   cytotoxic precautions maintained   protective environment maintained  Goal:  Improved Oral Mucous Membrane Health and Integrity  Outcome: Progressing  Intervention: Promote Oral Comfort and Health  Recent Flowsheet Documentation  Taken 8/21/2024 1500 by Jadyn Harrison RN  Oral Mucous Membrane Protection:   lip/mouth moisturizer applied   nonirritating oral fluids promoted   nonirritating oral foods promoted  Oral Care: oral rinse provided  Taken 8/21/2024 0800 by Jadyn Harrison RN  Oral Mucous Membrane Protection:   lip/mouth moisturizer applied   nonirritating oral fluids promoted   nonirritating oral foods promoted  Oral Care: oral rinse provided  Goal: Nausea and Vomiting Symptom Relief  Outcome: Progressing  Intervention: Prevent and Manage Nausea and Vomiting  Recent Flowsheet Documentation  Taken 8/21/2024 1500 by Jadyn Harrison RN  Nausea/Vomiting Interventions: (denies at this time) other (see comments)  Taken 8/21/2024 0800 by Jadyn Harrison RN  Nausea/Vomiting Interventions: (denies at this time) other (see comments)  Goal: Optimal Nutrition Intake  Outcome: Progressing  Intervention: Minimize and Manage Barriers to Oral Intake  Recent Flowsheet Documentation  Taken 8/21/2024 1500 by Jadyn Harrison RN  Oral Nutrition Promotion:   physical activity promoted   rest periods promoted   social interaction promoted  Taken 8/21/2024 0800 by Jadyn Harrison RN  Oral Nutrition Promotion:   physical activity promoted   rest periods promoted   social interaction promoted   Goal Outcome Evaluation:

## 2024-08-22 ENCOUNTER — APPOINTMENT (OUTPATIENT)
Dept: RADIATION ONCOLOGY | Facility: CLINIC | Age: 69
End: 2024-08-22
Attending: STUDENT IN AN ORGANIZED HEALTH CARE EDUCATION/TRAINING PROGRAM
Payer: COMMERCIAL

## 2024-08-22 LAB
ACANTHOCYTES BLD QL SMEAR: NORMAL
ANION GAP SERPL CALCULATED.3IONS-SCNC: 9 MMOL/L (ref 7–15)
AUER BODIES BLD QL SMEAR: NORMAL
BASO STIPL BLD QL SMEAR: NORMAL
BASOPHILS # BLD AUTO: ABNORMAL 10*3/UL
BASOPHILS NFR BLD AUTO: ABNORMAL %
BITE CELLS BLD QL SMEAR: NORMAL
BLD PROD TYP BPU: NORMAL
BLISTER CELLS BLD QL SMEAR: NORMAL
BLOOD COMPONENT TYPE: NORMAL
BUN SERPL-MCNC: 21.2 MG/DL (ref 8–23)
BURR CELLS BLD QL SMEAR: NORMAL
CALCIUM SERPL-MCNC: 9.3 MG/DL (ref 8.8–10.4)
CHLORIDE SERPL-SCNC: 102 MMOL/L (ref 98–107)
CODING SYSTEM: NORMAL
CREAT SERPL-MCNC: 0.74 MG/DL (ref 0.67–1.17)
DACRYOCYTES BLD QL SMEAR: NORMAL
EGFRCR SERPLBLD CKD-EPI 2021: >90 ML/MIN/1.73M2
ELLIPTOCYTES BLD QL SMEAR: NORMAL
EOSINOPHIL # BLD AUTO: ABNORMAL 10*3/UL
EOSINOPHIL NFR BLD AUTO: ABNORMAL %
ERYTHROCYTE [DISTWIDTH] IN BLOOD BY AUTOMATED COUNT: 17.1 % (ref 10–15)
FRAGMENTS BLD QL SMEAR: NORMAL
GLUCOSE SERPL-MCNC: 100 MG/DL (ref 70–99)
HCO3 SERPL-SCNC: 27 MMOL/L (ref 22–29)
HCT VFR BLD AUTO: 27.9 % (ref 40–53)
HGB BLD-MCNC: 9.7 G/DL (ref 13.3–17.7)
HGB C CRYSTALS: NORMAL
HOWELL-JOLLY BOD BLD QL SMEAR: NORMAL
IMM GRANULOCYTES # BLD: ABNORMAL 10*3/UL
IMM GRANULOCYTES NFR BLD: ABNORMAL %
ISSUE DATE AND TIME: NORMAL
LYMPHOCYTES # BLD AUTO: ABNORMAL 10*3/UL
LYMPHOCYTES NFR BLD AUTO: ABNORMAL %
MAGNESIUM SERPL-MCNC: 2.1 MG/DL (ref 1.7–2.3)
MCH RBC QN AUTO: 28.3 PG (ref 26.5–33)
MCHC RBC AUTO-ENTMCNC: 34.8 G/DL (ref 31.5–36.5)
MCV RBC AUTO: 81 FL (ref 78–100)
MONOCYTES # BLD AUTO: ABNORMAL 10*3/UL
MONOCYTES NFR BLD AUTO: ABNORMAL %
NEUTROPHILS # BLD AUTO: ABNORMAL 10*3/UL
NEUTROPHILS NFR BLD AUTO: ABNORMAL %
NEUTS HYPERSEG BLD QL SMEAR: NORMAL
PHOSPHATE SERPL-MCNC: 4.3 MG/DL (ref 2.5–4.5)
PLAT MORPH BLD: NORMAL
PLATELET # BLD AUTO: 15 10E3/UL (ref 150–450)
POLYCHROMASIA BLD QL SMEAR: NORMAL
POTASSIUM SERPL-SCNC: 4.1 MMOL/L (ref 3.4–5.3)
RBC # BLD AUTO: 3.43 10E6/UL (ref 4.4–5.9)
RBC AGGLUT BLD QL: NORMAL
RBC MORPH BLD: NORMAL
ROULEAUX BLD QL SMEAR: NORMAL
SICKLE CELLS BLD QL SMEAR: NORMAL
SMUDGE CELLS BLD QL SMEAR: NORMAL
SODIUM SERPL-SCNC: 138 MMOL/L (ref 135–145)
SPHEROCYTES BLD QL SMEAR: NORMAL
STOMATOCYTES BLD QL SMEAR: NORMAL
TARGETS BLD QL SMEAR: NORMAL
TOXIC GRANULES BLD QL SMEAR: NORMAL
UNIT ABO/RH: NORMAL
UNIT NUMBER: NORMAL
UNIT STATUS: NORMAL
UNIT TYPE ISBT: 5100
VARIANT LYMPHS BLD QL SMEAR: NORMAL
WBC # BLD AUTO: 0.3 10E3/UL (ref 4–11)

## 2024-08-22 PROCEDURE — 250N000013 HC RX MED GY IP 250 OP 250 PS 637: Performed by: NURSE PRACTITIONER

## 2024-08-22 PROCEDURE — 250N000012 HC RX MED GY IP 250 OP 636 PS 637

## 2024-08-22 PROCEDURE — 77412 RADIATION TX DELIVERY LVL 3: CPT | Performed by: RADIOLOGY

## 2024-08-22 PROCEDURE — 85027 COMPLETE CBC AUTOMATED: CPT

## 2024-08-22 PROCEDURE — 206N000001 HC R&B BMT UMMC

## 2024-08-22 PROCEDURE — 77336 RADIATION PHYSICS CONSULT: CPT | Performed by: RADIOLOGY

## 2024-08-22 PROCEDURE — 80048 BASIC METABOLIC PNL TOTAL CA: CPT

## 2024-08-22 PROCEDURE — 250N000013 HC RX MED GY IP 250 OP 250 PS 637: Performed by: INTERNAL MEDICINE

## 2024-08-22 PROCEDURE — 250N000011 HC RX IP 250 OP 636

## 2024-08-22 PROCEDURE — 250N000013 HC RX MED GY IP 250 OP 250 PS 637

## 2024-08-22 PROCEDURE — 77332 RADIATION TREATMENT AID(S): CPT | Performed by: RADIOLOGY

## 2024-08-22 PROCEDURE — P9037 PLATE PHERES LEUKOREDU IRRAD: HCPCS

## 2024-08-22 PROCEDURE — 258N000003 HC RX IP 258 OP 636

## 2024-08-22 PROCEDURE — 83735 ASSAY OF MAGNESIUM: CPT | Performed by: INTERNAL MEDICINE

## 2024-08-22 PROCEDURE — 84100 ASSAY OF PHOSPHORUS: CPT | Performed by: INTERNAL MEDICINE

## 2024-08-22 RX ADMIN — MICAFUNGIN SODIUM 150 MG: 50 INJECTION, POWDER, LYOPHILIZED, FOR SOLUTION INTRAVENOUS at 10:07

## 2024-08-22 RX ADMIN — URSODIOL 300 MG: 300 CAPSULE ORAL at 19:47

## 2024-08-22 RX ADMIN — ALLOPURINOL 300 MG: 300 TABLET ORAL at 08:49

## 2024-08-22 RX ADMIN — DEXAMETHASONE 6 MG: 2 TABLET ORAL at 08:49

## 2024-08-22 RX ADMIN — ACYCLOVIR 800 MG: 800 TABLET ORAL at 19:46

## 2024-08-22 RX ADMIN — URSODIOL 300 MG: 300 CAPSULE ORAL at 08:49

## 2024-08-22 RX ADMIN — ONDANSETRON HYDROCHLORIDE 8 MG: 8 TABLET, FILM COATED ORAL at 08:50

## 2024-08-22 RX ADMIN — ACYCLOVIR 800 MG: 800 TABLET ORAL at 08:50

## 2024-08-22 RX ADMIN — ONDANSETRON HYDROCHLORIDE 8 MG: 8 TABLET, FILM COATED ORAL at 00:38

## 2024-08-22 RX ADMIN — ONDANSETRON HYDROCHLORIDE 8 MG: 8 TABLET, FILM COATED ORAL at 17:36

## 2024-08-22 RX ADMIN — TAMSULOSIN HYDROCHLORIDE 0.4 MG: 0.4 CAPSULE ORAL at 17:36

## 2024-08-22 RX ADMIN — URSODIOL 300 MG: 300 CAPSULE ORAL at 15:14

## 2024-08-22 RX ADMIN — METHOCARBAMOL 500 MG: 500 TABLET ORAL at 15:14

## 2024-08-22 RX ADMIN — LEVOFLOXACIN 250 MG: 250 TABLET, FILM COATED ORAL at 12:42

## 2024-08-22 RX ADMIN — METHOCARBAMOL 500 MG: 500 TABLET ORAL at 19:46

## 2024-08-22 RX ADMIN — METHOCARBAMOL 500 MG: 500 TABLET ORAL at 08:49

## 2024-08-22 RX ADMIN — LISINOPRIL 15 MG: 10 TABLET ORAL at 12:42

## 2024-08-22 RX ADMIN — PANTOPRAZOLE SODIUM 40 MG: 40 TABLET, DELAYED RELEASE ORAL at 08:50

## 2024-08-22 ASSESSMENT — ACTIVITIES OF DAILY LIVING (ADL)
ADLS_ACUITY_SCORE: 20
ADLS_ACUITY_SCORE: 21
ADLS_ACUITY_SCORE: 20
ADLS_ACUITY_SCORE: 21
ADLS_ACUITY_SCORE: 20

## 2024-08-22 NOTE — PLAN OF CARE
"Afebrile, hypertensive (140s-150s/80s). Pt denies pain, nausea and vomiting. No PRNs given. No Replacements needed. Pt scheduled for TBI today. Pt resting between cares.        Problem: Adult Inpatient Plan of Care  Goal: Plan of Care Review  Description: The Plan of Care Review/Shift note should be completed every shift.  The Outcome Evaluation is a brief statement about your assessment that the patient is improving, declining, or no change.  This information will be displayed automatically on your shift  note.  Outcome: Progressing  Goal: Patient-Specific Goal (Individualized)  Description: You can add care plan individualizations to a care plan. Examples of Individualization might be:  \"Parent requests to be called daily at 9am for status\", \"I have a hard time hearing out of my right ear\", or \"Do not touch me to wake me up as it startles  me\".  Outcome: Progressing  Goal: Absence of Hospital-Acquired Illness or Injury  Outcome: Progressing  Intervention: Identify and Manage Fall Risk  Recent Flowsheet Documentation  Taken 8/22/2024 0421 by Jayshree Tyson, RN  Safety Promotion/Fall Prevention:   safety round/check completed   activity supervised   assistive device/personal items within reach   room organization consistent   nonskid shoes/slippers when out of bed  Taken 8/22/2024 0230 by Jayshree Tyson, RN  Safety Promotion/Fall Prevention: safety round/check completed  Taken 8/22/2024 0036 by Jayshree Tyson, RN  Safety Promotion/Fall Prevention:   safety round/check completed   activity supervised   assistive device/personal items within reach   room organization consistent   nonskid shoes/slippers when out of bed  Taken 8/21/2024 2206 by Jayshree Tyson, RN  Safety Promotion/Fall Prevention: safety round/check completed  Taken 8/21/2024 1934 by Jayshree Tyson, RN  Safety Promotion/Fall Prevention:   safety round/check completed   activity supervised   assistive device/personal items within reach   " room organization consistent   nonskid shoes/slippers when out of bed  Intervention: Prevent Skin Injury  Recent Flowsheet Documentation  Taken 8/22/2024 0036 by Jayshree Tyson RN  Body Position: position changed independently  Taken 8/21/2024 1934 by Jayshree Tyson RN  Body Position: position changed independently  Intervention: Prevent and Manage VTE (Venous Thromboembolism) Risk  Recent Flowsheet Documentation  Taken 8/21/2024 1934 by Jayshree Tyson RN  VTE Prevention/Management: SCDs off (sequential compression devices)  Intervention: Prevent Infection  Recent Flowsheet Documentation  Taken 8/22/2024 0421 by Jayshree Tyson RN  Infection Prevention:   cohorting utilized   environmental surveillance performed   equipment surfaces disinfected   hand hygiene promoted   personal protective equipment utilized   rest/sleep promoted   single patient room provided   visitors restricted/screened  Taken 8/22/2024 0036 by Jayshree Tyson RN  Infection Prevention:   cohorting utilized   environmental surveillance performed   equipment surfaces disinfected   hand hygiene promoted   personal protective equipment utilized   rest/sleep promoted   single patient room provided   visitors restricted/screened  Taken 8/21/2024 1934 by Jayshree Tyson RN  Infection Prevention:   cohorting utilized   environmental surveillance performed   equipment surfaces disinfected   hand hygiene promoted   personal protective equipment utilized   rest/sleep promoted   single patient room provided   visitors restricted/screened  Goal: Optimal Comfort and Wellbeing  Outcome: Progressing  Goal: Readiness for Transition of Care  Outcome: Progressing     Problem: Risk for Delirium  Goal: Optimal Coping  Outcome: Progressing  Goal: Improved Behavioral Control  Outcome: Progressing  Intervention: Minimize Safety Risk  Recent Flowsheet Documentation  Taken 8/22/2024 0421 by Jayshree Tyson RN  Enhanced Safety Measures:   room near  unit station   review medications for side effects with activity  Taken 8/22/2024 0036 by Jayshree Tyson RN  Enhanced Safety Measures:   room near unit station   review medications for side effects with activity  Taken 8/21/2024 1934 by Jayshree Tyson RN  Enhanced Safety Measures:   room near unit station   review medications for side effects with activity  Goal: Improved Attention and Thought Clarity  Outcome: Progressing  Goal: Improved Sleep  Outcome: Progressing     Problem: Stem Cell/Bone Marrow Transplant  Goal: Optimal Coping with Transplant  Outcome: Progressing  Goal: Symptom-Free Urinary Elimination  Outcome: Progressing  Goal: Diarrhea Symptom Control  Outcome: Progressing  Goal: Improved Activity Tolerance  Outcome: Progressing  Intervention: Promote Improved Energy  Recent Flowsheet Documentation  Taken 8/21/2024 1934 by Jayshree Tyson RN  Activity Management: up ad sergo  Goal: Blood Counts Within Acceptable Range  Outcome: Progressing  Intervention: Monitor and Manage Hematologic Symptoms  Recent Flowsheet Documentation  Taken 8/22/2024 0421 by Jayshree Tyson RN  Bleeding Precautions:   blood pressure closely monitored   gentle oral care promoted   monitored for signs of bleeding  Medication Review/Management:   medications reviewed   high-risk medications identified  Taken 8/22/2024 0036 by Jayshree Tyson RN  Bleeding Precautions:   blood pressure closely monitored   gentle oral care promoted   monitored for signs of bleeding  Medication Review/Management:   medications reviewed   high-risk medications identified  Taken 8/21/2024 1934 by Jayshree Tyson RN  Bleeding Precautions:   blood pressure closely monitored   gentle oral care promoted   monitored for signs of bleeding  Medication Review/Management:   medications reviewed   high-risk medications identified  Goal: Absence of Hypersensitivity Reaction  Outcome: Progressing  Goal: Absence of Infection  Outcome:  Progressing  Intervention: Prevent and Manage Infection  Recent Flowsheet Documentation  Taken 8/22/2024 0421 by Jayshree Tyson, RN  Infection Prevention:   cohorting utilized   environmental surveillance performed   equipment surfaces disinfected   hand hygiene promoted   personal protective equipment utilized   rest/sleep promoted   single patient room provided   visitors restricted/screened  Infection Management: aseptic technique maintained  Isolation Precautions:   cytotoxic precautions maintained   protective environment maintained  Taken 8/22/2024 0036 by Jayshree Tyson, RN  Infection Prevention:   cohorting utilized   environmental surveillance performed   equipment surfaces disinfected   hand hygiene promoted   personal protective equipment utilized   rest/sleep promoted   single patient room provided   visitors restricted/screened  Infection Management: aseptic technique maintained  Isolation Precautions:   cytotoxic precautions maintained   protective environment maintained  Taken 8/21/2024 1934 by Jayshree Tyson, RN  Infection Prevention:   cohorting utilized   environmental surveillance performed   equipment surfaces disinfected   hand hygiene promoted   personal protective equipment utilized   rest/sleep promoted   single patient room provided   visitors restricted/screened  Infection Management: aseptic technique maintained  Isolation Precautions:   cytotoxic precautions maintained   protective environment maintained  Goal: Improved Oral Mucous Membrane Health and Integrity  Outcome: Progressing  Goal: Nausea and Vomiting Symptom Relief  Outcome: Progressing  Intervention: Prevent and Manage Nausea and Vomiting  Recent Flowsheet Documentation  Taken 8/21/2024 1934 by Jayshree Tyson, RN  Nausea/Vomiting Interventions: (pt denies) other (see comments)  Goal: Optimal Nutrition Intake  Outcome: Progressing

## 2024-08-22 NOTE — PROGRESS NOTES
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     Diagnosis MDS/AML  HCT Type Allogeneic     Prep Regimen Flu/Cy/TBI  Donor Match & Source 8/8 DP permissive PBSC    GVHD Prophylaxis PTCy/MMF/Siro  Primary BMT MD Dr. Murphy    Clinical Trials [id number]       INTERVAL  HISTORY   Leland continues to do well. He has no new complaints this morning. He is ready for TBI this afternoon. No n/v/d/c. No fevers. Tolerating prep for transplant well, no acute concerns.      Review of Systems: 10 point ROS negative except as noted above.    PHYSICAL EXAM     Weight In/Out     Wt Readings from Last 3 Encounters:   08/22/24 88.3 kg (194 lb 9.6 oz)   08/09/24 89.9 kg (198 lb 4.8 oz)   08/08/24 90.3 kg (199 lb 1.2 oz)      I/O last 3 completed shifts:  In: 2200 [P.O.:2100; I.V.:100]  Out: 4150 [Urine:4150]     KPS:  80    /84 (BP Location: Left arm)   Pulse 84   Temp 98  F (36.7  C) (Oral)   Resp 18   Ht 1.84 m (6' 0.44\")   Wt 88.3 kg (194 lb 9.6 oz)   SpO2 97%   BMI 26.07 kg/m       General: Pleasant, up in chair, NAD   HEENT: MAYA, sclera anicteric, OP clear, buccal mucosa moist, no ulcerations   Lungs: CTA bilaterally  Cardiovascular: RRR, no M/R/G   Abdominal/Rectal: +BS, soft, NT, ND  Lymphatics: No edema  Skin: WWP, no rashes or petechiae  Neuro: A&O, appropriately interactive, speech clear, no focal deficits  Additional Findings: Estrada site CDI, no drainage    Current aGVHD staging:  Will start after transplant OR Skin 0, UGI 0, LGI 0, Liver 0     LABS AND IMAGING: I have assessed all abnormal lab values for their clinical significance and any values considered clinically significant have been addressed in the assessment and plan.      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     BMT/IEC PROTOCOL for MDS  - Chemo protocol: MT 2022-5  D-6: Flu 30mg/m2, Cy 50mg/kg  D-5 to D-2: Flu  D-1: TBI  D0: stem cell infusion  - Peripheral blood stem cell graft from 8/8 URD donor, ABO matched, Cell dose: TBD  - Engraftment monitoring: Peripheral blood and bone marrow chimerisms on D28, D100, 6 months, 1 and 2 years  - Restaging plan: BMBx with FISH, cytogenetics and NGS on D28, D100, 6 months, 1 and 2 years     HEME/COAG  # Pancytopenia secondary to disease process  - GCSF starts day +5, continue until ANC > 2500 for 2 consecutive days  - Transfusion parameters: hemoglobin <7, platelets <10  - Relevant thrombosis or bleeding history: none     RISK OF GVHD  - Prophylaxis: PTCy 50mg/kg on D+3 and D+4; MMF (D+5 to 35); Sirolimus (starting D+5, target level 5-10)  - Acute GVHD Treatment: None to date  - Chronic GVHD Treatment: None to date     ID  Prophylaxis plan:   - Bacterial: Levaquin 250mg while neutropenic    - Fungal: Micafungin 300mg 3x/week until D+45, followed by mold active azole. Pulm nodules present on workup CT.   - PJP: Bactrim to start at D+28. Toxoplasma negative.   - Viral: Acyclovir 800mg BID. No need for letermovir as donor and recipient are CMV negative.      Monitoring:   - CMV weekly  - EBV every 2 weeks from D30 to D180     GI/NUTRITION  # GERD: Protonix  - Anti-emetics: Zofran, dex scheduled during chemotherapy. Compazine, lorazepam available PRN.   -  "Ulcer prophy: Protonix  - VOD prophy: Ursodiol 300mg TID  - Dietician support to prevent malnutrition  - Miralax and senna PRN constipation. For now, Miralax scheduled at bedtime.      CARDIOVASCULAR  # HTN  - BP has been high (160-170s/ 70s) in recent clinic appointments. Recently increased lisinopril to 15mg daily. Monitor and increase PRN.      # CAD: Coronary artery calcifications seen on CT, stress test in 2023 negative     - Risk of cardiomyopathy: Baseline EF 55-60%.   - Risk of arrhythmia: Baseline EKG showed 421      RESPIRATORY  - Baseline PFTs: Normal   - Risk of respiratory complications: Frequent ambulation and incentive spirometer     RENAL/ELECTROLYTES/  - Risk of renal injury: IV hydration   - Electrolyte management: replace per sliding scale  - BPH: Continue home flomax.     DIABETES/ENDOCRINE  - Risk of steroid-induced hyperglyemia: Monitor BG, sliding scale if needed     MUSCULOSKELETAL/FRAILTY  - Baseline Frailty Score: Not frail (0)  - Daily PT/OT as needed while inpatient  - Gout: continue allopurinol      SYMPTOM MANAGEMENT  - Pain management: Outpatient has been taking celecoxib for MSK pain, once a day. Inpatient will try Tramadol - available PRN. Add robaxin PRN.      SOCIAL DETERMINANTS  - Caregiver: wife, Vani. Lives within the required distance from hospital but may elect to stay in [place].   - Financial/insurance concerns: None    Medically Ready for Discharge: Anticipated in 5+ Days      Clinically Significant Risk Factors                # Thrombocytopenia: Lowest platelets = 15 in last 2 days, will monitor for bleeding   # Hypertension: Noted on problem list           # Overweight: Estimated body mass index is 26.07 kg/m  as calculated from the following:    Height as of this encounter: 1.84 m (6' 0.44\").    Weight as of this encounter: 88.3 kg (194 lb 9.6 oz).            I spent 40 minutes in the care of this patient today, which included time necessary for preparation for the " visit, obtaining history, ordering medications/tests/procedures as medically indicated, review of pertinent medical literature, counseling of the patient, communication of recommendations to the care team, and documentation time.    Kaitlyn Aguilar, CNP  Vocera or Pager [phone]

## 2024-08-22 NOTE — PROGRESS NOTES
VS normal range, no pain was expressed, tolerated well TBI, had good oral intake, tomorrow stem cell transplant day

## 2024-08-23 ENCOUNTER — DOCUMENTATION ONLY (OUTPATIENT)
Dept: ONCOLOGY | Facility: CLINIC | Age: 69
End: 2024-08-23
Payer: COMMERCIAL

## 2024-08-23 LAB
ABO/RH(D): NORMAL
ANION GAP SERPL CALCULATED.3IONS-SCNC: 10 MMOL/L (ref 7–15)
ANTIBODY SCREEN: NEGATIVE
BASOPHILS # BLD AUTO: ABNORMAL 10*3/UL
BASOPHILS NFR BLD AUTO: ABNORMAL %
BILL ONLY ALLO PBPC PROCESSING: NORMAL
BUN SERPL-MCNC: 22.1 MG/DL (ref 8–23)
CALCIUM SERPL-MCNC: 9.4 MG/DL (ref 8.8–10.4)
CHLORIDE SERPL-SCNC: 102 MMOL/L (ref 98–107)
CREAT SERPL-MCNC: 0.79 MG/DL (ref 0.67–1.17)
EGFRCR SERPLBLD CKD-EPI 2021: >90 ML/MIN/1.73M2
EOSINOPHIL # BLD AUTO: ABNORMAL 10*3/UL
EOSINOPHIL NFR BLD AUTO: ABNORMAL %
ERYTHROCYTE [DISTWIDTH] IN BLOOD BY AUTOMATED COUNT: 16.9 % (ref 10–15)
GLUCOSE SERPL-MCNC: 100 MG/DL (ref 70–99)
HCO3 SERPL-SCNC: 26 MMOL/L (ref 22–29)
HCT VFR BLD AUTO: 27.9 % (ref 40–53)
HGB BLD-MCNC: 9.7 G/DL (ref 13.3–17.7)
IMM GRANULOCYTES # BLD: ABNORMAL 10*3/UL
IMM GRANULOCYTES NFR BLD: ABNORMAL %
LYMPHOCYTES # BLD AUTO: ABNORMAL 10*3/UL
LYMPHOCYTES NFR BLD AUTO: ABNORMAL %
MAGNESIUM SERPL-MCNC: 2.1 MG/DL (ref 1.7–2.3)
MCH RBC QN AUTO: 28 PG (ref 26.5–33)
MCHC RBC AUTO-ENTMCNC: 34.8 G/DL (ref 31.5–36.5)
MCV RBC AUTO: 81 FL (ref 78–100)
MONOCYTES # BLD AUTO: ABNORMAL 10*3/UL
MONOCYTES NFR BLD AUTO: ABNORMAL %
NEUTROPHILS # BLD AUTO: ABNORMAL 10*3/UL
NEUTROPHILS NFR BLD AUTO: ABNORMAL %
PHOSPHATE SERPL-MCNC: 4.7 MG/DL (ref 2.5–4.5)
PLATELET # BLD AUTO: 27 10E3/UL (ref 150–450)
POTASSIUM SERPL-SCNC: 4.1 MMOL/L (ref 3.4–5.3)
RBC # BLD AUTO: 3.46 10E6/UL (ref 4.4–5.9)
SODIUM SERPL-SCNC: 138 MMOL/L (ref 135–145)
SPECIMEN EXPIRATION DATE: NORMAL
WBC # BLD AUTO: 0.1 10E3/UL (ref 4–11)

## 2024-08-23 PROCEDURE — 206N000001 HC R&B BMT UMMC

## 2024-08-23 PROCEDURE — 250N000013 HC RX MED GY IP 250 OP 250 PS 637: Performed by: INTERNAL MEDICINE

## 2024-08-23 PROCEDURE — 83735 ASSAY OF MAGNESIUM: CPT

## 2024-08-23 PROCEDURE — 80048 BASIC METABOLIC PNL TOTAL CA: CPT

## 2024-08-23 PROCEDURE — 250N000011 HC RX IP 250 OP 636

## 2024-08-23 PROCEDURE — 250N000013 HC RX MED GY IP 250 OP 250 PS 637

## 2024-08-23 PROCEDURE — 85027 COMPLETE CBC AUTOMATED: CPT

## 2024-08-23 PROCEDURE — 38214 VOLUME DEPLETE OF HARVEST: CPT | Performed by: INTERNAL MEDICINE

## 2024-08-23 PROCEDURE — 30243Y3 TRANSFUSION OF ALLOGENEIC UNRELATED HEMATOPOIETIC STEM CELLS INTO CENTRAL VEIN, PERCUTANEOUS APPROACH: ICD-10-PCS | Performed by: INTERNAL MEDICINE

## 2024-08-23 PROCEDURE — 815N000004 HC ACQUISITION BONE MARROW NMDP

## 2024-08-23 PROCEDURE — 250N000011 HC RX IP 250 OP 636: Performed by: INTERNAL MEDICINE

## 2024-08-23 PROCEDURE — 84100 ASSAY OF PHOSPHORUS: CPT | Performed by: INTERNAL MEDICINE

## 2024-08-23 PROCEDURE — 86900 BLOOD TYPING SEROLOGIC ABO: CPT

## 2024-08-23 PROCEDURE — 250N000013 HC RX MED GY IP 250 OP 250 PS 637: Performed by: NURSE PRACTITIONER

## 2024-08-23 PROCEDURE — 258N000003 HC RX IP 258 OP 636

## 2024-08-23 RX ADMIN — METHOCARBAMOL 500 MG: 500 TABLET ORAL at 13:03

## 2024-08-23 RX ADMIN — TAMSULOSIN HYDROCHLORIDE 0.4 MG: 0.4 CAPSULE ORAL at 16:52

## 2024-08-23 RX ADMIN — ONDANSETRON HYDROCHLORIDE 8 MG: 8 TABLET, FILM COATED ORAL at 08:59

## 2024-08-23 RX ADMIN — PANTOPRAZOLE SODIUM 40 MG: 40 TABLET, DELAYED RELEASE ORAL at 09:00

## 2024-08-23 RX ADMIN — URSODIOL 300 MG: 300 CAPSULE ORAL at 08:59

## 2024-08-23 RX ADMIN — ACYCLOVIR 800 MG: 800 TABLET ORAL at 20:41

## 2024-08-23 RX ADMIN — ACYCLOVIR 800 MG: 800 TABLET ORAL at 08:59

## 2024-08-23 RX ADMIN — ONDANSETRON HYDROCHLORIDE 8 MG: 8 TABLET, FILM COATED ORAL at 16:52

## 2024-08-23 RX ADMIN — LEVOFLOXACIN 250 MG: 250 TABLET, FILM COATED ORAL at 11:06

## 2024-08-23 RX ADMIN — ONDANSETRON HYDROCHLORIDE 8 MG: 8 TABLET, FILM COATED ORAL at 00:32

## 2024-08-23 RX ADMIN — METHOCARBAMOL 500 MG: 500 TABLET ORAL at 20:41

## 2024-08-23 RX ADMIN — DIPHENHYDRAMINE HYDROCHLORIDE 25 MG: 25 CAPSULE ORAL at 13:23

## 2024-08-23 RX ADMIN — ACETAMINOPHEN 650 MG: 325 TABLET ORAL at 13:23

## 2024-08-23 RX ADMIN — ALLOPURINOL 300 MG: 300 TABLET ORAL at 08:59

## 2024-08-23 RX ADMIN — LISINOPRIL 15 MG: 10 TABLET ORAL at 13:03

## 2024-08-23 RX ADMIN — METHOCARBAMOL 500 MG: 500 TABLET ORAL at 08:59

## 2024-08-23 RX ADMIN — MICAFUNGIN SODIUM 150 MG: 50 INJECTION, POWDER, LYOPHILIZED, FOR SOLUTION INTRAVENOUS at 11:06

## 2024-08-23 RX ADMIN — URSODIOL 300 MG: 300 CAPSULE ORAL at 13:03

## 2024-08-23 RX ADMIN — Medication 5 ML: at 03:07

## 2024-08-23 RX ADMIN — URSODIOL 300 MG: 300 CAPSULE ORAL at 20:41

## 2024-08-23 ASSESSMENT — ACTIVITIES OF DAILY LIVING (ADL)
ADLS_ACUITY_SCORE: 23
ADLS_ACUITY_SCORE: 20
ADLS_ACUITY_SCORE: 23
ADLS_ACUITY_SCORE: 20
ADLS_ACUITY_SCORE: 23
ADLS_ACUITY_SCORE: 20
ADLS_ACUITY_SCORE: 23
ADLS_ACUITY_SCORE: 20

## 2024-08-23 NOTE — PROGRESS NOTES
Type of transplant: Donor: Allogeneic - Unrelated  Product:   BMT INFUSION DOCUMENTATION (Last 48 Hours)       BMT/Cellular Product Infusion       Row Name 08/23/24 1100                [REMOVED] Product 08/23/24 1246 HPC, Apheresis    Product Details Product Release Date: 08/23/24  -AD Product Release Time: 1246  -MS Product Type: HPC, Apheresis  -AD DIN: [id number]  -AD Product Description Code: [id number]  -AD Volume Dispensed (mL): 193 mL  -AD Completion Date (RN to complete): 08/23/24  -AC Completion Time (RN to complete): 1416  -AC    Checked by (Patient RN) Christel Mcgarry  -AC       Checked by (Witness) Lamar Rosa RN  -ANTHONY       Product Volume Infused (mL) 193 mL  -AC       Flush Volume (mL) 50 mL  -AC       Volume Dispensed (mL) --                 User Key  (r) = Recorded By, (t) = Taken By, (c) = Cosigned By      Initials Name Effective Dates    Jennyfer Penaloza 01/08/24 -     AD Do, Lacie T 01/08/24 -     AC Christel cMgarry RN 08/16/22 -     Lamar Philip RN 01/08/24 -                   Preparation: RN Documentation  Patient was premedicated as ordered: yes  Line Type: central line, right  Patient Stable Prior to Infusion: yes  Time Infusion Started: 1400  Teaching: side effects/monitoring  Tolerated/Reaction: Patient tolerance of product infusion  Immediate suspected transfusion reaction to the product: none  Did patient have prior history of similar signs/symptoms during this hospitalization?: NA  Symptoms during/after infusion: other (comment) (N/A)  Did the patient tolerate the infusion well: yes  Medications and treatment for symptoms: N/A  Did the symptoms resolve?:  (N/A)  Enter comments if clots, leaks, broken bag, infusion delays, other issues with bag/infusion: N/A  Flush until: n/a   Plan: remain hospitalized until engraftment

## 2024-08-23 NOTE — PROGRESS NOTES
Infusion Procedure Note    Type: Allogeneic transplant  Diagnosis: AML  Indication for Infusion: Reconstitution of hematopoiesis (transplant graft)  Reviewed and Confirmed for the Infusion: Consent previously obtained  Donor: Unrelated  Product Characteristics, Cell Dose and Volume: as described on the infusion form ([id number]).  Patient was Premedicated as Ordered: Yes  Complications: See infusion form    I was present at the beginning of the infusion and available on the floor/unit/clinic through the entire infusion.    Markus Mendez MD

## 2024-08-23 NOTE — PROGRESS NOTES
PA Initiation    Medication: MYCOPHENOLATE MOFETIL (GENERIC EQUIV) 250 MG PO CAPS  Insurance Company: [employer] - Phone [phone] Fax [phone]  Start Date: 8/23/2024    PA initiated for B vs D determination        Sepideh Kenyon  [facility] Liaison  Phone [phone]  Fax [phone]  Available on Teams & Vocera

## 2024-08-23 NOTE — PLAN OF CARE
"Oriented x4. Orthos + this morning, now up SBA, calling appropriately. Endorsed some lightheadedness upon standing. Afebrile. Denied pain. AUOP, 3 BMs today. Feels like his constipation is resolving. Walked halls multiple times today. Good appetite. Denies nausea. Tolerated transplant well. Feeling tired after the benadryl and napped on and off this afternoon. Showered. No CHG thru 8/24 d/t TBI. CVC HL. Wife here and supportive of patient.       Problem: Adult Inpatient Plan of Care  Goal: Plan of Care Review  Description: The Plan of Care Review/Shift note should be completed every shift.  The Outcome Evaluation is a brief statement about your assessment that the patient is improving, declining, or no change.  This information will be displayed automatically on your shift  note.  Outcome: Progressing  Flowsheets (Taken 8/23/2024 1808)  Plan of Care Reviewed With:   patient   spouse  Overall Patient Progress: improving  Goal: Patient-Specific Goal (Individualized)  Description: You can add care plan individualizations to a care plan. Examples of Individualization might be:  \"Parent requests to be called daily at 9am for status\", \"I have a hard time hearing out of my right ear\", or \"Do not touch me to wake me up as it startles  me\".  Outcome: Progressing  Goal: Absence of Hospital-Acquired Illness or Injury  Outcome: Progressing  Intervention: Identify and Manage Fall Risk  Recent Flowsheet Documentation  Taken 8/23/2024 1900 by Christel Mcgarry, RN  Safety Promotion/Fall Prevention: safety round/check completed  Taken 8/23/2024 1800 by Christel Mcgarry, RN  Safety Promotion/Fall Prevention: safety round/check completed  Taken 8/23/2024 1600 by Christel Mcgarry, RN  Safety Promotion/Fall Prevention:   safety round/check completed   activity supervised   assistive device/personal items within reach   room organization consistent   clutter free environment maintained   check orthostatic blood pressure   increased rounding and " observation   increase visualization of patient   lighting adjusted   nonskid shoes/slippers when out of bed   patient and family education   room near nurse's station   supervised activity  Taken 8/23/2024 1500 by Christel Mcgarry RN  Safety Promotion/Fall Prevention: safety round/check completed  Taken 8/23/2024 1400 by Christel Mcgarry RN  Safety Promotion/Fall Prevention: safety round/check completed  Taken 8/23/2024 1300 by Christel Mcgarry RN  Safety Promotion/Fall Prevention: safety round/check completed  Taken 8/23/2024 1200 by Christel Mcgarry RN  Safety Promotion/Fall Prevention:   safety round/check completed   activity supervised   assistive device/personal items within reach   room organization consistent   clutter free environment maintained   check orthostatic blood pressure   increased rounding and observation   increase visualization of patient   lighting adjusted   nonskid shoes/slippers when out of bed   patient and family education   room near nurse's station   supervised activity  Taken 8/23/2024 0906 by Christel Mcgarry RN  Safety Promotion/Fall Prevention:   safety round/check completed   activity supervised   assistive device/personal items within reach   room organization consistent   clutter free environment maintained   check orthostatic blood pressure   increased rounding and observation   increase visualization of patient   lighting adjusted   nonskid shoes/slippers when out of bed   patient and family education   room near nurse's station   supervised activity  Intervention: Prevent Skin Injury  Recent Flowsheet Documentation  Taken 8/23/2024 0915 by Christel Mcgarry RN  Body Position: position changed independently  Intervention: Prevent Infection  Recent Flowsheet Documentation  Taken 8/23/2024 1600 by Christel Mcgarry RN  Infection Prevention:   environmental surveillance performed   equipment surfaces disinfected   hand hygiene promoted   personal protective equipment utilized   rest/sleep  promoted   single patient room provided   visitors restricted/screened  Taken 8/23/2024 1200 by Christel Mcgarry RN  Infection Prevention:   environmental surveillance performed   equipment surfaces disinfected   hand hygiene promoted   personal protective equipment utilized   rest/sleep promoted   single patient room provided   visitors restricted/screened  Taken 8/23/2024 0906 by Christel Mcgarry RN  Infection Prevention:   environmental surveillance performed   equipment surfaces disinfected   hand hygiene promoted   personal protective equipment utilized   rest/sleep promoted   single patient room provided   visitors restricted/screened  Goal: Optimal Comfort and Wellbeing  Outcome: Progressing  Goal: Readiness for Transition of Care  Outcome: Progressing     Problem: Risk for Delirium  Goal: Optimal Coping  Outcome: Progressing  Goal: Improved Behavioral Control  Outcome: Progressing  Intervention: Minimize Safety Risk  Recent Flowsheet Documentation  Taken 8/23/2024 1600 by Christel Mcgarry RN  Enhanced Safety Measures:   room near unit station   monitor patients coagulation values   patient/family teach back on injury risk   review medications for side effects with activity  Taken 8/23/2024 1200 by Christel Mcgarry RN  Enhanced Safety Measures:   room near unit station   monitor patients coagulation values   patient/family teach back on injury risk   review medications for side effects with activity  Taken 8/23/2024 0906 by Christel Mcgarry RN  Enhanced Safety Measures:   room near unit station   monitor patients coagulation values   patient/family teach back on injury risk   review medications for side effects with activity  Goal: Improved Attention and Thought Clarity  Outcome: Progressing  Goal: Improved Sleep  Outcome: Progressing     Problem: Stem Cell/Bone Marrow Transplant  Goal: Optimal Coping with Transplant  Outcome: Progressing  Goal: Symptom-Free Urinary Elimination  Outcome: Progressing  Goal: Diarrhea  Symptom Control  Outcome: Progressing  Intervention: Manage Diarrhea  Recent Flowsheet Documentation  Taken 8/23/2024 0915 by Christel Mcgarry RN  Perineal Care: perineal hygiene encouraged  Goal: Improved Activity Tolerance  Outcome: Progressing  Intervention: Promote Improved Energy  Recent Flowsheet Documentation  Taken 8/23/2024 0915 by Christel Mcgarry RN  Activity Management:   activity adjusted per tolerance   activity encouraged  Taken 8/23/2024 0906 by Christel Mcgarry RN  Activity Management: activity adjusted per tolerance  Goal: Blood Counts Within Acceptable Range  Outcome: Progressing  Intervention: Monitor and Manage Hematologic Symptoms  Recent Flowsheet Documentation  Taken 8/23/2024 1600 by Christel Mcgarry RN  Bleeding Precautions:   blood pressure closely monitored   coagulation study results reviewed   foot protection facilitated   monitored for signs of bleeding   gentle oral care promoted  Medication Review/Management: medications reviewed  Taken 8/23/2024 1200 by Christel Mcgarry RN  Bleeding Precautions:   blood pressure closely monitored   coagulation study results reviewed   foot protection facilitated   monitored for signs of bleeding   gentle oral care promoted  Medication Review/Management: medications reviewed  Taken 8/23/2024 0906 by Christel Mcgarry RN  Bleeding Precautions:   blood pressure closely monitored   coagulation study results reviewed   foot protection facilitated   monitored for signs of bleeding   gentle oral care promoted  Medication Review/Management: medications reviewed  Goal: Absence of Hypersensitivity Reaction  Outcome: Progressing  Goal: Absence of Infection  Outcome: Progressing  Intervention: Prevent and Manage Infection  Recent Flowsheet Documentation  Taken 8/23/2024 1600 by Christel Mcgarry RN  Infection Prevention:   environmental surveillance performed   equipment surfaces disinfected   hand hygiene promoted   personal protective equipment utilized   rest/sleep  promoted   single patient room provided   visitors restricted/screened  Infection Management: aseptic technique maintained  Isolation Precautions: protective environment maintained  Taken 8/23/2024 1200 by Christel Mcgarry RN  Infection Prevention:   environmental surveillance performed   equipment surfaces disinfected   hand hygiene promoted   personal protective equipment utilized   rest/sleep promoted   single patient room provided   visitors restricted/screened  Infection Management: aseptic technique maintained  Isolation Precautions: protective environment maintained  Taken 8/23/2024 0906 by Christel Mcgarry RN  Infection Prevention:   environmental surveillance performed   equipment surfaces disinfected   hand hygiene promoted   personal protective equipment utilized   rest/sleep promoted   single patient room provided   visitors restricted/screened  Infection Management: aseptic technique maintained  Isolation Precautions: protective environment maintained  Goal: Improved Oral Mucous Membrane Health and Integrity  Outcome: Progressing  Goal: Nausea and Vomiting Symptom Relief  Outcome: Progressing  Goal: Optimal Nutrition Intake  Outcome: Progressing   Goal Outcome Evaluation:      Plan of Care Reviewed With: patient, spouse    Overall Patient Progress: improvingOverall Patient Progress: improving

## 2024-08-23 NOTE — PROGRESS NOTES
Prior Authorization Approval    Medication: SIROLIMUS (GENERIC EQUIV) 1 MG PO TABS  Authorization Effective Date: 5/5/2024  Authorization Expiration Date: until further notice   Approved Dose/Quantity: 2mg  /  120 tabs  Reference #: [id number]   Insurance Company: [employer] - Phone [phone] Fax [phone]  Copay: $85.21      When patients receive transplants that were paid by Medicare Part A, immunosuppressants are paid by Medicare Part B. If a patient did not have Medicare at the time of transplant then immunosuppressants are paid by Medicare Part D. This was a prior authorization to determine if Medicare Part B or D should pay. The approval letters for Medicare B advantage plans will always show as a denial for Medicare D.               Sepideh Kenyon  [facility] Pharmacy Liaison  Phone [phone]  Fax [phone]  Available on Teams & [employer]

## 2024-08-23 NOTE — PROGRESS NOTES
CLINICAL NUTRITION SERVICES - REASSESSMENT NOTE     Nutrition Prescription    RECOMMENDATIONS FOR MDs/PROVIDERS TO ORDER:  None at this time     Malnutrition Status:    Patient does not meet two of the established criteria necessary for diagnosing malnutrition    Recommendations already ordered by Registered Dietitian (RD):  PRN snacks/supplements    Future/Additional Recommendations:  -Monitor PO intake   -Monitor wt trends  -Monitor labs     EVALUATION OF THE PROGRESS TOWARD GOALS   Diet: High Kcal/High Protein, Ensure Enlive w/ice cream w/meals     Intake: 100% at meals per RN flowsheets. Pt reports he has been having 3 meals + an Ensure with ice cream daily.      NEW FINDINGS   Pt is day 0 for his Allo PBSC. Met with pt and his wife at bedside. Pt reports appetite remains great. He denies any N/V or taste changes presently. Pt notes plans to have lunch from outside today and inquired about options he had picked for food safety.       GI:  Pt reports stools have been regular for the last few days, denies any current C/D. LBM 8/21.     SKIN:  No concerns noted.     LABS:  Reviewed. Notable for:  Phosphorus 4.7  Glucose 100    MEDICATIONS:  Reviewed.  Acyclovir  Chemo-Mesna  Cytoxan  Zofran  Protonix  Miralax  Sirolimus  Ursodiol  D5 and 0.45% NaCl @ 225 mL/hr  PRN Lasix, Miralax, Compazine, Senokot     WEIGHTS:  Pt's have decreased since admission- down 2% since admission 1 week ago. This is likely due to fluid shifts, pt has received Lasix.   Vitals:    08/19/24 0840 08/20/24 0752 08/21/24 0822 08/22/24 0729   Weight: 90.1 kg (198 lb 9.6 oz) 88.7 kg (195 lb 9.6 oz) 89.8 kg (197 lb 14.4 oz) 88.3 kg (194 lb 9.6 oz)    08/23/24 0839   Weight: 88.4 kg (194 lb 14.4 oz)         MALNUTRITION  % Intake: No decreased intake noted  % Weight Loss: Weight loss does not meet criteria- likely due to fluids shifts   Subcutaneous Fat Loss: None observed  Muscle Loss: None observed  Fluid Accumulation/Edema: None  noted  Malnutrition Diagnosis: Patient does not meet two of the established criteria necessary for diagnosing malnutrition    Previous Goals   Once diet is advanced- Patient to consume % of nutritionally adequate meal trays TID, or the equivalent with supplements/snacks.   Evaluation: Met    Previous Nutrition Diagnosis  Predicted inadequate nutrient intake (kcal/protein) related to upcoming BMT with potential for side effects to affect ability to take adequate PO.   Evaluation: ongoing    CURRENT NUTRITION DIAGNOSIS  Predicted inadequate nutrient intake (kcal/protein) related to upcoming BMT with potential for side effects to affect ability to take adequate PO.     INTERVENTIONS  Implementation  Medical food supplement therapy- ordered PRN snacks/supplements     Goals  Patient to consume % of nutritionally adequate meal trays TID, or the equivalent with supplements/snacks.    Monitoring/Evaluation  Progress toward goals will be monitored and evaluated per protocol.    Marie Reese RD (Maggie), LD- 5C Clinical Dietitian   Available on [facility]  No longer available by paging

## 2024-08-23 NOTE — PROGRESS NOTES
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     Diagnosis MDS/AML  HCT Type Allogeneic     Prep Regimen Flu/Cy/TBI  Donor Match & Source 8/8 DP permissive PBSC    GVHD Prophylaxis PTCy/MMF/Siro  Primary BMT MD Dr. Murphy    Clinical Trials [id number]       INTERVAL  HISTORY   Leland continues to do well. He has no new complaints this morning. +Orthostatic VS today, mild LH. SCT today.      Review of Systems: 10 point ROS negative except as noted above.    PHYSICAL EXAM     Weight In/Out     Wt Readings from Last 3 Encounters:   08/23/24 88.4 kg (194 lb 14.4 oz)   08/09/24 89.9 kg (198 lb 4.8 oz)   08/08/24 90.3 kg (199 lb 1.2 oz)      I/O last 3 completed shifts:  In: 2498 [P.O.:2300]  Out: 4050 [Urine:4050]     KPS:  80    /82 (BP Location: Left arm)   Pulse 95   Temp 97.7  F (36.5  C) (Oral)   Resp 16   Ht 1.84 m (6' 0.44\")   Wt 88.4 kg (194 lb 14.4 oz)   SpO2 100%   BMI 26.11 kg/m       General: Pleasant, up in chair, NAD   HEENT: MAYA, sclera anicteric, OP clear, buccal mucosa moist, no ulcerations   Lungs: CTA bilaterally  Cardiovascular: RRR, no M/R/G   Abdominal/Rectal: +BS, soft, NT, ND  Lymphatics: No edema  Skin: WWP, no rashes or petechiae  Neuro: A&O, appropriately interactive, speech clear, no focal deficits  Additional Findings: Estrada site CDI, no drainage    Current aGVHD staging:  Will start after transplant OR Skin 0, UGI 0, LGI 0, Liver 0     LABS AND IMAGING: I have assessed all abnormal lab values for their clinical significance and any values considered clinically significant have been addressed in the assessment and plan.      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     BMT/IEC PROTOCOL for MDS  - Chemo protocol: MT 2022-5  D-6: Flu 30mg/m2, Cy 50mg/kg  D-5 to D-2: Flu  D-1: TBI  D0: stem cell infusion  - Peripheral blood stem cell graft from 8/8 URD donor, ABO matched, Cell dose: TBD  - Engraftment monitoring: Peripheral blood and bone marrow chimerisms on D28, D100, 6 months, 1 and 2 years  - Restaging plan: BMBx with FISH, cytogenetics and NGS on D28, D100, 6 months, 1 and 2 years     HEME/COAG  # Pancytopenia secondary to disease process  - GCSF starts day +5, continue until ANC > 2500 for 2 consecutive days  - Transfusion parameters: hemoglobin <7, platelets <10  - Relevant thrombosis or bleeding history: none     RISK OF GVHD  - Prophylaxis: PTCy 50mg/kg on D+3 and D+4; MMF (D+5 to 35); Sirolimus (starting D+5, target level 5-10)  - Acute GVHD Treatment: None to date  - Chronic GVHD Treatment: None to date     ID  Prophylaxis plan:   - Bacterial: Levaquin 250mg while neutropenic    - Fungal: Micafungin 300mg 3x/week until D+45, followed by mold active azole. Pulm nodules present on workup CT.   - PJP: Bactrim to start at D+28. Toxoplasma negative.   - Viral: Acyclovir 800mg BID. No need for letermovir as donor and recipient are CMV negative.      Monitoring:   - CMV weekly  - EBV every 2 weeks from D30 to D180     GI/NUTRITION  # GERD: Protonix  - Anti-emetics: Zofran, dex scheduled during chemotherapy. Compazine, lorazepam available PRN.   - Ulcer prophy: Protonix  - VOD prophy: Ursodiol 300mg TID  - Dietician support to  "prevent malnutrition  - Miralax and senna PRN constipation. For now, Miralax scheduled at bedtime.      CARDIOVASCULAR  # HTN  - BP has been high (160-170s/ 70s) in recent clinic appointments. Recently increased lisinopril to 15mg daily. Monitor and increase PRN.      # CAD: Coronary artery calcifications seen on CT, stress test in 2023 negative     - Risk of cardiomyopathy: Baseline EF 55-60%.   - Risk of arrhythmia: Baseline EKG showed 421      RESPIRATORY  - Baseline PFTs: Normal   - Risk of respiratory complications: Frequent ambulation and incentive spirometer     RENAL/ELECTROLYTES/  - Risk of renal injury: IV hydration   - Electrolyte management: replace per sliding scale  - BPH: Continue home flomax.     DIABETES/ENDOCRINE  - Risk of steroid-induced hyperglyemia: Monitor BG, sliding scale if needed     MUSCULOSKELETAL/FRAILTY  - Baseline Frailty Score: Not frail (0)  - Daily PT/OT as needed while inpatient  - Gout: continue allopurinol      SYMPTOM MANAGEMENT  - Pain management: Outpatient has been taking celecoxib for MSK pain, once a day. Inpatient will try Tramadol - available PRN. Add robaxin PRN.      SOCIAL DETERMINANTS  - Caregiver: wife, Vani. Lives within the required distance from hospital but may elect to stay in [place].   - Financial/insurance concerns: None    Medically Ready for Discharge: Anticipated in 5+ Days      Clinically Significant Risk Factors                # Thrombocytopenia: Lowest platelets = 15 in last 2 days, will monitor for bleeding   # Hypertension: Noted on problem list           # Overweight: Estimated body mass index is 26.11 kg/m  as calculated from the following:    Height as of this encounter: 1.84 m (6' 0.44\").    Weight as of this encounter: 88.4 kg (194 lb 14.4 oz).            I spent 30 minutes in the care of this patient today, which included time necessary for preparation for the visit, obtaining history, ordering medications/tests/procedures as medically " indicated, review of pertinent medical literature, counseling of the patient, communication of recommendations to the care team, and documentation time.    Bashir Isidro PA-C [phone]

## 2024-08-23 NOTE — PROGRESS NOTES
PA Initiation    Medication: SIROLIMUS (GENERIC EQUIV) 1 MG PO TABS  Insurance Company: [employer] - Phone [phone] Fax [phone]   Start Date: 8/23/2024    PA initiated for B vs D determination.          Sepideh Kenyon  [facility] Pharmacy Liaison  Phone [phone]  Fax [phone]  Available on Teams & Vocera

## 2024-08-23 NOTE — PROVIDER NOTIFICATION
Bashir Isidro PA-C notified of positive orthostatic vitals.   Lyin/87, .  Sittin/82, .   Standin/65  + for lightheadedness.   Patient placed as SBA w/GB.     Response: Have him call when he gets up.

## 2024-08-23 NOTE — PLAN OF CARE
"Afebrile, hypertensive (140s/90s), OVSS. Pt denies pain, nausea and vomiting. No PRNs given. No replacements needed. BMT today around 2-3pm. Pt resting between cares.       Problem: Adult Inpatient Plan of Care  Goal: Plan of Care Review  Description: The Plan of Care Review/Shift note should be completed every shift.  The Outcome Evaluation is a brief statement about your assessment that the patient is improving, declining, or no change.  This information will be displayed automatically on your shift  note.  Outcome: Progressing  Goal: Patient-Specific Goal (Individualized)  Description: You can add care plan individualizations to a care plan. Examples of Individualization might be:  \"Parent requests to be called daily at 9am for status\", \"I have a hard time hearing out of my right ear\", or \"Do not touch me to wake me up as it startles  me\".  Outcome: Progressing  Goal: Absence of Hospital-Acquired Illness or Injury  Outcome: Progressing  Intervention: Identify and Manage Fall Risk  Recent Flowsheet Documentation  Taken 8/23/2024 0305 by Jayshree Tyson, RN  Safety Promotion/Fall Prevention:   safety round/check completed   activity supervised   assistive device/personal items within reach   room organization consistent   clutter free environment maintained  Taken 8/23/2024 0245 by Jayshree Tyson, RN  Safety Promotion/Fall Prevention:   safety round/check completed   activity supervised   assistive device/personal items within reach   room organization consistent   clutter free environment maintained  Taken 8/23/2024 0031 by Jayshree Tyson, RN  Safety Promotion/Fall Prevention:   safety round/check completed   activity supervised   assistive device/personal items within reach   room organization consistent   clutter free environment maintained  Taken 8/22/2024 2210 by Jayshree Tyson, RN  Safety Promotion/Fall Prevention: safety round/check completed  Taken 8/22/2024 1900 by Jayshree Tyson, RN  Safety " Promotion/Fall Prevention:   safety round/check completed   activity supervised   assistive device/personal items within reach   room organization consistent   clutter free environment maintained  Intervention: Prevent Skin Injury  Recent Flowsheet Documentation  Taken 8/22/2024 1900 by Jayshree Tyson RN  Body Position: position changed independently  Intervention: Prevent and Manage VTE (Venous Thromboembolism) Risk  Recent Flowsheet Documentation  Taken 8/22/2024 1900 by Jayshree Tyson RN  VTE Prevention/Management: SCDs off (sequential compression devices)  Intervention: Prevent Infection  Recent Flowsheet Documentation  Taken 8/23/2024 0305 by Jayshree Tyson RN  Infection Prevention:   cohorting utilized   environmental surveillance performed   equipment surfaces disinfected   hand hygiene promoted   personal protective equipment utilized   rest/sleep promoted   single patient room provided  Taken 8/23/2024 0031 by Jayshree Tyson RN  Infection Prevention:   cohorting utilized   environmental surveillance performed   equipment surfaces disinfected   hand hygiene promoted   personal protective equipment utilized   rest/sleep promoted   single patient room provided  Taken 8/22/2024 1900 by Jayshree Tyson RN  Infection Prevention:   cohorting utilized   environmental surveillance performed   equipment surfaces disinfected   hand hygiene promoted   personal protective equipment utilized   rest/sleep promoted   single patient room provided  Goal: Optimal Comfort and Wellbeing  Outcome: Progressing  Goal: Readiness for Transition of Care  Outcome: Progressing     Problem: Risk for Delirium  Goal: Optimal Coping  Outcome: Progressing  Goal: Improved Behavioral Control  Outcome: Progressing  Intervention: Minimize Safety Risk  Recent Flowsheet Documentation  Taken 8/23/2024 0305 by Jayshree Tyson RN  Enhanced Safety Measures: room near unit station  Taken 8/23/2024 0031 by Jayshree Tyson  RN  Enhanced Safety Measures: room near unit station  Taken 8/22/2024 1900 by Jayshree Tyson RN  Enhanced Safety Measures: room near unit station  Goal: Improved Attention and Thought Clarity  Outcome: Progressing  Goal: Improved Sleep  Outcome: Progressing     Problem: Stem Cell/Bone Marrow Transplant  Goal: Optimal Coping with Transplant  Outcome: Progressing  Goal: Symptom-Free Urinary Elimination  Outcome: Progressing  Goal: Diarrhea Symptom Control  Outcome: Progressing  Goal: Improved Activity Tolerance  Outcome: Progressing  Intervention: Promote Improved Energy  Recent Flowsheet Documentation  Taken 8/22/2024 1900 by Jayshree Tyson RN  Activity Management: up ad sergo  Goal: Blood Counts Within Acceptable Range  Outcome: Progressing  Intervention: Monitor and Manage Hematologic Symptoms  Recent Flowsheet Documentation  Taken 8/23/2024 0305 by Jayshree Tyson RN  Bleeding Precautions: blood pressure closely monitored  Medication Review/Management: medications reviewed  Taken 8/23/2024 0031 by Jayshree Tyson RN  Bleeding Precautions: blood pressure closely monitored  Medication Review/Management: medications reviewed  Taken 8/22/2024 1900 by Jayshree Tyson RN  Bleeding Precautions: blood pressure closely monitored  Medication Review/Management: medications reviewed  Goal: Absence of Hypersensitivity Reaction  Outcome: Progressing  Goal: Absence of Infection  Outcome: Progressing  Intervention: Prevent and Manage Infection  Recent Flowsheet Documentation  Taken 8/23/2024 0305 by Jayshree Tyson RN  Infection Prevention:   cohorting utilized   environmental surveillance performed   equipment surfaces disinfected   hand hygiene promoted   personal protective equipment utilized   rest/sleep promoted   single patient room provided  Infection Management: aseptic technique maintained  Isolation Precautions: protective environment maintained  Taken 8/23/2024 0031 by Jayshree Tyson RN  Infection  Prevention:   cohorting utilized   environmental surveillance performed   equipment surfaces disinfected   hand hygiene promoted   personal protective equipment utilized   rest/sleep promoted   single patient room provided  Infection Management: aseptic technique maintained  Isolation Precautions: protective environment maintained  Taken 8/22/2024 1900 by Jayshree Tyson RN  Infection Prevention:   cohorting utilized   environmental surveillance performed   equipment surfaces disinfected   hand hygiene promoted   personal protective equipment utilized   rest/sleep promoted   single patient room provided  Infection Management: aseptic technique maintained  Isolation Precautions: protective environment maintained  Goal: Improved Oral Mucous Membrane Health and Integrity  Outcome: Progressing  Goal: Nausea and Vomiting Symptom Relief  Outcome: Progressing  Intervention: Prevent and Manage Nausea and Vomiting  Recent Flowsheet Documentation  Taken 8/22/2024 1900 by Jayshree Tyson RN  Nausea/Vomiting Interventions: (pt denies) other (see comments)  Goal: Optimal Nutrition Intake  Outcome: Progressing

## 2024-08-23 NOTE — PROGRESS NOTES
BMT/Cellular Allogeneic Product Infusion       Patient Vitals for the past 24 hrs:   Temp Temp src Pulse Resp BP   08/22/24 1558 98.5  F (36.9  C) Oral -- -- 131/83   08/22/24 1955 97.5  F (36.4  C) Oral 88 16 126/88   08/23/24 0031 98.2  F (36.8  C) Oral 90 16 (!) 144/97   08/23/24 0302 97.9  F (36.6  C) Oral 70 18 (!) 139/90   08/23/24 0835 97.8  F (36.6  C) Oral 105 16 --      BMT INFUSION DOCUMENTATION (Last 48 Hours)       BMT/Cellular Product Infusion       Row Name                  Product 08/23/24 1246 HPC, Apheresis    Product Details Product Release Date: 08/23/24  -AD Product Release Time: 1246  -MS Product Type: HPC, Apheresis  -AD DIN: [id number]  -AD Product Description Code: [id number]  -AD Volume Dispensed (mL): 193 mL  -AD    Checked by (Patient RN) --       Checked by (Witness) --       Product Volume Infused (mL) --       Flush Volume (mL) --       Volume Dispensed (mL) --                 User Key  (r) = Recorded By, (t) = Taken By, (c) = Cosigned By      Initials Name Effective Dates    Jennyfer Penaloza 01/08/24 -     AD Do, [facility] T 01/08/24 -                   Allogeneic Donor Eligibility Determination and Summary of Records: Ineligible  Special release form completed: yes  Comment: Special Release signed by Dr.Punita Murphy on 08/13/2024  Type of Infusion: Allogeneic      Baseline Pre-Infusion Evaluation (to be completed by Provider):   Dyspnea: Grade 0 - none  Hypoxia: Grade 0 - not present  Fever: Grade 0 - afebrile  Chills: Grade 0 - none  Febrile Neutropenia: Grade 0 - not present  Sinus Bradycardia: Grade 0 - none  Hypertension: Grade 1 - prehypertension (systolic -139 mm Hg or diastolic BP 80-89 mm Hg)  Hypotension: Grade 0 - none  Chest Pain: Grade 0 - none  Bronchospasm: Grade 0 - none  Pain: Grade 0 - none  Rash: Grade 0 - None  Neurologic Specify: none    If adverse reactions, events or complications occur (fever greater than 2 degrees fahrenheit increase, and severe  reactions of the following types: chills, dyspnea, bronchospasm, hyper/hypotension, hypoxia, bradycardia, chest pain, back/flank pain, hypoxia, and any other reaction deemed severe or life threatening; any instance of product bag breakage or unusual product appearance)    Any other events that are >= grade 3, then immediately contact the BMT Attending physician, the Cell Therapy Laboratory Medical Director (pager [phone]) and the Cell Therapy Laboratory ([phone]).  After midnight, holidays & weekends contact the [facility] on the appropriate campus ([facility] [place]: [phone]; [facility] [place]: [phone]).    Galileo Isidro PA-C

## 2024-08-24 LAB
ACANTHOCYTES BLD QL SMEAR: ABNORMAL
ANION GAP SERPL CALCULATED.3IONS-SCNC: 8 MMOL/L (ref 7–15)
AUER BODIES BLD QL SMEAR: ABNORMAL
BASO STIPL BLD QL SMEAR: ABNORMAL
BASOPHILS # BLD AUTO: ABNORMAL 10*3/UL
BASOPHILS NFR BLD AUTO: ABNORMAL %
BITE CELLS BLD QL SMEAR: ABNORMAL
BLISTER CELLS BLD QL SMEAR: ABNORMAL
BUN SERPL-MCNC: 22.8 MG/DL (ref 8–23)
BURR CELLS BLD QL SMEAR: ABNORMAL
CALCIUM SERPL-MCNC: 8.8 MG/DL (ref 8.8–10.4)
CHLORIDE SERPL-SCNC: 100 MMOL/L (ref 98–107)
CREAT SERPL-MCNC: 0.78 MG/DL (ref 0.67–1.17)
DACRYOCYTES BLD QL SMEAR: ABNORMAL
EGFRCR SERPLBLD CKD-EPI 2021: >90 ML/MIN/1.73M2
ELLIPTOCYTES BLD QL SMEAR: SLIGHT
EOSINOPHIL # BLD AUTO: ABNORMAL 10*3/UL
EOSINOPHIL NFR BLD AUTO: ABNORMAL %
ERYTHROCYTE [DISTWIDTH] IN BLOOD BY AUTOMATED COUNT: 16.7 % (ref 10–15)
FRAGMENTS BLD QL SMEAR: ABNORMAL
GLUCOSE SERPL-MCNC: 94 MG/DL (ref 70–99)
HCO3 SERPL-SCNC: 27 MMOL/L (ref 22–29)
HCT VFR BLD AUTO: 27 % (ref 40–53)
HGB BLD-MCNC: 9.5 G/DL (ref 13.3–17.7)
HGB C CRYSTALS: ABNORMAL
HOWELL-JOLLY BOD BLD QL SMEAR: ABNORMAL
IMM GRANULOCYTES # BLD: ABNORMAL 10*3/UL
IMM GRANULOCYTES NFR BLD: ABNORMAL %
LYMPHOCYTES # BLD AUTO: ABNORMAL 10*3/UL
LYMPHOCYTES NFR BLD AUTO: ABNORMAL %
MAGNESIUM SERPL-MCNC: 1.9 MG/DL (ref 1.7–2.3)
MCH RBC QN AUTO: 28.4 PG (ref 26.5–33)
MCHC RBC AUTO-ENTMCNC: 35.2 G/DL (ref 31.5–36.5)
MCV RBC AUTO: 81 FL (ref 78–100)
MONOCYTES # BLD AUTO: ABNORMAL 10*3/UL
MONOCYTES NFR BLD AUTO: ABNORMAL %
NEUTROPHILS # BLD AUTO: ABNORMAL 10*3/UL
NEUTROPHILS NFR BLD AUTO: ABNORMAL %
NEUTS HYPERSEG BLD QL SMEAR: ABNORMAL
PHOSPHATE SERPL-MCNC: 5 MG/DL (ref 2.5–4.5)
PLAT MORPH BLD: ABNORMAL
PLATELET # BLD AUTO: 28 10E3/UL (ref 150–450)
POLYCHROMASIA BLD QL SMEAR: ABNORMAL
POTASSIUM SERPL-SCNC: 4.2 MMOL/L (ref 3.4–5.3)
RBC # BLD AUTO: 3.34 10E6/UL (ref 4.4–5.9)
RBC AGGLUT BLD QL: ABNORMAL
RBC MORPH BLD: ABNORMAL
ROULEAUX BLD QL SMEAR: ABNORMAL
SICKLE CELLS BLD QL SMEAR: ABNORMAL
SMUDGE CELLS BLD QL SMEAR: ABNORMAL
SODIUM SERPL-SCNC: 135 MMOL/L (ref 135–145)
SPHEROCYTES BLD QL SMEAR: ABNORMAL
STOMATOCYTES BLD QL SMEAR: ABNORMAL
TARGETS BLD QL SMEAR: ABNORMAL
TOXIC GRANULES BLD QL SMEAR: ABNORMAL
VARIANT LYMPHS BLD QL SMEAR: ABNORMAL
WBC # BLD AUTO: 0.1 10E3/UL (ref 4–11)

## 2024-08-24 PROCEDURE — 258N000003 HC RX IP 258 OP 636

## 2024-08-24 PROCEDURE — 250N000013 HC RX MED GY IP 250 OP 250 PS 637

## 2024-08-24 PROCEDURE — 250N000013 HC RX MED GY IP 250 OP 250 PS 637: Performed by: NURSE PRACTITIONER

## 2024-08-24 PROCEDURE — 250N000013 HC RX MED GY IP 250 OP 250 PS 637: Performed by: INTERNAL MEDICINE

## 2024-08-24 PROCEDURE — 80048 BASIC METABOLIC PNL TOTAL CA: CPT

## 2024-08-24 PROCEDURE — 85027 COMPLETE CBC AUTOMATED: CPT

## 2024-08-24 PROCEDURE — 250N000011 HC RX IP 250 OP 636: Performed by: INTERNAL MEDICINE

## 2024-08-24 PROCEDURE — 84100 ASSAY OF PHOSPHORUS: CPT | Performed by: STUDENT IN AN ORGANIZED HEALTH CARE EDUCATION/TRAINING PROGRAM

## 2024-08-24 PROCEDURE — 206N000001 HC R&B BMT UMMC

## 2024-08-24 PROCEDURE — 250N000011 HC RX IP 250 OP 636

## 2024-08-24 PROCEDURE — 83735 ASSAY OF MAGNESIUM: CPT | Performed by: STUDENT IN AN ORGANIZED HEALTH CARE EDUCATION/TRAINING PROGRAM

## 2024-08-24 RX ORDER — HEPARIN SODIUM,PORCINE 10 UNIT/ML
5-20 VIAL (ML) INTRAVENOUS EVERY 24 HOURS
Status: DISCONTINUED | OUTPATIENT
Start: 2024-08-25 | End: 2024-09-19 | Stop reason: HOSPADM

## 2024-08-24 RX ORDER — HEPARIN SODIUM,PORCINE 10 UNIT/ML
5-20 VIAL (ML) INTRAVENOUS
Status: DISCONTINUED | OUTPATIENT
Start: 2024-08-24 | End: 2024-09-19 | Stop reason: HOSPADM

## 2024-08-24 RX ORDER — HEPARIN SODIUM,PORCINE 10 UNIT/ML
5-20 VIAL (ML) INTRAVENOUS EVERY 24 HOURS
Status: DISCONTINUED | OUTPATIENT
Start: 2024-08-24 | End: 2024-08-24

## 2024-08-24 RX ADMIN — URSODIOL 300 MG: 300 CAPSULE ORAL at 14:16

## 2024-08-24 RX ADMIN — METHOCARBAMOL 500 MG: 500 TABLET ORAL at 20:56

## 2024-08-24 RX ADMIN — TAMSULOSIN HYDROCHLORIDE 0.4 MG: 0.4 CAPSULE ORAL at 17:21

## 2024-08-24 RX ADMIN — LISINOPRIL 15 MG: 10 TABLET ORAL at 12:33

## 2024-08-24 RX ADMIN — ACYCLOVIR 800 MG: 800 TABLET ORAL at 20:56

## 2024-08-24 RX ADMIN — URSODIOL 300 MG: 300 CAPSULE ORAL at 09:16

## 2024-08-24 RX ADMIN — LEVOFLOXACIN 250 MG: 250 TABLET, FILM COATED ORAL at 11:35

## 2024-08-24 RX ADMIN — METHOCARBAMOL 500 MG: 500 TABLET ORAL at 14:16

## 2024-08-24 RX ADMIN — ACYCLOVIR 800 MG: 800 TABLET ORAL at 09:15

## 2024-08-24 RX ADMIN — MICAFUNGIN SODIUM 150 MG: 50 INJECTION, POWDER, LYOPHILIZED, FOR SOLUTION INTRAVENOUS at 11:35

## 2024-08-24 RX ADMIN — Medication 5 ML: at 13:04

## 2024-08-24 RX ADMIN — METHOCARBAMOL 500 MG: 500 TABLET ORAL at 09:16

## 2024-08-24 RX ADMIN — ALLOPURINOL 300 MG: 300 TABLET ORAL at 09:16

## 2024-08-24 RX ADMIN — PANTOPRAZOLE SODIUM 40 MG: 40 TABLET, DELAYED RELEASE ORAL at 09:15

## 2024-08-24 RX ADMIN — Medication 5 ML: at 03:20

## 2024-08-24 RX ADMIN — PROCHLORPERAZINE MALEATE 5 MG: 5 TABLET ORAL at 17:26

## 2024-08-24 RX ADMIN — URSODIOL 300 MG: 300 CAPSULE ORAL at 20:56

## 2024-08-24 ASSESSMENT — ACTIVITIES OF DAILY LIVING (ADL)
ADLS_ACUITY_SCORE: 23

## 2024-08-24 NOTE — PROGRESS NOTES
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     Diagnosis MDS/AML  HCT Type Allogeneic     Prep Regimen Flu/Cy/TBI  Donor Match & Source 8/8 DP permissive PBSC    GVHD Prophylaxis PTCy/MMF/Siro  Primary BMT MD Dr. Murphy    Clinical Trials [id number]       INTERVAL  HISTORY   Leland received his transplant yesterday. He denied any symptoms or side effects and felt well this morning. He is eating and drinking well and continues to ambulate regularly.     Review of Systems: 10 point ROS negative except as noted above.    PHYSICAL EXAM     Weight In/Out     Wt Readings from Last 3 Encounters:   08/24/24 89.2 kg (196 lb 11.2 oz)   08/09/24 89.9 kg (198 lb 4.8 oz)   08/08/24 90.3 kg (199 lb 1.2 oz)      I/O last 3 completed shifts:  In: 813 [P.O.:480; I.V.:140; Blood:193]  Out: 1600 [Urine:1600]     KPS:  80    /75 (BP Location: Right arm, Cuff Size: Adult Regular)   Pulse 92   Temp 97.9  F (36.6  C) (Oral)   Resp 16   Ht 1.84 m (6' 0.44\")   Wt 89.2 kg (196 lb 11.2 oz)   SpO2 98%   BMI 26.35 kg/m       General: Pleasant, sitting in up in chair, NAD   HEENT: sclera anicteric, OP clear, buccal mucosa moist, no ulcerations   Lungs: CTAB  Cardiovascular: RRR, no M/R/G   Abdominal/Rectal: +BS, soft, NT, ND  Lymphatics: No edema  Skin: WWP, no rashes or petechiae  Neuro: A&O, appropriately interactive, speech clear, moving all extremities   Additional Findings: Estrada site CDI, no drainage    Current aGVHD staging:  Skin 0, UGI 0, LGI 0, Liver 0     LABS AND IMAGING: I have assessed all abnormal lab values for their clinical significance and any values considered clinically significant have been addressed in the assessment and plan.      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     BMT/IEC PROTOCOL for MDS  - Chemo protocol: MT 2022-5  D-6: Flu 30mg/m2, Cy 50mg/kg  D-5 to D-2: Flu  D-1: TBI  D0: stem cell infusion  - Peripheral blood stem cell graft from 8/8 URD donor, ABO matched, Cell dose: TBD  - Engraftment monitoring: Peripheral blood and bone marrow chimerisms on D28, D100, 6 months, 1 and 2 years  - Restaging plan: BMBx with FISH, cytogenetics and NGS on D28, D100, 6 months, 1 and 2 years     HEME/COAG  # Pancytopenia secondary to disease process  - GCSF starts day +5, continue until ANC > 2500 for 2 consecutive days  - Transfusion parameters: hemoglobin <7, platelets <10  - Relevant thrombosis or bleeding history: none     RISK OF GVHD  - Prophylaxis: PTCy 50mg/kg on D+3 and D+4; MMF (D+5 to 35); Sirolimus (starting D+5, target level 5-10)  - Acute GVHD Treatment: None to date  - Chronic GVHD Treatment: None to date     ID  Prophylaxis plan:   - Bacterial: Levaquin 250mg while neutropenic    - Fungal: Micafungin 300mg 3x/week until D+45, followed by mold active azole. Pulm nodules present on workup CT.   - PJP: Bactrim to start at D+28. Toxoplasma negative.   - Viral: Acyclovir 800mg BID. No need for letermovir as donor and recipient are CMV negative.      Monitoring:   - CMV weekly, negative 8/17   - EBV every 2 weeks from D30 to D180     GI/NUTRITION  # GERD: Protonix  - Anti-emetics: Zofran, dex scheduled during chemotherapy. Compazine, lorazepam available  "PRN.   - Ulcer prophy: Protonix  - VOD prophy: Ursodiol 300mg TID  - Dietician support to prevent malnutrition  - Miralax and senna PRN constipation. For now, Miralax scheduled at bedtime.      CARDIOVASCULAR  # HTN  - BP has been high (160-170s/ 70s) in recent clinic appointments. Recently increased lisinopril to 15mg daily. Monitor and increase PRN.      # CAD: Coronary artery calcifications seen on CT, stress test in 2023 negative     - Risk of cardiomyopathy: Baseline EF 55-60%.   - Risk of arrhythmia: Baseline EKG showed 421      RESPIRATORY  - Baseline PFTs: Normal   - Risk of respiratory complications: Frequent ambulation and incentive spirometer     RENAL/ELECTROLYTES/  - Risk of renal injury: IV hydration   - Electrolyte management: replace per sliding scale  - BPH: Continue home flomax.     DIABETES/ENDOCRINE  - Risk of steroid-induced hyperglyemia: Monitor BG, sliding scale if needed     MUSCULOSKELETAL/FRAILTY  - Baseline Frailty Score: Not frail (0)  - Daily PT/OT as needed while inpatient  - Gout: continue allopurinol      SYMPTOM MANAGEMENT  - Pain management: Outpatient has been taking celecoxib for MSK pain, once a day. Inpatient will try Tramadol - available PRN. Added robaxin PRN.      SOCIAL DETERMINANTS  - Caregiver: wife, Vani. Lives within the required distance from hospital but may elect to stay in [place].   - Financial/insurance concerns: None    Medically Ready for Discharge: Anticipated in 5+ Days      Clinically Significant Risk Factors                # Thrombocytopenia: Lowest platelets = 27 in last 2 days, will monitor for bleeding   # Hypertension: Noted on problem list           # Overweight: Estimated body mass index is 26.35 kg/m  as calculated from the following:    Height as of this encounter: 1.84 m (6' 0.44\").    Weight as of this encounter: 89.2 kg (196 lb 11.2 oz).            Patient was staffed with Dr. Andrea Hooper MD   Hematology/Oncology Fellow " [id number]  Pager: [phone]

## 2024-08-24 NOTE — PLAN OF CARE
"/75 (BP Location: Right arm, Cuff Size: Adult Regular)   Pulse 92   Temp 98.4  F (36.9  C) (Oral)   Resp 16   Ht 1.84 m (6' 0.44\")   Wt 88.4 kg (194 lb 14.4 oz)   SpO2 98%   BMI 26.11 kg/m    Pt's AVSS, Al&Ox4, no c/o pain or nausea all night and x2 stool during the shift. Pt is up with SBA and voiding good amount. CVC intact and Heparin locked. Am lab stable with K+ 4.2, Mag 1.9, BG 94, WBC 0.1, Hgb 9.5, Plt28, and elevated Phos 5.0. Pt had uneventful night. Keep monitoring pt as ordered and notify MD with any new changes.   Problem: Adult Inpatient Plan of Care  Goal: Plan of Care Review  Description: The Plan of Care Review/Shift note should be completed every shift.  The Outcome Evaluation is a brief statement about your assessment that the patient is improving, declining, or no change.  This information will be displayed automatically on your shift  note.  Outcome: Progressing  Goal: Patient-Specific Goal (Individualized)  Description: You can add care plan individualizations to a care plan. Examples of Individualization might be:  \"Parent requests to be called daily at 9am for status\", \"I have a hard time hearing out of my right ear\", or \"Do not touch me to wake me up as it startles  me\".  Outcome: Progressing  Goal: Absence of Hospital-Acquired Illness or Injury  Outcome: Progressing  Intervention: Identify and Manage Fall Risk  Recent Flowsheet Documentation  Taken 8/24/2024 0000 by Batsheva Whipple RN  Safety Promotion/Fall Prevention:   activity supervised   clutter free environment maintained  Taken 8/23/2024 2041 by Batsheva Whipple RN  Safety Promotion/Fall Prevention:   activity supervised   clutter free environment maintained  Intervention: Prevent Skin Injury  Recent Flowsheet Documentation  Taken 8/24/2024 0000 by Batsheva Whipple RN  Body Position: position changed independently  Taken 8/23/2024 2041 by Batsheva Whipple RN  Body Position: position changed " independently  Goal: Optimal Comfort and Wellbeing  Outcome: Progressing  Goal: Readiness for Transition of Care  Outcome: Progressing     Problem: Risk for Delirium  Goal: Optimal Coping  Outcome: Progressing  Goal: Improved Behavioral Control  Outcome: Progressing  Intervention: Minimize Safety Risk  Recent Flowsheet Documentation  Taken 8/24/2024 0000 by Batsheva Whipple RN  Enhanced Safety Measures: room near unit station  Taken 8/23/2024 2041 by Batsheva Whipple RN  Enhanced Safety Measures: room near unit station  Goal: Improved Attention and Thought Clarity  Outcome: Progressing  Goal: Improved Sleep  Outcome: Progressing     Problem: Stem Cell/Bone Marrow Transplant  Goal: Optimal Coping with Transplant  Outcome: Progressing  Goal: Symptom-Free Urinary Elimination  Outcome: Progressing  Goal: Diarrhea Symptom Control  Outcome: Progressing  Goal: Improved Activity Tolerance  Outcome: Progressing  Intervention: Promote Improved Energy  Recent Flowsheet Documentation  Taken 8/24/2024 0000 by Batsheva Whipple RN  Activity Management: activity adjusted per tolerance  Taken 8/23/2024 2041 by Batsheva Whipple RN  Activity Management: activity adjusted per tolerance  Goal: Blood Counts Within Acceptable Range  Outcome: Progressing  Intervention: Monitor and Manage Hematologic Symptoms  Recent Flowsheet Documentation  Taken 8/24/2024 0000 by Batsheva Whipple RN  Bleeding Precautions: blood pressure closely monitored  Taken 8/23/2024 2041 by Batsheva Whipple RN  Bleeding Precautions: blood pressure closely monitored  Goal: Absence of Hypersensitivity Reaction  Outcome: Progressing  Goal: Absence of Infection  Outcome: Progressing  Goal: Improved Oral Mucous Membrane Health and Integrity  Outcome: Progressing  Goal: Nausea and Vomiting Symptom Relief  Outcome: Progressing  Goal: Optimal Nutrition Intake  Outcome: Progressing   Goal Outcome Evaluation:

## 2024-08-24 NOTE — PLAN OF CARE
Temp: 98.4  F (36.9  C) Temp src: Oral BP: 121/71 Pulse: 95   Resp: 16 SpO2: 98 % O2 Device: None (Room air)      Pt A&Ox4.  AVSS.  HR tachy after activity.  Pt walked 4 laps then took a shower.  Pt reported feeling lightheaded and fatigued after all the activity.  Pt took a nap afterwards.  Fall risk education given.  Pt has mild nausea; compazine given with relief noted.  Pt had 1 loose stool after lunch; no BM noted after dinner.  Wife at bedside.  Calling appropriately.        Problem: Stem Cell/Bone Marrow Transplant  Goal: Optimal Coping with Transplant  Outcome: Progressing  Intervention: Optimize Patient/Family Adjustment to Transplant  Recent Flowsheet Documentation  Taken 8/24/2024 1700 by Echo Barrera RN  Supportive Measures:   active listening utilized   decision-making supported   goal-setting facilitated  Goal: Symptom-Free Urinary Elimination  Outcome: Progressing  Intervention: Prevent or Manage Bladder Irritation  Recent Flowsheet Documentation  Taken 8/24/2024 1700 by Echo Barrera RN  Pain Management Interventions:   heat applied   repositioned  Urinary Elimination Promotion: frequent voiding encouraged  Hyperhydration Management: fluids provided  Goal: Diarrhea Symptom Control  Outcome: Progressing  Intervention: Manage Diarrhea  Recent Flowsheet Documentation  Taken 8/24/2024 1700 by Echo Barrera RN  Skin Protection: adhesive use limited  Perineal Care: perineal hygiene encouraged  Goal: Improved Activity Tolerance  Outcome: Progressing  Intervention: Promote Improved Energy  Recent Flowsheet Documentation  Taken 8/24/2024 1700 by Echo Barrera RN  Fatigue Management:   activity assistance provided   frequent rest breaks encouraged   paced activity encouraged  Activity Management:   activity adjusted per tolerance   ambulated to bathroom   up in chair  Environmental Support:   calm environment promoted   distractions minimized   environmental consistency promoted   personal routine  supported   rest periods encouraged  Goal: Blood Counts Within Acceptable Range  Outcome: Progressing  Intervention: Monitor and Manage Hematologic Symptoms  Recent Flowsheet Documentation  Taken 8/24/2024 1700 by Echo Barrera RN  Bleeding Precautions:   blood pressure closely monitored   gentle oral care promoted   monitored for signs of bleeding  Medication Review/Management:   medications reviewed   high-risk medications identified  Goal: Absence of Hypersensitivity Reaction  Outcome: Progressing  Goal: Absence of Infection  Outcome: Progressing  Intervention: Prevent and Manage Infection  Recent Flowsheet Documentation  Taken 8/24/2024 1700 by Echo Barrera RN  Infection Prevention:   equipment surfaces disinfected   hand hygiene promoted   rest/sleep promoted   single patient room provided  Infection Management: aseptic technique maintained  Isolation Precautions: protective environment maintained  Goal: Improved Oral Mucous Membrane Health and Integrity  Outcome: Progressing  Intervention: Promote Oral Comfort and Health  Recent Flowsheet Documentation  Taken 8/24/2024 1700 by Echo Barrera RN  Oral Care: oral rinse provided  Goal: Nausea and Vomiting Symptom Relief  Outcome: Progressing  Intervention: Prevent and Manage Nausea and Vomiting  Recent Flowsheet Documentation  Taken 8/24/2024 1700 by Echo Barrera RN  Nausea/Vomiting Interventions: antiemetic  Goal: Optimal Nutrition Intake  Outcome: Progressing  Intervention: Minimize and Manage Barriers to Oral Intake  Recent Flowsheet Documentation  Taken 8/24/2024 1700 by Echo Barrera RN  Oral Nutrition Promotion:   physical activity promoted   rest periods promoted

## 2024-08-25 LAB
ACANTHOCYTES BLD QL SMEAR: ABNORMAL
ANION GAP SERPL CALCULATED.3IONS-SCNC: 8 MMOL/L (ref 7–15)
AUER BODIES BLD QL SMEAR: ABNORMAL
BASO STIPL BLD QL SMEAR: ABNORMAL
BASOPHILS # BLD AUTO: ABNORMAL 10*3/UL
BASOPHILS NFR BLD AUTO: ABNORMAL %
BITE CELLS BLD QL SMEAR: ABNORMAL
BLD PROD TYP BPU: NORMAL
BLISTER CELLS BLD QL SMEAR: ABNORMAL
BLOOD COMPONENT TYPE: NORMAL
BUN SERPL-MCNC: 17.7 MG/DL (ref 8–23)
BURR CELLS BLD QL SMEAR: ABNORMAL
CALCIUM SERPL-MCNC: 8.9 MG/DL (ref 8.8–10.4)
CHLORIDE SERPL-SCNC: 102 MMOL/L (ref 98–107)
CMV DNA SPEC NAA+PROBE-ACNC: NOT DETECTED IU/ML
CODING SYSTEM: NORMAL
CREAT SERPL-MCNC: 0.73 MG/DL (ref 0.67–1.17)
DACRYOCYTES BLD QL SMEAR: ABNORMAL
EGFRCR SERPLBLD CKD-EPI 2021: >90 ML/MIN/1.73M2
ELLIPTOCYTES BLD QL SMEAR: ABNORMAL
EOSINOPHIL # BLD AUTO: ABNORMAL 10*3/UL
EOSINOPHIL NFR BLD AUTO: ABNORMAL %
ERYTHROCYTE [DISTWIDTH] IN BLOOD BY AUTOMATED COUNT: 16.7 % (ref 10–15)
FRAGMENTS BLD QL SMEAR: SLIGHT
GLUCOSE SERPL-MCNC: 105 MG/DL (ref 70–99)
HCO3 SERPL-SCNC: 26 MMOL/L (ref 22–29)
HCT VFR BLD AUTO: 26.2 % (ref 40–53)
HGB BLD-MCNC: 9.2 G/DL (ref 13.3–17.7)
HGB C CRYSTALS: ABNORMAL
HOWELL-JOLLY BOD BLD QL SMEAR: ABNORMAL
IMM GRANULOCYTES # BLD: ABNORMAL 10*3/UL
IMM GRANULOCYTES NFR BLD: ABNORMAL %
ISSUE DATE AND TIME: NORMAL
LYMPHOCYTES # BLD AUTO: ABNORMAL 10*3/UL
LYMPHOCYTES NFR BLD AUTO: ABNORMAL %
MAGNESIUM SERPL-MCNC: 1.9 MG/DL (ref 1.7–2.3)
MCH RBC QN AUTO: 28.3 PG (ref 26.5–33)
MCHC RBC AUTO-ENTMCNC: 35.1 G/DL (ref 31.5–36.5)
MCV RBC AUTO: 81 FL (ref 78–100)
MONOCYTES # BLD AUTO: ABNORMAL 10*3/UL
MONOCYTES NFR BLD AUTO: ABNORMAL %
NEUTROPHILS # BLD AUTO: ABNORMAL 10*3/UL
NEUTROPHILS NFR BLD AUTO: ABNORMAL %
NEUTS HYPERSEG BLD QL SMEAR: ABNORMAL
PHOSPHATE SERPL-MCNC: 3.5 MG/DL (ref 2.5–4.5)
PLAT MORPH BLD: ABNORMAL
PLATELET # BLD AUTO: 16 10E3/UL (ref 150–450)
POLYCHROMASIA BLD QL SMEAR: ABNORMAL
POTASSIUM SERPL-SCNC: 4.1 MMOL/L (ref 3.4–5.3)
RBC # BLD AUTO: 3.25 10E6/UL (ref 4.4–5.9)
RBC AGGLUT BLD QL: ABNORMAL
RBC MORPH BLD: ABNORMAL
ROULEAUX BLD QL SMEAR: ABNORMAL
SICKLE CELLS BLD QL SMEAR: ABNORMAL
SMUDGE CELLS BLD QL SMEAR: ABNORMAL
SODIUM SERPL-SCNC: 136 MMOL/L (ref 135–145)
SPHEROCYTES BLD QL SMEAR: ABNORMAL
STOMATOCYTES BLD QL SMEAR: ABNORMAL
TARGETS BLD QL SMEAR: ABNORMAL
TOXIC GRANULES BLD QL SMEAR: ABNORMAL
UNIT ABO/RH: NORMAL
UNIT NUMBER: NORMAL
UNIT STATUS: NORMAL
UNIT TYPE ISBT: 5100
VARIANT LYMPHS BLD QL SMEAR: ABNORMAL
WBC # BLD AUTO: 0.2 10E3/UL (ref 4–11)

## 2024-08-25 PROCEDURE — 83735 ASSAY OF MAGNESIUM: CPT | Performed by: INTERNAL MEDICINE

## 2024-08-25 PROCEDURE — P9037 PLATE PHERES LEUKOREDU IRRAD: HCPCS

## 2024-08-25 PROCEDURE — 250N000011 HC RX IP 250 OP 636: Performed by: INTERNAL MEDICINE

## 2024-08-25 PROCEDURE — 84100 ASSAY OF PHOSPHORUS: CPT | Performed by: INTERNAL MEDICINE

## 2024-08-25 PROCEDURE — 250N000011 HC RX IP 250 OP 636: Performed by: RADIOLOGY

## 2024-08-25 PROCEDURE — 250N000013 HC RX MED GY IP 250 OP 250 PS 637

## 2024-08-25 PROCEDURE — 258N000003 HC RX IP 258 OP 636

## 2024-08-25 PROCEDURE — 80048 BASIC METABOLIC PNL TOTAL CA: CPT

## 2024-08-25 PROCEDURE — S5010 5% DEXTROSE AND 0.45% SALINE: HCPCS

## 2024-08-25 PROCEDURE — 85027 COMPLETE CBC AUTOMATED: CPT

## 2024-08-25 PROCEDURE — 250N000013 HC RX MED GY IP 250 OP 250 PS 637: Performed by: NURSE PRACTITIONER

## 2024-08-25 PROCEDURE — 206N000001 HC R&B BMT UMMC

## 2024-08-25 PROCEDURE — 250N000011 HC RX IP 250 OP 636

## 2024-08-25 PROCEDURE — 250N000013 HC RX MED GY IP 250 OP 250 PS 637: Performed by: INTERNAL MEDICINE

## 2024-08-25 RX ADMIN — METHOCARBAMOL 500 MG: 500 TABLET ORAL at 19:41

## 2024-08-25 RX ADMIN — URSODIOL 300 MG: 300 CAPSULE ORAL at 19:41

## 2024-08-25 RX ADMIN — MICAFUNGIN SODIUM 150 MG: 50 INJECTION, POWDER, LYOPHILIZED, FOR SOLUTION INTRAVENOUS at 11:11

## 2024-08-25 RX ADMIN — URSODIOL 300 MG: 300 CAPSULE ORAL at 08:06

## 2024-08-25 RX ADMIN — ALLOPURINOL 300 MG: 300 TABLET ORAL at 08:06

## 2024-08-25 RX ADMIN — URSODIOL 300 MG: 300 CAPSULE ORAL at 14:44

## 2024-08-25 RX ADMIN — Medication 5 ML: at 06:50

## 2024-08-25 RX ADMIN — Medication 10 ML: at 03:11

## 2024-08-25 RX ADMIN — DEXTROSE AND SODIUM CHLORIDE 1000 ML: 5; 450 INJECTION, SOLUTION INTRAVENOUS at 22:15

## 2024-08-25 RX ADMIN — METHOCARBAMOL 500 MG: 500 TABLET ORAL at 08:06

## 2024-08-25 RX ADMIN — METHOCARBAMOL 500 MG: 500 TABLET ORAL at 14:44

## 2024-08-25 RX ADMIN — TAMSULOSIN HYDROCHLORIDE 0.4 MG: 0.4 CAPSULE ORAL at 17:12

## 2024-08-25 RX ADMIN — Medication 5 ML: at 13:17

## 2024-08-25 RX ADMIN — DEXTROSE AND SODIUM CHLORIDE 1000 ML: 5; 450 INJECTION, SOLUTION INTRAVENOUS at 22:16

## 2024-08-25 RX ADMIN — ACYCLOVIR 800 MG: 800 TABLET ORAL at 19:41

## 2024-08-25 RX ADMIN — LEVOFLOXACIN 250 MG: 250 TABLET, FILM COATED ORAL at 11:11

## 2024-08-25 RX ADMIN — PANTOPRAZOLE SODIUM 40 MG: 40 TABLET, DELAYED RELEASE ORAL at 08:06

## 2024-08-25 RX ADMIN — ACYCLOVIR 800 MG: 800 TABLET ORAL at 08:06

## 2024-08-25 RX ADMIN — POLYETHYLENE GLYCOL 3350 17 G: 17 POWDER, FOR SOLUTION ORAL at 22:25

## 2024-08-25 RX ADMIN — PROCHLORPERAZINE MALEATE 5 MG: 5 TABLET ORAL at 17:11

## 2024-08-25 ASSESSMENT — ACTIVITIES OF DAILY LIVING (ADL)
ADLS_ACUITY_SCORE: 22
ADLS_ACUITY_SCORE: 23
ADLS_ACUITY_SCORE: 22
ADLS_ACUITY_SCORE: 22
ADLS_ACUITY_SCORE: 23
ADLS_ACUITY_SCORE: 22
ADLS_ACUITY_SCORE: 22
ADLS_ACUITY_SCORE: 23
ADLS_ACUITY_SCORE: 23
ADLS_ACUITY_SCORE: 22
ADLS_ACUITY_SCORE: 23

## 2024-08-25 NOTE — PROGRESS NOTES
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     Diagnosis MDS/AML  HCT Type Allogeneic     Prep Regimen Flu/Cy/TBI  Donor Match & Source 8/8 DP permissive PBSC    GVHD Prophylaxis PTCy/MMF/Siro  Primary BMT MD Dr. Murphy    Clinical Trials [id number]       INTERVAL  HISTORY   Leland feels well overall today. Yesterday he walked in the hallway and then took a shower - he noted some fatigue and lightheadedness with his shower. Denied feeling flushed, dizzy, or unsteady on his feet. Eating and drinking well, estimates that he drank 3 pitchers of water yesterday. Had 2-3 loose bowel movements yesterday.     Review of Systems: 10 point ROS negative except as noted above.    PHYSICAL EXAM     Weight In/Out     Wt Readings from Last 3 Encounters:   08/25/24 89.3 kg (196 lb 14.4 oz)   08/09/24 89.9 kg (198 lb 4.8 oz)   08/08/24 90.3 kg (199 lb 1.2 oz)      I/O last 3 completed shifts:  In: 2042 [P.O.:1720; I.V.:100]  Out: 3650 [Urine:3650]     KPS:  80    /79   Pulse 89   Temp 98.5  F (36.9  C) (Oral)   Resp 16   Ht 1.84 m (6' 0.44\")   Wt 89.3 kg (196 lb 14.4 oz)   SpO2 98%   BMI 26.38 kg/m       General: Pleasant, laying in bed comfortably, NAD   HEENT: sclera anicteric, OP clear, buccal mucosa moist, no ulcerations   Lungs: CTAB, no wheezing or crackles   Cardiovascular: RRR, no M/R/G   Abdominal/Rectal: +BS, soft, NT, ND  Lymphatics: No edema  Skin: WWP, no rashes or petechiae  Neuro: A&O, appropriately interactive, speech clear, moving all extremities   Additional Findings: Estrada site CDI, no drainage    Current aGVHD staging:  Skin 0, UGI 0, LGI 0, Liver 0     LABS AND IMAGING: I have assessed all abnormal lab values for their clinical significance and any values considered clinically significant have " been addressed in the assessment and plan.      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     BMT/IEC PROTOCOL for MDS  - Chemo protocol: MT 2022-5  D-6: Flu 30mg/m2, Cy 50mg/kg  D-5 to D-2: Flu  D-1: TBI  D0: stem cell infusion  - Peripheral blood stem cell graft from 8/8 URD donor, ABO matched, Cell dose: TBD  - Engraftment monitoring: Peripheral blood and bone marrow chimerisms on D28, D100, 6 months, 1 and 2 years  - Restaging plan: BMBx with FISH, cytogenetics and NGS on D28, D100, 6 months, 1 and 2 years     HEME/COAG  # Pancytopenia secondary to disease process  - GCSF starts day +5, continue until ANC > 2500 for 2 consecutive days  - Transfusion parameters: hemoglobin <7, platelets <10  - Relevant thrombosis or bleeding history: none     RISK OF GVHD  - Prophylaxis: PTCy 50mg/kg on D+3 and D+4; MMF (D+5 to 35); Sirolimus (starting D+5, target level 5-10)  - Acute GVHD Treatment: None to date  - Chronic GVHD Treatment: None to date  - Starting IVF in PM of 8/25 in anticipation of PTCy      ID  Prophylaxis plan:   - Bacterial: Levaquin 250mg while neutropenic    - Fungal: Micafungin 300mg 3x/week until D+45, followed by mold active azole. Pulm nodules present on workup CT.   - PJP: Bactrim to start at D+28. Toxoplasma negative.   - Viral: Acyclovir 800mg BID. No need for letermovir as donor and recipient are CMV negative.      Monitoring:   - CMV weekly,  negative 8/17, 8/24 in process   - EBV every 2 weeks from D30 to D180     GI/NUTRITION  # GERD: Protonix  - Anti-emetics: Zofran, dex scheduled during chemotherapy. Compazine, lorazepam available PRN.   - Ulcer prophy: Protonix  - VOD prophy: Ursodiol 300mg TID  - Dietician support to prevent malnutrition  - Miralax and senna PRN constipation. For now, Miralax scheduled at bedtime.      CARDIOVASCULAR  # HTN  - BP has been high (160-170s/ 70s) in recent clinic appointments. Recently increased lisinopril to 15mg daily. Monitor and increase PRN.      # CAD: Coronary artery calcifications seen on CT, stress test in 2023 negative     - Risk of cardiomyopathy: Baseline EF 55-60%.   - Risk of arrhythmia: Baseline EKG showed 421      RESPIRATORY  - Baseline PFTs: Normal   - Risk of respiratory complications: Frequent ambulation and incentive spirometer     RENAL/ELECTROLYTES/  - Risk of renal injury: IV hydration   - Electrolyte management: replace per sliding scale  - BPH: Continue home flomax.     DIABETES/ENDOCRINE  - Risk of steroid-induced hyperglyemia: Monitor BG, sliding scale if needed     MUSCULOSKELETAL/FRAILTY  - Baseline Frailty Score: Not frail (0)  - Daily PT/OT as needed while inpatient  - Gout: continue allopurinol      SYMPTOM MANAGEMENT  - Pain management: Outpatient has been taking celecoxib for MSK pain, once a day. Inpatient will try Tramadol - available PRN. Added robaxin TID      SOCIAL DETERMINANTS  - Caregiver: wife, Vani. Lives within the required distance from hospital but may elect to stay in [place].   - Financial/insurance concerns: None    Medically Ready for Discharge: Anticipated in 5+ Days      Clinically Significant Risk Factors                # Thrombocytopenia: Lowest platelets = 16 in last 2 days, will monitor for bleeding   # Hypertension: Noted on problem list           # Overweight: Estimated body mass index is 26.38 kg/m  as calculated from the following:    Height as of this  "encounter: 1.84 m (6' 0.44\").    Weight as of this encounter: 89.3 kg (196 lb 14.4 oz).            Patient was staffed with Dr. Andrea Hooper MD   Hematology/Oncology Fellow PGY6  Pager: [phone]            "

## 2024-08-25 NOTE — PLAN OF CARE
"      AVSS.Pt is up independent in room. Pt had been stand by assist but denied any dizziness and orthostatic vitals improved compared to previous day. Continues to have some stiffness in his neck and shoulders, scheduled robaxin helpful per pt. Complained of some mild nausea around 1700, PRN compazine given x 1 with noted relief. Appetite and oral intake are fair. Pt voiding adequately and BM x 1. Per pt BM was back to normal consistency. Pt was up walking the halls throughout the day. Pt refused 12pm Lisinopril due to some concern of BP's being lower than his usual. Discussed with pt that BP's were within parameters and that his BP could become elevated again without med and pt expressed understanding. Plan for pt is to start pre cytoxan flush tonight. Continue plan of care.           Problem: Adult Inpatient Plan of Care  Goal: Plan of Care Review  Description: The Plan of Care Review/Shift note should be completed every shift.  The Outcome Evaluation is a brief statement about your assessment that the patient is improving, declining, or no change.  This information will be displayed automatically on your shift  note.  Outcome: Progressing  Goal: Patient-Specific Goal (Individualized)  Description: You can add care plan individualizations to a care plan. Examples of Individualization might be:  \"Parent requests to be called daily at 9am for status\", \"I have a hard time hearing out of my right ear\", or \"Do not touch me to wake me up as it startles  me\".  Outcome: Progressing  Goal: Absence of Hospital-Acquired Illness or Injury  Outcome: Progressing  Intervention: Identify and Manage Fall Risk  Recent Flowsheet Documentation  Taken 8/25/2024 1200 by Tiki Ruvalcaba RN  Safety Promotion/Fall Prevention:   assistive device/personal items within reach   clutter free environment maintained   nonskid shoes/slippers when out of bed   patient and family education   room organization consistent   safety round/check " completed   room near nurse's station  Taken 8/25/2024 1013 by Tiki Ruvalcaba RN  Safety Promotion/Fall Prevention: safety round/check completed  Taken 8/25/2024 0800 by Tiki Ruvalcaba RN  Safety Promotion/Fall Prevention:   assistive device/personal items within reach   clutter free environment maintained   nonskid shoes/slippers when out of bed   patient and family education   room near nurse's station   room organization consistent   safety round/check completed  Intervention: Prevent Skin Injury  Recent Flowsheet Documentation  Taken 8/25/2024 0800 by Tiki Ruvalcaba RN  Body Position: position changed independently  Skin Protection: adhesive use limited  Device Skin Pressure Protection: adhesive use limited  Intervention: Prevent and Manage VTE (Venous Thromboembolism) Risk  Recent Flowsheet Documentation  Taken 8/25/2024 0800 by Tiki Ruvalcaba RN  VTE Prevention/Management: SCDs off (sequential compression devices)  Intervention: Prevent Infection  Recent Flowsheet Documentation  Taken 8/25/2024 1200 by Tiki Ruvalcaba RN  Infection Prevention: equipment surfaces disinfected  Taken 8/25/2024 0800 by Tiki Ruvalcaba RN  Infection Prevention: equipment surfaces disinfected  Goal: Optimal Comfort and Wellbeing  Outcome: Progressing  Goal: Readiness for Transition of Care  Outcome: Progressing     Problem: Risk for Delirium  Goal: Optimal Coping  Outcome: Progressing  Intervention: Optimize Psychosocial Adjustment to Delirium  Recent Flowsheet Documentation  Taken 8/25/2024 0800 by iTki Ruvalcaba RN  Supportive Measures: active listening utilized  Goal: Improved Behavioral Control  Outcome: Progressing  Intervention: Minimize Safety Risk  Recent Flowsheet Documentation  Taken 8/25/2024 1200 by Tiki Ruvalcaba RN  Enhanced Safety Measures: room near unit station  Taken 8/25/2024 0800 by Tiki Ruvalcaba RN  Enhanced Safety Measures: room near unit station  Goal: Improved  Attention and Thought Clarity  Outcome: Progressing  Goal: Improved Sleep  Outcome: Progressing  Intervention: Promote Sleep  Recent Flowsheet Documentation  Taken 8/25/2024 0800 by Tiki Ruvalcaba RN  Sleep/Rest Enhancement: comfort measures     Problem: Stem Cell/Bone Marrow Transplant  Goal: Optimal Coping with Transplant  Outcome: Progressing  Intervention: Optimize Patient/Family Adjustment to Transplant  Recent Flowsheet Documentation  Taken 8/25/2024 0800 by Tiki Ruvalcaba RN  Supportive Measures: active listening utilized  Goal: Symptom-Free Urinary Elimination  Outcome: Progressing  Intervention: Prevent or Manage Bladder Irritation  Recent Flowsheet Documentation  Taken 8/25/2024 0800 by Tiki Ruvalcaba RN  Urinary Elimination Promotion: frequent voiding encouraged  Hyperhydration Management: fluids provided  Goal: Diarrhea Symptom Control  Outcome: Progressing  Intervention: Manage Diarrhea  Recent Flowsheet Documentation  Taken 8/25/2024 0800 by Tiki Ruvalcaba RN  Skin Protection: adhesive use limited  Perineal Care: perineal hygiene encouraged  Goal: Improved Activity Tolerance  Outcome: Progressing  Intervention: Promote Improved Energy  Recent Flowsheet Documentation  Taken 8/25/2024 0800 by Tiki Ruvalcaba RN  Fatigue Management: frequent rest breaks encouraged  Sleep/Rest Enhancement: comfort measures  Activity Management: activity adjusted per tolerance  Environmental Support: calm environment promoted  Goal: Blood Counts Within Acceptable Range  Outcome: Progressing  Intervention: Monitor and Manage Hematologic Symptoms  Recent Flowsheet Documentation  Taken 8/25/2024 1200 by Tiki Ruvalcaba RN  Bleeding Precautions: blood pressure closely monitored  Medication Review/Management: medications reviewed  Taken 8/25/2024 0800 by Tiki Ruvalcaba RN  Sleep/Rest Enhancement: comfort measures  Bleeding Precautions: blood pressure closely monitored  Medication  Review/Management: medications reviewed  Goal: Absence of Hypersensitivity Reaction  Outcome: Progressing  Goal: Absence of Infection  Outcome: Progressing  Intervention: Prevent and Manage Infection  Recent Flowsheet Documentation  Taken 8/25/2024 1200 by Tiki Ruvalcaba RN  Infection Prevention: equipment surfaces disinfected  Infection Management: aseptic technique maintained  Isolation Precautions: protective environment maintained  Taken 8/25/2024 0800 by Tiki Ruvalcaba RN  Infection Prevention: equipment surfaces disinfected  Infection Management: aseptic technique maintained  Isolation Precautions: protective environment maintained  Goal: Improved Oral Mucous Membrane Health and Integrity  Outcome: Progressing  Intervention: Promote Oral Comfort and Health  Recent Flowsheet Documentation  Taken 8/25/2024 0800 by Tiki Ruvalcaba RN  Oral Mucous Membrane Protection: lip/mouth moisturizer applied  Oral Care: oral rinse provided  Goal: Nausea and Vomiting Symptom Relief  Outcome: Progressing  Goal: Optimal Nutrition Intake  Outcome: Progressing   Goal Outcome Evaluation:

## 2024-08-25 NOTE — PLAN OF CARE
"/80   Pulse 100   Temp 98.4  F (36.9  C) (Oral)   Resp 16   Ht 1.84 m (6' 0.44\")   Wt 89.2 kg (196 lb 11.2 oz)   SpO2 97%   BMI 26.35 kg/m       Patient afebrile, vital signs stable, no reports of pain or nausea. Patient received platelets this morning. Patient SBA overnight. Continue with plan of care.    Goal Outcome Evaluation:  Problem: Adult Inpatient Plan of Care  Goal: Plan of Care Review  Description: The Plan of Care Review/Shift note should be completed every shift.  The Outcome Evaluation is a brief statement about your assessment that the patient is improving, declining, or no change.  This information will be displayed automatically on your shift  note.  Outcome: Progressing  Flowsheets (Taken 8/25/2024 0329)  Plan of Care Reviewed With: patient  Overall Patient Progress: improving  Goal: Optimal Comfort and Wellbeing  8/25/2024 0329 by Johnatohn Mcintosh RN  Outcome: Progressing  8/25/2024 0328 by Johnathon Mcintosh RN  Outcome: Progressing     Problem: Risk for Delirium  Goal: Optimal Coping  8/25/2024 0329 by Johnathon Mcintosh RN  Outcome: Progressing  8/25/2024 0328 by Johnathon Mcintosh RN  Outcome: Progressing  Goal: Improved Behavioral Control  8/25/2024 0329 by Johnathon Mcintosh RN  Outcome: Progressing  8/25/2024 0328 by Johnathon Mcintosh RN  Outcome: Progressing  Goal: Improved Attention and Thought Clarity  8/25/2024 0329 by Johnathon Mcintosh RN  Outcome: Progressing  8/25/2024 0328 by Johnathon Mcintosh RN  Outcome: Progressing  Goal: Improved Sleep  8/25/2024 0329 by Johnathon Mcintosh RN  Outcome: Progressing  8/25/2024 0328 by Johnathon Mcintosh RN  Outcome: Progressing     Problem: Stem Cell/Bone Marrow Transplant  Goal: Optimal Coping with Transplant  8/25/2024 0329 by Johnathon Mcintosh RN  Outcome: Progressing  8/25/2024 0328 by Johnathon Mcintosh RN  Outcome: Progressing  Goal: Symptom-Free Urinary Elimination  8/25/2024 0329 by Johnathon Mcintosh RN  Outcome: " Progressing  8/25/2024 0328 by Johnathon Mcintosh RN  Outcome: Progressing  Goal: Improved Activity Tolerance  Outcome: Progressing  Intervention: Promote Improved Energy  Recent Flowsheet Documentation  Taken 8/25/2024 0000 by Johnathon Mcintosh RN  Activity Management:   activity adjusted per tolerance   ambulated to bathroom   up in chair  Taken 8/24/2024 2000 by Johnathon Mcintosh RN  Activity Management:   activity adjusted per tolerance   ambulated to bathroom   up in chair  Goal: Blood Counts Within Acceptable Range  Outcome: Progressing  Intervention: Monitor and Manage Hematologic Symptoms  Recent Flowsheet Documentation  Taken 8/25/2024 0000 by Johnathon Mcintosh RN  Medication Review/Management:   medications reviewed   high-risk medications identified  Taken 8/24/2024 2000 by Johnathon Mcintosh RN  Medication Review/Management:   medications reviewed   high-risk medications identified  Goal: Absence of Hypersensitivity Reaction  Outcome: Progressing  Goal: Absence of Infection  Outcome: Progressing  Intervention: Prevent and Manage Infection  Recent Flowsheet Documentation  Taken 8/25/2024 0000 by Johnathon Mcintosh RN  Infection Prevention:   equipment surfaces disinfected   hand hygiene promoted   rest/sleep promoted   single patient room provided  Taken 8/24/2024 2000 by Johnathon Mcintosh RN  Infection Prevention:   equipment surfaces disinfected   hand hygiene promoted   rest/sleep promoted   single patient room provided  Goal: Improved Oral Mucous Membrane Health and Integrity  Outcome: Progressing  Goal: Nausea and Vomiting Symptom Relief  8/25/2024 0329 by Johnathon Mcintosh RN  Outcome: Progressing  8/25/2024 0328 by Johnathon Mcintosh RN  Outcome: Progressing  Goal: Optimal Nutrition Intake  Outcome: Progressing

## 2024-08-26 ENCOUNTER — APPOINTMENT (OUTPATIENT)
Dept: OCCUPATIONAL THERAPY | Facility: CLINIC | Age: 69
DRG: 014 | End: 2024-08-26
Attending: INTERNAL MEDICINE
Payer: COMMERCIAL

## 2024-08-26 LAB
ABO/RH(D): NORMAL
ACANTHOCYTES BLD QL SMEAR: ABNORMAL
ALBUMIN SERPL BCG-MCNC: 3.5 G/DL (ref 3.5–5.2)
ALP SERPL-CCNC: 97 U/L (ref 40–150)
ALT SERPL W P-5'-P-CCNC: 10 U/L (ref 0–70)
ANION GAP SERPL CALCULATED.3IONS-SCNC: 7 MMOL/L (ref 7–15)
ANTIBODY SCREEN: NEGATIVE
AST SERPL W P-5'-P-CCNC: 14 U/L (ref 0–45)
AUER BODIES BLD QL SMEAR: ABNORMAL
BASO STIPL BLD QL SMEAR: ABNORMAL
BASOPHILS # BLD AUTO: ABNORMAL 10*3/UL
BASOPHILS NFR BLD AUTO: ABNORMAL %
BILIRUB DIRECT SERPL-MCNC: 0.25 MG/DL (ref 0–0.3)
BILIRUB SERPL-MCNC: 0.8 MG/DL
BITE CELLS BLD QL SMEAR: ABNORMAL
BLISTER CELLS BLD QL SMEAR: ABNORMAL
BUN SERPL-MCNC: 14.6 MG/DL (ref 8–23)
BURR CELLS BLD QL SMEAR: ABNORMAL
CALCIUM SERPL-MCNC: 8.9 MG/DL (ref 8.8–10.4)
CHLORIDE SERPL-SCNC: 102 MMOL/L (ref 98–107)
CREAT SERPL-MCNC: 0.68 MG/DL (ref 0.67–1.17)
DACRYOCYTES BLD QL SMEAR: ABNORMAL
EGFRCR SERPLBLD CKD-EPI 2021: >90 ML/MIN/1.73M2
ELLIPTOCYTES BLD QL SMEAR: SLIGHT
EOSINOPHIL # BLD AUTO: ABNORMAL 10*3/UL
EOSINOPHIL NFR BLD AUTO: ABNORMAL %
ERYTHROCYTE [DISTWIDTH] IN BLOOD BY AUTOMATED COUNT: 16.8 % (ref 10–15)
FRAGMENTS BLD QL SMEAR: ABNORMAL
GLUCOSE SERPL-MCNC: 123 MG/DL (ref 70–99)
HCO3 SERPL-SCNC: 25 MMOL/L (ref 22–29)
HCT VFR BLD AUTO: 25.5 % (ref 40–53)
HGB BLD-MCNC: 8.8 G/DL (ref 13.3–17.7)
HGB C CRYSTALS: ABNORMAL
HOWELL-JOLLY BOD BLD QL SMEAR: ABNORMAL
IMM GRANULOCYTES # BLD: ABNORMAL 10*3/UL
IMM GRANULOCYTES NFR BLD: ABNORMAL %
INR PPP: 0.99 (ref 0.85–1.15)
LACTATE SERPL-SCNC: 0.8 MMOL/L (ref 0.7–2)
LACTATE SERPL-SCNC: 0.8 MMOL/L (ref 0.7–2)
LYMPHOCYTES # BLD AUTO: ABNORMAL 10*3/UL
LYMPHOCYTES NFR BLD AUTO: ABNORMAL %
MAGNESIUM SERPL-MCNC: 1.9 MG/DL (ref 1.7–2.3)
MCH RBC QN AUTO: 28 PG (ref 26.5–33)
MCHC RBC AUTO-ENTMCNC: 34.5 G/DL (ref 31.5–36.5)
MCV RBC AUTO: 81 FL (ref 78–100)
MONOCYTES # BLD AUTO: ABNORMAL 10*3/UL
MONOCYTES NFR BLD AUTO: ABNORMAL %
NEUTROPHILS # BLD AUTO: ABNORMAL 10*3/UL
NEUTROPHILS NFR BLD AUTO: ABNORMAL %
NEUTS HYPERSEG BLD QL SMEAR: ABNORMAL
PHOSPHATE SERPL-MCNC: 3.2 MG/DL (ref 2.5–4.5)
PLAT MORPH BLD: ABNORMAL
PLATELET # BLD AUTO: 33 10E3/UL (ref 150–450)
POLYCHROMASIA BLD QL SMEAR: ABNORMAL
POTASSIUM SERPL-SCNC: 3.8 MMOL/L (ref 3.4–5.3)
POTASSIUM SERPL-SCNC: 4.1 MMOL/L (ref 3.4–5.3)
PROT SERPL-MCNC: 6.2 G/DL (ref 6.4–8.3)
RBC # BLD AUTO: 3.14 10E6/UL (ref 4.4–5.9)
RBC AGGLUT BLD QL: ABNORMAL
RBC MORPH BLD: ABNORMAL
ROULEAUX BLD QL SMEAR: ABNORMAL
SICKLE CELLS BLD QL SMEAR: ABNORMAL
SMUDGE CELLS BLD QL SMEAR: ABNORMAL
SODIUM SERPL-SCNC: 132 MMOL/L (ref 135–145)
SODIUM SERPL-SCNC: 134 MMOL/L (ref 135–145)
SPECIMEN EXPIRATION DATE: NORMAL
SPHEROCYTES BLD QL SMEAR: ABNORMAL
STOMATOCYTES BLD QL SMEAR: ABNORMAL
TARGETS BLD QL SMEAR: ABNORMAL
TOXIC GRANULES BLD QL SMEAR: ABNORMAL
VARIANT LYMPHS BLD QL SMEAR: ABNORMAL
WBC # BLD AUTO: 0.1 10E3/UL (ref 4–11)

## 2024-08-26 PROCEDURE — 258N000003 HC RX IP 258 OP 636

## 2024-08-26 PROCEDURE — 83605 ASSAY OF LACTIC ACID: CPT | Performed by: INTERNAL MEDICINE

## 2024-08-26 PROCEDURE — 82040 ASSAY OF SERUM ALBUMIN: CPT

## 2024-08-26 PROCEDURE — 250N000013 HC RX MED GY IP 250 OP 250 PS 637

## 2024-08-26 PROCEDURE — 84295 ASSAY OF SERUM SODIUM: CPT

## 2024-08-26 PROCEDURE — 250N000013 HC RX MED GY IP 250 OP 250 PS 637: Performed by: INTERNAL MEDICINE

## 2024-08-26 PROCEDURE — 250N000011 HC RX IP 250 OP 636: Performed by: INTERNAL MEDICINE

## 2024-08-26 PROCEDURE — 258N000003 HC RX IP 258 OP 636: Performed by: INTERNAL MEDICINE

## 2024-08-26 PROCEDURE — 85027 COMPLETE CBC AUTOMATED: CPT

## 2024-08-26 PROCEDURE — 84132 ASSAY OF SERUM POTASSIUM: CPT

## 2024-08-26 PROCEDURE — 250N000011 HC RX IP 250 OP 636

## 2024-08-26 PROCEDURE — 250N000013 HC RX MED GY IP 250 OP 250 PS 637: Performed by: NURSE PRACTITIONER

## 2024-08-26 PROCEDURE — 97110 THERAPEUTIC EXERCISES: CPT | Mod: GO

## 2024-08-26 PROCEDURE — 206N000001 HC R&B BMT UMMC

## 2024-08-26 PROCEDURE — 83735 ASSAY OF MAGNESIUM: CPT

## 2024-08-26 PROCEDURE — 86900 BLOOD TYPING SEROLOGIC ABO: CPT

## 2024-08-26 PROCEDURE — 84100 ASSAY OF PHOSPHORUS: CPT

## 2024-08-26 PROCEDURE — 83605 ASSAY OF LACTIC ACID: CPT | Performed by: HOSPITALIST

## 2024-08-26 PROCEDURE — 82248 BILIRUBIN DIRECT: CPT

## 2024-08-26 PROCEDURE — 85610 PROTHROMBIN TIME: CPT

## 2024-08-26 PROCEDURE — S5010 5% DEXTROSE AND 0.45% SALINE: HCPCS

## 2024-08-26 RX ORDER — POTASSIUM CHLORIDE 29.8 MG/ML
20 INJECTION INTRAVENOUS ONCE
Status: COMPLETED | OUTPATIENT
Start: 2024-08-26 | End: 2024-08-26

## 2024-08-26 RX ORDER — DEXTROSE MONOHYDRATE AND SODIUM CHLORIDE 5; .9 G/100ML; G/100ML
INJECTION, SOLUTION INTRAVENOUS CONTINUOUS
Status: DISCONTINUED | OUTPATIENT
Start: 2024-08-26 | End: 2024-08-27

## 2024-08-26 RX ADMIN — URSODIOL 300 MG: 300 CAPSULE ORAL at 20:28

## 2024-08-26 RX ADMIN — METHOCARBAMOL 500 MG: 500 TABLET ORAL at 07:55

## 2024-08-26 RX ADMIN — DEXTROSE AND SODIUM CHLORIDE: 5; 900 INJECTION, SOLUTION INTRAVENOUS at 18:26

## 2024-08-26 RX ADMIN — ONDANSETRON HYDROCHLORIDE 8 MG: 8 TABLET, FILM COATED ORAL at 09:31

## 2024-08-26 RX ADMIN — FUROSEMIDE 20 MG: 10 INJECTION, SOLUTION INTRAMUSCULAR; INTRAVENOUS at 14:42

## 2024-08-26 RX ADMIN — LEVOFLOXACIN 250 MG: 250 TABLET, FILM COATED ORAL at 12:10

## 2024-08-26 RX ADMIN — CYCLOPHOSPHAMIDE 3930 MG: 2 INJECTION, POWDER, FOR SOLUTION INTRAVENOUS; ORAL at 10:06

## 2024-08-26 RX ADMIN — MICAFUNGIN SODIUM 150 MG: 50 INJECTION, POWDER, LYOPHILIZED, FOR SOLUTION INTRAVENOUS at 12:14

## 2024-08-26 RX ADMIN — URSODIOL 300 MG: 300 CAPSULE ORAL at 14:41

## 2024-08-26 RX ADMIN — TAMSULOSIN HYDROCHLORIDE 0.4 MG: 0.4 CAPSULE ORAL at 16:58

## 2024-08-26 RX ADMIN — POTASSIUM CHLORIDE 20 MEQ: 29.8 INJECTION, SOLUTION INTRAVENOUS at 18:25

## 2024-08-26 RX ADMIN — METHOCARBAMOL 500 MG: 500 TABLET ORAL at 20:28

## 2024-08-26 RX ADMIN — ACYCLOVIR 800 MG: 800 TABLET ORAL at 20:28

## 2024-08-26 RX ADMIN — ALLOPURINOL 300 MG: 300 TABLET ORAL at 07:55

## 2024-08-26 RX ADMIN — METHOCARBAMOL 500 MG: 500 TABLET ORAL at 14:41

## 2024-08-26 RX ADMIN — PANTOPRAZOLE SODIUM 40 MG: 40 TABLET, DELAYED RELEASE ORAL at 07:55

## 2024-08-26 RX ADMIN — DEXTROSE AND SODIUM CHLORIDE 1000 ML: 5; 450 INJECTION, SOLUTION INTRAVENOUS at 17:02

## 2024-08-26 RX ADMIN — ACYCLOVIR 800 MG: 800 TABLET ORAL at 07:55

## 2024-08-26 RX ADMIN — DEXTROSE AND SODIUM CHLORIDE 1000 ML: 5; 450 INJECTION, SOLUTION INTRAVENOUS at 14:41

## 2024-08-26 RX ADMIN — MESNA 3930 MG: 100 INJECTION, SOLUTION INTRAVENOUS at 07:54

## 2024-08-26 RX ADMIN — DEXTROSE AND SODIUM CHLORIDE 1000 ML: 5; 450 INJECTION, SOLUTION INTRAVENOUS at 04:13

## 2024-08-26 RX ADMIN — URSODIOL 300 MG: 300 CAPSULE ORAL at 07:55

## 2024-08-26 RX ADMIN — DEXTROSE AND SODIUM CHLORIDE 1000 ML: 5; 450 INJECTION, SOLUTION INTRAVENOUS at 04:12

## 2024-08-26 RX ADMIN — PROCHLORPERAZINE MALEATE 5 MG: 5 TABLET ORAL at 17:57

## 2024-08-26 RX ADMIN — ONDANSETRON HYDROCHLORIDE 8 MG: 8 TABLET, FILM COATED ORAL at 16:58

## 2024-08-26 ASSESSMENT — ACTIVITIES OF DAILY LIVING (ADL)
ADLS_ACUITY_SCORE: 22

## 2024-08-26 NOTE — PLAN OF CARE
VSS. Afebrile. Up ad sergo. Reports stiffness in neck. Scheduled robaxin provides relief. Patient received cytoxan today. Lasix 20mg given x1. Weight up 1.7kg. Sodium 132 & paged hospitalist & changed fluids to D5NS. Needs 20mEq of potassium this evening. Compazine given x1 for nausea.     Problem: Adult Inpatient Plan of Care  Goal: Absence of Hospital-Acquired Illness or Injury  Intervention: Identify and Manage Fall Risk  Recent Flowsheet Documentation  Taken 8/26/2024 1600 by Claudette Martinez RN  Safety Promotion/Fall Prevention: safety round/check completed     Problem: Adult Inpatient Plan of Care  Goal: Absence of Hospital-Acquired Illness or Injury  Intervention: Prevent Skin Injury  Recent Flowsheet Documentation  Taken 8/26/2024 1600 by Claudette Martinez RN  Body Position: position changed independently     Problem: Adult Inpatient Plan of Care  Goal: Absence of Hospital-Acquired Illness or Injury  Intervention: Prevent Infection  Recent Flowsheet Documentation  Taken 8/26/2024 1600 by Claudette Martinez RN  Infection Prevention:   cohorting utilized   personal protective equipment utilized   Goal Outcome Evaluation:

## 2024-08-26 NOTE — PROGRESS NOTES
Prior Authorization Approval    Medication: MYCOPHENOLATE MOFETIL (GENERIC EQUIV) 250 MG PO CAPS  Authorization Effective Date: 5/25/2024  Authorization Expiration Date: until further notice    Approved Dose/Quantity: 250 mg /    240 caps  Reference #: [id number]   Insurance Company: [employer] - Phone [phone] Fax [phone]  Expected CoPay: $ 12.8      When patients receive transplants that were paid by Medicare Part A, immunosuppressants are paid by Medicare Part B. If a patient did not have Medicare at the time of transplant then immunosuppressants are paid by Medicare Part D. This was a prior authorization to determine if Medicare Part B or D should pay. The approval letters for Medicare B advantage plans will always show as a denial for Medicare D.              Sepideh Kenyon  [facility] Liaison  Phone [phone]  Fax [phone]  Available on Teams & Annalise

## 2024-08-26 NOTE — PLAN OF CARE
"/80 (BP Location: Left arm)   Pulse 89   Temp 98.6  F (37  C) (Oral)   Resp 16   Ht 1.84 m (6' 0.44\")   Wt 89.3 kg (196 lb 14.4 oz)   SpO2 97%   BMI 26.38 kg/m       Pt afebrile, vital signs stable, no reports of pain or nausea. Patient started flush for cytoxin last night. Patient triggered lactic and it resulted at 0.8. No replacements this morning. Patient assist level independent. Continue with plan of care.    Goal Outcome Evaluation:      Plan of Care Reviewed With: patient    Overall Patient Progress: improvingOverall Patient Progress: improving               "

## 2024-08-26 NOTE — PROGRESS NOTES
"MD Sharmaine Bernal messaged on Vocera: \"Hi there, patient is on a D51/2NS fluid flush and his sodium check is 132 FYI\"   "

## 2024-08-26 NOTE — PROGRESS NOTES
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     Diagnosis MDS/AML  HCT Type Allogeneic     Prep Regimen Flu/Cy/TBI  Donor Match & Source 8/8 DP permissive PBSC    GVHD Prophylaxis PTCy/MMF/Siro  Primary BMT MD Dr. Murphy    Clinical Trials [id number]       INTERVAL  HISTORY   Leland feels well overall again today. He started his cytoxan flush overnight and is tolerating it well so far. Denies nausea/vomiting/diarrhea. Eating ok. Denies dizziness and lightheadedness. No fevers or new infectious concerns.     Review of Systems: 10 point ROS negative except as noted above.    PHYSICAL EXAM     Weight In/Out     Wt Readings from Last 3 Encounters:   08/26/24 88.7 kg (195 lb 9.6 oz)   08/09/24 89.9 kg (198 lb 4.8 oz)   08/08/24 90.3 kg (199 lb 1.2 oz)      I/O last 3 completed shifts:  In: 3815 [P.O.:1690; I.V.:2125]  Out: 3400 [Urine:3400]     KPS:  80    /88 (BP Location: Left arm)   Pulse 102   Temp 98.4  F (36.9  C) (Oral)   Resp 16   Ht 1.84 m (6' 0.44\")   Wt 88.7 kg (195 lb 9.6 oz)   SpO2 100%   BMI 26.21 kg/m       General: Pleasant, laying in bed comfortably, NAD   HEENT: sclera anicteric, OP clear, buccal mucosa moist, no ulcerations   Lungs: CTAB, no wheezing or crackles   Cardiovascular: RRR, no M/R/G   Abdominal/Rectal: +BS, soft, NT, ND  Lymphatics: No edema  Skin: WWP, no rashes or petechiae  Neuro: A&O, appropriately interactive, speech clear, moving all extremities   Additional Findings: Estrada site CDI, no drainage    Current aGVHD staging:  Skin 0, UGI 0, LGI 0, Liver 0     LABS AND IMAGING: I have assessed all abnormal lab values for their clinical significance and any values considered clinically significant have been addressed in the assessment and plan.      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     BMT/IEC PROTOCOL for MDS  - Chemo protocol: MT 2022-5  D-6: Flu 30mg/m2, Cy 50mg/kg  D-5 to D-2: Flu  D-1: TBI  D0: stem cell infusion  - Peripheral blood stem cell graft from 8/8 URD donor, ABO matched, Cell dose: TBD  - Engraftment monitoring: Peripheral blood and bone marrow chimerisms on D28, D100, 6 months, 1 and 2 years  - Restaging plan: BMBx with FISH, cytogenetics and NGS on D28, D100, 6 months, 1 and 2 years     HEME/COAG  # Pancytopenia secondary to disease process  - GCSF starts day +5, continue until ANC > 2500 for 2 consecutive days  - Transfusion parameters: hemoglobin <7, platelets <10  - Relevant thrombosis or bleeding history: none     RISK OF GVHD  - Prophylaxis: PTCy 50mg/kg on D+3 and D+4; MMF (D+5 to 35); Sirolimus (starting D+5, target level 5-10)  - Acute GVHD Treatment: None to date  - Chronic GVHD Treatment: None to date     ID  Prophylaxis plan:   - Bacterial: Levaquin 250mg while neutropenic    - Fungal: Micafungin 300mg 3x/week until D+45, followed by mold active azole. Pulm nodules present on workup CT.   - PJP: Bactrim to start at D+28. Toxoplasma negative.   - Viral: Acyclovir 800mg BID. No need for letermovir as donor and recipient are CMV negative.      Monitoring:   - CMV weekly, negative 8/17, 8/24 in process   - EBV every 2 weeks from D30 to D180     GI/NUTRITION  # GERD: Protonix  - Anti-emetics:  "Zofran, dex scheduled during chemotherapy. Compazine, lorazepam available PRN.   - Ulcer prophy: Protonix  - VOD prophy: Ursodiol 300mg TID  - Dietician support to prevent malnutrition  - Miralax and senna PRN constipation. For now, Miralax scheduled at bedtime.      CARDIOVASCULAR  # HTN  - BP has been high (160-170s/ 70s) in recent clinic appointments. Recently increased lisinopril to 15mg daily. Monitor and increase PRN. Hold starting 8/26 with orthostatics and dizziness during shower yesterday.      # CAD: Coronary artery calcifications seen on CT, stress test in 2023 negative     - Risk of cardiomyopathy: Baseline EF 55-60%.   - Risk of arrhythmia: Baseline EKG showed 421      RESPIRATORY  - Baseline PFTs: Normal   - Risk of respiratory complications: Frequent ambulation and incentive spirometer     RENAL/ELECTROLYTES/  - Risk of renal injury: IV hydration   - Electrolyte management: replace per sliding scale  - BPH: Continue home flomax.     DIABETES/ENDOCRINE  - Risk of steroid-induced hyperglyemia: Monitor BG, sliding scale if needed     MUSCULOSKELETAL/FRAILTY  - Baseline Frailty Score: Not frail (0)  - Daily PT/OT as needed while inpatient  - Gout: continue allopurinol      SYMPTOM MANAGEMENT  - Pain management: Outpatient has been taking celecoxib for MSK pain, once a day. Inpatient will try Tramadol - available PRN. Added robaxin TID      SOCIAL DETERMINANTS  - Caregiver: wife, Vani. Lives within the required distance from hospital but may elect to stay in [facility].   - Financial/insurance concerns: None    Medically Ready for Discharge: Anticipated in 5+ Days      Clinically Significant Risk Factors                # Thrombocytopenia: Lowest platelets = 16 in last 2 days, will monitor for bleeding   # Hypertension: Noted on problem list           # Overweight: Estimated body mass index is 26.21 kg/m  as calculated from the following:    Height as of this encounter: 1.84 m (6' 0.44\").    Weight as of " this encounter: 88.7 kg (195 lb 9.6 oz).            I spent 35 minutes in the care of this patient today, which included time necessary for preparation for the visit, obtaining history, ordering medications/tests/procedures as medically indicated, review of pertinent medical literature, counseling of the patient, communication of recommendations to the care team, and documentation time.    Kaitlyn Aguilar, CNP  Vocera or Pager [phone]

## 2024-08-26 NOTE — CONSULTS
Discharge Pharmacy Test Claim    Patient's [employer]BS [place] Medicare Advantage plan covers sirolimus and mycophenolate. Expected monthly copay for each is listed below.    Test Claim Copay   Sirolimus 85.21   Mycophenolate  12.80       Sepideh Kenyon  [facility] Liaison  Phone: [phone] Fax: [phone]  Available on Teams & Vocera

## 2024-08-26 NOTE — PROGRESS NOTES
Chemo drug: Cytoxan flush  Tolerated: Well, no complaints.  Intervention: Scheduled zofran, 20mg lasix x1  Response: Good output  Plan: Cytoxan tomorrow & continues cytoxan flush  Lab: Na, K--Na 132, fluids changed to D5NS, K-needs 20mEq of potassium  Wt/intervention: Weight up 1.7kg--does not meet call parameters

## 2024-08-27 LAB
ACANTHOCYTES BLD QL SMEAR: NORMAL
ANION GAP SERPL CALCULATED.3IONS-SCNC: 7 MMOL/L (ref 7–15)
AUER BODIES BLD QL SMEAR: NORMAL
BASO STIPL BLD QL SMEAR: NORMAL
BASOPHILS # BLD AUTO: ABNORMAL 10*3/UL
BASOPHILS NFR BLD AUTO: ABNORMAL %
BITE CELLS BLD QL SMEAR: NORMAL
BLD PROD TYP BPU: NORMAL
BLISTER CELLS BLD QL SMEAR: NORMAL
BLOOD COMPONENT TYPE: NORMAL
BUN SERPL-MCNC: 11.4 MG/DL (ref 8–23)
BURR CELLS BLD QL SMEAR: NORMAL
CALCIUM SERPL-MCNC: 8.3 MG/DL (ref 8.8–10.4)
CHLORIDE SERPL-SCNC: 99 MMOL/L (ref 98–107)
CODING SYSTEM: NORMAL
CREAT SERPL-MCNC: 0.63 MG/DL (ref 0.67–1.17)
DACRYOCYTES BLD QL SMEAR: NORMAL
EGFRCR SERPLBLD CKD-EPI 2021: >90 ML/MIN/1.73M2
ELLIPTOCYTES BLD QL SMEAR: NORMAL
EOSINOPHIL # BLD AUTO: ABNORMAL 10*3/UL
EOSINOPHIL NFR BLD AUTO: ABNORMAL %
ERYTHROCYTE [DISTWIDTH] IN BLOOD BY AUTOMATED COUNT: 16.5 % (ref 10–15)
FRAGMENTS BLD QL SMEAR: NORMAL
GLUCOSE SERPL-MCNC: 133 MG/DL (ref 70–99)
HCO3 SERPL-SCNC: 23 MMOL/L (ref 22–29)
HCT VFR BLD AUTO: 20.9 % (ref 40–53)
HGB BLD-MCNC: 7.6 G/DL (ref 13.3–17.7)
HGB C CRYSTALS: NORMAL
HOWELL-JOLLY BOD BLD QL SMEAR: NORMAL
IMM GRANULOCYTES # BLD: ABNORMAL 10*3/UL
IMM GRANULOCYTES NFR BLD: ABNORMAL %
ISSUE DATE AND TIME: NORMAL
LYMPHOCYTES # BLD AUTO: ABNORMAL 10*3/UL
LYMPHOCYTES NFR BLD AUTO: ABNORMAL %
MAGNESIUM SERPL-MCNC: 1.6 MG/DL (ref 1.7–2.3)
MCH RBC QN AUTO: 28.7 PG (ref 26.5–33)
MCHC RBC AUTO-ENTMCNC: 36.4 G/DL (ref 31.5–36.5)
MCV RBC AUTO: 79 FL (ref 78–100)
MONOCYTES # BLD AUTO: ABNORMAL 10*3/UL
MONOCYTES NFR BLD AUTO: ABNORMAL %
NEUTROPHILS # BLD AUTO: ABNORMAL 10*3/UL
NEUTROPHILS NFR BLD AUTO: ABNORMAL %
NEUTS HYPERSEG BLD QL SMEAR: NORMAL
PHOSPHATE SERPL-MCNC: 2.4 MG/DL (ref 2.5–4.5)
PLAT MORPH BLD: NORMAL
PLATELET # BLD AUTO: 16 10E3/UL (ref 150–450)
POLYCHROMASIA BLD QL SMEAR: NORMAL
POTASSIUM SERPL-SCNC: 3.7 MMOL/L (ref 3.4–5.3)
POTASSIUM SERPL-SCNC: 3.9 MMOL/L (ref 3.4–5.3)
POTASSIUM SERPL-SCNC: 3.9 MMOL/L (ref 3.4–5.3)
RBC # BLD AUTO: 2.65 10E6/UL (ref 4.4–5.9)
RBC AGGLUT BLD QL: NORMAL
RBC MORPH BLD: NORMAL
ROULEAUX BLD QL SMEAR: NORMAL
SICKLE CELLS BLD QL SMEAR: NORMAL
SMUDGE CELLS BLD QL SMEAR: NORMAL
SODIUM SERPL-SCNC: 128 MMOL/L (ref 135–145)
SODIUM SERPL-SCNC: 129 MMOL/L (ref 135–145)
SODIUM SERPL-SCNC: 129 MMOL/L (ref 135–145)
SPHEROCYTES BLD QL SMEAR: NORMAL
STOMATOCYTES BLD QL SMEAR: NORMAL
TARGETS BLD QL SMEAR: NORMAL
TOXIC GRANULES BLD QL SMEAR: NORMAL
UNIT ABO/RH: NORMAL
UNIT NUMBER: NORMAL
UNIT STATUS: NORMAL
UNIT TYPE ISBT: 5100
VARIANT LYMPHS BLD QL SMEAR: NORMAL
WBC # BLD AUTO: <0.1 10E3/UL (ref 4–11)

## 2024-08-27 PROCEDURE — 83735 ASSAY OF MAGNESIUM: CPT | Performed by: STUDENT IN AN ORGANIZED HEALTH CARE EDUCATION/TRAINING PROGRAM

## 2024-08-27 PROCEDURE — 85027 COMPLETE CBC AUTOMATED: CPT

## 2024-08-27 PROCEDURE — 250N000013 HC RX MED GY IP 250 OP 250 PS 637: Performed by: NURSE PRACTITIONER

## 2024-08-27 PROCEDURE — 80048 BASIC METABOLIC PNL TOTAL CA: CPT

## 2024-08-27 PROCEDURE — 258N000003 HC RX IP 258 OP 636: Performed by: INTERNAL MEDICINE

## 2024-08-27 PROCEDURE — 84100 ASSAY OF PHOSPHORUS: CPT | Performed by: STUDENT IN AN ORGANIZED HEALTH CARE EDUCATION/TRAINING PROGRAM

## 2024-08-27 PROCEDURE — 250N000011 HC RX IP 250 OP 636

## 2024-08-27 PROCEDURE — 250N000013 HC RX MED GY IP 250 OP 250 PS 637: Performed by: INTERNAL MEDICINE

## 2024-08-27 PROCEDURE — 250N000013 HC RX MED GY IP 250 OP 250 PS 637

## 2024-08-27 PROCEDURE — 258N000003 HC RX IP 258 OP 636

## 2024-08-27 PROCEDURE — 250N000011 HC RX IP 250 OP 636: Performed by: INTERNAL MEDICINE

## 2024-08-27 PROCEDURE — P9037 PLATE PHERES LEUKOREDU IRRAD: HCPCS

## 2024-08-27 PROCEDURE — 250N000009 HC RX 250: Performed by: INTERNAL MEDICINE

## 2024-08-27 PROCEDURE — 84295 ASSAY OF SERUM SODIUM: CPT

## 2024-08-27 PROCEDURE — 206N000001 HC R&B BMT UMMC

## 2024-08-27 PROCEDURE — 84132 ASSAY OF SERUM POTASSIUM: CPT

## 2024-08-27 RX ORDER — SODIUM CHLORIDE 9 MG/ML
INJECTION, SOLUTION INTRAVENOUS CONTINUOUS
Status: DISCONTINUED | OUTPATIENT
Start: 2024-08-27 | End: 2024-09-02

## 2024-08-27 RX ORDER — POTASSIUM PHOS IN 0.9 % NACL 15MMOL/250
15 PLASTIC BAG, INJECTION (ML) INTRAVENOUS ONCE
Status: COMPLETED | OUTPATIENT
Start: 2024-08-27 | End: 2024-08-27

## 2024-08-27 RX ORDER — POTASSIUM CHLORIDE 29.8 MG/ML
20 INJECTION INTRAVENOUS ONCE
Status: COMPLETED | OUTPATIENT
Start: 2024-08-27 | End: 2024-08-27

## 2024-08-27 RX ORDER — FUROSEMIDE 10 MG/ML
20 INJECTION INTRAMUSCULAR; INTRAVENOUS ONCE
Status: COMPLETED | OUTPATIENT
Start: 2024-08-27 | End: 2024-08-27

## 2024-08-27 RX ADMIN — METHOCARBAMOL 500 MG: 500 TABLET ORAL at 13:53

## 2024-08-27 RX ADMIN — MESNA 3930 MG: 100 INJECTION, SOLUTION INTRAVENOUS at 07:06

## 2024-08-27 RX ADMIN — URSODIOL 300 MG: 300 CAPSULE ORAL at 21:06

## 2024-08-27 RX ADMIN — PANTOPRAZOLE SODIUM 40 MG: 40 TABLET, DELAYED RELEASE ORAL at 07:44

## 2024-08-27 RX ADMIN — POTASSIUM PHOSPHATE, MONOBASIC POTASSIUM PHOSPHATE, DIBASIC 15 MMOL: 224; 236 INJECTION, SOLUTION, CONCENTRATE INTRAVENOUS at 05:19

## 2024-08-27 RX ADMIN — FUROSEMIDE 20 MG: 10 INJECTION, SOLUTION INTRAMUSCULAR; INTRAVENOUS at 16:34

## 2024-08-27 RX ADMIN — TRAMADOL HYDROCHLORIDE 50 MG: 50 TABLET, COATED ORAL at 11:56

## 2024-08-27 RX ADMIN — ACYCLOVIR 800 MG: 800 TABLET ORAL at 07:44

## 2024-08-27 RX ADMIN — METHOCARBAMOL 500 MG: 500 TABLET ORAL at 21:06

## 2024-08-27 RX ADMIN — CYCLOPHOSPHAMIDE 3930 MG: 2 INJECTION, POWDER, FOR SOLUTION INTRAVENOUS; ORAL at 10:10

## 2024-08-27 RX ADMIN — MICAFUNGIN SODIUM 150 MG: 50 INJECTION, POWDER, LYOPHILIZED, FOR SOLUTION INTRAVENOUS at 11:44

## 2024-08-27 RX ADMIN — POLYETHYLENE GLYCOL 3350 17 G: 17 POWDER, FOR SOLUTION ORAL at 21:06

## 2024-08-27 RX ADMIN — DEXTROSE AND SODIUM CHLORIDE: 5; 900 INJECTION, SOLUTION INTRAVENOUS at 03:28

## 2024-08-27 RX ADMIN — METHOCARBAMOL 500 MG: 500 TABLET ORAL at 07:44

## 2024-08-27 RX ADMIN — ONDANSETRON HYDROCHLORIDE 8 MG: 8 TABLET, FILM COATED ORAL at 16:34

## 2024-08-27 RX ADMIN — FUROSEMIDE 20 MG: 10 INJECTION, SOLUTION INTRAMUSCULAR; INTRAVENOUS at 11:32

## 2024-08-27 RX ADMIN — POTASSIUM CHLORIDE 20 MEQ: 29.8 INJECTION, SOLUTION INTRAVENOUS at 21:56

## 2024-08-27 RX ADMIN — SODIUM CHLORIDE: 9 INJECTION, SOLUTION INTRAVENOUS at 20:26

## 2024-08-27 RX ADMIN — TRAMADOL HYDROCHLORIDE 50 MG: 50 TABLET, COATED ORAL at 03:28

## 2024-08-27 RX ADMIN — SODIUM CHLORIDE: 9 INJECTION, SOLUTION INTRAVENOUS at 04:51

## 2024-08-27 RX ADMIN — TAMSULOSIN HYDROCHLORIDE 0.4 MG: 0.4 CAPSULE ORAL at 16:34

## 2024-08-27 RX ADMIN — SODIUM CHLORIDE: 9 INJECTION, SOLUTION INTRAVENOUS at 18:00

## 2024-08-27 RX ADMIN — ONDANSETRON HYDROCHLORIDE 8 MG: 8 TABLET, FILM COATED ORAL at 00:42

## 2024-08-27 RX ADMIN — TRAMADOL HYDROCHLORIDE 50 MG: 50 TABLET, COATED ORAL at 18:03

## 2024-08-27 RX ADMIN — LEVOFLOXACIN 250 MG: 250 TABLET, FILM COATED ORAL at 11:32

## 2024-08-27 RX ADMIN — ALLOPURINOL 300 MG: 300 TABLET ORAL at 07:44

## 2024-08-27 RX ADMIN — URSODIOL 300 MG: 300 CAPSULE ORAL at 13:53

## 2024-08-27 RX ADMIN — ACYCLOVIR 800 MG: 800 TABLET ORAL at 21:06

## 2024-08-27 RX ADMIN — ONDANSETRON HYDROCHLORIDE 8 MG: 8 TABLET, FILM COATED ORAL at 09:21

## 2024-08-27 RX ADMIN — URSODIOL 300 MG: 300 CAPSULE ORAL at 07:44

## 2024-08-27 RX ADMIN — FUROSEMIDE 10 MG: 10 INJECTION, SOLUTION INTRAMUSCULAR; INTRAVENOUS at 08:23

## 2024-08-27 ASSESSMENT — ACTIVITIES OF DAILY LIVING (ADL)
ADLS_ACUITY_SCORE: 22

## 2024-08-27 NOTE — PROGRESS NOTES
Chemo drug: Cytoxan  Tolerated: Well, no complaints  Intervention: Scheduled anti-emetics  Response: Denies nausea.  Plan: Cytoxan flush until 8/28/24 @ 1250  Lab: Na, K: Due at 1600  Wt/intervention: Due at 1600

## 2024-08-27 NOTE — PLAN OF CARE
2824-1193  VSS. Afebrile. Up ad sergo. Walked in hallways. Reports continued stiff neck. Robaxin scheduled given & provides relief. Tramadol given x1. Lasix 10mg given x1. Lasix 20mg given x1 per provider. Patient will need 20mg lasix x1 for I>O shift total. Patient had temporary bloody nose, but stopped quickly. Orthostatic positive this AM & notified provider, but no change in orders. Cytoxan flush to continue tomorrow 8/28/24 until 1250.  Problem: Risk for Delirium  Goal: Optimal Coping  Outcome: Progressing  Goal: Improved Behavioral Control  Outcome: Progressing  Goal: Improved Attention and Thought Clarity  Outcome: Progressing  Goal: Improved Sleep  Outcome: Progressing   Goal Outcome Evaluation:

## 2024-08-27 NOTE — PROGRESS NOTES
Hours of care: 0920-2971     Neuro: A&Ox4.   Vitals: Afebrile, VSS.  Lactic triggered result: 0.8.   Respiratory: RA, lung sounds clear, denies SOB  GI/: Voiding spontaneously. No BM this shift.  Diet/appetite: Tolerating high calorie/ high protein diet . Denies nausea. No appetite.  Activity: Up independently     Pain: Reported 5/10 neck pain, causing headache. Tramadol admin, reported 1/10 after.  Skin: No new deficits noted.  Lines: CVC right side  Drains: none  Replacements: Platelets & Phos infusing. Na 129, provider changed IV fluids to NS.      Plan: Continue with current POC. Report changes to primary team. Cytoxin flush continue until tomorrow afternoon, meeting voiding parameters.

## 2024-08-27 NOTE — PROVIDER NOTIFICATION
"Provider notified    \"Pt Na 129 this morning, on D5 + 0.9% NaCl for cytoxan flush.\"    Fluids changed to NS per new order.   "

## 2024-08-27 NOTE — PROGRESS NOTES
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     Diagnosis MDS/AML  HCT Type Allogeneic     Prep Regimen Flu/Cy/TBI  Donor Match & Source 8/8 DP permissive PBSC    GVHD Prophylaxis PTCy/MMF/Siro  Primary BMT MD Dr. Murphy    Clinical Trials [id number]       INTERVAL  HISTORY   Leland is fatigued this morning. Difficult night with frequent voiding with cytoxan flush. He offers no there complaints. Denies n/v/d/c. Eating ok. No fevers or new infectious concerns.     Review of Systems: 10 point ROS negative except as noted above.    PHYSICAL EXAM     Weight In/Out     Wt Readings from Last 3 Encounters:   08/27/24 91.8 kg (202 lb 6.4 oz)   08/09/24 89.9 kg (198 lb 4.8 oz)   08/08/24 90.3 kg (199 lb 1.2 oz)      I/O last 3 completed shifts:  In: 7974.8 [P.O.:1940; I.V.:4511; IV Piggyback:1523.8]  Out: 5550 [Urine:5550]     KPS:  80    BP (!) 144/86   Pulse 90   Temp 98.7  F (37.1  C) (Oral)   Resp 16   Ht 1.84 m (6' 0.44\")   Wt 91.8 kg (202 lb 6.4 oz)   SpO2 96%   BMI 27.12 kg/m       General: Pleasant, laying in bed comfortably, NAD   HEENT: sclera anicteric, OP clear, buccal mucosa moist, no ulcerations   Lungs: CTAB, no wheezing or crackles   Cardiovascular: RRR, no M/R/G   Abdominal/Rectal: +BS, soft, NT, ND  Lymphatics: No edema  Skin: WWP, no rashes or petechiae  Neuro: A&O, appropriately interactive, speech clear, moving all extremities   Additional Findings: Estrada site CDI, no drainage    Current aGVHD staging:  Skin 0, UGI 0, LGI 0, Liver 0     LABS AND IMAGING: I have assessed all abnormal lab values for their clinical significance and any values considered clinically significant have been addressed in the assessment and plan.      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     BMT/IEC PROTOCOL for MDS  - Chemo protocol: MT 2022-5  D-6: Flu 30mg/m2, Cy 50mg/kg  D-5 to D-2: Flu  D-1: TBI  D0: stem cell infusion  - Peripheral blood stem cell graft from 8/8 URD donor, ABO matched, Cell dose: TBD  - Engraftment monitoring: Peripheral blood and bone marrow chimerisms on D28, D100, 6 months, 1 and 2 years  - Restaging plan: BMBx with FISH, cytogenetics and NGS on D28, D100, 6 months, 1 and 2 years     HEME/COAG  # Pancytopenia secondary to disease process  - GCSF starts day +5, continue until ANC > 2500 for 2 consecutive days  - Transfusion parameters: hemoglobin <7, platelets <10  - Relevant thrombosis or bleeding history: none     RISK OF GVHD  - Prophylaxis: PTCy 50mg/kg on D+3 and D+4; MMF (D+5 to 35); Sirolimus (starting D+5, target level 5-10)  - Acute GVHD Treatment: None to date  - Chronic GVHD Treatment: None to date     ID  Prophylaxis plan:   - Bacterial: Levaquin 250mg while neutropenic    - Fungal: Micafungin 300mg 3x/week until D+45, followed by mold active azole. Pulm nodules present on workup CT.   - PJP: Bactrim to start at D+28. Toxoplasma negative.   - Viral: Acyclovir 800mg BID. No need for letermovir as donor and recipient are CMV negative.      Monitoring:   - CMV weekly, negative 8/17, 8/24 in process   - EBV every 2 weeks from D30 to D180     GI/NUTRITION  # GERD:  Protonix  - Anti-emetics: Zofran, dex scheduled during chemotherapy. Compazine, lorazepam available PRN.   - Ulcer prophy: Protonix  - VOD prophy: Ursodiol 300mg TID  - Dietician support to prevent malnutrition  - Miralax and senna PRN constipation. For now, Miralax scheduled at bedtime.      CARDIOVASCULAR  # HTN  - BP has been high (160-170s/ 70s) in recent clinic appointments. Recently increased lisinopril to 15mg daily. Monitor and increase PRN. Hold starting 8/26 with orthostatics and dizziness during shower 8/25.      # CAD: Coronary artery calcifications seen on CT, stress test in 2023 negative     - Risk of cardiomyopathy: Baseline EF 55-60%.   - Risk of arrhythmia: Baseline EKG showed 421      RESPIRATORY  - Baseline PFTs: Normal   - Risk of respiratory complications: Frequent ambulation and incentive spirometer     RENAL/ELECTROLYTES/  - Risk of renal injury: IV hydration   - Electrolyte management: replace per sliding scale  - BPH: Continue home flomax.  - Hyponatremia- secondary to SIADH with cytoxan flush. Changed fluids to NS.   - Hypervolemia secondary to cytoxan flush. Additional lasix 20mg IV x1 today, continue to monitor volume status closely.      DIABETES/ENDOCRINE  - Risk of steroid-induced hyperglyemia: Monitor BG, sliding scale if needed     MUSCULOSKELETAL/FRAILTY  - Baseline Frailty Score: Not frail (0)  - Daily PT/OT as needed while inpatient  - Gout: continue allopurinol      SYMPTOM MANAGEMENT  - Pain management: Outpatient has been taking celecoxib for MSK pain, once a day. Inpatient will try Tramadol - available PRN. Added robaxin TID      SOCIAL DETERMINANTS  - Caregiver: wife, Vani. Lives within the required distance from hospital but may elect to stay in [facility].   - Financial/insurance concerns: None    Medically Ready for Discharge: Anticipated in 5+ Days      Clinically Significant Risk Factors         # Hyponatremia: Lowest Na = 129 mmol/L in last 2 days, will monitor as  "appropriate  # Hypocalcemia: Lowest Ca = 8.3 mg/dL in last 2 days, will monitor and replace as appropriate   # Hypomagnesemia: Lowest Mg = 1.6 mg/dL in last 2 days, will replace as needed     # Thrombocytopenia: Lowest platelets = 16 in last 2 days, will monitor for bleeding   # Hypertension: Noted on problem list           # Overweight: Estimated body mass index is 27.12 kg/m  as calculated from the following:    Height as of this encounter: 1.84 m (6' 0.44\").    Weight as of this encounter: 91.8 kg (202 lb 6.4 oz).            I spent 30 minutes in the care of this patient today, which included time necessary for preparation for the visit, obtaining history, ordering medications/tests/procedures as medically indicated, review of pertinent medical literature, counseling of the patient, communication of recommendations to the care team, and documentation time.    Kaitlyn Aguilar, CNP  Vocera or Pager [phone]              "

## 2024-08-28 LAB
ACANTHOCYTES BLD QL SMEAR: NORMAL
ANION GAP SERPL CALCULATED.3IONS-SCNC: 7 MMOL/L (ref 7–15)
AUER BODIES BLD QL SMEAR: NORMAL
BASO STIPL BLD QL SMEAR: NORMAL
BASOPHILS # BLD AUTO: ABNORMAL 10*3/UL
BASOPHILS NFR BLD AUTO: ABNORMAL %
BITE CELLS BLD QL SMEAR: NORMAL
BLISTER CELLS BLD QL SMEAR: NORMAL
BUN SERPL-MCNC: 9.6 MG/DL (ref 8–23)
BURR CELLS BLD QL SMEAR: NORMAL
CALCIUM SERPL-MCNC: 8.6 MG/DL (ref 8.8–10.4)
CHLORIDE SERPL-SCNC: 99 MMOL/L (ref 98–107)
CREAT SERPL-MCNC: 0.62 MG/DL (ref 0.67–1.17)
DACRYOCYTES BLD QL SMEAR: NORMAL
EGFRCR SERPLBLD CKD-EPI 2021: >90 ML/MIN/1.73M2
ELLIPTOCYTES BLD QL SMEAR: NORMAL
EOSINOPHIL # BLD AUTO: ABNORMAL 10*3/UL
EOSINOPHIL NFR BLD AUTO: ABNORMAL %
ERYTHROCYTE [DISTWIDTH] IN BLOOD BY AUTOMATED COUNT: 16.3 % (ref 10–15)
FRAGMENTS BLD QL SMEAR: NORMAL
GLUCOSE SERPL-MCNC: 109 MG/DL (ref 70–99)
HCO3 SERPL-SCNC: 25 MMOL/L (ref 22–29)
HCT VFR BLD AUTO: 21.2 % (ref 40–53)
HGB BLD-MCNC: 7.4 G/DL (ref 13.3–17.7)
HGB C CRYSTALS: NORMAL
HOWELL-JOLLY BOD BLD QL SMEAR: NORMAL
IMM GRANULOCYTES # BLD: ABNORMAL 10*3/UL
IMM GRANULOCYTES NFR BLD: ABNORMAL %
LYMPHOCYTES # BLD AUTO: ABNORMAL 10*3/UL
LYMPHOCYTES NFR BLD AUTO: ABNORMAL %
MAGNESIUM SERPL-MCNC: 1.6 MG/DL (ref 1.7–2.3)
MCH RBC QN AUTO: 27.7 PG (ref 26.5–33)
MCHC RBC AUTO-ENTMCNC: 34.9 G/DL (ref 31.5–36.5)
MCV RBC AUTO: 79 FL (ref 78–100)
MONOCYTES # BLD AUTO: ABNORMAL 10*3/UL
MONOCYTES NFR BLD AUTO: ABNORMAL %
NEUTROPHILS # BLD AUTO: ABNORMAL 10*3/UL
NEUTROPHILS NFR BLD AUTO: ABNORMAL %
NEUTS HYPERSEG BLD QL SMEAR: NORMAL
PHOSPHATE SERPL-MCNC: 2.6 MG/DL (ref 2.5–4.5)
PLAT MORPH BLD: NORMAL
PLATELET # BLD AUTO: 29 10E3/UL (ref 150–450)
POLYCHROMASIA BLD QL SMEAR: NORMAL
POTASSIUM SERPL-SCNC: 4.3 MMOL/L (ref 3.4–5.3)
RBC # BLD AUTO: 2.67 10E6/UL (ref 4.4–5.9)
RBC AGGLUT BLD QL: NORMAL
RBC MORPH BLD: NORMAL
ROULEAUX BLD QL SMEAR: NORMAL
SICKLE CELLS BLD QL SMEAR: NORMAL
SMUDGE CELLS BLD QL SMEAR: NORMAL
SODIUM SERPL-SCNC: 131 MMOL/L (ref 135–145)
SPHEROCYTES BLD QL SMEAR: NORMAL
STOMATOCYTES BLD QL SMEAR: NORMAL
TARGETS BLD QL SMEAR: NORMAL
TOXIC GRANULES BLD QL SMEAR: NORMAL
VARIANT LYMPHS BLD QL SMEAR: NORMAL
WBC # BLD AUTO: <0.1 10E3/UL (ref 4–11)

## 2024-08-28 PROCEDURE — 250N000012 HC RX MED GY IP 250 OP 636 PS 637

## 2024-08-28 PROCEDURE — 250N000011 HC RX IP 250 OP 636: Performed by: INTERNAL MEDICINE

## 2024-08-28 PROCEDURE — 258N000003 HC RX IP 258 OP 636: Performed by: INTERNAL MEDICINE

## 2024-08-28 PROCEDURE — 250N000013 HC RX MED GY IP 250 OP 250 PS 637

## 2024-08-28 PROCEDURE — 258N000003 HC RX IP 258 OP 636

## 2024-08-28 PROCEDURE — 206N000001 HC R&B BMT UMMC

## 2024-08-28 PROCEDURE — 84100 ASSAY OF PHOSPHORUS: CPT | Performed by: INTERNAL MEDICINE

## 2024-08-28 PROCEDURE — 250N000013 HC RX MED GY IP 250 OP 250 PS 637: Performed by: NURSE PRACTITIONER

## 2024-08-28 PROCEDURE — 83735 ASSAY OF MAGNESIUM: CPT

## 2024-08-28 PROCEDURE — 99418 PROLNG IP/OBS E/M EA 15 MIN: CPT

## 2024-08-28 PROCEDURE — 250N000011 HC RX IP 250 OP 636

## 2024-08-28 PROCEDURE — 85027 COMPLETE CBC AUTOMATED: CPT

## 2024-08-28 PROCEDURE — 84295 ASSAY OF SERUM SODIUM: CPT

## 2024-08-28 PROCEDURE — 99233 SBSQ HOSP IP/OBS HIGH 50: CPT | Mod: FS

## 2024-08-28 PROCEDURE — 84132 ASSAY OF SERUM POTASSIUM: CPT

## 2024-08-28 PROCEDURE — 250N000013 HC RX MED GY IP 250 OP 250 PS 637: Performed by: INTERNAL MEDICINE

## 2024-08-28 RX ORDER — FUROSEMIDE 10 MG/ML
20 INJECTION INTRAMUSCULAR; INTRAVENOUS ONCE
Status: COMPLETED | OUTPATIENT
Start: 2024-08-28 | End: 2024-08-28

## 2024-08-28 RX ORDER — CLINDAMYCIN PHOSPHATE 11.9 MG/ML
SOLUTION TOPICAL 2 TIMES DAILY
Status: DISCONTINUED | OUTPATIENT
Start: 2024-08-28 | End: 2024-09-08

## 2024-08-28 RX ADMIN — MYCOPHENOLATE MOFETIL 1250 MG: 500 INJECTION, POWDER, LYOPHILIZED, FOR SOLUTION INTRAVENOUS at 22:14

## 2024-08-28 RX ADMIN — FILGRASTIM-AAFI 480 MCG: 480 INJECTION, SOLUTION INTRAVENOUS; SUBCUTANEOUS at 20:15

## 2024-08-28 RX ADMIN — PANTOPRAZOLE SODIUM 40 MG: 40 TABLET, DELAYED RELEASE ORAL at 08:08

## 2024-08-28 RX ADMIN — SODIUM CHLORIDE: 9 INJECTION, SOLUTION INTRAVENOUS at 08:12

## 2024-08-28 RX ADMIN — TRAMADOL HYDROCHLORIDE 50 MG: 50 TABLET, COATED ORAL at 00:09

## 2024-08-28 RX ADMIN — ONDANSETRON HYDROCHLORIDE 8 MG: 8 TABLET, FILM COATED ORAL at 08:08

## 2024-08-28 RX ADMIN — METHOCARBAMOL 500 MG: 500 TABLET ORAL at 20:15

## 2024-08-28 RX ADMIN — MYCOPHENOLATE MOFETIL 1250 MG: 500 INJECTION, POWDER, LYOPHILIZED, FOR SOLUTION INTRAVENOUS at 11:29

## 2024-08-28 RX ADMIN — ACYCLOVIR 800 MG: 800 TABLET ORAL at 08:08

## 2024-08-28 RX ADMIN — METHOCARBAMOL 500 MG: 500 TABLET ORAL at 08:08

## 2024-08-28 RX ADMIN — METHOCARBAMOL 500 MG: 500 TABLET ORAL at 13:40

## 2024-08-28 RX ADMIN — URSODIOL 300 MG: 300 CAPSULE ORAL at 13:41

## 2024-08-28 RX ADMIN — LEVOFLOXACIN 250 MG: 250 TABLET, FILM COATED ORAL at 11:29

## 2024-08-28 RX ADMIN — ONDANSETRON HYDROCHLORIDE 8 MG: 8 TABLET, FILM COATED ORAL at 00:09

## 2024-08-28 RX ADMIN — URSODIOL 300 MG: 300 CAPSULE ORAL at 08:08

## 2024-08-28 RX ADMIN — URSODIOL 300 MG: 300 CAPSULE ORAL at 20:15

## 2024-08-28 RX ADMIN — TAMSULOSIN HYDROCHLORIDE 0.4 MG: 0.4 CAPSULE ORAL at 17:15

## 2024-08-28 RX ADMIN — CLINDAMYCIN PHOSPHATE: 10 SOLUTION TOPICAL at 17:16

## 2024-08-28 RX ADMIN — ONDANSETRON HYDROCHLORIDE 8 MG: 8 TABLET, FILM COATED ORAL at 17:15

## 2024-08-28 RX ADMIN — ACYCLOVIR 800 MG: 800 TABLET ORAL at 20:15

## 2024-08-28 RX ADMIN — POLYETHYLENE GLYCOL 3350 17 G: 17 POWDER, FOR SOLUTION ORAL at 22:14

## 2024-08-28 RX ADMIN — FUROSEMIDE 20 MG: 10 INJECTION, SOLUTION INTRAMUSCULAR; INTRAVENOUS at 11:28

## 2024-08-28 RX ADMIN — MICAFUNGIN SODIUM 150 MG: 50 INJECTION, POWDER, LYOPHILIZED, FOR SOLUTION INTRAVENOUS at 11:29

## 2024-08-28 RX ADMIN — CLINDAMYCIN PHOSPHATE: 10 SOLUTION TOPICAL at 20:16

## 2024-08-28 RX ADMIN — ALLOPURINOL 300 MG: 300 TABLET ORAL at 08:08

## 2024-08-28 RX ADMIN — SIROLIMUS 12 MG: 2 TABLET ORAL at 08:08

## 2024-08-28 ASSESSMENT — ACTIVITIES OF DAILY LIVING (ADL)
ADLS_ACUITY_SCORE: 22

## 2024-08-28 NOTE — PLAN OF CARE
"BP (!) 149/93 (BP Location: Left arm)   Pulse 114   Temp 98.4  F (36.9  C) (Oral)   Resp 18   Ht 1.84 m (6' 0.44\")   Wt 92.2 kg (203 lb 3.2 oz)   SpO2 98%   BMI 27.22 kg/m      Neuro: A&Ox4.   Cardiac: Hypertensive, but within order parameters. VSS.   Respiratory: Sating >92% on RA.  GI/: Voiding spontaneously. No BM this shift.  Diet/appetite: Regular diet.  Activity: Up ad sergo.  Pain: At acceptable level on current regimen.   Skin: No new deficits noted.  LDA's: DL CVC    Plan: No acute events overnight. Pt reported intermittent neck aching/soreness - prn Tramadol given once this shift per pt request. No other complaints. Flush infusing at ordered rate. Pt met all q2h voiding parameters, no lasix given this shift. No replacements indicated with AM labs.  Continue with POC. Notify primary team with changes.    Goal Outcome Evaluation:      Plan of Care Reviewed With: patient                 "

## 2024-08-28 NOTE — PLAN OF CARE
Goal Outcome Evaluation:      Plan of Care Reviewed With: patient, spouse    Overall Patient Progress: no changeOverall Patient Progress: no change  Pt denied nausea, but had no appetite and ate little.  Complained of pain in his neck - Ultram given x 1 with good relief.  Would like another dose at midnight.  Has a new chest and back rash; infrequent red lesion, not itchy.  Sodiums are low (MD notified regarding Na+ 128; no further orders).  Potassium replaced.  Mouth with ridges.  Lasix given for I>O by 800 ml/shift - good response.  Continues on cytoxan flush and mesna infusion.  Orthostatics done per protocol: very slight drop in BP - standing.

## 2024-08-28 NOTE — PROGRESS NOTES
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     Diagnosis MDS/AML  HCT Type Allogeneic     Prep Regimen Flu/Cy/TBI  Donor Match & Source 8/8 DP permissive PBSC    GVHD Prophylaxis PTCy/MMF/Siro  Primary BMT MD Dr. Murphy    Clinical Trials [id number]       INTERVAL  HISTORY   Leland is fatigued again this morning. He noted a new scarce, follicular rash to his chest and back. This is not itchy or irritating to him. No nausea, but worsening appetite and some food aversion beginning. He did eat well this morning. Denies vomiting, diarrhea, and constipation. No fevers or new infectious concerns. Difficult night with frequent voiding with cytoxan flush. Knows plan to start GCSF, MMF, and sirolimus today.     Review of Systems: 10 point ROS negative except as noted above.    PHYSICAL EXAM     Weight In/Out     Wt Readings from Last 3 Encounters:   08/28/24 91.3 kg (201 lb 4.8 oz)   08/09/24 89.9 kg (198 lb 4.8 oz)   08/08/24 90.3 kg (199 lb 1.2 oz)      I/O last 3 completed shifts:  In: 8661.1 [P.O.:1940; I.V.:4617.5; IV Piggyback:1614.6]  Out: 7750 [Urine:7750]     KPS:  80    /78 (BP Location: Left arm)   Pulse 97   Temp 98.3  F (36.8  C) (Oral)   Resp 16   Ht 1.84 m (6' 0.44\")   Wt 91.3 kg (201 lb 4.8 oz)   SpO2 100%   BMI 26.97 kg/m       General: Pleasant, laying in bed comfortably, NAD   HEENT: sclera anicteric, OP clear, buccal mucosa moist, no ulcerations   Lungs: CTAB, no wheezing or crackles   Cardiovascular: RRR, no M/R/G   Abdominal/Rectal: +BS, soft, NT, ND  Lymphatics: No edema  Skin: rare follicular rash noted to chest, back on 8/28.  Neuro: A&O, appropriately interactive, speech clear, moving all extremities   Additional Findings: Estrada site CDI, no drainage    Current aGVHD staging:  Skin " 0, UGI 0, LGI 0, Liver 0     LABS AND IMAGING: I have assessed all abnormal lab values for their clinical significance and any values considered clinically significant have been addressed in the assessment and plan.      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     BMT/IEC PROTOCOL for MDS  - Chemo protocol: MT 2022-5  D-6: Flu 30mg/m2, Cy 50mg/kg  D-5 to D-2: Flu  D-1: TBI  D0: stem cell infusion  - Peripheral blood stem cell graft from 8/8 URD donor, ABO matched, Cell dose: TBD  - Engraftment monitoring: Peripheral blood and bone marrow chimerisms on D28, D100, 6 months, 1 and 2 years  - Restaging plan: BMBx with FISH, cytogenetics and NGS on D28, D100, 6 months, 1 and 2 years     HEME/COAG  # Pancytopenia secondary to disease process  - GCSF starts day +5, continue until ANC > 2500 for 2 consecutive days  - Transfusion parameters: hemoglobin <7, platelets <10  - Relevant thrombosis or bleeding history: none     RISK OF GVHD  - Prophylaxis: PTCy 50mg/kg on D+3 and D+4; MMF (D+5 to 35); Sirolimus (starting D+5, target level 5-10)  - Acute GVHD Treatment: None to date  - Chronic GVHD Treatment: None to date     ID  Prophylaxis plan:   - Bacterial: Levaquin 250mg while neutropenic    - Fungal: Micafungin 300mg 3x/week until D+45, followed by mold active azole. Pulm nodules present on workup CT.    - PJP: Bactrim to start at D+28. Toxoplasma negative.   - Viral: Acyclovir 800mg BID. No need for letermovir as donor and recipient are CMV negative.      Monitoring:   - CMV weekly, negative 8/17, 8/24 in process   - EBV every 2 weeks from D30 to D180     GI/NUTRITION  # GERD: Protonix  - Anti-emetics: Zofran, dex scheduled during chemotherapy. Compazine, lorazepam available PRN.   - Ulcer prophy: Protonix  - VOD prophy: Ursodiol 300mg TID  - Dietician support to prevent malnutrition  - Miralax and senna PRN constipation. For now, Miralax scheduled at bedtime.      CARDIOVASCULAR  # HTN  - BP has been high (160-170s/ 70s) in recent clinic appointments. Recently increased lisinopril to 15mg daily. Monitor and increase PRN. Hold starting 8/26 with orthostatics and dizziness during shower 8/25.      # CAD: Coronary artery calcifications seen on CT, stress test in 2023 negative     - Risk of cardiomyopathy: Baseline EF 55-60%.   - Risk of arrhythmia: Baseline EKG showed 421      RESPIRATORY  - Baseline PFTs: Normal   - Risk of respiratory complications: Frequent ambulation and incentive spirometer     RENAL/ELECTROLYTES/  - Risk of renal injury: IV hydration   - Electrolyte management: replace per sliding scale  - BPH: Continue home flomax.  - Hyponatremia- secondary to SIADH with cytoxan flush. Changed fluids to NS, improving.  - Hypervolemia secondary to cytoxan flush. Additional lasix 20mg IV x1 8/27 and 8/28, continue to monitor volume status closely.      DIABETES/ENDOCRINE  - Risk of steroid-induced hyperglyemia: Monitor BG, sliding scale if needed     MUSCULOSKELETAL/FRAILTY  - Baseline Frailty Score: Not frail (0)  - Daily PT/OT as needed while inpatient  - Gout: continue allopurinol      SYMPTOM MANAGEMENT  - Pain management: Outpatient has been taking celecoxib for MSK pain, once a day. Inpatient will try Tramadol - available PRN. Added robaxin TID   - Follicular rash to chest, back- Start topical  "clindamycin 8/28.      SOCIAL DETERMINANTS  - Caregiver: wife, Vani. Lives within the required distance from hospital but may elect to stay in [facility].   - Financial/insurance concerns: None    Medically Ready for Discharge: Anticipated in 5+ Days      Clinically Significant Risk Factors         # Hyponatremia: Lowest Na = 128 mmol/L in last 2 days, will monitor as appropriate    # Hypomagnesemia: Lowest Mg = 1.6 mg/dL in last 2 days, will replace as needed     # Thrombocytopenia: Lowest platelets = 16 in last 2 days, will monitor for bleeding   # Hypertension: Noted on problem list           # Overweight: Estimated body mass index is 26.97 kg/m  as calculated from the following:    Height as of this encounter: 1.84 m (6' 0.44\").    Weight as of this encounter: 91.3 kg (201 lb 4.8 oz).            I spent 30 minutes in the care of this patient today, which included time necessary for preparation for the visit, obtaining history, ordering medications/tests/procedures as medically indicated, review of pertinent medical literature, counseling of the patient, communication of recommendations to the care team, and documentation time.    Kaitlyn Aguilar, CNP  [employer] or Pager [phone]      Physician Attestation     I, Timothy Skaggs MD, saw and evaluated Leland Pearson as part of a shared APRN/PA visit. I personally performed the substantive portion of the medical decision making for this visit - please see the VJ s documentation for full details.    Key management decisions made by me and carried out under my direction include:   69y M with MDS/AML with ASXL1, RUNX1, IDH1, SRSF2 mutations, who achieved a low blast hypocellular state, and is now day +5 s/p GANGA (Flu/Cy/TBI) 8/8 URD (23yF, O+/O+, CMV -/-, DP permissive, 6.06 x106 CD34/kg) PB SCT with PTCy/siro/MMF GVHD ppx. He has tolerated conditioning, stem cell infusion, and PTCy reasonably well, without fever. He is a bit fatigued today and has a new follicular rash. " Will treat with some topical clinda. Start GCSF, MMF, siro today. Repeat another dose of lasix today and PTCy fluids complete today. Will continue to monitor closely for further complications of transplant and continue supportive care.    On the date of service, 08/28/2024, I spent 45 minutes on the patient unit personally reviewing medical records and medications, reviewing vital signs, labs, and imaging results as summarized above, obtaining a history from the patient, performing a physical exam, counseling and educating the patient on the diagnosis and treatment, evaluating a potentially life or organ threatening problem, intensively monitoring treatments with high risk of toxicity, coordinating care, and documenting in the electronic medical record.    Thank you for allowing me to participate in the care of this patient. Please do not hesitate to contact me if there are any concerns or questions.     Timothy Skaggs MD   of Medicine  Classical Hematology and Blood and Marrow Transplantation  Division of Hematology, Oncology, and Transplantation  [facility]

## 2024-08-29 ENCOUNTER — ONCOLOGY VISIT (OUTPATIENT)
Dept: RADIATION ONCOLOGY | Facility: CLINIC | Age: 69
End: 2024-08-29
Payer: COMMERCIAL

## 2024-08-29 LAB
ABO/RH(D): NORMAL
ACANTHOCYTES BLD QL SMEAR: SLIGHT
ALBUMIN SERPL BCG-MCNC: 3.1 G/DL (ref 3.5–5.2)
ALP SERPL-CCNC: 79 U/L (ref 40–150)
ALT SERPL W P-5'-P-CCNC: 10 U/L (ref 0–70)
ANION GAP SERPL CALCULATED.3IONS-SCNC: 8 MMOL/L (ref 7–15)
ANTIBODY SCREEN: NEGATIVE
AST SERPL W P-5'-P-CCNC: 15 U/L (ref 0–45)
AUER BODIES BLD QL SMEAR: ABNORMAL
BASO STIPL BLD QL SMEAR: ABNORMAL
BASOPHILS # BLD AUTO: ABNORMAL 10*3/UL
BASOPHILS NFR BLD AUTO: ABNORMAL %
BILIRUB SERPL-MCNC: 0.7 MG/DL
BITE CELLS BLD QL SMEAR: ABNORMAL
BLD PROD TYP BPU: NORMAL
BLISTER CELLS BLD QL SMEAR: ABNORMAL
BLOOD COMPONENT TYPE: NORMAL
BUN SERPL-MCNC: 12.5 MG/DL (ref 8–23)
BURR CELLS BLD QL SMEAR: ABNORMAL
CALCIUM SERPL-MCNC: 8.7 MG/DL (ref 8.8–10.4)
CHLORIDE SERPL-SCNC: 100 MMOL/L (ref 98–107)
CODING SYSTEM: NORMAL
CREAT SERPL-MCNC: 0.66 MG/DL (ref 0.67–1.17)
DACRYOCYTES BLD QL SMEAR: ABNORMAL
EGFRCR SERPLBLD CKD-EPI 2021: >90 ML/MIN/1.73M2
ELLIPTOCYTES BLD QL SMEAR: ABNORMAL
EOSINOPHIL # BLD AUTO: ABNORMAL 10*3/UL
EOSINOPHIL NFR BLD AUTO: ABNORMAL %
ERYTHROCYTE [DISTWIDTH] IN BLOOD BY AUTOMATED COUNT: 16.3 % (ref 10–15)
FRAGMENTS BLD QL SMEAR: SLIGHT
GLUCOSE SERPL-MCNC: 107 MG/DL (ref 70–99)
HCO3 SERPL-SCNC: 25 MMOL/L (ref 22–29)
HCT VFR BLD AUTO: 20.2 % (ref 40–53)
HGB BLD-MCNC: 7.3 G/DL (ref 13.3–17.7)
HGB C CRYSTALS: ABNORMAL
HOWELL-JOLLY BOD BLD QL SMEAR: ABNORMAL
IMM GRANULOCYTES # BLD: ABNORMAL 10*3/UL
IMM GRANULOCYTES NFR BLD: ABNORMAL %
ISSUE DATE AND TIME: NORMAL
LACTATE SERPL-SCNC: 1.7 MMOL/L (ref 0.7–2)
LYMPHOCYTES # BLD AUTO: ABNORMAL 10*3/UL
LYMPHOCYTES NFR BLD AUTO: ABNORMAL %
MAGNESIUM SERPL-MCNC: 1.8 MG/DL (ref 1.7–2.3)
MCH RBC QN AUTO: 28.2 PG (ref 26.5–33)
MCHC RBC AUTO-ENTMCNC: 36.1 G/DL (ref 31.5–36.5)
MCV RBC AUTO: 78 FL (ref 78–100)
MONOCYTES # BLD AUTO: ABNORMAL 10*3/UL
MONOCYTES NFR BLD AUTO: ABNORMAL %
NEUTROPHILS # BLD AUTO: ABNORMAL 10*3/UL
NEUTROPHILS NFR BLD AUTO: ABNORMAL %
NEUTS HYPERSEG BLD QL SMEAR: ABNORMAL
PHOSPHATE SERPL-MCNC: 2.9 MG/DL (ref 2.5–4.5)
PLAT MORPH BLD: ABNORMAL
PLATELET # BLD AUTO: 16 10E3/UL (ref 150–450)
POLYCHROMASIA BLD QL SMEAR: ABNORMAL
POTASSIUM SERPL-SCNC: 4 MMOL/L (ref 3.4–5.3)
POTASSIUM SERPL-SCNC: 4 MMOL/L (ref 3.4–5.3)
PROT SERPL-MCNC: 5.8 G/DL (ref 6.4–8.3)
RBC # BLD AUTO: 2.59 10E6/UL (ref 4.4–5.9)
RBC AGGLUT BLD QL: ABNORMAL
RBC MORPH BLD: ABNORMAL
ROULEAUX BLD QL SMEAR: ABNORMAL
SICKLE CELLS BLD QL SMEAR: ABNORMAL
SMUDGE CELLS BLD QL SMEAR: ABNORMAL
SODIUM SERPL-SCNC: 133 MMOL/L (ref 135–145)
SODIUM SERPL-SCNC: 133 MMOL/L (ref 135–145)
SPECIMEN EXPIRATION DATE: NORMAL
SPHEROCYTES BLD QL SMEAR: ABNORMAL
STOMATOCYTES BLD QL SMEAR: ABNORMAL
TARGETS BLD QL SMEAR: ABNORMAL
TOXIC GRANULES BLD QL SMEAR: ABNORMAL
UNIT ABO/RH: NORMAL
UNIT NUMBER: NORMAL
UNIT STATUS: NORMAL
UNIT TYPE ISBT: 5100
VARIANT LYMPHS BLD QL SMEAR: ABNORMAL
WBC # BLD AUTO: <0.1 10E3/UL (ref 4–11)

## 2024-08-29 PROCEDURE — 250N000011 HC RX IP 250 OP 636

## 2024-08-29 PROCEDURE — 250N000012 HC RX MED GY IP 250 OP 636 PS 637

## 2024-08-29 PROCEDURE — 83605 ASSAY OF LACTIC ACID: CPT | Performed by: INTERNAL MEDICINE

## 2024-08-29 PROCEDURE — 83735 ASSAY OF MAGNESIUM: CPT | Performed by: INTERNAL MEDICINE

## 2024-08-29 PROCEDURE — 86900 BLOOD TYPING SEROLOGIC ABO: CPT

## 2024-08-29 PROCEDURE — P9037 PLATE PHERES LEUKOREDU IRRAD: HCPCS

## 2024-08-29 PROCEDURE — 250N000013 HC RX MED GY IP 250 OP 250 PS 637

## 2024-08-29 PROCEDURE — 84100 ASSAY OF PHOSPHORUS: CPT | Performed by: INTERNAL MEDICINE

## 2024-08-29 PROCEDURE — 206N000001 HC R&B BMT UMMC

## 2024-08-29 PROCEDURE — 250N000013 HC RX MED GY IP 250 OP 250 PS 637: Performed by: NURSE PRACTITIONER

## 2024-08-29 PROCEDURE — 99233 SBSQ HOSP IP/OBS HIGH 50: CPT | Mod: FS

## 2024-08-29 PROCEDURE — 99418 PROLNG IP/OBS E/M EA 15 MIN: CPT

## 2024-08-29 PROCEDURE — 250N000011 HC RX IP 250 OP 636: Mod: JZ | Performed by: INTERNAL MEDICINE

## 2024-08-29 PROCEDURE — 86923 COMPATIBILITY TEST ELECTRIC: CPT

## 2024-08-29 PROCEDURE — 258N000003 HC RX IP 258 OP 636

## 2024-08-29 PROCEDURE — 250N000013 HC RX MED GY IP 250 OP 250 PS 637: Performed by: INTERNAL MEDICINE

## 2024-08-29 PROCEDURE — 80053 COMPREHEN METABOLIC PANEL: CPT

## 2024-08-29 PROCEDURE — 85027 COMPLETE CBC AUTOMATED: CPT

## 2024-08-29 RX ADMIN — TRAMADOL HYDROCHLORIDE 50 MG: 50 TABLET, COATED ORAL at 17:09

## 2024-08-29 RX ADMIN — SODIUM CHLORIDE 1000 ML: 9 INJECTION, SOLUTION INTRAVENOUS at 10:08

## 2024-08-29 RX ADMIN — TRAMADOL HYDROCHLORIDE 50 MG: 50 TABLET, COATED ORAL at 23:08

## 2024-08-29 RX ADMIN — TRAMADOL HYDROCHLORIDE 50 MG: 50 TABLET, COATED ORAL at 07:46

## 2024-08-29 RX ADMIN — PANTOPRAZOLE SODIUM 40 MG: 40 TABLET, DELAYED RELEASE ORAL at 07:46

## 2024-08-29 RX ADMIN — LEVOFLOXACIN 250 MG: 250 TABLET, FILM COATED ORAL at 10:33

## 2024-08-29 RX ADMIN — ACETAMINOPHEN 650 MG: 325 TABLET ORAL at 02:20

## 2024-08-29 RX ADMIN — FILGRASTIM-AAFI 480 MCG: 480 INJECTION, SOLUTION INTRAVENOUS; SUBCUTANEOUS at 19:44

## 2024-08-29 RX ADMIN — ACYCLOVIR 800 MG: 800 TABLET ORAL at 19:42

## 2024-08-29 RX ADMIN — MYCOPHENOLATE MOFETIL 1250 MG: 500 INJECTION, POWDER, LYOPHILIZED, FOR SOLUTION INTRAVENOUS at 22:57

## 2024-08-29 RX ADMIN — METHOCARBAMOL 500 MG: 500 TABLET ORAL at 19:42

## 2024-08-29 RX ADMIN — METHOCARBAMOL 500 MG: 500 TABLET ORAL at 14:57

## 2024-08-29 RX ADMIN — CLINDAMYCIN PHOSPHATE: 10 SOLUTION TOPICAL at 19:48

## 2024-08-29 RX ADMIN — ALLOPURINOL 300 MG: 300 TABLET ORAL at 07:46

## 2024-08-29 RX ADMIN — URSODIOL 300 MG: 300 CAPSULE ORAL at 19:42

## 2024-08-29 RX ADMIN — MYCOPHENOLATE MOFETIL 1250 MG: 500 INJECTION, POWDER, LYOPHILIZED, FOR SOLUTION INTRAVENOUS at 09:09

## 2024-08-29 RX ADMIN — ONDANSETRON HYDROCHLORIDE 8 MG: 8 TABLET, FILM COATED ORAL at 01:13

## 2024-08-29 RX ADMIN — METHOCARBAMOL 500 MG: 500 TABLET ORAL at 07:46

## 2024-08-29 RX ADMIN — URSODIOL 300 MG: 300 CAPSULE ORAL at 14:57

## 2024-08-29 RX ADMIN — SIROLIMUS 6 MG: 2 TABLET ORAL at 07:55

## 2024-08-29 RX ADMIN — MICAFUNGIN SODIUM 150 MG: 50 INJECTION, POWDER, LYOPHILIZED, FOR SOLUTION INTRAVENOUS at 10:04

## 2024-08-29 RX ADMIN — ACYCLOVIR 800 MG: 800 TABLET ORAL at 07:46

## 2024-08-29 RX ADMIN — CLINDAMYCIN PHOSPHATE: 10 SOLUTION TOPICAL at 07:47

## 2024-08-29 RX ADMIN — TAMSULOSIN HYDROCHLORIDE 0.4 MG: 0.4 CAPSULE ORAL at 17:07

## 2024-08-29 RX ADMIN — URSODIOL 300 MG: 300 CAPSULE ORAL at 07:46

## 2024-08-29 ASSESSMENT — ACTIVITIES OF DAILY LIVING (ADL)
ADLS_ACUITY_SCORE: 22

## 2024-08-29 NOTE — PLAN OF CARE
"/86   Pulse 90   Temp 98.2  F (36.8  C) (Oral)   Resp 16   Ht 1.84 m (6' 0.44\")   Wt 90.5 kg (199 lb 8 oz)   SpO2 97%   BMI 26.73 kg/m        VSS. Afebrile. Alert and oriented 4x.  Up independent in room. Denies pain upon assessment. No nausea or vomiting noted. No SOB. Patient stated he is just tired from the cytoxan flush last night and want to sleep more tonight. Voids spontaneously and adequately.    Complained of HA 2/10 pain scale. PRN Tylenol given with relief.    Platelets of 16, with blood consent. 1 unit of platelets transfused.    Problem: Adult Inpatient Plan of Care  Goal: Plan of Care Review  Description: The Plan of Care Review/Shift note should be completed every shift.  The Outcome Evaluation is a brief statement about your assessment that the patient is improving, declining, or no change.  This information will be displayed automatically on your shift  note.  Outcome: Progressing  Goal: Patient-Specific Goal (Individualized)  Description: You can add care plan individualizations to a care plan. Examples of Individualization might be:  \"Parent requests to be called daily at 9am for status\", \"I have a hard time hearing out of my right ear\", or \"Do not touch me to wake me up as it startles  me\".  Outcome: Progressing  Goal: Absence of Hospital-Acquired Illness or Injury  Outcome: Progressing  Intervention: Identify and Manage Fall Risk  Recent Flowsheet Documentation  Taken 8/28/2024 2000 by Irma Boucher RN  Safety Promotion/Fall Prevention: safety round/check completed  Intervention: Prevent Skin Injury  Recent Flowsheet Documentation  Taken 8/28/2024 2000 by Irma Boucher RN  Body Position: position changed independently  Skin Protection: adhesive use limited  Device Skin Pressure Protection: tubing/devices free from skin contact  Intervention: Prevent and Manage VTE (Venous Thromboembolism) Risk  Recent Flowsheet Documentation  Taken 8/28/2024 2000 by Irma Boucher" Linda, RN  VTE Prevention/Management: SCDs off (sequential compression devices)  Intervention: Prevent Infection  Recent Flowsheet Documentation  Taken 8/28/2024 2000 by Irma Boucher, RN  Infection Prevention:   environmental surveillance performed   rest/sleep promoted  Goal: Optimal Comfort and Wellbeing  Outcome: Progressing  Goal: Readiness for Transition of Care  Outcome: Progressing   Goal Outcome Evaluation:

## 2024-08-29 NOTE — PROGRESS NOTES
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     Diagnosis MDS/AML  HCT Type Allogeneic     Prep Regimen Flu/Cy/TBI  Donor Match & Source 8/8 DP permissive PBSC    GVHD Prophylaxis PTCy/MMF/Siro  Primary BMT MD Dr. Murphy    Clinical Trials [id number]       INTERVAL  HISTORY   Leland is fatigued again this morning. He has no specific complaints beyond just fatigue from the cytoxan flush. Orthostatic today. Rash is improving. Eating well. No n/v/d/c. No fevers or new infectious symptoms.      Review of Systems: 10 point ROS negative except as noted above.    PHYSICAL EXAM     Weight In/Out     Wt Readings from Last 3 Encounters:   08/29/24 89 kg (196 lb 4.8 oz)   08/09/24 89.9 kg (198 lb 4.8 oz)   08/08/24 90.3 kg (199 lb 1.2 oz)      I/O last 3 completed shifts:  In: 2402.5 [P.O.:320; I.V.:2082.5]  Out: 5500 [Urine:5500]     KPS:  80    /79 (BP Location: Left arm)   Pulse 90   Temp 97.8  F (36.6  C) (Oral)   Resp 16   Ht 1.84 m (6' 0.44\")   Wt 89 kg (196 lb 4.8 oz)   SpO2 97%   BMI 26.30 kg/m       General: Pleasant, laying in bed comfortably, NAD   HEENT: sclera anicteric, OP clear, buccal mucosa moist, no ulcerations   Lungs: CTAB, no wheezing or crackles   Cardiovascular: RRR, no M/R/G   Abdominal/Rectal: +BS, soft, NT, ND  Lymphatics: No edema  Skin: rare follicular rash noted to chest, back on 8/28.   Neuro: A&O, appropriately interactive, speech clear, moving all extremities   Additional Findings: Estrada site CDI, no drainage    Current aGVHD staging:  Skin 0, UGI 0, LGI 0, Liver 0     LABS AND IMAGING: I have assessed all abnormal lab values for their clinical significance and any values considered clinically significant have been addressed in the assessment and plan.      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     BMT/IEC PROTOCOL for MDS  - Chemo protocol: MT 2022-5  D-6: Flu 30mg/m2, Cy 50mg/kg  D-5 to D-2: Flu  D-1: TBI  D0: stem cell infusion  - Peripheral blood stem cell graft from 8/8 URD donor, ABO matched, Cell dose: TBD  - Engraftment monitoring: Peripheral blood and bone marrow chimerisms on D28, D100, 6 months, 1 and 2 years  - Restaging plan: BMBx with FISH, cytogenetics and NGS on D28, D100, 6 months, 1 and 2 years     HEME/COAG  # Pancytopenia secondary to disease process  - GCSF starts day +5, continue until ANC > 2500 for 2 consecutive days  - Transfusion parameters: hemoglobin <7, platelets <10  - Relevant thrombosis or bleeding history: none     RISK OF GVHD  - Prophylaxis: PTCy 50mg/kg on D+3 and D+4; MMF (D+5 to 35); Sirolimus (starting D+5, target level 5-10)  - Acute GVHD Treatment: None to date  - Chronic GVHD Treatment: None to date     ID  Prophylaxis plan:   - Bacterial: Levaquin 250mg while neutropenic    - Fungal: Micafungin 300mg 3x/week until D+45, followed by mold active azole. Pulm nodules present on workup CT.   - PJP: Bactrim to start at D+28. Toxoplasma negative.   - Viral: Acyclovir 800mg BID. No need for letermovir as donor and recipient are CMV negative.      Monitoring:   - CMV weekly, negative 8/17, 8/24 in process   - EBV every 2 weeks  from D30 to D180     GI/NUTRITION  # GERD: Protonix  - Anti-emetics: Zofran, dex scheduled during chemotherapy. Compazine, lorazepam available PRN.   - Ulcer prophy: Protonix  - VOD prophy: Ursodiol 300mg TID  - Dietician support to prevent malnutrition  - Miralax and senna PRN constipation. For now, Miralax scheduled at bedtime.      CARDIOVASCULAR  # HTN  - BP has been high (160-170s/ 70s) in recent clinic appointments. Recently increased lisinopril to 15mg daily. Monitor and increase PRN. Hold starting 8/26 with orthostatics and dizziness during shower 8/25.   - Orthostatic hypotension- 1L IVF today     # CAD: Coronary artery calcifications seen on CT, stress test in 2023 negative     - Risk of cardiomyopathy: Baseline EF 55-60%.   - Risk of arrhythmia: Baseline EKG showed 421      RESPIRATORY  - Baseline PFTs: Normal   - Risk of respiratory complications: Frequent ambulation and incentive spirometer     RENAL/ELECTROLYTES/  - Risk of renal injury: IV hydration   - Electrolyte management: replace per sliding scale  - BPH: Continue home flomax.  - Hyponatremia- secondary to SIADH with cytoxan flush. Changed fluids to NS, improving.  - Hypervolemia secondary to cytoxan flush. Additional lasix 20mg IV x1 8/27 and 8/28, continue to monitor volume status closely.      DIABETES/ENDOCRINE  - Risk of steroid-induced hyperglyemia: Monitor BG, sliding scale if needed     MUSCULOSKELETAL/FRAILTY  - Baseline Frailty Score: Not frail (0)  - Daily PT/OT as needed while inpatient  - Gout: continue allopurinol      SYMPTOM MANAGEMENT  - Pain management: Outpatient has been taking celecoxib for MSK pain, once a day. Inpatient will try Tramadol - available PRN. Added robaxin TID   - Follicular rash to chest, back- Start topical clindamycin 8/28.      SOCIAL DETERMINANTS  - Caregiver: wife, Vani. Lives within the required distance from hospital but may elect to stay in [facility].   - Financial/insurance concerns:  "None    Medically Ready for Discharge: Anticipated in 5+ Days      Clinically Significant Risk Factors         # Hyponatremia: Lowest Na = 128 mmol/L in last 2 days, will monitor as appropriate    # Hypomagnesemia: Lowest Mg = 1.6 mg/dL in last 2 days, will replace as needed   # Hypoalbuminemia: Lowest albumin = 3.1 g/dL at 8/29/2024  3:27 AM, will monitor as appropriate   # Thrombocytopenia: Lowest platelets = 16 in last 2 days, will monitor for bleeding   # Hypertension: Noted on problem list           # Overweight: Estimated body mass index is 26.3 kg/m  as calculated from the following:    Height as of this encounter: 1.84 m (6' 0.44\").    Weight as of this encounter: 89 kg (196 lb 4.8 oz).            I spent 30 minutes in the care of this patient today, which included time necessary for preparation for the visit, obtaining history, ordering medications/tests/procedures as medically indicated, review of pertinent medical literature, counseling of the patient, communication of recommendations to the care team, and documentation time.    Kaitlyn Aguilar, CNP  [employer] or Pager [phone]      Physician Attestation     I, Timothy Skaggs MD, saw and evaluated Leland Pearson as part of a shared APRN/PA visit. I personally performed the substantive portion of the medical decision making for this visit - please see the VJ s documentation for full details.    Key management decisions made by me and carried out under my direction include:   69y M with MDS/AML with ASXL1, RUNX1, IDH1, SRSF2 mutations, who achieved a low blast hypocellular state, and is now day +6 s/p GANGA (Flu/Cy/TBI) 8/8 URD (23yF, O+/O+, CMV -/-, DP permissive, 6.06 x106 CD34/kg) PB SCT with PTCy/siro/MMF GVHD ppx. He has tolerated conditioning, stem cell infusion, and PTCy reasonably well, without fever. He continues to be fatigued today. Continue supportive cares and GCSF, MMF, siro. He was a bit orthostatic today so will give some fluids back. Will " continue to monitor closely for further complications of transplant and continue supportive care.    On the date of service, 08/29/2024, I spent 40 minutes on the patient unit personally reviewing medical records and medications, reviewing vital signs, labs, and imaging results as summarized above, obtaining a history from the patient, performing a physical exam, counseling and educating the patient on the diagnosis and treatment, evaluating a potentially life or organ threatening problem, intensively monitoring treatments with high risk of toxicity, coordinating care, and documenting in the electronic medical record.    Thank you for allowing me to participate in the care of this patient. Please do not hesitate to contact me if there are any concerns or questions.     Timothy Skaggs MD   of Medicine  Classical Hematology and Blood and Marrow Transplantation  Division of Hematology, Oncology, and Transplantation  [facility]

## 2024-08-29 NOTE — PLAN OF CARE
Patient vital signs stable, no complaints today except the lack of sleep during Cytoxan flush. He was given additional lasix weight down this evening. No complaints of nausea, eating well. Patient slept between cares, wife at bedside, support provided. Continue to monitor.  Problem: Adult Inpatient Plan of Care  Goal: Absence of Hospital-Acquired Illness or Injury  Outcome: Progressing  Intervention: Identify and Manage Fall Risk  Recent Flowsheet Documentation  Taken 8/28/2024 0900 by Jevon Price, RN  Safety Promotion/Fall Prevention:   assistive device/personal items within reach   nonskid shoes/slippers when out of bed   safety round/check completed   Goal Outcome Evaluation:      Plan of Care Reviewed With: patient

## 2024-08-29 NOTE — LETTER
8/29/2024      Leland Pearson  [id number] [place] [id number]      Dear Colleague,    Thank you for referring your patient, Leland Pearson, to the [facility]. Please see a copy of my visit note below.    No notes on file    Again, thank you for allowing me to participate in the care of your patient.        Sincerely,        Bruno Ramos MD

## 2024-08-30 LAB
ACANTHOCYTES BLD QL SMEAR: NORMAL
ANION GAP SERPL CALCULATED.3IONS-SCNC: 6 MMOL/L (ref 7–15)
AUER BODIES BLD QL SMEAR: NORMAL
BASO STIPL BLD QL SMEAR: NORMAL
BASOPHILS # BLD AUTO: ABNORMAL 10*3/UL
BASOPHILS NFR BLD AUTO: ABNORMAL %
BITE CELLS BLD QL SMEAR: NORMAL
BLD PROD TYP BPU: NORMAL
BLISTER CELLS BLD QL SMEAR: NORMAL
BLOOD COMPONENT TYPE: NORMAL
BUN SERPL-MCNC: 14.5 MG/DL (ref 8–23)
BURR CELLS BLD QL SMEAR: NORMAL
CALCIUM SERPL-MCNC: 8.6 MG/DL (ref 8.8–10.4)
CHLORIDE SERPL-SCNC: 101 MMOL/L (ref 98–107)
CODING SYSTEM: NORMAL
CREAT SERPL-MCNC: 0.59 MG/DL (ref 0.67–1.17)
CROSSMATCH: NORMAL
DACRYOCYTES BLD QL SMEAR: NORMAL
EGFRCR SERPLBLD CKD-EPI 2021: >90 ML/MIN/1.73M2
ELLIPTOCYTES BLD QL SMEAR: NORMAL
EOSINOPHIL # BLD AUTO: ABNORMAL 10*3/UL
EOSINOPHIL NFR BLD AUTO: ABNORMAL %
ERYTHROCYTE [DISTWIDTH] IN BLOOD BY AUTOMATED COUNT: 16.2 % (ref 10–15)
FRAGMENTS BLD QL SMEAR: NORMAL
GLUCOSE SERPL-MCNC: 103 MG/DL (ref 70–99)
HCO3 SERPL-SCNC: 27 MMOL/L (ref 22–29)
HCT VFR BLD AUTO: 19.7 % (ref 40–53)
HGB BLD-MCNC: 6.9 G/DL (ref 13.3–17.7)
HGB C CRYSTALS: NORMAL
HOWELL-JOLLY BOD BLD QL SMEAR: NORMAL
IMM GRANULOCYTES # BLD: ABNORMAL 10*3/UL
IMM GRANULOCYTES NFR BLD: ABNORMAL %
ISSUE DATE AND TIME: NORMAL
LACTATE SERPL-SCNC: 0.5 MMOL/L (ref 0.7–2)
LACTATE SERPL-SCNC: 0.9 MMOL/L (ref 0.7–2)
LYMPHOCYTES # BLD AUTO: ABNORMAL 10*3/UL
LYMPHOCYTES NFR BLD AUTO: ABNORMAL %
MAGNESIUM SERPL-MCNC: 1.9 MG/DL (ref 1.7–2.3)
MCH RBC QN AUTO: 27.6 PG (ref 26.5–33)
MCHC RBC AUTO-ENTMCNC: 35 G/DL (ref 31.5–36.5)
MCV RBC AUTO: 79 FL (ref 78–100)
MONOCYTES # BLD AUTO: ABNORMAL 10*3/UL
MONOCYTES NFR BLD AUTO: ABNORMAL %
NEUTROPHILS # BLD AUTO: ABNORMAL 10*3/UL
NEUTROPHILS NFR BLD AUTO: ABNORMAL %
NEUTS HYPERSEG BLD QL SMEAR: NORMAL
PHOSPHATE SERPL-MCNC: 3.2 MG/DL (ref 2.5–4.5)
PLAT MORPH BLD: NORMAL
PLATELET # BLD AUTO: 24 10E3/UL (ref 150–450)
POLYCHROMASIA BLD QL SMEAR: NORMAL
POTASSIUM SERPL-SCNC: 3.9 MMOL/L (ref 3.4–5.3)
RBC # BLD AUTO: 2.5 10E6/UL (ref 4.4–5.9)
RBC AGGLUT BLD QL: NORMAL
RBC MORPH BLD: NORMAL
ROULEAUX BLD QL SMEAR: NORMAL
SICKLE CELLS BLD QL SMEAR: NORMAL
SIROLIMUS BLD-MCNC: 3 UG/L (ref 5–15)
SMUDGE CELLS BLD QL SMEAR: NORMAL
SODIUM SERPL-SCNC: 134 MMOL/L (ref 135–145)
SPHEROCYTES BLD QL SMEAR: NORMAL
STOMATOCYTES BLD QL SMEAR: NORMAL
TARGETS BLD QL SMEAR: NORMAL
TME LAST DOSE: ABNORMAL H
TME LAST DOSE: ABNORMAL H
TOXIC GRANULES BLD QL SMEAR: NORMAL
UNIT ABO/RH: NORMAL
UNIT NUMBER: NORMAL
UNIT STATUS: NORMAL
UNIT TYPE ISBT: 9500
VARIANT LYMPHS BLD QL SMEAR: NORMAL
WBC # BLD AUTO: <0.1 10E3/UL (ref 4–11)

## 2024-08-30 PROCEDURE — 999N000147 HC STATISTIC PT IP EVAL DEFER

## 2024-08-30 PROCEDURE — 80195 ASSAY OF SIROLIMUS: CPT

## 2024-08-30 PROCEDURE — 250N000011 HC RX IP 250 OP 636: Mod: JZ

## 2024-08-30 PROCEDURE — 250N000012 HC RX MED GY IP 250 OP 636 PS 637

## 2024-08-30 PROCEDURE — 250N000013 HC RX MED GY IP 250 OP 250 PS 637

## 2024-08-30 PROCEDURE — 250N000013 HC RX MED GY IP 250 OP 250 PS 637: Performed by: NURSE PRACTITIONER

## 2024-08-30 PROCEDURE — 85048 AUTOMATED LEUKOCYTE COUNT: CPT

## 2024-08-30 PROCEDURE — 250N000013 HC RX MED GY IP 250 OP 250 PS 637: Performed by: INTERNAL MEDICINE

## 2024-08-30 PROCEDURE — 250N000011 HC RX IP 250 OP 636: Performed by: INTERNAL MEDICINE

## 2024-08-30 PROCEDURE — 83605 ASSAY OF LACTIC ACID: CPT | Performed by: INTERNAL MEDICINE

## 2024-08-30 PROCEDURE — 84100 ASSAY OF PHOSPHORUS: CPT | Performed by: INTERNAL MEDICINE

## 2024-08-30 PROCEDURE — 83735 ASSAY OF MAGNESIUM: CPT

## 2024-08-30 PROCEDURE — 206N000001 HC R&B BMT UMMC

## 2024-08-30 PROCEDURE — 99233 SBSQ HOSP IP/OBS HIGH 50: CPT | Mod: FS | Performed by: INTERNAL MEDICINE

## 2024-08-30 PROCEDURE — 80048 BASIC METABOLIC PNL TOTAL CA: CPT

## 2024-08-30 PROCEDURE — 99418 PROLNG IP/OBS E/M EA 15 MIN: CPT | Performed by: INTERNAL MEDICINE

## 2024-08-30 PROCEDURE — 258N000003 HC RX IP 258 OP 636

## 2024-08-30 PROCEDURE — P9040 RBC LEUKOREDUCED IRRADIATED: HCPCS

## 2024-08-30 PROCEDURE — 83605 ASSAY OF LACTIC ACID: CPT | Performed by: STUDENT IN AN ORGANIZED HEALTH CARE EDUCATION/TRAINING PROGRAM

## 2024-08-30 RX ORDER — SIROLIMUS 2 MG/1
6 TABLET, FILM COATED ORAL ONCE
Status: COMPLETED | OUTPATIENT
Start: 2024-08-30 | End: 2024-08-30

## 2024-08-30 RX ADMIN — PANTOPRAZOLE SODIUM 40 MG: 40 TABLET, DELAYED RELEASE ORAL at 08:33

## 2024-08-30 RX ADMIN — MICAFUNGIN SODIUM 150 MG: 50 INJECTION, POWDER, LYOPHILIZED, FOR SOLUTION INTRAVENOUS at 10:35

## 2024-08-30 RX ADMIN — METHOCARBAMOL 500 MG: 500 TABLET ORAL at 13:58

## 2024-08-30 RX ADMIN — LEVOFLOXACIN 250 MG: 250 TABLET, FILM COATED ORAL at 10:35

## 2024-08-30 RX ADMIN — TAMSULOSIN HYDROCHLORIDE 0.4 MG: 0.4 CAPSULE ORAL at 17:18

## 2024-08-30 RX ADMIN — CLINDAMYCIN PHOSPHATE: 10 SOLUTION TOPICAL at 08:35

## 2024-08-30 RX ADMIN — URSODIOL 300 MG: 300 CAPSULE ORAL at 13:57

## 2024-08-30 RX ADMIN — FILGRASTIM-AAFI 480 MCG: 480 INJECTION, SOLUTION INTRAVENOUS; SUBCUTANEOUS at 19:53

## 2024-08-30 RX ADMIN — DICLOFENAC SODIUM 2 G: 10 GEL TOPICAL at 19:54

## 2024-08-30 RX ADMIN — PROCHLORPERAZINE MALEATE 5 MG: 5 TABLET ORAL at 17:18

## 2024-08-30 RX ADMIN — PROCHLORPERAZINE MALEATE 5 MG: 5 TABLET ORAL at 23:14

## 2024-08-30 RX ADMIN — Medication 5 ML: at 04:20

## 2024-08-30 RX ADMIN — SIROLIMUS 6 MG: 2 TABLET ORAL at 08:33

## 2024-08-30 RX ADMIN — DICLOFENAC SODIUM 2 G: 10 GEL TOPICAL at 10:35

## 2024-08-30 RX ADMIN — CLINDAMYCIN PHOSPHATE: 10 SOLUTION TOPICAL at 19:54

## 2024-08-30 RX ADMIN — MYCOPHENOLATE MOFETIL 1250 MG: 500 INJECTION, POWDER, LYOPHILIZED, FOR SOLUTION INTRAVENOUS at 10:35

## 2024-08-30 RX ADMIN — ACYCLOVIR 800 MG: 800 TABLET ORAL at 19:53

## 2024-08-30 RX ADMIN — METHOCARBAMOL 500 MG: 500 TABLET ORAL at 19:53

## 2024-08-30 RX ADMIN — ACYCLOVIR 800 MG: 800 TABLET ORAL at 08:33

## 2024-08-30 RX ADMIN — TRAMADOL HYDROCHLORIDE 50 MG: 50 TABLET, COATED ORAL at 08:36

## 2024-08-30 RX ADMIN — DICLOFENAC SODIUM 2 G: 10 GEL TOPICAL at 15:39

## 2024-08-30 RX ADMIN — METHOCARBAMOL 500 MG: 500 TABLET ORAL at 08:33

## 2024-08-30 RX ADMIN — URSODIOL 300 MG: 300 CAPSULE ORAL at 08:33

## 2024-08-30 RX ADMIN — URSODIOL 300 MG: 300 CAPSULE ORAL at 19:53

## 2024-08-30 RX ADMIN — SIROLIMUS 6 MG: 2 TABLET ORAL at 15:37

## 2024-08-30 RX ADMIN — ALLOPURINOL 300 MG: 300 TABLET ORAL at 08:36

## 2024-08-30 RX ADMIN — MYCOPHENOLATE MOFETIL 1250 MG: 500 INJECTION, POWDER, LYOPHILIZED, FOR SOLUTION INTRAVENOUS at 22:07

## 2024-08-30 ASSESSMENT — ACTIVITIES OF DAILY LIVING (ADL)
ADLS_ACUITY_SCORE: 22
ADLS_ACUITY_SCORE: 23
ADLS_ACUITY_SCORE: 23
ADLS_ACUITY_SCORE: 22
ADLS_ACUITY_SCORE: 23
ADLS_ACUITY_SCORE: 23
ADLS_ACUITY_SCORE: 22
ADLS_ACUITY_SCORE: 23
ADLS_ACUITY_SCORE: 22

## 2024-08-30 NOTE — PROGRESS NOTES
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     Diagnosis MDS/AML  HCT Type Allogeneic     Prep Regimen Flu/Cy/TBI  Donor Match & Source 8/8 DP permissive PBSC    GVHD Prophylaxis PTCy/MMF/Siro  Primary BMT MD Dr. Murphy    Clinical Trials [id number]       INTERVAL  HISTORY     HA maybe more persistent and a bit worse when waking. It has been ongoing since admission. It is mostly above his eyes, neck pain 2/2 spinal stenosis and chronic back/neck pain. He thinks HA is worse since PTCy. No vision changes, numbness tingling, weakness, AMS. Tramadol improves pain to 2/10. Will try voltaren gel and ice packs today. Will ask PT if any ideas for exercises or stretches.  No N/V/D. Eating better.    Review of Systems: 10 point ROS negative except as noted above.    PHYSICAL EXAM     Weight In/Out     Wt Readings from Last 3 Encounters:   08/30/24 89.4 kg (197 lb 3.2 oz)   08/09/24 89.9 kg (198 lb 4.8 oz)   08/08/24 90.3 kg (199 lb 1.2 oz)      I/O last 3 completed shifts:  In: 1970 [I.V.:1732]  Out: 2125 [Urine:2125]     KPS:  80    /83   Pulse 98   Temp 98.3  F (36.8  C) (Oral)   Resp 18   Ht 1.84 m (6' 0.44\")   Wt 89.4 kg (197 lb 3.2 oz)   SpO2 98%   BMI 26.42 kg/m       General:  NAD   Lungs: CTAB, no wheezing or crackles   Cardiovascular: RRR, no M/R/G   Abdominal/Rectal: +BS, soft, NT, ND  Lymphatics: No edema  Skin: improving follicular rash noted to chest, back on 8/28.   Neuro: A&O, appropriately interactive, speech clear, moving all extremities   Additional Findings: Estrada site CDI, no drainage    Current aGVHD staging:  Skin 0, UGI 0, LGI 0, Liver 0     LABS AND IMAGING: I have assessed all abnormal lab values for their clinical significance and any values considered clinically significant have " been addressed in the assessment and plan.      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     BMT/IEC PROTOCOL for MDS  - Chemo protocol: MT 2022-5  D-6: Flu 30mg/m2, Cy 50mg/kg  D-5 to D-2: Flu  D-1: TBI  D0: stem cell infusion  - Peripheral blood stem cell graft from 8/8 URD donor, ABO matched, Cell dose: TBD  - Engraftment monitoring: Peripheral blood and bone marrow chimerisms on D28, D100, 6 months, 1 and 2 years  - Restaging plan: BMBx with FISH, cytogenetics and NGS on D28, D100, 6 months, 1 and 2 years     HEME/COAG  # Pancytopenia secondary to disease process  - GCSF starts day +5, continue until ANC > 2500 for 2 consecutive days  - Transfusion parameters: hemoglobin <7, platelets <10     RISK OF GVHD  - Prophylaxis: PTCy 50mg/kg on D+3 and D+4; MMF (D+5 to 35); Sirolimus (starting D+5, target level 5-10)     ID  Prophylaxis plan:   - Bacterial: Levaquin 250mg while neutropenic    - Fungal: Micafungin 300mg 3x/week until D+45, followed by mold active azole. Pulm nodules present on workup CT.   - PJP: Bactrim to start at D+28. Toxoplasma negative.   - Viral: Acyclovir 800mg BID. No need for letermovir as donor and recipient are CMV negative.      Monitoring:   - CMV weekly, negative 8/17, 8/24 in process   - EBV every 2 weeks from D30 to D180     GI/NUTRITION  # GERD: Protonix  - Anti-emetics: Zofran, dex scheduled during chemotherapy.  Compazine, lorazepam available PRN.   - Ulcer prophy: Protonix  - VOD prophy: Ursodiol 300mg TID  - Dietician support to prevent malnutrition  - Miralax and senna PRN constipation. For now, Miralax scheduled at bedtime.      CARDIOVASCULAR  # HTN- BP has been high (160-170s/ 70s) in recent clinic appointments. Recently increased lisinopril to 15mg daily. Monitor and increase PRN. Hold starting 8/26 with orthostatics and dizziness during shower 8/25.   # Orthostatic hypotension- 1L IVF 8/29  # CAD: Coronary artery calcifications seen on CT, stress test in 2023 negative     - Risk of cardiomyopathy: Baseline EF 55-60%.   - Risk of arrhythmia: Baseline EKG showed 421      RESPIRATORY  - Baseline PFTs: Normal   - Risk of respiratory complications: Frequent ambulation and incentive spirometer     RENAL/ELECTROLYTES/  #Hyponatremia- improved with IVF.  - Risk of renal injury: IV hydration   - Electrolyte management: replace per sliding scale  - BPH: Continue home flomax.  - Hyponatremia- secondary to SIADH with cytoxan flush. Changed fluids to NS, improving.  - Hypervolemia secondary to cytoxan flush. Additional lasix 20mg IV x1 8/27 and 8/28, continue to monitor volume status closely.      DIABETES/ENDOCRINE  - Risk of steroid-induced hyperglyemia: Monitor BG, sliding scale if needed     MUSCULOSKELETAL/FRAILTY  - Baseline Frailty Score: Not frail (0)  - Daily PT/OT as needed while inpatient  - Gout: continue allopurinol      SYMPTOM MANAGEMENT  #HA: no red flag symptoms. Pt has history of spinal stenosis, lumbosacral radiculopathy likely exacerbating. Tramadol PRN. Voltaren gel, ice and PT asked for suggestions. In the setting of thrombocytopenia, consider CT head if changes in symptoms or worsens.  - Pain management: Outpatient has been taking celecoxib for MSK pain, once a day. Inpatient will try Tramadol - available PRN. Added robaxin TID   - Follicular rash to chest, back- Start topical clindamycin 8/28.     "  SOCIAL DETERMINANTS  - Caregiver: wife, Vani. Lives within the required distance from hospital but may elect to stay in [facility].   - Financial/insurance concerns: None    Medically Ready for Discharge: Anticipated in 5+ Days      Clinically Significant Risk Factors              # Hypoalbuminemia: Lowest albumin = 3.1 g/dL at 8/29/2024  3:27 AM, will monitor as appropriate   # Thrombocytopenia: Lowest platelets = 16 in last 2 days, will monitor for bleeding   # Hypertension: Noted on problem list           # Overweight: Estimated body mass index is 26.42 kg/m  as calculated from the following:    Height as of this encounter: 1.84 m (6' 0.44\").    Weight as of this encounter: 89.4 kg (197 lb 3.2 oz).            I spent 30 minutes in the care of this patient today, which included time necessary for preparation for the visit, obtaining history, ordering medications/tests/procedures as medically indicated, review of pertinent medical literature, counseling of the patient, communication of recommendations to the care team, and documentation time.    Bessie Lazo PA-C  , [facility]      Physician Attestation     I, Timothy Skaggs MD, saw and evaluated Leland THOMPSON Michel as part of a shared APRN/PA visit. I personally performed the substantive portion of the medical decision making for this visit - please see the VJ s documentation for full details.    Key management decisions made by me and carried out under my direction include:   69y M with MDS/AML with ASXL1, RUNX1, IDH1, SRSF2 mutations, who achieved a low blast hypocellular state, and is now day +7 s/p GANGA (Flu/Cy/TBI) 8/8 URD (23yF, O+/O+, CMV -/-, DP permissive, 6.06 x106 CD34/kg) PB SCT with PTCy/siro/MMF GVHD ppx. He has tolerated conditioning, stem cell infusion, and PTCy reasonably well, without fever. He continues to be fatigued, but appears to be in better spirits today. Continue supportive cares. Getting voltaren for some likely MSK-related headache. Will " continue to monitor closely for further complications of transplant and continue supportive care.    On the date of service, 08/30/2024, I spent 40 minutes on the patient unit personally reviewing medical records and medications, reviewing vital signs, labs, and imaging results as summarized above, obtaining a history from the patient, performing a physical exam, counseling and educating the patient on the diagnosis and treatment, evaluating a potentially life or organ threatening problem, intensively monitoring treatments with high risk of toxicity, coordinating care, and documenting in the electronic medical record.    Thank you for allowing me to participate in the care of this patient. Please do not hesitate to contact me if there are any concerns or questions.     Timothy Skaggs MD   of Medicine  Classical Hematology and Blood and Marrow Transplantation  Division of Hematology, Oncology, and Transplantation  [facility]

## 2024-08-30 NOTE — PROCEDURES
Radiotherapy Treatment Summary          Date of Report: 2024     PATIENT: NISREEN TAPIA  MEDICAL RECORD NO: [id number]  : 1955     DIAGNOSIS: D46.9 Myelodysplastic syndrome, unspecified  INTENT OF RADIOTHERAPY: Cure per BMT protocol      Details of the treatments summarized below are found in records kept in the Department of Radiation Oncology at [facility].     Treatment Summary:  Radiation Oncology - Course: 1 Protocol:   Treatment Site Modality Dose (cGy) per Fraction Number of Fractions Total Dose (cGy) Dates of Treatment Elapsed Days   TBI 18 X 200 1 200 24 1          COMMENTS:                      Mr. Woodson tolerated radiotherapy well with no reported side effects.     ED visits/hospitalizations: per BMT protocol     Missed treatments: none     Acute Toxicity Profile by CTC v5.0: none     PAIN MANAGEMENT: na                             FOLLOW UP PLAN: Per BMT team.                              Staff Physician: Bruno Ramos M.D.     CC:  Brenda Murphy MD              Radiation Oncology:  [facility] [address] [place] [address]

## 2024-08-30 NOTE — PLAN OF CARE
Patient vital signs stable, a little orthostatic this morning but asymptomatic. 1 liter NS given. Lactic acid 1.7 this morning His appetite has been good today. He denies nausea. His neck pain has been bothersome, Tramadol x 2 today. Wife at bedside most of the day, support provided. Continue to monitor.  Problem: Adult Inpatient Plan of Care  Goal: Plan of Care Review  Description: The Plan of Care Review/Shift note should be completed every shift.  The Outcome Evaluation is a brief statement about your assessment that the patient is improving, declining, or no change.  This information will be displayed automatically on your shift  note.  Outcome: Progressing   Goal Outcome Evaluation:      Plan of Care Reviewed With: patient

## 2024-08-30 NOTE — PLAN OF CARE
"/76 (BP Location: Left arm)   Pulse 95   Temp 98.1  F (36.7  C) (Oral)   Resp 16   Ht 1.84 m (6' 0.44\")   Wt 89 kg (196 lb 4.8 oz)   SpO2 96%   BMI 26.30 kg/m       Patient afebrile, vital signs stable, patient received tramadol x1 for head ache and back pain. Pain was managed. Patient triggered lactic and it resulted at 0.5. Pt currently getting blood this morning. Patient assist level independent. Continue with plan of care.    Goal Outcome Evaluation:      Plan of Care Reviewed With: patient    Overall Patient Progress: no changeOverall Patient Progress: no change       Problem: Adult Inpatient Plan of Care  Goal: Optimal Comfort and Wellbeing  Outcome: Progressing  Intervention: Monitor Pain and Promote Comfort  Recent Flowsheet Documentation  Taken 8/29/2024 2308 by Johnathon Mcintosh RN  Pain Management Interventions: medication (see MAR)     Problem: Risk for Delirium  Goal: Optimal Coping  Outcome: Progressing  Goal: Improved Behavioral Control  Outcome: Progressing  Intervention: Minimize Safety Risk  Recent Flowsheet Documentation  Taken 8/30/2024 0400 by Johnathon Mcintosh RN  Enhanced Safety Measures: review medications for side effects with activity  Taken 8/30/2024 0000 by Johnathon Mcintosh RN  Enhanced Safety Measures: review medications for side effects with activity  Taken 8/29/2024 2000 by Johnathon Mcintosh RN  Enhanced Safety Measures: review medications for side effects with activity  Goal: Improved Attention and Thought Clarity  Outcome: Progressing  Goal: Improved Sleep  Outcome: Progressing     Problem: Stem Cell/Bone Marrow Transplant  Goal: Optimal Coping with Transplant  Outcome: Progressing  Goal: Symptom-Free Urinary Elimination  Outcome: Progressing  Intervention: Prevent or Manage Bladder Irritation  Recent Flowsheet Documentation  Taken 8/29/2024 2308 by Johnathon Mcintosh RN  Pain Management Interventions: medication (see MAR)  Goal: Diarrhea Symptom Control  Outcome: " Progressing  Goal: Improved Activity Tolerance  Outcome: Progressing  Intervention: Promote Improved Energy  Recent Flowsheet Documentation  Taken 8/30/2024 0400 by Johnathon Mcintosh, RN  Activity Management: up ad sergo  Taken 8/30/2024 0000 by Johnathon Mcintosh, RN  Activity Management: up ad sergo  Taken 8/29/2024 2000 by Johnathon Mcintosh, RN  Activity Management: up ad sergo  Goal: Blood Counts Within Acceptable Range  Outcome: Progressing  Goal: Absence of Hypersensitivity Reaction  Outcome: Progressing  Goal: Absence of Infection  Outcome: Progressing  Goal: Improved Oral Mucous Membrane Health and Integrity  Outcome: Progressing  Goal: Nausea and Vomiting Symptom Relief  Outcome: Progressing  Goal: Optimal Nutrition Intake  Outcome: Progressing

## 2024-08-30 NOTE — PLAN OF CARE
Physical Therapy: Orders received. Chart reviewed and discussed with care team.? Physical Therapy not indicated due to per OT, pt moving IND and spouse able to assist with cares as needed; no acute PT needs at the moment.? Defer discharge recommendations to OT and care team.? Will complete orders.

## 2024-08-30 NOTE — PROGRESS NOTES
CLINICAL NUTRITION SERVICES - REASSESSMENT NOTE     Nutrition Prescription    RECOMMENDATIONS FOR MDs/PROVIDERS TO ORDER:  None at this time     Malnutrition Status:    Patient does not meet two of the established criteria necessary for diagnosing malnutrition    Recommendations already ordered by Registered Dietitian (RD):  PRN snacks/supplements     Future/Additional Recommendations:  -Monitor PO intake   -Monitor wt trends  -Monitor labs     EVALUATION OF THE PROGRESS TOWARD GOALS   Diet: High Kcal/High Protein, Ensure Enlive w/ice cream w/meals, PRN snacks/supplements     Intake: Variable intake ranging from 0-100% per RN flowsheets.     NEW FINDINGS   Pt is day 7 s/p Allo PBSC. Met with pt and his wife alongside RN's at bedside. Pt reports appetite is getting better- was decreased day 3-5 after his Allo. Pt denies any nausea or taste changes today (he notes smells of food were unappealing when PO appetite was low). Pt continues to have an Ensure mixed with ice cream daily. Pt and his wife denied any nutrition questions/concerns today.       GI:  LBM 8/29. Pt reports stools have been loose, did not take Miralax last night as a result.     SKIN:  Rash on back/chest, no other concerns noted     LABS:  Reviewed.  Na 134  Cr 0.59  Ca 8.6  Glucose 103    MEDICATIONS:  Reviewed.  Acyclovir  Protonix  Miralax  Sirolimus  Ursodiol  NaCl @ 225 mL/hr  PRN Miralax, Compazine, Senokot    WEIGHTS:  Wt's have slightly fluctuated during admission  Vitals:    08/27/24 1657 08/28/24 0724 08/28/24 1737 08/29/24 0946   Weight: 92.2 kg (203 lb 3.2 oz) 91.3 kg (201 lb 4.8 oz) 90.5 kg (199 lb 8 oz) 89 kg (196 lb 4.8 oz)    08/30/24 0902   Weight: 89.4 kg (197 lb 3.2 oz)       MALNUTRITION  % Intake: Decreased intake does not meet criteria  % Weight Loss: None noted  Subcutaneous Fat Loss: None observed  Muscle Loss: None observed  Fluid Accumulation/Edema: None noted  Malnutrition Diagnosis: Patient does not meet two of the  established criteria necessary for diagnosing malnutrition    Previous Goals   Patient to consume % of nutritionally adequate meal trays TID, or the equivalent with supplements/snacks.   Evaluation: Not met due to decreased PO intake x 2 days this week     Previous Nutrition Diagnosis  Predicted inadequate nutrient intake (kcal/protein) related to upcoming BMT with potential for side effects to affect ability to take adequate PO.   Evaluation: ongoing    CURRENT NUTRITION DIAGNOSIS  Predicted inadequate nutrient intake (kcal/protein) related to recent BMT with potential for side effects to affect ability to take adequate PO.     INTERVENTIONS  Implementation  Medical food supplement therapy- continue current orders     Goals  Patient to consume % of nutritionally adequate meal trays TID, or the equivalent with supplements/snacks.    Monitoring/Evaluation  Progress toward goals will be monitored and evaluated per protocol.    Marie Reese RD (Maggie), LD- 5C Clinical Dietitian   Available on [email or web address]  No longer available by paging

## 2024-08-30 NOTE — PLAN OF CARE
"/79 (BP Location: Left arm)   Pulse 102   Temp 98.6  F (37  C) (Oral)   Resp 16   Ht 1.84 m (6' 0.44\")   Wt 89.4 kg (197 lb 3.2 oz)   SpO2 100%   BMI 26.42 kg/m      AVSS on RA. Pt reporting headache and neck pain this morning. Relief with Voltaren gel and tramadol x1. Mild nausea this afternoon, pt given compazine x1. Pt up ad sergo, ambulated in halls multiples times throughout shift. Pt eating % of meals, see flowsheet for intake. One stool, pt voiding spontaneously. Sirolimus level low at 3, additional 6mg given. CVC insertion site red and swollen this evening, photo in media tab. Continue POC.    Problem: Adult Inpatient Plan of Care  Goal: Optimal Comfort and Wellbeing  Outcome: Progressing     Problem: Stem Cell/Bone Marrow Transplant  Goal: Nausea and Vomiting Symptom Relief  Outcome: Progressing  "

## 2024-08-31 DIAGNOSIS — D46.9 MDS (MYELODYSPLASTIC SYNDROME) (H): ICD-10-CM

## 2024-08-31 LAB
ACANTHOCYTES BLD QL SMEAR: NORMAL
ANION GAP SERPL CALCULATED.3IONS-SCNC: 7 MMOL/L (ref 7–15)
AUER BODIES BLD QL SMEAR: NORMAL
BASO STIPL BLD QL SMEAR: NORMAL
BASOPHILS # BLD AUTO: ABNORMAL 10*3/UL
BASOPHILS NFR BLD AUTO: ABNORMAL %
BITE CELLS BLD QL SMEAR: NORMAL
BLD PROD TYP BPU: NORMAL
BLISTER CELLS BLD QL SMEAR: NORMAL
BLOOD COMPONENT TYPE: NORMAL
BUN SERPL-MCNC: 15.5 MG/DL (ref 8–23)
BURR CELLS BLD QL SMEAR: NORMAL
CALCIUM SERPL-MCNC: 8.5 MG/DL (ref 8.8–10.4)
CHLORIDE SERPL-SCNC: 101 MMOL/L (ref 98–107)
CODING SYSTEM: NORMAL
CREAT SERPL-MCNC: 0.63 MG/DL (ref 0.67–1.17)
DACRYOCYTES BLD QL SMEAR: NORMAL
EGFRCR SERPLBLD CKD-EPI 2021: >90 ML/MIN/1.73M2
ELLIPTOCYTES BLD QL SMEAR: NORMAL
EOSINOPHIL # BLD AUTO: ABNORMAL 10*3/UL
EOSINOPHIL NFR BLD AUTO: ABNORMAL %
ERYTHROCYTE [DISTWIDTH] IN BLOOD BY AUTOMATED COUNT: 15.7 % (ref 10–15)
FRAGMENTS BLD QL SMEAR: NORMAL
GLUCOSE SERPL-MCNC: 109 MG/DL (ref 70–99)
HCO3 SERPL-SCNC: 26 MMOL/L (ref 22–29)
HCT VFR BLD AUTO: 22.1 % (ref 40–53)
HGB BLD-MCNC: 7.8 G/DL (ref 13.3–17.7)
HGB C CRYSTALS: NORMAL
HOWELL-JOLLY BOD BLD QL SMEAR: NORMAL
IMM GRANULOCYTES # BLD: ABNORMAL 10*3/UL
IMM GRANULOCYTES NFR BLD: ABNORMAL %
ISSUE DATE AND TIME: NORMAL
LACTATE SERPL-SCNC: 1 MMOL/L (ref 0.7–2)
LYMPHOCYTES # BLD AUTO: ABNORMAL 10*3/UL
LYMPHOCYTES NFR BLD AUTO: ABNORMAL %
MAGNESIUM SERPL-MCNC: 1.8 MG/DL (ref 1.7–2.3)
MCH RBC QN AUTO: 27.5 PG (ref 26.5–33)
MCHC RBC AUTO-ENTMCNC: 35.3 G/DL (ref 31.5–36.5)
MCV RBC AUTO: 78 FL (ref 78–100)
MONOCYTES # BLD AUTO: ABNORMAL 10*3/UL
MONOCYTES NFR BLD AUTO: ABNORMAL %
NEUTROPHILS # BLD AUTO: ABNORMAL 10*3/UL
NEUTROPHILS NFR BLD AUTO: ABNORMAL %
NEUTS HYPERSEG BLD QL SMEAR: NORMAL
PHOSPHATE SERPL-MCNC: 3.4 MG/DL (ref 2.5–4.5)
PLAT MORPH BLD: NORMAL
PLATELET # BLD AUTO: 13 10E3/UL (ref 150–450)
POLYCHROMASIA BLD QL SMEAR: NORMAL
POTASSIUM SERPL-SCNC: 3.8 MMOL/L (ref 3.4–5.3)
RBC # BLD AUTO: 2.84 10E6/UL (ref 4.4–5.9)
RBC AGGLUT BLD QL: NORMAL
RBC MORPH BLD: NORMAL
ROULEAUX BLD QL SMEAR: NORMAL
SICKLE CELLS BLD QL SMEAR: NORMAL
SMUDGE CELLS BLD QL SMEAR: NORMAL
SODIUM SERPL-SCNC: 134 MMOL/L (ref 135–145)
SPHEROCYTES BLD QL SMEAR: NORMAL
STOMATOCYTES BLD QL SMEAR: NORMAL
TARGETS BLD QL SMEAR: NORMAL
TOXIC GRANULES BLD QL SMEAR: NORMAL
UNIT ABO/RH: NORMAL
UNIT NUMBER: NORMAL
UNIT STATUS: NORMAL
UNIT TYPE ISBT: 7300
VARIANT LYMPHS BLD QL SMEAR: NORMAL
WBC # BLD AUTO: <0.1 10E3/UL (ref 4–11)

## 2024-08-31 PROCEDURE — 99418 PROLNG IP/OBS E/M EA 15 MIN: CPT | Performed by: INTERNAL MEDICINE

## 2024-08-31 PROCEDURE — 250N000011 HC RX IP 250 OP 636: Mod: JZ | Performed by: INTERNAL MEDICINE

## 2024-08-31 PROCEDURE — 99233 SBSQ HOSP IP/OBS HIGH 50: CPT | Mod: FS | Performed by: INTERNAL MEDICINE

## 2024-08-31 PROCEDURE — 206N000001 HC R&B BMT UMMC

## 2024-08-31 PROCEDURE — 258N000003 HC RX IP 258 OP 636

## 2024-08-31 PROCEDURE — 85027 COMPLETE CBC AUTOMATED: CPT

## 2024-08-31 PROCEDURE — 250N000013 HC RX MED GY IP 250 OP 250 PS 637: Performed by: NURSE PRACTITIONER

## 2024-08-31 PROCEDURE — 84100 ASSAY OF PHOSPHORUS: CPT | Performed by: INTERNAL MEDICINE

## 2024-08-31 PROCEDURE — 83605 ASSAY OF LACTIC ACID: CPT | Performed by: PHYSICIAN ASSISTANT

## 2024-08-31 PROCEDURE — 250N000013 HC RX MED GY IP 250 OP 250 PS 637: Performed by: INTERNAL MEDICINE

## 2024-08-31 PROCEDURE — 250N000012 HC RX MED GY IP 250 OP 636 PS 637

## 2024-08-31 PROCEDURE — 80048 BASIC METABOLIC PNL TOTAL CA: CPT

## 2024-08-31 PROCEDURE — 83735 ASSAY OF MAGNESIUM: CPT | Performed by: INTERNAL MEDICINE

## 2024-08-31 PROCEDURE — 250N000013 HC RX MED GY IP 250 OP 250 PS 637

## 2024-08-31 PROCEDURE — 250N000011 HC RX IP 250 OP 636: Mod: JZ

## 2024-08-31 PROCEDURE — P9037 PLATE PHERES LEUKOREDU IRRAD: HCPCS

## 2024-08-31 PROCEDURE — 250N000011 HC RX IP 250 OP 636: Performed by: RADIOLOGY

## 2024-08-31 PROCEDURE — 250N000011 HC RX IP 250 OP 636: Performed by: INTERNAL MEDICINE

## 2024-08-31 RX ORDER — POTASSIUM CHLORIDE 29.8 MG/ML
20 INJECTION INTRAVENOUS ONCE
Status: COMPLETED | OUTPATIENT
Start: 2024-08-31 | End: 2024-08-31

## 2024-08-31 RX ADMIN — CLINDAMYCIN PHOSPHATE: 10 SOLUTION TOPICAL at 07:29

## 2024-08-31 RX ADMIN — PANTOPRAZOLE SODIUM 40 MG: 40 TABLET, DELAYED RELEASE ORAL at 07:30

## 2024-08-31 RX ADMIN — URSODIOL 300 MG: 300 CAPSULE ORAL at 14:49

## 2024-08-31 RX ADMIN — DICLOFENAC SODIUM 2 G: 10 GEL TOPICAL at 07:29

## 2024-08-31 RX ADMIN — METHOCARBAMOL 500 MG: 500 TABLET ORAL at 14:49

## 2024-08-31 RX ADMIN — MICAFUNGIN SODIUM 150 MG: 50 INJECTION, POWDER, LYOPHILIZED, FOR SOLUTION INTRAVENOUS at 09:31

## 2024-08-31 RX ADMIN — ACYCLOVIR 800 MG: 800 TABLET ORAL at 07:30

## 2024-08-31 RX ADMIN — TAMSULOSIN HYDROCHLORIDE 0.4 MG: 0.4 CAPSULE ORAL at 17:32

## 2024-08-31 RX ADMIN — PROCHLORPERAZINE MALEATE 5 MG: 5 TABLET ORAL at 14:48

## 2024-08-31 RX ADMIN — MYCOPHENOLATE MOFETIL 1250 MG: 500 INJECTION, POWDER, LYOPHILIZED, FOR SOLUTION INTRAVENOUS at 21:59

## 2024-08-31 RX ADMIN — METHOCARBAMOL 500 MG: 500 TABLET ORAL at 07:30

## 2024-08-31 RX ADMIN — MYCOPHENOLATE MOFETIL 1250 MG: 500 INJECTION, POWDER, LYOPHILIZED, FOR SOLUTION INTRAVENOUS at 09:30

## 2024-08-31 RX ADMIN — FILGRASTIM-AAFI 480 MCG: 480 INJECTION, SOLUTION INTRAVENOUS; SUBCUTANEOUS at 20:25

## 2024-08-31 RX ADMIN — Medication 10 ML: at 14:00

## 2024-08-31 RX ADMIN — POTASSIUM CHLORIDE 20 MEQ: 29.8 INJECTION, SOLUTION INTRAVENOUS at 05:58

## 2024-08-31 RX ADMIN — SIROLIMUS 6 MG: 2 TABLET ORAL at 07:30

## 2024-08-31 RX ADMIN — CLINDAMYCIN PHOSPHATE: 10 SOLUTION TOPICAL at 20:25

## 2024-08-31 RX ADMIN — DICLOFENAC SODIUM 2 G: 10 GEL TOPICAL at 20:25

## 2024-08-31 RX ADMIN — METHOCARBAMOL 500 MG: 500 TABLET ORAL at 20:25

## 2024-08-31 RX ADMIN — URSODIOL 300 MG: 300 CAPSULE ORAL at 20:25

## 2024-08-31 RX ADMIN — LEVOFLOXACIN 250 MG: 250 TABLET, FILM COATED ORAL at 09:31

## 2024-08-31 RX ADMIN — URSODIOL 300 MG: 300 CAPSULE ORAL at 07:30

## 2024-08-31 RX ADMIN — DICLOFENAC SODIUM 2 G: 10 GEL TOPICAL at 17:32

## 2024-08-31 RX ADMIN — ACYCLOVIR 800 MG: 800 TABLET ORAL at 20:25

## 2024-08-31 RX ADMIN — ALLOPURINOL 300 MG: 300 TABLET ORAL at 07:30

## 2024-08-31 ASSESSMENT — ACTIVITIES OF DAILY LIVING (ADL)
ADLS_ACUITY_SCORE: 22
ADLS_ACUITY_SCORE: 23
ADLS_ACUITY_SCORE: 23
ADLS_ACUITY_SCORE: 22
ADLS_ACUITY_SCORE: 23
ADLS_ACUITY_SCORE: 22
ADLS_ACUITY_SCORE: 23
ADLS_ACUITY_SCORE: 22
ADLS_ACUITY_SCORE: 23
ADLS_ACUITY_SCORE: 22
ADLS_ACUITY_SCORE: 23
ADLS_ACUITY_SCORE: 22
ADLS_ACUITY_SCORE: 23
ADLS_ACUITY_SCORE: 23

## 2024-08-31 NOTE — PROGRESS NOTES
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     Diagnosis MDS/AML  HCT Type Allogeneic     Prep Regimen Flu/Cy/TBI  Donor Match & Source 8/8 DP permissive PBSC    GVHD Prophylaxis PTCy/MMF/Siro  Primary BMT MD Dr. Murphy    Clinical Trials [id number]       INTERVAL  HISTORY     Overall doing okay.  No major medical concerns.  Already ate breakfast  Had BM but more on constipated side.     Review of Systems: 10 point ROS negative except as noted above.    PHYSICAL EXAM     Weight In/Out     Wt Readings from Last 3 Encounters:   08/30/24 89.4 kg (197 lb 3.2 oz)   08/09/24 89.9 kg (198 lb 4.8 oz)   08/08/24 90.3 kg (199 lb 1.2 oz)      I/O last 3 completed shifts:  In: 1745 [P.O.:720; I.V.:725]  Out: 2950 [Urine:2950]     KPS:  80    /74   Pulse 97   Temp 98.2  F (36.8  C) (Oral)   Resp 16   Ht 1.84 m (6' 0.44\")   Wt 89.4 kg (197 lb 3.2 oz)   SpO2 98%   BMI 26.42 kg/m       General:  NAD  HEENT: no oral lesions but on tongue noted small area that is sore to him but no major exam findings.    Lungs: CTAB, no wheezing or crackles   Cardiovascular: RRR, no M/R/G   Lymphatics: No edema  Skin: No rash   Neuro: A&O, appropriately interactive, speech clear, moving all extremities   Additional Findings: Estrada site CDI, no drainage    Current aGVHD staging:  Skin 0, UGI 0, LGI 0, Liver 0     LABS AND IMAGING: I have assessed all abnormal lab values for their clinical significance and any values considered clinically significant have been addressed in the assessment and plan.      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     BMT/IEC PROTOCOL for MDS  - Chemo protocol: MT 2022-5  D-6: Flu 30mg/m2, Cy 50mg/kg  D-5 to D-2: Flu  D-1: TBI  D0: stem cell infusion  - Peripheral blood stem cell graft from 8/8 URD donor, ABO matched, Cell dose: TBD  - Engraftment monitoring: Peripheral blood and bone marrow chimerisms on D28, D100, 6 months, 1 and 2 years  - Restaging plan: BMBx with FISH, cytogenetics and NGS on D28, D100, 6 months, 1 and 2 years     HEME/COAG  # Pancytopenia secondary to disease process  - GCSF starts day +5, continue until ANC > 2500 for 2 consecutive days  - Transfusion parameters: hemoglobin <7, platelets <10     RISK OF GVHD  - Prophylaxis: PTCy 50mg/kg on D+3 and D+4; MMF (D+5 to 35); Sirolimus (starting D+5, target level 5-10).  - 8/30 siro level low. Bolused and will repeat level on Monday.      ID  Prophylaxis plan:   - Bacterial: Levaquin 250mg while neutropenic    - Fungal: Micafungin 300mg 3x/week until D+45, followed by mold active azole. Pulm nodules present on workup CT.   - PJP: Bactrim to start at D+28. Toxoplasma negative.   - Viral: Acyclovir 800mg BID. No need for letermovir as donor and recipient are CMV negative.      Monitoring:   - CMV weekly, still to be drawn today.   - EBV every 2 weeks from D30 to D180     GI/NUTRITION  # GERD: Protonix  - Anti-emetics: Zofran, dex scheduled during chemotherapy. Compazine, lorazepam available PRN.   - Ulcer prophy: Protonix  - VOD prophy: Ursodiol 300mg TID  - Dietician support to prevent malnutrition  - Miralax and senna PRN constipation. For now, Miralax scheduled at bedtime.     "  CARDIOVASCULAR  # HTN- lisinopril 15mg daily. Dc'd starting 8/26 with orthostatics and some dizziness.  # CAD: Coronary artery calcifications seen on CT, stress test in 2023 negative     - Risk of cardiomyopathy: Baseline EF 55-60%.   - Risk of arrhythmia: Baseline EKG showed 421       RENAL/ELECTROLYTES/  - Electrolyte management: replace per sliding scale  - BPH: Continue home flomax.      MUSCULOSKELETAL/FRAILTY  - Baseline Frailty Score: Not frail (0)  - Daily PT/OT as needed while inpatient  - Gout: continue allopurinol      Neuro   #HA: no red flag symptoms. Pt has history of spinal stenosis, lumbosacral radiculopathy likely exacerbating. Tramadol PRN. Voltaren gel, ice and PT asked for suggestions. In the setting of thrombocytopenia, consider CT head if changes in symptoms or worsens.  - Pain management: Outpatient has been taking celecoxib for MSK pain, once a day. Inpatient will try Tramadol - available PRN. Added robaxin TID     Derm:  - Follicular rash to chest, back- Started topical clindamycin 8/28.      SOCIAL DETERMINANTS  - Caregiver: wife, Vani. Lives within the required distance from hospital but may elect to [facility].   - Financial/insurance concerns: None    Medically Ready for Discharge: Anticipated in 5+ Days      Clinically Significant Risk Factors              # Hypoalbuminemia: Lowest albumin = 3.1 g/dL at 8/29/2024  3:27 AM, will monitor as appropriate   # Thrombocytopenia: Lowest platelets = 13 in last 2 days, will monitor for bleeding   # Hypertension: Noted on problem list           # Overweight: Estimated body mass index is 26.42 kg/m  as calculated from the following:    Height as of this encounter: 1.84 m (6' 0.44\").    Weight as of this encounter: 89.4 kg (197 lb 3.2 oz).            I spent 30 minutes in the care of this patient today, which included time necessary for preparation for the visit, obtaining history, ordering medications/tests/procedures as medically " indicated, review of pertinent medical literature, counseling of the patient, communication of recommendations to the care team, and documentation time.    Eunice Spicer PA-C  [phone]    Physician Attestation     I, Timothy Skaggs MD, saw and evaluated Leland Pearson as part of a shared APRN/PA visit. I personally performed the substantive portion of the medical decision making for this visit - please see the VJ s documentation for full details.    Key management decisions made by me and carried out under my direction include:   69y M with MDS/AML with ASXL1, RUNX1, IDH1, SRSF2 mutations, who achieved a low blast hypocellular state, and is now day +8 s/p GANGA (Flu/Cy/TBI) 8/8 URD (23yF, O+/O+, CMV -/-, DP permissive, 6.06 x106 CD34/kg) PB SCT with PTCy/siro/MMF GVHD ppx. He has tolerated conditioning, stem cell infusion, and PTCy reasonably well, without fever. He is doing relatively well today, feeling pretty well. Continue supportive cares. Getting voltaren for some likely MSK-related headache. Will continue to monitor closely for further complications of transplant and continue supportive care.    On the date of service, 08/31/2024, I spent 40 minutes on the patient unit personally reviewing medical records and medications, reviewing vital signs, labs, and imaging results as summarized above, obtaining a history from the patient, performing a physical exam, counseling and educating the patient on the diagnosis and treatment, evaluating a potentially life or organ threatening problem, intensively monitoring treatments with high risk of toxicity, coordinating care, and documenting in the electronic medical record.    Thank you for allowing me to participate in the care of this patient. Please do not hesitate to contact me if there are any concerns or questions.     Timothy Skaggs MD   of Medicine  Classical Hematology and Blood and Marrow Transplantation  Division of Hematology, Oncology, and  Transplantation  [facility]

## 2024-08-31 NOTE — PLAN OF CARE
"BP (!) 146/87 (BP Location: Left arm, Cuff Size: Adult Regular)   Pulse 111   Temp 99  F (37.2  C) (Oral)   Resp 16   Ht 1.84 m (6' 0.44\")   Wt 89.4 kg (197 lb 3.2 oz)   SpO2 96%   BMI 26.42 kg/m      Neuro: A&Ox4. No new deficits noted.   Cardiac: Afebrile with t-max-100.0. OVSS.   Respiratory: Sating at 96% on RA. Denies SOB, chest pain, lightheaded and dizziness.   GI/: mild nausea with no emesis, PRN compazine given x1 with relief.  Adequate urine output. BM X o on this shift.   Diet/appetite: high kcal/high protein diet.   Activity:  up independently.   Pain: At acceptable level on current regimen. Neck pain managed with Voltaren gel.    Skin: No new deficits noted.  LDA's: right double lumen CVC.     Plan: pt triggered sepsis protocol, lactic acid-0.9. plt and potassium currently infusing, recheck K+ level next AM. No other replacement needed. Continue with POC. Notify primary team with changes.   Problem: Adult Inpatient Plan of Care  Goal: Plan of Care Review  Description: The Plan of Care Review/Shift note should be completed every shift.  The Outcome Evaluation is a brief statement about your assessment that the patient is improving, declining, or no change.  This information will be displayed automatically on your shift  note.  Outcome: Progressing  Flowsheets (Taken 8/31/2024 0131)  Plan of Care Reviewed With: patient  Overall Patient Progress: no change     Problem: Stem Cell/Bone Marrow Transplant  Goal: Nausea and Vomiting Symptom Relief  Outcome: Progressing   Goal Outcome Evaluation:      Plan of Care Reviewed With: patient    Overall Patient Progress: no changeOverall Patient Progress: no change           "

## 2024-08-31 NOTE — PLAN OF CARE
"/84   Pulse 96   Temp 98.1  F (36.7  C) (Oral)   Resp 16   Ht 1.84 m (6' 0.44\")   Wt 89.3 kg (196 lb 14.4 oz)   SpO2 100%   BMI 26.38 kg/m      AVSS on RA. Pt denies pain. Neck pain fully managed with scheduled Voltaren gel. Intermittent mild nausea, relief with compazine x1. Pt more fatigued today, but was able to shower and ambulate in halls. Eating 75% of meals. Sore noted on R side of tongue, pt instructed to be diligent with oral cares. Declined magic mouthwash. 3 stools, progressively softer/looser. Small nose bleed this morning on prior shift, pt received a unit of platelets this morning. Continue POC.    Problem: Adult Inpatient Plan of Care  Goal: Optimal Comfort and Wellbeing  Outcome: Progressing     Problem: Stem Cell/Bone Marrow Transplant  Goal: Diarrhea Symptom Control  Outcome: Progressing  "

## 2024-09-01 LAB
ABO/RH(D): NORMAL
ACANTHOCYTES BLD QL SMEAR: ABNORMAL
ANION GAP SERPL CALCULATED.3IONS-SCNC: 7 MMOL/L (ref 7–15)
ANTIBODY SCREEN: NEGATIVE
AUER BODIES BLD QL SMEAR: ABNORMAL
BASO STIPL BLD QL SMEAR: ABNORMAL
BASOPHILS # BLD AUTO: ABNORMAL 10*3/UL
BASOPHILS NFR BLD AUTO: ABNORMAL %
BITE CELLS BLD QL SMEAR: ABNORMAL
BLD PROD TYP BPU: NORMAL
BLISTER CELLS BLD QL SMEAR: ABNORMAL
BLOOD COMPONENT TYPE: NORMAL
BUN SERPL-MCNC: 13.8 MG/DL (ref 8–23)
BURR CELLS BLD QL SMEAR: ABNORMAL
CALCIUM SERPL-MCNC: 8.6 MG/DL (ref 8.8–10.4)
CHLORIDE SERPL-SCNC: 101 MMOL/L (ref 98–107)
CMV DNA SPEC NAA+PROBE-ACNC: NOT DETECTED IU/ML
CODING SYSTEM: NORMAL
CREAT SERPL-MCNC: 0.63 MG/DL (ref 0.67–1.17)
DACRYOCYTES BLD QL SMEAR: ABNORMAL
EGFRCR SERPLBLD CKD-EPI 2021: >90 ML/MIN/1.73M2
ELLIPTOCYTES BLD QL SMEAR: ABNORMAL
EOSINOPHIL # BLD AUTO: ABNORMAL 10*3/UL
EOSINOPHIL NFR BLD AUTO: ABNORMAL %
ERYTHROCYTE [DISTWIDTH] IN BLOOD BY AUTOMATED COUNT: 15.7 % (ref 10–15)
FRAGMENTS BLD QL SMEAR: ABNORMAL
GLUCOSE SERPL-MCNC: 116 MG/DL (ref 70–99)
HCO3 SERPL-SCNC: 26 MMOL/L (ref 22–29)
HCT VFR BLD AUTO: 21.6 % (ref 40–53)
HGB BLD-MCNC: 7.6 G/DL (ref 13.3–17.7)
HGB C CRYSTALS: ABNORMAL
HOWELL-JOLLY BOD BLD QL SMEAR: ABNORMAL
IMM GRANULOCYTES # BLD: ABNORMAL 10*3/UL
IMM GRANULOCYTES NFR BLD: ABNORMAL %
ISSUE DATE AND TIME: NORMAL
LACTATE SERPL-SCNC: 0.8 MMOL/L (ref 0.7–2)
LYMPHOCYTES # BLD AUTO: ABNORMAL 10*3/UL
LYMPHOCYTES NFR BLD AUTO: ABNORMAL %
MAGNESIUM SERPL-MCNC: 1.8 MG/DL (ref 1.7–2.3)
MCH RBC QN AUTO: 27.2 PG (ref 26.5–33)
MCHC RBC AUTO-ENTMCNC: 35.2 G/DL (ref 31.5–36.5)
MCV RBC AUTO: 77 FL (ref 78–100)
MONOCYTES # BLD AUTO: ABNORMAL 10*3/UL
MONOCYTES NFR BLD AUTO: ABNORMAL %
NEUTROPHILS # BLD AUTO: ABNORMAL 10*3/UL
NEUTROPHILS NFR BLD AUTO: ABNORMAL %
NEUTS HYPERSEG BLD QL SMEAR: ABNORMAL
PHOSPHATE SERPL-MCNC: 3.1 MG/DL (ref 2.5–4.5)
PLAT MORPH BLD: ABNORMAL
PLATELET # BLD AUTO: 10 10E3/UL (ref 150–450)
POLYCHROMASIA BLD QL SMEAR: ABNORMAL
POTASSIUM SERPL-SCNC: 3.8 MMOL/L (ref 3.4–5.3)
RBC # BLD AUTO: 2.79 10E6/UL (ref 4.4–5.9)
RBC AGGLUT BLD QL: ABNORMAL
RBC MORPH BLD: ABNORMAL
ROULEAUX BLD QL SMEAR: ABNORMAL
SICKLE CELLS BLD QL SMEAR: ABNORMAL
SMUDGE CELLS BLD QL SMEAR: ABNORMAL
SODIUM SERPL-SCNC: 134 MMOL/L (ref 135–145)
SPECIMEN EXPIRATION DATE: NORMAL
SPHEROCYTES BLD QL SMEAR: ABNORMAL
STOMATOCYTES BLD QL SMEAR: ABNORMAL
TARGETS BLD QL SMEAR: SLIGHT
TOXIC GRANULES BLD QL SMEAR: ABNORMAL
UNIT ABO/RH: NORMAL
UNIT NUMBER: NORMAL
UNIT STATUS: NORMAL
UNIT TYPE ISBT: 5100
VARIANT LYMPHS BLD QL SMEAR: ABNORMAL
WBC # BLD AUTO: <0.1 10E3/UL (ref 4–11)

## 2024-09-01 PROCEDURE — 206N000001 HC R&B BMT UMMC

## 2024-09-01 PROCEDURE — 84100 ASSAY OF PHOSPHORUS: CPT | Performed by: INTERNAL MEDICINE

## 2024-09-01 PROCEDURE — 99418 PROLNG IP/OBS E/M EA 15 MIN: CPT | Performed by: INTERNAL MEDICINE

## 2024-09-01 PROCEDURE — 258N000003 HC RX IP 258 OP 636

## 2024-09-01 PROCEDURE — 250N000013 HC RX MED GY IP 250 OP 250 PS 637: Performed by: INTERNAL MEDICINE

## 2024-09-01 PROCEDURE — 86900 BLOOD TYPING SEROLOGIC ABO: CPT

## 2024-09-01 PROCEDURE — 250N000011 HC RX IP 250 OP 636

## 2024-09-01 PROCEDURE — 83605 ASSAY OF LACTIC ACID: CPT | Performed by: INTERNAL MEDICINE

## 2024-09-01 PROCEDURE — 250N000013 HC RX MED GY IP 250 OP 250 PS 637

## 2024-09-01 PROCEDURE — 83735 ASSAY OF MAGNESIUM: CPT | Performed by: INTERNAL MEDICINE

## 2024-09-01 PROCEDURE — 85027 COMPLETE CBC AUTOMATED: CPT

## 2024-09-01 PROCEDURE — P9037 PLATE PHERES LEUKOREDU IRRAD: HCPCS

## 2024-09-01 PROCEDURE — 250N000011 HC RX IP 250 OP 636: Performed by: INTERNAL MEDICINE

## 2024-09-01 PROCEDURE — 250N000012 HC RX MED GY IP 250 OP 636 PS 637

## 2024-09-01 PROCEDURE — 250N000013 HC RX MED GY IP 250 OP 250 PS 637: Performed by: NURSE PRACTITIONER

## 2024-09-01 PROCEDURE — 86923 COMPATIBILITY TEST ELECTRIC: CPT

## 2024-09-01 PROCEDURE — 80048 BASIC METABOLIC PNL TOTAL CA: CPT

## 2024-09-01 PROCEDURE — 99233 SBSQ HOSP IP/OBS HIGH 50: CPT | Mod: FS | Performed by: INTERNAL MEDICINE

## 2024-09-01 RX ORDER — POTASSIUM CHLORIDE 29.8 MG/ML
20 INJECTION INTRAVENOUS ONCE
Status: COMPLETED | OUTPATIENT
Start: 2024-09-01 | End: 2024-09-01

## 2024-09-01 RX ADMIN — METHOCARBAMOL 500 MG: 500 TABLET ORAL at 15:02

## 2024-09-01 RX ADMIN — PROCHLORPERAZINE MALEATE 5 MG: 5 TABLET ORAL at 17:43

## 2024-09-01 RX ADMIN — LEVOFLOXACIN 250 MG: 250 TABLET, FILM COATED ORAL at 10:35

## 2024-09-01 RX ADMIN — TRAMADOL HYDROCHLORIDE 50 MG: 50 TABLET, COATED ORAL at 20:04

## 2024-09-01 RX ADMIN — METHOCARBAMOL 500 MG: 500 TABLET ORAL at 20:04

## 2024-09-01 RX ADMIN — URSODIOL 300 MG: 300 CAPSULE ORAL at 08:34

## 2024-09-01 RX ADMIN — POTASSIUM CHLORIDE 20 MEQ: 29.8 INJECTION, SOLUTION INTRAVENOUS at 06:11

## 2024-09-01 RX ADMIN — DICLOFENAC SODIUM 2 G: 10 GEL TOPICAL at 20:07

## 2024-09-01 RX ADMIN — MICAFUNGIN SODIUM 150 MG: 50 INJECTION, POWDER, LYOPHILIZED, FOR SOLUTION INTRAVENOUS at 10:35

## 2024-09-01 RX ADMIN — ACYCLOVIR 800 MG: 800 TABLET ORAL at 20:04

## 2024-09-01 RX ADMIN — CLINDAMYCIN PHOSPHATE: 10 SOLUTION TOPICAL at 08:35

## 2024-09-01 RX ADMIN — MYCOPHENOLATE MOFETIL 1250 MG: 500 INJECTION, POWDER, LYOPHILIZED, FOR SOLUTION INTRAVENOUS at 22:46

## 2024-09-01 RX ADMIN — MYCOPHENOLATE MOFETIL 1250 MG: 500 INJECTION, POWDER, LYOPHILIZED, FOR SOLUTION INTRAVENOUS at 10:35

## 2024-09-01 RX ADMIN — ACYCLOVIR 800 MG: 800 TABLET ORAL at 08:34

## 2024-09-01 RX ADMIN — DICLOFENAC SODIUM 2 G: 10 GEL TOPICAL at 15:03

## 2024-09-01 RX ADMIN — PANTOPRAZOLE SODIUM 40 MG: 40 TABLET, DELAYED RELEASE ORAL at 08:34

## 2024-09-01 RX ADMIN — CLINDAMYCIN PHOSPHATE: 10 SOLUTION TOPICAL at 20:07

## 2024-09-01 RX ADMIN — URSODIOL 300 MG: 300 CAPSULE ORAL at 15:02

## 2024-09-01 RX ADMIN — SIROLIMUS 6 MG: 2 TABLET ORAL at 08:34

## 2024-09-01 RX ADMIN — URSODIOL 300 MG: 300 CAPSULE ORAL at 20:04

## 2024-09-01 RX ADMIN — ALLOPURINOL 300 MG: 300 TABLET ORAL at 08:34

## 2024-09-01 RX ADMIN — TRAMADOL HYDROCHLORIDE 50 MG: 50 TABLET, COATED ORAL at 08:46

## 2024-09-01 RX ADMIN — FILGRASTIM-AAFI 480 MCG: 480 INJECTION, SOLUTION INTRAVENOUS; SUBCUTANEOUS at 20:05

## 2024-09-01 RX ADMIN — DICLOFENAC SODIUM 2 G: 10 GEL TOPICAL at 08:35

## 2024-09-01 RX ADMIN — TAMSULOSIN HYDROCHLORIDE 0.4 MG: 0.4 CAPSULE ORAL at 17:43

## 2024-09-01 RX ADMIN — METHOCARBAMOL 500 MG: 500 TABLET ORAL at 08:34

## 2024-09-01 RX ADMIN — Medication 5 ML: at 06:45

## 2024-09-01 ASSESSMENT — ACTIVITIES OF DAILY LIVING (ADL)
ADLS_ACUITY_SCORE: 22

## 2024-09-01 NOTE — PLAN OF CARE
"/75 (BP Location: Left arm)   Pulse 106   Temp 99.1  F (37.3  C) (Oral)   Resp 16   Ht 1.84 m (6' 0.44\")   Wt 88.8 kg (195 lb 11.2 oz)   SpO2 100%   BMI 26.22 kg/m      AVSS on RA. Pt reported headache and neck pain, relief with tramadol x1 and scheduled voltaren gel. Mild nausea in the late afternoon, same time nearly every day this week. Pt given compazine with relief, requested we schedule something for nausea starting tomorrow ~4pm. 2 loose stools, pt voiding spontaneously. Continue POC.     Problem: Adult Inpatient Plan of Care  Goal: Optimal Comfort and Wellbeing  Outcome: Progressing     Problem: Stem Cell/Bone Marrow Transplant  Goal: Diarrhea Symptom Control  Outcome: Progressing     Problem: Stem Cell/Bone Marrow Transplant  Goal: Absence of Infection  Outcome: Progressing  "

## 2024-09-01 NOTE — PROGRESS NOTES
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     Diagnosis MDS/AML  HCT Type Allogeneic     Prep Regimen Flu/Cy/TBI  Donor Match & Source 8/8 DP permissive PBSC    GVHD Prophylaxis PTCy/MMF/Siro  Primary BMT MD Dr. Murphy    Clinical Trials [id number]       INTERVAL  HISTORY     Overall doing okay.  No major medical concerns.  Lots of disruptions overnight.   Up eating breakfast      Review of Systems: 10 point ROS negative except as noted above.    PHYSICAL EXAM     Weight In/Out     Wt Readings from Last 3 Encounters:   09/01/24 88.8 kg (195 lb 11.2 oz)   08/09/24 89.9 kg (198 lb 4.8 oz)   08/08/24 90.3 kg (199 lb 1.2 oz)      I/O last 3 completed shifts:  In: 2329 [P.O.:960; I.V.:898]  Out: 2050 [Urine:2050]     KPS:  80    /81 (BP Location: Left arm, Cuff Size: Adult Regular)   Pulse 101   Temp 99.1  F (37.3  C) (Oral)   Resp 16   Ht 1.84 m (6' 0.44\")   Wt 88.8 kg (195 lb 11.2 oz)   SpO2 97%   BMI 26.22 kg/m       General:  NAD   Lungs: breathing comfortably on RA  Lymphatics: No edema  Skin: No rash   Neuro: A&O, appropriately interactive, speech clear, moving all extremities   Additional Findings: Sean     Current aGVHD staging:  Skin 0, UGI 0, LGI 0, Liver 0     LABS AND IMAGING: I have assessed all abnormal lab values for their clinical significance and any values considered clinically significant have been addressed in the assessment and plan.      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     BMT/IEC PROTOCOL for MDS  - Chemo protocol: MT 2022-5  D-6: Flu 30mg/m2, Cy 50mg/kg  D-5 to D-2: Flu  D-1: TBI  D0: stem cell infusion  - Peripheral blood stem cell graft from 8/8 URD donor, ABO matched, Cell dose: TBD  - Engraftment monitoring: Peripheral blood and bone marrow chimerisms on D28, D100, 6 months, 1 and 2 years  - Restaging plan: BMBx with FISH, cytogenetics and NGS on D28, D100, 6 months, 1 and 2 years     HEME/COAG  # Pancytopenia secondary to disease process  - GCSF starts day +5, continue until ANC > 2500 for 2 consecutive days  - Transfusion parameters: hemoglobin <7, platelets <10  - plts today.     RISK OF GVHD  - Prophylaxis: PTCy 50mg/kg on D+3 and D+4; MMF (D+5 to 35); Sirolimus (starting D+5, target level 5-10).  - 8/30 siro level low. Bolused and will repeat level on Monday.      ID  Prophylaxis plan:   - Bacterial: Levaquin 250mg while neutropenic    - Fungal: Micafungin 300mg 3x/week until D+45, followed by mold active azole. Pulm nodules present on workup CT.   - PJP: Bactrim to start at D+28. Toxoplasma negative.   - Viral: Acyclovir 800mg BID. No need for letermovir as donor and recipient are CMV negative.      Monitoring:   - CMV weekly, pending  - EBV every 2 weeks from D30 to D180     GI/NUTRITION  # GERD: Protonix  - Anti-emetics: Zofran, dex scheduled during chemotherapy. Compazine, lorazepam available PRN.   - Ulcer prophy: Protonix  - VOD prophy: Ursodiol 300mg TID  - Dietician support to prevent malnutrition  - Miralax and senna PRN constipation. For now, Miralax scheduled at bedtime.      CARDIOVASCULAR  # HTN- lisinopril 15mg daily. Dc'd starting 8/26 with orthostatics and some dizziness.  # CAD:  "Coronary artery calcifications seen on CT, stress test in 2023 negative     - Risk of cardiomyopathy: Baseline EF 55-60%.   - Risk of arrhythmia: Baseline EKG showed 421       RENAL/ELECTROLYTES/  - Electrolyte management: replace per sliding scale  - BPH: Continue home flomax.      MUSCULOSKELETAL/FRAILTY  - Baseline Frailty Score: Not frail (0)  - Daily PT/OT as needed while inpatient  - Gout: continue allopurinol      Neuro   #HA: no red flag symptoms. Pt has history of spinal stenosis, lumbosacral radiculopathy likely exacerbating. Tramadol PRN. Voltaren gel, ice and PT asked for suggestions. In the setting of thrombocytopenia, consider CT head if changes in symptoms or worsens.  - Pain management: Outpatient has been taking celecoxib for MSK pain, once a day. Inpatient will try Tramadol - available PRN. Added robaxin TID     Derm:  - Follicular rash to chest, back- Started topical clindamycin 8/28.      SOCIAL DETERMINANTS  - Caregiver: wife, Vani. Lives within the required distance from hospital but may elect to stay in [place].   - Financial/insurance concerns: None    Medically Ready for Discharge: Anticipated in 5+ Days      Clinically Significant Risk Factors              # Hypoalbuminemia: Lowest albumin = 3.1 g/dL at 8/29/2024  3:27 AM, will monitor as appropriate   # Thrombocytopenia: Lowest platelets = 10 in last 2 days, will monitor for bleeding   # Hypertension: Noted on problem list           # Overweight: Estimated body mass index is 26.22 kg/m  as calculated from the following:    Height as of this encounter: 1.84 m (6' 0.44\").    Weight as of this encounter: 88.8 kg (195 lb 11.2 oz).            I spent 30 minutes in the care of this patient today, which included time necessary for preparation for the visit, obtaining history, ordering medications/tests/procedures as medically indicated, review of pertinent medical literature, counseling of the patient, communication of recommendations to " the care team, and documentation time.    Eunice Spicer PA-C  [phone]    Physician Attestation     I, Timothy Skaggs MD, saw and evaluated Lleand Pearson as part of a shared APRN/PA visit. I personally performed the substantive portion of the medical decision making for this visit - please see the VJ s documentation for full details.    Key management decisions made by me and carried out under my direction include:   69y M with MDS/AML with ASXL1, RUNX1, IDH1, SRSF2 mutations, who achieved a low blast hypocellular state, and is now day +9 s/p AGNGA (Flu/Cy/TBI) 8/8 URD (23yF, O+/O+, CMV -/-, DP permissive, 6.06 x106 CD34/kg) PB SCT with PTCy/siro/MMF GVHD ppx. He has tolerated conditioning, stem cell infusion, and PTCy reasonably well, without fever. He is doing relatively well today, feels well, eating, walking, no significant symptoms. Continue supportive cares. Will continue to monitor closely for further complications of transplant and continue supportive care.    On the date of service, 09/01/2024, I spent 35 minutes on the patient unit personally reviewing medical records and medications, reviewing vital signs, labs, and imaging results as summarized above, obtaining a history from the patient, performing a physical exam, counseling and educating the patient on the diagnosis and treatment, evaluating a potentially life or organ threatening problem, intensively monitoring treatments with high risk of toxicity, coordinating care, and documenting in the electronic medical record.    Thank you for allowing me to participate in the care of this patient. Please do not hesitate to contact me if there are any concerns or questions.     Timothy Skaggs MD   of Medicine  Classical Hematology and Blood and Marrow Transplantation  Division of Hematology, Oncology, and Transplantation  [facility]

## 2024-09-01 NOTE — PLAN OF CARE
"Goal Outcome Evaluation:         Blood pressure 130/81, pulse 101, temperature 99.1  F (37.3  C), temperature source Oral, resp. rate 16, height 1.84 m (6' 0.44\"), weight 89.3 kg (196 lb 14.4 oz), SpO2 97%.    Pt with temp max 99.6 oral,  HR tachycardic 105 to 107 bpm. Pt ia A/O x 4, gets up independently to the bathroom and he is voiding freely flakita urine. Pt is on room air with 02 saturations WNL. No nose bleed noted/reported, He denies pain and nausea and declined 20:00 Miralax.  Upper body rash resolving with topical lotion application  Low platelet level 10, this morning and he got a unit of platelet and tolerated it well. 20 mEq of potassium IV infusing for level of 3.8. Pt triggered SIRS for sepsis screening protocol and lactic Acid was 0.8. All caps and lines changed this morning. CVC dressing is C/D/I and with positive blood returns. Continue to monitor pt and follow plan of care.      Problem: Adult Inpatient Plan of Care  Goal: Plan of Care Review  Description: The Plan of Care Review/Shift note should be completed every shift.  The Outcome Evaluation is a brief statement about your assessment that the patient is improving, declining, or no change.  This information will be displayed automatically on your shift  note.  Outcome: Progressing  Goal: Patient-Specific Goal (Individualized)  Description: You can add care plan individualizations to a care plan. Examples of Individualization might be:  \"Parent requests to be called daily at 9am for status\", \"I have a hard time hearing out of my right ear\", or \"Do not touch me to wake me up as it startles  me\".  Outcome: Progressing  Goal: Absence of Hospital-Acquired Illness or Injury  Outcome: Progressing  Intervention: Identify and Manage Fall Risk  Recent Flowsheet Documentation  Taken 9/1/2024 0400 by Kasey Quiros RN  Safety Promotion/Fall Prevention:   clutter free environment maintained   lighting adjusted   room near nurse's station   room " organization consistent   patient and family education   increased rounding and observation   increase visualization of patient  Taken 8/31/2024 2000 by Kasey Quiros RN  Safety Promotion/Fall Prevention:   clutter free environment maintained   lighting adjusted   room near nurse's station   room organization consistent   patient and family education   increased rounding and observation   increase visualization of patient  Intervention: Prevent and Manage VTE (Venous Thromboembolism) Risk  Recent Flowsheet Documentation  Taken 9/1/2024 0400 by Kasey Quiros RN  VTE Prevention/Management: SCDs off (sequential compression devices)  Taken 8/31/2024 2000 by Kasey Quiros RN  VTE Prevention/Management: SCDs off (sequential compression devices)  Intervention: Prevent Infection  Recent Flowsheet Documentation  Taken 9/1/2024 0400 by Kasey Quiros RN  Infection Prevention:   single patient room provided   rest/sleep promoted   equipment surfaces disinfected   hand hygiene promoted   personal protective equipment utilized   visitors restricted/screened  Taken 8/31/2024 2000 by Ksaey Quiros RN  Infection Prevention:   single patient room provided   rest/sleep promoted   equipment surfaces disinfected   hand hygiene promoted   personal protective equipment utilized   visitors restricted/screened  Goal: Optimal Comfort and Wellbeing  Outcome: Progressing

## 2024-09-02 ENCOUNTER — APPOINTMENT (OUTPATIENT)
Dept: GENERAL RADIOLOGY | Facility: CLINIC | Age: 69
DRG: 014 | End: 2024-09-02
Payer: COMMERCIAL

## 2024-09-02 LAB
ACANTHOCYTES BLD QL SMEAR: ABNORMAL
ALBUMIN SERPL BCG-MCNC: 3.2 G/DL (ref 3.5–5.2)
ALBUMIN UR-MCNC: 20 MG/DL
ALP SERPL-CCNC: 72 U/L (ref 40–150)
ALT SERPL W P-5'-P-CCNC: 10 U/L (ref 0–70)
ANION GAP SERPL CALCULATED.3IONS-SCNC: 7 MMOL/L (ref 7–15)
APPEARANCE UR: CLEAR
AST SERPL W P-5'-P-CCNC: 16 U/L (ref 0–45)
AUER BODIES BLD QL SMEAR: ABNORMAL
BASO STIPL BLD QL SMEAR: ABNORMAL
BASOPHILS # BLD AUTO: ABNORMAL 10*3/UL
BASOPHILS NFR BLD AUTO: ABNORMAL %
BILIRUB DIRECT SERPL-MCNC: 0.31 MG/DL (ref 0–0.3)
BILIRUB SERPL-MCNC: 0.8 MG/DL
BILIRUB UR QL STRIP: NEGATIVE
BITE CELLS BLD QL SMEAR: ABNORMAL
BLD PROD TYP BPU: NORMAL
BLISTER CELLS BLD QL SMEAR: ABNORMAL
BLOOD COMPONENT TYPE: NORMAL
BUN SERPL-MCNC: 15.3 MG/DL (ref 8–23)
BURR CELLS BLD QL SMEAR: ABNORMAL
CALCIUM SERPL-MCNC: 8.6 MG/DL (ref 8.8–10.4)
CHLORIDE SERPL-SCNC: 99 MMOL/L (ref 98–107)
CODING SYSTEM: NORMAL
COLOR UR AUTO: ABNORMAL
CREAT SERPL-MCNC: 0.65 MG/DL (ref 0.67–1.17)
DACRYOCYTES BLD QL SMEAR: ABNORMAL
EGFRCR SERPLBLD CKD-EPI 2021: >90 ML/MIN/1.73M2
ELLIPTOCYTES BLD QL SMEAR: ABNORMAL
ENTEROCOCCUS FAECALIS: NOT DETECTED
ENTEROCOCCUS FAECIUM: NOT DETECTED
EOSINOPHIL # BLD AUTO: ABNORMAL 10*3/UL
EOSINOPHIL NFR BLD AUTO: ABNORMAL %
ERYTHROCYTE [DISTWIDTH] IN BLOOD BY AUTOMATED COUNT: 15.8 % (ref 10–15)
FRAGMENTS BLD QL SMEAR: SLIGHT
GLUCOSE SERPL-MCNC: 128 MG/DL (ref 70–99)
GLUCOSE UR STRIP-MCNC: NEGATIVE MG/DL
HCO3 SERPL-SCNC: 24 MMOL/L (ref 22–29)
HCT VFR BLD AUTO: 21.2 % (ref 40–53)
HGB BLD-MCNC: 7.5 G/DL (ref 13.3–17.7)
HGB C CRYSTALS: ABNORMAL
HGB UR QL STRIP: NEGATIVE
HOWELL-JOLLY BOD BLD QL SMEAR: ABNORMAL
IMM GRANULOCYTES # BLD: ABNORMAL 10*3/UL
IMM GRANULOCYTES NFR BLD: ABNORMAL %
INR PPP: 1.06 (ref 0.85–1.15)
ISSUE DATE AND TIME: NORMAL
KETONES UR STRIP-MCNC: NEGATIVE MG/DL
LACTATE SERPL-SCNC: 0.6 MMOL/L (ref 0.7–2)
LACTATE SERPL-SCNC: 0.7 MMOL/L (ref 0.7–2)
LEUKOCYTE ESTERASE UR QL STRIP: NEGATIVE
LISTERIA SPECIES (DETECTED/NOT DETECTED): NOT DETECTED
LYMPHOCYTES # BLD AUTO: ABNORMAL 10*3/UL
LYMPHOCYTES NFR BLD AUTO: ABNORMAL %
MAGNESIUM SERPL-MCNC: 1.6 MG/DL (ref 1.7–2.3)
MCH RBC QN AUTO: 27.9 PG (ref 26.5–33)
MCHC RBC AUTO-ENTMCNC: 35.4 G/DL (ref 31.5–36.5)
MCV RBC AUTO: 79 FL (ref 78–100)
MONOCYTES # BLD AUTO: ABNORMAL 10*3/UL
MONOCYTES NFR BLD AUTO: ABNORMAL %
MUCOUS THREADS #/AREA URNS LPF: PRESENT /LPF
NEUTROPHILS # BLD AUTO: ABNORMAL 10*3/UL
NEUTROPHILS NFR BLD AUTO: ABNORMAL %
NEUTS HYPERSEG BLD QL SMEAR: ABNORMAL
NITRATE UR QL: NEGATIVE
PH UR STRIP: 7 [PH] (ref 5–7)
PHOSPHATE SERPL-MCNC: 2.3 MG/DL (ref 2.5–4.5)
PLAT MORPH BLD: ABNORMAL
PLATELET # BLD AUTO: 15 10E3/UL (ref 150–450)
POLYCHROMASIA BLD QL SMEAR: ABNORMAL
POTASSIUM SERPL-SCNC: 3.9 MMOL/L (ref 3.4–5.3)
PROT SERPL-MCNC: 6.1 G/DL (ref 6.4–8.3)
RBC # BLD AUTO: 2.69 10E6/UL (ref 4.4–5.9)
RBC AGGLUT BLD QL: ABNORMAL
RBC MORPH BLD: ABNORMAL
RBC URINE: 1 /HPF
ROULEAUX BLD QL SMEAR: ABNORMAL
SICKLE CELLS BLD QL SMEAR: ABNORMAL
SIROLIMUS BLD-MCNC: 5.7 UG/L (ref 5–15)
SMUDGE CELLS BLD QL SMEAR: ABNORMAL
SODIUM SERPL-SCNC: 130 MMOL/L (ref 135–145)
SP GR UR STRIP: 1.02 (ref 1–1.03)
SPERM #/AREA URNS HPF: PRESENT /HPF
SPHEROCYTES BLD QL SMEAR: ABNORMAL
STAPHYLOCOCCUS AUREUS: NOT DETECTED
STAPHYLOCOCCUS EPIDERMIDIS: NOT DETECTED
STAPHYLOCOCCUS LUGDUNENSIS: NOT DETECTED
STAPHYLOCOCCUS SPECIES: NOT DETECTED
STOMATOCYTES BLD QL SMEAR: ABNORMAL
STREPTOCOCCUS AGALACTIAE: NOT DETECTED
STREPTOCOCCUS ANGINOSUS GROUP: NOT DETECTED
STREPTOCOCCUS PNEUMONIAE: NOT DETECTED
STREPTOCOCCUS PYOGENES: NOT DETECTED
STREPTOCOCCUS SPECIES: DETECTED
TARGETS BLD QL SMEAR: ABNORMAL
TME LAST DOSE: NORMAL H
TME LAST DOSE: NORMAL H
TOXIC GRANULES BLD QL SMEAR: ABNORMAL
UNIT ABO/RH: NORMAL
UNIT NUMBER: NORMAL
UNIT STATUS: NORMAL
UNIT TYPE ISBT: 5100
UROBILINOGEN UR STRIP-MCNC: NORMAL MG/DL
VARIANT LYMPHS BLD QL SMEAR: ABNORMAL
WBC # BLD AUTO: <0.1 10E3/UL (ref 4–11)
WBC URINE: 2 /HPF

## 2024-09-02 PROCEDURE — 83605 ASSAY OF LACTIC ACID: CPT | Performed by: INTERNAL MEDICINE

## 2024-09-02 PROCEDURE — 81001 URINALYSIS AUTO W/SCOPE: CPT

## 2024-09-02 PROCEDURE — 85027 COMPLETE CBC AUTOMATED: CPT

## 2024-09-02 PROCEDURE — 250N000011 HC RX IP 250 OP 636: Performed by: INTERNAL MEDICINE

## 2024-09-02 PROCEDURE — 250N000013 HC RX MED GY IP 250 OP 250 PS 637

## 2024-09-02 PROCEDURE — 250N000013 HC RX MED GY IP 250 OP 250 PS 637: Performed by: NURSE PRACTITIONER

## 2024-09-02 PROCEDURE — 250N000011 HC RX IP 250 OP 636: Mod: JZ

## 2024-09-02 PROCEDURE — 250N000012 HC RX MED GY IP 250 OP 636 PS 637: Performed by: INTERNAL MEDICINE

## 2024-09-02 PROCEDURE — P9037 PLATE PHERES LEUKOREDU IRRAD: HCPCS

## 2024-09-02 PROCEDURE — 258N000003 HC RX IP 258 OP 636

## 2024-09-02 PROCEDURE — 250N000013 HC RX MED GY IP 250 OP 250 PS 637: Performed by: PHYSICIAN ASSISTANT

## 2024-09-02 PROCEDURE — 87103 BLOOD FUNGUS CULTURE: CPT

## 2024-09-02 PROCEDURE — 250N000013 HC RX MED GY IP 250 OP 250 PS 637: Performed by: INTERNAL MEDICINE

## 2024-09-02 PROCEDURE — 250N000011 HC RX IP 250 OP 636: Mod: JZ | Performed by: INTERNAL MEDICINE

## 2024-09-02 PROCEDURE — 82248 BILIRUBIN DIRECT: CPT

## 2024-09-02 PROCEDURE — 99418 PROLNG IP/OBS E/M EA 15 MIN: CPT | Performed by: INTERNAL MEDICINE

## 2024-09-02 PROCEDURE — 87149 DNA/RNA DIRECT PROBE: CPT

## 2024-09-02 PROCEDURE — 258N000003 HC RX IP 258 OP 636: Performed by: PHYSICIAN ASSISTANT

## 2024-09-02 PROCEDURE — 80195 ASSAY OF SIROLIMUS: CPT | Performed by: INTERNAL MEDICINE

## 2024-09-02 PROCEDURE — 85610 PROTHROMBIN TIME: CPT

## 2024-09-02 PROCEDURE — 99233 SBSQ HOSP IP/OBS HIGH 50: CPT | Mod: FS | Performed by: INTERNAL MEDICINE

## 2024-09-02 PROCEDURE — 71046 X-RAY EXAM CHEST 2 VIEWS: CPT

## 2024-09-02 PROCEDURE — 250N000009 HC RX 250: Performed by: INTERNAL MEDICINE

## 2024-09-02 PROCEDURE — 250N000012 HC RX MED GY IP 250 OP 636 PS 637

## 2024-09-02 PROCEDURE — 71046 X-RAY EXAM CHEST 2 VIEWS: CPT | Mod: 26 | Performed by: RADIOLOGY

## 2024-09-02 PROCEDURE — 84100 ASSAY OF PHOSPHORUS: CPT

## 2024-09-02 PROCEDURE — 250N000011 HC RX IP 250 OP 636: Performed by: STUDENT IN AN ORGANIZED HEALTH CARE EDUCATION/TRAINING PROGRAM

## 2024-09-02 PROCEDURE — 80053 COMPREHEN METABOLIC PANEL: CPT

## 2024-09-02 PROCEDURE — 206N000001 HC R&B BMT UMMC

## 2024-09-02 PROCEDURE — 250N000011 HC RX IP 250 OP 636: Performed by: RADIOLOGY

## 2024-09-02 PROCEDURE — 36415 COLL VENOUS BLD VENIPUNCTURE: CPT

## 2024-09-02 PROCEDURE — 258N000003 HC RX IP 258 OP 636: Performed by: INTERNAL MEDICINE

## 2024-09-02 PROCEDURE — 83735 ASSAY OF MAGNESIUM: CPT

## 2024-09-02 RX ORDER — POTASSIUM PHOS IN 0.9 % NACL 15MMOL/250
15 PLASTIC BAG, INJECTION (ML) INTRAVENOUS ONCE
Status: COMPLETED | OUTPATIENT
Start: 2024-09-02 | End: 2024-09-02

## 2024-09-02 RX ORDER — PROCHLORPERAZINE MALEATE 5 MG
5 TABLET ORAL DAILY
Status: DISCONTINUED | OUTPATIENT
Start: 2024-09-02 | End: 2024-09-19 | Stop reason: HOSPADM

## 2024-09-02 RX ORDER — VANCOMYCIN HYDROCHLORIDE
1250 EVERY 12 HOURS
Status: DISCONTINUED | OUTPATIENT
Start: 2024-09-03 | End: 2024-09-04

## 2024-09-02 RX ORDER — MAGNESIUM SULFATE HEPTAHYDRATE 40 MG/ML
2 INJECTION, SOLUTION INTRAVENOUS ONCE
Status: COMPLETED | OUTPATIENT
Start: 2024-09-02 | End: 2024-09-02

## 2024-09-02 RX ORDER — CEFEPIME HYDROCHLORIDE 2 G/1
2 INJECTION, POWDER, FOR SOLUTION INTRAVENOUS EVERY 8 HOURS
Status: DISCONTINUED | OUTPATIENT
Start: 2024-09-02 | End: 2024-09-13

## 2024-09-02 RX ADMIN — POTASSIUM PHOSPHATE, MONOBASIC POTASSIUM PHOSPHATE, DIBASIC 15 MMOL: 224; 236 INJECTION, SOLUTION, CONCENTRATE INTRAVENOUS at 06:45

## 2024-09-02 RX ADMIN — ACYCLOVIR 800 MG: 800 TABLET ORAL at 19:42

## 2024-09-02 RX ADMIN — PANTOPRAZOLE SODIUM 40 MG: 40 TABLET, DELAYED RELEASE ORAL at 08:29

## 2024-09-02 RX ADMIN — TAMSULOSIN HYDROCHLORIDE 0.4 MG: 0.4 CAPSULE ORAL at 16:52

## 2024-09-02 RX ADMIN — ACETAMINOPHEN 650 MG: 325 TABLET ORAL at 04:57

## 2024-09-02 RX ADMIN — CEFEPIME HYDROCHLORIDE 2 G: 2 INJECTION, POWDER, FOR SOLUTION INTRAVENOUS at 13:40

## 2024-09-02 RX ADMIN — ACETAMINOPHEN 650 MG: 325 TABLET ORAL at 19:41

## 2024-09-02 RX ADMIN — CEFEPIME HYDROCHLORIDE 2 G: 2 INJECTION, POWDER, FOR SOLUTION INTRAVENOUS at 19:41

## 2024-09-02 RX ADMIN — ACETAMINOPHEN 650 MG: 325 TABLET ORAL at 14:49

## 2024-09-02 RX ADMIN — PROCHLORPERAZINE MALEATE 5 MG: 5 TABLET ORAL at 16:04

## 2024-09-02 RX ADMIN — Medication 5 ML: at 13:39

## 2024-09-02 RX ADMIN — TRAMADOL HYDROCHLORIDE 50 MG: 50 TABLET, COATED ORAL at 11:33

## 2024-09-02 RX ADMIN — DICLOFENAC SODIUM 2 G: 10 GEL TOPICAL at 19:46

## 2024-09-02 RX ADMIN — DICLOFENAC SODIUM 2 G: 10 GEL TOPICAL at 16:05

## 2024-09-02 RX ADMIN — ALLOPURINOL 300 MG: 300 TABLET ORAL at 08:29

## 2024-09-02 RX ADMIN — DICLOFENAC SODIUM 2 G: 10 GEL TOPICAL at 08:29

## 2024-09-02 RX ADMIN — URSODIOL 300 MG: 300 CAPSULE ORAL at 19:42

## 2024-09-02 RX ADMIN — ACETAMINOPHEN 650 MG: 325 TABLET ORAL at 10:13

## 2024-09-02 RX ADMIN — ACYCLOVIR 800 MG: 800 TABLET ORAL at 08:29

## 2024-09-02 RX ADMIN — SIROLIMUS 6 MG: 2 TABLET ORAL at 08:29

## 2024-09-02 RX ADMIN — CEFEPIME HYDROCHLORIDE 2 G: 2 INJECTION, POWDER, FOR SOLUTION INTRAVENOUS at 04:57

## 2024-09-02 RX ADMIN — SODIUM CHLORIDE 1750 MG: 9 INJECTION, SOLUTION INTRAVENOUS at 17:17

## 2024-09-02 RX ADMIN — MYCOPHENOLATE MOFETIL 1250 MG: 500 INJECTION, POWDER, LYOPHILIZED, FOR SOLUTION INTRAVENOUS at 22:14

## 2024-09-02 RX ADMIN — DICLOFENAC SODIUM 2 G: 10 GEL TOPICAL at 13:40

## 2024-09-02 RX ADMIN — MICAFUNGIN SODIUM 150 MG: 50 INJECTION, POWDER, LYOPHILIZED, FOR SOLUTION INTRAVENOUS at 10:21

## 2024-09-02 RX ADMIN — MAGNESIUM SULFATE HEPTAHYDRATE 2 G: 2 INJECTION, SOLUTION INTRAVENOUS at 16:54

## 2024-09-02 RX ADMIN — CLINDAMYCIN PHOSPHATE: 10 SOLUTION TOPICAL at 08:29

## 2024-09-02 RX ADMIN — METHOCARBAMOL 500 MG: 500 TABLET ORAL at 19:42

## 2024-09-02 RX ADMIN — URSODIOL 300 MG: 300 CAPSULE ORAL at 08:29

## 2024-09-02 RX ADMIN — METHOCARBAMOL 500 MG: 500 TABLET ORAL at 08:29

## 2024-09-02 RX ADMIN — SODIUM CHLORIDE 1000 ML: 9 INJECTION, SOLUTION INTRAVENOUS at 11:42

## 2024-09-02 RX ADMIN — CLINDAMYCIN PHOSPHATE: 10 SOLUTION TOPICAL at 19:46

## 2024-09-02 RX ADMIN — METHOCARBAMOL 500 MG: 500 TABLET ORAL at 13:40

## 2024-09-02 RX ADMIN — URSODIOL 300 MG: 300 CAPSULE ORAL at 13:40

## 2024-09-02 RX ADMIN — FILGRASTIM-AAFI 480 MCG: 480 INJECTION, SOLUTION INTRAVENOUS; SUBCUTANEOUS at 19:46

## 2024-09-02 RX ADMIN — SIROLIMUS 3 MG: 2 TABLET ORAL at 15:32

## 2024-09-02 RX ADMIN — MYCOPHENOLATE MOFETIL 1250 MG: 500 INJECTION, POWDER, LYOPHILIZED, FOR SOLUTION INTRAVENOUS at 11:33

## 2024-09-02 ASSESSMENT — ACTIVITIES OF DAILY LIVING (ADL)
ADLS_ACUITY_SCORE: 25
ADLS_ACUITY_SCORE: 22
ADLS_ACUITY_SCORE: 25
ADLS_ACUITY_SCORE: 22
ADLS_ACUITY_SCORE: 22
ADLS_ACUITY_SCORE: 24
ADLS_ACUITY_SCORE: 22
ADLS_ACUITY_SCORE: 24
ADLS_ACUITY_SCORE: 25
ADLS_ACUITY_SCORE: 22
ADLS_ACUITY_SCORE: 22
ADLS_ACUITY_SCORE: 24
ADLS_ACUITY_SCORE: 25
ADLS_ACUITY_SCORE: 25
ADLS_ACUITY_SCORE: 22
ADLS_ACUITY_SCORE: 22
ADLS_ACUITY_SCORE: 25
ADLS_ACUITY_SCORE: 22
ADLS_ACUITY_SCORE: 25
ADLS_ACUITY_SCORE: 24

## 2024-09-02 NOTE — PHARMACY-VANCOMYCIN DOSING SERVICE
Pharmacy Vancomycin Initial Note  Date of Service 2024  Patient's  1955  69 year old, male    Indication: Bacteremia    Current estimated CrCl = Estimated Creatinine Clearance: 132.4 mL/min (A) (based on SCr of 0.65 mg/dL (L)).    Creatinine for last 3 days  2024:  3:08 AM Creatinine 0.63 mg/dL  2024:  3:28 AM Creatinine 0.63 mg/dL  2024:  4:44 AM Creatinine 0.65 mg/dL    Recent Vancomycin Level(s) for last 3 days  No results found for requested labs within last 3 days.      Vancomycin IV Administrations (past 72 hours)        No vancomycin orders with administrations in past 72 hours.                    Nephrotoxins and other renal medications (From now, onward)      Start     Dose/Rate Route Frequency Ordered Stop    24 0500  vancomycin (VANCOCIN) 1,250 mg in 0.9% NaCl 250 mL intermittent infusion         1,250 mg  166.7 mL/hr over 90 Minutes Intravenous EVERY 12 HOURS 24 1639      24 1700  vancomycin (VANCOCIN) 1,750 mg in sodium chloride 0.9 % 250 mL intermittent infusion         1,750 mg (central catheter)  over 90 Minutes Intravenous ONCE 24 1638      24 0800  acyclovir (ZOVIRAX) tablet 800 mg         800 mg Oral 2 TIMES DAILY 24 1412              Contrast Orders - past 72 hours (72h ago, onward)      None            [employer] Prediction of Planned Initial Vancomycin Regimen  Loading dose: 1750 mg at 17:00 2024.  Regimen: 1250 mg IV every 12 hours.  Start time: 05:00 on 2024  Exposure target: AUC24 (range)400-600 mg/L.hr   AUC24,ss: 512 mg/L.hr  Probability of AUC24 > 400: 74 %  Ctrough,ss: 15.7 mg/L  Probability of Ctrough,ss > 20: 31 %  Probability of nephrotoxicity (Lodise VIRI ): 11 %     Plan:  Start vancomycin 1750 mg IV once followed by 1250 mg IV q12h.   Vancomycin monitoring method: AUC  Vancomycin therapeutic monitoring goal: 400-600 mg*h/L  Pharmacy will check vancomycin levels as appropriate in 1-3 Days.    Serum  creatinine levels will be ordered daily for the first week of therapy and at least twice weekly for subsequent weeks.      Chantel Armenta, ManishaD

## 2024-09-02 NOTE — INTERIM SUMMARY
Brief cross cover note:     Paged by RN regarding positive blood cultures. Blood cx from 9/2 growing G+ cocci in 3 of 4 bottles. Pt already on cefepime, given IVF earlier by different provider for soft BP. Will add vanc and order AM blood cultures for tomorrow.     Charli Cartagena,  09/02/24 at 4:31 PM

## 2024-09-02 NOTE — PLAN OF CARE
"[id number]    /72 (BP Location: Left arm)   Pulse 106   Temp (!) 103  F (39.4  C) (Oral)   Resp 18   Ht 1.84 m (6' 0.44\")   Wt 88.8 kg (195 lb 11.2 oz)   SpO2 97%   BMI 26.22 kg/m       Tmax 103 for first fever. Red & Purple lumen + Peripheral cultures in process. Urine culture collected. CXR done. Cefepime administered. Tylenol administered. Recheck 99.1. Intermittent tachycardia 100-120. OVSS on RA. Triggered Sepsis. Lactic 0.7. C/O neck pain, tramadol administered x 1 per pt. request. Voltaren gel applied. Denies nausea, pain, numbness or tingling. No stools overnight. Voiding adequately. Plt. 15, will need plt infusion. Phos 2.3, phos infusing. No other replacements needed. Continue plan of care.    Goal: Patient-Specific Goal (Individualized)    Problem: Adult Inpatient Plan of Care  Goal: Absence of Hospital-Acquired Illness or Injury  Intervention: Identify and Manage Fall Risk  Recent Flowsheet Documentation  Taken 9/2/2024 0000 by Vandana Frances RN  Safety Promotion/Fall Prevention:   safety round/check completed   nonskid shoes/slippers when out of bed   lighting adjusted   patient and family education   room near nurse's station   room organization consistent   clutter free environment maintained  Taken 9/1/2024 2300 by Vandana Frances RN  Safety Promotion/Fall Prevention: safety round/check completed  Taken 9/1/2024 2100 by Vandana Frances RN  Safety Promotion/Fall Prevention: safety round/check completed  Taken 9/1/2024 2000 by Vandana Frances RN  Safety Promotion/Fall Prevention:   safety round/check completed   nonskid shoes/slippers when out of bed   lighting adjusted   patient and family education   room near nurse's station   room organization consistent   clutter free environment maintained     Problem: Adult Inpatient Plan of Care  Goal: Absence of Hospital-Acquired Illness or Injury  Intervention: Prevent Infection  Recent Flowsheet Documentation  Taken 9/2/2024 " 0000 by Vandana Frances RN  Infection Prevention:   single patient room provided   visitors restricted/screened   rest/sleep promoted   personal protective equipment utilized   hand hygiene promoted   equipment surfaces disinfected  Taken 9/1/2024 2000 by Vandana Frances RN  Infection Prevention:   single patient room provided   visitors restricted/screened   rest/sleep promoted   personal protective equipment utilized   hand hygiene promoted   equipment surfaces disinfected     Problem: Adult Inpatient Plan of Care  Goal: Absence of Hospital-Acquired Illness or Injury  Intervention: Prevent and Manage VTE (Venous Thromboembolism) Risk  Recent Flowsheet Documentation  Taken 9/1/2024 2000 by Vandana Frances RN  VTE Prevention/Management: SCDs off (sequential compression devices)   Outcome: Progressing

## 2024-09-02 NOTE — PROGRESS NOTES
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     Diagnosis MDS/AML  HCT Type Allogeneic     Prep Regimen Flu/Cy/TBI  Donor Match & Source 8/8 DP permissive PBSC    GVHD Prophylaxis PTCy/MMF/Siro  Primary BMT MD Dr. Murphy    Clinical Trials [id number]       INTERVAL  HISTORY     Fever overnight but no noteworthy medical complaints to localize fever to. Leland's wife is bedside this am.     Review of Systems: 10 point ROS negative except as noted above.    PHYSICAL EXAM     Weight In/Out     Wt Readings from Last 3 Encounters:   09/02/24 88.5 kg (195 lb)   08/09/24 89.9 kg (198 lb 4.8 oz)   08/08/24 90.3 kg (199 lb 1.2 oz)      I/O last 3 completed shifts:  In: 1627.5 [P.O.:480; I.V.:1147.5]  Out: 1645 [Urine:1645]     KPS:  80    /70 (BP Location: Left arm)   Pulse 106   Temp 99  F (37.2  C) (Oral)   Resp 20   Ht 1.84 m (6' 0.44\")   Wt 88.5 kg (195 lb)   SpO2 97%   BMI 26.13 kg/m       General:  NAD   HEENT: +aphthous clean based ulcer noted on tongue.   Lungs: BCTA  CV: RRR  Lymphatics: No edema  Skin: +now minimal folliculitis on back  Neuro: A&O, appropriately interactive, speech clear, moving all extremities   Additional Findings: Sean     Current aGVHD staging:  Skin 0, UGI 0, LGI 0, Liver 0     LABS AND IMAGING: I have assessed all abnormal lab values for their clinical significance and any values considered clinically significant have been addressed in the assessment and plan.      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     BMT/IEC PROTOCOL for MDS  - Chemo protocol: MT 2022-5  D-6: Flu 30mg/m2, Cy 50mg/kg  D-5 to D-2: Flu  D-1: TBI  D0: stem cell infusion  - Peripheral blood stem cell graft from 8/8 URD donor, ABO matched, Cell dose: TBD  - Engraftment monitoring: Peripheral blood and bone marrow chimerisms on D28, D100, 6 months, 1 and 2 years  - Restaging plan: BMBx with FISH, cytogenetics and NGS on D28, D100, 6 months, 1 and 2 years     HEME/COAG  # Pancytopenia secondary to disease process  - GCSF starts day +5, continue until ANC > 2500 for 2 consecutive days  - Transfusion parameters: hemoglobin <7, platelets <10     RISK OF GVHD  - Prophylaxis: PTCy 50mg/kg on D+3 and D+4; MMF (D+5 to 35); Sirolimus (starting D+5, target level 5-10).  - Siro level pending.      ID  - Febrile Neutropenia 9/2 am 103: Leland looks clinically well. No localizing symptoms. Cefepime started. BC's pending. CXR looks okay (final read pending). UA neg. Lactic normal. CMV neg.     Prophylaxis plan:   - Bacterial: Levaquin 250mg while neutropenic Now on hold with cefepime   - Fungal: Micafungin 300mg 3x/week until D+45, followed by mold active azole. Pulm nodules present on workup CT.   - PJP: Bactrim to start at D+28. Toxoplasma negative.   - Viral: Acyclovir 800mg BID. No need for letermovir as donor and recipient are CMV negative.      Monitoring:   - CMV weekly, 8/31 neg  - EBV every 2 weeks from D30 to D180     GI/NUTRITION  # GERD: Protonix  - Anti-emetics: Zofran, dex scheduled during chemotherapy. Compazine, lorazepam available PRN.   - Ulcer prophy: Protonix  - VOD prophy: Ursodiol 300mg TID  - Dietician support to prevent  [Weight management counseling provided] : Weight management [Healthy eating counseling provided] : healthy eating malnutrition  - Miralax and senna PRN constipation. For now, Miralax scheduled at bedtime.      CARDIOVASCULAR  # HTN- lisinopril 15mg daily. Dc'd starting 8/26 with orthostatics and some dizziness.  # CAD: Coronary artery calcifications seen on CT, stress test in 2023 negative     - Risk of cardiomyopathy: Baseline EF 55-60%.   - Risk of arrhythmia: Baseline EKG showed 421       RENAL/ELECTROLYTES/  - Electrolyte management: replace per sliding scale  - BPH: Continue home flomax.      MUSCULOSKELETAL/FRAILTY  - Baseline Frailty Score: Not frail (0)  - Daily PT/OT as needed while inpatient  - Gout: continue allopurinol      Neuro   #HA: no red flag symptoms. Pt has history of spinal stenosis, lumbosacral radiculopathy likely exacerbating. Tramadol PRN. Voltaren gel, ice and PT asked for suggestions. In the setting of thrombocytopenia, consider CT head if changes in symptoms or worsens. No currently complaints of HA.   - Pain management: Outpatient has been taking celecoxib for MSK pain, once a day. Inpatient will try Tramadol - available PRN. Added robaxin TID     Derm:  - Follicular rash to chest, back- Started topical clindamycin 8/28. Much improved.      SOCIAL DETERMINANTS  - Caregiver: wife, Vani. Lives within the required distance from hospital but may elect to stay in [place] [facility].   - Financial/insurance concerns: None    Medically Ready for Discharge: Anticipated in 5+ Days      Clinically Significant Risk Factors          # Hypocalcemia: Lowest Ca = 8.6 mg/dL in last 2 days, will monitor and replace as appropriate   # Hypomagnesemia: Lowest Mg = 1.6 mg/dL in last 2 days, will replace as needed   # Hypoalbuminemia: Lowest albumin = 3.1 g/dL at 8/29/2024  3:27 AM, will monitor as appropriate   # Thrombocytopenia: Lowest platelets = 10 in last 2 days, will monitor for bleeding   # Hypertension: Noted on problem list           # Overweight: Estimated body mass index is 26.13 kg/m  as calculated from the  "following:    Height as of this encounter: 1.84 m (6' 0.44\").    Weight as of this encounter: 88.5 kg (195 lb).            I spent 30 minutes in the care of this patient today, which included time necessary for preparation for the visit, obtaining history, ordering medications/tests/procedures as medically indicated, review of pertinent medical literature, counseling of the patient, communication of recommendations to the care team, and documentation time.    STANLEY Bennett-MAYELIN  [phone]    Physician Attestation     I, Timothy Skaggs MD, saw and evaluated Leland Pearson as part of a shared APRN/PA visit. I personally performed the substantive portion of the medical decision making for this visit - please see the VJ s documentation for full details.    Key management decisions made by me and carried out under my direction include:   69y M with MDS/AML with ASXL1, RUNX1, IDH1, SRSF2 mutations, who achieved a low blast hypocellular state, and is now day +10 s/p GANGA (Flu/Cy/TBI) 8/8 URD (23yF, O+/O+, CMV -/-, DP permissive, 6.06 x106 CD34/kg) PB SCT with PTCy/siro/MMF GVHD ppx. He had his first fever of this admission this morning and also has developed rectal pain. CXR and UA unremarkable. Ucx and Bcx pending. Exam unremarkable. Started cefepime today. Add topical therapies for rectal pain. Appetite and energy are also decreased a bit today. Continue supportive cares. Will continue to monitor closely for further complications of transplant.    On the date of service, 09/02/2024, I spent 45 minutes on the patient unit personally reviewing medical records and medications, reviewing vital signs, labs, and imaging results as summarized above, obtaining a history from the patient, performing a physical exam, counseling and educating the patient on the diagnosis and treatment, evaluating a potentially life or organ threatening problem, intensively monitoring treatments with high risk of toxicity, coordinating care, and " [None] : None [Good understanding] : Patient has a good understanding of lifestyle changes and the steps needed to achieve self management goals documenting in the electronic medical record.    Thank you for allowing me to participate in the care of this patient. Please do not hesitate to contact me if there are any concerns or questions.     Timothy Skaggs MD   of Medicine  Classical Hematology and Blood and Marrow Transplantation  Division of Hematology, Oncology, and Transplantation  [facility]

## 2024-09-02 NOTE — PLAN OF CARE
"/69   Pulse 115   Temp 99.6  F (37.6  C) (Oral)   Resp 20   Ht 1.84 m (6' 0.44\")   Wt 88.5 kg (195 lb)   SpO2 100%   BMI 26.13 kg/m      Tmax 99.6. -110s. Orthos positive and provider notified. 1L bolus given and will recheck this afternoon. Pt SBA and calling appropriately. Pt reports chronic neck/back pain managed w/tylenol x3, bianca robaxin, and voltaren w/mild relief. Pt declines further intervention at this time. Denies nausea. Compazine bianca for 1600. Fair appetite. R CVC at TKo. Rash noted on back and clinda lotion applied. Voiding adequately. X1 BM reported. Prep H added to MAR and education provided. Siro 5.7 and additional dose given. Plts given. Wife at bedside. Will continue with POC.     Goal Outcome Evaluation:      Plan of Care Reviewed With: patient, spouse    Overall Patient Progress: no changeOverall Patient Progress: no change    Outcome Evaluation: Pt continues to be fatigued w/low grade fever and plans to rest today.      Problem: Adult Inpatient Plan of Care  Goal: Plan of Care Review  Description: The Plan of Care Review/Shift note should be completed every shift.  The Outcome Evaluation is a brief statement about your assessment that the patient is improving, declining, or no change.  This information will be displayed automatically on your shift  note.  Outcome: Progressing  Flowsheets (Taken 9/2/2024 3844)  Outcome Evaluation: Pt continues to be fatigued w/low grade fever and plans to rest today.  Plan of Care Reviewed With:   patient   spouse  Overall Patient Progress: no change     Problem: Stem Cell/Bone Marrow Transplant  Goal: Optimal Coping with Transplant  Outcome: Progressing  Intervention: Optimize Patient/Family Adjustment to Transplant  Recent Flowsheet Documentation  Taken 9/2/2024 3194 by Blossom Shah, RN  Supportive Measures:   active listening utilized   relaxation techniques promoted   self-care encouraged   verbalization of feelings encouraged  Goal: " Diarrhea Symptom Control  Outcome: Progressing  Intervention: Manage Diarrhea  Recent Flowsheet Documentation  Taken 9/2/2024 0826 by Blossom Shah RN  Skin Protection:   adhesive use limited   protective footwear used   transparent dressing maintained  Fluid/Electrolyte Management: fluids provided  Goal: Improved Activity Tolerance  Outcome: Progressing  Intervention: Promote Improved Energy  Recent Flowsheet Documentation  Taken 9/2/2024 0826 by Blossom Shah RN  Fatigue Management:   activity schedule adjusted   frequent rest breaks encouraged   paced activity encouraged  Activity Management:   activity adjusted per tolerance   up ad sergo  Environmental Support:   calm environment promoted   rest periods encouraged  Goal: Nausea and Vomiting Symptom Relief  Outcome: Progressing  Intervention: Prevent and Manage Nausea and Vomiting  Recent Flowsheet Documentation  Taken 9/2/2024 0826 by Blossom Shah RN  Nausea/Vomiting Interventions: antiemetic  Goal: Optimal Nutrition Intake  Outcome: Progressing  Intervention: Minimize and Manage Barriers to Oral Intake  Recent Flowsheet Documentation  Taken 9/2/2024 0826 by Blossom Shah RN  Oral Nutrition Promotion: rest periods promoted

## 2024-09-02 NOTE — PLAN OF CARE
Patient 102.8 this evening other vital signs stable. MD notified of temp and positive blood cultures from both lines, patient started on Vancomycin. Wife at bedside, support provided. Continue to monitor closely.  Problem: Adult Inpatient Plan of Care  Goal: Plan of Care Review  Description: The Plan of Care Review/Shift note should be completed every shift.  The Outcome Evaluation is a brief statement about your assessment that the patient is improving, declining, or no change.  This information will be displayed automatically on your shift  note.  Outcome: Progressing   Goal Outcome Evaluation:

## 2024-09-02 NOTE — PROGRESS NOTES
Gold Night crosscover:    Paged to notify patient fevered to 103F, ordered peripheral culture and line cultures from each lumen per signout, UC and xray ordered, cefepime started.     Meri Viera MD  Securely message with [facility]) (more info)  Text page via [facility] Paging/Directory

## 2024-09-03 LAB
ACANTHOCYTES BLD QL SMEAR: ABNORMAL
ANION GAP SERPL CALCULATED.3IONS-SCNC: 8 MMOL/L (ref 7–15)
AUER BODIES BLD QL SMEAR: ABNORMAL
BASO STIPL BLD QL SMEAR: ABNORMAL
BASOPHILS # BLD AUTO: ABNORMAL 10*3/UL
BASOPHILS NFR BLD AUTO: ABNORMAL %
BITE CELLS BLD QL SMEAR: ABNORMAL
BLD PROD TYP BPU: NORMAL
BLISTER CELLS BLD QL SMEAR: ABNORMAL
BLOOD COMPONENT TYPE: NORMAL
BUN SERPL-MCNC: 14.6 MG/DL (ref 8–23)
BURR CELLS BLD QL SMEAR: ABNORMAL
C DIFF TOX B STL QL: NEGATIVE
CA-I BLD-MCNC: 4.6 MG/DL (ref 4.4–5.2)
CALCIUM SERPL-MCNC: 8.1 MG/DL (ref 8.8–10.4)
CHLORIDE SERPL-SCNC: 100 MMOL/L (ref 98–107)
CODING SYSTEM: NORMAL
CREAT SERPL-MCNC: 0.69 MG/DL (ref 0.67–1.17)
CROSSMATCH: NORMAL
DACRYOCYTES BLD QL SMEAR: ABNORMAL
EGFRCR SERPLBLD CKD-EPI 2021: >90 ML/MIN/1.73M2
ELLIPTOCYTES BLD QL SMEAR: ABNORMAL
ENTEROCOCCUS FAECALIS: NOT DETECTED
ENTEROCOCCUS FAECIUM: NOT DETECTED
EOSINOPHIL # BLD AUTO: ABNORMAL 10*3/UL
EOSINOPHIL NFR BLD AUTO: ABNORMAL %
ERYTHROCYTE [DISTWIDTH] IN BLOOD BY AUTOMATED COUNT: 15.7 % (ref 10–15)
FRAGMENTS BLD QL SMEAR: SLIGHT
GLUCOSE SERPL-MCNC: 123 MG/DL (ref 70–99)
HCO3 SERPL-SCNC: 22 MMOL/L (ref 22–29)
HCT VFR BLD AUTO: 19.6 % (ref 40–53)
HGB BLD-MCNC: 7 G/DL (ref 13.3–17.7)
HGB C CRYSTALS: ABNORMAL
HOWELL-JOLLY BOD BLD QL SMEAR: ABNORMAL
IMM GRANULOCYTES # BLD: ABNORMAL 10*3/UL
IMM GRANULOCYTES NFR BLD: ABNORMAL %
ISSUE DATE AND TIME: NORMAL
LACTATE SERPL-SCNC: 0.7 MMOL/L (ref 0.7–2)
LISTERIA SPECIES (DETECTED/NOT DETECTED): NOT DETECTED
LYMPHOCYTES # BLD AUTO: ABNORMAL 10*3/UL
LYMPHOCYTES NFR BLD AUTO: ABNORMAL %
MAGNESIUM SERPL-MCNC: 1.9 MG/DL (ref 1.7–2.3)
MCH RBC QN AUTO: 27.8 PG (ref 26.5–33)
MCHC RBC AUTO-ENTMCNC: 35.7 G/DL (ref 31.5–36.5)
MCV RBC AUTO: 78 FL (ref 78–100)
MONOCYTES # BLD AUTO: ABNORMAL 10*3/UL
MONOCYTES NFR BLD AUTO: ABNORMAL %
NEUTROPHILS # BLD AUTO: ABNORMAL 10*3/UL
NEUTROPHILS NFR BLD AUTO: ABNORMAL %
NEUTS HYPERSEG BLD QL SMEAR: ABNORMAL
OSMOLALITY SERPL: 279 MMOL/KG (ref 280–301)
OSMOLALITY UR: 470 MMOL/KG (ref 100–1200)
PHOSPHATE SERPL-MCNC: 1.9 MG/DL (ref 2.5–4.5)
PHOSPHATE SERPL-MCNC: 2.1 MG/DL (ref 2.5–4.5)
PLAT MORPH BLD: ABNORMAL
PLATELET # BLD AUTO: 26 10E3/UL (ref 150–450)
POLYCHROMASIA BLD QL SMEAR: ABNORMAL
POTASSIUM SERPL-SCNC: 3.9 MMOL/L (ref 3.4–5.3)
RBC # BLD AUTO: 2.52 10E6/UL (ref 4.4–5.9)
RBC AGGLUT BLD QL: ABNORMAL
RBC MORPH BLD: ABNORMAL
ROULEAUX BLD QL SMEAR: ABNORMAL
SICKLE CELLS BLD QL SMEAR: ABNORMAL
SMUDGE CELLS BLD QL SMEAR: ABNORMAL
SODIUM SERPL-SCNC: 130 MMOL/L (ref 135–145)
SODIUM UR-SCNC: 27 MMOL/L
SPHEROCYTES BLD QL SMEAR: ABNORMAL
STAPHYLOCOCCUS AUREUS: NOT DETECTED
STAPHYLOCOCCUS EPIDERMIDIS: DETECTED
STAPHYLOCOCCUS LUGDUNENSIS: NOT DETECTED
STOMATOCYTES BLD QL SMEAR: ABNORMAL
STREPTOCOCCUS AGALACTIAE: NOT DETECTED
STREPTOCOCCUS ANGINOSUS GROUP: NOT DETECTED
STREPTOCOCCUS PNEUMONIAE: NOT DETECTED
STREPTOCOCCUS PYOGENES: NOT DETECTED
STREPTOCOCCUS SPECIES: NOT DETECTED
TARGETS BLD QL SMEAR: SLIGHT
TOXIC GRANULES BLD QL SMEAR: ABNORMAL
UNIT ABO/RH: NORMAL
UNIT NUMBER: NORMAL
UNIT STATUS: NORMAL
UNIT TYPE ISBT: 9500
VARIANT LYMPHS BLD QL SMEAR: ABNORMAL
WBC # BLD AUTO: <0.1 10E3/UL (ref 4–11)

## 2024-09-03 PROCEDURE — 84100 ASSAY OF PHOSPHORUS: CPT | Performed by: INTERNAL MEDICINE

## 2024-09-03 PROCEDURE — 87493 C DIFF AMPLIFIED PROBE: CPT

## 2024-09-03 PROCEDURE — 206N000001 HC R&B BMT UMMC

## 2024-09-03 PROCEDURE — 83935 ASSAY OF URINE OSMOLALITY: CPT

## 2024-09-03 PROCEDURE — 250N000013 HC RX MED GY IP 250 OP 250 PS 637: Performed by: INTERNAL MEDICINE

## 2024-09-03 PROCEDURE — 83930 ASSAY OF BLOOD OSMOLALITY: CPT

## 2024-09-03 PROCEDURE — P9040 RBC LEUKOREDUCED IRRADIATED: HCPCS

## 2024-09-03 PROCEDURE — 258N000003 HC RX IP 258 OP 636

## 2024-09-03 PROCEDURE — 250N000011 HC RX IP 250 OP 636: Performed by: INTERNAL MEDICINE

## 2024-09-03 PROCEDURE — 36415 COLL VENOUS BLD VENIPUNCTURE: CPT

## 2024-09-03 PROCEDURE — 82330 ASSAY OF CALCIUM: CPT

## 2024-09-03 PROCEDURE — 250N000011 HC RX IP 250 OP 636

## 2024-09-03 PROCEDURE — 250N000009 HC RX 250: Performed by: INTERNAL MEDICINE

## 2024-09-03 PROCEDURE — 83735 ASSAY OF MAGNESIUM: CPT | Performed by: INTERNAL MEDICINE

## 2024-09-03 PROCEDURE — 250N000012 HC RX MED GY IP 250 OP 636 PS 637

## 2024-09-03 PROCEDURE — 250N000013 HC RX MED GY IP 250 OP 250 PS 637

## 2024-09-03 PROCEDURE — 87040 BLOOD CULTURE FOR BACTERIA: CPT

## 2024-09-03 PROCEDURE — 99233 SBSQ HOSP IP/OBS HIGH 50: CPT | Mod: FS

## 2024-09-03 PROCEDURE — 85027 COMPLETE CBC AUTOMATED: CPT

## 2024-09-03 PROCEDURE — 250N000013 HC RX MED GY IP 250 OP 250 PS 637: Performed by: PHYSICIAN ASSISTANT

## 2024-09-03 PROCEDURE — 250N000013 HC RX MED GY IP 250 OP 250 PS 637: Performed by: NURSE PRACTITIONER

## 2024-09-03 PROCEDURE — 250N000011 HC RX IP 250 OP 636: Mod: JZ | Performed by: INTERNAL MEDICINE

## 2024-09-03 PROCEDURE — 83605 ASSAY OF LACTIC ACID: CPT | Performed by: INTERNAL MEDICINE

## 2024-09-03 PROCEDURE — 99418 PROLNG IP/OBS E/M EA 15 MIN: CPT

## 2024-09-03 PROCEDURE — 84300 ASSAY OF URINE SODIUM: CPT

## 2024-09-03 PROCEDURE — 258N000003 HC RX IP 258 OP 636: Performed by: INTERNAL MEDICINE

## 2024-09-03 PROCEDURE — 82565 ASSAY OF CREATININE: CPT

## 2024-09-03 PROCEDURE — 87040 BLOOD CULTURE FOR BACTERIA: CPT | Performed by: STUDENT IN AN ORGANIZED HEALTH CARE EDUCATION/TRAINING PROGRAM

## 2024-09-03 RX ORDER — POTASSIUM PHOS IN 0.9 % NACL 15MMOL/250
15 PLASTIC BAG, INJECTION (ML) INTRAVENOUS ONCE
Status: COMPLETED | OUTPATIENT
Start: 2024-09-03 | End: 2024-09-03

## 2024-09-03 RX ORDER — MEPERIDINE HYDROCHLORIDE 25 MG/ML
50 INJECTION INTRAMUSCULAR; INTRAVENOUS; SUBCUTANEOUS
Status: DISCONTINUED | OUTPATIENT
Start: 2024-09-03 | End: 2024-09-11

## 2024-09-03 RX ORDER — NALOXONE HYDROCHLORIDE 0.4 MG/ML
0.4 INJECTION, SOLUTION INTRAMUSCULAR; INTRAVENOUS; SUBCUTANEOUS
Status: DISCONTINUED | OUTPATIENT
Start: 2024-09-03 | End: 2024-09-15

## 2024-09-03 RX ORDER — NALOXONE HYDROCHLORIDE 0.4 MG/ML
0.4 INJECTION, SOLUTION INTRAMUSCULAR; INTRAVENOUS; SUBCUTANEOUS
Status: DISCONTINUED | OUTPATIENT
Start: 2024-09-03 | End: 2024-09-19 | Stop reason: HOSPADM

## 2024-09-03 RX ORDER — NALOXONE HYDROCHLORIDE 0.4 MG/ML
0.2 INJECTION, SOLUTION INTRAMUSCULAR; INTRAVENOUS; SUBCUTANEOUS
Status: DISCONTINUED | OUTPATIENT
Start: 2024-09-03 | End: 2024-09-19 | Stop reason: HOSPADM

## 2024-09-03 RX ORDER — LOPERAMIDE HCL 2 MG
2 CAPSULE ORAL 4 TIMES DAILY PRN
Status: DISCONTINUED | OUTPATIENT
Start: 2024-09-03 | End: 2024-09-04

## 2024-09-03 RX ORDER — LIDOCAINE HYDROCHLORIDE 20 MG/ML
JELLY TOPICAL EVERY 4 HOURS PRN
Status: DISCONTINUED | OUTPATIENT
Start: 2024-09-03 | End: 2024-09-11

## 2024-09-03 RX ORDER — NALOXONE HYDROCHLORIDE 0.4 MG/ML
0.2 INJECTION, SOLUTION INTRAMUSCULAR; INTRAVENOUS; SUBCUTANEOUS
Status: DISCONTINUED | OUTPATIENT
Start: 2024-09-03 | End: 2024-09-15

## 2024-09-03 RX ORDER — MEPERIDINE HYDROCHLORIDE 25 MG/ML
25 INJECTION INTRAMUSCULAR; INTRAVENOUS; SUBCUTANEOUS
Status: DISCONTINUED | OUTPATIENT
Start: 2024-09-03 | End: 2024-09-11

## 2024-09-03 RX ADMIN — ALLOPURINOL 300 MG: 300 TABLET ORAL at 07:50

## 2024-09-03 RX ADMIN — LOPERAMIDE HYDROCHLORIDE 2 MG: 2 CAPSULE ORAL at 14:49

## 2024-09-03 RX ADMIN — CEFEPIME HYDROCHLORIDE 2 G: 2 INJECTION, POWDER, FOR SOLUTION INTRAVENOUS at 12:28

## 2024-09-03 RX ADMIN — PANTOPRAZOLE SODIUM 40 MG: 40 TABLET, DELAYED RELEASE ORAL at 07:51

## 2024-09-03 RX ADMIN — SIROLIMUS 6 MG: 2 TABLET ORAL at 07:51

## 2024-09-03 RX ADMIN — METHOCARBAMOL 500 MG: 500 TABLET ORAL at 19:35

## 2024-09-03 RX ADMIN — ACYCLOVIR 800 MG: 800 TABLET ORAL at 07:50

## 2024-09-03 RX ADMIN — MYCOPHENOLATE MOFETIL 1250 MG: 500 INJECTION, POWDER, LYOPHILIZED, FOR SOLUTION INTRAVENOUS at 09:50

## 2024-09-03 RX ADMIN — ACETAMINOPHEN 650 MG: 325 TABLET ORAL at 21:55

## 2024-09-03 RX ADMIN — URSODIOL 300 MG: 300 CAPSULE ORAL at 19:35

## 2024-09-03 RX ADMIN — MICAFUNGIN SODIUM 150 MG: 50 INJECTION, POWDER, LYOPHILIZED, FOR SOLUTION INTRAVENOUS at 09:44

## 2024-09-03 RX ADMIN — Medication 3 CAPSULE: at 09:46

## 2024-09-03 RX ADMIN — SODIUM CHLORIDE 1000 ML: 9 INJECTION, SOLUTION INTRAVENOUS at 15:15

## 2024-09-03 RX ADMIN — METHOCARBAMOL 500 MG: 500 TABLET ORAL at 07:51

## 2024-09-03 RX ADMIN — VANCOMYCIN HYDROCHLORIDE 1250 MG: 10 INJECTION, POWDER, LYOPHILIZED, FOR SOLUTION INTRAVENOUS at 04:32

## 2024-09-03 RX ADMIN — DICLOFENAC SODIUM 2 G: 10 GEL TOPICAL at 14:01

## 2024-09-03 RX ADMIN — ACETAMINOPHEN 650 MG: 325 TABLET ORAL at 02:28

## 2024-09-03 RX ADMIN — TAMSULOSIN HYDROCHLORIDE 0.4 MG: 0.4 CAPSULE ORAL at 17:30

## 2024-09-03 RX ADMIN — POTASSIUM PHOSPHATE, MONOBASIC POTASSIUM PHOSPHATE, DIBASIC 9 MMOL: 224; 236 INJECTION, SOLUTION, CONCENTRATE INTRAVENOUS at 07:27

## 2024-09-03 RX ADMIN — PROCHLORPERAZINE EDISYLATE 5 MG: 5 INJECTION INTRAMUSCULAR; INTRAVENOUS at 14:48

## 2024-09-03 RX ADMIN — FILGRASTIM-AAFI 480 MCG: 480 INJECTION, SOLUTION INTRAVENOUS; SUBCUTANEOUS at 19:35

## 2024-09-03 RX ADMIN — POTASSIUM PHOSPHATE, MONOBASIC POTASSIUM PHOSPHATE, DIBASIC 9 MMOL: 224; 236 INJECTION, SOLUTION, CONCENTRATE INTRAVENOUS at 05:21

## 2024-09-03 RX ADMIN — ACETAMINOPHEN 650 MG: 325 TABLET ORAL at 07:29

## 2024-09-03 RX ADMIN — PROCHLORPERAZINE MALEATE 5 MG: 5 TABLET ORAL at 17:31

## 2024-09-03 RX ADMIN — ACYCLOVIR 800 MG: 800 TABLET ORAL at 19:35

## 2024-09-03 RX ADMIN — CEFEPIME HYDROCHLORIDE 2 G: 2 INJECTION, POWDER, FOR SOLUTION INTRAVENOUS at 03:36

## 2024-09-03 RX ADMIN — MYCOPHENOLATE MOFETIL 1250 MG: 500 INJECTION, POWDER, LYOPHILIZED, FOR SOLUTION INTRAVENOUS at 21:54

## 2024-09-03 RX ADMIN — LOPERAMIDE HYDROCHLORIDE 2 MG: 2 CAPSULE ORAL at 12:28

## 2024-09-03 RX ADMIN — METHOCARBAMOL 500 MG: 500 TABLET ORAL at 14:02

## 2024-09-03 RX ADMIN — DICLOFENAC SODIUM 2 G: 10 GEL TOPICAL at 09:44

## 2024-09-03 RX ADMIN — Medication 5 ML: at 03:36

## 2024-09-03 RX ADMIN — URSODIOL 300 MG: 300 CAPSULE ORAL at 14:02

## 2024-09-03 RX ADMIN — CEFEPIME HYDROCHLORIDE 2 G: 2 INJECTION, POWDER, FOR SOLUTION INTRAVENOUS at 21:09

## 2024-09-03 RX ADMIN — VANCOMYCIN HYDROCHLORIDE 1250 MG: 10 INJECTION, POWDER, LYOPHILIZED, FOR SOLUTION INTRAVENOUS at 17:33

## 2024-09-03 RX ADMIN — POTASSIUM PHOSPHATE, MONOBASIC POTASSIUM PHOSPHATE, DIBASIC 15 MMOL: 224; 236 INJECTION, SOLUTION, CONCENTRATE INTRAVENOUS at 17:33

## 2024-09-03 RX ADMIN — ACETAMINOPHEN 650 MG: 325 TABLET ORAL at 14:02

## 2024-09-03 RX ADMIN — CLINDAMYCIN PHOSPHATE: 10 SOLUTION TOPICAL at 09:44

## 2024-09-03 RX ADMIN — MEPERIDINE HYDROCHLORIDE 25 MG: 25 INJECTION INTRAMUSCULAR; INTRAVENOUS; SUBCUTANEOUS at 14:47

## 2024-09-03 RX ADMIN — URSODIOL 300 MG: 300 CAPSULE ORAL at 07:51

## 2024-09-03 ASSESSMENT — ACTIVITIES OF DAILY LIVING (ADL)
ADLS_ACUITY_SCORE: 25

## 2024-09-03 NOTE — PROVIDER NOTIFICATION
Provider notified - Meri Viera - Patient still has a fever of 102.5, needs RN+BC this morning. Do you want to put an OK for me to give with fever or do you want to wait for it to come down?     Per MD - can we wait at least an hour and see if fever improves before blood? He's right at threshold for transfusion so should be safe to wait

## 2024-09-03 NOTE — PLAN OF CARE
Nursing Note  Shift: [id number]    BMT Day +11    General: T-max 103.0F, Tylenol given x2 with relief down to 98.9F. Tachycardic 110-120's with fevers but otherwise regular rate. Rigors, demerol given. Continuous pulse ox placed after demerol, satting well on room air. A&Ox4. Reports mild chronic neck pain.   GI/: Patient having frequent and urgent, watery BMs. Metamucil and imodium given. Voiding well, orange colored. Tolerating normal diet. Sudden belching with 1 episode of emesis and dry heaving. IV compazine given with relief.  Skin: Rash on back. Has 2 visibile mouth/tongue sores. Saline rinse encouraged and used.   Activity: SBA.   Access: R CVC - infusing  Central line dressing changed, dressing was not intact (2/3 off) when assessed in the morning.     Blood products: 1 unit PRBCs given for HGB 7.0  Electrolytes: phos replaced, recheck was still low, second bag given. Recheck in AM.    Shift Events: Spiked fever, fever work up last completed yesterday. Notified provider, no new interventions. Serial peripheral blood culture already obtained today.   Critical result: Gram positive cocci in clusters from peripheral culture 9/2/24    Plan: Continue with plan of care. Notify primary team with changes.     Problem: Adult Inpatient Plan of Care  Goal: Absence of Hospital-Acquired Illness or Injury  Outcome: Progressing  Intervention: Identify and Manage Fall Risk  Recent Flowsheet Documentation  Taken 9/3/2024 1230 by Hillary Mcintosh RN  Safety Promotion/Fall Prevention: safety round/check completed  Taken 9/3/2024 0830 by Hillary Mcintosh RN  Safety Promotion/Fall Prevention: safety round/check completed  Intervention: Prevent Skin Injury  Recent Flowsheet Documentation  Taken 9/3/2024 1230 by Hillary Mcintosh RN  Body Position: position changed independently  Taken 9/3/2024 0830 by Hillary Mcintosh RN  Body Position: position changed independently  Intervention: Prevent Infection  Recent Flowsheet  Documentation  Taken 9/3/2024 1230 by Hillary Mcintosh RN  Infection Prevention:   rest/sleep promoted   hand hygiene promoted  Taken 9/3/2024 0830 by Hillary Mcintosh RN  Infection Prevention:   rest/sleep promoted   hand hygiene promoted     Problem: Stem Cell/Bone Marrow Transplant  Goal: Optimal Coping with Transplant  Outcome: Progressing  Intervention: Optimize Patient/Family Adjustment to Transplant  Recent Flowsheet Documentation  Taken 9/3/2024 0830 by Hillary Mcintosh RN  Supportive Measures:   active listening utilized   relaxation techniques promoted   self-care encouraged   verbalization of feelings encouraged  Family/Support System Care: involvement promoted  Goal: Symptom-Free Urinary Elimination  Outcome: Progressing  Goal: Diarrhea Symptom Control  Outcome: Not Progressing  Goal: Improved Activity Tolerance  Outcome: Not Progressing  Intervention: Promote Improved Energy  Recent Flowsheet Documentation  Taken 9/3/2024 1230 by Hillary Mcintosh RN  Activity Management: activity adjusted per tolerance  Taken 9/3/2024 0830 by Hillary Mcintosh RN  Activity Management: activity adjusted per tolerance  Environmental Support:   calm environment promoted   rest periods encouraged   personal routine supported  Goal: Blood Counts Within Acceptable Range  Outcome: Progressing  Goal: Absence of Hypersensitivity Reaction  Outcome: Progressing  Goal: Absence of Infection  Outcome: Not Progressing  Intervention: Prevent and Manage Infection  Recent Flowsheet Documentation  Taken 9/3/2024 1230 by Hillary Mcintosh RN  Infection Prevention:   rest/sleep promoted   hand hygiene promoted  Isolation Precautions: protective environment maintained  Taken 9/3/2024 0830 by Hillary Mcintosh RN  Infection Prevention:   rest/sleep promoted   hand hygiene promoted  Isolation Precautions: protective environment maintained  Goal: Improved Oral Mucous Membrane Health and Integrity  Outcome: Progressing  Goal:  Nausea and Vomiting Symptom Relief  Outcome: Not Progressing  Goal: Optimal Nutrition Intake  Outcome: Progressing   Goal Outcome Evaluation:

## 2024-09-03 NOTE — PLAN OF CARE
"/63   Pulse (!) 123   Temp (!) 102.5  F (39.2  C) (Oral)   Resp 16   Ht 1.84 m (6' 0.44\")   Wt 88.5 kg (195 lb)   SpO2 98%   BMI 26.13 kg/m      TMAX 103.1, tachycardic, OVSS. Gave tylenol and ice packs. Fever work-up 9/2/24. Did 1 set of blood cultures (red lumen) this morning as 9.2.24 cultures came back with growth. Pt has no complaints of nausea. Pain 0-2/10, tylenol given x2. Rash on back and chest is gone. Voiding adequately. Had BM x3. Pt is SBA and calls appropriately, education on bed alarm due to fall risk, refused.     Replacements this morning is phos and RBC. First bag of phos running, second bag due at 725, recheck 2-4 hours post infusion.   MD requested to wait til fever came down for RBC to be transfused. Both lines in use until 635 am with vanco and phos, will need RBC this morning.         Problem: Stem Cell/Bone Marrow Transplant  Goal: Diarrhea Symptom Control  Outcome: Not Progressing  Intervention: Manage Diarrhea  Recent Flowsheet Documentation  Taken 9/2/2024 1930 by Imtiaz Fairbanks, RN  Skin Protection:   adhesive use limited   protective footwear used   transparent dressing maintained  Fluid/Electrolyte Management: fluids provided  Perineal Care: perineal hygiene encouraged  Goal: Absence of Infection  Outcome: Not Progressing  Intervention: Prevent and Manage Infection  Recent Flowsheet Documentation  Taken 9/3/2024 0005 by Imtiaz Fairbanks, RN  Infection Prevention:   rest/sleep promoted   hand hygiene promoted  Isolation Precautions: protective environment maintained  Taken 9/2/2024 1930 by Imtiaz Fairbanks, RN  Infection Prevention:   rest/sleep promoted   hand hygiene promoted  Infection Management: aseptic technique maintained  Isolation Precautions: protective environment maintained     Problem: Stem Cell/Bone Marrow Transplant  Goal: Absence of Infection  Outcome: Not Progressing  Intervention: Prevent and Manage Infection  Recent Flowsheet Documentation  Taken 9/3/2024 0005 by " "Roen, Imtiaz M, RN  Infection Prevention:   rest/sleep promoted   hand hygiene promoted  Isolation Precautions: protective environment maintained  Taken 9/2/2024 1930 by Imtiaz Fairbanks RN  Infection Prevention:   rest/sleep promoted   hand hygiene promoted  Infection Management: aseptic technique maintained  Isolation Precautions: protective environment maintained     Problem: Adult Inpatient Plan of Care  Goal: Plan of Care Review  Description: The Plan of Care Review/Shift note should be completed every shift.  The Outcome Evaluation is a brief statement about your assessment that the patient is improving, declining, or no change.  This information will be displayed automatically on your shift  note.  Outcome: Progressing  Goal: Patient-Specific Goal (Individualized)  Description: You can add care plan individualizations to a care plan. Examples of Individualization might be:  \"Parent requests to be called daily at 9am for status\", \"I have a hard time hearing out of my right ear\", or \"Do not touch me to wake me up as it startles  me\".  Outcome: Progressing  Goal: Absence of Hospital-Acquired Illness or Injury  Outcome: Progressing  Intervention: Identify and Manage Fall Risk  Recent Flowsheet Documentation  Taken 9/3/2024 0440 by Imtiaz Fairbanks, CARLY  Safety Promotion/Fall Prevention: safety round/check completed  Taken 9/3/2024 0400 by Imtiaz Fairbanks RN  Safety Promotion/Fall Prevention: safety round/check completed  Taken 9/3/2024 0317 by Imtiaz Fairbanks, RN  Safety Promotion/Fall Prevention:   safety round/check completed   room organization consistent   nonskid shoes/slippers when out of bed   patient and family education   assistive device/personal items within reach   clutter free environment maintained  Taken 9/3/2024 0205 by Imtiaz Fairbanks, RN  Safety Promotion/Fall Prevention: safety round/check completed  Taken 9/3/2024 0005 by Imtiaz Fairbanks, RN  Safety Promotion/Fall Prevention:   safety round/check completed   room " organization consistent   room near nurse's station   nonskid shoes/slippers when out of bed   patient and family education   assistive device/personal items within reach   clutter free environment maintained  Taken 9/2/2024 2214 by Imtiaz Fairbanks RN  Safety Promotion/Fall Prevention: safety round/check completed  Taken 9/2/2024 1930 by Imtiaz Fairbanks RN  Safety Promotion/Fall Prevention:   safety round/check completed   room organization consistent   nonskid shoes/slippers when out of bed   patient and family education   assistive device/personal items within reach   clutter free environment maintained  Intervention: Prevent Skin Injury  Recent Flowsheet Documentation  Taken 9/2/2024 1930 by Imtiaz Fairbanks RN  Body Position:   position changed independently   sitting up in bed  Skin Protection:   adhesive use limited   protective footwear used   transparent dressing maintained  Device Skin Pressure Protection:   adhesive use limited   tubing/devices free from skin contact  Intervention: Prevent and Manage VTE (Venous Thromboembolism) Risk  Recent Flowsheet Documentation  Taken 9/2/2024 1930 by Imtiaz Fairbanks RN  VTE Prevention/Management: SCDs off (sequential compression devices)  Intervention: Prevent Infection  Recent Flowsheet Documentation  Taken 9/3/2024 0005 by Imtiaz Fairbanks RN  Infection Prevention:   rest/sleep promoted   hand hygiene promoted  Taken 9/2/2024 1930 by Imtiaz Fairbanks RN  Infection Prevention:   rest/sleep promoted   hand hygiene promoted  Goal: Optimal Comfort and Wellbeing  Outcome: Progressing  Intervention: Monitor Pain and Promote Comfort  Recent Flowsheet Documentation  Taken 9/2/2024 1933 by Imtiaz Fairbanks RN  Pain Management Interventions: medication (see MAR)  Goal: Readiness for Transition of Care  Outcome: Progressing     Problem: Risk for Delirium  Goal: Optimal Coping  Outcome: Progressing  Intervention: Optimize Psychosocial Adjustment to Delirium  Recent Flowsheet Documentation  Taken  9/2/2024 1930 by Imtiaz Fairbanks RN  Supportive Measures:   active listening utilized   relaxation techniques promoted   self-care encouraged   verbalization of feelings encouraged  Goal: Improved Behavioral Control  Outcome: Progressing  Intervention: Prevent and Manage Agitation  Recent Flowsheet Documentation  Taken 9/2/2024 1930 by Imtiaz Fiarbanks RN  Environment Familiarity/Consistency:   familiar objects from home provided   personal clothing/items utilized  Intervention: Minimize Safety Risk  Recent Flowsheet Documentation  Taken 9/2/2024 1930 by Imtiaz Fairbanks RN  Communication Enhancement Strategies: call light answered in person  Enhanced Safety Measures:   review medications for side effects with activity   pain management  Goal: Improved Attention and Thought Clarity  Outcome: Progressing  Intervention: Maximize Cognitive Function  Recent Flowsheet Documentation  Taken 9/2/2024 1930 by Imtiaz Fairbanks RN  Sensory Stimulation Regulation: lighting decreased  Reorientation Measures:   clock in view   familiar social contact encouraged  Goal: Improved Sleep  Outcome: Progressing  Intervention: Promote Sleep  Recent Flowsheet Documentation  Taken 9/2/2024 1930 by Imtiaz Fairbanks RN  Sleep/Rest Enhancement:   awakenings minimized   room darkened   regular sleep/rest pattern promoted   relaxation techniques promoted     Problem: Stem Cell/Bone Marrow Transplant  Goal: Optimal Coping with Transplant  Outcome: Progressing  Intervention: Optimize Patient/Family Adjustment to Transplant  Recent Flowsheet Documentation  Taken 9/2/2024 1930 by Imtiaz Fairbanks RN  Supportive Measures:   active listening utilized   relaxation techniques promoted   self-care encouraged   verbalization of feelings encouraged  Goal: Symptom-Free Urinary Elimination  Outcome: Progressing  Intervention: Prevent or Manage Bladder Irritation  Recent Flowsheet Documentation  Taken 9/2/2024 1933 by Imtiaz Fairbanks RN  Pain Management Interventions:  medication (see MAR)  Taken 9/2/2024 1930 by Imitaz Fairbanks RN  Urinary Elimination Promotion: voiding relaxation promoted  Hyperhydration Management: fluids provided  Goal: Improved Activity Tolerance  Outcome: Progressing  Intervention: Promote Improved Energy  Recent Flowsheet Documentation  Taken 9/2/2024 1930 by Imtiaz Fairbanks RN  Fatigue Management:   activity schedule adjusted   frequent rest breaks encouraged   paced activity encouraged  Sleep/Rest Enhancement:   awakenings minimized   room darkened   regular sleep/rest pattern promoted   relaxation techniques promoted  Activity Management: activity adjusted per tolerance  Environmental Support:   calm environment promoted   rest periods encouraged   personal routine supported  Goal: Blood Counts Within Acceptable Range  Outcome: Progressing  Intervention: Monitor and Manage Hematologic Symptoms  Recent Flowsheet Documentation  Taken 9/2/2024 1930 by Imtiaz Fairbanks RN  Sleep/Rest Enhancement:   awakenings minimized   room darkened   regular sleep/rest pattern promoted   relaxation techniques promoted  Bleeding Precautions:   blood pressure closely monitored   monitored for signs of bleeding   gentle oral care promoted  Medication Review/Management: medications reviewed  Goal: Absence of Hypersensitivity Reaction  Outcome: Progressing  Goal: Improved Oral Mucous Membrane Health and Integrity  Outcome: Progressing  Intervention: Promote Oral Comfort and Health  Recent Flowsheet Documentation  Taken 9/2/2024 1930 by Imtiaz Fairbanks RN  Oral Mucous Membrane Protection: nonirritating oral fluids promoted  Oral Care: oral rinse provided  Goal: Nausea and Vomiting Symptom Relief  Outcome: Progressing  Goal: Optimal Nutrition Intake  Outcome: Progressing  Intervention: Minimize and Manage Barriers to Oral Intake  Recent Flowsheet Documentation  Taken 9/2/2024 1930 by Imtiaz Fairbanks RN  Oral Nutrition Promotion: rest periods promoted   Goal Outcome Evaluation:

## 2024-09-03 NOTE — PROGRESS NOTES
09/03/24 0748   Significant Event   Significant Event Critical result/value   Critical Test Results/Notification   Critical Lab Result (Lab Name and Value) Peripheral blood culture drawn 9/2 @05:33am growing Gram + cocci and clusters   What Time Did The Lab Notify You? 0745   Provider Notified yes   Date of Provider Notification 09/03/24   Time of Provider Notification 0748   Mechanism of Provider notification verbal (face-to-face)   What Provider Did You Notify? Bessie Lazo PA-C   Response aware;other (see comment)  (No change in POC. Pt already on IV Vanco. Continue daily blood cultures.)       10:20 - VJ notified re: FYI, blood culture drawn yesterday AM came back positive MRSE per IDDL.

## 2024-09-03 NOTE — PROGRESS NOTES
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     Diagnosis MDS/AML  HCT Type Allogeneic     Prep Regimen Flu/Cy/TBI  Donor Match & Source 8/8 DP permissive PBSC    GVHD Prophylaxis PTCy/MMF/Siro  Primary BMT MD Dr. Murphy    Clinical Trials [id number]       INTERVAL  HISTORY     Remains febrile, tmax 103.1, tachy to 120's with this. BP stable. Now vitals WNL. BC+, Vanco started.   He is tired. Diarrhea over last few days very liquid stool x3 with rectal pain. No hematochezia, BRBPR  Eating ok, appetite decreased, but able to get down a few meals.   No N/V.   Small amount of mucositis in the mouth.    Review of Systems: 10 point ROS negative except as noted above.    PHYSICAL EXAM     Weight In/Out     Wt Readings from Last 3 Encounters:   09/03/24 89.4 kg (197 lb 1.6 oz)   08/09/24 89.9 kg (198 lb 4.8 oz)   08/08/24 90.3 kg (199 lb 1.2 oz)      I/O last 3 completed shifts:  In: 3489 [P.O.:1080; I.V.:2137]  Out: 1000 [Urine:1000]     KPS:  80    /67 (BP Location: Left arm)   Pulse 93   Temp 98.3  F (36.8  C) (Oral)   Resp 16   Ht 1.84 m (6' 0.44\")   Wt 89.4 kg (197 lb 1.6 oz)   SpO2 99%   BMI 26.41 kg/m       General:  NAD   HEENT: +aphthous clean based ulcer noted on tongue.   Lungs: BCTA  CV: RRR  Abdomen: slight tenderness to palpitation on LLQ, soft ND, +BS  Lymphatics: No edema  Skin: +now minimal folliculitis on back  Neuro: A&O, appropriately interactive, speech clear, moving all extremities   Additional Findings: Estrada     Current aGVHD staging:  Skin 0, UGI 0, LGI 0, Liver 0     LABS AND IMAGING: I have assessed all abnormal lab values for their clinical significance and any values considered clinically significant have been addressed in the assessment and plan.      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     BMT/IEC PROTOCOL for MDS  - Chemo protocol: MT 2022-5  D-6: Flu 30mg/m2, Cy 50mg/kg  D-5 to D-2: Flu  D-1: TBI  D0: stem cell infusion  - Peripheral blood stem cell graft from 8/8 URD donor, ABO matched, Cell dose: TBD  - Engraftment monitoring: Peripheral blood and bone marrow chimerisms on D28, D100, 6 months, 1 and 2 years  - Restaging plan: BMBx with FISH, cytogenetics and NGS on D28, D100, 6 months, 1 and 2 years     HEME/COAG  # Pancytopenia secondary to disease process  - GCSF starts day +5, continue until ANC > 2500 for 2 consecutive days  - Transfusion parameters: hemoglobin <7, platelets <10     RISK OF GVHD  - Prophylaxis: PTCy 50mg/kg on D+3 and D+4; MMF (D+5 to 35); Sirolimus (starting D+5, target level 5-10).  - Siro level = 5.7 given bolus (9/3)     ID  #Streptococcus mitis bacteremia (2/2 bottles) - Cefepime (9/1-x)  #MRSE bacteremia (1/2) -Vanco (9/2-x)   -sensitivities pending   -trend cultures x3 days   #Febrile Neutropenia (9/2-x)   *see BC above, otherwise unremarkable  #Diarrhea- c.diff recheck pending    Prophylaxis plan:   - Bacterial: Levaquin 250mg while neutropenic Now on hold with cefepime   - Fungal: Micafungin 300mg 3x/week until D+45, followed by mold active azole. Pulm nodules present on workup CT.   - PJP: Bactrim to start at D+28. Toxoplasma negative.   - Viral: Acyclovir 800mg BID. No  need for letermovir as donor and recipient are CMV negative.      Monitoring:   - CMV weekly, 8/31 neg  - EBV every 2 weeks from D30 to D180     GI/NUTRITION  # Diarrhea (9/3): c.diff recheck pending. Metamucil. PRN imodium.  # GERD: Protonix  - Anti-emetics: Zofran, dex scheduled during chemotherapy. Compazine, lorazepam available PRN.   - Ulcer prophy: Protonix  - VOD prophy: Ursodiol 300mg TID  - Dietician support to prevent malnutrition  - Miralax and senna PRN constipation. For now, Miralax scheduled at bedtime.      CARDIOVASCULAR  # HTN- lisinopril 15mg daily. Dc'd starting 8/26 with orthostatics and some dizziness.  # CAD: Coronary artery calcifications seen on CT, stress test in 2023 negative     - Risk of cardiomyopathy: Baseline EF 55-60%.   - Risk of arrhythmia: Baseline EKG showed 421       RENAL/ELECTROLYTES/  #Hyponatremia: osms urine/serum, Na urine/serum indicate hypovolemic hyponatremia. Encourage PO, will give IVF as necessary and slow down stools.  - Electrolyte management: replace per sliding scale  - BPH: Continue home flomax.      MUSCULOSKELETAL/FRAILTY  - Baseline Frailty Score: Not frail (0)  - Daily PT/OT as needed while inpatient  - Gout: continue allopurinol      Neuro   #HA: no red flag symptoms. Pt has history of spinal stenosis, lumbosacral radiculopathy likely exacerbating. Tramadol PRN. Voltaren gel, ice and PT asked for suggestions. In the setting of thrombocytopenia, consider CT head if changes in symptoms or worsens. No currently complaints of HA.   - Pain management: Outpatient has been taking celecoxib for MSK pain, once a day. Inpatient will try Tramadol - available PRN. Added robaxin TID     Derm:  - Follicular rash to chest, back- Started topical clindamycin 8/28. Much improved.      SOCIAL DETERMINANTS  - Caregiver: wife, Vani. Lives within the required distance from hospital but may elect to stay in [facility].   - Financial/insurance concerns: None    Medically Ready  "for Discharge: Anticipated in 5+ Days      Clinically Significant Risk Factors          # Hypocalcemia: Lowest Ca = 8.1 mg/dL in last 2 days, will monitor and replace as appropriate   # Hypomagnesemia: Lowest Mg = 1.6 mg/dL in last 2 days, will replace as needed   # Hypoalbuminemia: Lowest albumin = 3.1 g/dL at 8/29/2024  3:27 AM, will monitor as appropriate   # Thrombocytopenia: Lowest platelets = 15 in last 2 days, will monitor for bleeding   # Hypertension: Noted on problem list           # Overweight: Estimated body mass index is 26.41 kg/m  as calculated from the following:    Height as of this encounter: 1.84 m (6' 0.44\").    Weight as of this encounter: 89.4 kg (197 lb 1.6 oz).            I spent 30 minutes in the care of this patient today, which included time necessary for preparation for the visit, obtaining history, ordering medications/tests/procedures as medically indicated, review of pertinent medical literature, counseling of the patient, communication of recommendations to the care team, and documentation time.    Bessie Lazo PA-C  [phone]    Physician Attestation     I, Timothy Skaggs MD, saw and evaluated Leland Pearson as part of a shared APRN/PA visit. I personally performed the substantive portion of the medical decision making for this visit - please see the VJ s documentation for full details.    Key management decisions made by me and carried out under my direction include:   69y M with MDS/AML with ASXL1, RUNX1, IDH1, SRSF2 mutations, who achieved a low blast hypocellular state, and is now day +11 s/p GANGA (Flu/Cy/TBI) 8/8 URD (23yF, O+/O+, CMV -/-, DP permissive, 6.06 x106 CD34/kg) PB SCT with PTCy/siro/MMF GVHD ppx. He had his first fever of this admission on 9/2, and has developed rectal pain. ID workup shows GPC+ in blood with Verigene showing non-agalactiae/pneumococcus/anginosis strep species, and blood culture showing strep mitis and MRSE bacteremias. He is still febrile. Appetite and " energy are also decreased today. Vanc added to cefepime last night. Using topical therapies for rectal pain with incomplete benefit. Continue supportive cares. Will continue to monitor closely for further complications of transplant.    On the date of service, 09/03/2024, I spent 45 minutes on the patient unit personally reviewing medical records and medications, reviewing vital signs, labs, and imaging results as summarized above, obtaining a history from the patient, performing a physical exam, counseling and educating the patient on the diagnosis and treatment, evaluating a potentially life or organ threatening problem, intensively monitoring treatments with high risk of toxicity, coordinating care, and documenting in the electronic medical record.    Thank you for allowing me to participate in the care of this patient. Please do not hesitate to contact me if there are any concerns or questions.     Timothy Skaggs MD   of Medicine  Classical Hematology and Blood and Marrow Transplantation  Division of Hematology, Oncology, and Transplantation  [facility]

## 2024-09-03 NOTE — PROVIDER NOTIFICATION
Provider notified (Meri Viera) - temp of 102.0 - tylenol and ice packs given, blood cultures ordered for this morning  Recheck of 103.1    Per MD - OK to draw blood cultures with AM labs

## 2024-09-04 ENCOUNTER — APPOINTMENT (OUTPATIENT)
Dept: OCCUPATIONAL THERAPY | Facility: CLINIC | Age: 69
DRG: 014 | End: 2024-09-04
Attending: INTERNAL MEDICINE
Payer: COMMERCIAL

## 2024-09-04 LAB
ABO/RH(D): NORMAL
ACANTHOCYTES BLD QL SMEAR: ABNORMAL
ANION GAP SERPL CALCULATED.3IONS-SCNC: 10 MMOL/L (ref 7–15)
ANTIBODY SCREEN: NEGATIVE
AUER BODIES BLD QL SMEAR: ABNORMAL
BASO STIPL BLD QL SMEAR: ABNORMAL
BASOPHILS # BLD AUTO: ABNORMAL 10*3/UL
BASOPHILS NFR BLD AUTO: ABNORMAL %
BITE CELLS BLD QL SMEAR: ABNORMAL
BLD PROD TYP BPU: NORMAL
BLISTER CELLS BLD QL SMEAR: ABNORMAL
BLOOD COMPONENT TYPE: NORMAL
BUN SERPL-MCNC: 14.1 MG/DL (ref 8–23)
BURR CELLS BLD QL SMEAR: SLIGHT
CALCIUM SERPL-MCNC: 8.4 MG/DL (ref 8.8–10.4)
CHLORIDE SERPL-SCNC: 104 MMOL/L (ref 98–107)
CODING SYSTEM: NORMAL
CREAT SERPL-MCNC: 0.66 MG/DL (ref 0.67–1.17)
DACRYOCYTES BLD QL SMEAR: ABNORMAL
EGFRCR SERPLBLD CKD-EPI 2021: >90 ML/MIN/1.73M2
ELLIPTOCYTES BLD QL SMEAR: ABNORMAL
EOSINOPHIL # BLD AUTO: ABNORMAL 10*3/UL
EOSINOPHIL NFR BLD AUTO: ABNORMAL %
ERYTHROCYTE [DISTWIDTH] IN BLOOD BY AUTOMATED COUNT: 15.5 % (ref 10–15)
FRAGMENTS BLD QL SMEAR: ABNORMAL
GLUCOSE SERPL-MCNC: 113 MG/DL (ref 70–99)
HCO3 SERPL-SCNC: 21 MMOL/L (ref 22–29)
HCT VFR BLD AUTO: 21.5 % (ref 40–53)
HGB BLD-MCNC: 7.5 G/DL (ref 13.3–17.7)
HGB C CRYSTALS: ABNORMAL
HOWELL-JOLLY BOD BLD QL SMEAR: ABNORMAL
IMM GRANULOCYTES # BLD: ABNORMAL 10*3/UL
IMM GRANULOCYTES NFR BLD: ABNORMAL %
ISSUE DATE AND TIME: NORMAL
LACTATE SERPL-SCNC: 0.7 MMOL/L (ref 0.7–2)
LACTATE SERPL-SCNC: 0.9 MMOL/L (ref 0.7–2)
LYMPHOCYTES # BLD AUTO: ABNORMAL 10*3/UL
LYMPHOCYTES NFR BLD AUTO: ABNORMAL %
MAGNESIUM SERPL-MCNC: 1.8 MG/DL (ref 1.7–2.3)
MCH RBC QN AUTO: 27.7 PG (ref 26.5–33)
MCHC RBC AUTO-ENTMCNC: 34.9 G/DL (ref 31.5–36.5)
MCV RBC AUTO: 79 FL (ref 78–100)
MONOCYTES # BLD AUTO: ABNORMAL 10*3/UL
MONOCYTES NFR BLD AUTO: ABNORMAL %
NEUTROPHILS # BLD AUTO: ABNORMAL 10*3/UL
NEUTROPHILS NFR BLD AUTO: ABNORMAL %
NEUTS HYPERSEG BLD QL SMEAR: ABNORMAL
PHOSPHATE SERPL-MCNC: 2 MG/DL (ref 2.5–4.5)
PLAT MORPH BLD: ABNORMAL
PLATELET # BLD AUTO: 17 10E3/UL (ref 150–450)
PLATELET # BLD AUTO: 23 10E3/UL (ref 150–450)
POLYCHROMASIA BLD QL SMEAR: ABNORMAL
POTASSIUM SERPL-SCNC: 4 MMOL/L (ref 3.4–5.3)
RBC # BLD AUTO: 2.71 10E6/UL (ref 4.4–5.9)
RBC AGGLUT BLD QL: ABNORMAL
RBC MORPH BLD: ABNORMAL
ROULEAUX BLD QL SMEAR: ABNORMAL
SICKLE CELLS BLD QL SMEAR: ABNORMAL
SIROLIMUS BLD-MCNC: 9.8 UG/L (ref 5–15)
SMUDGE CELLS BLD QL SMEAR: ABNORMAL
SODIUM SERPL-SCNC: 135 MMOL/L (ref 135–145)
SPECIMEN EXPIRATION DATE: NORMAL
SPHEROCYTES BLD QL SMEAR: ABNORMAL
STOMATOCYTES BLD QL SMEAR: ABNORMAL
TARGETS BLD QL SMEAR: SLIGHT
TME LAST DOSE: NORMAL H
TME LAST DOSE: NORMAL H
TOXIC GRANULES BLD QL SMEAR: ABNORMAL
UNIT ABO/RH: NORMAL
UNIT NUMBER: NORMAL
UNIT STATUS: NORMAL
UNIT TYPE ISBT: 5100
VANCOMYCIN SERPL-MCNC: 6 UG/ML
VARIANT LYMPHS BLD QL SMEAR: ABNORMAL
WBC # BLD AUTO: <0.1 10E3/UL (ref 4–11)

## 2024-09-04 PROCEDURE — 97530 THERAPEUTIC ACTIVITIES: CPT | Mod: GO

## 2024-09-04 PROCEDURE — 87040 BLOOD CULTURE FOR BACTERIA: CPT | Performed by: STUDENT IN AN ORGANIZED HEALTH CARE EDUCATION/TRAINING PROGRAM

## 2024-09-04 PROCEDURE — 83735 ASSAY OF MAGNESIUM: CPT

## 2024-09-04 PROCEDURE — 99233 SBSQ HOSP IP/OBS HIGH 50: CPT | Mod: FS

## 2024-09-04 PROCEDURE — 84100 ASSAY OF PHOSPHORUS: CPT | Performed by: INTERNAL MEDICINE

## 2024-09-04 PROCEDURE — 250N000012 HC RX MED GY IP 250 OP 636 PS 637

## 2024-09-04 PROCEDURE — 86900 BLOOD TYPING SEROLOGIC ABO: CPT

## 2024-09-04 PROCEDURE — 250N000013 HC RX MED GY IP 250 OP 250 PS 637: Performed by: INTERNAL MEDICINE

## 2024-09-04 PROCEDURE — 83605 ASSAY OF LACTIC ACID: CPT | Performed by: INTERNAL MEDICINE

## 2024-09-04 PROCEDURE — 36415 COLL VENOUS BLD VENIPUNCTURE: CPT | Performed by: INTERNAL MEDICINE

## 2024-09-04 PROCEDURE — 85049 AUTOMATED PLATELET COUNT: CPT | Performed by: STUDENT IN AN ORGANIZED HEALTH CARE EDUCATION/TRAINING PROGRAM

## 2024-09-04 PROCEDURE — 250N000013 HC RX MED GY IP 250 OP 250 PS 637

## 2024-09-04 PROCEDURE — 85027 COMPLETE CBC AUTOMATED: CPT

## 2024-09-04 PROCEDURE — 250N000009 HC RX 250: Performed by: INTERNAL MEDICINE

## 2024-09-04 PROCEDURE — 80195 ASSAY OF SIROLIMUS: CPT | Performed by: INTERNAL MEDICINE

## 2024-09-04 PROCEDURE — 86923 COMPATIBILITY TEST ELECTRIC: CPT

## 2024-09-04 PROCEDURE — 87040 BLOOD CULTURE FOR BACTERIA: CPT | Performed by: INTERNAL MEDICINE

## 2024-09-04 PROCEDURE — 250N000011 HC RX IP 250 OP 636: Performed by: INTERNAL MEDICINE

## 2024-09-04 PROCEDURE — 80202 ASSAY OF VANCOMYCIN: CPT | Performed by: INTERNAL MEDICINE

## 2024-09-04 PROCEDURE — 206N000001 HC R&B BMT UMMC

## 2024-09-04 PROCEDURE — 250N000011 HC RX IP 250 OP 636

## 2024-09-04 PROCEDURE — 250N000013 HC RX MED GY IP 250 OP 250 PS 637: Performed by: PHYSICIAN ASSISTANT

## 2024-09-04 PROCEDURE — 99418 PROLNG IP/OBS E/M EA 15 MIN: CPT

## 2024-09-04 PROCEDURE — 258N000003 HC RX IP 258 OP 636

## 2024-09-04 PROCEDURE — 97535 SELF CARE MNGMENT TRAINING: CPT | Mod: GO

## 2024-09-04 PROCEDURE — 258N000003 HC RX IP 258 OP 636: Performed by: INTERNAL MEDICINE

## 2024-09-04 PROCEDURE — P9037 PLATE PHERES LEUKOREDU IRRAD: HCPCS

## 2024-09-04 PROCEDURE — 250N000009 HC RX 250: Performed by: STUDENT IN AN ORGANIZED HEALTH CARE EDUCATION/TRAINING PROGRAM

## 2024-09-04 PROCEDURE — 250N000013 HC RX MED GY IP 250 OP 250 PS 637: Performed by: NURSE PRACTITIONER

## 2024-09-04 PROCEDURE — 80048 BASIC METABOLIC PNL TOTAL CA: CPT

## 2024-09-04 RX ORDER — POTASSIUM PHOS IN 0.9 % NACL 15MMOL/250
15 PLASTIC BAG, INJECTION (ML) INTRAVENOUS ONCE
Status: COMPLETED | OUTPATIENT
Start: 2024-09-04 | End: 2024-09-04

## 2024-09-04 RX ORDER — OXYMETAZOLINE HYDROCHLORIDE 0.05 G/100ML
2 SPRAY NASAL 2 TIMES DAILY
Status: COMPLETED | OUTPATIENT
Start: 2024-09-04 | End: 2024-09-05

## 2024-09-04 RX ORDER — LISINOPRIL 5 MG/1
15 TABLET ORAL DAILY
Qty: 270 TABLET | Refills: 1 | OUTPATIENT
Start: 2024-09-04 | End: 2024-10-04

## 2024-09-04 RX ORDER — LOPERAMIDE HCL 2 MG
2 CAPSULE ORAL 4 TIMES DAILY PRN
Status: DISCONTINUED | OUTPATIENT
Start: 2024-09-04 | End: 2024-09-08

## 2024-09-04 RX ORDER — LOPERAMIDE HCL 2 MG
2 CAPSULE ORAL 2 TIMES DAILY
Status: DISCONTINUED | OUTPATIENT
Start: 2024-09-04 | End: 2024-09-05

## 2024-09-04 RX ADMIN — ALLOPURINOL 300 MG: 300 TABLET ORAL at 09:53

## 2024-09-04 RX ADMIN — DICLOFENAC SODIUM 2 G: 10 GEL TOPICAL at 10:05

## 2024-09-04 RX ADMIN — LOPERAMIDE HYDROCHLORIDE 2 MG: 2 CAPSULE ORAL at 12:58

## 2024-09-04 RX ADMIN — ACETAMINOPHEN 650 MG: 325 TABLET ORAL at 03:32

## 2024-09-04 RX ADMIN — METHOCARBAMOL 500 MG: 500 TABLET ORAL at 19:49

## 2024-09-04 RX ADMIN — ACYCLOVIR 800 MG: 800 TABLET ORAL at 09:53

## 2024-09-04 RX ADMIN — PANTOPRAZOLE SODIUM 40 MG: 40 TABLET, DELAYED RELEASE ORAL at 09:53

## 2024-09-04 RX ADMIN — ACYCLOVIR 800 MG: 800 TABLET ORAL at 19:49

## 2024-09-04 RX ADMIN — CEFEPIME HYDROCHLORIDE 2 G: 2 INJECTION, POWDER, FOR SOLUTION INTRAVENOUS at 04:53

## 2024-09-04 RX ADMIN — FILGRASTIM-AAFI 480 MCG: 480 INJECTION, SOLUTION INTRAVENOUS; SUBCUTANEOUS at 19:49

## 2024-09-04 RX ADMIN — URSODIOL 300 MG: 300 CAPSULE ORAL at 13:01

## 2024-09-04 RX ADMIN — ACETAMINOPHEN 650 MG: 325 TABLET ORAL at 12:58

## 2024-09-04 RX ADMIN — METHOCARBAMOL 500 MG: 500 TABLET ORAL at 12:59

## 2024-09-04 RX ADMIN — OXYMETAZOLINE HCL 2 SPRAY: 0.05 SPRAY NASAL at 19:48

## 2024-09-04 RX ADMIN — MYCOPHENOLATE MOFETIL 1250 MG: 500 INJECTION, POWDER, LYOPHILIZED, FOR SOLUTION INTRAVENOUS at 10:10

## 2024-09-04 RX ADMIN — CLINDAMYCIN PHOSPHATE: 10 SOLUTION TOPICAL at 10:04

## 2024-09-04 RX ADMIN — VANCOMYCIN HYDROCHLORIDE 1250 MG: 10 INJECTION, POWDER, LYOPHILIZED, FOR SOLUTION INTRAVENOUS at 04:52

## 2024-09-04 RX ADMIN — Medication 3 CAPSULE: at 09:50

## 2024-09-04 RX ADMIN — CEFEPIME HYDROCHLORIDE 2 G: 2 INJECTION, POWDER, FOR SOLUTION INTRAVENOUS at 19:48

## 2024-09-04 RX ADMIN — METHOCARBAMOL 500 MG: 500 TABLET ORAL at 09:51

## 2024-09-04 RX ADMIN — PROCHLORPERAZINE MALEATE 5 MG: 5 TABLET ORAL at 16:50

## 2024-09-04 RX ADMIN — MICAFUNGIN SODIUM 150 MG: 50 INJECTION, POWDER, LYOPHILIZED, FOR SOLUTION INTRAVENOUS at 11:13

## 2024-09-04 RX ADMIN — TRAMADOL HYDROCHLORIDE 50 MG: 50 TABLET, COATED ORAL at 16:50

## 2024-09-04 RX ADMIN — TAMSULOSIN HYDROCHLORIDE 0.4 MG: 0.4 CAPSULE ORAL at 16:59

## 2024-09-04 RX ADMIN — URSODIOL 300 MG: 300 CAPSULE ORAL at 19:49

## 2024-09-04 RX ADMIN — SIROLIMUS 6 MG: 2 TABLET ORAL at 09:52

## 2024-09-04 RX ADMIN — LOPERAMIDE HYDROCHLORIDE 2 MG: 2 CAPSULE ORAL at 19:49

## 2024-09-04 RX ADMIN — VANCOMYCIN HYDROCHLORIDE 1750 MG: 1 INJECTION, POWDER, LYOPHILIZED, FOR SOLUTION INTRAVENOUS at 17:50

## 2024-09-04 RX ADMIN — POTASSIUM PHOSPHATE, MONOBASIC POTASSIUM PHOSPHATE, DIBASIC 15 MMOL: 224; 236 INJECTION, SOLUTION, CONCENTRATE INTRAVENOUS at 06:42

## 2024-09-04 RX ADMIN — Medication 10 ML: at 03:20

## 2024-09-04 RX ADMIN — CEFEPIME HYDROCHLORIDE 2 G: 2 INJECTION, POWDER, FOR SOLUTION INTRAVENOUS at 12:52

## 2024-09-04 RX ADMIN — URSODIOL 300 MG: 300 CAPSULE ORAL at 09:51

## 2024-09-04 RX ADMIN — MYCOPHENOLATE MOFETIL 1250 MG: 500 INJECTION, POWDER, LYOPHILIZED, FOR SOLUTION INTRAVENOUS at 21:17

## 2024-09-04 ASSESSMENT — ACTIVITIES OF DAILY LIVING (ADL)
ADLS_ACUITY_SCORE: 25

## 2024-09-04 NOTE — PROGRESS NOTES
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     Diagnosis MDS/AML  HCT Type Allogeneic     Prep Regimen Flu/Cy/TBI  Donor Match & Source 8/8 DP permissive PBSC    GVHD Prophylaxis PTCy/MMF/Siro  Primary BMT MD Dr. Murphy    Clinical Trials [id number]       INTERVAL  HISTORY     Fever up to 102.9. Improved this AM to 100.5, BP stable. HR tachy with fevers.   No new infectious symptoms. Loose stools continue. Metamucil, imodium prn.  Rectal pain 5-6/10. Has not tried lidocaine or tramadol.   Vomiting x1 brought on all of a sudden, resolved with IV compazine.  Appetite downtrending, 1 meal in yesterday.    Review of Systems: 10 point ROS negative except as noted above.    PHYSICAL EXAM     Weight In/Out     Wt Readings from Last 3 Encounters:   09/04/24 91.2 kg (201 lb 1.6 oz)   08/09/24 89.9 kg (198 lb 4.8 oz)   08/08/24 90.3 kg (199 lb 1.2 oz)      I/O last 3 completed shifts:  In: 3744 [I.V.:2232; IV Piggyback:1000]  Out: 1640 [Urine:1540; Other:100]     KPS:  80    /75 (BP Location: Left arm)   Pulse 111   Temp 97.6  F (36.4  C) (Oral)   Resp 18   Ht 1.84 m (6' 0.44\")   Wt 91.2 kg (201 lb 1.6 oz)   SpO2 99%   BMI 26.94 kg/m       General:  NAD   HEENT: +aphthous clean based ulcer noted on tongue.   Lungs: BCTA  CV: RRR  Abdomen: slight tenderness to palpitation on epigastrium/RLQ, soft ND, +BS  Lymphatics: No edema  Skin: +now minimal folliculitis on back  Neuro: A&O, appropriately interactive, speech clear, moving all extremities   Additional Findings: Estrada     Current aGVHD staging:  Skin 0, UGI 0, LGI 0, Liver 0     LABS AND IMAGING: I have assessed all abnormal lab values for their clinical significance and any values considered clinically significant have been addressed in the assessment " and plan.      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     BMT/IEC PROTOCOL for MDS  - Chemo protocol: MT 2022-5  D-6: Flu 30mg/m2, Cy 50mg/kg  D-5 to D-2: Flu  D-1: TBI  D0: stem cell infusion  - Peripheral blood stem cell graft from 8/8 URD donor, ABO matched, Cell dose: TBD  - Engraftment monitoring: Peripheral blood and bone marrow chimerisms on D28, D100, 6 months, 1 and 2 years  - Restaging plan: BMBx with FISH, cytogenetics and NGS on D28, D100, 6 months, 1 and 2 years     HEME/COAG  # Pancytopenia secondary to disease process  - GCSF starts day +5, continue until ANC > 2500 for 2 consecutive days  - Transfusion parameters: hemoglobin <7, platelets <10     RISK OF GVHD  - Prophylaxis: PTCy 50mg/kg on D+3 and D+4; MMF (D+5 to 35); Sirolimus (starting D+5, target level 5-10).  - Siro level = 5.7 given bolus (9/2)     ID  #Streptococcus mitis bacteremia (2/2 bottles) - Cefepime (9/1-x)  #MRSE bacteremia (1/2) -Vanco (9/2-x)   -sensitivities pending   -trend cultures x3 days   #Febrile Neutropenia (9/2-x)   *see BC above, otherwise unremarkable  #Diarrhea- c.diff negative    Prophylaxis plan:   - Bacterial: Levaquin 250mg while neutropenic Now on hold with cefepime   - Fungal: Micafungin 300mg 3x/week until D+45, followed by mold active azole. Pulm nodules present on workup CT.   - PJP: Bactrim to start at D+28. Toxoplasma negative.   - Viral:  Acyclovir 800mg BID. No need for letermovir as donor and recipient are CMV negative.      Monitoring:   - CMV weekly, 8/31 neg  - EBV every 2 weeks from D30 to D180     GI/NUTRITION  # Diarrhea (9/3): c.diff recheck negative. Metamucil. 2mg BID imodium + PRN QID  # GERD: Protonix  - Anti-emetics: Zofran, dex scheduled during chemotherapy. Compazine, lorazepam available PRN.   - Ulcer prophy: Protonix  - VOD prophy: Ursodiol 300mg TID  - Dietician support to prevent malnutrition  - Miralax and senna PRN constipation. For now, Miralax scheduled at bedtime.      CARDIOVASCULAR  # HTN- lisinopril 15mg daily. Dc'd starting 8/26 with orthostatics and some dizziness.  # CAD: Coronary artery calcifications seen on CT, stress test in 2023 negative  - Risk of cardiomyopathy: Baseline EF 55-60%.   - Risk of arrhythmia: Baseline EKG showed 421       RENAL/ELECTROLYTES/  #Hyponatremia: osms urine/serum, Na urine/serum indicate hypovolemic hyponatremia. Encourage PO, will give IVF as necessary and slow down stools. RESOLVED WITH IVF.  - Electrolyte management: replace per sliding scale  - BPH: Continue home flomax.      MUSCULOSKELETAL/FRAILTY  - Baseline Frailty Score: Not frail (0)  - Daily PT/OT as needed while inpatient  - Gout: continue allopurinol      Neuro   #HA: no red flag symptoms. Pt has history of spinal stenosis, lumbosacral radiculopathy likely exacerbating. Tramadol PRN. Voltaren gel, ice and PT asked for suggestions. In the setting of thrombocytopenia, consider CT head if changes in symptoms or worsens. No currently complaints of HA.   - Pain management: Outpatient has been taking celecoxib for MSK pain, once a day. Inpatient will try Tramadol - available PRN. Added robaxin TID     Derm:  - Follicular rash to chest, back- Started topical clindamycin 8/28. Much improved.      SOCIAL DETERMINANTS  - Caregiver: wife, Vani. Lives within the required distance from hospital but may elect to stay in [facility].   -  "Financial/insurance concerns: None    Medically Ready for Discharge: Anticipated in 5+ Days      Clinically Significant Risk Factors              # Hypoalbuminemia: Lowest albumin = 3.1 g/dL at 8/29/2024  3:27 AM, will monitor as appropriate   # Thrombocytopenia: Lowest platelets = 17 in last 2 days, will monitor for bleeding   # Hypertension: Noted on problem list           # Overweight: Estimated body mass index is 26.94 kg/m  as calculated from the following:    Height as of this encounter: 1.84 m (6' 0.44\").    Weight as of this encounter: 91.2 kg (201 lb 1.6 oz).            I spent 30 minutes in the care of this patient today, which included time necessary for preparation for the visit, obtaining history, ordering medications/tests/procedures as medically indicated, review of pertinent medical literature, counseling of the patient, communication of recommendations to the care team, and documentation time.    Bessie Lazo PA-C  [phone]    Physician Attestation     I, Timothy Skaggs MD, saw and evaluated Leland THOMPSON Michel as part of a shared APRN/PA visit. I personally performed the substantive portion of the medical decision making for this visit - please see the VJ s documentation for full details.    Key management decisions made by me and carried out under my direction include:   69y M with MDS/AML with ASXL1, RUNX1, IDH1, SRSF2 mutations, who achieved a low blast hypocellular state, and is now day +12 s/p GANGA (Flu/Cy/TBI) 8/8 URD (23yF, O+/O+, CMV -/-, DP permissive, 6.06 x106 CD34/kg) PB SCT with PTCy/siro/MMF GVHD ppx. He had his first fever of this admission on 9/2, and has developed rectal pain. ID workup shows GPC+ in blood with Verigene showing non-agalactiae/pneumococcus/anginosis strep species, and blood culture showing strep mitis and MRSE bacteremias. He is still febrile. Appetite and energy are also decreased today. Vanc added to cefepime last night. Using topical therapies for rectal pain with " incomplete benefit. Continue supportive cares. Will continue to monitor closely for further complications of transplant.    On the date of service, 09/04/2024, I spent 45 minutes on the patient unit personally reviewing medical records and medications, reviewing vital signs, labs, and imaging results as summarized above, obtaining a history from the patient, performing a physical exam, counseling and educating the patient on the diagnosis and treatment, evaluating a potentially life or organ threatening problem, intensively monitoring treatments with high risk of toxicity, coordinating care, and documenting in the electronic medical record.    Thank you for allowing me to participate in the care of this patient. Please do not hesitate to contact me if there are any concerns or questions.     Timothy Skaggs MD   of Medicine  Classical Hematology and Blood and Marrow Transplantation  Division of Hematology, Oncology, and Transplantation  [facility]

## 2024-09-04 NOTE — PHARMACY-VANCOMYCIN DOSING SERVICE
Pharmacy Vancomycin Note  Date of Service 2024  Patient's  1955   69 year old, male    Indication: Strep Bacteremia / MRSE Bacteremia   Day of Therapy: 3  Current vancomycin regimen:  1250 mg IV q12h  Current vancomycin monitoring method: AUC  Current vancomycin therapeutic monitoring goal: 400-600 mg*h/L    InsightRX Prediction of Current Vancomycin Regimen  Regimen: 1250 mg IV every 12 hours.  Start time: 16:52 on 2024  Exposure target: AUC24 (range)400-600 mg/L.hr   AUC24,ss: 312 mg/L.hr  Probability of AUC24 > 400: 6 %  Ctrough,ss: 6.1 mg/L  Probability of Ctrough,ss > 20: 0 %  Probability of nephrotoxicity (Lodise VIRI ): 4 %    Current estimated CrCl = Estimated Creatinine Clearance: 131.7 mL/min (A) (based on SCr of 0.66 mg/dL (L)).    Creatinine for last 3 days  2024:  4:44 AM Creatinine 0.65 mg/dL  9/3/2024:  3:35 AM Creatinine 0.69 mg/dL  2024:  3:18 AM Creatinine 0.66 mg/dL    Recent Vancomycin Levels (past 3 days)  2024:  3:18 AM Vancomycin 6.0 ug/mL    Vancomycin IV Administrations (past 72 hours)                     vancomycin (VANCOCIN) 1,250 mg in 0.9% NaCl 250 mL intermittent infusion (mg) 1,250 mg New Bag 24 0452     1,250 mg New Bag 24 1733     1,250 mg New Bag  0432    vancomycin (VANCOCIN) 1,750 mg in sodium chloride 0.9 % 250 mL intermittent infusion (mg) 1,750 mg New Bag 24 1717                    Nephrotoxins and other renal medications (From now, onward)      Start     Dose/Rate Route Frequency Ordered Stop    24 0500  vancomycin (VANCOCIN) 1,250 mg in 0.9% NaCl 250 mL intermittent infusion         1,250 mg  166.7 mL/hr over 90 Minutes Intravenous EVERY 12 HOURS 24 1639      24 0800  acyclovir (ZOVIRAX) tablet 800 mg         800 mg Oral 2 TIMES DAILY 24 1412                 Contrast Orders - past 72 hours (72h ago, onward)      None            Interpretation of levels and current regimen:  Vancomycin level is  reflective of AUC less than 400    Has serum creatinine changed greater than 50% in last 72 hours: No    Urine output:  good urine output    Renal Function: Stable    InsightRX Prediction of Planned New Vancomycin Regimen  Regimen: 1750 mg IV every 12 hours.  Start time: 16:52 on 09/04/2024  Exposure target: AUC24 (range)400-600 mg/L.hr   AUC24,ss: 436 mg/L.hr  Probability of AUC24 > 400: 70 %  Ctrough,ss: 8.6 mg/L  Probability of Ctrough,ss > 20: 0 %  Probability of nephrotoxicity (Lodise VIRI 2009): 5 %    Plan:  Increase vancomycin dose to 1750 mg IV every 12 hours.   Vancomycin monitoring method: AUC  Vancomycin therapeutic monitoring goal: 400-600 mg*h/L  Pharmacy will check vancomycin levels as appropriate in 1-3 Days.  Serum creatinine levels will be ordered daily for the first week of therapy and at least twice weekly for subsequent weeks.    Josh Canela, ManishaD

## 2024-09-04 NOTE — PLAN OF CARE
"/68   Pulse 108   Temp 98.5  F (36.9  C) (Oral)   Resp 18   Ht 1.84 m (6' 0.44\")   Wt 89.4 kg (197 lb 1.6 oz)   SpO2 96%   BMI 26.41 kg/m      Leland Pearson had a generally restful night night, reporting intermediate sleep. PICC/CVC/Central line intact. Abnormal vital signs: Up to 102.9 fever, tachycardia, hypertension. Stahl 45, refusing bed alarm, education provided. 0/10 pain. Irregular assessment findings include: fever, diarrhea. Irregular labs: Ph 2.0, plt 17, both ordered per protocol. Plts completed and KPh infusing at end of shift.    Communications:   To Dr. Boogie Hunt: FYI- temp 102.9, workup done, on abx, tylenol given.  Seen 21:24 but no response    Problem: Adult Inpatient Plan of Care  Goal: Plan of Care Review  Description: The Plan of Care Review/Shift note should be completed every shift.  The Outcome Evaluation is a brief statement about your assessment that the patient is improving, declining, or no change.  This information will be displayed automatically on your shift  note.  Flowsheets (Taken 9/4/2024 0523)  Plan of Care Reviewed With: patient  Overall Patient Progress: no change  Goal: Patient-Specific Goal (Individualized)  Description: You can add care plan individualizations to a care plan. Examples of Individualization might be:  \"Parent requests to be called daily at 9am for status\", \"I have a hard time hearing out of my right ear\", or \"Do not touch me to wake me up as it startles  me\".  Outcome: Progressing  Flowsheets (Taken 9/4/2024 0523)  Patient/Family-Specific Goals (Include Timeframe): Nursing will cluster cares to minimize interruptions to sleep  Goal: Absence of Hospital-Acquired Illness or Injury  Intervention: Identify and Manage Fall Risk  Recent Flowsheet Documentation  Taken 9/3/2024 2200 by Levar Baez, RN  Safety Promotion/Fall Prevention: safety round/check completed  Taken 9/3/2024 2100 by Levar Baez, RN  Safety Promotion/Fall " Prevention: safety round/check completed  Taken 9/3/2024 1940 by Sasha, Levar K., RN  Safety Promotion/Fall Prevention:   clutter free environment maintained   nonskid shoes/slippers when out of bed   safety round/check completed  Intervention: Prevent Skin Injury  Recent Flowsheet Documentation  Taken 9/3/2024 1940 by Sasha, Levar K., RN  Body Position: position changed independently  Skin Protection:   adhesive use limited   protective footwear used   transparent dressing maintained  Device Skin Pressure Protection:   adhesive use limited   tubing/devices free from skin contact  Intervention: Prevent Infection  Recent Flowsheet Documentation  Taken 9/3/2024 1940 by Levar Baez RN  Infection Prevention:   environmental surveillance performed   hand hygiene promoted   personal protective equipment utilized   rest/sleep promoted   single patient room provided     Problem: Risk for Delirium  Goal: Improved Behavioral Control  Intervention: Minimize Safety Risk  Recent Flowsheet Documentation  Taken 9/3/2024 1940 by Levar Baez RN  Enhanced Safety Measures: review medications for side effects with activity     Problem: Stem Cell/Bone Marrow Transplant  Goal: Diarrhea Symptom Control  Intervention: Manage Diarrhea  Recent Flowsheet Documentation  Taken 9/3/2024 1940 by Levar Baez RN  Skin Protection:   adhesive use limited   protective footwear used   transparent dressing maintained  Fluid/Electrolyte Management: fluids provided  Goal: Improved Activity Tolerance  Intervention: Promote Improved Energy  Recent Flowsheet Documentation  Taken 9/3/2024 2100 by Levar Baez RN  Activity Management:   ambulated to bathroom   back to bed  Taken 9/3/2024 1940 by Levar Baez RN  Activity Management:   ambulated to bathroom   back to bed  Goal: Blood Counts Within Acceptable Range  Intervention: Monitor and Manage Hematologic Symptoms  Recent Flowsheet Documentation  Taken  9/3/2024 1940 by Levar Baez, RN  Medication Review/Management: medications reviewed  Goal: Absence of Infection  Intervention: Prevent and Manage Infection  Recent Flowsheet Documentation  Taken 9/3/2024 1940 by Levar Baez, RN  Infection Prevention:   environmental surveillance performed   hand hygiene promoted   personal protective equipment utilized   rest/sleep promoted   single patient room provided  Isolation Precautions: protective environment maintained  Goal: Improved Oral Mucous Membrane Health and Integrity  Intervention: Promote Oral Comfort and Health  Recent Flowsheet Documentation  Taken 9/3/2024 1940 by Levar Baez, RN  Oral Care: oral rinse provided   Goal Outcome Evaluation:      Plan of Care Reviewed With: patient    Overall Patient Progress: no change

## 2024-09-05 ENCOUNTER — ENROLLMENT (OUTPATIENT)
Dept: HOME HEALTH SERVICES | Facility: HOME HEALTH | Age: 69
End: 2024-09-05
Payer: COMMERCIAL

## 2024-09-05 LAB
ACANTHOCYTES BLD QL SMEAR: ABNORMAL
ALBUMIN SERPL BCG-MCNC: 2.9 G/DL (ref 3.5–5.2)
ALP SERPL-CCNC: 54 U/L (ref 40–150)
ALT SERPL W P-5'-P-CCNC: 19 U/L (ref 0–70)
ANION GAP SERPL CALCULATED.3IONS-SCNC: 11 MMOL/L (ref 7–15)
AST SERPL W P-5'-P-CCNC: 36 U/L (ref 0–45)
AUER BODIES BLD QL SMEAR: ABNORMAL
BASO STIPL BLD QL SMEAR: ABNORMAL
BASOPHILS # BLD AUTO: ABNORMAL 10*3/UL
BASOPHILS NFR BLD AUTO: ABNORMAL %
BILIRUB SERPL-MCNC: 0.6 MG/DL
BITE CELLS BLD QL SMEAR: ABNORMAL
BLISTER CELLS BLD QL SMEAR: ABNORMAL
BUN SERPL-MCNC: 13.7 MG/DL (ref 8–23)
BURR CELLS BLD QL SMEAR: SLIGHT
CALCIUM SERPL-MCNC: 8 MG/DL (ref 8.8–10.4)
CHLORIDE SERPL-SCNC: 101 MMOL/L (ref 98–107)
CREAT SERPL-MCNC: 0.8 MG/DL (ref 0.67–1.17)
DACRYOCYTES BLD QL SMEAR: ABNORMAL
EGFRCR SERPLBLD CKD-EPI 2021: >90 ML/MIN/1.73M2
ELLIPTOCYTES BLD QL SMEAR: ABNORMAL
EOSINOPHIL # BLD AUTO: ABNORMAL 10*3/UL
EOSINOPHIL NFR BLD AUTO: ABNORMAL %
ERYTHROCYTE [DISTWIDTH] IN BLOOD BY AUTOMATED COUNT: 15.8 % (ref 10–15)
FRAGMENTS BLD QL SMEAR: ABNORMAL
GLUCOSE SERPL-MCNC: 133 MG/DL (ref 70–99)
HCO3 SERPL-SCNC: 20 MMOL/L (ref 22–29)
HCT VFR BLD AUTO: 20.7 % (ref 40–53)
HGB BLD-MCNC: 7.1 G/DL (ref 13.3–17.7)
HGB C CRYSTALS: ABNORMAL
HOWELL-JOLLY BOD BLD QL SMEAR: ABNORMAL
IMM GRANULOCYTES # BLD: ABNORMAL 10*3/UL
IMM GRANULOCYTES NFR BLD: ABNORMAL %
LACTATE SERPL-SCNC: 1.1 MMOL/L (ref 0.7–2)
LYMPHOCYTES # BLD AUTO: ABNORMAL 10*3/UL
LYMPHOCYTES NFR BLD AUTO: ABNORMAL %
MAGNESIUM SERPL-MCNC: 1.6 MG/DL (ref 1.7–2.3)
MCH RBC QN AUTO: 27.3 PG (ref 26.5–33)
MCHC RBC AUTO-ENTMCNC: 34.3 G/DL (ref 31.5–36.5)
MCV RBC AUTO: 80 FL (ref 78–100)
MONOCYTES # BLD AUTO: ABNORMAL 10*3/UL
MONOCYTES NFR BLD AUTO: ABNORMAL %
NEUTROPHILS # BLD AUTO: ABNORMAL 10*3/UL
NEUTROPHILS NFR BLD AUTO: ABNORMAL %
NEUTS HYPERSEG BLD QL SMEAR: ABNORMAL
PHOSPHATE SERPL-MCNC: 1.2 MG/DL (ref 2.5–4.5)
PHOSPHATE SERPL-MCNC: 1.5 MG/DL (ref 2.5–4.5)
PLAT MORPH BLD: ABNORMAL
PLATELET # BLD AUTO: 28 10E3/UL (ref 150–450)
POLYCHROMASIA BLD QL SMEAR: ABNORMAL
POTASSIUM SERPL-SCNC: 3.4 MMOL/L (ref 3.4–5.3)
POTASSIUM SERPL-SCNC: 3.9 MMOL/L (ref 3.4–5.3)
PROT SERPL-MCNC: 5.8 G/DL (ref 6.4–8.3)
RBC # BLD AUTO: 2.6 10E6/UL (ref 4.4–5.9)
RBC AGGLUT BLD QL: ABNORMAL
RBC MORPH BLD: ABNORMAL
ROULEAUX BLD QL SMEAR: ABNORMAL
SICKLE CELLS BLD QL SMEAR: ABNORMAL
SMUDGE CELLS BLD QL SMEAR: ABNORMAL
SODIUM SERPL-SCNC: 132 MMOL/L (ref 135–145)
SPHEROCYTES BLD QL SMEAR: ABNORMAL
STOMATOCYTES BLD QL SMEAR: ABNORMAL
TARGETS BLD QL SMEAR: ABNORMAL
TOXIC GRANULES BLD QL SMEAR: ABNORMAL
VARIANT LYMPHS BLD QL SMEAR: ABNORMAL
WBC # BLD AUTO: <0.1 10E3/UL (ref 4–11)

## 2024-09-05 PROCEDURE — 250N000013 HC RX MED GY IP 250 OP 250 PS 637

## 2024-09-05 PROCEDURE — 250N000011 HC RX IP 250 OP 636: Performed by: INTERNAL MEDICINE

## 2024-09-05 PROCEDURE — 83735 ASSAY OF MAGNESIUM: CPT | Performed by: INTERNAL MEDICINE

## 2024-09-05 PROCEDURE — 84100 ASSAY OF PHOSPHORUS: CPT | Performed by: STUDENT IN AN ORGANIZED HEALTH CARE EDUCATION/TRAINING PROGRAM

## 2024-09-05 PROCEDURE — 84100 ASSAY OF PHOSPHORUS: CPT | Performed by: INTERNAL MEDICINE

## 2024-09-05 PROCEDURE — 250N000013 HC RX MED GY IP 250 OP 250 PS 637: Performed by: NURSE PRACTITIONER

## 2024-09-05 PROCEDURE — 85027 COMPLETE CBC AUTOMATED: CPT

## 2024-09-05 PROCEDURE — 250N000011 HC RX IP 250 OP 636

## 2024-09-05 PROCEDURE — 250N000012 HC RX MED GY IP 250 OP 636 PS 637

## 2024-09-05 PROCEDURE — 99418 PROLNG IP/OBS E/M EA 15 MIN: CPT

## 2024-09-05 PROCEDURE — 250N000009 HC RX 250: Performed by: STUDENT IN AN ORGANIZED HEALTH CARE EDUCATION/TRAINING PROGRAM

## 2024-09-05 PROCEDURE — 250N000013 HC RX MED GY IP 250 OP 250 PS 637: Performed by: INTERNAL MEDICINE

## 2024-09-05 PROCEDURE — 87040 BLOOD CULTURE FOR BACTERIA: CPT | Performed by: INTERNAL MEDICINE

## 2024-09-05 PROCEDURE — 83605 ASSAY OF LACTIC ACID: CPT | Performed by: INTERNAL MEDICINE

## 2024-09-05 PROCEDURE — 258N000003 HC RX IP 258 OP 636: Performed by: INTERNAL MEDICINE

## 2024-09-05 PROCEDURE — 99233 SBSQ HOSP IP/OBS HIGH 50: CPT | Mod: FS

## 2024-09-05 PROCEDURE — 36415 COLL VENOUS BLD VENIPUNCTURE: CPT | Performed by: INTERNAL MEDICINE

## 2024-09-05 PROCEDURE — 84132 ASSAY OF SERUM POTASSIUM: CPT | Performed by: INTERNAL MEDICINE

## 2024-09-05 PROCEDURE — 250N000009 HC RX 250: Performed by: INTERNAL MEDICINE

## 2024-09-05 PROCEDURE — 250N000013 HC RX MED GY IP 250 OP 250 PS 637: Performed by: PHYSICIAN ASSISTANT

## 2024-09-05 PROCEDURE — 258N000003 HC RX IP 258 OP 636: Performed by: STUDENT IN AN ORGANIZED HEALTH CARE EDUCATION/TRAINING PROGRAM

## 2024-09-05 PROCEDURE — 258N000003 HC RX IP 258 OP 636

## 2024-09-05 PROCEDURE — 80053 COMPREHEN METABOLIC PANEL: CPT

## 2024-09-05 PROCEDURE — 206N000001 HC R&B BMT UMMC

## 2024-09-05 PROCEDURE — 99223 1ST HOSP IP/OBS HIGH 75: CPT | Mod: GC | Performed by: INTERNAL MEDICINE

## 2024-09-05 PROCEDURE — 250N000011 HC RX IP 250 OP 636: Mod: JZ | Performed by: INTERNAL MEDICINE

## 2024-09-05 RX ORDER — LOPERAMIDE HCL 2 MG
2 CAPSULE ORAL 4 TIMES DAILY
Status: DISCONTINUED | OUTPATIENT
Start: 2024-09-05 | End: 2024-09-08

## 2024-09-05 RX ORDER — POTASSIUM CHLORIDE 29.8 MG/ML
20 INJECTION INTRAVENOUS
Status: DISCONTINUED | OUTPATIENT
Start: 2024-09-05 | End: 2024-09-05

## 2024-09-05 RX ORDER — POTASSIUM CHLORIDE 29.8 MG/ML
20 INJECTION INTRAVENOUS
Status: COMPLETED | OUTPATIENT
Start: 2024-09-05 | End: 2024-09-05

## 2024-09-05 RX ORDER — POTASSIUM PHOS IN 0.9 % NACL 15MMOL/250
15 PLASTIC BAG, INJECTION (ML) INTRAVENOUS
Status: COMPLETED | OUTPATIENT
Start: 2024-09-05 | End: 2024-09-05

## 2024-09-05 RX ORDER — LIDOCAINE 40 MG/G
CREAM TOPICAL
Status: DISCONTINUED | OUTPATIENT
Start: 2024-09-05 | End: 2024-09-11

## 2024-09-05 RX ORDER — POTASSIUM CHLORIDE 29.8 MG/ML
20 INJECTION INTRAVENOUS
Status: DISPENSED | OUTPATIENT
Start: 2024-09-05 | End: 2024-09-05

## 2024-09-05 RX ORDER — CALCIUM CARBONATE/VITAMIN D3 600 MG-10
2 TABLET ORAL 2 TIMES DAILY WITH MEALS
Status: DISCONTINUED | OUTPATIENT
Start: 2024-09-05 | End: 2024-09-19 | Stop reason: HOSPADM

## 2024-09-05 RX ADMIN — CEFEPIME HYDROCHLORIDE 2 G: 2 INJECTION, POWDER, FOR SOLUTION INTRAVENOUS at 03:05

## 2024-09-05 RX ADMIN — POTASSIUM PHOSPHATE, MONOBASIC POTASSIUM PHOSPHATE, DIBASIC 15 MMOL: 224; 236 INJECTION, SOLUTION, CONCENTRATE INTRAVENOUS at 20:23

## 2024-09-05 RX ADMIN — DICLOFENAC SODIUM 2 G: 10 GEL TOPICAL at 08:58

## 2024-09-05 RX ADMIN — MYCOPHENOLATE MOFETIL 1250 MG: 500 INJECTION, POWDER, LYOPHILIZED, FOR SOLUTION INTRAVENOUS at 23:01

## 2024-09-05 RX ADMIN — FILGRASTIM-AAFI 480 MCG: 480 INJECTION, SOLUTION INTRAVENOUS; SUBCUTANEOUS at 20:24

## 2024-09-05 RX ADMIN — LOPERAMIDE HYDROCHLORIDE 2 MG: 2 CAPSULE ORAL at 02:41

## 2024-09-05 RX ADMIN — ACETAMINOPHEN 650 MG: 325 TABLET ORAL at 02:57

## 2024-09-05 RX ADMIN — MEPERIDINE HYDROCHLORIDE 25 MG: 25 INJECTION INTRAMUSCULAR; INTRAVENOUS; SUBCUTANEOUS at 09:25

## 2024-09-05 RX ADMIN — LOPERAMIDE HYDROCHLORIDE 2 MG: 2 CAPSULE ORAL at 06:07

## 2024-09-05 RX ADMIN — MICAFUNGIN SODIUM 150 MG: 50 INJECTION, POWDER, LYOPHILIZED, FOR SOLUTION INTRAVENOUS at 09:00

## 2024-09-05 RX ADMIN — TAMSULOSIN HYDROCHLORIDE 0.4 MG: 0.4 CAPSULE ORAL at 16:22

## 2024-09-05 RX ADMIN — URSODIOL 300 MG: 300 CAPSULE ORAL at 13:33

## 2024-09-05 RX ADMIN — VANCOMYCIN HYDROCHLORIDE 1750 MG: 1 INJECTION, POWDER, LYOPHILIZED, FOR SOLUTION INTRAVENOUS at 03:05

## 2024-09-05 RX ADMIN — PANTOPRAZOLE SODIUM 40 MG: 40 TABLET, DELAYED RELEASE ORAL at 08:56

## 2024-09-05 RX ADMIN — URSODIOL 300 MG: 300 CAPSULE ORAL at 20:24

## 2024-09-05 RX ADMIN — CALCIUM CARBONATE 600 MG (1,500 MG)-VITAMIN D3 400 UNIT TABLET 2 TABLET: at 17:53

## 2024-09-05 RX ADMIN — METHOCARBAMOL 500 MG: 500 TABLET ORAL at 13:33

## 2024-09-05 RX ADMIN — VANCOMYCIN HYDROCHLORIDE 1750 MG: 1 INJECTION, POWDER, LYOPHILIZED, FOR SOLUTION INTRAVENOUS at 16:23

## 2024-09-05 RX ADMIN — POTASSIUM PHOSPHATE, MONOBASIC POTASSIUM PHOSPHATE, DIBASIC 15 MMOL: 224; 236 INJECTION, SOLUTION, CONCENTRATE INTRAVENOUS at 17:16

## 2024-09-05 RX ADMIN — PROCHLORPERAZINE MALEATE 5 MG: 5 TABLET ORAL at 16:22

## 2024-09-05 RX ADMIN — CEFEPIME HYDROCHLORIDE 2 G: 2 INJECTION, POWDER, FOR SOLUTION INTRAVENOUS at 20:23

## 2024-09-05 RX ADMIN — Medication 15 MMOL: at 09:07

## 2024-09-05 RX ADMIN — URSODIOL 300 MG: 300 CAPSULE ORAL at 08:56

## 2024-09-05 RX ADMIN — ALLOPURINOL 300 MG: 300 TABLET ORAL at 08:56

## 2024-09-05 RX ADMIN — POTASSIUM CHLORIDE 20 MEQ: 29.8 INJECTION, SOLUTION INTRAVENOUS at 13:33

## 2024-09-05 RX ADMIN — Medication 3 CAPSULE: at 08:55

## 2024-09-05 RX ADMIN — CALCIUM CARBONATE 600 MG (1,500 MG)-VITAMIN D3 400 UNIT TABLET 2 TABLET: at 08:56

## 2024-09-05 RX ADMIN — ACETAMINOPHEN 650 MG: 325 TABLET ORAL at 11:55

## 2024-09-05 RX ADMIN — ACYCLOVIR 800 MG: 800 TABLET ORAL at 20:24

## 2024-09-05 RX ADMIN — ACYCLOVIR 800 MG: 800 TABLET ORAL at 08:56

## 2024-09-05 RX ADMIN — SIROLIMUS 6 MG: 2 TABLET ORAL at 08:55

## 2024-09-05 RX ADMIN — MYCOPHENOLATE MOFETIL 1250 MG: 500 INJECTION, POWDER, LYOPHILIZED, FOR SOLUTION INTRAVENOUS at 09:00

## 2024-09-05 RX ADMIN — LOPERAMIDE HYDROCHLORIDE 2 MG: 2 CAPSULE ORAL at 11:55

## 2024-09-05 RX ADMIN — METHOCARBAMOL 500 MG: 500 TABLET ORAL at 08:56

## 2024-09-05 RX ADMIN — LOPERAMIDE HYDROCHLORIDE 2 MG: 2 CAPSULE ORAL at 08:57

## 2024-09-05 RX ADMIN — METHOCARBAMOL 500 MG: 500 TABLET ORAL at 20:24

## 2024-09-05 RX ADMIN — POTASSIUM CHLORIDE 20 MEQ: 29.8 INJECTION, SOLUTION INTRAVENOUS at 11:58

## 2024-09-05 RX ADMIN — LOPERAMIDE HYDROCHLORIDE 2 MG: 2 CAPSULE ORAL at 20:24

## 2024-09-05 RX ADMIN — LOPERAMIDE HYDROCHLORIDE 2 MG: 2 CAPSULE ORAL at 16:23

## 2024-09-05 RX ADMIN — Medication 15 MMOL: at 05:53

## 2024-09-05 RX ADMIN — CEFEPIME HYDROCHLORIDE 2 G: 2 INJECTION, POWDER, FOR SOLUTION INTRAVENOUS at 11:56

## 2024-09-05 ASSESSMENT — ACTIVITIES OF DAILY LIVING (ADL)
ADLS_ACUITY_SCORE: 25

## 2024-09-05 ASSESSMENT — ENCOUNTER SYMPTOMS
FEVER: 1
CHILLS: 1

## 2024-09-05 NOTE — CONSULTS
[facility]  Transplant Infectious Disease Consult Note - New Patient     Patient:  Leland Pearson, Date of birth 1955, Medical record number [id number]  Date of Visit:  09/05/2024  Consult requested by Dr. Timothy Skaggs for evaluation of persistent neutropenic fevers         Assessment and Recommendations:   Recommendations:  Continue IV Cefepime for strep mitis bacteremia  Recommend discontinuation of Vancomycin as MRSE peripheral culture seems most likely to be contamination  Would recommend removal of CVC if feasible  Non-urgent TTE to evaluate valves given strep mitis bacteremia and persistent fever  Would recommend enteric stool PCR to further evaluate ongoing loose stool and possible GI translocation  If fever continues to persist over the next couple of days, we will consider expanded imaging including CT chest/abdomen/pelvis, as well as expanded diagnostics including for viral and fungal etiologies    Case discussed with transplant ID attending, Dr. Iftikhar Dennis  Thank you very much for this consultation. Transplant Infectious Disease will continue to follow with you.    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  Initial evaluation reveals Streptococcus mitis bacteremia from central venous catheter as well as single culture of MRSE from peripheral blood.  Patient was started on vancomycin and cefepime but has been persistently febrile since 9/2/2024.  Additional evaluation includes negative chest x-ray, negative urinalysis, negative C. difficile PCR.    Suspect source of persistent neutropenic fever is likely due to patient's Streptococcus mitis catheter associated bacteremia.  Given only a single bottle of MRSE was isolated from the peripheral blood and resulted in number of hours after strep mitis, suspect this is likely  contaminant.  Given that the strep mitis grew from both lumens of the CVC within 12 hours, highly suspect that this line is infected.  Would recommend removal of CVC at this time.     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  Fevers have been quite high upwards of 103 and occurring multiple times daily.  Current source at this time is presumed to be bacteremia given isolation of Streptococcus mitis and Staphylococcus epidermidis and blood culture from 9/2/2024.  Repeat blood cultures have been negative to date.  Additional workup including chest x-ray, urinalysis, and C. difficile have all been unremarkable/negative.  Patient has been on appropriate antibiotic therapy with vancomycin and cefepime since onset of fevers on 9/2/2024.    Suspect most likely etiology of patient's ongoing fevers is his Streptococcus mitis bacteremia.  I suspect that his MRSE is likely contaminant at this time based on the fact that her only grown 1 of 2 peripheral blood culture bottles and resulted as positive several hours after his strep mitis cultures.  Would recommend continuation of cefepime at this time to target strep mitis, but can discontinue vancomycin at this time.  Additionally given the fact that the Streptococcus mitis was isolated from the CVC and tends to be a rather sticky organism, would recommend removal of catheter at this time.  Additionally given persistence of fever over the last several days, would recommend nonurgent transthoracic echocardiogram to further evaluate valves for possible infective endocarditis.    Etiology of  Streptococcus mitis is unclear at this time.  This is commonly an oral or GI normal dustin.  Patient does have a single oral lesion which may be the portal of entry.  He has also had significant ongoing diarrhea for the last several days.  Would recommend enteric stool panel to further evaluate diarrhea as well as potentially identify ongoing infectious process that may make it easier for bacterial translocation of normal dustin of the bloodstream.    Transplant checklist  - QTc interval: 421 (8/5/2024)  - Bacterial coverage: Vancomycin and cefepime  - Pneumocystis prophylaxis: TMP-SMX M,Tu  - Serostatus & viral prophylaxis: CMV D-/R-, EBV+, HSV-.  Acyclovir prophylaxis  - Fungal prophylaxis: Micafungin  - Toxo IgM negative,  - Gamma globulin status: Unknown           History of Infectious Disease Illness:   Patient is a 69-year-old male with MDS/AML now status post matched unrelated donor transplant on 8/23/2024.  Infectious disease team is being consulted due to persistent neutropenic fevers since 9/2/2024.    Patient was admitted on 8/16/2024 for preparation of BMT.  He then underwent bone marrow transplant on 8/23/2024.  Transplant was peripheral stem cell graft from on related donor.  Donor CMV negative, recipient CMV negative, recipient EBV positive, recipient HSV 1/2 IgG negative.  Patient has also had intermittent neutropenia prior to grafting.  He was neutropenic from 7/29/2024 to 8/18/2024.  He has since been neutropenic from 8/21/2024 to current.    Patient then developed fever to 103F on the morning of 9/2.  Neutropenic fever workup was initiated and patient was started on cefepime and vancomycin.  Chest x-ray obtained demonstrated no airspace disease.  Urinalysis was largely unremarkable.  Blood cultures were collected from both central venous catheter (tunneled double-lumen right internal jugular) and peripheral.  Both blood cultures from CBC resulted positive for strep mitis/oralis and peripheral blood  culture was positive for Staph epidermidis.  Patient has since had multiple repeat blood cultures on 9/3, 9/4, and 9/5 all of which are currently no growth to date.  PCR completed on 9/3 also negative. Patient has continued to have high-grade fevers daily despite what appears to be appropriate antimicrobial therapy and no more positive blood cultures.    Symptomatically, patient is predominantly complaining of ongoing loose watery stools, sometimes up to 3-4 times per day with associated rectal pain.  This is been evaluated with C. difficile testing which was negative on 9/3.  Otherwise he denies any ongoing headache, chest pain, cough, shortness of breath, abdominal pain, nausea, vomiting, change in urinary frequency, or dysuria.  He does notice when he has a fever and states it is often accompanied by chills and rigors.    Review of Systems:  CONSTITUTIONAL: Positive for fever, chills, rigors  EYES: negative for icterus or acute vision changes  ENT:  negative for acute hearing loss, sore throat  RESPIRATORY:  negative for cough, sputum or dyspnea  CARDIOVASCULAR:  negative for chest pain, palpitations  GASTROINTESTINAL: Positive for diarrhea.  Negative for nausea, vomiting, or constipation  GENITOURINARY:  negative for dysuria or hematuria  HEME:  No easy bruising or bleeding  INTEGUMENT:  negative for rash or pruritus  NEURO:  Negative for headache or tremor    Social/Environmental History:  Diagnosed with MDS/AML in March 2024.  No travel outside of states since that time.  Previously would work frequently in his garden, none since diagnosis of MDS  No recent exposure to soil, lakes, or prolonged outdoor exposure where he could have come in contact with ticks    Past Medical History:   Diagnosis Date    Gastroesophageal reflux disease     Hypertension        Past Surgical History:   Procedure Laterality Date    COLONOSCOPY      ESOPHAGOSCOPY, GASTROSCOPY, DUODENOSCOPY (EGD), COMBINED  7/28/2013    Procedure:  COMBINED ESOPHAGOSCOPY, GASTROSCOPY, DUODENOSCOPY (EGD), BIOPSY SINGLE OR MULTIPLE;;  Surgeon: Franklin Barrera MD;  Location:  GI    ESOPHAGOSCOPY, GASTROSCOPY, DUODENOSCOPY (EGD), COMBINED  7/28/2013    Procedure: COMBINED ESOPHAGOSCOPY, GASTROSCOPY, DUODENOSCOPY (EGD), REMOVE FOREIGN BODY;;  Surgeon: Franklin Barrera MD;  Location:  GI    IR CVC TUNNEL PLACEMENT > 5 YRS OF AGE  8/16/2024    ORTHOPEDIC SURGERY      04 micro discectomy, acl  repair       Family History   Problem Relation Age of Onset    No Known Problems Brother     No Known Problems Brother     No Known Problems Brother     No Known Problems Brother     Diabetes Brother     No Known Problems Sister     No Known Problems Daughter     No Known Problems Daughter        Social History     Social History Narrative    Not on file     Social History     Tobacco Use    Smoking status: Never     Passive exposure: Never    Smokeless tobacco: Never   Substance Use Topics    Alcohol use: Yes     Comment: socially    Drug use: No       Immunization History   Administered Date(s) Administered    COVID-19 12+ (2023-24) (Pfizer) 10/11/2023    COVID-19 Bivalent 18+ (Moderna) 10/03/2022    COVID-19 Monovalent 18+ (Moderna) 02/04/2021, 03/04/2021, 10/25/2021, 04/08/2022       Patient Active Problem List   Diagnosis    MDS (myelodysplastic syndrome) (H)    Pre-operative cardiovascular examination    Benign essential hypertension            Current Medications & Allergies:     Current Facility-Administered Medications   Medication Dose Route Frequency Provider Last Rate Last Admin    acyclovir (ZOVIRAX) tablet 800 mg  800 mg Oral BID Aziza Mendieta APRN CNP   800 mg at 09/05/24 0856    allopurinol (ZYLOPRIM) tablet 300 mg  300 mg Oral Daily Kaitlyn Aguilar NP   300 mg at 09/05/24 0856    calcium carbonate-vitamin D (CALTRATE) 600-10 MG-MCG per tablet 2 tablet  2 tablet Oral BID w/meals Bessie Lazo PA-C   2 tablet at 09/05/24 0856    ceFEPIme (MAXIPIME) 2 g  vial to attach to  mL bag for ADULTS or NS 50 mL bag for PEDS  2 g Intravenous Q8H Meri Viera  mL/hr at 09/05/24 0305 2 g at 09/05/24 0305    clindamycin (CLEOCIN T) 1 % solution   Topical BID Kaitlyn Aguilar NP   Given at 09/04/24 1004    diclofenac (VOLTAREN) 1 % topical gel 2 g  2 g Topical 4x Daily Bessie Lazo PA-C   2 g at 09/05/24 0858    filgrastim-aafi (NIVESTYM) injection 480 mcg  480 mcg Subcutaneous Daily at 8 pm Markus Mendez MD   480 mcg at 09/04/24 1949    heparin lock flush 10 unit/mL injection 5-20 mL  5-20 mL Intracatheter Q24H Markus Mendez MD   5 mL at 09/03/24 0336    loperamide (IMODIUM) capsule 2 mg  2 mg Oral BID Bessie Lazo PA-C   2 mg at 09/05/24 0857    methocarbamol (ROBAXIN) tablet 500 mg  500 mg Oral TID Aziza Mendieta APRN CNP   500 mg at 09/05/24 0856    micafungin (MYCAMINE) 150 mg in sodium chloride 0.9 % 100 mL intermittent infusion  150 mg Intravenous Daily Kaitlyn Aguilar  mL/hr at 09/05/24 0900 150 mg at 09/05/24 0900    mycophenolate mofetil (CELLCEPT) 1,250 mg in D5W intermittent infusion  1,250 mg Intravenous Q12H Counts include 234 beds at the [facility] (10/22) Kaitlyn Aguilar .3 mL/hr at 09/05/24 0900 1,250 mg at 09/05/24 0900    oxymetazoline (AFRIN) 0.05 % spray 2 spray  2 spray Both Nostrils BID Mahendra Siddiqui MD   2 spray at 09/04/24 1948    pantoprazole (PROTONIX) EC tablet 40 mg  40 mg Oral Daily Bessie Lazo PA-C   40 mg at 09/05/24 0856    potassium chloride 20 mEq in 50 mL intermittent infusion  20 mEq Intravenous Q1H Timothy Skaggs MD        Potassium Phosphate 15 mmol in NS intermittent infusion 15 mmol  15 mmol Intravenous Q2H Timothy Skaggs MD   15 mmol at 09/05/24 0907    prochlorperazine (COMPAZINE) tablet 5 mg  5 mg Oral Daily Eunice Spicer PA-C   5 mg at 09/04/24 1650    psyllium (METAMUCIL/KONSYL) capsule 3 capsule  3 capsule Oral Daily Bessie Lazo PA-C   3 capsule at 09/05/24 0855    sirolimus (GENERIC EQUIVALENT)  tablet 6 mg  6 mg Oral Daily Kaitlyn Aguilar NP   6 mg at 09/05/24 0855    [START ON 9/23/2024] sulfamethoxazole-trimethoprim (BACTRIM DS) 800-160 MG per tablet 1 tablet  1 tablet Oral Q Mon Tues BID Kaitlyn Aguilar, NP        tamsulosin (FLOMAX) capsule 0.4 mg  0.4 mg Oral Daily Bessie Lazo PA-C   0.4 mg at 09/04/24 1659    ursodiol (ACTIGALL) capsule 300 mg  300 mg Oral TID Markus Mendez MD   300 mg at 09/05/24 0856    vancomycin (VANCOCIN) 1,750 mg in sodium chloride 0.9 % 500 mL intermittent infusion  1,750 mg Intravenous Q12H Timothy Skaggs  mL/hr at 09/05/24 0305 1,750 mg at 09/05/24 0305       Infusions/Drips:    Current Facility-Administered Medications   Medication Dose Route Frequency Provider Last Rate Last Admin       No Known Allergies         Physical Exam:   Patient Vitals for the past 24 hrs:   BP Temp Temp src Pulse Resp SpO2 Weight   09/05/24 0844 (!) 142/81 98  F (36.7  C) Oral 98 16 99 % --   09/05/24 0825 -- -- -- -- -- -- 91.5 kg (201 lb 12.8 oz)   09/05/24 0604 -- 97.8  F (36.6  C) -- -- -- -- --   09/05/24 0444 -- 99.3  F (37.4  C) Oral 103 -- 96 % --   09/05/24 0256 (!) 141/82 (!) 103  F (39.4  C) Oral -- 16 94 % --   09/04/24 2357 (!) 151/84 (!) 100.8  F (38.2  C) Oral 119 20 97 % --   09/04/24 1941 (!) 146/85 98.3  F (36.8  C) Oral 118 18 100 % --   09/04/24 1539 122/71 97.9  F (36.6  C) Oral 106 18 100 % --   09/04/24 1258 -- (!) 103.1  F (39.5  C) -- -- -- -- --   09/04/24 1247 -- (!) 103.9  F (39.9  C) -- -- -- -- --   09/04/24 1224 (!) 152/72 (!) 102.7  F (39.3  C) Oral 113 18 98 % --   09/04/24 1100 -- 97.6  F (36.4  C) Oral -- -- -- --   09/04/24 0944 -- 99.1  F (37.3  C) Oral -- -- -- --     Ranges for vital signs:  Temp:  [97.6  F (36.4  C)-103.9  F (39.9  C)] 98  F (36.7  C)  Pulse:  [] 98  Resp:  [16-20] 16  BP: (122-152)/(71-85) 142/81  SpO2:  [94 %-100 %] 99 %  Vitals:    09/03/24 0739 09/04/24 0844 09/05/24 0825   Weight: 89.4 kg (197 lb 1.6 oz) 91.2  kg (201 lb 1.6 oz) 91.5 kg (201 lb 12.8 oz)       Physical Examination:  GENERAL: Lying in bed, no acute distress.  Nontoxic-appearing  HEAD:  Head is normocephalic, atraumatic   EYES:  Eyes have anicteric sclerae   ENT:  Oropharynx is moist.  Aphthous ulcer on tongue  LUNGS:  Clear to auscultation bilateral.   CARDIOVASCULAR:  Regular rate and rhythm with no murmur  ABDOMEN:  Normal bowel sounds, soft, nontender.   SKIN: Tunneled CVC in right chest.  Nonerythematous, with no tenderness to palpation or obvious drainage  NEUROLOGIC:  Grossly nonfocal. Active x4 extremities         Laboratory Data:       Metabolic Studies       Recent Labs   Lab Test 09/05/24  0254 09/04/24  1658 09/04/24  0318 09/04/24  0049   *  --  135  --    POTASSIUM 3.4  --  4.0  --    CHLORIDE 101  --  104  --    CO2 20*  --  21*  --    ANIONGAP 11  --  10  --    BUN 13.7  --  14.1  --    CR 0.80  --  0.66*  --    GFRESTIMATED >90  --  >90  --    *  --  113*  --    HERBERT 8.0*  --  8.4*  --    PHOS 1.2*  --  2.0*  --    MAG 1.6*  --  1.8  --    LACT  --  0.9  --  0.7       Hepatic Studies    Recent Labs   Lab Test 09/05/24  0254 09/02/24  0444 05/15/24  1339 05/08/24  1156   BILITOTAL 0.6 0.8   < > 0.6   DBIL  --  0.31*   < >  --    ALKPHOS 54 72   < > 82   PROTTOTAL 5.8* 6.1*   < > 7.6   ALBUMIN 2.9* 3.2*   < > 4.1   AST 36 16   < > 43   ALT 19 10   < > 21   LDH  --   --   --  613*    < > = values in this interval not displayed.       Gout Labs      Recent Labs   Lab Test 08/16/24  0920 08/09/24  0827 08/05/24  1200 07/29/24  0811 07/01/24  1208   URIC 4.2 3.9 4.1 4.5 4.1       Hematology Studies   Recent Labs   Lab Test 09/05/24  0254 09/04/24  1649 09/04/24  0318 09/03/24  0335 09/02/24  0444 08/22/24  0407 08/21/24  0352 08/20/24  0418 08/19/24  0327 08/18/24  0424 05/22/24  0838 05/21/24  1016   WBC <0.1*  --  <0.1* <0.1* <0.1*   < > 0.8* 1.2* 1.3* 1.3*   < > 4.0   ABLA  --   --   --   --   --   --   --   --   --   --   --  0.4*  "  BLST  --   --   --   --   --   --   --   --   --   --   --  9   ANEU  --   --   --   --   --   --  0.7* 1.2*  --   --    < > 0.6*   ANEUTAUTO  --   --   --   --   --   --   --   --  1.1* 0.8*   < >  --    ALYM  --   --   --   --   --   --  0.0* 0.0*  --   --    < > 2.0   ALYMPAUTO  --   --   --   --   --   --   --   --  0.1* 0.4*   < >  --    DAVIAN  --   --   --   --   --   --  0.0 0.0  --   --    < > 0.5   AMONOAUTO  --   --   --   --   --   --   --   --  0.1 0.2   < >  --    AEOS  --   --   --   --   --   --  0.0 0.0  --   --    < > 0.0   AEOSAUTO  --   --   --   --   --   --   --   --  0.0 0.0   < >  --    ABSBASO  --   --   --   --   --   --   --   --  0.0 0.0   < >  --    HGB 7.1*  --  7.5* 7.0* 7.5*   < > 9.9* 10.6* 10.1* 10.1*   < > 12.9*   HCT 20.7*  --  21.5* 19.6* 21.2*   < > 28.1* 30.6* 29.6* 28.3*   < > 38.4*   PLT 28* 23* 17* 26* 15*   < > 23* 33* 46* 16*   < > 72*    < > = values in this interval not displayed.       Clotting Studies    Recent Labs   Lab Test 09/02/24  0444 08/26/24  0407 08/16/24  0920 08/05/24  1200   INR 1.06 0.99 0.93 0.99   PTT  --   --  28 33       Iron Testing    Recent Labs   Lab Test 09/05/24  0254 08/02/24  0919 08/01/24  0805   ZARIA  --   --  460*   MCV 80   < > 84    < > = values in this interval not displayed.       Urine Studies     Recent Labs   Lab Test 09/02/24  0454 08/05/24  1200   URINEPH 7.0 6.5   NITRITE Negative Negative   LEUKEST Negative Negative   WBCU 2 <1       Medication levels    Recent Labs   Lab Test 09/04/24  1001 09/04/24  0318   VANCOMYCIN  --  6.0   RAPAMY 9.8  --        CSF testing   No lab results found.    Invalid input(s): \"CADAM\", \"EVPCR\", \"ENTPCR\", \"ENTEROVIRUS\"    Body fluid stats  No lab results found.    Microbiology:  Fungal testing  No lab results found.    Invalid input(s): \"HIFUN\", \"FUNGL\"    Last Culture results   Culture   Date Value Ref Range Status   09/04/2024 No growth after 1 day  Preliminary   09/03/2024 No growth after 1 day  " Preliminary   09/03/2024 No growth after 2 days  Preliminary   09/02/2024 Positive on the 1st day of incubation (A)  Preliminary   09/02/2024 Staphylococcus epidermidis (AA)  Preliminary     Comment:     1 of 2 bottles  The recovery of this organism from a single blood culture bottle most likely represents contamination. If susceptibility testing is needed, please refer to the antibiogram or contact [facility].   09/02/2024 Positive on the 1st day of incubation (A)  Preliminary   09/02/2024 Streptococcus mitis/oralis (AA)  Preliminary     Comment:     2 of 2 bottles  Referred to research section for identification.   09/02/2024 Positive on the 1st day of incubation (A)  Preliminary   09/02/2024 Streptococcus mitis/oralis (AA)  Preliminary     Comment:     2 of 2 bottles  Referred to research section for identification.  Susceptibilities done on previous cultures   08/16/2024   Final    No Vancomycin Resistant Enterococcus species isolated           Last checks of Clostridioides difficile testing  Recent Labs   Lab Test 09/03/24  1140 08/17/24  0906   CDBPCT Negative Negative       Syphilis Testing    Treponema Antibody Total   Date Value Ref Range Status   08/05/2024 Nonreactive Nonreactive Final       Quantiferon testing   Recent Labs   Lab Test 08/21/24  0352 08/20/24  0418   LYMPH 6 0       Viral loads    Recent Labs   Lab Test 08/31/24  1453 08/24/24  1416 08/17/24  1621 06/09/24  1124   CMVQNT Not Detected Not Detected   < >  --    HSDNA1  --   --   --  Not Detected   HSDNA2  --   --   --  Not Detected    < > = values in this interval not displayed.       CMV viral loads    Recent Labs   Lab Test 08/31/24  1453 08/24/24  1416 08/17/24  1621   CMVQNT Not Detected Not Detected Not Detected       Hepatitis B Testing     Recent Labs   Lab Test 08/05/24  1200   AUSAB 89.10   HBCAB Nonreactive   HEPBANG Nonreactive        Hepatitis C Antibody   Date Value Ref Range Status   08/05/2024 Nonreactive Nonreactive Final      Comment:     A nonreactive screening test result does not exclude the possibility of exposure to or infection with HCV. Nonreactive screening test results in individuals with prior exposure to HCV may be due to antibody levels below the limit of detection of this assay or lack of reactivity to the HCV antigens used in this assay. Patients with recent HCV infections (<3 months from time of exposure) may have false-negative HCV antibody results due to the time needed for seroconversion (average of 8 to 9 weeks).       CMV Antibody IgG   Date Value Ref Range Status   08/05/2024 No detectable antibody. No detectable antibody.  Final   06/11/2024 No detectable antibody. No detectable antibody.  Final     Varicella Zoster Antibody IgG   Date Value Ref Range Status   08/05/2024 Positive  Final     Comment:     Suggests previous exposure or immunization and probable immunity.     EBV Capsid Antibody IgG   Date Value Ref Range Status   08/05/2024 Positive (A) No detectable antibody. Final     Comment:     Suggests recent or past exposure.     Herpes Simplex Virus Type 1 IgG Antibody   Date Value Ref Range Status   08/05/2024 No HSV-1 IgG antibodies detected. No HSV-1 IgG antibodies detected Final     Herpes Simplex Virus Type 2 IgG Antibody   Date Value Ref Range Status   08/05/2024 No HSV-2 IgG antibodies detected. No HSV-2 IgG antibodies detected Final

## 2024-09-05 NOTE — PLAN OF CARE
"BP (!) 141/82 (BP Location: Left arm)   Pulse 103   Temp 97.8  F (36.6  C)   Resp 16   Ht 1.84 m (6' 0.44\")   Wt 91.2 kg (201 lb 1.6 oz)   SpO2 96%   BMI 26.94 kg/m      Leland Pearson had a tiring night, reporting poor sleep related to abdominal discomfort/diarrhea. LTCVC intact. Abnormal vital signs: Tmax 103, tachycardia, hypertension. 3/10 pain in the lower back and abdomen. Patient refused voltaren and clindamycin, education provided. Irregular assessment findings include: Diarrhea (1 episode of incontinence), hyperactive bowel sounds, rash (resolving), and epistaxis. Irregular labs include: magnesium, potassium, phosphorus, ordered per protocol. Plan for the day consists of: 1 more bag of KPh and two bags of KCl.     Problem: Adult Inpatient Plan of Care  Goal: Plan of Care Review  Description: The Plan of Care Review/Shift note should be completed every shift.  The Outcome Evaluation is a brief statement about your assessment that the patient is improving, declining, or no change.  This information will be displayed automatically on your shift  note.  Outcome: Progressing  Flowsheets (Taken 9/5/2024 0328)  Plan of Care Reviewed With: patient  Overall Patient Progress: no change  Goal: Patient-Specific Goal (Individualized)  Description: You can add care plan individualizations to a care plan. Examples of Individualization might be:  \"Parent requests to be called daily at 9am for status\", \"I have a hard time hearing out of my right ear\", or \"Do not touch me to wake me up as it startles  me\".  Outcome: Progressing  Flowsheets (Taken 9/5/2024 0328)  Patient/Family-Specific Goals (Include Timeframe): Nursing will cluster cares to maximize sleep  Goal: Absence of Hospital-Acquired Illness or Injury  Intervention: Identify and Manage Fall Risk  Recent Flowsheet Documentation  Taken 9/4/2024 1942 by Sasha, Levar K., RN  Safety Promotion/Fall Prevention:   check orthostatic blood pressure   clutter free " environment maintained   nonskid shoes/slippers when out of bed   safety round/check completed  Intervention: Prevent Skin Injury  Recent Flowsheet Documentation  Taken 9/4/2024 1941 by Levar Baez RN  Body Position: position changed independently  Skin Protection:   adhesive use limited   protective footwear used   transparent dressing maintained  Device Skin Pressure Protection:   adhesive use limited   tubing/devices free from skin contact  Intervention: Prevent Infection  Recent Flowsheet Documentation  Taken 9/4/2024 1941 by Levar Baez RN  Infection Prevention:   environmental surveillance performed   hand hygiene promoted   personal protective equipment utilized   rest/sleep promoted   single patient room provided     Problem: Risk for Delirium  Goal: Improved Behavioral Control  Intervention: Minimize Safety Risk  Recent Flowsheet Documentation  Taken 9/4/2024 1942 by Levar Baez RN  Enhanced Safety Measures: review medications for side effects with activity  Goal: Improved Sleep  Intervention: Promote Sleep  Recent Flowsheet Documentation  Taken 9/4/2024 1941 by Levar Baez RN  Sleep/Rest Enhancement: awakenings minimized     Problem: Stem Cell/Bone Marrow Transplant  Goal: Diarrhea Symptom Control  Intervention: Manage Diarrhea  Recent Flowsheet Documentation  Taken 9/4/2024 1941 by Levar Baez RN  Skin Protection:   adhesive use limited   protective footwear used   transparent dressing maintained  Perineal Care: perineal hygiene encouraged  Goal: Improved Activity Tolerance  Intervention: Promote Improved Energy  Recent Flowsheet Documentation  Taken 9/4/2024 1941 by Levar Baez RN  Sleep/Rest Enhancement: awakenings minimized  Activity Management:   ambulated to bathroom   back to bed  Goal: Blood Counts Within Acceptable Range  Intervention: Monitor and Manage Hematologic Symptoms  Recent Flowsheet Documentation  Taken 9/4/2024 1942 by Sasha  Levar COKER, RN  Bleeding Precautions:   blood pressure closely monitored   coagulation study results reviewed   foot protection facilitated   gentle oral care promoted  Medication Review/Management: medications reviewed  Taken 9/4/2024 1941 by Levar Baez, RN  Sleep/Rest Enhancement: awakenings minimized  Goal: Absence of Infection  Intervention: Prevent and Manage Infection  Recent Flowsheet Documentation  Taken 9/4/2024 1942 by Levar Baez, RN  Infection Management: aseptic technique maintained  Taken 9/4/2024 1941 by Levar Baez, RN  Infection Prevention:   environmental surveillance performed   hand hygiene promoted   personal protective equipment utilized   rest/sleep promoted   single patient room provided  Isolation Precautions: protective environment maintained  Goal: Improved Oral Mucous Membrane Health and Integrity  Intervention: Promote Oral Comfort and Health  Recent Flowsheet Documentation  Taken 9/4/2024 1941 by Levar Baez, RN  Oral Care: oral rinse provided   Goal Outcome Evaluation:      Plan of Care Reviewed With: patient    Overall Patient Progress: no change

## 2024-09-05 NOTE — PROGRESS NOTES
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     Diagnosis MDS/AML  HCT Type Allogeneic     Prep Regimen Flu/Cy/TBI  Donor Match & Source 8/8 DP permissive PBSC    GVHD Prophylaxis PTCy/MMF/Siro  Primary BMT MD Dr. Murphy    Clinical Trials [id number]       INTERVAL  HISTORY     Still febrile tmax 103.9. Rectal pain a bit better with topicals. Diarrhea continues, wants to increase imodium. No repeat + cultures since (9/2)  No N/V. Ate a full meal yesterday.   Mucositis not very bothersome.    Review of Systems: 10 point ROS negative except as noted above.    PHYSICAL EXAM     Weight In/Out     Wt Readings from Last 3 Encounters:   09/05/24 91.5 kg (201 lb 12.8 oz)   08/09/24 89.9 kg (198 lb 4.8 oz)   08/08/24 90.3 kg (199 lb 1.2 oz)      I/O last 3 completed shifts:  In: 3555.5 [P.O.:1280; I.V.:2275.5]  Out: 1275 [Urine:1125; Stool:150]     KPS:  80    BP (!) 141/80   Pulse 105   Temp (!) 101.7  F (38.7  C) (Oral)   Resp 18   Ht 1.84 m (6' 0.44\")   Wt 91.5 kg (201 lb 12.8 oz)   SpO2 96%   BMI 27.04 kg/m       General:  NAD   HEENT: +aphthous clean based ulcer noted on tongue.   Lungs: BCTA  CV: RRR  Abdomen: slight tenderness to palpitation on epigastrium/RLQ, soft ND, +BS  Lymphatics: No edema  Skin: +now minimal folliculitis on back  Neuro: A&O, appropriately interactive, speech clear, moving all extremities   Additional Findings: Estrada     Current aGVHD staging:  Skin 0, UGI 0, LGI 0, Liver 0     LABS AND IMAGING: I have assessed all abnormal lab values for their clinical significance and any values considered clinically significant have been addressed in the assessment and plan.      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     BMT/IEC PROTOCOL for MDS  - Chemo protocol: MT 2022-5  D-6: Flu 30mg/m2, Cy 50mg/kg  D-5 to D-2: Flu  D-1: TBI  D0: stem cell infusion  - Peripheral blood stem cell graft from 8/8 URD donor, ABO matched, Cell dose: TBD  - Engraftment monitoring: Peripheral blood and bone marrow chimerisms on D28, D100, 6 months, 1 and 2 years  - Restaging plan: BMBx with FISH, cytogenetics and NGS on D28, D100, 6 months, 1 and 2 years     HEME/COAG  # Pancytopenia secondary to disease process  - GCSF starts day +5, continue until ANC > 2500 for 2 consecutive days  - Transfusion parameters: hemoglobin <7, platelets <10     RISK OF GVHD  - Prophylaxis: PTCy 50mg/kg on D+3 and D+4; MMF (D+5 to 35); Sirolimus (starting D+5, target level 5-10).  - Siro level = 9.8 (9/4)     ID  #Streptococcus mitis bacteremia (2/2 bottles) - Cefepime (9/1-x)  #MRSE bacteremia (1/2) -Vanco (9/2-x)   -sensitivities pending   -trend cultures x3 days   #Febrile Neutropenia (9/2-x)   *see BC above, otherwise unremarkable  #Diarrhea- c.diff negative  #Rectal pain   -Consider additional anaerobic coverage with flagyl. Ctap if persistently febrile. Cvc removal if further positive cultures. Will discuss with ID.    Prophylaxis plan:   - Bacterial: Levaquin 250mg while neutropenic Now on hold with cefepime   - Fungal: Micafungin 300mg 3x/week until D+45, followed by mold active azole. Pulm nodules present on workup CT.   - PJP: Bactrim to start at D+28.  Toxoplasma negative.   - Viral: Acyclovir 800mg BID. No need for letermovir as donor and recipient are CMV negative.      Monitoring:   - CMV weekly, 8/31 neg  - EBV every 2 weeks from D30 to D180     GI/NUTRITION  # Diarrhea (9/3): c.diff recheck negative. Metamucil. 2mg. QID Imodium 2mg. 2mg qid prn.  # GERD: Protonix  - Anti-emetics: Zofran, dex scheduled during chemotherapy. Compazine, lorazepam available PRN.   - Ulcer prophy: Protonix  - VOD prophy: Ursodiol 300mg TID  - Dietician support to prevent malnutrition  - Miralax and senna PRN constipation. For now, Miralax scheduled at bedtime.      CARDIOVASCULAR  # HTN- lisinopril 15mg daily. Dc'd starting 8/26 with orthostatics and some dizziness.  # CAD: Coronary artery calcifications seen on CT, stress test in 2023 negative  - Risk of cardiomyopathy: Baseline EF 55-60%.   - Risk of arrhythmia: Baseline EKG showed 421       RENAL/ELECTROLYTES/  #Hyponatremia: osms urine/serum, Na urine/serum indicate hypovolemic hyponatremia. Encourage PO, will give IVF as necessary and slow down stools. RESOLVED WITH IVF. Stably waxing and waning.  - Electrolyte management: replace per sliding scale  - BPH: Continue home flomax.      MUSCULOSKELETAL/FRAILTY  - Baseline Frailty Score: Not frail (0)  - Daily PT/OT as needed while inpatient  - Gout: continue allopurinol      Neuro   #HA: no red flag symptoms. Pt has history of spinal stenosis, lumbosacral radiculopathy likely exacerbating. Tramadol PRN. Voltaren gel, ice and PT asked for suggestions. In the setting of thrombocytopenia, consider CT head if changes in symptoms or worsens. No currently complaints of HA.   - Pain management: Outpatient has been taking celecoxib for MSK pain, once a day. Inpatient will try Tramadol - available PRN. Added robaxin TID     Derm:  - Follicular rash to chest, back- Started topical clindamycin 8/28. Much improved.      SOCIAL DETERMINANTS  - Caregiver: wife, Vani. Lives within the  "required distance from hospital but may elect to stay in [place].   - Financial/insurance concerns: None    Medically Ready for Discharge: Anticipated in 5+ Days      Clinically Significant Risk Factors            # Hypomagnesemia: Lowest Mg = 1.6 mg/dL in last 2 days, will replace as needed   # Hypoalbuminemia: Lowest albumin = 2.9 g/dL at 9/5/2024  2:54 AM, will monitor as appropriate   # Thrombocytopenia: Lowest platelets = 17 in last 2 days, will monitor for bleeding   # Hypertension: Noted on problem list           # Overweight: Estimated body mass index is 27.04 kg/m  as calculated from the following:    Height as of this encounter: 1.84 m (6' 0.44\").    Weight as of this encounter: 91.5 kg (201 lb 12.8 oz).            I spent 30 minutes in the care of this patient today, which included time necessary for preparation for the visit, obtaining history, ordering medications/tests/procedures as medically indicated, review of pertinent medical literature, counseling of the patient, communication of recommendations to the care team, and documentation time.    Bessie Lazo PA-C  [phone]  "

## 2024-09-05 NOTE — PROGRESS NOTES
BMT CLINICAL SOCIAL WORK NOTE:    Focus: Supportive Counseling    Data: Pt is a 69 year old male, currently day +13 s/p Allo BMT.    Interventions: Clinical  (CSW) met with Pt to assess coping, provide supportive counseling and assist with resources as needed. Also in the pt's room today was his wife Vani.  Pt shared that he was not feeling the greatest today, most of the conversation was with his wife. Vani asked about relocation and being close post discharge, we discussed options including the [facility] [place], [place] and other local options. She is interested in being placed on the [place] list which CSW did. Currently she is staying local with their daughters who are bringing her to visit each day. CSW provided empathic listening, validation of concerns, and encouragement. CSW encouraged Pt to contact CSW for support, questions and/or resources.     Assessment: Pt presented as tired, but engaged at times.  Pt appears to be coping appropriately at this time. Pt continues to be supported by his wife and daughters.     Plan: CSW will continue to provide supportive counseling and assistance with resources as needed. CSW will continue to collaborate with multidisciplinary team regarding Pt's plan of care.     KAMERON Coelho, [facility]  Phone: [phone]  Vocera- BMT SW 3

## 2024-09-06 ENCOUNTER — APPOINTMENT (OUTPATIENT)
Dept: CARDIOLOGY | Facility: CLINIC | Age: 69
DRG: 014 | End: 2024-09-06
Payer: COMMERCIAL

## 2024-09-06 ENCOUNTER — APPOINTMENT (OUTPATIENT)
Dept: INTERVENTIONAL RADIOLOGY/VASCULAR | Facility: CLINIC | Age: 69
DRG: 014 | End: 2024-09-06
Attending: NURSE PRACTITIONER
Payer: COMMERCIAL

## 2024-09-06 LAB
ACANTHOCYTES BLD QL SMEAR: ABNORMAL
ADV 40+41 DNA STL QL NAA+NON-PROBE: NEGATIVE
ANION GAP SERPL CALCULATED.3IONS-SCNC: 7 MMOL/L (ref 7–15)
ASTRO TYP 1-8 RNA STL QL NAA+NON-PROBE: NEGATIVE
AUER BODIES BLD QL SMEAR: ABNORMAL
BASO STIPL BLD QL SMEAR: ABNORMAL
BASOPHILS # BLD AUTO: ABNORMAL 10*3/UL
BASOPHILS NFR BLD AUTO: ABNORMAL %
BITE CELLS BLD QL SMEAR: ABNORMAL
BLD PROD TYP BPU: NORMAL
BLD PROD TYP BPU: NORMAL
BLISTER CELLS BLD QL SMEAR: ABNORMAL
BLOOD COMPONENT TYPE: NORMAL
BLOOD COMPONENT TYPE: NORMAL
BUN SERPL-MCNC: 15.3 MG/DL (ref 8–23)
BURR CELLS BLD QL SMEAR: SLIGHT
C CAYETANENSIS DNA STL QL NAA+NON-PROBE: NEGATIVE
CALCIUM SERPL-MCNC: 7.7 MG/DL (ref 8.8–10.4)
CAMPYLOBACTER DNA SPEC NAA+PROBE: NEGATIVE
CHLORIDE SERPL-SCNC: 105 MMOL/L (ref 98–107)
CODING SYSTEM: NORMAL
CODING SYSTEM: NORMAL
CREAT SERPL-MCNC: 0.76 MG/DL (ref 0.67–1.17)
CROSSMATCH: NORMAL
CRYPTOSP DNA STL QL NAA+NON-PROBE: NEGATIVE
DACRYOCYTES BLD QL SMEAR: ABNORMAL
E COLI O157 DNA STL QL NAA+NON-PROBE: NORMAL
E HISTOLYT DNA STL QL NAA+NON-PROBE: NEGATIVE
EAEC ASTA GENE ISLT QL NAA+PROBE: NEGATIVE
EC STX1+STX2 GENES STL QL NAA+NON-PROBE: NEGATIVE
EGFRCR SERPLBLD CKD-EPI 2021: >90 ML/MIN/1.73M2
ELLIPTOCYTES BLD QL SMEAR: SLIGHT
EOSINOPHIL # BLD AUTO: ABNORMAL 10*3/UL
EOSINOPHIL NFR BLD AUTO: ABNORMAL %
EPEC EAE GENE STL QL NAA+NON-PROBE: NEGATIVE
ERYTHROCYTE [DISTWIDTH] IN BLOOD BY AUTOMATED COUNT: 16.1 % (ref 10–15)
ETEC LTA+ST1A+ST1B TOX ST NAA+NON-PROBE: NEGATIVE
FRAGMENTS BLD QL SMEAR: ABNORMAL
G LAMBLIA DNA STL QL NAA+NON-PROBE: NEGATIVE
GLUCOSE SERPL-MCNC: 116 MG/DL (ref 70–99)
HCO3 SERPL-SCNC: 22 MMOL/L (ref 22–29)
HCT VFR BLD AUTO: 17.4 % (ref 40–53)
HGB BLD-MCNC: 6.1 G/DL (ref 13.3–17.7)
HGB BLD-MCNC: 7.3 G/DL (ref 13.3–17.7)
HGB C CRYSTALS: ABNORMAL
HOWELL-JOLLY BOD BLD QL SMEAR: ABNORMAL
IMM GRANULOCYTES # BLD: ABNORMAL 10*3/UL
IMM GRANULOCYTES NFR BLD: ABNORMAL %
ISSUE DATE AND TIME: NORMAL
ISSUE DATE AND TIME: NORMAL
LACTATE SERPL-SCNC: 1 MMOL/L (ref 0.7–2)
LACTATE SERPL-SCNC: 1.6 MMOL/L (ref 0.7–2)
LVEF ECHO: NORMAL
LYMPHOCYTES # BLD AUTO: ABNORMAL 10*3/UL
LYMPHOCYTES NFR BLD AUTO: ABNORMAL %
MAGNESIUM SERPL-MCNC: 1.7 MG/DL (ref 1.7–2.3)
MCH RBC QN AUTO: 27.7 PG (ref 26.5–33)
MCHC RBC AUTO-ENTMCNC: 35.1 G/DL (ref 31.5–36.5)
MCV RBC AUTO: 79 FL (ref 78–100)
MONOCYTES # BLD AUTO: ABNORMAL 10*3/UL
MONOCYTES NFR BLD AUTO: ABNORMAL %
NEUTROPHILS # BLD AUTO: ABNORMAL 10*3/UL
NEUTROPHILS NFR BLD AUTO: ABNORMAL %
NEUTS HYPERSEG BLD QL SMEAR: ABNORMAL
NOROVIRUS GI+II RNA STL QL NAA+NON-PROBE: NEGATIVE
P SHIGELLOIDES DNA STL QL NAA+NON-PROBE: NEGATIVE
PHOSPHATE SERPL-MCNC: 1.7 MG/DL (ref 2.5–4.5)
PHOSPHATE SERPL-MCNC: 1.7 MG/DL (ref 2.5–4.5)
PHOSPHATE SERPL-MCNC: 2.3 MG/DL (ref 2.5–4.5)
PLAT MORPH BLD: ABNORMAL
PLATELET # BLD AUTO: 13 10E3/UL (ref 150–450)
POLYCHROMASIA BLD QL SMEAR: ABNORMAL
POTASSIUM SERPL-SCNC: 4 MMOL/L (ref 3.4–5.3)
RBC # BLD AUTO: 2.2 10E6/UL (ref 4.4–5.9)
RBC AGGLUT BLD QL: ABNORMAL
RBC MORPH BLD: ABNORMAL
ROULEAUX BLD QL SMEAR: ABNORMAL
RVA RNA STL QL NAA+NON-PROBE: NEGATIVE
SALMONELLA SP RPOD STL QL NAA+PROBE: NEGATIVE
SAPO I+II+IV+V RNA STL QL NAA+NON-PROBE: NEGATIVE
SHIGELLA SP+EIEC IPAH ST NAA+NON-PROBE: NEGATIVE
SICKLE CELLS BLD QL SMEAR: ABNORMAL
SMUDGE CELLS BLD QL SMEAR: ABNORMAL
SODIUM SERPL-SCNC: 134 MMOL/L (ref 135–145)
SPHEROCYTES BLD QL SMEAR: ABNORMAL
STOMATOCYTES BLD QL SMEAR: ABNORMAL
TARGETS BLD QL SMEAR: SLIGHT
TOXIC GRANULES BLD QL SMEAR: ABNORMAL
UNIT ABO/RH: NORMAL
UNIT ABO/RH: NORMAL
UNIT NUMBER: NORMAL
UNIT NUMBER: NORMAL
UNIT STATUS: NORMAL
UNIT STATUS: NORMAL
UNIT TYPE ISBT: 6200
UNIT TYPE ISBT: 9500
V CHOLERAE DNA SPEC QL NAA+PROBE: NEGATIVE
VANCOMYCIN SERPL-MCNC: 10.3 UG/ML
VARIANT LYMPHS BLD QL SMEAR: ABNORMAL
VIBRIO DNA SPEC NAA+PROBE: NEGATIVE
WBC # BLD AUTO: <0.1 10E3/UL (ref 4–11)
Y ENTEROCOL DNA STL QL NAA+PROBE: NEGATIVE

## 2024-09-06 PROCEDURE — 250N000011 HC RX IP 250 OP 636

## 2024-09-06 PROCEDURE — 999N000128 HC STATISTIC PERIPHERAL IV START W/O US GUIDANCE

## 2024-09-06 PROCEDURE — 99233 SBSQ HOSP IP/OBS HIGH 50: CPT | Mod: FS | Performed by: INTERNAL MEDICINE

## 2024-09-06 PROCEDURE — 85027 COMPLETE CBC AUTOMATED: CPT

## 2024-09-06 PROCEDURE — 99233 SBSQ HOSP IP/OBS HIGH 50: CPT | Mod: GC | Performed by: INTERNAL MEDICINE

## 2024-09-06 PROCEDURE — 36589 REMOVAL TUNNELED CV CATH: CPT

## 2024-09-06 PROCEDURE — 250N000013 HC RX MED GY IP 250 OP 250 PS 637

## 2024-09-06 PROCEDURE — 83605 ASSAY OF LACTIC ACID: CPT | Performed by: STUDENT IN AN ORGANIZED HEALTH CARE EDUCATION/TRAINING PROGRAM

## 2024-09-06 PROCEDURE — 250N000011 HC RX IP 250 OP 636: Mod: JZ | Performed by: INTERNAL MEDICINE

## 2024-09-06 PROCEDURE — 258N000003 HC RX IP 258 OP 636: Performed by: INTERNAL MEDICINE

## 2024-09-06 PROCEDURE — 250N000013 HC RX MED GY IP 250 OP 250 PS 637: Performed by: INTERNAL MEDICINE

## 2024-09-06 PROCEDURE — 250N000013 HC RX MED GY IP 250 OP 250 PS 637: Performed by: PHYSICIAN ASSISTANT

## 2024-09-06 PROCEDURE — 83735 ASSAY OF MAGNESIUM: CPT

## 2024-09-06 PROCEDURE — 206N000001 HC R&B BMT UMMC

## 2024-09-06 PROCEDURE — P9037 PLATE PHERES LEUKOREDU IRRAD: HCPCS

## 2024-09-06 PROCEDURE — 250N000009 HC RX 250: Performed by: STUDENT IN AN ORGANIZED HEALTH CARE EDUCATION/TRAINING PROGRAM

## 2024-09-06 PROCEDURE — 36589 REMOVAL TUNNELED CV CATH: CPT | Performed by: STUDENT IN AN ORGANIZED HEALTH CARE EDUCATION/TRAINING PROGRAM

## 2024-09-06 PROCEDURE — 250N000012 HC RX MED GY IP 250 OP 636 PS 637

## 2024-09-06 PROCEDURE — 83605 ASSAY OF LACTIC ACID: CPT | Performed by: INTERNAL MEDICINE

## 2024-09-06 PROCEDURE — 250N000009 HC RX 250: Performed by: INTERNAL MEDICINE

## 2024-09-06 PROCEDURE — 93306 TTE W/DOPPLER COMPLETE: CPT

## 2024-09-06 PROCEDURE — 02PYX3Z REMOVAL OF INFUSION DEVICE FROM GREAT VESSEL, EXTERNAL APPROACH: ICD-10-PCS | Performed by: STUDENT IN AN ORGANIZED HEALTH CARE EDUCATION/TRAINING PROGRAM

## 2024-09-06 PROCEDURE — 258N000003 HC RX IP 258 OP 636

## 2024-09-06 PROCEDURE — 93306 TTE W/DOPPLER COMPLETE: CPT | Mod: 26 | Performed by: INTERNAL MEDICINE

## 2024-09-06 PROCEDURE — 87507 IADNA-DNA/RNA PROBE TQ 12-25: CPT

## 2024-09-06 PROCEDURE — 84100 ASSAY OF PHOSPHORUS: CPT

## 2024-09-06 PROCEDURE — 250N000011 HC RX IP 250 OP 636: Mod: JZ

## 2024-09-06 PROCEDURE — 80202 ASSAY OF VANCOMYCIN: CPT | Performed by: INTERNAL MEDICINE

## 2024-09-06 PROCEDURE — 250N000011 HC RX IP 250 OP 636: Performed by: INTERNAL MEDICINE

## 2024-09-06 PROCEDURE — 84100 ASSAY OF PHOSPHORUS: CPT | Performed by: INTERNAL MEDICINE

## 2024-09-06 PROCEDURE — 36415 COLL VENOUS BLD VENIPUNCTURE: CPT

## 2024-09-06 PROCEDURE — P9040 RBC LEUKOREDUCED IRRADIATED: HCPCS

## 2024-09-06 PROCEDURE — 80048 BASIC METABOLIC PNL TOTAL CA: CPT

## 2024-09-06 PROCEDURE — 85018 HEMOGLOBIN: CPT

## 2024-09-06 PROCEDURE — 250N000013 HC RX MED GY IP 250 OP 250 PS 637: Performed by: NURSE PRACTITIONER

## 2024-09-06 PROCEDURE — 0JPT3XZ REMOVAL OF TUNNELED VASCULAR ACCESS DEVICE FROM TRUNK SUBCUTANEOUS TISSUE AND FASCIA, PERCUTANEOUS APPROACH: ICD-10-PCS | Performed by: STUDENT IN AN ORGANIZED HEALTH CARE EDUCATION/TRAINING PROGRAM

## 2024-09-06 PROCEDURE — 250N000009 HC RX 250

## 2024-09-06 RX ORDER — FUROSEMIDE 10 MG/ML
20 INJECTION INTRAMUSCULAR; INTRAVENOUS ONCE
Status: COMPLETED | OUTPATIENT
Start: 2024-09-06 | End: 2024-09-06

## 2024-09-06 RX ORDER — POTASSIUM PHOS IN 0.9 % NACL 15MMOL/250
15 PLASTIC BAG, INJECTION (ML) INTRAVENOUS
Status: DISPENSED | OUTPATIENT
Start: 2024-09-06 | End: 2024-09-06

## 2024-09-06 RX ORDER — LIDOCAINE 40 MG/G
CREAM TOPICAL
Status: DISCONTINUED | OUTPATIENT
Start: 2024-09-06 | End: 2024-09-11

## 2024-09-06 RX ORDER — POTASSIUM PHOS IN 0.9 % NACL 15MMOL/250
15 PLASTIC BAG, INJECTION (ML) INTRAVENOUS
Status: COMPLETED | OUTPATIENT
Start: 2024-09-06 | End: 2024-09-06

## 2024-09-06 RX ADMIN — FILGRASTIM-AAFI 480 MCG: 480 INJECTION, SOLUTION INTRAVENOUS; SUBCUTANEOUS at 20:20

## 2024-09-06 RX ADMIN — CALCIUM CARBONATE 600 MG (1,500 MG)-VITAMIN D3 400 UNIT TABLET 2 TABLET: at 18:07

## 2024-09-06 RX ADMIN — METHOCARBAMOL 500 MG: 500 TABLET ORAL at 14:40

## 2024-09-06 RX ADMIN — POTASSIUM PHOSPHATE, MONOBASIC POTASSIUM PHOSPHATE, DIBASIC 15 MMOL: 224; 236 INJECTION, SOLUTION, CONCENTRATE INTRAVENOUS at 01:36

## 2024-09-06 RX ADMIN — METHOCARBAMOL 500 MG: 500 TABLET ORAL at 20:20

## 2024-09-06 RX ADMIN — ALLOPURINOL 300 MG: 300 TABLET ORAL at 09:06

## 2024-09-06 RX ADMIN — METHOCARBAMOL 500 MG: 500 TABLET ORAL at 09:05

## 2024-09-06 RX ADMIN — URSODIOL 300 MG: 300 CAPSULE ORAL at 20:20

## 2024-09-06 RX ADMIN — MICAFUNGIN SODIUM 150 MG: 50 INJECTION, POWDER, LYOPHILIZED, FOR SOLUTION INTRAVENOUS at 09:43

## 2024-09-06 RX ADMIN — URSODIOL 300 MG: 300 CAPSULE ORAL at 09:07

## 2024-09-06 RX ADMIN — LOPERAMIDE HYDROCHLORIDE 2 MG: 2 CAPSULE ORAL at 16:26

## 2024-09-06 RX ADMIN — SIROLIMUS 6 MG: 2 TABLET ORAL at 09:09

## 2024-09-06 RX ADMIN — CALCIUM CARBONATE 600 MG (1,500 MG)-VITAMIN D3 400 UNIT TABLET 2 TABLET: at 09:07

## 2024-09-06 RX ADMIN — PANTOPRAZOLE SODIUM 40 MG: 40 TABLET, DELAYED RELEASE ORAL at 09:07

## 2024-09-06 RX ADMIN — TAMSULOSIN HYDROCHLORIDE 0.4 MG: 0.4 CAPSULE ORAL at 16:25

## 2024-09-06 RX ADMIN — FUROSEMIDE 20 MG: 10 INJECTION, SOLUTION INTRAMUSCULAR; INTRAVENOUS at 12:33

## 2024-09-06 RX ADMIN — LIDOCAINE HYDROCHLORIDE 3 ML: 10 INJECTION, SOLUTION EPIDURAL; INFILTRATION; INTRACAUDAL; PERINEURAL at 15:51

## 2024-09-06 RX ADMIN — ACYCLOVIR 800 MG: 800 TABLET ORAL at 20:20

## 2024-09-06 RX ADMIN — VANCOMYCIN HYDROCHLORIDE 1750 MG: 1 INJECTION, POWDER, LYOPHILIZED, FOR SOLUTION INTRAVENOUS at 03:48

## 2024-09-06 RX ADMIN — MYCOPHENOLATE MOFETIL 1250 MG: 500 INJECTION, POWDER, LYOPHILIZED, FOR SOLUTION INTRAVENOUS at 21:30

## 2024-09-06 RX ADMIN — URSODIOL 300 MG: 300 CAPSULE ORAL at 14:41

## 2024-09-06 RX ADMIN — POTASSIUM PHOSPHATE, MONOBASIC POTASSIUM PHOSPHATE, DIBASIC 15 MMOL: 224; 236 INJECTION, SOLUTION, CONCENTRATE INTRAVENOUS at 18:07

## 2024-09-06 RX ADMIN — CEFEPIME HYDROCHLORIDE 2 G: 2 INJECTION, POWDER, FOR SOLUTION INTRAVENOUS at 20:20

## 2024-09-06 RX ADMIN — POTASSIUM PHOSPHATE, MONOBASIC POTASSIUM PHOSPHATE, DIBASIC 15 MMOL: 224; 236 INJECTION, SOLUTION, CONCENTRATE INTRAVENOUS at 06:02

## 2024-09-06 RX ADMIN — MYCOPHENOLATE MOFETIL 1250 MG: 500 INJECTION, POWDER, LYOPHILIZED, FOR SOLUTION INTRAVENOUS at 09:37

## 2024-09-06 RX ADMIN — Medication 3 CAPSULE: at 09:06

## 2024-09-06 RX ADMIN — ACYCLOVIR 800 MG: 800 TABLET ORAL at 09:07

## 2024-09-06 RX ADMIN — ACETAMINOPHEN 650 MG: 325 TABLET ORAL at 01:50

## 2024-09-06 RX ADMIN — CEFEPIME HYDROCHLORIDE 2 G: 2 INJECTION, POWDER, FOR SOLUTION INTRAVENOUS at 12:26

## 2024-09-06 RX ADMIN — PROCHLORPERAZINE MALEATE 5 MG: 5 TABLET ORAL at 16:25

## 2024-09-06 RX ADMIN — CEFEPIME HYDROCHLORIDE 2 G: 2 INJECTION, POWDER, FOR SOLUTION INTRAVENOUS at 05:23

## 2024-09-06 ASSESSMENT — ACTIVITIES OF DAILY LIVING (ADL)
ADLS_ACUITY_SCORE: 25
ADLS_ACUITY_SCORE: 24
ADLS_ACUITY_SCORE: 25
ADLS_ACUITY_SCORE: 24
ADLS_ACUITY_SCORE: 25
ADLS_ACUITY_SCORE: 24
ADLS_ACUITY_SCORE: 25
ADLS_ACUITY_SCORE: 25
ADLS_ACUITY_SCORE: 24
ADLS_ACUITY_SCORE: 25
ADLS_ACUITY_SCORE: 24
ADLS_ACUITY_SCORE: 24
ADLS_ACUITY_SCORE: 25
ADLS_ACUITY_SCORE: 25
ADLS_ACUITY_SCORE: 24

## 2024-09-06 NOTE — CONSULTS
"    Interventional Radiology  [place] Consult Service Note  09/06/24   8:54 AM    Consult Requested: \"CVC removal 2/2 to strep bacteremia cultured from CVC, requires line holiday per ID\"    Recommendations/Plan:    Patient is on IR schedule 9/6 for a RIJ TCVC removal.   Platelets 13 this AM. Pt has received platelets already this AM.  Orders entered for procedure, No NPO required.  Consent will be done prior to procedure.     Please contact the IR charge RN at [phone] for estimated time of procedure.     Case and imaging discussed with IR attending, Dr. Arias.  Recommendations were reviewed with Bessie Lazo PA-C.    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 IR placed a RIJ TCVC on 8/16. We are now consulted for removal given bacteremia.     Diagnostic labs entered by: NA    Pertinent Imaging Reviewed:         Expected date of discharge:  TBD    Vitals:   /82   Pulse 92   Temp 97.9  F (36.6  C)   Resp 18   Ht 1.84 m (6' 0.44\")   Wt 94.3 kg (207 lb 12.8 oz)   SpO2 94%   BMI 27.84 kg/m      Pertinent Labs:   Lab Results   Component Value Date    WBC <0.1 (LL) 09/06/2024    WBC <0.1 (LL) 09/05/2024    WBC <0.1 (LL) 09/04/2024     Lab Results   Component Value Date    HGB 6.1 09/06/2024    HGB 7.1 09/05/2024    HGB 7.5 09/04/2024     Lab Results   Component Value Date    PLT 13 09/06/2024    PLT 28 09/05/2024    PLT 23 09/04/2024     Lab Results   Component Value Date    INR 1.06 09/02/2024    PTT 28 08/16/2024     Lab Results   Component Value Date    POTASSIUM 4.0 09/06/2024        COVID-19 Antibody Results, Testing for Immunity           No data to display              COVID-19 PCR Results           No data to display                IFEANYI Kimbrough CNP  Interventional " Radiology  Pager: [phone]

## 2024-09-06 NOTE — PLAN OF CARE
T-max 100.7, PRN tylenol given, blood cx not due per protocol. Vital signs stable overnight. Phosphorus bag 2 hung for the pts level of 1.5 from previous shift. Phosphorus level was 1.7 after redraw, final bag currently running. Platelets given for a level of 13, RBC currently transfusing for a level of 6.1.         Pt triggered sepsis protocol, LA was 1.0. Replacements needed: Plts and Hgb    Problem: Adult Inpatient Plan of Care  Goal: Optimal Comfort and Wellbeing  Outcome: Not Progressing  Intervention: Monitor Pain and Promote Comfort  Recent Flowsheet Documentation  Taken 9/5/2024 2035 by Yolanda Dyson, RN  Pain Management Interventions: medication (see MAR)     Problem: Risk for Delirium  Goal: Improved Sleep  Outcome: Not Progressing  Intervention: Promote Sleep  Recent Flowsheet Documentation  Taken 9/5/2024 2030 by Yolanda Dyson, RN  Sleep/Rest Enhancement: awakenings minimized     Problem: Stem Cell/Bone Marrow Transplant  Goal: Improved Activity Tolerance  Outcome: Not Progressing  Intervention: Promote Improved Energy  Recent Flowsheet Documentation  Taken 9/5/2024 2030 by Yolanda Dyson, RN  Sleep/Rest Enhancement: awakenings minimized  Activity Management: activity adjusted per tolerance  Environmental Support:   calm environment promoted   rest periods encouraged   personal routine supported  Goal: Blood Counts Within Acceptable Range  Outcome: Not Progressing  Intervention: Monitor and Manage Hematologic Symptoms  Recent Flowsheet Documentation  Taken 9/6/2024 0200 by Yolanda Dyson, RN  Bleeding Precautions:   blood pressure closely monitored   coagulation study results reviewed   foot protection facilitated   gentle oral care promoted  Medication Review/Management: medications reviewed  Taken 9/5/2024 2030 by Yolanda Dyson, RN  Sleep/Rest Enhancement: awakenings minimized  Bleeding Precautions:   blood pressure closely monitored   coagulation study results reviewed   foot protection  "facilitated   gentle oral care promoted  Medication Review/Management: medications reviewed  Goal: Absence of Infection  Outcome: Not Progressing  Intervention: Prevent and Manage Infection  Recent Flowsheet Documentation  Taken 9/6/2024 0200 by Yolanda Dyson, RN  Infection Management: aseptic technique maintained  Taken 9/5/2024 2030 by Yolanda Dyson, RN  Infection Prevention:   environmental surveillance performed   hand hygiene promoted   personal protective equipment utilized   rest/sleep promoted   single patient room provided  Infection Management: aseptic technique maintained  Isolation Precautions: protective environment maintained     Problem: Adult Inpatient Plan of Care  Goal: Plan of Care Review  Description: The Plan of Care Review/Shift note should be completed every shift.  The Outcome Evaluation is a brief statement about your assessment that the patient is improving, declining, or no change.  This information will be displayed automatically on your shift  note.  Outcome: Progressing  Goal: Patient-Specific Goal (Individualized)  Description: You can add care plan individualizations to a care plan. Examples of Individualization might be:  \"Parent requests to be called daily at 9am for status\", \"I have a hard time hearing out of my right ear\", or \"Do not touch me to wake me up as it startles  me\".  Outcome: Progressing  Goal: Absence of Hospital-Acquired Illness or Injury  Outcome: Progressing  Intervention: Identify and Manage Fall Risk  Recent Flowsheet Documentation  Taken 9/6/2024 0200 by Yolanda Dyson, RN  Safety Promotion/Fall Prevention: safety round/check completed  Taken 9/5/2024 2030 by Yolanda Dyson, RN  Safety Promotion/Fall Prevention: safety round/check completed  Intervention: Prevent Skin Injury  Recent Flowsheet Documentation  Taken 9/5/2024 2030 by Yolanda Dyson, RN  Body Position: position changed independently  Skin Protection:   adhesive use limited   protective footwear " used   transparent dressing maintained  Device Skin Pressure Protection:   adhesive use limited   tubing/devices free from skin contact  Intervention: Prevent Infection  Recent Flowsheet Documentation  Taken 9/5/2024 2030 by Yolanda Dyson RN  Infection Prevention:   environmental surveillance performed   hand hygiene promoted   personal protective equipment utilized   rest/sleep promoted   single patient room provided  Goal: Readiness for Transition of Care  Outcome: Progressing     Problem: Risk for Delirium  Goal: Optimal Coping  Outcome: Progressing  Intervention: Optimize Psychosocial Adjustment to Delirium  Recent Flowsheet Documentation  Taken 9/5/2024 2030 by Yolanda Dyson RN  Supportive Measures:   active listening utilized   relaxation techniques promoted   self-care encouraged   verbalization of feelings encouraged  Family/Support System Care: involvement promoted     Problem: Stem Cell/Bone Marrow Transplant  Goal: Optimal Coping with Transplant  Outcome: Progressing  Intervention: Optimize Patient/Family Adjustment to Transplant  Recent Flowsheet Documentation  Taken 9/5/2024 2030 by Yolanda Dyson RN  Supportive Measures:   active listening utilized   relaxation techniques promoted   self-care encouraged   verbalization of feelings encouraged  Family/Support System Care: involvement promoted  Goal: Symptom-Free Urinary Elimination  Outcome: Progressing  Intervention: Prevent or Manage Bladder Irritation  Recent Flowsheet Documentation  Taken 9/5/2024 2035 by Yolanda Dyson RN  Pain Management Interventions: medication (see MAR)  Goal: Diarrhea Symptom Control  Outcome: Progressing  Intervention: Manage Diarrhea  Recent Flowsheet Documentation  Taken 9/5/2024 2030 by Yolanda Dyson RN  Skin Protection:   adhesive use limited   protective footwear used   transparent dressing maintained  Goal: Absence of Hypersensitivity Reaction  Outcome: Progressing  Goal: Improved Oral Mucous Membrane Health and  Integrity  Outcome: Progressing  Goal: Nausea and Vomiting Symptom Relief  Outcome: Progressing  Goal: Optimal Nutrition Intake  Outcome: Progressing

## 2024-09-06 NOTE — PROGRESS NOTES
[facility]  Transplant Infectious Disease Progress Note     Patient:  Leland Pearson, Date of birth 1955, Medical record number [id number]  Date of Visit:  09/06/2024  Consult requested by Dr. Timothy Skaggs for evaluation of persistent neutropenic fevers         Assessment and Recommendations:   Recommendations:  Continue IV Cefepime for strep mitis bacteremia  CVC removed on 9/6.  Would recommend line holiday until he is afebrile for at least 48 to 72 hours.  Consider replacement early next week if fevers resolve  No evidence of valvular vegetations on echo 9/6  Enteric stool panel pending, will follow    Case discussed with transplant ID attending, Dr. Iftikhar Dennis  Thank you very much for this consultation. Transplant Infectious Disease will continue to follow with you.    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  Initial evaluation reveals Streptococcus mitis bacteremia from central venous catheter as well as single culture of MRSE from peripheral blood.  Patient was started on vancomycin and cefepime but has been persistently febrile since 9/2/2024.  Additional evaluation includes negative chest x-ray, negative urinalysis, negative C. difficile PCR.  Most likely etiology of persistent fevers is strep mitis bacteremia.  Patient had catheter removed on 9/6 and echocardiogram performed on 9/6 without evidence of valvular vegetations.  Fever curve improving on cefepime    Micheal Lucero MD  Infectious Disease Fellow  Most easily reached on [employer]  Pager #[phone]         Infectious Disease Issues:   Persistent neutropenic fevers  Streptococcus mitis/oralis bacteremia, isolated from CVC  Patient with persistent neutropenic fevers beginning on 9/2/2024 and continuing to current.  Fevers have been quite high upwards of 103 and occurring  multiple times daily.  Workup thus far has been positive for isolation of Streptococcus mitis and Staphylococcus epidermidis and blood culture from 9/2/2024.  Repeat blood cultures have been negative to date.  Additional workup including chest x-ray, urinalysis, and C. difficile have all been unremarkable/negative.  Patient has been on appropriate antibiotic therapy with vancomycin and cefepime since onset of fevers on 9/2/2024.    Suspect most likely etiology of patient's ongoing fevers is his Streptococcus mitis bacteremia.  I suspect that his MRSE is likely contaminant at this time based on the fact that her only grown 1 of 2 peripheral blood culture bottles and resulted as positive several hours after his strep mitis cultures.  Vancomycin was discontinued on 9/5.  Requested the patient's CVC be removed, which was performed on 9/6.  Patient underwent transthoracic echocardiogram on 9/6 as well with no evidence of valvular vegetations.  Fever curve overall is improving.  Continue cefepime for now, anticipate approximately 2-week course given prolonged fevers    Etiology of Streptococcus mitis is unclear at this time.  This is commonly an oral or GI normal dustin.  Patient does have a single oral lesion which may be the portal of entry.  He has also had significant ongoing diarrhea for the last several days.  Would recommend enteric stool panel to further evaluate diarrhea as well as potentially identify ongoing infectious process that may make it easier for bacterial translocation of normal dustin of the bloodstream.    Staphylococcus epidermidis isolated in single peripheral blood culture bottle  Isolated on peripheral blood culture from 9/2.  Not recovered from blood cultures drawn from central venous catheter on the same day.  These cultures also resulted as positive a number of hours after strep mitis have been isolated from CVC cultures.  I suspect that this is most likely contaminant as outlined above.   Vancomycin discontinued on 9/5.    MDS/AML status post matched unrelated donor transplant on 8/23/2024  Pancytopenia  Patient remains neutropenic at this time with white blood cell count of less than 0.1.  He is currently 14 days post transplant.  Hopeful for engraftment soon with subsequent steady increase in blood counts.    Transplant checklist  - QTc interval: 421 (8/5/2024)  - Bacterial coverage: Vancomycin and cefepime  - Pneumocystis prophylaxis: TMP-SMX M,Tu  - Serostatus & viral prophylaxis: CMV D-/R-, EBV+, HSV-.  Acyclovir prophylaxis  - Fungal prophylaxis: Micafungin  - Toxo IgM negative,  - Gamma globulin status: Unknown           Interval History:   Tmax 100.7 in the last 24 hours.  Much improved fever curve  Patient states overall he feels better in the last 24 hours as well  Central venous catheter was removed with interventional radiology earlier today  Patient also states that his diarrhea has lessened and has only had 1 bowel movement thus far today    Past Medical History:   Diagnosis Date    Gastroesophageal reflux disease     Hypertension        Past Surgical History:   Procedure Laterality Date    COLONOSCOPY      ESOPHAGOSCOPY, GASTROSCOPY, DUODENOSCOPY (EGD), COMBINED  7/28/2013    Procedure: COMBINED ESOPHAGOSCOPY, GASTROSCOPY, DUODENOSCOPY (EGD), BIOPSY SINGLE OR MULTIPLE;;  Surgeon: Franklin Barrera MD;  Location:  GI    ESOPHAGOSCOPY, GASTROSCOPY, DUODENOSCOPY (EGD), COMBINED  7/28/2013    Procedure: COMBINED ESOPHAGOSCOPY, GASTROSCOPY, DUODENOSCOPY (EGD), REMOVE FOREIGN BODY;;  Surgeon: Franklin Barrera MD;  Location:  GI    IR CVC TUNNEL PLACEMENT > 5 YRS OF AGE  8/16/2024    ORTHOPEDIC SURGERY      04 micro discectomy, acl  repair       Family History   Problem Relation Age of Onset    No Known Problems Brother     No Known Problems Brother     No Known Problems Brother     No Known Problems Brother     Diabetes Brother     No Known Problems Sister     No Known Problems Daughter      No Known Problems Daughter        Social History     Social History Narrative    Not on file     Social History     Tobacco Use    Smoking status: Never     Passive exposure: Never    Smokeless tobacco: Never   Substance Use Topics    Alcohol use: Yes     Comment: socially    Drug use: No       Immunization History   Administered Date(s) Administered    COVID-19 12+ (Pfizer) 10/11/2023    COVID-19 Bivalent 18+ (Moderna) 10/03/2022    COVID-19 Monovalent 18+ (Moderna) 02/04/2021, 03/04/2021, 10/25/2021, 04/08/2022       Patient Active Problem List   Diagnosis    MDS (myelodysplastic syndrome) (H)    Pre-operative cardiovascular examination    Benign essential hypertension            Current Medications & Allergies:     Current Facility-Administered Medications   Medication Dose Route Frequency Provider Last Rate Last Admin    acyclovir (ZOVIRAX) tablet 800 mg  800 mg Oral BID Aziza Mendieta APRN CNP   800 mg at 09/06/24 0907    allopurinol (ZYLOPRIM) tablet 300 mg  300 mg Oral Daily Kaitlyn Aguilar NP   300 mg at 09/06/24 0906    calcium carbonate-vitamin D (CALTRATE) 600-10 MG-MCG per tablet 2 tablet  2 tablet Oral BID w/meals Bessie Lazo PA-C   2 tablet at 09/06/24 0907    ceFEPIme (MAXIPIME) 2 g vial to attach to  mL bag for ADULTS or NS 50 mL bag for PEDS  2 g Intravenous Q8H Meri Viera  mL/hr at 09/05/24 0305 2 g at 09/06/24 1226    clindamycin (CLEOCIN T) 1 % solution   Topical BID Kaitlyn Aguilar NP   Given at 09/04/24 1004    diclofenac (VOLTAREN) 1 % topical gel 2 g  2 g Topical 4x Daily Bessie Lazo PA-C   2 g at 09/05/24 0858    filgrastim-aafi (NIVESTYM) injection 480 mcg  480 mcg Subcutaneous Daily at 8 pm Markus Mendez MD   480 mcg at 09/05/24 2024    heparin lock flush 10 unit/mL injection 5-20 mL  5-20 mL Intracatheter Q24H Markus Mendez MD   5 mL at 09/03/24 0336    loperamide (IMODIUM) capsule 2 mg  2 mg Oral 4x Daily Bessie Lazo PA-C   2 mg at 09/05/24  2024    methocarbamol (ROBAXIN) tablet 500 mg  500 mg Oral TID AnamWhitley kongIFEANYI Guillen CNP   500 mg at 09/06/24 0905    micafungin (MYCAMINE) 150 mg in sodium chloride 0.9 % 100 mL intermittent infusion  150 mg Intravenous Daily Kaitlyn Aguilar  mL/hr at 09/06/24 0943 150 mg at 09/06/24 0943    mycophenolate mofetil (CELLCEPT) 1,250 mg in D5W intermittent infusion  1,250 mg Intravenous Q12H [facility] (10/22) Kaitlyn Aguilar .3 mL/hr at 09/06/24 0937 1,250 mg at 09/06/24 0937    pantoprazole (PROTONIX) EC tablet 40 mg  40 mg Oral Daily Bessie aLzo PA-C   40 mg at 09/06/24 0907    prochlorperazine (COMPAZINE) tablet 5 mg  5 mg Oral Daily Eunice Spicer PA-C   5 mg at 09/05/24 1622    psyllium (METAMUCIL/KONSYL) capsule 3 capsule  3 capsule Oral Daily Bessie Lazo PA-C   3 capsule at 09/06/24 0906    sirolimus (GENERIC EQUIVALENT) tablet 6 mg  6 mg Oral Daily Kaitlyn Aguilar NP   6 mg at 09/06/24 0909    sodium chloride (PF) 0.9% PF flush 3 mL  3 mL Intracatheter Q8H Bessie Lazo PA-C        [START ON 9/23/2024] sulfamethoxazole-trimethoprim (BACTRIM DS) 800-160 MG per tablet 1 tablet  1 tablet Oral Q Mon Tues BID Kaitlyn Aguilar NP        tamsulosin (FLOMAX) capsule 0.4 mg  0.4 mg Oral Daily Bessie Lazo PA-C   0.4 mg at 09/05/24 1622    ursodiol (ACTIGALL) capsule 300 mg  300 mg Oral TID Markus Mendez MD   300 mg at 09/06/24 0907       Infusions/Drips:    Current Facility-Administered Medications   Medication Dose Route Frequency Provider Last Rate Last Admin       No Known Allergies         Physical Exam:   Patient Vitals for the past 24 hrs:   BP Temp Temp src Pulse Resp SpO2 Weight   09/06/24 1152 (!) 143/84 97.6  F (36.4  C) Oral 100 18 97 % --   09/06/24 0915 (!) 137/93 98  F (36.7  C) Oral 93 17 97 % --   09/06/24 0745 -- -- -- -- -- -- 94.3 kg (207 lb 12.8 oz)   09/06/24 0728 137/82 97.9  F (36.6  C) -- 92 18 94 % --   09/06/24 0702 (!) 149/86 97.6  F (36.4  C) Oral 89 18 97 %  --   09/06/24 0600 129/72 97.6  F (36.4  C) Axillary -- 20 -- --   09/06/24 0531 (!) 151/76 97.5  F (36.4  C) Axillary -- 20 97 % --   09/06/24 0300 134/74 97.9  F (36.6  C) Axillary -- 18 -- --   09/06/24 0142 (!) 143/68 (!) 100.7  F (38.2  C) Axillary -- 16 -- --   09/05/24 2359 138/76 99.9  F (37.7  C) Oral -- 16 -- --   09/05/24 2007 (!) 141/85 98.2  F (36.8  C) Oral 96 16 99 % --   09/05/24 1643 130/71 98.1  F (36.7  C) Oral -- 20 -- --     Ranges for vital signs:  Temp:  [97.5  F (36.4  C)-100.7  F (38.2  C)] 97.6  F (36.4  C)  Pulse:  [] 100  Resp:  [16-20] 18  BP: (129-151)/(68-93) 143/84  SpO2:  [94 %-99 %] 97 %  Vitals:    09/04/24 0844 09/05/24 0825 09/06/24 0745   Weight: 91.2 kg (201 lb 1.6 oz) 91.5 kg (201 lb 12.8 oz) 94.3 kg (207 lb 12.8 oz)       Physical Examination: (Exam at 10 AM, before removal of CVC)  GENERAL: Lying in bed, no acute distress.  Nontoxic-appearing  HEAD:  Head is normocephalic, atraumatic   ENT:  Oropharynx is moist.  Aphthous ulcer on tongue  LUNGS:  Clear to auscultation bilateral.   CARDIOVASCULAR:  Regular rate and rhythm with no murmur  ABDOMEN:  Normal bowel sounds, soft, nontender.   SKIN: Tunneled CVC in right chest.  Nonerythematous, with no tenderness to palpation or obvious drainage  NEUROLOGIC:  Grossly nonfocal. Active x4 extremities         Laboratory Data:       Metabolic Studies       Recent Labs   Lab Test 09/06/24  1219 09/06/24  0337 09/06/24  0148 09/05/24  2358 09/05/24  1628 09/05/24  1442 09/05/24  0922 09/05/24  0254   NA  --  134*  --   --   --   --   --  132*   POTASSIUM  --  4.0  --   --  3.9  --   --  3.4   CHLORIDE  --  105  --   --   --   --   --  101   CO2  --  22  --   --   --   --   --  20*   ANIONGAP  --  7  --   --   --   --   --  11   BUN  --  15.3  --   --   --   --   --  13.7   CR  --  0.76  --   --   --   --   --  0.80   GFRESTIMATED  --  >90  --   --   --   --   --  >90   GLC  --  116*  --   --   --   --   --  133*   HERBERT  --  7.7*  --    --   --   --   --  8.0*   PHOS 1.7*  --   --    < >  --    < >  --  1.2*   MAG  --  1.7  --   --   --   --   --  1.6*   LACT  --   --  1.0  --   --   --  1.1  --     < > = values in this interval not displayed.       Hepatic Studies    Recent Labs   Lab Test 09/05/24  0254 09/02/24  0444 05/15/24  1339 05/08/24  1156   BILITOTAL 0.6 0.8   < > 0.6   DBIL  --  0.31*   < >  --    ALKPHOS 54 72   < > 82   PROTTOTAL 5.8* 6.1*   < > 7.6   ALBUMIN 2.9* 3.2*   < > 4.1   AST 36 16   < > 43   ALT 19 10   < > 21   LDH  --   --   --  613*    < > = values in this interval not displayed.       Gout Labs      Recent Labs   Lab Test 08/16/24  0920 08/09/24  0827 08/05/24  1200 07/29/24  0811 07/01/24  1208   URIC 4.2 3.9 4.1 4.5 4.1       Hematology Studies   Recent Labs   Lab Test 09/06/24  1219 09/06/24  0337 09/05/24  0254 09/04/24  1649 09/04/24  0318 09/03/24  0335 08/22/24  0407 08/21/24  0352 08/20/24  0418 08/19/24  0327 08/18/24  0424 05/22/24  0838 05/21/24  1016   WBC  --  <0.1* <0.1*  --  <0.1* <0.1*   < > 0.8* 1.2* 1.3* 1.3*   < > 4.0   ABLA  --   --   --   --   --   --   --   --   --   --   --   --  0.4*   BLST  --   --   --   --   --   --   --   --   --   --   --   --  9   ANEU  --   --   --   --   --   --   --  0.7* 1.2*  --   --    < > 0.6*   ANEUTAUTO  --   --   --   --   --   --   --   --   --  1.1* 0.8*   < >  --    ALYM  --   --   --   --   --   --   --  0.0* 0.0*  --   --    < > 2.0   ALYMPAUTO  --   --   --   --   --   --   --   --   --  0.1* 0.4*   < >  --    DAVIAN  --   --   --   --   --   --   --  0.0 0.0  --   --    < > 0.5   AMONOAUTO  --   --   --   --   --   --   --   --   --  0.1 0.2   < >  --    AEOS  --   --   --   --   --   --   --  0.0 0.0  --   --    < > 0.0   AEOSAUTO  --   --   --   --   --   --   --   --   --  0.0 0.0   < >  --    ABSBASO  --   --   --   --   --   --   --   --   --  0.0 0.0   < >  --    HGB 7.3* 6.1* 7.1*  --  7.5* 7.0*   < > 9.9* 10.6* 10.1* 10.1*   < > 12.9*   HCT  --   "17.4* 20.7*  --  21.5* 19.6*   < > 28.1* 30.6* 29.6* 28.3*   < > 38.4*   PLT  --  13* 28*   < > 17* 26*   < > 23* 33* 46* 16*   < > 72*    < > = values in this interval not displayed.       Clotting Studies    Recent Labs   Lab Test 09/02/24  0444 08/26/24  0407 08/16/24  0920 08/05/24  1200   INR 1.06 0.99 0.93 0.99   PTT  --   --  28 33       Iron Testing    Recent Labs   Lab Test 09/06/24  0337 08/02/24  0919 08/01/24  0805   ZARIA  --   --  460*   MCV 79   < > 84    < > = values in this interval not displayed.       Urine Studies     Recent Labs   Lab Test 09/02/24  0454 08/05/24  1200   URINEPH 7.0 6.5   NITRITE Negative Negative   LEUKEST Negative Negative   WBCU 2 <1       Medication levels    Recent Labs   Lab Test 09/06/24  0337 09/04/24  1001   VANCOMYCIN 10.3  --    RAPAMY  --  9.8       CSF testing   No lab results found.    Invalid input(s): \"CADAM\", \"EVPCR\", \"ENTPCR\", \"ENTEROVIRUS\"    Body fluid stats  No lab results found.    Microbiology:  Fungal testing  No lab results found.    Invalid input(s): \"HIFUN\", \"FUNGL\"    Last Culture results   Culture   Date Value Ref Range Status   09/05/2024 No growth after 1 day  Preliminary   09/04/2024 No growth after 2 days  Preliminary   09/03/2024 No growth after 3 days  Preliminary   09/03/2024 No growth after 3 days  Preliminary   09/02/2024 Positive on the 1st day of incubation (A)  Preliminary   09/02/2024 Staphylococcus epidermidis (AA)  Preliminary     Comment:     1 of 2 bottles  The recovery of this organism from a single blood culture bottle most likely represents contamination. If susceptibility testing is needed, please refer to the antibiogram or contact [facility].   09/02/2024 Positive on the 1st day of incubation (A)  Preliminary   09/02/2024 Streptococcus mitis (AA)  Preliminary     Comment:     2 of 2 bottles     09/02/2024 Positive on the 1st day of incubation (A)  Preliminary   09/02/2024 Streptococcus mitis (AA)  Preliminary     Comment:     2 of 2 " bottles    Susceptibilities done on previous cultures   08/16/2024   Final    No Vancomycin Resistant Enterococcus species isolated           Last checks of Clostridioides difficile testing  Recent Labs   Lab Test 09/03/24  1140 08/17/24  0906   CDBPCT Negative Negative       Syphilis Testing    Treponema Antibody Total   Date Value Ref Range Status   08/05/2024 Nonreactive Nonreactive Final       Quantiferon testing   Recent Labs   Lab Test 08/21/24  0352 08/20/24  0418   LYMPH 6 0       Viral loads    Recent Labs   Lab Test 08/31/24  1453 08/24/24  1416 08/17/24  1621 06/09/24  1124   CMVQNT Not Detected Not Detected   < >  --    HSDNA1  --   --   --  Not Detected   HSDNA2  --   --   --  Not Detected    < > = values in this interval not displayed.       CMV viral loads    Recent Labs   Lab Test 08/31/24  1453 08/24/24  1416 08/17/24  1621   CMVQNT Not Detected Not Detected Not Detected       Hepatitis B Testing     Recent Labs   Lab Test 08/05/24  1200   AUSAB 89.10   HBCAB Nonreactive   HEPBANG Nonreactive        Hepatitis C Antibody   Date Value Ref Range Status   08/05/2024 Nonreactive Nonreactive Final     Comment:     A nonreactive screening test result does not exclude the possibility of exposure to or infection with HCV. Nonreactive screening test results in individuals with prior exposure to HCV may be due to antibody levels below the limit of detection of this assay or lack of reactivity to the HCV antigens used in this assay. Patients with recent HCV infections (<3 months from time of exposure) may have false-negative HCV antibody results due to the time needed for seroconversion (average of 8 to 9 weeks).       CMV Antibody IgG   Date Value Ref Range Status   08/05/2024 No detectable antibody. No detectable antibody.  Final   06/11/2024 No detectable antibody. No detectable antibody.  Final     Varicella Zoster Antibody IgG   Date Value Ref Range Status   08/05/2024 Positive  Final     Comment:      Suggests previous exposure or immunization and probable immunity.     EBV Capsid Antibody IgG   Date Value Ref Range Status   08/05/2024 Positive (A) No detectable antibody. Final     Comment:     Suggests recent or past exposure.     Herpes Simplex Virus Type 1 IgG Antibody   Date Value Ref Range Status   08/05/2024 No HSV-1 IgG antibodies detected. No HSV-1 IgG antibodies detected Final     Herpes Simplex Virus Type 2 IgG Antibody   Date Value Ref Range Status   08/05/2024 No HSV-2 IgG antibodies detected. No HSV-2 IgG antibodies detected Final

## 2024-09-06 NOTE — IR NOTE
Patient Name: Leland Pearson  Medical Record Number: [id number]  Today's Date: 9/6/2024    Procedure: Tunneled line removal   Proceduralist: Dr. Puckett  Pathology present: na    Procedure Start: 1548  Procedure end: 1554  Sedation medications administered: Local lidocaine     Report given to: Sotero Agustin  : gayle    Other Notes: Pt arrived to IR room 7 from . Consent reviewed. Pt denies any questions or concerns regarding procedure. Pt positioned supine and monitored per protocol.     Pt tolerated procedure without any noted complications.     No culture per IR NP.    Pt transferred back to .

## 2024-09-06 NOTE — PROGRESS NOTES
CLINICAL NUTRITION SERVICES - REASSESSMENT NOTE     Nutrition Prescription    RECOMMENDATIONS FOR MDs/PROVIDERS TO ORDER:  None at present.    Malnutrition Status:    Patient does not meet two of the established criteria necessary for diagnosing malnutrition but is at risk for malnutrition    Recommendations already ordered by Registered Dietitian (RD):  Modified supplement: vanilla Ensure Enlive with ice cream daily  Continue supplements PRN    Future/Additional Recommendations:  Monitor oral intake, weight, supplement preferences.     EVALUATION OF THE PROGRESS TOWARD GOALS   Diet: High Kcal/High Protein  Ensure Enlive with ice cream daily  Supplements PRN  Intake: Pt consuming % of meals per flowsheet. Pt ordering 1-2 meals a day per [employer] [facility].     NEW FINDINGS   Met with pt and his wife. Pt was eating breakfast during visit. Pt's appetite is not what it used to be. This began when chemo started ~2 weeks ago. He's eating 50% of normal. He's eating 2 meals a day, same as what he did at home. Food is not always appealing to him. The Ensure with ice cream doesn't always sound good, so he doesn't drink it everyday. He would like to try the vanilla flavor. He gets some outside food and has snacks at bedside.    Weight:  Last wt (9/6): 94.3 kg  Wt elevated compared to admit wt  No significant wt loss PTA  Wt Readings from Last 45 Encounters:   09/06/24 94.3 kg (207 lb 12.8 oz)   08/09/24 89.9 kg (198 lb 4.8 oz)   08/08/24 90.3 kg (199 lb 1.2 oz)   08/08/24 90.3 kg (199 lb)   08/07/24 90.8 kg (200 lb 3.2 oz)   08/06/24 90.3 kg (199 lb)   08/05/24 89.8 kg (198 lb)   07/29/24 90.7 kg (200 lb)   07/05/24 91.2 kg (201 lb)   07/01/24 91.7 kg (202 lb 3.2 oz)   06/19/24 90.8 kg (200 lb 1.6 oz)   06/13/24 89.5 kg (197 lb 6.4 oz)   06/12/24 89.9 kg (198 lb 4.8 oz)   06/11/24 90.6 kg (199 lb 12.8 oz)   06/09/24 89.7 kg (197 lb 11.2 oz)   05/26/24 90.7 kg (199 lb 14.4 oz)   05/08/24 91.9 kg (202 lb 11.2 oz)     Labs:  Na  "134 (L).  9/6 phos 1.7 (L).  WBC <0.1 (L).    Medications:  Calcium carbonate-vitamin D  Imodium 2 mg QID  Compazine daily  Psyllium  Sirolimus  Vancomycin  PRN miralax, compazine, senna    GI:  Stooling 1-3x daily per I/O. LBM 9/5 per flowsheet.  Diarrhea    Skin: no significant findings    Respiratory: RA    MALNUTRITION  % Intake: < 75% for > 7 days (moderate)  % Weight Loss: None noted  Subcutaneous Fat Loss: None observed  Muscle Loss: None observed  Fluid Accumulation/Edema: None noted  Malnutrition Diagnosis: Patient does not meet two of the established criteria necessary for diagnosing malnutrition but is at risk for malnutrition    Previous Goals   Patient to consume % of nutritionally adequate meal trays TID, or the equivalent with supplements/snacks.  Evaluation: Not met    Previous Nutrition Diagnosis  Predicted inadequate nutrient intake (kcal/protein) related to recent BMT with potential for side effects to affect ability to take adequate PO.   Evaluation: Declining    CURRENT NUTRITION DIAGNOSIS  Inadequate oral intake related to decreased appetite as evidenced by pt report of intake at 50%.      INTERVENTIONS  Implementation  Medical food supplement therapy    Goals  Patient to consume % of nutritionally adequate meal trays TID, or the equivalent with supplements/snacks.    Monitoring/Evaluation  Progress toward goals will be monitored and evaluated per protocol.    Jose Luis Florian RD, LD  Available on [email or web address]  Weekend/Holiday PILAR [employer] - \"Weekend Clinical Dietitian\"  No longer available by paging   "

## 2024-09-06 NOTE — PROGRESS NOTES
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     Diagnosis MDS/AML  HCT Type Allogeneic     Prep Regimen Flu/Cy/TBI  Donor Match & Source 8/8 DP permissive PBSC    GVHD Prophylaxis PTCy/MMF/Siro  Primary BMT MD Dr. Murphy    Clinical Trials [id number]       INTERVAL  HISTORY     Tmax 101.7 yesterday morning, fever yesterday afternoon, but has been afebrile since. BP and HR stable.  Diarrhea improved with scheduled imodium. No belly pain. Rectal pain resolved.  No N/V. Ate well.       Review of Systems: 10 point ROS negative except as noted above.    PHYSICAL EXAM     Weight In/Out     Wt Readings from Last 3 Encounters:   09/06/24 94.3 kg (207 lb 12.8 oz)   08/09/24 89.9 kg (198 lb 4.8 oz)   08/08/24 90.3 kg (199 lb 1.2 oz)      I/O last 3 completed shifts:  In: 3482 [P.O.:240; I.V.:3242]  Out: 2575 [Urine:2500; Stool:75]     KPS:  80    BP (!) 137/93 (BP Location: Left arm)   Pulse 93   Temp 98  F (36.7  C) (Oral)   Resp 17   Ht 1.84 m (6' 0.44\")   Wt 94.3 kg (207 lb 12.8 oz)   SpO2 97%   BMI 27.84 kg/m       General:  NAD   HEENT: +aphthous clean based ulcer noted on tongue.   Lungs: BCTA  CV: RRR  Abdomen: slight tenderness to palpitation on epigastrium/RLQ, soft ND, +BS  Lymphatics: No edema  Skin: folliculitis on back is resolving  Neuro: A&O, appropriately interactive, speech clear, moving all extremities   Additional Findings: Estrada     Current aGVHD staging:  Skin 0, UGI 0, LGI 0, Liver 0     LABS AND IMAGING: I have assessed all abnormal lab values for their clinical significance and any values considered clinically significant have been addressed in the assessment and plan.      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     BMT/IEC PROTOCOL for MDS  - Chemo protocol: MT 2022-5  D-6: Flu 30mg/m2, Cy 50mg/kg  D-5 to D-2: Flu  D-1: TBI  D0: stem cell infusion  - Peripheral blood stem cell graft from 8/8 URD donor, ABO matched, Cell dose: TBD  - Engraftment monitoring: Peripheral blood and bone marrow chimerisms on D28, D100, 6 months, 1 and 2 years  - Restaging plan: BMBx with FISH, cytogenetics and NGS on D28, D100, 6 months, 1 and 2 years     HEME/COAG  # Pancytopenia secondary to disease process  - GCSF starts day +5, continue until ANC > 2500 for 2 consecutive days  - Transfusion parameters: hemoglobin <7, platelets <10     RISK OF GVHD  - Prophylaxis: PTCy 50mg/kg on D+3 and D+4; MMF (D+5 to 35); Sirolimus (starting D+5, target level 5-10).  - Siro level = 9.8 (9/4)     ID  #Streptococcus mitis bacteremia (2/2 bottles) - Cefepime (9/1-x)   -ID following, recommends CVC removal and line holiday, echo, continue cefepime  #MRSE bacteremia likely contaminant because peripheral stick, grew late. will discontinue (1/2) -Vanco (9/2-9/6)   -sensitivities pending   -trend cultures x3 days negative so far  #Febrile Neutropenia (9/2-x)   *see BC above, otherwise unremarkable  #Diarrhea- c.diff negative   -enteric panel pending  #Rectal pain      Prophylaxis plan:   - Bacterial: Levaquin 250mg while neutropenic Now on hold with cefepime   - Fungal: Micafungin 300mg 3x/week until D+45, followed by mold active azole. Pulm nodules present on workup CT.    - PJP: Bactrim to start at D+28. Toxoplasma negative.   - Viral: Acyclovir 800mg BID. No need for letermovir as donor and recipient are CMV negative.      Monitoring:   - CMV weekly, 8/31 neg  - EBV every 2 weeks from D30 to D180     GI/NUTRITION  # Diarrhea (9/3): c.diff recheck negative. Metamucil. 2mg. QID Imodium 2mg. 2mg qid prn.  # GERD: Protonix  - Anti-emetics: Zofran, dex scheduled during chemotherapy. Compazine, lorazepam available PRN.   - Ulcer prophy: Protonix  - VOD prophy: Ursodiol 300mg TID  - Dietician support to prevent malnutrition  - Miralax and senna PRN constipation. For now, Miralax scheduled at bedtime.      CARDIOVASCULAR  # HTN- lisinopril 15mg daily. Dc'd starting 8/26 with orthostatics and some dizziness.  # CAD: Coronary artery calcifications seen on CT, stress test in 2023 negative  - Risk of cardiomyopathy: Baseline EF 55-60%.   - Risk of arrhythmia: Baseline EKG showed 421       RENAL/ELECTROLYTES/  #Hypervolemia- up 6lbs since (9/5) will give 20mg lasix   #Hyponatremia: osms urine/serum, Na urine/serum indicate hypovolemic hyponatremia. Encourage PO, will give IVF as necessary and slow down stools. RESOLVED WITH IVF. Stably waxing and waning.  - Electrolyte management: replace per sliding scale  - BPH: Continue home flomax.      MUSCULOSKELETAL/FRAILTY  - Baseline Frailty Score: Not frail (0)  - Daily PT/OT as needed while inpatient  - Gout: continue allopurinol      Neuro   #HA: no red flag symptoms. Pt has history of spinal stenosis, lumbosacral radiculopathy likely exacerbating. Tramadol PRN. Voltaren gel, ice and PT asked for suggestions. In the setting of thrombocytopenia, consider CT head if changes in symptoms or worsens. No currently complaints of HA.   - Pain management: Outpatient has been taking celecoxib for MSK pain, once a day. Inpatient will try Tramadol - available PRN. Added robaxin TID     Derm:  - Follicular rash to chest, back- Started topical clindamycin  "8/28. Much improved.      SOCIAL DETERMINANTS  - Caregiver: wife, Vani. Lives within the required distance from hospital but may elect to stay in [facility].   - Financial/insurance concerns: None    Medically Ready for Discharge: Anticipated in 5+ Days      Clinically Significant Risk Factors            # Hypomagnesemia: Lowest Mg = 1.6 mg/dL in last 2 days, will replace as needed   # Hypoalbuminemia: Lowest albumin = 2.9 g/dL at 9/5/2024  2:54 AM, will monitor as appropriate   # Thrombocytopenia: Lowest platelets = 13 in last 2 days, will monitor for bleeding   # Hypertension: Noted on problem list           # Overweight: Estimated body mass index is 27.84 kg/m  as calculated from the following:    Height as of this encounter: 1.84 m (6' 0.44\").    Weight as of this encounter: 94.3 kg (207 lb 12.8 oz).            I spent 30 minutes in the care of this patient today, which included time necessary for preparation for the visit, obtaining history, ordering medications/tests/procedures as medically indicated, review of pertinent medical literature, counseling of the patient, communication of recommendations to the care team, and documentation time.    Bessie Lazo PA-C  [phone]  "

## 2024-09-06 NOTE — PHARMACY-VANCOMYCIN DOSING SERVICE
Pharmacy Vancomycin Note  Date of Service 2024  Patient's  1955   69 year old, male    Indication:  Strep bacteremia/MRSE bacteremia  Day of Therapy: 4  Current vancomycin regimen:  1750 mg IV every 12 hours  Current vancomycin monitoring method: AUC  Current vancomycin therapeutic monitoring goal: 400-600 mg*h/L    Insight[employer] Prediction of Current Vancomycin Regimen  Loading dose: N/A  Regimen: 1750 mg IV every 12 hours.  Start time: 15:48 on 2024  Exposure target: AUC24 (range)400-600 mg/L.hr   AUC24,ss: 463 mg/L.hr  Probability of AUC24 > 400: 85 %  Ctrough,ss: 9.7 mg/L  Probability of Ctrough,ss > 20: 0 %  Probability of nephrotoxicity (Lodise VIRI ): 6 %    Current estimated CrCl = Estimated Creatinine Clearance: 120.6 mL/min (based on SCr of 0.76 mg/dL).    Creatinine for last 3 days  2024:  3:18 AM Creatinine 0.66 mg/dL  2024:  2:54 AM Creatinine 0.80 mg/dL  2024:  3:37 AM Creatinine 0.76 mg/dL    Recent Vancomycin Levels (past 3 days)  2024:  3:18 AM Vancomycin 6.0 ug/mL  2024:  3:37 AM Vancomycin 10.3 ug/mL    Vancomycin IV Administrations (past 72 hours)                     vancomycin (VANCOCIN) 1,750 mg in sodium chloride 0.9 % 500 mL intermittent infusion (mg) 1,750 mg New Bag 24 0348     1,750 mg New Bag 24 1623     1,750 mg New Bag  0305     1,750 mg New Bag 24 1750    vancomycin (VANCOCIN) 1,250 mg in 0.9% NaCl 250 mL intermittent infusion (mg) 1,250 mg New Bag 24 0452     1,250 mg New Bag 24 1733                    Nephrotoxins and other renal medications (From now, onward)      Start     Dose/Rate Route Frequency Ordered Stop    24 1700  vancomycin (VANCOCIN) 1,750 mg in sodium chloride 0.9 % 500 mL intermittent infusion         1,750 mg  over 120 Minutes Intravenous EVERY 12 HOURS 24 0802      24 0800  acyclovir (ZOVIRAX) tablet 800 mg         800 mg Oral 2 TIMES DAILY 24 141                  Contrast Orders - past 72 hours (72h ago, onward)      None            Interpretation of levels and current regimen:  Vancomycin level is reflective of -600    Has serum creatinine changed greater than 50% in last 72 hours: No    Urine output:  good urine output    Renal Function: Stable    Plan:  Continue Current Dose  Vancomycin monitoring method: AUC  Vancomycin therapeutic monitoring goal: 400-600 mg*h/L  Pharmacy will check vancomycin levels as appropriate in 3-5 Days.  Serum creatinine levels will be ordered daily for the first week of therapy and at least twice weekly for subsequent weeks.    Tj Ramesh, PharmD Candidate

## 2024-09-06 NOTE — PROCEDURES
Brief Op Note    Name: Leland Pearson   Admission Date: 2024  MRN: [id number]    Attending Physician: Dr. Kj Puckett  Resident Physician: N/A  Advanced Practice Provider: N/A    Room/Bed: 5419/5419-01  Sex: Male  : 1955   Age: 69 year old    Diagnosis and Procedure  Pre-Operative Diagnosis: Bacteremia  Post-Operative Diagnosis: Same    Procedure:  Tunneled central venous catheter removal  Anesthesia: None    Procedure Data  Technique:  None  Findings: Technically successful removal of tunneled, cuffed, low-flow central venous catheter.   EBL: < 5 mL  Specimen Submitted: None  Complications: None  Drains: None    Condition/Disposition: Stable into care of anesthesia/sedation team; full report to follow.    For the final procedural/operative note, please look under: Chart Review > Imaging    Kj Puckett MD

## 2024-09-07 LAB
ABO/RH(D): NORMAL
ACANTHOCYTES BLD QL SMEAR: NORMAL
ALBUMIN SERPL BCG-MCNC: 2.8 G/DL (ref 3.5–5.2)
ALBUMIN UR-MCNC: 50 MG/DL
ALP SERPL-CCNC: 53 U/L (ref 40–150)
ALT SERPL W P-5'-P-CCNC: 18 U/L (ref 0–70)
ANION GAP SERPL CALCULATED.3IONS-SCNC: 11 MMOL/L (ref 7–15)
ANTIBODY SCREEN: NEGATIVE
APPEARANCE UR: CLEAR
AST SERPL W P-5'-P-CCNC: 30 U/L (ref 0–45)
AUER BODIES BLD QL SMEAR: NORMAL
BASO STIPL BLD QL SMEAR: NORMAL
BASOPHILS # BLD AUTO: ABNORMAL 10*3/UL
BASOPHILS NFR BLD AUTO: ABNORMAL %
BILIRUB DIRECT SERPL-MCNC: 0.28 MG/DL (ref 0–0.3)
BILIRUB SERPL-MCNC: 0.7 MG/DL
BILIRUB UR QL STRIP: NEGATIVE
BITE CELLS BLD QL SMEAR: NORMAL
BLISTER CELLS BLD QL SMEAR: NORMAL
BUN SERPL-MCNC: 9.8 MG/DL (ref 8–23)
BURR CELLS BLD QL SMEAR: NORMAL
CALCIUM SERPL-MCNC: 8.3 MG/DL (ref 8.8–10.4)
CHLORIDE SERPL-SCNC: 100 MMOL/L (ref 98–107)
COLOR UR AUTO: YELLOW
CREAT SERPL-MCNC: 0.63 MG/DL (ref 0.67–1.17)
DACRYOCYTES BLD QL SMEAR: NORMAL
EGFRCR SERPLBLD CKD-EPI 2021: >90 ML/MIN/1.73M2
ELLIPTOCYTES BLD QL SMEAR: NORMAL
EOSINOPHIL # BLD AUTO: ABNORMAL 10*3/UL
EOSINOPHIL NFR BLD AUTO: ABNORMAL %
ERYTHROCYTE [DISTWIDTH] IN BLOOD BY AUTOMATED COUNT: 17.9 % (ref 10–15)
FRAGMENTS BLD QL SMEAR: NORMAL
GLUCOSE SERPL-MCNC: 120 MG/DL (ref 70–99)
GLUCOSE UR STRIP-MCNC: NEGATIVE MG/DL
HCO3 SERPL-SCNC: 22 MMOL/L (ref 22–29)
HCT VFR BLD AUTO: 22.7 % (ref 40–53)
HGB BLD-MCNC: 7.8 G/DL (ref 13.3–17.7)
HGB C CRYSTALS: NORMAL
HGB UR QL STRIP: ABNORMAL
HOWELL-JOLLY BOD BLD QL SMEAR: NORMAL
HYALINE CASTS: 5 /LPF
IMM GRANULOCYTES # BLD: ABNORMAL 10*3/UL
IMM GRANULOCYTES NFR BLD: ABNORMAL %
KETONES UR STRIP-MCNC: NEGATIVE MG/DL
LACTATE SERPL-SCNC: 1.5 MMOL/L (ref 0.7–2)
LACTATE SERPL-SCNC: 2.3 MMOL/L (ref 0.7–2)
LEUKOCYTE ESTERASE UR QL STRIP: NEGATIVE
LYMPHOCYTES # BLD AUTO: ABNORMAL 10*3/UL
LYMPHOCYTES NFR BLD AUTO: ABNORMAL %
MAGNESIUM SERPL-MCNC: 1.8 MG/DL (ref 1.7–2.3)
MCH RBC QN AUTO: 26.7 PG (ref 26.5–33)
MCHC RBC AUTO-ENTMCNC: 34.4 G/DL (ref 31.5–36.5)
MCV RBC AUTO: 78 FL (ref 78–100)
MONOCYTES # BLD AUTO: ABNORMAL 10*3/UL
MONOCYTES NFR BLD AUTO: ABNORMAL %
MUCOUS THREADS #/AREA URNS LPF: PRESENT /LPF
NEUTROPHILS # BLD AUTO: ABNORMAL 10*3/UL
NEUTROPHILS NFR BLD AUTO: ABNORMAL %
NEUTS HYPERSEG BLD QL SMEAR: NORMAL
NITRATE UR QL: NEGATIVE
PH UR STRIP: 6 [PH] (ref 5–7)
PLAT MORPH BLD: NORMAL
PLATELET # BLD AUTO: 21 10E3/UL (ref 150–450)
POLYCHROMASIA BLD QL SMEAR: NORMAL
POTASSIUM SERPL-SCNC: 4 MMOL/L (ref 3.4–5.3)
PROCALCITONIN SERPL IA-MCNC: 0.37 NG/ML
PROT SERPL-MCNC: 5.8 G/DL (ref 6.4–8.3)
RBC # BLD AUTO: 2.92 10E6/UL (ref 4.4–5.9)
RBC AGGLUT BLD QL: NORMAL
RBC MORPH BLD: NORMAL
RBC URINE: 5 /HPF
ROULEAUX BLD QL SMEAR: NORMAL
SICKLE CELLS BLD QL SMEAR: NORMAL
SIROLIMUS BLD-MCNC: 18.1 UG/L (ref 5–15)
SMUDGE CELLS BLD QL SMEAR: NORMAL
SODIUM SERPL-SCNC: 133 MMOL/L (ref 135–145)
SP GR UR STRIP: 1.02 (ref 1–1.03)
SPECIMEN EXPIRATION DATE: NORMAL
SPHEROCYTES BLD QL SMEAR: NORMAL
STOMATOCYTES BLD QL SMEAR: NORMAL
TARGETS BLD QL SMEAR: NORMAL
TME LAST DOSE: ABNORMAL H
TME LAST DOSE: ABNORMAL H
TOXIC GRANULES BLD QL SMEAR: NORMAL
UROBILINOGEN UR STRIP-MCNC: NORMAL MG/DL
VARIANT LYMPHS BLD QL SMEAR: NORMAL
WBC # BLD AUTO: 0.1 10E3/UL (ref 4–11)
WBC URINE: 1 /HPF

## 2024-09-07 PROCEDURE — 87040 BLOOD CULTURE FOR BACTERIA: CPT | Performed by: PHYSICIAN ASSISTANT

## 2024-09-07 PROCEDURE — 83735 ASSAY OF MAGNESIUM: CPT

## 2024-09-07 PROCEDURE — 250N000011 HC RX IP 250 OP 636: Mod: JZ | Performed by: INTERNAL MEDICINE

## 2024-09-07 PROCEDURE — 80195 ASSAY OF SIROLIMUS: CPT | Performed by: INTERNAL MEDICINE

## 2024-09-07 PROCEDURE — 250N000013 HC RX MED GY IP 250 OP 250 PS 637: Performed by: PHYSICIAN ASSISTANT

## 2024-09-07 PROCEDURE — 86900 BLOOD TYPING SEROLOGIC ABO: CPT

## 2024-09-07 PROCEDURE — 99232 SBSQ HOSP IP/OBS MODERATE 35: CPT | Performed by: PHYSICIAN ASSISTANT

## 2024-09-07 PROCEDURE — 250N000013 HC RX MED GY IP 250 OP 250 PS 637: Performed by: NURSE PRACTITIONER

## 2024-09-07 PROCEDURE — 36415 COLL VENOUS BLD VENIPUNCTURE: CPT | Performed by: PHYSICIAN ASSISTANT

## 2024-09-07 PROCEDURE — 82247 BILIRUBIN TOTAL: CPT | Performed by: PHYSICIAN ASSISTANT

## 2024-09-07 PROCEDURE — 80048 BASIC METABOLIC PNL TOTAL CA: CPT

## 2024-09-07 PROCEDURE — 250N000011 HC RX IP 250 OP 636: Mod: JZ

## 2024-09-07 PROCEDURE — 84145 PROCALCITONIN (PCT): CPT | Performed by: PHYSICIAN ASSISTANT

## 2024-09-07 PROCEDURE — 250N000013 HC RX MED GY IP 250 OP 250 PS 637: Performed by: INTERNAL MEDICINE

## 2024-09-07 PROCEDURE — 250N000011 HC RX IP 250 OP 636: Performed by: INTERNAL MEDICINE

## 2024-09-07 PROCEDURE — 99233 SBSQ HOSP IP/OBS HIGH 50: CPT | Mod: FS | Performed by: INTERNAL MEDICINE

## 2024-09-07 PROCEDURE — 80053 COMPREHEN METABOLIC PANEL: CPT

## 2024-09-07 PROCEDURE — 83605 ASSAY OF LACTIC ACID: CPT

## 2024-09-07 PROCEDURE — 258N000003 HC RX IP 258 OP 636

## 2024-09-07 PROCEDURE — 250N000013 HC RX MED GY IP 250 OP 250 PS 637

## 2024-09-07 PROCEDURE — 81001 URINALYSIS AUTO W/SCOPE: CPT

## 2024-09-07 PROCEDURE — 85027 COMPLETE CBC AUTOMATED: CPT

## 2024-09-07 PROCEDURE — 206N000001 HC R&B BMT UMMC

## 2024-09-07 PROCEDURE — 250N000009 HC RX 250

## 2024-09-07 PROCEDURE — 250N000012 HC RX MED GY IP 250 OP 636 PS 637

## 2024-09-07 PROCEDURE — 36415 COLL VENOUS BLD VENIPUNCTURE: CPT

## 2024-09-07 RX ORDER — SALIVA STIMULANT COMB. NO.3
2 SPRAY, NON-AEROSOL (ML) MUCOUS MEMBRANE 4 TIMES DAILY
Status: DISCONTINUED | OUTPATIENT
Start: 2024-09-07 | End: 2024-09-19 | Stop reason: HOSPADM

## 2024-09-07 RX ORDER — OXYMETAZOLINE HYDROCHLORIDE 0.05 G/100ML
2 SPRAY NASAL 2 TIMES DAILY PRN
Status: DISCONTINUED | OUTPATIENT
Start: 2024-09-07 | End: 2024-09-11

## 2024-09-07 RX ORDER — LIDOCAINE 40 MG/G
CREAM TOPICAL
Status: DISCONTINUED | OUTPATIENT
Start: 2024-09-07 | End: 2024-09-07

## 2024-09-07 RX ORDER — DIPHENHYDRAMINE HYDROCHLORIDE AND LIDOCAINE HYDROCHLORIDE AND ALUMINUM HYDROXIDE AND MAGNESIUM HYDRO
10 KIT EVERY 6 HOURS PRN
Status: DISCONTINUED | OUTPATIENT
Start: 2024-09-07 | End: 2024-09-11

## 2024-09-07 RX ORDER — POTASSIUM PHOS IN 0.9 % NACL 15MMOL/250
15 PLASTIC BAG, INJECTION (ML) INTRAVENOUS ONCE
Status: COMPLETED | OUTPATIENT
Start: 2024-09-07 | End: 2024-09-07

## 2024-09-07 RX ADMIN — URSODIOL 300 MG: 300 CAPSULE ORAL at 20:07

## 2024-09-07 RX ADMIN — SODIUM CHLORIDE, POTASSIUM CHLORIDE, SODIUM LACTATE AND CALCIUM CHLORIDE 500 ML: 600; 310; 30; 20 INJECTION, SOLUTION INTRAVENOUS at 12:32

## 2024-09-07 RX ADMIN — METHOCARBAMOL 500 MG: 500 TABLET ORAL at 14:04

## 2024-09-07 RX ADMIN — URSODIOL 300 MG: 300 CAPSULE ORAL at 14:04

## 2024-09-07 RX ADMIN — SIROLIMUS 6 MG: 2 TABLET ORAL at 08:53

## 2024-09-07 RX ADMIN — URSODIOL 300 MG: 300 CAPSULE ORAL at 08:52

## 2024-09-07 RX ADMIN — METHOCARBAMOL 500 MG: 500 TABLET ORAL at 20:08

## 2024-09-07 RX ADMIN — Medication 3 CAPSULE: at 08:52

## 2024-09-07 RX ADMIN — ACYCLOVIR 800 MG: 800 TABLET ORAL at 08:52

## 2024-09-07 RX ADMIN — METHOCARBAMOL 500 MG: 500 TABLET ORAL at 08:52

## 2024-09-07 RX ADMIN — CEFEPIME HYDROCHLORIDE 2 G: 2 INJECTION, POWDER, FOR SOLUTION INTRAVENOUS at 11:52

## 2024-09-07 RX ADMIN — Medication 2 SPRAY: at 20:09

## 2024-09-07 RX ADMIN — CEFEPIME HYDROCHLORIDE 2 G: 2 INJECTION, POWDER, FOR SOLUTION INTRAVENOUS at 20:09

## 2024-09-07 RX ADMIN — CALCIUM CARBONATE 600 MG (1,500 MG)-VITAMIN D3 400 UNIT TABLET 2 TABLET: at 08:52

## 2024-09-07 RX ADMIN — OXYMETAZOLINE HCL 2 SPRAY: 0.05 SPRAY NASAL at 11:52

## 2024-09-07 RX ADMIN — LOPERAMIDE HYDROCHLORIDE 2 MG: 2 CAPSULE ORAL at 16:47

## 2024-09-07 RX ADMIN — Medication 2 SPRAY: at 16:48

## 2024-09-07 RX ADMIN — ACYCLOVIR 800 MG: 800 TABLET ORAL at 20:09

## 2024-09-07 RX ADMIN — FILGRASTIM-AAFI 480 MCG: 480 INJECTION, SOLUTION INTRAVENOUS; SUBCUTANEOUS at 20:09

## 2024-09-07 RX ADMIN — LOPERAMIDE HYDROCHLORIDE 2 MG: 2 CAPSULE ORAL at 11:56

## 2024-09-07 RX ADMIN — LOPERAMIDE HYDROCHLORIDE 2 MG: 2 CAPSULE ORAL at 20:08

## 2024-09-07 RX ADMIN — MYCOPHENOLATE MOFETIL 1250 MG: 500 INJECTION, POWDER, LYOPHILIZED, FOR SOLUTION INTRAVENOUS at 11:08

## 2024-09-07 RX ADMIN — MYCOPHENOLATE MOFETIL 1250 MG: 500 INJECTION, POWDER, LYOPHILIZED, FOR SOLUTION INTRAVENOUS at 22:08

## 2024-09-07 RX ADMIN — ALLOPURINOL 300 MG: 300 TABLET ORAL at 08:53

## 2024-09-07 RX ADMIN — TAMSULOSIN HYDROCHLORIDE 0.4 MG: 0.4 CAPSULE ORAL at 16:48

## 2024-09-07 RX ADMIN — Medication 15 MMOL: at 00:23

## 2024-09-07 RX ADMIN — PROCHLORPERAZINE MALEATE 5 MG: 5 TABLET ORAL at 16:48

## 2024-09-07 RX ADMIN — LOPERAMIDE HYDROCHLORIDE 2 MG: 2 CAPSULE ORAL at 08:53

## 2024-09-07 RX ADMIN — Medication 2 SPRAY: at 11:08

## 2024-09-07 RX ADMIN — CALCIUM CARBONATE 600 MG (1,500 MG)-VITAMIN D3 400 UNIT TABLET 2 TABLET: at 20:08

## 2024-09-07 RX ADMIN — MICAFUNGIN SODIUM 150 MG: 50 INJECTION, POWDER, LYOPHILIZED, FOR SOLUTION INTRAVENOUS at 09:01

## 2024-09-07 RX ADMIN — PANTOPRAZOLE SODIUM 40 MG: 40 TABLET, DELAYED RELEASE ORAL at 08:52

## 2024-09-07 RX ADMIN — CEFEPIME HYDROCHLORIDE 2 G: 2 INJECTION, POWDER, FOR SOLUTION INTRAVENOUS at 04:47

## 2024-09-07 ASSESSMENT — ACTIVITIES OF DAILY LIVING (ADL)
ADLS_ACUITY_SCORE: 24

## 2024-09-07 NOTE — PROGRESS NOTES
09/07/24 1100   Call Information   Date of Call 09/07/24   Time of Call 1119   Name of person requesting the team Daphne   Title of person requesting team RN   RRT Arrival time 1122   Time RRT ended 1135   Reason for call   Type of RRT Adult   Primary reason for call Sepsis suspected   Sepsis Suspected Elevated Lactate level;Heart Rate > 100;BMT/Oncology patient with WBC<0.5 or >12 or ANC <500   Was patient transferred from the ED, ICU, or PACU within last 24 hours prior to RRT call? No   SBAR   Situation LA 2.3   Background per provider note...hx of hypertension..recent diagnosis of MDS/AML now status post matched unrelated donor transplant on 8/23/2024 with now development of persistent neutropenic fever.  Initial evaluation reveals Streptococcus mitis bacteremia from central venous catheter as well as single culture of MRSE from peripheral blood.  Patient was started on vancomycin and cefepime but has been persistently febrile since 9/2/2024.  Additional evaluation includes negative chest x-ray, negative urinalysis, negative C. difficile PCR.   Notable History/Conditions Hypertension;Cancer   Assessment pt is vitally stable.  alert and oriented. currently denies pain.   Interventions Labs   Patient Outcome   Patient Outcome Stabilized on unit   RRT Team   Date Attending Physician notified 09/07/24   Time Attending Physician notified 1119   Physician(s) Roseline DICK   Lead RN Vanesa BARRAGAN   Post RRT Intervention Assessment   Post RRT Assessment Stable/Improved   Date Follow Up Done 09/07/24   Time Follow Up Done 1315   Comments recheck LA 1.5

## 2024-09-07 NOTE — PROVIDER NOTIFICATION
"Provider STANLEY Cortes verbally notified of patient's positive orthostatic BPs this AM.     Patient denies lightheadedness or dizziness. Patient educated to call for help when feeling unsteady or symptomatic.     Provider STANLEY Cortes verbally notified of patient's lactic acid of 2.3. RRT called.     Repeat peripheral blood culture and LR 500ml bolus ordered. LR 500ml bolus administered per order. Blood culture collected by .    Provider STANLEY Cortes text paged via [employer] \"Patient's weight is down 2kg from yesterday. Weight is up to 2kg since admission. He has fair appetite and oral intake of fluids.\"    Provider stated no further intervention needed since patient is already receiving LR bolus.     Provider STANLEY Cortes verbally notified of patient's right nasal bleeding.     Provider ordered afrin. Afrin, gauze and pressure applied with resolution of nasal bleeding.  "

## 2024-09-07 NOTE — PROGRESS NOTES
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     Diagnosis MDS/AML  HCT Type Allogeneic     Prep Regimen Flu/Cy/TBI  Donor Match & Source 8/8 DP permissive PBSC    GVHD Prophylaxis PTCy/MMF/Siro  Primary BMT MD Dr. Murphy    Clinical Trials [id number]       INTERVAL  HISTORY     Febrile x1 100.8, defervesced without antipyretics, BP stable.  No N/V, stools stable. Tenderness in belly diffusely, LLQ worse. No rectal pain, hematochezia.  Ate blizzard from DQ x1. Otherwise not interested in eating.      Review of Systems: 10 point ROS negative except as noted above.    PHYSICAL EXAM     Weight In/Out     Wt Readings from Last 3 Encounters:   09/07/24 92 kg (202 lb 12.8 oz)   08/09/24 89.9 kg (198 lb 4.8 oz)   08/08/24 90.3 kg (199 lb 1.2 oz)      I/O last 3 completed shifts:  In: 2067 [P.O.:240; I.V.:1612]  Out: 3800 [Urine:3800]     KPS:  80    BP (!) 141/90 (BP Location: Right arm)   Pulse 93   Temp 98.7  F (37.1  C) (Oral)   Resp 18   Ht 1.84 m (6' 0.44\")   Wt 92 kg (202 lb 12.8 oz)   SpO2 98%   BMI 27.17 kg/m       General:  NAD   HEENT: +aphthous clean based ulcer noted on tongue.   Lungs: BCTA  CV: RRR  Abdomen: tender to deep palpitation LLQ otherwise nontender belly, soft ND, +BS  Lymphatics: No edema  Skin: folliculitis on back is resolving  Neuro: A&O, appropriately interactive, speech clear, moving all extremities   Additional Findings: Estrada removed. 2 PIV    Current aGVHD staging:  Skin 0, UGI 0, LGI 0, Liver 0     LABS AND IMAGING: I have assessed all abnormal lab values for their clinical significance and any values considered clinically significant have been addressed in the assessment and plan.      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     BMT/IEC PROTOCOL for MDS  - Chemo protocol: MT 2022-5  D-6: Flu 30mg/m2, Cy 50mg/kg  D-5 to D-2: Flu  D-1: TBI  D0: stem cell infusion  - Peripheral blood stem cell graft from 8/8 URD donor, ABO matched, Cell dose: TBD  - Engraftment monitoring: Peripheral blood and bone marrow chimerisms on D28, D100, 6 months, 1 and 2 years  - Restaging plan: BMBx with FISH, cytogenetics and NGS on D28, D100, 6 months, 1 and 2 years     HEME/COAG  # Pancytopenia secondary to disease process  - GCSF starts day +5, continue until ANC > 2500 for 2 consecutive days  - Transfusion parameters: hemoglobin <7, platelets <10     RISK OF GVHD  - Prophylaxis: PTCy 50mg/kg on D+3 and D+4; MMF (D+5 to 35); Sirolimus (starting D+5, target level 5-10).  - Siro level = 9.8 (9/4) recheck today (9/7)     ID  #Streptococcus mitis bacteremia (2/2 bottles) - Cefepime (9/1-x)   -ID following, recommends CVC removal and line holiday, echo, continue cefepime. Currently 2 PIV. PICC can be placed Monday 9/9.  #MRSE bacteremia likely contaminant because peripheral stick, grew late. will discontinue (1/2) -Vanco (9/2-9/6)   -sensitivities pending   -trend cultures x3 days negative so far  #Febrile Neutropenia (9/2-x)   *see BC above, otherwise unremarkable  #Diarrhea- c.diff negative   -enteric panel pending  #Rectal pain      Prophylaxis plan:   - Bacterial: Levaquin 250mg while neutropenic Now on hold with cefepime   - Fungal:  Micafungin 300mg 3x/week until D+45, followed by mold active azole. Pulm nodules present on workup CT.   - PJP: Bactrim to start at D+28. Toxoplasma negative.   - Viral: Acyclovir 800mg BID. No need for letermovir as donor and recipient are CMV negative.      Monitoring:   - CMV weekly, 8/31 neg  - EBV every 2 weeks from D30 to D180     GI/NUTRITION  #Hemorrhoids- prep H, lidocaine  # Diarrhea (9/3): c.diff recheck negative. Metamucil. 2mg. QID Imodium 2mg. 2mg qid prn.  # GERD: Protonix  - Anti-emetics: Zofran, dex scheduled during chemotherapy. Compazine, lorazepam available PRN.   - Ulcer prophy: Protonix  - VOD prophy: Ursodiol 300mg TID  - Dietician support to prevent malnutrition  - Miralax and senna PRN constipation. For now, Miralax scheduled at bedtime.      CARDIOVASCULAR  # HTN- lisinopril 15mg daily. Dc'd starting 8/26 with orthostatics and some dizziness.  # CAD: Coronary artery calcifications seen on CT, stress test in 2023 negative  - Risk of cardiomyopathy: Baseline EF 55-60%.   - Risk of arrhythmia: Baseline EKG showed 421       RENAL/ELECTROLYTES/  #Hypervolemia- up 6lbs since (9/5) will give 20mg lasix. Resolved.  #Hyponatremia: osms urine/serum, Na urine/serum indicate hypovolemic hyponatremia. Encourage PO, will give IVF as necessary and slow down stools. RESOLVED WITH IVF. Stably waxing and waning.  - Electrolyte management: replace per sliding scale  - BPH: Continue home flomax.      MUSCULOSKELETAL/FRAILTY  - Baseline Frailty Score: Not frail (0)  - Daily PT/OT as needed while inpatient  - Gout: continue allopurinol      Neuro   #HA: no red flag symptoms. Pt has history of spinal stenosis, lumbosacral radiculopathy likely exacerbating. Tramadol PRN. Voltaren gel, ice and PT asked for suggestions. In the setting of thrombocytopenia, consider CT head if changes in symptoms or worsens. No currently complaints of HA.   - Pain management: Outpatient has been taking celecoxib for MSK pain, once a  "day. Inpatient will try Tramadol - available PRN. Added robaxin TID     Derm:  - Follicular rash to chest, back- Started topical clindamycin 8/28. Much improved.      SOCIAL DETERMINANTS  - Caregiver: wife, Vani. Lives within the required distance from hospital but may elect to stay in [facility].   - Financial/insurance concerns: None    Medically Ready for Discharge: Anticipated in 5+ Days      Clinically Significant Risk Factors              # Hypoalbuminemia: Lowest albumin = 2.9 g/dL at 9/5/2024  2:54 AM, will monitor as appropriate   # Thrombocytopenia: Lowest platelets = 13 in last 2 days, will monitor for bleeding   # Hypertension: Noted on problem list           # Overweight: Estimated body mass index is 27.17 kg/m  as calculated from the following:    Height as of this encounter: 1.84 m (6' 0.44\").    Weight as of this encounter: 92 kg (202 lb 12.8 oz).            I spent 30 minutes in the care of this patient today, which included time necessary for preparation for the visit, obtaining history, ordering medications/tests/procedures as medically indicated, review of pertinent medical literature, counseling of the patient, communication of recommendations to the care team, and documentation time.    Bessie Lazo PA-C  [phone]  "

## 2024-09-07 NOTE — CODE/RAPID RESPONSE
Rapid Response Team Note    Assessment   A rapid response was called on Leland Pearson due to SIRS/Sepsis trigger. This presentation is unclear, but possibly related to intravascular depletion from recent lasix use and diarrhea (C. Diff and enteric panel negative), but sepsis on differential with febrile neutropenia, though fever curve is down trending and patient on appropriate abx with Cefepime for strep mitis bacteremia with removal of CVC yesterday. Lactic was previous normal, so with rise leads to suspicion for new infectious process. Patient is reporting new dysuria today.     Plan   -  Blood culture, UA. Mild RUQ abdominal pain, will add on LFTs. If diarrhea worsens or if patient develops worse abd pain may need to retest for C. Diff   -  Agree with IVF, 500 ml LR bolus.   -  Repeat lactic acid in 3 hours  -  Continue cefepime for now, low threshold to broaden if develops hemodynamic instability or new infectious source identified.   -  The  BMT  primary team was able to be reached and they are in agreement with the above plan.  -  Disposition: The patient will remain on the current unit. We will continue to monitor this patient closely.  -  Reassessment and plan follow-up will be performed by the primary team    Roseline Strong PA-C  Rapid Response Team VJ  Securely message with [employer]     Medical Decision Making       25 MINUTES SPENT BY ME on the date of service doing chart review, history, exam, documentation & further activities per the note.      Hospital Course   Brief Summary of events leading to rapid response:   Leland Pearson is a 68 yo M with PMHx of MDS/AML admitted on 8/16/2024 for allogenic bone marrow transplant, with hospital course c/b febrile neutropenia 2/2 Strep mitus bacteremia (2/2 bottles), possibly related to CVC catheter that was subsequently removed on 9/6, currently on cefepime.     RRT was called for lactic of 2.3, triggered by sepsis BPA.     Patient reports feeling  generally unwell. Developed bloody nose this morning. States he has been having fevers, though this has been improving, only had a fever to 100.8F last night previously 102F. Denies any CP, SOB, cough, or N/V. Reports having diarrhea, which has been slightly more formed today. Patient reports urinating frequently after lasix. Reports dysuria, today that is new. Denies any abdominal pain, until his rounding team was palpating on his abdomen. Denies any flank pain, or back pain. Previous rash has been improving. No reported joint pain or swelling. Mucositis present but has also improved.        Physical Exam   Vital Signs: Temp: 97.7  F (36.5  C) Temp src: Oral BP: 124/79 Pulse: 94   Resp: 18 SpO2: 97 % O2 Device: None (Room air)    Weight: 202 lbs 12.8 oz  Exam:   GENERAL: Alert and awake. Answering questions appropriately.  NAD, lying in chair.   HEENT: Anicteric sclera. Mucous membranes moist   CARDIOVASCULAR: RRR. S1, S2. No murmurs, rubs, or gallops.   RESPIRATORY: Effort normal on RA. Clear to auscultation bilaterally, no rales, rhonchi or wheezes  GI: Abdomen soft, Mild TTP in RUQ, negative doyle's sign. No rebound or guarding. Normoactive BS.  NEUROLOGICAL: CN II-XII grossly intact.   SKIN: Intact. Warm and dry. No jaundice.    Significant Results and Procedures   See chart.     Sepsis Evaluation   The patient is known to have an infection.    NO EVIDENCE OF SEPSIS at this time.  Vital sign, physical exam, and lab findings are due to on appropriate abx.

## 2024-09-07 NOTE — PLAN OF CARE
"/86 (BP Location: Right arm)   Pulse 93   Temp 98.8  F (37.1  C) (Oral)   Resp 18   Ht 1.84 m (6' 0.44\")   Wt 92 kg (202 lb 12.8 oz)   SpO2 95%   BMI 27.17 kg/m      VSS except for positive orthostatic BPs. Afebrile. Orthostatic BPs positive in AM. Patient is asymptomatic. Up independently.     Patient had 2-3/10 neck pain that radiated to his back. Patient denies pain this afternoon. Rest and relaxation promoted. Patient declined prn Voltaren gel.     Patient has fair appetite. Supplements encouraged. Patient had multiple soft/loose BMs. Scheduled imodium given. No further replacements indicated this shift.    Patient had lactic of 2.3. RRT called. Repeat peripheral blood culture collected. 500ml LR bolus given per order. Repeat lactic is 1.5    Patient had right nasal bleeding. Gauze and pressure applied with nose clamp. Afrin nasal spray administered. Nose bleed since resolved.     Continue with plan of care and notify provider with changes.      Goal Outcome Evaluation:      Plan of Care Reviewed With: patient  Overall Patient Progress: no change       Problem: Adult Inpatient Plan of Care  Goal: Plan of Care Review  Description: The Plan of Care Review/Shift note should be completed every shift.  The Outcome Evaluation is a brief statement about your assessment that the patient is improving, declining, or no change.  This information will be displayed automatically on your shift  note.  Outcome: Progressing  Flowsheets (Taken 9/7/2024 1416)  Plan of Care Reviewed With: patient  Overall Patient Progress: no change  Goal: Patient-Specific Goal (Individualized)  Description: You can add care plan individualizations to a care plan. Examples of Individualization might be:  \"Parent requests to be called daily at 9am for status\", \"I have a hard time hearing out of my right ear\", or \"Do not touch me to wake me up as it startles  me\".  Outcome: Progressing  Goal: Absence of Hospital-Acquired Illness or " Injury  Outcome: Progressing  Intervention: Identify and Manage Fall Risk  Recent Flowsheet Documentation  Taken 9/7/2024 1500 by Scarlett Engle RN  Safety Promotion/Fall Prevention:   safety round/check completed   clutter free environment maintained   nonskid shoes/slippers when out of bed   patient and family education   room near nurse's station   room organization consistent   assistive device/personal items within reach  Taken 9/7/2024 1230 by Scarlett Engle RN  Safety Promotion/Fall Prevention:   safety round/check completed   clutter free environment maintained   nonskid shoes/slippers when out of bed   patient and family education   room near nurse's station   room organization consistent   assistive device/personal items within reach  Intervention: Prevent Skin Injury  Recent Flowsheet Documentation  Taken 9/7/2024 1230 by Scarlett Engle RN  Body Position: position changed independently  Taken 9/7/2024 0800 by Scarlett Engle RN  Skin Protection:   adhesive use limited   protective footwear used   transparent dressing maintained  Device Skin Pressure Protection:   adhesive use limited   tubing/devices free from skin contact  Intervention: Prevent and Manage VTE (Venous Thromboembolism) Risk  Recent Flowsheet Documentation  Taken 9/7/2024 0800 by Scarlett Engle RN  VTE Prevention/Management: (ambulation promoted) SCDs off (sequential compression devices)  Intervention: Prevent Infection  Recent Flowsheet Documentation  Taken 9/7/2024 1500 by Scarlett Engle RN  Infection Prevention:   environmental surveillance performed   hand hygiene promoted   personal protective equipment utilized   rest/sleep promoted   single patient room provided  Taken 9/7/2024 1230 by Scarlett Engle RN  Infection Prevention:   environmental surveillance performed   hand hygiene promoted   personal protective equipment utilized   rest/sleep promoted   single patient room provided  Taken  9/7/2024 0800 by Scarlett Engle RN  Infection Prevention:   environmental surveillance performed   hand hygiene promoted   personal protective equipment utilized   rest/sleep promoted   single patient room provided  Goal: Optimal Comfort and Wellbeing  Outcome: Progressing  Intervention: Monitor Pain and Promote Comfort  Recent Flowsheet Documentation  Taken 9/7/2024 0911 by Scarlett Engle RN  Pain Management Interventions:   rest   relaxation techniques promoted   medication offered but refused  Goal: Readiness for Transition of Care  Outcome: Progressing     Problem: Risk for Delirium  Goal: Optimal Coping  Outcome: Progressing  Intervention: Optimize Psychosocial Adjustment to Delirium  Recent Flowsheet Documentation  Taken 9/7/2024 0800 by Scarlett Engle RN  Supportive Measures:   active listening utilized   relaxation techniques promoted   self-care encouraged   verbalization of feelings encouraged  Family/Support System Care:   involvement promoted   presence promoted   self-care encouraged  Goal: Improved Sleep  Outcome: Progressing  Intervention: Promote Sleep  Recent Flowsheet Documentation  Taken 9/7/2024 0800 by Scarlett Engle RN  Sleep/Rest Enhancement: consistent schedule promoted     Problem: Stem Cell/Bone Marrow Transplant  Goal: Optimal Coping with Transplant  Outcome: Progressing  Intervention: Optimize Patient/Family Adjustment to Transplant  Recent Flowsheet Documentation  Taken 9/7/2024 0800 by Scarlett Engle RN  Supportive Measures:   active listening utilized   relaxation techniques promoted   self-care encouraged   verbalization of feelings encouraged  Family/Support System Care:   involvement promoted   presence promoted   self-care encouraged  Goal: Symptom-Free Urinary Elimination  Outcome: Progressing  Intervention: Prevent or Manage Bladder Irritation  Recent Flowsheet Documentation  Taken 9/7/2024 0911 by Scarlett Engle RN  Pain Management  Interventions:   rest   relaxation techniques promoted   medication offered but refused  Taken 9/7/2024 0800 by Scarlett Engle RN  Urinary Elimination Promotion: voiding relaxation promoted  Hyperhydration Management: fluids provided  Goal: Diarrhea Symptom Control  Outcome: Progressing  Intervention: Manage Diarrhea  Recent Flowsheet Documentation  Taken 9/7/2024 0800 by Scarlett Engle RN  Skin Protection:   adhesive use limited   protective footwear used   transparent dressing maintained  Fluid/Electrolyte Management: fluids provided  Goal: Improved Activity Tolerance  Outcome: Progressing  Intervention: Promote Improved Energy  Recent Flowsheet Documentation  Taken 9/7/2024 1230 by Scarlett Engle RN  Activity Management:   activity adjusted per tolerance   ambulated in room  Taken 9/7/2024 0800 by Scarlett Engle RN  Fatigue Management:   activity schedule adjusted   frequent rest breaks encouraged  Sleep/Rest Enhancement: consistent schedule promoted  Environmental Support:   calm environment promoted   rest periods encouraged   personal routine supported  Goal: Blood Counts Within Acceptable Range  Outcome: Progressing  Intervention: Monitor and Manage Hematologic Symptoms  Recent Flowsheet Documentation  Taken 9/7/2024 1500 by Scarlett Engle RN  Bleeding Precautions:   monitored for signs of bleeding   gentle oral care promoted  Medication Review/Management: medications reviewed  Taken 9/7/2024 1230 by Scarlett Engle RN  Bleeding Precautions:   monitored for signs of bleeding   gentle oral care promoted  Medication Review/Management: medications reviewed  Taken 9/7/2024 0800 by Scarlett Engle RN  Sleep/Rest Enhancement: consistent schedule promoted  Bleeding Precautions:   monitored for signs of bleeding   gentle oral care promoted  Medication Review/Management: medications reviewed  Goal: Absence of Hypersensitivity Reaction  Outcome: Progressing  Goal: Absence of  Infection  Outcome: Progressing  Intervention: Prevent and Manage Infection  Recent Flowsheet Documentation  Taken 9/7/2024 1500 by Scarlett Engle RN  Infection Prevention:   environmental surveillance performed   hand hygiene promoted   personal protective equipment utilized   rest/sleep promoted   single patient room provided  Infection Management: aseptic technique maintained  Isolation Precautions: protective environment maintained  Taken 9/7/2024 1230 by Scarlett Engle RN  Infection Prevention:   environmental surveillance performed   hand hygiene promoted   personal protective equipment utilized   rest/sleep promoted   single patient room provided  Infection Management: aseptic technique maintained  Isolation Precautions: protective environment maintained  Taken 9/7/2024 0800 by Scarlett Engle RN  Infection Prevention:   environmental surveillance performed   hand hygiene promoted   personal protective equipment utilized   rest/sleep promoted   single patient room provided  Infection Management: aseptic technique maintained  Isolation Precautions: protective environment maintained  Goal: Improved Oral Mucous Membrane Health and Integrity  Outcome: Progressing  Goal: Nausea and Vomiting Symptom Relief  Outcome: Progressing  Intervention: Prevent and Manage Nausea and Vomiting  Recent Flowsheet Documentation  Taken 9/7/2024 0800 by Scarlett Engle RN  Nausea/Vomiting Interventions: antiemetic  Goal: Optimal Nutrition Intake  Outcome: Progressing  Intervention: Minimize and Manage Barriers to Oral Intake  Recent Flowsheet Documentation  Taken 9/7/2024 0800 by Scarlett Engle RN  Oral Nutrition Promotion: rest periods promoted

## 2024-09-07 NOTE — PLAN OF CARE
VSS. Afebrile. Tmax 99. Denies pain. Up ad sergo. CVC removed. Hemoglobin recheck 7.3. Phosphorus recheck 1.7. Additional dose of phosphorus infusing with recheck post-completion. Wife present at bedside.  Problem: Risk for Delirium  Goal: Optimal Coping  Outcome: Progressing     Problem: Risk for Delirium  Goal: Improved Sleep  Outcome: Progressing     Problem: Adult Inpatient Plan of Care  Goal: Plan of Care Review  Description: The Plan of Care Review/Shift note should be completed every shift.  The Outcome Evaluation is a brief statement about your assessment that the patient is improving, declining, or no change.  This information will be displayed automatically on your shift  note.  Outcome: Progressing   Goal Outcome Evaluation:

## 2024-09-07 NOTE — PLAN OF CARE
"Hours of care: 9361-4405    Neuro: A&Ox4.   Vitals: MT of 100.8, VSS.   Respiratory: RA, lung sounds clear, denies SOB  GI/: Voiding spontaneously. No BM this shift.  Diet/appetite: Tolerating high calorie/ high protein diet . Denies nausea.  Activity: Up independently     Pain: Denies   Skin: No new deficits noted.  Lines: 2x PIV  Drains: none  Replacements: K phos replaced at beginning of shift    Plan: Continue with current POC. Report changes to primary team.        Problem: Adult Inpatient Plan of Care  Goal: Plan of Care Review  Description: The Plan of Care Review/Shift note should be completed every shift.  The Outcome Evaluation is a brief statement about your assessment that the patient is improving, declining, or no change.  This information will be displayed automatically on your shift  note.  Outcome: Progressing  Flowsheets (Taken 9/6/2024 2238)  Plan of Care Reviewed With: patient  Overall Patient Progress: no change  Goal: Patient-Specific Goal (Individualized)  Description: You can add care plan individualizations to a care plan. Examples of Individualization might be:  \"Parent requests to be called daily at 9am for status\", \"I have a hard time hearing out of my right ear\", or \"Do not touch me to wake me up as it startles  me\".  Outcome: Progressing  Goal: Absence of Hospital-Acquired Illness or Injury  Outcome: Progressing  Intervention: Identify and Manage Fall Risk  Recent Flowsheet Documentation  Taken 9/6/2024 2000 by Ambreen Sales, RN  Safety Promotion/Fall Prevention: safety round/check completed  Intervention: Prevent Skin Injury  Recent Flowsheet Documentation  Taken 9/6/2024 2000 by Ambreen Sales, RN  Body Position: position changed independently  Goal: Optimal Comfort and Wellbeing  Outcome: Progressing  Goal: Readiness for Transition of Care  Outcome: Progressing     Problem: Risk for Delirium  Goal: Optimal Coping  Outcome: Progressing  Goal: Improved Sleep  Outcome: " Progressing     Problem: Stem Cell/Bone Marrow Transplant  Goal: Optimal Coping with Transplant  Outcome: Progressing  Goal: Symptom-Free Urinary Elimination  Outcome: Progressing  Goal: Diarrhea Symptom Control  Outcome: Progressing  Goal: Improved Activity Tolerance  Outcome: Progressing  Intervention: Promote Improved Energy  Recent Flowsheet Documentation  Taken 9/6/2024 2000 by Ambreen Sales, RN  Activity Management: up ad sergo  Goal: Blood Counts Within Acceptable Range  Outcome: Progressing  Intervention: Monitor and Manage Hematologic Symptoms  Recent Flowsheet Documentation  Taken 9/6/2024 2000 by Ambreen Sales, RN  Medication Review/Management: medications reviewed  Goal: Absence of Hypersensitivity Reaction  Outcome: Progressing  Goal: Absence of Infection  Outcome: Progressing  Goal: Improved Oral Mucous Membrane Health and Integrity  Outcome: Progressing  Goal: Nausea and Vomiting Symptom Relief  Outcome: Progressing  Goal: Optimal Nutrition Intake  Outcome: Progressing   Goal Outcome Evaluation:      Plan of Care Reviewed With: patient    Overall Patient Progress: no changeOverall Patient Progress: no change

## 2024-09-08 LAB
ACANTHOCYTES BLD QL SMEAR: ABNORMAL
ANION GAP SERPL CALCULATED.3IONS-SCNC: 9 MMOL/L (ref 7–15)
AUER BODIES BLD QL SMEAR: ABNORMAL
BACTERIA BLD CULT: NO GROWTH
BACTERIA BLD CULT: NO GROWTH
BASO STIPL BLD QL SMEAR: ABNORMAL
BASOPHILS # BLD AUTO: ABNORMAL 10*3/UL
BASOPHILS NFR BLD AUTO: ABNORMAL %
BITE CELLS BLD QL SMEAR: ABNORMAL
BLD PROD TYP BPU: NORMAL
BLISTER CELLS BLD QL SMEAR: ABNORMAL
BLOOD COMPONENT TYPE: NORMAL
BUN SERPL-MCNC: 10 MG/DL (ref 8–23)
BURR CELLS BLD QL SMEAR: SLIGHT
CALCIUM SERPL-MCNC: 8.4 MG/DL (ref 8.8–10.4)
CHLORIDE SERPL-SCNC: 101 MMOL/L (ref 98–107)
CMV DNA SPEC NAA+PROBE-ACNC: NOT DETECTED IU/ML
CODING SYSTEM: NORMAL
CREAT SERPL-MCNC: 0.63 MG/DL (ref 0.67–1.17)
DACRYOCYTES BLD QL SMEAR: ABNORMAL
EGFRCR SERPLBLD CKD-EPI 2021: >90 ML/MIN/1.73M2
ELLIPTOCYTES BLD QL SMEAR: ABNORMAL
EOSINOPHIL # BLD AUTO: ABNORMAL 10*3/UL
EOSINOPHIL NFR BLD AUTO: ABNORMAL %
ERYTHROCYTE [DISTWIDTH] IN BLOOD BY AUTOMATED COUNT: 17.3 % (ref 10–15)
FRAGMENTS BLD QL SMEAR: ABNORMAL
GLUCOSE BLDC GLUCOMTR-MCNC: 102 MG/DL (ref 70–99)
GLUCOSE SERPL-MCNC: 109 MG/DL (ref 70–99)
HCO3 SERPL-SCNC: 24 MMOL/L (ref 22–29)
HCT VFR BLD AUTO: 23.5 % (ref 40–53)
HGB BLD-MCNC: 7.8 G/DL (ref 13.3–17.7)
HGB C CRYSTALS: ABNORMAL
HOLD SPECIMEN: NORMAL
HOWELL-JOLLY BOD BLD QL SMEAR: ABNORMAL
IMM GRANULOCYTES # BLD: ABNORMAL 10*3/UL
IMM GRANULOCYTES NFR BLD: ABNORMAL %
ISSUE DATE AND TIME: NORMAL
LACTATE SERPL-SCNC: 1.4 MMOL/L (ref 0.7–2)
LYMPHOCYTES # BLD AUTO: ABNORMAL 10*3/UL
LYMPHOCYTES NFR BLD AUTO: ABNORMAL %
MAGNESIUM SERPL-MCNC: 1.9 MG/DL (ref 1.7–2.3)
MCH RBC QN AUTO: 26.5 PG (ref 26.5–33)
MCHC RBC AUTO-ENTMCNC: 33.2 G/DL (ref 31.5–36.5)
MCV RBC AUTO: 80 FL (ref 78–100)
MONOCYTES # BLD AUTO: ABNORMAL 10*3/UL
MONOCYTES NFR BLD AUTO: ABNORMAL %
NEUTROPHILS # BLD AUTO: ABNORMAL 10*3/UL
NEUTROPHILS NFR BLD AUTO: ABNORMAL %
NEUTS HYPERSEG BLD QL SMEAR: ABNORMAL
PHOSPHATE SERPL-MCNC: 2.3 MG/DL (ref 2.5–4.5)
PLAT MORPH BLD: ABNORMAL
PLATELET # BLD AUTO: 14 10E3/UL (ref 150–450)
POLYCHROMASIA BLD QL SMEAR: ABNORMAL
POTASSIUM SERPL-SCNC: 4.1 MMOL/L (ref 3.4–5.3)
RBC # BLD AUTO: 2.94 10E6/UL (ref 4.4–5.9)
RBC AGGLUT BLD QL: ABNORMAL
RBC MORPH BLD: ABNORMAL
ROULEAUX BLD QL SMEAR: ABNORMAL
SICKLE CELLS BLD QL SMEAR: ABNORMAL
SIROLIMUS BLD-MCNC: 15.9 UG/L (ref 5–15)
SMUDGE CELLS BLD QL SMEAR: ABNORMAL
SODIUM SERPL-SCNC: 134 MMOL/L (ref 135–145)
SPHEROCYTES BLD QL SMEAR: ABNORMAL
STOMATOCYTES BLD QL SMEAR: ABNORMAL
TARGETS BLD QL SMEAR: ABNORMAL
TME LAST DOSE: ABNORMAL H
TME LAST DOSE: ABNORMAL H
TOXIC GRANULES BLD QL SMEAR: ABNORMAL
UNIT ABO/RH: NORMAL
UNIT NUMBER: NORMAL
UNIT STATUS: NORMAL
UNIT TYPE ISBT: 6200
VARIANT LYMPHS BLD QL SMEAR: ABNORMAL
WBC # BLD AUTO: 0.2 10E3/UL (ref 4–11)

## 2024-09-08 PROCEDURE — 85027 COMPLETE CBC AUTOMATED: CPT

## 2024-09-08 PROCEDURE — 83605 ASSAY OF LACTIC ACID: CPT

## 2024-09-08 PROCEDURE — P9037 PLATE PHERES LEUKOREDU IRRAD: HCPCS

## 2024-09-08 PROCEDURE — 250N000013 HC RX MED GY IP 250 OP 250 PS 637

## 2024-09-08 PROCEDURE — 250N000011 HC RX IP 250 OP 636: Mod: JZ

## 2024-09-08 PROCEDURE — 250N000013 HC RX MED GY IP 250 OP 250 PS 637: Performed by: INTERNAL MEDICINE

## 2024-09-08 PROCEDURE — 258N000003 HC RX IP 258 OP 636

## 2024-09-08 PROCEDURE — 80048 BASIC METABOLIC PNL TOTAL CA: CPT

## 2024-09-08 PROCEDURE — 83735 ASSAY OF MAGNESIUM: CPT

## 2024-09-08 PROCEDURE — 250N000011 HC RX IP 250 OP 636: Mod: JZ | Performed by: INTERNAL MEDICINE

## 2024-09-08 PROCEDURE — 36415 COLL VENOUS BLD VENIPUNCTURE: CPT

## 2024-09-08 PROCEDURE — 250N000011 HC RX IP 250 OP 636: Performed by: INTERNAL MEDICINE

## 2024-09-08 PROCEDURE — 206N000001 HC R&B BMT UMMC

## 2024-09-08 PROCEDURE — 84100 ASSAY OF PHOSPHORUS: CPT

## 2024-09-08 PROCEDURE — 99233 SBSQ HOSP IP/OBS HIGH 50: CPT | Mod: FS | Performed by: INTERNAL MEDICINE

## 2024-09-08 PROCEDURE — 250N000013 HC RX MED GY IP 250 OP 250 PS 637: Performed by: PHYSICIAN ASSISTANT

## 2024-09-08 PROCEDURE — 80195 ASSAY OF SIROLIMUS: CPT

## 2024-09-08 PROCEDURE — 250N000013 HC RX MED GY IP 250 OP 250 PS 637: Performed by: NURSE PRACTITIONER

## 2024-09-08 RX ORDER — LOPERAMIDE HCL 2 MG
2 CAPSULE ORAL 2 TIMES DAILY
Status: DISCONTINUED | OUTPATIENT
Start: 2024-09-08 | End: 2024-09-10

## 2024-09-08 RX ORDER — CLINDAMYCIN PHOSPHATE 11.9 MG/ML
SOLUTION TOPICAL 2 TIMES DAILY PRN
Status: DISCONTINUED | OUTPATIENT
Start: 2024-09-08 | End: 2024-09-11

## 2024-09-08 RX ORDER — PHENAZOPYRIDINE HYDROCHLORIDE 200 MG/1
200 TABLET, FILM COATED ORAL
Status: DISCONTINUED | OUTPATIENT
Start: 2024-09-08 | End: 2024-09-10

## 2024-09-08 RX ORDER — LOPERAMIDE HCL 2 MG
2 CAPSULE ORAL 4 TIMES DAILY PRN
Status: DISCONTINUED | OUTPATIENT
Start: 2024-09-08 | End: 2024-09-11

## 2024-09-08 RX ADMIN — CALCIUM CARBONATE 600 MG (1,500 MG)-VITAMIN D3 400 UNIT TABLET 2 TABLET: at 08:22

## 2024-09-08 RX ADMIN — TAMSULOSIN HYDROCHLORIDE 0.4 MG: 0.4 CAPSULE ORAL at 16:27

## 2024-09-08 RX ADMIN — URSODIOL 300 MG: 300 CAPSULE ORAL at 08:22

## 2024-09-08 RX ADMIN — URSODIOL 300 MG: 300 CAPSULE ORAL at 20:06

## 2024-09-08 RX ADMIN — Medication 3 CAPSULE: at 08:22

## 2024-09-08 RX ADMIN — ALLOPURINOL 300 MG: 300 TABLET ORAL at 08:22

## 2024-09-08 RX ADMIN — PANTOPRAZOLE SODIUM 40 MG: 40 TABLET, DELAYED RELEASE ORAL at 08:22

## 2024-09-08 RX ADMIN — PHENAZOPYRIDINE 200 MG: 200 TABLET ORAL at 18:20

## 2024-09-08 RX ADMIN — CEFEPIME HYDROCHLORIDE 2 G: 2 INJECTION, POWDER, FOR SOLUTION INTRAVENOUS at 20:06

## 2024-09-08 RX ADMIN — Medication 2 SPRAY: at 16:27

## 2024-09-08 RX ADMIN — METHOCARBAMOL 500 MG: 500 TABLET ORAL at 08:22

## 2024-09-08 RX ADMIN — CEFEPIME HYDROCHLORIDE 2 G: 2 INJECTION, POWDER, FOR SOLUTION INTRAVENOUS at 04:19

## 2024-09-08 RX ADMIN — METHOCARBAMOL 500 MG: 500 TABLET ORAL at 15:00

## 2024-09-08 RX ADMIN — Medication 2 SPRAY: at 20:04

## 2024-09-08 RX ADMIN — ACYCLOVIR 800 MG: 800 TABLET ORAL at 20:06

## 2024-09-08 RX ADMIN — MYCOPHENOLATE MOFETIL 1250 MG: 500 INJECTION, POWDER, LYOPHILIZED, FOR SOLUTION INTRAVENOUS at 10:53

## 2024-09-08 RX ADMIN — Medication 2 SPRAY: at 08:24

## 2024-09-08 RX ADMIN — FILGRASTIM-AAFI 480 MCG: 480 INJECTION, SOLUTION INTRAVENOUS; SUBCUTANEOUS at 20:07

## 2024-09-08 RX ADMIN — MYCOPHENOLATE MOFETIL 1250 MG: 500 INJECTION, POWDER, LYOPHILIZED, FOR SOLUTION INTRAVENOUS at 21:47

## 2024-09-08 RX ADMIN — ACYCLOVIR 800 MG: 800 TABLET ORAL at 08:22

## 2024-09-08 RX ADMIN — Medication 2 SPRAY: at 12:41

## 2024-09-08 RX ADMIN — PHENAZOPYRIDINE 200 MG: 200 TABLET ORAL at 12:41

## 2024-09-08 RX ADMIN — POTASSIUM & SODIUM PHOSPHATES POWDER PACK 280-160-250 MG 1 PACKET: 280-160-250 PACK at 12:41

## 2024-09-08 RX ADMIN — CALCIUM CARBONATE 600 MG (1,500 MG)-VITAMIN D3 400 UNIT TABLET 2 TABLET: at 18:20

## 2024-09-08 RX ADMIN — PROCHLORPERAZINE MALEATE 5 MG: 5 TABLET ORAL at 16:27

## 2024-09-08 RX ADMIN — URSODIOL 300 MG: 300 CAPSULE ORAL at 15:00

## 2024-09-08 RX ADMIN — MICAFUNGIN SODIUM 150 MG: 50 INJECTION, POWDER, LYOPHILIZED, FOR SOLUTION INTRAVENOUS at 10:53

## 2024-09-08 RX ADMIN — POTASSIUM & SODIUM PHOSPHATES POWDER PACK 280-160-250 MG 1 PACKET: 280-160-250 PACK at 16:27

## 2024-09-08 RX ADMIN — LOPERAMIDE HYDROCHLORIDE 2 MG: 2 CAPSULE ORAL at 20:06

## 2024-09-08 RX ADMIN — LOPERAMIDE HYDROCHLORIDE 2 MG: 2 CAPSULE ORAL at 08:22

## 2024-09-08 RX ADMIN — CEFEPIME HYDROCHLORIDE 2 G: 2 INJECTION, POWDER, FOR SOLUTION INTRAVENOUS at 12:41

## 2024-09-08 RX ADMIN — POTASSIUM & SODIUM PHOSPHATES POWDER PACK 280-160-250 MG 1 PACKET: 280-160-250 PACK at 06:43

## 2024-09-08 RX ADMIN — METHOCARBAMOL 500 MG: 500 TABLET ORAL at 20:14

## 2024-09-08 ASSESSMENT — ACTIVITIES OF DAILY LIVING (ADL)
ADLS_ACUITY_SCORE: 24

## 2024-09-08 NOTE — PROGRESS NOTES
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     Diagnosis MDS/AML  HCT Type Allogeneic     Prep Regimen Flu/Cy/TBI  Donor Match & Source 8/8 DP permissive PBSC    GVHD Prophylaxis PTCy/MMF/Siro  Primary BMT MD Dr. Murphy    Clinical Trials [id number]       INTERVAL  HISTORY     Afebrile x24 hours. No new infectious work up.  No N/V. Ate two meals. No diarrhea. Slight abdominal tenderness.  Dysuria continues, frequency continues. UA mostly unremarkable, slight blood, but no evidence of retention or clots.    Review of Systems: 10 point ROS negative except as noted above.    PHYSICAL EXAM     Weight In/Out     Wt Readings from Last 3 Encounters:   09/08/24 89.8 kg (197 lb 15.6 oz)   08/09/24 89.9 kg (198 lb 4.8 oz)   08/08/24 90.3 kg (199 lb 1.2 oz)      I/O last 3 completed shifts:  In: 3492 [P.O.:1700; I.V.:1587]  Out: 4050 [Urine:4050]     KPS:  80    /74 (BP Location: Right arm)   Pulse 100   Temp 97.3  F (36.3  C) (Axillary)   Resp 16   Ht 1.84 m (6' 0.44\")   Wt 89.8 kg (197 lb 15.6 oz)   SpO2 97%   BMI 26.52 kg/m       General:  NAD   HEENT: +aphthous clean based ulcer noted on tongue.   Lungs: BCTA  CV: RRR, no MRG   Abdomen: tender to deep palpitation RLQ and LLQ otherwise nontender belly, soft ND, +BS  Lymphatics: No edema  Neuro: A&O, appropriately interactive, speech clear, moving all extremities   Additional Findings: Estrada removed. 2 PIV    Current aGVHD staging:  Skin 0, UGI 0, LGI 0, Liver 0     LABS AND IMAGING: I have assessed all abnormal lab values for their clinical significance and any values considered clinically significant have been addressed in the assessment and plan.      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     BMT/IEC PROTOCOL for MDS  - Chemo protocol: MT 2022-5  D-6: Flu 30mg/m2, Cy 50mg/kg  D-5 to D-2: Flu  D-1: TBI  D0: stem cell infusion  - Peripheral blood stem cell graft from 8/8 URD donor, ABO matched, Cell dose: TBD  - Engraftment monitoring: Peripheral blood and bone marrow chimerisms on D28, D100, 6 months, 1 and 2 years  - Restaging plan: BMBx with FISH, cytogenetics and NGS on D28, D100, 6 months, 1 and 2 years     HEME/COAG  # Pancytopenia secondary to disease process  - GCSF starts day +5, continue until ANC > 2500 for 2 consecutive days  - Transfusion parameters: hemoglobin <7, platelets <20 (epistaxis)     RISK OF GVHD  - Prophylaxis: PTCy 50mg/kg on D+3 and D+4; MMF (D+5 to 35); Sirolimus (starting D+5, target level 5-10).  - Siro level =>18, held and recheck (9/8)     ID  #Streptococcus mitis bacteremia (2/2 bottles) - Cefepime (9/1-x)   -ABX duration plan: per ID likely will require 14 days following CVC removal. Sensitive to ceftriaxone q24hrs, can transition to non- pseudomonal coverage once she has engrafted.   -ID following, recommends CVC removal and line holiday, echo, continue cefepime. Currently 2 PIV. PICC can be placed Monday 9/9.  #MRSE bacteremia likely contaminant because peripheral stick, grew late. will discontinue (1/2) -Vanco (9/2-9/6)   -trend cultures x3 days negative so far  #Febrile Neutropenia (9/2-x)   *see BC above, otherwise unremarkable  #Diarrhea- c.diff  negative   -enteric panel pending  #Rectal pain- resolved    Prophylaxis plan:   - Bacterial: Levaquin 250mg while neutropenic Now on hold with cefepime   - Fungal: Micafungin 300mg 3x/week until D+45, followed by mold active azole. Pulm nodules present on workup CT.   - PJP: Bactrim to start at D+28. Toxoplasma negative.   - Viral: Acyclovir 800mg BID. No need for letermovir as donor and recipient are CMV negative.      Monitoring:   - CMV weekly, 8/31 neg  - EBV every 2 weeks from D30 to D180     GI/NUTRITION  #Hemorrhoids- prep H, lidocaine  #Diarrhea (9/3): c.diff recheck negative. Metamucil. 2mg. BID Imodium 2mg. 2mg qid prn. Pulling back with improvement of stools.  # GERD: Protonix  - Anti-emetics: Zofran, dex scheduled during chemotherapy. Compazine, lorazepam available PRN.   - Ulcer prophy: Protonix  - VOD prophy: Ursodiol 300mg TID  - Dietician support to prevent malnutrition  - Miralax and senna PRN constipation. For now, Miralax scheduled at bedtime.      CARDIOVASCULAR  # HTN- lisinopril 15mg daily. Dc'd starting 8/26 with orthostatics and some dizziness.  # CAD: Coronary artery calcifications seen on CT, stress test in 2023 negative  - Risk of cardiomyopathy: Baseline EF 55-60%.   - Risk of arrhythmia: Baseline EKG showed 421       RENAL/ELECTROLYTES/  #Hypervolemia- up 6lbs since (9/5) will give 20mg lasix. Resolved.  #LA = 2.3 (9/7)- given 500mL of IVF and resolved  #Hyponatremia: osms urine/serum, Na urine/serum indicate hypovolemic hyponatremia. Encourage PO, will give IVF as necessary and slow down stools. RESOLVED WITH IVF. Stably waxing and waning.  - Electrolyte management: replace per sliding scale  - BPH: Continue home flomax.      MUSCULOSKELETAL/FRAILTY  - Baseline Frailty Score: Not frail (0)  - Daily PT/OT as needed while inpatient  - Gout: continue allopurinol      Neuro   #HA: no red flag symptoms. Pt has history of spinal stenosis, lumbosacral radiculopathy likely exacerbating.  "Tramadol PRN. Voltaren gel, ice and PT asked for suggestions. In the setting of thrombocytopenia, consider CT head if changes in symptoms or worsens. No currently complaints of HA.   - Pain management: Outpatient has been taking celecoxib for MSK pain, once a day. Inpatient will try Tramadol - available PRN. Added robaxin TID     Derm:  - Follicular rash to chest, back- Started topical clindamycin 8/28. Much improved.      SOCIAL DETERMINANTS  - Caregiver: wife, Vani. Lives within the required distance from hospital but may elect to stay in [place] [facility].   - Financial/insurance concerns: None    Medically Ready for Discharge: Anticipated in 5+ Days      Clinically Significant Risk Factors              # Hypoalbuminemia: Lowest albumin = 2.8 g/dL at 9/7/2024  7:13 AM, will monitor as appropriate   # Thrombocytopenia: Lowest platelets = 14 in last 2 days, will monitor for bleeding   # Hypertension: Noted on problem list           # Overweight: Estimated body mass index is 26.52 kg/m  as calculated from the following:    Height as of this encounter: 1.84 m (6' 0.44\").    Weight as of this encounter: 89.8 kg (197 lb 15.6 oz).            I spent 30 minutes in the care of this patient today, which included time necessary for preparation for the visit, obtaining history, ordering medications/tests/procedures as medically indicated, review of pertinent medical literature, counseling of the patient, communication of recommendations to the care team, and documentation time.    Bessie Lazo PA-C  [phone]  "

## 2024-09-08 NOTE — PLAN OF CARE
"/82 (BP Location: Right arm)   Pulse 95   Temp 98  F (36.7  C) (Oral)   Resp 16   Ht 1.84 m (6' 0.44\")   Wt 89.8 kg (197 lb 15.6 oz)   SpO2 95%   BMI 26.52 kg/m  '    VSS on room air. Alert and oriented x4. Patient has burning with urination. Patient has fair appetite. Supplements encouraged. Patient has soft/loose BMs. Scheduled imodium given per order. PIVs saline locked. Phosphorous replaced orally; recheck tomorrow. No further replacements this shift. Continue with plan of care. Notify provider with any changes.     Goal Outcome Evaluation:      Plan of Care Reviewed With: patient    Overall Patient Progress: no change\      Problem: Adult Inpatient Plan of Care  Goal: Plan of Care Review  Description: The Plan of Care Review/Shift note should be completed every shift.  The Outcome Evaluation is a brief statement about your assessment that the patient is improving, declining, or no change.  This information will be displayed automatically on your shift  note.  Outcome: Progressing  Flowsheets (Taken 9/8/2024 1410)  Plan of Care Reviewed With:   patient   spouse  Overall Patient Progress: improving  Goal: Patient-Specific Goal (Individualized)  Description: You can add care plan individualizations to a care plan. Examples of Individualization might be:  \"Parent requests to be called daily at 9am for status\", \"I have a hard time hearing out of my right ear\", or \"Do not touch me to wake me up as it startles  me\".  Outcome: Progressing  Goal: Absence of Hospital-Acquired Illness or Injury  Outcome: Progressing  Intervention: Identify and Manage Fall Risk  Recent Flowsheet Documentation  Taken 9/8/2024 1230 by Scarlett Engle, RN  Safety Promotion/Fall Prevention: safety round/check completed  Taken 9/8/2024 0830 by Scarlett Engle, RN  Safety Promotion/Fall Prevention: safety round/check completed  Intervention: Prevent Skin Injury  Recent Flowsheet Documentation  Taken 9/8/2024 0830 by Jd" Gulijk, Scarlett, RN  Skin Protection:   adhesive use limited   protective footwear used   transparent dressing maintained  Device Skin Pressure Protection:   adhesive use limited   tubing/devices free from skin contact  Intervention: Prevent and Manage VTE (Venous Thromboembolism) Risk  Recent Flowsheet Documentation  Taken 9/8/2024 0830 by Scarlett Engle RN  VTE Prevention/Management: (ambulation promoted) SCDs off (sequential compression devices)  Intervention: Prevent Infection  Recent Flowsheet Documentation  Taken 9/8/2024 1500 by Scarlett Engle RN  Infection Prevention:   environmental surveillance performed   hand hygiene promoted   personal protective equipment utilized   rest/sleep promoted   single patient room provided  Taken 9/8/2024 1230 by Scarlett Engle RN  Infection Prevention:   environmental surveillance performed   hand hygiene promoted   personal protective equipment utilized   rest/sleep promoted   single patient room provided  Taken 9/8/2024 0830 by Scarlett Engle RN  Infection Prevention:   environmental surveillance performed   hand hygiene promoted   personal protective equipment utilized   rest/sleep promoted   single patient room provided  Goal: Optimal Comfort and Wellbeing  Outcome: Progressing  Goal: Readiness for Transition of Care  Outcome: Progressing     Problem: Risk for Delirium  Goal: Optimal Coping  Outcome: Progressing  Intervention: Optimize Psychosocial Adjustment to Delirium  Recent Flowsheet Documentation  Taken 9/8/2024 0830 by Scarlett Engle RN  Supportive Measures:   active listening utilized   relaxation techniques promoted   self-care encouraged   verbalization of feelings encouraged  Family/Support System Care:   involvement promoted   presence promoted   self-care encouraged  Goal: Improved Sleep  Outcome: Progressing  Intervention: Promote Sleep  Recent Flowsheet Documentation  Taken 9/8/2024 0830 by Scarlett Engle RN  Sleep/Rest  Enhancement: consistent schedule promoted     Problem: Stem Cell/Bone Marrow Transplant  Goal: Optimal Coping with Transplant  Outcome: Progressing  Intervention: Optimize Patient/Family Adjustment to Transplant  Recent Flowsheet Documentation  Taken 9/8/2024 0830 by Scarlett Engle RN  Supportive Measures:   active listening utilized   relaxation techniques promoted   self-care encouraged   verbalization of feelings encouraged  Family/Support System Care:   involvement promoted   presence promoted   self-care encouraged  Goal: Symptom-Free Urinary Elimination  Outcome: Progressing  Intervention: Prevent or Manage Bladder Irritation  Recent Flowsheet Documentation  Taken 9/8/2024 0830 by Scarlett Engle RN  Urinary Elimination Promotion: voiding relaxation promoted  Hyperhydration Management: fluids provided  Goal: Diarrhea Symptom Control  Outcome: Progressing  Intervention: Manage Diarrhea  Recent Flowsheet Documentation  Taken 9/8/2024 0830 by Scarlett Engle RN  Skin Protection:   adhesive use limited   protective footwear used   transparent dressing maintained  Fluid/Electrolyte Management: fluids provided  Goal: Improved Activity Tolerance  Outcome: Progressing  Intervention: Promote Improved Energy  Recent Flowsheet Documentation  Taken 9/8/2024 0830 by Scarlett Engle RN  Fatigue Management:   activity schedule adjusted   frequent rest breaks encouraged  Sleep/Rest Enhancement: consistent schedule promoted  Activity Management:   activity adjusted per tolerance   ambulated in room  Environmental Support:   calm environment promoted   rest periods encouraged   personal routine supported  Goal: Blood Counts Within Acceptable Range  Outcome: Progressing  Intervention: Monitor and Manage Hematologic Symptoms  Recent Flowsheet Documentation  Taken 9/8/2024 1230 by Scarlett Engle RN  Bleeding Precautions:   monitored for signs of bleeding   gentle oral care promoted  Medication  Review/Management: medications reviewed  Taken 9/8/2024 0830 by Scarlett Engle RN  Sleep/Rest Enhancement: consistent schedule promoted  Bleeding Precautions:   monitored for signs of bleeding   gentle oral care promoted  Medication Review/Management: medications reviewed  Goal: Absence of Hypersensitivity Reaction  Outcome: Progressing  Goal: Absence of Infection  Outcome: Progressing  Intervention: Prevent and Manage Infection  Recent Flowsheet Documentation  Taken 9/8/2024 1500 by Scarlett Engle RN  Infection Prevention:   environmental surveillance performed   hand hygiene promoted   personal protective equipment utilized   rest/sleep promoted   single patient room provided  Isolation Precautions: protective environment maintained  Taken 9/8/2024 1230 by Scarlett Engle RN  Infection Prevention:   environmental surveillance performed   hand hygiene promoted   personal protective equipment utilized   rest/sleep promoted   single patient room provided  Infection Management: aseptic technique maintained  Isolation Precautions: protective environment maintained  Taken 9/8/2024 0830 by Scarlett Engle RN  Infection Prevention:   environmental surveillance performed   hand hygiene promoted   personal protective equipment utilized   rest/sleep promoted   single patient room provided  Infection Management: aseptic technique maintained  Isolation Precautions: protective environment maintained  Goal: Improved Oral Mucous Membrane Health and Integrity  Outcome: Progressing  Goal: Nausea and Vomiting Symptom Relief  Outcome: Progressing  Intervention: Prevent and Manage Nausea and Vomiting  Recent Flowsheet Documentation  Taken 9/8/2024 0830 by Scarlett Engle RN  Nausea/Vomiting Interventions: antiemetic  Goal: Optimal Nutrition Intake  Outcome: Progressing  Intervention: Minimize and Manage Barriers to Oral Intake  Recent Flowsheet Documentation  Taken 9/8/2024 0830 by Scarlett Engle  RN  Oral Nutrition Promotion: rest periods promoted

## 2024-09-08 NOTE — PLAN OF CARE
"Goal Outcome Evaluation: Patient was afebrile through shift and asymptomatic. Orthostatic vital signs were negative on PM shift. Patient's appetite is good. His fluid intake and urine output were good during shift. Also, no bowel movements occurred and patient continues to take scheduled imodium. Patient had no pain, nausea, or concerns per patient. Peripheral IV sites continues to flush well and no S&S of infection at this time. Patient needed both phosphorus replacement (orally for three doses) and 1 unit of platelets as his parameter is 20 and his morning lab value was only  14. Patient tolerated platelets infusion well.       Plan of Care Reviewed With: patient    Overall Patient Progress: improvingOverall Patient Progress: improving      Problem: Adult Inpatient Plan of Care  Goal: Plan of Care Review  Description: The Plan of Care Review/Shift note should be completed every shift.  The Outcome Evaluation is a brief statement about your assessment that the patient is improving, declining, or no change.  This information will be displayed automatically on your shift  note.  Outcome: Progressing  Flowsheets (Taken 9/8/2024 0226)  Plan of Care Reviewed With: patient  Overall Patient Progress: improving  Goal: Patient-Specific Goal (Individualized)  Description: You can add care plan individualizations to a care plan. Examples of Individualization might be:  \"Parent requests to be called daily at 9am for status\", \"I have a hard time hearing out of my right ear\", or \"Do not touch me to wake me up as it startles  me\".  Outcome: Progressing  Goal: Absence of Hospital-Acquired Illness or Injury  Outcome: Progressing  Intervention: Identify and Manage Fall Risk  Recent Flowsheet Documentation  Taken 9/8/2024 0007 by Jacinta Naik, RN  Safety Promotion/Fall Prevention: safety round/check completed  Taken 9/7/2024 2200 by Jacinta Naik, RN  Safety Promotion/Fall Prevention: safety round/check completed  Taken " 9/7/2024 2018 by Jacinta Naik, RN  Safety Promotion/Fall Prevention:   check orthostatic blood pressure   clutter free environment maintained   nonskid shoes/slippers when out of bed   patient and family education   room near nurse's station   safety round/check completed  Intervention: Prevent Skin Injury  Recent Flowsheet Documentation  Taken 9/7/2024 2018 by Jacinta Naik, RN  Skin Protection:   adhesive use limited   protective footwear used   transparent dressing maintained  Device Skin Pressure Protection:   adhesive use limited   tubing/devices free from skin contact  Intervention: Prevent and Manage VTE (Venous Thromboembolism) Risk  Recent Flowsheet Documentation  Taken 9/7/2024 2018 by Jacinta Naik RN  VTE Prevention/Management: (ambulationed encouraged) SCDs off (sequential compression devices)  Intervention: Prevent Infection  Recent Flowsheet Documentation  Taken 9/8/2024 0007 by Jacinta Naik, RN  Infection Prevention:   environmental surveillance performed   equipment surfaces disinfected   hand hygiene promoted   personal protective equipment utilized   rest/sleep promoted   single patient room provided  Taken 9/7/2024 2018 by Jacinta Naik RN  Infection Prevention:   environmental surveillance performed   equipment surfaces disinfected   hand hygiene promoted   personal protective equipment utilized   rest/sleep promoted   single patient room provided  Goal: Optimal Comfort and Wellbeing  Outcome: Progressing  Goal: Readiness for Transition of Care  Outcome: Progressing     Problem: Risk for Delirium  Goal: Optimal Coping  Outcome: Progressing  Intervention: Optimize Psychosocial Adjustment to Delirium  Recent Flowsheet Documentation  Taken 9/7/2024 2018 by Jacinta Naik, RN  Supportive Measures:   active listening utilized   decision-making supported   relaxation techniques promoted   self-care encouraged  Family/Support System Care:   involvement promoted   self-care  encouraged   support provided  Goal: Improved Sleep  Outcome: Progressing     Problem: Stem Cell/Bone Marrow Transplant  Goal: Optimal Coping with Transplant  Outcome: Progressing  Intervention: Optimize Patient/Family Adjustment to Transplant  Recent Flowsheet Documentation  Taken 9/7/2024 2018 by Jacinta Naik RN  Supportive Measures:   active listening utilized   decision-making supported   relaxation techniques promoted   self-care encouraged  Family/Support System Care:   involvement promoted   self-care encouraged   support provided  Goal: Symptom-Free Urinary Elimination  Outcome: Progressing  Intervention: Prevent or Manage Bladder Irritation  Recent Flowsheet Documentation  Taken 9/7/2024 2018 by Jacinta Naik RN  Urinary Elimination Promotion: voiding relaxation promoted  Hyperhydration Management: fluids provided  Goal: Diarrhea Symptom Control  Outcome: Progressing  Intervention: Manage Diarrhea  Recent Flowsheet Documentation  Taken 9/7/2024 2018 by Jacinta Naik RN  Skin Protection:   adhesive use limited   protective footwear used   transparent dressing maintained  Fluid/Electrolyte Management: fluids provided  Goal: Improved Activity Tolerance  Outcome: Progressing  Intervention: Promote Improved Energy  Recent Flowsheet Documentation  Taken 9/7/2024 2018 by Jacinta Naik RN  Fatigue Management:   activity schedule adjusted   frequent rest breaks encouraged  Environmental Support:   calm environment promoted   rest periods encouraged   personal routine supported  Goal: Blood Counts Within Acceptable Range  Outcome: Progressing  Intervention: Monitor and Manage Hematologic Symptoms  Recent Flowsheet Documentation  Taken 9/7/2024 2018 by Jacinta Naik RN  Bleeding Precautions:   blood pressure closely monitored   gentle oral care promoted   monitored for signs of bleeding  Medication Review/Management: medications reviewed  Goal: Absence of Hypersensitivity Reaction  Outcome:  Progressing  Goal: Absence of Infection  Outcome: Progressing  Intervention: Prevent and Manage Infection  Recent Flowsheet Documentation  Taken 9/8/2024 0007 by Jacinta Naik, RN  Infection Prevention:   environmental surveillance performed   equipment surfaces disinfected   hand hygiene promoted   personal protective equipment utilized   rest/sleep promoted   single patient room provided  Isolation Precautions: protective environment maintained  Taken 9/7/2024 2018 by Jacinta Naik, RN  Infection Prevention:   environmental surveillance performed   equipment surfaces disinfected   hand hygiene promoted   personal protective equipment utilized   rest/sleep promoted   single patient room provided  Infection Management: aseptic technique maintained  Isolation Precautions: protective environment maintained  Goal: Improved Oral Mucous Membrane Health and Integrity  Outcome: Progressing  Goal: Nausea and Vomiting Symptom Relief  Outcome: Progressing  Goal: Optimal Nutrition Intake  Outcome: Progressing  Intervention: Minimize and Manage Barriers to Oral Intake  Recent Flowsheet Documentation  Taken 9/7/2024 2018 by Jacinta Naik, RN  Oral Nutrition Promotion: rest periods promoted

## 2024-09-09 ENCOUNTER — TELEPHONE (OUTPATIENT)
Dept: TRANSPLANT | Facility: CLINIC | Age: 69
End: 2024-09-09

## 2024-09-09 PROBLEM — A49.1 INFECTION DUE TO STREPTOCOCCUS MITIS GROUP: Status: ACTIVE | Noted: 2024-09-02

## 2024-09-09 PROBLEM — Z94.84 HISTORY OF PERIPHERAL STEM CELL TRANSPLANT (H): Status: ACTIVE | Noted: 2024-08-23

## 2024-09-09 PROBLEM — R35.1 NOCTURIA: Status: ACTIVE | Noted: 2024-09-09

## 2024-09-09 PROBLEM — Z79.2 ADMINISTRATION OF LONG-TERM PROPHYLACTIC ANTIBIOTICS: Status: ACTIVE | Noted: 2024-09-09

## 2024-09-09 LAB
ALBUMIN SERPL BCG-MCNC: 3.2 G/DL (ref 3.5–5.2)
ALBUMIN UR-MCNC: 300 MG/DL
ALP SERPL-CCNC: 58 U/L (ref 40–150)
ALT SERPL W P-5'-P-CCNC: 19 U/L (ref 0–70)
ANION GAP SERPL CALCULATED.3IONS-SCNC: 10 MMOL/L (ref 7–15)
APPEARANCE UR: ABNORMAL
AST SERPL W P-5'-P-CCNC: 25 U/L (ref 0–45)
BACTERIA BLD CULT: NO GROWTH
BACTERIA BLD CULT: NO GROWTH
BASOPHILS # BLD AUTO: ABNORMAL 10*3/UL
BASOPHILS NFR BLD AUTO: ABNORMAL %
BILIRUB DIRECT SERPL-MCNC: 0.24 MG/DL (ref 0–0.3)
BILIRUB SERPL-MCNC: 0.5 MG/DL
BILIRUB UR QL STRIP: ABNORMAL
BLD PROD TYP BPU: NORMAL
BLOOD COMPONENT TYPE: NORMAL
BUN SERPL-MCNC: 10.5 MG/DL (ref 8–23)
CALCIUM SERPL-MCNC: 8.7 MG/DL (ref 8.8–10.4)
CHLORIDE SERPL-SCNC: 102 MMOL/L (ref 98–107)
CODING SYSTEM: NORMAL
COLOR UR AUTO: ABNORMAL
CREAT SERPL-MCNC: 0.6 MG/DL (ref 0.67–1.17)
EGFRCR SERPLBLD CKD-EPI 2021: >90 ML/MIN/1.73M2
EOSINOPHIL # BLD AUTO: ABNORMAL 10*3/UL
EOSINOPHIL NFR BLD AUTO: ABNORMAL %
ERYTHROCYTE [DISTWIDTH] IN BLOOD BY AUTOMATED COUNT: 17 % (ref 10–15)
GLUCOSE SERPL-MCNC: 115 MG/DL (ref 70–99)
GLUCOSE UR STRIP-MCNC: NEGATIVE MG/DL
HCO3 SERPL-SCNC: 24 MMOL/L (ref 22–29)
HCT VFR BLD AUTO: 23.2 % (ref 40–53)
HGB BLD-MCNC: 7.9 G/DL (ref 13.3–17.7)
HGB UR QL STRIP: ABNORMAL
IMM GRANULOCYTES # BLD: ABNORMAL 10*3/UL
IMM GRANULOCYTES NFR BLD: ABNORMAL %
INR PPP: 1.06 (ref 0.85–1.15)
ISSUE DATE AND TIME: NORMAL
KETONES UR STRIP-MCNC: NEGATIVE MG/DL
LACTATE SERPL-SCNC: 1.3 MMOL/L (ref 0.7–2)
LEUKOCYTE ESTERASE UR QL STRIP: NEGATIVE
LYMPHOCYTES # BLD AUTO: ABNORMAL 10*3/UL
LYMPHOCYTES NFR BLD AUTO: ABNORMAL %
MAGNESIUM SERPL-MCNC: 2.1 MG/DL (ref 1.7–2.3)
MCH RBC QN AUTO: 26.7 PG (ref 26.5–33)
MCHC RBC AUTO-ENTMCNC: 34.1 G/DL (ref 31.5–36.5)
MCV RBC AUTO: 78 FL (ref 78–100)
MONOCYTES # BLD AUTO: ABNORMAL 10*3/UL
MONOCYTES NFR BLD AUTO: ABNORMAL %
NEUTROPHILS # BLD AUTO: ABNORMAL 10*3/UL
NEUTROPHILS NFR BLD AUTO: ABNORMAL %
NITRATE UR QL: POSITIVE
PH UR STRIP: 7 [PH] (ref 5–7)
PHOSPHATE SERPL-MCNC: 2.7 MG/DL (ref 2.5–4.5)
PLATELET # BLD AUTO: 19 10E3/UL (ref 150–450)
POTASSIUM SERPL-SCNC: 4.2 MMOL/L (ref 3.4–5.3)
PROT SERPL-MCNC: 6.3 G/DL (ref 6.4–8.3)
RBC # BLD AUTO: 2.96 10E6/UL (ref 4.4–5.9)
RBC URINE: >182 /HPF
SIROLIMUS BLD-MCNC: 14.5 UG/L (ref 5–15)
SODIUM SERPL-SCNC: 136 MMOL/L (ref 135–145)
SP GR UR STRIP: 1.03 (ref 1–1.03)
TME LAST DOSE: NORMAL H
TME LAST DOSE: NORMAL H
UNIT ABO/RH: NORMAL
UNIT NUMBER: NORMAL
UNIT STATUS: NORMAL
UNIT TYPE ISBT: 6200
UROBILINOGEN UR STRIP-MCNC: 12 MG/DL
WBC # BLD AUTO: 0.4 10E3/UL (ref 4–11)
WBC URINE: 1 /HPF

## 2024-09-09 PROCEDURE — 250N000011 HC RX IP 250 OP 636: Mod: JZ

## 2024-09-09 PROCEDURE — 36569 INSJ PICC 5 YR+ W/O IMAGING: CPT

## 2024-09-09 PROCEDURE — 87086 URINE CULTURE/COLONY COUNT: CPT | Performed by: HOSPITALIST

## 2024-09-09 PROCEDURE — 36415 COLL VENOUS BLD VENIPUNCTURE: CPT | Performed by: PHYSICIAN ASSISTANT

## 2024-09-09 PROCEDURE — 250N000013 HC RX MED GY IP 250 OP 250 PS 637

## 2024-09-09 PROCEDURE — 80195 ASSAY OF SIROLIMUS: CPT | Performed by: INTERNAL MEDICINE

## 2024-09-09 PROCEDURE — 85027 COMPLETE CBC AUTOMATED: CPT

## 2024-09-09 PROCEDURE — 99233 SBSQ HOSP IP/OBS HIGH 50: CPT | Mod: FS | Performed by: INTERNAL MEDICINE

## 2024-09-09 PROCEDURE — 84100 ASSAY OF PHOSPHORUS: CPT

## 2024-09-09 PROCEDURE — 250N000011 HC RX IP 250 OP 636: Performed by: INTERNAL MEDICINE

## 2024-09-09 PROCEDURE — 250N000013 HC RX MED GY IP 250 OP 250 PS 637: Performed by: INTERNAL MEDICINE

## 2024-09-09 PROCEDURE — 250N000013 HC RX MED GY IP 250 OP 250 PS 637: Performed by: NURSE PRACTITIONER

## 2024-09-09 PROCEDURE — 36415 COLL VENOUS BLD VENIPUNCTURE: CPT

## 2024-09-09 PROCEDURE — 250N000011 HC RX IP 250 OP 636: Mod: JZ | Performed by: INTERNAL MEDICINE

## 2024-09-09 PROCEDURE — 87799 DETECT AGENT NOS DNA QUANT: CPT | Performed by: PHYSICIAN ASSISTANT

## 2024-09-09 PROCEDURE — 250N000009 HC RX 250: Performed by: PHYSICIAN ASSISTANT

## 2024-09-09 PROCEDURE — 83735 ASSAY OF MAGNESIUM: CPT

## 2024-09-09 PROCEDURE — 80053 COMPREHEN METABOLIC PANEL: CPT

## 2024-09-09 PROCEDURE — P9037 PLATE PHERES LEUKOREDU IRRAD: HCPCS

## 2024-09-09 PROCEDURE — 36415 COLL VENOUS BLD VENIPUNCTURE: CPT | Performed by: INTERNAL MEDICINE

## 2024-09-09 PROCEDURE — 258N000003 HC RX IP 258 OP 636

## 2024-09-09 PROCEDURE — 272N000473 HC KIT, VPS RHYTHM STYLET

## 2024-09-09 PROCEDURE — 83605 ASSAY OF LACTIC ACID: CPT | Performed by: PHYSICIAN ASSISTANT

## 2024-09-09 PROCEDURE — 250N000013 HC RX MED GY IP 250 OP 250 PS 637: Performed by: PHYSICIAN ASSISTANT

## 2024-09-09 PROCEDURE — 85610 PROTHROMBIN TIME: CPT

## 2024-09-09 PROCEDURE — 82248 BILIRUBIN DIRECT: CPT

## 2024-09-09 PROCEDURE — 272N000451 HC KIT SHRLOCK 5FR POWER PICC DOUBLE LUMEN

## 2024-09-09 PROCEDURE — 206N000001 HC R&B BMT UMMC

## 2024-09-09 PROCEDURE — 81001 URINALYSIS AUTO W/SCOPE: CPT | Performed by: HOSPITALIST

## 2024-09-09 PROCEDURE — 99233 SBSQ HOSP IP/OBS HIGH 50: CPT | Mod: 24 | Performed by: INTERNAL MEDICINE

## 2024-09-09 PROCEDURE — 250N000011 HC RX IP 250 OP 636: Performed by: PHYSICIAN ASSISTANT

## 2024-09-09 RX ORDER — HEPARIN SODIUM,PORCINE 10 UNIT/ML
5-20 VIAL (ML) INTRAVENOUS EVERY 24 HOURS
Status: DISCONTINUED | OUTPATIENT
Start: 2024-09-09 | End: 2024-09-19 | Stop reason: HOSPADM

## 2024-09-09 RX ORDER — HEPARIN SODIUM,PORCINE 10 UNIT/ML
5-20 VIAL (ML) INTRAVENOUS
Status: DISCONTINUED | OUTPATIENT
Start: 2024-09-09 | End: 2024-09-19 | Stop reason: HOSPADM

## 2024-09-09 RX ORDER — LIDOCAINE 40 MG/G
CREAM TOPICAL
Status: DISCONTINUED | OUTPATIENT
Start: 2024-09-09 | End: 2024-09-11

## 2024-09-09 RX ADMIN — Medication 2 SPRAY: at 20:03

## 2024-09-09 RX ADMIN — ACYCLOVIR 800 MG: 800 TABLET ORAL at 08:19

## 2024-09-09 RX ADMIN — FILGRASTIM-AAFI 480 MCG: 480 INJECTION, SOLUTION INTRAVENOUS; SUBCUTANEOUS at 19:54

## 2024-09-09 RX ADMIN — Medication 2 SPRAY: at 17:21

## 2024-09-09 RX ADMIN — PROCHLORPERAZINE MALEATE 5 MG: 5 TABLET ORAL at 17:20

## 2024-09-09 RX ADMIN — TAMSULOSIN HYDROCHLORIDE 0.4 MG: 0.4 CAPSULE ORAL at 17:21

## 2024-09-09 RX ADMIN — PHENAZOPYRIDINE 200 MG: 200 TABLET ORAL at 13:54

## 2024-09-09 RX ADMIN — Medication 2 SPRAY: at 08:19

## 2024-09-09 RX ADMIN — METHOCARBAMOL 500 MG: 500 TABLET ORAL at 17:21

## 2024-09-09 RX ADMIN — LIDOCAINE HYDROCHLORIDE 5 ML: 10 INJECTION, SOLUTION EPIDURAL; INFILTRATION; INTRACAUDAL; PERINEURAL at 15:41

## 2024-09-09 RX ADMIN — URSODIOL 300 MG: 300 CAPSULE ORAL at 19:54

## 2024-09-09 RX ADMIN — URSODIOL 300 MG: 300 CAPSULE ORAL at 17:20

## 2024-09-09 RX ADMIN — Medication 2 SPRAY: at 13:52

## 2024-09-09 RX ADMIN — ACYCLOVIR 800 MG: 800 TABLET ORAL at 19:53

## 2024-09-09 RX ADMIN — MYCOPHENOLATE MOFETIL 1250 MG: 500 INJECTION, POWDER, LYOPHILIZED, FOR SOLUTION INTRAVENOUS at 21:13

## 2024-09-09 RX ADMIN — ACETAMINOPHEN 650 MG: 325 TABLET ORAL at 00:24

## 2024-09-09 RX ADMIN — CALCIUM CARBONATE 600 MG (1,500 MG)-VITAMIN D3 400 UNIT TABLET 2 TABLET: at 17:22

## 2024-09-09 RX ADMIN — LOPERAMIDE HYDROCHLORIDE 2 MG: 2 CAPSULE ORAL at 08:18

## 2024-09-09 RX ADMIN — PANTOPRAZOLE SODIUM 40 MG: 40 TABLET, DELAYED RELEASE ORAL at 08:19

## 2024-09-09 RX ADMIN — PHENAZOPYRIDINE 200 MG: 200 TABLET ORAL at 08:19

## 2024-09-09 RX ADMIN — ALLOPURINOL 300 MG: 300 TABLET ORAL at 08:19

## 2024-09-09 RX ADMIN — URSODIOL 300 MG: 300 CAPSULE ORAL at 08:19

## 2024-09-09 RX ADMIN — ACETAMINOPHEN 650 MG: 325 TABLET ORAL at 19:53

## 2024-09-09 RX ADMIN — MICAFUNGIN SODIUM 150 MG: 50 INJECTION, POWDER, LYOPHILIZED, FOR SOLUTION INTRAVENOUS at 12:31

## 2024-09-09 RX ADMIN — CEFEPIME HYDROCHLORIDE 2 G: 2 INJECTION, POWDER, FOR SOLUTION INTRAVENOUS at 04:05

## 2024-09-09 RX ADMIN — CEFEPIME HYDROCHLORIDE 2 G: 2 INJECTION, POWDER, FOR SOLUTION INTRAVENOUS at 13:52

## 2024-09-09 RX ADMIN — METHOCARBAMOL 500 MG: 500 TABLET ORAL at 19:54

## 2024-09-09 RX ADMIN — HEPARIN, PORCINE (PF) 10 UNIT/ML INTRAVENOUS SYRINGE 5 ML: at 17:24

## 2024-09-09 RX ADMIN — CALCIUM CARBONATE 600 MG (1,500 MG)-VITAMIN D3 400 UNIT TABLET 2 TABLET: at 08:18

## 2024-09-09 RX ADMIN — PHENAZOPYRIDINE 200 MG: 200 TABLET ORAL at 17:20

## 2024-09-09 RX ADMIN — CEFEPIME HYDROCHLORIDE 2 G: 2 INJECTION, POWDER, FOR SOLUTION INTRAVENOUS at 19:56

## 2024-09-09 RX ADMIN — ACETAMINOPHEN 650 MG: 325 TABLET ORAL at 12:36

## 2024-09-09 RX ADMIN — MYCOPHENOLATE MOFETIL 1250 MG: 500 INJECTION, POWDER, LYOPHILIZED, FOR SOLUTION INTRAVENOUS at 09:45

## 2024-09-09 RX ADMIN — LOPERAMIDE HYDROCHLORIDE 2 MG: 2 CAPSULE ORAL at 19:54

## 2024-09-09 RX ADMIN — METHOCARBAMOL 500 MG: 500 TABLET ORAL at 08:50

## 2024-09-09 RX ADMIN — Medication 3 CAPSULE: at 08:18

## 2024-09-09 ASSESSMENT — ACTIVITIES OF DAILY LIVING (ADL)
ADLS_ACUITY_SCORE: 24

## 2024-09-09 NOTE — PROGRESS NOTES
VS baseline, continue to have voiding pain, took PRN tylenol, voided fair 1 or 2 drop of blood was noted sent test for BK replace PICC line today

## 2024-09-09 NOTE — PLAN OF CARE
Goal Outcome Evaluation: AVSS. Patient remained afebrile during shift. He continues to received IV Cefepime for ABX Q8H. He did have pain & burning with urination throughout the night and was given PRN Tylenol which provided some relief. Patient had good fluid intake. Also, his urine output increased overnight as he is on scheduled Pyridium for urinary discomfort. Appetite continues to be fair and no nausea or vomiting occurred throughout shift. Possible PICC placement on 09/09/24 as blood cultures continue to be negative. Morning labs waiting to be drawn, for any possible replacements.       Plan of Care Reviewed With: patient    Overall Patient Progress: no changeOverall Patient Progress: no change

## 2024-09-09 NOTE — PROGRESS NOTES
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     Diagnosis MDS/AML  HCT Type Allogeneic     Prep Regimen Flu/Cy/TBI  Donor Match & Source 8/8 DP permissive PBSC    GVHD Prophylaxis PTCy/MMF/Siro  Primary BMT MD Dr. Murphy    Clinical Trials [id number]       INTERVAL  HISTORY   Appetite remains lower than normal but eating/drinking some. No n/v/d/c. Bothered by urinary urge/frequency and dysuria. No hematuria, spasms, or clots. No fevers. He is fatigued and feels more weak having    Review of Systems: 10 point ROS negative except as noted above.    PHYSICAL EXAM     KPS:  70    /83   Pulse 111   Temp 98  F (36.7  C) (Oral)   Resp 16   Ht 1.84 m (6' 0.44\")   Wt 87.1 kg (192 lb)   SpO2 99%   BMI 25.72 kg/m       General:  NAD   HEENT: +aphthous clean based ulcer noted on R ant/lat tongue.   Lungs: CTAB  CV: RRR, no MRG   Abdomen: +bs, soft, NT/ND. Discomfort with suprapubic palpation but more so that he needs to urinate. Lymphatics: No edema  Neuro: A&O, appropriately interactive, speech clear, moving all extremities   Skin: no new rash; flat scattered remnants of rash on scalp (per wife)  Additional Findings: Estrada removed. 2 PIV R arm    Current aGVHD staging:  start after engraftment (BMT Assessment tab)    LABS AND IMAGING: I have assessed all abnormal lab values for their clinical significance and any values considered clinically significant have been addressed in the assessment and plan.      Lab Results   Component Value Date    WBC 0.4 (LL) 09/09/2024    ANEU 0.7 (L) 08/21/2024    HGB 7.9 (L) 09/09/2024    HCT 23.2 (L) 09/09/2024    PLT 19 (LL) 09/09/2024     09/09/2024    POTASSIUM 4.2 09/09/2024    CHLORIDE 102 09/09/2024    CO2 24 09/09/2024     (H) 09/09/2024    BUN 10.5 " 09/09/2024    CR 0.60 (L) 09/09/2024    MAG 2.1 09/09/2024    INR 1.06 09/09/2024    BILITOTAL 0.5 09/09/2024    AST 25 09/09/2024    ALT 19 09/09/2024    ALKPHOS 58 09/09/2024    PROTTOTAL 6.3 (L) 09/09/2024    ALBUMIN 3.2 (L) 09/09/2024       ASSESSMENT AND PLAN     Leland Pearson is a 69 year old male with 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     BMT/IEC PROTOCOL for MDS  - Chemo protocol: MT 2022-5; Flu/Cy/TBI  - Peripheral blood stem cell graft from 8/8 URD donor, ABO matched, Cell dose: 6.06 x10^6 CD34/kg  - Engraftment monitoring: Peripheral blood and bone marrow chimerisms on D28, D100, 6 months, 1 and 2 years  - Restaging plan: BMBx with FISH, cytogenetics and NGS on D28, D100, 6 months, 1 and 2 years  *D21 BMbx scheduled 9/13 at 11am on 5C (also has scheduled oupt in the event of discharge prior to that). Naida ARAMBULA listed as proceduralist (update prn)     HEME/COAG  # Pancytopenia 2/2 chemo/BMT  - GCSF started day +5, continue until ANC > 2500 for 2 consecutive days. WBC up to 400 today.  - Transfusion parameters: hemoglobin <7, platelets <20 (epistaxis). Plts today.     RISK OF GVHD  - Prophylaxis: PTCy 50mg/kg on D+3 and D+4; MMF (D+5 to 35); Sirolimus (starting D+5, target level 5-10).  - Siro held pending repeat level 9/9 (has been supratherapeutic).      ID  #Streptococcus mitis bacteremia (2/2 bottles) - Cefepime (9/1-x)   -ABX duration plan: per ID likely will require 14 days following CVC removal. Sensitive to ceftriaxone q24hrs, can transition to non- pseudomonal coverage once she has engrafted.   -ID following, recommended CVC removal and line holiday, echo, continue cefepime. Currently 2 PIV. PICC can be placed Monday 9/9; ordered.  #MRSE bacteremia likely contaminant because peripheral stick, grew late. will discontinue (1/2) -Vanco (9/2-9/6)   -trend cultures x3 days negative so far  #Febrile Neutropenia (9/2-x)   *see BC above,  otherwise unremarkable  #Diarrhea- resolved.   -c.diff neg 9/3 & enteric panel neg 9/6  #Rectal pain- resolved    Prophylaxis plan:   - Bacterial: Levaquin  (held while on Cefepime)   - Fungal: Micafungin until D+45, followed by mold active azole. Pulm nodules present on workup CT.   - PJP: Bactrim to start at D+28. Toxoplasma negative.   - Viral: Acyclovir 800mg BID. No need for letermovir as donor and recipient are CMV negative.      Monitoring:   - CMV weekly, neg 9/9  - EBV every 2 weeks from D30 to D180     GI/NUTRITION  #Hemorrhoids- prep H, lidocaine  #Diarrhea (9/3): c.diff recheck negative. Metamucil daily with prn Imodium.  # GERD: Protonix  - Anti-emetics: Zofran, dex scheduled during chemotherapy. Compazine, lorazepam available PRN.   - Ulcer prophy: Protonix  - VOD prophy: Ursodiol   - Dietician support to prevent malnutrition  - Miralax and senna PRN constipation.     CARDIOVASCULAR  # HTN- PTA lisinopril stopped 8/26 d/t symptomatic orthostasis.   # CAD: Coronary artery calcifications seen on CT, stress test in 2023 negative  - Risk of cardiomyopathy: Baseline EF 55-60%.   - Risk of arrhythmia: Baseline EKG showed 421       RENAL/ELECTROLYTES/  #Hypervolemia- up 6lbs since (9/5) will give 20mg lasix. Resolved.  #LA = 2.3 (9/7)- given 500mL of IVF and resolved  #Hyponatremia: osms urine/serum, Na urine/serum indicate hypovolemic hyponatremia. Encourage PO, will give IVF as necessary and slow down stools. RESOLVED WITH IVF. Stably waxing and waning.  - Electrolyte management: replace per sliding scale  - BPH: Continue PTA flomax.  #urinary urge/frequency/dysuria: repeat UA today, check urine BK. Cont Pyridium tid.      MUSCULOSKELETAL/FRAILTY  - Baseline Frailty Score: Not frail (0)  - Daily PT/OT as needed while inpatient  - Gout: continue allopurinol      Neuro   #HA: no red flag symptoms. Pt has history of spinal stenosis, lumbosacral radiculopathy likely exacerbating. Tramadol PRN. Voltaren gel,  "ice and PT asked for suggestions. In the setting of thrombocytopenia, consider CT head if changes in symptoms or worsens. No currently complaints of HA.   - Pain management: Outpatient has been taking celecoxib for MSK pain, once a day. Inpatient will try Tramadol - available PRN. Added robaxin TID this admission.    Derm:  - Follicular rash to chest, back- Started topical clindamycin 8/28. Much improved.      SOCIAL DETERMINANTS  - Caregiver: wife, Vani. Lives within the required distance from hospital but may elect to stay in [place].   - Financial/insurance concerns: None    Medically Ready for Discharge: Anticipated in 5+ Days      Clinically Significant Risk Factors          # Hypocalcemia: Lowest Ca = 8.4 mg/dL in last 2 days, will monitor and replace as appropriate     # Hypoalbuminemia: Lowest albumin = 2.8 g/dL at 9/7/2024  7:13 AM, will monitor as appropriate   # Thrombocytopenia: Lowest platelets = 14 in last 2 days, will monitor for bleeding   # Hypertension: Noted on problem list           # Overweight: Estimated body mass index is 25.72 kg/m  as calculated from the following:    Height as of this encounter: 1.84 m (6' 0.44\").    Weight as of this encounter: 87.1 kg (192 lb).            I spent 40 minutes in the care of this patient today, which included time necessary for preparation for the visit, obtaining history, ordering medications/tests/procedures as medically indicated, review of pertinent medical literature, counseling of the patient, communication of recommendations to the care team, and documentation time.    Bernadette Green PA-C    "

## 2024-09-09 NOTE — TELEPHONE ENCOUNTER
Lucita Aguilar, RN  P Bmt Adult  [facility]  Cc: Jignesh Aranda, RN  Anu,    This patient is ready to schedule for BAN visits.    LT RNCC: Jignesh  MD: Jeffrey    Bmbx: In clinic  Weekly lab day preference:  TBD    Clonoseq: No    Admitting VJ: Kaitlyn Ramsey Jeff    Restage per protocol. Orders are scanned into the media tab.  For allos only: please schedule with primary MD at D28, D60, D100, and D180.    Thanks!    Lucita Aguilar RN, MSN  BMT Nurse Coordinator  Phone: [phone]

## 2024-09-09 NOTE — PROGRESS NOTES
[facility]  Transplant Infectious Disease Progress Note     Patient:  Leland Pearson, Date of birth 1955, Medical record number [id number]  Date of Visit:  09/09/2024  Consult requested by Dr. Timothy Skaggs for evaluation of persistent neutropenic fevers         Assessment and Recommendations:   Recommendations:  Continue IV Cefepime for strep mitis bacteremia. Will plan to 14 day course from removal of CVC, with end date of 9/20/2024  Okay for PICC placement today  Given increased urinary frequency and dysuria, would obtain UA, urine culture, BK virus PCR from both blood and urine.    Case discussed with transplant ID attending, Dr. Manriquez  Thank you very much for this consultation. Transplant Infectious Disease will continue to follow with you.    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  Initial evaluation reveals Streptococcus mitis bacteremia from central venous catheter as well as single culture of MRSE from peripheral blood.  Patient was started on vancomycin and cefepime but has been persistently febrile since 9/2/2024.  Additional evaluation includes negative chest x-ray, negative urinalysis, negative C. difficile PCR.  Most likely etiology of persistent fevers is strep mitis bacteremia.  Patient had catheter removed on 9/6 and echocardiogram performed on 9/6 without evidence of valvular vegetations.  Fever have since resolved and patient is doing well on Cefepime.    Micheal Lucero MD  Infectious Disease Fellow  Most easily reached on [employer]  Pager #[phone]         Infectious Disease Issues:   Persistent neutropenic fevers  Streptococcus mitis/oralis bacteremia, isolated from CVC  Patient with persistent neutropenic fevers beginning on 9/2/2024 and continuing to current.  Fevers have been quite high upwards of 103 and occurring  multiple times daily.  Workup thus far has been positive for isolation of Streptococcus mitis and Staphylococcus epidermidis and blood culture from 9/2/2024.  Repeat blood cultures have been negative to date.  Additional workup including chest x-ray, urinalysis, and C. difficile have all been unremarkable/negative.  Patient has been on appropriate antibiotic therapy with vancomycin and cefepime since onset of fevers on 9/2/2024.    Suspect most likely etiology of patient's ongoing fevers is his Streptococcus mitis bacteremia.  I suspect that his MRSE is likely contaminant at this time based on the fact that her only grown 1 of 2 peripheral blood culture bottles and resulted as positive several hours after his strep mitis cultures.  Vancomycin was discontinued on 9/5.  Requested the patient's CVC be removed, which was performed on 9/6.  Patient underwent transthoracic echocardiogram on 9/6 as well with no evidence of valvular vegetations.  Fever curve overall is improving.  Continue cefepime for now, anticipate approximately 2-week     Dysuria  Increased Urinary Frequency  Patient with increased nocturnal urinary frequency and dysuria for the last 2 days.  Etiology unclear at this time, but given time course in relation to his transplant would be concern for BK virus reactivation.  Would recommend checking urinalysis and urine culture as well as BK virus PCR from both blood and urine.    Staphylococcus epidermidis isolated in single peripheral blood culture bottle  Isolated on peripheral blood culture from 9/2.  Not recovered from blood cultures drawn from central venous catheter on the same day.  These cultures also resulted as positive a number of hours after strep mitis have been isolated from CVC cultures.  I suspect that this is most likely contaminant as outlined above.  Vancomycin discontinued on 9/5.    MDS/AML status post matched unrelated donor transplant on 8/23/2024  Pancytopenia  WBC count steadily  increasing, now up to 0.4.  Hopeful that this will continue to rise    Transplant checklist  - QTc interval: 421 (8/5/2024)  - Bacterial coverage: Cefepime  - Pneumocystis prophylaxis: TMP-SMX M,Tu  - Serostatus & viral prophylaxis: CMV D-/R-, EBV+, HSV-.  Acyclovir prophylaxis  - Fungal prophylaxis: Micafungin  - Toxo IgM negative,  - Gamma globulin status: Unknown           Interval History:   Afebrile for the last 48 hours  No longer experiencing rigors or chills  Has complained of increased nocturnal urinary frequency and intermittent dysuria for the last day.  No associated suprapubic tenderness, flank pain, or hematuria    Past Medical History:   Diagnosis Date    Gastroesophageal reflux disease     Hypertension        Past Surgical History:   Procedure Laterality Date    COLONOSCOPY      ESOPHAGOSCOPY, GASTROSCOPY, DUODENOSCOPY (EGD), COMBINED  7/28/2013    Procedure: COMBINED ESOPHAGOSCOPY, GASTROSCOPY, DUODENOSCOPY (EGD), BIOPSY SINGLE OR MULTIPLE;;  Surgeon: Franklin Barrera MD;  Location: [facility]    ESOPHAGOSCOPY, GASTROSCOPY, DUODENOSCOPY (EGD), COMBINED  7/28/2013    Procedure: COMBINED ESOPHAGOSCOPY, GASTROSCOPY, DUODENOSCOPY (EGD), REMOVE FOREIGN BODY;;  Surgeon: Franklin Barrera MD;  Location: [facility]    IR CVC TUNNEL PLACEMENT > 5 YRS OF AGE  8/16/2024    IR CVC TUNNEL REMOVAL RIGHT  9/6/2024    ORTHOPEDIC SURGERY      04 micro discectomy, acl  repair       Family History   Problem Relation Age of Onset    No Known Problems Brother     No Known Problems Brother     No Known Problems Brother     No Known Problems Brother     Diabetes Brother     No Known Problems Sister     No Known Problems Daughter     No Known Problems Daughter        Social History     Social History Narrative    Not on file     Social History     Tobacco Use    Smoking status: Never     Passive exposure: Never    Smokeless tobacco: Never   Substance Use Topics    Alcohol use: Yes     Comment: socially    Drug use: No        Immunization History   Administered Date(s) Administered    COVID-19 12+ (Pfizer) 10/11/2023    COVID-19 Bivalent 18+ (Moderna) 10/03/2022    COVID-19 Monovalent 18+ (Moderna) 02/04/2021, 03/04/2021, 10/25/2021, 04/08/2022       Patient Active Problem List   Diagnosis    MDS (myelodysplastic syndrome) (H)    Pre-operative cardiovascular examination    Benign essential hypertension            Current Medications & Allergies:     Current Facility-Administered Medications   Medication Dose Route Frequency Provider Last Rate Last Admin    acyclovir (ZOVIRAX) tablet 800 mg  800 mg Oral BID Aziza Mendieta APRN CNP   800 mg at 09/09/24 0819    allopurinol (ZYLOPRIM) tablet 300 mg  300 mg Oral Daily Kaitlyn Aguilar, NP   300 mg at 09/09/24 0819    artificial saliva (BIOTENE MT) solution 2 spray  2 spray Swish & Spit 4x Daily Bessie Lazo PA-C   2 spray at 09/09/24 0819    calcium carbonate-vitamin D (CALTRATE) 600-10 MG-MCG per tablet 2 tablet  2 tablet Oral BID w/meals Bessie Lazo PA-C   2 tablet at 09/09/24 0818    ceFEPIme (MAXIPIME) 2 g vial to attach to  mL bag for ADULTS or NS 50 mL bag for PEDS  2 g Intravenous Q8H Meri Viera  mL/hr at 09/05/24 0305 2 g at 09/09/24 0405    filgrastim-aafi (NIVESTYM) injection 480 mcg  480 mcg Subcutaneous Daily at 8 pm Markus Mendez MD   480 mcg at 09/08/24 2007    heparin lock flush 10 unit/mL injection 5-20 mL  5-20 mL Intracatheter Q24H Markus Mendez MD   5 mL at 09/03/24 0336    loperamide (IMODIUM) capsule 2 mg  2 mg Oral BID Bessie Lazo PA-C   2 mg at 09/09/24 0818    methocarbamol (ROBAXIN) tablet 500 mg  500 mg Oral TID Aziza Mendieta APRN CNP   500 mg at 09/09/24 0850    micafungin (MYCAMINE) 150 mg in sodium chloride 0.9 % 100 mL intermittent infusion  150 mg Intravenous Daily Kaitlyn Aguilar,  mL/hr at 09/09/24 1231 150 mg at 09/09/24 1231    mycophenolate mofetil (CELLCEPT) 1,250 mg in D5W intermittent infusion   1,250 mg Intravenous Q12H [facility] (10/22) Kaitlyn Aguilar .3 mL/hr at 09/09/24 0945 1,250 mg at 09/09/24 0945    pantoprazole (PROTONIX) EC tablet 40 mg  40 mg Oral Daily Bessie Lazo PA-C   40 mg at 09/09/24 0819    phenazopyridine (PYRIDIUM) tablet 200 mg  200 mg Oral TID w/meals Bessie Lazo PA-C   200 mg at 09/09/24 0819    prochlorperazine (COMPAZINE) tablet 5 mg  5 mg Oral Daily Eunice Spicer PA-C   5 mg at 09/08/24 1627    psyllium (METAMUCIL/KONSYL) capsule 3 capsule  3 capsule Oral Daily Bessie Lazo PA-C   3 capsule at 09/09/24 0818    [Held by provider] sirolimus (GENERIC EQUIVALENT) tablet 6 mg  6 mg Oral Daily Kaitlyn Aguilar NP   6 mg at 09/07/24 0853    sodium chloride (PF) 0.9% PF flush 3 mL  3 mL Intracatheter Q8H Roseline Strong PA-C   3 mL at 09/09/24 0405    sodium chloride (PF) 0.9% PF flush 3 mL  3 mL Intracatheter Q8H Yaa Baer MD   3 mL at 09/09/24 0019    sodium chloride (PF) 0.9% PF flush 3 mL  3 mL Intracatheter Q8H Bessie Lazo PA-C   3 mL at 09/09/24 0019    [START ON 9/23/2024] sulfamethoxazole-trimethoprim (BACTRIM DS) 800-160 MG per tablet 1 tablet  1 tablet Oral Q Mon Tues BID Kaitlyn Aguilar NP        tamsulosin (FLOMAX) capsule 0.4 mg  0.4 mg Oral Daily Bessie Lazo PA-C   0.4 mg at 09/08/24 1627    ursodiol (ACTIGALL) capsule 300 mg  300 mg Oral TID Markus Mendez MD   300 mg at 09/09/24 0819       Infusions/Drips:    Current Facility-Administered Medications   Medication Dose Route Frequency Provider Last Rate Last Admin       No Known Allergies         Physical Exam:   Patient Vitals for the past 24 hrs:   BP Temp Temp src Pulse Resp SpO2 Weight   09/09/24 1135 130/86 98.3  F (36.8  C) Oral 89 16 97 % --   09/09/24 0935 130/83 98  F (36.7  C) Oral 111 -- 99 % --   09/09/24 0912 130/70 98  F (36.7  C) -- 97 16 -- --   09/09/24 0839 -- -- -- -- -- -- 87.1 kg (192 lb)   09/09/24 0813 127/82 98.7  F (37.1  C) Oral -- 16 97 % --   09/09/24 0408  123/77 98.2  F (36.8  C) Oral -- 18 -- --   09/09/24 0024 133/81 98.4  F (36.9  C) Oral -- 18 97 % --   09/08/24 1957 135/73 98  F (36.7  C) Oral 95 16 96 % --   09/08/24 1621 134/82 98  F (36.7  C) Oral 95 16 95 % --   09/08/24 1347 (!) 142/83 98.2  F (36.8  C) Oral 92 16 96 % --   09/08/24 1315 134/80 97.9  F (36.6  C) Oral -- 16 97 % --     Ranges for vital signs:  Temp:  [97.9  F (36.6  C)-98.7  F (37.1  C)] 98.3  F (36.8  C)  Pulse:  [] 89  Resp:  [16-18] 16  BP: (123-142)/(70-86) 130/86  SpO2:  [95 %-99 %] 97 %  Vitals:    09/07/24 0800 09/08/24 0745 09/09/24 0839   Weight: 92 kg (202 lb 12.8 oz) 89.8 kg (197 lb 15.6 oz) 87.1 kg (192 lb)       Physical Examination:   GENERAL: Lying in bed, no acute distress.  Nontoxic-appearing  HEAD:  Head is normocephalic, atraumatic   ENT:  Oropharynx is moist.  LUNGS:  Clear to auscultation bilateral.   CARDIOVASCULAR:  Regular rate and rhythm with no murmur  ABDOMEN:  Normal bowel sounds, soft, nontender.  No suprapubic tenderness or flank pain  NEUROLOGIC:  Grossly nonfocal. Active x4 extremities         Laboratory Data:       Metabolic Studies       Recent Labs   Lab Test 09/09/24  1120 09/09/24  0615 09/08/24  1738 09/08/24  1346 09/08/24  0358 09/07/24  1103 09/07/24  0713   NA  --  136  --   --  134*  --  133*   POTASSIUM  --  4.2  --   --  4.1  --  4.0   CHLORIDE  --  102  --   --  101  --  100   CO2  --  24  --   --  24  --  22   ANIONGAP  --  10  --   --  9  --  11   BUN  --  10.5  --   --  10.0  --  9.8   CR  --  0.60*  --   --  0.63*  --  0.63*   GFRESTIMATED  --  >90  --   --  >90  --  >90   GLC  --  115*   < >  --  109*  --  120*   HERBERT  --  8.7*  --   --  8.4*  --  8.3*   PHOS  --  2.7  --   --  2.3*  --   --    MAG  --  2.1  --   --  1.9  --  1.8   LACT 1.3  --   --  1.4  --    < >  --    PCAL  --   --   --   --   --   --  0.37    < > = values in this interval not displayed.       Hepatic Studies    Recent Labs   Lab Test 09/09/24  0615 09/07/24  0749  05/15/24  1339 05/08/24  1156   BILITOTAL 0.5 0.7   < > 0.6   DBIL 0.24 0.28   < >  --    ALKPHOS 58 53   < > 82   PROTTOTAL 6.3* 5.8*   < > 7.6   ALBUMIN 3.2* 2.8*   < > 4.1   AST 25 30   < > 43   ALT 19 18   < > 21   LDH  --   --   --  613*    < > = values in this interval not displayed.       Gout Labs      Recent Labs   Lab Test 08/16/24  0920 08/09/24  0827 08/05/24  1200 07/29/24  0811 07/01/24  1208   URIC 4.2 3.9 4.1 4.5 4.1       Hematology Studies   Recent Labs   Lab Test 09/09/24  0615 09/08/24  0358 09/07/24  0713 09/06/24  1219 09/06/24  0337 08/22/24  0407 08/21/24  0352 08/20/24  0418 08/19/24  0327 08/18/24  0424 05/22/24  0838 05/21/24  1016   WBC 0.4* 0.2* 0.1*  --  <0.1*   < > 0.8* 1.2* 1.3* 1.3*   < > 4.0   ABLA  --   --   --   --   --   --   --   --   --   --   --  0.4*   BLST  --   --   --   --   --   --   --   --   --   --   --  9   ANEU  --   --   --   --   --   --  0.7* 1.2*  --   --    < > 0.6*   ANEUTAUTO  --   --   --   --   --   --   --   --  1.1* 0.8*   < >  --    ALYM  --   --   --   --   --   --  0.0* 0.0*  --   --    < > 2.0   ALYMPAUTO  --   --   --   --   --   --   --   --  0.1* 0.4*   < >  --    DAVIAN  --   --   --   --   --   --  0.0 0.0  --   --    < > 0.5   AMONOAUTO  --   --   --   --   --   --   --   --  0.1 0.2   < >  --    AEOS  --   --   --   --   --   --  0.0 0.0  --   --    < > 0.0   AEOSAUTO  --   --   --   --   --   --   --   --  0.0 0.0   < >  --    ABSBASO  --   --   --   --   --   --   --   --  0.0 0.0   < >  --    HGB 7.9* 7.8* 7.8*   < > 6.1*   < > 9.9* 10.6* 10.1* 10.1*   < > 12.9*   HCT 23.2* 23.5* 22.7*  --  17.4*   < > 28.1* 30.6* 29.6* 28.3*   < > 38.4*   PLT 19* 14* 21*  --  13*   < > 23* 33* 46* 16*   < > 72*    < > = values in this interval not displayed.       Clotting Studies    Recent Labs   Lab Test 09/09/24  0615 09/02/24  0444 08/26/24  0407 08/16/24  0920   INR 1.06 1.06 0.99 0.93   PTT  --   --   --  28       Iron Testing    Recent Labs   Lab Test  "09/09/24  0615 08/02/24  0919 08/01/24  0805   ZARIA  --   --  460*   MCV 78   < > 84    < > = values in this interval not displayed.       Urine Studies     Recent Labs   Lab Test 09/07/24  1230 09/02/24  0454 08/05/24  1200   URINEPH 6.0 7.0 6.5   NITRITE Negative Negative Negative   LEUKEST Negative Negative Negative   WBCU 1 2 <1       Medication levels    Recent Labs   Lab Test 09/08/24  0733 09/07/24  0713 09/06/24  0337   VANCOMYCIN  --   --  10.3   RAPAMY 15.9*   < >  --     < > = values in this interval not displayed.       CSF testing   No lab results found.    Invalid input(s): \"CADAM\", \"EVPCR\", \"ENTPCR\", \"ENTEROVIRUS\"    Body fluid stats  No lab results found.    Microbiology:  Fungal testing  No lab results found.    Invalid input(s): \"HIFUN\", \"FUNGL\"    Last Culture results   Culture   Date Value Ref Range Status   09/07/2024 No growth after 1 day  Preliminary   09/05/2024 No growth after 4 days  Preliminary   09/04/2024 No Growth  Final   09/03/2024 No Growth  Final   09/03/2024 No Growth  Final   09/02/2024 Positive on the 1st day of incubation (A)  Preliminary   09/02/2024 Staphylococcus epidermidis (AA)  Preliminary     Comment:     1 of 2 bottles  The recovery of this organism from a single blood culture bottle most likely represents contamination. If susceptibility testing is needed, please refer to the antibiogram or contact [facility].   09/02/2024 Positive on the 1st day of incubation (A)  Preliminary   09/02/2024 Streptococcus mitis (AA)  Preliminary     Comment:     2 of 2 bottles     09/02/2024 Positive on the 1st day of incubation (A)  Preliminary   09/02/2024 Streptococcus mitis (AA)  Preliminary     Comment:     2 of 2 bottles    Susceptibilities done on previous cultures   08/16/2024   Final    No Vancomycin Resistant Enterococcus species isolated           Last checks of Clostridioides difficile testing  Recent Labs   Lab Test 09/03/24  1140 08/17/24  0906   CDBPCT Negative Negative "       Syphilis Testing    Treponema Antibody Total   Date Value Ref Range Status   08/05/2024 Nonreactive Nonreactive Final       Quantiferon testing   Recent Labs   Lab Test 08/21/24  0352 08/20/24  0418   LYMPH 6 0       Viral loads    Recent Labs   Lab Test 09/07/24  1103 08/31/24  1453 08/17/24  1621 06/09/24  1124   CMVQNT Not Detected Not Detected   < >  --    HSDNA1  --   --   --  Not Detected   HSDNA2  --   --   --  Not Detected    < > = values in this interval not displayed.       CMV viral loads    Recent Labs   Lab Test 09/07/24  1103 08/31/24  1453 08/24/24  1416 08/17/24  1621   CMVQNT Not Detected Not Detected Not Detected Not Detected       Hepatitis B Testing     Recent Labs   Lab Test 08/05/24  1200   AUSAB 89.10   HBCAB Nonreactive   HEPBANG Nonreactive        Hepatitis C Antibody   Date Value Ref Range Status   08/05/2024 Nonreactive Nonreactive Final     Comment:     A nonreactive screening test result does not exclude the possibility of exposure to or infection with HCV. Nonreactive screening test results in individuals with prior exposure to HCV may be due to antibody levels below the limit of detection of this assay or lack of reactivity to the HCV antigens used in this assay. Patients with recent HCV infections (<3 months from time of exposure) may have false-negative HCV antibody results due to the time needed for seroconversion (average of 8 to 9 weeks).       CMV Antibody IgG   Date Value Ref Range Status   08/05/2024 No detectable antibody. No detectable antibody.  Final   06/11/2024 No detectable antibody. No detectable antibody.  Final     Varicella Zoster Antibody IgG   Date Value Ref Range Status   08/05/2024 Positive  Final     Comment:     Suggests previous exposure or immunization and probable immunity.     EBV Capsid Antibody IgG   Date Value Ref Range Status   08/05/2024 Positive (A) No detectable antibody. Final     Comment:     Suggests recent or past exposure.     Herpes  Simplex Virus Type 1 IgG Antibody   Date Value Ref Range Status   08/05/2024 No HSV-1 IgG antibodies detected. No HSV-1 IgG antibodies detected Final     Herpes Simplex Virus Type 2 IgG Antibody   Date Value Ref Range Status   08/05/2024 No HSV-2 IgG antibodies detected. No HSV-2 IgG antibodies detected Final

## 2024-09-09 NOTE — PROCEDURES
[facility]    Double Lumen PICC Placement    Date/Time: 9/9/2024 3:29 PM    Performed by: Sumit Partida RN  Authorized by: Bernadette Green PA-C  Indications: vascular access      UNIVERSAL PROTOCOL   Site Marked: Yes  Prior Images Obtained and Reviewed:  Yes  Required items: Required blood products, implants, devices and special equipment available    Patient identity confirmed:  Verbally with patient, arm band, provided demographic data, hospital-assigned identification number and anonymous protocol, patient vented/unresponsive  NA - No sedation, light sedation, or local anesthesia  Confirmation Checklist:  Patient's identity using two indicators, relevant allergies, procedure was appropriate and matched the consent or emergent situation and correct equipment/implants were available  Time out: Immediately prior to the procedure a time out was called (Sumit)    Universal Protocol: the Joint Commission Universal Protocol was followed    Preparation: Patient was prepped and draped in usual sterile fashion       ANESTHESIA    Anesthesia:  Local infiltration  Local Anesthetic:  Lidocaine 1% without epinephrine  Anesthetic Total (mL):  5      SEDATION    Patient Sedated: No        Preparation: skin prepped with ChloraPrep  Skin prep agent: skin prep agent completely dried prior to procedure  Sterile barriers: maximum sterile barriers were used: cap, mask, sterile gown, sterile gloves, and large sterile sheet  Hand hygiene: hand hygiene performed prior to central venous catheter insertion  Type of line used: PICC  Catheter type: double lumen  Lumen type: non-valved and power PICC  Lumen Identification: Purple and Red  Catheter size: 5 Fr  Brand: Bard  Lot number: [id number]  Placement method: venipuncture, MST, ultrasound and tip navigation system  Number of attempts: 1  Difficulty threading catheter: no  Successful placement: yes  Orientation: right  Catheter to Vein  (%): 31  Location: basilic vein (0.57cm)  Tip Location: SVC  Arm circumference: adults 10 cm  Extremity circumference: 29  Visible catheter length: 5  Total catheter length: 49  Dressing and securement: adhesive securement device, alcohol impregnated caps, blood cleaned with CHG, blood removed, chlorhexidine patch applied, fixation device, gloves changed prior to final dressing, glue, line secured, secure lock, securement device, site cleansed and transparent securement dressing  Post procedure assessment: blood return through all ports, free fluid flow and placement verified by 3CG technology  PROCEDURE Describe Procedure: Right basilic vein 0.57cm dm. 5cm external.Placement verified by Nguyễn 3CG.PICC okay to use.  Disposal: sharps and needle count correct at the end of procedure, needles and guidewire disposed in sharps container

## 2024-09-09 NOTE — PLAN OF CARE
"  Problem: Risk for Delirium  Goal: Improved Sleep  9/9/2024 0454 by Jacinta Naik, RN  Outcome: Not Progressing  Intervention: Promote Sleep  Recent Flowsheet Documentation  Taken 9/8/2024 1957 by Jacinta Naik, RN  Sleep/Rest Enhancement: consistent schedule promoted     Problem: Stem Cell/Bone Marrow Transplant  Goal: Symptom-Free Urinary Elimination  9/9/2024 0454 by Jacinta Naik, RN  Outcome: Not Progressing  Intervention: Prevent or Manage Bladder Irritation  Recent Flowsheet Documentation  Taken 9/9/2024 0024 by Jacinta Naik, RN  Pain Management Interventions:   medication (see MAR)   distraction   emotional support  Taken 9/8/2024 1957 by Jacinta Naik, RN  Urinary Elimination Promotion: voiding relaxation promoted  Hyperhydration Management: fluids provided      Problem: Adult Inpatient Plan of Care  Goal: Plan of Care Review  Description: The Plan of Care Review/Shift note should be completed every shift.  The Outcome Evaluation is a brief statement about your assessment that the patient is improving, declining, or no change.  This information will be displayed automatically on your shift  note.  9/9/2024 0454 by Jacinta Naik, RN  Outcome: Progressing  Flowsheets (Taken 9/9/2024 0454)  Plan of Care Reviewed With: patient  Overall Patient Progress: no change  Goal: Patient-Specific Goal (Individualized)  Description: You can add care plan individualizations to a care plan. Examples of Individualization might be:  \"Parent requests to be called daily at 9am for status\", \"I have a hard time hearing out of my right ear\", or \"Do not touch me to wake me up as it startles  me\".  9/9/2024 0454 by Jacinta Naik, RN  Outcome: Progressing  Goal: Absence of Hospital-Acquired Illness or Injury  9/9/2024 0454 by Jacinta Naik, RN  Outcome: Progressing  Intervention: Identify and Manage Fall Risk  Recent Flowsheet Documentation  Taken 9/9/2024 0410 by Jacinta Naik, RN  Safety Promotion/Fall " Prevention:   safety round/check completed   nonskid shoes/slippers when out of bed   check orthostatic blood pressure  Taken 9/9/2024 0025 by Jacinta Naik RN  Safety Promotion/Fall Prevention:   safety round/check completed   nonskid shoes/slippers when out of bed   check orthostatic blood pressure  Intervention: Prevent Skin Injury  Recent Flowsheet Documentation  Taken 9/8/2024 1957 by Jacinta Naik, RN  Skin Protection:   adhesive use limited   protective footwear used   transparent dressing maintained  Device Skin Pressure Protection:   adhesive use limited   tubing/devices free from skin contact  Intervention: Prevent and Manage VTE (Venous Thromboembolism) Risk  Recent Flowsheet Documentation  Taken 9/8/2024 1957 by Jacinta Naik, RN  VTE Prevention/Management: (Up ad sergo & ambulation promoted) SCDs off (sequential compression devices)  Intervention: Prevent Infection  Recent Flowsheet Documentation  Taken 9/9/2024 0410 by Jacinta Naik RN  Infection Prevention:   environmental surveillance performed   hand hygiene promoted   rest/sleep promoted   single patient room provided  Taken 9/9/2024 0025 by Jacinta Naik RN  Infection Prevention:   environmental surveillance performed   hand hygiene promoted   rest/sleep promoted   single patient room provided  Taken 9/8/2024 1957 by Jcainta Naik RN  Infection Prevention:   environmental surveillance performed   hand hygiene promoted   rest/sleep promoted   single patient room provided  Goal: Optimal Comfort and Wellbeing  9/9/2024 0454 by Jacinta Naik RN  Outcome: Progressing  Intervention: Monitor Pain and Promote Comfort  Recent Flowsheet Documentation  Taken 9/9/2024 0024 by Jacinta Naik RN  Pain Management Interventions:   medication (see MAR)   distraction   emotional support  Goal: Readiness for Transition of Care  9/9/2024 0454 by Jacinta Naik RN  Outcome: Progressing    Problem: Risk for Delirium  Goal: Optimal  Coping  9/9/2024 0454 by Jacinta Naik, RN  Outcome: Progressing  Intervention: Optimize Psychosocial Adjustment to Delirium  Recent Flowsheet Documentation  Taken 9/8/2024 1957 by Jacinta Naik, RN  Supportive Measures:   active listening utilized   decision-making supported   positive reinforcement provided   self-care encouraged   self-reflection promoted  Family/Support System Care:   involvement promoted   presence promoted   self-care encouraged   support provided     Problem: Stem Cell/Bone Marrow Transplant  Goal: Optimal Coping with Transplant  9/9/2024 0454 by Jacinta Naik, RN  Outcome: Progressing  Intervention: Optimize Patient/Family Adjustment to Transplant  Recent Flowsheet Documentation  Taken 9/8/2024 1957 by Jacinta Naik, RN  Supportive Measures:   active listening utilized   decision-making supported   positive reinforcement provided   self-care encouraged   self-reflection promoted  Family/Support System Care:   involvement promoted   presence promoted   self-care encouraged   support provided  Goal: Diarrhea Symptom Control  9/9/2024 0454 by Jacinta Naik, RN  Outcome: Progressing  Intervention: Manage Diarrhea  Recent Flowsheet Documentation  Taken 9/8/2024 1957 by Jacinta Naik, RN  Skin Protection:   adhesive use limited   protective footwear used   transparent dressing maintained  Fluid/Electrolyte Management: fluids provided  Goal: Improved Activity Tolerance  9/9/2024 0454 by Jacinta Naik, RN  Outcome: Progressing  Intervention: Promote Improved Energy  Recent Flowsheet Documentation  Taken 9/8/2024 1957 by Jacinta Naik, RN  Fatigue Management:   activity schedule adjusted   frequent rest breaks encouraged   paced activity encouraged  Sleep/Rest Enhancement: consistent schedule promoted  Environmental Support:   calm environment promoted   personal routine supported   rest periods encouraged  Goal: Blood Counts Within Acceptable Range  9/9/2024 0454 by Gumaro  Jacinta KEARNS RN  Outcome: Progressing  Intervention: Monitor and Manage Hematologic Symptoms  Recent Flowsheet Documentation  Taken 9/9/2024 0410 by Jacinta Naik RN  Bleeding Precautions:   blood pressure closely monitored   gentle oral care promoted   monitored for signs of bleeding  Medication Review/Management: medications reviewed  Taken 9/9/2024 0025 by Jacinta Naik RN  Bleeding Precautions:   blood pressure closely monitored   gentle oral care promoted   monitored for signs of bleeding  Medication Review/Management: medications reviewed  Taken 9/8/2024 1957 by Jacinta Naik RN  Sleep/Rest Enhancement: consistent schedule promoted  Bleeding Precautions:   blood pressure closely monitored   gentle oral care promoted   monitored for signs of bleeding  Medication Review/Management: medications reviewed  Goal: Absence of Hypersensitivity Reaction  9/9/2024 0454 by Jacinta Naik RN  Outcome: Progressing  Goal: Absence of Infection  9/9/2024 0454 by Jacinta Naik RN  Outcome: Progressing  Intervention: Prevent and Manage Infection  Recent Flowsheet Documentation  Taken 9/9/2024 0410 by Jacinta Naik RN  Infection Prevention:   environmental surveillance performed   hand hygiene promoted   rest/sleep promoted   single patient room provided  Isolation Precautions: protective environment maintained  Taken 9/9/2024 0025 by Jacinta Naik RN  Infection Prevention:   environmental surveillance performed   hand hygiene promoted   rest/sleep promoted   single patient room provided  Isolation Precautions: protective environment maintained  Taken 9/8/2024 1957 by Jacinta Naik RN  Infection Prevention:   environmental surveillance performed   hand hygiene promoted   rest/sleep promoted   single patient room provided  Isolation Precautions: protective environment maintained  Goal: Improved Oral Mucous Membrane Health and Integrity  9/9/2024 0454 by Jacinta Naik RN  Outcome: Progressing  Goal:  Nausea and Vomiting Symptom Relief  9/9/2024 0454 by Jacinta Naik, RN  Outcome: Progressing  Goal: Optimal Nutrition Intake  9/9/2024 0454 by Jacinta Naik, RN  Outcome: Progressing  Intervention: Minimize and Manage Barriers to Oral Intake  Recent Flowsheet Documentation  Taken 9/8/2024 1957 by Jacinta Naik, RN  Oral Nutrition Promotion: rest periods promoted

## 2024-09-10 ENCOUNTER — HOME INFUSION (PRE-WILLOW HOME INFUSION) (OUTPATIENT)
Dept: PHARMACY | Facility: CLINIC | Age: 69
End: 2024-09-10
Payer: COMMERCIAL

## 2024-09-10 ENCOUNTER — APPOINTMENT (OUTPATIENT)
Dept: OCCUPATIONAL THERAPY | Facility: CLINIC | Age: 69
DRG: 014 | End: 2024-09-10
Attending: INTERNAL MEDICINE
Payer: COMMERCIAL

## 2024-09-10 LAB
ABO/RH(D): NORMAL
ANION GAP SERPL CALCULATED.3IONS-SCNC: 9 MMOL/L (ref 7–15)
ANTIBODY SCREEN: NEGATIVE
BACTERIA BLD CULT: NO GROWTH
BACTERIA UR CULT: NO GROWTH
BASOPHILS # BLD AUTO: ABNORMAL 10*3/UL
BASOPHILS # BLD MANUAL: 0 10E3/UL (ref 0–0.2)
BASOPHILS NFR BLD AUTO: ABNORMAL %
BASOPHILS NFR BLD MANUAL: 0 %
BK VIRUS SPECIMEN TYPE: NORMAL
BKV DNA # SPEC NAA+PROBE: NOT DETECTED IU/ML
BUN SERPL-MCNC: 13.1 MG/DL (ref 8–23)
CALCIUM SERPL-MCNC: 8.7 MG/DL (ref 8.8–10.4)
CHLORIDE SERPL-SCNC: 102 MMOL/L (ref 98–107)
CREAT SERPL-MCNC: 0.62 MG/DL (ref 0.67–1.17)
EGFRCR SERPLBLD CKD-EPI 2021: >90 ML/MIN/1.73M2
ELLIPTOCYTES BLD QL SMEAR: SLIGHT
EOSINOPHIL # BLD AUTO: ABNORMAL 10*3/UL
EOSINOPHIL # BLD MANUAL: 0 10E3/UL (ref 0–0.7)
EOSINOPHIL NFR BLD AUTO: ABNORMAL %
EOSINOPHIL NFR BLD MANUAL: 0 %
ERYTHROCYTE [DISTWIDTH] IN BLOOD BY AUTOMATED COUNT: 16.7 % (ref 10–15)
GLUCOSE SERPL-MCNC: 108 MG/DL (ref 70–99)
HCO3 SERPL-SCNC: 26 MMOL/L (ref 22–29)
HCT VFR BLD AUTO: 22.8 % (ref 40–53)
HGB BLD-MCNC: 7.8 G/DL (ref 13.3–17.7)
IMM GRANULOCYTES # BLD: ABNORMAL 10*3/UL
IMM GRANULOCYTES NFR BLD: ABNORMAL %
LACTATE SERPL-SCNC: 0.6 MMOL/L (ref 0.7–2)
LYMPHOCYTES # BLD AUTO: ABNORMAL 10*3/UL
LYMPHOCYTES # BLD MANUAL: 0 10E3/UL (ref 0.8–5.3)
LYMPHOCYTES NFR BLD AUTO: ABNORMAL %
LYMPHOCYTES NFR BLD MANUAL: 3 %
MAGNESIUM SERPL-MCNC: 2.1 MG/DL (ref 1.7–2.3)
MCH RBC QN AUTO: 27 PG (ref 26.5–33)
MCHC RBC AUTO-ENTMCNC: 34.2 G/DL (ref 31.5–36.5)
MCV RBC AUTO: 79 FL (ref 78–100)
MONOCYTES # BLD AUTO: ABNORMAL 10*3/UL
MONOCYTES # BLD MANUAL: 0 10E3/UL (ref 0–1.3)
MONOCYTES NFR BLD AUTO: ABNORMAL %
MONOCYTES NFR BLD MANUAL: 3 %
NEUTROPHILS # BLD AUTO: ABNORMAL 10*3/UL
NEUTROPHILS # BLD MANUAL: 0.8 10E3/UL (ref 1.6–8.3)
NEUTROPHILS NFR BLD AUTO: ABNORMAL %
NEUTROPHILS NFR BLD MANUAL: 94 %
NRBC # BLD AUTO: 0 10E3/UL
NRBC BLD AUTO-RTO: 0 /100
PHOSPHATE SERPL-MCNC: 2.2 MG/DL (ref 2.5–4.5)
PLAT MORPH BLD: ABNORMAL
PLATELET # BLD AUTO: 28 10E3/UL (ref 150–450)
POTASSIUM SERPL-SCNC: 4 MMOL/L (ref 3.4–5.3)
RBC # BLD AUTO: 2.89 10E6/UL (ref 4.4–5.9)
RBC MORPH BLD: ABNORMAL
SIROLIMUS BLD-MCNC: 10.1 UG/L (ref 5–15)
SODIUM SERPL-SCNC: 137 MMOL/L (ref 135–145)
SPECIMEN EXPIRATION DATE: NORMAL
TME LAST DOSE: NORMAL H
TME LAST DOSE: NORMAL H
WBC # BLD AUTO: 0.8 10E3/UL (ref 4–11)

## 2024-09-10 PROCEDURE — 86900 BLOOD TYPING SEROLOGIC ABO: CPT

## 2024-09-10 PROCEDURE — 85027 COMPLETE CBC AUTOMATED: CPT

## 2024-09-10 PROCEDURE — 250N000013 HC RX MED GY IP 250 OP 250 PS 637

## 2024-09-10 PROCEDURE — 80195 ASSAY OF SIROLIMUS: CPT | Performed by: INTERNAL MEDICINE

## 2024-09-10 PROCEDURE — 97530 THERAPEUTIC ACTIVITIES: CPT | Mod: GO

## 2024-09-10 PROCEDURE — 250N000009 HC RX 250: Performed by: STUDENT IN AN ORGANIZED HEALTH CARE EDUCATION/TRAINING PROGRAM

## 2024-09-10 PROCEDURE — 250N000011 HC RX IP 250 OP 636: Performed by: INTERNAL MEDICINE

## 2024-09-10 PROCEDURE — 86923 COMPATIBILITY TEST ELECTRIC: CPT

## 2024-09-10 PROCEDURE — 250N000012 HC RX MED GY IP 250 OP 636 PS 637: Performed by: PHYSICIAN ASSISTANT

## 2024-09-10 PROCEDURE — 83605 ASSAY OF LACTIC ACID: CPT | Performed by: STUDENT IN AN ORGANIZED HEALTH CARE EDUCATION/TRAINING PROGRAM

## 2024-09-10 PROCEDURE — 258N000003 HC RX IP 258 OP 636

## 2024-09-10 PROCEDURE — 258N000003 HC RX IP 258 OP 636: Performed by: STUDENT IN AN ORGANIZED HEALTH CARE EDUCATION/TRAINING PROGRAM

## 2024-09-10 PROCEDURE — 99233 SBSQ HOSP IP/OBS HIGH 50: CPT | Mod: 24 | Performed by: INTERNAL MEDICINE

## 2024-09-10 PROCEDURE — 84100 ASSAY OF PHOSPHORUS: CPT | Performed by: STUDENT IN AN ORGANIZED HEALTH CARE EDUCATION/TRAINING PROGRAM

## 2024-09-10 PROCEDURE — 258N000003 HC RX IP 258 OP 636: Performed by: PHYSICIAN ASSISTANT

## 2024-09-10 PROCEDURE — 250N000013 HC RX MED GY IP 250 OP 250 PS 637: Performed by: PHYSICIAN ASSISTANT

## 2024-09-10 PROCEDURE — 80048 BASIC METABOLIC PNL TOTAL CA: CPT

## 2024-09-10 PROCEDURE — 97110 THERAPEUTIC EXERCISES: CPT | Mod: GO

## 2024-09-10 PROCEDURE — 250N000013 HC RX MED GY IP 250 OP 250 PS 637: Performed by: NURSE PRACTITIONER

## 2024-09-10 PROCEDURE — 250N000013 HC RX MED GY IP 250 OP 250 PS 637: Performed by: INTERNAL MEDICINE

## 2024-09-10 PROCEDURE — 85007 BL SMEAR W/DIFF WBC COUNT: CPT

## 2024-09-10 PROCEDURE — 206N000001 HC R&B BMT UMMC

## 2024-09-10 PROCEDURE — 83735 ASSAY OF MAGNESIUM: CPT | Performed by: STUDENT IN AN ORGANIZED HEALTH CARE EDUCATION/TRAINING PROGRAM

## 2024-09-10 PROCEDURE — 87799 DETECT AGENT NOS DNA QUANT: CPT | Performed by: PHYSICIAN ASSISTANT

## 2024-09-10 PROCEDURE — 250N000011 HC RX IP 250 OP 636

## 2024-09-10 RX ORDER — POTASSIUM PHOS IN 0.9 % NACL 15MMOL/250
15 PLASTIC BAG, INJECTION (ML) INTRAVENOUS ONCE
Status: COMPLETED | OUTPATIENT
Start: 2024-09-10 | End: 2024-09-10

## 2024-09-10 RX ORDER — PHENAZOPYRIDINE HYDROCHLORIDE 200 MG/1
200 TABLET, FILM COATED ORAL 3 TIMES DAILY PRN
Status: DISCONTINUED | OUTPATIENT
Start: 2024-09-10 | End: 2024-09-11

## 2024-09-10 RX ORDER — OXYBUTYNIN CHLORIDE 5 MG/1
5 TABLET ORAL 2 TIMES DAILY
Status: DISCONTINUED | OUTPATIENT
Start: 2024-09-10 | End: 2024-09-10

## 2024-09-10 RX ADMIN — METHOCARBAMOL 500 MG: 500 TABLET ORAL at 19:40

## 2024-09-10 RX ADMIN — MYCOPHENOLATE MOFETIL 1250 MG: 500 INJECTION, POWDER, LYOPHILIZED, FOR SOLUTION INTRAVENOUS at 11:14

## 2024-09-10 RX ADMIN — METHOCARBAMOL 500 MG: 500 TABLET ORAL at 14:09

## 2024-09-10 RX ADMIN — Medication 2 SPRAY: at 19:43

## 2024-09-10 RX ADMIN — SIROLIMUS 3.5 MG: 2 TABLET ORAL at 17:09

## 2024-09-10 RX ADMIN — URSODIOL 300 MG: 300 CAPSULE ORAL at 19:40

## 2024-09-10 RX ADMIN — MICAFUNGIN SODIUM 150 MG: 50 INJECTION, POWDER, LYOPHILIZED, FOR SOLUTION INTRAVENOUS at 10:25

## 2024-09-10 RX ADMIN — Medication 2.5 MG: at 19:40

## 2024-09-10 RX ADMIN — URSODIOL 300 MG: 300 CAPSULE ORAL at 14:09

## 2024-09-10 RX ADMIN — ACYCLOVIR 800 MG: 800 TABLET ORAL at 19:40

## 2024-09-10 RX ADMIN — Medication 5 ML: at 00:21

## 2024-09-10 RX ADMIN — Medication 5 ML: at 05:15

## 2024-09-10 RX ADMIN — MYCOPHENOLATE MOFETIL 1250 MG: 500 INJECTION, POWDER, LYOPHILIZED, FOR SOLUTION INTRAVENOUS at 21:14

## 2024-09-10 RX ADMIN — PANTOPRAZOLE SODIUM 40 MG: 40 TABLET, DELAYED RELEASE ORAL at 07:27

## 2024-09-10 RX ADMIN — Medication 10 ML: at 13:51

## 2024-09-10 RX ADMIN — Medication 3 CAPSULE: at 07:26

## 2024-09-10 RX ADMIN — FILGRASTIM-AAFI 480 MCG: 480 INJECTION, SOLUTION INTRAVENOUS; SUBCUTANEOUS at 19:40

## 2024-09-10 RX ADMIN — Medication 2 SPRAY: at 07:32

## 2024-09-10 RX ADMIN — ALLOPURINOL 300 MG: 300 TABLET ORAL at 07:27

## 2024-09-10 RX ADMIN — Medication 2.5 MG: at 10:30

## 2024-09-10 RX ADMIN — CALCIUM CARBONATE 600 MG (1,500 MG)-VITAMIN D3 400 UNIT TABLET 2 TABLET: at 07:27

## 2024-09-10 RX ADMIN — SODIUM CHLORIDE 1000 ML: 9 INJECTION, SOLUTION INTRAVENOUS at 09:00

## 2024-09-10 RX ADMIN — CEFEPIME HYDROCHLORIDE 2 G: 2 INJECTION, POWDER, FOR SOLUTION INTRAVENOUS at 04:26

## 2024-09-10 RX ADMIN — URSODIOL 300 MG: 300 CAPSULE ORAL at 07:27

## 2024-09-10 RX ADMIN — POTASSIUM PHOSPHATE, MONOBASIC POTASSIUM PHOSPHATE, DIBASIC 15 MMOL: 224; 236 INJECTION, SOLUTION, CONCENTRATE INTRAVENOUS at 06:01

## 2024-09-10 RX ADMIN — CALCIUM CARBONATE 600 MG (1,500 MG)-VITAMIN D3 400 UNIT TABLET 2 TABLET: at 18:14

## 2024-09-10 RX ADMIN — CEFEPIME HYDROCHLORIDE 2 G: 2 INJECTION, POWDER, FOR SOLUTION INTRAVENOUS at 19:41

## 2024-09-10 RX ADMIN — ACYCLOVIR 800 MG: 800 TABLET ORAL at 07:26

## 2024-09-10 RX ADMIN — METHOCARBAMOL 500 MG: 500 TABLET ORAL at 07:26

## 2024-09-10 RX ADMIN — CEFEPIME HYDROCHLORIDE 2 G: 2 INJECTION, POWDER, FOR SOLUTION INTRAVENOUS at 12:16

## 2024-09-10 RX ADMIN — TAMSULOSIN HYDROCHLORIDE 0.4 MG: 0.4 CAPSULE ORAL at 17:09

## 2024-09-10 RX ADMIN — PROCHLORPERAZINE MALEATE 5 MG: 5 TABLET ORAL at 17:09

## 2024-09-10 RX ADMIN — PHENAZOPYRIDINE 200 MG: 200 TABLET ORAL at 07:27

## 2024-09-10 ASSESSMENT — ACTIVITIES OF DAILY LIVING (ADL)
ADLS_ACUITY_SCORE: 24

## 2024-09-10 NOTE — PROGRESS NOTES
[facility]  Transplant Infectious Disease Progress Note     Patient:  Leland Pearson, Date of birth 1955, Medical record number [id number]  Date of Visit:  09/10/2024  Consult requested by Dr. Timothy Skaggs for evaluation of persistent neutropenic fevers         Assessment and Recommendations:   Recommendations:  Continue IV Cefepime for strep mitis bacteremia. Will plan to 14 day course from removal of CVC, with end date of 9/20/2024  Patient with ongoing dysuria, increased urinary frequency, and evidence of hematuria on UA.  Urine culture pending.  Also concerning for possible BK virus, BK virus blood PCR and urine PCR pending.    Case discussed with transplant ID attending, Dr. Manriquez  Thank you very much for this consultation. Transplant Infectious Disease will continue to follow with you.    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  Initial evaluation reveals Streptococcus mitis bacteremia from central venous catheter as well as single culture of MRSE from peripheral blood.  Patient was started on vancomycin and cefepime but has been persistently febrile since 9/2/2024.  Additional evaluation includes negative chest x-ray, negative urinalysis, negative C. difficile PCR.  Etiology of fever was determined to be secondary to strep mitis bacteremia.  Patient had central venous catheter removed on 9/6 and subsequently defervesced on 9/7.  Plan to continue cefepime for total of 14 days from catheter removal.  Patient now experiencing increased dysuria, increased urinary frequency, and hematuria on urinalysis.  Evaluating for bacterial urinary tract infection versus BK virus associated hemorrhagic cystitis    Micheal Lucero MD  Infectious Disease Fellow  Most easily reached on [employer]  Pager #[phone]         Infectious Disease Issues:   Persistent neutropenic  fevers  Streptococcus mitis/oralis bacteremia, isolated from CVC  Patient with persistent neutropenic fevers beginning on 9/2/2024 and continuing to current.  Fevers have been quite high upwards of 103 and occurring multiple times daily.  Workup thus far has been positive for isolation of Streptococcus mitis and Staphylococcus epidermidis and blood culture from 9/2/2024.  Repeat blood cultures have been negative to date.  Additional workup including chest x-ray, urinalysis, and C. difficile have all been unremarkable/negative.  Patient has been on appropriate antibiotic therapy with vancomycin and cefepime since onset of fevers on 9/2/2024.    Suspect most likely etiology of patient's ongoing fevers is his Streptococcus mitis bacteremia.  I suspect that his MRSE is likely contaminant at this time based on the fact that her only grown 1 of 2 peripheral blood culture bottles and resulted as positive several hours after his strep mitis cultures.  Vancomycin was discontinued on 9/5.  Requested the patient's CVC be removed, which was performed on 9/6.  Patient underwent transthoracic echocardiogram on 9/6 as well with no evidence of valvular vegetations.  Fever curve overall is improving.  Plan to continue cefepime for total of 14 days from catheter removal, with end date of 9/20/2024    Dysuria  Increased Urinary Frequency  Hematuria  Patient with increased nocturnal urinary frequency and dysuria for the last 3 days.  Etiology unclear at this time, but given time course in relation to his transplant and use of posttransplant cyclophosphamide this would be concerning for BK virus hemorrhagic cystitis. Urinalysis demonstrates hematuria with greater than 182 RBCs.  Urine culture pending at this time.  BK virus blood and urine PCR pending as well.  Given extent of symptoms, patient would likely benefit from cidofovir if proven to have BK virus hemorrhagic cystitis    Staphylococcus epidermidis isolated in single peripheral  blood culture bottle  Isolated on peripheral blood culture from 9/2.  Not recovered from blood cultures drawn from central venous catheter on the same day.  These cultures also resulted as positive a number of hours after strep mitis have been isolated from CVC cultures.  I suspect that this is most likely contaminant as outlined above.  Vancomycin discontinued on 9/5.    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  Up to 0.8 today    Transplant checklist  - QTc interval: 421 (8/5/2024)  - Bacterial coverage: Cefepime  - Pneumocystis prophylaxis: TMP-SMX M,Tu  - Serostatus & viral prophylaxis: CMV D-/R-, EBV+, HSV-.  Acyclovir prophylaxis  - Fungal prophylaxis: Micafungin  - Toxo IgM negative,  - Gamma globulin status: Unknown           Interval History:   Patient has remained afebrile since 9/7  Continues to have increased dysuria, increased urinary frequency, and now with hematuria on UA.  The symptoms are quite distressing to him and he is unable to sleep due to frequency of urination.  PICC line placed on 9/9    Past Medical History:   Diagnosis Date    Gastroesophageal reflux disease     Hypertension        Past Surgical History:   Procedure Laterality Date    COLONOSCOPY      ESOPHAGOSCOPY, GASTROSCOPY, DUODENOSCOPY (EGD), COMBINED  07/28/2013    Procedure: COMBINED ESOPHAGOSCOPY, GASTROSCOPY, DUODENOSCOPY (EGD), BIOPSY SINGLE OR MULTIPLE;;  Surgeon: Franklin Barrera MD;  Location: [facility]    ESOPHAGOSCOPY, GASTROSCOPY, DUODENOSCOPY (EGD), COMBINED  07/28/2013    Procedure: COMBINED ESOPHAGOSCOPY, GASTROSCOPY, DUODENOSCOPY (EGD), REMOVE FOREIGN BODY;;  Surgeon: Franklin Barrera MD;  Location:  GI    IR CVC TUNNEL PLACEMENT > 5 YRS OF AGE  08/16/2024    IR CVC TUNNEL REMOVAL RIGHT  09/06/2024    ORTHOPEDIC SURGERY      04 micro discectomy, acl  repair    PICC DOUBLE LUMEN PLACEMENT Right 09/09/2024    Right basilic vein 49cm total 5cm  external.       Family History   Problem Relation Age of Onset    No Known Problems Brother     No Known Problems Brother     No Known Problems Brother     No Known Problems Brother     Diabetes Brother     No Known Problems Sister     No Known Problems Daughter     No Known Problems Daughter        Social History     Social History Narrative    Not on file     Social History     Tobacco Use    Smoking status: Never     Passive exposure: Never    Smokeless tobacco: Never   Substance Use Topics    Alcohol use: Yes     Comment: socially    Drug use: No       Immunization History   Administered Date(s) Administered    COVID-19 12+ (Pfizer) 10/11/2023    COVID-19 Bivalent 18+ (Moderna) 10/03/2022    COVID-19 Monovalent 18+ (Moderna) 02/04/2021, 03/04/2021, 10/25/2021, 04/08/2022       Patient Active Problem List   Diagnosis    MDS (myelodysplastic syndrome) (H)    Pre-operative cardiovascular examination    Benign essential hypertension    Infection due to Streptococcus mitis group    Nocturia    Administration of long-term prophylactic antibiotics    History of peripheral stem cell transplant (H)            Current Medications & Allergies:     Current Facility-Administered Medications   Medication Dose Route Frequency Provider Last Rate Last Admin    acyclovir (ZOVIRAX) tablet 800 mg  800 mg Oral BID Aziza Mendieta APRN CNP   800 mg at 09/10/24 0726    allopurinol (ZYLOPRIM) tablet 300 mg  300 mg Oral Daily Kaitlyn Aguilar NP   300 mg at 09/10/24 0727    artificial saliva (BIOTENE MT) solution 2 spray  2 spray Swish & Spit 4x Daily Bessie Lazo PA-C   2 spray at 09/10/24 0732    calcium carbonate-vitamin D (CALTRATE) 600-10 MG-MCG per tablet 2 tablet  2 tablet Oral BID w/meals Bessie Lazo PA-C   2 tablet at 09/10/24 0727    ceFEPIme (MAXIPIME) 2 g vial to attach to  mL bag for ADULTS or NS 50 mL bag for PEDS  2 g Intravenous Q8H Meri Viera  mL/hr at 09/05/24 0305 2 g at 09/10/24 1216     filgrastim-aafi (NIVESTYM) injection 480 mcg  480 mcg Subcutaneous Daily at 8 pm Markus Mendez MD   480 mcg at 09/1955    heparin lock flush 10 unit/mL injection 5-20 mL  5-20 mL Intracatheter Q24H Bernadette Green PA-C   5 mL at 09/09/24 1724    heparin lock flush 10 unit/mL injection 5-20 mL  5-20 mL Intracatheter Q24H Markus Mendez MD   5 mL at 09/10/24 0515    methocarbamol (ROBAXIN) tablet 500 mg  500 mg Oral TID Aziza Mendieta APRN CNP   500 mg at 09/10/24 0726    micafungin (MYCAMINE) 150 mg in sodium chloride 0.9 % 100 mL intermittent infusion  150 mg Intravenous Daily Kaitlyn Aguilar  mL/hr at 09/10/24 1025 150 mg at 09/10/24 1025    mycophenolate mofetil (CELLCEPT) 1,250 mg in D5W intermittent infusion  1,250 mg Intravenous Q12H [facility] (10/22) Kaitlyn Aguilar .3 mL/hr at 09/10/24 1114 1,250 mg at 09/10/24 1114    oxyBUTYnin (DITROPAN) half-tab 2.5 mg  2.5 mg Oral BID Bernadette Green PA-C   2.5 mg at 09/10/24 1030    pantoprazole (PROTONIX) EC tablet 40 mg  40 mg Oral Daily Bessie Lazo PA-C   40 mg at 09/10/24 0727    prochlorperazine (COMPAZINE) tablet 5 mg  5 mg Oral Daily Eunice Spicer PA-C   5 mg at 09/09/24 1720    psyllium (METAMUCIL/KONSYL) capsule 3 capsule  3 capsule Oral Daily Bessie Lazo PA-C   3 capsule at 09/10/24 0726    sirolimus (GENERIC EQUIVALENT) tablet 3.5 mg  3.5 mg Oral Daily Bernadette Green PA-C        sodium chloride (PF) 0.9% PF flush 3 mL  3 mL Intracatheter Q8H Roseline Strong PA-C   3 mL at 09/10/24 1339    sodium chloride (PF) 0.9% PF flush 3 mL  3 mL Intracatheter Q8H Yaa Baer MD   3 mL at 09/09/24 1722    sodium chloride (PF) 0.9% PF flush 3 mL  3 mL Intracatheter Q8H Bessie Lazo PA-C   3 mL at 09/10/24 0732    [START ON 9/23/2024] sulfamethoxazole-trimethoprim (BACTRIM DS) 800-160 MG per tablet 1 tablet  1 tablet Oral Q Mon Tues BID Kaitlyn Aguilar, NP        tamsulosin (FLOMAX) capsule 0.4  mg  0.4 mg Oral Daily Bessie Lazo PA-C   0.4 mg at 09/09/24 1721    ursodiol (ACTIGALL) capsule 300 mg  300 mg Oral TID Markus Mendez MD   300 mg at 09/10/24 0727       Infusions/Drips:    Current Facility-Administered Medications   Medication Dose Route Frequency Provider Last Rate Last Admin       No Known Allergies         Physical Exam:   Patient Vitals for the past 24 hrs:   BP Temp Temp src Pulse Resp SpO2 Weight   09/10/24 1120 129/74 98.5  F (36.9  C) Oral 94 16 96 % --   09/10/24 0748 (!) 73/57 98  F (36.7  C) Oral 119 16 98 % 86.5 kg (190 lb 9.6 oz)   09/10/24 0746 95/58 -- -- 118 -- 96 % --   09/10/24 0744 127/82 -- -- 109 -- 96 % --   09/10/24 0516 -- 98.4  F (36.9  C) Oral -- 16 -- --   09/10/24 0441 131/79 99.3  F (37.4  C) Oral -- 16 98 % --   09/10/24 0410 (!) 146/83 98.2  F (36.8  C) Oral -- 18 -- --   09/10/24 0018 (!) 157/78 98  F (36.7  C) Oral -- 16 99 % --   09/09/24 1957 (!) 143/83 98.4  F (36.9  C) Oral -- 18 98 % --   09/09/24 1652 129/79 98  F (36.7  C) Oral 95 16 100 % --     Ranges for vital signs:  Temp:  [98  F (36.7  C)-99.3  F (37.4  C)] 98.5  F (36.9  C)  Pulse:  [] 94  Resp:  [16-18] 16  BP: ()/(57-83) 129/74  SpO2:  [96 %-100 %] 96 %  Vitals:    09/08/24 0745 09/09/24 0839 09/10/24 0748   Weight: 89.8 kg (197 lb 15.6 oz) 87.1 kg (192 lb) 86.5 kg (190 lb 9.6 oz)       Physical Examination:   GENERAL: Lying in bed, no acute distress.  Nontoxic-appearing  HEAD:  Head is normocephalic, atraumatic   ENT:  Oropharynx is moist.  LUNGS:  Clear to auscultation bilateral.   CARDIOVASCULAR:  Regular rate and rhythm with no murmur  ABDOMEN:  Normal bowel sounds, soft, nontender.  No suprapubic tenderness or flank pain  SKIN: PICC in right upper extremity.  Not erythematous, nontender, and without drainage.  NEUROLOGIC:  Grossly nonfocal. Active x4 extremities         Laboratory Data:       Metabolic Studies       Recent Labs   Lab Test 09/10/24  0420 09/09/24  1120  09/09/24  0615 09/07/24  1103 09/07/24  0713     --  136   < > 133*   POTASSIUM 4.0  --  4.2   < > 4.0   CHLORIDE 102  --  102   < > 100   CO2 26  --  24   < > 22   ANIONGAP 9  --  10   < > 11   BUN 13.1  --  10.5   < > 9.8   CR 0.62*  --  0.60*   < > 0.63*   GFRESTIMATED >90  --  >90   < > >90   *  --  115*   < > 120*   HERBERT 8.7*  --  8.7*   < > 8.3*   PHOS 2.2*  --  2.7   < >  --    MAG 2.1  --  2.1   < > 1.8   LACT 0.6* 1.3  --    < >  --    PCAL  --   --   --   --  0.37    < > = values in this interval not displayed.       Hepatic Studies    Recent Labs   Lab Test 09/09/24  0615 09/07/24  0713 05/15/24  1339 05/08/24  1156   BILITOTAL 0.5 0.7   < > 0.6   DBIL 0.24 0.28   < >  --    ALKPHOS 58 53   < > 82   PROTTOTAL 6.3* 5.8*   < > 7.6   ALBUMIN 3.2* 2.8*   < > 4.1   AST 25 30   < > 43   ALT 19 18   < > 21   LDH  --   --   --  613*    < > = values in this interval not displayed.       Gout Labs      Recent Labs   Lab Test 08/16/24  0920 08/09/24  0827 08/05/24  1200 07/29/24  0811 07/01/24  1208   URIC 4.2 3.9 4.1 4.5 4.1       Hematology Studies   Recent Labs   Lab Test 09/10/24  0420 09/09/24  0615 09/08/24  0358 09/07/24  0713 08/22/24  0407 08/21/24  0352 08/20/24  0418 08/19/24  0327 08/18/24  0424 05/22/24  0838 05/21/24  1016   WBC 0.8* 0.4* 0.2* 0.1*   < > 0.8*   < > 1.3* 1.3*   < > 4.0   ABLA  --   --   --   --   --   --   --   --   --   --  0.4*   BLST  --   --   --   --   --   --   --   --   --   --  9   ANEU 0.8*  --   --   --   --  0.7*   < >  --   --    < > 0.6*   ANEUTAUTO  --   --   --   --   --   --   --  1.1* 0.8*   < >  --    ALYM 0.0*  --   --   --   --  0.0*   < >  --   --    < > 2.0   ALYMPAUTO  --   --   --   --   --   --   --  0.1* 0.4*   < >  --    DAVIAN 0.0  --   --   --   --  0.0   < >  --   --    < > 0.5   AMONOAUTO  --   --   --   --   --   --   --  0.1 0.2   < >  --    AEOS 0.0  --   --   --   --  0.0   < >  --   --    < > 0.0   AEOSAUTO  --   --   --   --   --   --   --   "0.0 0.0   < >  --    ABSBASO  --   --   --   --   --   --   --  0.0 0.0   < >  --    HGB 7.8* 7.9* 7.8* 7.8*   < > 9.9*   < > 10.1* 10.1*   < > 12.9*   HCT 22.8* 23.2* 23.5* 22.7*   < > 28.1*   < > 29.6* 28.3*   < > 38.4*   PLT 28* 19* 14* 21*   < > 23*   < > 46* 16*   < > 72*    < > = values in this interval not displayed.       Clotting Studies    Recent Labs   Lab Test 09/09/24  0615 09/02/24  0444 08/26/24  0407 08/16/24  0920   INR 1.06 1.06 0.99 0.93   PTT  --   --   --  28       Iron Testing    Recent Labs   Lab Test 09/10/24  0420 08/02/24  0919 08/01/24  0805   ZARIA  --   --  460*   MCV 79   < > 84    < > = values in this interval not displayed.       Urine Studies     Recent Labs   Lab Test 09/09/24  1900 09/07/24  1230 09/02/24  0454 08/05/24  1200   URINEPH 7.0 6.0 7.0 6.5   NITRITE Positive* Negative Negative Negative   LEUKEST Negative Negative Negative Negative   WBCU 1 1 2 <1       Medication levels    Recent Labs   Lab Test 09/10/24  0728 09/07/24  0713 09/06/24  0337   VANCOMYCIN  --   --  10.3   RAPAMY 10.1   < >  --     < > = values in this interval not displayed.       CSF testing   No lab results found.    Invalid input(s): \"CADAM\", \"EVPCR\", \"ENTPCR\", \"ENTEROVIRUS\"    Body fluid stats  No lab results found.    Microbiology:  Fungal testing  No lab results found.    Invalid input(s): \"HIFUN\", \"FUNGL\"    Last Culture results   Culture   Date Value Ref Range Status   09/07/2024 No growth after 3 days  Preliminary   09/05/2024 No Growth  Final   09/04/2024 No Growth  Final   09/04/2024 No Growth  Final   09/03/2024 No Growth  Final   09/03/2024 No Growth  Final   09/02/2024 Positive on the 1st day of incubation (A)  Preliminary   09/02/2024 Staphylococcus epidermidis (AA)  Preliminary     Comment:     1 of 2 bottles  The recovery of this organism from a single blood culture bottle most likely represents contamination. If susceptibility testing is needed, please refer to the antibiogram or contact " IDDL.   09/02/2024 Positive on the 1st day of incubation (A)  Preliminary   09/02/2024 Streptococcus mitis (AA)  Preliminary     Comment:     2 of 2 bottles     09/02/2024 Positive on the 1st day of incubation (A)  Preliminary   09/02/2024 Streptococcus mitis (AA)  Preliminary     Comment:     2 of 2 bottles    Susceptibilities done on previous cultures   08/16/2024   Final    No Vancomycin Resistant Enterococcus species isolated           Last checks of Clostridioides difficile testing  Recent Labs   Lab Test 09/03/24  1140 08/17/24  0906   CDBPCT Negative Negative       Syphilis Testing    Treponema Antibody Total   Date Value Ref Range Status   08/05/2024 Nonreactive Nonreactive Final       Quantiferon testing   Recent Labs   Lab Test 09/10/24  0420 08/21/24  0352   LYMPH 3 6       Viral loads    Recent Labs   Lab Test 09/07/24  1103 08/31/24  1453 08/17/24  1621 06/09/24  1124   CMVQNT Not Detected Not Detected   < >  --    HSDNA1  --   --   --  Not Detected   HSDNA2  --   --   --  Not Detected    < > = values in this interval not displayed.       CMV viral loads    Recent Labs   Lab Test 09/07/24  1103 08/31/24  1453 08/24/24  1416 08/17/24  1621   CMVQNT Not Detected Not Detected Not Detected Not Detected       Hepatitis B Testing     Recent Labs   Lab Test 08/05/24  1200   AUSAB 89.10   HBCAB Nonreactive   HEPBANG Nonreactive        Hepatitis C Antibody   Date Value Ref Range Status   08/05/2024 Nonreactive Nonreactive Final     Comment:     A nonreactive screening test result does not exclude the possibility of exposure to or infection with HCV. Nonreactive screening test results in individuals with prior exposure to HCV may be due to antibody levels below the limit of detection of this assay or lack of reactivity to the HCV antigens used in this assay. Patients with recent HCV infections (<3 months from time of exposure) may have false-negative HCV antibody results due to the time needed for seroconversion  (average of 8 to 9 weeks).       CMV Antibody IgG   Date Value Ref Range Status   08/05/2024 No detectable antibody. No detectable antibody.  Final   06/11/2024 No detectable antibody. No detectable antibody.  Final     Varicella Zoster Antibody IgG   Date Value Ref Range Status   08/05/2024 Positive  Final     Comment:     Suggests previous exposure or immunization and probable immunity.     EBV Capsid Antibody IgG   Date Value Ref Range Status   08/05/2024 Positive (A) No detectable antibody. Final     Comment:     Suggests recent or past exposure.     Herpes Simplex Virus Type 1 IgG Antibody   Date Value Ref Range Status   08/05/2024 No HSV-1 IgG antibodies detected. No HSV-1 IgG antibodies detected Final     Herpes Simplex Virus Type 2 IgG Antibody   Date Value Ref Range Status   08/05/2024 No HSV-2 IgG antibodies detected. No HSV-2 IgG antibodies detected Final

## 2024-09-10 NOTE — PLAN OF CARE
VSS   Afebrile  Pt note to be alert and oriented x4.  Using call light appropriately.  SIROLIMUS Level this AM.    Lactic Acid 0.6  No blood products needed this AM.  Very small clots noted in urinal.  Phosphorus being replaced, re-check value tomorrow AM.    Problem: Adult Inpatient Plan of Care  Goal: Absence of Hospital-Acquired Illness or Injury  Intervention: Identify and Manage Fall Risk  Recent Flowsheet Documentation  Taken 9/10/2024 0010 by Jeremiah Seaman RN  Safety Promotion/Fall Prevention:   assistive device/personal items within reach   safety round/check completed   room near nurse's station   room organization consistent   nonskid shoes/slippers when out of bed   lighting adjusted   increase visualization of patient   increased rounding and observation   clutter free environment maintained  Taken 9/9/2024 2000 by Jeremiah Seaman RN  Safety Promotion/Fall Prevention:   assistive device/personal items within reach   safety round/check completed   room near nurse's station   room organization consistent   nonskid shoes/slippers when out of bed   lighting adjusted   increase visualization of patient   increased rounding and observation   clutter free environment maintained     Problem: Adult Inpatient Plan of Care  Goal: Absence of Hospital-Acquired Illness or Injury  Intervention: Prevent Infection  Recent Flowsheet Documentation  Taken 9/10/2024 0010 by Jeremiah Seaman RN  Infection Prevention:   cohorting utilized   environmental surveillance performed   equipment surfaces disinfected   hand hygiene promoted   personal protective equipment utilized   rest/sleep promoted   single patient room provided   visitors restricted/screened  Taken 9/9/2024 2000 by Jeremiah Seaman RN  Infection Prevention:   cohorting utilized   environmental surveillance performed   equipment surfaces disinfected   hand hygiene promoted   personal protective equipment utilized   rest/sleep promoted   single patient room  provided   visitors restricted/screened     Problem: Risk for Delirium  Goal: Improved Sleep  Intervention: Promote Sleep  Recent Flowsheet Documentation  Taken 9/9/2024 2000 by Jeremiah Seaman, RN  Sleep/Rest Enhancement:   awakenings minimized   comfort measures   natural light exposure provided   noise level reduced   regular sleep/rest pattern promoted   room darkened     Problem: Stem Cell/Bone Marrow Transplant  Goal: Symptom-Free Urinary Elimination  Intervention: Prevent or Manage Bladder Irritation  Recent Flowsheet Documentation  Taken 9/9/2024 2000 by Jeremiah Seaman, RN  Urinary Elimination Promotion: voiding relaxation promoted  Hyperhydration Management: fluids provided  Taken 9/9/2024 1953 by Jeremiah Seaman, RN  Pain Management Interventions: medication (see MAR)

## 2024-09-10 NOTE — PROGRESS NOTES
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     Diagnosis MDS/AML  HCT Type Allogeneic     Prep Regimen Flu/Cy/TBI  Donor Match & Source 8/8 DP permissive PBSC    GVHD Prophylaxis PTCy/MMF/Siro  Primary BMT MD Dr. Murphy    Clinical Trials [id number]       INTERVAL  HISTORY   Orthostatic on vital check this morning, will give IVF bolus. Still bothered by urinary urge/frequency and dysuria. A couple of clots reported by nursing. Appetite is low but eating trying to eat. No fevers.     Review of Systems: 10 point ROS negative except as noted above.    PHYSICAL EXAM     KPS:  70    /74 (BP Location: Left arm)   Pulse 94   Temp 98.5  F (36.9  C) (Oral)   Resp 16   Ht 1.84 m (6' 0.44\")   Wt 86.5 kg (190 lb 9.6 oz)   SpO2 96%   BMI 25.54 kg/m       General:  NAD   HEENT: +aphthous clean based ulcer noted on R ant/lat tongue.   Lungs: CTAB  CV: RRR, no MRG   Abdomen: +bs, soft, NT/ND.   Lymphatics: No edema  Neuro: A&O, appropriately interactive, speech clear, moving all extremities   Skin: no new rash; flat scattered remnants of rash on scalp (per wife)  Access: new R arm PICC dressing cdi, no drainage    Current aGVHD staging:  start after engraftment (BMT Assessment tab)    LABS AND IMAGING: I have assessed all abnormal lab values for their clinical significance and any values considered clinically significant have been addressed in the assessment and plan.      Lab Results   Component Value Date    WBC 0.8 (LL) 09/10/2024    ANEU 0.8 (L) 09/10/2024    HGB 7.8 (L) 09/10/2024    HCT 22.8 (L) 09/10/2024    PLT 28 (LL) 09/10/2024     09/10/2024    POTASSIUM 4.0 09/10/2024    CHLORIDE 102 09/10/2024    CO2 26 09/10/2024     (H) 09/10/2024    BUN 13.1 09/10/2024    CR 0.62 (L) 09/10/2024    MAG " 2.1 09/10/2024    INR 1.06 09/09/2024    BILITOTAL 0.5 09/09/2024    AST 25 09/09/2024    ALT 19 09/09/2024    ALKPHOS 58 09/09/2024    PROTTOTAL 6.3 (L) 09/09/2024    ALBUMIN 3.2 (L) 09/09/2024       ASSESSMENT AND PLAN     Leland Pearson is a 69 year old male with 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     BMT/IEC PROTOCOL for MDS  - Chemo protocol: MT 2022-5; Flu/Cy/TBI  - Peripheral blood stem cell graft from 8/8 URD donor, ABO matched, Cell dose: 6.06 x10^6 CD34/kg  - Engraftment monitoring: Peripheral blood and bone marrow chimerisms on D28, D100, 6 months, 1 and 2 years  - Restaging plan: BMBx with FISH, cytogenetics and NGS on D28, D100, 6 months, 1 and 2 years  *D21 BMbx scheduled 9/13 at 11am on 5C (also has scheduled oupt in the event of discharge prior to that). Naida ARAMBULA listed as proceduralist (update prn)     HEME/COAG  # Pancytopenia 2/2 chemo/BMT  - GCSF started day +5, continue until ANC > 2500 for 2 consecutive days. WBC up to 800 today.  - Transfusion parameters: hemoglobin <7, platelets <20 (epistaxis).      RISK OF GVHD  - Prophylaxis: PTCy 50mg/kg on D+3 and D+4; MMF (D+5 to 35); Sirolimus (starting D+5, target level 5-10).  - Siro held pending repeat level 9/9 (has been supratherapeutic)- level 10.1, resume lower dose 3.5mg/d (was 6mg) and repeat level 9/12 (ordered).      ID  #Streptococcus mitis bacteremia (2/2 bottles) - Cefepime (9/1-x)   -ABX duration plan: per ID likely will require 14 days following CVC removal through 9/20. Sensitive to ceftriaxone q24hrs, can transition to non- pseudomonal coverage once she has engrafted.  -ID following, recommended CVC removal and line holiday, echo, continue cefepime. PICC placed 9/9.    #MRSE bacteremia likely contaminant because peripheral stick, grew late. will discontinue (1/2) -Vanco (9/2-9/6)   -trend cultures x3 days negative so far  #Febrile Neutropenia (9/2-x)   *see BC above,  otherwise unremarkable  #Diarrhea- resolved.   -c.diff neg 9/3 & enteric panel neg 9/6  #Rectal pain- resolved    Prophylaxis plan:   - Bacterial: Levaquin (held while on Cefepime)   - Fungal: Micafungin until D+45, followed by mold active azole. Pulm nodules present on workup CT.   - PJP: Bactrim to start at D+28. Toxoplasma negative.   - Viral: Acyclovir 800mg BID. No need for letermovir as donor and recipient are CMV negative.      Monitoring:   - CMV weekly, neg 9/9  - EBV every 2 weeks from D30 to D180     GI/NUTRITION  #Hemorrhoids- prep H, lidocaine  #Diarrhea (9/3): c.diff recheck negative. Metamucil daily with prn Imodium.  # GERD: Protonix  - Anti-emetics: Zofran, dex scheduled during chemotherapy. Compazine, lorazepam available PRN.   - Ulcer prophy: Protonix  - VOD prophy: Ursodiol   - Dietician support to prevent malnutrition  - Miralax and senna PRN constipation.     CARDIOVASCULAR  # HTN- PTA lisinopril stopped 8/26 d/t symptomatic orthostasis.   # CAD: Coronary artery calcifications seen on CT, stress test in 2023 negative  - Risk of cardiomyopathy: Baseline EF 55-60%.   - Risk of arrhythmia: Baseline EKG showed 421       RENAL/ELECTROLYTES/  #Hypervolemia- up 6lbs since (9/5) will give 20mg lasix. Resolved.  #LA = 2.3 (9/7)- given 500mL of IVF and resolved  #Hyponatremia: osms urine/serum, Na urine/serum indicate hypovolemic hyponatremia. Encourage PO, will give IVF as necessary and slow down stools. RESOLVED WITH IVF. Stably waxing and waning.  - Electrolyte management: replace per sliding scale  - BPH: Continue PTA flomax.  #urinary urge/frequency/dysuria: UA with +nitrates, , mod blood, >182 RBC. UC & urine BK penidng (9/9), blood BK pending (9/10). Change Pyridium to prn 9/10 (not helpful); add Ditropan 2.5mg bid x9/10 (also on Flomax).      MUSCULOSKELETAL/FRAILTY  - Baseline Frailty Score: Not frail (0)  - Daily PT/OT as needed while inpatient  - Gout: continue allopurinol      Neuro   #HA:  "no red flag symptoms. Pt has history of spinal stenosis, lumbosacral radiculopathy likely exacerbating. Tramadol PRN. Voltaren gel, ice and PT asked for suggestions. In the setting of thrombocytopenia, consider CT head if changes in symptoms or worsens. No currently complaints of HA.   - Pain management: Outpatient has been taking celecoxib for MSK pain, once a day. Inpatient will try Tramadol - available PRN. Added robaxin TID this admission.    Derm:  - Follicular rash to chest, back- Started topical clindamycin 8/28. Much improved.      SOCIAL DETERMINANTS  - Caregiver: wife, Vani. Lives within the required distance from hospital but may elect to stay in [facility].   - Financial/insurance concerns: None    Medically Ready for Discharge: Anticipated in 5+ Days; Possibly Sunday/Monday pending further engraftment, improvement in  sx. Pt has home care coverage so [place] will check on their comfortability with home Abx and izzy. They are on wait list for [facility] (live in [place] but might prefer closer lodging early post BMT); will prefer appts ~10am when possible.      Clinically Significant Risk Factors              # Hypoalbuminemia: Lowest albumin = 2.8 g/dL at 9/7/2024  7:13 AM, will monitor as appropriate   # Thrombocytopenia: Lowest platelets = 19 in last 2 days, will monitor for bleeding   # Hypertension: Noted on problem list           # Overweight: Estimated body mass index is 25.54 kg/m  as calculated from the following:    Height as of this encounter: 1.84 m (6' 0.44\").    Weight as of this encounter: 86.5 kg (190 lb 9.6 oz).            I spent 40 minutes in the care of this patient today, which included time necessary for preparation for the visit, obtaining history, ordering medications/tests/procedures as medically indicated, review of pertinent medical literature, counseling of the patient, communication of recommendations to the care team, and documentation time.    Bernadette Green PA-C    "

## 2024-09-10 NOTE — PROGRESS NOTES
Patient does have coverage for iv abx through their [employer] Medicare Advantage plan, however the drug must be on a CADD pump for coverage. If not on a CADD pump, then the patient would be self-pay due to no coverage. Patient has an 80/20 coverage until they have met their out of pocket of $3000 (patient has met $1662.69 at this time).     Based on Rocephin and Micafungin , it can go on a CADD so patient should be covered.     Pt has coverage for line care.     For nursing, patient should have coverage if homebound, however [facility] is not contracted with Medicare and an outside nursing agency would be utilized instead. If patient is not homebound, there is no coverage and [facility] can see patient if patient agrees to self-pay for $90 per visit.    Patient should have coverage in a TCU or infusion center.    (UBMT) In reference to admission date 08/16/2024.    Please contact Intake with any questions, [phone] or In Basket pool,  Home Infusion ([phone]).

## 2024-09-10 NOTE — TELEPHONE ENCOUNTER
Prior Authorization Approval    Medication: VENCLEXTA 100 MG PO TABS  Authorization Effective Date: 2/9/2024  Authorization Expiration Date: 5/9/2025  Approved Dose/Quantity:   Reference #: [id number]   Insurance Company: [employer] - Phone [phone] Fax [phone]  Expected CoPay: $ 2,132.05  CoPay Card Available:      Financial Assistance Needed:   Which Pharmacy is filling the prescription:    Pharmacy Notified: yes  Patient Notified: yes        Thank you,    Angeles Schumacher  Oncology Pharmacy Liaison II  [email or web address]  Phone: [phone]  Fax: [phone]     [Feeling Fatigued] : feeling fatigued [Cough] : cough [Wheezing] : wheezing [Joint Pain] : joint pain [Dizziness] : dizziness [Negative] : Heme/Lymph

## 2024-09-10 NOTE — PLAN OF CARE
Patient orthostatic this morning and asymptomatic he feels when asked he was given a liter of normal saline he is not orthostatic this evening. His main complaint has been dysuria, urgency and frequency with urination.  He does have blood in his urine and may pass a small shed here and there.  Continue to monitor.  Problem: Adult Inpatient Plan of Care  Goal: Plan of Care Review  Description: The Plan of Care Review/Shift note should be completed every shift.  The Outcome Evaluation is a brief statement about your assessment that the patient is improving, declining, or no change.  This information will be displayed automatically on your shift  note.  Outcome: Progressing   Goal Outcome Evaluation:

## 2024-09-11 ENCOUNTER — APPOINTMENT (OUTPATIENT)
Dept: OCCUPATIONAL THERAPY | Facility: CLINIC | Age: 69
DRG: 014 | End: 2024-09-11
Attending: INTERNAL MEDICINE
Payer: COMMERCIAL

## 2024-09-11 LAB
ANION GAP SERPL CALCULATED.3IONS-SCNC: 10 MMOL/L (ref 7–15)
BASOPHILS # BLD AUTO: ABNORMAL 10*3/UL
BASOPHILS # BLD MANUAL: 0 10E3/UL (ref 0–0.2)
BASOPHILS NFR BLD AUTO: ABNORMAL %
BASOPHILS NFR BLD MANUAL: 0 %
BK VIRUS SPECIMEN TYPE: NORMAL
BKV DNA # SPEC NAA+PROBE: NOT DETECTED IU/ML
BLD PROD TYP BPU: NORMAL
BLD PROD TYP BPU: NORMAL
BLOOD COMPONENT TYPE: NORMAL
BLOOD COMPONENT TYPE: NORMAL
BUN SERPL-MCNC: 12.8 MG/DL (ref 8–23)
CALCIUM SERPL-MCNC: 8.5 MG/DL (ref 8.8–10.4)
CHLORIDE SERPL-SCNC: 99 MMOL/L (ref 98–107)
CODING SYSTEM: NORMAL
CODING SYSTEM: NORMAL
CREAT SERPL-MCNC: 0.62 MG/DL (ref 0.67–1.17)
CROSSMATCH: NORMAL
EGFRCR SERPLBLD CKD-EPI 2021: >90 ML/MIN/1.73M2
EOSINOPHIL # BLD AUTO: ABNORMAL 10*3/UL
EOSINOPHIL # BLD MANUAL: 0 10E3/UL (ref 0–0.7)
EOSINOPHIL NFR BLD AUTO: ABNORMAL %
EOSINOPHIL NFR BLD MANUAL: 0 %
ERYTHROCYTE [DISTWIDTH] IN BLOOD BY AUTOMATED COUNT: 16.9 % (ref 10–15)
GLUCOSE SERPL-MCNC: 118 MG/DL (ref 70–99)
HCO3 SERPL-SCNC: 25 MMOL/L (ref 22–29)
HCT VFR BLD AUTO: 21.3 % (ref 40–53)
HGB BLD-MCNC: 7 G/DL (ref 13.3–17.7)
IMM GRANULOCYTES # BLD: ABNORMAL 10*3/UL
IMM GRANULOCYTES NFR BLD: ABNORMAL %
ISSUE DATE AND TIME: NORMAL
ISSUE DATE AND TIME: NORMAL
LACTATE SERPL-SCNC: 0.7 MMOL/L (ref 0.7–2)
LACTATE SERPL-SCNC: 1.2 MMOL/L (ref 0.7–2)
LYMPHOCYTES # BLD AUTO: ABNORMAL 10*3/UL
LYMPHOCYTES # BLD MANUAL: 0 10E3/UL (ref 0.8–5.3)
LYMPHOCYTES NFR BLD AUTO: ABNORMAL %
LYMPHOCYTES NFR BLD MANUAL: 2 %
MAGNESIUM SERPL-MCNC: 2 MG/DL (ref 1.7–2.3)
MCH RBC QN AUTO: 26.2 PG (ref 26.5–33)
MCHC RBC AUTO-ENTMCNC: 32.9 G/DL (ref 31.5–36.5)
MCV RBC AUTO: 80 FL (ref 78–100)
MONOCYTES # BLD AUTO: ABNORMAL 10*3/UL
MONOCYTES # BLD MANUAL: 0.1 10E3/UL (ref 0–1.3)
MONOCYTES NFR BLD AUTO: ABNORMAL %
MONOCYTES NFR BLD MANUAL: 8 %
NEUTROPHILS # BLD AUTO: ABNORMAL 10*3/UL
NEUTROPHILS # BLD MANUAL: 1.3 10E3/UL (ref 1.6–8.3)
NEUTROPHILS NFR BLD AUTO: ABNORMAL %
NEUTROPHILS NFR BLD MANUAL: 90 %
NRBC # BLD AUTO: 0 10E3/UL
NRBC BLD AUTO-RTO: 0 /100
PHOSPHATE SERPL-MCNC: 2 MG/DL (ref 2.5–4.5)
PLAT MORPH BLD: ABNORMAL
PLATELET # BLD AUTO: 14 10E3/UL (ref 150–450)
POTASSIUM SERPL-SCNC: 4 MMOL/L (ref 3.4–5.3)
RBC # BLD AUTO: 2.67 10E6/UL (ref 4.4–5.9)
RBC MORPH BLD: ABNORMAL
SODIUM SERPL-SCNC: 134 MMOL/L (ref 135–145)
UNIT ABO/RH: NORMAL
UNIT ABO/RH: NORMAL
UNIT NUMBER: NORMAL
UNIT NUMBER: NORMAL
UNIT STATUS: NORMAL
UNIT STATUS: NORMAL
UNIT TYPE ISBT: 5100
UNIT TYPE ISBT: 7300
WBC # BLD AUTO: 1.4 10E3/UL (ref 4–11)

## 2024-09-11 PROCEDURE — 85027 COMPLETE CBC AUTOMATED: CPT

## 2024-09-11 PROCEDURE — 84100 ASSAY OF PHOSPHORUS: CPT | Performed by: STUDENT IN AN ORGANIZED HEALTH CARE EDUCATION/TRAINING PROGRAM

## 2024-09-11 PROCEDURE — 250N000013 HC RX MED GY IP 250 OP 250 PS 637

## 2024-09-11 PROCEDURE — 258N000003 HC RX IP 258 OP 636: Performed by: STUDENT IN AN ORGANIZED HEALTH CARE EDUCATION/TRAINING PROGRAM

## 2024-09-11 PROCEDURE — 258N000003 HC RX IP 258 OP 636

## 2024-09-11 PROCEDURE — 250N000011 HC RX IP 250 OP 636: Performed by: INTERNAL MEDICINE

## 2024-09-11 PROCEDURE — 250N000009 HC RX 250: Performed by: STUDENT IN AN ORGANIZED HEALTH CARE EDUCATION/TRAINING PROGRAM

## 2024-09-11 PROCEDURE — 99233 SBSQ HOSP IP/OBS HIGH 50: CPT | Mod: FS | Performed by: PHYSICIAN ASSISTANT

## 2024-09-11 PROCEDURE — P9040 RBC LEUKOREDUCED IRRADIATED: HCPCS

## 2024-09-11 PROCEDURE — 83605 ASSAY OF LACTIC ACID: CPT | Performed by: STUDENT IN AN ORGANIZED HEALTH CARE EDUCATION/TRAINING PROGRAM

## 2024-09-11 PROCEDURE — P9037 PLATE PHERES LEUKOREDU IRRAD: HCPCS

## 2024-09-11 PROCEDURE — 250N000013 HC RX MED GY IP 250 OP 250 PS 637: Performed by: NURSE PRACTITIONER

## 2024-09-11 PROCEDURE — 97110 THERAPEUTIC EXERCISES: CPT | Mod: GO

## 2024-09-11 PROCEDURE — 250N000011 HC RX IP 250 OP 636: Mod: JZ | Performed by: INTERNAL MEDICINE

## 2024-09-11 PROCEDURE — 36415 COLL VENOUS BLD VENIPUNCTURE: CPT

## 2024-09-11 PROCEDURE — 97530 THERAPEUTIC ACTIVITIES: CPT | Mod: GO

## 2024-09-11 PROCEDURE — 99418 PROLNG IP/OBS E/M EA 15 MIN: CPT | Performed by: PHYSICIAN ASSISTANT

## 2024-09-11 PROCEDURE — 250N000011 HC RX IP 250 OP 636: Mod: JZ

## 2024-09-11 PROCEDURE — 87040 BLOOD CULTURE FOR BACTERIA: CPT

## 2024-09-11 PROCEDURE — 83735 ASSAY OF MAGNESIUM: CPT

## 2024-09-11 PROCEDURE — 99233 SBSQ HOSP IP/OBS HIGH 50: CPT | Performed by: INTERNAL MEDICINE

## 2024-09-11 PROCEDURE — 250N000012 HC RX MED GY IP 250 OP 636 PS 637: Performed by: PHYSICIAN ASSISTANT

## 2024-09-11 PROCEDURE — 250N000013 HC RX MED GY IP 250 OP 250 PS 637: Performed by: PHYSICIAN ASSISTANT

## 2024-09-11 PROCEDURE — 87799 DETECT AGENT NOS DNA QUANT: CPT | Performed by: STUDENT IN AN ORGANIZED HEALTH CARE EDUCATION/TRAINING PROGRAM

## 2024-09-11 PROCEDURE — 250N000011 HC RX IP 250 OP 636: Performed by: PHYSICIAN ASSISTANT

## 2024-09-11 PROCEDURE — 250N000013 HC RX MED GY IP 250 OP 250 PS 637: Performed by: INTERNAL MEDICINE

## 2024-09-11 PROCEDURE — 85007 BL SMEAR W/DIFF WBC COUNT: CPT

## 2024-09-11 PROCEDURE — 206N000001 HC R&B BMT UMMC

## 2024-09-11 PROCEDURE — 80048 BASIC METABOLIC PNL TOTAL CA: CPT

## 2024-09-11 RX ORDER — POTASSIUM PHOS IN 0.9 % NACL 15MMOL/250
15 PLASTIC BAG, INJECTION (ML) INTRAVENOUS ONCE
Status: COMPLETED | OUTPATIENT
Start: 2024-09-11 | End: 2024-09-11

## 2024-09-11 RX ADMIN — PROCHLORPERAZINE MALEATE 5 MG: 5 TABLET ORAL at 15:49

## 2024-09-11 RX ADMIN — Medication 2 SPRAY: at 07:49

## 2024-09-11 RX ADMIN — METHOCARBAMOL 500 MG: 500 TABLET ORAL at 19:44

## 2024-09-11 RX ADMIN — URSODIOL 300 MG: 300 CAPSULE ORAL at 15:50

## 2024-09-11 RX ADMIN — CEFEPIME HYDROCHLORIDE 2 G: 2 INJECTION, POWDER, FOR SOLUTION INTRAVENOUS at 19:46

## 2024-09-11 RX ADMIN — Medication 2.5 MG: at 19:44

## 2024-09-11 RX ADMIN — URSODIOL 300 MG: 300 CAPSULE ORAL at 19:44

## 2024-09-11 RX ADMIN — Medication 2.5 MG: at 07:43

## 2024-09-11 RX ADMIN — ACYCLOVIR 800 MG: 800 TABLET ORAL at 07:43

## 2024-09-11 RX ADMIN — POTASSIUM PHOSPHATE, MONOBASIC POTASSIUM PHOSPHATE, DIBASIC 15 MMOL: 224; 236 INJECTION, SOLUTION, CONCENTRATE INTRAVENOUS at 07:44

## 2024-09-11 RX ADMIN — CALCIUM CARBONATE 600 MG (1,500 MG)-VITAMIN D3 400 UNIT TABLET 2 TABLET: at 07:43

## 2024-09-11 RX ADMIN — FILGRASTIM-AAFI 480 MCG: 480 INJECTION, SOLUTION INTRAVENOUS; SUBCUTANEOUS at 19:46

## 2024-09-11 RX ADMIN — CALCIUM CARBONATE 600 MG (1,500 MG)-VITAMIN D3 400 UNIT TABLET 2 TABLET: at 17:52

## 2024-09-11 RX ADMIN — URSODIOL 300 MG: 300 CAPSULE ORAL at 07:43

## 2024-09-11 RX ADMIN — MYCOPHENOLATE MOFETIL 1250 MG: 500 INJECTION, POWDER, LYOPHILIZED, FOR SOLUTION INTRAVENOUS at 09:58

## 2024-09-11 RX ADMIN — TAMSULOSIN HYDROCHLORIDE 0.4 MG: 0.4 CAPSULE ORAL at 17:52

## 2024-09-11 RX ADMIN — CEFEPIME HYDROCHLORIDE 2 G: 2 INJECTION, POWDER, FOR SOLUTION INTRAVENOUS at 04:10

## 2024-09-11 RX ADMIN — Medication 3 CAPSULE: at 07:43

## 2024-09-11 RX ADMIN — SIROLIMUS 3.5 MG: 2 TABLET ORAL at 07:44

## 2024-09-11 RX ADMIN — METHOCARBAMOL 500 MG: 500 TABLET ORAL at 15:50

## 2024-09-11 RX ADMIN — MYCOPHENOLATE MOFETIL 1250 MG: 500 INJECTION, POWDER, LYOPHILIZED, FOR SOLUTION INTRAVENOUS at 22:51

## 2024-09-11 RX ADMIN — MICAFUNGIN SODIUM 150 MG: 50 INJECTION, POWDER, LYOPHILIZED, FOR SOLUTION INTRAVENOUS at 11:25

## 2024-09-11 RX ADMIN — METHOCARBAMOL 500 MG: 500 TABLET ORAL at 07:43

## 2024-09-11 RX ADMIN — Medication 2 SPRAY: at 19:54

## 2024-09-11 RX ADMIN — ACETAMINOPHEN 650 MG: 325 TABLET ORAL at 19:24

## 2024-09-11 RX ADMIN — ALLOPURINOL 300 MG: 300 TABLET ORAL at 07:43

## 2024-09-11 RX ADMIN — ACYCLOVIR 800 MG: 800 TABLET ORAL at 19:44

## 2024-09-11 RX ADMIN — HEPARIN, PORCINE (PF) 10 UNIT/ML INTRAVENOUS SYRINGE 5 ML: at 20:28

## 2024-09-11 RX ADMIN — CEFEPIME HYDROCHLORIDE 2 G: 2 INJECTION, POWDER, FOR SOLUTION INTRAVENOUS at 11:25

## 2024-09-11 RX ADMIN — PANTOPRAZOLE SODIUM 40 MG: 40 TABLET, DELAYED RELEASE ORAL at 07:44

## 2024-09-11 RX ADMIN — Medication 2 SPRAY: at 11:26

## 2024-09-11 ASSESSMENT — ACTIVITIES OF DAILY LIVING (ADL)
ADLS_ACUITY_SCORE: 24
ADLS_ACUITY_SCORE: 24
ADLS_ACUITY_SCORE: 28
ADLS_ACUITY_SCORE: 24
ADLS_ACUITY_SCORE: 28
ADLS_ACUITY_SCORE: 28
ADLS_ACUITY_SCORE: 24

## 2024-09-11 NOTE — PROGRESS NOTES
[facility]  Transplant Infectious Disease Progress Note     Patient:  Leland Pearson, Date of birth 1955, Medical record number [id number]  Date of Visit:  09/11/2024         Assessment and Recommendations:   Recommendations:  - Please send adenovirus PCR of urine.   - Ok to transition cefepime to ceftriaxone once you feel comfortable, since he is probably engrafting.   - Continue acyclovir as viral prophylaxis.  - Continue micafungin as fungal prophylaxis.   - Agree with bactrim as Pneumocystis prophylaxis, due to start ~ 9/23/2024 per SOP.     Transplant Infectious Disease will continue to follow with you.      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   Infectious Disease issues include:  - Dysuria, urgency and frequency. Check of 9/9/2024 urine BK is neg, awaiting 9/10/2024 blood BK however would expect this to be neg as well. Would check adenovirus.   - Pulm nodules present on workup CT 8/6/2024. 2.4 mm nodule right lower lobe image 184 series 4. Stable 4.5 mm nodule in the right major fissure image 107 series 4. Minimal atelectasis in the lingula. Micafungin until D+45, followed by mold active azole.   - Streptococcus mitis/oralis bacteremia, isolated from CVC 9/2/2024. Repeat blood cultures have been negative to date. CVC removed 9/6/2024 due to persistent fever, and fever has improved since removal. Transthoracic echocardiogram neg on 9/6/2024. Plan to continue sensitive antibiotics for total of 14 days from catheter removal, with end date of 9/20/2024. See recs above.   - Staphylococcus epidermidis in blood culture from 9/2/2024. Repeat blood cultures have been negative to date. Interpreting this isolation event of Staph epi as a contaminant based on the fact that her only grown 1 of 2 peripheral blood culture bottles and resulted as positive several hours after his strep mitis cultures. Vancomycin  was discontinued on 9/5/2024.   - QTc interval: 421 msec on 8/5/2024 EKG.   - Bacterial coverage: cefepime (due to continue to ~ 9/20/2024)  - PCP prophylaxis: due to start ~ day+28  - Serostatus & viral prophylaxis: Toxoplasma negative. CMV D-/R-, EBV+, HSV-. No need for letermovir as donor and recipient are CMV negative. On ACV  - Fungal prophylaxis: izzy  - Immunization status: he has received 6 Covid-19 vaccines  - Gamma globulin status: unknown  - Isolation status: Good hand hygiene.   - Code status: Full Code    Marlen Manriquez MD. Pager [phone]         Interval History:   Since Leland was last seen by me on 9/11/2024, he is doing ok. He still has many nighttime interruptions by both staff and his urinary situation, but overall did a little better than the night before with regards to some sleep. Ate better. WBC continues to improve, ANC up to 1300 from 800. He was orthostatic yesterday and that responded to 1L IVF. He is day+19.     Review of Systems:  CONSTITUTIONAL:  Tmax 100F; could be a bit of engraftment fever  EYES:   ENT:    RESPIRATORY:  no issues with cough   CARDIOVASCULAR:    GASTROINTESTINAL:    GENITOURINARY:  voiding well; main complaint has been dysuria, urgency and frequency with urination. He does have blood in his urine and may pass a small shed here and there   HEME:  he was transfused with plt  INTEGUMENT:  no new rash  NEURO:           Current Medications & Allergies:     Current Facility-Administered Medications   Medication Dose Route Frequency Provider Last Rate Last Admin    acyclovir (ZOVIRAX) tablet 800 mg  800 mg Oral BID Aziza Mendieta APRN CNP   800 mg at 09/11/24 0743    allopurinol (ZYLOPRIM) tablet 300 mg  300 mg Oral Daily Kaitlyn Aguilar NP   300 mg at 09/11/24 0743    artificial saliva (BIOTENE MT) solution 2 spray  2 spray Swish & Spit 4x Daily Bessie Lazo PA-C   2 spray at 09/11/24 1126    calcium carbonate-vitamin D (CALTRATE) 600-10 MG-MCG per tablet 2  tablet  2 tablet Oral BID w/meals Bessie Lazo PA-C   2 tablet at 09/11/24 0743    ceFEPIme (MAXIPIME) 2 g vial to attach to  mL bag for ADULTS or NS 50 mL bag for PEDS  2 g Intravenous Q8H Meri Viera  mL/hr at 09/05/24 0305 2 g at 09/11/24 1125    filgrastim-aafi (NIVESTYM) injection 480 mcg  480 mcg Subcutaneous Daily at 8 pm Markus Mendez MD   480 mcg at 09/10/24 1940    heparin lock flush 10 unit/mL injection 5-20 mL  5-20 mL Intracatheter Q24H Bernadette Green PA-C   5 mL at 09/09/24 1724    heparin lock flush 10 unit/mL injection 5-20 mL  5-20 mL Intracatheter Q24H Markus Mendez MD   5 mL at 09/10/24 0515    methocarbamol (ROBAXIN) tablet 500 mg  500 mg Oral TID Aziza Mendieta APRN CNP   500 mg at 09/11/24 0743    micafungin (MYCAMINE) 150 mg in sodium chloride 0.9 % 100 mL intermittent infusion  150 mg Intravenous Daily Kaitlyn Aguilar  mL/hr at 09/11/24 1125 150 mg at 09/11/24 1125    mycophenolate mofetil (CELLCEPT) 1,250 mg in D5W intermittent infusion  1,250 mg Intravenous Q12H [facility] (10/22) Kaitlyn Aguilar .3 mL/hr at 09/11/24 0958 1,250 mg at 09/11/24 0958    oxyBUTYnin (DITROPAN) half-tab 2.5 mg  2.5 mg Oral BID Bernadette Green PA-C   2.5 mg at 09/11/24 0743    pantoprazole (PROTONIX) EC tablet 40 mg  40 mg Oral Daily Bessie Lazo PA-C   40 mg at 09/11/24 0744    Potassium Phosphate 15 mmol in NS intermittent infusion 15 mmol  15 mmol Intravenous Once Charli Cartagena DO   15 mmol at 09/11/24 0744    prochlorperazine (COMPAZINE) tablet 5 mg  5 mg Oral Daily Eunice Spicer PA-C   5 mg at 09/10/24 1709    psyllium (METAMUCIL/KONSYL) capsule 3 capsule  3 capsule Oral Daily Bessie Lazo PA-C   3 capsule at 09/11/24 0743    sirolimus (GENERIC EQUIVALENT) tablet 3.5 mg  3.5 mg Oral Daily Bernadette Green PA-C   3.5 mg at 09/11/24 0744    sodium chloride (PF) 0.9% PF flush 3 mL  3 mL Intracatheter Q8H Roseline Strong PA-C    3 mL at 09/11/24 0445    sodium chloride (PF) 0.9% PF flush 3 mL  3 mL Intracatheter Q8H Yaa Baer MD   3 mL at 09/09/24 1722    sodium chloride (PF) 0.9% PF flush 3 mL  3 mL Intracatheter Q8H Bessie Lazo PA-C   3 mL at 09/10/24 0732    [START ON 9/23/2024] sulfamethoxazole-trimethoprim (BACTRIM DS) 800-160 MG per tablet 1 tablet  1 tablet Oral Q Mon Tues BID Kaitlyn Aguilar, NP        tamsulosin (FLOMAX) capsule 0.4 mg  0.4 mg Oral Daily Bessie Lazo PA-C   0.4 mg at 09/10/24 1709    ursodiol (ACTIGALL) capsule 300 mg  300 mg Oral TID Markus Mendez MD   300 mg at 09/11/24 0743       Infusions/Drips:  Current Facility-Administered Medications   Medication Dose Route Frequency Provider Last Rate Last Admin       No Known Allergies         Physical Exam:   Patient Vitals for the past 24 hrs:   BP Temp Temp src Pulse Resp SpO2 Weight   09/11/24 1108 128/80 99  F (37.2  C) Oral 99 16 98 % --   09/11/24 0957 (!) 145/71 99  F (37.2  C) Oral -- 16 95 % --   09/11/24 0903 (!) 147/73 98.6  F (37  C) Oral 102 16 98 % --   09/11/24 0804 120/72 98.8  F (37.1  C) Oral 100 16 97 % --   09/11/24 0754 136/77 98.1  F (36.7  C) Oral 103 16 96 % --   09/11/24 0741 -- -- -- -- -- -- 86.8 kg (191 lb 6.4 oz)   09/11/24 0627 132/79 99.2  F (37.3  C) -- 100 16 -- --   09/11/24 0529 135/71 98.9  F (37.2  C) Oral 97 16 96 % --   09/11/24 0503 126/70 100  F (37.8  C) -- 107 16 -- --   09/11/24 0406 136/74 99.6  F (37.6  C) Oral 103 16 96 % --   09/11/24 0240 138/80 99.9  F (37.7  C) Oral 106 16 95 % --   09/11/24 0019 135/75 99.2  F (37.3  C) Oral 109 16 97 % --   09/10/24 1551 (!) 146/75 98.7  F (37.1  C) Oral 104 16 96 % --     Ranges for vital signs:  Temp:  [98.1  F (36.7  C)-100  F (37.8  C)] 99  F (37.2  C)  Pulse:  [] 99  Resp:  [16] 16  BP: (120-147)/(70-80) 128/80  SpO2:  [95 %-98 %] 98 %  Vitals:    09/09/24 0839 09/10/24 0748 09/11/24 0741   Weight: 87.1 kg (192 lb) 86.5 kg (190 lb 9.6 oz) 86.8 kg (191 lb 6.4  oz)       Physical Examination:  GENERAL:  well-developed, well-nourished man, resting in bed in no acute distress.  HEAD:  Head is normocephalic, atraumatic   EYES:  Eyes have anicteric sclerae without conjunctival injection   ENT:  Oropharynx is moist without exudates or ulcers. Tongue is midline  NECK:  Supple.   LUNGS:  Clear to auscultation bilateral.   CARDIOVASCULAR:  Regular rate and rhythm with no murmur  ABDOMEN:  Normal bowel sounds, soft, nontender.   SKIN:  No acute rashes. PICC in right upper extremity without any surrounding erythema or exudate.  NEUROLOGIC:  Grossly nonfocal. Active x4 extremities         Laboratory Data:     Metabolic Studies       Recent Labs   Lab Test 09/11/24  0415 09/11/24  0240 09/10/24  0420 09/07/24  1103 09/07/24  0713   *  --  137   < > 133*   POTASSIUM 4.0  --  4.0   < > 4.0   CHLORIDE 99  --  102   < > 100   CO2 25  --  26   < > 22   ANIONGAP 10  --  9   < > 11   BUN 12.8  --  13.1   < > 9.8   CR 0.62*  --  0.62*   < > 0.63*   GFRESTIMATED >90  --  >90   < > >90   *  --  108*   < > 120*   HERBERT 8.5*  --  8.7*   < > 8.3*   PHOS 2.0*  --  2.2*   < >  --    MAG 2.0  --  2.1   < > 1.8   LACT  --  0.7 0.6*   < >  --    PCAL  --   --   --   --  0.37    < > = values in this interval not displayed.       Hepatic Studies    Recent Labs   Lab Test 09/09/24  0615 09/07/24  0713 09/05/24  0254 05/15/24  1339 05/08/24  1156   BILITOTAL 0.5 0.7 0.6   < > 0.6   DBIL 0.24 0.28  --    < >  --    ALKPHOS 58 53 54   < > 82   PROTTOTAL 6.3* 5.8* 5.8*   < > 7.6   ALBUMIN 3.2* 2.8* 2.9*   < > 4.1   AST 25 30 36   < > 43   ALT 19 18 19   < > 21   LDH  --   --   --   --  613*    < > = values in this interval not displayed.       Gout Labs      Recent Labs   Lab Test 08/16/24  0920 08/09/24  0827 08/05/24  1200 07/29/24  0811 07/01/24  1208   URIC 4.2 3.9 4.1 4.5 4.1       Hematology Studies   Recent Labs   Lab Test 09/11/24  0415 09/10/24  0420 09/09/24  0615 09/08/24  0358  08/20/24 0418 08/19/24  0327 08/18/24  0424 05/22/24  0838 05/21/24  1016   WBC 1.4* 0.8* 0.4* 0.2*   < > 1.3* 1.3*   < > 4.0   ABLA  --   --   --   --   --   --   --   --  0.4*   BLST  --   --   --   --   --   --   --   --  9   ANEU 1.3* 0.8*  --   --    < >  --   --    < > 0.6*   ANEUTAUTO  --   --   --   --   --  1.1* 0.8*   < >  --    ALYM 0.0* 0.0*  --   --    < >  --   --    < > 2.0   ALYMPAUTO  --   --   --   --   --  0.1* 0.4*   < >  --    DAVIAN 0.1 0.0  --   --    < >  --   --    < > 0.5   AMONOAUTO  --   --   --   --   --  0.1 0.2   < >  --    AEOS 0.0 0.0  --   --    < >  --   --    < > 0.0   AEOSAUTO  --   --   --   --   --  0.0 0.0   < >  --    ABSBASO  --   --   --   --   --  0.0 0.0   < >  --    HGB 7.0* 7.8* 7.9* 7.8*   < > 10.1* 10.1*   < > 12.9*   HCT 21.3* 22.8* 23.2* 23.5*   < > 29.6* 28.3*   < > 38.4*   PLT 14* 28* 19* 14*   < > 46* 16*   < > 72*    < > = values in this interval not displayed.       Clotting Studies    Recent Labs   Lab Test 09/09/24 0615 09/02/24 0444 08/26/24  0407 08/16/24  0920   INR 1.06 1.06 0.99 0.93   PTT  --   --   --  28       Iron Testing    Recent Labs   Lab Test 09/11/24 0415 08/02/24  0919 08/01/24  0805   ZARIA  --   --  460*   MCV 80   < > 84    < > = values in this interval not displayed.       Urine Studies     Recent Labs   Lab Test 09/09/24  1900 09/07/24  1230 09/02/24  0454 08/05/24  1200   URINEPH 7.0 6.0 7.0 6.5   NITRITE Positive* Negative Negative Negative   LEUKEST Negative Negative Negative Negative   WBCU 1 1 2 <1       Medication levels    Recent Labs   Lab Test 09/10/24  0728 09/07/24  0713 09/06/24  0337   VANCOMYCIN  --   --  10.3   RAPAMY 10.1   < >  --     < > = values in this interval not displayed.       Microbiology:  Last Culture results   Culture   Date Value Ref Range Status   09/09/2024 No Growth  Final   09/07/2024 No growth after 3 days  Preliminary   09/05/2024 No Growth  Final   09/04/2024 No Growth  Final   09/04/2024 No Growth   Final   09/03/2024 No Growth  Final   09/03/2024 No Growth  Final   09/02/2024 Positive on the 1st day of incubation (A)  Preliminary   09/02/2024 Staphylococcus epidermidis (AA)  Preliminary     Comment:     1 of 2 bottles  The recovery of this organism from a single blood culture bottle most likely represents contamination. If susceptibility testing is needed, please refer to the antibiogram or contact [facility].   09/02/2024 Positive on the 1st day of incubation (A)  Preliminary   09/02/2024 Streptococcus mitis (AA)  Preliminary     Comment:     2 of 2 bottles     09/02/2024 Positive on the 1st day of incubation (A)  Preliminary   09/02/2024 Streptococcus mitis (AA)  Preliminary     Comment:     2 of 2 bottles    Susceptibilities done on previous cultures   08/16/2024   Final    No Vancomycin Resistant Enterococcus species isolated     Enterococcus faecium   Date Value Ref Range Status   09/02/2024 Not Detected Not Detected Final           Last checks of Clostridioides difficile testing  Recent Labs   Lab Test 09/03/24  1140 08/17/24  0906   CDBPCT Negative Negative       Infection Studies to assess Diarrhea   Recent Labs   Lab Test 09/06/24  1253   BIEPEC Negative   BISTEC Negative   BISHEI Negative   BIEAEC Negative   BIETEC Negative   GMG652 NA   BIADE Negative   BICRY Negative   BICAM Negative   BISAL Negative   BIVIBS Negative   BIVIBC Negative   BIYER Negative   BIGIA Negative   BINOR Negative   BIROT Negative   BIPLE Negative   BIENT Negative   BIAST Negative   BISAP Negative       Virology:  Viral loads    Recent Labs   Lab Test 09/07/24  1103 08/31/24  1453 08/17/24  1621 06/09/24  1124   CMVQNT Not Detected Not Detected   < >  --    HSDNA1  --   --   --  Not Detected   HSDNA2  --   --   --  Not Detected    < > = values in this interval not displayed.       BK Virus Testing     Recent Labs   Lab Test 09/09/24  1129   BKRES Not Detected       Imaging:  No results found for this or any previous visit (from the  past 48 hour(s)).

## 2024-09-11 NOTE — PROGRESS NOTES
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     Diagnosis MDS/AML  HCT Type Allogeneic     Prep Regimen Flu/Cy/TBI  Donor Match & Source 8/8 DP permissive PBSC    GVHD Prophylaxis PTCy/MMF/Siro  Primary BMT MD Dr. Murphy    Clinical Trials [id number]       INTERVAL  HISTORY     Still bothered by urinary urgency/frequency/some blood.  Ate better yesterday.  Rode bike for a few minutes  Normal BMT today.   No new issues.  Wife bedside  Working towards discharge.     Review of Systems: 10 point ROS negative except as noted above.    PHYSICAL EXAM     KPS:  70    BP (!) 145/71 (BP Location: Left arm)   Pulse 102   Temp 99  F (37.2  C) (Oral)   Resp 16   Ht 1.84 m (6' 0.44\")   Wt 86.8 kg (191 lb 6.4 oz)   SpO2 95%   BMI 25.64 kg/m       General:  NAD   HEENT: No oral lesions  Lungs: CTAB  CV: RRR, no MRG    Lymphatics: No edema  Neuro: A&O, appropriately interactive, speech clear, moving all extremities   Skin: No rash  Access: R arm PICC dressing cdi, no drainage    Current aGVHD staging: (BMT Assessment tab)    LABS AND IMAGING: I have assessed all abnormal lab values for their clinical significance and any values considered clinically significant have been addressed in the assessment and plan.      Lab Results   Component Value Date    WBC 1.4 (L) 09/11/2024    ANEU 1.3 (L) 09/11/2024    HGB 7.0 (L) 09/11/2024    HCT 21.3 (L) 09/11/2024    PLT 14 (LL) 09/11/2024     (L) 09/11/2024    POTASSIUM 4.0 09/11/2024    CHLORIDE 99 09/11/2024    CO2 25 09/11/2024     (H) 09/11/2024    BUN 12.8 09/11/2024    CR 0.62 (L) 09/11/2024    MAG 2.0 09/11/2024    INR 1.06 09/09/2024    BILITOTAL 0.5 09/09/2024    AST 25 09/09/2024    ALT 19 09/09/2024    ALKPHOS 58 09/09/2024    PROTTOTAL 6.3 (L) 09/09/2024    " ALBUMIN 3.2 (L) 09/09/2024       ASSESSMENT AND PLAN     Leland Pearson is a 69 year old male with 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     BMT/IEC PROTOCOL for MDS  - Chemo protocol: MT 2022-5; Flu/Cy/TBI  - Peripheral blood stem cell graft from 8/8 URD donor, ABO matched, Cell dose: 6.06 x10^6 CD34/kg  - Engraftment monitoring: Peripheral blood and bone marrow chimerisms on D28, D100, 6 months, 1 and 2 years  - Restaging plan: BMBx with FISH, cytogenetics and NGS on D28, D100, 6 months, 1 and 2 years  - D21 BMbx scheduled 9/13 at 11am on 5C      HEME/COAG  # Pancytopenia 2/2 chemo/BMT  - GCSF started day +5, continue until ANC > 2500 for 2 consecutive days. WBC up to 1400 today.  - Transfusion parameters: hemoglobin <7, platelets <20 (epistaxis).   - blood and plts today.      RISK OF GVHD  - Prophylaxis: PTCy 50mg/kg on D+3 and D+4; MMF (D+5 to 35); Sirolimus (starting D+5, target level 5-10).  - Siro 3.5mg/d (was 6mg) and repeat level 9/12 (ordered).       ID  #Streptococcus mitis bacteremia (2/2 bottles) - Cefepime (9/1-x)   -ABX duration plan: per ID through 9/20. Sensitive to ceftriaxone q24hrs, can transition to non- pseudomonal coverage once she has engrafted.    #MRSE bacteremia likely contaminant because peripheral stick, grew late (1/2). Dc'd Vanco (9/2-9/6)   - Repeat BC's x3 days NGTD  #Febrile Neutropenia: Now afebrile.    *see BC above, otherwise unremarkable    Prophylaxis plan:   - Fungal: Micafungin until D+45, followed by mold active azole. Pulm nodules present on workup CT.   - PJP: Bactrim to start at D+28. Toxoplasma negative.   - Viral: Acyclovir 800mg BID. No need for letermovir as donor and recipient are CMV negative.      Monitoring:   - CMV weekly, neg 9/7  - EBV every 2 weeks from D30 to D180     GI/NUTRITION  # GERD: Protonix  - Anti-emetics: Compazine daily at 1600 and PRN. lorazepam available PRN.   - Ulcer prophy:  Protonix  - VOD prophy: Ursodiol   - Dietician support to prevent malnutrition    CARDIOVASCULAR  # HTN- PTA lisinopril stopped 8/26 d/t symptomatic orthostasis.   # CAD: Coronary artery calcifications seen on CT, stress test in 2023 negative  - Risk of cardiomyopathy: Baseline EF 55-60%.   - Risk of arrhythmia: Baseline EKG showed 421       RENAL/ELECTROLYTES/  - Electrolyte management: replace per sliding scale  - BPH: Continue PTA flomax.  - Hemorrhagic Cystitis secondary to cytoxan: urinary urge/frequency/dysuria: UA with +nitrates, mod blood, >182 RBC. UC neg. Urine BK neg. Pyridium not helpful. Ditropan 2.5mg bid (also on Flomax).      MUSCULOSKELETAL/FRAILTY  - Baseline Frailty Score: Not frail (0)  - Daily PT/OT as needed while inpatient  - Gout: continue allopurinol      Neuro   - Pt has history of spinal stenosis, lumbosacral radiculopathy likely exacerbating.   - Pain management: Outpatient had been taking celecoxib for MSK pain, once a day. Stopped Inpatient will try Tramadol hasn't used. Now discontinued.  Added robaxin TID this admission.       SOCIAL DETERMINANTS  - Caregiver: wife, Vani. Lives within the required distance from hospital but may elect to stay in [facility].   - Financial/insurance concerns: None    Medically Ready for Discharge: Anticipated in 2-4 Days; Possibly Sunday/Monday pending further engraftment, improvement in  sx. Likely planning izzy in clinic 3x a week and will finish up a course of rocephin daily through 9/20. They are on wait list for [facility] (live in [place] but might prefer closer lodging early post BMT); will prefer appts ~10am when possible.      Clinically Significant Risk Factors              # Hypoalbuminemia: Lowest albumin = 2.8 g/dL at 9/7/2024  7:13 AM, will monitor as appropriate   # Thrombocytopenia: Lowest platelets = 14 in last 2 days, will monitor for bleeding   # Hypertension: Noted on problem list           # Overweight: Estimated body mass index  "is 25.64 kg/m  as calculated from the following:    Height as of this encounter: 1.84 m (6' 0.44\").    Weight as of this encounter: 86.8 kg (191 lb 6.4 oz).            I spent 40 minutes in the care of this patient today, which included time necessary for preparation for the visit, obtaining history, ordering medications/tests/procedures as medically indicated, review of pertinent medical literature, counseling of the patient, communication of recommendations to the care team, and documentation time.    Eunice Spicer PA-C  [phone]  "

## 2024-09-11 NOTE — PLAN OF CARE
"Goal Outcome Evaluation:    /70   Pulse 107   Temp 100  F (37.8  C)   Resp 16   Ht 1.84 m (6' 0.44\")   Wt 86.5 kg (190 lb 9.6 oz)   SpO2 96%   BMI 25.54 kg/m       AVSS. No pain or nausea. Triggered lactic 0.6. Voiding well. No prns needed. Platelets replaced. Phos and RBC need replacing. Continue POC.       Problem: Adult Inpatient Plan of Care  Goal: Plan of Care Review  Description: The Plan of Care Review/Shift note should be completed every shift.  The Outcome Evaluation is a brief statement about your assessment that the patient is improving, declining, or no change.  This information will be displayed automatically on your shift  note.  Outcome: Progressing  Goal: Patient-Specific Goal (Individualized)  Description: You can add care plan individualizations to a care plan. Examples of Individualization might be:  \"Parent requests to be called daily at 9am for status\", \"I have a hard time hearing out of my right ear\", or \"Do not touch me to wake me up as it startles  me\".  Outcome: Progressing  Goal: Absence of Hospital-Acquired Illness or Injury  Outcome: Progressing  Intervention: Identify and Manage Fall Risk  Recent Flowsheet Documentation  Taken 9/11/2024 0200 by Hayde Perea RN  Safety Promotion/Fall Prevention: safety round/check completed  Taken 9/11/2024 0000 by Hayde Perea RN  Safety Promotion/Fall Prevention: safety round/check completed  Taken 9/10/2024 2200 by Hayde Perea RN  Safety Promotion/Fall Prevention: safety round/check completed  Taken 9/10/2024 2030 by Hayde Perea RN  Safety Promotion/Fall Prevention: safety round/check completed  Intervention: Prevent Skin Injury  Recent Flowsheet Documentation  Taken 9/10/2024 2200 by Hayde Perea, RN  Body Position: position changed independently  Taken 9/10/2024 2030 by Hayde Perea RN  Skin Protection:   adhesive use limited   transparent dressing maintained  Device Skin Pressure Protection:   " adhesive use limited   tubing/devices free from skin contact  Intervention: Prevent and Manage VTE (Venous Thromboembolism) Risk  Recent Flowsheet Documentation  Taken 9/10/2024 2030 by Hayde Perea RN  VTE Prevention/Management: SCDs off (sequential compression devices)  Intervention: Prevent Infection  Recent Flowsheet Documentation  Taken 9/10/2024 2030 by Hayde Perea RN  Infection Prevention:   cohorting utilized   environmental surveillance performed   equipment surfaces disinfected   hand hygiene promoted   personal protective equipment utilized   rest/sleep promoted   single patient room provided   visitors restricted/screened  Goal: Optimal Comfort and Wellbeing  Outcome: Progressing  Goal: Readiness for Transition of Care  Outcome: Progressing     Problem: Risk for Delirium  Goal: Optimal Coping  Outcome: Progressing  Intervention: Optimize Psychosocial Adjustment to Delirium  Recent Flowsheet Documentation  Taken 9/10/2024 2030 by Hayde Perea RN  Supportive Measures: active listening utilized  Family/Support System Care: involvement promoted  Goal: Improved Sleep  Outcome: Progressing  Intervention: Promote Sleep  Recent Flowsheet Documentation  Taken 9/10/2024 2030 by Hayde Perea RN  Sleep/Rest Enhancement:   awakenings minimized   comfort measures   natural light exposure provided   noise level reduced   regular sleep/rest pattern promoted   room darkened     Problem: Stem Cell/Bone Marrow Transplant  Goal: Optimal Coping with Transplant  Outcome: Progressing  Intervention: Optimize Patient/Family Adjustment to Transplant  Recent Flowsheet Documentation  Taken 9/10/2024 2030 by Hayde Perea RN  Supportive Measures: active listening utilized  Family/Support System Care: involvement promoted  Goal: Symptom-Free Urinary Elimination  Outcome: Progressing  Intervention: Prevent or Manage Bladder Irritation  Recent Flowsheet Documentation  Taken 9/10/2024 2030 by Brit  Hayde TEMPLETON RN  Urinary Elimination Promotion: voiding relaxation promoted  Hyperhydration Management: fluids provided  Goal: Diarrhea Symptom Control  Outcome: Progressing  Intervention: Manage Diarrhea  Recent Flowsheet Documentation  Taken 9/10/2024 2200 by Hayde Perea RN  Perineal Care: perineal hygiene encouraged  Taken 9/10/2024 2030 by Hayde Perea RN  Skin Protection:   adhesive use limited   transparent dressing maintained  Fluid/Electrolyte Management: fluids provided  Goal: Improved Activity Tolerance  Outcome: Progressing  Intervention: Promote Improved Energy  Recent Flowsheet Documentation  Taken 9/10/2024 2200 by Hayde Perea RN  Activity Management: up ad sergo  Taken 9/10/2024 2030 by Hayde Perea RN  Fatigue Management:   activity schedule adjusted   fatigue-related activity identified   frequent rest breaks encouraged  Sleep/Rest Enhancement:   awakenings minimized   comfort measures   natural light exposure provided   noise level reduced   regular sleep/rest pattern promoted   room darkened  Environmental Support: calm environment promoted  Goal: Blood Counts Within Acceptable Range  Outcome: Progressing  Intervention: Monitor and Manage Hematologic Symptoms  Recent Flowsheet Documentation  Taken 9/10/2024 2030 by Hayde Perea RN  Sleep/Rest Enhancement:   awakenings minimized   comfort measures   natural light exposure provided   noise level reduced   regular sleep/rest pattern promoted   room darkened  Bleeding Precautions:   monitored for signs of bleeding   gentle oral care promoted  Medication Review/Management: medications reviewed  Goal: Absence of Hypersensitivity Reaction  Outcome: Progressing  Goal: Absence of Infection  Outcome: Progressing  Intervention: Prevent and Manage Infection  Recent Flowsheet Documentation  Taken 9/10/2024 2030 by Hayde Perea RN  Infection Prevention:   cohorting utilized   environmental surveillance performed   equipment  surfaces disinfected   hand hygiene promoted   personal protective equipment utilized   rest/sleep promoted   single patient room provided   visitors restricted/screened  Infection Management: aseptic technique maintained  Isolation Precautions: protective environment maintained  Goal: Improved Oral Mucous Membrane Health and Integrity  Outcome: Progressing  Intervention: Promote Oral Comfort and Health  Recent Flowsheet Documentation  Taken 9/10/2024 2200 by Hayde Perea, RN  Oral Mucous Membrane Protection: nonirritating oral fluids promoted  Oral Care: oral rinse provided  Goal: Nausea and Vomiting Symptom Relief  Outcome: Progressing  Goal: Optimal Nutrition Intake  Outcome: Progressing  Intervention: Minimize and Manage Barriers to Oral Intake  Recent Flowsheet Documentation  Taken 9/10/2024 2030 by Hayde Perea, RN  Oral Nutrition Promotion: rest periods promoted

## 2024-09-11 NOTE — PLAN OF CARE
Patient happy with white count today. He feels urination may be a little improved today. Urine still orange to pink in color still some urgency and burning with urination, frequency maybe improved. T max this evening 100.6 other vital signs stable.  He denies pain, he denies nausea, BM soft today. Continue to monitor.  Problem: Adult Inpatient Plan of Care  Goal: Plan of Care Review  Description: The Plan of Care Review/Shift note should be completed every shift.  The Outcome Evaluation is a brief statement about your assessment that the patient is improving, declining, or no change.  This information will be displayed automatically on your shift  note.  Outcome: Progressing   Goal Outcome Evaluation:

## 2024-09-12 ENCOUNTER — APPOINTMENT (OUTPATIENT)
Dept: OCCUPATIONAL THERAPY | Facility: CLINIC | Age: 69
DRG: 014 | End: 2024-09-12
Attending: INTERNAL MEDICINE
Payer: COMMERCIAL

## 2024-09-12 LAB
ALBUMIN SERPL BCG-MCNC: 3.4 G/DL (ref 3.5–5.2)
ALP SERPL-CCNC: 70 U/L (ref 40–150)
ALT SERPL W P-5'-P-CCNC: 16 U/L (ref 0–70)
ANION GAP SERPL CALCULATED.3IONS-SCNC: 8 MMOL/L (ref 7–15)
AST SERPL W P-5'-P-CCNC: 25 U/L (ref 0–45)
BACTERIA BLD CULT: NO GROWTH
BASOPHILS # BLD AUTO: ABNORMAL 10*3/UL
BASOPHILS # BLD MANUAL: 0 10E3/UL (ref 0–0.2)
BASOPHILS NFR BLD AUTO: ABNORMAL %
BASOPHILS NFR BLD MANUAL: 0 %
BILIRUB SERPL-MCNC: 0.5 MG/DL
BUN SERPL-MCNC: 14.3 MG/DL (ref 8–23)
CALCIUM SERPL-MCNC: 8.7 MG/DL (ref 8.8–10.4)
CHLORIDE SERPL-SCNC: 100 MMOL/L (ref 98–107)
CREAT SERPL-MCNC: 0.64 MG/DL (ref 0.67–1.17)
EBV DNA SERPL NAA+PROBE-ACNC: NOT DETECTED IU/ML
EGFRCR SERPLBLD CKD-EPI 2021: >90 ML/MIN/1.73M2
ELLIPTOCYTES BLD QL SMEAR: SLIGHT
EOSINOPHIL # BLD AUTO: ABNORMAL 10*3/UL
EOSINOPHIL # BLD MANUAL: 0 10E3/UL (ref 0–0.7)
EOSINOPHIL NFR BLD AUTO: ABNORMAL %
EOSINOPHIL NFR BLD MANUAL: 0 %
ERYTHROCYTE [DISTWIDTH] IN BLOOD BY AUTOMATED COUNT: 16.5 % (ref 10–15)
FLUAV RNA SPEC QL NAA+PROBE: NEGATIVE
FLUBV RNA RESP QL NAA+PROBE: NEGATIVE
GLUCOSE SERPL-MCNC: 134 MG/DL (ref 70–99)
HCO3 SERPL-SCNC: 25 MMOL/L (ref 22–29)
HCT VFR BLD AUTO: 24.7 % (ref 40–53)
HGB BLD-MCNC: 8.4 G/DL (ref 13.3–17.7)
IMM GRANULOCYTES # BLD: ABNORMAL 10*3/UL
IMM GRANULOCYTES NFR BLD: ABNORMAL %
LACTATE SERPL-SCNC: 1 MMOL/L (ref 0.7–2)
LACTATE SERPL-SCNC: 2.1 MMOL/L (ref 0.7–2)
LYMPHOCYTES # BLD AUTO: ABNORMAL 10*3/UL
LYMPHOCYTES # BLD MANUAL: 0 10E3/UL (ref 0.8–5.3)
LYMPHOCYTES NFR BLD AUTO: ABNORMAL %
LYMPHOCYTES NFR BLD MANUAL: 1 %
MAGNESIUM SERPL-MCNC: 2.1 MG/DL (ref 1.7–2.3)
MCH RBC QN AUTO: 26.8 PG (ref 26.5–33)
MCHC RBC AUTO-ENTMCNC: 34 G/DL (ref 31.5–36.5)
MCV RBC AUTO: 79 FL (ref 78–100)
METAMYELOCYTES # BLD MANUAL: 0.1 10E3/UL
METAMYELOCYTES NFR BLD MANUAL: 4 %
MONOCYTES # BLD AUTO: ABNORMAL 10*3/UL
MONOCYTES # BLD MANUAL: 0.2 10E3/UL (ref 0–1.3)
MONOCYTES NFR BLD AUTO: ABNORMAL %
MONOCYTES NFR BLD MANUAL: 6 %
MRSA DNA SPEC QL NAA+PROBE: NEGATIVE
NEUTROPHILS # BLD AUTO: ABNORMAL 10*3/UL
NEUTROPHILS # BLD MANUAL: 2.6 10E3/UL (ref 1.6–8.3)
NEUTROPHILS NFR BLD AUTO: ABNORMAL %
NEUTROPHILS NFR BLD MANUAL: 89 %
NRBC # BLD AUTO: 0 10E3/UL
NRBC BLD AUTO-RTO: 0 /100
PHOSPHATE SERPL-MCNC: 2 MG/DL (ref 2.5–4.5)
PLAT MORPH BLD: ABNORMAL
PLATELET # BLD AUTO: 27 10E3/UL (ref 150–450)
POTASSIUM SERPL-SCNC: 4 MMOL/L (ref 3.4–5.3)
PROT SERPL-MCNC: 6.5 G/DL (ref 6.4–8.3)
RBC # BLD AUTO: 3.13 10E6/UL (ref 4.4–5.9)
RBC MORPH BLD: ABNORMAL
RSV RNA SPEC NAA+PROBE: NEGATIVE
SA TARGET DNA: NEGATIVE
SARS-COV-2 RNA RESP QL NAA+PROBE: NEGATIVE
SIROLIMUS BLD-MCNC: 9.6 UG/L (ref 5–15)
SODIUM SERPL-SCNC: 133 MMOL/L (ref 135–145)
TME LAST DOSE: NORMAL H
TME LAST DOSE: NORMAL H
WBC # BLD AUTO: 2.9 10E3/UL (ref 4–11)

## 2024-09-12 PROCEDURE — 87640 STAPH A DNA AMP PROBE: CPT | Performed by: PHYSICIAN ASSISTANT

## 2024-09-12 PROCEDURE — 83735 ASSAY OF MAGNESIUM: CPT | Performed by: STUDENT IN AN ORGANIZED HEALTH CARE EDUCATION/TRAINING PROGRAM

## 2024-09-12 PROCEDURE — 80053 COMPREHEN METABOLIC PANEL: CPT

## 2024-09-12 PROCEDURE — 250N000013 HC RX MED GY IP 250 OP 250 PS 637: Performed by: NURSE PRACTITIONER

## 2024-09-12 PROCEDURE — 250N000013 HC RX MED GY IP 250 OP 250 PS 637: Performed by: STUDENT IN AN ORGANIZED HEALTH CARE EDUCATION/TRAINING PROGRAM

## 2024-09-12 PROCEDURE — 250N000011 HC RX IP 250 OP 636

## 2024-09-12 PROCEDURE — 250N000013 HC RX MED GY IP 250 OP 250 PS 637: Performed by: INTERNAL MEDICINE

## 2024-09-12 PROCEDURE — 250N000009 HC RX 250: Performed by: INTERNAL MEDICINE

## 2024-09-12 PROCEDURE — 87040 BLOOD CULTURE FOR BACTERIA: CPT | Performed by: PHYSICIAN ASSISTANT

## 2024-09-12 PROCEDURE — 85007 BL SMEAR W/DIFF WBC COUNT: CPT

## 2024-09-12 PROCEDURE — 250N000012 HC RX MED GY IP 250 OP 636 PS 637: Performed by: PHYSICIAN ASSISTANT

## 2024-09-12 PROCEDURE — 97530 THERAPEUTIC ACTIVITIES: CPT | Mod: GO

## 2024-09-12 PROCEDURE — 250N000011 HC RX IP 250 OP 636: Mod: JZ | Performed by: INTERNAL MEDICINE

## 2024-09-12 PROCEDURE — 84100 ASSAY OF PHOSPHORUS: CPT | Performed by: STUDENT IN AN ORGANIZED HEALTH CARE EDUCATION/TRAINING PROGRAM

## 2024-09-12 PROCEDURE — 258N000003 HC RX IP 258 OP 636

## 2024-09-12 PROCEDURE — 250N000013 HC RX MED GY IP 250 OP 250 PS 637

## 2024-09-12 PROCEDURE — 83605 ASSAY OF LACTIC ACID: CPT | Performed by: PHYSICIAN ASSISTANT

## 2024-09-12 PROCEDURE — 36415 COLL VENOUS BLD VENIPUNCTURE: CPT | Performed by: PHYSICIAN ASSISTANT

## 2024-09-12 PROCEDURE — 250N000011 HC RX IP 250 OP 636: Performed by: RADIOLOGY

## 2024-09-12 PROCEDURE — 999N000157 HC STATISTIC RCP TIME EA 10 MIN

## 2024-09-12 PROCEDURE — 206N000001 HC R&B BMT UMMC

## 2024-09-12 PROCEDURE — 87799 DETECT AGENT NOS DNA QUANT: CPT | Performed by: PHYSICIAN ASSISTANT

## 2024-09-12 PROCEDURE — 99232 SBSQ HOSP IP/OBS MODERATE 35: CPT | Performed by: PHYSICIAN ASSISTANT

## 2024-09-12 PROCEDURE — 258N000003 HC RX IP 258 OP 636: Performed by: INTERNAL MEDICINE

## 2024-09-12 PROCEDURE — 87641 MR-STAPH DNA AMP PROBE: CPT | Performed by: PHYSICIAN ASSISTANT

## 2024-09-12 PROCEDURE — 250N000013 HC RX MED GY IP 250 OP 250 PS 637: Performed by: PHYSICIAN ASSISTANT

## 2024-09-12 PROCEDURE — 250N000011 HC RX IP 250 OP 636: Performed by: PHYSICIAN ASSISTANT

## 2024-09-12 PROCEDURE — 87637 SARSCOV2&INF A&B&RSV AMP PRB: CPT | Performed by: PHYSICIAN ASSISTANT

## 2024-09-12 PROCEDURE — 250N000011 HC RX IP 250 OP 636: Performed by: INTERNAL MEDICINE

## 2024-09-12 PROCEDURE — 99233 SBSQ HOSP IP/OBS HIGH 50: CPT | Mod: FS | Performed by: STUDENT IN AN ORGANIZED HEALTH CARE EDUCATION/TRAINING PROGRAM

## 2024-09-12 PROCEDURE — 85027 COMPLETE CBC AUTOMATED: CPT

## 2024-09-12 PROCEDURE — 80195 ASSAY OF SIROLIMUS: CPT | Performed by: PHYSICIAN ASSISTANT

## 2024-09-12 PROCEDURE — 258N000003 HC RX IP 258 OP 636: Performed by: PHYSICIAN ASSISTANT

## 2024-09-12 PROCEDURE — 97110 THERAPEUTIC EXERCISES: CPT | Mod: GO

## 2024-09-12 PROCEDURE — 99233 SBSQ HOSP IP/OBS HIGH 50: CPT | Mod: GC | Performed by: INTERNAL MEDICINE

## 2024-09-12 RX ORDER — LOPERAMIDE HCL 2 MG
2 CAPSULE ORAL ONCE
Status: COMPLETED | OUTPATIENT
Start: 2024-09-12 | End: 2024-09-12

## 2024-09-12 RX ORDER — POTASSIUM PHOS IN 0.9 % NACL 15MMOL/250
15 PLASTIC BAG, INJECTION (ML) INTRAVENOUS ONCE
Status: COMPLETED | OUTPATIENT
Start: 2024-09-12 | End: 2024-09-12

## 2024-09-12 RX ORDER — MYCOPHENOLATE MOFETIL 250 MG/1
1250 CAPSULE ORAL EVERY 12 HOURS SCHEDULED
Status: DISCONTINUED | OUTPATIENT
Start: 2024-09-12 | End: 2024-09-19 | Stop reason: HOSPADM

## 2024-09-12 RX ADMIN — URSODIOL 300 MG: 300 CAPSULE ORAL at 20:17

## 2024-09-12 RX ADMIN — SIROLIMUS 3.5 MG: 2 TABLET ORAL at 08:58

## 2024-09-12 RX ADMIN — SODIUM CHLORIDE 1000 ML: 9 INJECTION, SOLUTION INTRAVENOUS at 14:58

## 2024-09-12 RX ADMIN — Medication 2 SPRAY: at 20:20

## 2024-09-12 RX ADMIN — METHOCARBAMOL 500 MG: 500 TABLET ORAL at 20:17

## 2024-09-12 RX ADMIN — CEFEPIME HYDROCHLORIDE 2 G: 2 INJECTION, POWDER, FOR SOLUTION INTRAVENOUS at 20:20

## 2024-09-12 RX ADMIN — ACETAMINOPHEN 650 MG: 325 TABLET ORAL at 20:17

## 2024-09-12 RX ADMIN — PANTOPRAZOLE SODIUM 40 MG: 40 TABLET, DELAYED RELEASE ORAL at 07:29

## 2024-09-12 RX ADMIN — Medication 2.5 MG: at 20:17

## 2024-09-12 RX ADMIN — METHOCARBAMOL 500 MG: 500 TABLET ORAL at 13:05

## 2024-09-12 RX ADMIN — MYCOPHENOLATE MOFETIL 1250 MG: 250 CAPSULE ORAL at 20:17

## 2024-09-12 RX ADMIN — CEFEPIME HYDROCHLORIDE 2 G: 2 INJECTION, POWDER, FOR SOLUTION INTRAVENOUS at 03:13

## 2024-09-12 RX ADMIN — ACYCLOVIR 800 MG: 800 TABLET ORAL at 20:17

## 2024-09-12 RX ADMIN — HEPARIN, PORCINE (PF) 10 UNIT/ML INTRAVENOUS SYRINGE 5 ML: at 21:07

## 2024-09-12 RX ADMIN — FILGRASTIM-AAFI 480 MCG: 480 INJECTION, SOLUTION INTRAVENOUS; SUBCUTANEOUS at 20:20

## 2024-09-12 RX ADMIN — Medication 5 ML: at 13:05

## 2024-09-12 RX ADMIN — Medication 3 CAPSULE: at 07:28

## 2024-09-12 RX ADMIN — CEFEPIME HYDROCHLORIDE 2 G: 2 INJECTION, POWDER, FOR SOLUTION INTRAVENOUS at 11:21

## 2024-09-12 RX ADMIN — Medication 2.5 MG: at 07:28

## 2024-09-12 RX ADMIN — Medication 2 SPRAY: at 07:29

## 2024-09-12 RX ADMIN — Medication 5 ML: at 17:14

## 2024-09-12 RX ADMIN — ALLOPURINOL 300 MG: 300 TABLET ORAL at 07:28

## 2024-09-12 RX ADMIN — TAMSULOSIN HYDROCHLORIDE 0.4 MG: 0.4 CAPSULE ORAL at 17:14

## 2024-09-12 RX ADMIN — ACETAMINOPHEN 650 MG: 325 TABLET ORAL at 03:12

## 2024-09-12 RX ADMIN — HEPARIN, PORCINE (PF) 10 UNIT/ML INTRAVENOUS SYRINGE 5 ML: at 15:59

## 2024-09-12 RX ADMIN — METHOCARBAMOL 500 MG: 500 TABLET ORAL at 07:29

## 2024-09-12 RX ADMIN — MICAFUNGIN SODIUM 150 MG: 50 INJECTION, POWDER, LYOPHILIZED, FOR SOLUTION INTRAVENOUS at 10:04

## 2024-09-12 RX ADMIN — CALCIUM CARBONATE 600 MG (1,500 MG)-VITAMIN D3 400 UNIT TABLET 2 TABLET: at 07:28

## 2024-09-12 RX ADMIN — MYCOPHENOLATE MOFETIL 1250 MG: 250 CAPSULE ORAL at 10:04

## 2024-09-12 RX ADMIN — ACYCLOVIR 800 MG: 800 TABLET ORAL at 07:28

## 2024-09-12 RX ADMIN — URSODIOL 300 MG: 300 CAPSULE ORAL at 13:06

## 2024-09-12 RX ADMIN — CALCIUM CARBONATE 600 MG (1,500 MG)-VITAMIN D3 400 UNIT TABLET 2 TABLET: at 17:14

## 2024-09-12 RX ADMIN — LOPERAMIDE HYDROCHLORIDE 2 MG: 2 CAPSULE ORAL at 21:44

## 2024-09-12 RX ADMIN — PROCHLORPERAZINE MALEATE 5 MG: 5 TABLET ORAL at 15:59

## 2024-09-12 RX ADMIN — URSODIOL 300 MG: 300 CAPSULE ORAL at 07:28

## 2024-09-12 RX ADMIN — POTASSIUM PHOSPHATE, MONOBASIC POTASSIUM PHOSPHATE, DIBASIC 15 MMOL: 224; 236 INJECTION, SOLUTION, CONCENTRATE INTRAVENOUS at 04:43

## 2024-09-12 ASSESSMENT — ACTIVITIES OF DAILY LIVING (ADL)
ADLS_ACUITY_SCORE: 28
ADLS_ACUITY_SCORE: 27
ADLS_ACUITY_SCORE: 28
ADLS_ACUITY_SCORE: 27
ADLS_ACUITY_SCORE: 28
ADLS_ACUITY_SCORE: 27
ADLS_ACUITY_SCORE: 28
ADLS_ACUITY_SCORE: 27
ADLS_ACUITY_SCORE: 28
ADLS_ACUITY_SCORE: 27

## 2024-09-12 NOTE — PLAN OF CARE
Patient happy with white count, no new complaints today. Patient T max 100.4 po,  other vital signs stable. Sepsis protocol triggered lactate 2.1,  RRT notified patient given a 1 liter flush, peripheral blood cultures drawn, recheck lactate 1.0. If he should have another temperature spike they may start Vanco according to Eunice ANGEL. Bone marrow biopsy tomorrow 11 am. Wife at bedside all day, support provided, continue to monitor.  Problem: Adult Inpatient Plan of Care  Goal: Plan of Care Review  Description: The Plan of Care Review/Shift note should be completed every shift.  The Outcome Evaluation is a brief statement about your assessment that the patient is improving, declining, or no change.  This information will be displayed automatically on your shift  note.  Outcome: Progressing   Goal Outcome Evaluation:

## 2024-09-12 NOTE — CODE/RAPID RESPONSE
Rapid Response Team Note    Assessment   A rapid response was called on Leland Pearson due to lactic acidosis and fevers. This presentation is likely due to known malignancy and BMT.    Plan   -  given fever and lactic acidosis will broaden infection workup:   - fluvid panel   - blood cultures x 1 (given culture tube shortage), 9/11 culture NGTD   - urine cx 9/9 no growth - no indication to repeat at this time    - MRSA nares  - 1L IVF bolus (ordered per primary)  - continue cefepime per primary- low threshold to add MRSA coverage   - not currently neutropenic   -  The Hematology/Oncology primary team was able to be reached and they are in agreement with the above plan.  -  Disposition: The patient will remain on the current unit. We will continue to monitor this patient closely.  -  Reassessment and plan follow-up will be performed by the primary team    Emily Mancia PA-C  Rapid Response Team [facility]  Securely message with [employer]     Medical Decision Making       20 MINUTES SPENT BY ME on the date of service doing chart review, history, exam, documentation & further activities per the note.          Hospital Course   Brief Summary of events leading to rapid response:   RRT paged for lactate 2.1.  Patient w/ AML and 20 days s/p BMT. Seen in company of wife, bedside RN and RRT RN.     Continues w/ several days of urinary urgency and dysuria. Feels generally tired. Notes general poor appetite and decreased po intake vs baseline.     Patient has a fever on vitals check during this RRT but does not feel sensation of fevers or chills.     Denies N/V, cough, sore throat, chest pain, sob, palpitations, abdominal pain, rashes, lumps, lesions, changes to bowels (including diarrhea, constipation), bleeding or other complaints.       Physical Exam   Vital Signs: Temp: 97.9  F (36.6  C) Temp src: Oral BP: 127/81 Pulse: 101   Resp: 16 SpO2: 94 % O2 Device: None (Room air)    Weight: 188 lbs 14.4 oz      Exam:   /81 (BP  "Location: Left arm)   Pulse 101   Temp 100.4  F (38  C) (Oral)   Resp 16   Ht 1.84 m (6' 0.44\")   Wt 85.7 kg (188 lb 14.4 oz)   SpO2 94%   BMI 25.31 kg/m    Generalized appearance: A&O, NAD, able to sit upright unassisted  HEENT: NC, AT, pupils equal and round, sclera anicteric  CV: tachycardic, RRR, no m/r/g  PULM: CTAB, non-labored breathing on RA  GI: NABS, soft, NT, ND  Extremities: no edema, + PT and radial pulses  MSK: moves all extremities, no gross deformities  SKIN: CDI, noncyanotic, anicteric  Psych: Mood and affect appropriate      Significant Results and Procedures   Lactate 2.1  Urine cx 9/9 no growth   Blood cx 9/11 NGTD  CXR 9/2 clear and no PTX    Sepsis Evaluation   The patient is not known to have an infection.    NO EVIDENCE OF SEPSIS at this time.  Vital sign, physical exam, and lab findings are due to mild hypovolemia in the setting of malignancy and BMT.    "

## 2024-09-12 NOTE — PROVIDER NOTIFICATION
09/12/24 1455   Call Information   Date of Call 09/12/24   Time of Call 1455   Name of person requesting the team Jevon LOPEZ   Title of person requesting team RN   RRT Arrival time 1458   Time RRT ended 1515   Reason for call   Type of RRT Adult   Primary reason for call Sepsis suspected   Sepsis Suspected Elevated Lactate level   Was patient transferred from the ED, ICU, or PACU within last 24 hours prior to RRT call? 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   Notable History/Conditions Cancer;Hypertension   Assessment Patient alert and oriented X 4. Temp 100.4 (oral), otherwise VS WDL. Lung sounds clear. Patient on RA. S1/S2. Reports urgency and frequency with urination. Reports adequate PO intake.   Interventions Fluid bolus;Labs   Patient Outcome   Patient Outcome Stabilized on unit   RRT Team   Attending/Primary/Covering Physician Rich Curtis MD/ BMT   Date Attending Physician notified 09/12/24   Time Attending Physician notified 1455   Physician(s) Emily Mancia PABRUCE   Lead RN Antonieta LOPEZ   RT NA   Post RRT Intervention Assessment   Post RRT Assessment Stable/Improved   Date Follow Up Done 09/12/24   Time Follow Up Done 1714   Comments Lactic 1.0, VS WDL.

## 2024-09-12 NOTE — PROGRESS NOTES
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     Diagnosis MDS/AML  HCT Type Allogeneic     Prep Regimen Flu/Cy/TBI  Donor Match & Source 8/8 DP permissive PBSC    GVHD Prophylaxis PTCy/MMF/Siro  Primary BMT MD Dr. Murphy    Clinical Trials [id number]       INTERVAL  HISTORY     Urinary symptoms improving.  No blood noted. Has looked much clearer recently.  Walked in [place] x 1.  Ate reasonable.  Had a fever and felt chilled. No new localizing signs/symptoms of fever.   No URI symptoms    Review of Systems: 10 point ROS negative except as noted above.    PHYSICAL EXAM     KPS:  70    /77 (BP Location: Left arm)   Pulse 101   Temp 98  F (36.7  C) (Oral)   Resp 16   Ht 1.84 m (6' 0.44\")   Wt 85.7 kg (188 lb 14.4 oz)   SpO2 95%   BMI 25.31 kg/m       General:  NAD   HEENT: No oral lesions  Lungs: CTAB  CV: RRR, no MRG    Lymphatics: No edema  Neuro: A&O, appropriately interactive, speech clear, moving all extremities   Skin: No rash  Access: R arm PICC dressing cdi, no drainage    Current aGVHD staging: (BMT Assessment tab)    LABS AND IMAGING: I have assessed all abnormal lab values for their clinical significance and any values considered clinically significant have been addressed in the assessment and plan.      Lab Results   Component Value Date    WBC 2.9 (L) 09/12/2024    ANEU 2.6 09/12/2024    HGB 8.4 (L) 09/12/2024    HCT 24.7 (L) 09/12/2024    PLT 27 (LL) 09/12/2024     (L) 09/12/2024    POTASSIUM 4.0 09/12/2024    CHLORIDE 100 09/12/2024    CO2 25 09/12/2024     (H) 09/12/2024    BUN 14.3 09/12/2024    CR 0.64 (L) 09/12/2024    MAG 2.1 09/12/2024    INR 1.06 09/09/2024    BILITOTAL 0.5 09/12/2024    AST 25 09/12/2024    ALT 16 09/12/2024    ALKPHOS 70 09/12/2024    PROTTOTAL 6.5 " 09/12/2024    ALBUMIN 3.4 (L) 09/12/2024       ASSESSMENT AND PLAN     Leland Pearson is a 69 year old male with 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     BMT/IEC PROTOCOL for MDS  - Chemo protocol: MT 2022-5; Flu/Cy/TBI  - Peripheral blood stem cell graft from 8/8 URD donor, ABO matched, Cell dose: 6.06 x10^6 CD34/kg  - Engraftment monitoring: Peripheral blood and bone marrow chimerisms on D28, D100, 6 months, 1 and 2 years  - Restaging plan: BMBx with FISH, cytogenetics and NGS on D28, D100, 6 months, 1 and 2 years  - D21 BMbx scheduled 9/13 at 11am on 5C.       HEME/COAG  # Pancytopenia 2/2 chemo/BMT  - GCSF started day +5, continue until ANC > 2500 for 2 consecutive days. ANC up to  2600. Likely last day of G today.   - Transfusion parameters: hemoglobin <7, platelets <20 (epistaxis).   - No transfusion needs today.     RISK OF GVHD  - Prophylaxis: PTCy 50mg/kg on D+3 and D+4; MMF (D+5 to 35); Sirolimus (starting D+5, target level 5-10).  - Siro 3.5mg/d. Level pending.        ID  #Streptococcus mitis bacteremia (2/2 bottles) - Cefepime (9/1-x)   -ABX duration plan: per ID through 9/20. Sensitive to ceftriaxone q24hrs, can transition to non- pseudomonal coverage for discharge/better engraftment.     #MRSE bacteremia likely contaminant because peripheral stick, grew late (1/2). Dc'd Vanco (9/2-9/6)   - Repeat BC's x3 days NGTD  #Non-Neutropenic Fever: 102.7 9/11. Peripheral BC pending. Urine cx 9/9 neg. BK urine and blood neg. Adeno urine pending. CMV neg 9/7. Will send EBV and Adeno PCR blood. Still on cefepime as above. Leland appears non-toxic with no new localizing findings and currently afebrile.     Prophylaxis plan:   - Fungal: Micafungin until D+45, followed by mold active azole. Pulm nodules present on workup CT.   - PJP: Bactrim to start at D+28. Toxoplasma negative.   - Viral: Acyclovir 800mg BID. No need for letermovir as donor and  recipient are CMV negative.      Monitoring:   - CMV weekly, neg 9/7  - EBV every 2 weeks from D30 to D180     GI/NUTRITION  # GERD: Protonix  - Anti-emetics: Compazine daily at 1600 and PRN. lorazepam available PRN.   - Ulcer prophy: Protonix  - VOD prophy: Ursodiol   - Dietician support to prevent malnutrition    CARDIOVASCULAR  # HTN- PTA lisinopril stopped 8/26 d/t symptomatic orthostasis.   # CAD: Coronary artery calcifications seen on CT, stress test in 2023 negative  - Risk of cardiomyopathy: Baseline EF 55-60%.   - Risk of arrhythmia: Baseline EKG showed 421       RENAL/ELECTROLYTES/  - Electrolyte management: replace per sliding scale  - BPH: Continue PTA flomax.  - Hemorrhagic Cystitis secondary to cytoxan: urinary urge/frequency/dysuria: UA with +nitrates, mod blood, >182 RBC. UC neg. Urine BK neg. BK blood neg. Pyridium not helpful. Ditropan 2.5mg bid (also on Flomax). Adeno urine pending. Symptoms starting to improve--no noted blood or clots per Leland.       MUSCULOSKELETAL/FRAILTY  - Baseline Frailty Score: Not frail (0)  - Daily PT/OT as needed while inpatient  - Gout: continue allopurinol      Neuro   - Pt has history of spinal stenosis, lumbosacral radiculopathy likely exacerbating.   - Pain management: Outpatient had been taking celecoxib for MSK pain, once a day. Stopped Inpatient will try Tramadol hasn't used. Now discontinued.  Added robaxin TID this admission.       SOCIAL DETERMINANTS  - Caregiver: wife, Vani. Lives within the required distance from hospital but may elect to stay in [place].   - Financial/insurance concerns: None    Medically Ready for Discharge: Anticipated in 2-4 Days; Possibly Sunday/Monday pending further engraftment, fever workup, improvement in  sx. Likely planning izzy in clinic 3x a week and will finish up a course of rocephin daily through 9/20. They are on wait list for [facility] (live in [place] but might prefer closer lodging early post BMT); will prefer  "appts ~10am when possible.      Clinically Significant Risk Factors          # Hypocalcemia: Lowest Ca = 8.5 mg/dL in last 2 days, will monitor and replace as appropriate     # Hypoalbuminemia: Lowest albumin = 2.8 g/dL at 9/7/2024  7:13 AM, will monitor as appropriate   # Thrombocytopenia: Lowest platelets = 14 in last 2 days, will monitor for bleeding   # Hypertension: Noted on problem list           # Overweight: Estimated body mass index is 25.31 kg/m  as calculated from the following:    Height as of this encounter: 1.84 m (6' 0.44\").    Weight as of this encounter: 85.7 kg (188 lb 14.4 oz).            I spent 40 minutes in the care of this patient today, which included time necessary for preparation for the visit, obtaining history, ordering medications/tests/procedures as medically indicated, review of pertinent medical literature, counseling of the patient, communication of recommendations to the care team, and documentation time.    Eunice Spicer PA-C  [phone]  "

## 2024-09-12 NOTE — PROGRESS NOTES
RT responded to code sepsis. No respiratory interventions needed.    Samantha Bernal, RT on 9/12/2024 at 3:00 PM

## 2024-09-12 NOTE — PLAN OF CARE
"BP (!) 150/79 (BP Location: Left arm)   Pulse 101   Temp (!) 102.7  F (39.3  C) (Oral)   Resp 18   Ht 1.84 m (6' 0.44\")   Wt 86.8 kg (191 lb 6.4 oz)   SpO2 98%   BMI 25.64 kg/m      Tmax 102.7, tachy 110s, and slightly elevated bp within parameters, OVSS. No complaints of pain, nausea, or shortness of breath. Urine is pale yellow, no blood clots. Urinary urgency and frequency decreased.   1900 - lactic 1.6 and blood cultures drawn.   No blood products needed, phos replacement started.   Purple lumen heparin locked and red lumen running phos.           Problem: Adult Inpatient Plan of Care  Goal: Plan of Care Review  Description: The Plan of Care Review/Shift note should be completed every shift.  The Outcome Evaluation is a brief statement about your assessment that the patient is improving, declining, or no change.  This information will be displayed automatically on your shift  note.  Outcome: Progressing  Flowsheets (Taken 9/12/2024 0246)  Plan of Care Reviewed With: patient  Overall Patient Progress: no change  Goal: Patient-Specific Goal (Individualized)  Description: You can add care plan individualizations to a care plan. Examples of Individualization might be:  \"Parent requests to be called daily at 9am for status\", \"I have a hard time hearing out of my right ear\", or \"Do not touch me to wake me up as it startles  me\".  Outcome: Progressing  Goal: Absence of Hospital-Acquired Illness or Injury  Outcome: Progressing  Intervention: Identify and Manage Fall Risk  Recent Flowsheet Documentation  Taken 9/12/2024 0137 by Imtiaz Fairbanks, RN  Safety Promotion/Fall Prevention: safety round/check completed  Taken 9/11/2024 2300 by Imtiaz Fairbanks, RN  Safety Promotion/Fall Prevention:   safety round/check completed   room organization consistent   nonskid shoes/slippers when out of bed   patient and family education   assistive device/personal items within reach   clutter free environment maintained  Taken 9/11/2024 " 2015 by Imtiaz Fairbanks RN  Safety Promotion/Fall Prevention: safety round/check completed  Taken 9/11/2024 1940 by Imtiaz Fairbanks RN  Safety Promotion/Fall Prevention:   nonskid shoes/slippers when out of bed   safety round/check completed   assistive device/personal items within reach   clutter free environment maintained   room organization consistent  Intervention: Prevent Skin Injury  Recent Flowsheet Documentation  Taken 9/11/2024 1940 by Imtiaz Fairbanks RN  Body Position: position changed independently  Skin Protection:   adhesive use limited   transparent dressing maintained  Device Skin Pressure Protection:   adhesive use limited   tubing/devices free from skin contact  Intervention: Prevent and Manage VTE (Venous Thromboembolism) Risk  Recent Flowsheet Documentation  Taken 9/11/2024 1940 by Imtiaz Fairbanks RN  VTE Prevention/Management: SCDs off (sequential compression devices)  Intervention: Prevent Infection  Recent Flowsheet Documentation  Taken 9/11/2024 2300 by Imtiaz Fairbanks RN  Infection Prevention:   cohorting utilized   environmental surveillance performed   equipment surfaces disinfected   hand hygiene promoted   personal protective equipment utilized   rest/sleep promoted   single patient room provided   visitors restricted/screened  Taken 9/11/2024 1940 by Imtiaz Fairbanks RN  Infection Prevention:   cohorting utilized   environmental surveillance performed   equipment surfaces disinfected   hand hygiene promoted   personal protective equipment utilized   rest/sleep promoted   single patient room provided   visitors restricted/screened  Goal: Optimal Comfort and Wellbeing  Outcome: Progressing  Goal: Readiness for Transition of Care  Outcome: Progressing     Problem: Risk for Delirium  Goal: Optimal Coping  Outcome: Progressing  Intervention: Optimize Psychosocial Adjustment to Delirium  Recent Flowsheet Documentation  Taken 9/11/2024 1940 by Imtiaz Fairbanks RN  Supportive Measures:   active listening  utilized   self-care encouraged  Goal: Improved Sleep  Outcome: Progressing     Problem: Stem Cell/Bone Marrow Transplant  Goal: Optimal Coping with Transplant  Outcome: Progressing  Intervention: Optimize Patient/Family Adjustment to Transplant  Recent Flowsheet Documentation  Taken 9/11/2024 1940 by Imtiaz Fairbanks RN  Supportive Measures:   active listening utilized   self-care encouraged  Goal: Symptom-Free Urinary Elimination  Outcome: Progressing  Intervention: Prevent or Manage Bladder Irritation  Recent Flowsheet Documentation  Taken 9/11/2024 1940 by Imtiaz Fairbanks RN  Urinary Elimination Promotion: voiding relaxation promoted  Hyperhydration Management: fluids provided  Goal: Diarrhea Symptom Control  Outcome: Progressing  Intervention: Manage Diarrhea  Recent Flowsheet Documentation  Taken 9/11/2024 1940 by Imtiaz Fairbanks RN  Skin Protection:   adhesive use limited   transparent dressing maintained  Fluid/Electrolyte Management: fluids provided  Perineal Care: perineal hygiene encouraged  Goal: Improved Activity Tolerance  Outcome: Progressing  Intervention: Promote Improved Energy  Recent Flowsheet Documentation  Taken 9/11/2024 1940 by Imtiaz Fairbanks RN  Fatigue Management:   activity schedule adjusted   fatigue-related activity identified   frequent rest breaks encouraged  Activity Management: up ad sergo  Environmental Support: calm environment promoted  Goal: Blood Counts Within Acceptable Range  Outcome: Progressing  Intervention: Monitor and Manage Hematologic Symptoms  Recent Flowsheet Documentation  Taken 9/11/2024 1940 by Imtiaz Fairbanks RN  Bleeding Precautions:   monitored for signs of bleeding   gentle oral care promoted  Medication Review/Management: medications reviewed  Goal: Absence of Hypersensitivity Reaction  Outcome: Progressing  Goal: Absence of Infection  Outcome: Progressing  Intervention: Prevent and Manage Infection  Recent Flowsheet Documentation  Taken 9/11/2024 2300 by Imtiaz Fairbanks  RN  Infection Prevention:   cohorting utilized   environmental surveillance performed   equipment surfaces disinfected   hand hygiene promoted   personal protective equipment utilized   rest/sleep promoted   single patient room provided   visitors restricted/screened  Isolation Precautions: protective environment maintained  Taken 9/11/2024 1940 by Imtiaz Fairbanks RN  Infection Prevention:   cohorting utilized   environmental surveillance performed   equipment surfaces disinfected   hand hygiene promoted   personal protective equipment utilized   rest/sleep promoted   single patient room provided   visitors restricted/screened  Infection Management: aseptic technique maintained  Isolation Precautions: protective environment maintained  Goal: Improved Oral Mucous Membrane Health and Integrity  Outcome: Progressing  Intervention: Promote Oral Comfort and Health  Recent Flowsheet Documentation  Taken 9/11/2024 1940 by Imtiaz Fairbanks RN  Oral Mucous Membrane Protection: nonirritating oral fluids promoted  Oral Care: oral rinse provided  Goal: Nausea and Vomiting Symptom Relief  Outcome: Progressing  Goal: Optimal Nutrition Intake  Outcome: Progressing  Intervention: Minimize and Manage Barriers to Oral Intake  Recent Flowsheet Documentation  Taken 9/11/2024 1940 by Imtiaz Fairbanks RN  Oral Nutrition Promotion: rest periods promoted     Goal Outcome Evaluation:      Plan of Care Reviewed With: patient    Overall Patient Progress: no change

## 2024-09-12 NOTE — PROGRESS NOTES
[facility]  Transplant Infectious Disease Progress Note     Patient:  Leland Pearson, Date of birth 1955, Medical record number [id number]  Date of Visit:  09/12/2024  Consult requested by Dr. Timothy Skaggs for evaluation of persistent neutropenic fevers         Assessment and Recommendations:   Recommendations:  Continue IV Cefepime for strep mitis bacteremia and recent fever  Awaiting results for blood culture 9/12, adenovirus urine PCR, adenovirus blood PCR, EBV PCR  If patient spikes another fever, agree with pursuing repeat imaging  Continue prophylaxis with acyclovir (viral) and micafungin (fungal)    Case discussed with transplant ID attending, Dr. Manriquez  Thank you very much for this consultation. Transplant Infectious Disease will continue to follow with you.    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  Initial evaluation reveals Streptococcus mitis bacteremia from central venous catheter as well as single culture of MRSE from peripheral blood.  Patient was started on vancomycin and cefepime but has been persistently febrile since 9/2/2024.  Additional evaluation includes negative chest x-ray, negative urinalysis, negative C. difficile PCR.  Etiology of fever was determined to be secondary to strep mitis bacteremia.  Patient had central venous catheter removed on 9/6 and subsequently defervesced on 9/7 and remained fever free until 9/11.  Patient with new fever on 9/11 and has also been experiencing increased urinary frequency with dysuria for the last several days.  Investigation including urine culture and BK virus so far unrevealing.  Given new fever and urinary symptoms, awaiting adenovirus PCR as well as repeat blood cultures.    Micheal Lucero MD  Infectious Disease Fellow  Most easily reached on Rayn  Pager #[phone]         Infectious Disease Issues:    Persistent neutropenic fevers  Streptococcus mitis/oralis bacteremia, isolated from CVC  Patient with persistent neutropenic fevers beginning on 9/2/2024 and continuing to current.  Fevers have been quite high upwards of 103 and occurring multiple times daily.  Workup thus far has been positive for isolation of Streptococcus mitis and Staphylococcus epidermidis and blood culture from 9/2/2024.  Repeat blood cultures have been negative to date.  Additional workup including chest x-ray, urinalysis, and C. difficile have all been unremarkable/negative.  Patient has been on appropriate antibiotic therapy with vancomycin and cefepime since onset of fevers on 9/2/2024.    Suspect most likely etiology of patient's ongoing fevers is his Streptococcus mitis bacteremia.  I suspect that his MRSE is likely contaminant at this time based on the fact that her only grown 1 of 2 peripheral blood culture bottles and resulted as positive several hours after his strep mitis cultures.  Vancomycin was discontinued on 9/5.  Requested the patient's CVC be removed, which was performed on 9/6.  Patient underwent transthoracic echocardiogram on 9/6 as well with no evidence of valvular vegetations.  Patient had defervesced on 9/7, but now with recurrent fever as of 9/11.  Etiology unclear at this time, with repeat evaluation pending including blood culture, adenovirus PCR, EBV PCR    Dysuria  Increased Urinary Frequency  Hematuria  Patient with increased nocturnal urinary frequency and dysuria for the last 3 days.  Etiology unclear at this time, but given time course in relation to his transplant and use of posttransplant cyclophosphamide this would be concerning for BK virus hemorrhagic cystitis. Urinalysis demonstrates hematuria with greater than 182 RBCs.  Urine culture negative.  BK virus negative and urine PCR and and blood PCR.  Adenovirus can also be associated with hemorrhagic cystitis with PCR pending.    Staphylococcus epidermidis  isolated in single peripheral blood culture bottle  Isolated on peripheral blood culture from 9/2.  Not recovered from blood cultures drawn from central venous catheter on the same day.  These cultures also resulted as positive a number of hours after strep mitis have been isolated from CVC cultures.  I suspect that this is most likely contaminant as outlined above.  Vancomycin discontinued on 9/5.    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  Up to 2.9 today with an ANC of 2600.    Transplant checklist  - QTc interval: 421 (8/5/2024)  - Bacterial coverage: Cefepime  - Pneumocystis prophylaxis: TMP-SMX M,Tu planned to start on 9/23/2024  - Serostatus & viral prophylaxis: CMV D-/R-, EBV+, HSV-.  Acyclovir prophylaxis  - Fungal prophylaxis: Micafungin  - Toxo IgM negative,  - Gamma globulin status: Unknown           Interval History:   Patient spiked fever overnight with Tmax of 102.7  States overall he is feeling better today been previously  Dysuria has improved significantly, but still having increased urinary frequency  Otherwise denies headache, neck pain, neck stiffness, chest pain, cough, shortness of breath, abdominal pain, diarrhea, nausea, vomiting, or weakness    Past Medical History:   Diagnosis Date    Gastroesophageal reflux disease     Hypertension        Past Surgical History:   Procedure Laterality Date    COLONOSCOPY      ESOPHAGOSCOPY, GASTROSCOPY, DUODENOSCOPY (EGD), COMBINED  07/28/2013    Procedure: COMBINED ESOPHAGOSCOPY, GASTROSCOPY, DUODENOSCOPY (EGD), BIOPSY SINGLE OR MULTIPLE;;  Surgeon: Franklin Barrera MD;  Location: [facility]    ESOPHAGOSCOPY, GASTROSCOPY, DUODENOSCOPY (EGD), COMBINED  07/28/2013    Procedure: COMBINED ESOPHAGOSCOPY, GASTROSCOPY, DUODENOSCOPY (EGD), REMOVE FOREIGN BODY;;  Surgeon: Franklin Barrrea MD;  Location: [facility]    IR CVC TUNNEL PLACEMENT > 5 YRS OF AGE  08/16/2024    IR CVC TUNNEL REMOVAL RIGHT   09/06/2024    ORTHOPEDIC SURGERY      04 micro discectomy, acl  repair    PICC DOUBLE LUMEN PLACEMENT Right 09/09/2024    Right basilic vein 49cm total 5cm external.       Family History   Problem Relation Age of Onset    No Known Problems Brother     No Known Problems Brother     No Known Problems Brother     No Known Problems Brother     Diabetes Brother     No Known Problems Sister     No Known Problems Daughter     No Known Problems Daughter        Social History     Social History Narrative    Not on file     Social History     Tobacco Use    Smoking status: Never     Passive exposure: Never    Smokeless tobacco: Never   Substance Use Topics    Alcohol use: Yes     Comment: socially    Drug use: No       Immunization History   Administered Date(s) Administered    COVID-19 12+ (Pfizer) 10/11/2023    COVID-19 Bivalent 18+ (Moderna) 10/03/2022    COVID-19 Monovalent 18+ (Moderna) 02/04/2021, 03/04/2021, 10/25/2021, 04/08/2022    Influenza Vaccine 18-64 (Flublok) 11/12/2019    Influenza Vaccine 65+ (FLUAD) 10/11/2023    Influenza Vaccine 65+ (Fluzone HD) 10/08/2021, 10/06/2022    Pneumo Conj 13-V (2010&after) 03/25/2019    Pneumococcal 23 valent 04/30/2020    RSV Vaccine (Arexvy) 10/25/2023    TDAP (Adacel,Boostrix) 07/14/2021    Td (Adult), Adsorbed 03/05/2003    Zoster recombinant adjuvanted (SHINGRIX) 10/29/2018, 01/01/2019, 01/21/2019    Zoster vaccine, live 01/01/2015       Patient Active Problem List   Diagnosis    MDS (myelodysplastic syndrome) (H)    Pre-operative cardiovascular examination    Benign essential hypertension    Infection due to Streptococcus mitis group    Nocturia    Administration of long-term prophylactic antibiotics    History of peripheral stem cell transplant (H)            Current Medications & Allergies:     Current Facility-Administered Medications   Medication Dose Route Frequency Provider Last Rate Last Admin    acyclovir (ZOVIRAX) tablet 800 mg  800 mg Oral BID Aziza Mendieta,  IFEANYI CNP   800 mg at 09/12/24 0728    allopurinol (ZYLOPRIM) tablet 300 mg  300 mg Oral Daily Kaitlyn Aguilar NP   300 mg at 09/12/24 0728    artificial saliva (BIOTENE MT) solution 2 spray  2 spray Swish & Spit 4x Daily Bessie Lazo PA-C   2 spray at 09/12/24 0729    calcium carbonate-vitamin D (CALTRATE) 600-10 MG-MCG per tablet 2 tablet  2 tablet Oral BID w/meals Bessie Lazo PA-C   2 tablet at 09/12/24 0728    ceFEPIme (MAXIPIME) 2 g vial to attach to  mL bag for ADULTS or NS 50 mL bag for PEDS  2 g Intravenous Q8H Meri Viera  mL/hr at 09/05/24 0305 2 g at 09/12/24 1121    filgrastim-aafi (NIVESTYM) injection 480 mcg  480 mcg Subcutaneous Daily at 8 pm Markus Mendez MD   480 mcg at 09/11/24 1946    heparin lock flush 10 unit/mL injection 5-20 mL  5-20 mL Intracatheter Q24H Bernadette Green PA-C   5 mL at 09/12/24 1559    heparin lock flush 10 unit/mL injection 5-20 mL  5-20 mL Intracatheter Q24H Markus Mendez MD   5 mL at 09/10/24 0515    methocarbamol (ROBAXIN) tablet 500 mg  500 mg Oral TID Aziza Mendieta APRN CNP   500 mg at 09/12/24 1305    micafungin (MYCAMINE) 150 mg in sodium chloride 0.9 % 100 mL intermittent infusion  150 mg Intravenous Daily Kaitlyn Aguilar  mL/hr at 09/12/24 1004 150 mg at 09/12/24 1004    mycophenolate (GENERIC EQUIVALENT) capsule 1,250 mg  1,250 mg Oral Q12H Count includes the [facility] (08/20) Eunice Spicer PA-C   1,250 mg at 09/12/24 1004    oxyBUTYnin (DITROPAN) half-tab 2.5 mg  2.5 mg Oral BID Bernadette Green PA-C   2.5 mg at 09/12/24 0728    pantoprazole (PROTONIX) EC tablet 40 mg  40 mg Oral Daily Bessie Lazo PA-C   40 mg at 09/12/24 0729    prochlorperazine (COMPAZINE) tablet 5 mg  5 mg Oral Daily Eunice Spicer PA-C   5 mg at 09/12/24 1559    psyllium (METAMUCIL/KONSYL) capsule 3 capsule  3 capsule Oral Daily Bessie Lazo PA-C   3 capsule at 09/12/24 0728    sirolimus (GENERIC EQUIVALENT) tablet 3.5 mg  3.5 mg Oral  Daily Bernadette Green PA-C   3.5 mg at 09/12/24 0858    sodium chloride (PF) 0.9% PF flush 3 mL  3 mL Intracatheter Q8H Roseline Strong PA-C   3 mL at 09/1955    sodium chloride (PF) 0.9% PF flush 3 mL  3 mL Intracatheter Q8H Yaa Baer MD   3 mL at 09/09/24 1722    sodium chloride (PF) 0.9% PF flush 3 mL  3 mL Intracatheter Q8H Bessie Lazo PA-C   3 mL at 09/10/24 0732    [START ON 9/23/2024] sulfamethoxazole-trimethoprim (BACTRIM DS) 800-160 MG per tablet 1 tablet  1 tablet Oral Q Mon Tues BID Kaitlyn Aguilar, NP        tamsulosin (FLOMAX) capsule 0.4 mg  0.4 mg Oral Daily Bessie Lazo PA-C   0.4 mg at 09/11/24 1752    ursodiol (ACTIGALL) capsule 300 mg  300 mg Oral TID Markus Mendez MD   300 mg at 09/12/24 1306       Infusions/Drips:    Current Facility-Administered Medications   Medication Dose Route Frequency Provider Last Rate Last Admin       No Known Allergies         Physical Exam:   Patient Vitals for the past 24 hrs:   BP Temp Temp src Resp SpO2 Weight   09/12/24 1556 132/76 99.2  F (37.3  C) Oral 16 98 % --   09/12/24 1453 127/81 100.4  F (38  C) Oral 16 -- --   09/12/24 1419 (!) 145/84 99  F (37.2  C) Oral 16 94 % --   09/12/24 1304 134/78 99.5  F (37.5  C) Oral 16 96 % --   09/12/24 0733 121/77 98  F (36.7  C) Oral 16 95 % 85.7 kg (188 lb 14.4 oz)   09/12/24 0442 -- 98.8  F (37.1  C) Oral -- -- --   09/12/24 0306 (!) 150/79 (!) 102.7  F (39.3  C) Oral 18 98 % --   09/11/24 2300 132/74 98.1  F (36.7  C) Oral 18 96 % --   09/11/24 1940 (!) 146/79 98.8  F (37.1  C) Oral 16 100 % --   09/11/24 1909 137/75 (!) 101.3  F (38.5  C) Oral 18 96 % --   09/11/24 1800 -- 100.1  F (37.8  C) Oral -- -- --     Ranges for vital signs:  Temp:  [98  F (36.7  C)-102.7  F (39.3  C)] 99.2  F (37.3  C)  Resp:  [16-18] 16  BP: (121-150)/(74-84) 132/76  SpO2:  [94 %-100 %] 98 %  Vitals:    09/10/24 0748 09/11/24 0741 09/12/24 0733   Weight: 86.5 kg (190 lb 9.6 oz) 86.8 kg (191 lb 6.4 oz) 85.7 kg  (188 lb 14.4 oz)       Physical Examination:   GENERAL: Lying in bed, no acute distress.  Nontoxic-appearing  HEAD:  Head is normocephalic, atraumatic   ENT:  Oropharynx is moist.  LUNGS:  Clear to auscultation bilateral.   CARDIOVASCULAR:  Regular rate and rhythm with no murmur  ABDOMEN:  Normal bowel sounds, soft, nontender.  No suprapubic tenderness or flank pain  SKIN: PICC in right upper extremity.  Not erythematous, nontender, and without drainage.  NEUROLOGIC:  Grossly nonfocal. Active x4 extremities         Laboratory Data:       Metabolic Studies       Recent Labs   Lab Test 09/12/24  1415 09/12/24  0321 09/11/24  1906 09/11/24  0415 09/07/24  1103 09/07/24  0713   NA  --  133*  --  134*   < > 133*   POTASSIUM  --  4.0  --  4.0   < > 4.0   CHLORIDE  --  100  --  99   < > 100   CO2  --  25  --  25   < > 22   ANIONGAP  --  8  --  10   < > 11   BUN  --  14.3  --  12.8   < > 9.8   CR  --  0.64*  --  0.62*   < > 0.63*   GFRESTIMATED  --  >90  --  >90   < > >90   GLC  --  134*  --  118*   < > 120*   HERBERT  --  8.7*  --  8.5*   < > 8.3*   PHOS  --  2.0*  --  2.0*   < >  --    MAG  --  2.1  --  2.0   < > 1.8   LACT 2.1*  --  1.2  --    < >  --    PCAL  --   --   --   --   --  0.37    < > = values in this interval not displayed.       Hepatic Studies    Recent Labs   Lab Test 09/12/24  0321 09/09/24  0615 05/15/24  1339 05/08/24  1156   BILITOTAL 0.5 0.5   < > 0.6   DBIL  --  0.24   < >  --    ALKPHOS 70 58   < > 82   PROTTOTAL 6.5 6.3*   < > 7.6   ALBUMIN 3.4* 3.2*   < > 4.1   AST 25 25   < > 43   ALT 16 19   < > 21   LDH  --   --   --  613*    < > = values in this interval not displayed.       Gout Labs      Recent Labs   Lab Test 08/16/24  0920 08/09/24  0827 08/05/24  1200 07/29/24  0811 07/01/24  1208   URIC 4.2 3.9 4.1 4.5 4.1       Hematology Studies   Recent Labs   Lab Test 09/12/24  0321 09/11/24  0415 09/10/24  0420 09/09/24  0615 08/20/24  0418 08/19/24  0327 08/18/24  0424 05/22/24  0838 05/21/24  1016  "  WBC 2.9* 1.4* 0.8* 0.4*   < > 1.3* 1.3*   < > 4.0   ABLA  --   --   --   --   --   --   --   --  0.4*   BLST  --   --   --   --   --   --   --   --  9   ANEU 2.6 1.3* 0.8*  --    < >  --   --    < > 0.6*   ANEUTAUTO  --   --   --   --   --  1.1* 0.8*   < >  --    ALYM 0.0* 0.0* 0.0*  --    < >  --   --    < > 2.0   ALYMPAUTO  --   --   --   --   --  0.1* 0.4*   < >  --    DAVIAN 0.2 0.1 0.0  --    < >  --   --    < > 0.5   AMONOAUTO  --   --   --   --   --  0.1 0.2   < >  --    AEOS 0.0 0.0 0.0  --    < >  --   --    < > 0.0   AEOSAUTO  --   --   --   --   --  0.0 0.0   < >  --    ABSBASO  --   --   --   --   --  0.0 0.0   < >  --    HGB 8.4* 7.0* 7.8* 7.9*   < > 10.1* 10.1*   < > 12.9*   HCT 24.7* 21.3* 22.8* 23.2*   < > 29.6* 28.3*   < > 38.4*   PLT 27* 14* 28* 19*   < > 46* 16*   < > 72*    < > = values in this interval not displayed.       Clotting Studies    Recent Labs   Lab Test 09/09/24  0615 09/02/24  0444 08/26/24  0407 08/16/24  0920   INR 1.06 1.06 0.99 0.93   PTT  --   --   --  28       Iron Testing    Recent Labs   Lab Test 09/12/24  0321 08/02/24  0919 08/01/24  0805   ZARIA  --   --  460*   MCV 79   < > 84    < > = values in this interval not displayed.       Urine Studies     Recent Labs   Lab Test 09/09/24  1900 09/07/24  1230 09/02/24  0454 08/05/24  1200   URINEPH 7.0 6.0 7.0 6.5   NITRITE Positive* Negative Negative Negative   LEUKEST Negative Negative Negative Negative   WBCU 1 1 2 <1       Medication levels    Recent Labs   Lab Test 09/12/24  0730 09/07/24  0713 09/06/24  0337   VANCOMYCIN  --   --  10.3   RAPAMY 9.6   < >  --     < > = values in this interval not displayed.       CSF testing   No lab results found.    Invalid input(s): \"CADAM\", \"EVPCR\", \"ENTPCR\", \"ENTEROVIRUS\"    Body fluid stats  No lab results found.    Microbiology:  Fungal testing  No lab results found.    Invalid input(s): \"HIFUN\", \"FUNGL\"    Last Culture results   Culture   Date Value Ref Range Status   09/11/2024 No " growth after 12 hours  Preliminary   09/09/2024 No Growth  Final   09/07/2024 No Growth  Final   09/05/2024 No Growth  Final   09/04/2024 No Growth  Final   09/04/2024 No Growth  Final   09/03/2024 No Growth  Final   09/03/2024 No Growth  Final   09/02/2024 Positive on the 1st day of incubation (A)  Preliminary   09/02/2024 Staphylococcus epidermidis (AA)  Preliminary     Comment:     1 of 2 bottles  The recovery of this organism from a single blood culture bottle most likely represents contamination. If susceptibility testing is needed, please refer to the antibiogram or contact [facility].   09/02/2024 Positive on the 1st day of incubation (A)  Preliminary   09/02/2024 Streptococcus mitis (AA)  Preliminary     Comment:     2 of 2 bottles     09/02/2024 Positive on the 1st day of incubation (A)  Preliminary   09/02/2024 Streptococcus mitis (AA)  Preliminary     Comment:     2 of 2 bottles    Susceptibilities done on previous cultures   08/16/2024   Final    No Vancomycin Resistant Enterococcus species isolated           Last checks of Clostridioides difficile testing  Recent Labs   Lab Test 09/03/24  1140 08/17/24  0906   CDBPCT Negative Negative       Syphilis Testing    Treponema Antibody Total   Date Value Ref Range Status   08/05/2024 Nonreactive Nonreactive Final       Quantiferon testing   Recent Labs   Lab Test 09/12/24  0321 09/11/24  0415   LYMPH 1 2       Viral loads    Recent Labs   Lab Test 09/07/24  1103 08/31/24  1453 08/17/24  1621 06/09/24  1124   CMVQNT Not Detected Not Detected   < >  --    HSDNA1  --   --   --  Not Detected   HSDNA2  --   --   --  Not Detected    < > = values in this interval not displayed.       CMV viral loads    Recent Labs   Lab Test 09/07/24  1103 08/31/24  1453 08/24/24  1416 08/17/24  1621   CMVQNT Not Detected Not Detected Not Detected Not Detected       Hepatitis B Testing     Recent Labs   Lab Test 08/05/24  1200   AUSAB 89.10   HBCAB Nonreactive   HEPBANG Nonreactive         Hepatitis C Antibody   Date Value Ref Range Status   08/05/2024 Nonreactive Nonreactive Final     Comment:     A nonreactive screening test result does not exclude the possibility of exposure to or infection with HCV. Nonreactive screening test results in individuals with prior exposure to HCV may be due to antibody levels below the limit of detection of this assay or lack of reactivity to the HCV antigens used in this assay. Patients with recent HCV infections (<3 months from time of exposure) may have false-negative HCV antibody results due to the time needed for seroconversion (average of 8 to 9 weeks).       CMV Antibody IgG   Date Value Ref Range Status   08/05/2024 No detectable antibody. No detectable antibody.  Final   06/11/2024 No detectable antibody. No detectable antibody.  Final     Varicella Zoster Antibody IgG   Date Value Ref Range Status   08/05/2024 Positive  Final     Comment:     Suggests previous exposure or immunization and probable immunity.     EBV Capsid Antibody IgG   Date Value Ref Range Status   08/05/2024 Positive (A) No detectable antibody. Final     Comment:     Suggests recent or past exposure.     Herpes Simplex Virus Type 1 IgG Antibody   Date Value Ref Range Status   08/05/2024 No HSV-1 IgG antibodies detected. No HSV-1 IgG antibodies detected Final     Herpes Simplex Virus Type 2 IgG Antibody   Date Value Ref Range Status   08/05/2024 No HSV-2 IgG antibodies detected. No HSV-2 IgG antibodies detected Final

## 2024-09-12 NOTE — PROGRESS NOTES
BMT CLINICAL SOCIAL WORK NOTE:    Focus: Supportive Counseling    Data: Pt is a 69 year old male currently day +20 s/p allo BMT    Interventions: Clinical  (CSW) met with Pt to assess coping, provide supportive counseling and assist with resources as needed. Also in the pt's room today was his wife Vani.  Pt shared that he is feeling better over the last couple days, but still dealing with fevers. Vani shared that they have decided to return home at discharge vs stay local. She is aware to reach out should something change, but she does still prefer to have clinic appts later in the morning to avoid rush hr as driving is still a worry for her. CSW provided empathic listening, validation of concerns, and encouragement. CSW encouraged Pt to contact CSW for support, questions and/or resources.     Assessment: Pt presented as friendly and open.  Pt appears to be coping well at this time. Pt continues to be supported by his wife and family.     Plan: CSW will continue to provide supportive counseling and assistance with resources as needed. CSW will continue to collaborate with multidisciplinary team regarding Pt's plan of care.     KAMERON Coelho, [facility] Regional HospitalSW  [facility]  Phone: [phone]  Vocera- BMT SW 3

## 2024-09-12 NOTE — PROVIDER NOTIFICATION
MD notified - Christiana Ramirez 2006  - pt trigger lactic and blood cultures for temp, got both complete waiting for peripheral blood cultures, lactic 1.6.    Per MD - continue with cefepime and micafungin unless he has other changes

## 2024-09-13 LAB
ABO/RH(D): NORMAL
ALBUMIN UR-MCNC: 100 MG/DL
ANION GAP SERPL CALCULATED.3IONS-SCNC: 9 MMOL/L (ref 7–15)
ANTIBODY SCREEN: NEGATIVE
APPEARANCE UR: CLEAR
BASOPHILS # BLD AUTO: 0 10E3/UL (ref 0–0.2)
BASOPHILS NFR BLD AUTO: 1 %
BILIRUB UR QL STRIP: NEGATIVE
BLD PROD TYP BPU: NORMAL
BLOOD COMPONENT TYPE: NORMAL
BUN SERPL-MCNC: 15.2 MG/DL (ref 8–23)
CALCIUM SERPL-MCNC: 8.6 MG/DL (ref 8.8–10.4)
CHLORIDE SERPL-SCNC: 101 MMOL/L (ref 98–107)
CODING SYSTEM: NORMAL
COLOR UR AUTO: YELLOW
CREAT SERPL-MCNC: 0.64 MG/DL (ref 0.67–1.17)
EGFRCR SERPLBLD CKD-EPI 2021: >90 ML/MIN/1.73M2
EOSINOPHIL # BLD AUTO: 0 10E3/UL (ref 0–0.7)
EOSINOPHIL NFR BLD AUTO: 0 %
ERYTHROCYTE [DISTWIDTH] IN BLOOD BY AUTOMATED COUNT: 16.5 % (ref 10–15)
GLUCOSE SERPL-MCNC: 119 MG/DL (ref 70–99)
GLUCOSE UR STRIP-MCNC: NEGATIVE MG/DL
HADV DNA # SPEC NAA+PROBE: NOT DETECTED COPIES/ML
HADV DNA # SPEC NAA+PROBE: NOT DETECTED COPIES/ML
HCO3 SERPL-SCNC: 25 MMOL/L (ref 22–29)
HCT VFR BLD AUTO: 23.4 % (ref 40–53)
HGB BLD-MCNC: 7.8 G/DL (ref 13.3–17.7)
HGB UR QL STRIP: ABNORMAL
HYALINE CASTS: 1 /LPF
IMM GRANULOCYTES # BLD: 0.1 10E3/UL
IMM GRANULOCYTES NFR BLD: 2 %
INTERPRETATION: NORMAL
ISSUE DATE AND TIME: NORMAL
KETONES UR STRIP-MCNC: NEGATIVE MG/DL
LAB DIRECTOR DISCLAIMER: NORMAL
LAB DIRECTOR INTERPRETATION: NORMAL
LAB DIRECTOR METHODOLOGY: NORMAL
LAB DIRECTOR RESULTS: NORMAL
LACTATE SERPL-SCNC: 1.2 MMOL/L (ref 0.7–2)
LEUKOCYTE ESTERASE UR QL STRIP: ABNORMAL
LOCATION OF TASK: NORMAL
LYMPHOCYTES # BLD AUTO: 0 10E3/UL (ref 0.8–5.3)
LYMPHOCYTES NFR BLD AUTO: 0 %
MAGNESIUM SERPL-MCNC: 2.1 MG/DL (ref 1.7–2.3)
MCH RBC QN AUTO: 27 PG (ref 26.5–33)
MCHC RBC AUTO-ENTMCNC: 33.3 G/DL (ref 31.5–36.5)
MCV RBC AUTO: 81 FL (ref 78–100)
MONOCYTES # BLD AUTO: 0.3 10E3/UL (ref 0–1.3)
MONOCYTES NFR BLD AUTO: 8 %
MUCOUS THREADS #/AREA URNS LPF: PRESENT /LPF
NEUTROPHILS # BLD AUTO: 2.9 10E3/UL (ref 1.6–8.3)
NEUTROPHILS NFR BLD AUTO: 89 %
NITRATE UR QL: NEGATIVE
NRBC # BLD AUTO: 0 10E3/UL
NRBC BLD AUTO-RTO: 0 /100
PATH REPORT.COMMENTS IMP SPEC: NORMAL
PATH REPORT.FINAL DX SPEC: NORMAL
PATH REPORT.MICROSCOPIC SPEC OTHER STN: NORMAL
PATH REPORT.RELEVANT HX SPEC: NORMAL
PH UR STRIP: 6 [PH] (ref 5–7)
PHOSPHATE SERPL-MCNC: 2.3 MG/DL (ref 2.5–4.5)
PLATELET # BLD AUTO: 19 10E3/UL (ref 150–450)
POTASSIUM SERPL-SCNC: 3.9 MMOL/L (ref 3.4–5.3)
RBC # BLD AUTO: 2.89 10E6/UL (ref 4.4–5.9)
RBC URINE: 112 /HPF
SIGNIFICANT RESULTS: NORMAL
SODIUM SERPL-SCNC: 135 MMOL/L (ref 135–145)
SP GR UR STRIP: 1.03 (ref 1–1.03)
SPECIMEN DESCRIPTION: NORMAL
SPECIMEN DESCRIPTION: NORMAL
SPECIMEN EXPIRATION DATE: NORMAL
TEST DETAILS, MDL: NORMAL
UNIT ABO/RH: NORMAL
UNIT NUMBER: NORMAL
UNIT STATUS: NORMAL
UNIT TYPE ISBT: 600
UROBILINOGEN UR STRIP-MCNC: NORMAL MG/DL
WBC # BLD AUTO: 3.3 10E3/UL (ref 4–11)
WBC URINE: 3 /HPF

## 2024-09-13 PROCEDURE — 250N000013 HC RX MED GY IP 250 OP 250 PS 637: Performed by: MASSAGE THERAPIST

## 2024-09-13 PROCEDURE — 85060 BLOOD SMEAR INTERPRETATION: CPT | Mod: GC | Performed by: PATHOLOGY

## 2024-09-13 PROCEDURE — 250N000012 HC RX MED GY IP 250 OP 636 PS 637: Performed by: PHYSICIAN ASSISTANT

## 2024-09-13 PROCEDURE — 85025 COMPLETE CBC W/AUTO DIFF WBC: CPT

## 2024-09-13 PROCEDURE — 88237 TISSUE CULTURE BONE MARROW: CPT

## 2024-09-13 PROCEDURE — 88342 IMHCHEM/IMCYTCHM 1ST ANTB: CPT | Mod: TC

## 2024-09-13 PROCEDURE — 250N000009 HC RX 250: Performed by: STUDENT IN AN ORGANIZED HEALTH CARE EDUCATION/TRAINING PROGRAM

## 2024-09-13 PROCEDURE — 86900 BLOOD TYPING SEROLOGIC ABO: CPT

## 2024-09-13 PROCEDURE — 88311 DECALCIFY TISSUE: CPT | Mod: TC

## 2024-09-13 PROCEDURE — 250N000013 HC RX MED GY IP 250 OP 250 PS 637

## 2024-09-13 PROCEDURE — 206N000001 HC R&B BMT UMMC

## 2024-09-13 PROCEDURE — 250N000013 HC RX MED GY IP 250 OP 250 PS 637: Performed by: PHYSICIAN ASSISTANT

## 2024-09-13 PROCEDURE — 250N000011 HC RX IP 250 OP 636: Performed by: INTERNAL MEDICINE

## 2024-09-13 PROCEDURE — 38222 DX BONE MARROW BX & ASPIR: CPT | Performed by: PHYSICIAN ASSISTANT

## 2024-09-13 PROCEDURE — 83605 ASSAY OF LACTIC ACID: CPT | Performed by: STUDENT IN AN ORGANIZED HEALTH CARE EDUCATION/TRAINING PROGRAM

## 2024-09-13 PROCEDURE — 81401 MOPATH PROCEDURE LEVEL 2: CPT

## 2024-09-13 PROCEDURE — 81001 URINALYSIS AUTO W/SCOPE: CPT | Performed by: MASSAGE THERAPIST

## 2024-09-13 PROCEDURE — G0452 MOLECULAR PATHOLOGY INTERPR: HCPCS | Mod: 26 | Performed by: STUDENT IN AN ORGANIZED HEALTH CARE EDUCATION/TRAINING PROGRAM

## 2024-09-13 PROCEDURE — 81134 HC MOLEC PATH LEVEL 2 NGS OPNP: CPT

## 2024-09-13 PROCEDURE — 80048 BASIC METABOLIC PNL TOTAL CA: CPT

## 2024-09-13 PROCEDURE — 99222 1ST HOSP IP/OBS MODERATE 55: CPT | Performed by: PHYSICIAN ASSISTANT

## 2024-09-13 PROCEDURE — 84100 ASSAY OF PHOSPHORUS: CPT | Performed by: INTERNAL MEDICINE

## 2024-09-13 PROCEDURE — 88305 TISSUE EXAM BY PATHOLOGIST: CPT | Mod: 26 | Performed by: PATHOLOGY

## 2024-09-13 PROCEDURE — 99233 SBSQ HOSP IP/OBS HIGH 50: CPT | Mod: FS | Performed by: STUDENT IN AN ORGANIZED HEALTH CARE EDUCATION/TRAINING PROGRAM

## 2024-09-13 PROCEDURE — G0452 MOLECULAR PATHOLOGY INTERPR: HCPCS | Mod: 26 | Performed by: PATHOLOGY

## 2024-09-13 PROCEDURE — 258N000003 HC RX IP 258 OP 636

## 2024-09-13 PROCEDURE — 88341 IMHCHEM/IMCYTCHM EA ADD ANTB: CPT | Mod: 26 | Performed by: PATHOLOGY

## 2024-09-13 PROCEDURE — 88342 IMHCHEM/IMCYTCHM 1ST ANTB: CPT | Mod: 26 | Performed by: PATHOLOGY

## 2024-09-13 PROCEDURE — 81175 ASXL1 FULL GENE SEQUENCE: CPT

## 2024-09-13 PROCEDURE — 250N000013 HC RX MED GY IP 250 OP 250 PS 637: Performed by: INTERNAL MEDICINE

## 2024-09-13 PROCEDURE — 87563 M. GENITALIUM AMP PROBE: CPT | Performed by: MASSAGE THERAPIST

## 2024-09-13 PROCEDURE — 88311 DECALCIFY TISSUE: CPT | Mod: 26 | Performed by: PATHOLOGY

## 2024-09-13 PROCEDURE — 81267 CHIMERISM ANAL NO CELL SELEC: CPT

## 2024-09-13 PROCEDURE — 81120 IDH1 COMMON VARIANTS: CPT

## 2024-09-13 PROCEDURE — 88189 FLOWCYTOMETRY/READ 16 & >: CPT | Mod: GC | Performed by: PATHOLOGY

## 2024-09-13 PROCEDURE — 99232 SBSQ HOSP IP/OBS MODERATE 35: CPT | Mod: GC | Performed by: INTERNAL MEDICINE

## 2024-09-13 PROCEDURE — 88185 FLOWCYTOMETRY/TC ADD-ON: CPT

## 2024-09-13 PROCEDURE — 258N000003 HC RX IP 258 OP 636: Performed by: STUDENT IN AN ORGANIZED HEALTH CARE EDUCATION/TRAINING PROGRAM

## 2024-09-13 PROCEDURE — 250N000011 HC RX IP 250 OP 636

## 2024-09-13 PROCEDURE — P9037 PLATE PHERES LEUKOREDU IRRAD: HCPCS

## 2024-09-13 PROCEDURE — 250N000011 HC RX IP 250 OP 636: Performed by: PHYSICIAN ASSISTANT

## 2024-09-13 PROCEDURE — 250N000013 HC RX MED GY IP 250 OP 250 PS 637: Performed by: NURSE PRACTITIONER

## 2024-09-13 PROCEDURE — 83735 ASSAY OF MAGNESIUM: CPT

## 2024-09-13 PROCEDURE — 85097 BONE MARROW INTERPRETATION: CPT | Mod: GC | Performed by: PATHOLOGY

## 2024-09-13 PROCEDURE — 07DR3ZX EXTRACTION OF ILIAC BONE MARROW, PERCUTANEOUS APPROACH, DIAGNOSTIC: ICD-10-PCS | Performed by: PHYSICIAN ASSISTANT

## 2024-09-13 PROCEDURE — 81479 UNLISTED MOLECULAR PATHOLOGY: CPT

## 2024-09-13 RX ORDER — OXYBUTYNIN CHLORIDE 10 MG/1
10 TABLET, EXTENDED RELEASE ORAL AT BEDTIME
Status: DISCONTINUED | OUTPATIENT
Start: 2024-09-13 | End: 2024-09-19 | Stop reason: HOSPADM

## 2024-09-13 RX ORDER — POTASSIUM PHOS IN 0.9 % NACL 15MMOL/250
15 PLASTIC BAG, INJECTION (ML) INTRAVENOUS ONCE
Status: COMPLETED | OUTPATIENT
Start: 2024-09-13 | End: 2024-09-13

## 2024-09-13 RX ORDER — CEFTRIAXONE 2 G/1
2 INJECTION, POWDER, FOR SOLUTION INTRAMUSCULAR; INTRAVENOUS EVERY 24 HOURS
Status: DISCONTINUED | OUTPATIENT
Start: 2024-09-13 | End: 2024-09-15

## 2024-09-13 RX ADMIN — PANTOPRAZOLE SODIUM 40 MG: 40 TABLET, DELAYED RELEASE ORAL at 08:10

## 2024-09-13 RX ADMIN — HEPARIN, PORCINE (PF) 10 UNIT/ML INTRAVENOUS SYRINGE 5 ML: at 06:00

## 2024-09-13 RX ADMIN — MYCOPHENOLATE MOFETIL 1250 MG: 250 CAPSULE ORAL at 20:16

## 2024-09-13 RX ADMIN — OXYBUTYNIN CHLORIDE 10 MG: 10 TABLET, EXTENDED RELEASE ORAL at 22:09

## 2024-09-13 RX ADMIN — URSODIOL 300 MG: 300 CAPSULE ORAL at 20:17

## 2024-09-13 RX ADMIN — ALLOPURINOL 300 MG: 300 TABLET ORAL at 08:10

## 2024-09-13 RX ADMIN — CALCIUM CARBONATE 600 MG (1,500 MG)-VITAMIN D3 400 UNIT TABLET 2 TABLET: at 17:51

## 2024-09-13 RX ADMIN — Medication 2 SPRAY: at 08:20

## 2024-09-13 RX ADMIN — METHOCARBAMOL 500 MG: 500 TABLET ORAL at 20:17

## 2024-09-13 RX ADMIN — CALCIUM CARBONATE 600 MG (1,500 MG)-VITAMIN D3 400 UNIT TABLET 2 TABLET: at 08:10

## 2024-09-13 RX ADMIN — HEPARIN, PORCINE (PF) 10 UNIT/ML INTRAVENOUS SYRINGE 5 ML: at 20:26

## 2024-09-13 RX ADMIN — URSODIOL 300 MG: 300 CAPSULE ORAL at 08:11

## 2024-09-13 RX ADMIN — CEFTRIAXONE SODIUM 2 G: 2 INJECTION, POWDER, FOR SOLUTION INTRAMUSCULAR; INTRAVENOUS at 13:53

## 2024-09-13 RX ADMIN — METHOCARBAMOL 500 MG: 500 TABLET ORAL at 08:10

## 2024-09-13 RX ADMIN — MIDAZOLAM HYDROCHLORIDE 1 MG: 1 INJECTION, SOLUTION INTRAMUSCULAR; INTRAVENOUS at 11:13

## 2024-09-13 RX ADMIN — ACYCLOVIR 800 MG: 800 TABLET ORAL at 08:11

## 2024-09-13 RX ADMIN — TAMSULOSIN HYDROCHLORIDE 0.4 MG: 0.4 CAPSULE ORAL at 17:51

## 2024-09-13 RX ADMIN — MYCOPHENOLATE MOFETIL 1250 MG: 250 CAPSULE ORAL at 08:10

## 2024-09-13 RX ADMIN — Medication 2 SPRAY: at 20:20

## 2024-09-13 RX ADMIN — CEFEPIME HYDROCHLORIDE 2 G: 2 INJECTION, POWDER, FOR SOLUTION INTRAVENOUS at 03:21

## 2024-09-13 RX ADMIN — SIROLIMUS 3.5 MG: 2 TABLET ORAL at 08:10

## 2024-09-13 RX ADMIN — Medication 3 CAPSULE: at 08:10

## 2024-09-13 RX ADMIN — Medication 2 SPRAY: at 17:52

## 2024-09-13 RX ADMIN — URSODIOL 300 MG: 300 CAPSULE ORAL at 13:53

## 2024-09-13 RX ADMIN — METHOCARBAMOL 500 MG: 500 TABLET ORAL at 13:53

## 2024-09-13 RX ADMIN — PROCHLORPERAZINE MALEATE 5 MG: 5 TABLET ORAL at 17:51

## 2024-09-13 RX ADMIN — Medication 2.5 MG: at 08:10

## 2024-09-13 RX ADMIN — ACYCLOVIR 800 MG: 800 TABLET ORAL at 20:17

## 2024-09-13 RX ADMIN — Medication 2 SPRAY: at 13:53

## 2024-09-13 RX ADMIN — POTASSIUM PHOSPHATE, MONOBASIC POTASSIUM PHOSPHATE, DIBASIC 15 MMOL: 224; 236 INJECTION, SOLUTION, CONCENTRATE INTRAVENOUS at 05:09

## 2024-09-13 RX ADMIN — MICAFUNGIN SODIUM 150 MG: 50 INJECTION, POWDER, LYOPHILIZED, FOR SOLUTION INTRAVENOUS at 10:54

## 2024-09-13 ASSESSMENT — ACTIVITIES OF DAILY LIVING (ADL)
ADLS_ACUITY_SCORE: 27
ADLS_ACUITY_SCORE: 26
ADLS_ACUITY_SCORE: 25
ADLS_ACUITY_SCORE: 27
ADLS_ACUITY_SCORE: 26
ADLS_ACUITY_SCORE: 26
ADLS_ACUITY_SCORE: 27
ADLS_ACUITY_SCORE: 26
ADLS_ACUITY_SCORE: 28
ADLS_ACUITY_SCORE: 27
ADLS_ACUITY_SCORE: 25
ADLS_ACUITY_SCORE: 26
ADLS_ACUITY_SCORE: 27
ADLS_ACUITY_SCORE: 26
ADLS_ACUITY_SCORE: 28
ADLS_ACUITY_SCORE: 27
ADLS_ACUITY_SCORE: 25
ADLS_ACUITY_SCORE: 26
ADLS_ACUITY_SCORE: 27
ADLS_ACUITY_SCORE: 27
ADLS_ACUITY_SCORE: 26

## 2024-09-13 NOTE — PROGRESS NOTES
Blood and Marrow Transplant Discharge Teach    RNCC met with patient and Spouse to discuss upcoming discharge. Reviewed plan for line care supplies, PT/OT recommendations and upcoming clinic visits.      Patient and Caregiver demonstrates understanding of the following:  Which situations necessitate calling provider and whom to contact: Yes  Proper use and care of (medical equipment, care aids, etc.) Yes  Reviewed Post Allo Transplant guidelines and patient verbalizes understanding      Infection Control:  Patient and Caregiver instructed on hand hygiene: Yes  Signs and symptoms of infection taught: Yes      For CAR-T patient: Verified that patient has product specific wallet card. Patient instructed to remain within close proximity of facility (within 30-40 minutes per program standard) for at least four weeks post infusion. CAR-T patient is instructed not to operate motorized vehicle or heavy machinery for a period of 8 weeks.    Time spent with patient: 45 minutes.  Specific Concerns: Yes    Last bmbx in clinic    Diagnosis: MDS    Protocol: 2022-52    Donor Source: Allogeneic - Unrelated Donor     Caregiver:   Vani Caballero    Long-term BMT MD/NC/SW: Jeffrey/Jignesh/Mayi    Discharge location: Home    [ x ] Home Infusion Company: [employer]    [ x ] Pharmacy needs: Micafungin (clinic until clinic visits decrease to less than three visits per week, then patient would like to switch to [facility])     Sirolimus: $85.21     MMF: $12.80     Prevymis: CMV neg    [ x ] Can fill meds at [facility] pharmacy (BMT benefits soft check): Yes   If not, preferred pharmacy at discharge:     [ x ] PT/OT recommendations: Home with assist    [ x ] 1st time discharge? Yes    [ x ] Micafungin teach izzy ZHANG in clinic per preference     [ x ] Weekly Lab Day Preference? No preference on day, strongly prefers appointments between 10am-2pm    [ x ] clonoSEQ testing needed? no    Lucita Aguilar, RN, MSN  BMT & Cellular  Therapy Nurse Coordinator  [facility] Blood and Marrow Transplant Program  Phone: [phone]  Fax: [phone]

## 2024-09-13 NOTE — PROCEDURES
"BMT ONC Adult Bone Marrow Biopsy Procedure Note  September 13, 2024  /79 (BP Location: Left arm)   Pulse 117   Temp 98.3  F (36.8  C) (Oral)   Resp 16   Ht 1.84 m (6' 0.44\")   Wt 86.2 kg (190 lb)   SpO2 98%   BMI 25.46 kg/m       Learning needs assessment complete within 12 months? YES    DIAGNOSIS: MDS/AML     PROCEDURE: Unilateral Bone Marrow Biopsy and Unilateral Aspirate    [place] [facility] [place] [facility]-Procedure Room    Patient s identification was positively verified by verbal identification and invasive procedure safety checklist was completed. Informed consent was obtained. Following the administration of Midazolam as pre-medication, patient was placed in the prone position and prepped and draped in a sterile manner. Approximately 10 cc of 1% Lidocaine was used over the left posterior iliac spine. Following this a 3 mm incision was made. Trephine bone marrow core(s) was (were) obtained from the [facility]. Bone marrow aspirates were obtained from the [facility]. Aspirates were sent for morphology, immunophenotyping, cytogenetics, and molecular diagnostics RFLP. A total of approximately 20 ml of marrow was aspirated. Following this procedure a sterile dressing was applied to the bone marrow biopsy site(s). The patient was placed in the supine position to maintain pressure on the biopsy site. Post-procedure wound care instructions were given.     Complications: NO    Pre-procedural pain: 0 out of 10 on the numeric pain rating scale.     Procedural pain: 1 out of 10 on the numeric pain rating scale.     Post-procedural pain assessment: 0 out of 10 on the numeric pain rating scale.     Interventions: NO    Length of procedure:20 minutes or less      Procedure performed by: Baltazar Arguello PA-C     "

## 2024-09-13 NOTE — PROGRESS NOTES
CLINICAL NUTRITION SERVICES - REASSESSMENT NOTE     Nutrition Prescription    RECOMMENDATIONS FOR MDs/PROVIDERS TO ORDER:  None at this time     Malnutrition Status:    Patient does not meet two of the established criteria necessary for diagnosing malnutrition    Recommendations already ordered by Registered Dietitian (RD):  Continue vanilla Ensure Enlive with ice cream daily  Continue supplements PRN    Future/Additional Recommendations:  Continue to monitor nutrition-related findings and follow pt per protocol     EVALUATION OF THE PROGRESS TOWARD GOALS   Diet: High Kcal/High Protein  Supplements: Ensure + Ice cream 1/day, additional ONS & snacks PRN   PO Intake: % (most often 100%)   RS order review shows 7-day avg of 1886 kcal/day and 81 grams protein/day, 5-day avg of 2010 kcal/day and 81 grams protein/day as ordered.      NEW FINDINGS   Weights:  Most Recent Weight: 85.7 kg (188 lb 14.4 oz)  on 9/12/24 via Standing scale  Body mass index is 25.31 kg/m .  Wt down 4.5 kg from admission.    GI:  0-3 unmeasured BMs per day over past week, per I/O.   Last BM: 09/12/24  Diarrhea -  loose stool x3 overnight, requesting imodium     Medications:  Acyclovir, caltrate, cefepime, micafungin, mycophenolate, protonix, K phos, compazine, psyllium, sirolimus, ursodiol,     Labs:  Low phos this morning - replacing    Electrolytes  Potassium (mmol/L)   Date Value   09/13/2024 3.9   09/12/2024 4.0   09/11/2024 4.0     Phosphorus (mg/dL)   Date Value   09/13/2024 2.3 (L)   09/12/2024 2.0 (L)   09/11/2024 2.0 (L)   09/10/2024 2.2 (L)   09/09/2024 2.7    Blood Glucose  Glucose (mg/dL)   Date Value   09/13/2024 119 (H)   09/12/2024 134 (H)   09/11/2024 118 (H)   09/10/2024 108 (H)   09/09/2024 115 (H)     GLUCOSE BY METER POCT (mg/dL)   Date Value   09/08/2024 102 (H)    Inflammatory Markers  WBC Count (10e3/uL)   Date Value   09/13/2024 3.3 (L)   09/12/2024 2.9 (L)   09/11/2024 1.4 (L)     Albumin (g/dL)   Date Value    09/12/2024 3.4 (L)   09/09/2024 3.2 (L)   09/07/2024 2.8 (L)      Magnesium (mg/dL)   Date Value   09/13/2024 2.1   09/12/2024 2.1   09/11/2024 2.0     Sodium (mmol/L)   Date Value   09/13/2024 135   09/12/2024 133 (L)   09/11/2024 134 (L)    Renal  Urea Nitrogen (mg/dL)   Date Value   09/13/2024 15.2   09/12/2024 14.3   09/11/2024 12.8     Creatinine (mg/dL)   Date Value   09/13/2024 0.64 (L)   09/12/2024 0.64 (L)   09/11/2024 0.62 (L)     Additional  Ketones Urine (mg/dL)   Date Value   09/09/2024 Negative     Platelet Count (10e3/uL)   Date Value   09/13/2024 19 (LL)     aPTT (Seconds)   Date Value   08/16/2024 28     INR (no units)   Date Value   09/09/2024 1.06        RENAL  GFR >90    RESPIRATORY  Room air     SKIN  No pressure injuries documented at this time     MALNUTRITION  % Intake: Decreased intake does not meet criteria  % Weight Loss: Weight loss does not meet criteria for malnutrition  -- cannot rule out confounded by fluid status  Subcutaneous Fat Loss: None observed   Muscle Loss: None observed  Fluid Accumulation/Edema: None noted  Malnutrition Diagnosis: Patient does not meet two of the established criteria necessary for diagnosing malnutrition    Previous Goals   Patient to consume % of nutritionally adequate meal trays TID, or the equivalent with supplements/snacks.   Evaluation: Met    Previous Nutrition Diagnosis  Inadequate oral intake related to decreased appetite as evidenced by pt report of intake at 50%.   Evaluation: Improving    CURRENT NUTRITION DIAGNOSIS  Predicted inadequate nutrient intake (kcal/protein) related to recent BMT with potential for side effects to affect ability to take adequate PO.        INTERVENTIONS  Implementation  Medical food supplement therapy     Goals  Patient to consume % of nutritionally adequate meal trays TID, or the equivalent with supplements/snacks.    Monitoring/Evaluation  Progress toward goals will be monitored and evaluated per  protocol.    Jenny Martin, MPH, RDN, LD  5C (BMT) [facility] [5C Clinical Dietitian]   Weekend/Holiday: [employer] - Weekend Clinical Dietitian

## 2024-09-13 NOTE — PROVIDER NOTIFICATION
MD notified (Malathi Ji) - patient is having increased loose stools, was asking if he would be able to have imodium ordered again.    Per MD - one time dose ordered

## 2024-09-13 NOTE — PROGRESS NOTES
[facility]  Transplant Infectious Disease Progress Note     Patient:  Leland Pearson, Date of birth 1955, Medical record number [id number]  Date of Visit:  09/13/2024  Consult requested by Dr. Timothy Skaggs for evaluation of persistent neutropenic fevers         Assessment and Recommendations:   Recommendations:  Recommend transitioning to Ceftriaxone for coverage of strep mitis bacteremia to make sure he tolerates before potential discharge in next few days. Stop date of 9/20/2024 for 14 days of therapy post catheter removal.  Awaiting results for blood culture 9/12, adenovirus urine PCR, adenovirus blood PCR  If patient spikes another fever, agree with pursuing repeat imaging  Continue prophylaxis with acyclovir (viral) and micafungin (fungal)    Case discussed with transplant ID attending, Dr. Manriquez  Thank you very much for this consultation. Transplant Infectious Disease will continue to follow with you.    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  Initial evaluation reveals Streptococcus mitis bacteremia from central venous catheter as well as single culture of MRSE from peripheral blood.  Patient was started on vancomycin and cefepime but has been persistently febrile since 9/2/2024.  Additional evaluation includes negative chest x-ray, negative urinalysis, negative C. difficile PCR.  Etiology of fever was determined to be secondary to strep mitis bacteremia.  Patient had central venous catheter removed on 9/6 and subsequently defervesced on 9/7 and remained fever free until 9/11.  Patient with new fever on 9/11 and has also been experiencing increased urinary frequency with dysuria for the last several days.  Investigation including urine culture and BK virus so far unrevealing.  Given new fever and urinary symptoms, awaiting adenovirus PCR as well as repeat  blood cultures. Has remained afebrile since the AM on 9/12.    Micheal Lucero MD  Infectious Disease Fellow  Most easily reached on Vocera  Pager #[phone]         Infectious Disease Issues:   Persistent neutropenic fevers  Streptococcus mitis/oralis bacteremia, isolated from CVC  Patient with persistent neutropenic fevers beginning on 9/2/2024 and continuing to current.  Fevers have been quite high upwards of 103 and occurring multiple times daily.  Workup thus far has been positive for isolation of Streptococcus mitis and Staphylococcus epidermidis and blood culture from 9/2/2024.  Repeat blood cultures have been negative to date.  Additional workup including chest x-ray, urinalysis, and C. difficile have all been unremarkable/negative.  Patient has been on appropriate antibiotic therapy with vancomycin and cefepime since onset of fevers on 9/2/2024.    Suspect most likely etiology of patient's ongoing fevers is his Streptococcus mitis bacteremia.  I suspect that his MRSE is likely contaminant at this time based on the fact that her only grown 1 of 2 peripheral blood culture bottles and resulted as positive several hours after his strep mitis cultures.  Vancomycin was discontinued on 9/5.  Requested the patient's CVC be removed, which was performed on 9/6.  Patient underwent transthoracic echocardiogram on 9/6 as well with no evidence of valvular vegetations.  Patient had defervesced on 9/7, but now with recurrent fever as of 9/11.  Etiology unclear at this time, blood culture from 9/12 with no growth to date. EBV PCR negative. Adenovirus PCR from blood and urine pending.     Dysuria  Increased Urinary Frequency  Hematuria  Patient with increased nocturnal urinary frequency and dysuria for the last 3 days.  Etiology unclear at this time, but given time course in relation to his transplant and use of posttransplant cyclophosphamide this would be concerning for BK virus hemorrhagic cystitis. Urinalysis demonstrates  hematuria with greater than 182 RBCs.  Urine culture negative.  BK virus negative and urine PCR and and blood PCR.  Adenovirus can also be associated with hemorrhagic cystitis with PCR pending.    Staphylococcus epidermidis isolated in single peripheral blood culture bottle  Isolated on peripheral blood culture from 9/2.  Not recovered from blood cultures drawn from central venous catheter on the same day.  These cultures also resulted as positive a number of hours after strep mitis have been isolated from CVC cultures.  I suspect that this is most likely contaminant as outlined above.  Vancomycin discontinued on 9/5.    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  Up to 2.9 today with an ANC of 2600.    Transplant checklist  - QTc interval: 421 (8/5/2024)  - Bacterial coverage: Cefepime  - Pneumocystis prophylaxis: TMP-SMX M,Tu planned to start on 9/23/2024  - Serostatus & viral prophylaxis: CMV D-/R-, EBV+, HSV-.  Acyclovir prophylaxis  - Fungal prophylaxis: Micafungin  - Toxo IgM negative,  - Gamma globulin status: Unknown           Interval History:   Patient has remained afebrile since yesterday morning  Overall feels better than the days prior  Urinary frequency and dysuria continue to improve, but did have a clot pass today  Otherwise, not having significant diarrhea, abdominal pain, cough, chest pain, or shortness of breath.    Past Medical History:   Diagnosis Date    Gastroesophageal reflux disease     Hypertension        Past Surgical History:   Procedure Laterality Date    COLONOSCOPY      ESOPHAGOSCOPY, GASTROSCOPY, DUODENOSCOPY (EGD), COMBINED  07/28/2013    Procedure: COMBINED ESOPHAGOSCOPY, GASTROSCOPY, DUODENOSCOPY (EGD), BIOPSY SINGLE OR MULTIPLE;;  Surgeon: Franklin Barrera MD;  Location: [facility]    ESOPHAGOSCOPY, GASTROSCOPY, DUODENOSCOPY (EGD), COMBINED  07/28/2013    Procedure: COMBINED ESOPHAGOSCOPY, GASTROSCOPY, DUODENOSCOPY  (EGD), REMOVE FOREIGN BODY;;  Surgeon: Franklin Barrera MD;  Location: [facility]    IR CVC TUNNEL PLACEMENT > 5 YRS OF AGE  08/16/2024    IR CVC TUNNEL REMOVAL RIGHT  09/06/2024    ORTHOPEDIC SURGERY      04 micro discectomy, acl  repair    PICC DOUBLE LUMEN PLACEMENT Right 09/09/2024    Right basilic vein 49cm total 5cm external.       Family History   Problem Relation Age of Onset    No Known Problems Brother     No Known Problems Brother     No Known Problems Brother     No Known Problems Brother     Diabetes Brother     No Known Problems Sister     No Known Problems Daughter     No Known Problems Daughter        Social History     Social History Narrative    Not on file     Social History     Tobacco Use    Smoking status: Never     Passive exposure: Never    Smokeless tobacco: Never   Substance Use Topics    Alcohol use: Yes     Comment: socially    Drug use: No       Immunization History   Administered Date(s) Administered    COVID-19 12+ (Pfizer) 10/11/2023    COVID-19 Bivalent 18+ (Moderna) 10/03/2022    COVID-19 Monovalent 18+ (Moderna) 02/04/2021, 03/04/2021, 10/25/2021, 04/08/2022    Influenza Vaccine 18-64 (Flublok) 11/12/2019    Influenza Vaccine 65+ (FLUAD) 10/11/2023    Influenza Vaccine 65+ (Fluzone HD) 10/08/2021, 10/06/2022    Pneumo Conj 13-V (2010&after) 03/25/2019    Pneumococcal 23 valent 04/30/2020    RSV Vaccine (Arexvy) 10/25/2023    TDAP (Adacel,Boostrix) 07/14/2021    Td (Adult), Adsorbed 03/05/2003    Zoster recombinant adjuvanted (SHINGRIX) 10/29/2018, 01/01/2019, 01/21/2019    Zoster vaccine, live 01/01/2015       Patient Active Problem List   Diagnosis    MDS (myelodysplastic syndrome) (H)    Pre-operative cardiovascular examination    Benign essential hypertension    Infection due to Streptococcus mitis group    Nocturia    Administration of long-term prophylactic antibiotics    History of peripheral stem cell transplant (H)            Current Medications & Allergies:     Current  Facility-Administered Medications   Medication Dose Route Frequency Provider Last Rate Last Admin    acyclovir (ZOVIRAX) tablet 800 mg  800 mg Oral BID Aziza Mendieta APRN CNP   800 mg at 09/13/24 0811    allopurinol (ZYLOPRIM) tablet 300 mg  300 mg Oral Daily Kaitlyn Aguilar NP   300 mg at 09/13/24 0810    artificial saliva (BIOTENE MT) solution 2 spray  2 spray Swish & Spit 4x Daily Bessie Lazo PA-C   2 spray at 09/13/24 0820    calcium carbonate-vitamin D (CALTRATE) 600-10 MG-MCG per tablet 2 tablet  2 tablet Oral BID w/meals Bessie Lazo PA-C   2 tablet at 09/13/24 0810    ceFEPIme (MAXIPIME) 2 g vial to attach to  mL bag for ADULTS or NS 50 mL bag for PEDS  2 g Intravenous Q8H Meri Viera  mL/hr at 09/05/24 0305 2 g at 09/13/24 0321    heparin lock flush 10 unit/mL injection 5-20 mL  5-20 mL Intracatheter Q24H Bernadette Green PA-C   5 mL at 09/12/24 1559    heparin lock flush 10 unit/mL injection 5-20 mL  5-20 mL Intracatheter Q24H Markus Mendez MD   5 mL at 09/10/24 0515    methocarbamol (ROBAXIN) tablet 500 mg  500 mg Oral TID Aziza Mendieta APRN CNP   500 mg at 09/13/24 0810    micafungin (MYCAMINE) 150 mg in sodium chloride 0.9 % 100 mL intermittent infusion  150 mg Intravenous Daily Kaitlyn Aguilar  mL/hr at 09/13/24 1054 150 mg at 09/13/24 1054    mycophenolate (GENERIC EQUIVALENT) capsule 1,250 mg  1,250 mg Oral Q12H On license of [facility] (08/20) Eunice Spicer PA-C   1,250 mg at 09/13/24 0810    oxyBUTYnin (DITROPAN) half-tab 2.5 mg  2.5 mg Oral BID Bernadette Green PA-C   2.5 mg at 09/13/24 0810    pantoprazole (PROTONIX) EC tablet 40 mg  40 mg Oral Daily Bessie Lazo PA-C   40 mg at 09/13/24 0810    prochlorperazine (COMPAZINE) tablet 5 mg  5 mg Oral Daily Eunice Spicer PA-C   5 mg at 09/12/24 1559    psyllium (METAMUCIL/KONSYL) capsule 3 capsule  3 capsule Oral Daily Bessie Lazo PA-C   3 capsule at 09/13/24 0810    sirolimus (GENERIC  EQUIVALENT) tablet 3.5 mg  3.5 mg Oral Daily Bernadette Green PA-C   3.5 mg at 09/13/24 0810    [START ON 9/23/2024] sulfamethoxazole-trimethoprim (BACTRIM DS) 800-160 MG per tablet 1 tablet  1 tablet Oral Q Mon Tues BID Kaitlyn Aguilar, NP        tamsulosin (FLOMAX) capsule 0.4 mg  0.4 mg Oral Daily Bessie Lazo PA-C   0.4 mg at 09/12/24 1714    ursodiol (ACTIGALL) capsule 300 mg  300 mg Oral TID Markus Mendez MD   300 mg at 09/13/24 0811       Infusions/Drips:    Current Facility-Administered Medications   Medication Dose Route Frequency Provider Last Rate Last Admin       No Known Allergies         Physical Exam:   Patient Vitals for the past 24 hrs:   BP Temp Temp src Pulse Resp SpO2 Weight   09/13/24 1042 136/77 98.3  F (36.8  C) Oral 108 16 99 % --   09/13/24 0840 128/79 98.3  F (36.8  C) Oral 117 16 98 % 86.2 kg (190 lb)   09/13/24 0558 (!) 142/76 99.7  F (37.6  C) Oral -- 16 99 % --   09/13/24 0530 -- 99.2  F (37.3  C) Oral -- -- -- --   09/13/24 0500 136/77 99.9  F (37.7  C) Oral 100 16 98 % --   09/13/24 0445 139/80 99.3  F (37.4  C) Oral 106 16 98 % --   09/13/24 0310 127/68 98  F (36.7  C) Oral (!) 121 16 99 % --   09/12/24 2315 131/76 98.1  F (36.7  C) Oral 99 18 98 % --   09/12/24 1950 (!) 144/71 100  F (37.8  C) Oral 102 16 99 % --   09/12/24 1718 137/69 98.9  F (37.2  C) Oral -- 16 -- --   09/12/24 1556 132/76 99.2  F (37.3  C) Oral -- 16 98 % --   09/12/24 1453 127/81 100.4  F (38  C) Oral -- 16 -- --   09/12/24 1419 (!) 145/84 99  F (37.2  C) Oral -- 16 94 % --   09/12/24 1304 134/78 99.5  F (37.5  C) Oral -- 16 96 % --     Ranges for vital signs:  Temp:  [98  F (36.7  C)-100.4  F (38  C)] 98.3  F (36.8  C)  Pulse:  [] 108  Resp:  [16-18] 16  BP: (127-145)/(68-84) 136/77  SpO2:  [94 %-99 %] 99 %  Vitals:    09/11/24 0741 09/12/24 0733 09/13/24 0840   Weight: 86.8 kg (191 lb 6.4 oz) 85.7 kg (188 lb 14.4 oz) 86.2 kg (190 lb)       Physical Examination:   GENERAL: Lying in bed, no  acute distress.  Nontoxic-appearing  HEAD:  Head is normocephalic  ENT:  Oropharynx is moist.  LUNGS:  Clear to auscultation bilateral.   CARDIOVASCULAR:  Regular rate and rhythm with no murmur  ABDOMEN:  Normal bowel sounds, soft, nontender.   SKIN: PICC in right upper extremity.  Not erythematous, nontender, and without drainage.  NEUROLOGIC:  Grossly nonfocal. Active x4 extremities         Laboratory Data:       Metabolic Studies       Recent Labs   Lab Test 09/13/24  0322 09/12/24  1714 09/12/24  1415 09/12/24  0321 09/07/24  1103 09/07/24  0713     --   --  133*   < > 133*   POTASSIUM 3.9  --   --  4.0   < > 4.0   CHLORIDE 101  --   --  100   < > 100   CO2 25  --   --  25   < > 22   ANIONGAP 9  --   --  8   < > 11   BUN 15.2  --   --  14.3   < > 9.8   CR 0.64*  --   --  0.64*   < > 0.63*   GFRESTIMATED >90  --   --  >90   < > >90   *  --   --  134*   < > 120*   HERBERT 8.6*  --   --  8.7*   < > 8.3*   PHOS 2.3*  --   --  2.0*   < >  --    MAG 2.1  --   --  2.1   < > 1.8   LACT  --  1.0 2.1*  --    < >  --    PCAL  --   --   --   --   --  0.37    < > = values in this interval not displayed.       Hepatic Studies    Recent Labs   Lab Test 09/12/24  0321 09/09/24  0615 05/15/24  1339 05/08/24  1156   BILITOTAL 0.5 0.5   < > 0.6   DBIL  --  0.24   < >  --    ALKPHOS 70 58   < > 82   PROTTOTAL 6.5 6.3*   < > 7.6   ALBUMIN 3.4* 3.2*   < > 4.1   AST 25 25   < > 43   ALT 16 19   < > 21   LDH  --   --   --  613*    < > = values in this interval not displayed.       Gout Labs      Recent Labs   Lab Test 08/16/24  0920 08/09/24  0827 08/05/24  1200 07/29/24  0811 07/01/24  1208   URIC 4.2 3.9 4.1 4.5 4.1       Hematology Studies   Recent Labs   Lab Test 09/13/24  0322 09/12/24  0321 09/11/24  0415 09/10/24  0420 08/20/24  0418 08/19/24  0327 05/22/24  0838 05/21/24  1016   WBC 3.3* 2.9* 1.4* 0.8*   < > 1.3*   < > 4.0   ABLA  --   --   --   --   --   --   --  0.4*   BLST  --   --   --   --   --   --   --  9   ANEU  " --  2.6 1.3* 0.8*   < >  --    < > 0.6*   ANEUTAUTO 2.9  --   --   --   --  1.1*   < >  --    ALYM  --  0.0* 0.0* 0.0*   < >  --    < > 2.0   ALYMPAUTO 0.0*  --   --   --   --  0.1*   < >  --    DAVIAN  --  0.2 0.1 0.0   < >  --    < > 0.5   AMONOAUTO 0.3  --   --   --   --  0.1   < >  --    AEOS  --  0.0 0.0 0.0   < >  --    < > 0.0   AEOSAUTO 0.0  --   --   --   --  0.0   < >  --    ABSBASO 0.0  --   --   --   --  0.0   < >  --    HGB 7.8* 8.4* 7.0* 7.8*   < > 10.1*   < > 12.9*   HCT 23.4* 24.7* 21.3* 22.8*   < > 29.6*   < > 38.4*   PLT 19* 27* 14* 28*   < > 46*   < > 72*    < > = values in this interval not displayed.       Clotting Studies    Recent Labs   Lab Test 09/09/24  0615 09/02/24  0444 08/26/24  0407 08/16/24  0920   INR 1.06 1.06 0.99 0.93   PTT  --   --   --  28       Iron Testing    Recent Labs   Lab Test 09/13/24  0322 08/02/24  0919 08/01/24  0805   ZARIA  --   --  460*   MCV 81   < > 84    < > = values in this interval not displayed.       Urine Studies     Recent Labs   Lab Test 09/09/24  1900 09/07/24  1230 09/02/24  0454 08/05/24  1200   URINEPH 7.0 6.0 7.0 6.5   NITRITE Positive* Negative Negative Negative   LEUKEST Negative Negative Negative Negative   WBCU 1 1 2 <1       Medication levels    Recent Labs   Lab Test 09/12/24  0730 09/07/24  0713 09/06/24  0337   VANCOMYCIN  --   --  10.3   RAPAMY 9.6   < >  --     < > = values in this interval not displayed.       CSF testing   No lab results found.    Invalid input(s): \"CADAM\", \"EVPCR\", \"ENTPCR\", \"ENTEROVIRUS\"    Body fluid stats  No lab results found.    Microbiology:  Fungal testing  No lab results found.    Invalid input(s): \"HIFUN\", \"FUNGL\"    Last Culture results   Culture   Date Value Ref Range Status   09/12/2024 No growth after 12 hours  Preliminary   09/11/2024 No growth after 1 day  Preliminary   09/09/2024 No Growth  Final   09/07/2024 No Growth  Final   09/05/2024 No Growth  Final   09/04/2024 No Growth  Final   09/04/2024 No Growth  " Final   09/03/2024 No Growth  Final   09/03/2024 No Growth  Final   09/02/2024 Positive on the 1st day of incubation (A)  Preliminary   09/02/2024 Staphylococcus epidermidis (AA)  Preliminary     Comment:     1 of 2 bottles  The recovery of this organism from a single blood culture bottle most likely represents contamination. If susceptibility testing is needed, please refer to the antibiogram or contact [facility].   09/02/2024 Positive on the 1st day of incubation (A)  Preliminary   09/02/2024 Streptococcus mitis (AA)  Preliminary     Comment:     2 of 2 bottles     09/02/2024 Positive on the 1st day of incubation (A)  Preliminary   09/02/2024 Streptococcus mitis (AA)  Preliminary     Comment:     2 of 2 bottles    Susceptibilities done on previous cultures   08/16/2024   Final    No Vancomycin Resistant Enterococcus species isolated           Last checks of Clostridioides difficile testing  Recent Labs   Lab Test 09/03/24  1140 08/17/24  0906   CDBPCT Negative Negative       Syphilis Testing    Treponema Antibody Total   Date Value Ref Range Status   08/05/2024 Nonreactive Nonreactive Final       Quantiferon testing   Recent Labs   Lab Test 09/13/24  0322 09/12/24  0321   LYMPH 0 1       Viral loads    Recent Labs   Lab Test 09/12/24  1120 09/07/24  1103 08/31/24  1453 08/17/24  1621 06/09/24  1124   EBQI Not Detected  --   --   --   --    CMVQNT  --  Not Detected Not Detected   < >  --    HSDNA1  --   --   --   --  Not Detected   HSDNA2  --   --   --   --  Not Detected    < > = values in this interval not displayed.       CMV viral loads    Recent Labs   Lab Test 09/07/24  1103 08/31/24  1453 08/24/24  1416 08/17/24  1621   CMVQNT Not Detected Not Detected Not Detected Not Detected       Hepatitis B Testing     Recent Labs   Lab Test 08/05/24  1200   AUSAB 89.10   HBCAB Nonreactive   HEPBANG Nonreactive        Hepatitis C Antibody   Date Value Ref Range Status   08/05/2024 Nonreactive Nonreactive Final     Comment:      A nonreactive screening test result does not exclude the possibility of exposure to or infection with HCV. Nonreactive screening test results in individuals with prior exposure to HCV may be due to antibody levels below the limit of detection of this assay or lack of reactivity to the HCV antigens used in this assay. Patients with recent HCV infections (<3 months from time of exposure) may have false-negative HCV antibody results due to the time needed for seroconversion (average of 8 to 9 weeks).       CMV Antibody IgG   Date Value Ref Range Status   08/05/2024 No detectable antibody. No detectable antibody.  Final   06/11/2024 No detectable antibody. No detectable antibody.  Final     Varicella Zoster Antibody IgG   Date Value Ref Range Status   08/05/2024 Positive  Final     Comment:     Suggests previous exposure or immunization and probable immunity.     EBV Capsid Antibody IgG   Date Value Ref Range Status   08/05/2024 Positive (A) No detectable antibody. Final     Comment:     Suggests recent or past exposure.     Herpes Simplex Virus Type 1 IgG Antibody   Date Value Ref Range Status   08/05/2024 No HSV-1 IgG antibodies detected. No HSV-1 IgG antibodies detected Final     Herpes Simplex Virus Type 2 IgG Antibody   Date Value Ref Range Status   08/05/2024 No HSV-2 IgG antibodies detected. No HSV-2 IgG antibodies detected Final

## 2024-09-13 NOTE — PLAN OF CARE
"/73 (BP Location: Left arm)   Pulse 98   Temp 98.6  F (37  C) (Oral)   Resp 16   Ht 1.84 m (6' 0.44\")   Wt 86.2 kg (190 lb)   SpO2 97%   BMI 25.46 kg/m      Afebrile; VSS on RA. Ongoing urinary frequency/urgency with intermittent dysuria. Seen by Urology today and urine samples sent. Bladder scanned with, at most, 28mL PVR. No other complaints of pain. Pt denied nausea. Poor appetite, ate 2 small meals today. Tolerated BMB this AM without issues. Tried to walk halls this evening, but could not make it more than one lap due to fatigue. Continue POC.     Goal Outcome Evaluation:    Plan of Care Reviewed With: patient, spouse  Overall Patient Progress: improvingOverall Patient Progress: improving  Problem: Adult Inpatient Plan of Care  Goal: Plan of Care Review  Description: The Plan of Care Review/Shift note should be completed every shift.  The Outcome Evaluation is a brief statement about your assessment that the patient is improving, declining, or no change.  This information will be displayed automatically on your shift  note.  Outcome: Progressing  Flowsheets (Taken 9/13/2024 1832)  Plan of Care Reviewed With:   patient   spouse  Overall Patient Progress: improving  Goal: Patient-Specific Goal (Individualized)  Description: You can add care plan individualizations to a care plan. Examples of Individualization might be:  \"Parent requests to be called daily at 9am for status\", \"I have a hard time hearing out of my right ear\", or \"Do not touch me to wake me up as it startles  me\".  Outcome: Progressing  Goal: Absence of Hospital-Acquired Illness or Injury  Outcome: Progressing  Goal: Optimal Comfort and Wellbeing  Outcome: Progressing  Goal: Readiness for Transition of Care  Outcome: Progressing     Problem: Risk for Delirium  Goal: Optimal Coping  Outcome: Progressing  Goal: Improved Sleep  Outcome: Progressing     Problem: Stem Cell/Bone Marrow Transplant  Goal: Optimal Coping with " Transplant  Outcome: Progressing  Goal: Symptom-Free Urinary Elimination  Outcome: Progressing  Goal: Diarrhea Symptom Control  Outcome: Progressing  Goal: Improved Activity Tolerance  Outcome: Progressing  Goal: Blood Counts Within Acceptable Range  Outcome: Progressing  Goal: Absence of Hypersensitivity Reaction  Outcome: Progressing  Goal: Absence of Infection  Outcome: Progressing  Goal: Improved Oral Mucous Membrane Health and Integrity  Outcome: Progressing  Goal: Nausea and Vomiting Symptom Relief  Outcome: Progressing  Goal: Optimal Nutrition Intake  Outcome: Not Progressing

## 2024-09-13 NOTE — CONSULTS
Urology Consult History and Physical    Name: Leland Pearson    MRN: [id number]   YOB: 1955       We were asked to see Leland Pearson at the request of STANLEY Savage for evaluation and treatment of the following chief complaint:          Chief Complaint:   Ongoing dysuria with resolution of hematuria but intermittent passing of clots.     History is obtained from patient and review of records           History of Present Illness:   Leland Pearson is a 69 year old male with pmh significant for MDS/AML with myelodysplasia currently day -7 of his PBSCT, now with neutropenic fever, initial eval showed Streptococcus mitis bacteremia from central venous catheter and culture of MRSE from peripheral blood.    Patient presents I bed with spouse at bedside. Patient reports that 5 days ago he started experiencing symptoms of dysuria, which he describes as burning with urination, frequency and urgency.  Several days ago he could see blood in the urine and this has since resolved,  passing intermittent clots. This morning he reports that when he was trying to urinate he had to strain and then passed a blood clot. Patient was giving 3 days of pyridium without relief, started oxybutynin 2.5 mg BID with little improvement.   Denies pressure, flank pain, constipation or abdominal discomfort.    Denies constipation.     Patient has history of BPH and has been taking Tamsulosin for several years started for difficulty initiating stream in the morning.   History of passing one kidney stone, aware of stone that was found on CT 4/25/2024.   Underwent Cytoxan flush 8/27/2024  BK virus negative and urine PCR and and blood PCR. Adenovirus PCR pending. Last urine culture was done 9/9/2024.           Past Medical History:     Past Medical History:   Diagnosis Date    Gastroesophageal reflux disease     Hypertension             Past Surgical History:     Past Surgical History:   Procedure Laterality Date     COLONOSCOPY      ESOPHAGOSCOPY, GASTROSCOPY, DUODENOSCOPY (EGD), COMBINED  07/28/2013    Procedure: COMBINED ESOPHAGOSCOPY, GASTROSCOPY, DUODENOSCOPY (EGD), BIOPSY SINGLE OR MULTIPLE;;  Surgeon: Franklin Barrera MD;  Location:  GI    ESOPHAGOSCOPY, GASTROSCOPY, DUODENOSCOPY (EGD), COMBINED  07/28/2013    Procedure: COMBINED ESOPHAGOSCOPY, GASTROSCOPY, DUODENOSCOPY (EGD), REMOVE FOREIGN BODY;;  Surgeon: Franklin Barrera MD;  Location:  GI    IR CVC TUNNEL PLACEMENT > 5 YRS OF AGE  08/16/2024    IR CVC TUNNEL REMOVAL RIGHT  09/06/2024    ORTHOPEDIC SURGERY      04 micro discectomy, acl  repair    PICC DOUBLE LUMEN PLACEMENT Right 09/09/2024    Right basilic vein 49cm total 5cm external.            Social History:     Social History     Tobacco Use    Smoking status: Never     Passive exposure: Never    Smokeless tobacco: Never   Substance Use Topics    Alcohol use: Yes     Comment: socially            Family History:     Family History   Problem Relation Age of Onset    No Known Problems Brother     No Known Problems Brother     No Known Problems Brother     No Known Problems Brother     Diabetes Brother     No Known Problems Sister     No Known Problems Daughter     No Known Problems Daughter             Allergies:   No Known Allergies         Medications:     Current Facility-Administered Medications   Medication Dose Route Frequency Provider Last Rate Last Admin    acetaminophen (TYLENOL) tablet 325-650 mg  325-650 mg Oral Q4H PRN Bessie Lazo PA-C   650 mg at 09/12/24 2017    acyclovir (ZOVIRAX) tablet 800 mg  800 mg Oral BID Aziza Mendieta APRN CNP   800 mg at 09/13/24 0811    allopurinol (ZYLOPRIM) tablet 300 mg  300 mg Oral Daily Kaitlyn Aguilar NP   300 mg at 09/13/24 0810    artificial saliva (BIOTENE MT) solution 2 spray  2 spray Swish & Spit 4x Daily Bsesie Lazo PA-C   2 spray at 09/13/24 0820    calcium carbonate-vitamin D (CALTRATE) 600-10 MG-MCG per tablet 2 tablet  2 tablet Oral BID w/meals  Bessie Lazo PA-C   2 tablet at 09/13/24 0810    ceFEPIme (MAXIPIME) 2 g vial to attach to  mL bag for ADULTS or NS 50 mL bag for PEDS  2 g Intravenous Q8H Meri Viera  mL/hr at 09/05/24 0305 2 g at 09/13/24 0321    heparin lock flush 10 unit/mL injection 5-20 mL  5-20 mL Intracatheter Q24H Bernadette Green PA-C   5 mL at 09/12/24 1559    heparin lock flush 10 unit/mL injection 5-20 mL  5-20 mL Intracatheter Q1H PRN Bernadette Green PA-C   5 mL at 09/13/24 0600    heparin lock flush 10 unit/mL injection 5-20 mL  5-20 mL Intracatheter Q1H PRN Gregory Brambila MD   5 mL at 09/12/24 1714    heparin lock flush 10 unit/mL injection 5-20 mL  5-20 mL Intracatheter Q24H Markus Mendez MD   5 mL at 09/10/24 0515    heparin lock flush 10 unit/mL injection 5-20 mL  5-20 mL Intracatheter Q1H PRN Markus Mendez MD   10 mL at 09/10/24 1351    methocarbamol (ROBAXIN) tablet 500 mg  500 mg Oral TID Aziza Mendieta APRN CNP   500 mg at 09/13/24 0810    micafungin (MYCAMINE) 150 mg in sodium chloride 0.9 % 100 mL intermittent infusion  150 mg Intravenous Daily Kaitlyn Aguilar  mL/hr at 09/13/24 1054 150 mg at 09/13/24 1054    mycophenolate (GENERIC EQUIVALENT) capsule 1,250 mg  1,250 mg Oral Q12H [facility] Eunice Spicer PA-C   1,250 mg at 09/13/24 0810    naloxone (NARCAN) injection 0.2 mg  0.2 mg Intravenous Q2 Min PRN Timothy Skaggs MD        Or    naloxone (NARCAN) injection 0.4 mg  0.4 mg Intravenous Q2 Min PRN Timothy Skaggs MD        Or    naloxone (NARCAN) injection 0.2 mg  0.2 mg Intramuscular Q2 Min PRN Timothy Skaggs MD        Or    naloxone (NARCAN) injection 0.4 mg  0.4 mg Intramuscular Q2 Min PRN Timothy Skaggs MD        oxyBUTYnin (DITROPAN) half-tab 2.5 mg  2.5 mg Oral BID Bernadette Green PA-C   2.5 mg at 09/13/24 0810    pantoprazole (PROTONIX) EC tablet 40 mg  40 mg Oral Daily Bessie Lazo PA-C   40 mg at 09/13/24 0810     prochlorperazine (COMPAZINE) injection 5 mg  5 mg Intravenous Q6H PRN Bessie Lazo PA-C   5 mg at 09/03/24 1448    Or    prochlorperazine (COMPAZINE) tablet 5 mg  5 mg Oral Q6H PRN Bessie Lazo PA-C   5 mg at 09/01/24 1743    prochlorperazine (COMPAZINE) tablet 5 mg  5 mg Oral Daily Eunice Spicer PA-C   5 mg at 09/12/24 1559    psyllium (METAMUCIL/KONSYL) capsule 3 capsule  3 capsule Oral Daily Bessie Lazo PA-C   3 capsule at 09/13/24 0810    sirolimus (GENERIC EQUIVALENT) tablet 3.5 mg  3.5 mg Oral Daily Bernadette Green PA-C   3.5 mg at 09/13/24 0810    sodium chloride (PF) 0.9% PF flush 10-20 mL  10-20 mL Intracatheter q1 min prn Gregory Brambila MD        sodium chloride (PF) 0.9% PF flush 10-20 mL  10-20 mL Intracatheter q1 min prn Markus Mendez MD   20 mL at 09/10/24 0515    sodium chloride (PF) 0.9% PF flush 10-40 mL  10-40 mL Intracatheter Once PRN Bernadette Green PA-C        sodium chloride (PF) 0.9% PF flush 3 mL  3 mL Intracatheter q1 min prn Roseline Strong PA-C        sodium chloride (PF) 0.9% PF flush 3 mL  3 mL Intracatheter q1 min prn Yaa Baer MD        sodium chloride (PF) 0.9% PF flush 3 mL  3 mL Intracatheter q1 min prn Bessie Lazo PA-C        [START ON 9/23/2024] sulfamethoxazole-trimethoprim (BACTRIM DS) 800-160 MG per tablet 1 tablet  1 tablet Oral Q Mon Tues BID Kaitlyn Aguilar NP        tamsulosin (FLOMAX) capsule 0.4 mg  0.4 mg Oral Daily Bessie Lazo PA-C   0.4 mg at 09/12/24 1714    ursodiol (ACTIGALL) capsule 300 mg  300 mg Oral TID Markus Mendez MD   300 mg at 09/13/24 0811             Review of Systems:    ROS: See HPI for pertinent details.  As above in HPI          Physical Exam:   VS:  T: 98.3    HR: 108    BP: 136/77    RR: 16   GEN:  AOx3.  NAD.  Pleasant.  HEENT:  Sclerae anicteric.  Conjunctivae pink.  Moist mucous membranes  NECK:  Supple.  No lymphadenopathy.  CV:  RRR  LUNGS: Non-labored breathing.  BACK:  No  "costoverterbral tenderness.  ABD:  Soft.  NT.  ND.  No rebound or guarding.  No surgical scars. No masses.    :  Phallus circumcised.  Meatus patent. Normal penile shaft without plaques.  Testicles descended bilaterally, no nodules or tenderness.  Epididymes non-tender. No inguinal hernias.  EXT:  Warm, well perfused.  No lower extremity edema bilaterally  SKIN:  Warm.  Dry.  No rashes.  NEURO:  CN grossly intact.  Normal gait    URINE: Bedside urinal, yellow urine no clots seen           Data:   All laboratory data reviewed:    No results found for: \"PSA\"  Recent Labs   Lab 09/13/24  0322 09/12/24  0321 09/11/24  0415 09/10/24  0420   WBC 3.3* 2.9* 1.4* 0.8*   HGB 7.8* 8.4* 7.0* 7.8*   PLT 19* 27* 14* 28*       Recent Labs   Lab 09/13/24  0322 09/12/24  0321 09/11/24  0415 09/10/24  0420    133* 134* 137   POTASSIUM 3.9 4.0 4.0 4.0   CHLORIDE 101 100 99 102   CO2 25 25 25 26   BUN 15.2 14.3 12.8 13.1   CR 0.64* 0.64* 0.62* 0.62*   * 134* 118* 108*   HERBERT 8.6* 8.7* 8.5* 8.7*   MAG 2.1 2.1 2.0 2.1   PHOS 2.3* 2.0* 2.0* 2.2*       Recent Labs   Lab 09/09/24  1900   COLOR Dark Brown*   APPEARANCE Slightly Cloudy*   URINEGLC Negative   URINEBILI Moderate*   URINEKETONE Negative   SG 1.030   URINEPH 7.0   PROTEIN 300*   NITRITE Positive*   LEUKEST Negative   RBCU >182*   WBCU 1     Results for orders placed or performed during the hospital encounter of 08/16/24   Urine Culture    Specimen: Urine, NOS   Result Value Ref Range    Culture No Growth        All pertinent imaging reviewed:  PROCEDURE:  CT CHEST ABD PELVIS W IV CONTRAST     HISTORY:  Thrombocytopenia, unspecified; Verbal order - madeline.     TECHNIQUE: CT imaging of the chest, abdomen, and pelvis utilizing 3 mm axial   slices and intravenous contrast. Axial 8 mm MIP images of the chest provided.   Coronal and sagittal reformats provided.     Contrast: 100 mL of Omnipaque 350 was administered intravenously. No immediate   complication.     CT DLP " DOSE:  888 (mGy.cm).     COMPARISON: CT of the abdomen and pelvis, 5/30/2015.     FINDINGS:     CHEST   Airways: The intrathoracic trachea is patent. There is no endobronchial mass or   resorptive atelectasis.   Lungs: There is linear scarring in the inferior segment of the lingula.   Juxtapleural pulmonary nodules adjacent to the left major fissure may represent   intrapulmonary lymph nodes. These measure 3 to 4 mm. These do not appear   suspicious. 1 to 2 mm right upper lobe pulmonary nodule on series 3, image 53,   considered benign.   Pleural spaces: No pleural or pericardial effusion.   Heart: Coronary artery calcifications, particularly of the LAD.   Pulmonary arteries: Normal caliber main pulmonary artery.   Aorta: Normal caliber thoracic aorta.   Lymph nodes: Nonenlarged axillary lymph nodes. In the left axilla, these measure   up to 8 mm in short axis dimension. No mediastinal or hilar lymphadenopathy. No   internal mammary adenopathy.   Thyroid: Thyroid gland is symmetric. No mass at the thoracic inlet.   Esophagus: There is no esophageal wall thickening or mass.   Chest wall: There is no chest wall mass or fluid collection.   Osseous: Degenerative disc disease at the endplates of the thoracic spine. No   suspicious bone lesions are seen. Manubrium/body of the sternum appear intact.     ABDOMEN AND PELVIS   Liver: Liver morphology is noncirrhotic. No focal liver lesion.   Biliary: Normal caliber biliary tree. No inflammatory changes adjacent to the   gallbladder.   Pancreas: There is no pancreatic mass or pancreatic duct dilation. No glandular   atrophy.   Spleen: Normal spleen size. No focal splenic lesion. The spleen measures up to   13.8 cm in maximum length.   Adrenals: No adrenal mass.   Kidneys: The nephrograms are symmetric. There is a 3 mm stone in the left kidney   on series 7, image 47. No solid renal mass. No delayed nephrogram.   Urinary tracts: There is no obstructive urolith. No hydroureter  or urothelial   thickening.   Urinary bladder: Urinary bladder and perivesical fat are normal. Reproductive:   Prostate and seminal vesicles are within normal limits.   Colon: There is colonic diverticulosis. No findings for diverticulitis. No mural   thickening. No pericolonic edema.   Small bowel: Normal caliber small bowel. No mural stratification.   Appendix: No secondary signs for appendicitis. No calcified appendicolith.   Stomach: There is no gastric wall thickening or evidence for gastric outlet   obstruction.   Peritoneal cavity: No abdominal or pelvic ascites.   Lymph nodes: There is no inguinal, pelvic sidewall, or gastrohepatic ligament   adenopathy.   Vasculature: Normal caliber abdominal aorta. Celiac artery and SMA are patent.   ISIS is patent.   Body wall: No abdominal wall mass or fluid collection.   Osseous: Degenerative disc disease in the lumbar spine. Discogenic sclerosis and   disc height loss at multiple levels, particularly at the lumbosacral junction.     IMPRESSION:   1. No thoracic, abdominal, or pelvic lymphadenopathy by size criteria.   2.  Juxtapleural pulmonary nodules along the left major fissure, likely   intrapulmonary lymph nodes. These do not require imaging follow-up, and are   considered benign.   3.  3 mm nephrolith in the left intrarenal collecting system. No delayed   nephrogram, hydronephrosis, or perinephric fluid collection.          Impression and Plan:   Impression / Plan:   Leland Pearson is a 69 year old male with dysuria and hematuria starting 5 days ago, slightly improved with starting oxybutynin. Possible etiology could include cystitis related to cytoxan or infection, with hematuria made worse by thrombocytopenia. Patient has history of kidney stones unlikely etiology given negative for flank/colic pain. Prostatitis is always on the differential with these symptoms, but digital rectal exam deferred today given thrombocytopenia, negative signs of systemic  infection.  Other etiologies include prostatic bleeding, bladder malignancy. Discussed with patient and spouse, agrees to further urine testing, PVR and outpatient eval.         Today:   UA shows significant blood only.  Urine culture pending (ordered)- please treat positive results  Ureaplasma/ mycoplasma pending (ordered)- please treat positive results  Consider testing for BK virus in the urine (hematuria and dysuria are frequent symptoms of infection).  Awaiting results for adenovirus   Urine cytology (ordered)   Obtain post void residual to rule out urinary retention retention  (9/13/24 PVR measured at 28cc)  Continue tamsulosin 0.4mg   Avoid constipation   For urgency, pyridium hasn't worked.  Also had no response to oxybutynin 2.5mg, but this is quite a low dose.  Would recommend trying oxybutynin ER 10mg once daily (ordered)- this is a moderate dose with extended release to reduce pill burden.  Watch for constipation and dry mouth  Patient should followup in outpatient urology for possible Cystoscopy given gross hematuria in the setting of cytoxan exposure.  Please place referral outpatient urology consult.     Urology will follow sign off.  Discussed with Emely Jeter NP     Thank you for the opportunity to participate in the care of Leland THOMPSON Michel.     Stephanie Egan PA-C  Urology Physician Assistant  Personal Pager: [phone]    Please call Job Code:   [phone] to reach the Urology resident or PA on call - Weekdays  [phone] to reach the Urology resident or PA on call - Weeknights and weekends

## 2024-09-13 NOTE — PLAN OF CARE
"/68 (BP Location: Left arm)   Pulse (!) 121   Temp 98  F (36.7  C) (Oral)   Resp 16   Ht 1.84 m (6' 0.44\")   Wt 85.7 kg (188 lb 14.4 oz)   SpO2 99%   BMI 25.31 kg/m        Tmax 100.0, tachy with elevated temps, OVSS. Patient has a bone marrow biopsy today at 11 am.  No complaints of pain, nausea, or SOB. Urinary urgency and frequency decreased, urine had no blood clots. 1x order for imodium given due to loose stools during day. Tylenol given x1.   Red lumen heparin locked and purple lumen running phos.   Platelets transfused and phos started.         Problem: Adult Inpatient Plan of Care  Goal: Plan of Care Review  Description: The Plan of Care Review/Shift note should be completed every shift.  The Outcome Evaluation is a brief statement about your assessment that the patient is improving, declining, or no change.  This information will be displayed automatically on your shift  note.  Outcome: Progressing  Flowsheets (Taken 9/13/2024 0236)  Plan of Care Reviewed With: patient  Overall Patient Progress: no change  Goal: Patient-Specific Goal (Individualized)  Description: You can add care plan individualizations to a care plan. Examples of Individualization might be:  \"Parent requests to be called daily at 9am for status\", \"I have a hard time hearing out of my right ear\", or \"Do not touch me to wake me up as it startles  me\".  Outcome: Progressing  Goal: Absence of Hospital-Acquired Illness or Injury  Outcome: Progressing  Intervention: Identify and Manage Fall Risk  Recent Flowsheet Documentation  Taken 9/13/2024 0130 by Imtiaz Fairbanks, RN  Safety Promotion/Fall Prevention: safety round/check completed  Taken 9/12/2024 2315 by Imtiaz Fairbanks, RN  Safety Promotion/Fall Prevention:   safety round/check completed   room organization consistent   nonskid shoes/slippers when out of bed   patient and family education   assistive device/personal items within reach   clutter free environment maintained  Taken " 9/12/2024 2145 by Imtiaz Fairbanks RN  Safety Promotion/Fall Prevention: safety round/check completed  Taken 9/12/2024 2055 by Imtiaz Fairbanks RN  Safety Promotion/Fall Prevention:   safety round/check completed   room organization consistent   nonskid shoes/slippers when out of bed   patient and family education   assistive device/personal items within reach   clutter free environment maintained  Taken 9/12/2024 1925 by Imtiaz Fairbanks RN  Safety Promotion/Fall Prevention: safety round/check completed  Intervention: Prevent Skin Injury  Recent Flowsheet Documentation  Taken 9/12/2024 2055 by Imtiaz Fairbanks RN  Body Position: position changed independently  Skin Protection:   adhesive use limited   transparent dressing maintained  Device Skin Pressure Protection:   adhesive use limited   tubing/devices free from skin contact  Intervention: Prevent and Manage VTE (Venous Thromboembolism) Risk  Recent Flowsheet Documentation  Taken 9/12/2024 2055 by Imtiaz Fairbanks RN  VTE Prevention/Management: SCDs off (sequential compression devices)  Intervention: Prevent Infection  Recent Flowsheet Documentation  Taken 9/12/2024 2315 by Imtiaz Fairbanks RN  Infection Prevention:   cohorting utilized   environmental surveillance performed   equipment surfaces disinfected   hand hygiene promoted   personal protective equipment utilized   rest/sleep promoted   single patient room provided   visitors restricted/screened  Taken 9/12/2024 2055 by Imtiaz Fairbanks RN  Infection Prevention:   cohorting utilized   environmental surveillance performed   equipment surfaces disinfected   hand hygiene promoted   personal protective equipment utilized   rest/sleep promoted   single patient room provided   visitors restricted/screened  Goal: Optimal Comfort and Wellbeing  Outcome: Progressing  Goal: Readiness for Transition of Care  Outcome: Progressing     Problem: Risk for Delirium  Goal: Optimal Coping  Outcome: Progressing  Intervention: Optimize  Psychosocial Adjustment to Delirium  Recent Flowsheet Documentation  Taken 9/12/2024 2055 by Imtiaz Fairbanks RN  Supportive Measures:   active listening utilized   self-care encouraged  Family/Support System Care: involvement promoted  Goal: Improved Sleep  Outcome: Progressing  Intervention: Promote Sleep  Recent Flowsheet Documentation  Taken 9/12/2024 2055 by Imtiaz Fairbanks RN  Sleep/Rest Enhancement:   awakenings minimized   comfort measures   natural light exposure provided   noise level reduced   regular sleep/rest pattern promoted   room darkened     Problem: Stem Cell/Bone Marrow Transplant  Goal: Optimal Coping with Transplant  Outcome: Progressing  Intervention: Optimize Patient/Family Adjustment to Transplant  Recent Flowsheet Documentation  Taken 9/12/2024 2055 by Imtiaz Fairbanks RN  Supportive Measures:   active listening utilized   self-care encouraged  Family/Support System Care: involvement promoted  Goal: Symptom-Free Urinary Elimination  Outcome: Progressing  Intervention: Prevent or Manage Bladder Irritation  Recent Flowsheet Documentation  Taken 9/12/2024 2055 by Imtiaz Fairbanks RN  Urinary Elimination Promotion: voiding relaxation promoted  Hyperhydration Management: fluids provided  Goal: Diarrhea Symptom Control  Outcome: Progressing  Intervention: Manage Diarrhea  Recent Flowsheet Documentation  Taken 9/12/2024 2055 by Imtiaz Fairbanks RN  Skin Protection:   adhesive use limited   transparent dressing maintained  Fluid/Electrolyte Management: fluids provided  Perineal Care: perineal hygiene encouraged  Goal: Improved Activity Tolerance  Outcome: Progressing  Intervention: Promote Improved Energy  Recent Flowsheet Documentation  Taken 9/12/2024 2055 by Imtiaz Fairbanks RN  Fatigue Management:   activity schedule adjusted   fatigue-related activity identified   frequent rest breaks encouraged  Sleep/Rest Enhancement:   awakenings minimized   comfort measures   natural light exposure provided   noise level  reduced   regular sleep/rest pattern promoted   room darkened  Activity Management: activity adjusted per tolerance  Environmental Support: calm environment promoted  Goal: Blood Counts Within Acceptable Range  Outcome: Progressing  Intervention: Monitor and Manage Hematologic Symptoms  Recent Flowsheet Documentation  Taken 9/12/2024 2055 by Imtiaz Fairbanks RN  Sleep/Rest Enhancement:   awakenings minimized   comfort measures   natural light exposure provided   noise level reduced   regular sleep/rest pattern promoted   room darkened  Bleeding Precautions:   monitored for signs of bleeding   gentle oral care promoted  Medication Review/Management: medications reviewed  Goal: Absence of Hypersensitivity Reaction  Outcome: Progressing  Goal: Absence of Infection  Outcome: Progressing  Intervention: Prevent and Manage Infection  Recent Flowsheet Documentation  Taken 9/12/2024 2315 by Imtiaz Fairbanks RN  Infection Prevention:   cohorting utilized   environmental surveillance performed   equipment surfaces disinfected   hand hygiene promoted   personal protective equipment utilized   rest/sleep promoted   single patient room provided   visitors restricted/screened  Isolation Precautions: protective environment maintained  Taken 9/12/2024 2055 by Imtiaz Fairbanks RN  Infection Prevention:   cohorting utilized   environmental surveillance performed   equipment surfaces disinfected   hand hygiene promoted   personal protective equipment utilized   rest/sleep promoted   single patient room provided   visitors restricted/screened  Infection Management: aseptic technique maintained  Isolation Precautions: protective environment maintained  Goal: Improved Oral Mucous Membrane Health and Integrity  Outcome: Progressing  Intervention: Promote Oral Comfort and Health  Recent Flowsheet Documentation  Taken 9/12/2024 2055 by Imtiaz Fairbanks RN  Oral Mucous Membrane Protection: nonirritating oral fluids promoted  Oral Care: oral rinse  provided  Goal: Nausea and Vomiting Symptom Relief  Outcome: Progressing  Goal: Optimal Nutrition Intake  Outcome: Progressing  Intervention: Minimize and Manage Barriers to Oral Intake  Recent Flowsheet Documentation  Taken 9/12/2024 2055 by Imtiaz Fairbanks RN  Oral Nutrition Promotion: rest periods promoted     Goal Outcome Evaluation:      Plan of Care Reviewed With: patient    Overall Patient Progress: no change

## 2024-09-13 NOTE — PROGRESS NOTES
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     Diagnosis MDS/AML  HCT Type Allogeneic     Prep Regimen Flu/Cy/TBI  Donor Match & Source 8/8 DP permissive PBSC    GVHD Prophylaxis PTCy/MMF/Siro  Primary BMT MD Dr. Murphy    Clinical Trials [id number]       INTERVAL  HISTORY     Leland slept well overnight - still urinary frequency/urgency but no blood noted and still slightly improved. He has a watery right eye this morning but no obvious vision changes. No signs of foreign body though. No recurrent fever overnight. He did have a few looser stools overnight and took imodium x 1. No abdominal pain. Appetite is okay.    Prior to BM bx - he did pass a clot in his urine which was quite uncomfortable.    Review of Systems: ROS negative except as noted above.    PHYSICAL EXAM     Vitals:    09/11/24 0741 09/12/24 0733 09/13/24 0840   Weight: 86.8 kg (191 lb 6.4 oz) 85.7 kg (188 lb 14.4 oz) 86.2 kg (190 lb)      KPS:  70    /79 (BP Location: Left arm)   Pulse 117   Temp 98.3  F (36.8  C) (Oral)   Resp 16   Ht 1.84 m (6' 0.44\")   Wt 86.2 kg (190 lb)   SpO2 98%   BMI 25.46 kg/m       General:  NAD   HEENT: No oral lesions  Lungs: CTAB  CV: RRR, no MRG    Abdomen: Soft. NT. ND. +BS  Lymphatics: No edema  Neuro: A&O, appropriately interactive, speech clear, moving all extremities   Skin: No rash  Access: R arm PICC dressing cdi, no drainage    Current aGVHD staging: (BMT Assessment tab)    LABS AND IMAGING: I have assessed all abnormal lab values for their clinical significance and any values considered clinically significant have been addressed in the assessment and plan.      Lab Results   Component Value Date    WBC 3.3 (L) 09/13/2024    ANEU 2.6 09/12/2024    HGB 7.8 (L) 09/13/2024    HCT 23.4 (L) " 09/13/2024    PLT 19 (LL) 09/13/2024     09/13/2024    POTASSIUM 3.9 09/13/2024    CHLORIDE 101 09/13/2024    CO2 25 09/13/2024     (H) 09/13/2024    BUN 15.2 09/13/2024    CR 0.64 (L) 09/13/2024    MAG 2.1 09/13/2024    INR 1.06 09/09/2024    BILITOTAL 0.5 09/12/2024    AST 25 09/12/2024    ALT 16 09/12/2024    ALKPHOS 70 09/12/2024    PROTTOTAL 6.5 09/12/2024    ALBUMIN 3.4 (L) 09/12/2024       ASSESSMENT AND PLAN     Leland Pearson is a 69 year old male with 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     BMT/IEC PROTOCOL for MDS  - Chemo protocol: MT 2022-5; Flu/Cy/TBI  - Peripheral blood stem cell graft from 8/8 URD donor, ABO matched, Cell dose: 6.06 x10^6 CD34/kg  - Engraftment monitoring: Peripheral blood and bone marrow chimerisms on D28, D100, 6 months, 1 and 2 years  - Restaging plan: BMBx with FISH, cytogenetics and NGS on D28, D100, 6 months, 1 and 2 years  - D21 BMbx completed 9/13 at 11am on 5C. Labs pending.     HEME/COAG  # Pancytopenia 2/2 chemo/BMT  - Last day of G (9/12)   - Transfusion parameters: hemoglobin <7, platelets <10. (9/13) PLTs given -- decreased parameters as hematuria has resolved.     RISK OF GVHD  - Prophylaxis: PTCy 50mg/kg on D+3 and D+4; MMF (D+5 to 35); Sirolimus (starting D+5, target level 5-10).  - Siro 3.5mg/d. (9/12) 9.6.     ID  #Streptococcus mitis bacteremia (2/2 bottles) - Cefepime (9/1-x)  - ABX duration plan: per ID through 9/20. Sensitive to ceftriaxone q24hrs, can transition to non-pseudomonal coverage for discharge/better engraftment.   - Discussed with ID on 9/13: no indication at this time to repeat abdominal imaging or C. Diff testing. With anticipation for discharge, we will switch to Ceftriaxone (9/13).    # MRSE bacteremia likely contaminant because peripheral stick, grew late (1/2). Dc'd Vanco (9/2-9/6)   - Repeat BC's x3 days NGTD  # Non-Neutropenic Fever: (9/11) 102.7. Peripheral BC  (9/11, 9/13) NGTD. Urine cx 9/9 neg. BK urine and blood neg. Adeno urine pending. CMV neg 9/7. EBV neg. Adeno PCR blood pending. Flu/RSV/COVID neg. Still on cefepime as above. Leland appears non-toxic with no new localizing findings and currently afebrile. MRSA swab neg overnight (9/12)     Prophylaxis plan:   - Fungal: Micafungin until D+45, followed by mold active azole. Pulm nodules present on workup CT.   - PJP: Bactrim to start at D+28. Toxoplasma negative.   - Viral: Acyclovir 800mg BID. No need for letermovir as donor and recipient are CMV negative.      Monitoring:   - CMV weekly, neg 9/7  - EBV every 2 weeks from D30 to D180: (9/13) ND     GI/NUTRITION  # GERD: Protonix  # Loose stools: Imodium x 1 on 9/13. No indication to repeat C. Diff at this time.   - Anti-emetics: Compazine daily at 1600 and PRN. lorazepam available PRN.   - Ulcer prophy: Protonix  - VOD prophy: Ursodiol   - Dietician support to prevent malnutrition    CARDIOVASCULAR  # HTN: PTA lisinopril stopped 8/26 d/t symptomatic orthostasis.   # CAD: Coronary artery calcifications seen on CT, stress test in 2023 negative  - Risk of cardiomyopathy: Baseline EF 55-60%.   - Risk of arrhythmia: Baseline EKG showed 421       RENAL/ELECTROLYTES/  - Electrolyte management: replace per sliding scale  - BPH: Continue PTA flomax.  - Hemorrhagic Cystitis secondary to cytoxan: urinary urge/frequency/dysuria: UA with +nitrates, mod blood, >182 RBC. UC neg. Urine BK neg. BK blood neg. Pyridium not helpful. Ditropan 2.5mg bid (also on Flomax). Adeno urine pending. Symptoms starting to improve--no noted blood per pt but large clot which was hard to pass 9/13. Consulting urology for recs for ongoing dysuria/urgency plus intermittent clots after resolution of gross hematuria.      MUSCULOSKELETAL/FRAILTY  - Baseline Frailty Score: Not frail (0)  - Daily PT/OT as needed while inpatient  # Gout: continue allopurinol      Neuro   # history of spinal stenosis,  "lumbosacral radiculopathy likely exacerbating.   - Pain management: Outpatient had been taking celecoxib for MSK pain, once a day. Stopped Inpatient will try Tramadol hasn't used. Now discontinued.  Added robaxin TID this admission.    SOCIAL DETERMINANTS  - Caregiver: wife, Vani. Lives within the required distance from hospital but may elect to stay in [place].   - Financial/insurance concerns: None    Medically Ready for Discharge: Anticipated in 2-4 Days; Possibly Sunday/Monday pending further engraftment, fever workup, improvement in  sx.   - O/P plan: izzy in clinic 3x a week. Q24s Rocephin thru 9/20 in clinic.   - They are on wait list for [facility] (live in [place] but might prefer closer lodging early post BMT)  - prefer appts ~10am when possible.      Clinically Significant Risk Factors          # Hypocalcemia: Lowest Ca = 8.6 mg/dL in last 2 days, will monitor and replace as appropriate     # Hypoalbuminemia: Lowest albumin = 2.8 g/dL at 9/7/2024  7:13 AM, will monitor as appropriate   # Thrombocytopenia: Lowest platelets = 19 in last 2 days, will monitor for bleeding   # Hypertension: Noted on problem list           # Overweight: Estimated body mass index is 25.46 kg/m  as calculated from the following:    Height as of this encounter: 1.84 m (6' 0.44\").    Weight as of this encounter: 86.2 kg (190 lb).            I spent 40 minutes in the care of this patient today, which included time necessary for preparation for the visit, obtaining history, ordering medications/tests/procedures as medically indicated, review of pertinent medical literature, counseling of the patient, communication of recommendations to the care team, and documentation time.    Baltazar Arguello PA-C   *[id number] or YASMINE    "

## 2024-09-14 LAB
ANION GAP SERPL CALCULATED.3IONS-SCNC: 8 MMOL/L (ref 7–15)
BASOPHILS # BLD AUTO: 0 10E3/UL (ref 0–0.2)
BASOPHILS NFR BLD AUTO: 1 %
BUN SERPL-MCNC: 13.5 MG/DL (ref 8–23)
CALCIUM SERPL-MCNC: 8.6 MG/DL (ref 8.8–10.4)
CHLORIDE SERPL-SCNC: 103 MMOL/L (ref 98–107)
CREAT SERPL-MCNC: 0.61 MG/DL (ref 0.67–1.17)
EGFRCR SERPLBLD CKD-EPI 2021: >90 ML/MIN/1.73M2
EOSINOPHIL # BLD AUTO: 0 10E3/UL (ref 0–0.7)
EOSINOPHIL NFR BLD AUTO: 0 %
ERYTHROCYTE [DISTWIDTH] IN BLOOD BY AUTOMATED COUNT: 16.7 % (ref 10–15)
GLUCOSE SERPL-MCNC: 112 MG/DL (ref 70–99)
HCO3 SERPL-SCNC: 27 MMOL/L (ref 22–29)
HCT VFR BLD AUTO: 22.1 % (ref 40–53)
HGB BLD-MCNC: 7.4 G/DL (ref 13.3–17.7)
IMM GRANULOCYTES # BLD: 0.1 10E3/UL
IMM GRANULOCYTES NFR BLD: 2 %
LAB DIRECTOR DISCLAIMER: NORMAL
LAB DIRECTOR DISCLAIMER: NORMAL
LAB DIRECTOR INTERPRETATION: NORMAL
LAB DIRECTOR INTERPRETATION: NORMAL
LAB DIRECTOR METHODOLOGY: NORMAL
LAB DIRECTOR METHODOLOGY: NORMAL
LAB DIRECTOR RESULTS: NORMAL
LAB DIRECTOR RESULTS: NORMAL
LOCATION OF TASK: NORMAL
LOCATION OF TASK: NORMAL
LYMPHOCYTES # BLD AUTO: 0 10E3/UL (ref 0.8–5.3)
LYMPHOCYTES NFR BLD AUTO: 1 %
MAGNESIUM SERPL-MCNC: 2 MG/DL (ref 1.7–2.3)
MCH RBC QN AUTO: 27.2 PG (ref 26.5–33)
MCHC RBC AUTO-ENTMCNC: 33.5 G/DL (ref 31.5–36.5)
MCV RBC AUTO: 81 FL (ref 78–100)
MONOCYTES # BLD AUTO: 0.2 10E3/UL (ref 0–1.3)
MONOCYTES NFR BLD AUTO: 12 %
NEUTROPHILS # BLD AUTO: 1.7 10E3/UL (ref 1.6–8.3)
NEUTROPHILS NFR BLD AUTO: 84 %
NRBC # BLD AUTO: 0 10E3/UL
NRBC BLD AUTO-RTO: 0 /100
PHOSPHATE SERPL-MCNC: 2.4 MG/DL (ref 2.5–4.5)
PLATELET # BLD AUTO: 25 10E3/UL (ref 150–450)
POTASSIUM SERPL-SCNC: 3.7 MMOL/L (ref 3.4–5.3)
RBC # BLD AUTO: 2.72 10E6/UL (ref 4.4–5.9)
SODIUM SERPL-SCNC: 138 MMOL/L (ref 135–145)
SPECIMEN DESCRIPTION: NORMAL
SPECIMEN DESCRIPTION: NORMAL
WBC # BLD AUTO: 2.1 10E3/UL (ref 4–11)

## 2024-09-14 PROCEDURE — 250N000013 HC RX MED GY IP 250 OP 250 PS 637

## 2024-09-14 PROCEDURE — 250N000011 HC RX IP 250 OP 636

## 2024-09-14 PROCEDURE — 84100 ASSAY OF PHOSPHORUS: CPT | Performed by: STUDENT IN AN ORGANIZED HEALTH CARE EDUCATION/TRAINING PROGRAM

## 2024-09-14 PROCEDURE — G0452 MOLECULAR PATHOLOGY INTERPR: HCPCS | Performed by: PATHOLOGY

## 2024-09-14 PROCEDURE — 250N000009 HC RX 250: Performed by: STUDENT IN AN ORGANIZED HEALTH CARE EDUCATION/TRAINING PROGRAM

## 2024-09-14 PROCEDURE — 250N000011 HC RX IP 250 OP 636: Performed by: INTERNAL MEDICINE

## 2024-09-14 PROCEDURE — 250N000011 HC RX IP 250 OP 636: Performed by: PHYSICIAN ASSISTANT

## 2024-09-14 PROCEDURE — 85025 COMPLETE CBC W/AUTO DIFF WBC: CPT

## 2024-09-14 PROCEDURE — 99233 SBSQ HOSP IP/OBS HIGH 50: CPT | Mod: FS | Performed by: STUDENT IN AN ORGANIZED HEALTH CARE EDUCATION/TRAINING PROGRAM

## 2024-09-14 PROCEDURE — 83735 ASSAY OF MAGNESIUM: CPT | Performed by: STUDENT IN AN ORGANIZED HEALTH CARE EDUCATION/TRAINING PROGRAM

## 2024-09-14 PROCEDURE — 81268 CHIMERISM ANAL W/CELL SELECT: CPT

## 2024-09-14 PROCEDURE — 80048 BASIC METABOLIC PNL TOTAL CA: CPT

## 2024-09-14 PROCEDURE — 250N000013 HC RX MED GY IP 250 OP 250 PS 637: Performed by: PHYSICIAN ASSISTANT

## 2024-09-14 PROCEDURE — 250N000012 HC RX MED GY IP 250 OP 636 PS 637: Performed by: PHYSICIAN ASSISTANT

## 2024-09-14 PROCEDURE — 258N000003 HC RX IP 258 OP 636: Performed by: STUDENT IN AN ORGANIZED HEALTH CARE EDUCATION/TRAINING PROGRAM

## 2024-09-14 PROCEDURE — 258N000003 HC RX IP 258 OP 636

## 2024-09-14 PROCEDURE — 250N000013 HC RX MED GY IP 250 OP 250 PS 637: Performed by: MASSAGE THERAPIST

## 2024-09-14 PROCEDURE — 250N000013 HC RX MED GY IP 250 OP 250 PS 637: Performed by: NURSE PRACTITIONER

## 2024-09-14 PROCEDURE — 87086 URINE CULTURE/COLONY COUNT: CPT | Performed by: PHYSICIAN ASSISTANT

## 2024-09-14 PROCEDURE — 250N000013 HC RX MED GY IP 250 OP 250 PS 637: Performed by: INTERNAL MEDICINE

## 2024-09-14 PROCEDURE — 250N000011 HC RX IP 250 OP 636: Performed by: STUDENT IN AN ORGANIZED HEALTH CARE EDUCATION/TRAINING PROGRAM

## 2024-09-14 PROCEDURE — 206N000001 HC R&B BMT UMMC

## 2024-09-14 RX ORDER — POTASSIUM PHOS IN 0.9 % NACL 15MMOL/250
15 PLASTIC BAG, INJECTION (ML) INTRAVENOUS ONCE
Status: COMPLETED | OUTPATIENT
Start: 2024-09-14 | End: 2024-09-14

## 2024-09-14 RX ORDER — POTASSIUM CHLORIDE 29.8 MG/ML
20 INJECTION INTRAVENOUS ONCE
Status: COMPLETED | OUTPATIENT
Start: 2024-09-14 | End: 2024-09-14

## 2024-09-14 RX ADMIN — HEPARIN, PORCINE (PF) 10 UNIT/ML INTRAVENOUS SYRINGE 5 ML: at 15:09

## 2024-09-14 RX ADMIN — CEFTRIAXONE SODIUM 2 G: 2 INJECTION, POWDER, FOR SOLUTION INTRAMUSCULAR; INTRAVENOUS at 12:35

## 2024-09-14 RX ADMIN — URSODIOL 300 MG: 300 CAPSULE ORAL at 15:08

## 2024-09-14 RX ADMIN — Medication 2 SPRAY: at 16:23

## 2024-09-14 RX ADMIN — POTASSIUM PHOSPHATE, MONOBASIC POTASSIUM PHOSPHATE, DIBASIC 15 MMOL: 224; 236 INJECTION, SOLUTION, CONCENTRATE INTRAVENOUS at 07:04

## 2024-09-14 RX ADMIN — ALLOPURINOL 300 MG: 300 TABLET ORAL at 07:56

## 2024-09-14 RX ADMIN — Medication 3 CAPSULE: at 07:57

## 2024-09-14 RX ADMIN — URSODIOL 300 MG: 300 CAPSULE ORAL at 07:56

## 2024-09-14 RX ADMIN — ACETAMINOPHEN 650 MG: 325 TABLET ORAL at 19:41

## 2024-09-14 RX ADMIN — PANTOPRAZOLE SODIUM 40 MG: 40 TABLET, DELAYED RELEASE ORAL at 07:56

## 2024-09-14 RX ADMIN — Medication 2 SPRAY: at 12:31

## 2024-09-14 RX ADMIN — METHOCARBAMOL 500 MG: 500 TABLET ORAL at 07:57

## 2024-09-14 RX ADMIN — METHOCARBAMOL 500 MG: 500 TABLET ORAL at 19:39

## 2024-09-14 RX ADMIN — ACYCLOVIR 800 MG: 800 TABLET ORAL at 19:39

## 2024-09-14 RX ADMIN — HEPARIN, PORCINE (PF) 10 UNIT/ML INTRAVENOUS SYRINGE 5 ML: at 12:40

## 2024-09-14 RX ADMIN — CALCIUM CARBONATE 600 MG (1,500 MG)-VITAMIN D3 400 UNIT TABLET 2 TABLET: at 17:36

## 2024-09-14 RX ADMIN — OXYBUTYNIN CHLORIDE 10 MG: 10 TABLET, EXTENDED RELEASE ORAL at 23:21

## 2024-09-14 RX ADMIN — Medication 10 ML: at 03:47

## 2024-09-14 RX ADMIN — URSODIOL 300 MG: 300 CAPSULE ORAL at 19:39

## 2024-09-14 RX ADMIN — CALCIUM CARBONATE 600 MG (1,500 MG)-VITAMIN D3 400 UNIT TABLET 2 TABLET: at 08:05

## 2024-09-14 RX ADMIN — HEPARIN, PORCINE (PF) 10 UNIT/ML INTRAVENOUS SYRINGE 5 ML: at 17:36

## 2024-09-14 RX ADMIN — Medication 2 SPRAY: at 19:39

## 2024-09-14 RX ADMIN — METHOCARBAMOL 500 MG: 500 TABLET ORAL at 15:08

## 2024-09-14 RX ADMIN — POTASSIUM CHLORIDE 20 MEQ: 29.8 INJECTION, SOLUTION INTRAVENOUS at 05:44

## 2024-09-14 RX ADMIN — ACYCLOVIR 800 MG: 800 TABLET ORAL at 07:56

## 2024-09-14 RX ADMIN — ACETAMINOPHEN 650 MG: 325 TABLET ORAL at 08:05

## 2024-09-14 RX ADMIN — PROCHLORPERAZINE MALEATE 5 MG: 5 TABLET ORAL at 16:22

## 2024-09-14 RX ADMIN — MYCOPHENOLATE MOFETIL 1250 MG: 250 CAPSULE ORAL at 19:39

## 2024-09-14 RX ADMIN — MYCOPHENOLATE MOFETIL 1250 MG: 250 CAPSULE ORAL at 07:57

## 2024-09-14 RX ADMIN — TAMSULOSIN HYDROCHLORIDE 0.4 MG: 0.4 CAPSULE ORAL at 16:22

## 2024-09-14 RX ADMIN — ACETAMINOPHEN 650 MG: 325 TABLET ORAL at 00:15

## 2024-09-14 RX ADMIN — MICAFUNGIN SODIUM 150 MG: 50 INJECTION, POWDER, LYOPHILIZED, FOR SOLUTION INTRAVENOUS at 10:07

## 2024-09-14 RX ADMIN — SIROLIMUS 3.5 MG: 2 TABLET ORAL at 07:58

## 2024-09-14 RX ADMIN — Medication 2 SPRAY: at 07:59

## 2024-09-14 ASSESSMENT — ACTIVITIES OF DAILY LIVING (ADL)
ADLS_ACUITY_SCORE: 25

## 2024-09-14 NOTE — PROGRESS NOTES
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     Diagnosis MDS/AML  HCT Type Allogeneic     Prep Regimen Flu/Cy/TBI  Donor Match & Source 8/8 DP permissive PBSC    GVHD Prophylaxis PTCy/MMF/Siro  Primary BMT MD Dr. Murphy    Clinical Trials [id number]       INTERVAL  HISTORY     No acute events overnight. Continues to have urinary urgency/frequency. Utilizing primofit overnight. Was seen by urology yesterday, recommended to increase his oxybuytinin to 10mg. Will monitor for improvement, however note to be cautious of any increased delirium and fall risk. Infectious workup continues to remain negative. Remains on cefepime. Net negative 1.4L. Had some neck pain overnight with tylenol improvement. Required K and Phos replacement.  Had BmBx yesterday and tolerated procedure well.    Review of Systems: ROS negative except as noted above.    PHYSICAL EXAM     Vitals:    09/11/24 0741 09/12/24 0733 09/13/24 0840   Weight: 86.8 kg (191 lb 6.4 oz) 85.7 kg (188 lb 14.4 oz) 86.2 kg (190 lb)      KPS:  70    /75 (BP Location: Left arm)   Pulse 94   Temp 98  F (36.7  C) (Oral)   Resp 16   Ht 1.84 m (6' 0.44\")   Wt 86.2 kg (190 lb)   SpO2 99%   BMI 25.46 kg/m       General:  NAD   HEENT: No oral lesions  Lungs: CTAB  CV: RRR, no MRG    Abdomen: Soft. NT. ND. +BS  Lymphatics: No edema  Neuro: A&O, appropriately interactive, speech clear, moving all extremities   Skin: No rash  Access: R arm PICC dressing cdi, no drainage    Current aGVHD staging: (BMT Assessment tab)    LABS AND IMAGING: I have assessed all abnormal lab values for their clinical significance and any values considered clinically significant have been addressed in the assessment and plan.      Lab Results   Component Value Date    WBC 2.1 (L) " 09/14/2024    ANEU 2.6 09/12/2024    HGB 7.4 (L) 09/14/2024    HCT 22.1 (L) 09/14/2024    PLT 25 (LL) 09/14/2024     09/14/2024    POTASSIUM 3.7 09/14/2024    CHLORIDE 103 09/14/2024    CO2 27 09/14/2024     (H) 09/14/2024    BUN 13.5 09/14/2024    CR 0.61 (L) 09/14/2024    MAG 2.0 09/14/2024    INR 1.06 09/09/2024    BILITOTAL 0.5 09/12/2024    AST 25 09/12/2024    ALT 16 09/12/2024    ALKPHOS 70 09/12/2024    PROTTOTAL 6.5 09/12/2024    ALBUMIN 3.4 (L) 09/12/2024       ASSESSMENT AND PLAN     Leland Pearson is a 69 year old male with 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     BMT/IEC PROTOCOL for MDS  - Chemo protocol: MT 2022-5; Flu/Cy/TBI  - Peripheral blood stem cell graft from 8/8 URD donor, ABO matched, Cell dose: 6.06 x10^6 CD34/kg  - Engraftment monitoring: Peripheral blood and bone marrow chimerisms on D28, D100, 6 months, 1 and 2 years  - Restaging plan: BMBx with FISH, cytogenetics and NGS on D28, D100, 6 months, 1 and 2 years  - D21 BMbx completed 9/13 at 11am on 5C. Flow negative, pending path/chimerisms etc.      HEME/COAG  # Pancytopenia 2/2 chemo/BMT  - Last day of G (9/12)   - Transfusion parameters: hemoglobin <7, platelets <10. (9/13) PLTs given -- decreased parameters as hematuria has resolved.     RISK OF GVHD  - Prophylaxis: PTCy 50mg/kg on D+3 and D+4; MMF (D+5 to 35); Sirolimus (starting D+5, target level 5-10).  - Siro 3.5mg/d. (9/12) 9.6.     ID  #Streptococcus mitis bacteremia (2/2 bottles) - Cefepime (9/1-9/13), Ceftriaxone (9/13-x)   - ABX duration plan: per ID through 9/20. Sensitive to ceftriaxone q24hrs, can transition to non-pseudomonal coverage for discharge/better engraftment.   - Discussed with ID on 9/13: no indication at this time to repeat abdominal imaging or C. Diff testing. With anticipation for discharge, we will switch to Ceftriaxone (9/13).    # MRSE bacteremia likely contaminant because  peripheral stick, grew late (1/2). Dc'd Vanco (9/2-9/6)   - Repeat BC's x3 days NGTD  # Non-Neutropenic Fever: (9/11) 102.7. Peripheral BC (9/11, 9/13) NGTD. Urine cx 9/9 neg. BK urine and blood neg. Adeno urine pending. CMV neg 9/7. EBV neg. Adeno PCR blood pending. Flu/RSV/COVID neg. Still on cefepime as above. Leland appears non-toxic with no new localizing findings and currently afebrile. MRSA swab neg overnight (9/12)     Prophylaxis plan:   - Fungal: Micafungin until D+45, followed by mold active azole. Pulm nodules present on workup CT.   - PJP: Bactrim to start at D+28. Toxoplasma negative.   - Viral: Acyclovir 800mg BID. No need for letermovir as donor and recipient are CMV negative.      Monitoring:   - CMV weekly, neg 9/7  - EBV every 2 weeks from D30 to D180: (9/13) ND     GI/NUTRITION  # GERD: Protonix  # Loose stools: Imodium x 1 on 9/13. No indication to repeat C. Diff at this time.   - Anti-emetics: Compazine daily at 1600 and PRN. lorazepam available PRN.   - Ulcer prophy: Protonix  - VOD prophy: Ursodiol   - Dietician support to prevent malnutrition    CARDIOVASCULAR  # HTN: PTA lisinopril stopped 8/26 d/t symptomatic orthostasis.   # CAD: Coronary artery calcifications seen on CT, stress test in 2023 negative  - Risk of cardiomyopathy: Baseline EF 55-60%.   - Risk of arrhythmia: Baseline EKG showed 421       RENAL/ELECTROLYTES/  - Electrolyte management: replace per sliding scale  - BPH: Continue PTA flomax.  - Hemorrhagic Cystitis secondary to cytoxan: urinary urge/frequency/dysuria: UA with +nitrates, mod blood, >182 RBC. UC neg. Urine BK neg. BK blood neg. Pyridium not helpful. Ditropan 2.5mg bid (also on Flomax). Adeno urine pending. Symptoms starting to improve--no noted blood per pt but large clot which was hard to pass 9/13.   Urology recommended increasing oxybuytinin.      MUSCULOSKELETAL/FRAILTY  - Baseline Frailty Score: Not frail (0)  - Daily PT/OT as needed while inpatient  # Gout:  "continue allopurinol      Neuro   # history of spinal stenosis, lumbosacral radiculopathy likely exacerbating.   - Pain management: Outpatient had been taking celecoxib for MSK pain, once a day. Stopped Inpatient will try Tramadol hasn't used. Now discontinued.  Added robaxin TID this admission.    SOCIAL DETERMINANTS  - Caregiver: wife, Vani. Lives within the required distance from hospital   - Financial/insurance concerns: None    Medically Ready for Discharge: Anticipated in 2-4 Days;   - O/P plan: izzy in clinic 3x a week. Q24s Rocephin thru 9/20 in clinic.   - Plan to discharge maybe Tuesday, ideally would like to get urinary symptoms under better control  - Will plan to go [place] instead of [place]       Clinically Significant Risk Factors              # Hypoalbuminemia: Lowest albumin = 2.8 g/dL at 9/7/2024  7:13 AM, will monitor as appropriate   # Thrombocytopenia: Lowest platelets = 19 in last 2 days, will monitor for bleeding   # Hypertension: Noted on problem list           # Overweight: Estimated body mass index is 25.46 kg/m  as calculated from the following:    Height as of this encounter: 1.84 m (6' 0.44\").    Weight as of this encounter: 86.2 kg (190 lb).            I spent 30 minutes in the care of this patient today, which included time necessary for preparation for the visit, obtaining history, ordering medications/tests/procedures as medically indicated, review of pertinent medical literature, counseling of the patient, communication of recommendations to the care team, and documentation time.    Naida Andrea PA-C       "

## 2024-09-14 NOTE — PLAN OF CARE
"/75 (BP Location: Left arm)   Pulse 94   Temp 98  F (36.7  C) (Oral)   Resp 16   Ht 1.84 m (6' 0.44\")   Wt 86.2 kg (190 lb)   SpO2 99%   BMI 25.46 kg/m       Patient afebrile, vital signs stable. Patient received acetaminophen x1 for neck pain and pain was managed. No reports of nausea. Patient is utilizing primofit due to urgency/frequency. Patient will be receiving k and phos this morning. Patient assist level independent. Continue with plan of care.    Goal Outcome Evaluation:      Plan of Care Reviewed With: patient    Overall Patient Progress: no changeOverall Patient Progress: no change       Problem: Stem Cell/Bone Marrow Transplant  Goal: Symptom-Free Urinary Elimination  Outcome: Not Progressing     Problem: Adult Inpatient Plan of Care  Goal: Optimal Comfort and Wellbeing  Outcome: Progressing     Problem: Risk for Delirium  Goal: Optimal Coping  Outcome: Progressing  Goal: Improved Sleep  Outcome: Progressing     Problem: Stem Cell/Bone Marrow Transplant  Goal: Optimal Coping with Transplant  Outcome: Progressing  Goal: Improved Activity Tolerance  Outcome: Progressing  Intervention: Promote Improved Energy  Recent Flowsheet Documentation  Taken 9/14/2024 0000 by Johnathon Mcintosh, RN  Activity Management:   activity adjusted per tolerance   ambulated in room  Taken 9/13/2024 2000 by Johnathon Mcintosh, RN  Activity Management:   activity adjusted per tolerance   ambulated in room  Goal: Absence of Hypersensitivity Reaction  Outcome: Progressing  Goal: Absence of Infection  Outcome: Progressing  Intervention: Prevent and Manage Infection  Recent Flowsheet Documentation  Taken 9/14/2024 0000 by Johnathon Mcintosh, RN  Infection Prevention:   cohorting utilized   environmental surveillance performed   equipment surfaces disinfected   hand hygiene promoted   personal protective equipment utilized   rest/sleep promoted   single patient room provided   visitors restricted/screened  Taken 9/13/2024 " 2000 by Johnathon Mcintosh, RN  Infection Prevention:   cohorting utilized   environmental surveillance performed   equipment surfaces disinfected   hand hygiene promoted   personal protective equipment utilized   rest/sleep promoted   single patient room provided   visitors restricted/screened  Goal: Improved Oral Mucous Membrane Health and Integrity  Outcome: Progressing  Goal: Nausea and Vomiting Symptom Relief  Outcome: Progressing

## 2024-09-14 NOTE — PLAN OF CARE
"Patient remains hospitalized following stem cell transplant, currently day +22. Monitoring return of counts. Continues on ppx antimicrobials as well as IV Rocephin. Continues on PO MMF & sirolimus. Appetite good. Denies nausea. 1 formed BM this shift. K and phos replaced early this AM.Continues to have urinary frequency, but reports urgency is getting better. Reported lower back pain x1 this shift - Tylenol administered and effective. Later this evening, reported R neck discomfort, near old CVC insertion site. Area noted to be slightly dark pink/red. No swelling noted. Ambulating independently, up on stationary bike today. VSS, tmax of 99.7 - continuing to monitor.       Problem: Adult Inpatient Plan of Care  Goal: Plan of Care Review  Description: The Plan of Care Review/Shift note should be completed every shift.  The Outcome Evaluation is a brief statement about your assessment that the patient is improving, declining, or no change.  This information will be displayed automatically on your shift  note.  Outcome: Progressing  Goal: Patient-Specific Goal (Individualized)  Description: You can add care plan individualizations to a care plan. Examples of Individualization might be:  \"Parent requests to be called daily at 9am for status\", \"I have a hard time hearing out of my right ear\", or \"Do not touch me to wake me up as it startles  me\".  Outcome: Progressing  Goal: Absence of Hospital-Acquired Illness or Injury  Outcome: Progressing  Intervention: Identify and Manage Fall Risk  Recent Flowsheet Documentation  Taken 9/14/2024 1619 by Lulú Isidro, RN  Safety Promotion/Fall Prevention:   assistive device/personal items within reach   chemotherapeutic precautions   clutter free environment maintained   lighting adjusted   nonskid shoes/slippers when out of bed   patient and family education   safety round/check completed   room organization consistent  Taken 9/14/2024 1200 by Lulú Isidro, RN  Safety " Promotion/Fall Prevention:   assistive device/personal items within reach   chemotherapeutic precautions   clutter free environment maintained   lighting adjusted   nonskid shoes/slippers when out of bed   patient and family education   safety round/check completed   room organization consistent  Taken 9/14/2024 0809 by Lulú Isidro RN  Safety Promotion/Fall Prevention:   safety round/check completed   assistive device/personal items within reach   chemotherapeutic precautions   clutter free environment maintained   lighting adjusted   nonskid shoes/slippers when out of bed   patient and family education   room organization consistent  Intervention: Prevent Skin Injury  Recent Flowsheet Documentation  Taken 9/14/2024 1619 by Lulú Isidro RN  Body Position: supine  Taken 9/14/2024 1500 by Lulú Isidro RN  Body Position: supine, head elevated  Taken 9/14/2024 1400 by uLlú Isidro RN  Body Position: supine, head elevated  Taken 9/14/2024 1000 by Lulú Isidro RN  Body Position: supine  Taken 9/14/2024 0900 by Lulú Isidro RN  Body Position: supine  Goal: Optimal Comfort and Wellbeing  Outcome: Progressing  Intervention: Monitor Pain and Promote Comfort  Recent Flowsheet Documentation  Taken 9/14/2024 0805 by Lulú Isidro RN  Pain Management Interventions: medication (see MAR)  Goal: Readiness for Transition of Care  Outcome: Progressing     Problem: Risk for Delirium  Goal: Optimal Coping  Outcome: Progressing  Goal: Improved Sleep  Outcome: Progressing     Problem: Stem Cell/Bone Marrow Transplant  Goal: Optimal Coping with Transplant  Outcome: Progressing  Goal: Symptom-Free Urinary Elimination  Outcome: Progressing  Intervention: Prevent or Manage Bladder Irritation  Recent Flowsheet Documentation  Taken 9/14/2024 0805 by Lulú Isidro RN  Pain Management Interventions: medication (see MAR)  Goal: Diarrhea Symptom Control  Outcome: Progressing  Goal: Improved Activity  Tolerance  Outcome: Progressing  Intervention: Promote Improved Energy  Recent Flowsheet Documentation  Taken 9/14/2024 1200 by Lulú Isidro RN  Activity Management: up in chair  Taken 9/14/2024 1100 by Lulú Isidro RN  Activity Management: (on stationary bike) ambulated in room  Taken 9/14/2024 1000 by Lulú Isidro RN  Activity Management: activity encouraged  Goal: Blood Counts Within Acceptable Range  Outcome: Progressing  Intervention: Monitor and Manage Hematologic Symptoms  Recent Flowsheet Documentation  Taken 9/14/2024 1619 by Lulú Isidro RN  Medication Review/Management:   medications reviewed   high-risk medications identified  Taken 9/14/2024 1200 by Lulú Isidro RN  Medication Review/Management:   medications reviewed   high-risk medications identified  Taken 9/14/2024 0809 by Lulú Isidro RN  Medication Review/Management:   medications reviewed   high-risk medications identified  Goal: Absence of Hypersensitivity Reaction  Outcome: Progressing  Goal: Absence of Infection  Outcome: Progressing  Intervention: Prevent and Manage Infection  Recent Flowsheet Documentation  Taken 9/14/2024 1619 by Lulú Isidro RN  Isolation Precautions:   cytotoxic precautions maintained   protective environment maintained  Taken 9/14/2024 1200 by Lulú Isidro RN  Isolation Precautions:   cytotoxic precautions maintained   protective environment maintained  Taken 9/14/2024 0809 by Lulú Isidro RN  Isolation Precautions:   cytotoxic precautions maintained   protective environment maintained  Goal: Improved Oral Mucous Membrane Health and Integrity  Outcome: Progressing  Goal: Nausea and Vomiting Symptom Relief  Outcome: Progressing  Goal: Optimal Nutrition Intake  Outcome: Progressing   Goal Outcome Evaluation:

## 2024-09-15 ENCOUNTER — APPOINTMENT (OUTPATIENT)
Dept: ULTRASOUND IMAGING | Facility: CLINIC | Age: 69
DRG: 014 | End: 2024-09-15
Attending: PHYSICIAN ASSISTANT
Payer: COMMERCIAL

## 2024-09-15 ENCOUNTER — APPOINTMENT (OUTPATIENT)
Dept: GENERAL RADIOLOGY | Facility: CLINIC | Age: 69
DRG: 014 | End: 2024-09-15
Attending: PHYSICIAN ASSISTANT
Payer: COMMERCIAL

## 2024-09-15 LAB
ANION GAP SERPL CALCULATED.3IONS-SCNC: 11 MMOL/L (ref 7–15)
BACTERIA UR CULT: NO GROWTH
BASOPHILS # BLD AUTO: 0 10E3/UL (ref 0–0.2)
BASOPHILS NFR BLD AUTO: 0 %
BUN SERPL-MCNC: 15.1 MG/DL (ref 8–23)
CALCIUM SERPL-MCNC: 8.4 MG/DL (ref 8.8–10.4)
CHLORIDE SERPL-SCNC: 100 MMOL/L (ref 98–107)
CMV DNA SPEC NAA+PROBE-ACNC: NOT DETECTED IU/ML
CREAT SERPL-MCNC: 0.59 MG/DL (ref 0.67–1.17)
EGFRCR SERPLBLD CKD-EPI 2021: >90 ML/MIN/1.73M2
EOSINOPHIL # BLD AUTO: 0 10E3/UL (ref 0–0.7)
EOSINOPHIL NFR BLD AUTO: 0 %
ERYTHROCYTE [DISTWIDTH] IN BLOOD BY AUTOMATED COUNT: 16.6 % (ref 10–15)
GLUCOSE SERPL-MCNC: 120 MG/DL (ref 70–99)
HCO3 SERPL-SCNC: 24 MMOL/L (ref 22–29)
HCT VFR BLD AUTO: 22.6 % (ref 40–53)
HGB BLD-MCNC: 7.6 G/DL (ref 13.3–17.7)
IMM GRANULOCYTES # BLD: 0.1 10E3/UL
IMM GRANULOCYTES NFR BLD: 3 %
LACTATE SERPL-SCNC: 0.8 MMOL/L (ref 0.7–2)
LYMPHOCYTES # BLD AUTO: 0 10E3/UL (ref 0.8–5.3)
LYMPHOCYTES NFR BLD AUTO: 1 %
MAGNESIUM SERPL-MCNC: 1.9 MG/DL (ref 1.7–2.3)
MCH RBC QN AUTO: 27.1 PG (ref 26.5–33)
MCHC RBC AUTO-ENTMCNC: 33.6 G/DL (ref 31.5–36.5)
MCV RBC AUTO: 81 FL (ref 78–100)
MONOCYTES # BLD AUTO: 0.3 10E3/UL (ref 0–1.3)
MONOCYTES NFR BLD AUTO: 19 %
NEUTROPHILS # BLD AUTO: 1.4 10E3/UL (ref 1.6–8.3)
NEUTROPHILS NFR BLD AUTO: 77 %
NRBC # BLD AUTO: 0 10E3/UL
NRBC BLD AUTO-RTO: 0 /100
PHOSPHATE SERPL-MCNC: 2.4 MG/DL (ref 2.5–4.5)
PLATELET # BLD AUTO: 23 10E3/UL (ref 150–450)
POTASSIUM SERPL-SCNC: 3.9 MMOL/L (ref 3.4–5.3)
RADIOLOGIST FLAGS: ABNORMAL
RBC # BLD AUTO: 2.8 10E6/UL (ref 4.4–5.9)
SODIUM SERPL-SCNC: 135 MMOL/L (ref 135–145)
WBC # BLD AUTO: 1.8 10E3/UL (ref 4–11)

## 2024-09-15 PROCEDURE — 258N000003 HC RX IP 258 OP 636

## 2024-09-15 PROCEDURE — 83605 ASSAY OF LACTIC ACID: CPT | Performed by: STUDENT IN AN ORGANIZED HEALTH CARE EDUCATION/TRAINING PROGRAM

## 2024-09-15 PROCEDURE — 250N000013 HC RX MED GY IP 250 OP 250 PS 637

## 2024-09-15 PROCEDURE — 250N000013 HC RX MED GY IP 250 OP 250 PS 637: Performed by: MASSAGE THERAPIST

## 2024-09-15 PROCEDURE — 71045 X-RAY EXAM CHEST 1 VIEW: CPT

## 2024-09-15 PROCEDURE — 250N000012 HC RX MED GY IP 250 OP 636 PS 637: Performed by: PHYSICIAN ASSISTANT

## 2024-09-15 PROCEDURE — 250N000013 HC RX MED GY IP 250 OP 250 PS 637: Performed by: NURSE PRACTITIONER

## 2024-09-15 PROCEDURE — 250N000011 HC RX IP 250 OP 636

## 2024-09-15 PROCEDURE — 87040 BLOOD CULTURE FOR BACTERIA: CPT

## 2024-09-15 PROCEDURE — 85025 COMPLETE CBC W/AUTO DIFF WBC: CPT

## 2024-09-15 PROCEDURE — 93971 EXTREMITY STUDY: CPT | Mod: RT

## 2024-09-15 PROCEDURE — 71045 X-RAY EXAM CHEST 1 VIEW: CPT | Mod: 26 | Performed by: RADIOLOGY

## 2024-09-15 PROCEDURE — 206N000001 HC R&B BMT UMMC

## 2024-09-15 PROCEDURE — 99233 SBSQ HOSP IP/OBS HIGH 50: CPT | Mod: FS | Performed by: STUDENT IN AN ORGANIZED HEALTH CARE EDUCATION/TRAINING PROGRAM

## 2024-09-15 PROCEDURE — 80048 BASIC METABOLIC PNL TOTAL CA: CPT

## 2024-09-15 PROCEDURE — 83735 ASSAY OF MAGNESIUM: CPT | Performed by: STUDENT IN AN ORGANIZED HEALTH CARE EDUCATION/TRAINING PROGRAM

## 2024-09-15 PROCEDURE — 250N000011 HC RX IP 250 OP 636: Performed by: INTERNAL MEDICINE

## 2024-09-15 PROCEDURE — 250N000013 HC RX MED GY IP 250 OP 250 PS 637: Performed by: INTERNAL MEDICINE

## 2024-09-15 PROCEDURE — 250N000009 HC RX 250: Performed by: STUDENT IN AN ORGANIZED HEALTH CARE EDUCATION/TRAINING PROGRAM

## 2024-09-15 PROCEDURE — 84100 ASSAY OF PHOSPHORUS: CPT | Performed by: STUDENT IN AN ORGANIZED HEALTH CARE EDUCATION/TRAINING PROGRAM

## 2024-09-15 PROCEDURE — 36415 COLL VENOUS BLD VENIPUNCTURE: CPT

## 2024-09-15 PROCEDURE — 93971 EXTREMITY STUDY: CPT | Mod: 26 | Performed by: RADIOLOGY

## 2024-09-15 PROCEDURE — 250N000011 HC RX IP 250 OP 636: Performed by: PHYSICIAN ASSISTANT

## 2024-09-15 PROCEDURE — 258N000003 HC RX IP 258 OP 636: Performed by: STUDENT IN AN ORGANIZED HEALTH CARE EDUCATION/TRAINING PROGRAM

## 2024-09-15 RX ORDER — CEFEPIME HYDROCHLORIDE 2 G/1
2 INJECTION, POWDER, FOR SOLUTION INTRAVENOUS EVERY 8 HOURS
Status: DISCONTINUED | OUTPATIENT
Start: 2024-09-15 | End: 2024-09-17

## 2024-09-15 RX ORDER — POTASSIUM PHOS IN 0.9 % NACL 15MMOL/250
15 PLASTIC BAG, INJECTION (ML) INTRAVENOUS ONCE
Status: COMPLETED | OUTPATIENT
Start: 2024-09-15 | End: 2024-09-15

## 2024-09-15 RX ADMIN — Medication 5 ML: at 12:05

## 2024-09-15 RX ADMIN — ACYCLOVIR 800 MG: 800 TABLET ORAL at 20:19

## 2024-09-15 RX ADMIN — Medication 2 SPRAY: at 20:24

## 2024-09-15 RX ADMIN — URSODIOL 300 MG: 300 CAPSULE ORAL at 08:50

## 2024-09-15 RX ADMIN — METHOCARBAMOL 500 MG: 500 TABLET ORAL at 20:19

## 2024-09-15 RX ADMIN — MICAFUNGIN SODIUM 150 MG: 50 INJECTION, POWDER, LYOPHILIZED, FOR SOLUTION INTRAVENOUS at 10:41

## 2024-09-15 RX ADMIN — METHOCARBAMOL 500 MG: 500 TABLET ORAL at 08:50

## 2024-09-15 RX ADMIN — Medication 3 CAPSULE: at 08:50

## 2024-09-15 RX ADMIN — SIROLIMUS 3.5 MG: 2 TABLET ORAL at 08:51

## 2024-09-15 RX ADMIN — ACETAMINOPHEN 650 MG: 325 TABLET ORAL at 21:55

## 2024-09-15 RX ADMIN — MYCOPHENOLATE MOFETIL 1250 MG: 250 CAPSULE ORAL at 20:19

## 2024-09-15 RX ADMIN — CEFEPIME HYDROCHLORIDE 2 G: 2 INJECTION, POWDER, FOR SOLUTION INTRAVENOUS at 20:22

## 2024-09-15 RX ADMIN — CALCIUM CARBONATE 600 MG (1,500 MG)-VITAMIN D3 400 UNIT TABLET 2 TABLET: at 08:50

## 2024-09-15 RX ADMIN — Medication 2 SPRAY: at 12:05

## 2024-09-15 RX ADMIN — ACETAMINOPHEN 650 MG: 325 TABLET ORAL at 11:09

## 2024-09-15 RX ADMIN — URSODIOL 300 MG: 300 CAPSULE ORAL at 20:19

## 2024-09-15 RX ADMIN — Medication 2 SPRAY: at 08:56

## 2024-09-15 RX ADMIN — MYCOPHENOLATE MOFETIL 1250 MG: 250 CAPSULE ORAL at 08:51

## 2024-09-15 RX ADMIN — POTASSIUM PHOSPHATE, MONOBASIC POTASSIUM PHOSPHATE, DIBASIC 15 MMOL: 224; 236 INJECTION, SOLUTION, CONCENTRATE INTRAVENOUS at 06:53

## 2024-09-15 RX ADMIN — CEFEPIME HYDROCHLORIDE 2 G: 2 INJECTION, POWDER, FOR SOLUTION INTRAVENOUS at 12:54

## 2024-09-15 RX ADMIN — TAMSULOSIN HYDROCHLORIDE 0.4 MG: 0.4 CAPSULE ORAL at 17:22

## 2024-09-15 RX ADMIN — ALLOPURINOL 300 MG: 300 TABLET ORAL at 08:50

## 2024-09-15 RX ADMIN — ACETAMINOPHEN 650 MG: 325 TABLET ORAL at 17:29

## 2024-09-15 RX ADMIN — OXYBUTYNIN CHLORIDE 10 MG: 10 TABLET, EXTENDED RELEASE ORAL at 21:55

## 2024-09-15 RX ADMIN — Medication 10 ML: at 03:53

## 2024-09-15 RX ADMIN — HEPARIN, PORCINE (PF) 10 UNIT/ML INTRAVENOUS SYRINGE 5 ML: at 12:01

## 2024-09-15 RX ADMIN — METHOCARBAMOL 500 MG: 500 TABLET ORAL at 15:15

## 2024-09-15 RX ADMIN — URSODIOL 300 MG: 300 CAPSULE ORAL at 15:15

## 2024-09-15 RX ADMIN — ACYCLOVIR 800 MG: 800 TABLET ORAL at 08:50

## 2024-09-15 RX ADMIN — ACETAMINOPHEN 650 MG: 325 TABLET ORAL at 03:53

## 2024-09-15 RX ADMIN — PANTOPRAZOLE SODIUM 40 MG: 40 TABLET, DELAYED RELEASE ORAL at 08:51

## 2024-09-15 ASSESSMENT — ACTIVITIES OF DAILY LIVING (ADL)
ADLS_ACUITY_SCORE: 25

## 2024-09-15 NOTE — PLAN OF CARE
"BP (!) 141/82 (BP Location: Left arm)   Pulse 99   Temp 98.6  F (37  C) (Oral)   Resp 16   Ht 1.84 m (6' 0.44\")   Wt 85.4 kg (188 lb 3.2 oz)   SpO2 98%   BMI 25.21 kg/m      Intermittently hypertensive and mildy tachy, OVSS on RA. Denies nausea. Reports mild neck/back pain as well as pain and CVC removal site, tylenol x2 given with okay relief. CVC removal site pink, slightly raised and tender to touch. Utilizing primofit overnight, voiding well. No BM. Phosphorus infusing, recheck tomorrow with AM labs. No other replacements needed. Continue plan of care.     Problem: Adult Inpatient Plan of Care  Goal: Plan of Care Review  Description: The Plan of Care Review/Shift note should be completed every shift.  The Outcome Evaluation is a brief statement about your assessment that the patient is improving, declining, or no change.  This information will be displayed automatically on your shift  note.  Outcome: Progressing     Problem: Stem Cell/Bone Marrow Transplant  Goal: Optimal Coping with Transplant  Outcome: Progressing   Goal Outcome Evaluation:                        "

## 2024-09-15 NOTE — PLAN OF CARE
"/82   Pulse 102   Temp (!) 101.7  F (38.7  C) (Oral)   Resp 16   Ht 1.84 m (6' 0.44\")   Wt 84.9 kg (187 lb 1.6 oz)   SpO2 96%   BMI 25.07 kg/m      Tachycardic w/ HR's in low 100's, OVSS on RA. Denies CP, SOB, nausea/vomiting, numbness/tingling. C/O neck & back pain and swelling near CVC removal site, w/ difficulty swollowing large pills, tylenol given x2 w/ little relief per pt. CVC removal site pink & tender to touch w/ boarders marked. Ultrasound of upper extremity completed. Temp max of 101.7, blood cultures drawn, CXR obtained, abx initiated. Primofit removed during the day only utilizing at night, voiding adequately. LBM 9/15. Phosphorus replaced today w/ recheck tomorrow.     Goal Outcome Evaluation:      Plan of Care Reviewed With: patient    Overall Patient Progress: no changeOverall Patient Progress: no change       Problem: Adult Inpatient Plan of Care  Goal: Plan of Care Review  Description: The Plan of Care Review/Shift note should be completed every shift.  The Outcome Evaluation is a brief statement about your assessment that the patient is improving, declining, or no change.  This information will be displayed automatically on your shift  note.  Outcome: Not Progressing  Flowsheets (Taken 9/15/2024 1410)  Plan of Care Reviewed With: patient  Overall Patient Progress: no change  Goal: Patient-Specific Goal (Individualized)  Description: You can add care plan individualizations to a care plan. Examples of Individualization might be:  \"Parent requests to be called daily at 9am for status\", \"I have a hard time hearing out of my right ear\", or \"Do not touch me to wake me up as it startles  me\".  Outcome: Not Progressing  Goal: Absence of Hospital-Acquired Illness or Injury  Outcome: Not Progressing  Intervention: Identify and Manage Fall Risk  Recent Flowsheet Documentation  Taken 9/15/2024 1338 by Dalia Colbert RN  Safety Promotion/Fall Prevention: safety round/check completed  Taken " 9/15/2024 1226 by Dalia Colbert RN  Safety Promotion/Fall Prevention: safety round/check completed  Taken 9/15/2024 1032 by Dalia Colbert RN  Safety Promotion/Fall Prevention: safety round/check completed  Taken 9/15/2024 0915 by Dalia Colbert RN  Safety Promotion/Fall Prevention: safety round/check completed  Taken 9/15/2024 0845 by Dalia Colbert RN  Safety Promotion/Fall Prevention:   assistive device/personal items within reach   clutter free environment maintained   nonskid shoes/slippers when out of bed   patient and family education   safety round/check completed   supervised activity  Intervention: Prevent Skin Injury  Recent Flowsheet Documentation  Taken 9/15/2024 0845 by Dalia Colbert RN  Body Position: position changed independently  Skin Protection:   adhesive use limited   transparent dressing maintained  Device Skin Pressure Protection:   adhesive use limited   tubing/devices free from skin contact  Intervention: Prevent and Manage VTE (Venous Thromboembolism) Risk  Recent Flowsheet Documentation  Taken 9/15/2024 0845 by Dalia Colbert RN  VTE Prevention/Management: SCDs off (sequential compression devices)  Intervention: Prevent Infection  Recent Flowsheet Documentation  Taken 9/15/2024 1226 by Dalia Colbert RN  Infection Prevention:   cohorting utilized   environmental surveillance performed   equipment surfaces disinfected   hand hygiene promoted   single patient room provided  Taken 9/15/2024 0845 by Dalia Colbert RN  Infection Prevention:   cohorting utilized   environmental surveillance performed   equipment surfaces disinfected   hand hygiene promoted   single patient room provided  Goal: Optimal Comfort and Wellbeing  Outcome: Not Progressing  Intervention: Monitor Pain and Promote Comfort  Recent Flowsheet Documentation  Taken 9/15/2024 0845 by Dalia Colbert RN  Pain Management Interventions: medication (see MAR)  Goal: Readiness  for Transition of Care  Outcome: Not Progressing     Problem: Risk for Delirium  Goal: Optimal Coping  Outcome: Not Progressing  Intervention: Optimize Psychosocial Adjustment to Delirium  Recent Flowsheet Documentation  Taken 9/15/2024 0845 by Dalia Colbert RN  Supportive Measures:   active listening utilized   self-care encouraged   verbalization of feelings encouraged  Family/Support System Care: self-care encouraged  Goal: Improved Sleep  Outcome: Not Progressing     Problem: Stem Cell/Bone Marrow Transplant  Goal: Optimal Coping with Transplant  Outcome: Not Progressing  Intervention: Optimize Patient/Family Adjustment to Transplant  Recent Flowsheet Documentation  Taken 9/15/2024 0845 by Dalia Colbert RN  Supportive Measures:   active listening utilized   self-care encouraged   verbalization of feelings encouraged  Family/Support System Care: self-care encouraged  Goal: Symptom-Free Urinary Elimination  Outcome: Not Progressing  Intervention: Prevent or Manage Bladder Irritation  Recent Flowsheet Documentation  Taken 9/15/2024 0845 by Dalia Colbert RN  Pain Management Interventions: medication (see MAR)  Urinary Elimination Promotion: frequent voiding encouraged  Hyperhydration Management: fluids provided  Goal: Diarrhea Symptom Control  Outcome: Not Progressing  Intervention: Manage Diarrhea  Recent Flowsheet Documentation  Taken 9/15/2024 1035 by Dalia Colbert RN  Perineal Care: perineal hygiene encouraged  Taken 9/15/2024 0845 by Dalia Colbert RN  Skin Protection:   adhesive use limited   transparent dressing maintained  Fluid/Electrolyte Management: fluids provided  Perineal Care: perineal hygiene encouraged  Goal: Improved Activity Tolerance  Outcome: Not Progressing  Intervention: Promote Improved Energy  Recent Flowsheet Documentation  Taken 9/15/2024 1035 by Dalia Colbert RN  Activity Management: ambulated to bathroom  Taken 9/15/2024 0845 by Dalia Colbert  CURRY RN  Fatigue Management: frequent rest breaks encouraged  Activity Management: ambulated to bathroom  Environmental Support: personal routine supported  Goal: Blood Counts Within Acceptable Range  Outcome: Not Progressing  Intervention: Monitor and Manage Hematologic Symptoms  Recent Flowsheet Documentation  Taken 9/15/2024 0845 by Dalia Colbert RN  Bleeding Precautions:   monitored for signs of bleeding   gentle oral care promoted  Medication Review/Management: medications reviewed  Goal: Absence of Hypersensitivity Reaction  Outcome: Not Progressing  Goal: Absence of Infection  Outcome: Not Progressing  Intervention: Prevent and Manage Infection  Recent Flowsheet Documentation  Taken 9/15/2024 1226 by Dalia Colbert RN  Infection Prevention:   cohorting utilized   environmental surveillance performed   equipment surfaces disinfected   hand hygiene promoted   single patient room provided  Isolation Precautions: protective environment maintained  Taken 9/15/2024 0845 by Dalia Colbert RN  Infection Prevention:   cohorting utilized   environmental surveillance performed   equipment surfaces disinfected   hand hygiene promoted   single patient room provided  Infection Management: aseptic technique maintained  Isolation Precautions: protective environment maintained  Goal: Improved Oral Mucous Membrane Health and Integrity  Outcome: Not Progressing  Goal: Nausea and Vomiting Symptom Relief  Outcome: Not Progressing  Goal: Optimal Nutrition Intake  Outcome: Not Progressing  Intervention: Minimize and Manage Barriers to Oral Intake  Recent Flowsheet Documentation  Taken 9/15/2024 0845 by Dalia Colbert RN  Oral Nutrition Promotion: rest periods promoted

## 2024-09-15 NOTE — PROGRESS NOTES
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     Diagnosis MDS/AML  HCT Type Allogeneic     Prep Regimen Flu/Cy/TBI  Donor Match & Source 8/8 DP permissive PBSC    GVHD Prophylaxis PTCy/MMF/Siro  Primary BMT MD Dr. Murphy    Clinical Trials [id number]       INTERVAL  HISTORY     No acute events. A bit more hypertensive this AM (140s/80s) , he's not on any BP medications, only medication change yesterday was increase of oxbuytinin, remains weight down from admit. Needing phos replacement today, discharge planning will likely need oral phos.     Leland today complains of right neck tenderness and swelling near CVC line site will plan to obtain ultrasound to further evaluate for infection vs clot vs pocket fluid. May need IR to evaluate if there is a pocket of fluid. Complaining of dry mouth could be 2/2 to ditropan. Otherwise he is doing okay. He is no longer using the primofit during the day.     This afternoon he spiked a fever on ceftriaxone, will obtain blood cultures and infectious workup since his last set of blood cultures were on 9/12.   Review of Systems: ROS negative except as noted above.    PHYSICAL EXAM     Vitals:    09/12/24 0733 09/13/24 0840 09/14/24 0809   Weight: 85.7 kg (188 lb 14.4 oz) 86.2 kg (190 lb) 85.4 kg (188 lb 3.2 oz)      KPS:  70    BP (!) 141/82 (BP Location: Left arm)   Pulse 99   Temp 98.6  F (37  C) (Oral)   Resp 16   Ht 1.84 m (6' 0.44\")   Wt 85.4 kg (188 lb 3.2 oz)   SpO2 98%   BMI 25.21 kg/m       General:  NAD   HEENT: No oral lesions. Mild tenderness, slight erythema superior to previous CVC site that extends superior of the sternocleidomastoid muscle.   Lungs: CTAB  CV: RRR, no MRG    Abdomen: Soft. NT. ND. +BS  Lymphatics: No edema  Neuro: A&O, appropriately " interactive, speech clear, moving all extremities   Skin: No rash  Access: R arm PICC dressing cdi, no drainage    Current aGVHD staging: (BMT Assessment tab)    LABS AND IMAGING: I have assessed all abnormal lab values for their clinical significance and any values considered clinically significant have been addressed in the assessment and plan.      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     BMT/IEC PROTOCOL for MDS  - Chemo protocol: MT 2022-5; Flu/Cy/TBI  - Peripheral blood stem cell graft from 8/8 URD donor, ABO matched, Cell dose: 6.06 x10^6 CD34/kg  - Engraftment monitoring: Peripheral blood and bone marrow chimerisms on D28, D100, 6 months, 1 and 2 years  - Restaging plan: BMBx with FISH, cytogenetics and NGS on D28, D100, 6 months, 1 and 2 years  - D21 BMbx completed 9/13 at 11am on 5C. Flow negative, pending path/chimerisms etc.      HEME/COAG  # Pancytopenia 2/2 chemo/BMT  - Last day of G (9/12)   - Transfusion parameters: hemoglobin <7, platelets <10. (9/13) PLTs given -- decreased parameters as hematuria has resolved.    #CVC Removal; Right sternocleidomastoid tenderness  Started complaining of tenderness and swelling near right CVC site that was removed. Obtaining US to further investigate. - Pending      RISK OF GVHD  - Prophylaxis: PTCy  50mg/kg on D+3 and D+4; MMF (D+5 to 35); Sirolimus (starting D+5, target level 5-10).  - Siro 3.5mg/d. (9/12) 9.6.       ID  #Streptococcus mitis bacteremia (2/2 bottles) - Cefepime (9/1-9/13), Ceftriaxone (9/13-9/15)   - ABX duration plan: per ID through 9/20. Sensitive to ceftriaxone q24hrs, can transition to non-pseudomonal coverage for discharge/better engraftment.   - Discussed with ID on 9/13: no indication at this time to repeat abdominal imaging or C. Diff testing. With anticipation for discharge, we will switch to Ceftriaxone (9/13).    # MRSE bacteremia likely contaminant because peripheral stick, grew late (1/2). Dc'd Vanco (9/2-9/6)   - Repeat BC's x3 days NGTD  # Non-Neutropenic Fever: (9/11) 102.7. Peripheral BC (9/11, 9/13) NGTD. Urine cx 9/9 neg. BK urine and blood neg. Adeno urine pending. CMV neg 9/7. EBV neg. Adeno PCR blood pending. Flu/RSV/COVID neg. Still on cefepime as above. Leland appears non-toxic with no new localizing findings and currently afebrile. MRSA swab neg overnight (9/12)    *Repeat fever on 9/15. Will obtain cultures, UC is pending, will obtain CXR. Resume cefepime 9/15-x    Prophylaxis plan:   - Fungal: Micafungin until D+45, followed by mold active azole. Pulm nodules present on workup CT.   - PJP: Bactrim to start at D+28. Toxoplasma negative.   - Viral: Acyclovir 800mg BID. No need for letermovir as donor and recipient are CMV negative.      Monitoring:   - CMV weekly, neg 9/7  - EBV every 2 weeks from D30 to D180: (9/13) ND     GI/NUTRITION  # GERD: Protonix  # Loose stools: Imodium x 1 on 9/13. No indication to repeat C. Diff at this time.   - Anti-emetics: Compazine daily at 1600 and PRN. lorazepam available PRN.   - Ulcer prophy: Protonix  - VOD prophy: Ursodiol   - Dietician support to prevent malnutrition    CARDIOVASCULAR  # HTN: PTA lisinopril stopped 8/26 d/t symptomatic orthostasis.   # CAD: Coronary artery calcifications seen on CT, stress test in 2023 negative  -  "Risk of cardiomyopathy: Baseline EF 55-60%.   - Risk of arrhythmia: Baseline EKG showed 421       RENAL/ELECTROLYTES/  - Electrolyte management: replace per sliding scale  - BPH: Continue PTA flomax.  - Hemorrhagic Cystitis secondary to cytoxan: urinary urge/frequency/dysuria: UA with +nitrates, mod blood, >182 RBC. UC neg. Urine BK neg. BK blood neg. Pyridium not helpful. Ditropan 2.5mg bid (also on Flomax). Adeno urine pending. Symptoms starting to improve--no noted blood per pt but large clot which was hard to pass 9/13.   Urology recommended increasing oxybuytinin.      MUSCULOSKELETAL/FRAILTY  - Baseline Frailty Score: Not frail (0)  - Daily PT/OT as needed while inpatient  # Gout: continue allopurinol      Neuro   # history of spinal stenosis, lumbosacral radiculopathy likely exacerbating.   - Pain management: Outpatient had been taking celecoxib for MSK pain, once a day. Stopped Inpatient will try Tramadol hasn't used. Now discontinued.  Added robaxin TID this admission.    SOCIAL DETERMINANTS  - Caregiver: wife, Vani. Lives within the required distance from hospital   - Financial/insurance concerns: None    Medically Ready for Discharge: Anticipated in 2-4 Days;   - O/P plan: izzy in clinic 3x a week. Q24s Rocephin thru 9/20 in clinic.   - Plan to discharge maybe Tuesday, ideally would like to get urinary symptoms under better control  - Will plan to go HOME instead of [place]       Clinically Significant Risk Factors              # Hypoalbuminemia: Lowest albumin = 2.8 g/dL at 9/7/2024  7:13 AM, will monitor as appropriate   # Thrombocytopenia: Lowest platelets = 23 in last 2 days, will monitor for bleeding   # Hypertension: Noted on problem list           # Overweight: Estimated body mass index is 25.21 kg/m  as calculated from the following:    Height as of this encounter: 1.84 m (6' 0.44\").    Weight as of this encounter: 85.4 kg (188 lb 3.2 oz).            I spent 40 minutes in the care of this " patient today, which included time necessary for preparation for the visit, obtaining history, ordering medications/tests/procedures as medically indicated, review of pertinent medical literature, counseling of the patient, communication of recommendations to the care team, and documentation time.    Naida Andrea PA-C

## 2024-09-16 ENCOUNTER — APPOINTMENT (OUTPATIENT)
Dept: OCCUPATIONAL THERAPY | Facility: CLINIC | Age: 69
DRG: 014 | End: 2024-09-16
Attending: INTERNAL MEDICINE
Payer: COMMERCIAL

## 2024-09-16 LAB
ABO/RH(D): NORMAL
ALBUMIN SERPL BCG-MCNC: 3.4 G/DL (ref 3.5–5.2)
ALP SERPL-CCNC: 67 U/L (ref 40–150)
ALT SERPL W P-5'-P-CCNC: 14 U/L (ref 0–70)
ANION GAP SERPL CALCULATED.3IONS-SCNC: 10 MMOL/L (ref 7–15)
ANTIBODY SCREEN: NEGATIVE
AST SERPL W P-5'-P-CCNC: 23 U/L (ref 0–45)
BACTERIA BLD CULT: ABNORMAL
BACTERIA BLD CULT: NO GROWTH
BASOPHILS # BLD AUTO: ABNORMAL 10*3/UL
BASOPHILS # BLD MANUAL: 0 10E3/UL (ref 0–0.2)
BASOPHILS NFR BLD AUTO: ABNORMAL %
BASOPHILS NFR BLD MANUAL: 0 %
BILIRUB DIRECT SERPL-MCNC: <0.2 MG/DL (ref 0–0.3)
BILIRUB SERPL-MCNC: 0.5 MG/DL
BUN SERPL-MCNC: 13.3 MG/DL (ref 8–23)
CALCIUM SERPL-MCNC: 9 MG/DL (ref 8.8–10.4)
CHLORIDE SERPL-SCNC: 100 MMOL/L (ref 98–107)
CREAT SERPL-MCNC: 0.66 MG/DL (ref 0.67–1.17)
EGFRCR SERPLBLD CKD-EPI 2021: >90 ML/MIN/1.73M2
EOSINOPHIL # BLD AUTO: ABNORMAL 10*3/UL
EOSINOPHIL # BLD MANUAL: 0 10E3/UL (ref 0–0.7)
EOSINOPHIL NFR BLD AUTO: ABNORMAL %
EOSINOPHIL NFR BLD MANUAL: 0 %
ERYTHROCYTE [DISTWIDTH] IN BLOOD BY AUTOMATED COUNT: 17.1 % (ref 10–15)
GLUCOSE SERPL-MCNC: 129 MG/DL (ref 70–99)
HCO3 SERPL-SCNC: 25 MMOL/L (ref 22–29)
HCT VFR BLD AUTO: 24.1 % (ref 40–53)
HGB BLD-MCNC: 8 G/DL (ref 13.3–17.7)
IMM GRANULOCYTES # BLD: ABNORMAL 10*3/UL
IMM GRANULOCYTES NFR BLD: ABNORMAL %
INR PPP: 1.26 (ref 0.85–1.15)
LACTATE SERPL-SCNC: 0.6 MMOL/L (ref 0.7–2)
LYMPHOCYTES # BLD AUTO: ABNORMAL 10*3/UL
LYMPHOCYTES # BLD MANUAL: 0 10E3/UL (ref 0.8–5.3)
LYMPHOCYTES NFR BLD AUTO: ABNORMAL %
LYMPHOCYTES NFR BLD MANUAL: 1 %
M GENITALIUM DNA SPEC QL NAA+PROBE: NOT DETECTED
M HOMINIS DNA SPEC QL NAA+PROBE: NOT DETECTED
MAGNESIUM SERPL-MCNC: 2 MG/DL (ref 1.7–2.3)
MCH RBC QN AUTO: 26.9 PG (ref 26.5–33)
MCHC RBC AUTO-ENTMCNC: 33.2 G/DL (ref 31.5–36.5)
MCV RBC AUTO: 81 FL (ref 78–100)
MONOCYTES # BLD AUTO: ABNORMAL 10*3/UL
MONOCYTES # BLD MANUAL: 0.2 10E3/UL (ref 0–1.3)
MONOCYTES NFR BLD AUTO: ABNORMAL %
MONOCYTES NFR BLD MANUAL: 15 %
NEUTROPHILS # BLD AUTO: ABNORMAL 10*3/UL
NEUTROPHILS # BLD MANUAL: 1.3 10E3/UL (ref 1.6–8.3)
NEUTROPHILS NFR BLD AUTO: ABNORMAL %
NEUTROPHILS NFR BLD MANUAL: 84 %
NRBC # BLD AUTO: 0 10E3/UL
NRBC BLD AUTO-RTO: 0 /100
PATH REPORT.COMMENTS IMP SPEC: NORMAL
PATH REPORT.COMMENTS IMP SPEC: NORMAL
PATH REPORT.FINAL DX SPEC: NORMAL
PATH REPORT.GROSS SPEC: NORMAL
PATH REPORT.MICROSCOPIC SPEC OTHER STN: NORMAL
PATH REPORT.MICROSCOPIC SPEC OTHER STN: NORMAL
PATH REPORT.RELEVANT HX SPEC: NORMAL
PHOSPHATE SERPL-MCNC: 3 MG/DL (ref 2.5–4.5)
PLAT MORPH BLD: ABNORMAL
PLATELET # BLD AUTO: 24 10E3/UL (ref 150–450)
POLYCHROMASIA BLD QL SMEAR: SLIGHT
POTASSIUM SERPL-SCNC: 4 MMOL/L (ref 3.4–5.3)
PROT SERPL-MCNC: 6.6 G/DL (ref 6.4–8.3)
RBC # BLD AUTO: 2.97 10E6/UL (ref 4.4–5.9)
RBC MORPH BLD: ABNORMAL
SIROLIMUS BLD-MCNC: 6.3 UG/L (ref 5–15)
SODIUM SERPL-SCNC: 135 MMOL/L (ref 135–145)
SPECIMEN EXPIRATION DATE: NORMAL
TME LAST DOSE: NORMAL H
TME LAST DOSE: NORMAL H
U PARVUM DNA SPEC QL NAA+PROBE: NOT DETECTED
U UREALYTICUM DNA SPEC QL NAA+PROBE: NOT DETECTED
WBC # BLD AUTO: 1.5 10E3/UL (ref 4–11)

## 2024-09-16 PROCEDURE — 97535 SELF CARE MNGMENT TRAINING: CPT | Mod: GO

## 2024-09-16 PROCEDURE — 206N000001 HC R&B BMT UMMC

## 2024-09-16 PROCEDURE — 85027 COMPLETE CBC AUTOMATED: CPT

## 2024-09-16 PROCEDURE — 250N000012 HC RX MED GY IP 250 OP 636 PS 637: Performed by: PHYSICIAN ASSISTANT

## 2024-09-16 PROCEDURE — 84100 ASSAY OF PHOSPHORUS: CPT

## 2024-09-16 PROCEDURE — 250N000013 HC RX MED GY IP 250 OP 250 PS 637

## 2024-09-16 PROCEDURE — 99233 SBSQ HOSP IP/OBS HIGH 50: CPT | Mod: FS | Performed by: STUDENT IN AN ORGANIZED HEALTH CARE EDUCATION/TRAINING PROGRAM

## 2024-09-16 PROCEDURE — 85007 BL SMEAR W/DIFF WBC COUNT: CPT

## 2024-09-16 PROCEDURE — 250N000013 HC RX MED GY IP 250 OP 250 PS 637: Performed by: INTERNAL MEDICINE

## 2024-09-16 PROCEDURE — 258N000003 HC RX IP 258 OP 636

## 2024-09-16 PROCEDURE — 83735 ASSAY OF MAGNESIUM: CPT

## 2024-09-16 PROCEDURE — 80053 COMPREHEN METABOLIC PANEL: CPT

## 2024-09-16 PROCEDURE — 250N000013 HC RX MED GY IP 250 OP 250 PS 637: Performed by: PHYSICIAN ASSISTANT

## 2024-09-16 PROCEDURE — 82248 BILIRUBIN DIRECT: CPT

## 2024-09-16 PROCEDURE — 83605 ASSAY OF LACTIC ACID: CPT | Performed by: HOSPITALIST

## 2024-09-16 PROCEDURE — 85610 PROTHROMBIN TIME: CPT

## 2024-09-16 PROCEDURE — 250N000013 HC RX MED GY IP 250 OP 250 PS 637: Performed by: NURSE PRACTITIONER

## 2024-09-16 PROCEDURE — 250N000011 HC RX IP 250 OP 636

## 2024-09-16 PROCEDURE — 86923 COMPATIBILITY TEST ELECTRIC: CPT

## 2024-09-16 PROCEDURE — 250N000011 HC RX IP 250 OP 636: Performed by: PHYSICIAN ASSISTANT

## 2024-09-16 PROCEDURE — 80195 ASSAY OF SIROLIMUS: CPT | Performed by: INTERNAL MEDICINE

## 2024-09-16 PROCEDURE — 99233 SBSQ HOSP IP/OBS HIGH 50: CPT | Performed by: INTERNAL MEDICINE

## 2024-09-16 PROCEDURE — 250N000011 HC RX IP 250 OP 636: Performed by: INTERNAL MEDICINE

## 2024-09-16 PROCEDURE — 250N000013 HC RX MED GY IP 250 OP 250 PS 637: Performed by: MASSAGE THERAPIST

## 2024-09-16 PROCEDURE — 250N000011 HC RX IP 250 OP 636: Performed by: HOSPITALIST

## 2024-09-16 PROCEDURE — 86900 BLOOD TYPING SEROLOGIC ABO: CPT

## 2024-09-16 RX ORDER — ONDANSETRON 2 MG/ML
8 INJECTION INTRAMUSCULAR; INTRAVENOUS ONCE
Status: COMPLETED | OUTPATIENT
Start: 2024-09-16 | End: 2024-09-16

## 2024-09-16 RX ADMIN — URSODIOL 300 MG: 300 CAPSULE ORAL at 15:10

## 2024-09-16 RX ADMIN — METHOCARBAMOL 500 MG: 500 TABLET ORAL at 20:05

## 2024-09-16 RX ADMIN — Medication 2 SPRAY: at 13:08

## 2024-09-16 RX ADMIN — CEFEPIME HYDROCHLORIDE 2 G: 2 INJECTION, POWDER, FOR SOLUTION INTRAVENOUS at 12:49

## 2024-09-16 RX ADMIN — HEPARIN, PORCINE (PF) 10 UNIT/ML INTRAVENOUS SYRINGE 5 ML: at 16:36

## 2024-09-16 RX ADMIN — ONDANSETRON 8 MG: 2 INJECTION INTRAMUSCULAR; INTRAVENOUS at 18:32

## 2024-09-16 RX ADMIN — PANTOPRAZOLE SODIUM 40 MG: 40 TABLET, DELAYED RELEASE ORAL at 08:56

## 2024-09-16 RX ADMIN — METHOCARBAMOL 500 MG: 500 TABLET ORAL at 08:58

## 2024-09-16 RX ADMIN — TAMSULOSIN HYDROCHLORIDE 0.4 MG: 0.4 CAPSULE ORAL at 16:36

## 2024-09-16 RX ADMIN — ALLOPURINOL 300 MG: 300 TABLET ORAL at 08:58

## 2024-09-16 RX ADMIN — SODIUM CHLORIDE, POTASSIUM CHLORIDE, SODIUM LACTATE AND CALCIUM CHLORIDE 1000 ML: 600; 310; 30; 20 INJECTION, SOLUTION INTRAVENOUS at 10:52

## 2024-09-16 RX ADMIN — MYCOPHENOLATE MOFETIL 1250 MG: 250 CAPSULE ORAL at 08:59

## 2024-09-16 RX ADMIN — URSODIOL 300 MG: 300 CAPSULE ORAL at 20:05

## 2024-09-16 RX ADMIN — MYCOPHENOLATE MOFETIL 1250 MG: 250 CAPSULE ORAL at 20:04

## 2024-09-16 RX ADMIN — Medication 2 SPRAY: at 09:18

## 2024-09-16 RX ADMIN — URSODIOL 300 MG: 300 CAPSULE ORAL at 08:56

## 2024-09-16 RX ADMIN — MICAFUNGIN SODIUM 150 MG: 50 INJECTION, POWDER, LYOPHILIZED, FOR SOLUTION INTRAVENOUS at 09:04

## 2024-09-16 RX ADMIN — Medication 3 CAPSULE: at 08:59

## 2024-09-16 RX ADMIN — PROCHLORPERAZINE MALEATE 5 MG: 5 TABLET ORAL at 16:36

## 2024-09-16 RX ADMIN — ACETAMINOPHEN 650 MG: 325 TABLET ORAL at 04:33

## 2024-09-16 RX ADMIN — CEFEPIME HYDROCHLORIDE 2 G: 2 INJECTION, POWDER, FOR SOLUTION INTRAVENOUS at 20:05

## 2024-09-16 RX ADMIN — OXYBUTYNIN CHLORIDE 10 MG: 10 TABLET, EXTENDED RELEASE ORAL at 22:12

## 2024-09-16 RX ADMIN — CEFEPIME HYDROCHLORIDE 2 G: 2 INJECTION, POWDER, FOR SOLUTION INTRAVENOUS at 04:32

## 2024-09-16 RX ADMIN — Medication 5 ML: at 04:33

## 2024-09-16 RX ADMIN — Medication 2 SPRAY: at 16:37

## 2024-09-16 RX ADMIN — CALCIUM CARBONATE 600 MG (1,500 MG)-VITAMIN D3 400 UNIT TABLET 2 TABLET: at 08:56

## 2024-09-16 RX ADMIN — ACYCLOVIR 800 MG: 800 TABLET ORAL at 08:59

## 2024-09-16 RX ADMIN — SIROLIMUS 3.5 MG: 2 TABLET ORAL at 09:02

## 2024-09-16 RX ADMIN — ACYCLOVIR 800 MG: 800 TABLET ORAL at 20:05

## 2024-09-16 RX ADMIN — HEPARIN, PORCINE (PF) 10 UNIT/ML INTRAVENOUS SYRINGE 5 ML: at 13:05

## 2024-09-16 RX ADMIN — METHOCARBAMOL 500 MG: 500 TABLET ORAL at 15:10

## 2024-09-16 ASSESSMENT — ACTIVITIES OF DAILY LIVING (ADL)
ADLS_ACUITY_SCORE: 25
ADLS_ACUITY_SCORE: 25
ADLS_ACUITY_SCORE: 26
ADLS_ACUITY_SCORE: 25
ADLS_ACUITY_SCORE: 26
ADLS_ACUITY_SCORE: 25
ADLS_ACUITY_SCORE: 26
ADLS_ACUITY_SCORE: 25
ADLS_ACUITY_SCORE: 26
ADLS_ACUITY_SCORE: 25
ADLS_ACUITY_SCORE: 25
ADLS_ACUITY_SCORE: 26
ADLS_ACUITY_SCORE: 25
ADLS_ACUITY_SCORE: 26
ADLS_ACUITY_SCORE: 25
ADLS_ACUITY_SCORE: 25
ADLS_ACUITY_SCORE: 26
ADLS_ACUITY_SCORE: 25
ADLS_ACUITY_SCORE: 26

## 2024-09-16 NOTE — PROGRESS NOTES
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     Diagnosis MDS/AML  HCT Type Allogeneic     Prep Regimen Flu/Cy/TBI  Donor Match & Source 8/8 DP permissive PBSC    GVHD Prophylaxis PTCy/MMF/Siro  Primary BMT MD Dr. Murphy    Clinical Trials [id number]       INTERVAL  HISTORY     No acute events. Febrile overnight with tmax of 102.3. He had a syncopal episode this morning and was orthostatic today. R neck tenderness is better, swelling is down today. Dry mouth ongoing. Still using external catheter overnight. No new infectious symptoms. Urinary symptoms continue to improve.      Review of Systems: ROS negative except as noted above.    PHYSICAL EXAM     Vitals:    09/14/24 0809 09/15/24 0845 09/16/24 0729   Weight: 85.4 kg (188 lb 3.2 oz) 84.9 kg (187 lb 1.6 oz) 84.2 kg (185 lb 9.6 oz)      KPS:  70    /81 (BP Location: Left arm)   Pulse 110   Temp 98.9  F (37.2  C) (Oral)   Resp 16   Ht 1.84 m (6' 0.44\")   Wt 84.2 kg (185 lb 9.6 oz)   SpO2 100%   BMI 24.87 kg/m       General:  NAD   HEENT: No oral lesions. Mild tenderness, slight erythema superior to previous CVC site that extends superior of the sternocleidomastoid muscle. Improved 9/16.  Lungs: CTAB  CV: RRR, no MRG    Abdomen: Soft. NT. ND. +BS  Lymphatics: No edema  Neuro: A&O, appropriately interactive, speech clear, moving all extremities   Skin: No rash  Access: R arm PICC dressing cdi, no drainage    Current aGVHD staging: (BMT Assessment tab)    LABS AND IMAGING: I have assessed all abnormal lab values for their clinical significance and any values considered clinically significant have been addressed in the assessment and plan.      Lab Results   Component Value Date    WBC 1.5 (L) 09/16/2024    ANEU 1.3 (L) 09/16/2024    HGB 8.0 (L) " 09/16/2024    HCT 24.1 (L) 09/16/2024    PLT 24 (LL) 09/16/2024     09/16/2024    POTASSIUM 4.0 09/16/2024    CHLORIDE 100 09/16/2024    CO2 25 09/16/2024     (H) 09/16/2024    BUN 13.3 09/16/2024    CR 0.66 (L) 09/16/2024    MAG 2.0 09/16/2024    INR 1.26 (H) 09/16/2024    BILITOTAL 0.5 09/16/2024    AST 23 09/16/2024    ALT 14 09/16/2024    ALKPHOS 67 09/16/2024    PROTTOTAL 6.6 09/16/2024    ALBUMIN 3.4 (L) 09/16/2024       ASSESSMENT AND PLAN     Leland Pearson is a 69 year old male with 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     BMT/IEC PROTOCOL for MDS  - Chemo protocol: MT 2022-5; Flu/Cy/TBI  - Peripheral blood stem cell graft from 8/8 URD donor, ABO matched, Cell dose: 6.06 x10^6 CD34/kg  - Engraftment monitoring: Peripheral blood and bone marrow chimerisms on D28, D100, 6 months, 1 and 2 years  - Restaging plan: BMBx with FISH, cytogenetics and NGS on D28, D100, 6 months, 1 and 2 years  - D21 BMbx completed 9/13. Flow negative, pending path/chimerisms etc.      HEME/COAG  # Pancytopenia 2/2 chemo/BMT  - Last day of G (9/12)   - Transfusion parameters: hemoglobin <7, platelets <10. (9/13) PLTs given -- decreased parameters as hematuria has resolved.    # Right sternocleidomastoid tenderness s/p CVC removal  #Occlusive thrombus of R internal jugular vein  #Nonocclusive thrombus or R axillary vein  Started complaining of tenderness and swelling near right CVC site that was removed. Obtained US to further investigate. Positive for thrombus. No anticoagulation at this time due to thrombocytopenia. Will need to begin DOAC vs lovenox once platelets >50k. Pharmacy to help determine if DOAC is safe given upcoming medication interactions (start of azole, siro, etc.).     RISK OF GVHD  - Prophylaxis: PTCy 50mg/kg on D+3 and D+4; MMF (D+5 to 35); Sirolimus (starting D+5, target level 5-10).  - Siro 3.5mg/d. (9/12) 9.6.     ID  #Streptococcus  mitis bacteremia (2/2 bottles) - Cefepime (9/1-9/13), Ceftriaxone (9/13-9/15)   - ABX duration plan: per ID through 9/20. Sensitive to ceftriaxone q24hrs, can transition to non-pseudomonal coverage for discharge/better engraftment.   - Discussed with ID on 9/13: no indication at this time to repeat abdominal imaging or C. Diff testing. With anticipation for discharge, we will switch to Ceftriaxone (9/13-9/15). Resume cefepime 9/16 as below.     # MRSE bacteremia likely contaminant because peripheral stick, grew late (1/2). Dc'd Vanco (9/2-9/6)   - Repeat BC's x3 days NGTD  # Non-Neutropenic Fever: (9/11) 102.7. Peripheral BC (9/11, 9/13) NGTD. Urine cx 9/9 neg. BK urine and blood neg. Adeno urine pending. CMV neg 9/7. EBV neg. Adeno PCR blood pending. Flu/RSV/COVID neg. Still on cefepime as above. Leland appears non-toxic with no new localizing findings and currently afebrile. MRSA swab neg overnight (9/12)    *Repeat fever on 9/15. Will obtain cultures, UC is pending, will obtain CXR. Resume cefepime 9/15-x. Likely has bacterial seeding of internal jugular clot as above- Appreciate ID input.     Prophylaxis plan:   - Fungal: Micafungin until D+45, followed by mold active azole. Pulm nodules present on workup CT.   - PJP: Bactrim to start at D+28. Toxoplasma negative.   - Viral: Acyclovir 800mg BID. No need for letermovir as donor and recipient are CMV negative.      Monitoring:   - CMV weekly, neg 9/7  - EBV every 2 weeks from D30 to D180: (9/13) ND     GI/NUTRITION  # GERD: Protonix  # Loose stools: Imodium x 1 on 9/13. No indication to repeat C. Diff at this time.   - Anti-emetics: Compazine daily at 1600 and PRN. lorazepam available PRN.   - Ulcer prophy: Protonix  - VOD prophy: Ursodiol   - Dietician support to prevent malnutrition    CARDIOVASCULAR  # HTN: PTA lisinopril stopped 8/26 d/t symptomatic orthostasis.   # CAD: Coronary artery calcifications seen on CT, stress test in 2023 negative  - Risk of  cardiomyopathy: Baseline EF 55-60%.   - Risk of arrhythmia: Baseline EKG showed 421   - Orthostatic hypotension- 1L IVF 9/16. Will hold off on stopping flomax and ditropan with history of urinary discomfort as below.       RENAL/ELECTROLYTES/  - Electrolyte management: replace per sliding scale  - BPH: Continue PTA flomax.  - Hemorrhagic Cystitis secondary to cytoxan: urinary urge/frequency/dysuria: UA with +nitrates, mod blood, >182 RBC. UC neg. Urine BK neg. BK blood neg. Pyridium not helpful. Ditropan 2.5mg bid (also on Flomax). Adeno urine pending. Symptoms starting to improve--no noted blood per pt but large clot which was hard to pass 9/13. Urology recommended increasing oxybuytinin.      MUSCULOSKELETAL/FRAILTY  - Baseline Frailty Score: Not frail (0)  - Daily PT/OT as needed while inpatient  # Gout: continue allopurinol      Neuro   # history of spinal stenosis, lumbosacral radiculopathy likely exacerbating.   - Pain management: Outpatient had been taking celecoxib for MSK pain, once a day. Stopped Inpatient will try Tramadol hasn't used. Now discontinued.  Added robaxin TID this admission.    SOCIAL DETERMINANTS  - Caregiver: wife, Vani. Lives within the required distance from hospital   - Financial/insurance concerns: None    Medically Ready for Discharge: Anticipated in 2-4 Days;   - O/P plan: izzy in clinic 3x a week. Still needs antibiotic plan determined.   - Will plan to go HOME instead of [facility]       Clinically Significant Risk Factors          # Hypocalcemia: Lowest Ca = 8.4 mg/dL in last 2 days, will monitor and replace as appropriate     # Hypoalbuminemia: Lowest albumin = 2.8 g/dL at 9/7/2024  7:13 AM, will monitor as appropriate  # Coagulation Defect: INR = 1.26 (Ref range: 0.85 - 1.15) and/or PTT = 28 Seconds (Ref range: 22 - 38 Seconds), will monitor for bleeding  # Thrombocytopenia: Lowest platelets = 23 in last 2 days, will monitor for bleeding   # Hypertension: Noted on problem list                      I spent 40 minutes in the care of this patient today, which included time necessary for preparation for the visit, obtaining history, ordering medications/tests/procedures as medically indicated, review of pertinent medical literature, counseling of the patient, communication of recommendations to the care team, and documentation time.    Kaitlyn Aguilar NP

## 2024-09-16 NOTE — PLAN OF CARE
Pt had allo transplant day +24 and has MDS. Today's focuses were fever management, to increase mobility and movement, and monitor for syncope. VSS stable during day shift, except tachycardic.    Assessment:  Bowel sounds and heart sounds - auscultated and WDL  Diminished lung sounds in the bases (anteriorly)  Denied N/V  Erythema and tenderness at previous CVC site on chest  No mouth sores, no difficulty swallowing or chewing  Pale coloration of skin  Calm, cooperative mood  Less apathetic than previous shift's report!    Other/Events:  Overnight had condom cath, during day he used restroom for urination and bowel movements  At 09:00, had syncopal episode post-toileting (2 BMs - beared down?), Claudette Martinez RN and Sotero Puentes RN assisted pt to shower chair to re-stabilize, then used gait belt to slowly get to the bed.   Pt was pale   Neuro assessment WDL post-syncope episode   Educated on now being SBA to bathroom and calling beforehand  Ultrasound found last CVC showing a thrombus with possible bacteria growth  Cultures done yesterday  Sirolimus level drawn this morning  Per family report, had an emesis episode this morning         Problem: Stem Cell/Bone Marrow Transplant  Goal: Improved Activity Tolerance  Outcome: Not Progressing  Intervention: Promote Improved Energy  Recent Flowsheet Documentation  Taken 9/16/2024 0900 by Sotero Puentes RN  Fatigue Management: frequent rest breaks encouraged  Sleep/Rest Enhancement:   awakenings minimized   comfort measures   natural light exposure provided   noise level reduced   regular sleep/rest pattern promoted   room darkened  Activity Management: activity adjusted per tolerance  Environmental Support: personal routine supported  Goal: Absence of Infection  Outcome: Not Progressing  Intervention: Prevent and Manage Infection  Recent Flowsheet Documentation  Taken 9/16/2024 1300 by Sotero Puentes RN  Infection Prevention:   cohorting utilized   environmental  surveillance performed   equipment surfaces disinfected   hand hygiene promoted   single patient room provided  Infection Management: aseptic technique maintained  Isolation Precautions: protective environment maintained  Taken 9/16/2024 0900 by Sotero Puentes RN  Infection Prevention:   cohorting utilized   environmental surveillance performed   equipment surfaces disinfected   hand hygiene promoted   single patient room provided  Infection Management: aseptic technique maintained  Isolation Precautions: protective environment maintained  Goal: Optimal Nutrition Intake  Outcome: Not Progressing   Goal Outcome Evaluation:

## 2024-09-16 NOTE — PLAN OF CARE
"BP (!) 143/82 (BP Location: Left arm, Cuff Size: Adult Regular)   Pulse 102   Temp 100.4  F (38  C) (Oral)   Resp 18   Ht 1.84 m (6' 0.44\")   Wt 84.9 kg (187 lb 1.6 oz)   SpO2 100%   BMI 25.07 kg/m    Pt's stable with Tmax of 100.5, tachy with HR in the low 100's and BP slightly elevated. Ty given q4h, for temp and neck/back pain. No changes noted from marking around the neck. PICC intact and purple infusing at tko with Abx q8h. Pt encourage to move more during the day. Keep monitoring pt as ordered and notify MD with any new changes.    Problem: Adult Inpatient Plan of Care  Goal: Plan of Care Review  Description: The Plan of Care Review/Shift note should be completed every shift.  The Outcome Evaluation is a brief statement about your assessment that the patient is improving, declining, or no change.  This information will be displayed automatically on your shift  note.  Outcome: Progressing  Goal: Patient-Specific Goal (Individualized)  Description: You can add care plan individualizations to a care plan. Examples of Individualization might be:  \"Parent requests to be called daily at 9am for status\", \"I have a hard time hearing out of my right ear\", or \"Do not touch me to wake me up as it startles  me\".  Outcome: Progressing  Goal: Absence of Hospital-Acquired Illness or Injury  Outcome: Progressing  Intervention: Identify and Manage Fall Risk  Recent Flowsheet Documentation  Taken 9/16/2024 0020 by Batsheva Whipple RN  Safety Promotion/Fall Prevention:   clutter free environment maintained   increased rounding and observation  Taken 9/15/2024 2017 by Batsheva Whipple RN  Safety Promotion/Fall Prevention:   clutter free environment maintained   increased rounding and observation  Intervention: Prevent Skin Injury  Recent Flowsheet Documentation  Taken 9/16/2024 0020 by Batsheva Whipple RN  Body Position: position changed independently  Taken 9/15/2024 2017 by Batsheva Whipple RN  Body Position: " position changed independently  Goal: Optimal Comfort and Wellbeing  Outcome: Progressing  Intervention: Monitor Pain and Promote Comfort  Recent Flowsheet Documentation  Taken 9/15/2024 2017 by Batsheva Whipple RN  Pain Management Interventions: declines  Goal: Readiness for Transition of Care  Outcome: Progressing     Problem: Risk for Delirium  Goal: Optimal Coping  Outcome: Progressing  Goal: Improved Sleep  Outcome: Progressing     Problem: Stem Cell/Bone Marrow Transplant  Goal: Optimal Coping with Transplant  Outcome: Progressing  Goal: Symptom-Free Urinary Elimination  Outcome: Progressing  Intervention: Prevent or Manage Bladder Irritation  Recent Flowsheet Documentation  Taken 9/15/2024 2017 by Batsheva Whipple RN  Pain Management Interventions: declines  Goal: Diarrhea Symptom Control  Outcome: Progressing  Goal: Improved Activity Tolerance  Outcome: Progressing  Intervention: Promote Improved Energy  Recent Flowsheet Documentation  Taken 9/16/2024 0400 by Batsheva Whipple RN  Activity Management: activity adjusted per tolerance  Taken 9/16/2024 0020 by Batsheva Whipple RN  Activity Management: activity adjusted per tolerance  Taken 9/15/2024 2017 by Batsheva Whipple RN  Activity Management: activity adjusted per tolerance  Goal: Blood Counts Within Acceptable Range  Outcome: Progressing  Goal: Absence of Hypersensitivity Reaction  Outcome: Progressing  Goal: Absence of Infection  Outcome: Progressing  Goal: Improved Oral Mucous Membrane Health and Integrity  Outcome: Progressing  Goal: Nausea and Vomiting Symptom Relief  Outcome: Progressing  Goal: Optimal Nutrition Intake  Outcome: Progressing   Goal Outcome Evaluation:

## 2024-09-16 NOTE — PROGRESS NOTES
Cross Cover Note    September 16, 2024  6:03 PM    Nausea: x1 8mg IV zofran ordered    3 soft bms reported today - noted patient taking metamucil last this morning--> metamucil order held    John Lucero MD  [facility]

## 2024-09-16 NOTE — PROGRESS NOTES
[facility]  Transplant Infectious Disease Progress Note     Patient:  Leland Pearson, Date of birth 1955, Medical record number [id number]  Date of Visit:  09/16/2024         Assessment and Recommendations:   Recommendations:  - If fevers continue, could add vancomycin IV since Staphylococcus epidermidis was isolated from blood culture from 9/2/2024 (interpreted as a contaminant), while awaiting new BCx, UCx, CXR.   - Continue cefepime for bacterial coverage  - Continue acyclovir as viral prophylaxis.  - Continue micafungin as fungal prophylaxis.   - Agree with bactrim as Pneumocystis prophylaxis, due to start ~ 9/23/2024 per SOP.     Transplant Infectious Disease will continue to follow with you.      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   Infectious Disease issues include:  - Low grade fever during slow engraftment. Await pending 9/15/2024 BCx. May have bacterial seeding of his clot with either Strep mitis > Staph epi. If fevers continue, could add vancomycin IV since Staphylococcus epidermidis was isolated from blood culture from 9/2/2024 (interpreted as a contaminant), while awaiting new BCx.    - Dysuria, urgency and frequency (improving). 9/11/2024 urine & 9/12/2024 blood adenovirus PCR neg. 9/9/2024 urine BK is neg, awaiting 9/10/2024 blood BK. He is using a Primofit external catheter so that he can sleep overnight with nocturia.  - Pulm nodules present on workup CT 8/6/2024. 2.4 mm nodule right lower lobe image 184 series 4. Stable 4.5 mm nodule in the right major fissure image 107 series 4. Minimal atelectasis in the lingula. Micafungin until D+45, followed by mold active azole.   - Streptococcus mitis/oralis bacteremia, isolated from CVC 9/2/2024. Repeat blood cultures have been negative to date. CVC removed 9/6/2024 due to persistent fever, and fever has improved since removal. Transthoracic  echocardiogram neg on 9/6/2024. Plan to continue sensitive antibiotics for total of 14 days from catheter removal, with end date of 9/20/2024.   - Staphylococcus epidermidis in blood culture from 9/2/2024. Repeat blood cultures have been negative to date. Interpreting this isolation event of Staph epi as a contaminant based on the fact that her only grown 1 of 2 peripheral blood culture bottles and resulted as positive several hours after his strep mitis cultures. Vancomycin was discontinued on 9/5/2024.   - QTc interval: 421 msec on 8/5/2024 EKG.   - Bacterial coverage: cefepime (due to continue to ~ 9/20/2024)  - PCP prophylaxis: due to start ~ day+28  - Serostatus & viral prophylaxis: Toxoplasma negative. CMV D-/R-, EBV+, HSV-. No need for letermovir as donor and recipient are CMV negative. On ACV  - Fungal prophylaxis: izzy  - Immunization status: he has received 6 Covid-19 vaccines  - Gamma globulin status: unknown  - Isolation status: Good hand hygiene.   - Code status: Full Code    Marlen Manriquez MD. Pager [phone]         Interval History:   Since Leland was last seen by me on 9/13/2024, he had swelling to his right neck area over the weekend. Duplex has shown occlusive clot in the RIJ vein and non-occlusive clot in the R axillalr vein. R neck tenderness is better, swelling is down today. BP slightly elevated yesterday. He had a syncopal episode this morning and was orthostatic today. He continues to have intermittent temperature elevations. WBC has fallen a little since his high value of 3.3, down to 1.5 today. ANC down to 1300. His wife and daughter were at the bedside. He is day+24.     Review of Systems:  CONSTITUTIONAL:  + fever, Tmax 102.3F at 8 pm yesterday, now without fever  EYES:   ENT:    RESPIRATORY:    CARDIOVASCULAR:  HR in the low 100's when febrile  GASTROINTESTINAL:  no nausea/vomiting   GENITOURINARY:  using external catheter so that he can sleep overnight with nocturia.   HEME:  last  plt tranfusion on 9/13/2024.   INTEGUMENT:  swelling improved over the right neck area  NEURO:           Current Medications & Allergies:     Current Facility-Administered Medications   Medication Dose Route Frequency Provider Last Rate Last Admin    acyclovir (ZOVIRAX) tablet 800 mg  800 mg Oral BID Aziza Mendieta APRN CNP   800 mg at 09/16/24 0859    allopurinol (ZYLOPRIM) tablet 300 mg  300 mg Oral Daily Kaitlyn Aguilar NP   300 mg at 09/16/24 0858    artificial saliva (BIOTENE MT) solution 2 spray  2 spray Swish & Spit 4x Daily Bessie Lazo PA-C   2 spray at 09/16/24 1308    calcium carbonate-vitamin D (CALTRATE) 600-10 MG-MCG per tablet 2 tablet  2 tablet Oral BID w/meals Bessie Lazo PA-C   2 tablet at 09/16/24 0856    ceFEPIme (MAXIPIME) 2 g vial to attach to  mL bag for ADULTS or NS 50 mL bag for PEDS  2 g Intravenous Q8H Naida Andrea PA-C 200 mL/hr at 09/16/24 1249 2 g at 09/16/24 1249    heparin lock flush 10 unit/mL injection 5-20 mL  5-20 mL Intracatheter Q24H Bernadette Green PA-C   5 mL at 09/14/24 1509    heparin lock flush 10 unit/mL injection 5-20 mL  5-20 mL Intracatheter Q24H Markus Mendez MD   5 mL at 09/16/24 0433    methocarbamol (ROBAXIN) tablet 500 mg  500 mg Oral TID Aziza Mendieta APRN CNP   500 mg at 09/16/24 0858    micafungin (MYCAMINE) 150 mg in sodium chloride 0.9 % 100 mL intermittent infusion  150 mg Intravenous Daily Kaitlyn Aguilar  mL/hr at 09/16/24 0904 150 mg at 09/16/24 0904    mycophenolate (GENERIC EQUIVALENT) capsule 1,250 mg  1,250 mg Oral Q12H [facility] (08/20) Eunice Spicer PA-C   1,250 mg at 09/16/24 0859    oxyBUTYnin ER (DITROPAN XL) 24 hr tablet 10 mg  10 mg Oral At Bedtime Emely Jeter APRN CNP   10 mg at 09/15/24 2155    pantoprazole (PROTONIX) EC tablet 40 mg  40 mg Oral Daily Bessie Lazo PA-C   40 mg at 09/16/24 0856    prochlorperazine (COMPAZINE) tablet 5 mg  5 mg Oral Daily Eunice Spicer PA-C   5 mg at  09/14/24 1622    psyllium (METAMUCIL/KONSYL) capsule 3 capsule  3 capsule Oral Daily Bessie Lazo PA-C   3 capsule at 09/16/24 0859    sirolimus (GENERIC EQUIVALENT) tablet 3.5 mg  3.5 mg Oral Daily Bernadette Green PA-C   3.5 mg at 09/16/24 0902    [START ON 9/23/2024] sulfamethoxazole-trimethoprim (BACTRIM DS) 800-160 MG per tablet 1 tablet  1 tablet Oral Q Mon Tues BID Kaitlyn Aguilar, NP        tamsulosin (FLOMAX) capsule 0.4 mg  0.4 mg Oral Daily Bessie Lazo PA-C   0.4 mg at 09/15/24 1722    ursodiol (ACTIGALL) capsule 300 mg  300 mg Oral TID Markus Mendez MD   300 mg at 09/16/24 0856       Infusions/Drips:  Current Facility-Administered Medications   Medication Dose Route Frequency Provider Last Rate Last Admin       No Known Allergies         Physical Exam:   Patient Vitals for the past 24 hrs:   BP Temp Temp src Pulse Resp SpO2 Weight   09/16/24 1109 132/80 99.2  F (37.3  C) Oral 105 16 100 % --   09/16/24 1035 -- -- -- -- -- 100 % --   09/16/24 1034 -- -- -- -- -- 99 % --   09/16/24 1032 -- -- -- -- -- 100 % --   09/16/24 0729 120/81 98.9  F (37.2  C) Oral 110 16 97 % 84.2 kg (185 lb 9.6 oz)   09/16/24 0433 (!) 143/82 100.4  F (38  C) Oral -- 18 100 % --   09/16/24 0422 (!) 147/80 -- -- -- 16 98 % --   09/16/24 0420 (!) 143/81 (!) 100.5  F (38.1  C) Oral -- 18 99 % --   09/16/24 0020 (!) 140/83 98.6  F (37  C) Oral -- 16 95 % --   09/15/24 2017 139/79 (!) 102.3  F (39.1  C) Oral -- 18 96 % --   09/15/24 1721 -- (!) 100.7  F (38.2  C) Oral -- -- -- --   09/15/24 1516 132/77 98.9  F (37.2  C) Oral -- 17 100 % --     Ranges for vital signs:  Temp:  [98.6  F (37  C)-102.3  F (39.1  C)] 99.2  F (37.3  C)  Pulse:  [105-110] 105  Resp:  [16-18] 16  BP: (120-147)/(77-83) 132/80  Cuff Mean (mmHg):  [] 101  SpO2:  [95 %-100 %] 100 %  Vitals:    09/14/24 0809 09/15/24 0845 09/16/24 0729   Weight: 85.4 kg (188 lb 3.2 oz) 84.9 kg (187 lb 1.6 oz) 84.2 kg (185 lb 9.6 oz)       Physical  Examination:  GENERAL:  well-developed, well-nourished man, resting in bed in no acute distress.  HEAD:  Head is normocephalic, atraumatic   EYES:  Eyes have anicteric sclerae  ENT:  Oropharynx is moist without exudates or ulcers. Tongue is midline  NECK:  Supple.   LUNGS:  Clear to auscultation bilateral.   CARDIOVASCULAR:  Regular rate and rhythm with no murmur  ABDOMEN:  Normal bowel sounds, soft, nontender.   SKIN:  No acute rashes. Magic marker lines on right neck where swelling was yesterday indicate no current swelling. PICC in right upper extremity without any surrounding erythema or exudate.  NEUROLOGIC:  Grossly nonfocal. Active x4 extremities         Laboratory Data:     Metabolic Studies       Recent Labs   Lab Test 09/16/24  0432 09/15/24  1153 09/15/24  0353 09/07/24  1103 09/07/24  0713     --  135   < > 133*   POTASSIUM 4.0  --  3.9   < > 4.0   CHLORIDE 100  --  100   < > 100   CO2 25  --  24   < > 22   ANIONGAP 10  --  11   < > 11   BUN 13.3  --  15.1   < > 9.8   CR 0.66*  --  0.59*   < > 0.63*   GFRESTIMATED >90  --  >90   < > >90   *  --  120*   < > 120*   HERBERT 9.0  --  8.4*   < > 8.3*   PHOS 3.0  --  2.4*   < >  --    MAG 2.0  --  1.9   < > 1.8   LACT 0.6* 0.8  --    < >  --    PCAL  --   --   --   --  0.37    < > = values in this interval not displayed.       Hepatic Studies    Recent Labs   Lab Test 09/16/24  0432 09/12/24  0321 09/09/24  0615 05/15/24  1339 05/08/24  1156   BILITOTAL 0.5 0.5 0.5   < > 0.6   DBIL <0.20  --  0.24   < >  --    ALKPHOS 67 70 58   < > 82   PROTTOTAL 6.6 6.5 6.3*   < > 7.6   ALBUMIN 3.4* 3.4* 3.2*   < > 4.1   AST 23 25 25   < > 43   ALT 14 16 19   < > 21   LDH  --   --   --   --  613*    < > = values in this interval not displayed.       Gout Labs      Recent Labs   Lab Test 08/16/24  0920 08/09/24  0827 08/05/24  1200 07/29/24  0811 07/01/24  1208   URIC 4.2 3.9 4.1 4.5 4.1       Hematology Studies   Recent Labs   Lab Test 09/16/24  0432 09/15/24  0353  09/14/24  0346 09/13/24  0322 09/12/24  0321 05/22/24  0838 05/21/24  1016   WBC 1.5* 1.8* 2.1* 3.3* 2.9*   < > 4.0   ABLA  --   --   --   --   --   --  0.4*   BLST  --   --   --   --   --   --  9   ANEU 1.3*  --   --   --  2.6   < > 0.6*   ANEUTAUTO  --  1.4* 1.7 2.9  --    < >  --    ALYM 0.0*  --   --   --  0.0*   < > 2.0   ALYMPAUTO  --  0.0* 0.0* 0.0*  --    < >  --    DAVIAN 0.2  --   --   --  0.2   < > 0.5   AMONOAUTO  --  0.3 0.2 0.3  --    < >  --    AEOS 0.0  --   --   --  0.0   < > 0.0   AEOSAUTO  --  0.0 0.0 0.0  --    < >  --    ABSBASO  --  0.0 0.0 0.0  --    < >  --    HGB 8.0* 7.6* 7.4* 7.8* 8.4*   < > 12.9*   HCT 24.1* 22.6* 22.1* 23.4* 24.7*   < > 38.4*   PLT 24* 23* 25* 19* 27*   < > 72*    < > = values in this interval not displayed.       Clotting Studies    Recent Labs   Lab Test 09/16/24  0432 09/09/24  0615 09/02/24  0444 08/26/24  0407 08/16/24  0920   INR 1.26* 1.06 1.06 0.99 0.93   PTT  --   --   --   --  28       Iron Testing    Recent Labs   Lab Test 09/16/24  0432 08/02/24  0919 08/01/24  0805   ZARIA  --   --  460*   MCV 81   < > 84    < > = values in this interval not displayed.       Urine Studies     Recent Labs   Lab Test 09/13/24  1645 09/09/24  1900 09/07/24  1230 09/02/24  0454 08/05/24  1200   URINEPH 6.0 7.0 6.0 7.0 6.5   NITRITE Negative Positive* Negative Negative Negative   LEUKEST Trace* Negative Negative Negative Negative   WBCU 3 1 1 2 <1       Medication levels    Recent Labs   Lab Test 09/12/24  0730 09/07/24  0713 09/06/24  0337   VANCOMYCIN  --   --  10.3   RAPAMY 9.6   < >  --     < > = values in this interval not displayed.       Microbiology:  Last Culture results   Culture   Date Value Ref Range Status   09/15/2024 No growth after 12 hours  Preliminary   09/15/2024 No growth after 12 hours  Preliminary   09/14/2024 No Growth  Final   09/12/2024 No growth after 3 days  Preliminary   09/11/2024 No growth after 4 days  Preliminary   09/09/2024 No Growth  Final    09/07/2024 No Growth  Final   09/05/2024 No Growth  Final   09/04/2024 No Growth  Final   09/04/2024 No Growth  Final   09/03/2024 No Growth  Final   09/03/2024 No Growth  Final   09/02/2024 Positive on the 2nd day of incubation (A)  Final   09/02/2024 Staphylococcus epidermidis (AA)  Final     Comment:     1 of 2 bottles  The recovery of this organism from a single blood culture bottle most likely represents contamination. If susceptibility testing is needed, please refer to the antibiogram or contact [facility].   09/02/2024 Positive on the 1st day of incubation (A)  Final   09/02/2024 Streptococcus mitis (AA)  Final     Comment:     2 of 2 bottles     09/02/2024 Positive on the 1st day of incubation (A)  Final   09/02/2024 Streptococcus mitis (AA)  Final     Comment:     2 of 2 bottles    Susceptibilities done on previous cultures   08/16/2024   Final    No Vancomycin Resistant Enterococcus species isolated     Enterococcus faecium   Date Value Ref Range Status   09/02/2024 Not Detected Not Detected Final           Last checks of Clostridioides difficile testing  Recent Labs   Lab Test 09/03/24  1140 08/17/24  0906   CDBPCT Negative Negative       Infection Studies to assess Diarrhea   Recent Labs   Lab Test 09/06/24  1253   BIEPEC Negative   BISTEC Negative   BISHEI Negative   BIEAEC Negative   BIETEC Negative   [id number] NA   BIADE Negative   BICRY Negative   BICAM Negative   BISAL Negative   BIVIBS Negative   BIVIBC Negative   BIYER Negative   BIGIA Negative   BINOR Negative   BIROT Negative   BIPLE Negative   BIENT Negative   BIAST Negative   BISAP Negative       Virology:  Viral loads    Recent Labs   Lab Test 09/14/24  1741 09/12/24  1120 09/07/24  1103 08/17/24  1621 06/09/24  1124   EBQI  --  Not Detected  --   --   --    CMVQNT Not Detected  --  Not Detected   < >  --    HSDNA1  --   --   --   --  Not Detected   HSDNA2  --   --   --   --  Not Detected    < > = values in this interval not displayed.       BK  Virus Testing     Recent Labs   Lab Test 09/10/24  1340 09/09/24  1129   BKRES Not Detected Not Detected       Imaging:  Recent Results (from the past 48 hour(s))   US Upper Extremity Venous Duplex Right   Result Value    Radiologist flags Right IJ and right axillary vein thrombus (Urgent)    Narrative    EXAMINATION: DOPPLER VENOUS ULTRASOUND OF THE RIGHT UPPER EXTREMITY    COMPARISON: None.    HISTORY: Recent CBC removed, tenderness, swelling, difficulty  swallowing    TECHNIQUE:  Gray-scale evaluation with compression, spectral flow and  color Doppler assessment of the deep venous system of the right upper  extremity.    FINDINGS:  Occlusive thrombus within the right internal jugular vein. Patent  innominate and subclavian veins. Nonocclusive thrombus within the  right axillary vein. There is normal compressibility of the brachial,  basilic and cephalic veins. Right upper extremity PICC noted.      Impression    IMPRESSION:  1.  Occlusive thrombus within the right internal jugular vein.  2.  Nonocclusive thrombus within the right axillary vein.    [Urgent Result: Right IJ and right axillary vein thrombus]    Finding was identified on 9/15/2024 12:08 PM.     STANLEY Savage was contacted by Dr. Florez at 9/15/2024 12:13 PM and  verbalized understanding of the urgent finding.     I have personally reviewed the examination and initial interpretation  and I agree with the findings.    JAEL ASCENCIO MD         SYSTEM ID:  [id number]   XR Chest Port 1 View    Narrative    EXAM: XR CHEST PORT 1 VIEW  9/15/2024 12:39 PM      HISTORY: neutropenic fever s/p bmt    COMPARISON: 19 2024    FINDINGS: Single view of the chest. Right upper extremity PICC tip  terminates at the superior cavoatrial junction. Trachea is midline.  Cardiac silhouette is within normal limits. No focal consolidation. No  pleural effusion or pneumothorax. Visualized upper abdomen is  unremarkable. No acute osseous abnormalities.      Impression     IMPRESSION: No focal airspace disease.    I have personally reviewed the examination and initial interpretation  and I agree with the findings.    LIZ GASPAR MD         SYSTEM ID:  [id number]

## 2024-09-16 NOTE — PLAN OF CARE
[id number]  VSS. Afebrile. SBA. Denies dizziness/lightheadedness when getting up. Zofran x1 dose given for nausea. Patient requested imodium & requested provider for order, but ordered to hold metamucil for now.    Problem: Stem Cell/Bone Marrow Transplant  Goal: Symptom-Free Urinary Elimination  Outcome: Progressing     Problem: Stem Cell/Bone Marrow Transplant  Goal: Diarrhea Symptom Control  Outcome: Progressing   Goal Outcome Evaluation:

## 2024-09-17 ENCOUNTER — APPOINTMENT (OUTPATIENT)
Dept: ULTRASOUND IMAGING | Facility: CLINIC | Age: 69
DRG: 014 | End: 2024-09-17
Attending: PHYSICIAN ASSISTANT
Payer: COMMERCIAL

## 2024-09-17 ENCOUNTER — APPOINTMENT (OUTPATIENT)
Dept: OCCUPATIONAL THERAPY | Facility: CLINIC | Age: 69
DRG: 014 | End: 2024-09-17
Attending: INTERNAL MEDICINE
Payer: COMMERCIAL

## 2024-09-17 ENCOUNTER — DOCUMENTATION ONLY (OUTPATIENT)
Dept: ONCOLOGY | Facility: CLINIC | Age: 69
End: 2024-09-17
Payer: COMMERCIAL

## 2024-09-17 LAB
ANION GAP SERPL CALCULATED.3IONS-SCNC: 8 MMOL/L (ref 7–15)
BACTERIA BLD CULT: NO GROWTH
BASOPHILS # BLD AUTO: ABNORMAL 10*3/UL
BASOPHILS # BLD MANUAL: 0 10E3/UL (ref 0–0.2)
BASOPHILS NFR BLD AUTO: ABNORMAL %
BASOPHILS NFR BLD MANUAL: 0 %
BUN SERPL-MCNC: 12.2 MG/DL (ref 8–23)
CALCIUM SERPL-MCNC: 9 MG/DL (ref 8.8–10.4)
CHLORIDE SERPL-SCNC: 101 MMOL/L (ref 98–107)
CREAT SERPL-MCNC: 0.68 MG/DL (ref 0.67–1.17)
EGFRCR SERPLBLD CKD-EPI 2021: >90 ML/MIN/1.73M2
EOSINOPHIL # BLD AUTO: ABNORMAL 10*3/UL
EOSINOPHIL # BLD MANUAL: 0 10E3/UL (ref 0–0.7)
EOSINOPHIL NFR BLD AUTO: ABNORMAL %
EOSINOPHIL NFR BLD MANUAL: 0 %
ERYTHROCYTE [DISTWIDTH] IN BLOOD BY AUTOMATED COUNT: 17 % (ref 10–15)
GLUCOSE SERPL-MCNC: 115 MG/DL (ref 70–99)
HCO3 SERPL-SCNC: 26 MMOL/L (ref 22–29)
HCT VFR BLD AUTO: 21.9 % (ref 40–53)
HGB BLD-MCNC: 7.4 G/DL (ref 13.3–17.7)
IMM GRANULOCYTES # BLD: ABNORMAL 10*3/UL
IMM GRANULOCYTES NFR BLD: ABNORMAL %
LYMPHOCYTES # BLD AUTO: ABNORMAL 10*3/UL
LYMPHOCYTES # BLD MANUAL: 0 10E3/UL (ref 0.8–5.3)
LYMPHOCYTES NFR BLD AUTO: ABNORMAL %
LYMPHOCYTES NFR BLD MANUAL: 1 %
MAGNESIUM SERPL-MCNC: 2 MG/DL (ref 1.7–2.3)
MCH RBC QN AUTO: 27.5 PG (ref 26.5–33)
MCHC RBC AUTO-ENTMCNC: 33.8 G/DL (ref 31.5–36.5)
MCV RBC AUTO: 81 FL (ref 78–100)
MONOCYTES # BLD AUTO: ABNORMAL 10*3/UL
MONOCYTES # BLD MANUAL: 0.3 10E3/UL (ref 0–1.3)
MONOCYTES NFR BLD AUTO: ABNORMAL %
MONOCYTES NFR BLD MANUAL: 22 %
NEUTROPHILS # BLD AUTO: ABNORMAL 10*3/UL
NEUTROPHILS # BLD MANUAL: 1 10E3/UL (ref 1.6–8.3)
NEUTROPHILS NFR BLD AUTO: ABNORMAL %
NEUTROPHILS NFR BLD MANUAL: 77 %
NRBC # BLD AUTO: 0 10E3/UL
NRBC BLD AUTO-RTO: 0 /100
PHOSPHATE SERPL-MCNC: 2.9 MG/DL (ref 2.5–4.5)
PLAT MORPH BLD: ABNORMAL
PLATELET # BLD AUTO: 22 10E3/UL (ref 150–450)
POTASSIUM SERPL-SCNC: 3.8 MMOL/L (ref 3.4–5.3)
RBC # BLD AUTO: 2.69 10E6/UL (ref 4.4–5.9)
RBC MORPH BLD: ABNORMAL
SODIUM SERPL-SCNC: 135 MMOL/L (ref 135–145)
WBC # BLD AUTO: 1.3 10E3/UL (ref 4–11)

## 2024-09-17 PROCEDURE — 97110 THERAPEUTIC EXERCISES: CPT | Mod: GO

## 2024-09-17 PROCEDURE — 99233 SBSQ HOSP IP/OBS HIGH 50: CPT | Performed by: INTERNAL MEDICINE

## 2024-09-17 PROCEDURE — 250N000011 HC RX IP 250 OP 636: Performed by: PHYSICIAN ASSISTANT

## 2024-09-17 PROCEDURE — 250N000012 HC RX MED GY IP 250 OP 636 PS 637

## 2024-09-17 PROCEDURE — 85007 BL SMEAR W/DIFF WBC COUNT: CPT

## 2024-09-17 PROCEDURE — 93971 EXTREMITY STUDY: CPT | Mod: 26 | Performed by: RADIOLOGY

## 2024-09-17 PROCEDURE — 250N000013 HC RX MED GY IP 250 OP 250 PS 637: Performed by: PHYSICIAN ASSISTANT

## 2024-09-17 PROCEDURE — 250N000013 HC RX MED GY IP 250 OP 250 PS 637: Performed by: INTERNAL MEDICINE

## 2024-09-17 PROCEDURE — 99233 SBSQ HOSP IP/OBS HIGH 50: CPT | Mod: FS | Performed by: STUDENT IN AN ORGANIZED HEALTH CARE EDUCATION/TRAINING PROGRAM

## 2024-09-17 PROCEDURE — 250N000011 HC RX IP 250 OP 636

## 2024-09-17 PROCEDURE — 250N000013 HC RX MED GY IP 250 OP 250 PS 637: Performed by: MASSAGE THERAPIST

## 2024-09-17 PROCEDURE — 250N000013 HC RX MED GY IP 250 OP 250 PS 637: Performed by: NURSE PRACTITIONER

## 2024-09-17 PROCEDURE — 83735 ASSAY OF MAGNESIUM: CPT | Performed by: HOSPITALIST

## 2024-09-17 PROCEDURE — 85027 COMPLETE CBC AUTOMATED: CPT

## 2024-09-17 PROCEDURE — 84100 ASSAY OF PHOSPHORUS: CPT | Performed by: HOSPITALIST

## 2024-09-17 PROCEDURE — 258N000003 HC RX IP 258 OP 636

## 2024-09-17 PROCEDURE — 250N000013 HC RX MED GY IP 250 OP 250 PS 637

## 2024-09-17 PROCEDURE — 250N000011 HC RX IP 250 OP 636: Performed by: STUDENT IN AN ORGANIZED HEALTH CARE EDUCATION/TRAINING PROGRAM

## 2024-09-17 PROCEDURE — 206N000001 HC R&B BMT UMMC

## 2024-09-17 PROCEDURE — 80048 BASIC METABOLIC PNL TOTAL CA: CPT

## 2024-09-17 PROCEDURE — 97535 SELF CARE MNGMENT TRAINING: CPT | Mod: GO

## 2024-09-17 PROCEDURE — 93971 EXTREMITY STUDY: CPT | Mod: RT

## 2024-09-17 PROCEDURE — 250N000011 HC RX IP 250 OP 636: Performed by: INTERNAL MEDICINE

## 2024-09-17 PROCEDURE — 250N000012 HC RX MED GY IP 250 OP 636 PS 637: Performed by: PHYSICIAN ASSISTANT

## 2024-09-17 RX ORDER — ENOXAPARIN SODIUM 100 MG/ML
40 INJECTION SUBCUTANEOUS EVERY 24 HOURS
Status: DISCONTINUED | OUTPATIENT
Start: 2024-09-17 | End: 2024-09-18

## 2024-09-17 RX ADMIN — TAMSULOSIN HYDROCHLORIDE 0.4 MG: 0.4 CAPSULE ORAL at 17:11

## 2024-09-17 RX ADMIN — Medication 2 SPRAY: at 17:12

## 2024-09-17 RX ADMIN — URSODIOL 300 MG: 300 CAPSULE ORAL at 14:07

## 2024-09-17 RX ADMIN — ENOXAPARIN SODIUM 40 MG: 40 INJECTION SUBCUTANEOUS at 17:11

## 2024-09-17 RX ADMIN — MICAFUNGIN SODIUM 150 MG: 50 INJECTION, POWDER, LYOPHILIZED, FOR SOLUTION INTRAVENOUS at 09:00

## 2024-09-17 RX ADMIN — SIROLIMUS 4.5 MG: 2 TABLET ORAL at 08:52

## 2024-09-17 RX ADMIN — CALCIUM CARBONATE 600 MG (1,500 MG)-VITAMIN D3 400 UNIT TABLET 2 TABLET: at 17:11

## 2024-09-17 RX ADMIN — ACYCLOVIR 800 MG: 800 TABLET ORAL at 08:52

## 2024-09-17 RX ADMIN — Medication 2 SPRAY: at 12:46

## 2024-09-17 RX ADMIN — Medication 5 ML: at 06:52

## 2024-09-17 RX ADMIN — METHOCARBAMOL 500 MG: 500 TABLET ORAL at 08:52

## 2024-09-17 RX ADMIN — MYCOPHENOLATE MOFETIL 1250 MG: 250 CAPSULE ORAL at 20:09

## 2024-09-17 RX ADMIN — METHOCARBAMOL 500 MG: 500 TABLET ORAL at 20:09

## 2024-09-17 RX ADMIN — CALCIUM CARBONATE 600 MG (1,500 MG)-VITAMIN D3 400 UNIT TABLET 2 TABLET: at 08:52

## 2024-09-17 RX ADMIN — ALLOPURINOL 300 MG: 300 TABLET ORAL at 08:52

## 2024-09-17 RX ADMIN — MYCOPHENOLATE MOFETIL 1250 MG: 250 CAPSULE ORAL at 08:52

## 2024-09-17 RX ADMIN — Medication 2 SPRAY: at 08:51

## 2024-09-17 RX ADMIN — PROCHLORPERAZINE MALEATE 5 MG: 5 TABLET ORAL at 17:11

## 2024-09-17 RX ADMIN — URSODIOL 300 MG: 300 CAPSULE ORAL at 20:09

## 2024-09-17 RX ADMIN — Medication 2 SPRAY: at 20:10

## 2024-09-17 RX ADMIN — CEFEPIME HYDROCHLORIDE 2 G: 2 INJECTION, POWDER, FOR SOLUTION INTRAVENOUS at 12:46

## 2024-09-17 RX ADMIN — OXYBUTYNIN CHLORIDE 10 MG: 10 TABLET, EXTENDED RELEASE ORAL at 22:13

## 2024-09-17 RX ADMIN — HEPARIN, PORCINE (PF) 10 UNIT/ML INTRAVENOUS SYRINGE 10 ML: at 17:41

## 2024-09-17 RX ADMIN — CEFEPIME HYDROCHLORIDE 2 G: 2 INJECTION, POWDER, FOR SOLUTION INTRAVENOUS at 04:26

## 2024-09-17 RX ADMIN — METHOCARBAMOL 500 MG: 500 TABLET ORAL at 14:07

## 2024-09-17 RX ADMIN — PANTOPRAZOLE SODIUM 40 MG: 40 TABLET, DELAYED RELEASE ORAL at 08:52

## 2024-09-17 RX ADMIN — ACYCLOVIR 800 MG: 800 TABLET ORAL at 20:09

## 2024-09-17 RX ADMIN — ACETAMINOPHEN 650 MG: 325 TABLET ORAL at 00:45

## 2024-09-17 RX ADMIN — URSODIOL 300 MG: 300 CAPSULE ORAL at 08:52

## 2024-09-17 RX ADMIN — AMOXICILLIN AND CLAVULANATE POTASSIUM 1 TABLET: 875; 125 TABLET, FILM COATED ORAL at 20:09

## 2024-09-17 ASSESSMENT — ACTIVITIES OF DAILY LIVING (ADL)
ADLS_ACUITY_SCORE: 25
ADLS_ACUITY_SCORE: 26
ADLS_ACUITY_SCORE: 25
ADLS_ACUITY_SCORE: 26
ADLS_ACUITY_SCORE: 25
ADLS_ACUITY_SCORE: 26
ADLS_ACUITY_SCORE: 25
ADLS_ACUITY_SCORE: 26
ADLS_ACUITY_SCORE: 26
ADLS_ACUITY_SCORE: 25
ADLS_ACUITY_SCORE: 26
ADLS_ACUITY_SCORE: 25
ADLS_ACUITY_SCORE: 26
ADLS_ACUITY_SCORE: 26
ADLS_ACUITY_SCORE: 25

## 2024-09-17 NOTE — PROGRESS NOTES
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     Diagnosis MDS/AML  HCT Type Allogeneic     Prep Regimen Flu/Cy/TBI  Donor Match & Source 8/8 DP permissive PBSC    GVHD Prophylaxis PTCy/MMF/Siro  Primary BMT MD Dr. Murphy    Clinical Trials [id number]       INTERVAL  HISTORY     Leland is complaining of right neck pain and increased swelling. He notes that tylenol and heating pack have helped minimally. T max in last 24 hrs was 99.3. He has no new urinary symptoms. He was having soft to loose stools overnight so metamucil was held this AM. No BM this AM yet. His appetite remains poor due to smell and taste of food. He will continue to try to drink ensures to replace meals when he does not feel like eating.    Review of Systems: ROS negative except as noted above.    PHYSICAL EXAM     Vitals:    09/15/24 0845 09/16/24 0729 09/17/24 0756   Weight: 84.9 kg (187 lb 1.6 oz) 84.2 kg (185 lb 9.6 oz) 83.9 kg (184 lb 14.4 oz)      KPS:  70    /76 (BP Location: Left arm)   Pulse 101   Temp 98.9  F (37.2  C) (Oral)   Resp 16   Ht 1.84 m (6' 0.44\")   Wt 83.9 kg (184 lb 14.4 oz)   SpO2 99%   BMI 24.77 kg/m       General:  NAD   HEENT: No oral lesions. Mild tenderness, slight erythema superior to previous CVC site that extends superior of the sternocleidomastoid muscle. Improved 9/16.  Lungs: CTAB  CV: RRR, no MRG    Abdomen: Soft. NT. ND. +BS  Lymphatics: No edema  Neuro: A&O, appropriately interactive, speech clear, moving all extremities   Skin: No rash  Access: R arm PICC dressing cdi, no drainage    Current aGVHD staging: (BMT Assessment tab)    LABS AND IMAGING: I have assessed all abnormal lab values for their clinical significance and any values considered clinically significant have been addressed in the " assessment and plan.      Lab Results   Component Value Date    WBC 1.3 (L) 09/17/2024    ANEU 1.0 (L) 09/17/2024    HGB 7.4 (L) 09/17/2024    HCT 21.9 (L) 09/17/2024    PLT 22 (LL) 09/17/2024     09/17/2024    POTASSIUM 3.8 09/17/2024    CHLORIDE 101 09/17/2024    CO2 26 09/17/2024     (H) 09/17/2024    BUN 12.2 09/17/2024    CR 0.68 09/17/2024    MAG 2.0 09/17/2024    INR 1.26 (H) 09/16/2024    BILITOTAL 0.5 09/16/2024    AST 23 09/16/2024    ALT 14 09/16/2024    ALKPHOS 67 09/16/2024    PROTTOTAL 6.6 09/16/2024    ALBUMIN 3.4 (L) 09/16/2024       ASSESSMENT AND PLAN     Leland Pearson is a 69 year old male with 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     BMT/IEC PROTOCOL for MDS  - Chemo protocol: MT 2022-5; Flu/Cy/TBI  - Peripheral blood stem cell graft from 8/8 URD donor, ABO matched, Cell dose: 6.06 x10^6 CD34/kg  - Engraftment monitoring: Peripheral blood and bone marrow chimerisms on D28, D100, 6 months, 1 and 2 years  - Restaging plan: BMBx with FISH, cytogenetics and NGS on D28, D100, 6 months, 1 and 2 years  - D21 BMbx completed 9/13. Flow negative, pending path/chimerisms etc.      HEME/COAG  # Pancytopenia 2/2 chemo/BMT  - Last day of G (9/12)   - Transfusion parameters: hemoglobin <7, platelets <10.     # Right sternocleidomastoid tenderness s/p CVC removal  #Occlusive thrombus of R internal jugular vein  #Nonocclusive thrombus or R axillary vein  - Started complaining of tenderness and swelling near right CVC site that was removed. Obtained US to further investigate.   - Positive for thrombus. No anticoagulation at this time due to thrombocytopenia. Will need to begin DOAC (per pharmacy preferably voriconazole + apixaban without loading dose) vs lovenox once platelets >50k.   (9/17) Ongoing right neck pain/swelling: will repeat ultrasound to evaluate for further extension of clot. If no worsening clot, will consider CT scan  of neck for soft tissue swelling in coming days. If worsening clot, plan to consult benign heme on plan to anticoagulate.    RISK OF GVHD  - Prophylaxis: PTCy 50mg/kg on D+3 and D+4; MMF (D+5 to 35); Sirolimus (starting D+5, target level 5-10).  - Siro 4.5mg/d. (9/16) 6.3 - increased daily dose     ID  #Streptococcus mitis bacteremia (2/2 bottles) - Cefepime (9/1-9/13), Ceftriaxone (9/13-9/15)   - ABX duration plan: per ID through 9/20. Sensitive to ceftriaxone q24hrs, can transition to non-pseudomonal coverage for discharge/better engraftment.   - With anticipation for discharge, we will switch to Ceftriaxone (9/13-9/15). Resume cefepime 9/16 as below.     # MRSE bacteremia likely contaminant because peripheral stick, grew late (1/2). Dc'd Vanco (9/2-9/6)   - Repeat BC's x3 days NGTD  # Non-Neutropenic Fever: (9/11) 102.7. Peripheral BC (9/11, 9/13) NGTD. Urine cx 9/9 neg. BK urine and blood neg. Adeno urine pending. CMV neg 9/7. EBV neg. Adeno PCR blood pending. Flu/RSV/COVID neg. Still on cefepime as above. Leland appears non-toxic with no new localizing findings and currently afebrile. MRSA swab neg overnight (9/12)   *Repeat fever on 9/15. Will obtain cultures, UC is pending, will obtain CXR. Resume cefepime 9/15-9/17 -- transitioned to oral Augemtin BID thru 9/20. Likely has bacterial seeding of internal jugular clot as above- Appreciate ID input.     Prophylaxis plan:   - Fungal: Micafungin until D+45, followed by mold active azole. Pulm nodules present on workup CT.   - PJP: Bactrim to start at D+28. Toxoplasma negative.   - Viral: Acyclovir 800mg BID. No need for letermovir as donor and recipient are CMV negative.      Monitoring:   - CMV weekly, neg 9/7  - EBV every 2 weeks from D30 to D180: (9/13) ND     GI/NUTRITION  # GERD: Protonix  # Loose stools: Imodium x 1 on 9/13. No indication to repeat C. Diff at this time. Holding Metamucil.  - Anti-emetics: Compazine daily at 1600 and PRN. lorazepam available  PRN.   - Ulcer prophy: Protonix  - VOD prophy: Ursodiol   - Dietician support to prevent malnutrition    CARDIOVASCULAR  # HTN: PTA lisinopril stopped 8/26 d/t symptomatic orthostasis.   # CAD: Coronary artery calcifications seen on CT, stress test in 2023 negative  - Risk of cardiomyopathy: Baseline EF 55-60%.   - Risk of arrhythmia: Baseline EKG showed 421   - Orthostatic hypotension - 1L IVF 9/16. Continue flomax and ditropan for now with history of urinary discomfort as below.       RENAL/ELECTROLYTES/  - Electrolyte management: replace per sliding scale  - BPH: Continue PTA flomax.  - Hemorrhagic Cystitis secondary to cytoxan: urinary urge/frequency/dysuria: UA with +nitrates, mod blood, >182 RBC. UC neg. Urine BK neg. BK blood neg. Pyridium not helpful. Ditropan 2.5mg bid (also on Flomax). Adeno urine pending. Symptoms starting to improve--no noted blood per pt but large clot which was hard to pass 9/13. Urology recommended increasing oxybuytinin.      MUSCULOSKELETAL/FRAILTY  - Baseline Frailty Score: Not frail (0)  - Daily PT/OT as needed while inpatient  # Gout: continue allopurinol      Neuro   # history of spinal stenosis, lumbosacral radiculopathy likely exacerbating.   - Pain management: Outpatient had been taking celecoxib for MSK pain, once a day. Stopped Inpatient will try Tramadol hasn't used. Now discontinued.  Added robaxin TID this admission.    SOCIAL DETERMINANTS  - Caregiver: wife, Vani. Lives within the required distance from hospital   - Financial/insurance concerns: None    Medically Ready for Discharge: Anticipated in 2-4 Days;   - O/P plan: izzy in clinic 3x a week.   - Will plan to go HOME instead of [place]       Clinically Significant Risk Factors              # Hypoalbuminemia: Lowest albumin = 2.8 g/dL at 9/7/2024  7:13 AM, will monitor as appropriate  # Coagulation Defect: INR = 1.26 (Ref range: 0.85 - 1.15) and/or PTT = 28 Seconds (Ref range: 22 - 38 Seconds), will monitor for  bleeding  # Thrombocytopenia: Lowest platelets = 22 in last 2 days, will monitor for bleeding   # Hypertension: Noted on problem list                     Patient Active Problem List   Diagnosis    MDS (myelodysplastic syndrome) (H)    Pre-operative cardiovascular examination    Benign essential hypertension    Infection due to Streptococcus mitis group    Nocturia    Administration of long-term prophylactic antibiotics    History of peripheral stem cell transplant (H)        I spent 40 minutes in the care of this patient today, which included time necessary for preparation for the visit, obtaining history, ordering medications/tests/procedures as medically indicated, review of pertinent medical literature, counseling of the patient, communication of recommendations to the care team, and documentation time.    Baltazar Arguello PA-C   [facility] - [phone]

## 2024-09-17 NOTE — PLAN OF CARE
"/76 (BP Location: Left arm)   Pulse 101   Temp 98.9  F (37.2  C) (Oral)   Resp 16   Ht 1.84 m (6' 0.44\")   Wt 83.9 kg (184 lb 14.4 oz)   SpO2 99%   BMI 24.77 kg/m      Intermittently tachy this afternoon, AVSS on RA. A&Ox4, able to make needs known. Denies CP, SOB, numbness/tingling, dizziness. C/O neck pain 5/10, increased difficulty swallowing pills, decreased appetite, and increased swelling. Care team notified, ultrasound of upper extremity & central venous doppler ordered & completed. Switched PO meds to IV as able & obtained okay to cut order for applicable meds. No replacements this care shift, AM draw tomorrow. Ambulating SBA to bathroom during the day voiding adequately, utilizing external male catheter at night.     Goal Outcome Evaluation:      Plan of Care Reviewed With: patient    Overall Patient Progress: no changeOverall Patient Progress: no change         Problem: Adult Inpatient Plan of Care  Goal: Readiness for Transition of Care  Outcome: Not Progressing  Flowsheets (Taken 9/17/2024 1403)  Anticipated Changes Related to Illness: (neck swelling and pain increased.) other (see comments)  Transportation Anticipated: family or friend will provide  Concerns to be Addressed: no discharge needs identified  Intervention: Mutually Develop Transition Plan  Recent Flowsheet Documentation  Taken 9/17/2024 1403 by Dalia Colbert RN  Anticipated Changes Related to Illness: (neck swelling and pain increased.) other (see comments)  Transportation Anticipated: family or friend will provide  Concerns to be Addressed: no discharge needs identified     Problem: Adult Inpatient Plan of Care  Goal: Plan of Care Review  Description: The Plan of Care Review/Shift note should be completed every shift.  The Outcome Evaluation is a brief statement about your assessment that the patient is improving, declining, or no change.  This information will be displayed automatically on your shift  note.  Outcome: " "Progressing  Flowsheets (Taken 9/17/2024 1403)  Plan of Care Reviewed With: patient  Overall Patient Progress: no change  Goal: Patient-Specific Goal (Individualized)  Description: You can add care plan individualizations to a care plan. Examples of Individualization might be:  \"Parent requests to be called daily at 9am for status\", \"I have a hard time hearing out of my right ear\", or \"Do not touch me to wake me up as it startles  me\".  Outcome: Progressing  Goal: Absence of Hospital-Acquired Illness or Injury  Outcome: Progressing  Intervention: Identify and Manage Fall Risk  Recent Flowsheet Documentation  Taken 9/17/2024 1252 by Dalia Colbert RN  Safety Promotion/Fall Prevention: safety round/check completed  Taken 9/17/2024 0919 by Dalia Colbert RN  Safety Promotion/Fall Prevention: safety round/check completed  Taken 9/17/2024 0911 by Dalia Colbert RN  Safety Promotion/Fall Prevention: safety round/check completed  Taken 9/17/2024 0801 by Dalia Colbert RN  Safety Promotion/Fall Prevention: safety round/check completed  Intervention: Prevent Skin Injury  Recent Flowsheet Documentation  Taken 9/17/2024 0911 by Dalia Colbert RN  Body Position:   position changed independently   supine  Skin Protection: adhesive use limited  Device Skin Pressure Protection: adhesive use limited  Intervention: Prevent and Manage VTE (Venous Thromboembolism) Risk  Recent Flowsheet Documentation  Taken 9/17/2024 0911 by Dalia Colbert RN  VTE Prevention/Management: SCDs off (sequential compression devices)  Intervention: Prevent Infection  Recent Flowsheet Documentation  Taken 9/17/2024 1252 by Dalia Colbert RN  Infection Prevention:   hand hygiene promoted   single patient room provided   environmental surveillance performed   equipment surfaces disinfected   personal protective equipment utilized   visitors restricted/screened  Taken 9/17/2024 0911 by Dalia Colbert RN  Infection " Prevention:   hand hygiene promoted   single patient room provided   environmental surveillance performed   equipment surfaces disinfected   personal protective equipment utilized   visitors restricted/screened  Goal: Optimal Comfort and Wellbeing  Outcome: Progressing  Intervention: Monitor Pain and Promote Comfort  Recent Flowsheet Documentation  Taken 9/17/2024 1155 by Dalia Colbert RN  Pain Management Interventions:   medication (see MAR)   MD notified (comment)  Taken 9/17/2024 0911 by Dalia Colbert RN  Pain Management Interventions:   medication (see MAR)   MD notified (comment)     Problem: Risk for Delirium  Goal: Optimal Coping  Outcome: Progressing  Intervention: Optimize Psychosocial Adjustment to Delirium  Recent Flowsheet Documentation  Taken 9/17/2024 0911 by Dalia Colbert RN  Supportive Measures:   active listening utilized   decision-making supported   goal-setting facilitated   verbalization of feelings encouraged  Family/Support System Care: support provided  Goal: Improved Sleep  Outcome: Progressing     Problem: Stem Cell/Bone Marrow Transplant  Goal: Optimal Coping with Transplant  Outcome: Progressing  Intervention: Optimize Patient/Family Adjustment to Transplant  Recent Flowsheet Documentation  Taken 9/17/2024 0911 by Dalia Colbert RN  Supportive Measures:   active listening utilized   decision-making supported   goal-setting facilitated   verbalization of feelings encouraged  Family/Support System Care: support provided  Goal: Symptom-Free Urinary Elimination  Outcome: Progressing  Intervention: Prevent or Manage Bladder Irritation  Recent Flowsheet Documentation  Taken 9/17/2024 1155 by Dalia Colbert RN  Pain Management Interventions:   medication (see MAR)   MD notified (comment)  Taken 9/17/2024 0911 by Dalia Colbert RN  Pain Management Interventions:   medication (see MAR)   MD notified (comment)  Urinary Elimination Promotion: frequent voiding  encouraged  Hyperhydration Management: fluids provided  Goal: Diarrhea Symptom Control  Outcome: Progressing  Intervention: Manage Diarrhea  Recent Flowsheet Documentation  Taken 9/17/2024 1320 by Dalia Colbert RN  Perineal Care: perineal hygiene encouraged  Taken 9/17/2024 0911 by Dalia Colbert RN  Skin Protection: adhesive use limited  Fluid/Electrolyte Management: fluids provided  Perineal Care: perineal hygiene encouraged  Goal: Improved Activity Tolerance  Outcome: Progressing  Intervention: Promote Improved Energy  Recent Flowsheet Documentation  Taken 9/17/2024 1320 by Dalia Colbert RN  Activity Management: ambulated to bathroom  Taken 9/17/2024 0911 by Dalia Colbert RN  Fatigue Management: frequent rest breaks encouraged  Activity Management:   up in chair   ambulated to bathroom  Environmental Support:   calm environment promoted   personal routine supported  Goal: Blood Counts Within Acceptable Range  Outcome: Progressing  Intervention: Monitor and Manage Hematologic Symptoms  Recent Flowsheet Documentation  Taken 9/17/2024 0911 by Dalia Colbert RN  Bleeding Precautions: blood pressure closely monitored  Medication Review/Management: medications reviewed  Goal: Absence of Hypersensitivity Reaction  Outcome: Progressing  Goal: Absence of Infection  Outcome: Progressing  Intervention: Prevent and Manage Infection  Recent Flowsheet Documentation  Taken 9/17/2024 1252 by Dalia Colbert RN  Infection Prevention:   hand hygiene promoted   single patient room provided   environmental surveillance performed   equipment surfaces disinfected   personal protective equipment utilized   visitors restricted/screened  Isolation Precautions: protective environment maintained  Taken 9/17/2024 0911 by Dalia Colbert RN  Infection Prevention:   hand hygiene promoted   single patient room provided   environmental surveillance performed   equipment surfaces disinfected   personal  protective equipment utilized   visitors restricted/screened  Infection Management: aseptic technique maintained  Isolation Precautions: protective environment maintained  Goal: Improved Oral Mucous Membrane Health and Integrity  Outcome: Progressing  Goal: Nausea and Vomiting Symptom Relief  Outcome: Progressing  Goal: Optimal Nutrition Intake  Outcome: Progressing  Intervention: Minimize and Manage Barriers to Oral Intake  Recent Flowsheet Documentation  Taken 9/17/2024 0911 by Dalia Colbert RN  Oral Nutrition Promotion: rest periods promoted     Problem: Risk for Delirium  Goal: Improved Behavioral Control  Intervention: Prevent and Manage Agitation  Recent Flowsheet Documentation  Taken 9/17/2024 0911 by Dalia Colbert RN  Environment Familiarity/Consistency: daily routine followed  Intervention: Minimize Safety Risk  Recent Flowsheet Documentation  Taken 9/17/2024 0911 by Dalia Colbert RN  Communication Enhancement Strategies: call light answered in person  Enhanced Safety Measures: room near unit station  Goal: Improved Attention and Thought Clarity  Intervention: Maximize Cognitive Function  Recent Flowsheet Documentation  Taken 9/17/2024 0911 by Dalia Colbert RN  Sensory Stimulation Regulation:   care clustered   quiet environment promoted  Reorientation Measures:   clock in view   glasses use encouraged

## 2024-09-17 NOTE — PROGRESS NOTES
PA Initiation    Medication: VORICONAZOLE 200 MG PO TABS  Insurance Company: [employer] - Phone [phone] Fax [phone]   Start Date: 9/17/2024        Sepideh Kenyon  [facility] Liaison  Phone [phone]  Fax [phone]  Available on Teams & Vocera

## 2024-09-17 NOTE — PLAN OF CARE
"Goal Outcome Evaluation:         Blood pressure 125/86, pulse 109, temperature 97.8  F (36.6  C), temperature source Axillary, resp. rate 16, height 1.84 m (6' 0.44\"), weight 84.2 kg (185 lb 9.6 oz), SpO2 94%.    Pt is AVSS, A/O x4. He is on room air with 02 saturations WNL. Pt's main complained was back of his neck pain 6/10 and tylenol given x 1 and he is also benefiting from hot packs. Pt slept well and he is voiding via external male catheter (Promofit ) and he is voiding large amounts. Lab results are WNL for pt this morning. Right double lumen PICC, red lumen with TKO and Antibiotics and purple lumen is heparin locked, Continue to monitor pt and follow plan of care.      Problem: Adult Inpatient Plan of Care  Goal: Plan of Care Review  Description: The Plan of Care Review/Shift note should be completed every shift.  The Outcome Evaluation is a brief statement about your assessment that the patient is improving, declining, or no change.  This information will be displayed automatically on your shift  note.  Outcome: Progressing  Goal: Patient-Specific Goal (Individualized)  Description: You can add care plan individualizations to a care plan. Examples of Individualization might be:  \"Parent requests to be called daily at 9am for status\", \"I have a hard time hearing out of my right ear\", or \"Do not touch me to wake me up as it startles  me\".  Outcome: Progressing  Goal: Absence of Hospital-Acquired Illness or Injury  Outcome: Progressing  Intervention: Identify and Manage Fall Risk  Recent Flowsheet Documentation  Taken 9/17/2024 0400 by Kasey Quiros RN  Safety Promotion/Fall Prevention:   clutter free environment maintained   lighting adjusted   nonskid shoes/slippers when out of bed   room near nurse's station   safety round/check completed   room organization consistent   treat reversible contributory factors   treat underlying cause  Taken 9/17/2024 0000 by Kasey Quiros RN  Safety Promotion/Fall " Prevention:   clutter free environment maintained   lighting adjusted   nonskid shoes/slippers when out of bed   room near nurse's station   room organization consistent   patient and family education  Taken 9/16/2024 2000 by Kasey Quiros RN  Safety Promotion/Fall Prevention:   clutter free environment maintained   lighting adjusted   nonskid shoes/slippers when out of bed   room near nurse's station   safety round/check completed   room organization consistent   treat reversible contributory factors   treat underlying cause  Intervention: Prevent Skin Injury  Recent Flowsheet Documentation  Taken 9/17/2024 0400 by Kasey Quiros RN  Body Position: position changed independently  Skin Protection: adhesive use limited  Device Skin Pressure Protection: adhesive use limited  Taken 9/17/2024 0000 by Kasey Quiros RN  Body Position: position changed independently  Taken 9/16/2024 2000 by Kasey Quiros RN  Body Position: position changed independently  Skin Protection: adhesive use limited  Device Skin Pressure Protection: adhesive use limited  Intervention: Prevent and Manage VTE (Venous Thromboembolism) Risk  Recent Flowsheet Documentation  Taken 9/17/2024 0400 by Kasey Quiros RN  VTE Prevention/Management: SCDs off (sequential compression devices)  Taken 9/16/2024 2000 by Kasey Quiros RN  VTE Prevention/Management: SCDs off (sequential compression devices)  Intervention: Prevent Infection  Recent Flowsheet Documentation  Taken 9/17/2024 0400 by Kasey Quiros RN  Infection Prevention:   hand hygiene promoted   single patient room provided   environmental surveillance performed   equipment surfaces disinfected   personal protective equipment utilized   rest/sleep promoted   visitors restricted/screened  Taken 9/17/2024 0000 by Kasey Quiros RN  Infection Prevention:   hand hygiene promoted   single patient room provided   environmental surveillance performed   equipment surfaces  disinfected   personal protective equipment utilized   rest/sleep promoted   visitors restricted/screened  Taken 9/16/2024 2000 by Kasey Quiros RN  Infection Prevention:   hand hygiene promoted   single patient room provided   environmental surveillance performed   equipment surfaces disinfected   personal protective equipment utilized   rest/sleep promoted   visitors restricted/screened  Goal: Optimal Comfort and Wellbeing  Outcome: Progressing  Intervention: Monitor Pain and Promote Comfort  Recent Flowsheet Documentation  Taken 9/17/2024 0131 by Kasey Quiros RN  Pain Management Interventions:   pillow support provided   heat applied  Taken 9/17/2024 0127 by Kasey Quiros RN  Pain Management Interventions: heat applied  Taken 9/17/2024 0045 by Kasey Quiros RN  Pain Management Interventions:   medication (see MAR)   heat applied  Taken 9/16/2024 2000 by Kasey Quiros RN  Pain Management Interventions: heat applied

## 2024-09-17 NOTE — PROGRESS NOTES
Prior Authorization Approval    Medication: VORICONAZOLE 200 MG PO TABS  Authorization Effective Date: 6/19/2024  Authorization Expiration Date: 3/17/2025  Approved Dose/Quantity: 200mg  /  60 tabs  Reference #: [id number]   Insurance Company: [employer] - Phone [phone] Fax [phone]  Expected CoPay: $ 149.09  Financial Assistance Needed: Liaison applying for LUPE Kenyon  [facility] Liaison  Phone [phone]  Fax [phone]  Available on Teams & Vocera

## 2024-09-17 NOTE — CONSULTS
"Discharge Pharmacy Test Claim    Patient's [employer] medicare advantage plan covers Eliquis and enoxaparin. Expected monthly copays are listed below. Patient is in the coverage gap (\"donut hole\") phase of their coverage. Voriconazole requires a prior authorization through patient's plan. Liaison will initiate PA process.    Test Claim Copay   Eliquis 145.33   Lovenox 107.84   Voriconazole PA required       Sepideh Kenyon  [facility] Liaison  Phone: [phone] Fax: [phone]  Available on Teams & Vocera    "

## 2024-09-17 NOTE — PROGRESS NOTES
[facility]  Transplant Infectious Disease Progress Note     Patient:  Leland Pearson, Date of birth 1955, Medical record number [id number]  Date of Visit:  09/17/2024         Assessment and Recommendations:   Recommendations:  - OK to change over cefepime to Augmentin 875 mg po BID to 9/20/2024. After that end date, would switch over to SOP for bacterial prophylaxis that he is eligible for.   - Continue acyclovir as viral prophylaxis.  - Continue micafungin as fungal prophylaxis.   - Agree with bactrim as Pneumocystis prophylaxis, due to start ~ 9/23/2024 per SOP.     Transplant Infectious Disease will continue to follow with you.      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   Infectious Disease issues include:  - Streptococcus mitis/oralis bacteremia, isolated from CVC 9/2/2024. Repeat blood cultures have been negative to date. CVC removed 9/6/2024 due to persistent fever, and fever has improved since removal. Transthoracic echocardiogram neg on 9/6/2024. Plan to continue sensitive antibiotics for total of 14 days from catheter removal, with end date of 9/20/2024. The isolate was susceptible to Augmentin on the suppressed antibiotic panel. OK to change over cefepime to Augmentin 875 mg po BID to 9/202/2024. After that end date, would switch over to SOP for bacterial prophylaxis that he is eligible for.   - Pulm nodules present on workup CT 8/6/2024. 2.4 mm nodule right lower lobe image 184 series 4. Stable 4.5 mm nodule in the right major fissure image 107 series 4. Minimal atelectasis in the lingula. Micafungin until D+45, followed by mold active azole.   - Dysuria, urgency and frequency (improving). 9/11/2024 urine & 9/12/2024 blood adenovirus PCR neg. 9/9/2024 urine BK is neg, awaiting 9/10/2024 blood BK. He is using a Primofit external catheter so that he can sleep overnight with nocturia.  -  Staphylococcus epidermidis in blood culture from 9/2/2024. Repeat blood cultures have been negative to date. Interpreting this isolation event of Staph epi as a contaminant based on the fact that her only grown 1 of 2 peripheral blood culture bottles and resulted as positive several hours after his strep mitis cultures. Vancomycin was discontinued on 9/5/2024.   - QTc interval: 421 msec on 8/5/2024 EKG.   - Bacterial coverage: cefepime   - PCP prophylaxis: due to start ~ day+28  - Serostatus & viral prophylaxis: Toxoplasma negative. CMV D-/R-, EBV+, HSV-. No need for letermovir as donor and recipient are CMV negative. On ACV  - Fungal prophylaxis: izzy  - Immunization status: he has received 6 Covid-19 vaccines  - Gamma globulin status: unknown  - Isolation status: Good hand hygiene.   - Code status: Full Code    Marlen Manriquez MD. Pager [phone]         Interval History:   Since Leland was last seen by me on 9/16/2024, he continues to have some mild and tender swelling to his right neck area, where Duplex had shown occlusive clot in the RIJ vein and non-occlusive clot in the R axillalr vein. There is a new line on his skin around the R neck where it had been swollen yesterday, but it is not swollen or tender in that new area this morning. (The original area is .) His wife and daughter were at the bedside. He is day+25. He may discharge within 2 days. WBC is 1.3, with an ANC of 1000.     Review of Systems:  CONSTITUTIONAL:  now without fever. In fact, he was cold this morning and under a blanket.   EYES:   ENT:    RESPIRATORY:    CARDIOVASCULAR:  tachycardic at times.   GASTROINTESTINAL:  no nausea/vomiting. Leland requested imodium although crosscover provider ordered to hold metamucil   GENITOURINARY:  using external catheter so that he can sleep overnight with nocturia.   HEME:  last plt tranfusion on 9/13/2024.   INTEGUMENT:    NEURO:           Current Medications & Allergies:     Current  Facility-Administered Medications   Medication Dose Route Frequency Provider Last Rate Last Admin    acyclovir (ZOVIRAX) tablet 800 mg  800 mg Oral BID Baltazar Arguello PA-C   800 mg at 09/17/24 0852    allopurinol (ZYLOPRIM) tablet 300 mg  300 mg Oral Daily Kaitlyn Aguilar NP   300 mg at 09/17/24 0852    artificial saliva (BIOTENE MT) solution 2 spray  2 spray Swish & Spit 4x Daily Bessie Lazo PA-C   2 spray at 09/17/24 0851    calcium carbonate-vitamin D (CALTRATE) 600-10 MG-MCG per tablet 2 tablet  2 tablet Oral BID w/meals Bessie Lazo PA-C   2 tablet at 09/17/24 0852    ceFEPIme (MAXIPIME) 2 g vial to attach to  mL bag for ADULTS or NS 50 mL bag for PEDS  2 g Intravenous Q8H Naida Andrea PA-C 200 mL/hr at 09/17/24 0426 2 g at 09/17/24 0426    heparin lock flush 10 unit/mL injection 5-20 mL  5-20 mL Intracatheter Q24H Bernadette Green PA-C   5 mL at 09/16/24 1636    heparin lock flush 10 unit/mL injection 5-20 mL  5-20 mL Intracatheter Q24H Markus Mendez MD   5 mL at 09/17/24 0652    methocarbamol (ROBAXIN) tablet 500 mg  500 mg Oral TID Aziza Mendieta APRN CNP   500 mg at 09/17/24 0852    micafungin (MYCAMINE) 150 mg in sodium chloride 0.9 % 100 mL intermittent infusion  150 mg Intravenous Daily Kaitlyn Aguilar  mL/hr at 09/17/24 0900 150 mg at 09/17/24 0900    mycophenolate (GENERIC EQUIVALENT) capsule 1,250 mg  1,250 mg Oral Q12H [facility] (08/20) Eunice Spicer PA-C   1,250 mg at 09/17/24 0852    oxyBUTYnin ER (DITROPAN XL) 24 hr tablet 10 mg  10 mg Oral At Bedtime Emely Jeter APRN CNP   10 mg at 09/16/24 2212    pantoprazole (PROTONIX) EC tablet 40 mg  40 mg Oral Daily Bessie Lazo PA-C   40 mg at 09/17/24 0852    prochlorperazine (COMPAZINE) tablet 5 mg  5 mg Oral Daily Eunice Spicer PA-C   5 mg at 09/16/24 1636    [Held by provider] psyllium (METAMUCIL/KONSYL) capsule 3 capsule  3 capsule Oral Daily Bessie Lazo PA-C   3 capsule at 09/16/24 0878     sirolimus (GENERIC EQUIVALENT) tablet 4.5 mg  4.5 mg Oral Daily Bessie Lazo PA-C   4.5 mg at 09/17/24 0852    [START ON 9/23/2024] sulfamethoxazole-trimethoprim (BACTRIM DS) 800-160 MG per tablet 1 tablet  1 tablet Oral Q Mon Tues BID Kaitlyn Aguilar, NP        tamsulosin (FLOMAX) capsule 0.4 mg  0.4 mg Oral Daily Bessie Lazo PA-C   0.4 mg at 09/16/24 1636    ursodiol (ACTIGALL) capsule 300 mg  300 mg Oral TID Markus Mendez MD   300 mg at 09/17/24 0852       Infusions/Drips:  Current Facility-Administered Medications   Medication Dose Route Frequency Provider Last Rate Last Admin       No Known Allergies         Physical Exam:   Patient Vitals for the past 24 hrs:   BP Temp Temp src Pulse Resp SpO2 Weight   09/17/24 1155 121/76 98.9  F (37.2  C) Oral 101 16 99 % --   09/17/24 0758 -- -- -- -- -- 96 % --   09/17/24 0756 -- 98  F (36.7  C) Oral -- 16 99 % 83.9 kg (184 lb 14.4 oz)   09/17/24 0427 125/86 97.8  F (36.6  C) Axillary -- 16 94 % --   09/17/24 0045 134/73 98.8  F (37.1  C) Oral -- 18 93 % --   09/16/24 2013 131/74 99.2  F (37.3  C) Oral -- 18 97 % --   09/16/24 1702 (!) 134/94 99.3  F (37.4  C) Oral 109 17 100 % --     Ranges for vital signs:  Temp:  [97.8  F (36.6  C)-99.3  F (37.4  C)] 98.9  F (37.2  C)  Pulse:  [101-109] 101  Resp:  [16-18] 16  BP: (121-134)/(73-94) 121/76  SpO2:  [93 %-100 %] 99 %  Vitals:    09/15/24 0845 09/16/24 0729 09/17/24 0756   Weight: 84.9 kg (187 lb 1.6 oz) 84.2 kg (185 lb 9.6 oz) 83.9 kg (184 lb 14.4 oz)       Physical Examination:  GENERAL:  well-developed, well-nourished man, resting in bed in no acute distress.  HEAD:  Head is normocephalic, atraumatic   EYES:  Eyes have anicteric sclerae  ENT:  Oropharynx is moist without exudates or ulcers. Tongue is midline  NECK:  Supple.   LUNGS:  Clear to auscultation bilateral.   CARDIOVASCULAR:  Regular rate and rhythm with no murmur  ABDOMEN:  Normal bowel sounds, soft, nontender.   SKIN:  No acute rashes. Magic marker  lines on right neck have some swelling between neck and innnermost line. PICC in right upper extremity without any surrounding erythema or exudate.  NEUROLOGIC:  Grossly nonfocal. Active x4 extremities         Laboratory Data:     Metabolic Studies       Recent Labs   Lab Test 09/17/24 0424 09/16/24  0432 09/15/24  1153 09/07/24  1103 09/07/24  0713    135  --    < > 133*   POTASSIUM 3.8 4.0  --    < > 4.0   CHLORIDE 101 100  --    < > 100   CO2 26 25  --    < > 22   ANIONGAP 8 10  --    < > 11   BUN 12.2 13.3  --    < > 9.8   CR 0.68 0.66*  --    < > 0.63*   GFRESTIMATED >90 >90  --    < > >90   * 129*  --    < > 120*   HERBERT 9.0 9.0  --    < > 8.3*   PHOS 2.9 3.0  --    < >  --    MAG 2.0 2.0  --    < > 1.8   LACT  --  0.6* 0.8   < >  --    PCAL  --   --   --   --  0.37    < > = values in this interval not displayed.       Hepatic Studies    Recent Labs   Lab Test 09/16/24 0432 09/12/24  0321 09/09/24  0615 05/15/24  1339 05/08/24  1156   BILITOTAL 0.5 0.5 0.5   < > 0.6   DBIL <0.20  --  0.24   < >  --    ALKPHOS 67 70 58   < > 82   PROTTOTAL 6.6 6.5 6.3*   < > 7.6   ALBUMIN 3.4* 3.4* 3.2*   < > 4.1   AST 23 25 25   < > 43   ALT 14 16 19   < > 21   LDH  --   --   --   --  613*    < > = values in this interval not displayed.       Gout Labs      Recent Labs   Lab Test 08/16/24  0920 08/09/24  0827 08/05/24  1200 07/29/24  0811 07/01/24  1208   URIC 4.2 3.9 4.1 4.5 4.1       Hematology Studies   Recent Labs   Lab Test 09/17/24  0424 09/16/24  0432 09/15/24  0353 09/14/24  0346 05/22/24  0838 05/21/24  1016   WBC 1.3* 1.5* 1.8* 2.1*   < > 4.0   ABLA  --   --   --   --   --  0.4*   BLST  --   --   --   --   --  9   ANEU 1.0* 1.3*  --   --    < > 0.6*   ANEUTAUTO  --   --  1.4* 1.7   < >  --    ALYM 0.0* 0.0*  --   --    < > 2.0   ALYMPAUTO  --   --  0.0* 0.0*   < >  --    DAVIAN 0.3 0.2  --   --    < > 0.5   AMONOAUTO  --   --  0.3 0.2   < >  --    AEOS 0.0 0.0  --   --    < > 0.0   AEOSAUTO  --   --  0.0  0.0   < >  --    ABSBASO  --   --  0.0 0.0   < >  --    HGB 7.4* 8.0* 7.6* 7.4*   < > 12.9*   HCT 21.9* 24.1* 22.6* 22.1*   < > 38.4*   PLT 22* 24* 23* 25*   < > 72*    < > = values in this interval not displayed.       Clotting Studies    Recent Labs   Lab Test 09/16/24  0432 09/09/24  0615 09/02/24  0444 08/26/24  0407 08/16/24  0920   INR 1.26* 1.06 1.06 0.99 0.93   PTT  --   --   --   --  28       Iron Testing    Recent Labs   Lab Test 09/17/24  0424 08/02/24  0919 08/01/24  0805   ZARIA  --   --  460*   MCV 81   < > 84    < > = values in this interval not displayed.       Urine Studies     Recent Labs   Lab Test 09/13/24  1645 09/09/24  1900 09/07/24  1230 09/02/24  0454 08/05/24  1200   URINEPH 6.0 7.0 6.0 7.0 6.5   NITRITE Negative Positive* Negative Negative Negative   LEUKEST Trace* Negative Negative Negative Negative   WBCU 3 1 1 2 <1       Medication levels    Recent Labs   Lab Test 09/16/24  0843 09/07/24  0713 09/06/24  0337   VANCOMYCIN  --   --  10.3   RAPAMY 6.3   < >  --     < > = values in this interval not displayed.       Microbiology:  Susceptibility Blood from [place] 09/02/2024    Streptococcus mitis     ALDO     Amoxicillin/Clavulanate <=0.5 ug/mL *      Cefotaxime <=0.25 ug/mL Susceptible     Ceftriaxone <=0.25 ug/mL Susceptible     Clindamycin <=0.12 ug/mL Susceptible     Erythromycin >2 ug/mL Resistant *     Levofloxacin >8 ug/mL Resistant *     Meropenem <=0.25 ug/mL Susceptible     Penicillin 0.25 ug/mL Intermediate     Tetracycline >8 ug/mL Resistant *     Vancomycin <=0.25 ug/mL Susceptible           Last Culture results   Culture   Date Value Ref Range Status   09/15/2024 No growth after 1 day  Preliminary   09/15/2024 No growth after 1 day  Preliminary   09/14/2024 No Growth  Final   09/12/2024 No growth after 4 days  Preliminary   09/11/2024 No Growth  Final   09/09/2024 No Growth  Final   09/07/2024 No Growth  Final   09/05/2024 No Growth  Final   09/04/2024 No Growth  Final    09/04/2024 No Growth  Final   09/03/2024 No Growth  Final   09/03/2024 No Growth  Final   09/02/2024 Positive on the 2nd day of incubation (A)  Final   09/02/2024 Staphylococcus epidermidis (AA)  Final     Comment:     1 of 2 bottles  The recovery of this organism from a single blood culture bottle most likely represents contamination. If susceptibility testing is needed, please refer to the antibiogram or contact [facility].   09/02/2024 Positive on the 1st day of incubation (A)  Final   09/02/2024 Streptococcus mitis (AA)  Final     Comment:     2 of 2 bottles     09/02/2024 Positive on the 1st day of incubation (A)  Final   09/02/2024 Streptococcus mitis (AA)  Final     Comment:     2 of 2 bottles    Susceptibilities done on previous cultures   08/16/2024   Final    No Vancomycin Resistant Enterococcus species isolated     Enterococcus faecium   Date Value Ref Range Status   09/02/2024 Not Detected Not Detected Final           Last checks of Clostridioides difficile testing  Recent Labs   Lab Test 09/03/24  1140 08/17/24  0906   CDBPCT Negative Negative       Infection Studies to assess Diarrhea   Recent Labs   Lab Test 09/06/24  1253   BIEPEC Negative   BISTEC Negative   BISHEI Negative   BIEAEC Negative   BIETEC Negative   ETM580 NA   BIADE Negative   BICRY Negative   BICAM Negative   BISAL Negative   BIVIBS Negative   BIVIBC Negative   BIYER Negative   BIGIA Negative   BINOR Negative   BIROT Negative   BIPLE Negative   BIENT Negative   BIAST Negative   BISAP Negative       Virology:  Viral loads    Recent Labs   Lab Test 09/14/24  1741 09/12/24  1120 09/07/24  1103 08/17/24  1621 06/09/24  1124   EBQI  --  Not Detected  --   --   --    CMVQNT Not Detected  --  Not Detected   < >  --    HSDNA1  --   --   --   --  Not Detected   HSDNA2  --   --   --   --  Not Detected    < > = values in this interval not displayed.       BK Virus Testing     Recent Labs   Lab Test 09/10/24  1340 09/09/24  1129   BKRES Not Detected  Not Detected       Imaging:  Recent Results (from the past 48 hour(s))   XR Chest Port 1 View    Narrative    EXAM: XR CHEST PORT 1 VIEW  9/15/2024 12:39 PM      HISTORY: neutropenic fever s/p bmt    COMPARISON: 19 2024    FINDINGS: Single view of the chest. Right upper extremity PICC tip  terminates at the superior cavoatrial junction. Trachea is midline.  Cardiac silhouette is within normal limits. No focal consolidation. No  pleural effusion or pneumothorax. Visualized upper abdomen is  unremarkable. No acute osseous abnormalities.      Impression    IMPRESSION: No focal airspace disease.    I have personally reviewed the examination and initial interpretation  and I agree with the findings.    LIZ GASPAR MD         SYSTEM ID:  [id number]

## 2024-09-17 NOTE — PROGRESS NOTES
BMT CLINICAL SOCIAL WORK NOTE :    Focus: Supportive Counseling/Resources/Discharge Planning    Data: Pt is a 69 year old male currently day +25 s/p allo BMT .    Interventions: Clinical  (CSW) met with pt to assess coping, provide supportive counseling and assist with resources as needed. Pt spouse (Vani) also at bedside. Pt shared that he has been doing fine. Pt and Vani looking forward to potential discharge this week. Vani reports that they will plan to take pt home but noted that her name was still on the waitlist for [facility]. Vani reports that she would like to keep her name on the wait list in case pt and family decide they would like to move into [facility] when name comes up or decline at that time. CSW provided empathic listening, validation of concerns, and encouragement. Pt was encouraged to contact CSW for support, questions, and/or resource needs.    Assessment: Pt presented as open and engaged.  Pt appears to be coping well at this time. Pt continues to be supported by spouse, Vani.     Plan: CSW will continue to provide supportive counseling and assistance with resources as needed. CSW will continue to collaborate with multidisciplinary team regarding pt's plan of care.     MARC Newman   [facility]   Available via [employer]  Assisting BMT SW 3, ph: [phone]

## 2024-09-17 NOTE — PROGRESS NOTES
[employer]AVIS APPROVED    Medication: VORICONAZOLE 200 MG PO TABS  Amount: $ 3,500  Foundation Name: CONNIE Santos Phone: [phone]  Member ID: [id number]  BIN: [id number]  PCN: [id number]  Group: [id number]  Foundation Effective Date: 9/17/2024  Foundation Expiration Date: 9/17/2025  Patient Notified: Liaison attempted to contact patient several times Monday 9/17/24 with no answer, will attempt again on Tuesday 9/18/24.        Sepideh Kenyon  [facility] Liaison  Phone [phone]  Fax [phone]  Available on Teams & Vocera

## 2024-09-18 ENCOUNTER — APPOINTMENT (OUTPATIENT)
Dept: OCCUPATIONAL THERAPY | Facility: CLINIC | Age: 69
DRG: 014 | End: 2024-09-18
Attending: INTERNAL MEDICINE
Payer: COMMERCIAL

## 2024-09-18 LAB
ANION GAP SERPL CALCULATED.3IONS-SCNC: 10 MMOL/L (ref 7–15)
BASOPHILS # BLD AUTO: 0 10E3/UL (ref 0–0.2)
BASOPHILS NFR BLD AUTO: 1 %
BLD PROD TYP BPU: NORMAL
BLOOD COMPONENT TYPE: NORMAL
BUN SERPL-MCNC: 14.1 MG/DL (ref 8–23)
CALCIUM SERPL-MCNC: 8.7 MG/DL (ref 8.8–10.4)
CHLORIDE SERPL-SCNC: 100 MMOL/L (ref 98–107)
CODING SYSTEM: NORMAL
CREAT SERPL-MCNC: 0.65 MG/DL (ref 0.67–1.17)
CROSSMATCH: NORMAL
EGFRCR SERPLBLD CKD-EPI 2021: >90 ML/MIN/1.73M2
EOSINOPHIL # BLD AUTO: 0 10E3/UL (ref 0–0.7)
EOSINOPHIL NFR BLD AUTO: 0 %
ERYTHROCYTE [DISTWIDTH] IN BLOOD BY AUTOMATED COUNT: 17.2 % (ref 10–15)
GLUCOSE SERPL-MCNC: 111 MG/DL (ref 70–99)
HCO3 SERPL-SCNC: 27 MMOL/L (ref 22–29)
HCT VFR BLD AUTO: 20.1 % (ref 40–53)
HGB BLD-MCNC: 6.7 G/DL (ref 13.3–17.7)
IMM GRANULOCYTES # BLD: 0.1 10E3/UL
IMM GRANULOCYTES NFR BLD: 5 %
ISSUE DATE AND TIME: NORMAL
LACTATE SERPL-SCNC: 0.9 MMOL/L (ref 0.7–2)
LYMPHOCYTES # BLD AUTO: <0.1 10E3/UL (ref 0.8–5.3)
LYMPHOCYTES NFR BLD AUTO: 2 %
MAGNESIUM SERPL-MCNC: 2 MG/DL (ref 1.7–2.3)
MCH RBC QN AUTO: 27.5 PG (ref 26.5–33)
MCHC RBC AUTO-ENTMCNC: 33.3 G/DL (ref 31.5–36.5)
MCV RBC AUTO: 82 FL (ref 78–100)
MONOCYTES # BLD AUTO: 0.4 10E3/UL (ref 0–1.3)
MONOCYTES NFR BLD AUTO: 32 %
NEUTROPHILS # BLD AUTO: 0.8 10E3/UL (ref 1.6–8.3)
NEUTROPHILS NFR BLD AUTO: 60 %
NRBC # BLD AUTO: 0 10E3/UL
NRBC BLD AUTO-RTO: 0 /100
PHOSPHATE SERPL-MCNC: 3 MG/DL (ref 2.5–4.5)
PLATELET # BLD AUTO: 28 10E3/UL (ref 150–450)
POTASSIUM SERPL-SCNC: 3.7 MMOL/L (ref 3.4–5.3)
RBC # BLD AUTO: 2.44 10E6/UL (ref 4.4–5.9)
SODIUM SERPL-SCNC: 137 MMOL/L (ref 135–145)
UNIT ABO/RH: NORMAL
UNIT NUMBER: NORMAL
UNIT STATUS: NORMAL
UNIT TYPE ISBT: 5100
WBC # BLD AUTO: 1.3 10E3/UL (ref 4–11)

## 2024-09-18 PROCEDURE — 80048 BASIC METABOLIC PNL TOTAL CA: CPT

## 2024-09-18 PROCEDURE — 99232 SBSQ HOSP IP/OBS MODERATE 35: CPT | Performed by: INTERNAL MEDICINE

## 2024-09-18 PROCEDURE — 250N000013 HC RX MED GY IP 250 OP 250 PS 637: Performed by: MASSAGE THERAPIST

## 2024-09-18 PROCEDURE — 250N000013 HC RX MED GY IP 250 OP 250 PS 637: Performed by: PHYSICIAN ASSISTANT

## 2024-09-18 PROCEDURE — 84100 ASSAY OF PHOSPHORUS: CPT | Performed by: INTERNAL MEDICINE

## 2024-09-18 PROCEDURE — 97530 THERAPEUTIC ACTIVITIES: CPT | Mod: GO

## 2024-09-18 PROCEDURE — 99222 1ST HOSP IP/OBS MODERATE 55: CPT | Mod: GC | Performed by: INTERNAL MEDICINE

## 2024-09-18 PROCEDURE — 85025 COMPLETE CBC W/AUTO DIFF WBC: CPT

## 2024-09-18 PROCEDURE — 250N000012 HC RX MED GY IP 250 OP 636 PS 637

## 2024-09-18 PROCEDURE — 258N000003 HC RX IP 258 OP 636

## 2024-09-18 PROCEDURE — 250N000013 HC RX MED GY IP 250 OP 250 PS 637: Performed by: INTERNAL MEDICINE

## 2024-09-18 PROCEDURE — 250N000011 HC RX IP 250 OP 636: Performed by: INTERNAL MEDICINE

## 2024-09-18 PROCEDURE — P9040 RBC LEUKOREDUCED IRRADIATED: HCPCS

## 2024-09-18 PROCEDURE — 250N000013 HC RX MED GY IP 250 OP 250 PS 637

## 2024-09-18 PROCEDURE — 250N000013 HC RX MED GY IP 250 OP 250 PS 637: Performed by: STUDENT IN AN ORGANIZED HEALTH CARE EDUCATION/TRAINING PROGRAM

## 2024-09-18 PROCEDURE — 83605 ASSAY OF LACTIC ACID: CPT | Performed by: STUDENT IN AN ORGANIZED HEALTH CARE EDUCATION/TRAINING PROGRAM

## 2024-09-18 PROCEDURE — 250N000011 HC RX IP 250 OP 636: Performed by: PHYSICIAN ASSISTANT

## 2024-09-18 PROCEDURE — 250N000012 HC RX MED GY IP 250 OP 636 PS 637: Performed by: PHYSICIAN ASSISTANT

## 2024-09-18 PROCEDURE — 83735 ASSAY OF MAGNESIUM: CPT

## 2024-09-18 PROCEDURE — 206N000001 HC R&B BMT UMMC

## 2024-09-18 PROCEDURE — 99233 SBSQ HOSP IP/OBS HIGH 50: CPT | Mod: FS | Performed by: PHYSICIAN ASSISTANT

## 2024-09-18 PROCEDURE — 250N000011 HC RX IP 250 OP 636

## 2024-09-18 PROCEDURE — 250N000013 HC RX MED GY IP 250 OP 250 PS 637: Performed by: NURSE PRACTITIONER

## 2024-09-18 PROCEDURE — 99418 PROLNG IP/OBS E/M EA 15 MIN: CPT | Performed by: PHYSICIAN ASSISTANT

## 2024-09-18 RX ORDER — MYCOPHENOLATE MOFETIL 250 MG/1
1250 CAPSULE ORAL EVERY 12 HOURS
Qty: 90 CAPSULE | Refills: 0 | Status: SHIPPED | OUTPATIENT
Start: 2024-09-18 | End: 2024-09-27

## 2024-09-18 RX ORDER — URSODIOL 300 MG/1
300 CAPSULE ORAL 3 TIMES DAILY
Qty: 90 CAPSULE | Refills: 0 | Status: SHIPPED | OUTPATIENT
Start: 2024-09-18 | End: 2024-10-04

## 2024-09-18 RX ORDER — PANTOPRAZOLE SODIUM 40 MG/1
40 TABLET, DELAYED RELEASE ORAL DAILY
Qty: 30 TABLET | Refills: 0 | Status: SHIPPED | OUTPATIENT
Start: 2024-09-19 | End: 2024-09-27

## 2024-09-18 RX ORDER — ACYCLOVIR 200 MG/1
800 CAPSULE ORAL 2 TIMES DAILY
Status: DISCONTINUED | OUTPATIENT
Start: 2024-09-18 | End: 2024-09-19 | Stop reason: HOSPADM

## 2024-09-18 RX ORDER — CALCIUM CARBONATE/VITAMIN D3 600 MG-10
2 TABLET ORAL 2 TIMES DAILY WITH MEALS
Qty: 120 TABLET | Refills: 0 | Status: SHIPPED | OUTPATIENT
Start: 2024-09-18 | End: 2024-10-18

## 2024-09-18 RX ORDER — PROCHLORPERAZINE MALEATE 5 MG
5 TABLET ORAL EVERY 6 HOURS PRN
Qty: 15 TABLET | Refills: 0 | Status: SHIPPED | OUTPATIENT
Start: 2024-09-18 | End: 2024-09-30

## 2024-09-18 RX ORDER — METHOCARBAMOL 500 MG/1
500 TABLET, FILM COATED ORAL 3 TIMES DAILY
Qty: 90 TABLET | Refills: 0 | Status: SHIPPED | OUTPATIENT
Start: 2024-09-18 | End: 2024-10-18

## 2024-09-18 RX ORDER — ENOXAPARIN SODIUM 100 MG/ML
40 INJECTION SUBCUTANEOUS EVERY 12 HOURS
Qty: 24 ML | Refills: 0 | Status: SHIPPED | OUTPATIENT
Start: 2024-09-18 | End: 2024-10-18

## 2024-09-18 RX ORDER — SULFAMETHOXAZOLE/TRIMETHOPRIM 800-160 MG
1 TABLET ORAL
Qty: 16 TABLET | Refills: 0 | Status: SHIPPED | OUTPATIENT
Start: 2024-09-23 | End: 2024-10-04

## 2024-09-18 RX ORDER — OXYBUTYNIN CHLORIDE 10 MG/1
10 TABLET, EXTENDED RELEASE ORAL AT BEDTIME
Qty: 30 TABLET | Refills: 0 | Status: SHIPPED | OUTPATIENT
Start: 2024-09-18 | End: 2024-09-24

## 2024-09-18 RX ORDER — ENOXAPARIN SODIUM 100 MG/ML
40 INJECTION SUBCUTANEOUS EVERY 24 HOURS
Qty: 12 ML | Refills: 0 | Status: SHIPPED | OUTPATIENT
Start: 2024-09-18 | End: 2024-09-19

## 2024-09-18 RX ORDER — ENOXAPARIN SODIUM 100 MG/ML
40 INJECTION SUBCUTANEOUS EVERY 12 HOURS
Status: DISCONTINUED | OUTPATIENT
Start: 2024-09-18 | End: 2024-09-19 | Stop reason: HOSPADM

## 2024-09-18 RX ORDER — SIROLIMUS 0.5 MG/1
4.5 TABLET, SUGAR COATED ORAL DAILY
Qty: 270 TABLET | Refills: 0 | Status: SHIPPED | OUTPATIENT
Start: 2024-09-18 | End: 2024-09-24

## 2024-09-18 RX ADMIN — Medication 2 SPRAY: at 19:55

## 2024-09-18 RX ADMIN — METHOCARBAMOL 500 MG: 500 TABLET ORAL at 14:38

## 2024-09-18 RX ADMIN — ACYCLOVIR 800 MG: 200 CAPSULE ORAL at 19:47

## 2024-09-18 RX ADMIN — Medication 10 ML: at 04:31

## 2024-09-18 RX ADMIN — FILGRASTIM-AAFI 480 MCG: 480 INJECTION, SOLUTION INTRAVENOUS; SUBCUTANEOUS at 14:38

## 2024-09-18 RX ADMIN — METHOCARBAMOL 500 MG: 500 TABLET ORAL at 19:48

## 2024-09-18 RX ADMIN — URSODIOL 300 MG: 300 CAPSULE ORAL at 10:33

## 2024-09-18 RX ADMIN — TAMSULOSIN HYDROCHLORIDE 0.4 MG: 0.4 CAPSULE ORAL at 18:12

## 2024-09-18 RX ADMIN — URSODIOL 300 MG: 300 CAPSULE ORAL at 14:38

## 2024-09-18 RX ADMIN — MYCOPHENOLATE MOFETIL 1250 MG: 250 CAPSULE ORAL at 10:52

## 2024-09-18 RX ADMIN — ALLOPURINOL 300 MG: 300 TABLET ORAL at 10:33

## 2024-09-18 RX ADMIN — SIROLIMUS 4.5 MG: 2 TABLET ORAL at 10:52

## 2024-09-18 RX ADMIN — CALCIUM CARBONATE 600 MG (1,500 MG)-VITAMIN D3 400 UNIT TABLET 2 TABLET: at 10:32

## 2024-09-18 RX ADMIN — ACETAMINOPHEN 650 MG: 325 TABLET ORAL at 00:36

## 2024-09-18 RX ADMIN — OXYBUTYNIN CHLORIDE 10 MG: 10 TABLET, EXTENDED RELEASE ORAL at 19:48

## 2024-09-18 RX ADMIN — PROCHLORPERAZINE MALEATE 5 MG: 5 TABLET ORAL at 18:12

## 2024-09-18 RX ADMIN — AMOXICILLIN AND CLAVULANATE POTASSIUM 1 TABLET: 875; 125 TABLET, FILM COATED ORAL at 19:48

## 2024-09-18 RX ADMIN — AMOXICILLIN AND CLAVULANATE POTASSIUM 1 TABLET: 875; 125 TABLET, FILM COATED ORAL at 10:54

## 2024-09-18 RX ADMIN — CALCIUM CARBONATE 600 MG (1,500 MG)-VITAMIN D3 400 UNIT TABLET 2 TABLET: at 18:12

## 2024-09-18 RX ADMIN — MYCOPHENOLATE MOFETIL 1250 MG: 250 CAPSULE ORAL at 19:47

## 2024-09-18 RX ADMIN — URSODIOL 300 MG: 300 CAPSULE ORAL at 19:48

## 2024-09-18 RX ADMIN — METHOCARBAMOL 500 MG: 500 TABLET ORAL at 10:34

## 2024-09-18 RX ADMIN — PANTOPRAZOLE SODIUM 40 MG: 40 TABLET, DELAYED RELEASE ORAL at 10:55

## 2024-09-18 RX ADMIN — Medication 2 SPRAY: at 10:56

## 2024-09-18 RX ADMIN — ACYCLOVIR 800 MG: 800 TABLET ORAL at 10:55

## 2024-09-18 RX ADMIN — MICAFUNGIN SODIUM 150 MG: 50 INJECTION, POWDER, LYOPHILIZED, FOR SOLUTION INTRAVENOUS at 10:56

## 2024-09-18 RX ADMIN — ENOXAPARIN SODIUM 40 MG: 40 INJECTION SUBCUTANEOUS at 19:09

## 2024-09-18 ASSESSMENT — ACTIVITIES OF DAILY LIVING (ADL)
ADLS_ACUITY_SCORE: 25

## 2024-09-18 NOTE — PROGRESS NOTES
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     Diagnosis MDS/AML  HCT Type Allogeneic     Prep Regimen Flu/Cy/TBI  Donor Match & Source 8/8 DP permissive PBSC    GVHD Prophylaxis PTCy/MMF/Siro  Primary BMT MD Dr. Murphy    Clinical Trials [id number]       INTERVAL  HISTORY     Leland continues to complain of neck pain. He appetite remains poor but he was able to get up and walk around more yesterday. He did not sleep well last night though due to the neck pain. No throat pain. No nausea, vomiting, or diarrhea noted.    Review of Systems: ROS negative except as noted above.    PHYSICAL EXAM     Vitals:    09/16/24 0729 09/17/24 0756 09/18/24 0812   Weight: 84.2 kg (185 lb 9.6 oz) 83.9 kg (184 lb 14.4 oz) 83.7 kg (184 lb 9.6 oz)      KPS:  70    /78 (BP Location: Left arm, Cuff Size: Adult Regular)   Pulse 89   Temp 98.8  F (37.1  C) (Oral)   Resp 17   Ht 1.84 m (6' 0.44\")   Wt 83.7 kg (184 lb 9.6 oz)   SpO2 96%   BMI 24.73 kg/m       General:  NAD   HEENT: No oral lesions. Mild tenderness, slight erythema superior to previous CVC site that extends superior of the sternocleidomastoid muscle. Improved 9/16.  Lungs: CTAB  CV: RRR, no MRG    Abdomen: Soft. NT. ND. +BS  Lymphatics: No edema  Neuro: A&O, appropriately interactive, speech clear, moving all extremities   Skin: No rash  Access: R arm PICC dressing cdi, no drainage    Current aGVHD staging: (BMT Assessment tab)    LABS AND IMAGING: I have assessed all abnormal lab values for their clinical significance and any values considered clinically significant have been addressed in the assessment and plan.      Lab Results   Component Value Date    WBC 1.3 (L) 09/18/2024    ANEU 1.0 (L) 09/17/2024    HGB 6.7 (LL) 09/18/2024    HCT 20.1 (L) 09/18/2024 "    PLT 28 (LL) 09/18/2024     09/18/2024    POTASSIUM 3.7 09/18/2024    CHLORIDE 100 09/18/2024    CO2 27 09/18/2024     (H) 09/18/2024    BUN 14.1 09/18/2024    CR 0.65 (L) 09/18/2024    MAG 2.0 09/18/2024    INR 1.26 (H) 09/16/2024    BILITOTAL 0.5 09/16/2024    AST 23 09/16/2024    ALT 14 09/16/2024    ALKPHOS 67 09/16/2024    PROTTOTAL 6.6 09/16/2024    ALBUMIN 3.4 (L) 09/16/2024       ASSESSMENT AND PLAN     Leland Pearson is a 69 year old male with 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     BMT/IEC PROTOCOL for MDS  - Chemo protocol: MT 2022-5; Flu/Cy/TBI  - Peripheral blood stem cell graft from 8/8 URD donor, ABO matched, Cell dose: 6.06 x10^6 CD34/kg  - Engraftment monitoring: Peripheral blood and bone marrow chimerisms on D28, D100, 6 months, 1 and 2 years  - Restaging plan: BMBx with FISH, cytogenetics and NGS on D28, D100, 6 months, 1 and 2 years  - D21 BMbx completed 9/13. Flow negative, pending path/chimerisms etc.      HEME/COAG  # Pancytopenia 2/2 chemo/BMT  - Last day of G (9/12). (9/18) ANC 0.8 - G given   - Transfusion parameters: hemoglobin <7, platelets <50 (increased while on Lovenox). (9/18) 1 unit of RBCs    # Right sternocleidomastoid tenderness s/p CVC removal  #Occlusive thrombus of R internal jugular vein  #Nonocclusive thrombus or R axillary vein  - Started complaining of tenderness and swelling near right CVC site that was removed. Obtained US to further investigate.   - Positive for thrombus. No anticoagulation at this time due to thrombocytopenia. Will need to begin DOAC (per pharmacy preferably voriconazole + apixaban without loading dose) vs lovenox once platelets >50k.   (9/17) Ongoing right neck pain/swelling: will repeat ultrasound shows extension of previous clot. Started on Lovenox -- per Benign Heme will start Lovenox BID with PLT parameter >50. Will plan to discharge on lovenox and consider  transitioning to DOAC at later date with consideration of adjusting dose when oral antifungal starts day 45.    RISK OF GVHD  - Prophylaxis: PTCy 50mg/kg on D+3 and D+4; MMF (D+5 to 35); Sirolimus (starting D+5, target level 5-10).  - Siro 4.5mg/d. (9/16) 6.3 - increased daily dose     ID  #Streptococcus mitis bacteremia (2/2 bottles) - Cefepime (9/1-9/13), Ceftriaxone (9/13-9/15)   - ABX duration plan: per ID through 9/20. Sensitive to ceftriaxone q24hrs, can transition to non-pseudomonal coverage for discharge/better engraftment.   - With anticipation for discharge, we will switch to Ceftriaxone (9/13-9/15). Resume cefepime 9/16 as below.     # MRSE bacteremia likely contaminant because peripheral stick, grew late (1/2). Dc'd Vanco (9/2-9/6)   - Repeat BC's x3 days NGTD  # Non-Neutropenic Fever: (9/11) 102.7. Peripheral BC (9/11, 9/13) NGTD. Urine cx 9/9 neg. BK urine and blood neg. Adeno urine pending. CMV neg 9/7. EBV neg. Adeno PCR blood pending. Flu/RSV/COVID neg. Still on cefepime as above. Leland appears non-toxic with no new localizing findings and currently afebrile. MRSA swab neg overnight (9/12)   *Repeat fever on 9/15. Will obtain cultures, UC is pending, will obtain CXR. Resume cefepime 9/15-9/17 -- transitioned to oral Augmentin BID thru 9/20. Likely has bacterial seeding of internal jugular clot as above - Appreciate ID input.     Prophylaxis plan:   - Fungal: Micafungin until D+45, followed by mold active azole. Pulm nodules present on workup CT.   - PJP: Bactrim to start at D+28. Toxoplasma negative.   - Viral: Acyclovir 800mg BID. No need for letermovir as donor and recipient are CMV negative.      Monitoring:   - CMV weekly, neg 9/7  - EBV every 2 weeks from D30 to D180: (9/14) ND     GI/NUTRITION  # GERD: Protonix  # Loose stools: Imodium x 1 on 9/13. No indication to repeat C. Diff at this time. Holding Metamucil.  - Anti-emetics: Compazine daily at 1600 and PRN. lorazepam available PRN.   -  Ulcer prophy: Protonix  - VOD prophy: Ursodiol   - Dietician support to prevent malnutrition    CARDIOVASCULAR  # HTN: PTA lisinopril stopped 8/26 d/t symptomatic orthostasis.   # CAD: Coronary artery calcifications seen on CT, stress test in 2023 negative  - Risk of cardiomyopathy: Baseline EF 55-60%.   - Risk of arrhythmia: Baseline EKG showed 421   - Orthostatic hypotension - 1L IVF 9/16. Continue flomax and ditropan for now with history of urinary discomfort as below.       RENAL/ELECTROLYTES/  - Electrolyte management: replace per sliding scale  - BPH: Continue PTA flomax.  - Hemorrhagic Cystitis secondary to cytoxan: urinary urge/frequency/dysuria: UA with +nitrates, mod blood, >182 RBC. UC neg. Urine BK neg. BK blood neg. Pyridium not helpful. Ditropan 2.5mg bid (also on Flomax). Adeno urine pending. Symptoms starting to improve--no noted blood per pt but large clot which was hard to pass 9/13. Urology recommended increasing oxybuytinin.      MUSCULOSKELETAL/FRAILTY  - Baseline Frailty Score: Not frail (0)  - Daily PT/OT as needed while inpatient  # Gout: continue allopurinol      Neuro   # history of spinal stenosis, lumbosacral radiculopathy likely exacerbating.   - Pain management: Outpatient had been taking celecoxib for MSK pain, once a day. Stopped Inpatient will try Tramadol hasn't used. Now discontinued.  Added robaxin TID this admission.    SOCIAL DETERMINANTS  - Caregiver: wife, Vani. Lives within the required distance from hospital   - Financial/insurance concerns: None    Medically Ready for Discharge: Anticipated in 2-4 Days;   - O/P plan: izzy in clinic 3x a week.   - Will plan to go HOME instead of [place]       Clinically Significant Risk Factors              # Hypoalbuminemia: Lowest albumin = 2.8 g/dL at 9/7/2024  7:13 AM, will monitor as appropriate  # Coagulation Defect: INR = 1.26 (Ref range: 0.85 - 1.15) and/or PTT = 28 Seconds (Ref range: 22 - 38 Seconds), will monitor for bleeding  #  Thrombocytopenia: Lowest platelets = 22 in last 2 days, will monitor for bleeding   # Hypertension: Noted on problem list                     Patient Active Problem List   Diagnosis    MDS (myelodysplastic syndrome) (H)    Pre-operative cardiovascular examination    Benign essential hypertension    Infection due to Streptococcus mitis group    Nocturia    Administration of long-term prophylactic antibiotics    History of peripheral stem cell transplant (H)        I spent 40 minutes in the care of this patient today, which included time necessary for preparation for the visit, obtaining history, ordering medications/tests/procedures as medically indicated, review of pertinent medical literature, counseling of the patient, communication of recommendations to the care team, and documentation time.    Baltazar Arguello PA-C   [facility] - [phone]

## 2024-09-18 NOTE — PLAN OF CARE
"Goal Outcome Evaluation:         Blood pressure 118/78, pulse 102, temperature 98  F (36.7  C), resp. rate 16, height 1.84 m (6' 0.44\"), weight 83.9 kg (184 lb 14.4 oz), SpO2 94%.    AVSS, A/O x 4 Pt is on room air and 02 saturations are WNL. He complained of right neck pain rated level at 5/10 and requested cold pack and said he did not benefit from it. Tylenol given with good relief with pain level coming down to 3/10. Pt is sleeping good in between cares. He is using external male catheter and voiding well. Low hemoglobin level, 6.7 and he is getting 1 unit of PRBC and he is tolerating transfusion so far. Right PICC dressing is C/D/I and dressing is due to be changed today. Both caps and IV line have been changed this morning. Continue to assess neck pain and treat. And continue with plan of care.      Problem: Adult Inpatient Plan of Care  Goal: Plan of Care Review  Description: The Plan of Care Review/Shift note should be completed every shift.  The Outcome Evaluation is a brief statement about your assessment that the patient is improving, declining, or no change.  This information will be displayed automatically on your shift  note.  Outcome: Progressing  Goal: Patient-Specific Goal (Individualized)  Description: You can add care plan individualizations to a care plan. Examples of Individualization might be:  \"Parent requests to be called daily at 9am for status\", \"I have a hard time hearing out of my right ear\", or \"Do not touch me to wake me up as it startles  me\".  Outcome: Progressing  Goal: Absence of Hospital-Acquired Illness or Injury  Outcome: Progressing  Intervention: Identify and Manage Fall Risk  Recent Flowsheet Documentation  Taken 9/18/2024 0400 by Kasey Quiros RN  Safety Promotion/Fall Prevention:   clutter free environment maintained   lighting adjusted   nonskid shoes/slippers when out of bed   room near nurse's station   safety round/check completed   room organization consistent   " treat reversible contributory factors   treat underlying cause  Taken 9/18/2024 0000 by Kasey Quiros RN  Safety Promotion/Fall Prevention:   clutter free environment maintained   lighting adjusted   increased rounding and observation   patient and family education   room organization consistent   safety round/check completed  Taken 9/17/2024 2000 by Kasey Quiros RN  Safety Promotion/Fall Prevention:   clutter free environment maintained   lighting adjusted   nonskid shoes/slippers when out of bed   room near nurse's station   safety round/check completed   room organization consistent   treat reversible contributory factors   treat underlying cause  Intervention: Prevent Skin Injury  Recent Flowsheet Documentation  Taken 9/18/2024 0400 by Kasey Quiros RN  Body Position: position changed independently  Skin Protection: adhesive use limited  Device Skin Pressure Protection: adhesive use limited  Taken 9/18/2024 0000 by Kasey Quiros RN  Body Position: position changed independently  Taken 9/17/2024 2000 by Kasey Quiros RN  Body Position: position changed independently  Skin Protection: adhesive use limited  Device Skin Pressure Protection: adhesive use limited  Intervention: Prevent and Manage VTE (Venous Thromboembolism) Risk  Recent Flowsheet Documentation  Taken 9/18/2024 0400 by Kasey Quiros RN  VTE Prevention/Management: SCDs off (sequential compression devices)  Taken 9/17/2024 2000 by Kaesy Quiros RN  VTE Prevention/Management: SCDs off (sequential compression devices)  Intervention: Prevent Infection  Recent Flowsheet Documentation  Taken 9/18/2024 0400 by Kasey Quiros RN  Infection Prevention:   hand hygiene promoted   single patient room provided   environmental surveillance performed   equipment surfaces disinfected   personal protective equipment utilized   rest/sleep promoted   visitors restricted/screened  Taken 9/18/2024 0000 by Kasey Quiros  RN  Infection Prevention:   hand hygiene promoted   single patient room provided   environmental surveillance performed   equipment surfaces disinfected   rest/sleep promoted   personal protective equipment utilized   visitors restricted/screened  Taken 9/17/2024 2000 by Kasey Quiros, RN  Infection Prevention:   hand hygiene promoted   single patient room provided   environmental surveillance performed   equipment surfaces disinfected   personal protective equipment utilized   rest/sleep promoted   visitors restricted/screened  Goal: Optimal Comfort and Wellbeing  Outcome: Progressing  Intervention: Monitor Pain and Promote Comfort  Recent Flowsheet Documentation  Taken 9/18/2024 0036 by Kasey Quiros, RN  Pain Management Interventions: medication (see MAR)

## 2024-09-18 NOTE — PROGRESS NOTES
Addressed in separate encounter. Joel Wegener,MD w   Care Coordination - Discharge Planning    I spoke with Leland and his wife, Vani, regarding discharge tomorrow. They are aware of the discharge plan with follow up on Friday in clinic.     I educated them on the process outlined in the When to Call For Help page. They endorsed no other questions at this time.     Discharge location: Home    [ x ] Home Infusion Company: [facility] for line cares    [ x ] Pharmacy needs: Micafungin (clinic until clinic visits decrease to less than three visits per week, then patient would like to switch to [facility])     Sirolimus: $85.21     MMF: $12.80     Prevymis: CMV neg     Lovenox: $107.84 (monthly supply     Voriconazole: $149.09 (raiza approved, transition to Vori after day+45)     Please my bookmarks for pharm liaison notes.     [ x ] Can fill meds at [facility] (BMT benefits soft check): Yes   If not, preferred pharmacy at discharge: n/a    [ x ] PT/OT recommendations: Home with assist    [ x ] 1st time discharge? Yes    [ x ] Discharge teach    [ x ] Micafungin teach - NA, izzy in clinic per preference     [ x ] Weekly Lab Day Preference? No preference on day, strongly prefers appointments between 10am-2pm    [ x ] D0 BAN scheduling notification    [ x ] clonoSEQ testing needed? no    [ x ] [employer] wallet card- n/a    Claudia Finch  BMT Nurse Coordinator  Phone: [phone]  Pager: [phone]

## 2024-09-18 NOTE — PROGRESS NOTES
[facility]  Transplant Infectious Disease Progress Note     Patient:  Leland Pearson, Date of birth 1955, Medical record number [id number]  Date of Visit:  09/18/2024         Assessment and Recommendations:   Recommendations:  - Continue Augmentin 875 mg po BID to 9/20/2024. After that end date, would switch over to SOP for bacterial prophylaxis that he is eligible for.   - Continue acyclovir as viral prophylaxis.  - Continue micafungin as fungal prophylaxis per SOP.   - Agree with bactrim as Pneumocystis prophylaxis, due to start ~ 9/23/2024 per SOP.     Transplant Infectious Disease will continue to follow with you.      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   Infectious Disease issues include:  - Streptococcus mitis/oralis bacteremia, isolated from CVC 9/2/2024. Repeat blood cultures have been negative to date. CVC removed 9/6/2024 due to persistent fever, and fever has improved since removal. Transthoracic echocardiogram neg on 9/6/2024. Plan to continue sensitive antibiotics for total of 14 days from catheter removal, with end date of 9/20/2024. The isolate was susceptible to Augmentin on the suppressed antibiotic panel, changed over on 9/17/2024. Continue Augmentin 875 mg po BID to 9/202/2024. After that end date, would switch over to SOP for bacterial prophylaxis that he is eligible for.   - Pulm nodules present on workup CT 8/6/2024. 2.4 mm nodule right lower lobe image 184 series 4. Stable 4.5 mm nodule in the right major fissure image 107 series 4. Minimal atelectasis in the lingula. Micafungin until D+45, followed by mold active azole.   - Dysuria, urgency and frequency (improving). 9/11/2024 urine & 9/12/2024 blood adenovirus PCR neg. 9/9/2024 urine BK is neg, awaiting 9/10/2024 blood BK.   - Staphylococcus epidermidis in blood culture from 9/2/2024. Repeat blood cultures have been negative  to date. Interpreting this isolation event of Staph epi as a contaminant based on the fact that her only grown 1 of 2 peripheral blood culture bottles and resulted as positive several hours after his strep mitis cultures. Vancomycin was discontinued on 9/5/2024.   - QTc interval: 421 msec on 8/5/2024 EKG.   - Bacterial coverage: Augmentin   - PCP prophylaxis: due to start ~ day+28  - Serostatus & viral prophylaxis: Toxoplasma negative. CMV D-/R-, EBV+, HSV-. No need for letermovir as donor and recipient are CMV negative. On ACV  - Fungal prophylaxis: izzy  - Immunization status: he has received 6 Covid-19 vaccines  - Gamma globulin status: unknown  - Isolation status: Good hand hygiene.   - Code status: Full Code    Marlen Manriquez MD. Pager [phone]         Interval History:   Since Leland was last seen by me on 9/17/2024, he continues to have some mild and tender swelling to his right neck area. Duplex had shown occlusive clot in the RIJ vein and non-occlusive clot in the R axillary vein. Clot in the axillary vein has extended. His wife and daughter were at the bedside. He was changed from cefepime to Augmentin 9/17/2024 without any recurrence of fever. WBC is the same as yesterday, at 1.3K. Discussed with Leland and his wife Vani that while we had talked about the possibility of bacteria being trapped in the vessel thrombus, since he has not had fever for two days, that may not be the case. He is day+26 today.     Review of Systems:  CONSTITUTIONAL:  no fever  EYES:   ENT:    RESPIRATORY:  room air and oxygen saturations are in normal limits  CARDIOVASCULAR:  tachycardic at times.   GASTROINTESTINAL:  no nausea/vomiting.   GENITOURINARY:    HEME:  last PRBC tranfusion on 9/18/2024.   INTEGUMENT:  He complained of right neck pain rated level at 5/10 and requested cold pack and said he did not benefit from it. Tylenol given with good relief with pain level coming down to 3/10.   NEURO:           Current  Medications & Allergies:     Current Facility-Administered Medications   Medication Dose Route Frequency Provider Last Rate Last Admin    acyclovir (ZOVIRAX) capsule 800 mg  800 mg Oral BID Markus Zimmerman DO        allopurinol (ZYLOPRIM) tablet 300 mg  300 mg Oral Daily Kaitlyn Aguilar NP   300 mg at 09/18/24 1033    amoxicillin-clavulanate (AUGMENTIN) 875-125 MG per tablet 1 tablet  1 tablet Oral Q12H NATA (08/20) Baltazar Arguello PA-C   1 tablet at 09/18/24 1054    artificial saliva (BIOTENE MT) solution 2 spray  2 spray Swish & Spit 4x Daily Bessie Lazo PA-C   2 spray at 09/18/24 1056    calcium carbonate-vitamin D (CALTRATE) 600-10 MG-MCG per tablet 2 tablet  2 tablet Oral BID w/meals Bessie Lazo PA-C   2 tablet at 09/18/24 1032    enoxaparin ANTICOAGULANT (LOVENOX) injection 40 mg  40 mg Subcutaneous Q24H Rich Curtis MD   40 mg at 09/17/24 1711    filgrastim-aafi (NIVESTYM) injection 480 mcg  480 mcg Subcutaneous Once Baltazra Arguello PA-C        heparin lock flush 10 unit/mL injection 5-20 mL  5-20 mL Intracatheter Q24H Bernadette Green PA-C   10 mL at 09/17/24 1741    heparin lock flush 10 unit/mL injection 5-20 mL  5-20 mL Intracatheter Q24H Markus Mendez MD   10 mL at 09/18/24 0431    methocarbamol (ROBAXIN) tablet 500 mg  500 mg Oral TID Aziza Mendieta APRN CNP   500 mg at 09/18/24 1034    micafungin (MYCAMINE) 150 mg in sodium chloride 0.9 % 100 mL intermittent infusion  150 mg Intravenous Daily Kaitlyn Aguilar  mL/hr at 09/18/24 1056 150 mg at 09/18/24 1056    mycophenolate (GENERIC EQUIVALENT) capsule 1,250 mg  1,250 mg Oral Q12H NATA (08/20) Eunice Spicer PA-C   1,250 mg at 09/18/24 1052    oxyBUTYnin ER (DITROPAN XL) 24 hr tablet 10 mg  10 mg Oral At Bedtime Emely Jeter APRN CNP   10 mg at 09/17/24 2213    pantoprazole (PROTONIX) EC tablet 40 mg  40 mg Oral Daily Bessie Lazo PA-C   40 mg at 09/18/24 1052    prochlorperazine (COMPAZINE)  tablet 5 mg  5 mg Oral Daily Eunice Spicer PA-C   5 mg at 09/17/24 1711    [Held by provider] psyllium (METAMUCIL/KONSYL) capsule 3 capsule  3 capsule Oral Daily Bessie Lazo PA-C   3 capsule at 09/16/24 0859    sirolimus (GENERIC EQUIVALENT) tablet 4.5 mg  4.5 mg Oral Daily Bessie Lazo PA-C   4.5 mg at 09/18/24 1052    [START ON 9/23/2024] sulfamethoxazole-trimethoprim (BACTRIM DS) 800-160 MG per tablet 1 tablet  1 tablet Oral Q Mon Tues BID Kaitlyn Aguilar, NP        tamsulosin (FLOMAX) capsule 0.4 mg  0.4 mg Oral Daily Bessie Lazo PA-C   0.4 mg at 09/17/24 1711    ursodiol (ACTIGALL) capsule 300 mg  300 mg Oral TID Markus Mendez MD   300 mg at 09/18/24 1033       Infusions/Drips:  Current Facility-Administered Medications   Medication Dose Route Frequency Provider Last Rate Last Admin       No Known Allergies         Physical Exam:   Patient Vitals for the past 24 hrs:   BP Temp Temp src Pulse Resp SpO2 Weight   09/18/24 1126 124/78 98.8  F (37.1  C) Oral 89 17 96 % --   09/18/24 0812 109/71 97.9  F (36.6  C) Oral 118 16 100 % 83.7 kg (184 lb 9.6 oz)   09/18/24 0741 129/84 (P) 98.3  F (36.8  C) (P) Oral -- -- 99 % --   09/18/24 0627 120/78 (P) 97.7  F (36.5  C) (P) Oral -- -- 98 % --   09/18/24 0615 -- -- -- -- -- 95 % --   09/18/24 0601 118/78 -- -- -- -- 94 % --   09/18/24 0600 118/78 98  F (36.7  C) -- -- -- 94 % --   09/18/24 0440 119/76 98.9  F (37.2  C) Oral -- 16 98 % --   09/18/24 0036 -- -- -- -- -- 97 % --   09/18/24 0033 134/74 97.9  F (36.6  C) Oral -- 16 97 % --   09/17/24 1929 133/79 98.6  F (37  C) Oral 102 16 97 % --   09/17/24 1613 -- -- -- -- -- 99 % --   09/17/24 1612 -- 98.8  F (37.1  C) Oral -- 16 -- --   09/17/24 1155 121/76 98.9  F (37.2  C) Oral 101 16 99 % --     Ranges for vital signs:  Temp:  [97.7  F (36.5  C)-98.9  F (37.2  C)] 98.8  F (37.1  C)  Pulse:  [] 89  Resp:  [16-17] 17  BP: (109-134)/(71-84) 124/78  SpO2:  [94 %-100 %] 96 %  Vitals:    09/16/24 0729  09/17/24 0756 09/18/24 0812   Weight: 84.2 kg (185 lb 9.6 oz) 83.9 kg (184 lb 14.4 oz) 83.7 kg (184 lb 9.6 oz)       Physical Examination:  GENERAL:  well-developed, well-nourished man, resting in bed in no acute distress.  HEAD:  Head is normocephalic, atraumatic   EYES:  Eyes have anicteric sclerae  ENT:  Oropharynx is moist without exudates or ulcers. Tongue is midline  NECK:  Supple.   SKIN:  No acute rashes. Magic marker lines on right neck have some swelling between neck and innnermost line. PICC in right upper extremity without any surrounding erythema or exudate.  NEUROLOGIC:  Grossly nonfocal. Active x4 extremities         Laboratory Data:     Metabolic Studies       Recent Labs   Lab Test 09/18/24  0430 09/17/24  0424 09/16/24  0432 09/15/24  1153 09/07/24  1103 09/07/24  0713    135 135  --    < > 133*   POTASSIUM 3.7 3.8 4.0  --    < > 4.0   CHLORIDE 100 101 100  --    < > 100   CO2 27 26 25  --    < > 22   ANIONGAP 10 8 10  --    < > 11   BUN 14.1 12.2 13.3  --    < > 9.8   CR 0.65* 0.68 0.66*  --    < > 0.63*   GFRESTIMATED >90 >90 >90  --    < > >90   * 115* 129*  --    < > 120*   HERBERT 8.7* 9.0 9.0  --    < > 8.3*   PHOS 3.0 2.9 3.0  --    < >  --    MAG 2.0 2.0 2.0  --    < > 1.8   LACT  --   --  0.6* 0.8   < >  --    PCAL  --   --   --   --   --  0.37    < > = values in this interval not displayed.       Hepatic Studies    Recent Labs   Lab Test 09/16/24  0432 09/12/24  0321 09/09/24  0615 05/15/24  1339 05/08/24  1156   BILITOTAL 0.5 0.5 0.5   < > 0.6   DBIL <0.20  --  0.24   < >  --    ALKPHOS 67 70 58   < > 82   PROTTOTAL 6.6 6.5 6.3*   < > 7.6   ALBUMIN 3.4* 3.4* 3.2*   < > 4.1   AST 23 25 25   < > 43   ALT 14 16 19   < > 21   LDH  --   --   --   --  613*    < > = values in this interval not displayed.       Gout Labs      Recent Labs   Lab Test 08/16/24  0920 08/09/24  0827 08/05/24  1200 07/29/24  0811 07/01/24  1208   URIC 4.2 3.9 4.1 4.5 4.1       Hematology Studies   Recent Labs    Lab Test 09/18/24  0430 09/17/24  0424 09/16/24  0432 09/15/24  0353 05/22/24  0838 05/21/24  1016   WBC 1.3* 1.3* 1.5* 1.8*   < > 4.0   ABLA  --   --   --   --   --  0.4*   BLST  --   --   --   --   --  9   ANEU  --  1.0* 1.3*  --    < > 0.6*   ANEUTAUTO 0.8*  --   --  1.4*   < >  --    ALYM  --  0.0* 0.0*  --    < > 2.0   ALYMPAUTO <0.1*  --   --  0.0*   < >  --    DAVIAN  --  0.3 0.2  --    < > 0.5   AMONOAUTO 0.4  --   --  0.3   < >  --    AEOS  --  0.0 0.0  --    < > 0.0   AEOSAUTO 0.0  --   --  0.0   < >  --    ABSBASO 0.0  --   --  0.0   < >  --    HGB 6.7* 7.4* 8.0* 7.6*   < > 12.9*   HCT 20.1* 21.9* 24.1* 22.6*   < > 38.4*   PLT 28* 22* 24* 23*   < > 72*    < > = values in this interval not displayed.       Clotting Studies    Recent Labs   Lab Test 09/16/24  0432 09/09/24  0615 09/02/24  0444 08/26/24  0407 08/16/24  0920   INR 1.26* 1.06 1.06 0.99 0.93   PTT  --   --   --   --  28       Iron Testing    Recent Labs   Lab Test 09/18/24  0430 08/02/24  0919 08/01/24  0805   ZARIA  --   --  460*   MCV 82   < > 84    < > = values in this interval not displayed.       Urine Studies     Recent Labs   Lab Test 09/13/24  1645 09/09/24  1900 09/07/24  1230 09/02/24  0454 08/05/24  1200   URINEPH 6.0 7.0 6.0 7.0 6.5   NITRITE Negative Positive* Negative Negative Negative   LEUKEST Trace* Negative Negative Negative Negative   WBCU 3 1 1 2 <1       Medication levels    Recent Labs   Lab Test 09/16/24  0843 09/07/24  0713 09/06/24  0337   VANCOMYCIN  --   --  10.3   RAPAMY 6.3   < >  --     < > = values in this interval not displayed.       Microbiology:  Susceptibility Blood from [facility] [place] 09/02/2024    Streptococcus mitis     ALDO     Amoxicillin/Clavulanate <=0.5 ug/mL *      Cefotaxime <=0.25 ug/mL Susceptible     Ceftriaxone <=0.25 ug/mL Susceptible     Clindamycin <=0.12 ug/mL Susceptible     Erythromycin >2 ug/mL Resistant *     Levofloxacin >8 ug/mL Resistant *     Meropenem <=0.25 ug/mL Susceptible      Penicillin 0.25 ug/mL Intermediate     Tetracycline >8 ug/mL Resistant *     Vancomycin <=0.25 ug/mL Susceptible           Last Culture results   Culture   Date Value Ref Range Status   09/15/2024 No growth after 2 days  Preliminary   09/15/2024 No growth after 2 days  Preliminary   09/14/2024 No Growth  Final   09/12/2024 No Growth  Final   09/11/2024 No Growth  Final   09/09/2024 No Growth  Final   09/07/2024 No Growth  Final   09/05/2024 No Growth  Final   09/04/2024 No Growth  Final   09/04/2024 No Growth  Final   09/03/2024 No Growth  Final   09/03/2024 No Growth  Final   09/02/2024 Positive on the 2nd day of incubation (A)  Final   09/02/2024 Staphylococcus epidermidis (AA)  Final     Comment:     1 of 2 bottles  The recovery of this organism from a single blood culture bottle most likely represents contamination. If susceptibility testing is needed, please refer to the antibiogram or contact [facility].   09/02/2024 Positive on the 1st day of incubation (A)  Final   09/02/2024 Streptococcus mitis (AA)  Final     Comment:     2 of 2 bottles     09/02/2024 Positive on the 1st day of incubation (A)  Final   09/02/2024 Streptococcus mitis (AA)  Final     Comment:     2 of 2 bottles    Susceptibilities done on previous cultures   08/16/2024   Final    No Vancomycin Resistant Enterococcus species isolated     Enterococcus faecium   Date Value Ref Range Status   09/02/2024 Not Detected Not Detected Final           Last checks of Clostridioides difficile testing  Recent Labs   Lab Test 09/03/24  1140 08/17/24  0906   CDBPCT Negative Negative       Infection Studies to assess Diarrhea   Recent Labs   Lab Test 09/06/24  1253   BIEPEC Negative   BISTEC Negative   BISHEI Negative   BIEAEC Negative   BIETEC Negative   SDN462 NA   BIADE Negative   BICRY Negative   BICAM Negative   BISAL Negative   BIVIBS Negative   BIVIBC Negative   BIYER Negative   BIGIA Negative   BINOR Negative   BIROT Negative   BIPLE Negative   BIENT  Negative   BIAST Negative   BISAP Negative       Virology:  Viral loads    Recent Labs   Lab Test 09/14/24  1741 09/12/24  1120 09/07/24  1103 08/17/24  1621 06/09/24  1124   EBQI  --  Not Detected  --   --   --    CMVQNT Not Detected  --  Not Detected   < >  --    HSDNA1  --   --   --   --  Not Detected   HSDNA2  --   --   --   --  Not Detected    < > = values in this interval not displayed.       BK Virus Testing     Recent Labs   Lab Test 09/10/24  1340 09/09/24  1129   BKRES Not Detected Not Detected       Imaging:  Recent Results (from the past 48 hour(s))   US Upper Extremity Venous Duplex Right    Narrative    EXAMINATION: DOPPLER VENOUS ULTRASOUND OF THE RIGHT UPPER EXTREMITY,  9/17/2024 2:04 PM     COMPARISON: Upper extremity venous duplex ultrasound 9/15/2024.    HISTORY: known clot - increased neck swelling and pain    TECHNIQUE:  Gray-scale evaluation with compression, spectral flow and  color Doppler assessment of the deep venous system of the right upper  extremity.    FINDINGS:  Right: Occlusive thrombus within the right internal jugular vein  extending to the right innominate vein. Partially occlusive thrombus  within the right subclavian vein adjacent to the PICC line extending  into the right axillary vein. The right cephalic and basilic veins are  patent and fully compressible.  .     Impression    IMPRESSION:  1. Redemonstration of the known occlusive right IJ thrombus extending  into the right innominate.  2. Interval extension of the nonocclusive right axillary venous  thrombus into the subclavian vein.    I have personally reviewed the examination and initial interpretation  and I agree with the findings.    DEJUAN TORRES MD         SYSTEM ID:  [id number]

## 2024-09-18 NOTE — CONSULTS
Hematology Consult Note   Date of Service: 09/18/2024    Patient: Leland Pearson  MRN: [id number]  Admission Date: 8/16/2024  Hospital Day # Hospital Day: 34  Primary Outpatient Hematologist: BMT     Reason for Consult: Right Internal Jugular and axillary thrombus    Assessment & Plan:   Leland Pearson is a 69 year old male with history of MDS/AML with myelodysplasia-related gene mutations (ASXL1, RUNX1, SRSF2) who is Day 26+ s/p GANGA MUD PBSCT. Hematology consulted regarding management of R internal jugular and axillary thrombus.    Patient has right-sided anterior neck pain and swelling with ultrasound evidence of acute occlusive right internal jugular thrombus around his former central catheter site as well as non-occlusive right axillary thrombus extending to the subclavian vein adjacent to his PICC line. The internal jugular thrombus has shown interval extension into the innominate vein on repeat ultrasound 9/17, although it is not entirely clear whether this was migration of the axillary thrombus vs primary IJ clot propagation. Regardless, given the extension and continued symptoms, treatment with therapeutic anticoagulation is warranted.    In order to safely receive anticoagulation given his post-transplant thrombocytopenia, we recommend platelet transfusion with goal of Plt > 50k. Once platelets are above 50k, we recommend beginning therapeutic enoxaparin 1 mg/kg BID with plans to anticoagulate for at least 3 months until follow-up in [facility]. Given the potential for interaction with antifungal prophylaxis/azoles, we recommend avoiding DOACs and continuing twice daily enoxaparin at this time.    Given the evidence of clot burden around his Right PICC site on U/S 9/17, we would ideally remove and replace with PICC in left arm if logistically feasible prior to discharge.    Recommendations:   - transfuse platelets with goal > 50k while on therapeutic anticoagulation  - begin therapeutic lovenox (1 mg/kg  BID) once platelets > 50k  - continue twice daily lovenox until [facility] follow-up in 3 months  - if logistically feasible, recommend removing R PICC and moving to left arm given clot propagation near PICC site  ----------------------------------------------------------------------------------------  Patient was seen and plan of care was discussed with attending physician Dr. Price.    We will continue to follow this patient. Please don't hesitate to contact the Fellow On-Call with questions.    I spent 60 minutes in the care of this patient today, which included time necessary for preparation for the visit, obtaining history, ordering medications/tests/procedures as medically indicated, review of pertinent medical literature, counseling of the patient, communication of recommendations to the care team, and documentation time.     Levar Hwang  Hematology-Oncology Fellow, PGY-4  Pager: [phone]    ----------------------------------------------------------------------------------------    History of Present Illness:    Leland Pearson is a 69 year old male with history of MDS/AML with myelodysplasia-related gene mutations (ASXL1, RUNX1, SRSF2) who is Day 26+ s/p GANGA MUD PBSCT. Hematology consulted regarding management of R internal jugular and axillary thrombus.    Patient was originally admitted 8/16/24 in preparation for stem cell transplant which he received on 8/23, now is currently D+26. Post-transplant course was complicated by strep mitis/oralis bacteremia isolated from CVC 9/2. The catheter was subsequently removed 9/6. He received IV antibiotics, transitioned to oral augmentin which is planned to be continued through 9/20.    Patient developed significant right-sided neck pain and swelling on 9/15. Ultrasound 9/15 showed an occlusive thrombus in the right internal jugular vein near the former catheter site, as well as a non-occlusive thrombus in the right axillary vein. Given his post-transplant  thrombocytopenia with Plt 23, he was not started on therapeutic anticoagulation at that time.    Follow-up U/S on 9/17 showed interval extension of the occlusive R internal jugular thrombus to the innominate vein and interval extension of the nonocclusive right axillary vein thrombus into the subclavian vein adjacent to the PICC line. He was started on prophylactic enoxaparin 40 mg overnight.    When seen this morning patient endorses continued tenderness around his right anterior neck but the swelling has marginally improved. He denies headache, vision changes, focal weakness, chest pain, dyspnea, or lower extremity swelling.    Hematologic History:  #MDS/AML with myelodysplasia-related gene mutations (ASXL1, RUNX1, SRSF2) who is Day 26+ s/p GANGA MUD PBSCT    BMT/IEC PROTOCOL for MDS  - Chemo protocol: MT 2022-5; Flu/Cy/TBI  - Peripheral blood stem cell graft from 8/8 [facility] donor, ABO matched, Cell dose: 6.06 x10^6 CD34/kg  - Engraftment monitoring: Peripheral blood and bone marrow chimerisms on D28, D100, 6 months, 1 and 2 years  - Restaging plan: BMBx with FISH, cytogenetics and NGS on D28, D100, 6 months, 1 and 2 years  - D21 BMbx completed 9/13. Flow negative, pending path/chimerisms etc.     Prophylaxis plan:   - Fungal: Micafungin until D+45, followed by mold active azole. Pulm nodules present on workup CT.   - PJP: Bactrim to start at D+28. Toxoplasma negative.   - Viral: Acyclovir 800mg BID. No need for letermovir as donor and recipient are CMV negative.     Review of Systems: Pertinent positive and negative systems described in HPI; the remainder of the 14 systems are negative    Past Medical History:  Past Medical History:   Diagnosis Date    Gastroesophageal reflux disease     Hypertension        Past Surgical History:  Past Surgical History:   Procedure Laterality Date    COLONOSCOPY      ESOPHAGOSCOPY, GASTROSCOPY, DUODENOSCOPY (EGD), COMBINED  07/28/2013    Procedure: COMBINED ESOPHAGOSCOPY, GASTROSCOPY,  DUODENOSCOPY (EGD), BIOPSY SINGLE OR MULTIPLE;;  Surgeon: Franklin Barrera MD;  Location:  GI    ESOPHAGOSCOPY, GASTROSCOPY, DUODENOSCOPY (EGD), COMBINED  07/28/2013    Procedure: COMBINED ESOPHAGOSCOPY, GASTROSCOPY, DUODENOSCOPY (EGD), REMOVE FOREIGN BODY;;  Surgeon: Franklin Barrera MD;  Location:  GI    IR CVC TUNNEL PLACEMENT > 5 YRS OF AGE  08/16/2024    IR CVC TUNNEL REMOVAL RIGHT  09/06/2024    ORTHOPEDIC SURGERY      04 micro discectomy, acl  repair    PICC DOUBLE LUMEN PLACEMENT Right 09/09/2024    Right basilic vein 49cm total 5cm external.       Social History:  Social History     Socioeconomic History    Marital status:    Tobacco Use    Smoking status: Never     Passive exposure: Never    Smokeless tobacco: Never   Substance and Sexual Activity    Alcohol use: Yes     Comment: socially    Drug use: No     Social Determinants of Health     Financial Resource Strain: Low Risk  (8/24/2024)    Financial Resource Strain     Within the past 12 months, have you or your family members you live with been unable to get utilities (heat, electricity) when it was really needed?: No   Food Insecurity: Low Risk  (8/24/2024)    Food Insecurity     Within the past 12 months, did you worry that your food would run out before you got money to buy more?: No     Within the past 12 months, did the food you bought just not last and you didn t have money to get more?: No   Transportation Needs: Low Risk  (8/24/2024)    Transportation Needs     Within the past 12 months, has lack of transportation kept you from medical appointments, getting your medicines, non-medical meetings or appointments, work, or from getting things that you need?: No   Interpersonal Safety: Low Risk  (8/24/2024)    Interpersonal Safety     Do you feel physically and emotionally safe where you currently live?: Yes     Within the past 12 months, have you been hit, slapped, kicked or otherwise physically hurt by someone?: No     Within the past 12  months, have you been humiliated or emotionally abused in other ways by your partner or ex-partner?: No   Housing Stability: Low Risk  (8/24/2024)    Housing Stability     Do you have housing? : Yes     Are you worried about losing your housing?: No        Family History  Family History   Problem Relation Age of Onset    No Known Problems Brother     No Known Problems Brother     No Known Problems Brother     No Known Problems Brother     Diabetes Brother     No Known Problems Sister     No Known Problems Daughter     No Known Problems Daughter        Outpatient Medications:  Current Facility-Administered Medications   Medication Dose Route Frequency Provider Last Rate Last Admin    acetaminophen (TYLENOL) tablet 325-650 mg  325-650 mg Oral Q4H PRN Bessie Lazo PA-C   650 mg at 09/18/24 0036    acyclovir (ZOVIRAX) capsule 800 mg  800 mg Oral BID Markus Zimmerman DO        allopurinol (ZYLOPRIM) tablet 300 mg  300 mg Oral Daily Kaitlyn Aguilar NP   300 mg at 09/18/24 1033    amoxicillin-clavulanate (AUGMENTIN) 875-125 MG per tablet 1 tablet  1 tablet Oral Q12H [facility] (08/20) Baltazar Arguello PA-C   1 tablet at 09/17/24 2009    artificial saliva (BIOTENE MT) solution 2 spray  2 spray Swish & Spit 4x Daily Bessie Lazo PA-C   2 spray at 09/17/24 2010    calcium carbonate-vitamin D (CALTRATE) 600-10 MG-MCG per tablet 2 tablet  2 tablet Oral BID w/meals Bessie Lazo PA-C   2 tablet at 09/18/24 1032    enoxaparin ANTICOAGULANT (LOVENOX) injection 40 mg  40 mg Subcutaneous Q24H Rich Curtis MD   40 mg at 09/17/24 1711    heparin lock flush 10 unit/mL injection 5-20 mL  5-20 mL Intracatheter Q24H Bernadette Green PA-C   10 mL at 09/17/24 1741    heparin lock flush 10 unit/mL injection 5-20 mL  5-20 mL Intracatheter Q1H PRN Bernadette Green PA-C   5 mL at 09/16/24 1305    heparin lock flush 10 unit/mL injection 5-20 mL  5-20 mL Intracatheter Q1H PRN Gregory Brambila MD   5 mL at 09/12/24  1714    heparin lock flush 10 unit/mL injection 5-20 mL  5-20 mL Intracatheter Q24H Markus Mendez MD   10 mL at 09/18/24 0431    heparin lock flush 10 unit/mL injection 5-20 mL  5-20 mL Intracatheter Q1H PRN Markus Mendez MD   5 mL at 09/15/24 1205    methocarbamol (ROBAXIN) tablet 500 mg  500 mg Oral TID Aziza Mendieta APRN CNP   500 mg at 09/18/24 1034    micafungin (MYCAMINE) 150 mg in sodium chloride 0.9 % 100 mL intermittent infusion  150 mg Intravenous Daily Kaitlyn Aguilar  mL/hr at 09/17/24 0900 150 mg at 09/17/24 0900    mycophenolate (GENERIC EQUIVALENT) capsule 1,250 mg  1,250 mg Oral Q12H [facility] (08/20) Eunice Spicer PA-C   1,250 mg at 09/17/24 2009    naloxone (NARCAN) injection 0.2 mg  0.2 mg Intravenous Q2 Min PRN Timothy Skaggs MD        Or    naloxone (NARCAN) injection 0.4 mg  0.4 mg Intravenous Q2 Min PRN Timothy Skaggs MD        oxyBUTYnin ER (DITROPAN XL) 24 hr tablet 10 mg  10 mg Oral At Bedtime Emely Jeter APRN CNP   10 mg at 09/17/24 2213    pantoprazole (PROTONIX) EC tablet 40 mg  40 mg Oral Daily Bessie Lazo PA-C   40 mg at 09/17/24 0852    prochlorperazine (COMPAZINE) injection 5 mg  5 mg Intravenous Q6H PRN Bessie Lazo PA-C   5 mg at 09/03/24 1448    Or    prochlorperazine (COMPAZINE) tablet 5 mg  5 mg Oral Q6H PRN Bessie Lazo PA-C   5 mg at 09/01/24 1743    prochlorperazine (COMPAZINE) tablet 5 mg  5 mg Oral Daily Eunice Spicer PA-C   5 mg at 09/17/24 1711    [Held by provider] psyllium (METAMUCIL/KONSYL) capsule 3 capsule  3 capsule Oral Daily Bessie Lazo PA-C   3 capsule at 09/16/24 0859    sirolimus (GENERIC EQUIVALENT) tablet 4.5 mg  4.5 mg Oral Daily Bessie Lazo PA-C   4.5 mg at 09/17/24 0852    sodium chloride (PF) 0.9% PF flush 10-20 mL  10-20 mL Intracatheter q1 min prn Gregory Brambila MD        sodium chloride (PF) 0.9% PF flush 10-20 mL  10-20 mL Intracatheter q1 min prn Markus Mendez MD   20 mL at 09/10/24 0515     "sodium chloride (PF) 0.9% PF flush 10-40 mL  10-40 mL Intracatheter Once PRN Bernadette Green PA-C        sodium chloride (PF) 0.9% PF flush 3 mL  3 mL Intracatheter q1 min prn Roseline Strong PA-C        sodium chloride (PF) 0.9% PF flush 3 mL  3 mL Intracatheter q1 min prn Yaa Baer MD        sodium chloride (PF) 0.9% PF flush 3 mL  3 mL Intracatheter q1 min prn Bessie Lazo PA-C        [START ON 9/23/2024] sulfamethoxazole-trimethoprim (BACTRIM DS) 800-160 MG per tablet 1 tablet  1 tablet Oral Q Mon Tues BID Kaitlyn Aguilar NP        tamsulosin (FLOMAX) capsule 0.4 mg  0.4 mg Oral Daily Bessie Lazo PA-C   0.4 mg at 09/17/24 1711    ursodiol (ACTIGALL) capsule 300 mg  300 mg Oral TID Markus Mendez MD   300 mg at 09/18/24 1033        Physical Exam:    /71 (BP Location: Left arm)   Pulse 118   Temp 97.9  F (36.6  C) (Oral)   Resp 16   Ht 1.84 m (6' 0.44\")   Wt 83.7 kg (184 lb 9.6 oz)   SpO2 100%   BMI 24.73 kg/m    Gen: No acute distress  HEENT: tenderness to palpation right neck, moderate erythema and swelling but reduced from prior exam.  Chest: equal rise, no audible wheezing, no acute distress  Abd: non distended  Ext: Warm and well perfused. No lower extremity edema  Skin: No cyanosis or petechial lesion over exposed skin  Neuro: Alert and answering questions appropriately. CNII-XII grossly intact. Moving all extremities without issue or focal neurologic deficits.    Labs & Studies: I personally reviewed the following studies:  ROUTINE LABS (Last four results):  CMP  Recent Labs   Lab 09/18/24  0430 09/17/24  0424 09/16/24  0432 09/15/24  0353 09/13/24  0322 09/12/24  0321    135 135 135   < > 133*   POTASSIUM 3.7 3.8 4.0 3.9   < > 4.0   CHLORIDE 100 101 100 100   < > 100   CO2 27 26 25 24   < > 25   ANIONGAP 10 8 10 11   < > 8   * 115* 129* 120*   < > 134*   BUN 14.1 12.2 13.3 15.1   < > 14.3   CR 0.65* 0.68 0.66* 0.59*   < > 0.64*   GFRESTIMATED >90 >90 >90 " >90   < > >90   HERBERT 8.7* 9.0 9.0 8.4*   < > 8.7*   MAG 2.0 2.0 2.0 1.9   < > 2.1   PHOS 3.0 2.9 3.0 2.4*   < > 2.0*   PROTTOTAL  --   --  6.6  --   --  6.5   ALBUMIN  --   --  3.4*  --   --  3.4*   BILITOTAL  --   --  0.5  --   --  0.5   ALKPHOS  --   --  67  --   --  70   AST  --   --  23  --   --  25   ALT  --   --  14  --   --  16    < > = values in this interval not displayed.     CBC  Recent Labs   Lab 09/18/24  0430 09/17/24  0424 09/16/24  0432 09/15/24  0353   WBC 1.3* 1.3* 1.5* 1.8*   RBC 2.44* 2.69* 2.97* 2.80*   HGB 6.7* 7.4* 8.0* 7.6*   HCT 20.1* 21.9* 24.1* 22.6*   MCV 82 81 81 81   MCH 27.5 27.5 26.9 27.1   MCHC 33.3 33.8 33.2 33.6   RDW 17.2* 17.0* 17.1* 16.6*   PLT 28* 22* 24* 23*     INR  Recent Labs   Lab 09/16/24  0432   INR 1.26*       Duplex Ultrasound 9/17    FINDINGS:  Right: Occlusive thrombus within the right internal jugular vein  extending to the right innominate vein. Partially occlusive thrombus  within the right subclavian vein adjacent to the PICC line extending  into the right axillary vein. The right cephalic and basilic veins are  patent and fully compressible.                                                                   IMPRESSION:  1. Redemonstration of the known occlusive right IJ thrombus extending  into the right innominate.  2. Interval extension of the nonocclusive right axillary venous  thrombus into the subclavian vein.      Duplex Ultrasound 9/15    FINDINGS:  Right: Occlusive thrombus within the right internal jugular vein  extending to the right innominate vein. Partially occlusive thrombus  within the right subclavian vein adjacent to the PICC line extending  into the right axillary vein. The right cephalic and basilic veins are  patent and fully compressible.                                                                   IMPRESSION:  1. Redemonstration of the known occlusive right IJ thrombus extending  into the right innominate.  2. Interval extension of the  nonocclusive right axillary venous  thrombus into the subclavian vein.     I have personally reviewed the examination and initial interpretation  and I agree with the findings.

## 2024-09-18 NOTE — PLAN OF CARE
"/81 (BP Location: Left arm)   Pulse 92   Temp 98.4  F (36.9  C) (Oral)   Resp 16   Ht 1.84 m (6' 0.44\")   Wt 83.7 kg (184 lb 9.6 oz)   SpO2 98%   BMI 24.73 kg/m      A&Ox4, able to make needs known. AVSS on room air. Denies CP, SOB, N/V, dizziness. C/O right neck pain w/ difficulty swallowing pills & decreased appetite. Able to intake some whole pills & some crushed as able per pharmacy. Ambulating SBA to bathroom & in halls. Voiding adequately, utilizing external male catheter at night. PICC dressing changed this care shift. Initial lovenox teaching completed with pt and wife. Wife able to give one dose this evening with bedside RN guidance & teaching. Tentative plan to discharge as early as 9/19.     Goal Outcome Evaluation:      Plan of Care Reviewed With: patient    Overall Patient Progress: no changeOverall Patient Progress: no change       Problem: Adult Inpatient Plan of Care  Goal: Plan of Care Review  Description: The Plan of Care Review/Shift note should be completed every shift.  The Outcome Evaluation is a brief statement about your assessment that the patient is improving, declining, or no change.  This information will be displayed automatically on your shift  note.  Outcome: Not Progressing  Flowsheets (Taken 9/18/2024 1851)  Plan of Care Reviewed With: patient  Overall Patient Progress: no change  Goal: Patient-Specific Goal (Individualized)  Description: You can add care plan individualizations to a care plan. Examples of Individualization might be:  \"Parent requests to be called daily at 9am for status\", \"I have a hard time hearing out of my right ear\", or \"Do not touch me to wake me up as it startles  me\".  Outcome: Not Progressing  Goal: Absence of Hospital-Acquired Illness or Injury  Outcome: Not Progressing  Intervention: Identify and Manage Fall Risk  Recent Flowsheet Documentation  Taken 9/18/2024 1715 by aDlia Colbert, RN  Safety Promotion/Fall Prevention: safety " round/check completed  Taken 9/18/2024 1155 by Dalia Colbert RN  Safety Promotion/Fall Prevention: safety round/check completed  Taken 9/18/2024 1027 by Dalia Colbert RN  Safety Promotion/Fall Prevention: safety round/check completed  Taken 9/18/2024 0912 by Dalia Colbert RN  Safety Promotion/Fall Prevention: safety round/check completed  Intervention: Prevent Skin Injury  Recent Flowsheet Documentation  Taken 9/18/2024 0813 by Dalia Colbert RN  Body Position: position changed independently  Skin Protection: adhesive use limited  Device Skin Pressure Protection: adhesive use limited  Intervention: Prevent and Manage VTE (Venous Thromboembolism) Risk  Recent Flowsheet Documentation  Taken 9/18/2024 0813 by Dalia Colbert RN  VTE Prevention/Management: SCDs off (sequential compression devices)  Intervention: Prevent Infection  Recent Flowsheet Documentation  Taken 9/18/2024 1607 by Dalia Colbert RN  Infection Prevention:   hand hygiene promoted   single patient room provided   environmental surveillance performed   equipment surfaces disinfected   personal protective equipment utilized   rest/sleep promoted   visitors restricted/screened  Taken 9/18/2024 1155 by Dalia Colbert RN  Infection Prevention:   hand hygiene promoted   single patient room provided   environmental surveillance performed   equipment surfaces disinfected   personal protective equipment utilized   rest/sleep promoted   visitors restricted/screened  Taken 9/18/2024 0813 by Dalia Colbert RN  Infection Prevention:   hand hygiene promoted   single patient room provided   environmental surveillance performed   equipment surfaces disinfected   personal protective equipment utilized   rest/sleep promoted   visitors restricted/screened  Goal: Optimal Comfort and Wellbeing  Outcome: Not Progressing  Intervention: Monitor Pain and Promote Comfort  Recent Flowsheet Documentation  Taken 9/18/2024 1607 by  Dalia Colbert RN  Pain Management Interventions:   medication (see MAR)   medication offered but refused  Taken 9/18/2024 1126 by Dalia Colbert RN  Pain Management Interventions:   medication (see MAR)   medication offered but refused  Taken 9/18/2024 0812 by Dalia Colbert RN  Pain Management Interventions:   medication (see MAR)   medication offered but refused  Goal: Readiness for Transition of Care  Outcome: Not Progressing     Problem: Risk for Delirium  Goal: Optimal Coping  Outcome: Not Progressing  Intervention: Optimize Psychosocial Adjustment to Delirium  Recent Flowsheet Documentation  Taken 9/18/2024 0813 by Dalia Colbert RN  Supportive Measures:   active listening utilized   decision-making supported   goal-setting facilitated   positive reinforcement provided   verbalization of feelings encouraged  Family/Support System Care: support provided  Goal: Improved Sleep  Outcome: Not Progressing     Problem: Stem Cell/Bone Marrow Transplant  Goal: Optimal Coping with Transplant  Outcome: Not Progressing  Intervention: Optimize Patient/Family Adjustment to Transplant  Recent Flowsheet Documentation  Taken 9/18/2024 0813 by Dalia Colbert RN  Supportive Measures:   active listening utilized   decision-making supported   goal-setting facilitated   positive reinforcement provided   verbalization of feelings encouraged  Family/Support System Care: support provided  Goal: Symptom-Free Urinary Elimination  Outcome: Not Progressing  Intervention: Prevent or Manage Bladder Irritation  Recent Flowsheet Documentation  Taken 9/18/2024 1607 by Dalia Colbert RN  Pain Management Interventions:   medication (see MAR)   medication offered but refused  Taken 9/18/2024 1126 by Dalia Colbert RN  Pain Management Interventions:   medication (see MAR)   medication offered but refused  Taken 9/18/2024 0813 by Dalia Colbert RN  Urinary Elimination Promotion: frequent voiding  encouraged  Hyperhydration Management: fluids provided  Taken 9/18/2024 0812 by Dalia Colbert RN  Pain Management Interventions:   medication (see MAR)   medication offered but refused  Goal: Diarrhea Symptom Control  Outcome: Not Progressing  Intervention: Manage Diarrhea  Recent Flowsheet Documentation  Taken 9/18/2024 0813 by Dalia Colbert RN  Skin Protection: adhesive use limited  Fluid/Electrolyte Management: fluids provided  Goal: Improved Activity Tolerance  Outcome: Not Progressing  Intervention: Promote Improved Energy  Recent Flowsheet Documentation  Taken 9/18/2024 0813 by Dalia Colbert RN  Fatigue Management: frequent rest breaks encouraged  Environmental Support:   calm environment promoted   personal routine supported  Goal: Blood Counts Within Acceptable Range  Outcome: Not Progressing  Intervention: Monitor and Manage Hematologic Symptoms  Recent Flowsheet Documentation  Taken 9/18/2024 0813 by Dalia Colbert RN  Bleeding Precautions: blood pressure closely monitored  Medication Review/Management: medications reviewed  Goal: Absence of Hypersensitivity Reaction  Outcome: Not Progressing  Goal: Absence of Infection  Outcome: Not Progressing  Intervention: Prevent and Manage Infection  Recent Flowsheet Documentation  Taken 9/18/2024 1607 by Dalia Colbert RN  Infection Prevention:   hand hygiene promoted   single patient room provided   environmental surveillance performed   equipment surfaces disinfected   personal protective equipment utilized   rest/sleep promoted   visitors restricted/screened  Isolation Precautions: protective environment maintained  Taken 9/18/2024 1155 by Dalia Colbert RN  Infection Prevention:   hand hygiene promoted   single patient room provided   environmental surveillance performed   equipment surfaces disinfected   personal protective equipment utilized   rest/sleep promoted   visitors restricted/screened  Isolation Precautions:  protective environment maintained  Taken 9/18/2024 0813 by Dalia Colbert RN  Infection Prevention:   hand hygiene promoted   single patient room provided   environmental surveillance performed   equipment surfaces disinfected   personal protective equipment utilized   rest/sleep promoted   visitors restricted/screened  Infection Management: aseptic technique maintained  Isolation Precautions: protective environment maintained  Goal: Improved Oral Mucous Membrane Health and Integrity  Outcome: Not Progressing  Intervention: Promote Oral Comfort and Health  Recent Flowsheet Documentation  Taken 9/18/2024 0813 by Dalia Colbert RN  Oral Mucous Membrane Protection: lip/mouth moisturizer applied  Goal: Nausea and Vomiting Symptom Relief  Outcome: Not Progressing  Goal: Optimal Nutrition Intake  Outcome: Not Progressing  Intervention: Minimize and Manage Barriers to Oral Intake  Recent Flowsheet Documentation  Taken 9/18/2024 0813 by Dalia Colbert RN  Oral Nutrition Promotion: rest periods promoted

## 2024-09-19 ENCOUNTER — DOCUMENTATION ONLY (OUTPATIENT)
Dept: ONCOLOGY | Facility: CLINIC | Age: 69
End: 2024-09-19
Payer: COMMERCIAL

## 2024-09-19 VITALS
HEIGHT: 72 IN | TEMPERATURE: 98.2 F | HEART RATE: 98 BPM | WEIGHT: 186.29 LBS | BODY MASS INDEX: 25.23 KG/M2 | RESPIRATION RATE: 16 BRPM | DIASTOLIC BLOOD PRESSURE: 72 MMHG | OXYGEN SATURATION: 98 % | SYSTOLIC BLOOD PRESSURE: 132 MMHG

## 2024-09-19 LAB
ABO/RH(D): NORMAL
ALBUMIN SERPL BCG-MCNC: 3.2 G/DL (ref 3.5–5.2)
ALP SERPL-CCNC: 66 U/L (ref 40–150)
ALT SERPL W P-5'-P-CCNC: 15 U/L (ref 0–70)
ANION GAP SERPL CALCULATED.3IONS-SCNC: 10 MMOL/L (ref 7–15)
ANTIBODY SCREEN: NEGATIVE
AST SERPL W P-5'-P-CCNC: 22 U/L (ref 0–45)
BASOPHILS # BLD AUTO: 0 10E3/UL (ref 0–0.2)
BASOPHILS NFR BLD AUTO: 0 %
BILIRUB SERPL-MCNC: 0.4 MG/DL
BLD PROD TYP BPU: NORMAL
BLD PROD TYP BPU: NORMAL
BLOOD COMPONENT TYPE: NORMAL
BLOOD COMPONENT TYPE: NORMAL
BUN SERPL-MCNC: 12.2 MG/DL (ref 8–23)
CALCIUM SERPL-MCNC: 8.7 MG/DL (ref 8.8–10.4)
CHLORIDE SERPL-SCNC: 100 MMOL/L (ref 98–107)
CODING SYSTEM: NORMAL
CODING SYSTEM: NORMAL
CREAT SERPL-MCNC: 0.58 MG/DL (ref 0.67–1.17)
CROSSMATCH: NORMAL
CULTURE HARVEST COMPLETE DATE: NORMAL
CULTURE HARVEST COMPLETE DATE: NORMAL
EGFRCR SERPLBLD CKD-EPI 2021: >90 ML/MIN/1.73M2
EOSINOPHIL # BLD AUTO: 0 10E3/UL (ref 0–0.7)
EOSINOPHIL NFR BLD AUTO: 0 %
ERYTHROCYTE [DISTWIDTH] IN BLOOD BY AUTOMATED COUNT: 17.3 % (ref 10–15)
GLUCOSE SERPL-MCNC: 114 MG/DL (ref 70–99)
HCO3 SERPL-SCNC: 26 MMOL/L (ref 22–29)
HCT VFR BLD AUTO: 24.7 % (ref 40–53)
HGB BLD-MCNC: 8.1 G/DL (ref 13.3–17.7)
IMM GRANULOCYTES # BLD: 0.1 10E3/UL
IMM GRANULOCYTES NFR BLD: 1 %
INTERPRETATION: NORMAL
ISSUE DATE AND TIME: NORMAL
LACTATE SERPL-SCNC: 0.7 MMOL/L (ref 0.7–2)
LYMPHOCYTES # BLD AUTO: 0.1 10E3/UL (ref 0.8–5.3)
LYMPHOCYTES NFR BLD AUTO: 1 %
MCH RBC QN AUTO: 27.7 PG (ref 26.5–33)
MCHC RBC AUTO-ENTMCNC: 32.8 G/DL (ref 31.5–36.5)
MCV RBC AUTO: 85 FL (ref 78–100)
MONOCYTES # BLD AUTO: 1.1 10E3/UL (ref 0–1.3)
MONOCYTES NFR BLD AUTO: 11 %
NEUTROPHILS # BLD AUTO: 9.2 10E3/UL (ref 1.6–8.3)
NEUTROPHILS NFR BLD AUTO: 87 %
NRBC # BLD AUTO: 0 10E3/UL
NRBC BLD AUTO-RTO: 0 /100
PLATELET # BLD AUTO: 40 10E3/UL (ref 150–450)
POTASSIUM SERPL-SCNC: 3.8 MMOL/L (ref 3.4–5.3)
PROT SERPL-MCNC: 6.1 G/DL (ref 6.4–8.3)
RBC # BLD AUTO: 2.92 10E6/UL (ref 4.4–5.9)
SIROLIMUS BLD-MCNC: 9.2 UG/L (ref 5–15)
SODIUM SERPL-SCNC: 136 MMOL/L (ref 135–145)
SPECIMEN EXPIRATION DATE: NORMAL
TME LAST DOSE: NORMAL H
TME LAST DOSE: NORMAL H
UNIT ABO/RH: NORMAL
UNIT ABO/RH: NORMAL
UNIT NUMBER: NORMAL
UNIT NUMBER: NORMAL
UNIT STATUS: NORMAL
UNIT STATUS: NORMAL
UNIT TYPE ISBT: 5100
UNIT TYPE ISBT: 6200
WBC # BLD AUTO: 10.6 10E3/UL (ref 4–11)

## 2024-09-19 PROCEDURE — 250N000011 HC RX IP 250 OP 636: Performed by: RADIOLOGY

## 2024-09-19 PROCEDURE — 250N000013 HC RX MED GY IP 250 OP 250 PS 637: Performed by: STUDENT IN AN ORGANIZED HEALTH CARE EDUCATION/TRAINING PROGRAM

## 2024-09-19 PROCEDURE — 250N000013 HC RX MED GY IP 250 OP 250 PS 637: Performed by: PHYSICIAN ASSISTANT

## 2024-09-19 PROCEDURE — 250N000013 HC RX MED GY IP 250 OP 250 PS 637

## 2024-09-19 PROCEDURE — 83605 ASSAY OF LACTIC ACID: CPT | Performed by: STUDENT IN AN ORGANIZED HEALTH CARE EDUCATION/TRAINING PROGRAM

## 2024-09-19 PROCEDURE — 250N000012 HC RX MED GY IP 250 OP 636 PS 637: Performed by: PHYSICIAN ASSISTANT

## 2024-09-19 PROCEDURE — P9037 PLATE PHERES LEUKOREDU IRRAD: HCPCS | Performed by: PHYSICIAN ASSISTANT

## 2024-09-19 PROCEDURE — 86900 BLOOD TYPING SEROLOGIC ABO: CPT

## 2024-09-19 PROCEDURE — 80195 ASSAY OF SIROLIMUS: CPT | Performed by: STUDENT IN AN ORGANIZED HEALTH CARE EDUCATION/TRAINING PROGRAM

## 2024-09-19 PROCEDURE — 250N000011 HC RX IP 250 OP 636: Performed by: PHYSICIAN ASSISTANT

## 2024-09-19 PROCEDURE — 86923 COMPATIBILITY TEST ELECTRIC: CPT | Performed by: STUDENT IN AN ORGANIZED HEALTH CARE EDUCATION/TRAINING PROGRAM

## 2024-09-19 PROCEDURE — 250N000013 HC RX MED GY IP 250 OP 250 PS 637: Performed by: INTERNAL MEDICINE

## 2024-09-19 PROCEDURE — 80053 COMPREHEN METABOLIC PANEL: CPT

## 2024-09-19 PROCEDURE — 250N000011 HC RX IP 250 OP 636

## 2024-09-19 PROCEDURE — 258N000003 HC RX IP 258 OP 636

## 2024-09-19 PROCEDURE — 250N000013 HC RX MED GY IP 250 OP 250 PS 637: Performed by: NURSE PRACTITIONER

## 2024-09-19 PROCEDURE — 250N000011 HC RX IP 250 OP 636: Performed by: INTERNAL MEDICINE

## 2024-09-19 PROCEDURE — 250N000012 HC RX MED GY IP 250 OP 636 PS 637

## 2024-09-19 PROCEDURE — 85025 COMPLETE CBC W/AUTO DIFF WBC: CPT

## 2024-09-19 PROCEDURE — 99239 HOSP IP/OBS DSCHRG MGMT >30: CPT | Mod: FS | Performed by: PHYSICIAN ASSISTANT

## 2024-09-19 RX ORDER — DIPHENHYDRAMINE HYDROCHLORIDE 50 MG/ML
50 INJECTION INTRAMUSCULAR; INTRAVENOUS
Start: 2024-09-19

## 2024-09-19 RX ORDER — EPINEPHRINE 1 MG/ML
0.3 INJECTION, SOLUTION INTRAMUSCULAR; SUBCUTANEOUS EVERY 5 MIN PRN
OUTPATIENT
Start: 2024-09-19

## 2024-09-19 RX ORDER — HEPARIN SODIUM (PORCINE) LOCK FLUSH IV SOLN 100 UNIT/ML 100 UNIT/ML
5 SOLUTION INTRAVENOUS
Status: CANCELLED | OUTPATIENT
Start: 2024-09-19

## 2024-09-19 RX ORDER — CARBOXYMETHYLCELLULOSE SODIUM 5 MG/ML
1 SOLUTION/ DROPS OPHTHALMIC
Status: DISCONTINUED | OUTPATIENT
Start: 2024-09-19 | End: 2024-09-19 | Stop reason: HOSPADM

## 2024-09-19 RX ORDER — HEPARIN SODIUM,PORCINE 10 UNIT/ML
5-20 VIAL (ML) INTRAVENOUS DAILY PRN
OUTPATIENT
Start: 2024-09-19

## 2024-09-19 RX ORDER — HEPARIN SODIUM,PORCINE 10 UNIT/ML
5-20 VIAL (ML) INTRAVENOUS DAILY PRN
Status: CANCELLED | OUTPATIENT
Start: 2024-09-19

## 2024-09-19 RX ORDER — HEPARIN SODIUM (PORCINE) LOCK FLUSH IV SOLN 100 UNIT/ML 100 UNIT/ML
5 SOLUTION INTRAVENOUS
OUTPATIENT
Start: 2024-09-19

## 2024-09-19 RX ADMIN — METHOCARBAMOL 500 MG: 500 TABLET ORAL at 08:09

## 2024-09-19 RX ADMIN — URSODIOL 300 MG: 300 CAPSULE ORAL at 08:09

## 2024-09-19 RX ADMIN — MYCOPHENOLATE MOFETIL 1250 MG: 250 CAPSULE ORAL at 08:10

## 2024-09-19 RX ADMIN — URSODIOL 300 MG: 300 CAPSULE ORAL at 14:29

## 2024-09-19 RX ADMIN — ENOXAPARIN SODIUM 40 MG: 40 INJECTION SUBCUTANEOUS at 08:11

## 2024-09-19 RX ADMIN — METHOCARBAMOL 500 MG: 500 TABLET ORAL at 14:29

## 2024-09-19 RX ADMIN — Medication 10 ML: at 03:31

## 2024-09-19 RX ADMIN — MICAFUNGIN SODIUM 150 MG: 50 INJECTION, POWDER, LYOPHILIZED, FOR SOLUTION INTRAVENOUS at 09:42

## 2024-09-19 RX ADMIN — CALCIUM CARBONATE 600 MG (1,500 MG)-VITAMIN D3 400 UNIT TABLET 2 TABLET: at 08:11

## 2024-09-19 RX ADMIN — ACYCLOVIR 800 MG: 200 CAPSULE ORAL at 08:10

## 2024-09-19 RX ADMIN — SIROLIMUS 4.5 MG: 2 TABLET ORAL at 08:15

## 2024-09-19 RX ADMIN — AMOXICILLIN AND CLAVULANATE POTASSIUM 1 TABLET: 875; 125 TABLET, FILM COATED ORAL at 08:10

## 2024-09-19 RX ADMIN — Medication 1 DROP: at 10:55

## 2024-09-19 RX ADMIN — Medication 5 ML: at 10:56

## 2024-09-19 RX ADMIN — ALLOPURINOL 300 MG: 300 TABLET ORAL at 08:09

## 2024-09-19 RX ADMIN — PANTOPRAZOLE SODIUM 40 MG: 40 TABLET, DELAYED RELEASE ORAL at 08:10

## 2024-09-19 RX ADMIN — HEPARIN, PORCINE (PF) 10 UNIT/ML INTRAVENOUS SYRINGE 5 ML: at 10:51

## 2024-09-19 ASSESSMENT — ACTIVITIES OF DAILY LIVING (ADL)
ADLS_ACUITY_SCORE: 25

## 2024-09-19 NOTE — DISCHARGE INSTRUCTIONS
BMT Contact Information  For issues including fevers 100.5 or more:  Please call during the week: Monday through Friday between hours of 8:00 am and 4:30 pm- Call BMT office- [phone]  After hours/Weekends: Please call [facility]  and ask for BMT physician on call and the  will have the BMT Fellow Physician call you back: [phone]    [facility] phone #[phone]     Advice for Patients on COVID19:  a. Avoid contact with individuals:   i. Who are sick or have recently been sick  ii. Have traveled to high risk areas (per CDC guidelines) or have been on a cruise in the last 14 days  iii. Who were or could have been exposed to COVID-19   b. If experiencing symptoms such as: Fever, cough or shortness of breath contact BMT at [phone] Mon-Fri 8am-4:30pm or After Hours at [phone] (ask to speak to a BMT Fellow) for guidance on need for clinical assessment  c. Avoid all non- essential travel at this time; if traveling is necessary use mask (N-95)   d. Wear a mask when in public areas  d. Avoid crowded places, if possible  f. Follow CDC advice https://www.cdc.gov/coronavirus/2019-ncov/index.html and travel guidelines https://www.cdc.gov/coronavirus/2019-ncov/travelers/index.html

## 2024-09-19 NOTE — PROGRESS NOTES
Pt is a 69 year old male currently Allo D +27.    Per Med Team, pt is anticipated to be medically stable for discharge today. SW met with pt to assess coping, provide psychosocial support and check in. Pt spouse (Vani) also present at bedside. Pt riding bike during visit. SW provided a discharge packet with psychosocial post-transplant resources for patient and caregiver. Pt shared that he is looking forward to going home today. Pt family to provide transportation. SW reviewed IMM Notice of Medicare Discharge rights. Pt declined/questions or concerns. SW encouraged pt to contact SW for support, questions and/or resources.     Assessment: Pt presented as pleasant and engaged. Pt continues to be supported by his family.     Discharge Plan: Pt to discharge home in [place], [place] with pt spouse (Vani) as primary caregiver.     MARC Newman   [facility]   Available via [employer]  Assisting BMT SW 3, ph: [phone]

## 2024-09-19 NOTE — DISCHARGE SUMMARY
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, RUNX1, SRSF2)   S/P 8/8 DP permissive GANGA MUD PBSCT  Anemia  Thrombocytopenia  Occlusive thrombus of R internal jugular vein  Nonocclusive thrombus of R axillary vein  Strept mitis bacteremia  MRSE bacteremia  Non-neutropenic fever  GERD  HTN  CAD  BPH  Hemorrhagic cystitis - resolved  Hx of gout    Discharge Medications:         Medication List        Started      amoxicillin-clavulanate 875-125 MG tablet  Commonly known as: AUGMENTIN  1 tablet, Oral, EVERY 12 HOURS     calcium carbonate-vitamin D 600-10 MG-MCG per tablet  Commonly known as: CALTRATE  2 tablets, Oral, 2 TIMES DAILY WITH MEALS     enoxaparin ANTICOAGULANT 40 MG/0.4ML syringe  Commonly known as: LOVENOX  40 mg, Subcutaneous, EVERY 12 HOURS     methocarbamol 500 MG tablet  Commonly known as: ROBAXIN  500 mg, Oral, 3 TIMES DAILY     mycophenolate 250 MG capsule  Commonly known as: GENERIC EQUIVALENT  1,250 mg, Oral, EVERY 12 HOURS     oxyBUTYnin ER 10 MG 24 hr tablet  Commonly known as: DITROPAN XL  10 mg, Oral, AT BEDTIME     pantoprazole 40 MG EC tablet  Commonly known as: PROTONIX  40 mg, Oral, DAILY     sirolimus 0.5 MG tablet  4.5 mg, Oral, DAILY     sulfamethoxazole-trimethoprim 800-160 MG tablet  Commonly known as: BACTRIM DS  1 tablet, Oral, EVERY MONDAY TUESDAY BID  Start taking on: September 23, 2024     ursodiol 300 MG capsule  Commonly known as: ACTIGALL  300 mg, Oral, 3 TIMES DAILY            Modified      prochlorperazine 5 MG tablet  Commonly known as: COMPAZINE  5 mg, Oral, EVERY 6 HOURS PRN  What changed: how much to take            Discontinued      celecoxib 200 MG capsule  Commonly  "known as: celeBREX     famotidine 20 MG tablet  Commonly known as: PEPCID     levofloxacin 250 MG tablet  Commonly known as: LEVAQUIN     lisinopril 5 MG tablet  Commonly known as: ZESTRIL     Multi-vitamin tablet     posaconazole 100 MG DR tablet  Commonly known as: NOXAFIL            Brief History of Illness:    **Adopted from H&P  Leland presents today for admission for his transplant. He is feeling well with no new medical complaints. He has no recent sick contacts. He has back pain that limits his walking- he is really only able to tolerate standing for about 15 minutes at a time before requiring a break. He does complete about 25 minutes on a stationary bike at a time- would like a bike in his room if possible. No recent nausea. Appetite has been good. Wife is at bedside. He is working to try to be able to watch the [employer] while here in the hospital.      Physical Exam:    /72   Pulse 98   Temp 98.2  F (36.8  C)   Resp 16   Ht 1.84 m (6' 0.44\")   Wt 84.5 kg (186 lb 4.6 oz)   SpO2 98%   BMI 24.96 kg/m    # Discharge Pain Plan:   - Patient currently has NO PAIN and is not being prescribed pain medications on discharge.    General:  NAD   HEENT: No oral lesions. Mild tenderness and  edema along the right lateral neck and just superior to clavicle.   Lungs: CTAB  CV: RRR, no MRG    Abdomen: Soft. NT. ND. +BS  Lymphatics: No edema in lower extremities.  Neuro: A&O, appropriately interactive, speech clear, moving all extremities   Skin: No rash  Access: R arm PICC dressing cdi, no drainage       Lab Values     I have assessed all abnormal lab values for their clinical significance and any values considered clinically significant have been addressed in the assessment and plan.   Hospital Course:      BMT/IEC PROTOCOL for MDS  - Chemo protocol: MT 2022-5; Flu/Cy/TBI  - Peripheral blood stem cell graft from 8/8 URD donor, ABO matched, Cell dose: 6.06 x10^6 CD34/kg  - Engraftment monitoring: Peripheral " blood and bone marrow chimerisms on D28, D100, 6 months, 1 and 2 years  - Restaging plan: BMBx with FISH, cytogenetics and NGS on D28, D100, 6 months, 1 and 2 years  - D21 BMbx completed 9/13. Flow negative, pending path/chimerisms etc.      HEME/COAG  # Pancytopenia 2/2 chemo/BMT  - Last day of G (9/12). (9/18) ANC 0.8 - G given   - Transfusion parameters: hemoglobin <7, platelets <50 (increased while on Lovenox).     # Right sternocleidomastoid tenderness s/p CVC removal  #Occlusive thrombus of R internal jugular vein  #Nonocclusive thrombus or R axillary vein  - Started complaining of tenderness and swelling near right CVC site that was removed. Obtained US to further investigate.   - Positive for thrombus. No anticoagulation at this time due to thrombocytopenia. Will need to begin DOAC (per pharmacy preferably voriconazole + apixaban without loading dose) vs lovenox once platelets >50k.   (9/17) Ongoing right neck pain/swelling: will repeat ultrasound shows extension of previous clot. Started on Lovenox -- per Benign Heme will start Lovenox BID with PLT parameter >50. Will plan to discharge on lovenox for 3 months. Will have follow up with Dr. Price outpatient.    RISK OF GVHD  - Prophylaxis: PTCy 50mg/kg on D+3 and D+4; MMF (D+5 to 35); Sirolimus (starting D+5, target level 5-10).  - Siro 4.5mg/d. (9/19) Siro level pending     ID  #Streptococcus mitis bacteremia (2/2 bottles) - Cefepime (9/1-9/13), Ceftriaxone (9/13-9/15)   - ABX duration plan: per ID through 9/20. Sensitive to ceftriaxone q24hrs, can transition to non-pseudomonal coverage for discharge/better engraftment.   - With anticipation for discharge, we will switch to Ceftriaxone (9/13-9/15). Resume cefepime 9/16 as below.     # MRSE bacteremia likely contaminant because peripheral stick, grew late (1/2). Dc'd Vanco (9/2-9/6)   - Repeat BC's x3 days NGTD  # Non-Neutropenic Fever: (9/11) 102.7. Peripheral BC (9/11, 9/13) NGTD. Urine cx 9/9 neg. BK  urine and blood neg. Adeno urine pending. CMV neg 9/7. EBV neg. Adeno PCR blood pending. Flu/RSV/COVID neg. Still on cefepime as above. Leland appears non-toxic with no new localizing findings and currently afebrile. MRSA swab neg overnight (9/12)   *Repeat fever on 9/15. Resume cefepime 9/15-9/17 -- transitioned to oral Augmentin BID thru 9/20. Likely has bacterial seeding of internal jugular clot as above - Appreciate ID input.     Prophylaxis plan:   - Fungal: Micafungin until D+45 (in clinic), followed by mold active azole. Pulm nodules present on workup CT.   - PJP: Bactrim to start at D+28. Toxoplasma negative.   - Viral: Acyclovir 800mg BID. No need for letermovir as donor and recipient are CMV negative.      Monitoring:   - CMV weekly, neg 9/7  - EBV every 2 weeks from D30 to D180: (9/14) ND     GI/NUTRITION  # GERD: Protonix  # Loose stools: Imodium x 1 on 9/13. No indication to repeat C. Diff at this time. Holding Metamucil.  - Anti-emetics: Compazine daily at 1600 and PRN. lorazepam available PRN.   - Ulcer prophy: Protonix  - VOD prophy: Ursodiol   - Dietician support to prevent malnutrition    CARDIOVASCULAR  # HTN: PTA lisinopril stopped 8/26 d/t symptomatic orthostasis.   # CAD: Coronary artery calcifications seen on CT, stress test in 2023 negative  - Risk of cardiomyopathy: Baseline EF 55-60%.   - Risk of arrhythmia: Baseline EKG showed 421   # Orthostatic hypotension - 1L IVF 9/16. Continue flomax and ditropan for now with history of urinary discomfort as below.       RENAL/ELECTROLYTES/  - Electrolyte management: replace per sliding scale  - BPH: Continue PTA flomax.  - Hemorrhagic Cystitis secondary to cytoxan: urinary urge/frequency/dysuria: UA with +nitrates, mod blood, >182 RBC. UC neg. Urine BK neg. BK blood neg. Pyridium not helpful. Ditropan 2.5mg bid (also on Flomax). Adeno urine pending. Symptoms starting to improve--no noted blood per pt but large clot which was hard to pass 9/13.  Urology recommended increasing oxybuytinin.      MUSCULOSKELETAL/FRAILTY  - Baseline Frailty Score: Not frail (0)  - Daily PT/OT as needed while inpatient  # Gout: continue allopurinol      Neuro   # history of spinal stenosis, lumbosacral radiculopathy likely exacerbating.   - Pain management: Outpatient had been taking celecoxib for MSK pain, once a day. Stopped Inpatient will try Tramadol hasn't used. Now discontinued.  Added robaxin TID this admission.    SOCIAL DETERMINANTS  - Caregiver: wife, Vani. Lives within the required distance from hospital   - Financial/insurance concerns: None    Clinically Significant Risk Factors              # Hypoalbuminemia: Lowest albumin = 2.8 g/dL at 9/7/2024  7:13 AM, will monitor as appropriate   # Thrombocytopenia: Lowest platelets = 28 in last 2 days, will monitor for bleeding   # Hypertension: Noted on problem list                      CODE STATUS: FULL  Discharge Instructions and Follow-Up:    Discharge diet: Regular diet as tolerated  Discharge activity: Activity as tolerated   Discharge follow-up: Follow up with BMT Clinic as follows:  Follow up in BMT clinic on 9/20 with Dr. Murphy with labs and infusion for GILDARDO  Discharge Disposition:    Discharged to home.    Baltazar Arguello PA-C  9/19/2024    I spent 60 minutes in the care of this patient today, which included time necessary for preparation for the visit, obtaining history, ordering medications/tests/procedures as medically indicated, review of pertinent medical literature, counseling of the patient, communication of recommendations to the care team, and documentation time.    Advice for Patients concerning COVID19:  a. Avoid contact with individuals:   i. Who are sick or have recently been sick  ii. Have traveled to high risk areas (per CDC guidelines) or have been on a cruise in the last 14 days  iii. Who were or could have been exposed to COVID-19   b. If experiencing symptoms such as: Fever, cough or shortness  of breath contact BMT at [phone] Mon-Fri 8am-4:30pm or After Hours at [phone] (ask to speak to a BMT Fellow) for guidance on need for clinical assessment  c. Avoid all non- essential travel at this time; if traveling is necessary use mask (N-95)   d. Wear a mask when in public areas  d. Avoid crowded places, if possible  f. Follow CDC advice https://www.cdc.gov/coronavirus/2019-ncov/index.html and travel guidelines https://www.cdc.gov/coronavirus/2019-ncov/travelers/index.html

## 2024-09-19 NOTE — PROGRESS NOTES
PA Initiation    Medication: ENOXAPARIN SODIUM 40 MG/0.4ML IJ SOSY  Insurance Company: [employer] - Phone [phone] Fax [phone]  Pharmacy Filling the Rx: [facility] - [place], [place] - [address]  Filling Pharmacy Phone: [phone]  Start Date: 9/19/2024          Sepideh Kenyon  [facility] Liaison  Phone [phone]  Fax [phone]  Available on Teams & Vocera

## 2024-09-19 NOTE — CONSULTS
IR consult regarding RUE PICC associated thrombus    Pt with hx of line associated DVT requiring AC which is challenging given thrombocytopenia. RUE PICC placed 9/9 pt now with worsening symptoms and imaging evidence of worsening DVT. IR asked to consider PICC removal on the right and new placement on the LEFT.     Case and imaging was discussed with Dr. Walsh. We do not recommend removal of the currently functional PICC. Pt has proven to be thrombogenic and the risk with PICC removal and replacement on the contralateral side is this will also develop catheter associated thrombus. This results in a pt with DVT in both upper extremity subclavian veins. Understand the difficulties in treating patient with AC given intermittent thrombocytopenia but ultimately this would be the IR recommendation.    Baltazar Arguello PA-C was updated regarding IR recommendations.    Lucille Cooper DNP, APRN  Interventional Radiology   IR on-call pager: [phone]

## 2024-09-19 NOTE — PROGRESS NOTES
Prior Authorization Approval    Medication: ENOXAPARIN SODIUM 40 MG/0.4ML IJ SOSY  Authorization Effective Date: 6/21/2024  Authorization Expiration Date: 9/19/2025  Approved Dose/Quantity: 40mg/0.4ml   /   24 ml  Reference #: [id number]   Insurance Company: [employer] - Phone [phone] Fax [phone]  Expected CoPay: $ 96.65  Which Pharmacy is filling the prescription: [facility] - [place], [place] - [address]  Pharmacy Notified: Yes              Sepideh Kenyon  [facility] Liaison  Phone [phone]  Fax [phone]  Available on Teams & Vocera

## 2024-09-19 NOTE — PROGRESS NOTES
"Discharge SBAR:    Situation:  Leland Pearson is a 69 year old being discharged to: Home  Admission reason: Scheduled Admission  Is this a readmission? No  Discharge time: 1430  Discharge barrier if discharged after 11AM: order not placed    Background:  Primary diagnosis: MDS/AML  /72   Pulse 98   Temp 98.2  F (36.8  C)   Resp 16   Ht 1.84 m (6' 0.44\")   Wt 84.5 kg (186 lb 4.6 oz)   SpO2 98%   BMI 24.96 kg/m    Type of donor: Allogeneic  Type of stem cells: PBSC  Relapsed? No  Falls Precautions? Yes  Isolation? No  DNR? No  DNI? No  Confidential Patient? No  Positive blood cultures? No    Assessment:  Discharge teaching  Review discharge medications and schedule: Yes  Set up pill box: Yes; Acyclovir, Allopurinol and Flomax at home and need to be added to box  Cap and Line flushed with caregiver:  Yes  Central line teach back and questions complete:  Yes  Discharge instructions reviewed: Yes  Special considerations (think about previous reactions, issues with flushing CVC, premedication needs, etc): Yes, RUE clot, potential for removal of PICC in a few days.    Patient Concerns: No    Recommendations:  Anticipated needs:  Daily infusions: Yes, Micafungin 3x/week  Daily transfusions: No  G-CSF: No  Other: Not Applicable  Verbal report called to clinic: No      "

## 2024-09-19 NOTE — PLAN OF CARE
"/72   Pulse 98   Temp 98.2  F (36.8  C)   Resp 16   Ht 1.84 m (6' 0.44\")   Wt 84.5 kg (186 lb 4.6 oz)   SpO2 98%   BMI 24.96 kg/m    VSS, AOx4, up in room with SBA but doing better with dizziness.  Pt ready for discharge, meds picked up from pharmacy and reviewed in room, Pt awaiting call from [facility] to set up heparin order delivery.  PICC line flushing reviewed and Pt's spouse allowed time to practice on PICC dummy.  Pt's spouse was able to give a second dose of lovenox this morning and states she feels more comfortable doing it at home but still nervous.  Clinic appointment tomorrow, informed to bring pill box and all meds with to appointment for follow up with pharmacist at clinic appointment.  Problem: Adult Inpatient Plan of Care  Goal: Plan of Care Review  Description: The Plan of Care Review/Shift note should be completed every shift.  The Outcome Evaluation is a brief statement about your assessment that the patient is improving, declining, or no change.  This information will be displayed automatically on your shift  note.  Outcome: Adequate for Care Transition  Goal: Patient-Specific Goal (Individualized)  Description: You can add care plan individualizations to a care plan. Examples of Individualization might be:  \"Parent requests to be called daily at 9am for status\", \"I have a hard time hearing out of my right ear\", or \"Do not touch me to wake me up as it startles  me\".  Outcome: Adequate for Care Transition  Goal: Absence of Hospital-Acquired Illness or Injury  Outcome: Adequate for Care Transition  Goal: Optimal Comfort and Wellbeing  Outcome: Adequate for Care Transition  Goal: Readiness for Transition of Care  Outcome: Adequate for Care Transition     Problem: Risk for Delirium  Goal: Optimal Coping  Outcome: Adequate for Care Transition  Goal: Improved Sleep  Outcome: Adequate for Care Transition     Problem: Stem Cell/Bone Marrow Transplant  Goal: Optimal Coping with " Transplant  Outcome: Adequate for Care Transition  Goal: Symptom-Free Urinary Elimination  Outcome: Adequate for Care Transition  Goal: Diarrhea Symptom Control  Outcome: Adequate for Care Transition  Goal: Improved Activity Tolerance  Outcome: Adequate for Care Transition  Goal: Blood Counts Within Acceptable Range  Outcome: Adequate for Care Transition  Goal: Absence of Hypersensitivity Reaction  Outcome: Adequate for Care Transition  Goal: Absence of Infection  Outcome: Adequate for Care Transition  Goal: Improved Oral Mucous Membrane Health and Integrity  Outcome: Adequate for Care Transition  Goal: Nausea and Vomiting Symptom Relief  Outcome: Adequate for Care Transition  Goal: Optimal Nutrition Intake  Outcome: Adequate for Care Transition   Goal Outcome Evaluation:

## 2024-09-19 NOTE — PROGRESS NOTES
Hematology Consult Note   Date of Service: 09/19/2024    Patient: Leland Pearson  MRN: [id number]  Admission Date: 8/16/2024  Hospital Day # Hospital Day: 35  Primary Outpatient Hematologist: BMT    Reason for Consult: Right Internal Jugular and axillary thrombus     Assessment & Plan:   Leland Pearson is a 69 year old male with history of MDS/AML with myelodysplasia-related gene mutations (ASXL1, RUNX1, SRSF2) who is Day 26+ s/p GANGA MUD PBSCT. Hematology consulted regarding management of R internal jugular and axillary thrombus.     Patient has right-sided anterior neck pain and swelling with ultrasound evidence of acute occlusive right internal jugular thrombus around his former central catheter site as well as non-occlusive right axillary thrombus extending to the subclavian vein adjacent to his PICC line. The internal jugular thrombus has shown interval extension into the innominate vein on repeat ultrasound 9/17, although it is not entirely clear whether this was migration of the axillary thrombus vs primary IJ clot propagation. Regardless, given the extension and continued symptoms, treatment with therapeutic anticoagulation is warranted.     In order to safely receive therapeutic anticoagulation given his post-transplant thrombocytopenia, we recommend platelet transfusion with goal of Plt > 50k. Once platelets are above 50k, we recommend beginning therapeutic enoxaparin 1 mg/kg BID with plans to anticoagulate for at least 3 months until follow-up in [facility]. Given the potential for interaction with antifungal prophylaxis/azoles, we recommend avoiding DOACs and continuing twice daily enoxaparin at this time.     Recommendations:   - continue enoxaparin twice daily, increase dose to 1 mg/kg BID once platelets > 50k  - continue anticoagulation until [facility] follow-up in 3 months    Plan of care was discussed with attending physician Dr. Price.    Hematology to sign off at this time. Please don't hesitate to  contact the Fellow On-Call with questions.    I spent 30 minutes in the care of this patient today, which included time necessary for preparation for the visit, obtaining history, ordering medications/tests/procedures as medically indicated, review of pertinent medical literature, counseling of the patient, communication of recommendations to the care team, and documentation time.     Levar Hwang  Hematology-Oncology Fellow, PGY-4  Pager: [phone]    Interval history  No events overnight. Received G-CSF, 1u RBC, and platelet transfusion with platelet increase to 40k. Neck pain moderately improved. Denies fevers, chills, shortness of breath, chest pain, lower extremity swelling. Planned to discharge today.    Review of Systems: Pertinent positive and negative systems described in HPI; the remainder of the 14 systems are negative    Past Medical History:  Past Medical History:   Diagnosis Date    Gastroesophageal reflux disease     Hypertension        Past Surgical History:  Past Surgical History:   Procedure Laterality Date    COLONOSCOPY      ESOPHAGOSCOPY, GASTROSCOPY, DUODENOSCOPY (EGD), COMBINED  07/28/2013    Procedure: COMBINED ESOPHAGOSCOPY, GASTROSCOPY, DUODENOSCOPY (EGD), BIOPSY SINGLE OR MULTIPLE;;  Surgeon: Franklin Barrera MD;  Location: [facility]    ESOPHAGOSCOPY, GASTROSCOPY, DUODENOSCOPY (EGD), COMBINED  07/28/2013    Procedure: COMBINED ESOPHAGOSCOPY, GASTROSCOPY, DUODENOSCOPY (EGD), REMOVE FOREIGN BODY;;  Surgeon: Franklin Barrera MD;  Location: [facility]    IR CVC TUNNEL PLACEMENT > 5 YRS OF AGE  08/16/2024    IR CVC TUNNEL REMOVAL RIGHT  09/06/2024    ORTHOPEDIC SURGERY      04 micro discectomy, acl  repair    PICC DOUBLE LUMEN PLACEMENT Right 09/09/2024    Right basilic vein 49cm total 5cm external.       Social History:  Social History     Socioeconomic History    Marital status:    Tobacco Use    Smoking status: Never     Passive exposure: Never    Smokeless tobacco: Never   Substance  and Sexual Activity    Alcohol use: Yes     Comment: socially    Drug use: No     Social Determinants of Health     Financial Resource Strain: Low Risk  (8/24/2024)    Financial Resource Strain     Within the past 12 months, have you or your family members you live with been unable to get utilities (heat, electricity) when it was really needed?: No   Food Insecurity: Low Risk  (8/24/2024)    Food Insecurity     Within the past 12 months, did you worry that your food would run out before you got money to buy more?: No     Within the past 12 months, did the food you bought just not last and you didn t have money to get more?: No   Transportation Needs: Low Risk  (8/24/2024)    Transportation Needs     Within the past 12 months, has lack of transportation kept you from medical appointments, getting your medicines, non-medical meetings or appointments, work, or from getting things that you need?: No   Interpersonal Safety: Low Risk  (8/24/2024)    Interpersonal Safety     Do you feel physically and emotionally safe where you currently live?: Yes     Within the past 12 months, have you been hit, slapped, kicked or otherwise physically hurt by someone?: No     Within the past 12 months, have you been humiliated or emotionally abused in other ways by your partner or ex-partner?: No   Housing Stability: Low Risk  (8/24/2024)    Housing Stability     Do you have housing? : Yes     Are you worried about losing your housing?: No        Family History  Family History   Problem Relation Age of Onset    No Known Problems Brother     No Known Problems Brother     No Known Problems Brother     No Known Problems Brother     Diabetes Brother     No Known Problems Sister     No Known Problems Daughter     No Known Problems Daughter        Outpatient Medications:  Current Facility-Administered Medications   Medication Dose Route Frequency Provider Last Rate Last Admin    acetaminophen (TYLENOL) tablet 325-650 mg  325-650 mg Oral Q4H  PRN Bessie Lazo PA-C   650 mg at 09/18/24 0036    acyclovir (ZOVIRAX) capsule 800 mg  800 mg Oral BID Markus Zimmerman DO   800 mg at 09/19/24 0810    allopurinol (ZYLOPRIM) tablet 300 mg  300 mg Oral Daily Kaitlyn Aguilar Beth, NP   300 mg at 09/19/24 0809    amoxicillin-clavulanate (AUGMENTIN) 875-125 MG per tablet 1 tablet  1 tablet Oral Q12H [facility] (08/20) Baltazar Arguello PA-C   1 tablet at 09/19/24 0810    artificial saliva (BIOTENE MT) solution 2 spray  2 spray Swish & Spit 4x Daily Bessie Lazo PA-C   2 spray at 09/18/24 1955    calcium carbonate-vitamin D (CALTRATE) 600-10 MG-MCG per tablet 2 tablet  2 tablet Oral BID w/meals Bessie Lazo PA-C   2 tablet at 09/19/24 0811    carboxymethylcellulose PF (REFRESH PLUS) 0.5 % ophthalmic solution 1 drop  1 drop Both Eyes Q1H PRN Bessie Lazo PA-C   1 drop at 09/19/24 1055    enoxaparin ANTICOAGULANT (LOVENOX) injection 40 mg  40 mg Subcutaneous Q12H Baltazar Arguello PA-C   40 mg at 09/19/24 0811    heparin lock flush 10 unit/mL injection 5-20 mL  5-20 mL Intracatheter Q24H Bernadette Green PA-C   10 mL at 09/17/24 1741    heparin lock flush 10 unit/mL injection 5-20 mL  5-20 mL Intracatheter Q1H PRN Bernadette Green PA-C   5 mL at 09/19/24 1051    heparin lock flush 10 unit/mL injection 5-20 mL  5-20 mL Intracatheter Q1H PRN Gregory Bramibla MD   5 mL at 09/19/24 1056    heparin lock flush 10 unit/mL injection 5-20 mL  5-20 mL Intracatheter Q24H Markus Mendez MD   10 mL at 09/19/24 0331    heparin lock flush 10 unit/mL injection 5-20 mL  5-20 mL Intracatheter Q1H PRN Markus Mendez MD   5 mL at 09/15/24 1205    methocarbamol (ROBAXIN) tablet 500 mg  500 mg Oral TID Aziza Mendieta APRN CNP   500 mg at 09/19/24 0809    micafungin (MYCAMINE) 150 mg in sodium chloride 0.9 % 100 mL intermittent infusion  150 mg Intravenous Daily Kaitlyn Aguilar  mL/hr at 09/19/24 0942 150 mg at 09/19/24 0942    mycophenolate (GENERIC EQUIVALENT)  capsule 1,250 mg  1,250 mg Oral Q12H [facility] (08/20) Eunice Spicer PA-C   1,250 mg at 09/19/24 0810    naloxone (NARCAN) injection 0.2 mg  0.2 mg Intravenous Q2 Min PRN Timothy Skaggs MD        Or    naloxone (NARCAN) injection 0.4 mg  0.4 mg Intravenous Q2 Min PRN Timothy Skaggs MD        oxyBUTYnin ER (DITROPAN XL) 24 hr tablet 10 mg  10 mg Oral At Bedtime Emely Jeter APRN CNP   10 mg at 09/18/24 1948    pantoprazole (PROTONIX) EC tablet 40 mg  40 mg Oral Daily Bessie Lazo PA-C   40 mg at 09/19/24 0810    prochlorperazine (COMPAZINE) injection 5 mg  5 mg Intravenous Q6H PRN Bessie Lazo PA-C   5 mg at 09/03/24 1448    Or    prochlorperazine (COMPAZINE) tablet 5 mg  5 mg Oral Q6H PRN Bessie Lazo PA-C   5 mg at 09/01/24 1743    prochlorperazine (COMPAZINE) tablet 5 mg  5 mg Oral Daily Eunice Spicer PA-C   5 mg at 09/18/24 1812    [Held by provider] psyllium (METAMUCIL/KONSYL) capsule 3 capsule  3 capsule Oral Daily Bessie Lazo PA-C   3 capsule at 09/16/24 0859    sirolimus (GENERIC EQUIVALENT) tablet 4.5 mg  4.5 mg Oral Daily Bessie Lazo PA-C   4.5 mg at 09/19/24 0815    sodium chloride (PF) 0.9% PF flush 10-20 mL  10-20 mL Intracatheter q1 min prn Gregory Brambila MD        sodium chloride (PF) 0.9% PF flush 10-20 mL  10-20 mL Intracatheter q1 min prn Markus Mendez MD   20 mL at 09/10/24 0515    sodium chloride (PF) 0.9% PF flush 10-40 mL  10-40 mL Intracatheter Once PRN Bernadette Green PA-C        sodium chloride (PF) 0.9% PF flush 3 mL  3 mL Intracatheter q1 min prn Roseline Strong PA-C        sodium chloride (PF) 0.9% PF flush 3 mL  3 mL Intracatheter q1 min prn Yaa Baer MD        sodium chloride (PF) 0.9% PF flush 3 mL  3 mL Intracatheter q1 min prn Bessie Lazo PA-C        [START ON 9/23/2024] sulfamethoxazole-trimethoprim (BACTRIM DS) 800-160 MG per tablet 1 tablet  1 tablet Oral Q Mon Tues BID Kaitlyn Aguilar, NP        tamsulosin (FLOMAX) capsule  "0.4 mg  0.4 mg Oral Daily Bessie Lazo PA-C   0.4 mg at 09/18/24 1812    ursodiol (ACTIGALL) capsule 300 mg  300 mg Oral TID Markus Mendez MD   300 mg at 09/19/24 0809        Physical Exam:    /72   Pulse 98   Temp 98.2  F (36.8  C)   Resp 16   Ht 1.84 m (6' 0.44\")   Wt 84.5 kg (186 lb 4.6 oz)   SpO2 98%   BMI 24.96 kg/m      Gen: No acute distress  HEENT: EOMI, right sided neck tenderness to palpation, swelling stable  Chest: equal rise, no audible wheezing, no acute distress  Abd: non distended  Ext: Warm and well perfused. No lower extremity edema  Skin: No cyanosis or petechial lesion over exposed skin  Neuro: Alert and answering questions appropriately. CNII-XII grossly intact. Moving all extremities without issue or focal neurologic deficits.      Labs & Studies: I personally reviewed the following studies:  ROUTINE LABS (Last four results):  CMP  Recent Labs   Lab 09/19/24  0323 09/18/24  0430 09/17/24  0424 09/16/24  0432 09/15/24  0353    137 135 135 135   POTASSIUM 3.8 3.7 3.8 4.0 3.9   CHLORIDE 100 100 101 100 100   CO2 26 27 26 25 24   ANIONGAP 10 10 8 10 11   * 111* 115* 129* 120*   BUN 12.2 14.1 12.2 13.3 15.1   CR 0.58* 0.65* 0.68 0.66* 0.59*   GFRESTIMATED >90 >90 >90 >90 >90   HERBERT 8.7* 8.7* 9.0 9.0 8.4*   MAG  --  2.0 2.0 2.0 1.9   PHOS  --  3.0 2.9 3.0 2.4*   PROTTOTAL 6.1*  --   --  6.6  --    ALBUMIN 3.2*  --   --  3.4*  --    BILITOTAL 0.4  --   --  0.5  --    ALKPHOS 66  --   --  67  --    AST 22  --   --  23  --    ALT 15  --   --  14  --      CBC  Recent Labs   Lab 09/19/24  0323 09/18/24  0430 09/17/24  0424 09/16/24  0432   WBC 10.6 1.3* 1.3* 1.5*   RBC 2.92* 2.44* 2.69* 2.97*   HGB 8.1* 6.7* 7.4* 8.0*   HCT 24.7* 20.1* 21.9* 24.1*   MCV 85 82 81 81   MCH 27.7 27.5 27.5 26.9   MCHC 32.8 33.3 33.8 33.2   RDW 17.3* 17.2* 17.0* 17.1*   PLT 40* 28* 22* 24*     INR  Recent Labs   Lab 09/16/24  0432   INR 1.26*                 "

## 2024-09-19 NOTE — PROGRESS NOTES
[facility]   09/19/2024    Patient ID:  Leland Pearson is a 69 year old male with h/o MDS/AML with myelodysplasia related gene mutations (ASXL1, RUNX1, SRSF2) admitted on 8/16/2024 for allogeneic transplant, currently day +27 of his HCT.     Diagnosis MDS/AML  HCT Type Allogeneic     Prep Regimen Flu/Cy/TBI  Donor Match & Source 8/8 DP permissive PBSC    GVHD Prophylaxis PTCy/MMF/Siro  Primary BMT MD Dr. Murphy    Clinical Trials [id number]       INTERVAL  HISTORY     Leland continues to report neck pain and feels that it is more swollen today. He is able to move his neck with minimal pain just tightness. He otherwise feels well.    Review of Systems: ROS negative except as noted above.    PHYSICAL EXAM     Vitals:    09/17/24 0756 09/18/24 0812 09/19/24 0848   Weight: 83.9 kg (184 lb 14.4 oz) 83.7 kg (184 lb 9.6 oz) 84.5 kg (186 lb 4.6 oz)      KPS:  70    /72   Pulse 98   Temp 98.2  F (36.8  C)   Resp 16   Ht 1.84 m (6' 0.44\")   Wt 84.5 kg (186 lb 4.6 oz)   SpO2 98%   BMI 24.96 kg/m       General:  NAD   HEENT: No oral lesions. Mild tenderness and  edema along the right lateral neck and just superior to clavicle.   Lungs: CTAB  CV: RRR, no MRG    Abdomen: Soft. NT. ND. +BS  Lymphatics: No edema in lower extremities.  Neuro: A&O, appropriately interactive, speech clear, moving all extremities   Skin: No rash  Access: R arm PICC dressing cdi, no drainage    Current aGVHD staging: (BMT Assessment tab)    LABS AND IMAGING: I have assessed all abnormal lab values for their clinical significance and any values considered clinically significant have been addressed in the assessment and plan.      Lab Results   Component Value Date    WBC 10.6 09/19/2024    ANEU 1.0 (L) 09/17/2024    HGB 8.1 (L) 09/19/2024    HCT 24.7 (L) 09/19/2024    PLT 40 (LL) 09/19/2024     09/19/2024    POTASSIUM 3.8 09/19/2024    CHLORIDE 100 09/19/2024    CO2 26 09/19/2024     (H) 09/19/2024    BUN 12.2 " 09/19/2024    CR 0.58 (L) 09/19/2024    MAG 2.0 09/18/2024    INR 1.26 (H) 09/16/2024    BILITOTAL 0.4 09/19/2024    AST 22 09/19/2024    ALT 15 09/19/2024    ALKPHOS 66 09/19/2024    PROTTOTAL 6.1 (L) 09/19/2024    ALBUMIN 3.2 (L) 09/19/2024       ASSESSMENT AND PLAN     Leland Pearson is a 69 year old male with h/o MDS/AML with myelodysplasia related gene mutations (ASXL1, RUNX1, SRSF2) D+27 s/p GANGA MUD PBSCT.     BMT/IEC PROTOCOL for MDS  - Chemo protocol: MT 2022-5; Flu/Cy/TBI  - Peripheral blood stem cell graft from 8/8 URD donor, ABO matched, Cell dose: 6.06 x10^6 CD34/kg  - Engraftment monitoring: Peripheral blood and bone marrow chimerisms on D28, D100, 6 months, 1 and 2 years  - Restaging plan: BMBx with FISH, cytogenetics and NGS on D28, D100, 6 months, 1 and 2 years  - D21 BMbx completed 9/13. Flow negative, pending path/chimerisms etc.      HEME/COAG  # Pancytopenia 2/2 chemo/BMT  - Last day of G (9/12). (9/18) ANC 0.8 - G given   - Transfusion parameters: hemoglobin <7, platelets <50 (increased while on Lovenox).     # Right sternocleidomastoid tenderness s/p CVC removal  #Occlusive thrombus of R internal jugular vein  #Nonocclusive thrombus or R axillary vein  - Started complaining of tenderness and swelling near right CVC site that was removed. Obtained US to further investigate.   - Positive for thrombus. No anticoagulation at this time due to thrombocytopenia. Will need to begin DOAC (per pharmacy preferably voriconazole + apixaban without loading dose) vs lovenox once platelets >50k.   (9/17) Ongoing right neck pain/swelling: will repeat ultrasound shows extension of previous clot. Started on Lovenox -- per Benign Heme will start Lovenox BID with PLT parameter >50. Will plan to discharge on lovenox for 3 months. Will have follow up with Dr. Price outpatient.    RISK OF GVHD  - Prophylaxis: PTCy 50mg/kg on D+3 and D+4; MMF (D+5 to 35); Sirolimus (starting D+5, target level 5-10).  - Siro  4.5mg/d. (9/19) Siro level pending     ID  #Streptococcus mitis bacteremia (2/2 bottles) - Cefepime (9/1-9/13), Ceftriaxone (9/13-9/15)   - ABX duration plan: per ID through 9/20. Sensitive to ceftriaxone q24hrs, can transition to non-pseudomonal coverage for discharge/better engraftment.   - With anticipation for discharge, we will switch to Ceftriaxone (9/13-9/15). Resume cefepime 9/16 as below.     # MRSE bacteremia likely contaminant because peripheral stick, grew late (1/2). Dc'd Vanco (9/2-9/6)   - Repeat BC's x3 days NGTD  # Non-Neutropenic Fever: (9/11) 102.7. Peripheral BC (9/11, 9/13) NGTD. Urine cx 9/9 neg. BK urine and blood neg. Adeno urine pending. CMV neg 9/7. EBV neg. Adeno PCR blood pending. Flu/RSV/COVID neg. Still on cefepime as above. Leland appears non-toxic with no new localizing findings and currently afebrile. MRSA swab neg overnight (9/12)   *Repeat fever on 9/15. Resume cefepime 9/15-9/17 -- transitioned to oral Augmentin BID thru 9/20. Likely has bacterial seeding of internal jugular clot as above - Appreciate ID input.     Prophylaxis plan:   - Fungal: Micafungin until D+45 (in clinic), followed by mold active azole. Pulm nodules present on workup CT.   - PJP: Bactrim to start at D+28. Toxoplasma negative.   - Viral: Acyclovir 800mg BID. No need for letermovir as donor and recipient are CMV negative.      Monitoring:   - CMV weekly, neg 9/7  - EBV every 2 weeks from D30 to D180: (9/14) ND     GI/NUTRITION  # GERD: Protonix  # Loose stools: Imodium x 1 on 9/13. No indication to repeat C. Diff at this time. Holding Metamucil.  - Anti-emetics: Compazine daily at 1600 and PRN. lorazepam available PRN.   - Ulcer prophy: Protonix  - VOD prophy: Ursodiol   - Dietician support to prevent malnutrition    CARDIOVASCULAR  # HTN: PTA lisinopril stopped 8/26 d/t symptomatic orthostasis.   # CAD: Coronary artery calcifications seen on CT, stress test in 2023 negative  - Risk of cardiomyopathy: Baseline  EF 55-60%.   - Risk of arrhythmia: Baseline EKG showed 421   # Orthostatic hypotension - 1L IVF 9/16. Continue flomax and ditropan for now with history of urinary discomfort as below.       RENAL/ELECTROLYTES/  - Electrolyte management: replace per sliding scale  - BPH: Continue PTA flomax.  - Hemorrhagic Cystitis secondary to cytoxan: urinary urge/frequency/dysuria: UA with +nitrates, mod blood, >182 RBC. UC neg. Urine BK neg. BK blood neg. Pyridium not helpful. Ditropan 2.5mg bid (also on Flomax). Adeno urine pending. Symptoms starting to improve--no noted blood per pt but large clot which was hard to pass 9/13. Urology recommended increasing oxybuytinin.      MUSCULOSKELETAL/FRAILTY  - Baseline Frailty Score: Not frail (0)  - Daily PT/OT as needed while inpatient  # Gout: continue allopurinol      Neuro   # history of spinal stenosis, lumbosacral radiculopathy likely exacerbating.   - Pain management: Outpatient had been taking celecoxib for MSK pain, once a day. Stopped Inpatient will try Tramadol hasn't used. Now discontinued.  Added robaxin TID this admission.    SOCIAL DETERMINANTS  - Caregiver: wife, Vani. Lives within the required distance from hospital   - Financial/insurance concerns: None    Medically Ready for Discharge: Anticipated Today;   - O/P plan: izzy in clinic 3x a week.   - Will plan to go HOME instead of [facility]       Clinically Significant Risk Factors              # Hypoalbuminemia: Lowest albumin = 2.8 g/dL at 9/7/2024  7:13 AM, will monitor as appropriate   # Thrombocytopenia: Lowest platelets = 28 in last 2 days, will monitor for bleeding   # Hypertension: Noted on problem list                     Patient Active Problem List   Diagnosis    MDS (myelodysplastic syndrome) (H)    Pre-operative cardiovascular examination    Benign essential hypertension    Infection due to Streptococcus mitis group    Nocturia    Administration of long-term prophylactic antibiotics    History of peripheral  stem cell transplant (H)        I spent 60 minutes in the care of this patient today, which included time necessary for preparation for the visit, obtaining history, ordering medications/tests/procedures as medically indicated, review of pertinent medical literature, counseling of the patient, communication of recommendations to the care team, and documentation time.    Baltazar Arguello PA-C   [facility] - [phone]

## 2024-09-19 NOTE — PLAN OF CARE
"Goal Outcome Evaluation:         Blood pressure 121/75, pulse 92, temperature 98  F (36.7  C), temperature source Tympanic, resp. rate 16, height 1.84 m (6' 0.44\"), weight 83.7 kg (184 lb 9.6 oz), SpO2 94%.    AVSS, A/O x 4. On room air with 02 saturations WNL. Pt denies any chest pain, Shortness of breath, nausea vomiting and no stool reported. Pt denies any dizziness. C/O left neck pain and rated pain at 4/10 but declined any intervention. Triggered SIRS for Sepsis screening protocol and lactic Acid was 0.7. Right PICC dressing is clean dry and intact and red lumen flushing well and  left lumen flushed sluggishly. Plan is to discharge pt to home today. Wife needs reinforcement on Lovenox subcutaneus teaching. She said she is nervous. Continue to give support to pt and wife and follow plan of care.          Problem: Adult Inpatient Plan of Care  Goal: Plan of Care Review  Description: The Plan of Care Review/Shift note should be completed every shift.  The Outcome Evaluation is a brief statement about your assessment that the patient is improving, declining, or no change.  This information will be displayed automatically on your shift  note.  Outcome: Progressing  Goal: Patient-Specific Goal (Individualized)  Description: You can add care plan individualizations to a care plan. Examples of Individualization might be:  \"Parent requests to be called daily at 9am for status\", \"I have a hard time hearing out of my right ear\", or \"Do not touch me to wake me up as it startles  me\".  Outcome: Progressing  Goal: Absence of Hospital-Acquired Illness or Injury  Outcome: Progressing  Intervention: Identify and Manage Fall Risk  Recent Flowsheet Documentation  Taken 9/19/2024 0400 by Kasey Quiros RN  Safety Promotion/Fall Prevention:   clutter free environment maintained   lighting adjusted   nonskid shoes/slippers when out of bed   room near nurse's station   safety round/check completed   room organization consistent   " treat reversible contributory factors   treat underlying cause  Taken 9/19/2024 0000 by Kasey Quiros RN  Safety Promotion/Fall Prevention:   lighting adjusted   clutter free environment maintained   nonskid shoes/slippers when out of bed   room near nurse's station   room organization consistent   safety round/check completed  Taken 9/18/2024 2000 by Kasey Quiros RN  Safety Promotion/Fall Prevention:   clutter free environment maintained   lighting adjusted   nonskid shoes/slippers when out of bed   room near nurse's station   safety round/check completed   room organization consistent   treat reversible contributory factors   treat underlying cause  Intervention: Prevent Skin Injury  Recent Flowsheet Documentation  Taken 9/19/2024 0400 by Kasey Quiros RN  Body Position: position changed independently  Skin Protection: adhesive use limited  Device Skin Pressure Protection: adhesive use limited  Taken 9/19/2024 0000 by Kasey Quiros RN  Body Position: position changed independently  Taken 9/18/2024 2000 by Kasey Quiros RN  Body Position: position changed independently  Skin Protection: adhesive use limited  Device Skin Pressure Protection: adhesive use limited  Intervention: Prevent and Manage VTE (Venous Thromboembolism) Risk  Recent Flowsheet Documentation  Taken 9/19/2024 0400 by Kasey Quiros RN  VTE Prevention/Management: SCDs off (sequential compression devices)  Taken 9/18/2024 2000 by Kasey Quiros RN  VTE Prevention/Management: SCDs off (sequential compression devices)  Intervention: Prevent Infection  Recent Flowsheet Documentation  Taken 9/19/2024 0400 by Kasey Quiros RN  Infection Prevention:   hand hygiene promoted   single patient room provided   environmental surveillance performed   equipment surfaces disinfected   personal protective equipment utilized   rest/sleep promoted   visitors restricted/screened  Taken 9/19/2024 0000 by Kasey Quiros  RN  Infection Prevention:   hand hygiene promoted   single patient room provided   environmental surveillance performed   equipment surfaces disinfected   personal protective equipment utilized   rest/sleep promoted   visitors restricted/screened  Taken 9/18/2024 2000 by Kasey Quiros RN  Infection Prevention:   hand hygiene promoted   single patient room provided   environmental surveillance performed   equipment surfaces disinfected   personal protective equipment utilized   rest/sleep promoted   visitors restricted/screened  Goal: Optimal Comfort and Wellbeing  Outcome: Progressing

## 2024-09-20 ENCOUNTER — APPOINTMENT (OUTPATIENT)
Dept: LAB | Facility: CLINIC | Age: 69
End: 2024-09-20
Attending: STUDENT IN AN ORGANIZED HEALTH CARE EDUCATION/TRAINING PROGRAM
Payer: COMMERCIAL

## 2024-09-20 ENCOUNTER — ALLIED HEALTH/NURSE VISIT (OUTPATIENT)
Dept: TRANSPLANT | Facility: CLINIC | Age: 69
End: 2024-09-20
Attending: STUDENT IN AN ORGANIZED HEALTH CARE EDUCATION/TRAINING PROGRAM
Payer: COMMERCIAL

## 2024-09-20 VITALS
SYSTOLIC BLOOD PRESSURE: 133 MMHG | RESPIRATION RATE: 16 BRPM | HEART RATE: 125 BPM | WEIGHT: 188.2 LBS | TEMPERATURE: 97.7 F | DIASTOLIC BLOOD PRESSURE: 71 MMHG | OXYGEN SATURATION: 99 % | BODY MASS INDEX: 25.21 KG/M2

## 2024-09-20 DIAGNOSIS — Z94.84 IMMUNOCOMPROMISED STATE ASSOCIATED WITH STEM CELL TRANSPLANT (H): Primary | ICD-10-CM

## 2024-09-20 DIAGNOSIS — Z94.84 HISTORY OF PERIPHERAL STEM CELL TRANSPLANT (H): ICD-10-CM

## 2024-09-20 DIAGNOSIS — M10.9 GOUT, UNSPECIFIED CAUSE, UNSPECIFIED CHRONICITY, UNSPECIFIED SITE: ICD-10-CM

## 2024-09-20 DIAGNOSIS — D46.9 MDS (MYELODYSPLASTIC SYNDROME) (H): Primary | ICD-10-CM

## 2024-09-20 DIAGNOSIS — D46.9 MDS (MYELODYSPLASTIC SYNDROME) (H): ICD-10-CM

## 2024-09-20 DIAGNOSIS — D84.822 IMMUNOCOMPROMISED STATE ASSOCIATED WITH STEM CELL TRANSPLANT (H): Primary | ICD-10-CM

## 2024-09-20 LAB
ANION GAP SERPL CALCULATED.3IONS-SCNC: 11 MMOL/L (ref 7–15)
BACTERIA BLD CULT: NO GROWTH
BACTERIA BLD CULT: NO GROWTH
BASOPHILS # BLD AUTO: 0 10E3/UL (ref 0–0.2)
BASOPHILS NFR BLD AUTO: 0 %
BUN SERPL-MCNC: 15.4 MG/DL (ref 8–23)
CALCIUM SERPL-MCNC: 9.1 MG/DL (ref 8.8–10.4)
CHLORIDE SERPL-SCNC: 102 MMOL/L (ref 98–107)
CREAT SERPL-MCNC: 0.62 MG/DL (ref 0.67–1.17)
EGFRCR SERPLBLD CKD-EPI 2021: >90 ML/MIN/1.73M2
EOSINOPHIL # BLD AUTO: 0 10E3/UL (ref 0–0.7)
EOSINOPHIL NFR BLD AUTO: 0 %
ERYTHROCYTE [DISTWIDTH] IN BLOOD BY AUTOMATED COUNT: 18.2 % (ref 10–15)
GLUCOSE SERPL-MCNC: 142 MG/DL (ref 70–99)
HCO3 SERPL-SCNC: 23 MMOL/L (ref 22–29)
HCT VFR BLD AUTO: 26.9 % (ref 40–53)
HGB BLD-MCNC: 8.6 G/DL (ref 13.3–17.7)
IMM GRANULOCYTES # BLD: 0.1 10E3/UL
IMM GRANULOCYTES NFR BLD: 1 %
LYMPHOCYTES # BLD AUTO: 0.2 10E3/UL (ref 0.8–5.3)
LYMPHOCYTES NFR BLD AUTO: 2 %
MCH RBC QN AUTO: 27.7 PG (ref 26.5–33)
MCHC RBC AUTO-ENTMCNC: 32 G/DL (ref 31.5–36.5)
MCV RBC AUTO: 87 FL (ref 78–100)
MONOCYTES # BLD AUTO: 0.9 10E3/UL (ref 0–1.3)
MONOCYTES NFR BLD AUTO: 11 %
NEUTROPHILS # BLD AUTO: 6.8 10E3/UL (ref 1.6–8.3)
NEUTROPHILS NFR BLD AUTO: 85 %
NRBC # BLD AUTO: 0 10E3/UL
NRBC BLD AUTO-RTO: 0 /100
PLATELET # BLD AUTO: 78 10E3/UL (ref 150–450)
POTASSIUM SERPL-SCNC: 3.8 MMOL/L (ref 3.4–5.3)
RBC # BLD AUTO: 3.1 10E6/UL (ref 4.4–5.9)
SODIUM SERPL-SCNC: 136 MMOL/L (ref 135–145)
WBC # BLD AUTO: 8 10E3/UL (ref 4–11)

## 2024-09-20 PROCEDURE — 99417 PROLNG OP E/M EACH 15 MIN: CPT | Performed by: STUDENT IN AN ORGANIZED HEALTH CARE EDUCATION/TRAINING PROGRAM

## 2024-09-20 PROCEDURE — 36592 COLLECT BLOOD FROM PICC: CPT

## 2024-09-20 PROCEDURE — 258N000003 HC RX IP 258 OP 636: Performed by: PHYSICIAN ASSISTANT

## 2024-09-20 PROCEDURE — G0463 HOSPITAL OUTPT CLINIC VISIT: HCPCS | Performed by: STUDENT IN AN ORGANIZED HEALTH CARE EDUCATION/TRAINING PROGRAM

## 2024-09-20 PROCEDURE — 85025 COMPLETE CBC W/AUTO DIFF WBC: CPT

## 2024-09-20 PROCEDURE — 250N000011 HC RX IP 250 OP 636: Performed by: STUDENT IN AN ORGANIZED HEALTH CARE EDUCATION/TRAINING PROGRAM

## 2024-09-20 PROCEDURE — 86900 BLOOD TYPING SEROLOGIC ABO: CPT

## 2024-09-20 PROCEDURE — 80048 BASIC METABOLIC PNL TOTAL CA: CPT

## 2024-09-20 PROCEDURE — 99215 OFFICE O/P EST HI 40 MIN: CPT | Performed by: STUDENT IN AN ORGANIZED HEALTH CARE EDUCATION/TRAINING PROGRAM

## 2024-09-20 PROCEDURE — 96365 THER/PROPH/DIAG IV INF INIT: CPT

## 2024-09-20 PROCEDURE — 258N000003 HC RX IP 258 OP 636: Performed by: STUDENT IN AN ORGANIZED HEALTH CARE EDUCATION/TRAINING PROGRAM

## 2024-09-20 PROCEDURE — 250N000011 HC RX IP 250 OP 636: Performed by: PHYSICIAN ASSISTANT

## 2024-09-20 PROCEDURE — 86923 COMPATIBILITY TEST ELECTRIC: CPT | Performed by: STUDENT IN AN ORGANIZED HEALTH CARE EDUCATION/TRAINING PROGRAM

## 2024-09-20 RX ORDER — HEPARIN SODIUM (PORCINE) LOCK FLUSH IV SOLN 100 UNIT/ML 100 UNIT/ML
5 SOLUTION INTRAVENOUS
OUTPATIENT
Start: 2024-09-21

## 2024-09-20 RX ORDER — HEPARIN SODIUM,PORCINE 10 UNIT/ML
5-20 VIAL (ML) INTRAVENOUS DAILY PRN
Status: CANCELLED | OUTPATIENT
Start: 2024-09-21

## 2024-09-20 RX ORDER — ALLOPURINOL 300 MG/1
300 TABLET ORAL DAILY
Qty: 90 TABLET | Refills: 0 | Status: SHIPPED | OUTPATIENT
Start: 2024-09-20

## 2024-09-20 RX ORDER — HEPARIN SODIUM,PORCINE 10 UNIT/ML
5 VIAL (ML) INTRAVENOUS
Status: COMPLETED | OUTPATIENT
Start: 2024-09-20 | End: 2024-09-20

## 2024-09-20 RX ADMIN — SODIUM CHLORIDE 1000 ML: 9 INJECTION, SOLUTION INTRAVENOUS at 14:07

## 2024-09-20 RX ADMIN — Medication 5 ML: at 11:58

## 2024-09-20 RX ADMIN — Medication 5 ML: at 11:59

## 2024-09-20 RX ADMIN — MICAFUNGIN SODIUM 300 MG: 100 INJECTION, POWDER, LYOPHILIZED, FOR SOLUTION INTRAVENOUS at 14:07

## 2024-09-20 ASSESSMENT — PAIN SCALES - GENERAL: PAINLEVEL: NO PAIN (0)

## 2024-09-20 NOTE — NURSING NOTE
"Oncology Rooming Note    September 20, 2024 12:16 PM   Leland Pearson is a 69 year old male who presents for:    Chief Complaint   Patient presents with    Labs Only     PICC, heparin locked, vitals checked    Oncology Clinic Visit     Myelodysplastic syndrome      Initial Vitals: /71   Pulse (!) 125   Temp 97.7  F (36.5  C)   Resp 16   Wt 85.4 kg (188 lb 3.2 oz)   SpO2 99%   BMI 25.21 kg/m   Estimated body mass index is 25.21 kg/m  as calculated from the following:    Height as of 8/16/24: 1.84 m (6' 0.44\").    Weight as of this encounter: 85.4 kg (188 lb 3.2 oz). Body surface area is 2.09 meters squared.  No Pain (0) Comment: Data Unavailable   No LMP for male patient.  Allergies reviewed: Yes  Medications reviewed: Yes    Medications: Medication refills not needed today.  Pharmacy name entered into [employer]:    [id number] [address] [facility] - [facility] [address]    Frailty Screening:   Is the patient here for a new oncology consult visit in cancer care? 2. No      Clinical concerns:  Blood clot; Needs heparin flushes; wondering if its ok for him to have windows open in the house    Praveena Hodges              "

## 2024-09-20 NOTE — PLAN OF CARE
Occupational Therapy Discharge Summary    Reason for therapy discharge:    Discharged to home.    Progress towards therapy goal(s). See goals on Care Plan in [facility] electronic health record for goal details.  Goals partially met.  Barriers to achieving goals:   discharge from facility.    Therapy recommendation(s):    Continue home exercise program. Pt not seen by discharging therapist.

## 2024-09-20 NOTE — NURSING NOTE
Chief Complaint   Patient presents with    Labs Only     PICC, heparin locked, vitals checked     Fabby Arriaza RN on 9/20/2024 at 12:02 PM

## 2024-09-20 NOTE — PROGRESS NOTES
Infusion Nursing Note:  Leland Pearson presents today for scheduled Micafungin and add on IVF.    Patient seen by provider today: Yes: Dr. Brenda Murphy   present during visit today: Not Applicable.    Note: Lab results monitored; no blood products needed today. Micafungin infused over 1 hour without complication; 1L NS bolus given over 1 hour for poor PO intake/dehydration.    Intravenous Access:  PICC.  +BR noted pre and post infusion    Treatment Conditions:  Results reviewed, labs MET treatment parameters, ok to proceed with treatment.      Post Infusion Assessment:  Patient tolerated infusion without incident.       Discharge Plan:   Patient discharged in stable condition accompanied by: wife.  Departure Mode: Ambulatory.      Siri Davenport RN

## 2024-09-20 NOTE — LETTER
9/20/2024      Leland Pearson  [address] [place] [address]      Dear Colleague,    Thank you for referring your patient, Leland Pearson, to the [facility] BLOOD AND MARROW TRANSPLANT PROGRAM [place]. Please see a copy of my visit note below.    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     Diagnosis MDS-IB2 BMT type Allogenic  CMV  Donor -  Recipient -    Prep GANGA (Flu/Cy/TBI) Donor type  8/8, URD ABO D/R O+    GVHD ppx PTCy, siro, MMF Graft source PBSCT Toxo IgG Negative   Protocol [id number] CD34/kg 6.06E+06    BMT MD Brenda Murphy     Pre-transplant disease history  Referring provider: Dr. Bhavin Jackson   He started having drenching night sweats and fatigue in Jan 2024. Intentional weight loss. He was found to have thrombocytopenia on routine CBC done prior to back surgery (was planned for laminectomy and fusion). On 4/1/24, WBC 6.4, Hb 13.5, Plts 64, ANC 1.89. LDH elevated 532. He underwent bone marrow biopsy (4/23/24) which showed MDS-EB2. Hypercellular marrow (%) with 10.8% blasts including rare circulating blasts. Flow showed 4% myeloid blasts. Normal karyotype. NGS (peripheral blood) was positive for ASXL1, IDH1, RUNX1, SRSF2 mutations (full report not available). Counts: WBC 6.7, Hb 13.1, Plts 70, ANC 0.7. IPSS-M = High risk (0.85).  He was treated with azacitidine 75mg/m2 for 7 days and venetoclax for 14 days (C1 on 5/20/24). Bone marrow after first cycle (6/13/24) showed persistent MDS, with hypercellular marrow with therapy effect, no increase in blasts. Normal karyotype. NGS: ASXL1 (38%), IDH1 (43%), RUNX1 (41%), SRSF1 (38%).   He received second cycle of aza/ angelia on 7/1/24. His pre-transplant BMBx was done on 8/2/24 and he was cytopenic at the time (CBC with WBC 0.6, Hb 14, platelet  count 24). Hypocellular marrow (5%) with no increase in blasts. Normal karyotype, NGS negative.     Post transplant   - 9/2/24 (around D10) Neutropenic fevers. Strept mitis bacteremia resistant to levaquin from central venous catheter as well as a single culture of MRSE from peripheral blood (contaminant). Treated with cefepime. Repeat blood cultures negative, but continued to have high fevers. TTE (9/6/24) did not show any vegetations. CVC was removed on 9/6/24, defervesced on 9/7/24. PICC placed on 9/9/24. Antibiotics (cefepime followed by augmentin) were continued for a total of 14days from line removal.   - Right lower neck swelling and tenderness near the previous CVC site. US (9/15/24) showed an occlusive right internal jugular thrombus and a non occlusive thrombus in the right axillary vein, PICC associated. He was initially not anticoagulated due to thrombocytopenia but had increased symptoms. Repeat US (9/17/24) showed extension of right internal jugular thrombus into the innominate vein and extension of right axillary thrombus into subclavian vein. He was started on therapeutic anticoagulation with lovenox 1mg/kg BID on 9/18/24 with transfusion support for platelets, with plan to continue for 3 months. Right PICC removal was considered given propagation of clot near the PICC line. However, IR (9/19/24) recommended against removal of R PICC and replacement in the left arm due to risk of contralateral DVT.   - Hemorrhagic cystitis: Dysuria, urgency and hematuria starting  9/11/2024. Infectious workup including adenovirus and BK virus negative. Urology was consulted, recommended outpatient follow up. Now resolved.          INTERVAL HISTORY     Leland Pearson returns for a scheduled BMT clinic visit.   He was discharged yesterday. Feels good to be back home. Main issue is that he has been feeling tired. Independent in ADLs, but needed some help getting out of the shower. Can walk up and down the stairs in  his house with handrails. Standing for a long time aggravates low back pain.  He has induration and tenderness at his right sternocalvicular area. Has some pain swallowing and moving his neck.     No rash. Appetite improving. Nausea only with medications. No abdominal pain. Had three Bms today, loose.     PLAN   - 9/13/24: BMBx remission, NGS negative. Bone marrow 100% donor. Peripheral blood CD3 92%  - Counts good  - Continue anticoagulation with lovenox 1mg/kg BID  - ID: acyclovir, bactrim, micafungin. Start bactrim on Monday. Please start posaconazole on Monday so that the PICC line can be removed earlier.   - 1lL fluids today for elevated heart rate  - Rbacxin and oxybutynin PRN, not scheduled   - stop MMF on 9/27    PMH  Back pain due to spinal stenosis, lumbosacral radiculopathy.   Gout, most recent flare was in April 2024  GERD  HTN  BPH, on tamsulosin   Right knee ACL repair in 2014  Low back surgery in 2004 for herniated disc, complicated by infection requiring prolonged IV abx   Coronary artery calcifications seen on CT, stress test in 2023 negative    SH  . Lives with his wife, Vani. Two daughters, 43 and 36. 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 Was still working part time till last summer at [employer]), 2-3 hours per day.     Medications  Current Outpatient Medications   Medication Sig Dispense Refill     acyclovir (ZOVIRAX) 800 MG tablet Take 1 tablet (800 mg) by mouth every 12 hours for 150 days 60 tablet 4     allopurinol (ZYLOPRIM) 300 MG tablet Take 1 tablet (300 mg) by mouth daily. 90 tablet 0     calcium carbonate-vitamin D (CALTRATE) 600-10 MG-MCG per tablet Take 2 tablets by mouth 2 times daily (with meals). 120 tablet 0     enoxaparin ANTICOAGULANT (LOVENOX) 40 MG/0.4ML syringe Inject 0.4 mLs (40 mg) subcutaneously every 12 hours. 24 mL 0     methocarbamol (ROBAXIN) 500 MG tablet Take 1 tablet (500 mg) by mouth 3 times daily. 90 tablet 0     mycophenolate (GENERIC  EQUIVALENT) 250 MG capsule Take 5 capsules (1,250 mg) by mouth every 12 hours for 9 days. 90 capsule 0     pantoprazole (PROTONIX) 40 MG EC tablet Take 1 tablet (40 mg) by mouth daily. 30 tablet 0     prochlorperazine (COMPAZINE) 5 MG tablet Take 1 tablet (5 mg) by mouth every 6 hours as needed for nausea or vomiting. 15 tablet 0     sulfamethoxazole-trimethoprim (BACTRIM DS) 800-160 MG tablet Take 1 tablet by mouth Every Mon, Tues two times daily. 16 tablet 0     tamsulosin (FLOMAX) 0.4 MG capsule Take 0.4 mg by mouth daily.       ursodiol (ACTIGALL) 300 MG capsule Take 1 capsule (300 mg) by mouth 3 times daily. 90 capsule 0     oxyBUTYnin ER (DITROPAN XL) 10 MG 24 hr tablet Take 10 mg by mouth nightly as needed for bladder spasms.       posaconazole (NOXAFIL) 100 MG DR tablet Take 3 tablets (300 mg) by mouth every morning. 90 tablet 1     sirolimus (GENERIC EQUIVALENT) 0.5 MG tablet Take 1 mg by mouth daily.           EXAM      /71   Pulse (!) 125   Temp 97.7  F (36.5  C)   Resp 16   Wt 85.4 kg (188 lb 3.2 oz)   SpO2 99%   BMI 25.21 kg/m    Wt Readings from Last 4 Encounters:   09/23/24 85.7 kg (188 lb 14.4 oz)   09/20/24 85.4 kg (188 lb 3.2 oz)   09/19/24 84.5 kg (186 lb 4.6 oz)   08/09/24 89.9 kg (198 lb 4.8 oz)       KPS: 70% Cannot do active work, but can care for self    General: Alert, awake  Eyes: Conjunctiva normal  Mouth and Throat: No mucositis or lichenoid change.  Chest: Induration and tenderness over the right sternoclavicular area  Respiratory: Normal respiratory effort.  Cardiovascular: Normal perfusion, no significant edema.  Abdomen: No distention or mass.  Access: R PICC line     LABS / PATHOLOGY/ IMAGING     Data     Blood Counts  Recent Labs   Lab Test 09/23/24  0927 09/20/24  1159 09/19/24  0323 09/18/24  0430 09/17/24  0424 09/16/24  0432 09/13/24  0322 09/12/24  0321   WBC 3.0* 8.0 10.6   < > 1.3* 1.5*   < > 2.9*   HGB 9.1* 8.6* 8.1*   < > 7.4* 8.0*   < > 8.4*   PLT 95* 78* 40*    < > 22* 24*   < > 27*   NEUTROPHIL 52 85 87   < > 77 84   < > 89   ANEU  --   --   --   --  1.0* 1.3*  --  2.6   LYMPH 4 2 1   < > 1 1   < > 1   ALYM  --   --   --   --  0.0* 0.0*  --  0.0*    < > = values in this interval not displayed.     Basic Panel  Recent Labs   Lab Test 09/23/24 0927 09/20/24 1159 09/19/24  0323    136 136   POTASSIUM 3.8 3.8 3.8   CHLORIDE 102 102 100   CO2 24 23 26   BUN 12.2 15.4 12.2   CR 0.65* 0.62* 0.58*   * 142* 114*        Calcium, Magnesium, Phosphorus  Recent Labs   Lab Test 09/23/24 0927 09/20/24  1159 09/19/24  0323 09/18/24  0430 09/17/24  0424 09/16/24  0432   HERBERT 9.3 9.1 8.7* 8.7* 9.0 9.0   MAG  --   --   --  2.0 2.0 2.0   PHOS 2.5  --   --  3.0 2.9 3.0        LFTs  Recent Labs   Lab Test 09/23/24  0927 09/19/24  0323 09/16/24  0432   ALKPHOS 83 66 67   AST 44 22 23   ALT 28 15 14   BILITOTAL 0.3 0.4 0.5   ALBUMIN 3.7 3.2* 3.4*     Immunosuppression  Recent Labs   Lab Test 09/23/24 0927 09/19/24  0323 09/16/24  0843   RAPAMY 9.3 9.2 6.3     Recent Labs   Lab Test 09/14/24  1741 09/07/24  1103 08/31/24  1453   CMVQNT Not Detected Not Detected Not Detected            ASSESSMENT AND PLAN     BMT: D32   - Chemo protocol: MT 2022-5; Flu/Cy/TBI  - Peripheral blood stem cell graft from 8/8 URD donor, ABO matched, Cell dose: 6.06 x10^6 CD34/kg    Disease status:   9/13/24 (D28): Bmbx shows hypocellular marrow, no dysplasia or blasts. Cytogenetics cancelled. NGS negative    Graft status/chimerism:   9/13/24 (D28): Bone marrow 100%. Peripheral blood CD3 92%, CD33 100%    GVHD  # Current immunosuppression is sirolimus and MMF  - Serum level of 9.2 on 9/19/24. Goal 8 -12.   - MMF can be stopped at D35 per protocol (9/27/24)    # GVHD: none   Skin: No changes. Skin score 0.  GI: No symptoms. GI score 0.  Liver: LFTs normal. Liver score 0.    Heme/ Coag  # Right internal jugular occlusive thrombus and right axillary vein non occlusive thrombus, line associated   - Continue  lovenox 1mg/kg BID, to continue for at least 3 months (12/18/24). Has a follow up with Dr. Ochoa to decide on duration.   - Will plan to switch to mold active azole to facilitate removal of Right PICC line  - Transfuse platelets >50k    # G-CSF last given on 9/18/24. Continue PRN for ANC < 1  # Transfusion parameters: hemoglobin <7, platelets <50     ID  # Strept mitis bacteremia  - Will complete augmentin on 9/21/24    # Prophylaxis  Fungal: Currently on micafungin. Switch to posaconazole. Pulm nodules present on workup CT.   PJP/ Toxo IgG negative: Bactrim from D28, to start on 9/23/24  Viral: Acyclovir 800 mg bid    # Monitoring  CMV: Monitor weekly till D100. 9/14 - negative  EBV: Monitor every 2 weeks between D30 to D180. 9/12 - negative  IgG:  Check serum IgG level q3 months, and consider IVIG repletion for IgG levels <400 mg/dL.    GI  GERD: Pantoprazole  VOD prophylaxis: Ursodiol   # Loose stools: Monitor, imodium and metamucil as needed    CARDIOVASCULAR  # HTN: PTA lisinopril stopped 8/26 d/t symptomatic orthostasis.   # CAD: Coronary artery calcifications seen on CT, stress test in 2023 negative  - Risk of cardiomyopathy: Baseline EF 55-60%.   - Risk of arrhythmia: Baseline EKG showed 421       RENAL/ELECTROLYTES/  - BPH: Continue PTA flomax.  - Hemorrhagic Cystitis secondary to cytoxan: Resolved. Oxybutynin 10mg at bedtime PRN. Will need follow up with urology at some point.       MUSCULOSKELETAL  # Gout: continue allopurinol   # History of spinal stenosis, lumbosacral radiculopathy:   - Pain management: Outpatient had been taking celecoxib for MSK pain, once a day.   - Currently on robaxin scheduled, take PRN. Can also try tramadol.      SOCIAL DETERMINANTS  - Caregiver: wife, Vani. Lives within the required distance from hospital   - Financial/insurance concerns: None    Post-transplant vaccinations  COVID, RSV and flu after D100    I spent 60 minutes in the care of this patient today, which  included time necessary for preparation for the visit, obtaining history, ordering medications/tests/procedures as medically indicated, review of pertinent medical literature, counseling of the patient, communication of recommendations to the care team, and documentation time.    Brenda Murphy  Department of Hematology, Oncology and Transplantation  Pager 1300/Text via [employer]          Again, thank you for allowing me to participate in the care of your patient.        Sincerely,        ONEIL Mnotero

## 2024-09-20 NOTE — PROGRESS NOTES
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     Diagnosis MDS-IB2 BMT type Allogenic  CMV  Donor -  Recipient -    Prep GANGA (Flu/Cy/TBI) Donor type  8/8, URD ABO D/R O+    GVHD ppx PTCy, siro, MMF Graft source PBSCT Toxo IgG Negative   Protocol [id number] CD34/kg 6.06E+06    BMT MD Brenda Murphy     Pre-transplant disease history  Referring provider: Dr. Delcid  Data    He started having drenching night sweats and fatigue in Jan 2024. Intentional weight loss. He was found to have thrombocytopenia on routine CBC done prior to back surgery (was planned for laminectomy and fusion). On 4/1/24, WBC 6.4, Hb 13.5, Plts 64, ANC 1.89. LDH elevated 532. He underwent bone marrow biopsy (4/23/24) which showed MDS-EB2. Hypercellular marrow (%) with 10.8% blasts including rare circulating blasts. Flow showed 4% myeloid blasts. Normal karyotype. NGS (peripheral blood) was positive for ASXL1, IDH1, RUNX1, SRSF2 mutations (full report not available). Counts: WBC 6.7, Hb 13.1, Plts 70, ANC 0.7. IPSS-M = High risk (0.85).  He was treated with azacitidine 75mg/m2 for 7 days and venetoclax for 14 days (C1 on 5/20/24). Bone marrow after first cycle (6/13/24) showed persistent MDS, with hypercellular marrow with therapy effect, no increase in blasts. Normal karyotype. NGS: ASXL1 (38%), IDH1 (43%), RUNX1 (41%), SRSF1 (38%).   He received second cycle of aza/ angelia on 7/1/24. His pre-transplant BMBx was done on 8/2/24 and he was cytopenic at the time (CBC with WBC 0.6, Hb 14, platelet count 24). Hypocellular marrow (5%) with no increase in blasts. Normal karyotype, NGS negative.     Post transplant   - 9/2/24 (around D10) Neutropenic fevers. Strept mitis bacteremia resistant to levaquin from central venous catheter as well as a single culture of MRSE from  peripheral blood (contaminant). Treated with cefepime. Repeat blood cultures negative, but continued to have high fevers. TTE (9/6/24) did not show any vegetations. CVC was removed on 9/6/24, defervesced on 9/7/24. PICC placed on 9/9/24. Antibiotics (cefepime followed by augmentin) were continued for a total of 14days from line removal.   - Right lower neck swelling and tenderness near the previous CVC site. US (9/15/24) showed an occlusive right internal jugular thrombus and a non occlusive thrombus in the right axillary vein, PICC associated. He was initially not anticoagulated due to thrombocytopenia but had increased symptoms. Repeat US (9/17/24) showed extension of right internal jugular thrombus into the innominate vein and extension of right axillary thrombus into subclavian vein. He was started on therapeutic anticoagulation with lovenox 1mg/kg BID on 9/18/24 with transfusion support for platelets, with plan to continue for 3 months. Right PICC removal was considered given propagation of clot near the PICC line. However, IR (9/19/24) recommended against removal of R PICC and replacement in the left arm due to risk of contralateral DVT.   - Hemorrhagic cystitis: Dysuria, urgency and hematuria starting  9/11/2024. Infectious workup including adenovirus and BK virus negative. Urology was consulted, recommended outpatient follow up. Now resolved.          INTERVAL HISTORY     Leland Pearson returns for a scheduled BMT clinic visit.   He was discharged yesterday. Feels good to be back home. Main issue is that he has been feeling tired. Independent in ADLs, but needed some help getting out of the shower. Can walk up and down the stairs in his house with handrails. Standing for a long time aggravates low back pain.  He has induration and tenderness at his right sternocalvicular area. Has some pain swallowing and moving his neck.     No rash. Appetite improving. Nausea only with medications. No abdominal pain.  Had three Bms today, loose.     PLAN   - 9/13/24: BMBx remission, NGS negative. Bone marrow 100% donor. Peripheral blood CD3 92%  - Counts good  - Continue anticoagulation with lovenox 1mg/kg BID  - ID: acyclovir, bactrim, micafungin. Start bactrim on Monday. Please start posaconazole on Monday so that the PICC line can be removed earlier.   - 1lL fluids today for elevated heart rate  - Rbacxin and oxybutynin PRN, not scheduled   - stop MMF on 9/27    PMH  Back pain due to spinal stenosis, lumbosacral radiculopathy.   Gout, most recent flare was in April 2024  GERD  HTN  BPH, on tamsulosin   Right knee ACL repair in 2014  Low back surgery in 2004 for herniated disc, complicated by infection requiring prolonged IV abx   Coronary artery calcifications seen on CT, stress test in 2023 negative    SH  . Lives with his wife, Vani. Two daughters, 43 and 36. 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 Was still working part time till last summer at [employer], 2-3 hours per day.     Medications  Current Outpatient Medications   Medication Sig Dispense Refill    acyclovir (ZOVIRAX) 800 MG tablet Take 1 tablet (800 mg) by mouth every 12 hours for 150 days 60 tablet 4    allopurinol (ZYLOPRIM) 300 MG tablet Take 1 tablet (300 mg) by mouth daily. 90 tablet 0    calcium carbonate-vitamin D (CALTRATE) 600-10 MG-MCG per tablet Take 2 tablets by mouth 2 times daily (with meals). 120 tablet 0    enoxaparin ANTICOAGULANT (LOVENOX) 40 MG/0.4ML syringe Inject 0.4 mLs (40 mg) subcutaneously every 12 hours. 24 mL 0    methocarbamol (ROBAXIN) 500 MG tablet Take 1 tablet (500 mg) by mouth 3 times daily. 90 tablet 0    mycophenolate (GENERIC EQUIVALENT) 250 MG capsule Take 5 capsules (1,250 mg) by mouth every 12 hours for 9 days. 90 capsule 0    pantoprazole (PROTONIX) 40 MG EC tablet Take 1 tablet (40 mg) by mouth daily. 30 tablet 0    prochlorperazine (COMPAZINE) 5 MG tablet Take 1 tablet (5 mg) by mouth every 6 hours  as needed for nausea or vomiting. 15 tablet 0    sulfamethoxazole-trimethoprim (BACTRIM DS) 800-160 MG tablet Take 1 tablet by mouth Every Mon, Tues two times daily. 16 tablet 0    tamsulosin (FLOMAX) 0.4 MG capsule Take 0.4 mg by mouth daily.      ursodiol (ACTIGALL) 300 MG capsule Take 1 capsule (300 mg) by mouth 3 times daily. 90 capsule 0    oxyBUTYnin ER (DITROPAN XL) 10 MG 24 hr tablet Take 10 mg by mouth nightly as needed for bladder spasms.      posaconazole (NOXAFIL) 100 MG DR tablet Take 3 tablets (300 mg) by mouth every morning. 90 tablet 1    sirolimus (GENERIC EQUIVALENT) 0.5 MG tablet Take 1 mg by mouth daily.           EXAM      /71   Pulse (!) 125   Temp 97.7  F (36.5  C)   Resp 16   Wt 85.4 kg (188 lb 3.2 oz)   SpO2 99%   BMI 25.21 kg/m    Wt Readings from Last 4 Encounters:   09/23/24 85.7 kg (188 lb 14.4 oz)   09/20/24 85.4 kg (188 lb 3.2 oz)   09/19/24 84.5 kg (186 lb 4.6 oz)   08/09/24 89.9 kg (198 lb 4.8 oz)       KPS: 70% Cannot do active work, but can care for self    General: Alert, awake  Eyes: Conjunctiva normal  Mouth and Throat: No mucositis or lichenoid change.  Chest: Induration and tenderness over the right sternoclavicular area  Respiratory: Normal respiratory effort.  Cardiovascular: Normal perfusion, no significant edema.  Abdomen: No distention or mass.  Access: R PICC line     LABS / PATHOLOGY/ IMAGING     Data      Blood Counts  Recent Labs   Lab Test 09/23/24  0927 09/20/24  1159 09/19/24  0323 09/18/24  0430 09/17/24  0424 09/16/24  0432 09/13/24  0322 09/12/24  0321   WBC 3.0* 8.0 10.6   < > 1.3* 1.5*   < > 2.9*   HGB 9.1* 8.6* 8.1*   < > 7.4* 8.0*   < > 8.4*   PLT 95* 78* 40*   < > 22* 24*   < > 27*   NEUTROPHIL 52 85 87   < > 77 84   < > 89   ANEU  --   --   --   --  1.0* 1.3*  --  2.6   LYMPH 4 2 1   < > 1 1   < > 1   ALYM  --   --   --   --  0.0* 0.0*  --  0.0*    < > = values in this interval not displayed.     Basic Panel  Recent Labs   Lab Test  09/23/24  0927 09/20/24  1159 09/19/24  0323    136 136   POTASSIUM 3.8 3.8 3.8   CHLORIDE 102 102 100   CO2 24 23 26   BUN 12.2 15.4 12.2   CR 0.65* 0.62* 0.58*   * 142* 114*        Calcium, Magnesium, Phosphorus  Recent Labs   Lab Test 09/23/24  0927 09/20/24  1159 09/19/24  0323 09/18/24  0430 09/17/24  0424 09/16/24  0432   HERBERT 9.3 9.1 8.7* 8.7* 9.0 9.0   MAG  --   --   --  2.0 2.0 2.0   PHOS 2.5  --   --  3.0 2.9 3.0        LFTs  Recent Labs   Lab Test 09/23/24  0927 09/19/24  0323 09/16/24  0432   ALKPHOS 83 66 67   AST 44 22 23   ALT 28 15 14   BILITOTAL 0.3 0.4 0.5   ALBUMIN 3.7 3.2* 3.4*     Immunosuppression  Recent Labs   Lab Test 09/23/24  0927 09/19/24  0323 09/16/24  0843   RAPAMY 9.3 9.2 6.3     Recent Labs   Lab Test 09/14/24  1741 09/07/24  1103 08/31/24  1453   CMVQNT Not Detected Not Detected Not Detected            ASSESSMENT AND PLAN     BMT: D32   - Chemo protocol: MT 2022-5; Flu/Cy/TBI  - Peripheral blood stem cell graft from 8/8 URD donor, ABO matched, Cell dose: 6.06 x10^6 CD34/kg    Disease status:   9/13/24 (D28): Bmbx shows hypocellular marrow, no dysplasia or blasts. Cytogenetics cancelled. NGS negative    Graft status/chimerism:   9/13/24 (D28): Bone marrow 100%. Peripheral blood CD3 92%, CD33 100%    GVHD  # Current immunosuppression is sirolimus and MMF  - Serum level of 9.2 on 9/19/24. Goal 8 -12.   - MMF can be stopped at D35 per protocol (9/27/24)    # GVHD: none   Skin: No changes. Skin score 0.  GI: No symptoms. GI score 0.  Liver: LFTs normal. Liver score 0.    Heme/ Coag  # Right internal jugular occlusive thrombus and right axillary vein non occlusive thrombus, line associated   - Continue lovenox 1mg/kg BID, to continue for at least 3 months (12/18/24). Has a follow up with Dr. Ochoa to decide on duration.   - Will plan to switch to mold active azole to facilitate removal of Right PICC line  - Transfuse platelets >50k    # G-CSF last given on 9/18/24. Continue  PRN for ANC < 1  # Transfusion parameters: hemoglobin <7, platelets <50     ID  # Strept mitis bacteremia  - Will complete augmentin on 9/21/24    # Prophylaxis  Fungal: Currently on micafungin. Switch to posaconazole. Pulm nodules present on workup CT.   PJP/ Toxo IgG negative: Bactrim from D28, to start on 9/23/24  Viral: Acyclovir 800 mg bid    # Monitoring  CMV: Monitor weekly till D100. 9/14 - negative  EBV: Monitor every 2 weeks between D30 to D180. 9/12 - negative  IgG:  Check serum IgG level q3 months, and consider IVIG repletion for IgG levels <400 mg/dL.    GI  GERD: Pantoprazole  VOD prophylaxis: Ursodiol   # Loose stools: Monitor, imodium and metamucil as needed    CARDIOVASCULAR  # HTN: PTA lisinopril stopped 8/26 d/t symptomatic orthostasis.   # CAD: Coronary artery calcifications seen on CT, stress test in 2023 negative  - Risk of cardiomyopathy: Baseline EF 55-60%.   - Risk of arrhythmia: Baseline EKG showed 421       RENAL/ELECTROLYTES/  - BPH: Continue PTA flomax.  - Hemorrhagic Cystitis secondary to cytoxan: Resolved. Oxybutynin 10mg at bedtime PRN. Will need follow up with urology at some point.       MUSCULOSKELETAL  # Gout: continue allopurinol   # History of spinal stenosis, lumbosacral radiculopathy:   - Pain management: Outpatient had been taking celecoxib for MSK pain, once a day.   - Currently on robaxin scheduled, take PRN. Can also try tramadol.      SOCIAL DETERMINANTS  - Caregiver: wife, Vani. Lives within the required distance from hospital   - Financial/insurance concerns: None    Post-transplant vaccinations  COVID, RSV and flu after D100    I spent 60 minutes in the care of this patient today, which included time necessary for preparation for the visit, obtaining history, ordering medications/tests/procedures as medically indicated, review of pertinent medical literature, counseling of the patient, communication of recommendations to the care team, and documentation  time.    Brenda Murphy  Department of Hematology, Oncology and Transplantation  Pager 1300/Text via [employer]

## 2024-09-20 NOTE — PROGRESS NOTES
I met with Leland Pearson to review his medication list after hospital discharge post-transplant. Leland Pearson and his caregiver had the opportunity to ask questions regarding his therapy which were answered to their satisfaction. We specifically discussed the following items:    1. BMT: Overall doing well. Answered all questions to patient's satisfaction.  2. ID: Acyclovir in medication box. Micafungin in clinic today. Augmentin to end Saturday AM.  3: GVHD: Sirolimus and MMF filled appropriately in med box. He did not hold before labs today so will get repeat level on Monday.  4: Other: Lovenox subcutaneous injections twice daily. Discussed how to dispose of sharps at home.    Changes made to scheduled medication list by today's provider include:  Added: None  Deleted: None  Changed: Methocarbamol changed to PRN at provider appointment. Oxybutinin on hold.    Current Outpatient Medications   Medication Sig Dispense Refill    acyclovir (ZOVIRAX) 800 MG tablet Take 1 tablet (800 mg) by mouth every 12 hours for 150 days 60 tablet 4    allopurinol (ZYLOPRIM) 300 MG tablet Take 1 tablet (300 mg) by mouth daily. 90 tablet 0    amoxicillin-clavulanate (AUGMENTIN) 875-125 MG tablet Take 1 tablet by mouth every 12 hours for 2 days. 4 tablet 0    calcium carbonate-vitamin D (CALTRATE) 600-10 MG-MCG per tablet Take 2 tablets by mouth 2 times daily (with meals). 120 tablet 0    enoxaparin ANTICOAGULANT (LOVENOX) 40 MG/0.4ML syringe Inject 0.4 mLs (40 mg) subcutaneously every 12 hours. 24 mL 0    methocarbamol (ROBAXIN) 500 MG tablet Take 1 tablet (500 mg) by mouth 3 times daily. 90 tablet 0    mycophenolate (GENERIC EQUIVALENT) 250 MG capsule Take 5 capsules (1,250 mg) by mouth every 12 hours for 9 days. 90 capsule 0    oxyBUTYnin ER (DITROPAN XL) 10 MG 24 hr tablet Take 1 tablet (10 mg) by mouth at bedtime. 30 tablet 0    pantoprazole (PROTONIX) 40 MG EC tablet Take 1 tablet (40 mg) by mouth daily. 30 tablet 0     prochlorperazine (COMPAZINE) 5 MG tablet Take 1 tablet (5 mg) by mouth every 6 hours as needed for nausea or vomiting. 15 tablet 0    RAPAMUNE (BRAND) 0.5 MG tablet Take 9 tablets (4.5 mg) by mouth daily. 270 tablet 0    [START ON 9/23/2024] sulfamethoxazole-trimethoprim (BACTRIM DS) 800-160 MG tablet Take 1 tablet by mouth Every Mon, Tues two times daily. 16 tablet 0    tamsulosin (FLOMAX) 0.4 MG capsule Tamsulosin Oral    daily    active      ursodiol (ACTIGALL) 300 MG capsule Take 1 capsule (300 mg) by mouth 3 times daily. 90 capsule 0        Pertinent labs considered today:  Lab Results   Component Value Date     09/20/2024                 normal sodium 136-148  Lab Results   Component Value Date    POTASSIUM 3.8 09/20/2024       normal potassium 3.5-5.2   Lab Results   Component Value Date    CHLORIDE 102 09/20/2024           normal chloride    Lab Results   Component Value Date    CO2 23 09/20/2024                normal CO2 20-32  Lab Results   Component Value Date    BUN 15.4 09/20/2024                 normal BUN 5-24  Lab Results   Component Value Date     09/20/2024     09/08/2024                 normal glucose   Lab Results   Component Value Date    CR 0.62 09/20/2024                 normal cr 0.8-1.5  Lab Results   Component Value Date    HERBERT 9.1 09/20/2024               normal calcium  8.5-10.4  Lab Results   Component Value Date    BILITOTAL 0.4 09/19/2024          normal bilirubin 0.2-1.3  Lab Results   Component Value Date    PROTTOTAL 6.1 09/19/2024          normal total protein 6.0-8.2  Lab Results   Component Value Date    ALBUMIN 3.2 09/19/2024             normal albumin 3.2-4.5  Lab Results   Component Value Date    ALKPHOS 66 09/19/2024            normal alkphos   Lab Results   Component Value Date    ALT 15 09/19/2024         normal ALT 0-65  Lab Results   Component Value Date    AST 22 09/19/2024         normal AST 0-37  Lab Results   Component Value Date     HGB 8.6 09/20/2024     Lab Results   Component Value Date    WBC 8.0 09/20/2024      Lab Results   Component Value Date    PLT 78 09/20/2024     Estimated Creatinine Clearance: 133.9 mL/min (A) (based on SCr of 0.62 mg/dL (L)).      MARI CUNNINGHAM, [facility], PharmD

## 2024-09-21 LAB
ABO/RH(D): NORMAL
ANTIBODY SCREEN: NEGATIVE
SPECIMEN EXPIRATION DATE: NORMAL

## 2024-09-22 LAB
BLD PROD TYP BPU: NORMAL
BLOOD COMPONENT TYPE: NORMAL
CODING SYSTEM: NORMAL
CROSSMATCH: NORMAL
UNIT ABO/RH: NORMAL
UNIT NUMBER: NORMAL
UNIT STATUS: NORMAL
UNIT TYPE ISBT: 5100

## 2024-09-22 RX ORDER — EPINEPHRINE 1 MG/ML
0.3 INJECTION, SOLUTION INTRAMUSCULAR; SUBCUTANEOUS EVERY 5 MIN PRN
OUTPATIENT
Start: 2024-09-22

## 2024-09-22 RX ORDER — DIPHENHYDRAMINE HYDROCHLORIDE 50 MG/ML
50 INJECTION INTRAMUSCULAR; INTRAVENOUS
Start: 2024-09-22

## 2024-09-22 RX ORDER — HEPARIN SODIUM (PORCINE) LOCK FLUSH IV SOLN 100 UNIT/ML 100 UNIT/ML
5 SOLUTION INTRAVENOUS
OUTPATIENT
Start: 2024-09-22

## 2024-09-22 RX ORDER — HEPARIN SODIUM,PORCINE 10 UNIT/ML
5-20 VIAL (ML) INTRAVENOUS DAILY PRN
OUTPATIENT
Start: 2024-09-22

## 2024-09-23 ENCOUNTER — ONCOLOGY VISIT (OUTPATIENT)
Dept: TRANSPLANT | Facility: CLINIC | Age: 69
End: 2024-09-23
Attending: STUDENT IN AN ORGANIZED HEALTH CARE EDUCATION/TRAINING PROGRAM
Payer: COMMERCIAL

## 2024-09-23 ENCOUNTER — APPOINTMENT (OUTPATIENT)
Dept: LAB | Facility: CLINIC | Age: 69
End: 2024-09-23
Payer: COMMERCIAL

## 2024-09-23 ENCOUNTER — INFUSION THERAPY VISIT (OUTPATIENT)
Dept: TRANSPLANT | Facility: CLINIC | Age: 69
End: 2024-09-23
Payer: COMMERCIAL

## 2024-09-23 VITALS
TEMPERATURE: 97.8 F | BODY MASS INDEX: 25.31 KG/M2 | DIASTOLIC BLOOD PRESSURE: 79 MMHG | RESPIRATION RATE: 18 BRPM | HEART RATE: 124 BPM | SYSTOLIC BLOOD PRESSURE: 124 MMHG | WEIGHT: 188.9 LBS | OXYGEN SATURATION: 100 %

## 2024-09-23 DIAGNOSIS — D46.9 MDS (MYELODYSPLASTIC SYNDROME) (H): Primary | ICD-10-CM

## 2024-09-23 DIAGNOSIS — Z79.899 ENCOUNTER FOR LONG-TERM (CURRENT) USE OF MEDICATIONS: ICD-10-CM

## 2024-09-23 DIAGNOSIS — D46.9 MDS (MYELODYSPLASTIC SYNDROME) (H): ICD-10-CM

## 2024-09-23 LAB
ALBUMIN SERPL BCG-MCNC: 3.7 G/DL (ref 3.5–5.2)
ALP SERPL-CCNC: 83 U/L (ref 40–150)
ALT SERPL W P-5'-P-CCNC: 28 U/L (ref 0–70)
ANION GAP SERPL CALCULATED.3IONS-SCNC: 11 MMOL/L (ref 7–15)
AST SERPL W P-5'-P-CCNC: 44 U/L (ref 0–45)
BASOPHILS # BLD AUTO: 0.1 10E3/UL (ref 0–0.2)
BASOPHILS NFR BLD AUTO: 2 %
BILIRUB SERPL-MCNC: 0.3 MG/DL
BUN SERPL-MCNC: 12.2 MG/DL (ref 8–23)
CALCIUM SERPL-MCNC: 9.3 MG/DL (ref 8.8–10.4)
CHLORIDE SERPL-SCNC: 102 MMOL/L (ref 98–107)
CREAT SERPL-MCNC: 0.65 MG/DL (ref 0.67–1.17)
EGFRCR SERPLBLD CKD-EPI 2021: >90 ML/MIN/1.73M2
EOSINOPHIL # BLD AUTO: 0 10E3/UL (ref 0–0.7)
EOSINOPHIL NFR BLD AUTO: 0 %
ERYTHROCYTE [DISTWIDTH] IN BLOOD BY AUTOMATED COUNT: 19.1 % (ref 10–15)
GLUCOSE SERPL-MCNC: 157 MG/DL (ref 70–99)
HCO3 SERPL-SCNC: 24 MMOL/L (ref 22–29)
HCT VFR BLD AUTO: 27.4 % (ref 40–53)
HGB BLD-MCNC: 9.1 G/DL (ref 13.3–17.7)
IMM GRANULOCYTES # BLD: 0.1 10E3/UL
IMM GRANULOCYTES NFR BLD: 5 %
LYMPHOCYTES # BLD AUTO: 0.1 10E3/UL (ref 0.8–5.3)
LYMPHOCYTES NFR BLD AUTO: 4 %
MCH RBC QN AUTO: 28.9 PG (ref 26.5–33)
MCHC RBC AUTO-ENTMCNC: 33.2 G/DL (ref 31.5–36.5)
MCV RBC AUTO: 87 FL (ref 78–100)
MONOCYTES # BLD AUTO: 1.1 10E3/UL (ref 0–1.3)
MONOCYTES NFR BLD AUTO: 38 %
NEUTROPHILS # BLD AUTO: 1.6 10E3/UL (ref 1.6–8.3)
NEUTROPHILS NFR BLD AUTO: 52 %
NRBC # BLD AUTO: 0 10E3/UL
NRBC BLD AUTO-RTO: 1 /100
PHOSPHATE SERPL-MCNC: 2.5 MG/DL (ref 2.5–4.5)
PLATELET # BLD AUTO: 95 10E3/UL (ref 150–450)
POTASSIUM SERPL-SCNC: 3.8 MMOL/L (ref 3.4–5.3)
PROT SERPL-MCNC: 6.8 G/DL (ref 6.4–8.3)
RBC # BLD AUTO: 3.15 10E6/UL (ref 4.4–5.9)
SIROLIMUS BLD-MCNC: 9.3 UG/L (ref 5–15)
SODIUM SERPL-SCNC: 137 MMOL/L (ref 135–145)
TME LAST DOSE: NORMAL H
TME LAST DOSE: NORMAL H
URATE SERPL-MCNC: 2.1 MG/DL (ref 3.4–7)
WBC # BLD AUTO: 3 10E3/UL (ref 4–11)

## 2024-09-23 PROCEDURE — 258N000003 HC RX IP 258 OP 636: Performed by: PHYSICIAN ASSISTANT

## 2024-09-23 PROCEDURE — G2211 COMPLEX E/M VISIT ADD ON: HCPCS

## 2024-09-23 PROCEDURE — 85025 COMPLETE CBC W/AUTO DIFF WBC: CPT

## 2024-09-23 PROCEDURE — 99215 OFFICE O/P EST HI 40 MIN: CPT

## 2024-09-23 PROCEDURE — 80195 ASSAY OF SIROLIMUS: CPT

## 2024-09-23 PROCEDURE — 96365 THER/PROPH/DIAG IV INF INIT: CPT

## 2024-09-23 PROCEDURE — G0463 HOSPITAL OUTPT CLINIC VISIT: HCPCS | Mod: 25

## 2024-09-23 PROCEDURE — 80053 COMPREHEN METABOLIC PANEL: CPT

## 2024-09-23 PROCEDURE — 84550 ASSAY OF BLOOD/URIC ACID: CPT

## 2024-09-23 PROCEDURE — 250N000011 HC RX IP 250 OP 636: Performed by: PHYSICIAN ASSISTANT

## 2024-09-23 PROCEDURE — 36592 COLLECT BLOOD FROM PICC: CPT

## 2024-09-23 PROCEDURE — 84100 ASSAY OF PHOSPHORUS: CPT

## 2024-09-23 PROCEDURE — 250N000011 HC RX IP 250 OP 636: Performed by: STUDENT IN AN ORGANIZED HEALTH CARE EDUCATION/TRAINING PROGRAM

## 2024-09-23 RX ORDER — HEPARIN SODIUM,PORCINE 10 UNIT/ML
5-20 VIAL (ML) INTRAVENOUS DAILY PRN
Status: DISCONTINUED | OUTPATIENT
Start: 2024-09-23 | End: 2024-09-23 | Stop reason: HOSPADM

## 2024-09-23 RX ORDER — POSACONAZOLE 100 MG/1
300 TABLET, DELAYED RELEASE ORAL EVERY MORNING
Qty: 90 TABLET | Refills: 1 | Status: SHIPPED | OUTPATIENT
Start: 2024-09-23

## 2024-09-23 RX ORDER — HEPARIN SODIUM,PORCINE 10 UNIT/ML
5 VIAL (ML) INTRAVENOUS ONCE
Status: COMPLETED | OUTPATIENT
Start: 2024-09-23 | End: 2024-09-23

## 2024-09-23 RX ORDER — HEPARIN SODIUM,PORCINE 10 UNIT/ML
5-20 VIAL (ML) INTRAVENOUS DAILY PRN
OUTPATIENT
Start: 2024-09-24

## 2024-09-23 RX ORDER — HEPARIN SODIUM (PORCINE) LOCK FLUSH IV SOLN 100 UNIT/ML 100 UNIT/ML
5 SOLUTION INTRAVENOUS
OUTPATIENT
Start: 2024-09-24

## 2024-09-23 RX ADMIN — MICAFUNGIN SODIUM 300 MG: 100 INJECTION, POWDER, LYOPHILIZED, FOR SOLUTION INTRAVENOUS at 11:02

## 2024-09-23 RX ADMIN — Medication 5 ML: at 09:33

## 2024-09-23 RX ADMIN — HEPARIN, PORCINE (PF) 10 UNIT/ML INTRAVENOUS SYRINGE 5 ML: at 12:03

## 2024-09-23 ASSESSMENT — PAIN SCALES - GENERAL: PAINLEVEL: NO PAIN (0)

## 2024-09-23 NOTE — NURSING NOTE
"Oncology Rooming Note    September 23, 2024 9:56 AM   Leland Pearson is a 69 year old male who presents for:    Chief Complaint   Patient presents with    Blood Draw     Labs drawn via PICC line by RN. VS taken.    Oncology Clinic Visit     MDS (myelodysplastic syndrome)     Initial Vitals: /79 (BP Location: Left arm, Patient Position: Sitting, Cuff Size: Adult Regular)   Pulse (!) 124   Temp 97.8  F (36.6  C) (Oral)   Resp 18   Wt 85.7 kg (188 lb 14.4 oz)   SpO2 100%   BMI 25.31 kg/m   Estimated body mass index is 25.31 kg/m  as calculated from the following:    Height as of 8/16/24: 1.84 m (6' 0.44\").    Weight as of this encounter: 85.7 kg (188 lb 14.4 oz). Body surface area is 2.09 meters squared.  No Pain (0) Comment: Data Unavailable   No LMP for male patient.  Allergies reviewed: Yes  Medications reviewed: Yes    Medications: Medication refills not needed today.  Pharmacy name entered into [employer]:    [id number] [place] [facility] - [place], [place] - [address] [place] [address] [facility] - [place], [place] - [facility] [address]    Frailty Screening:   Is the patient here for a new oncology consult visit in cancer care? 2. No      Clinical concerns: Patient's heart rate was 124 this morning, and he is wondering if this is because of his blood clots. They would also like to discuss the results of the bone marrow biopsy in more detail.       Adonay Ramos EMT            "

## 2024-09-23 NOTE — PROGRESS NOTES
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     Referring provider: Dr. Delcid    History of Present Illness/ Summary  Getting on bike but still with fatigue. No n/v. Eating/drinking okay but still c/o foods not tasting as they should. Drinking 1 ensure/day. No rashes. No fevers. Looser stools, 3x/day-took imodium last 2 days.          Review of Systems: Negative except as mentioned above    Physical Exam     Vital Signs: /79 (BP Location: Left arm, Patient Position: Sitting, Cuff Size: Adult Regular)   Pulse (!) 124   Temp 97.8  F (36.6  C) (Oral)   Resp 18   Wt 85.7 kg (188 lb 14.4 oz)   SpO2 100%   BMI 25.31 kg/m    Wt Readings from Last 4 Encounters:   09/23/24 85.7 kg (188 lb 14.4 oz)   09/20/24 85.4 kg (188 lb 3.2 oz)   09/19/24 84.5 kg (186 lb 4.6 oz)   08/09/24 89.9 kg (198 lb 4.8 oz)     KPS: 80% Can perform normal activity with effort, some signs of disease    General: Alert, no distress  Eyes: Conjunctiva normal  Respiratory: Normal respiratory effort.  Cardiovascular: Normal perfusion, no significant edema. Regular, HR 120s  Psych: Mood and affect are appropriate.  Line: right PICC    Lab Results   Component Value Date    WBC 3.0 (L) 09/23/2024    ANEU 1.0 (L) 09/17/2024    HGB 9.1 (L) 09/23/2024    HCT 27.4 (L) 09/23/2024    PLT 95 (L) 09/23/2024     09/23/2024    POTASSIUM 3.8 09/23/2024    CHLORIDE 102 09/23/2024    CO2 24 09/23/2024     (H) 09/23/2024    BUN 12.2 09/23/2024    CR 0.65 (L) 09/23/2024    MAG 2.0 09/18/2024    INR 1.26 (H) 09/16/2024         Plan     BMT/IEC PROTOCOL for MDS  - Chemo protocol: MT 2022-5; Flu/Cy/TBI  - Peripheral blood stem cell graft from 8/8 URD donor, ABO matched, Cell dose: 6.06 x10^6 CD34/kg  - Engraftment monitoring: Peripheral  blood and bone marrow chimerisms on D28, D100, 6 months, 1 and 2 years  - Restaging plan: BMBx with FISH, cytogenetics and NGS on D28, D100, 6 months, 1 and 2 years  - D21 BMbx completed 9/13. - 9/13/24: BMBx remission, NGS pending. Bone marrow 100% donor. Peripheral blood CD3 92%    HEME/COAG  # Pancytopenia 2/2 chemo/BMT  - Last day of G (9/12). (9/18) ANC 0.8 - G given   - Transfusion parameters: hemoglobin <7, platelets <50 (increased while on Lovenox).      # Right sternocleidomastoid tenderness s/p CVC removal  #Occlusive thrombus of R internal jugular vein  #Nonocclusive thrombus or R axillary vein  - Started complaining of tenderness and swelling near right CVC site that was removed. Obtained US to further investigate.   - Positive for thrombus. No anticoagulation at this time due to thrombocytopenia. Will need to begin DOAC (per pharmacy preferably voriconazole + apixaban without loading dose) vs lovenox once platelets >50k.   (9/17) Ongoing right neck pain/swelling: will repeat ultrasound shows extension of previous clot. Started on Lovenox -- per Benign Heme will start Lovenox BID with PLT parameter >50. Will plan to discharge on lovenox for 3 months. Will have follow up with Dr. Price outpatient.     RISK OF GVHD  - Prophylaxis: PTCy 50mg/kg on D+3 and D+4; MMF (D+5 to 35); Sirolimus (starting D+5, target level 5-10).  - Siro 4.5mg/d. (9/23) Siro level pending  - stop mmf 9/27     ID  #Streptococcus mitis bacteremia (2/2 bottles) s/p cefepime/ceftriaxone then augmentin thru 9/20. Likely bacterial seeding of internal jugular clot  - ABX duration plan: per ID through 9/20. Sensitive to ceftriaxone q24hrs, can transition to non-pseudomonal coverage for discharge/better engraftment.   - With anticipation for discharge, we will switch to Ceftriaxone (9/13-9/15). Resume cefepime 9/16 as below.      # MRSE bacteremia likely contaminant because peripheral stick, grew late (1/2). Dc'd Vanco (9/2-9/6)               - Repeat BC's x3 days NGTD     Prophylaxis plan:   - Fungal: Micafungin until D+45 (in clinic), followed by mold active azole. Pulm nodules present on workup CT.   - PJP: Bactrim to start at D+28. Toxoplasma negative. Bactrim started 9/23  - Viral: Acyclovir 800mg BID. No need for letermovir as donor and recipient are CMV negative.   - Per Dr. Murphy, Please start posaconazole on Monday so that the PICC line can be removed earlier. Sent Rx to pharmacy 9/23-awaiting coverage. Consider starting Wednesday with level ~10/2     Monitoring:   - CMV weekly, neg 9/7  - EBV every 2 weeks from D30 to D180: (9/14) ND     GI/NUTRITION  # GERD: Protonix  # Loose stools: Imodium x 1 on 9/13. No indication to repeat C. Diff at this time. Holding Metamucil.  - Anti-emetics: Compazine daily at 1600 and PRN. lorazepam available PRN.   - Ulcer prophy: Protonix  - VOD prophy: Ursodiol      CARDIOVASCULAR  # HTN: PTA lisinopril stopped 8/26 d/t symptomatic orthostasis.   # CAD: Coronary artery calcifications seen on CT, stress test in 2023 negative  - Risk of cardiomyopathy: Baseline EF 55-60%.   - Risk of arrhythmia: Baseline EKG showed 421   # Orthostatic hypotension - 1L IVF 9/16. Continue flomax and ditropan for now with history of urinary discomfort as below.       RENAL/ELECTROLYTES/  - Electrolyte management: replace per sliding scale  - BPH: Continue PTA flomax.  - Hemorrhagic Cystitis secondary to cytoxan: no longer taking ditropan      MUSCULOSKELETAL/FRAILTY  - Baseline Frailty Score: Not frail (0)  - Daily PT/OT as needed while inpatient  # Gout: continue allopurinol      Neuro   # history of spinal stenosis, lumbosacral radiculopathy likely exacerbating.   - Pain management: Outpatient had been taking celecoxib for MSK pain, once a day. Stopped Inpatient will try Tramadol hasn't used. Now discontinued.  Added robaxin-no longer taking 9/23     SOCIAL DETERMINANTS  - Caregiver: wife, Vani. Lives within the required  distance from hospital   - Financial/insurance concerns: None    Plan:  Start Bactrim  Send Rx for Posa-start tomorrow 9/24.   Siro level pending. PharmD to discuss new dose 9/24 based on today's level  Cancel izzy infusions  Keep provider Friday.      I spent 45 minutes in the care of this patient today, which included time necessary for preparation for the visit, obtaining history, ordering medications/tests/procedures as medically indicated, review of pertinent medical literature, counseling of the patient, communication of recommendations to the care team, and documentation time.    The longitudinal plan of care for the diagnosis(es)/condition(s) as documented were addressed during this visit. Due to the added complexity in care, I will continue to support Leland in the subsequent management and with ongoing continuity of care.    Jennie Watkins NP

## 2024-09-23 NOTE — PROGRESS NOTES
Infusion Nursing Note:  Leland Pearson presents today for scheduled Micafungin.    Patient seen by provider today: Yes: Jennie Watkins CNP   present during visit today: Not Applicable.    Note: Pt assessed by provider prior to infusion. Pt received 300 mg Micafungin IV without complication. Labs monitored; no additional infusion needs identified.      Intravenous Access:  PICC.    Treatment Conditions:  Not Applicable.      Post Infusion Assessment:  Patient tolerated infusion without incident.  Blood return noted pre and post infusion.  Site patent and intact, free from redness, edema or discomfort.  No evidence of extravasations.       Discharge Plan:   Patient discharged in stable condition accompanied by: wife.  Departure Mode: Ambulatory.      Vioal Lloyd RN

## 2024-09-23 NOTE — NURSING NOTE
Chief Complaint   Patient presents with    Blood Draw     Labs drawn via PICC line by RN. VS taken.     Labs collected from PICC.  Line flushed with saline and heparin locked.  Vitals taken and pt checked in for appt.     Unable to get blood return on purple lumen of PICC line, flushed with saline and heparin; labs draw from red lumen of PICC line; FYI message sent to provider.    Hermelinda Cabral RN

## 2024-09-23 NOTE — LETTER
9/23/2024      Leland Pearson  [address] [place] [address]      Dear Colleague,    Thank you for referring your patient, Leland Pearson, to the [facility] BLOOD AND MARROW TRANSPLANT PROGRAM [place]. Please see a copy of my visit note below.    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     Referring provider: Dr. Delcid    History of Present Illness/ Summary  Getting on bike but still with fatigue. No n/v. Eating/drinking okay but still c/o foods not tasting as they should. Drinking 1 ensure/day. No rashes. No fevers. Looser stools, 3x/day-took imodium last 2 days.          Review of Systems: Negative except as mentioned above    Physical Exam     Vital Signs: /79 (BP Location: Left arm, Patient Position: Sitting, Cuff Size: Adult Regular)   Pulse (!) 124   Temp 97.8  F (36.6  C) (Oral)   Resp 18   Wt 85.7 kg (188 lb 14.4 oz)   SpO2 100%   BMI 25.31 kg/m    Wt Readings from Last 4 Encounters:   09/23/24 85.7 kg (188 lb 14.4 oz)   09/20/24 85.4 kg (188 lb 3.2 oz)   09/19/24 84.5 kg (186 lb 4.6 oz)   08/09/24 89.9 kg (198 lb 4.8 oz)     KPS: 80% Can perform normal activity with effort, some signs of disease    General: Alert, no distress  Eyes: Conjunctiva normal  Respiratory: Normal respiratory effort.  Cardiovascular: Normal perfusion, no significant edema. Regular, HR 120s  Psych: Mood and affect are appropriate.  Line: right PICC    Lab Results   Component Value Date    WBC 3.0 (L) 09/23/2024    ANEU 1.0 (L) 09/17/2024    HGB 9.1 (L) 09/23/2024    HCT 27.4 (L) 09/23/2024    PLT 95 (L) 09/23/2024     09/23/2024    POTASSIUM 3.8 09/23/2024    CHLORIDE 102 09/23/2024    CO2 24 09/23/2024     (H) 09/23/2024    BUN 12.2 09/23/2024    CR 0.65 (L)  09/23/2024    MAG 2.0 09/18/2024    INR 1.26 (H) 09/16/2024         Plan     BMT/IEC PROTOCOL for MDS  - Chemo protocol: MT 2022-5; Flu/Cy/TBI  - Peripheral blood stem cell graft from 8/8 URD donor, ABO matched, Cell dose: 6.06 x10^6 CD34/kg  - Engraftment monitoring: Peripheral blood and bone marrow chimerisms on D28, D100, 6 months, 1 and 2 years  - Restaging plan: BMBx with FISH, cytogenetics and NGS on D28, D100, 6 months, 1 and 2 years  - D21 BMbx completed 9/13. - 9/13/24: BMBx remission, NGS pending. Bone marrow 100% donor. Peripheral blood CD3 92%    HEME/COAG  # Pancytopenia 2/2 chemo/BMT  - Last day of G (9/12). (9/18) ANC 0.8 - G given   - Transfusion parameters: hemoglobin <7, platelets <50 (increased while on Lovenox).      # Right sternocleidomastoid tenderness s/p CVC removal  #Occlusive thrombus of R internal jugular vein  #Nonocclusive thrombus or R axillary vein  - Started complaining of tenderness and swelling near right CVC site that was removed. Obtained US to further investigate.   - Positive for thrombus. No anticoagulation at this time due to thrombocytopenia. Will need to begin DOAC (per pharmacy preferably voriconazole + apixaban without loading dose) vs lovenox once platelets >50k.   (9/17) Ongoing right neck pain/swelling: will repeat ultrasound shows extension of previous clot. Started on Lovenox -- per Benign Heme will start Lovenox BID with PLT parameter >50. Will plan to discharge on lovenox for 3 months. Will have follow up with Dr. Price outpatient.     RISK OF GVHD  - Prophylaxis: PTCy 50mg/kg on D+3 and D+4; MMF (D+5 to 35); Sirolimus (starting D+5, target level 5-10).  - Siro 4.5mg/d. (9/23) Siro level pending  - stop mmf 9/27     ID  #Streptococcus mitis bacteremia (2/2 bottles) s/p cefepime/ceftriaxone then augmentin thru 9/20. Likely bacterial seeding of internal jugular clot  - ABX duration plan: per ID through 9/20. Sensitive to ceftriaxone q24hrs, can transition to  non-pseudomonal coverage for discharge/better engraftment.   - With anticipation for discharge, we will switch to Ceftriaxone (9/13-9/15). Resume cefepime 9/16 as below.      # MRSE bacteremia likely contaminant because peripheral stick, grew late (1/2). Dc'd Vanco (9/2-9/6)              - Repeat BC's x3 days NGTD     Prophylaxis plan:   - Fungal: Micafungin until D+45 (in clinic), followed by mold active azole. Pulm nodules present on workup CT.   - PJP: Bactrim to start at D+28. Toxoplasma negative. Bactrim started 9/23  - Viral: Acyclovir 800mg BID. No need for letermovir as donor and recipient are CMV negative.   - Per Dr. Murphy, Please start posaconazole on Monday so that the PICC line can be removed earlier. Sent Rx to pharmacy 9/23-awaiting coverage. Consider starting Wednesday with level ~10/2     Monitoring:   - CMV weekly, neg 9/7  - EBV every 2 weeks from D30 to D180: (9/14) ND     GI/NUTRITION  # GERD: Protonix  # Loose stools: Imodium x 1 on 9/13. No indication to repeat C. Diff at this time. Holding Metamucil.  - Anti-emetics: Compazine daily at 1600 and PRN. lorazepam available PRN.   - Ulcer prophy: Protonix  - VOD prophy: Ursodiol      CARDIOVASCULAR  # HTN: PTA lisinopril stopped 8/26 d/t symptomatic orthostasis.   # CAD: Coronary artery calcifications seen on CT, stress test in 2023 negative  - Risk of cardiomyopathy: Baseline EF 55-60%.   - Risk of arrhythmia: Baseline EKG showed 421   # Orthostatic hypotension - 1L IVF 9/16. Continue flomax and ditropan for now with history of urinary discomfort as below.       RENAL/ELECTROLYTES/  - Electrolyte management: replace per sliding scale  - BPH: Continue PTA flomax.  - Hemorrhagic Cystitis secondary to cytoxan: no longer taking ditropan      MUSCULOSKELETAL/FRAILTY  - Baseline Frailty Score: Not frail (0)  - Daily PT/OT as needed while inpatient  # Gout: continue allopurinol      Neuro   # history of spinal stenosis, lumbosacral radiculopathy  likely exacerbating.   - Pain management: Outpatient had been taking celecoxib for MSK pain, once a day. Stopped Inpatient will try Tramadol hasn't used. Now discontinued.  Added robaxin-no longer taking 9/23     SOCIAL DETERMINANTS  - Caregiver: wife, Vani. Lives within the required distance from hospital   - Financial/insurance concerns: None    Plan:  Start Bactrim  Send Rx for Posa-start tomorrow 9/24.   Siro level pending. PharmD to discuss new dose 9/24 based on today's level  Cancel izzy infusions  Keep provider Friday.      I spent 45 minutes in the care of this patient today, which included time necessary for preparation for the visit, obtaining history, ordering medications/tests/procedures as medically indicated, review of pertinent medical literature, counseling of the patient, communication of recommendations to the care team, and documentation time.    The longitudinal plan of care for the diagnosis(es)/condition(s) as documented were addressed during this visit. Due to the added complexity in care, I will continue to support Leland in the subsequent management and with ongoing continuity of care.    Jennie Watkins NP      Again, thank you for allowing me to participate in the care of your patient.        Sincerely,        BMT Advanced Practice Provider

## 2024-09-24 ENCOUNTER — TELEPHONE (OUTPATIENT)
Dept: TRANSPLANT | Facility: CLINIC | Age: 69
End: 2024-09-24
Payer: COMMERCIAL

## 2024-09-24 ENCOUNTER — TELEPHONE (OUTPATIENT)
Dept: ONCOLOGY | Facility: CLINIC | Age: 69
End: 2024-09-24
Payer: COMMERCIAL

## 2024-09-24 ENCOUNTER — CARE COORDINATION (OUTPATIENT)
Dept: TRANSPLANT | Facility: CLINIC | Age: 69
End: 2024-09-24
Payer: COMMERCIAL

## 2024-09-24 ENCOUNTER — DOCUMENTATION ONLY (OUTPATIENT)
Dept: TRANSPLANT | Facility: CLINIC | Age: 69
End: 2024-09-24

## 2024-09-24 RX ORDER — SIROLIMUS 0.5 MG/1
TABLET, FILM COATED ORAL DAILY
COMMUNITY

## 2024-09-24 RX ORDER — OXYBUTYNIN CHLORIDE 10 MG/1
10 TABLET, EXTENDED RELEASE ORAL
COMMUNITY

## 2024-09-24 NOTE — TELEPHONE ENCOUNTER
TRAVIS APPROVED    Medication:  Various  Amount: $ 3,500  Foundation Name: LLS  Foundation Phone:    Foundation Effective Date:    Foundation Expiration Date: 9/17/2025  Additional Information:   Patient Notified: I think pt obtained        Thank you,    Angeles Schumacher  Oncology Pharmacy Liaison II  [email or web address]  Phone: [phone]  Fax: [phone]

## 2024-09-24 NOTE — PROGRESS NOTES
Leland called regarding his posaconazole. He is currently paying $250 for a 2 month supply. Writer reached out to pharmacy liaison to check on raiza availability. They will call the patient.Patient also expressed an ability to pay for this medication, writer offered the option of finding other antifungal coverage and the patient will think on it after raiza information is known. Encouraged patient to reach out.

## 2024-09-24 NOTE — TELEPHONE ENCOUNTER
I spoke with Leland DONNA Pearson and his spouse regarding his level from sirolimus clinic visit dated 9/3, which resulted as 9.3 on sirolimus 4.5 mg daily. Discussed with Jennie Watkins, patient started posaconazole today (9/24) and is stopping micafungin. Due to DDI with posaconazole, will have Leland reduce his sirolimus dose to 1 mg daily starting today. Leland had concerns with his copay of posaconazole, which were relayed to his care team. Leland is next in clinic on Friday 9/27. Leland and his spouse voiced their understanding of this dose change.     Josh Canela, PharmD

## 2024-09-27 ENCOUNTER — ALLIED HEALTH/NURSE VISIT (OUTPATIENT)
Dept: TRANSPLANT | Facility: CLINIC | Age: 69
End: 2024-09-27
Attending: STUDENT IN AN ORGANIZED HEALTH CARE EDUCATION/TRAINING PROGRAM
Payer: COMMERCIAL

## 2024-09-27 ENCOUNTER — APPOINTMENT (OUTPATIENT)
Dept: LAB | Facility: CLINIC | Age: 69
End: 2024-09-27
Payer: COMMERCIAL

## 2024-09-27 VITALS
BODY MASS INDEX: 24.97 KG/M2 | TEMPERATURE: 98 F | SYSTOLIC BLOOD PRESSURE: 117 MMHG | RESPIRATION RATE: 16 BRPM | OXYGEN SATURATION: 99 % | HEART RATE: 120 BPM | WEIGHT: 186.4 LBS | DIASTOLIC BLOOD PRESSURE: 77 MMHG

## 2024-09-27 DIAGNOSIS — D46.9 MDS (MYELODYSPLASTIC SYNDROME) (H): ICD-10-CM

## 2024-09-27 DIAGNOSIS — Z94.84 IMMUNOCOMPROMISED STATE ASSOCIATED WITH STEM CELL TRANSPLANT (H): ICD-10-CM

## 2024-09-27 DIAGNOSIS — Z94.84 HISTORY OF PERIPHERAL STEM CELL TRANSPLANT (H): ICD-10-CM

## 2024-09-27 DIAGNOSIS — D84.822 IMMUNOCOMPROMISED STATE ASSOCIATED WITH STEM CELL TRANSPLANT (H): ICD-10-CM

## 2024-09-27 DIAGNOSIS — D84.822 IMMUNOCOMPROMISED STATE ASSOCIATED WITH STEM CELL TRANSPLANT (H): Primary | ICD-10-CM

## 2024-09-27 DIAGNOSIS — Z94.84 IMMUNOCOMPROMISED STATE ASSOCIATED WITH STEM CELL TRANSPLANT (H): Primary | ICD-10-CM

## 2024-09-27 DIAGNOSIS — R35.0 URINARY FREQUENCY: Primary | ICD-10-CM

## 2024-09-27 LAB
ALBUMIN SERPL BCG-MCNC: 3.7 G/DL (ref 3.5–5.2)
ALBUMIN UR-MCNC: 50 MG/DL
ALP SERPL-CCNC: 78 U/L (ref 40–150)
ALT SERPL W P-5'-P-CCNC: 28 U/L (ref 0–70)
ANION GAP SERPL CALCULATED.3IONS-SCNC: 12 MMOL/L (ref 7–15)
APPEARANCE UR: CLEAR
AST SERPL W P-5'-P-CCNC: 46 U/L (ref 0–45)
BASOPHILS # BLD AUTO: 0 10E3/UL (ref 0–0.2)
BASOPHILS NFR BLD AUTO: 2 %
BILIRUB SERPL-MCNC: 0.3 MG/DL
BILIRUB UR QL STRIP: NEGATIVE
BUN SERPL-MCNC: 14.5 MG/DL (ref 8–23)
CALCIUM SERPL-MCNC: 9.1 MG/DL (ref 8.8–10.4)
CAOX CRY #/AREA URNS HPF: ABNORMAL /HPF
CHLORIDE SERPL-SCNC: 101 MMOL/L (ref 98–107)
CMV DNA SPEC NAA+PROBE-ACNC: NOT DETECTED IU/ML
COLOR UR AUTO: YELLOW
CREAT SERPL-MCNC: 0.77 MG/DL (ref 0.67–1.17)
EBV DNA SERPL NAA+PROBE-ACNC: NOT DETECTED IU/ML
EGFRCR SERPLBLD CKD-EPI 2021: >90 ML/MIN/1.73M2
EOSINOPHIL # BLD AUTO: 0 10E3/UL (ref 0–0.7)
EOSINOPHIL NFR BLD AUTO: 1 %
ERYTHROCYTE [DISTWIDTH] IN BLOOD BY AUTOMATED COUNT: 20.2 % (ref 10–15)
GLUCOSE SERPL-MCNC: 144 MG/DL (ref 70–99)
GLUCOSE UR STRIP-MCNC: NEGATIVE MG/DL
HCO3 SERPL-SCNC: 22 MMOL/L (ref 22–29)
HCT VFR BLD AUTO: 28.4 % (ref 40–53)
HGB BLD-MCNC: 9.1 G/DL (ref 13.3–17.7)
HGB UR QL STRIP: NEGATIVE
IGG SERPL-MCNC: 708 MG/DL (ref 610–1616)
IMM GRANULOCYTES # BLD: 0.1 10E3/UL
IMM GRANULOCYTES NFR BLD: 3 %
KETONES UR STRIP-MCNC: ABNORMAL MG/DL
LEUKOCYTE ESTERASE UR QL STRIP: NEGATIVE
LYMPHOCYTES # BLD AUTO: 0.1 10E3/UL (ref 0.8–5.3)
LYMPHOCYTES NFR BLD AUTO: 7 %
MCH RBC QN AUTO: 28.3 PG (ref 26.5–33)
MCHC RBC AUTO-ENTMCNC: 32 G/DL (ref 31.5–36.5)
MCV RBC AUTO: 88 FL (ref 78–100)
MONOCYTES # BLD AUTO: 0.6 10E3/UL (ref 0–1.3)
MONOCYTES NFR BLD AUTO: 29 %
MUCOUS THREADS #/AREA URNS LPF: PRESENT /LPF
NEUTROPHILS # BLD AUTO: 1.2 10E3/UL (ref 1.6–8.3)
NEUTROPHILS NFR BLD AUTO: 60 %
NITRATE UR QL: NEGATIVE
NRBC # BLD AUTO: 0 10E3/UL
NRBC BLD AUTO-RTO: 0 /100
PH UR STRIP: 5.5 [PH] (ref 5–7)
PLATELET # BLD AUTO: 103 10E3/UL (ref 150–450)
POTASSIUM SERPL-SCNC: 3.8 MMOL/L (ref 3.4–5.3)
PROT SERPL-MCNC: 6.8 G/DL (ref 6.4–8.3)
RBC # BLD AUTO: 3.22 10E6/UL (ref 4.4–5.9)
RBC URINE: 4 /HPF
SIROLIMUS BLD-MCNC: 10.5 UG/L (ref 5–15)
SODIUM SERPL-SCNC: 135 MMOL/L (ref 135–145)
SP GR UR STRIP: 1.03 (ref 1–1.03)
TME LAST DOSE: NORMAL H
TME LAST DOSE: NORMAL H
UROBILINOGEN UR STRIP-MCNC: NORMAL MG/DL
WBC # BLD AUTO: 2 10E3/UL (ref 4–11)
WBC URINE: 3 /HPF

## 2024-09-27 PROCEDURE — 87799 DETECT AGENT NOS DNA QUANT: CPT

## 2024-09-27 PROCEDURE — 81001 URINALYSIS AUTO W/SCOPE: CPT

## 2024-09-27 PROCEDURE — 85049 AUTOMATED PLATELET COUNT: CPT

## 2024-09-27 PROCEDURE — 82784 ASSAY IGA/IGD/IGG/IGM EACH: CPT

## 2024-09-27 PROCEDURE — 87086 URINE CULTURE/COLONY COUNT: CPT

## 2024-09-27 PROCEDURE — 250N000011 HC RX IP 250 OP 636: Performed by: STUDENT IN AN ORGANIZED HEALTH CARE EDUCATION/TRAINING PROGRAM

## 2024-09-27 PROCEDURE — 80195 ASSAY OF SIROLIMUS: CPT

## 2024-09-27 PROCEDURE — 80053 COMPREHEN METABOLIC PANEL: CPT

## 2024-09-27 PROCEDURE — 36592 COLLECT BLOOD FROM PICC: CPT

## 2024-09-27 PROCEDURE — G0463 HOSPITAL OUTPT CLINIC VISIT: HCPCS

## 2024-09-27 PROCEDURE — 99215 OFFICE O/P EST HI 40 MIN: CPT

## 2024-09-27 PROCEDURE — G2211 COMPLEX E/M VISIT ADD ON: HCPCS

## 2024-09-27 RX ORDER — HEPARIN SODIUM,PORCINE 10 UNIT/ML
5 VIAL (ML) INTRAVENOUS ONCE
Status: COMPLETED | OUTPATIENT
Start: 2024-09-27 | End: 2024-09-27

## 2024-09-27 RX ORDER — PANTOPRAZOLE SODIUM 40 MG/1
40 TABLET, DELAYED RELEASE ORAL 2 TIMES DAILY
Qty: 60 TABLET | Refills: 1 | Status: SHIPPED | OUTPATIENT
Start: 2024-09-27

## 2024-09-27 RX ADMIN — Medication 5 ML: at 09:29

## 2024-09-27 ASSESSMENT — PAIN SCALES - GENERAL: PAINLEVEL: NO PAIN (0)

## 2024-09-27 NOTE — LETTER
9/27/2024      Leland Pearson  [address] [place] [address]      Dear Colleague,    Thank you for referring your patient, Leland Pearson, to the [facility] BLOOD AND MARROW TRANSPLANT PROGRAM [place]. Please see a copy of my visit note below.    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     Referring provider: Dr. Delcid    History of Present Illness/Summary  Still with fatigue. No n/v. Struggling with burping, urinary frequency. Eating/drinking okay Drinking 1 ensure/day. Weight stable. No rashes. No fevers. Looser stools, 3x/day-took imodium last 2 days. Trouble sleeping.       Review of Systems: Negative except as mentioned above    Physical Exam     Vital Signs: /77   Pulse 120   Temp 98  F (36.7  C) (Oral)   Resp 16   Wt 84.6 kg (186 lb 6.4 oz)   SpO2 99%   BMI 24.97 kg/m    Wt Readings from Last 4 Encounters:   09/27/24 84.6 kg (186 lb 6.4 oz)   09/23/24 85.7 kg (188 lb 14.4 oz)   09/20/24 85.4 kg (188 lb 3.2 oz)   09/19/24 84.5 kg (186 lb 4.6 oz)     KPS: 80% Can perform normal activity with effort, some signs of disease    General: Alert, no distress  Eyes: Conjunctiva normal  Respiratory: Normal respiratory effort. CTAB  Cardiovascular: Normal perfusion, no significant edema. Regular, HR 120s  Abd: +BS, soft, NT, ND  Psych: Mood and affect are appropriate.  Line: right PICC    Lab Results   Component Value Date    WBC 2.0 (L) 09/27/2024    ANEU 1.0 (L) 09/17/2024    HGB 9.1 (L) 09/27/2024    HCT 28.4 (L) 09/27/2024     (L) 09/27/2024     09/23/2024    POTASSIUM 3.8 09/23/2024    CHLORIDE 102 09/23/2024    CO2 24 09/23/2024     (H) 09/23/2024    BUN 12.2 09/23/2024    CR 0.65 (L) 09/23/2024    MAG 2.0 09/18/2024    INR 1.26 (H)  09/16/2024         Plan     BMT/IEC PROTOCOL for MDS  - Chemo protocol: MT 2022-5; Flu/Cy/TBI  - Peripheral blood stem cell graft from 8/8 URD donor, ABO matched, Cell dose: 6.06 x10^6 CD34/kg  - Engraftment monitoring: Peripheral blood and bone marrow chimerisms on D28, D100, 6 months, 1 and 2 years  - Restaging plan: BMBx with FISH, cytogenetics and NGS on D28, D100, 6 months, 1 and 2 years  - D21 BMbx completed 9/13. - 9/13/24: BMBx remission, NGS pending. Bone marrow 100% donor. Peripheral blood CD3 92%    HEME/COAG  # Pancytopenia 2/2 chemo/BMT  - Last day of G (9/12). (9/18) ANC 0.8 - G given   - Transfusion parameters: hemoglobin <7, platelets <50 (increased while on Lovenox).      # Right sternocleidomastoid tenderness s/p CVC removal  #Occlusive thrombus of R internal jugular vein  #Nonocclusive thrombus or R axillary vein  - Started complaining of tenderness and swelling near right CVC site that was removed. Obtained US to further investigate.   - Positive for thrombus. No anticoagulation at this time due to thrombocytopenia. Will need to begin DOAC (per pharmacy preferably voriconazole + apixaban without loading dose) vs lovenox once platelets >50k.   (9/17) Ongoing right neck pain/swelling: will repeat ultrasound shows extension of previous clot. Started on Lovenox -- per Benign Heme will start Lovenox BID with PLT parameter >50. Will plan to discharge on lovenox for 3 months. Will have follow up with Dr. Price outpatient.     RISK OF GVHD  - Prophylaxis: PTCy 50mg/kg on D+3 and D+4; MMF (D+5 to 35); Sirolimus (starting D+5, target level 5-10).  - Siro 4.5mg/d. (9/23) Siro level pending  - stop mmf 9/27     ID  #Streptococcus mitis bacteremia (2/2 bottles) s/p cefepime/ceftriaxone then augmentin thru 9/20. Likely bacterial seeding of internal jugular clot  - ABX duration plan: per ID through 9/20. Sensitive to ceftriaxone q24hrs, can transition to non-pseudomonal coverage for discharge/better  engraftment.   - With anticipation for discharge, we will switch to Ceftriaxone (9/13-9/15). Resume cefepime 9/16 as below.      # MRSE bacteremia likely contaminant because peripheral stick, grew late (1/2). Dc'd Vanco (9/2-9/6)              - Repeat BC's x3 days NGTD    #Check UA/UC/BK virus in urine with urinary frequency     Prophylaxis plan:   - Fungal: Micafungin(in clinic), Now transitioned to posaconazole- started 9/24.  Plan for level ~10/2.  - PJP: Bactrim to start at D+28. Toxoplasma negative. Bactrim started 9/23  - Viral: Acyclovir 800mg BID. No need for letermovir as donor and recipient are CMV negative.      Monitoring:   - CMV weekly, neg 9/7  - EBV every 2 weeks from D30 to D180: (9/14) ND     GI/NUTRITION  # GERD: Protonix- increase to BID with complaints of belching.  # Loose stools: Imodium x 1 on 9/13. No indication to repeat C. Diff at this time. Holding Metamucil.  - Anti-emetics: Compazine daily at 1600 and PRN. lorazepam available PRN.   - Ulcer prophy: Protonix  - VOD prophy: Ursodiol      CARDIOVASCULAR  # HTN: PTA lisinopril stopped 8/26 d/t symptomatic orthostasis.   # CAD: Coronary artery calcifications seen on CT, stress test in 2023 negative  - Risk of cardiomyopathy: Baseline EF 55-60%.   - Risk of arrhythmia: Baseline EKG showed 421   # Orthostatic hypotension - 1L IVF 9/16. Continue flomax and ditropan for now with history of urinary discomfort as below.       RENAL/ELECTROLYTES/  - Electrolyte management: replace per sliding scale  - BPH: Continue PTA flomax.  - Hemorrhagic Cystitis secondary to cytoxan: no longer taking ditropan      MUSCULOSKELETAL/FRAILTY  - Baseline Frailty Score: Not frail (0)  - Daily PT/OT as needed while inpatient  # Gout: continue allopurinol      Neuro   # history of spinal stenosis, lumbosacral radiculopathy likely exacerbating.   - Pain management: Outpatient had been taking celecoxib for MSK pain, once a day. Stopped Inpatient will try Tramadol  hasn't used. Now discontinued.  Added robaxin-no longer taking 9/23    Supportive cares:  - Trial melatonin for sleep     SOCIAL DETERMINANTS  - Caregiver: wife, Vani. Lives within the required distance from hospital   - Financial/insurance concerns: None    Plan:  Check UA/UC and BK urine with complaints of urinary frequency  Increase protonix to BID  Trial melatonin for sleep   Continue Bactrim, posa  Requested visits next week M Th  Siro level pending.       I spent 50 minutes in the care of this patient today, which included time necessary for preparation for the visit, obtaining history, ordering medications/tests/procedures as medically indicated, review of pertinent medical literature, counseling of the patient, communication of recommendations to the care team, and documentation time.    The longitudinal plan of care for the diagnosis(es)/condition(s) as documented were addressed during this visit. Due to the added complexity in care, I will continue to support Leland in the subsequent management and with ongoing continuity of care.    Kaitlyn Aguilar, CNP  [facility] or Pager [phone]      Again, thank you for allowing me to participate in the care of your patient.        Sincerely,        BMT Advanced Practice Provider

## 2024-09-27 NOTE — PROGRESS NOTES
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     Referring provider: Dr. Delcid    History of Present Illness/Summary  Still with fatigue. No n/v. Struggling with burping, urinary frequency. Eating/drinking okay Drinking 1 ensure/day. Weight stable. No rashes. No fevers. Looser stools, 3x/day-took imodium last 2 days. Trouble sleeping.       Review of Systems: Negative except as mentioned above    Physical Exam     Vital Signs: /77   Pulse 120   Temp 98  F (36.7  C) (Oral)   Resp 16   Wt 84.6 kg (186 lb 6.4 oz)   SpO2 99%   BMI 24.97 kg/m    Wt Readings from Last 4 Encounters:   09/27/24 84.6 kg (186 lb 6.4 oz)   09/23/24 85.7 kg (188 lb 14.4 oz)   09/20/24 85.4 kg (188 lb 3.2 oz)   09/19/24 84.5 kg (186 lb 4.6 oz)     KPS: 80% Can perform normal activity with effort, some signs of disease    General: Alert, no distress  Eyes: Conjunctiva normal  Respiratory: Normal respiratory effort. CTAB  Cardiovascular: Normal perfusion, no significant edema. Regular, HR 120s  Abd: +BS, soft, NT, ND  Psych: Mood and affect are appropriate.  Line: right PICC    Lab Results   Component Value Date    WBC 2.0 (L) 09/27/2024    ANEU 1.0 (L) 09/17/2024    HGB 9.1 (L) 09/27/2024    HCT 28.4 (L) 09/27/2024     (L) 09/27/2024     09/23/2024    POTASSIUM 3.8 09/23/2024    CHLORIDE 102 09/23/2024    CO2 24 09/23/2024     (H) 09/23/2024    BUN 12.2 09/23/2024    CR 0.65 (L) 09/23/2024    MAG 2.0 09/18/2024    INR 1.26 (H) 09/16/2024         Plan     BMT/IEC PROTOCOL for MDS  - Chemo protocol: MT 2022-5; Flu/Cy/TBI  - Peripheral blood stem cell graft from 8/8 URD donor, ABO matched, Cell dose: 6.06 x10^6 CD34/kg  - Engraftment monitoring: Peripheral blood and bone marrow chimerisms on D28, D100, 6  months, 1 and 2 years  - Restaging plan: BMBx with FISH, cytogenetics and NGS on D28, D100, 6 months, 1 and 2 years  - D21 BMbx completed 9/13. - 9/13/24: BMBx remission, NGS pending. Bone marrow 100% donor. Peripheral blood CD3 92%    HEME/COAG  # Pancytopenia 2/2 chemo/BMT  - Last day of G (9/12). (9/18) ANC 0.8 - G given   - Transfusion parameters: hemoglobin <7, platelets <50 (increased while on Lovenox).      # Right sternocleidomastoid tenderness s/p CVC removal  #Occlusive thrombus of R internal jugular vein  #Nonocclusive thrombus or R axillary vein  - Started complaining of tenderness and swelling near right CVC site that was removed. Obtained US to further investigate.   - Positive for thrombus. No anticoagulation at this time due to thrombocytopenia. Will need to begin DOAC (per pharmacy preferably voriconazole + apixaban without loading dose) vs lovenox once platelets >50k.   (9/17) Ongoing right neck pain/swelling: will repeat ultrasound shows extension of previous clot. Started on Lovenox -- per Benign Heme will start Lovenox BID with PLT parameter >50. Will plan to discharge on lovenox for 3 months. Will have follow up with Dr. Price outpatient.     RISK OF GVHD  - Prophylaxis: PTCy 50mg/kg on D+3 and D+4; MMF (D+5 to 35); Sirolimus (starting D+5, target level 5-10).  - Siro 4.5mg/d. (9/23) Siro level pending  - stop mmf 9/27     ID  #Streptococcus mitis bacteremia (2/2 bottles) s/p cefepime/ceftriaxone then augmentin thru 9/20. Likely bacterial seeding of internal jugular clot  - ABX duration plan: per ID through 9/20. Sensitive to ceftriaxone q24hrs, can transition to non-pseudomonal coverage for discharge/better engraftment.   - With anticipation for discharge, we will switch to Ceftriaxone (9/13-9/15). Resume cefepime 9/16 as below.      # MRSE bacteremia likely contaminant because peripheral stick, grew late (1/2). Dc'd Vanco (9/2-9/6)              - Repeat BC's x3 days NGTD    #Check UA/UC/BK  virus in urine with urinary frequency     Prophylaxis plan:   - Fungal: Micafungin(in clinic), Now transitioned to posaconazole- started 9/24.  Plan for level ~10/2.  - PJP: Bactrim to start at D+28. Toxoplasma negative. Bactrim started 9/23  - Viral: Acyclovir 800mg BID. No need for letermovir as donor and recipient are CMV negative.      Monitoring:   - CMV weekly, neg 9/7  - EBV every 2 weeks from D30 to D180: (9/14) ND     GI/NUTRITION  # GERD: Protonix- increase to BID with complaints of belching.  # Loose stools: Imodium x 1 on 9/13. No indication to repeat C. Diff at this time. Holding Metamucil.  - Anti-emetics: Compazine daily at 1600 and PRN. lorazepam available PRN.   - Ulcer prophy: Protonix  - VOD prophy: Ursodiol      CARDIOVASCULAR  # HTN: PTA lisinopril stopped 8/26 d/t symptomatic orthostasis.   # CAD: Coronary artery calcifications seen on CT, stress test in 2023 negative  - Risk of cardiomyopathy: Baseline EF 55-60%.   - Risk of arrhythmia: Baseline EKG showed 421   # Orthostatic hypotension - 1L IVF 9/16. Continue flomax and ditropan for now with history of urinary discomfort as below.       RENAL/ELECTROLYTES/  - Electrolyte management: replace per sliding scale  - BPH: Continue PTA flomax.  - Hemorrhagic Cystitis secondary to cytoxan: no longer taking ditropan      MUSCULOSKELETAL/FRAILTY  - Baseline Frailty Score: Not frail (0)  - Daily PT/OT as needed while inpatient  # Gout: continue allopurinol      Neuro   # history of spinal stenosis, lumbosacral radiculopathy likely exacerbating.   - Pain management: Outpatient had been taking celecoxib for MSK pain, once a day. Stopped Inpatient will try Tramadol hasn't used. Now discontinued.  Added robaxin-no longer taking 9/23    Supportive cares:  - Trial melatonin for sleep     SOCIAL DETERMINANTS  - Caregiver: wife, Vani. Lives within the required distance from hospital   - Financial/insurance concerns: None    Plan:  Check UA/UC and BK urine  with complaints of urinary frequency  Increase protonix to BID  Trial melatonin for sleep   Continue Bactrim, posa  Requested visits next week M Th  Siro level pending.       I spent 50 minutes in the care of this patient today, which included time necessary for preparation for the visit, obtaining history, ordering medications/tests/procedures as medically indicated, review of pertinent medical literature, counseling of the patient, communication of recommendations to the care team, and documentation time.    The longitudinal plan of care for the diagnosis(es)/condition(s) as documented were addressed during this visit. Due to the added complexity in care, I will continue to support Leland in the subsequent management and with ongoing continuity of care.    Kaitlyn Aguilar, CNP  FiscalNote or Pager [phone]

## 2024-09-27 NOTE — NURSING NOTE
Chief Complaint   Patient presents with    Blood Draw     Labs drawn via PICC by RN in lab.  VS taken       Labs collected from PICC by RN, line flushed with saline and heparin.  Vitals taken. Pt checked in for appointment(s).    Jessica Jeter RN

## 2024-09-27 NOTE — NURSING NOTE
"Oncology Rooming Note    September 27, 2024 9:47 AM   Leland Pearson is a 69 year old male who presents for:    Chief Complaint   Patient presents with    Blood Draw     Labs drawn via PICC by RN in lab.  VS taken    Oncology Clinic Visit     Immunocompromised state associated with stem cell transplant         Initial Vitals: /77   Pulse 120   Temp 98  F (36.7  C) (Oral)   Resp 16   Wt 84.6 kg (186 lb 6.4 oz)   SpO2 99%   BMI 24.97 kg/m   Estimated body mass index is 24.97 kg/m  as calculated from the following:    Height as of 8/16/24: 1.84 m (6' 0.44\").    Weight as of this encounter: 84.6 kg (186 lb 6.4 oz). Body surface area is 2.08 meters squared.  No Pain (0) Comment: Data Unavailable   No LMP for male patient.  Allergies reviewed: Yes  Medications reviewed: Yes    Medications: Medication refills not needed today.  Pharmacy name entered into [email or web address]:    [id number] [address] [facility] - [place], [place] - [facility] [address] [facility] [address]    Frailty Screening:   Is the patient here for a new oncology consult visit in cancer care? 2. No      Clinical concerns: pt noticed he's been burping a lot, wonders if there is something he can take to prevent this. Noticed after hospital and has stayed persistent for a while now.     -Pt has problems urinating at night. Gets up an average of 6 times a night to go. Wondering if theres anything to help.      -Pt wonders if there is a sleep aid he can use at night. Hasn't been sleeping well.     -Pt has been having recurring diarrhea, not every stool but very frequently.     -Pt has been having trouble maintaining weight, looking for methods to help.     -needs scheduling for next week    Gabe Abarca"

## 2024-09-28 LAB — BACTERIA UR CULT: NO GROWTH

## 2024-09-29 LAB
BK VIRUS SPECIMEN TYPE: NORMAL
BKV DNA # SPEC NAA+PROBE: NOT DETECTED IU/ML

## 2024-09-30 ENCOUNTER — ONCOLOGY VISIT (OUTPATIENT)
Dept: TRANSPLANT | Facility: CLINIC | Age: 69
End: 2024-09-30
Payer: COMMERCIAL

## 2024-09-30 ENCOUNTER — APPOINTMENT (OUTPATIENT)
Dept: LAB | Facility: CLINIC | Age: 69
End: 2024-09-30
Attending: STUDENT IN AN ORGANIZED HEALTH CARE EDUCATION/TRAINING PROGRAM
Payer: COMMERCIAL

## 2024-09-30 VITALS
DIASTOLIC BLOOD PRESSURE: 70 MMHG | HEART RATE: 118 BPM | TEMPERATURE: 98.7 F | BODY MASS INDEX: 25.01 KG/M2 | RESPIRATION RATE: 16 BRPM | OXYGEN SATURATION: 99 % | WEIGHT: 186.7 LBS | SYSTOLIC BLOOD PRESSURE: 115 MMHG

## 2024-09-30 DIAGNOSIS — D84.822 IMMUNOCOMPROMISED STATE ASSOCIATED WITH STEM CELL TRANSPLANT (H): ICD-10-CM

## 2024-09-30 DIAGNOSIS — D46.9 MDS (MYELODYSPLASTIC SYNDROME) (H): ICD-10-CM

## 2024-09-30 DIAGNOSIS — Z94.84 HISTORY OF PERIPHERAL STEM CELL TRANSPLANT (H): ICD-10-CM

## 2024-09-30 DIAGNOSIS — Z79.899 ENCOUNTER FOR LONG-TERM (CURRENT) USE OF MEDICATIONS: ICD-10-CM

## 2024-09-30 DIAGNOSIS — Z94.84 IMMUNOCOMPROMISED STATE ASSOCIATED WITH STEM CELL TRANSPLANT (H): ICD-10-CM

## 2024-09-30 LAB
ALBUMIN SERPL BCG-MCNC: 3.6 G/DL (ref 3.5–5.2)
ALP SERPL-CCNC: 81 U/L (ref 40–150)
ALT SERPL W P-5'-P-CCNC: 36 U/L (ref 0–70)
ANION GAP SERPL CALCULATED.3IONS-SCNC: 11 MMOL/L (ref 7–15)
AST SERPL W P-5'-P-CCNC: 55 U/L (ref 0–45)
BASOPHILS # BLD AUTO: 0 10E3/UL (ref 0–0.2)
BASOPHILS NFR BLD AUTO: 1 %
BILIRUB SERPL-MCNC: 0.3 MG/DL
BUN SERPL-MCNC: 14.1 MG/DL (ref 8–23)
CALCIUM SERPL-MCNC: 9 MG/DL (ref 8.8–10.4)
CHLORIDE SERPL-SCNC: 101 MMOL/L (ref 98–107)
CMV DNA SPEC NAA+PROBE-ACNC: NOT DETECTED IU/ML
CREAT SERPL-MCNC: 0.69 MG/DL (ref 0.67–1.17)
EGFRCR SERPLBLD CKD-EPI 2021: >90 ML/MIN/1.73M2
EOSINOPHIL # BLD AUTO: 0.1 10E3/UL (ref 0–0.7)
EOSINOPHIL NFR BLD AUTO: 1 %
ERYTHROCYTE [DISTWIDTH] IN BLOOD BY AUTOMATED COUNT: 20.5 % (ref 10–15)
GLUCOSE SERPL-MCNC: 163 MG/DL (ref 70–99)
HCO3 SERPL-SCNC: 23 MMOL/L (ref 22–29)
HCT VFR BLD AUTO: 27.7 % (ref 40–53)
HGB BLD-MCNC: 9.2 G/DL (ref 13.3–17.7)
HSV1 DNA SPEC QL NAA+PROBE: NOT DETECTED
HSV2 DNA SPEC QL NAA+PROBE: NOT DETECTED
IMM GRANULOCYTES # BLD: 0 10E3/UL
IMM GRANULOCYTES NFR BLD: 1 %
LYMPHOCYTES # BLD AUTO: 0.2 10E3/UL (ref 0.8–5.3)
LYMPHOCYTES NFR BLD AUTO: 5 %
MAGNESIUM SERPL-MCNC: 1.9 MG/DL (ref 1.7–2.3)
MCH RBC QN AUTO: 29.6 PG (ref 26.5–33)
MCHC RBC AUTO-ENTMCNC: 33.2 G/DL (ref 31.5–36.5)
MCV RBC AUTO: 89 FL (ref 78–100)
MONOCYTES # BLD AUTO: 0.5 10E3/UL (ref 0–1.3)
MONOCYTES NFR BLD AUTO: 12 %
NEUTROPHILS # BLD AUTO: 3 10E3/UL (ref 1.6–8.3)
NEUTROPHILS NFR BLD AUTO: 80 %
NRBC # BLD AUTO: 0 10E3/UL
NRBC BLD AUTO-RTO: 0 /100
PLATELET # BLD AUTO: 109 10E3/UL (ref 150–450)
POTASSIUM SERPL-SCNC: 3.6 MMOL/L (ref 3.4–5.3)
PROT SERPL-MCNC: 6.5 G/DL (ref 6.4–8.3)
RBC # BLD AUTO: 3.11 10E6/UL (ref 4.4–5.9)
SIROLIMUS BLD-MCNC: 11 UG/L (ref 5–15)
SODIUM SERPL-SCNC: 135 MMOL/L (ref 135–145)
TME LAST DOSE: NORMAL H
TME LAST DOSE: NORMAL H
WBC # BLD AUTO: 3.7 10E3/UL (ref 4–11)

## 2024-09-30 PROCEDURE — G0463 HOSPITAL OUTPT CLINIC VISIT: HCPCS

## 2024-09-30 PROCEDURE — 83735 ASSAY OF MAGNESIUM: CPT

## 2024-09-30 PROCEDURE — 87529 HSV DNA AMP PROBE: CPT

## 2024-09-30 PROCEDURE — 80195 ASSAY OF SIROLIMUS: CPT

## 2024-09-30 PROCEDURE — 99215 OFFICE O/P EST HI 40 MIN: CPT

## 2024-09-30 PROCEDURE — 250N000011 HC RX IP 250 OP 636

## 2024-09-30 PROCEDURE — 36592 COLLECT BLOOD FROM PICC: CPT

## 2024-09-30 PROCEDURE — G2211 COMPLEX E/M VISIT ADD ON: HCPCS

## 2024-09-30 PROCEDURE — 85025 COMPLETE CBC W/AUTO DIFF WBC: CPT

## 2024-09-30 PROCEDURE — 80053 COMPREHEN METABOLIC PANEL: CPT

## 2024-09-30 RX ORDER — PROCHLORPERAZINE MALEATE 5 MG
5 TABLET ORAL EVERY 6 HOURS PRN
Qty: 30 TABLET | Refills: 0 | Status: SHIPPED | OUTPATIENT
Start: 2024-09-30

## 2024-09-30 RX ORDER — HEPARIN SODIUM,PORCINE 10 UNIT/ML
5 VIAL (ML) INTRAVENOUS ONCE
Status: COMPLETED | OUTPATIENT
Start: 2024-09-30 | End: 2024-09-30

## 2024-09-30 RX ADMIN — Medication 5 ML: at 09:00

## 2024-09-30 ASSESSMENT — PAIN SCALES - GENERAL: PAINLEVEL: NO PAIN (0)

## 2024-09-30 NOTE — PROGRESS NOTES
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     Referring provider: Dr. Delcid    History of Present Illness/Summary  Still burping a lot. Eating raisin bran for breakfast but no carbonation or other gas producing foods. Normal bowel movements. Increased protonix to twice daily without much change. No fevers. No bleeding. No rashes. Small sore on lower lip  No longer with urinary sxs  Sleeping better with melatonin; urinating less at night      Review of Systems: Negative except as mentioned above    Physical Exam     Vital Signs: /70 (BP Location: Left arm, Patient Position: Sitting, Cuff Size: Adult Regular)   Pulse 118   Temp 98.7  F (37.1  C) (Oral)   Resp 16   Wt 84.7 kg (186 lb 11.2 oz)   SpO2 99%   BMI 25.01 kg/m    Wt Readings from Last 4 Encounters:   09/30/24 84.7 kg (186 lb 11.2 oz)   09/27/24 84.6 kg (186 lb 6.4 oz)   09/23/24 85.7 kg (188 lb 14.4 oz)   09/20/24 85.4 kg (188 lb 3.2 oz)     KPS: 80% Can perform normal activity with effort, some signs of disease    General: Alert, no distress  Eyes: Conjunctiva normal  Mouth: small lesion on left lower lip  Respiratory: Normal respiratory effort. CTAB  Cardiovascular: Normal perfusion, no significant edema. tachy  Abd: +BS, soft, NT, ND  Psych: Mood and affect are appropriate.  Line: right PICC    Lab Results   Component Value Date    WBC 3.7 (L) 09/30/2024    ANEU 1.0 (L) 09/17/2024    HGB 9.2 (L) 09/30/2024    HCT 27.7 (L) 09/30/2024     (L) 09/30/2024     09/30/2024    POTASSIUM 3.6 09/30/2024    CHLORIDE 101 09/30/2024    CO2 23 09/30/2024     (H) 09/30/2024    BUN 14.1 09/30/2024    CR 0.69 09/30/2024    MAG 1.9 09/30/2024    INR 1.26 (H) 09/16/2024         Plan     BMT/IEC PROTOCOL for MDS  - Chemo protocol: MT  2022-5; Flu/Cy/TBI  - Peripheral blood stem cell graft from 8/8 URD donor, ABO matched, Cell dose: 6.06 x10^6 CD34/kg  - Engraftment monitoring: Peripheral blood and bone marrow chimerisms on D28, D100, 6 months, 1 and 2 years  - Restaging plan: BMBx with FISH, cytogenetics and NGS on D28, D100, 6 months, 1 and 2 years  - D21 BMbx completed 9/13. - 9/13/24: BMBx remission, NGS pending. Bone marrow 100% donor. Peripheral blood CD3 92%    HEME/COAG  # Pancytopenia 2/2 chemo/BMT  - Last day of G (9/12). (9/18) ANC 0.8 - G given   - Transfusion parameters: hemoglobin <7, platelets <50 (increased while on Lovenox).      # Right sternocleidomastoid tenderness s/p CVC removal  #Occlusive thrombus of R internal jugular vein  #Nonocclusive thrombus or R axillary vein  - Started complaining of tenderness and swelling near right CVC site that was removed. Obtained US to further investigate.   - Positive for thrombus. No anticoagulation at this time due to thrombocytopenia. Will need to begin DOAC (per pharmacy preferably voriconazole + apixaban without loading dose) vs lovenox once platelets >50k.   (9/17) Ongoing right neck pain/swelling: will repeat ultrasound shows extension of previous clot. Started on Lovenox -- per Benign Heme will start Lovenox BID with PLT parameter >50. Plan for lovenox for 3 months. Will have follow up with Dr. Price outpatient.     RISK OF GVHD  - Prophylaxis: PTCy 50mg/kg on D+3 and D+4; MMF (D+5 to 35); Sirolimus (starting D+5, target level 5-10).  - Siro 4.5mg/d. (9/23) Siro level 10.5 9/27; level pending today  - stop mmf 9/27     ID  #Streptococcus mitis bacteremia (2/2 bottles) s/p cefepime/ceftriaxone then augmentin thru 9/20. Likely bacterial seeding of internal jugular clot  - ABX duration plan: per ID through 9/20. Sensitive to ceftriaxone q24hrs, can transition to non-pseudomonal coverage for discharge/better engraftment.   - With anticipation for discharge, we will switch to  Ceftriaxone (9/13-9/15). Resume cefepime 9/16 as below.      # MRSE bacteremia likely contaminant because peripheral stick, grew late (1/2). Dc'd Vanco (9/2-9/6)              - Repeat BC's x3 days NGTD    #Check UA/UC/BK virus in urine with urinary frequency-negative 9/27     Prophylaxis plan:   - Fungal: Micafungin(in clinic), Now transitioned to posaconazole- started 9/24.  Plan for level ~10/2.  - PJP: Bactrim to start at D+28. Toxoplasma negative. Bactrim started 9/23  - Viral: Acyclovir 800mg BID. No need for letermovir as donor and recipient are CMV negative.  9/30 swab lip for HSV-in process     Monitoring:   - CMV weekly, neg 9/7  - EBV every 2 weeks from D30 to D180: (9/14) ND     GI/NUTRITION  # GERD: Protonix- increase to BID with complaints of belching.  - Anti-emetics: Compazine daily at 1600 and PRN. lorazepam available PRN.   - Ulcer prophy: Protonix  - VOD prophy: Ursodiol      CARDIOVASCULAR  # HTN: PTA lisinopril stopped 8/26 d/t symptomatic orthostasis.   # CAD: Coronary artery calcifications seen on CT, stress test in 2023 negative  - Risk of cardiomyopathy: Baseline EF 55-60%.   - Risk of arrhythmia: Baseline EKG showed 421       RENAL/ELECTROLYTES/  - Electrolyte management: replace per sliding scale  - BPH: Continue PTA flomax.  - Hemorrhagic Cystitis secondary to cytoxan: hold ditropan for now as may be contributing to belching      MUSCULOSKELETAL/FRAILTY  - Baseline Frailty Score: Not frail (0)  - Daily PT/OT as needed while inpatient  # Gout: continue allopurinol      Neuro   # history of spinal stenosis, lumbosacral radiculopathy likely exacerbating.   - Pain management: Outpatient had been taking celecoxib for MSK pain, once a day. Stopped Inpatient will try Tramadol hasn't used. Now discontinued.  Added robaxin-no longer taking 9/23    Supportive cares:  - Trial melatonin for sleep     SOCIAL DETERMINANTS  - Caregiver: wife, Vani. Lives within the required distance from hospital   -  Financial/insurance concerns: None    Plan:  Continue Bactrim, posa-posa level Thursday. Consider removing PICC when therapeutic  Next visit th, then consider weekly  Siro level pending.   Hold ditropan  Swab lip for hsv      I spent 40 minutes in the care of this patient today, which included time necessary for preparation for the visit, obtaining history, ordering medications/tests/procedures as medically indicated, review of pertinent medical literature, counseling of the patient, communication of recommendations to the care team, and documentation time.    The longitudinal plan of care for the diagnosis(es)/condition(s) as documented were addressed during this visit. Due to the added complexity in care, I will continue to support Leland in the subsequent management and with ongoing continuity of care.    Jennie Watkins NP

## 2024-09-30 NOTE — NURSING NOTE
Chief Complaint   Patient presents with    Oncology Clinic Visit     RTN for MDS    Blood Draw     Labs drawn via picc line by RN in lab. VS taken.      Labs collected from PICC.  Line flushed with saline and heparin.  Vitals taken and pt checked in for appt.    Doris Bay RN

## 2024-09-30 NOTE — NURSING NOTE
"Oncology Rooming Note    September 30, 2024 9:06 AM   Leland Pearson is a 69 year old male who presents for:    Chief Complaint   Patient presents with    Oncology Clinic Visit     RTN for MDS     Initial Vitals: /70 (BP Location: Left arm, Patient Position: Sitting, Cuff Size: Adult Regular)   Pulse 118   Temp 98.7  F (37.1  C) (Oral)   Resp 16   Wt 84.7 kg (186 lb 11.2 oz)   SpO2 99%   BMI 25.01 kg/m   Estimated body mass index is 25.01 kg/m  as calculated from the following:    Height as of 8/16/24: 1.84 m (6' 0.44\").    Weight as of this encounter: 84.7 kg (186 lb 11.2 oz). Body surface area is 2.08 meters squared.  No Pain (0) Comment: Data Unavailable   No LMP for male patient.  Allergies reviewed: Yes  Medications reviewed: Yes    Medications: Medication refills not needed today.  Pharmacy name entered into [employer]:    [id number] - [place], [place] - [address] [place] [address] [facility] - [place], [place] - [address]    Frailty Screening:   Is the patient here for a new oncology consult visit in cancer care? 2. No      Clinical concerns: none       Ivett Chapa MA             "

## 2024-10-01 ENCOUNTER — TELEPHONE (OUTPATIENT)
Dept: TRANSPLANT | Facility: CLINIC | Age: 69
End: 2024-10-01
Payer: COMMERCIAL

## 2024-10-01 NOTE — TELEPHONE ENCOUNTER
I spoke with Leland and his wife Vani regarding Leland's sirolimus level from clinic visit dated 9/30, which resulted as 11.0 on 9/30. His current sirolimus dose is 1 mg daily.     Discussed with Dr Murphy. Leland is to decrease his dose to alternating days of 1 mg and 0.5 mg. Sirolimus level is within goal range of 8-12 but on higher side and posaconazole is getting to steady state so expect siro level to continue to rise. He has not taken it yet today so they will update his med box and give 0.5 mg today. Plan to recheck level 10/7, and posaconazole level planned for 10/3. Patient was instructed to hold dose prior to labs. Leland repeated these directions to me and voiced his understanding.     García Hernandez, PharmD  Hematology/Oncology & BMT Clinical Pharmacist  October 1, 2024

## 2024-10-03 ENCOUNTER — ONCOLOGY VISIT (OUTPATIENT)
Dept: TRANSPLANT | Facility: CLINIC | Age: 69
End: 2024-10-03
Payer: COMMERCIAL

## 2024-10-03 ENCOUNTER — APPOINTMENT (OUTPATIENT)
Dept: LAB | Facility: CLINIC | Age: 69
End: 2024-10-03
Attending: STUDENT IN AN ORGANIZED HEALTH CARE EDUCATION/TRAINING PROGRAM
Payer: COMMERCIAL

## 2024-10-03 VITALS
DIASTOLIC BLOOD PRESSURE: 79 MMHG | OXYGEN SATURATION: 100 % | BODY MASS INDEX: 25.19 KG/M2 | HEART RATE: 104 BPM | TEMPERATURE: 98 F | SYSTOLIC BLOOD PRESSURE: 135 MMHG | RESPIRATION RATE: 16 BRPM | WEIGHT: 188 LBS

## 2024-10-03 DIAGNOSIS — Z94.84 IMMUNOCOMPROMISED STATE ASSOCIATED WITH STEM CELL TRANSPLANT (H): ICD-10-CM

## 2024-10-03 DIAGNOSIS — D46.9 MDS (MYELODYSPLASTIC SYNDROME) (H): ICD-10-CM

## 2024-10-03 DIAGNOSIS — D84.822 IMMUNOCOMPROMISED STATE ASSOCIATED WITH STEM CELL TRANSPLANT (H): ICD-10-CM

## 2024-10-03 LAB
ALBUMIN SERPL BCG-MCNC: 3.6 G/DL (ref 3.5–5.2)
ALP SERPL-CCNC: 86 U/L (ref 40–150)
ALT SERPL W P-5'-P-CCNC: 38 U/L (ref 0–70)
ANION GAP SERPL CALCULATED.3IONS-SCNC: 12 MMOL/L (ref 7–15)
AST SERPL W P-5'-P-CCNC: 51 U/L (ref 0–45)
BASOPHILS # BLD AUTO: 0 10E3/UL (ref 0–0.2)
BASOPHILS NFR BLD AUTO: 1 %
BILIRUB SERPL-MCNC: 0.3 MG/DL
BUN SERPL-MCNC: 13.6 MG/DL (ref 8–23)
CALCIUM SERPL-MCNC: 8.8 MG/DL (ref 8.8–10.4)
CHLORIDE SERPL-SCNC: 102 MMOL/L (ref 98–107)
CREAT SERPL-MCNC: 0.79 MG/DL (ref 0.67–1.17)
EGFRCR SERPLBLD CKD-EPI 2021: >90 ML/MIN/1.73M2
EOSINOPHIL # BLD AUTO: 0.1 10E3/UL (ref 0–0.7)
EOSINOPHIL NFR BLD AUTO: 2 %
ERYTHROCYTE [DISTWIDTH] IN BLOOD BY AUTOMATED COUNT: 20.8 % (ref 10–15)
GLUCOSE SERPL-MCNC: 130 MG/DL (ref 70–99)
HCO3 SERPL-SCNC: 23 MMOL/L (ref 22–29)
HCT VFR BLD AUTO: 27 % (ref 40–53)
HGB BLD-MCNC: 9.1 G/DL (ref 13.3–17.7)
IMM GRANULOCYTES # BLD: 0 10E3/UL
IMM GRANULOCYTES NFR BLD: 0 %
LYMPHOCYTES # BLD AUTO: 0.5 10E3/UL (ref 0.8–5.3)
LYMPHOCYTES NFR BLD AUTO: 16 %
MCH RBC QN AUTO: 29.7 PG (ref 26.5–33)
MCHC RBC AUTO-ENTMCNC: 33.7 G/DL (ref 31.5–36.5)
MCV RBC AUTO: 88 FL (ref 78–100)
MONOCYTES # BLD AUTO: 0.7 10E3/UL (ref 0–1.3)
MONOCYTES NFR BLD AUTO: 23 %
NEUTROPHILS # BLD AUTO: 1.7 10E3/UL (ref 1.6–8.3)
NEUTROPHILS NFR BLD AUTO: 58 %
NRBC # BLD AUTO: 0 10E3/UL
NRBC BLD AUTO-RTO: 0 /100
PLATELET # BLD AUTO: 131 10E3/UL (ref 150–450)
POTASSIUM SERPL-SCNC: 3.9 MMOL/L (ref 3.4–5.3)
PROT SERPL-MCNC: 6.4 G/DL (ref 6.4–8.3)
RBC # BLD AUTO: 3.06 10E6/UL (ref 4.4–5.9)
SIROLIMUS BLD-MCNC: 10.5 UG/L (ref 5–15)
SODIUM SERPL-SCNC: 137 MMOL/L (ref 135–145)
TME LAST DOSE: NORMAL H
TME LAST DOSE: NORMAL H
WBC # BLD AUTO: 2.9 10E3/UL (ref 4–11)

## 2024-10-03 PROCEDURE — 99215 OFFICE O/P EST HI 40 MIN: CPT

## 2024-10-03 PROCEDURE — G0463 HOSPITAL OUTPT CLINIC VISIT: HCPCS

## 2024-10-03 PROCEDURE — 80195 ASSAY OF SIROLIMUS: CPT

## 2024-10-03 PROCEDURE — 250N000011 HC RX IP 250 OP 636

## 2024-10-03 PROCEDURE — 85004 AUTOMATED DIFF WBC COUNT: CPT

## 2024-10-03 PROCEDURE — G2211 COMPLEX E/M VISIT ADD ON: HCPCS

## 2024-10-03 PROCEDURE — 80053 COMPREHEN METABOLIC PANEL: CPT

## 2024-10-03 PROCEDURE — 80187 DRUG ASSAY POSACONAZOLE: CPT

## 2024-10-03 PROCEDURE — 36592 COLLECT BLOOD FROM PICC: CPT

## 2024-10-03 RX ORDER — HEPARIN SODIUM,PORCINE 10 UNIT/ML
5 VIAL (ML) INTRAVENOUS ONCE
Status: COMPLETED | OUTPATIENT
Start: 2024-10-03 | End: 2024-10-03

## 2024-10-03 RX ADMIN — Medication 5 ML: at 15:47

## 2024-10-03 ASSESSMENT — PAIN SCALES - GENERAL: PAINLEVEL: NO PAIN (0)

## 2024-10-03 NOTE — PROGRESS NOTES
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     Referring provider: Dr. Delcid    History of Present Illness/Summary  Mr Pearson returns for follow up with his wife. Still with bothersome gas and burping. No improvement on BID protonix. No lela n/v, no loose stools. No rash. No fevers or infectious complaints. Lower lip sore which he thinks was a canker sore resolved. Now he has a little irritation in the back upper right molar. No bleeding, no discharge or foul taste. Tooth without pain.      Review of Systems: Negative except as mentioned above    Physical Exam     Vital Signs: /79   Pulse 104   Temp 98  F (36.7  C)   Resp 16   Wt 85.3 kg (188 lb)   SpO2 100%   BMI 25.19 kg/m    Wt Readings from Last 4 Encounters:   10/03/24 85.3 kg (188 lb)   09/30/24 84.7 kg (186 lb 11.2 oz)   09/27/24 84.6 kg (186 lb 6.4 oz)   09/23/24 85.7 kg (188 lb 14.4 oz)     KPS: 80% Can perform normal activity with effort, some signs of disease    General: Alert, no distress  Eyes: Conjunctiva normal  Mouth: no ulcerations visible. No visible erythema or swelling at site of discomfort around back right upper molar  Respiratory: Normal respiratory effort. CTAB  Cardiovascular: Normal perfusion, no significant edema. tachy  Abd: +BS, soft, NT, ND  Psych: Mood and affect are appropriate.  Line: right PICC    Lab Results   Component Value Date    WBC 2.9 (L) 10/03/2024    ANEU 1.0 (L) 09/17/2024    HGB 9.1 (L) 10/03/2024    HCT 27.0 (L) 10/03/2024     (L) 10/03/2024     10/03/2024    POTASSIUM 3.9 10/03/2024    CHLORIDE 102 10/03/2024    CO2 23 10/03/2024     (H) 10/03/2024    BUN 13.6 10/03/2024    CR 0.79 10/03/2024    MAG 1.9 09/30/2024    INR 1.26 (H) 09/16/2024         Plan     BMT/IEC PROTOCOL  for MDS  - Chemo protocol: MT 2022-5; Flu/Cy/TBI  - Peripheral blood stem cell graft from 8/8 URD donor, ABO matched, Cell dose: 6.06 x10^6 CD34/kg  - Engraftment monitoring: Peripheral blood and bone marrow chimerisms on D28, D100, 6 months, 1 and 2 years  - Restaging plan: BMBx with FISH, cytogenetics and NGS on D28, D100, 6 months, 1 and 2 years  - D21 BMbx completed 9/13. - 9/13/24: BMBx remission, NGS pending. Bone marrow 100% donor. Peripheral blood CD3 92%    HEME/COAG  # Pancytopenia 2/2 chemo/BMT  - Last day of G (9/12). (9/18) ANC 0.8 - G given   - Transfusion parameters: hemoglobin <7, platelets <50 (increased while on Lovenox).      # Right sternocleidomastoid tenderness s/p CVC removal  #Occlusive thrombus of R internal jugular vein  #Nonocclusive thrombus or R axillary vein  - Started complaining of tenderness and swelling near right CVC site that was removed. Obtained US to further investigate.   - Positive for thrombus. No anticoagulation at this time due to thrombocytopenia. Will need to begin DOAC (per pharmacy preferably voriconazole + apixaban without loading dose) vs lovenox once platelets >50k.   (9/17) Ongoing right neck pain/swelling: will repeat ultrasound shows extension of previous clot. Started on Lovenox -- per Benign Heme will start Lovenox BID with PLT parameter >50. Plan for lovenox for 3 months. Will have follow up with Dr. Price outpatient.     RISK OF GVHD  - Prophylaxis: PTCy 50mg/kg on D+3 and D+4; MMF (D+5 to 35); Sirolimus (starting D+5, target level 5-10).  - Siro 1mg/0.5mg alternating (lowered for posa). Level pending today, 10/3. He held the dose appropriately   - stop mmf 9/27     ID afebrile  H/o  #Streptococcus mitis bacteremia (2/2 bottles) s/p cefepime/ceftriaxone then augmentin thru 9/20. Likely bacterial seeding of internal jugular clot  - ABX duration plan: per ID through 9/20. Sensitive to ceftriaxone q24hrs, can transition to non-pseudomonal coverage for  discharge/better engraftment.   - With anticipation for discharge, we will switch to Ceftriaxone (9/13-9/15). Resume cefepime 9/16 as below.      # MRSE bacteremia likely contaminant because peripheral stick, grew late (1/2). Dc'd Vanco (9/2-9/6)              - Repeat BC's x3 days NGTD    #Check UA/UC/BK virus in urine with urinary frequency-negative 9/27     Prophylaxis plan:   - Fungal: Micafungin(in clinic), Now transitioned to posaconazole- started 9/24. Level 10/3 but he took the dose already today. Will discuss with pharmd, next week he will hold posa appropriately  - PJP: Bactrim to start at D+28. Toxoplasma negative. Bactrim started 9/23  - Viral: Acyclovir 800mg BID. No need for letermovir as donor and recipient are CMV negative.  9/30 swab lip for HSV- NEG     Monitoring:   - CMV weekly, neg 9/30  - EBV every 2 weeks from D30 to D180: neg 9/27     GI/NUTRITION  - Anti-emetics: Compazine daily at 1600 and PRN. lorazepam available PRN.   - Ulcer prophy: Protonix back to daily as it did not improve gas/belching. Already avoiding carbinated beverages. Hopeful it will improve off MMF  - VOD prophy: Ursodiol      CARDIOVASCULAR  # HTN: PTA lisinopril stopped 8/26 d/t symptomatic orthostasis.   # CAD: Coronary artery calcifications seen on CT, stress test in 2023 negative  - Risk of cardiomyopathy: Baseline EF 55-60%.   - Risk of arrhythmia: Baseline EKG showed 421       RENAL/ELECTROLYTES/  - Electrolyte management: replace per sliding scale  - BPH: Continue PTA flomax.  - Hemorrhagic Cystitis secondary to cytoxan: hold ditropan for now as may be contributing to belching      MUSCULOSKELETAL/FRAILTY  - Baseline Frailty Score: Not frail (0)  # Gout: continue allopurinol      Neuro   # history of spinal stenosis, lumbosacral radiculopathy likely exacerbating.   - Pain management: Outpatient had been taking celecoxib for MSK pain, once a day. Stopped Inpatient will try Tramadol hasn't used. Now discontinued.   Added robaxin-no longer taking 9/23    Supportive cares  - Oral cares for aphthous ulcers/gum irritation  - Trial melatonin for sleep     SOCIAL DETERMINANTS  - Caregiver: wife, Vani. Lives within the required distance from hospital   - Financial/insurance concerns: None    Plan:  Posa level in process, however it is not a trough. Check next week as trough. Consider removing PICC when therapeutic  Siro level pending.   Call if gum discomfort worsens, for any other new or worsening sx.    RTC 1 week    I spent 40 minutes in the care of this patient today, which included time necessary for preparation for the visit, obtaining history, ordering medications/tests/procedures as medically indicated, review of pertinent medical literature, counseling of the patient, communication of recommendations to the care team, and documentation time.    The longitudinal plan of care for the diagnosis(es)/condition(s) as documented were addressed during this visit. Due to the added complexity in care, I will continue to support Leland in the subsequent management and with ongoing continuity of care.    Dianna Aguillon PA-C

## 2024-10-03 NOTE — PROGRESS NOTES
AUTH REQUIRED DAY ORDERS (or CHANGE IN ORDERS) ARE RECEIVED. PLEASE NOTIFY PA TEAM ONCE ORDERS RECEIVED.     09/10/2024: ****IV ABX IS COVERED VIA CADD ONLY*****  OTHER THERAPY: RETURN TO INTAKE FOR COVERAGE DETERMINATION. COVERAGE ISSUE-TPA WILL ONLY BE COVERED IN AN OUTPATIENT SETTING  LINE CARE IS COVERED. SV    [facility] CANNOT DO NURSING IF PT IS HB, IF NOT CAN BE IVN IF PT AGREES TO SELF PAY.

## 2024-10-03 NOTE — LETTER
10/3/2024      Leland Pearson  [address] [place] [address]      Dear Colleague,    Thank you for referring your patient, Leland Pearson, to the [facility] [place]. Please see a copy of my visit note below.    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     Referring provider: Dr. Delcid    History of Present Illness/Summary  Mr Pearson returns for follow up with his wife. Still with bothersome gas and burping. No improvement on BID protonix. No lela n/v, no loose stools. No rash. No fevers or infectious complaints. Lower lip sore which he thinks was a canker sore resolved. Now he has a little irritation in the back upper right molar. No bleeding, no discharge or foul taste. Tooth without pain.      Review of Systems: Negative except as mentioned above    Physical Exam     Vital Signs: /79   Pulse 104   Temp 98  F (36.7  C)   Resp 16   Wt 85.3 kg (188 lb)   SpO2 100%   BMI 25.19 kg/m    Wt Readings from Last 4 Encounters:   10/03/24 85.3 kg (188 lb)   09/30/24 84.7 kg (186 lb 11.2 oz)   09/27/24 84.6 kg (186 lb 6.4 oz)   09/23/24 85.7 kg (188 lb 14.4 oz)     KPS: 80% Can perform normal activity with effort, some signs of disease    General: Alert, no distress  Eyes: Conjunctiva normal  Mouth: no ulcerations visible. No visible erythema or swelling at site of discomfort around back right upper molar  Respiratory: Normal respiratory effort. CTAB  Cardiovascular: Normal perfusion, no significant edema. tachy  Abd: +BS, soft, NT, ND  Psych: Mood and affect are appropriate.  Line: right PICC    Lab Results   Component Value Date    WBC 2.9 (L) 10/03/2024    ANEU 1.0 (L) 09/17/2024    HGB 9.1 (L) 10/03/2024    HCT 27.0 (L)  10/03/2024     (L) 10/03/2024     10/03/2024    POTASSIUM 3.9 10/03/2024    CHLORIDE 102 10/03/2024    CO2 23 10/03/2024     (H) 10/03/2024    BUN 13.6 10/03/2024    CR 0.79 10/03/2024    MAG 1.9 09/30/2024    INR 1.26 (H) 09/16/2024         Plan     BMT/IEC PROTOCOL for MDS  - Chemo protocol: MT 2022-5; Flu/Cy/TBI  - Peripheral blood stem cell graft from 8/8 URD donor, ABO matched, Cell dose: 6.06 x10^6 CD34/kg  - Engraftment monitoring: Peripheral blood and bone marrow chimerisms on D28, D100, 6 months, 1 and 2 years  - Restaging plan: BMBx with FISH, cytogenetics and NGS on D28, D100, 6 months, 1 and 2 years  - D21 BMbx completed 9/13. - 9/13/24: BMBx remission, NGS pending. Bone marrow 100% donor. Peripheral blood CD3 92%    HEME/COAG  # Pancytopenia 2/2 chemo/BMT  - Last day of G (9/12). (9/18) ANC 0.8 - G given   - Transfusion parameters: hemoglobin <7, platelets <50 (increased while on Lovenox).      # Right sternocleidomastoid tenderness s/p CVC removal  #Occlusive thrombus of R internal jugular vein  #Nonocclusive thrombus or R axillary vein  - Started complaining of tenderness and swelling near right CVC site that was removed. Obtained US to further investigate.   - Positive for thrombus. No anticoagulation at this time due to thrombocytopenia. Will need to begin DOAC (per pharmacy preferably voriconazole + apixaban without loading dose) vs lovenox once platelets >50k.   (9/17) Ongoing right neck pain/swelling: will repeat ultrasound shows extension of previous clot. Started on Lovenox -- per Benign Heme will start Lovenox BID with PLT parameter >50. Plan for lovenox for 3 months. Will have follow up with Dr. Price outpatient.     RISK OF GVHD  - Prophylaxis: PTCy 50mg/kg on D+3 and D+4; MMF (D+5 to 35); Sirolimus (starting D+5, target level 5-10).  - Siro 1mg/0.5mg alternating (lowered for posa). Level pending today, 10/3. He held the dose appropriately   - stop mmf 9/27     ID  afebrile  H/o  #Streptococcus mitis bacteremia (2/2 bottles) s/p cefepime/ceftriaxone then augmentin thru 9/20. Likely bacterial seeding of internal jugular clot  - ABX duration plan: per ID through 9/20. Sensitive to ceftriaxone q24hrs, can transition to non-pseudomonal coverage for discharge/better engraftment.   - With anticipation for discharge, we will switch to Ceftriaxone (9/13-9/15). Resume cefepime 9/16 as below.      # MRSE bacteremia likely contaminant because peripheral stick, grew late (1/2). Dc'd Vanco (9/2-9/6)              - Repeat BC's x3 days NGTD    #Check UA/UC/BK virus in urine with urinary frequency-negative 9/27     Prophylaxis plan:   - Fungal: Micafungin(in clinic), Now transitioned to posaconazole- started 9/24. Level 10/3 but he took the dose already today. Will discuss with pharmd, next week he will hold posa appropriately  - PJP: Bactrim to start at D+28. Toxoplasma negative. Bactrim started 9/23  - Viral: Acyclovir 800mg BID. No need for letermovir as donor and recipient are CMV negative.  9/30 swab lip for HSV- NEG     Monitoring:   - CMV weekly, neg 9/30  - EBV every 2 weeks from D30 to D180: neg 9/27     GI/NUTRITION  - Anti-emetics: Compazine daily at 1600 and PRN. lorazepam available PRN.   - Ulcer prophy: Protonix back to daily as it did not improve gas/belching. Already avoiding carbinated beverages. Hopeful it will improve off MMF  - VOD prophy: Ursodiol      CARDIOVASCULAR  # HTN: PTA lisinopril stopped 8/26 d/t symptomatic orthostasis.   # CAD: Coronary artery calcifications seen on CT, stress test in 2023 negative  - Risk of cardiomyopathy: Baseline EF 55-60%.   - Risk of arrhythmia: Baseline EKG showed 421       RENAL/ELECTROLYTES/  - Electrolyte management: replace per sliding scale  - BPH: Continue PTA flomax.  - Hemorrhagic Cystitis secondary to cytoxan: hold ditropan for now as may be contributing to belching      MUSCULOSKELETAL/FRAILTY  - Baseline Frailty Score: Not  frail (0)  # Gout: continue allopurinol      Neuro   # history of spinal stenosis, lumbosacral radiculopathy likely exacerbating.   - Pain management: Outpatient had been taking celecoxib for MSK pain, once a day. Stopped Inpatient will try Tramadol hasn't used. Now discontinued.  Added robaxin-no longer taking 9/23    Supportive cares  - Oral cares for aphthous ulcers/gum irritation  - Trial melatonin for sleep     SOCIAL DETERMINANTS  - Caregiver: wife, Vani. Lives within the required distance from hospital   - Financial/insurance concerns: None    Plan:  Posa level in process, however it is not a trough. Check next week as trough. Consider removing PICC when therapeutic  Siro level pending.   Call if gum discomfort worsens, for any other new or worsening sx.    RTC 1 week    I spent 40 minutes in the care of this patient today, which included time necessary for preparation for the visit, obtaining history, ordering medications/tests/procedures as medically indicated, review of pertinent medical literature, counseling of the patient, communication of recommendations to the care team, and documentation time.    The longitudinal plan of care for the diagnosis(es)/condition(s) as documented were addressed during this visit. Due to the added complexity in care, I will continue to support Leland in the subsequent management and with ongoing continuity of care.    Dianna Aguillon PA-C      Again, thank you for allowing me to participate in the care of your patient.        Sincerely,        [facility] Advanced Practice Provider

## 2024-10-03 NOTE — NURSING NOTE
Chief Complaint   Patient presents with    Blood Draw     Labs collected from PICC.  Line flushed with saline and heparin locked.  Vitals taken and pt checked in for appt.        Labs collected from PICC.  Line flushed with saline and heparin locked.  Vitals taken and pt checked in for appt.     Malu Bush RN

## 2024-10-03 NOTE — NURSING NOTE
"Oncology Rooming Note    October 3, 2024 3:56 PM   Leland Pearson is a 69 year old male who presents for:    Chief Complaint   Patient presents with    Blood Draw     Labs collected from PICC.  Line flushed with saline and heparin locked.  Vitals taken and pt checked in for appt.       Oncology Clinic Visit     Return; hx MDS s/p BMT     Initial Vitals: /79   Pulse 104   Temp 98  F (36.7  C)   Resp 16   Wt 85.3 kg (188 lb)   SpO2 100%   BMI 25.19 kg/m   Estimated body mass index is 25.19 kg/m  as calculated from the following:    Height as of 8/16/24: 1.84 m (6' 0.44\").    Weight as of this encounter: 85.3 kg (188 lb). Body surface area is 2.09 meters squared.  No Pain (0) Comment: Data Unavailable   No LMP for male patient.  Allergies reviewed: Yes  Medications reviewed: Yes    Medications: MEDICATION REFILLS NEEDED TODAY. Provider was notified.  Pharmacy name entered into [facility]:    [id number] [address] [facility] - [place], [place] - [address]    Frailty Screening:   Is the patient here for a new oncology consult visit in cancer care? 2. No      Clinical concerns: Wondering about sun sensitivity/how long to be cautious.       Viola Lloyd RN              "

## 2024-10-04 DIAGNOSIS — Z94.84 HISTORY OF PERIPHERAL STEM CELL TRANSPLANT (H): ICD-10-CM

## 2024-10-04 DIAGNOSIS — D46.9 MDS (MYELODYSPLASTIC SYNDROME) (H): ICD-10-CM

## 2024-10-04 LAB — CMV DNA SPEC NAA+PROBE-ACNC: NOT DETECTED IU/ML

## 2024-10-04 RX ORDER — URSODIOL 300 MG/1
300 CAPSULE ORAL 3 TIMES DAILY
Qty: 90 CAPSULE | Refills: 0 | Status: SHIPPED | OUTPATIENT
Start: 2024-10-04 | End: 2024-11-01

## 2024-10-04 RX ORDER — SULFAMETHOXAZOLE AND TRIMETHOPRIM 800; 160 MG/1; MG/1
1 TABLET ORAL
Qty: 16 TABLET | Refills: 0 | Status: SHIPPED | OUTPATIENT
Start: 2024-10-07 | End: 2024-11-01

## 2024-10-05 LAB — POSACONAZOLE SERPL-MCNC: 1.7 UG/ML

## 2024-10-07 ENCOUNTER — LAB (OUTPATIENT)
Dept: LAB | Facility: CLINIC | Age: 69
End: 2024-10-07
Attending: STUDENT IN AN ORGANIZED HEALTH CARE EDUCATION/TRAINING PROGRAM
Payer: COMMERCIAL

## 2024-10-07 ENCOUNTER — TELEPHONE (OUTPATIENT)
Dept: TRANSPLANT | Facility: CLINIC | Age: 69
End: 2024-10-07
Payer: COMMERCIAL

## 2024-10-07 DIAGNOSIS — Z94.84 HISTORY OF PERIPHERAL STEM CELL TRANSPLANT (H): ICD-10-CM

## 2024-10-07 DIAGNOSIS — D84.822 IMMUNOCOMPROMISED STATE ASSOCIATED WITH STEM CELL TRANSPLANT (H): ICD-10-CM

## 2024-10-07 DIAGNOSIS — J02.9 SORE THROAT: Primary | ICD-10-CM

## 2024-10-07 DIAGNOSIS — Z94.84 IMMUNOCOMPROMISED STATE ASSOCIATED WITH STEM CELL TRANSPLANT (H): ICD-10-CM

## 2024-10-07 DIAGNOSIS — D46.9 MDS (MYELODYSPLASTIC SYNDROME) (H): ICD-10-CM

## 2024-10-07 DIAGNOSIS — J02.9 SORE THROAT: ICD-10-CM

## 2024-10-07 DIAGNOSIS — Z76.82 STEM CELL TRANSPLANT CANDIDATE: ICD-10-CM

## 2024-10-07 LAB
DEPRECATED S PYO AG THROAT QL EIA: NEGATIVE
GROUP A STREP BY PCR: NOT DETECTED

## 2024-10-07 PROCEDURE — 99000 SPECIMEN HANDLING OFFICE-LAB: CPT | Performed by: PATHOLOGY

## 2024-10-07 PROCEDURE — 87651 STREP A DNA AMP PROBE: CPT | Performed by: STUDENT IN AN ORGANIZED HEALTH CARE EDUCATION/TRAINING PROGRAM

## 2024-10-07 NOTE — TELEPHONE ENCOUNTER
"BMT Nurse Triage - Upper Respiratory Infection (URI):     Treating Provider:   Dr. Murphy    Date of last office visit: 10/3    Onset of symptoms: 10/5     Duration of symptoms: 2 day      Any Fever: No     Cough? If yes, productive or dry: No    Congestion No    Drainage:  No    Any recent known COVID exposure? If yes, see below for recommendation: No    Any recent COVID testing? No    SOB? No    Chest Paint? no    Patient called reporting throat pain. He has tried tylenol, last dose was taken at 1800 last night, with some relief. Leland remains afebrile this morning. He denies any other respiratory symptoms as above. After discussion with [facility], plan for patient to continue with tylenol and go for rapid strep swab. Scheduling notified and will call patient. Writer instructed Leland to call BMT if new symptoms develop or he is unable to eat/take pills.    Leland also reported a new canker sore that is painful. No other mouth sores, bleeding, or change in oral health. HSV swab from canker sore last week was negative. Leland's plans to use over the counter numbing agent, s/s swish and rinse, and follow up with [facility] on Thursday.    Finally, Leland has a new lump on the inside of his L elbow. He denies any generalized LUE edema, redness, pain, or change in sensation. He rates the pain with the bump a 2/10 and describes it as a \"small pocket of fluid\". Per CIELO Schneider plan to assess when patient is in clinic on 10/11. Leland was encouraged to call if pain is worsening or if edema, redness, and /or change in sensation occurs.    These recommendations were developed and discussed with CIELO Schneider. Leland and Vani understand the plan and when to call, no further questions at this time.           "

## 2024-10-07 NOTE — TELEPHONE ENCOUNTER
Sore throat started over weekend with no fever.  Sores in mouth and lip and was tested already but spreading.   Left forearm is sore and swollen between wrist and elbow.

## 2024-10-10 ENCOUNTER — APPOINTMENT (OUTPATIENT)
Dept: LAB | Facility: CLINIC | Age: 69
End: 2024-10-10
Attending: STUDENT IN AN ORGANIZED HEALTH CARE EDUCATION/TRAINING PROGRAM
Payer: COMMERCIAL

## 2024-10-10 ENCOUNTER — ONCOLOGY VISIT (OUTPATIENT)
Dept: TRANSPLANT | Facility: CLINIC | Age: 69
End: 2024-10-10
Attending: STUDENT IN AN ORGANIZED HEALTH CARE EDUCATION/TRAINING PROGRAM
Payer: COMMERCIAL

## 2024-10-10 VITALS
OXYGEN SATURATION: 99 % | BODY MASS INDEX: 25.04 KG/M2 | TEMPERATURE: 100.6 F | WEIGHT: 186.9 LBS | SYSTOLIC BLOOD PRESSURE: 131 MMHG | HEART RATE: 105 BPM | RESPIRATION RATE: 16 BRPM | DIASTOLIC BLOOD PRESSURE: 73 MMHG

## 2024-10-10 DIAGNOSIS — Z94.84 IMMUNOCOMPROMISED STATE ASSOCIATED WITH STEM CELL TRANSPLANT (H): ICD-10-CM

## 2024-10-10 DIAGNOSIS — D84.822 IMMUNOCOMPROMISED STATE ASSOCIATED WITH STEM CELL TRANSPLANT (H): ICD-10-CM

## 2024-10-10 DIAGNOSIS — D46.9 MDS (MYELODYSPLASTIC SYNDROME) (H): ICD-10-CM

## 2024-10-10 LAB
C PNEUM DNA SPEC QL NAA+PROBE: NOT DETECTED
FLUAV H1 2009 PAND RNA SPEC QL NAA+PROBE: NOT DETECTED
FLUAV H1 RNA SPEC QL NAA+PROBE: NOT DETECTED
FLUAV H3 RNA SPEC QL NAA+PROBE: NOT DETECTED
FLUAV RNA SPEC QL NAA+PROBE: NOT DETECTED
FLUBV RNA SPEC QL NAA+PROBE: NOT DETECTED
HADV DNA SPEC QL NAA+PROBE: NOT DETECTED
HCOV PNL SPEC NAA+PROBE: NOT DETECTED
HMPV RNA SPEC QL NAA+PROBE: NOT DETECTED
HPIV1 RNA SPEC QL NAA+PROBE: NOT DETECTED
HPIV2 RNA SPEC QL NAA+PROBE: NOT DETECTED
HPIV3 RNA SPEC QL NAA+PROBE: NOT DETECTED
HPIV4 RNA SPEC QL NAA+PROBE: NOT DETECTED
M PNEUMO DNA SPEC QL NAA+PROBE: NOT DETECTED
RSV RNA SPEC QL NAA+PROBE: NOT DETECTED
RSV RNA SPEC QL NAA+PROBE: NOT DETECTED
RV+EV RNA SPEC QL NAA+PROBE: NOT DETECTED
SARS-COV-2 RNA RESP QL NAA+PROBE: NEGATIVE
SIROLIMUS BLD-MCNC: 9.6 UG/L (ref 5–15)
TME LAST DOSE: NORMAL H
TME LAST DOSE: NORMAL H

## 2024-10-10 PROCEDURE — 87040 BLOOD CULTURE FOR BACTERIA: CPT

## 2024-10-10 PROCEDURE — G2211 COMPLEX E/M VISIT ADD ON: HCPCS

## 2024-10-10 PROCEDURE — 80195 ASSAY OF SIROLIMUS: CPT

## 2024-10-10 PROCEDURE — 36592 COLLECT BLOOD FROM PICC: CPT

## 2024-10-10 PROCEDURE — G0463 HOSPITAL OUTPT CLINIC VISIT: HCPCS

## 2024-10-10 PROCEDURE — 99417 PROLNG OP E/M EACH 15 MIN: CPT

## 2024-10-10 PROCEDURE — 87635 SARS-COV-2 COVID-19 AMP PRB: CPT

## 2024-10-10 PROCEDURE — 80187 DRUG ASSAY POSACONAZOLE: CPT

## 2024-10-10 PROCEDURE — 87799 DETECT AGENT NOS DNA QUANT: CPT

## 2024-10-10 PROCEDURE — 99215 OFFICE O/P EST HI 40 MIN: CPT

## 2024-10-10 PROCEDURE — 250N000011 HC RX IP 250 OP 636: Performed by: STUDENT IN AN ORGANIZED HEALTH CARE EDUCATION/TRAINING PROGRAM

## 2024-10-10 PROCEDURE — 87486 CHLMYD PNEUM DNA AMP PROBE: CPT

## 2024-10-10 RX ORDER — ACYCLOVIR 800 MG/1
800 TABLET ORAL EVERY 12 HOURS
Qty: 60 TABLET | Refills: 4 | Status: SHIPPED | OUTPATIENT
Start: 2024-10-10 | End: 2024-11-01

## 2024-10-10 RX ORDER — HEPARIN SODIUM,PORCINE 10 UNIT/ML
5 VIAL (ML) INTRAVENOUS ONCE
Status: COMPLETED | OUTPATIENT
Start: 2024-10-10 | End: 2024-10-10

## 2024-10-10 RX ORDER — TRAMADOL HYDROCHLORIDE 50 MG/1
50 TABLET ORAL EVERY 6 HOURS PRN
Qty: 10 TABLET | Refills: 0 | Status: SHIPPED | OUTPATIENT
Start: 2024-10-10 | End: 2024-10-13

## 2024-10-10 RX ADMIN — Medication 5 ML: at 11:25

## 2024-10-10 RX ADMIN — Medication 5 ML: at 11:26

## 2024-10-10 ASSESSMENT — PAIN SCALES - GENERAL: PAINLEVEL: EXTREME PAIN (8)

## 2024-10-10 NOTE — NURSING NOTE
Chief Complaint   Patient presents with    Blood Draw     Labs drawn from PICC by RN in lab     Labs collected from PICC.  Line flushed with saline and heparin locked.  Vitals taken and pt checked in for appt.     Jeanie Méndez RN

## 2024-10-10 NOTE — LETTER
10/10/2024      Leland Pearson  [address] [place] [address]      Dear Colleague,    Thank you for referring your patient, Leland Pearson, to the [facility]OD AND MARROW [facility]ANSPL[facility]. Please see a copy of my visit note below.    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     Referring provider: Dr. Delcid    History of Present Illness/Summary  Mr Pearson returns for follow up with his wife. He has signficant viral symptoms with sore throat, intermittent coughing, and painful swallowing. No sick contacts that he is aware of. No lela n/v. No loose stools. No rash. No fevers at home, though he is 100.3 here in clinic today. Mouth sores are worsening- he is using salt and soda rinses.  Some arm pain after getting his blood pressure checked today. Energy level is low.       Review of Systems: Negative except as mentioned above    Physical Exam     Vital Signs: /73 (BP Location: Left arm, Patient Position: Sitting, Cuff Size: Adult Regular)   Pulse 105   Temp 100.3  F (37.9  C) (Oral)   Resp 16   Wt 84.8 kg (186 lb 14.4 oz)   SpO2 99%   BMI 25.04 kg/m    Wt Readings from Last 4 Encounters:   10/10/24 84.8 kg (186 lb 14.4 oz)   10/03/24 85.3 kg (188 lb)   09/30/24 84.7 kg (186 lb 11.2 oz)   09/27/24 84.6 kg (186 lb 6.4 oz)     KPS: 80% Can perform normal activity with effort, some signs of disease    General: Alert, no distress  Eyes: Conjunctiva normal  Mouth: Ulcerations noted to lips bilaterally, with small ulcerations around gumline and under tongue. No lela erythema or exudates noted to posterior oropharynx.    Respiratory: Normal respiratory effort. CTAB  Cardiovascular: Normal perfusion, no significant edema. slightly tachy  Abd: +BS,  soft, NT, ND  Psych: Mood and affect are appropriate.  Line: right PICC    Lab Results   Component Value Date    WBC 4.7 10/10/2024    ANEU 1.0 (L) 09/17/2024    HGB 9.6 (L) 10/10/2024    HCT 29.6 (L) 10/10/2024     10/10/2024     10/10/2024    POTASSIUM 3.9 10/10/2024    CHLORIDE 103 10/10/2024    CO2 24 10/10/2024     (H) 10/10/2024    BUN 14.2 10/10/2024    CR 0.68 10/10/2024    MAG 1.9 09/30/2024    INR 1.26 (H) 09/16/2024         Plan     BMT/IEC PROTOCOL for MDS  - Chemo protocol: MT 2022-5; Flu/Cy/TBI  - Peripheral blood stem cell graft from 8/8 URD donor, ABO matched, Cell dose: 6.06 x10^6 CD34/kg  - Engraftment monitoring: Peripheral blood and bone marrow chimerisms on D28, D100, 6 months, 1 and 2 years  - Restaging plan: BMBx with FISH, cytogenetics and NGS on D28, D100, 6 months, 1 and 2 years  - D21 BMbx completed 9/13. - 9/13/24: BMBx remission, NGS pending. Bone marrow 100% donor. Peripheral blood CD3 92%    HEME/COAG  # Pancytopenia 2/2 chemo/BMT  - Last day of G (9/12).    - Transfusion parameters: hemoglobin <7, platelets <50 (increased while on Lovenox).      # Right sternocleidomastoid tenderness s/p CVC removal  #Occlusive thrombus of R internal jugular vein  #Nonocclusive thrombus or R axillary vein  - Started complaining of tenderness and swelling near right CVC site that was removed. Obtained US to further investigate.   - Positive for thrombus. No anticoagulation at this time due to thrombocytopenia. Will need to begin DOAC (per pharmacy preferably voriconazole + apixaban without loading dose) vs lovenox once platelets >50k.   (9/17) Ongoing right neck pain/swelling: will repeat ultrasound shows extension of previous clot. Started on Lovenox -- per Benign Heme will start Lovenox BID with PLT parameter >50. Plan for lovenox for 3 months. Will have follow up with Dr. Price outpatient.     RISK OF GVHD  - Prophylaxis: PTCy 50mg/kg on D+3 and D+4; MMF (D+5 to 35); Sirolimus  (starting D+5, target level 5-10).  - Siro 1mg/0.5mg alternating (lowered for posa). Level pending today, 10/10. He held the dose appropriately   - stop mmf 9/27     ID afebrile  #Sore throat. Strep swab negative. Sounds congested, he reports an intermittent runny nose. New ulcerations to oropharynx. Tmax 100.6 in clinic today. Suspect viral infection- will also obtain blood culture.    - RVP/COVID swabs   - start MMW   - hold off on tylenol- trial tramadol for pain control to prevent masking fevers.    - blood culture x1    H/o  #Streptococcus mitis bacteremia (2/2 bottles) s/p cefepime/ceftriaxone then augmentin thru 9/20. Likely bacterial seeding of internal jugular clot  - ABX duration plan: per ID through 9/20. Sensitive to ceftriaxone q24hrs, can transition to non-pseudomonal coverage for discharge/better engraftment.   - Ceftriaxone (9/13-9/15). Resume cefepime 9/16 as below.      # MRSE bacteremia likely contaminant because peripheral stick, grew late (1/2). Dc'd Vanco (9/2-9/6)              - Repeat BC's x3 days NGTD    #Check UA/UC/BK virus in urine with urinary frequency-negative 9/27     Prophylaxis plan:   - Fungal: Micafungin(in clinic), Now transitioned to posaconazole- started 9/24. Level 10/3 1.7, but did not hold. Will repeat level today to ensure therapeutic level- he did appropriately hold his dose.    - PJP: Bactrim to start at D+28. Toxoplasma negative. Bactrim started 9/23  - Viral: Acyclovir 800mg BID. No need for letermovir as donor and recipient are CMV negative.  9/30 swab lip for HSV- NEG     Monitoring:   - CMV weekly, neg 9/30  - EBV every 2 weeks from D30 to D180: neg 9/27     GI/NUTRITION  - Anti-emetics: Compazine daily at 1600 and PRN. lorazepam available PRN.   - Ulcer prophy: Protonix back to daily as it did not improve gas/belching- improved  - VOD prophy: Ursodiol      CARDIOVASCULAR  # HTN: PTA lisinopril stopped 8/26 d/t symptomatic orthostasis.   # CAD: Coronary artery  calcifications seen on CT, stress test in 2023 negative  - Risk of cardiomyopathy: Baseline EF 55-60%.   - Risk of arrhythmia: Baseline EKG showed 421       RENAL/ELECTROLYTES/  - Electrolyte management: replace per sliding scale  - BPH: Continue PTA flomax.  - Hemorrhagic Cystitis secondary to cytoxan: hold ditropan for now as may be contributing to belching      MUSCULOSKELETAL/FRAILTY  - Baseline Frailty Score: Not frail (0)  # Gout: continue allopurinol      Neuro   # history of spinal stenosis, lumbosacral radiculopathy likely exacerbating.   - Pain management: Outpatient had been taking celecoxib for MSK pain, once a day. Stopped Inpatient will try Tramadol hasn't used. Now discontinued.  Added robaxin-no longer taking 9/23    Supportive cares  - Oral cares for aphthous ulcers/gum irritation  - Trial melatonin for sleep     SOCIAL DETERMINANTS  - Caregiver: wife, Vani. Lives within the required distance from hospital   - Financial/insurance concerns: None    Plan:  Blood culture x1  RVP/Covid  Trial MMW, continue saline rinses for mouth sores.  Tramadol for pain control    Posa level in process, confirmed it is trough. Consider removing PICC when therapeutic  Siro level pending.       RTC Tuesday for lab, VJ visit.     I spent 60 minutes in the care of this patient today, which included time necessary for preparation for the visit, obtaining history, ordering medications/tests/procedures as medically indicated, review of pertinent medical literature, counseling of the patient, communication of recommendations to the care team, and documentation time.    The longitudinal plan of care for the diagnosis(es)/condition(s) as documented were addressed during this visit. Due to the added complexity in care, I will continue to support Leland in the subsequent management and with ongoing continuity of care.    Kaitlyn Aguilar, CNP  shopandsave or Pager [phone]        Again, thank you for allowing me to participate in the  care of your patient.        Sincerely,        BMT Advanced Practice Provider

## 2024-10-10 NOTE — PROGRESS NOTES
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     Referring provider: Dr. Delcid    History of Present Illness/Summary  Mr Pearson returns for follow up with his wife. He has signficant viral symptoms with sore throat, intermittent coughing, and painful swallowing. No sick contacts that he is aware of. No lela n/v. No loose stools. No rash. No fevers at home, though he is 100.3 here in clinic today. Mouth sores are worsening- he is using salt and soda rinses.  Some arm pain after getting his blood pressure checked today. Energy level is low.       Review of Systems: Negative except as mentioned above    Physical Exam     Vital Signs: /73 (BP Location: Left arm, Patient Position: Sitting, Cuff Size: Adult Regular)   Pulse 105   Temp 100.3  F (37.9  C) (Oral)   Resp 16   Wt 84.8 kg (186 lb 14.4 oz)   SpO2 99%   BMI 25.04 kg/m    Wt Readings from Last 4 Encounters:   10/10/24 84.8 kg (186 lb 14.4 oz)   10/03/24 85.3 kg (188 lb)   09/30/24 84.7 kg (186 lb 11.2 oz)   09/27/24 84.6 kg (186 lb 6.4 oz)     KPS: 80% Can perform normal activity with effort, some signs of disease    General: Alert, no distress  Eyes: Conjunctiva normal  Mouth: Ulcerations noted to lips bilaterally, with small ulcerations around gumline and under tongue. No lela erythema or exudates noted to posterior oropharynx.    Respiratory: Normal respiratory effort. CTAB  Cardiovascular: Normal perfusion, no significant edema. slightly tachy  Abd: +BS, soft, NT, ND  Psych: Mood and affect are appropriate.  Line: right PICC    Lab Results   Component Value Date    WBC 4.7 10/10/2024    ANEU 1.0 (L) 09/17/2024    HGB 9.6 (L) 10/10/2024    HCT 29.6 (L) 10/10/2024     10/10/2024     10/10/2024    POTASSIUM 3.9 10/10/2024     CHLORIDE 103 10/10/2024    CO2 24 10/10/2024     (H) 10/10/2024    BUN 14.2 10/10/2024    CR 0.68 10/10/2024    MAG 1.9 09/30/2024    INR 1.26 (H) 09/16/2024         Plan     BMT/IEC PROTOCOL for MDS  - Chemo protocol: MT 2022-5; Flu/Cy/TBI  - Peripheral blood stem cell graft from 8/8 URD donor, ABO matched, Cell dose: 6.06 x10^6 CD34/kg  - Engraftment monitoring: Peripheral blood and bone marrow chimerisms on D28, D100, 6 months, 1 and 2 years  - Restaging plan: BMBx with FISH, cytogenetics and NGS on D28, D100, 6 months, 1 and 2 years  - D21 BMbx completed 9/13. - 9/13/24: BMBx remission, NGS pending. Bone marrow 100% donor. Peripheral blood CD3 92%    HEME/COAG  # Pancytopenia 2/2 chemo/BMT  - Last day of G (9/12).    - Transfusion parameters: hemoglobin <7, platelets <50 (increased while on Lovenox).      # Right sternocleidomastoid tenderness s/p CVC removal  #Occlusive thrombus of R internal jugular vein  #Nonocclusive thrombus or R axillary vein  - Started complaining of tenderness and swelling near right CVC site that was removed. Obtained US to further investigate.   - Positive for thrombus. No anticoagulation at this time due to thrombocytopenia. Will need to begin DOAC (per pharmacy preferably voriconazole + apixaban without loading dose) vs lovenox once platelets >50k.   (9/17) Ongoing right neck pain/swelling: will repeat ultrasound shows extension of previous clot. Started on Lovenox -- per Benign Heme will start Lovenox BID with PLT parameter >50. Plan for lovenox for 3 months. Will have follow up with Dr. Price outpatient.     RISK OF GVHD  - Prophylaxis: PTCy 50mg/kg on D+3 and D+4; MMF (D+5 to 35); Sirolimus (starting D+5, target level 5-10).  - Siro 1mg/0.5mg alternating (lowered for posa). Level pending today, 10/10. He held the dose appropriately   - stop mmf 9/27     ID afebrile  #Sore throat. Strep swab negative. Sounds congested, he reports an intermittent runny nose. New ulcerations  to oropharynx. Tmax 100.6 in clinic today. Suspect viral infection- will also obtain blood culture.    - RVP/COVID swabs   - start MMW   - hold off on tylenol- trial tramadol for pain control to prevent masking fevers.    - blood culture x1    H/o  #Streptococcus mitis bacteremia (2/2 bottles) s/p cefepime/ceftriaxone then augmentin thru 9/20. Likely bacterial seeding of internal jugular clot  - ABX duration plan: per ID through 9/20. Sensitive to ceftriaxone q24hrs, can transition to non-pseudomonal coverage for discharge/better engraftment.   - Ceftriaxone (9/13-9/15). Resume cefepime 9/16 as below.      # MRSE bacteremia likely contaminant because peripheral stick, grew late (1/2). Dc'd Vanco (9/2-9/6)              - Repeat BC's x3 days NGTD    #Check UA/UC/BK virus in urine with urinary frequency-negative 9/27     Prophylaxis plan:   - Fungal: Micafungin(in clinic), Now transitioned to posaconazole- started 9/24. Level 10/3 1.7, but did not hold. Will repeat level today to ensure therapeutic level- he did appropriately hold his dose.    - PJP: Bactrim to start at D+28. Toxoplasma negative. Bactrim started 9/23  - Viral: Acyclovir 800mg BID. No need for letermovir as donor and recipient are CMV negative.  9/30 swab lip for HSV- NEG     Monitoring:   - CMV weekly, neg 9/30  - EBV every 2 weeks from D30 to D180: neg 9/27     GI/NUTRITION  - Anti-emetics: Compazine daily at 1600 and PRN. lorazepam available PRN.   - Ulcer prophy: Protonix back to daily as it did not improve gas/belching- improved  - VOD prophy: Ursodiol      CARDIOVASCULAR  # HTN: PTA lisinopril stopped 8/26 d/t symptomatic orthostasis.   # CAD: Coronary artery calcifications seen on CT, stress test in 2023 negative  - Risk of cardiomyopathy: Baseline EF 55-60%.   - Risk of arrhythmia: Baseline EKG showed 421       RENAL/ELECTROLYTES/  - Electrolyte management: replace per sliding scale  - BPH: Continue PTA flomax.  - Hemorrhagic Cystitis  secondary to cytoxan: hold ditropan for now as may be contributing to belching      MUSCULOSKELETAL/FRAILTY  - Baseline Frailty Score: Not frail (0)  # Gout: continue allopurinol      Neuro   # history of spinal stenosis, lumbosacral radiculopathy likely exacerbating.   - Pain management: Outpatient had been taking celecoxib for MSK pain, once a day. Stopped Inpatient will try Tramadol hasn't used. Now discontinued.  Added robaxin-no longer taking 9/23    Supportive cares  - Oral cares for aphthous ulcers/gum irritation  - Trial melatonin for sleep     SOCIAL DETERMINANTS  - Caregiver: wife, Vani. Lives within the required distance from hospital   - Financial/insurance concerns: None    Plan:  Blood culture x1  RVP/Covid  Trial MMW, continue saline rinses for mouth sores.  Tramadol for pain control    Posa level in process, confirmed it is trough. Consider removing PICC when therapeutic  Siro level pending.       RTC Tuesday for lab, VJ visit.     I spent 60 minutes in the care of this patient today, which included time necessary for preparation for the visit, obtaining history, ordering medications/tests/procedures as medically indicated, review of pertinent medical literature, counseling of the patient, communication of recommendations to the care team, and documentation time.    The longitudinal plan of care for the diagnosis(es)/condition(s) as documented were addressed during this visit. Due to the added complexity in care, I will continue to support Leland in the subsequent management and with ongoing continuity of care.    Kaitlyn Aguilar, CNP  Vocera or Pager [phone]

## 2024-10-10 NOTE — NURSING NOTE
"Oncology Rooming Note    October 10, 2024 11:39 AM   Leland Pearson is a 69 year old male who presents for:    Chief Complaint   Patient presents with    Blood Draw     Labs drawn from PICC by RN in lab    Oncology Clinic Visit     MDS (myelodysplastic syndrome)     Initial Vitals: /73 (BP Location: Left arm, Patient Position: Sitting, Cuff Size: Adult Regular)   Pulse 105   Temp 100.3  F (37.9  C)   Resp 16   Wt 84.8 kg (186 lb 14.4 oz)   SpO2 99%   BMI 25.04 kg/m   Estimated body mass index is 25.04 kg/m  as calculated from the following:    Height as of 8/16/24: 1.84 m (6' 0.44\").    Weight as of this encounter: 84.8 kg (186 lb 14.4 oz). Body surface area is 2.08 meters squared.  Extreme Pain (8) Comment: Data Unavailable   No LMP for male patient.  Allergies reviewed: Yes  Medications reviewed: Yes    Medications: Medication refills not needed today.  Pharmacy name entered into [employer]:    [id number] - [place], [place] - [address] [place] [address] [facility] - [place], [place] - [address]    Frailty Screening:   Is the patient here for a new oncology consult visit in cancer care? 2. No      Clinical concerns: Pt is not feeling well; Temp: 100.3 in clinic & lab. Refill: Acyclovir      Marika Leonardo, EMT  10/10/2024              "

## 2024-10-11 LAB
CMV DNA SPEC NAA+PROBE-ACNC: NOT DETECTED IU/ML
EBV DNA SERPL NAA+PROBE-ACNC: NOT DETECTED IU/ML

## 2024-10-12 LAB — POSACONAZOLE SERPL-MCNC: 1.1 UG/ML

## 2024-10-15 ENCOUNTER — APPOINTMENT (OUTPATIENT)
Dept: LAB | Facility: CLINIC | Age: 69
End: 2024-10-15
Attending: STUDENT IN AN ORGANIZED HEALTH CARE EDUCATION/TRAINING PROGRAM
Payer: COMMERCIAL

## 2024-10-15 ENCOUNTER — ONCOLOGY VISIT (OUTPATIENT)
Dept: TRANSPLANT | Facility: CLINIC | Age: 69
End: 2024-10-15
Attending: STUDENT IN AN ORGANIZED HEALTH CARE EDUCATION/TRAINING PROGRAM
Payer: COMMERCIAL

## 2024-10-15 VITALS
WEIGHT: 190.6 LBS | TEMPERATURE: 98.4 F | BODY MASS INDEX: 25.54 KG/M2 | SYSTOLIC BLOOD PRESSURE: 125 MMHG | HEART RATE: 104 BPM | OXYGEN SATURATION: 99 % | RESPIRATION RATE: 16 BRPM | DIASTOLIC BLOOD PRESSURE: 74 MMHG

## 2024-10-15 DIAGNOSIS — M79.602 PAIN OF LEFT UPPER EXTREMITY: Primary | ICD-10-CM

## 2024-10-15 DIAGNOSIS — D46.9 MDS (MYELODYSPLASTIC SYNDROME) (H): ICD-10-CM

## 2024-10-15 LAB — BACTERIA BLD CULT: NO GROWTH

## 2024-10-15 PROCEDURE — 99215 OFFICE O/P EST HI 40 MIN: CPT

## 2024-10-15 PROCEDURE — 36592 COLLECT BLOOD FROM PICC: CPT

## 2024-10-15 PROCEDURE — 250N000011 HC RX IP 250 OP 636

## 2024-10-15 PROCEDURE — G2211 COMPLEX E/M VISIT ADD ON: HCPCS

## 2024-10-15 PROCEDURE — G0463 HOSPITAL OUTPT CLINIC VISIT: HCPCS

## 2024-10-15 RX ORDER — HEPARIN SODIUM,PORCINE 10 UNIT/ML
5 VIAL (ML) INTRAVENOUS ONCE
Status: COMPLETED | OUTPATIENT
Start: 2024-10-15 | End: 2024-10-15

## 2024-10-15 RX ORDER — ENOXAPARIN SODIUM 100 MG/ML
40 INJECTION SUBCUTANEOUS EVERY 12 HOURS
Qty: 24 ML | Refills: 0 | Status: SHIPPED | OUTPATIENT
Start: 2024-10-15 | End: 2024-10-15

## 2024-10-15 RX ORDER — ENOXAPARIN SODIUM 100 MG/ML
80 INJECTION SUBCUTANEOUS 2 TIMES DAILY
Qty: 48 ML | Refills: 1 | Status: SHIPPED | OUTPATIENT
Start: 2024-10-15

## 2024-10-15 RX ADMIN — Medication 5 ML: at 12:21

## 2024-10-15 ASSESSMENT — PAIN SCALES - GENERAL: PAINLEVEL: NO PAIN (0)

## 2024-10-15 NOTE — NURSING NOTE
Dressing change note:  Central line dsg due to be changed.  Redness, burning, irration denied by patient with previous occlusive dressing. External length is noted to be 5cm as also documented with previous dressing change.  Site cleaned with chlorhexidine.    Occlusive dressing applied.  Pt states site feels better, will continue to monitor.   See flowsheet for additional detail...

## 2024-10-15 NOTE — NURSING NOTE
"Oncology Rooming Note    October 15, 2024 12:27 PM   Leland Pearson is a 69 year old male who presents for:    Chief Complaint   Patient presents with    Blood Draw     Picc line blood draw by lab RN    Oncology Clinic Visit     Myelodysplasic syndrome     Initial Vitals: /74   Pulse 104   Temp 98.4  F (36.9  C) (Oral)   Resp 16   Wt 86.5 kg (190 lb 9.6 oz)   SpO2 99%   BMI 25.54 kg/m   Estimated body mass index is 25.54 kg/m  as calculated from the following:    Height as of 8/16/24: 1.84 m (6' 0.44\").    Weight as of this encounter: 86.5 kg (190 lb 9.6 oz). Body surface area is 2.1 meters squared.  No Pain (0) Comment: Data Unavailable   No LMP for male patient.  Allergies reviewed: Yes  Medications reviewed: Yes    Medications: MEDICATION REFILLS NEEDED TODAY. Provider was notified.  Pharmacy name entered into [employer]:    [id number] - [address] [facility] - [place], [place] - [address]    Frailty Screening:   Is the patient here for a new oncology consult visit in cancer care? 2. No      Clinical concerns:       Enid Glaser CMA              "

## 2024-10-15 NOTE — PROGRESS NOTES
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     Referring provider: Dr. Delcid    History of Present Illness/Summary  Mr Pearson returns for follow up with his wife. He is greatly improved from last week; much less pain with swallowing and mouth ulcers are healing. No lela n/v. No diarrhea. Eating well. Gained weight. Energy level is better. No skin rash. He does note ongoing pain to his LUE- some slight swelling and discomfort with extension of elbow.     Review of Systems: Negative except as mentioned above    Physical Exam     Vital Signs: /74   Pulse 104   Temp 98.4  F (36.9  C) (Oral)   Resp 16   Wt 86.5 kg (190 lb 9.6 oz)   SpO2 99%   BMI 25.54 kg/m    Wt Readings from Last 4 Encounters:   10/15/24 86.5 kg (190 lb 9.6 oz)   10/10/24 84.8 kg (186 lb 14.4 oz)   10/03/24 85.3 kg (188 lb)   09/30/24 84.7 kg (186 lb 11.2 oz)     KPS: 80% Can perform normal activity with effort, some signs of disease    General: Alert, no distress  Eyes: Conjunctiva normal  Mouth: Ulcerations noted to lips bilaterally, with small ulcerations around gumline and under tongue. No lela erythema or exudates noted to posterior oropharynx.    Respiratory: Normal respiratory effort. CTAB  Cardiovascular: Normal perfusion, no significant edema. slightly tachy  Abd: +BS, soft, NT, ND  Psych: Mood and affect are appropriate.  Line: right PICC    Lab Results   Component Value Date    WBC 4.7 10/10/2024    ANEU 1.0 (L) 09/17/2024    HGB 9.6 (L) 10/10/2024    HCT 29.6 (L) 10/10/2024     10/10/2024     10/10/2024    POTASSIUM 3.9 10/10/2024    CHLORIDE 103 10/10/2024    CO2 24 10/10/2024     (H) 10/10/2024    BUN 14.2 10/10/2024    CR 0.68 10/10/2024    MAG 1.9 09/30/2024    INR 1.26 (H) 09/16/2024        Plan     BMT/IEC PROTOCOL for MDS  - Chemo protocol: MT 2022-5; Flu/Cy/TBI  - Peripheral blood stem cell graft from 8/8 URD donor, ABO matched, Cell dose: 6.06 x10^6 CD34/kg  - Engraftment monitoring: Peripheral blood and bone marrow chimerisms on D28, D100, 6 months, 1 and 2 years  - Restaging plan: BMBx with FISH, cytogenetics and NGS on D28, D100, 6 months, 1 and 2 years  - D21 BMbx completed 9/13. - 9/13/24: BMBx remission, NGS pending. Bone marrow 100% donor. Peripheral blood CD3 92%    HEME/COAG  # Pancytopenia 2/2 chemo/BMT  - Last day of G (9/12).    - Transfusion parameters: hemoglobin <7, platelets <50 (increased while on Lovenox).      # Right sternocleidomastoid tenderness s/p CVC removal  #Occlusive thrombus of R internal jugular vein  #Nonocclusive thrombus or R axillary vein  - Started complaining of tenderness and swelling near right CVC site that was removed. Obtained US to further investigate.   - Positive for thrombus. No anticoagulation at this time due to thrombocytopenia. Will need to begin DOAC (per pharmacy preferably voriconazole + apixaban without loading dose) vs lovenox once platelets >50k.   (9/17) Ongoing right neck pain/swelling: will repeat ultrasound shows extension of previous clot. Started on Lovenox -- per Benign Heme will start Lovenox BID with PLT parameter >50. Plan for lovenox for 3 months. Will have follow up with Dr. Price outpatient. Increase to full dose with platelet stability 10/15.      RISK OF GVHD  - Prophylaxis: PTCy 50mg/kg on D+3 and D+4; MMF (D+5 to 35); Sirolimus (starting D+5, target level 5-10).  - Siro 1mg/0.5mg alternating (lowered for posa). Level pending today, 10/15. He held the dose appropriately   - stop mmf 9/27     ID afebrile  #10/10: Sore throat. Strep swab negative. Sounds congested, he reports an intermittent runny nose. New ulcerations to oropharynx. Tmax 100.6 in clinic. Suspect viral infection- Swabs negative, BC NGTD. All symptoms  improved 10/15.     H/o  #Streptococcus mitis bacteremia (2/2 bottles) s/p cefepime/ceftriaxone then augmentin thru 9/20. Likely bacterial seeding of internal jugular clot  - ABX duration plan: per ID through 9/20. Sensitive to ceftriaxone q24hrs, can transition to non-pseudomonal coverage for discharge/better engraftment.   - Ceftriaxone (9/13-9/15). Resume cefepime 9/16 as below.      # MRSE bacteremia likely contaminant because peripheral stick, grew late (1/2). Dc'd Vanco (9/2-9/6)              - Repeat BC's x3 days NGTD    #Check UA/UC/BK virus in urine with urinary frequency-negative 9/27     Prophylaxis plan:   - Fungal: Micafungin(in clinic), Now transitioned to posaconazole- started 9/24. Level 10/3 1.7, but did not hold. Repeat Level 1.1 adequate  - PJP: Bactrim to start at D+28. Toxoplasma negative. Bactrim started 9/23  - Viral: Acyclovir 800mg BID. No need for letermovir as donor and recipient are CMV negative.  9/30 swab lip for HSV- NEG     Monitoring:   - CMV weekly, neg 9/30  - EBV every 2 weeks from D30 to D180: neg 9/27     GI/NUTRITION  - Anti-emetics: Compazine daily at 1600 and PRN. lorazepam available PRN.   - Ulcer prophy: Protonix back to daily as it did not improve gas/belching- improved  - VOD prophy: Ursodiol      CARDIOVASCULAR  # HTN: PTA lisinopril stopped 8/26 d/t symptomatic orthostasis.   # CAD: Coronary artery calcifications seen on CT, stress test in 2023 negative  - Risk of cardiomyopathy: Baseline EF 55-60%.   - Risk of arrhythmia: Baseline EKG showed 421       RENAL/ELECTROLYTES/  - Electrolyte management: replace per sliding scale  - BPH: Continue PTA flomax.  - Hemorrhagic Cystitis secondary to cytoxan: hold ditropan for now as may be contributing to belching      MUSCULOSKELETAL/FRAILTY  - Baseline Frailty Score: Not frail (0)  # Gout: continue allopurinol   # L arm pain- noted first 10/10- tightness with extension of arm. Some slight swelling. Trial heat, will set up for  ultrasound on RTC 10/18 if persists.      Neuro   # History of spinal stenosis, lumbosacral radiculopathy likely exacerbating.   - Pain management: Outpatient had been taking celecoxib for MSK pain, once a day. Stopped Inpatient will try Tramadol hasn't used. Now discontinued.  Added robaxin-no longer taking 9/23    Supportive cares  - Oral cares for aphthous ulcers/gum irritation  - Trial melatonin for sleep     SOCIAL DETERMINANTS  - Caregiver: wife, Vani. Lives within the required distance from hospital   - Financial/insurance concerns: None    Plan:  Increase lovenox to 80mg BID  Order Ultrasound for LUE given pain with extension of elbow.   Consider removing PICC line if counts remain stable on Friday.     RTC Friday for lab, VJ visit.     I spent 50 minutes in the care of this patient today, which included time necessary for preparation for the visit, obtaining history, ordering medications/tests/procedures as medically indicated, review of pertinent medical literature, counseling of the patient, communication of recommendations to the care team, and documentation time.    The longitudinal plan of care for the diagnosis(es)/condition(s) as documented were addressed during this visit. Due to the added complexity in care, I will continue to support Leland in the subsequent management and with ongoing continuity of care.    Kaitlyn Aguilar, CNP  [employer] or Pager [phone]

## 2024-10-15 NOTE — LETTER
10/15/2024      Leland Pearson  [address] [place] [address]      Dear Colleague,    Thank you for referring your patient, Leland Pearson, to the [facility] BLOOD AND MARROW TRANSPLANT PROGRAM [place]. Please see a copy of my visit note below.    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     Referring provider: Dr. Delcid    History of Present Illness/Summary  Mr Pearson returns for follow up with his wife. He is greatly improved from last week; much less pain with swallowing and mouth ulcers are healing. No lela n/v. No diarrhea. Eating well. Gained weight. Energy level is better. No skin rash. He does note ongoing pain to his LUE- some slight swelling and discomfort with extension of elbow.     Review of Systems: Negative except as mentioned above    Physical Exam     Vital Signs: /74   Pulse 104   Temp 98.4  F (36.9  C) (Oral)   Resp 16   Wt 86.5 kg (190 lb 9.6 oz)   SpO2 99%   BMI 25.54 kg/m    Wt Readings from Last 4 Encounters:   10/15/24 86.5 kg (190 lb 9.6 oz)   10/10/24 84.8 kg (186 lb 14.4 oz)   10/03/24 85.3 kg (188 lb)   09/30/24 84.7 kg (186 lb 11.2 oz)     KPS: 80% Can perform normal activity with effort, some signs of disease    General: Alert, no distress  Eyes: Conjunctiva normal  Mouth: Ulcerations noted to lips bilaterally, with small ulcerations around gumline and under tongue. No lela erythema or exudates noted to posterior oropharynx.    Respiratory: Normal respiratory effort. CTAB  Cardiovascular: Normal perfusion, no significant edema. slightly tachy  Abd: +BS, soft, NT, ND  Psych: Mood and affect are appropriate.  Line: right PICC    Lab Results   Component Value Date    WBC 4.7 10/10/2024    ANEU 1.0 (L) 09/17/2024    HGB 9.6  (L) 10/10/2024    HCT 29.6 (L) 10/10/2024     10/10/2024     10/10/2024    POTASSIUM 3.9 10/10/2024    CHLORIDE 103 10/10/2024    CO2 24 10/10/2024     (H) 10/10/2024    BUN 14.2 10/10/2024    CR 0.68 10/10/2024    MAG 1.9 09/30/2024    INR 1.26 (H) 09/16/2024       Plan     BMT/IEC PROTOCOL for MDS  - Chemo protocol: MT 2022-5; Flu/Cy/TBI  - Peripheral blood stem cell graft from 8/8 URD donor, ABO matched, Cell dose: 6.06 x10^6 CD34/kg  - Engraftment monitoring: Peripheral blood and bone marrow chimerisms on D28, D100, 6 months, 1 and 2 years  - Restaging plan: BMBx with FISH, cytogenetics and NGS on D28, D100, 6 months, 1 and 2 years  - D21 BMbx completed 9/13. - 9/13/24: BMBx remission, NGS pending. Bone marrow 100% donor. Peripheral blood CD3 92%    HEME/COAG  # Pancytopenia 2/2 chemo/BMT  - Last day of G (9/12).    - Transfusion parameters: hemoglobin <7, platelets <50 (increased while on Lovenox).      # Right sternocleidomastoid tenderness s/p CVC removal  #Occlusive thrombus of R internal jugular vein  #Nonocclusive thrombus or R axillary vein  - Started complaining of tenderness and swelling near right CVC site that was removed. Obtained US to further investigate.   - Positive for thrombus. No anticoagulation at this time due to thrombocytopenia. Will need to begin DOAC (per pharmacy preferably voriconazole + apixaban without loading dose) vs lovenox once platelets >50k.   (9/17) Ongoing right neck pain/swelling: will repeat ultrasound shows extension of previous clot. Started on Lovenox -- per Benign Heme will start Lovenox BID with PLT parameter >50. Plan for lovenox for 3 months. Will have follow up with Dr. Price outpatient. Increase to full dose with platelet stability 10/15.      RISK OF GVHD  - Prophylaxis: PTCy 50mg/kg on D+3 and D+4; MMF (D+5 to 35); Sirolimus (starting D+5, target level 5-10).  - Siro 1mg/0.5mg alternating (lowered for posa). Level pending today, 10/15. He  held the dose appropriately   - stop mmf 9/27     ID afebrile  #10/10: Sore throat. Strep swab negative. Sounds congested, he reports an intermittent runny nose. New ulcerations to oropharynx. Tmax 100.6 in clinic. Suspect viral infection- Swabs negative, BC NGTD. All symptoms improved 10/15.     H/o  #Streptococcus mitis bacteremia (2/2 bottles) s/p cefepime/ceftriaxone then augmentin thru 9/20. Likely bacterial seeding of internal jugular clot  - ABX duration plan: per ID through 9/20. Sensitive to ceftriaxone q24hrs, can transition to non-pseudomonal coverage for discharge/better engraftment.   - Ceftriaxone (9/13-9/15). Resume cefepime 9/16 as below.      # MRSE bacteremia likely contaminant because peripheral stick, grew late (1/2). Dc'd Vanco (9/2-9/6)              - Repeat BC's x3 days NGTD    #Check UA/UC/BK virus in urine with urinary frequency-negative 9/27     Prophylaxis plan:   - Fungal: Micafungin(in clinic), Now transitioned to posaconazole- started 9/24. Level 10/3 1.7, but did not hold. Repeat Level 1.1 adequate  - PJP: Bactrim to start at D+28. Toxoplasma negative. Bactrim started 9/23  - Viral: Acyclovir 800mg BID. No need for letermovir as donor and recipient are CMV negative.  9/30 swab lip for HSV- NEG     Monitoring:   - CMV weekly, neg 9/30  - EBV every 2 weeks from D30 to D180: neg 9/27     GI/NUTRITION  - Anti-emetics: Compazine daily at 1600 and PRN. lorazepam available PRN.   - Ulcer prophy: Protonix back to daily as it did not improve gas/belching- improved  - VOD prophy: Ursodiol      CARDIOVASCULAR  # HTN: PTA lisinopril stopped 8/26 d/t symptomatic orthostasis.   # CAD: Coronary artery calcifications seen on CT, stress test in 2023 negative  - Risk of cardiomyopathy: Baseline EF 55-60%.   - Risk of arrhythmia: Baseline EKG showed 421       RENAL/ELECTROLYTES/  - Electrolyte management: replace per sliding scale  - BPH: Continue PTA flomax.  - Hemorrhagic Cystitis secondary to  cytoxan: hold ditropan for now as may be contributing to belching      MUSCULOSKELETAL/FRAILTY  - Baseline Frailty Score: Not frail (0)  # Gout: continue allopurinol   # L arm pain- noted first 10/10- tightness with extension of arm. Some slight swelling. Trial heat, will set up for ultrasound on RTC 10/18 if persists.      Neuro   # History of spinal stenosis, lumbosacral radiculopathy likely exacerbating.   - Pain management: Outpatient had been taking celecoxib for MSK pain, once a day. Stopped Inpatient will try Tramadol hasn't used. Now discontinued.  Added robaxin-no longer taking 9/23    Supportive cares  - Oral cares for aphthous ulcers/gum irritation  - Trial melatonin for sleep     SOCIAL DETERMINANTS  - Caregiver: wife, Vani. Lives within the required distance from hospital   - Financial/insurance concerns: None    Plan:  Increase lovenox to 80mg BID  Order Ultrasound for LUE given pain with extension of elbow.   Consider removing PICC line if counts remain stable on Friday.     RTC Friday for lab, VJ visit.     I spent 50 minutes in the care of this patient today, which included time necessary for preparation for the visit, obtaining history, ordering medications/tests/procedures as medically indicated, review of pertinent medical literature, counseling of the patient, communication of recommendations to the care team, and documentation time.    The longitudinal plan of care for the diagnosis(es)/condition(s) as documented were addressed during this visit. Due to the added complexity in care, I will continue to support Leland in the subsequent management and with ongoing continuity of care.    Kaitlyn Aguilar, CNP  Voc[employer] or Pager [phone]        Again, thank you for allowing me to participate in the care of your patient.        Sincerely,        BMT Advanced Practice Provider

## 2024-10-15 NOTE — NURSING NOTE
Chief Complaint   Patient presents with    Blood Draw     Picc line blood draw by lab RN       Labs drawn via PICC line and flushed with heparin by lab RN. Vitals taken and next appointment arrived.    Ela Price RN

## 2024-10-16 LAB — CMV DNA SPEC NAA+PROBE-ACNC: NOT DETECTED IU/ML

## 2024-10-18 ENCOUNTER — LAB (OUTPATIENT)
Dept: LAB | Facility: CLINIC | Age: 69
End: 2024-10-18
Attending: PHYSICIAN ASSISTANT
Payer: COMMERCIAL

## 2024-10-18 ENCOUNTER — OFFICE VISIT (OUTPATIENT)
Dept: TRANSPLANT | Facility: CLINIC | Age: 69
End: 2024-10-18
Attending: PHYSICIAN ASSISTANT
Payer: COMMERCIAL

## 2024-10-18 ENCOUNTER — ANCILLARY PROCEDURE (OUTPATIENT)
Dept: ULTRASOUND IMAGING | Facility: CLINIC | Age: 69
End: 2024-10-18
Payer: COMMERCIAL

## 2024-10-18 VITALS
WEIGHT: 188.8 LBS | RESPIRATION RATE: 16 BRPM | HEART RATE: 98 BPM | DIASTOLIC BLOOD PRESSURE: 77 MMHG | BODY MASS INDEX: 25.3 KG/M2 | TEMPERATURE: 98.4 F | SYSTOLIC BLOOD PRESSURE: 144 MMHG | OXYGEN SATURATION: 99 %

## 2024-10-18 DIAGNOSIS — Z94.84 IMMUNOCOMPROMISED STATE ASSOCIATED WITH STEM CELL TRANSPLANT (H): ICD-10-CM

## 2024-10-18 DIAGNOSIS — D46.9 MDS (MYELODYSPLASTIC SYNDROME) (H): Primary | ICD-10-CM

## 2024-10-18 DIAGNOSIS — D84.822 IMMUNOCOMPROMISED STATE ASSOCIATED WITH STEM CELL TRANSPLANT (H): ICD-10-CM

## 2024-10-18 DIAGNOSIS — M79.602 PAIN OF LEFT UPPER EXTREMITY: ICD-10-CM

## 2024-10-18 DIAGNOSIS — D46.9 MDS (MYELODYSPLASTIC SYNDROME) (H): ICD-10-CM

## 2024-10-18 LAB
ALBUMIN SERPL BCG-MCNC: 3.7 G/DL (ref 3.5–5.2)
ALP SERPL-CCNC: 84 U/L (ref 40–150)
ALT SERPL W P-5'-P-CCNC: 35 U/L (ref 0–70)
ANION GAP SERPL CALCULATED.3IONS-SCNC: 9 MMOL/L (ref 7–15)
AST SERPL W P-5'-P-CCNC: 41 U/L (ref 0–45)
BASOPHILS # BLD AUTO: 0 10E3/UL (ref 0–0.2)
BASOPHILS NFR BLD AUTO: 0 %
BILIRUB SERPL-MCNC: 0.3 MG/DL
BUN SERPL-MCNC: 14.7 MG/DL (ref 8–23)
CALCIUM SERPL-MCNC: 9.2 MG/DL (ref 8.8–10.4)
CHLORIDE SERPL-SCNC: 105 MMOL/L (ref 98–107)
CMV DNA SPEC NAA+PROBE-ACNC: NOT DETECTED IU/ML
CREAT SERPL-MCNC: 0.74 MG/DL (ref 0.67–1.17)
EGFRCR SERPLBLD CKD-EPI 2021: >90 ML/MIN/1.73M2
EOSINOPHIL # BLD AUTO: 0.1 10E3/UL (ref 0–0.7)
EOSINOPHIL NFR BLD AUTO: 1 %
ERYTHROCYTE [DISTWIDTH] IN BLOOD BY AUTOMATED COUNT: 19.6 % (ref 10–15)
GLUCOSE SERPL-MCNC: 120 MG/DL (ref 70–99)
HCO3 SERPL-SCNC: 24 MMOL/L (ref 22–29)
HCT VFR BLD AUTO: 32.4 % (ref 40–53)
HGB BLD-MCNC: 10.3 G/DL (ref 13.3–17.7)
IMM GRANULOCYTES # BLD: 0 10E3/UL
IMM GRANULOCYTES NFR BLD: 1 %
LYMPHOCYTES # BLD AUTO: 0.4 10E3/UL (ref 0.8–5.3)
LYMPHOCYTES NFR BLD AUTO: 8 %
MCH RBC QN AUTO: 29.1 PG (ref 26.5–33)
MCHC RBC AUTO-ENTMCNC: 31.8 G/DL (ref 31.5–36.5)
MCV RBC AUTO: 92 FL (ref 78–100)
MONOCYTES # BLD AUTO: 0.8 10E3/UL (ref 0–1.3)
MONOCYTES NFR BLD AUTO: 15 %
NEUTROPHILS # BLD AUTO: 4.1 10E3/UL (ref 1.6–8.3)
NEUTROPHILS NFR BLD AUTO: 76 %
NRBC # BLD AUTO: 0 10E3/UL
NRBC BLD AUTO-RTO: 0 /100
PLATELET # BLD AUTO: 177 10E3/UL (ref 150–450)
POTASSIUM SERPL-SCNC: 4 MMOL/L (ref 3.4–5.3)
PROT SERPL-MCNC: 6.5 G/DL (ref 6.4–8.3)
RBC # BLD AUTO: 3.54 10E6/UL (ref 4.4–5.9)
SIROLIMUS BLD-MCNC: 10.7 UG/L (ref 5–15)
SODIUM SERPL-SCNC: 138 MMOL/L (ref 135–145)
TME LAST DOSE: NORMAL H
TME LAST DOSE: NORMAL H
WBC # BLD AUTO: 5.5 10E3/UL (ref 4–11)

## 2024-10-18 PROCEDURE — 99214 OFFICE O/P EST MOD 30 MIN: CPT

## 2024-10-18 PROCEDURE — 250N000011 HC RX IP 250 OP 636: Performed by: PHYSICIAN ASSISTANT

## 2024-10-18 PROCEDURE — 87799 DETECT AGENT NOS DNA QUANT: CPT

## 2024-10-18 PROCEDURE — 80053 COMPREHEN METABOLIC PANEL: CPT

## 2024-10-18 PROCEDURE — 99213 OFFICE O/P EST LOW 20 MIN: CPT

## 2024-10-18 PROCEDURE — 93971 EXTREMITY STUDY: CPT | Mod: LT | Performed by: RADIOLOGY

## 2024-10-18 PROCEDURE — 80195 ASSAY OF SIROLIMUS: CPT

## 2024-10-18 PROCEDURE — 36592 COLLECT BLOOD FROM PICC: CPT

## 2024-10-18 PROCEDURE — 85025 COMPLETE CBC W/AUTO DIFF WBC: CPT

## 2024-10-18 RX ORDER — HEPARIN SODIUM,PORCINE 10 UNIT/ML
5 VIAL (ML) INTRAVENOUS ONCE
Status: COMPLETED | OUTPATIENT
Start: 2024-10-18 | End: 2024-10-18

## 2024-10-18 RX ORDER — SUCRALFATE 1 G/1
1 TABLET ORAL 4 TIMES DAILY
Qty: 60 TABLET | Refills: 1 | Status: SHIPPED | OUTPATIENT
Start: 2024-10-18 | End: 2024-10-25

## 2024-10-18 RX ADMIN — Medication 5 ML: at 12:17

## 2024-10-18 RX ADMIN — Medication 5 ML: at 12:18

## 2024-10-18 ASSESSMENT — PAIN SCALES - GENERAL: PAINLEVEL: NO PAIN (0)

## 2024-10-18 NOTE — LETTER
10/18/2024      Leland Pearson  [address] [place] [address]      Dear Colleague,    Thank you for referring your patient, Leland Pearson, to the [facility] BLOOD AND MARROW TRANSPLANT PROGRAM [place]. Please see a copy of my visit note below.    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     Referring provider: Dr. Delcid    History of Present Illness/Summary  Mr Pearson returns for follow up with his wife. He is continues to be doing well. No new medical complaints. L arm remains irritating with full extension of elbow- US is pending today. No n/v. Some reflux/heartburn symptoms that are worsening as he eats more food. No lela nausea. Mouth sores are healing- nearly resolved. No diarrhea. No skin rash.     Review of Systems: Negative except as mentioned above    Physical Exam     Vital Signs: BP (!) 144/77   Pulse 98   Temp 98.4  F (36.9  C) (Oral)   Resp 16   Wt 85.6 kg (188 lb 12.8 oz)   SpO2 99%   BMI 25.30 kg/m    Wt Readings from Last 4 Encounters:   10/18/24 85.6 kg (188 lb 12.8 oz)   10/15/24 86.5 kg (190 lb 9.6 oz)   10/10/24 84.8 kg (186 lb 14.4 oz)   10/03/24 85.3 kg (188 lb)     KPS: 80% Can perform normal activity with effort, some signs of disease    General: Alert, no distress  Eyes: Conjunctiva normal  Mouth: Ulcerations noted to lips bilaterally, with small ulcerations around gumline and under tongue. No lela erythema or exudates noted to posterior oropharynx.    Respiratory: Normal respiratory effort. CTAB  Cardiovascular: Normal perfusion, no significant edema. slightly tachy  Abd: +BS, soft, NT, ND  Psych: Mood and affect are appropriate.  Line: right PICC    Lab Results   Component Value Date    WBC 4.7 10/15/2024    ANEU 1.0 (L)  09/17/2024    HGB 9.5 (L) 10/15/2024    HCT 30.2 (L) 10/15/2024     10/15/2024     10/15/2024    POTASSIUM 4.2 10/15/2024    CHLORIDE 103 10/15/2024    CO2 25 10/15/2024     (H) 10/15/2024    BUN 12.8 10/15/2024    CR 0.74 10/15/2024    MAG 1.9 10/15/2024    INR 1.26 (H) 09/16/2024       Plan     BMT/IEC PROTOCOL for MDS  - Chemo protocol: MT 2022-5; Flu/Cy/TBI  - Peripheral blood stem cell graft from 8/8 URD donor, ABO matched, Cell dose: 6.06 x10^6 CD34/kg  - Engraftment monitoring: Peripheral blood and bone marrow chimerisms on D28, D100, 6 months, 1 and 2 years  - Restaging plan: BMBx with FISH, cytogenetics and NGS on D28, D100, 6 months, 1 and 2 years  - D21 BMbx completed 9/13. - 9/13/24: BMBx remission, NGS pending. Bone marrow 100% donor. Peripheral blood CD3 92%    HEME/COAG  # Pancytopenia 2/2 chemo/BMT  - Last day of G (9/12).    - Transfusion parameters: hemoglobin <7, platelets <50 (increased while on Lovenox).      # Right sternocleidomastoid tenderness s/p CVC removal  #Occlusive thrombus of R internal jugular vein  #Nonocclusive thrombus or R axillary vein  - Started complaining of tenderness and swelling near right CVC site that was removed. Obtained US to further investigate.   - Positive for thrombus. No anticoagulation at this time due to thrombocytopenia. Will need to begin DOAC (per pharmacy preferably voriconazole + apixaban without loading dose) vs lovenox once platelets >50k.   (9/17) Ongoing right neck pain/swelling: will repeat ultrasound shows extension of previous clot. Started on Lovenox -- per Benign Heme will start Lovenox BID with PLT parameter >50. Plan for lovenox for 3 months. Will have follow up with Dr. Price outpatient. Increase to full dose with platelet stability 10/15.      RISK OF GVHD  - Prophylaxis: PTCy 50mg/kg on D+3 and D+4; MMF (D+5 to 35); Sirolimus (starting D+5, target level 5-10).  - Siro 1mg/0.5mg alternating (lowered for posa). lLevel  pending today.       ID afebrile  #10/10: Sore throat. Strep swab negative. Sounds congested, he reports an intermittent runny nose. New ulcerations to oropharynx. Tmax 100.6 in clinic. Suspect viral infection- Swabs negative, BC NGTD. All symptoms improved 10/15.     H/o  #Streptococcus mitis bacteremia (2/2 bottles) s/p cefepime/ceftriaxone then augmentin thru 9/20. Likely bacterial seeding of internal jugular clot  - ABX duration plan: per ID through 9/20. Sensitive to ceftriaxone q24hrs, can transition to non-pseudomonal coverage for discharge/better engraftment.   - Ceftriaxone (9/13-9/15).     # MRSE bacteremia likely contaminant because peripheral stick, grew late (1/2). Dc'd Vanco (9/2-9/6)              - Repeat BC's x3 days NGTD    #Check UA/UC/BK virus in urine with urinary frequency-negative 9/27     Prophylaxis plan:   - Fungal: Micafungin(in clinic), Now transitioned to posaconazole- started 9/24. Level 10/3 1.7, but did not hold. Repeat Level 1.1 adequate  - PJP: Bactrim to start at D+28. Toxoplasma negative. Bactrim started 9/23  - Viral: Acyclovir 800mg BID. No need for letermovir as donor and recipient are CMV negative.  9/30 swab lip for HSV- NEG     Monitoring:   - CMV weekly, neg 9/30  - EBV every 2 weeks from D30 to D180: neg 9/27     GI/NUTRITION  - Reflux: Add carafate QID  - Anti-emetics: Compazine PRN. lorazepam available PRN.   - Ulcer prophy: Protonix daily  - VOD prophy: Ursodiol      CARDIOVASCULAR  # HTN: PTA lisinopril stopped 8/26 d/t symptomatic orthostasis.   # CAD: Coronary artery calcifications seen on CT, stress test in 2023 negative  - Risk of cardiomyopathy: Baseline EF 55-60%.   - Risk of arrhythmia: Baseline EKG showed 421       RENAL/ELECTROLYTES/  - Electrolyte management: replace per sliding scale  - BPH: Continue PTA flomax.  - Hemorrhagic Cystitis secondary to cytoxan: hold ditropan for now as may be contributing to belching      MUSCULOSKELETAL/FRAILTY  - Baseline  Frailty Score: Not frail (0)  # Gout: continue allopurinol   # L arm pain- noted first 10/10- tightness with extension of arm. Some slight swelling noted, no erythema. Ultrasound pending, though low suspicion for clot as he is on anticoagulation at appropriate dosing. Trial heat, tylenol for symptomatic relief.      Neuro   # History of spinal stenosis, lumbosacral radiculopathy likely exacerbating.     Supportive cares  - Oral cares for aphthous ulcers/gum irritation  - Trial melatonin for sleep       Plan:  Start Carafate QID  Remove PICC line if counts remain stable on Friday.     RTC Friday for lab, VJ visit.     I spent 30 minutes in the care of this patient today, which included time necessary for preparation for the visit, obtaining history, ordering medications/tests/procedures as medically indicated, review of pertinent medical literature, counseling of the patient, communication of recommendations to the care team, and documentation time.    The longitudinal plan of care for the diagnosis(es)/condition(s) as documented were addressed during this visit. Due to the added complexity in care, I will continue to support Leland in the subsequent management and with ongoing continuity of care.    Kaitlyn Aguilar, CNP  [facility] or Pager [phone]        Again, thank you for allowing me to participate in the care of your patient.        Sincerely,        BMT Advanced Practice Provider

## 2024-10-18 NOTE — NURSING NOTE
"Oncology Rooming Note    October 18, 2024 12:56 PM   Leland Pearson is a 69 year old male who presents for:    Chief Complaint   Patient presents with    Blood Draw     Picc line blood draw by lab RN    Oncology Clinic Visit     MDS     Initial Vitals: BP (!) 144/77   Pulse 98   Temp 98.4  F (36.9  C) (Oral)   Resp 16   Wt 85.6 kg (188 lb 12.8 oz)   SpO2 99%   BMI 25.30 kg/m   Estimated body mass index is 25.3 kg/m  as calculated from the following:    Height as of 8/16/24: 1.84 m (6' 0.44\").    Weight as of this encounter: 85.6 kg (188 lb 12.8 oz). Body surface area is 2.09 meters squared.  No Pain (0) Comment: Data Unavailable   No LMP for male patient.  Allergies reviewed: Yes  Medications reviewed: Yes    Medications: Medication refills not needed today.  Pharmacy name entered into [employer]:    [id number] [address] [facility] - [place], [place] - [address]    Frailty Screening:   Is the patient here for a new oncology consult visit in cancer care? 2. No      Clinical concerns: None       Maddy Vanessa LPN  10/18/2024                "

## 2024-10-18 NOTE — PROGRESS NOTES
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     Referring provider: Dr. Delcid    History of Present Illness/Summary  Mr Pearson returns for follow up with his wife. He is continues to be doing well. No new medical complaints. L arm remains irritating with full extension of elbow- US is pending today. No n/v. Some reflux/heartburn symptoms that are worsening as he eats more food. No lela nausea. Mouth sores are healing- nearly resolved. No diarrhea. No skin rash.     Review of Systems: Negative except as mentioned above    Physical Exam     Vital Signs: BP (!) 144/77   Pulse 98   Temp 98.4  F (36.9  C) (Oral)   Resp 16   Wt 85.6 kg (188 lb 12.8 oz)   SpO2 99%   BMI 25.30 kg/m    Wt Readings from Last 4 Encounters:   10/18/24 85.6 kg (188 lb 12.8 oz)   10/15/24 86.5 kg (190 lb 9.6 oz)   10/10/24 84.8 kg (186 lb 14.4 oz)   10/03/24 85.3 kg (188 lb)     KPS: 80% Can perform normal activity with effort, some signs of disease    General: Alert, no distress  Eyes: Conjunctiva normal  Mouth: Ulcerations noted to lips bilaterally, with small ulcerations around gumline and under tongue. No lela erythema or exudates noted to posterior oropharynx.    Respiratory: Normal respiratory effort. CTAB  Cardiovascular: Normal perfusion, no significant edema. slightly tachy  Abd: +BS, soft, NT, ND  Psych: Mood and affect are appropriate.  Line: right PICC    Lab Results   Component Value Date    WBC 4.7 10/15/2024    ANEU 1.0 (L) 09/17/2024    HGB 9.5 (L) 10/15/2024    HCT 30.2 (L) 10/15/2024     10/15/2024     10/15/2024    POTASSIUM 4.2 10/15/2024    CHLORIDE 103 10/15/2024    CO2 25 10/15/2024     (H) 10/15/2024    BUN 12.8 10/15/2024    CR 0.74 10/15/2024    MAG 1.9 10/15/2024    INR 1.26 (H)  09/16/2024       Plan     BMT/IEC PROTOCOL for MDS  - Chemo protocol: MT 2022-5; Flu/Cy/TBI  - Peripheral blood stem cell graft from 8/8 URD donor, ABO matched, Cell dose: 6.06 x10^6 CD34/kg  - Engraftment monitoring: Peripheral blood and bone marrow chimerisms on D28, D100, 6 months, 1 and 2 years  - Restaging plan: BMBx with FISH, cytogenetics and NGS on D28, D100, 6 months, 1 and 2 years  - D21 BMbx completed 9/13. - 9/13/24: BMBx remission, NGS pending. Bone marrow 100% donor. Peripheral blood CD3 92%    HEME/COAG  # Pancytopenia 2/2 chemo/BMT  - Last day of G (9/12).    - Transfusion parameters: hemoglobin <7, platelets <50 (increased while on Lovenox).      # Right sternocleidomastoid tenderness s/p CVC removal  #Occlusive thrombus of R internal jugular vein  #Nonocclusive thrombus or R axillary vein  - Started complaining of tenderness and swelling near right CVC site that was removed. Obtained US to further investigate.   - Positive for thrombus. No anticoagulation at this time due to thrombocytopenia. Will need to begin DOAC (per pharmacy preferably voriconazole + apixaban without loading dose) vs lovenox once platelets >50k.   (9/17) Ongoing right neck pain/swelling: will repeat ultrasound shows extension of previous clot. Started on Lovenox -- per Benign Heme will start Lovenox BID with PLT parameter >50. Plan for lovenox for 3 months. Will have follow up with Dr. Price outpatient. Increase to full dose with platelet stability 10/15.      RISK OF GVHD  - Prophylaxis: PTCy 50mg/kg on D+3 and D+4; MMF (D+5 to 35); Sirolimus (starting D+5, target level 5-10).  - Siro 1mg/0.5mg alternating (lowered for posa). lLevel pending today.       ID afebrile  #10/10: Sore throat. Strep swab negative. Sounds congested, he reports an intermittent runny nose. New ulcerations to oropharynx. Tmax 100.6 in clinic. Suspect viral infection- Swabs negative, BC NGTD. All symptoms improved 10/15.     H/o  #Streptococcus mitis  bacteremia (2/2 bottles) s/p cefepime/ceftriaxone then augmentin thru 9/20. Likely bacterial seeding of internal jugular clot  - ABX duration plan: per ID through 9/20. Sensitive to ceftriaxone q24hrs, can transition to non-pseudomonal coverage for discharge/better engraftment.   - Ceftriaxone (9/13-9/15).     # MRSE bacteremia likely contaminant because peripheral stick, grew late (1/2). Dc'd Vanco (9/2-9/6)              - Repeat BC's x3 days NGTD    #Check UA/UC/BK virus in urine with urinary frequency-negative 9/27     Prophylaxis plan:   - Fungal: Micafungin(in clinic), Now transitioned to posaconazole- started 9/24. Level 10/3 1.7, but did not hold. Repeat Level 1.1 adequate  - PJP: Bactrim to start at D+28. Toxoplasma negative. Bactrim started 9/23  - Viral: Acyclovir 800mg BID. No need for letermovir as donor and recipient are CMV negative.  9/30 swab lip for HSV- NEG     Monitoring:   - CMV weekly, neg 9/30  - EBV every 2 weeks from D30 to D180: neg 9/27     GI/NUTRITION  - Reflux: Add carafate QID  - Anti-emetics: Compazine PRN. lorazepam available PRN.   - Ulcer prophy: Protonix daily  - VOD prophy: Ursodiol      CARDIOVASCULAR  # HTN: PTA lisinopril stopped 8/26 d/t symptomatic orthostasis.   # CAD: Coronary artery calcifications seen on CT, stress test in 2023 negative  - Risk of cardiomyopathy: Baseline EF 55-60%.   - Risk of arrhythmia: Baseline EKG showed 421       RENAL/ELECTROLYTES/  - Electrolyte management: replace per sliding scale  - BPH: Continue PTA flomax.  - Hemorrhagic Cystitis secondary to cytoxan: hold ditropan for now as may be contributing to belching      MUSCULOSKELETAL/FRAILTY  - Baseline Frailty Score: Not frail (0)  # Gout: continue allopurinol   # L arm pain- noted first 10/10- tightness with extension of arm. Some slight swelling noted, no erythema. Ultrasound pending, though low suspicion for clot as he is on anticoagulation at appropriate dosing. Trial heat, tylenol for  symptomatic relief.      Neuro   # History of spinal stenosis, lumbosacral radiculopathy likely exacerbating.     Supportive cares  - Oral cares for aphthous ulcers/gum irritation  - Trial melatonin for sleep       Plan:  Start Carafate QID  Remove PICC line if counts remain stable on Friday.     RTC Friday for lab, VJ visit.     I spent 30 minutes in the care of this patient today, which included time necessary for preparation for the visit, obtaining history, ordering medications/tests/procedures as medically indicated, review of pertinent medical literature, counseling of the patient, communication of recommendations to the care team, and documentation time.    The longitudinal plan of care for the diagnosis(es)/condition(s) as documented were addressed during this visit. Due to the added complexity in care, I will continue to support Leland in the subsequent management and with ongoing continuity of care.    Kaitlyn Aguilar, CNP  Vocera or Pager [phone]

## 2024-10-18 NOTE — NURSING NOTE
Labs monitored, wnl.   Picc line removal orders varified.   Dressing removed, insertion site cleaned with chlorhexidine.  Picc line removed, pressure held on site for 2 minutes.  Vaseline gauze and a 4x4 applied. Site wrapped with coban.    Pt tolerated procedure. Pt monitored for 20mins. Pt informed to keep dsg  C,d,I for 24hrs. Pt educated to go to ED if site looks irritated, painful, hard, oozing  or if she has a temp 100.5. Pt verbalizes understanding.

## 2024-10-18 NOTE — NURSING NOTE
Chief Complaint   Patient presents with    Blood Draw     Picc line blood draw by lab RN       Labs drawn via PICC line and flushed with heparin by lab RN. Vitals taken and next appointment arrived.    Ela Pirce RN

## 2024-10-20 LAB — EBV DNA SERPL NAA+PROBE-ACNC: NOT DETECTED IU/ML

## 2024-10-23 DIAGNOSIS — D46.9 MDS (MYELODYSPLASTIC SYNDROME) (H): ICD-10-CM

## 2024-10-23 DIAGNOSIS — Z94.84 HISTORY OF PERIPHERAL STEM CELL TRANSPLANT (H): ICD-10-CM

## 2024-10-23 RX ORDER — PANTOPRAZOLE SODIUM 40 MG/1
40 TABLET, DELAYED RELEASE ORAL DAILY
Qty: 90 TABLET | Refills: 0 | Status: SHIPPED | OUTPATIENT
Start: 2024-10-23

## 2024-10-25 ENCOUNTER — OFFICE VISIT (OUTPATIENT)
Dept: TRANSPLANT | Facility: CLINIC | Age: 69
End: 2024-10-25
Attending: PHYSICIAN ASSISTANT
Payer: COMMERCIAL

## 2024-10-25 ENCOUNTER — LAB (OUTPATIENT)
Dept: LAB | Facility: CLINIC | Age: 69
End: 2024-10-25
Attending: PHYSICIAN ASSISTANT
Payer: COMMERCIAL

## 2024-10-25 VITALS
TEMPERATURE: 97.9 F | HEART RATE: 105 BPM | WEIGHT: 190.9 LBS | OXYGEN SATURATION: 99 % | DIASTOLIC BLOOD PRESSURE: 83 MMHG | SYSTOLIC BLOOD PRESSURE: 149 MMHG | BODY MASS INDEX: 25.58 KG/M2 | RESPIRATION RATE: 17 BRPM

## 2024-10-25 DIAGNOSIS — D46.9 MDS (MYELODYSPLASTIC SYNDROME) (H): Primary | ICD-10-CM

## 2024-10-25 DIAGNOSIS — Z94.84 IMMUNOCOMPROMISED STATE ASSOCIATED WITH STEM CELL TRANSPLANT (H): ICD-10-CM

## 2024-10-25 DIAGNOSIS — Z23 NEED FOR PROPHYLACTIC VACCINATION AND INOCULATION AGAINST INFLUENZA: ICD-10-CM

## 2024-10-25 DIAGNOSIS — D84.822 IMMUNOCOMPROMISED STATE ASSOCIATED WITH STEM CELL TRANSPLANT (H): ICD-10-CM

## 2024-10-25 LAB
ALBUMIN SERPL BCG-MCNC: 3.7 G/DL (ref 3.5–5.2)
ALP SERPL-CCNC: 90 U/L (ref 40–150)
ALT SERPL W P-5'-P-CCNC: 35 U/L (ref 0–70)
ANION GAP SERPL CALCULATED.3IONS-SCNC: 9 MMOL/L (ref 7–15)
AST SERPL W P-5'-P-CCNC: 41 U/L (ref 0–45)
BASOPHILS # BLD AUTO: 0 10E3/UL (ref 0–0.2)
BASOPHILS NFR BLD AUTO: 1 %
BILIRUB SERPL-MCNC: 0.3 MG/DL
BUN SERPL-MCNC: 12.2 MG/DL (ref 8–23)
CALCIUM SERPL-MCNC: 9.2 MG/DL (ref 8.8–10.4)
CHLORIDE SERPL-SCNC: 105 MMOL/L (ref 98–107)
CREAT SERPL-MCNC: 0.76 MG/DL (ref 0.67–1.17)
EGFRCR SERPLBLD CKD-EPI 2021: >90 ML/MIN/1.73M2
EOSINOPHIL # BLD AUTO: 0.1 10E3/UL (ref 0–0.7)
EOSINOPHIL NFR BLD AUTO: 2 %
ERYTHROCYTE [DISTWIDTH] IN BLOOD BY AUTOMATED COUNT: 18.7 % (ref 10–15)
GLUCOSE SERPL-MCNC: 105 MG/DL (ref 70–99)
HCO3 SERPL-SCNC: 25 MMOL/L (ref 22–29)
HCT VFR BLD AUTO: 34.5 % (ref 40–53)
HGB BLD-MCNC: 10.8 G/DL (ref 13.3–17.7)
IMM GRANULOCYTES # BLD: 0 10E3/UL
IMM GRANULOCYTES NFR BLD: 0 %
LAB DIRECTOR DISCLAIMER: NORMAL
LAB DIRECTOR DISCLAIMER: NORMAL
LAB DIRECTOR INTERPRETATION: NORMAL
LAB DIRECTOR INTERPRETATION: NORMAL
LAB DIRECTOR METHODOLOGY: NORMAL
LAB DIRECTOR METHODOLOGY: NORMAL
LAB DIRECTOR RESULTS: NORMAL
LAB DIRECTOR RESULTS: NORMAL
LYMPHOCYTES # BLD AUTO: 0.5 10E3/UL (ref 0.8–5.3)
LYMPHOCYTES NFR BLD AUTO: 10 %
MCH RBC QN AUTO: 29.2 PG (ref 26.5–33)
MCHC RBC AUTO-ENTMCNC: 31.3 G/DL (ref 31.5–36.5)
MCV RBC AUTO: 93 FL (ref 78–100)
MONOCYTES # BLD AUTO: 0.7 10E3/UL (ref 0–1.3)
MONOCYTES NFR BLD AUTO: 13 %
NEUTROPHILS # BLD AUTO: 3.6 10E3/UL (ref 1.6–8.3)
NEUTROPHILS NFR BLD AUTO: 73 %
NRBC # BLD AUTO: 0 10E3/UL
NRBC BLD AUTO-RTO: 0 /100
PLATELET # BLD AUTO: 170 10E3/UL (ref 150–450)
POTASSIUM SERPL-SCNC: 4 MMOL/L (ref 3.4–5.3)
PROT SERPL-MCNC: 6.5 G/DL (ref 6.4–8.3)
RBC # BLD AUTO: 3.7 10E6/UL (ref 4.4–5.9)
SIROLIMUS BLD-MCNC: 11.3 UG/L (ref 5–15)
SODIUM SERPL-SCNC: 139 MMOL/L (ref 135–145)
SPECIMEN DESCRIPTION: NORMAL
SPECIMEN DESCRIPTION: NORMAL
TME LAST DOSE: NORMAL H
TME LAST DOSE: NORMAL H
WBC # BLD AUTO: 4.9 10E3/UL (ref 4–11)

## 2024-10-25 PROCEDURE — 99213 OFFICE O/P EST LOW 20 MIN: CPT | Mod: 25

## 2024-10-25 PROCEDURE — 81268 CHIMERISM ANAL W/CELL SELECT: CPT

## 2024-10-25 PROCEDURE — 250N000011 HC RX IP 250 OP 636: Performed by: PHYSICIAN ASSISTANT

## 2024-10-25 PROCEDURE — G0452 MOLECULAR PATHOLOGY INTERPR: HCPCS | Mod: 26 | Performed by: PATHOLOGY

## 2024-10-25 PROCEDURE — G0008 ADMIN INFLUENZA VIRUS VAC: HCPCS | Performed by: PHYSICIAN ASSISTANT

## 2024-10-25 PROCEDURE — 85004 AUTOMATED DIFF WBC COUNT: CPT

## 2024-10-25 PROCEDURE — 87799 DETECT AGENT NOS DNA QUANT: CPT

## 2024-10-25 PROCEDURE — 90662 IIV NO PRSV INCREASED AG IM: CPT | Performed by: PHYSICIAN ASSISTANT

## 2024-10-25 PROCEDURE — 82947 ASSAY GLUCOSE BLOOD QUANT: CPT

## 2024-10-25 PROCEDURE — 80195 ASSAY OF SIROLIMUS: CPT

## 2024-10-25 PROCEDURE — 36415 COLL VENOUS BLD VENIPUNCTURE: CPT

## 2024-10-25 PROCEDURE — 99215 OFFICE O/P EST HI 40 MIN: CPT

## 2024-10-25 RX ORDER — CHLORHEXIDINE GLUCONATE ORAL RINSE 1.2 MG/ML
5-10 SOLUTION DENTAL 2 TIMES DAILY
Qty: 118 ML | Refills: 0 | Status: SHIPPED | OUTPATIENT
Start: 2024-10-25

## 2024-10-25 RX ORDER — SUCRALFATE 1 G/1
1 TABLET ORAL 4 TIMES DAILY
Qty: 120 TABLET | Refills: 0 | Status: SHIPPED | OUTPATIENT
Start: 2024-10-25 | End: 2024-11-24

## 2024-10-25 RX ADMIN — INFLUENZA A VIRUS A/VICTORIA/4897/2022 IVR-238 (H1N1) ANTIGEN (FORMALDEHYDE INACTIVATED), INFLUENZA A VIRUS A/CALIFORNIA/122/2022 SAN-022 (H3N2) ANTIGEN (FORMALDEHYDE INACTIVATED), AND INFLUENZA B VIRUS B/MICHIGAN/01/2021 ANTIGEN (FORMALDEHYDE INACTIVATED) 0.5 ML: 60; 60; 60 INJECTION, SUSPENSION INTRAMUSCULAR at 13:12

## 2024-10-25 ASSESSMENT — PAIN SCALES - GENERAL: PAINLEVEL_OUTOF10: NO PAIN (0)

## 2024-10-25 NOTE — NURSING NOTE
Chief Complaint   Patient presents with    Blood Draw     Labs drawn with  by RN. Vitals taken. Pt checked into next appointment.       Mary Bradley RN

## 2024-10-25 NOTE — LETTER
10/25/2024      Leland Pearson  [address] [place] [address]      Dear Colleague,    Thank you for referring your patient, Leland Pearson, to the [facility] [place]. Please see a copy of my visit note below.    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     Referring provider: Dr. Delcid    History of Present Illness/Summary  Leland is feeling well today - his arm actually feels much better. No sensation of pulling with arm extension. His PICC line was removed last week - which he is very happy about. He is eating and drinking well. He still has two small mouth sores that are managed well with tabbing peridex on it. These sores are not affecting eating. Heart burn symptoms and tongue fullness have significantly improved since starting carafate - so we will continue. No rash. No diarrhea. No nausea or vomiting. No new infections.    Review of Systems: Negative except as mentioned above    Physical Exam     Vital Signs: BP (!) 149/83   Pulse 105   Temp 97.9  F (36.6  C) (Oral)   Resp 17   Wt 86.6 kg (190 lb 14.4 oz)   SpO2 99%   BMI 25.58 kg/m      Wt Readings from Last 4 Encounters:   10/25/24 86.6 kg (190 lb 14.4 oz)   10/18/24 85.6 kg (188 lb 12.8 oz)   10/15/24 86.5 kg (190 lb 9.6 oz)   10/10/24 84.8 kg (186 lb 14.4 oz)     KPS: 80% Can perform normal activity with effort, some signs of disease    General: Alert, no distress  Eyes: Conjunctiva normal  Mouth: Very small ulcer on upper inner lip and along the right lateral surface of tongue. No surrounding erythema. MMM.  Respiratory: Normal respiratory effort. CTAB  Cardiovascular: RRR. Normal perfusion, no significant edema.  Abd: +BS, soft, NT, ND  Psych: Mood and affect are  appropriate.    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     BMT/IEC PROTOCOL for MDS  - Chemo protocol: MT 2022-5; Flu/Cy/TBI  - Peripheral blood stem cell graft from 8/8 URD donor, ABO matched, Cell dose: 6.06 x10^6 CD34/kg  - Engraftment monitoring: Peripheral blood and bone marrow chimerisms on D28, D100, 6 months, 1 and 2 years  - Restaging plan: BMBx with FISH, cytogenetics and NGS on D28, D100, 6 months, 1 and 2 years  - D21 BMbx completed 9/13. - 9/13/24: BMBx remission, NGS pending. Bone marrow 100% donor. Peripheral blood CD3 92%    HEME/COAG  # Pancytopenia 2/2 chemo/BMT  - Last day of G (9/12).    - Transfusion parameters: hemoglobin <7, platelets <50 (increased while on Lovenox).      # Right sternocleidomastoid tenderness s/p CVC removal  # Occlusive thrombus of R internal jugular vein  # Nonocclusive thrombus or R axillary vein  - Started complaining of tenderness and swelling near right CVC site that was removed. Obtained US to further investigate.   - Positive for thrombus. No anticoagulation at this time due to thrombocytopenia. Will need to begin DOAC (per pharmacy preferably voriconazole + apixaban without loading dose) vs lovenox once platelets >50k.   (9/17) Ongoing right neck pain/swelling: will repeat ultrasound shows extension of previous clot. Started on Lovenox -- per Benign Heme will start Lovenox BID with PLT  parameter >50.   - Plan for lovenox for 3 months. Will have follow up with Dr. Price outpatient on 12/17. Increase to full dose with platelet stability 10/15.      RISK OF GVHD  - Prophylaxis: PTCy 50mg/kg on D+3 and D+4; MMF (D+5 to 35); Sirolimus (starting D+5, target level 5-10).  - Siro 1mg/0.5mg alternating (lowered for posa). (10/25) Level pending today.       ID afebrile  # Sore throat/congestion/orophaynx ulcerations (10/10): Suspect viral infection - Swabs negative, BC NGTD. All symptoms improved 10/15.     H/o  #Streptococcus mitis bacteremia (2/2 bottles) s/p cefepime/ceftriaxone then augmentin thru 9/20. Likely bacterial seeding of internal jugular clot  - ABX duration plan: per ID through 9/20. Sensitive to ceftriaxone q24hrs, can transition to non-pseudomonal coverage for discharge/better engraftment.   - Ceftriaxone (9/13-9/15).     # MRSE bacteremia likely contaminant because peripheral stick, grew late (1/2). Dc'd Vanco (9/2-9/6)              - Repeat BC's x3 days NGTD    #Check UA/UC/BK virus in urine with urinary frequency-negative 9/27     Prophylaxis plan:   - Fungal: Micafungin(completed day 45) posaconazole.  - PJP: Bactrim to start at D+28. Toxoplasma negative. Bactrim started 9/23  - Viral: Acyclovir 800mg BID. No need for letermovir as donor and recipient are CMV negative.  9/30 swab lip for HSV- NEG     Monitoring:   - CMV weekly, neg 10/18  - EBV every 2 weeks from D30 to D180: neg 10/18    Vaccinations: Flu shot (10/25). COVID/RSV after Day 100.     GI/NUTRITION  # Mouth sores: waxing and waning sores - not affecting eating or drinking. Okay to use peridex or MMW PRN for spot treatment.  # Reflux: carafate QID  - Anti-emetics: Compazine PRN.   - Ulcer prophy: Protonix daily  - VOD prophy: Ursodiol -- (10/25) hold for 7 days if LFTs remain WNL okay to discontinue.     CARDIOVASCULAR  # HTN: PTA lisinopril stopped 8/26 d/t symptomatic orthostasis. (10/25) Monitor BP -- if remains  elevated over the next week, consider restarting lisinopril.  # CAD: Coronary artery calcifications seen on CT, stress test in 2023 negative  - Risk of cardiomyopathy: Baseline EF 55-60%.   - Risk of arrhythmia: Baseline EKG showed 421       RENAL/ELECTROLYTES/  - Electrolyte management: replace per sliding scale  - BPH: Continue PTA flomax.  - Hemorrhagic Cystitis secondary to cytoxan: hold ditropan for now as may be contributing to belching      MUSCULOSKELETAL/FRAILTY  - Baseline Frailty Score: Not frail (0)  # Gout: continue allopurinol   # L arm pain- noted first 10/10- tightness with extension of arm. Some slight swelling noted, no erythema. Ultrasound pending, though low suspicion for clot as he is on anticoagulation at appropriate dosing. Trial heat, tylenol for symptomatic relief.      Neuro   # History of spinal stenosis, lumbosacral radiculopathy likely exacerbating.     Supportive cares  - Oral cares for aphthous ulcers/gum irritation -- peridex   - Trial melatonin for sleep     Plan:  Flu shot  Hold ursodiol for 1 week - if LFTs remain WNL, okay to discontinue  Continue Carafate QID - new rx    RTC qweekly for lab, VJ visit.     I spent 40 minutes in the care of this patient today, which included time necessary for preparation for the visit, obtaining history, ordering medications/tests/procedures as medically indicated, review of pertinent medical literature, counseling of the patient, communication of recommendations to the care team, and documentation time.    Baltazar Arguello PA-C         Again, thank you for allowing me to participate in the care of your patient.        Sincerely,        BMT Advanced Practice Provider

## 2024-10-25 NOTE — NURSING NOTE
"Oncology Rooming Note    October 25, 2024 12:17 PM   Leland Pearson is a 69 year old male who presents for:    Chief Complaint   Patient presents with    Blood Draw     Labs drawn with  by RN. Vitals taken. Pt checked into next appointment.      Oncology Clinic Visit     MDS (myelodysplastic syndrome)     Initial Vitals: BP (!) 149/83   Pulse 105   Temp 97.9  F (36.6  C) (Oral)   Resp 17   Wt 86.6 kg (190 lb 14.4 oz)   SpO2 99%   BMI 25.58 kg/m   Estimated body mass index is 25.58 kg/m  as calculated from the following:    Height as of 8/16/24: 1.84 m (6' 0.44\").    Weight as of this encounter: 86.6 kg (190 lb 14.4 oz). Body surface area is 2.1 meters squared.  No Pain (0) Comment: Data Unavailable   No LMP for male patient.  Allergies reviewed: Yes  Medications reviewed: Yes    Medications: MEDICATION REFILLS NEEDED TODAY. Provider was notified.  Pharmacy name entered into copygram:    [id number] - [place], [place] - [address] [place] [address] [facility] [place], [place] - [address]    Frailty Screening:   Is the patient here for a new oncology consult visit in cancer care? 2. No      Clinical concerns: Patient is wondering if he should be getting a refill on his magic mouthwash, considering that he bought Peridex OTC and it seems to be working. He is also wondering if he will be receiving his flu shot today.      Adonay Ramos EMT            "

## 2024-10-25 NOTE — PROGRESS NOTES
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     Referring provider: Dr. Delcid    History of Present Illness/Summary  Leland is feeling well today - his arm actually feels much better. No sensation of pulling with arm extension. His PICC line was removed last week - which he is very happy about. He is eating and drinking well. He still has two small mouth sores that are managed well with tabbing peridex on it. These sores are not affecting eating. Heart burn symptoms and tongue fullness have significantly improved since starting carafate - so we will continue. No rash. No diarrhea. No nausea or vomiting. No new infections.    Review of Systems: Negative except as mentioned above    Physical Exam     Vital Signs: BP (!) 149/83   Pulse 105   Temp 97.9  F (36.6  C) (Oral)   Resp 17   Wt 86.6 kg (190 lb 14.4 oz)   SpO2 99%   BMI 25.58 kg/m      Wt Readings from Last 4 Encounters:   10/25/24 86.6 kg (190 lb 14.4 oz)   10/18/24 85.6 kg (188 lb 12.8 oz)   10/15/24 86.5 kg (190 lb 9.6 oz)   10/10/24 84.8 kg (186 lb 14.4 oz)     KPS: 80% Can perform normal activity with effort, some signs of disease    General: Alert, no distress  Eyes: Conjunctiva normal  Mouth: Very small ulcer on upper inner lip and along the right lateral surface of tongue. No surrounding erythema. MMM.  Respiratory: Normal respiratory effort. CTAB  Cardiovascular: RRR. Normal perfusion, no significant edema.  Abd: +BS, soft, NT, ND  Psych: Mood and affect are appropriate.    LABORATORY STUDIES:    Lab Results   Component Value Date    WBC 4.9 10/25/2024    ANEU 1.0 (L) 09/17/2024    HGB 10.8 (L) 10/25/2024    HCT 34.5 (L) 10/25/2024     10/25/2024     10/18/2024    POTASSIUM 4.0 10/18/2024    CHLORIDE 105 10/18/2024    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     BMT/IEC PROTOCOL for MDS  - Chemo protocol: MT 2022-5; Flu/Cy/TBI  - Peripheral blood stem cell graft from 8/8 URD donor, ABO matched, Cell dose: 6.06 x10^6 CD34/kg  - Engraftment monitoring: Peripheral blood and bone marrow chimerisms on D28, D100, 6 months, 1 and 2 years  - Restaging plan: BMBx with FISH, cytogenetics and NGS on D28, D100, 6 months, 1 and 2 years  - D21 BMbx completed 9/13. - 9/13/24: BMBx remission, NGS pending. Bone marrow 100% donor. Peripheral blood CD3 92%    HEME/COAG  # Pancytopenia 2/2 chemo/BMT  - Last day of G (9/12).    - Transfusion parameters: hemoglobin <7, platelets <50 (increased while on Lovenox).      # Right sternocleidomastoid tenderness s/p CVC removal  # Occlusive thrombus of R internal jugular vein  # Nonocclusive thrombus or R axillary vein  - Started complaining of tenderness and swelling near right CVC site that was removed. Obtained US to further investigate.   - Positive for thrombus. No anticoagulation at this time due to thrombocytopenia. Will need to begin DOAC (per pharmacy preferably voriconazole + apixaban without loading dose) vs lovenox once platelets >50k.   (9/17) Ongoing right neck pain/swelling: will repeat ultrasound shows extension of previous clot. Started on Lovenox -- per Benign Heme will start Lovenox BID with PLT parameter >50.   - Plan for lovenox for 3 months. Will have follow up with Dr. Price outpatient on 12/17. Increase to full dose with platelet stability 10/15.      RISK OF GVHD  - Prophylaxis: PTCy 50mg/kg on D+3 and D+4; MMF (D+5 to 35); Sirolimus (starting D+5, target level  5-10).  - Siro 1mg/0.5mg alternating (lowered for posa). (10/25) Level pending today.       ID afebrile  # Sore throat/congestion/orophaynx ulcerations (10/10): Suspect viral infection - Swabs negative, BC NGTD. All symptoms improved 10/15.     H/o  #Streptococcus mitis bacteremia (2/2 bottles) s/p cefepime/ceftriaxone then augmentin thru 9/20. Likely bacterial seeding of internal jugular clot  - ABX duration plan: per ID through 9/20. Sensitive to ceftriaxone q24hrs, can transition to non-pseudomonal coverage for discharge/better engraftment.   - Ceftriaxone (9/13-9/15).     # MRSE bacteremia likely contaminant because peripheral stick, grew late (1/2). Dc'd Vanco (9/2-9/6)              - Repeat BC's x3 days NGTD    #Check UA/UC/BK virus in urine with urinary frequency-negative 9/27     Prophylaxis plan:   - Fungal: Micafungin(completed day 45) posaconazole.  - PJP: Bactrim to start at D+28. Toxoplasma negative. Bactrim started 9/23  - Viral: Acyclovir 800mg BID. No need for letermovir as donor and recipient are CMV negative.  9/30 swab lip for HSV- NEG     Monitoring:   - CMV weekly, neg 10/18  - EBV every 2 weeks from D30 to D180: neg 10/18    Vaccinations: Flu shot (10/25). COVID/RSV after Day 100.     GI/NUTRITION  # Mouth sores: waxing and waning sores - not affecting eating or drinking. Okay to use peridex or MMW PRN for spot treatment.  # Reflux: carafate QID  - Anti-emetics: Compazine PRN.   - Ulcer prophy: Protonix daily  - VOD prophy: Ursodiol -- (10/25) hold for 7 days if LFTs remain WNL okay to discontinue.     CARDIOVASCULAR  # HTN: PTA lisinopril stopped 8/26 d/t symptomatic orthostasis. (10/25) Monitor BP -- if remains elevated over the next week, consider restarting lisinopril.  # CAD: Coronary artery calcifications seen on CT, stress test in 2023 negative  - Risk of cardiomyopathy: Baseline EF 55-60%.   - Risk of arrhythmia: Baseline EKG showed 421       RENAL/ELECTROLYTES/  - Electrolyte  management: replace per sliding scale  - BPH: Continue PTA flomax.  - Hemorrhagic Cystitis secondary to cytoxan: hold ditropan for now as may be contributing to belching      MUSCULOSKELETAL/FRAILTY  - Baseline Frailty Score: Not frail (0)  # Gout: continue allopurinol   # L arm pain- noted first 10/10- tightness with extension of arm. Some slight swelling noted, no erythema. Ultrasound pending, though low suspicion for clot as he is on anticoagulation at appropriate dosing. Trial heat, tylenol for symptomatic relief.      Neuro   # History of spinal stenosis, lumbosacral radiculopathy likely exacerbating.     Supportive cares  - Oral cares for aphthous ulcers/gum irritation -- peridex   - Trial melatonin for sleep     Plan:  Flu shot  Hold ursodiol for 1 week - if LFTs remain WNL, okay to discontinue  Continue Carafate QID - new rx  Monitoring BP - may need to add back on lisinopril in the next 1-2 weeks    RTC qweekly for lab, VJ visit.     I spent 40 minutes in the care of this patient today, which included time necessary for preparation for the visit, obtaining history, ordering medications/tests/procedures as medically indicated, review of pertinent medical literature, counseling of the patient, communication of recommendations to the care team, and documentation time.    Baltazar Arguello PA-C

## 2024-10-26 LAB — CMV DNA SPEC NAA+PROBE-ACNC: NOT DETECTED IU/ML

## 2024-10-27 LAB — EBV DNA SERPL NAA+PROBE-ACNC: NOT DETECTED IU/ML

## 2024-11-01 ENCOUNTER — OFFICE VISIT (OUTPATIENT)
Dept: TRANSPLANT | Facility: CLINIC | Age: 69
End: 2024-11-01
Attending: STUDENT IN AN ORGANIZED HEALTH CARE EDUCATION/TRAINING PROGRAM
Payer: COMMERCIAL

## 2024-11-01 ENCOUNTER — LAB (OUTPATIENT)
Dept: LAB | Facility: CLINIC | Age: 69
End: 2024-11-01
Payer: COMMERCIAL

## 2024-11-01 VITALS
TEMPERATURE: 98.2 F | SYSTOLIC BLOOD PRESSURE: 138 MMHG | OXYGEN SATURATION: 97 % | BODY MASS INDEX: 25.98 KG/M2 | RESPIRATION RATE: 16 BRPM | WEIGHT: 193.9 LBS | HEART RATE: 92 BPM | DIASTOLIC BLOOD PRESSURE: 86 MMHG

## 2024-11-01 DIAGNOSIS — D46.9 MDS (MYELODYSPLASTIC SYNDROME) (H): ICD-10-CM

## 2024-11-01 DIAGNOSIS — D46.9 MDS (MYELODYSPLASTIC SYNDROME) (H): Primary | ICD-10-CM

## 2024-11-01 LAB
ALBUMIN SERPL BCG-MCNC: 3.8 G/DL (ref 3.5–5.2)
ALP SERPL-CCNC: 90 U/L (ref 40–150)
ALT SERPL W P-5'-P-CCNC: 47 U/L (ref 0–70)
ANION GAP SERPL CALCULATED.3IONS-SCNC: 9 MMOL/L (ref 7–15)
AST SERPL W P-5'-P-CCNC: 53 U/L (ref 0–45)
BASOPHILS # BLD AUTO: 0 10E3/UL (ref 0–0.2)
BASOPHILS NFR BLD AUTO: 1 %
BILIRUB SERPL-MCNC: 0.3 MG/DL
BUN SERPL-MCNC: 12.3 MG/DL (ref 8–23)
CALCIUM SERPL-MCNC: 9.2 MG/DL (ref 8.8–10.4)
CHLORIDE SERPL-SCNC: 106 MMOL/L (ref 98–107)
CREAT SERPL-MCNC: 0.76 MG/DL (ref 0.67–1.17)
EGFRCR SERPLBLD CKD-EPI 2021: >90 ML/MIN/1.73M2
EOSINOPHIL # BLD AUTO: 0.1 10E3/UL (ref 0–0.7)
EOSINOPHIL NFR BLD AUTO: 3 %
ERYTHROCYTE [DISTWIDTH] IN BLOOD BY AUTOMATED COUNT: 18 % (ref 10–15)
GLUCOSE SERPL-MCNC: 106 MG/DL (ref 70–99)
HCO3 SERPL-SCNC: 26 MMOL/L (ref 22–29)
HCT VFR BLD AUTO: 33.8 % (ref 40–53)
HGB BLD-MCNC: 11.2 G/DL (ref 13.3–17.7)
IMM GRANULOCYTES # BLD: 0 10E3/UL
IMM GRANULOCYTES NFR BLD: 1 %
LYMPHOCYTES # BLD AUTO: 0.4 10E3/UL (ref 0.8–5.3)
LYMPHOCYTES NFR BLD AUTO: 11 %
MCH RBC QN AUTO: 30.6 PG (ref 26.5–33)
MCHC RBC AUTO-ENTMCNC: 33.1 G/DL (ref 31.5–36.5)
MCV RBC AUTO: 92 FL (ref 78–100)
MONOCYTES # BLD AUTO: 0.6 10E3/UL (ref 0–1.3)
MONOCYTES NFR BLD AUTO: 17 %
NEUTROPHILS # BLD AUTO: 2.4 10E3/UL (ref 1.6–8.3)
NEUTROPHILS NFR BLD AUTO: 68 %
NRBC # BLD AUTO: 0 10E3/UL
NRBC BLD AUTO-RTO: 0 /100
PLATELET # BLD AUTO: 155 10E3/UL (ref 150–450)
POTASSIUM SERPL-SCNC: 3.9 MMOL/L (ref 3.4–5.3)
PROT SERPL-MCNC: 6.4 G/DL (ref 6.4–8.3)
RBC # BLD AUTO: 3.66 10E6/UL (ref 4.4–5.9)
SIROLIMUS BLD-MCNC: 11.1 UG/L (ref 5–15)
SODIUM SERPL-SCNC: 141 MMOL/L (ref 135–145)
TME LAST DOSE: NORMAL H
TME LAST DOSE: NORMAL H
WBC # BLD AUTO: 3.5 10E3/UL (ref 4–11)

## 2024-11-01 PROCEDURE — 85004 AUTOMATED DIFF WBC COUNT: CPT

## 2024-11-01 PROCEDURE — 99214 OFFICE O/P EST MOD 30 MIN: CPT | Performed by: STUDENT IN AN ORGANIZED HEALTH CARE EDUCATION/TRAINING PROGRAM

## 2024-11-01 PROCEDURE — 80053 COMPREHEN METABOLIC PANEL: CPT

## 2024-11-01 PROCEDURE — 80195 ASSAY OF SIROLIMUS: CPT | Performed by: STUDENT IN AN ORGANIZED HEALTH CARE EDUCATION/TRAINING PROGRAM

## 2024-11-01 PROCEDURE — G0463 HOSPITAL OUTPT CLINIC VISIT: HCPCS | Performed by: STUDENT IN AN ORGANIZED HEALTH CARE EDUCATION/TRAINING PROGRAM

## 2024-11-01 PROCEDURE — 36415 COLL VENOUS BLD VENIPUNCTURE: CPT | Performed by: STUDENT IN AN ORGANIZED HEALTH CARE EDUCATION/TRAINING PROGRAM

## 2024-11-01 PROCEDURE — 87799 DETECT AGENT NOS DNA QUANT: CPT | Performed by: STUDENT IN AN ORGANIZED HEALTH CARE EDUCATION/TRAINING PROGRAM

## 2024-11-01 RX ORDER — ACYCLOVIR 800 MG/1
800 TABLET ORAL EVERY 12 HOURS
Qty: 90 TABLET | Refills: 3 | Status: SHIPPED | OUTPATIENT
Start: 2024-11-01

## 2024-11-01 RX ORDER — SULFAMETHOXAZOLE AND TRIMETHOPRIM 800; 160 MG/1; MG/1
1 TABLET ORAL
Qty: 48 TABLET | Refills: 3 | Status: SHIPPED | OUTPATIENT
Start: 2024-11-04 | End: 2025-10-06

## 2024-11-01 ASSESSMENT — PAIN SCALES - GENERAL: PAINLEVEL_OUTOF10: NO PAIN (0)

## 2024-11-01 NOTE — LETTER
11/1/2024      Leland Pearson  [address] [place] [address]      Dear Colleague,    Thank you for referring your patient, Leland Pearson, to the [facility] BLOOD AND MARROW TRANSPLANT PROGRAM [place]. Please see a copy of my visit note below.    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     Diagnosis MDS-IB2 BMT type Allogenic  CMV  Donor -  Recipient -    Prep GANGA (Flu/Cy/TBI) Donor type  8/8, URD ABO D/R O+    GVHD ppx PTCy, siro, MMF Graft source PBSCT Toxo IgG Negative   Protocol [id number] CD34/kg 6.06E+06    BMT MD Brenda Murphy     Pre-transplant disease history  Referring provider: Dr. Bhavin Jackson   He started having drenching night sweats and fatigue in Jan 2024. Intentional weight loss. He was found to have thrombocytopenia on routine CBC done prior to back surgery (was planned for laminectomy and fusion). On 4/1/24, WBC 6.4, Hb 13.5, Plts 64, ANC 1.89. LDH elevated 532. He underwent bone marrow biopsy (4/23/24) which showed MDS-EB2. Hypercellular marrow (%) with 10.8% blasts including rare circulating blasts. Flow showed 4% myeloid blasts. Normal karyotype. NGS (peripheral blood) was positive for ASXL1, IDH1, RUNX1, SRSF2 mutations (full report not available). Counts: WBC 6.7, Hb 13.1, Plts 70, ANC 0.7. IPSS-M = High risk (0.85).  He was treated with azacitidine 75mg/m2 for 7 days and venetoclax for 14 days (C1 on 5/20/24). Bone marrow after first cycle (6/13/24) showed persistent MDS, with hypercellular marrow with therapy effect, no increase in blasts. Normal karyotype. NGS: ASXL1 (38%), IDH1 (43%), RUNX1 (41%), SRSF1 (38%).   He received second cycle of aza/ angelia on 7/1/24. His pre-transplant BMBx was done on 8/2/24 and he was cytopenic at the time (CBC with WBC 0.6, Hb 14, platelet  count 24). Hypocellular marrow (5%) with no increase in blasts. Normal karyotype, NGS negative.     Post transplant   - 9/2/24 (around D10) Neutropenic fevers. Strept mitis bacteremia resistant to levaquin from central venous catheter as well as a single culture of MRSE from peripheral blood (contaminant). Treated with cefepime. Repeat blood cultures negative, but continued to have high fevers. TTE (9/6/24) did not show any vegetations. CVC was removed on 9/6/24, defervesced on 9/7/24. PICC placed on 9/9/24. Antibiotics (cefepime followed by augmentin) were continued for a total of 14days from line removal.   - Right lower neck swelling and tenderness near the previous CVC site. US (9/15/24) showed an occlusive right internal jugular thrombus and a non occlusive thrombus in the right axillary vein, PICC associated. He was initially not anticoagulated due to thrombocytopenia but had increased symptoms. Repeat US (9/17/24) showed extension of right internal jugular thrombus into the innominate vein and extension of right axillary thrombus into subclavian vein. He was started on therapeutic anticoagulation with lovenox 1mg/kg BID on 9/18/24 with transfusion support for platelets, with plan to continue for 3 months. Right PICC removal was considered given propagation of clot near the PICC line. However, IR (9/19/24) recommended against removal of R PICC and replacement in the left arm due to risk of contralateral DVT.   - Hemorrhagic cystitis: Dysuria, urgency and hematuria starting  9/11/2024. Infectious workup including adenovirus and BK virus negative. Urology was consulted, recommended outpatient follow up. Now resolved.          INTERVAL HISTORY     Leland Pearson returns for a scheduled BMT clinic visit. He has been doing very well.   His mouth sores have been healing. Still has sores on the tongue. Has been using peridex.  No nausea, vomiting. Good appetite. No rash. No issues with lovenox, no bleeding.      PLAN   - Doing very well  - Chimerisms D60 are 100%  - No signs of GVHD  - Drop siro goal to 5-10 (keep around 6-8). This should help mouth sores heal  - Stop sirolimus at D100 without taper   - No other changes.     PMH  Back pain due to spinal stenosis, lumbosacral radiculopathy.   Gout, most recent flare was in April 2024  GERD  HTN  BPH, on tamsulosin   Right knee ACL repair in 2014  Low back surgery in 2004 for herniated disc, complicated by infection requiring prolonged IV abx   Coronary artery calcifications seen on CT, stress test in 2023 negative    SH  . Lives with his wife, Vani. Two daughters, 43 and 36. 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 Was still working part time till last summer at [employer], 2-3 hours per day.     Medications  Current Outpatient Medications   Medication Sig Dispense Refill     acyclovir (ZOVIRAX) 800 MG tablet Take 1 tablet (800 mg) by mouth every 12 hours. 90 tablet 3     allopurinol (ZYLOPRIM) 300 MG tablet Take 1 tablet (300 mg) by mouth daily. 90 tablet 0     chlorhexidine (PERIDEX) 0.12 % solution Swish and spit 5-10 mLs in mouth 2 times daily. 118 mL 0     enoxaparin ANTICOAGULANT (LOVENOX) 80 MG/0.8ML syringe Inject 0.8 mLs (80 mg) subcutaneously 2 times daily. 48 mL 1     magic mouthwash suspension (diphenhydrAMINE, lidocaine, aluminum-magnesium & simethicone) Swish and swallow 10 mLs in mouth every 6 hours as needed for mouth sores. 150 mL 1     pantoprazole (PROTONIX) 40 MG EC tablet Take 1 tablet (40 mg) by mouth daily. 90 tablet 0     posaconazole (NOXAFIL) 100 MG DR tablet Take 3 tablets (300 mg) by mouth every morning. 90 tablet 1     prochlorperazine (COMPAZINE) 5 MG tablet Take 1 tablet (5 mg) by mouth every 6 hours as needed for nausea or vomiting. 30 tablet 0     sirolimus (GENERIC EQUIVALENT) 0.5 MG tablet Take by mouth daily. Alternating between 0.5 mg and 1 mg daily (dose as of 10/1)       sucralfate (CARAFATE) 1 GM tablet Take  1 tablet (1 g) by mouth 4 times daily. 120 tablet 0     [START ON 11/4/2024] sulfamethoxazole-trimethoprim (BACTRIM DS) 800-160 MG tablet Take 1 tablet by mouth Every Mon, Tues two times daily. 48 tablet 3     tamsulosin (FLOMAX) 0.4 MG capsule Take 0.4 mg by mouth daily.           EXAM      /86   Pulse 92   Temp 98.2  F (36.8  C) (Oral)   Resp 16   Wt 88 kg (193 lb 14.4 oz)   SpO2 97%   BMI 25.98 kg/m    Wt Readings from Last 4 Encounters:   11/01/24 88 kg (193 lb 14.4 oz)   10/25/24 86.6 kg (190 lb 14.4 oz)   10/18/24 85.6 kg (188 lb 12.8 oz)   10/15/24 86.5 kg (190 lb 9.6 oz)       KPS: 80% Can perform normal activity with effort, some signs of disease    General: Alert, awake  Eyes: Conjunctiva normal  Mouth and Throat: Scalloped tongue edges   Chest: Normal   Respiratory: Normal respiratory effort.  Cardiovascular: Normal perfusion, no significant edema.  Abdomen: No distention or mass.  Access: None      LABS / PATHOLOGY/ IMAGING     Data     Blood Counts  Recent Labs   Lab Test 11/01/24  1357 10/25/24  1201 10/18/24  1216 09/18/24  0430 09/17/24  0424 09/16/24  0432 09/13/24  0322 09/12/24  0321   WBC 3.5* 4.9 5.5   < > 1.3* 1.5*   < > 2.9*   HGB 11.2* 10.8* 10.3*   < > 7.4* 8.0*   < > 8.4*    170 177   < > 22* 24*   < > 27*   NEUTROPHIL 68 73 76   < > 77 84   < > 89   ANEU  --   --   --   --  1.0* 1.3*  --  2.6   LYMPH 11 10 8   < > 1 1   < > 1   ALYM  --   --   --   --  0.0* 0.0*  --  0.0*    < > = values in this interval not displayed.     Basic Panel  Recent Labs   Lab Test 11/01/24  1357 10/25/24  1201 10/18/24  1216    139 138   POTASSIUM 3.9 4.0 4.0   CHLORIDE 106 105 105   CO2 26 25 24   BUN 12.3 12.2 14.7   CR 0.76 0.76 0.74   * 105* 120*      LFTs  Recent Labs   Lab Test 11/01/24  1357 10/25/24  1201 10/18/24  1216   ALKPHOS 90 90 84   AST 53* 41 41   ALT 47 35 35   BILITOTAL 0.3 0.3 0.3   ALBUMIN 3.8 3.7 3.7     Immunosuppression  Recent Labs   Lab Test  10/25/24  1201 10/18/24  1216 10/15/24  1220   RAPAMY 11.3 10.7 12.3     Recent Labs   Lab Test 10/25/24  1201 10/18/24  1216 10/15/24  1220   CMVQNT Not Detected Not Detected Not Detected            ASSESSMENT AND PLAN     BMT: D70   - Chemo protocol: MT 2022-5; Flu/Cy/TBI  - Peripheral blood stem cell graft from 8/8 URD donor, ABO matched, Cell dose: 6.06 x10^6 CD34/kg    Disease status:   9/13/24 (D28): Bmbx shows hypocellular marrow, no dysplasia or blasts. Cytogenetics cancelled. NGS negative    Graft status/chimerism:   9/13/24 (D28): Bone marrow 100%. Peripheral blood CD3 92%, CD33 100%  10/25/24 (D60): Peripheral blood CD3 and CD33 100%    GVHD  # Current immunosuppression is sirolimus.   - Goal 5-10 (keep around 6-8)  - Stop at D100 without taper.     # GVHD: none   Skin: No changes. Skin score 0.  GI: No symptoms. GI score 0.  Liver: LFTs normal. Liver score 0.    Heme/ Coag  # Right internal jugular occlusive thrombus and right axillary vein non occlusive thrombus, line associated   - Continue lovenox 1mg/kg BID, to continue for at least 3 months (12/18/24). Has a follow up with Dr. Ochoa to decide on duration.   - Right upper chest/ neck induration has resolved. PICC line was removed on 10/18/24  - Plan to stop posaconazole around D100, can be switched to DOAC after if anticoagulation needs to continue long term: will defer to Dr. Ochoa    # G-CSF last given on 9/18/24. Continue PRN for ANC < 1  # Transfusion parameters: hemoglobin <7, platelets <50     ID  # Prophylaxis  Fungal: Posaconazole.  Pulm nodules present on workup CT.   PJP/ Toxo IgG negative: Bactrim   Viral: Acyclovir 800 mg bid    # Monitoring  CMV: Monitor weekly till D100. 10/25 - negative  EBV: Monitor every 2 weeks between D30 to D180. 10/25 - negative  IgG:  IgG was 708 mg/dL on 9/27/24. Check serum IgG level q3 months, and consider IVIG repletion for IgG levels <400 mg/dL.    GI  GERD: Pantoprazole and carafate  VOD prophylaxis:  Completed   # Loose stools: Resolved, has 2-3 formed bowel movements daily.     CARDIOVASCULAR  # HTN: PTA lisinopril stopped 8/26 d/t symptomatic orthostasis.   # CAD: Coronary artery calcifications seen on CT, stress test in 2023 negative  - Risk of cardiomyopathy: Baseline EF 55-60%.   - Risk of arrhythmia: Baseline EKG showed 421       RENAL/ELECTROLYTES/  - BPH: Continue PTA flomax.  - Hemorrhagic Cystitis secondary to cytoxan: Resolved. Oxybutynin 10mg at bedtime PRN. Will need follow up with urology at some point.       MUSCULOSKELETAL  # Gout: continue allopurinol   # History of spinal stenosis, lumbosacral radiculopathy:   - Pain management: Okay to take tylenol or robaxin RPN     SOCIAL DETERMINANTS  - Caregiver: wife, Vani. Lives within the required distance from hospital   - Financial/insurance concerns: None    Post-transplant vaccinations  COVID, RSV and flu after D100    I spent 30 minutes in the care of this patient today, which included time necessary for preparation for the visit, obtaining history, ordering medications/tests/procedures as medically indicated, review of pertinent medical literature, counseling of the patient, communication of recommendations to the care team, and documentation time.    Brenda Murphy  Department of Hematology, Oncology and Transplantation  Pager 1300/Text via [employer]          Again, thank you for allowing me to participate in the care of your patient.        Sincerely,        ONEIL Montero

## 2024-11-01 NOTE — PROGRESS NOTES
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     Diagnosis MDS-IB2 BMT type Allogenic  CMV  Donor -  Recipient -    Prep GANGA (Flu/Cy/TBI) Donor type  8/8, URD ABO D/R O+    GVHD ppx PTCy, siro, MMF Graft source PBSCT Toxo IgG Negative   Protocol [id number] CD34/kg 6.06E+06    BMT MD Brenda Murphy     Pre-transplant disease history  Referring provider: Dr. Delcid  Data    He started having drenching night sweats and fatigue in Jan 2024. Intentional weight loss. He was found to have thrombocytopenia on routine CBC done prior to back surgery (was planned for laminectomy and fusion). On 4/1/24, WBC 6.4, Hb 13.5, Plts 64, ANC 1.89. LDH elevated 532. He underwent bone marrow biopsy (4/23/24) which showed MDS-EB2. Hypercellular marrow (%) with 10.8% blasts including rare circulating blasts. Flow showed 4% myeloid blasts. Normal karyotype. NGS (peripheral blood) was positive for ASXL1, IDH1, RUNX1, SRSF2 mutations (full report not available). Counts: WBC 6.7, Hb 13.1, Plts 70, ANC 0.7. IPSS-M = High risk (0.85).  He was treated with azacitidine 75mg/m2 for 7 days and venetoclax for 14 days (C1 on 5/20/24). Bone marrow after first cycle (6/13/24) showed persistent MDS, with hypercellular marrow with therapy effect, no increase in blasts. Normal karyotype. NGS: ASXL1 (38%), IDH1 (43%), RUNX1 (41%), SRSF1 (38%).   He received second cycle of aza/ angelia on 7/1/24. His pre-transplant BMBx was done on 8/2/24 and he was cytopenic at the time (CBC with WBC 0.6, Hb 14, platelet count 24). Hypocellular marrow (5%) with no increase in blasts. Normal karyotype, NGS negative.     Post transplant   - 9/2/24 (around D10) Neutropenic fevers. Strept mitis bacteremia resistant to levaquin from central venous catheter as well as a single culture of MRSE from  peripheral blood (contaminant). Treated with cefepime. Repeat blood cultures negative, but continued to have high fevers. TTE (9/6/24) did not show any vegetations. CVC was removed on 9/6/24, defervesced on 9/7/24. PICC placed on 9/9/24. Antibiotics (cefepime followed by augmentin) were continued for a total of 14days from line removal.   - Right lower neck swelling and tenderness near the previous CVC site. US (9/15/24) showed an occlusive right internal jugular thrombus and a non occlusive thrombus in the right axillary vein, PICC associated. He was initially not anticoagulated due to thrombocytopenia but had increased symptoms. Repeat US (9/17/24) showed extension of right internal jugular thrombus into the innominate vein and extension of right axillary thrombus into subclavian vein. He was started on therapeutic anticoagulation with lovenox 1mg/kg BID on 9/18/24 with transfusion support for platelets, with plan to continue for 3 months. Right PICC removal was considered given propagation of clot near the PICC line. However, IR (9/19/24) recommended against removal of R PICC and replacement in the left arm due to risk of contralateral DVT.   - Hemorrhagic cystitis: Dysuria, urgency and hematuria starting  9/11/2024. Infectious workup including adenovirus and BK virus negative. Urology was consulted, recommended outpatient follow up. Now resolved.          INTERVAL HISTORY     Leland Pearson returns for a scheduled BMT clinic visit. He has been doing very well.   His mouth sores have been healing. Still has sores on the tongue. Has been using peridex.  No nausea, vomiting. Good appetite. No rash. No issues with lovenox, no bleeding.     PLAN   - Doing very well  - Chimerisms D60 are 100%  - No signs of GVHD  - Drop siro goal to 5-10 (keep around 6-8). This should help mouth sores heal  - Stop sirolimus at D100 without taper   - No other changes.     PMH  Back pain due to spinal stenosis, lumbosacral  radiculopathy.   Gout, most recent flare was in April 2024  GERD  HTN  BPH, on tamsulosin   Right knee ACL repair in 2014  Low back surgery in 2004 for herniated disc, complicated by infection requiring prolonged IV abx   Coronary artery calcifications seen on CT, stress test in 2023 negative    SH  . Lives with his wife, Vani. Two daughters, 43 and 36. 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     Medications  Current Outpatient Medications   Medication Sig Dispense Refill    acyclovir (ZOVIRAX) 800 MG tablet Take 1 tablet (800 mg) by mouth every 12 hours. 90 tablet 3    allopurinol (ZYLOPRIM) 300 MG tablet Take 1 tablet (300 mg) by mouth daily. 90 tablet 0    chlorhexidine (PERIDEX) 0.12 % solution Swish and spit 5-10 mLs in mouth 2 times daily. 118 mL 0    enoxaparin ANTICOAGULANT (LOVENOX) 80 MG/0.8ML syringe Inject 0.8 mLs (80 mg) subcutaneously 2 times daily. 48 mL 1    magic mouthwash suspension (diphenhydrAMINE, lidocaine, aluminum-magnesium & simethicone) Swish and swallow 10 mLs in mouth every 6 hours as needed for mouth sores. 150 mL 1    pantoprazole (PROTONIX) 40 MG EC tablet Take 1 tablet (40 mg) by mouth daily. 90 tablet 0    posaconazole (NOXAFIL) 100 MG DR tablet Take 3 tablets (300 mg) by mouth every morning. 90 tablet 1    prochlorperazine (COMPAZINE) 5 MG tablet Take 1 tablet (5 mg) by mouth every 6 hours as needed for nausea or vomiting. 30 tablet 0    sirolimus (GENERIC EQUIVALENT) 0.5 MG tablet Take by mouth daily. Alternating between 0.5 mg and 1 mg daily (dose as of 10/1)      sucralfate (CARAFATE) 1 GM tablet Take 1 tablet (1 g) by mouth 4 times daily. 120 tablet 0    [START ON 11/4/2024] sulfamethoxazole-trimethoprim (BACTRIM DS) 800-160 MG tablet Take 1 tablet by mouth Every Mon, Tues two times daily. 48 tablet 3    tamsulosin (FLOMAX) 0.4 MG capsule Take 0.4 mg by mouth daily.            EXAM      /86   Pulse 92   Temp 98.2  F (36.8  C) (Oral)   Resp 16   Wt 88 kg (193 lb 14.4 oz)   SpO2 97%   BMI 25.98 kg/m    Wt Readings from Last 4 Encounters:   11/01/24 88 kg (193 lb 14.4 oz)   10/25/24 86.6 kg (190 lb 14.4 oz)   10/18/24 85.6 kg (188 lb 12.8 oz)   10/15/24 86.5 kg (190 lb 9.6 oz)       KPS: 80% Can perform normal activity with effort, some signs of disease    General: Alert, awake  Eyes: Conjunctiva normal  Mouth and Throat: Scalloped tongue edges   Chest: Normal   Respiratory: Normal respiratory effort.  Cardiovascular: Normal perfusion, no significant edema.  Abdomen: No distention or mass.  Access: None      LABS / PATHOLOGY/ IMAGING     Data      Blood Counts  Recent Labs   Lab Test 11/01/24  1357 10/25/24  1201 10/18/24  1216 09/18/24  0430 09/17/24  0424 09/16/24  0432 09/13/24  0322 09/12/24  0321   WBC 3.5* 4.9 5.5   < > 1.3* 1.5*   < > 2.9*   HGB 11.2* 10.8* 10.3*   < > 7.4* 8.0*   < > 8.4*    170 177   < > 22* 24*   < > 27*   NEUTROPHIL 68 73 76   < > 77 84   < > 89   ANEU  --   --   --   --  1.0* 1.3*  --  2.6   LYMPH 11 10 8   < > 1 1   < > 1   ALYM  --   --   --   --  0.0* 0.0*  --  0.0*    < > = values in this interval not displayed.     Basic Panel  Recent Labs   Lab Test 11/01/24  1357 10/25/24  1201 10/18/24  1216    139 138   POTASSIUM 3.9 4.0 4.0   CHLORIDE 106 105 105   CO2 26 25 24   BUN 12.3 12.2 14.7   CR 0.76 0.76 0.74   * 105* 120*      LFTs  Recent Labs   Lab Test 11/01/24  1357 10/25/24  1201 10/18/24  1216   ALKPHOS 90 90 84   AST 53* 41 41   ALT 47 35 35   BILITOTAL 0.3 0.3 0.3   ALBUMIN 3.8 3.7 3.7     Immunosuppression  Recent Labs   Lab Test 10/25/24  1201 10/18/24  1216 10/15/24  1220   RAPAMY 11.3 10.7 12.3     Recent Labs   Lab Test 10/25/24  1201 10/18/24  1216 10/15/24  1220   CMVQNT Not Detected Not Detected Not Detected            ASSESSMENT AND PLAN     BMT: D70   - Chemo protocol: MT 2022-5; Flu/Cy/TBI  - Peripheral blood  stem cell graft from 8/8 URD donor, ABO matched, Cell dose: 6.06 x10^6 CD34/kg    Disease status:   9/13/24 (D28): Bmbx shows hypocellular marrow, no dysplasia or blasts. Cytogenetics cancelled. NGS negative    Graft status/chimerism:   9/13/24 (D28): Bone marrow 100%. Peripheral blood CD3 92%, CD33 100%  10/25/24 (D60): Peripheral blood CD3 and CD33 100%    GVHD  # Current immunosuppression is sirolimus.   - Goal 5-10 (keep around 6-8)  - Stop at D100 without taper.     # GVHD: none   Skin: No changes. Skin score 0.  GI: No symptoms. GI score 0.  Liver: LFTs normal. Liver score 0.    Heme/ Coag  # Right internal jugular occlusive thrombus and right axillary vein non occlusive thrombus, line associated   - Continue lovenox 1mg/kg BID, to continue for at least 3 months (12/18/24). Has a follow up with Dr. Ochoa to decide on duration.   - Right upper chest/ neck induration has resolved. PICC line was removed on 10/18/24  - Plan to stop posaconazole around D100, can be switched to DOAC after if anticoagulation needs to continue long term: will defer to Dr. Ochoa    # G-CSF last given on 9/18/24. Continue PRN for ANC < 1  # Transfusion parameters: hemoglobin <7, platelets <50     ID  # Prophylaxis  Fungal: Posaconazole.  Pulm nodules present on workup CT.   PJP/ Toxo IgG negative: Bactrim   Viral: Acyclovir 800 mg bid    # Monitoring  CMV: Monitor weekly till D100. 10/25 - negative  EBV: Monitor every 2 weeks between D30 to D180. 10/25 - negative  IgG:  IgG was 708 mg/dL on 9/27/24. Check serum IgG level q3 months, and consider IVIG repletion for IgG levels <400 mg/dL.    GI  GERD: Pantoprazole and carafate  VOD prophylaxis: Completed   # Loose stools: Resolved, has 2-3 formed bowel movements daily.     CARDIOVASCULAR  # HTN: PTA lisinopril stopped 8/26 d/t symptomatic orthostasis.   # CAD: Coronary artery calcifications seen on CT, stress test in 2023 negative  - Risk of cardiomyopathy: Baseline EF 55-60%.   -  Risk of arrhythmia: Baseline EKG showed 421       RENAL/ELECTROLYTES/  - BPH: Continue PTA flomax.  - Hemorrhagic Cystitis secondary to cytoxan: Resolved. Oxybutynin 10mg at bedtime PRN. Will need follow up with urology at some point.       MUSCULOSKELETAL  # Gout: continue allopurinol   # History of spinal stenosis, lumbosacral radiculopathy:   - Pain management: Okay to take tylenol or robaxin RPN     SOCIAL DETERMINANTS  - Caregiver: wife, Vani. Lives within the required distance from hospital   - Financial/insurance concerns: None    Post-transplant vaccinations  COVID, RSV and flu after D100    I spent 30 minutes in the care of this patient today, which included time necessary for preparation for the visit, obtaining history, ordering medications/tests/procedures as medically indicated, review of pertinent medical literature, counseling of the patient, communication of recommendations to the care team, and documentation time.    Brenda Murphy  Department of Hematology, Oncology and Transplantation  Pager 1300/Text via [email or web address]

## 2024-11-01 NOTE — NURSING NOTE
"Oncology Rooming Note    November 1, 2024 2:06 PM   Leland Pearson is a 69 year old male who presents for:    Chief Complaint   Patient presents with    Blood Draw     Vpt blood draw by lab RN    Oncology Clinic Visit     Myelodysplastic syndrome     Initial Vitals: /86   Pulse 92   Temp 98.2  F (36.8  C) (Oral)   Resp 16   Wt 88 kg (193 lb 14.4 oz)   SpO2 97%   BMI 25.98 kg/m   Estimated body mass index is 25.98 kg/m  as calculated from the following:    Height as of 8/16/24: 1.84 m (6' 0.44\").    Weight as of this encounter: 88 kg (193 lb 14.4 oz). Body surface area is 2.12 meters squared.  No Pain (0) Comment: Data Unavailable   No LMP for male patient.  Allergies reviewed: Yes  Medications reviewed: Yes    Medications: MEDICATION REFILLS NEEDED TODAY. Provider was notified.  Pharmacy name entered into [employer]:    [id number] [address] [facility] - [employer], [place] - [address]    Frailty Screening:   Is the patient here for a new oncology consult visit in cancer care? 2. No      Clinical concerns: needs refills on acyclovir- was wondering about 90 day supply; bactrim  No longer taking ursodiol and wondering if it needs to recontinue  Taking calcium-vitamin D- wondering if that should still continue.   Also wondering to continue salt washes.   When is it safe for dental cleaning  Wondering how long to avoid fermented food  Wondering how thanksgiving plans should be affected   Praveena Hodges"

## 2024-11-01 NOTE — NURSING NOTE
Chief Complaint   Patient presents with    Blood Draw     Vpt blood draw by lab RN     Venipuncture labs drawn by lab RN, vitals taken and appointment arrived.    Ela Price RN

## 2024-11-02 LAB — CMV DNA SPEC NAA+PROBE-ACNC: NOT DETECTED IU/ML

## 2024-11-02 RX ORDER — SIROLIMUS 0.5 MG/1
0.5 TABLET, FILM COATED ORAL DAILY
COMMUNITY
Start: 2024-11-02

## 2024-11-02 NOTE — PHARMACY-IMMUNOSUPPRESSION MONITORING
Sirolimus level assessment    11/1 Sirolimus level 11.1  New goal of 5-10 (ideally 6-8) per Dr. Murphy  Current dose: 1mg/0.5mg alternating daily    With new goal, will decrease to 0.5 mg every day.  Patient was called with new dose.    Thank you,  Andy J. Kurtzweil, [facility]

## 2024-11-03 LAB — EBV DNA SERPL NAA+PROBE-ACNC: NOT DETECTED IU/ML

## 2024-11-07 ENCOUNTER — OFFICE VISIT (OUTPATIENT)
Dept: TRANSPLANT | Facility: CLINIC | Age: 69
End: 2024-11-07
Attending: STUDENT IN AN ORGANIZED HEALTH CARE EDUCATION/TRAINING PROGRAM
Payer: COMMERCIAL

## 2024-11-07 ENCOUNTER — LAB (OUTPATIENT)
Dept: LAB | Facility: CLINIC | Age: 69
End: 2024-11-07
Attending: STUDENT IN AN ORGANIZED HEALTH CARE EDUCATION/TRAINING PROGRAM
Payer: COMMERCIAL

## 2024-11-07 VITALS
HEART RATE: 91 BPM | WEIGHT: 193.9 LBS | RESPIRATION RATE: 17 BRPM | OXYGEN SATURATION: 100 % | BODY MASS INDEX: 25.98 KG/M2 | TEMPERATURE: 98.3 F | DIASTOLIC BLOOD PRESSURE: 84 MMHG | SYSTOLIC BLOOD PRESSURE: 153 MMHG

## 2024-11-07 DIAGNOSIS — D84.822 IMMUNOCOMPROMISED STATE ASSOCIATED WITH STEM CELL TRANSPLANT (H): ICD-10-CM

## 2024-11-07 DIAGNOSIS — D46.9 MDS (MYELODYSPLASTIC SYNDROME) (H): Primary | ICD-10-CM

## 2024-11-07 DIAGNOSIS — Z94.84 IMMUNOCOMPROMISED STATE ASSOCIATED WITH STEM CELL TRANSPLANT (H): ICD-10-CM

## 2024-11-07 LAB
ALBUMIN SERPL BCG-MCNC: 3.8 G/DL (ref 3.5–5.2)
ALP SERPL-CCNC: 80 U/L (ref 40–150)
ALT SERPL W P-5'-P-CCNC: 48 U/L (ref 0–70)
ANION GAP SERPL CALCULATED.3IONS-SCNC: 8 MMOL/L (ref 7–15)
AST SERPL W P-5'-P-CCNC: 50 U/L (ref 0–45)
BASOPHILS # BLD AUTO: 0 10E3/UL (ref 0–0.2)
BASOPHILS NFR BLD AUTO: 1 %
BILIRUB SERPL-MCNC: 0.3 MG/DL
BUN SERPL-MCNC: 11.1 MG/DL (ref 8–23)
CALCIUM SERPL-MCNC: 9.2 MG/DL (ref 8.8–10.4)
CHLORIDE SERPL-SCNC: 106 MMOL/L (ref 98–107)
CREAT SERPL-MCNC: 0.87 MG/DL (ref 0.67–1.17)
EGFRCR SERPLBLD CKD-EPI 2021: >90 ML/MIN/1.73M2
EOSINOPHIL # BLD AUTO: 0.1 10E3/UL (ref 0–0.7)
EOSINOPHIL NFR BLD AUTO: 4 %
ERYTHROCYTE [DISTWIDTH] IN BLOOD BY AUTOMATED COUNT: 17.2 % (ref 10–15)
GLUCOSE SERPL-MCNC: 104 MG/DL (ref 70–99)
HCO3 SERPL-SCNC: 26 MMOL/L (ref 22–29)
HCT VFR BLD AUTO: 36.3 % (ref 40–53)
HGB BLD-MCNC: 11.7 G/DL (ref 13.3–17.7)
IMM GRANULOCYTES # BLD: 0 10E3/UL
IMM GRANULOCYTES NFR BLD: 0 %
LYMPHOCYTES # BLD AUTO: 0.5 10E3/UL (ref 0.8–5.3)
LYMPHOCYTES NFR BLD AUTO: 17 %
MCH RBC QN AUTO: 30.2 PG (ref 26.5–33)
MCHC RBC AUTO-ENTMCNC: 32.2 G/DL (ref 31.5–36.5)
MCV RBC AUTO: 94 FL (ref 78–100)
MONOCYTES # BLD AUTO: 0.5 10E3/UL (ref 0–1.3)
MONOCYTES NFR BLD AUTO: 17 %
NEUTROPHILS # BLD AUTO: 1.8 10E3/UL (ref 1.6–8.3)
NEUTROPHILS NFR BLD AUTO: 61 %
NRBC # BLD AUTO: 0 10E3/UL
NRBC BLD AUTO-RTO: 0 /100
PLATELET # BLD AUTO: 150 10E3/UL (ref 150–450)
POTASSIUM SERPL-SCNC: 4.1 MMOL/L (ref 3.4–5.3)
PROT SERPL-MCNC: 6.4 G/DL (ref 6.4–8.3)
RBC # BLD AUTO: 3.88 10E6/UL (ref 4.4–5.9)
SIROLIMUS BLD-MCNC: 10 UG/L (ref 5–15)
SODIUM SERPL-SCNC: 140 MMOL/L (ref 135–145)
TME LAST DOSE: NORMAL H
TME LAST DOSE: NORMAL H
WBC # BLD AUTO: 2.9 10E3/UL (ref 4–11)

## 2024-11-07 PROCEDURE — G2211 COMPLEX E/M VISIT ADD ON: HCPCS

## 2024-11-07 PROCEDURE — 36415 COLL VENOUS BLD VENIPUNCTURE: CPT

## 2024-11-07 PROCEDURE — 99215 OFFICE O/P EST HI 40 MIN: CPT

## 2024-11-07 PROCEDURE — 85004 AUTOMATED DIFF WBC COUNT: CPT

## 2024-11-07 PROCEDURE — 87799 DETECT AGENT NOS DNA QUANT: CPT

## 2024-11-07 PROCEDURE — 80195 ASSAY OF SIROLIMUS: CPT

## 2024-11-07 PROCEDURE — 82947 ASSAY GLUCOSE BLOOD QUANT: CPT

## 2024-11-07 PROCEDURE — G0463 HOSPITAL OUTPT CLINIC VISIT: HCPCS

## 2024-11-07 ASSESSMENT — PAIN SCALES - GENERAL: PAINLEVEL_OUTOF10: NO PAIN (0)

## 2024-11-07 NOTE — LETTER
11/7/2024      Leland Pearson  [address] [place] [address]      Dear Colleague,    Thank you for referring your patient, Leland Pearson, to the [facility] BLOOD AND MARROW TRANSPLANT PROGRAM [place]. Please see a copy of my visit note below.    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     Diagnosis MDS-IB2 BMT type Allogenic  CMV  Donor -  Recipient -    Prep GANGA (Flu/Cy/TBI) Donor type  8/8, URD ABO D/R O+    GVHD ppx PTCy, siro, MMF Graft source PBSCT Toxo IgG Negative   Protocol [id number] CD34/kg 6.06E+06    BMT MD Brenda Murphy     Pre-transplant disease history  Referring provider: Dr. Bhavin Jackson   He started having drenching night sweats and fatigue in Jan 2024. Intentional weight loss. He was found to have thrombocytopenia on routine CBC done prior to back surgery (was planned for laminectomy and fusion). On 4/1/24, WBC 6.4, Hb 13.5, Plts 64, ANC 1.89. LDH elevated 532. He underwent bone marrow biopsy (4/23/24) which showed MDS-EB2. Hypercellular marrow (%) with 10.8% blasts including rare circulating blasts. Flow showed 4% myeloid blasts. Normal karyotype. NGS (peripheral blood) was positive for ASXL1, IDH1, RUNX1, SRSF2 mutations (full report not available). Counts: WBC 6.7, Hb 13.1, Plts 70, ANC 0.7. IPSS-M = High risk (0.85).  He was treated with azacitidine 75mg/m2 for 7 days and venetoclax for 14 days (C1 on 5/20/24). Bone marrow after first cycle (6/13/24) showed persistent MDS, with hypercellular marrow with therapy effect, no increase in blasts. Normal karyotype. NGS: ASXL1 (38%), IDH1 (43%), RUNX1 (41%), SRSF1 (38%).   He received second cycle of aza/ angelia on 7/1/24. His pre-transplant BMBx was done on 8/2/24 and he was cytopenic at the time (CBC with WBC 0.6, Hb 14, platelet  count 24). Hypocellular marrow (5%) with no increase in blasts. Normal karyotype, NGS negative.     Post transplant   - 9/2/24 (around D10) Neutropenic fevers. Strept mitis bacteremia resistant to levaquin from central venous catheter as well as a single culture of MRSE from peripheral blood (contaminant). Treated with cefepime. Repeat blood cultures negative, but continued to have high fevers. TTE (9/6/24) did not show any vegetations. CVC was removed on 9/6/24, defervesced on 9/7/24. PICC placed on 9/9/24. Antibiotics (cefepime followed by augmentin) were continued for a total of 14days from line removal.   - Right lower neck swelling and tenderness near the previous CVC site. US (9/15/24) showed an occlusive right internal jugular thrombus and a non occlusive thrombus in the right axillary vein, PICC associated. He was initially not anticoagulated due to thrombocytopenia but had increased symptoms. Repeat US (9/17/24) showed extension of right internal jugular thrombus into the innominate vein and extension of right axillary thrombus into subclavian vein. He was started on therapeutic anticoagulation with lovenox 1mg/kg BID on 9/18/24 with transfusion support for platelets, with plan to continue for 3 months. Right PICC removal was considered given propagation of clot near the PICC line. However, IR (9/19/24) recommended against removal of R PICC and replacement in the left arm due to risk of contralateral DVT.   - Hemorrhagic cystitis: Dysuria, urgency and hematuria starting  9/11/2024. Infectious workup including adenovirus and BK virus negative. Urology was consulted, recommended outpatient follow up. Now resolved.          INTERVAL HISTORY     Here for weekly follow-up with his wife. Continues to feel relatively well. Mouth sores present but overall improved; L side of tongue only currently. Using MMW and Peridex. No n/v/d/c. No fevers or infectious sx. Belly tender/bruised from Lovenox injections. Home BP  monitor usually shows BP 120s/80s.    ROS: 10 point ROS neg other than the symptoms noted above in the HPI.     EXAM      BP (!) 153/84   Pulse 91   Temp 98.3  F (36.8  C) (Oral)   Resp 17   Wt 88 kg (193 lb 14.4 oz)   SpO2 100%   BMI 25.98 kg/m    Wt Readings from Last 4 Encounters:   11/07/24 88 kg (193 lb 14.4 oz)   11/01/24 88 kg (193 lb 14.4 oz)   10/25/24 86.6 kg (190 lb 14.4 oz)   10/18/24 85.6 kg (188 lb 12.8 oz)       KPS: 80% Can perform normal activity with effort, some signs of disease    General: Alert, awake  Eyes: Conjunctiva normal  Mouth and Throat: Scalloped tongue edges ; 1 ulceration L tongue  Chest: RRR  Respiratory: CTAB  Cardiovascular: RRR  Lymph: trace LE edema L slightly >R  Abdomen: +bs, soft, NT/ND  Access: None      LABS     Data     Blood Counts  Recent Labs   Lab Test 11/07/24  1240 11/01/24  1357 10/25/24  1201 09/18/24  0430 09/17/24  0424 09/16/24  0432 09/13/24  0322 09/12/24  0321   WBC 2.9* 3.5* 4.9   < > 1.3* 1.5*   < > 2.9*   HGB 11.7* 11.2* 10.8*   < > 7.4* 8.0*   < > 8.4*    155 170   < > 22* 24*   < > 27*   NEUTROPHIL 61 68 73   < > 77 84   < > 89   ANEU  --   --   --   --  1.0* 1.3*  --  2.6   LYMPH 17 11 10   < > 1 1   < > 1   ALYM  --   --   --   --  0.0* 0.0*  --  0.0*    < > = values in this interval not displayed.     Basic Panel  Recent Labs   Lab Test 11/07/24  1240 11/01/24  1357 10/25/24  1201    141 139   POTASSIUM 4.1 3.9 4.0   CHLORIDE 106 106 105   CO2 26 26 25   BUN 11.1 12.3 12.2   CR 0.87 0.76 0.76   * 106* 105*      LFTs  Recent Labs   Lab Test 11/07/24  1240 11/01/24  1357 10/25/24  1201   ALKPHOS 80 90 90   AST 50* 53* 41   ALT 48 47 35   BILITOTAL 0.3 0.3 0.3   ALBUMIN 3.8 3.8 3.7     Immunosuppression  Recent Labs   Lab Test 11/01/24  1357 10/25/24  1201 10/18/24  1216   RAPAMY 11.1 11.3 10.7     Recent Labs   Lab Test 11/01/24  1357 10/25/24  1201 10/18/24  1216   CMVQNT Not Detected Not Detected Not Detected             ASSESSMENT AND PLAN     BMT: D76   - Chemo protocol: MT 2022-5; Flu/Cy/TBI  - Peripheral blood stem cell graft from 8/8 URD donor, ABO matched, Cell dose: 6.06 x10^6 CD34/kg    Disease status:   9/13/24 (D28): Bmbx shows hypocellular marrow, no dysplasia or blasts. Cytogenetics cancelled. NGS negative    Graft status/chimerism:   9/13/24 (D28): Bone marrow 100%. Peripheral blood CD3 92%, CD33 100%  10/25/24 (D60): Peripheral blood CD3 and CD33 100%    GVHD  # Current immunosuppression is sirolimus 0.5mg/d; level pending.   - Goal 5-10 (keep around 6-8)  - Stop at D100 without taper.     # GVHD: none   Skin: No changes. Skin score 0.  GI: No symptoms. GI score 0.  Liver: LFTs normal. Liver score 0.    Heme/ Coag  # Right internal jugular occlusive thrombus and right axillary vein non occlusive thrombus, line associated   - Continue lovenox 1mg/kg BID (no DOAC d/t interaction w/posa), to continue for at least 3 months (12/18/24). Has a follow up with Dr. Ochoa to decide on duration.   - Right upper chest/ neck induration has resolved. PICC line was removed on 10/18/24  - Plan to stop posaconazole around D100, can be switched to DOAC after if anticoagulation needs to continue long term: will defer to Dr. Ochoa    # G-CSF last given on 9/18/24. Continue PRN for ANC < 1  # Transfusion parameters: hemoglobin <7, platelets <50     ID  # Prophylaxis  Fungal: Posaconazole.  Pulm nodules present on workup CT.   PJP/ Toxo IgG negative: Bactrim   Viral: Acyclovir 800 mg bid  S/p influenza vacc 10/25/24    # Monitoring  CMV: Monitor weekly till D100. 11/1 - negative  EBV: Monitor every 2 weeks between D30 to D180. 11/1 neg  IgG:  IgG was 708 mg/dL on 9/27/24. Check serum IgG level q3 months, and consider IVIG repletion for IgG levels <400 mg/dL.    GI  GERD: Pantoprazole and carafate  VOD prophylaxis: Completed     CARDIOVASCULAR  # HTN: PTA lisinopril stopped 8/26 d/t symptomatic orthostasis. He can bring home BP monitor  to compare to clinic. WNL at home, slightly hypertensive in clinic.  # CAD: Coronary artery calcifications seen on CT, stress test in 2023 negative  - Risk of cardiomyopathy: Baseline EF 55-60%.   - Risk of arrhythmia: Baseline EKG showed 421       RENAL/ELECTROLYTES/  - BPH: Continue Flomax.  - Hemorrhagic Cystitis secondary to cytoxan: Resolved. Oxybutynin 10mg at bedtime PRN. Will need follow up with urology at some point.       MUSCULOSKELETAL  # Gout: continue allopurinol   # History of spinal stenosis, lumbosacral radiculopathy:   - Pain management: Okay to take tylenol or robaxin RPN     SOCIAL DETERMINANTS  - Caregiver: wife, Vani. Lives within the required distance from hospital   - Financial/insurance concerns: None    Post-transplant vaccinations  COVID, RSV after D100    I spent 40minutes in the care of this patient today, which included time necessary for preparation for the visit, obtaining history, ordering medications/tests/procedures as medically indicated, review of pertinent medical literature, counseling of the patient, communication of recommendations to the care team, and documentation time.    The longitudinal plan of care for the diagnosis(es)/condition(s) as documented were addressed during this visit. Due to the added complexity in care, I will continue to support Leland in the subsequent management and with ongoing continuity of care.    Bernadette Green PA-C          Again, thank you for allowing me to participate in the care of your patient.        Sincerely,        BMT Advanced Practice Provider

## 2024-11-07 NOTE — NURSING NOTE
"Oncology Rooming Note    November 7, 2024 12:59 PM   Leland Pearson is a 69 year old male who presents for:    Chief Complaint   Patient presents with    Blood Draw     Labs drawn with  by RN. Vitals taken. Pt checked into next appointment.      Oncology Clinic Visit     RTN for S/P BMT for MDS     Initial Vitals: BP (!) 153/84   Pulse 91   Temp 98.3  F (36.8  C) (Oral)   Resp 17   Wt 88 kg (193 lb 14.4 oz)   SpO2 100%   BMI 25.98 kg/m   Estimated body mass index is 25.98 kg/m  as calculated from the following:    Height as of 8/16/24: 1.84 m (6' 0.44\").    Weight as of this encounter: 88 kg (193 lb 14.4 oz). Body surface area is 2.12 meters squared.  No Pain (0) Comment: Data Unavailable   No LMP for male patient.  Allergies reviewed: Yes  Medications reviewed: Yes    Medications: Medication refills not needed today.  Pharmacy name entered into [employer] [facility]:    [id number] - [place], [place] - [address] [place] [address] [facility] [place], [place] - [address]    Frailty Screening:   Is the patient here for a new oncology consult visit in cancer care? 2. No      Clinical concerns: none      Ivett Chapa MA             "

## 2024-11-07 NOTE — PROGRESS NOTES
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     Diagnosis MDS-IB2 BMT type Allogenic  CMV  Donor -  Recipient -    Prep GANGA (Flu/Cy/TBI) Donor type  8/8, URD ABO D/R O+    GVHD ppx PTCy, siro, MMF Graft source PBSCT Toxo IgG Negative   Protocol [id number] CD34/kg 6.06E+06    BMT MD Brenda Murphy     Pre-transplant disease history  Referring provider: Dr. Delcid  Data    He started having drenching night sweats and fatigue in Jan 2024. Intentional weight loss. He was found to have thrombocytopenia on routine CBC done prior to back surgery (was planned for laminectomy and fusion). On 4/1/24, WBC 6.4, Hb 13.5, Plts 64, ANC 1.89. LDH elevated 532. He underwent bone marrow biopsy (4/23/24) which showed MDS-EB2. Hypercellular marrow (%) with 10.8% blasts including rare circulating blasts. Flow showed 4% myeloid blasts. Normal karyotype. NGS (peripheral blood) was positive for ASXL1, IDH1, RUNX1, SRSF2 mutations (full report not available). Counts: WBC 6.7, Hb 13.1, Plts 70, ANC 0.7. IPSS-M = High risk (0.85).  He was treated with azacitidine 75mg/m2 for 7 days and venetoclax for 14 days (C1 on 5/20/24). Bone marrow after first cycle (6/13/24) showed persistent MDS, with hypercellular marrow with therapy effect, no increase in blasts. Normal karyotype. NGS: ASXL1 (38%), IDH1 (43%), RUNX1 (41%), SRSF1 (38%).   He received second cycle of aza/ angelia on 7/1/24. His pre-transplant BMBx was done on 8/2/24 and he was cytopenic at the time (CBC with WBC 0.6, Hb 14, platelet count 24). Hypocellular marrow (5%) with no increase in blasts. Normal karyotype, NGS negative.     Post transplant   - 9/2/24 (around D10) Neutropenic fevers. Strept mitis bacteremia resistant to levaquin from central venous catheter as well as a single culture of MRSE from  peripheral blood (contaminant). Treated with cefepime. Repeat blood cultures negative, but continued to have high fevers. TTE (9/6/24) did not show any vegetations. CVC was removed on 9/6/24, defervesced on 9/7/24. PICC placed on 9/9/24. Antibiotics (cefepime followed by augmentin) were continued for a total of 14days from line removal.   - Right lower neck swelling and tenderness near the previous CVC site. US (9/15/24) showed an occlusive right internal jugular thrombus and a non occlusive thrombus in the right axillary vein, PICC associated. He was initially not anticoagulated due to thrombocytopenia but had increased symptoms. Repeat US (9/17/24) showed extension of right internal jugular thrombus into the innominate vein and extension of right axillary thrombus into subclavian vein. He was started on therapeutic anticoagulation with lovenox 1mg/kg BID on 9/18/24 with transfusion support for platelets, with plan to continue for 3 months. Right PICC removal was considered given propagation of clot near the PICC line. However, IR (9/19/24) recommended against removal of R PICC and replacement in the left arm due to risk of contralateral DVT.   - Hemorrhagic cystitis: Dysuria, urgency and hematuria starting  9/11/2024. Infectious workup including adenovirus and BK virus negative. Urology was consulted, recommended outpatient follow up. Now resolved.          INTERVAL HISTORY     Here for weekly follow-up with his wife. Continues to feel relatively well. Mouth sores present but overall improved; L side of tongue only currently. Using MMW and Peridex. No n/v/d/c. No fevers or infectious sx. Belly tender/bruised from Lovenox injections. Home BP monitor usually shows BP 120s/80s.    ROS: 10 point ROS neg other than the symptoms noted above in the HPI.     EXAM      BP (!) 153/84   Pulse 91   Temp 98.3  F (36.8  C) (Oral)   Resp 17   Wt 88 kg (193 lb 14.4 oz)   SpO2 100%   BMI 25.98 kg/m    Wt Readings from Last 4  Encounters:   11/07/24 88 kg (193 lb 14.4 oz)   11/01/24 88 kg (193 lb 14.4 oz)   10/25/24 86.6 kg (190 lb 14.4 oz)   10/18/24 85.6 kg (188 lb 12.8 oz)       KPS: 80% Can perform normal activity with effort, some signs of disease    General: Alert, awake  Eyes: Conjunctiva normal  Mouth and Throat: Scalloped tongue edges ; 1 ulceration L tongue  Chest: RRR  Respiratory: CTAB  Cardiovascular: RRR  Lymph: trace LE edema L slightly >R  Abdomen: +bs, soft, NT/ND  Access: None      LABS     Data      Blood Counts  Recent Labs   Lab Test 11/07/24  1240 11/01/24  1357 10/25/24  1201 09/18/24  0430 09/17/24  0424 09/16/24  0432 09/13/24  0322 09/12/24  0321   WBC 2.9* 3.5* 4.9   < > 1.3* 1.5*   < > 2.9*   HGB 11.7* 11.2* 10.8*   < > 7.4* 8.0*   < > 8.4*    155 170   < > 22* 24*   < > 27*   NEUTROPHIL 61 68 73   < > 77 84   < > 89   ANEU  --   --   --   --  1.0* 1.3*  --  2.6   LYMPH 17 11 10   < > 1 1   < > 1   ALYM  --   --   --   --  0.0* 0.0*  --  0.0*    < > = values in this interval not displayed.     Basic Panel  Recent Labs   Lab Test 11/07/24  1240 11/01/24  1357 10/25/24  1201    141 139   POTASSIUM 4.1 3.9 4.0   CHLORIDE 106 106 105   CO2 26 26 25   BUN 11.1 12.3 12.2   CR 0.87 0.76 0.76   * 106* 105*      LFTs  Recent Labs   Lab Test 11/07/24  1240 11/01/24  1357 10/25/24  1201   ALKPHOS 80 90 90   AST 50* 53* 41   ALT 48 47 35   BILITOTAL 0.3 0.3 0.3   ALBUMIN 3.8 3.8 3.7     Immunosuppression  Recent Labs   Lab Test 11/01/24  1357 10/25/24  1201 10/18/24  1216   RAPAMY 11.1 11.3 10.7     Recent Labs   Lab Test 11/01/24  1357 10/25/24  1201 10/18/24  1216   CMVQNT Not Detected Not Detected Not Detected            ASSESSMENT AND PLAN     BMT: D76   - Chemo protocol: MT 2022-5; Flu/Cy/TBI  - Peripheral blood stem cell graft from 8/8 URD donor, ABO matched, Cell dose: 6.06 x10^6 CD34/kg    Disease status:   9/13/24 (D28): Bmbx shows hypocellular marrow, no dysplasia or blasts. Cytogenetics  cancelled. NGS negative    Graft status/chimerism:   9/13/24 (D28): Bone marrow 100%. Peripheral blood CD3 92%, CD33 100%  10/25/24 (D60): Peripheral blood CD3 and CD33 100%    GVHD  # Current immunosuppression is sirolimus 0.5mg/d; level pending.   - Goal 5-10 (keep around 6-8)  - Stop at D100 without taper.     # GVHD: none   Skin: No changes. Skin score 0.  GI: No symptoms. GI score 0.  Liver: LFTs normal. Liver score 0.    Heme/ Coag  # Right internal jugular occlusive thrombus and right axillary vein non occlusive thrombus, line associated   - Continue lovenox 1mg/kg BID (no DOAC d/t interaction w/posa), to continue for at least 3 months (12/18/24). Has a follow up with Dr. Ochoa to decide on duration.   - Right upper chest/ neck induration has resolved. PICC line was removed on 10/18/24  - Plan to stop posaconazole around D100, can be switched to DOAC after if anticoagulation needs to continue long term: will defer to Dr. Ochoa    # G-CSF last given on 9/18/24. Continue PRN for ANC < 1  # Transfusion parameters: hemoglobin <7, platelets <50     ID  # Prophylaxis  Fungal: Posaconazole.  Pulm nodules present on workup CT.   PJP/ Toxo IgG negative: Bactrim   Viral: Acyclovir 800 mg bid  S/p influenza vacc 10/25/24    # Monitoring  CMV: Monitor weekly till D100. 11/1 - negative  EBV: Monitor every 2 weeks between D30 to D180. 11/1 neg  IgG:  IgG was 708 mg/dL on 9/27/24. Check serum IgG level q3 months, and consider IVIG repletion for IgG levels <400 mg/dL.    GI  GERD: Pantoprazole and carafate  VOD prophylaxis: Completed     CARDIOVASCULAR  # HTN: PTA lisinopril stopped 8/26 d/t symptomatic orthostasis. He can bring home BP monitor to compare to clinic. WNL at home, slightly hypertensive in clinic.  # CAD: Coronary artery calcifications seen on CT, stress test in 2023 negative  - Risk of cardiomyopathy: Baseline EF 55-60%.   - Risk of arrhythmia: Baseline EKG showed 421       RENAL/ELECTROLYTES/  - BPH:  Continue Flomax.  - Hemorrhagic Cystitis secondary to cytoxan: Resolved. Oxybutynin 10mg at bedtime PRN. Will need follow up with urology at some point.       MUSCULOSKELETAL  # Gout: continue allopurinol   # History of spinal stenosis, lumbosacral radiculopathy:   - Pain management: Okay to take tylenol or robaxin RPN     SOCIAL DETERMINANTS  - Caregiver: wife, Vani. Lives within the required distance from hospital   - Financial/insurance concerns: None    Post-transplant vaccinations  COVID, RSV after D100    I spent 40minutes in the care of this patient today, which included time necessary for preparation for the visit, obtaining history, ordering medications/tests/procedures as medically indicated, review of pertinent medical literature, counseling of the patient, communication of recommendations to the care team, and documentation time.    The longitudinal plan of care for the diagnosis(es)/condition(s) as documented were addressed during this visit. Due to the added complexity in care, I will continue to support Leland in the subsequent management and with ongoing continuity of care.    Bernadette Green PA-C

## 2024-11-08 LAB
CMV DNA SPEC NAA+PROBE-ACNC: NOT DETECTED IU/ML
EBV DNA SERPL NAA+PROBE-ACNC: NOT DETECTED IU/ML
SPECIMEN TYPE: NORMAL

## 2024-11-10 ENCOUNTER — HEALTH MAINTENANCE LETTER (OUTPATIENT)
Age: 69
End: 2024-11-10

## 2024-11-14 NOTE — PROGRESS NOTES
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     Diagnosis MDS-IB2 BMT type Allogenic  CMV  Donor -  Recipient -    Prep GANGA (Flu/Cy/TBI) Donor type  8/8, URD ABO D/R O+    GVHD ppx PTCy, siro, MMF Graft source PBSCT Toxo IgG Negative   Protocol [id number] CD34/kg 6.06E+06    BMT MD Brenda Murphy     Pre-transplant disease history  Referring provider: Dr. Delcid  Data    He started having drenching night sweats and fatigue in Jan 2024. Intentional weight loss. He was found to have thrombocytopenia on routine CBC done prior to back surgery (was planned for laminectomy and fusion). On 4/1/24, WBC 6.4, Hb 13.5, Plts 64, ANC 1.89. LDH elevated 532. He underwent bone marrow biopsy (4/23/24) which showed MDS-EB2. Hypercellular marrow (%) with 10.8% blasts including rare circulating blasts. Flow showed 4% myeloid blasts. Normal karyotype. NGS (peripheral blood) was positive for ASXL1, IDH1, RUNX1, SRSF2 mutations (full report not available). Counts: WBC 6.7, Hb 13.1, Plts 70, ANC 0.7. IPSS-M = High risk (0.85).  He was treated with azacitidine 75mg/m2 for 7 days and venetoclax for 14 days (C1 on 5/20/24). Bone marrow after first cycle (6/13/24) showed persistent MDS, with hypercellular marrow with therapy effect, no increase in blasts. Normal karyotype. NGS: ASXL1 (38%), IDH1 (43%), RUNX1 (41%), SRSF1 (38%).   He received second cycle of aza/ angelia on 7/1/24. His pre-transplant BMBx was done on 8/2/24 and he was cytopenic at the time (CBC with WBC 0.6, Hb 14, platelet count 24). Hypocellular marrow (5%) with no increase in blasts. Normal karyotype, NGS negative.     Post transplant   - 9/2/24 (around D10) Neutropenic fevers. Strept mitis bacteremia resistant to levaquin from central venous catheter as well as a single culture of MRSE from  peripheral blood (contaminant). Treated with cefepime. Repeat blood cultures negative, but continued to have high fevers. TTE (9/6/24) did not show any vegetations. CVC was removed on 9/6/24, defervesced on 9/7/24. PICC placed on 9/9/24. Antibiotics (cefepime followed by augmentin) were continued for a total of 14days from line removal.   - Right lower neck swelling and tenderness near the previous CVC site. US (9/15/24) showed an occlusive right internal jugular thrombus and a non occlusive thrombus in the right axillary vein, PICC associated. He was initially not anticoagulated due to thrombocytopenia but had increased symptoms. Repeat US (9/17/24) showed extension of right internal jugular thrombus into the innominate vein and extension of right axillary thrombus into subclavian vein. He was started on therapeutic anticoagulation with lovenox 1mg/kg BID on 9/18/24 with transfusion support for platelets, with plan to continue for 3 months. Right PICC removal was considered given propagation of clot near the PICC line. However, IR (9/19/24) recommended against removal of R PICC and replacement in the left arm due to risk of contralateral DVT.   - Hemorrhagic cystitis: Dysuria, urgency and hematuria starting  9/11/2024. Infectious workup including adenovirus and BK virus negative. Urology was consulted, recommended outpatient follow up. Now resolved.          INTERVAL HISTORY     Here for weekly follow-up with his wife. Continues to feel relatively well. Mouth sores resolved. Using Peridex. No n/v/d/c. No fevers or infectious sx. Belly tender/bruised from Lovenox injections.     ROS: 10 point ROS neg other than the symptoms noted above in the HPI.     EXAM      BP (!) 151/87   Pulse 86   Temp 98  F (36.7  C) (Oral)   Resp 16   Wt 89 kg (196 lb 4.8 oz)   SpO2 98%   BMI 26.30 kg/m    Wt Readings from Last 4 Encounters:   11/15/24 89 kg (196 lb 4.8 oz)   11/07/24 88 kg (193 lb 14.4 oz)   11/01/24 88 kg (193 lb  14.4 oz)   10/25/24 86.6 kg (190 lb 14.4 oz)       KPS: 80% Can perform normal activity with effort, some signs of disease    General: Alert, awake  Eyes: Conjunctiva normal  Mouth and Throat: Scalloped tongue edges, no open sores  Chest: RRR  Respiratory: CTAB  Cardiovascular: RRR  Abd:  several ecchymosis secondary to lovenox injections in various stages of resolution.  Lymph: trace LE edema L slightly >R  Abdomen: +bs, soft, NT/ND  Access: None      LABS     Lab Results   Component Value Date    WBC 2.9 (L) 11/07/2024    ANEU 1.0 (L) 09/17/2024    HGB 11.7 (L) 11/07/2024    HCT 36.3 (L) 11/07/2024     11/07/2024     11/07/2024    POTASSIUM 4.1 11/07/2024    CHLORIDE 106 11/07/2024    CO2 26 11/07/2024     (H) 11/07/2024    BUN 11.1 11/07/2024    CR 0.87 11/07/2024    MAG 1.9 10/15/2024    INR 1.26 (H) 09/16/2024        ASSESSMENT AND PLAN     BMT: D84   - Chemo protocol: MT 2022-5; Flu/Cy/TBI  - Peripheral blood stem cell graft from 8/8 URD donor, ABO matched, Cell dose: 6.06 x10^6 CD34/kg    Disease status:   9/13/24 (D28): Bmbx shows hypocellular marrow, no dysplasia or blasts.  NGS negative    Graft status/chimerism:   9/13/24 (D28): Bone marrow 100%. Peripheral blood CD3 92%, CD33 100%  10/25/24 (D60): Peripheral blood CD3 and CD33 100%    GVHD  # Current immunosuppression is sirolimus 0.5mg/d; level 10 (11/7), pending from today  - Goal 5-10 (keep around 6-8)  - Stop at D100 without taper.     # GVHD: NTD      Heme/ Coag  # Right internal jugular occlusive thrombus and right axillary vein non occlusive thrombus, line associated   - Continue lovenox 1mg/kg BID (no DOAC d/t interaction w/posa), to continue for at least 3 months (12/18/24). Has a follow up with Dr. Ochoa to decide on duration.   - Right upper chest/ neck induration has resolved. PICC line was removed on 10/18/24  - Plan to stop posaconazole around D100, can be switched to DOAC after if anticoagulation needs to continue  long term: will defer to Dr. Ochoa    # G-CSF last given on 9/18/24. Continue PRN for ANC < 1  # Transfusion parameters: hemoglobin <7, platelets <50     ID  # Prophylaxis  Fungal: Posaconazole.  Pulm nodules present on workup CT.   PJP/ Toxo IgG negative: Bactrim   Viral: Acyclovir 800 mg bid  S/p influenza vacc 10/25/24    # Monitoring  CMV: Monitor weekly till D100. 11/7 - negative  EBV: Monitor every 2 weeks between D30 to D180. 11/7 neg  IgG:  IgG was 708 mg/dL on 9/27/24. Check serum IgG level q3 months, and consider IVIG repletion for IgG levels <400 mg/dL.    GI  GERD: Pantoprazole and carafate  VOD prophylaxis: Completed     CARDIOVASCULAR  # HTN: PTA lisinopril stopped 8/26 d/t symptomatic orthostasis--he was newly on 15mg at this time and admitted for SCT. He will bring home BP monitor to compare to clinic. WNL at home, slightly hypertensive in clinic.    # CAD: Coronary artery calcifications seen on CT, stress test in 2023 negative  - Risk of cardiomyopathy: Baseline EF 55-60%.   - Risk of arrhythmia: Baseline EKG showed 421       RENAL/ELECTROLYTES/  - BPH: Continue Flomax.  - Hemorrhagic Cystitis secondary to cytoxan: Resolved. Oxybutynin 10mg at bedtime PRN. Will need follow up with urology at some point.       MUSCULOSKELETAL  # Gout: continue allopurinol   # History of spinal stenosis, lumbosacral radiculopathy:   - Pain management: Okay to take tylenol or robaxin RPN     SOCIAL DETERMINANTS  - Caregiver: wife, Vani. Lives within the required distance from hospital   - Financial/insurance concerns: None    Post-transplant vaccinations  COVID, RSV after D100    Final plan:  Multiple questions answered regarding future steps/precautions  RTC 11/22 12/2 BM bx restage  12/6 review w/ Dr. Murphy    I spent 60minutes in the care of this patient today, which included time necessary for preparation for the visit, obtaining history, ordering medications/tests/procedures as medically indicated, review  of pertinent medical literature, counseling of the patient, communication of recommendations to the care team, and documentation time.    The longitudinal plan of care for the diagnosis(es)/condition(s) as documented were addressed during this visit. Due to the added complexity in care, I will continue to support Leland in the subsequent management and with ongoing continuity of care.      Tiki Renner pa-c  [phone]

## 2024-11-15 ENCOUNTER — LAB (OUTPATIENT)
Dept: LAB | Facility: CLINIC | Age: 69
End: 2024-11-15
Attending: STUDENT IN AN ORGANIZED HEALTH CARE EDUCATION/TRAINING PROGRAM
Payer: COMMERCIAL

## 2024-11-15 ENCOUNTER — OFFICE VISIT (OUTPATIENT)
Dept: TRANSPLANT | Facility: CLINIC | Age: 69
End: 2024-11-15
Attending: STUDENT IN AN ORGANIZED HEALTH CARE EDUCATION/TRAINING PROGRAM
Payer: COMMERCIAL

## 2024-11-15 VITALS
SYSTOLIC BLOOD PRESSURE: 151 MMHG | BODY MASS INDEX: 26.3 KG/M2 | TEMPERATURE: 98 F | DIASTOLIC BLOOD PRESSURE: 87 MMHG | OXYGEN SATURATION: 98 % | WEIGHT: 196.3 LBS | RESPIRATION RATE: 16 BRPM | HEART RATE: 86 BPM

## 2024-11-15 DIAGNOSIS — D46.9 MDS (MYELODYSPLASTIC SYNDROME) (H): Primary | ICD-10-CM

## 2024-11-15 LAB
ALBUMIN SERPL BCG-MCNC: 3.9 G/DL (ref 3.5–5.2)
ALP SERPL-CCNC: 88 U/L (ref 40–150)
ALT SERPL W P-5'-P-CCNC: 39 U/L (ref 0–70)
ANION GAP SERPL CALCULATED.3IONS-SCNC: 9 MMOL/L (ref 7–15)
AST SERPL W P-5'-P-CCNC: 51 U/L (ref 0–45)
BASOPHILS # BLD AUTO: 0 10E3/UL (ref 0–0.2)
BASOPHILS NFR BLD AUTO: 1 %
BILIRUB SERPL-MCNC: 0.3 MG/DL
BUN SERPL-MCNC: 12.7 MG/DL (ref 8–23)
CALCIUM SERPL-MCNC: 9.4 MG/DL (ref 8.8–10.4)
CHLORIDE SERPL-SCNC: 105 MMOL/L (ref 98–107)
CMV DNA SPEC NAA+PROBE-ACNC: NOT DETECTED IU/ML
CREAT SERPL-MCNC: 0.85 MG/DL (ref 0.67–1.17)
EGFRCR SERPLBLD CKD-EPI 2021: >90 ML/MIN/1.73M2
EOSINOPHIL # BLD AUTO: 0.1 10E3/UL (ref 0–0.7)
EOSINOPHIL NFR BLD AUTO: 4 %
ERYTHROCYTE [DISTWIDTH] IN BLOOD BY AUTOMATED COUNT: 16.1 % (ref 10–15)
GLUCOSE SERPL-MCNC: 102 MG/DL (ref 70–99)
HCO3 SERPL-SCNC: 26 MMOL/L (ref 22–29)
HCT VFR BLD AUTO: 37.1 % (ref 40–53)
HGB BLD-MCNC: 12 G/DL (ref 13.3–17.7)
IMM GRANULOCYTES # BLD: 0 10E3/UL
IMM GRANULOCYTES NFR BLD: 1 %
LYMPHOCYTES # BLD AUTO: 0.4 10E3/UL (ref 0.8–5.3)
LYMPHOCYTES NFR BLD AUTO: 11 %
MCH RBC QN AUTO: 30 PG (ref 26.5–33)
MCHC RBC AUTO-ENTMCNC: 32.3 G/DL (ref 31.5–36.5)
MCV RBC AUTO: 93 FL (ref 78–100)
MONOCYTES # BLD AUTO: 0.6 10E3/UL (ref 0–1.3)
MONOCYTES NFR BLD AUTO: 18 %
NEUTROPHILS # BLD AUTO: 2.1 10E3/UL (ref 1.6–8.3)
NEUTROPHILS NFR BLD AUTO: 65 %
NRBC # BLD AUTO: 0 10E3/UL
NRBC BLD AUTO-RTO: 0 /100
PLATELET # BLD AUTO: 153 10E3/UL (ref 150–450)
POTASSIUM SERPL-SCNC: 4.5 MMOL/L (ref 3.4–5.3)
PROT SERPL-MCNC: 6.3 G/DL (ref 6.4–8.3)
RBC # BLD AUTO: 4 10E6/UL (ref 4.4–5.9)
SIROLIMUS BLD-MCNC: 9 UG/L (ref 5–15)
SODIUM SERPL-SCNC: 140 MMOL/L (ref 135–145)
SPECIMEN TYPE: NORMAL
TME LAST DOSE: NORMAL H
TME LAST DOSE: NORMAL H
WBC # BLD AUTO: 3.3 10E3/UL (ref 4–11)

## 2024-11-15 PROCEDURE — 36415 COLL VENOUS BLD VENIPUNCTURE: CPT

## 2024-11-15 PROCEDURE — 82040 ASSAY OF SERUM ALBUMIN: CPT

## 2024-11-15 PROCEDURE — 85004 AUTOMATED DIFF WBC COUNT: CPT

## 2024-11-15 PROCEDURE — 85041 AUTOMATED RBC COUNT: CPT

## 2024-11-15 PROCEDURE — G0463 HOSPITAL OUTPT CLINIC VISIT: HCPCS

## 2024-11-15 PROCEDURE — 80195 ASSAY OF SIROLIMUS: CPT

## 2024-11-15 RX ORDER — CHLORHEXIDINE GLUCONATE ORAL RINSE 1.2 MG/ML
5-10 SOLUTION DENTAL 2 TIMES DAILY
Qty: 118 ML | Refills: 0 | Status: SHIPPED | OUTPATIENT
Start: 2024-11-15

## 2024-11-15 RX ORDER — ENOXAPARIN SODIUM 100 MG/ML
80 INJECTION SUBCUTANEOUS 2 TIMES DAILY
Qty: 48 ML | Refills: 1 | Status: SHIPPED | OUTPATIENT
Start: 2024-11-15

## 2024-11-15 ASSESSMENT — PAIN SCALES - GENERAL: PAINLEVEL_OUTOF10: NO PAIN (0)

## 2024-11-15 NOTE — LETTER
11/15/2024      Leland Pearson  [address] [place] [address]      Dear Colleague,    Thank you for referring your patient, Leland Pearson, to the [facility]LOOD AND MARROW TRANSPLANT PRO[facility] [place]. Please see a copy of my visit note below.    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     Diagnosis MDS-IB2 BMT type Allogenic  CMV  Donor -  Recipient -    Prep GANGA (Flu/Cy/TBI) Donor type  8/8, URD ABO D/R O+    GVHD ppx PTCy, siro, MMF Graft source PBSCT Toxo IgG Negative   Protocol [id number] CD34/kg 6.06E+06    BMT MD Brenda Murphy     Pre-transplant disease history  Referring provider: Dr. Bhavin Jackson   He started having drenching night sweats and fatigue in Jan 2024. Intentional weight loss. He was found to have thrombocytopenia on routine CBC done prior to back surgery (was planned for laminectomy and fusion). On 4/1/24, WBC 6.4, Hb 13.5, Plts 64, ANC 1.89. LDH elevated 532. He underwent bone marrow biopsy (4/23/24) which showed MDS-EB2. Hypercellular marrow (%) with 10.8% blasts including rare circulating blasts. Flow showed 4% myeloid blasts. Normal karyotype. NGS (peripheral blood) was positive for ASXL1, IDH1, RUNX1, SRSF2 mutations (full report not available). Counts: WBC 6.7, Hb 13.1, Plts 70, ANC 0.7. IPSS-M = High risk (0.85).  He was treated with azacitidine 75mg/m2 for 7 days and venetoclax for 14 days (C1 on 5/20/24). Bone marrow after first cycle (6/13/24) showed persistent MDS, with hypercellular marrow with therapy effect, no increase in blasts. Normal karyotype. NGS: ASXL1 (38%), IDH1 (43%), RUNX1 (41%), SRSF1 (38%).   He received second cycle of aza/ angelia on 7/1/24. His pre-transplant BMBx was done on 8/2/24 and he was cytopenic at the time (CBC with WBC 0.6, Hb 14,  platelet count 24). Hypocellular marrow (5%) with no increase in blasts. Normal karyotype, NGS negative.     Post transplant   - 9/2/24 (around D10) Neutropenic fevers. Strept mitis bacteremia resistant to levaquin from central venous catheter as well as a single culture of MRSE from peripheral blood (contaminant). Treated with cefepime. Repeat blood cultures negative, but continued to have high fevers. TTE (9/6/24) did not show any vegetations. CVC was removed on 9/6/24, defervesced on 9/7/24. PICC placed on 9/9/24. Antibiotics (cefepime followed by augmentin) were continued for a total of 14days from line removal.   - Right lower neck swelling and tenderness near the previous CVC site. US (9/15/24) showed an occlusive right internal jugular thrombus and a non occlusive thrombus in the right axillary vein, PICC associated. He was initially not anticoagulated due to thrombocytopenia but had increased symptoms. Repeat US (9/17/24) showed extension of right internal jugular thrombus into the innominate vein and extension of right axillary thrombus into subclavian vein. He was started on therapeutic anticoagulation with lovenox 1mg/kg BID on 9/18/24 with transfusion support for platelets, with plan to continue for 3 months. Right PICC removal was considered given propagation of clot near the PICC line. However, IR (9/19/24) recommended against removal of R PICC and replacement in the left arm due to risk of contralateral DVT.   - Hemorrhagic cystitis: Dysuria, urgency and hematuria starting  9/11/2024. Infectious workup including adenovirus and BK virus negative. Urology was consulted, recommended outpatient follow up. Now resolved.          INTERVAL HISTORY     Here for weekly follow-up with his wife. Continues to feel relatively well. Mouth sores resolved. Using Peridex. No n/v/d/c. No fevers or infectious sx. Belly tender/bruised from Lovenox injections.     ROS: 10 point ROS neg other than the symptoms noted above  in the HPI.     EXAM      BP (!) 151/87   Pulse 86   Temp 98  F (36.7  C) (Oral)   Resp 16   Wt 89 kg (196 lb 4.8 oz)   SpO2 98%   BMI 26.30 kg/m    Wt Readings from Last 4 Encounters:   11/15/24 89 kg (196 lb 4.8 oz)   11/07/24 88 kg (193 lb 14.4 oz)   11/01/24 88 kg (193 lb 14.4 oz)   10/25/24 86.6 kg (190 lb 14.4 oz)       KPS: 80% Can perform normal activity with effort, some signs of disease    General: Alert, awake  Eyes: Conjunctiva normal  Mouth and Throat: Scalloped tongue edges, no open sores  Chest: RRR  Respiratory: CTAB  Cardiovascular: RRR  Abd:  several ecchymosis secondary to lovenox injections in various stages of resolution.  Lymph: trace LE edema L slightly >R  Abdomen: +bs, soft, NT/ND  Access: None      LABS     Lab Results   Component Value Date    WBC 2.9 (L) 11/07/2024    ANEU 1.0 (L) 09/17/2024    HGB 11.7 (L) 11/07/2024    HCT 36.3 (L) 11/07/2024     11/07/2024     11/07/2024    POTASSIUM 4.1 11/07/2024    CHLORIDE 106 11/07/2024    CO2 26 11/07/2024     (H) 11/07/2024    BUN 11.1 11/07/2024    CR 0.87 11/07/2024    MAG 1.9 10/15/2024    INR 1.26 (H) 09/16/2024        ASSESSMENT AND PLAN     BMT: D84   - Chemo protocol: MT 2022-5; Flu/Cy/TBI  - Peripheral blood stem cell graft from 8/8 URD donor, ABO matched, Cell dose: 6.06 x10^6 CD34/kg    Disease status:   9/13/24 (D28): Bmbx shows hypocellular marrow, no dysplasia or blasts.  NGS negative    Graft status/chimerism:   9/13/24 (D28): Bone marrow 100%. Peripheral blood CD3 92%, CD33 100%  10/25/24 (D60): Peripheral blood CD3 and CD33 100%    GVHD  # Current immunosuppression is sirolimus 0.5mg/d; level 10 (11/7), pending from today  - Goal 5-10 (keep around 6-8)  - Stop at D100 without taper.     # GVHD: NTD      Heme/ Coag  # Right internal jugular occlusive thrombus and right axillary vein non occlusive thrombus, line associated   - Continue lovenox 1mg/kg BID (no DOAC d/t interaction w/posa), to continue for at  least 3 months (12/18/24). Has a follow up with Dr. Ochoa to decide on duration.   - Right upper chest/ neck induration has resolved. PICC line was removed on 10/18/24  - Plan to stop posaconazole around D100, can be switched to DOAC after if anticoagulation needs to continue long term: will defer to Dr. Ochoa    # G-CSF last given on 9/18/24. Continue PRN for ANC < 1  # Transfusion parameters: hemoglobin <7, platelets <50     ID  # Prophylaxis  Fungal: Posaconazole.  Pulm nodules present on workup CT.   PJP/ Toxo IgG negative: Bactrim   Viral: Acyclovir 800 mg bid  S/p influenza vacc 10/25/24    # Monitoring  CMV: Monitor weekly till D100. 11/7 - negative  EBV: Monitor every 2 weeks between D30 to D180. 11/7 neg  IgG:  IgG was 708 mg/dL on 9/27/24. Check serum IgG level q3 months, and consider IVIG repletion for IgG levels <400 mg/dL.    GI  GERD: Pantoprazole and carafate  VOD prophylaxis: Completed     CARDIOVASCULAR  # HTN: PTA lisinopril stopped 8/26 d/t symptomatic orthostasis--he was newly on 15mg at this time and admitted for SCT. He will bring home BP monitor to compare to clinic. WNL at home, slightly hypertensive in clinic.    # CAD: Coronary artery calcifications seen on CT, stress test in 2023 negative  - Risk of cardiomyopathy: Baseline EF 55-60%.   - Risk of arrhythmia: Baseline EKG showed 421       RENAL/ELECTROLYTES/  - BPH: Continue Flomax.  - Hemorrhagic Cystitis secondary to cytoxan: Resolved. Oxybutynin 10mg at bedtime PRN. Will need follow up with urology at some point.       MUSCULOSKELETAL  # Gout: continue allopurinol   # History of spinal stenosis, lumbosacral radiculopathy:   - Pain management: Okay to take tylenol or robaxin RPN     SOCIAL DETERMINANTS  - Caregiver: wife, Vani. Lives within the required distance from hospital   - Financial/insurance concerns: None    Post-transplant vaccinations  COVID, RSV after D100    Final plan:  Multiple questions answered regarding future  steps/precautions  RTC 11/22 12/2 BM bx restage  12/6 review w/ Dr. Murphy    I spent 60minutes in the care of this patient today, which included time necessary for preparation for the visit, obtaining history, ordering medications/tests/procedures as medically indicated, review of pertinent medical literature, counseling of the patient, communication of recommendations to the care team, and documentation time.    The longitudinal plan of care for the diagnosis(es)/condition(s) as documented were addressed during this visit. Due to the added complexity in care, I will continue to support Leland in the subsequent management and with ongoing continuity of care.      Tiki Renner pa-c  [phone]          Again, thank you for allowing me to participate in the care of your patient.        Sincerely,        BMT Advanced Practice Provider

## 2024-11-15 NOTE — NURSING NOTE
"Oncology Rooming Note    November 15, 2024 11:38 AM   Leland Pearson is a 69 year old male who presents for:    Chief Complaint   Patient presents with    Blood Draw     Vpt blood draw by lab RN    Oncology Clinic Visit     MDS (myelodysplastic syndrome)      Initial Vitals: BP (!) 151/87   Pulse 86   Temp 98  F (36.7  C) (Oral)   Resp 16   Wt 89 kg (196 lb 4.8 oz)   SpO2 98%   BMI 26.30 kg/m   Estimated body mass index is 26.3 kg/m  as calculated from the following:    Height as of 8/16/24: 1.84 m (6' 0.44\").    Weight as of this encounter: 89 kg (196 lb 4.8 oz). Body surface area is 2.13 meters squared.  No Pain (0) Comment: Data Unavailable   No LMP for male patient.  Allergies reviewed: Yes  Medications reviewed: Yes    Medications: MEDICATION REFILLS NEEDED TODAY. Provider was NOT notified.  Pharmacy name entered into [employer]:    [id number] - [place], [place] - [address] [place] [address] [facility] [place], [place] - [address] [place] [address] PHARMACY [place], [place] - [address] [facility] [phone]    Frailty Screening:   Is the patient here for a new oncology consult visit in cancer care? 2. No      Clinical concerns: Pt needs a refill of the lovenox, peridex, and calcium-vitamin D     Pt reports itchy bumps on their head, starting within the last couple weeks and within the last week have noticed an increase in the amount. Pt also has some bumps/lumps on their stomach near lovenox injection site.       Celina Mason            "

## 2024-11-21 ENCOUNTER — CARE COORDINATION (OUTPATIENT)
Dept: TRANSPLANT | Facility: CLINIC | Age: 69
End: 2024-11-21
Payer: COMMERCIAL

## 2024-11-21 NOTE — PROGRESS NOTES
Talked with Leland about bone marrow biopsy on 12/2. In anticipation of this, Leland will hold his lovenox starting the morning of 12/1 through 12/2. Leland verbalizes understanding and does not have further questions at this time.

## 2024-11-22 ENCOUNTER — APPOINTMENT (OUTPATIENT)
Dept: LAB | Facility: CLINIC | Age: 69
End: 2024-11-22
Attending: STUDENT IN AN ORGANIZED HEALTH CARE EDUCATION/TRAINING PROGRAM
Payer: COMMERCIAL

## 2024-11-25 ENCOUNTER — ANCILLARY PROCEDURE (OUTPATIENT)
Dept: ULTRASOUND IMAGING | Facility: CLINIC | Age: 69
End: 2024-11-25
Payer: COMMERCIAL

## 2024-11-25 DIAGNOSIS — Z94.84 HISTORY OF PERIPHERAL STEM CELL TRANSPLANT (H): ICD-10-CM

## 2024-11-25 DIAGNOSIS — R60.0 EDEMA OF LEFT FOREARM: ICD-10-CM

## 2024-11-25 PROCEDURE — 93971 EXTREMITY STUDY: CPT | Mod: LT | Performed by: RADIOLOGY

## 2024-11-29 ENCOUNTER — APPOINTMENT (OUTPATIENT)
Dept: LAB | Facility: CLINIC | Age: 69
End: 2024-11-29
Attending: STUDENT IN AN ORGANIZED HEALTH CARE EDUCATION/TRAINING PROGRAM
Payer: COMMERCIAL

## 2024-11-29 LAB
ALBUMIN SERPL BCG-MCNC: 4 G/DL (ref 3.5–5.2)
ALP SERPL-CCNC: 110 U/L (ref 40–150)
ALT SERPL W P-5'-P-CCNC: 31 U/L (ref 0–70)
ANION GAP SERPL CALCULATED.3IONS-SCNC: 8 MMOL/L (ref 7–15)
AST SERPL W P-5'-P-CCNC: 37 U/L (ref 0–45)
BASOPHILS # BLD AUTO: 0 10E3/UL (ref 0–0.2)
BASOPHILS NFR BLD AUTO: 1 %
BILIRUB SERPL-MCNC: 0.2 MG/DL
BUN SERPL-MCNC: 15.1 MG/DL (ref 8–23)
CALCIUM SERPL-MCNC: 9.2 MG/DL (ref 8.8–10.4)
CHLORIDE SERPL-SCNC: 105 MMOL/L (ref 98–107)
CREAT SERPL-MCNC: 0.83 MG/DL (ref 0.67–1.17)
EGFRCR SERPLBLD CKD-EPI 2021: >90 ML/MIN/1.73M2
EOSINOPHIL # BLD AUTO: 0.2 10E3/UL (ref 0–0.7)
EOSINOPHIL NFR BLD AUTO: 6 %
ERYTHROCYTE [DISTWIDTH] IN BLOOD BY AUTOMATED COUNT: 15.1 % (ref 10–15)
GLUCOSE SERPL-MCNC: 112 MG/DL (ref 70–99)
HCO3 SERPL-SCNC: 27 MMOL/L (ref 22–29)
HCT VFR BLD AUTO: 37.3 % (ref 40–53)
HGB BLD-MCNC: 12.5 G/DL (ref 13.3–17.7)
IMM GRANULOCYTES # BLD: 0 10E3/UL
IMM GRANULOCYTES NFR BLD: 0 %
LAB DIRECTOR DISCLAIMER: NORMAL
LAB DIRECTOR DISCLAIMER: NORMAL
LAB DIRECTOR INTERPRETATION: NORMAL
LAB DIRECTOR INTERPRETATION: NORMAL
LAB DIRECTOR METHODOLOGY: NORMAL
LAB DIRECTOR METHODOLOGY: NORMAL
LAB DIRECTOR RESULTS: NORMAL
LAB DIRECTOR RESULTS: NORMAL
LYMPHOCYTES # BLD AUTO: 0.5 10E3/UL (ref 0.8–5.3)
LYMPHOCYTES NFR BLD AUTO: 13 %
MCH RBC QN AUTO: 31.1 PG (ref 26.5–33)
MCHC RBC AUTO-ENTMCNC: 33.5 G/DL (ref 31.5–36.5)
MCV RBC AUTO: 93 FL (ref 78–100)
MONOCYTES # BLD AUTO: 0.5 10E3/UL (ref 0–1.3)
MONOCYTES NFR BLD AUTO: 13 %
NEUTROPHILS # BLD AUTO: 2.5 10E3/UL (ref 1.6–8.3)
NEUTROPHILS NFR BLD AUTO: 67 %
NRBC # BLD AUTO: 0 10E3/UL
NRBC BLD AUTO-RTO: 0 /100
PLATELET # BLD AUTO: 162 10E3/UL (ref 150–450)
POTASSIUM SERPL-SCNC: 4.3 MMOL/L (ref 3.4–5.3)
PROT SERPL-MCNC: 6.6 G/DL (ref 6.4–8.3)
RBC # BLD AUTO: 4.02 10E6/UL (ref 4.4–5.9)
SODIUM SERPL-SCNC: 140 MMOL/L (ref 135–145)
SPECIMEN TYPE: NORMAL
SPECIMEN TYPE: NORMAL
WBC # BLD AUTO: 3.8 10E3/UL (ref 4–11)

## 2024-11-29 PROCEDURE — 80053 COMPREHEN METABOLIC PANEL: CPT

## 2024-11-29 PROCEDURE — G0452 MOLECULAR PATHOLOGY INTERPR: HCPCS | Mod: 26 | Performed by: PATHOLOGY

## 2024-11-29 PROCEDURE — 85025 COMPLETE CBC W/AUTO DIFF WBC: CPT

## 2024-11-29 PROCEDURE — 81268 CHIMERISM ANAL W/CELL SELECT: CPT

## 2024-12-02 ENCOUNTER — OFFICE VISIT (OUTPATIENT)
Dept: TRANSPLANT | Facility: CLINIC | Age: 69
End: 2024-12-02
Attending: STUDENT IN AN ORGANIZED HEALTH CARE EDUCATION/TRAINING PROGRAM
Payer: COMMERCIAL

## 2024-12-02 VITALS
WEIGHT: 197.1 LBS | RESPIRATION RATE: 18 BRPM | DIASTOLIC BLOOD PRESSURE: 91 MMHG | SYSTOLIC BLOOD PRESSURE: 153 MMHG | BODY MASS INDEX: 26.41 KG/M2 | OXYGEN SATURATION: 98 % | TEMPERATURE: 98.5 F | HEART RATE: 80 BPM

## 2024-12-02 DIAGNOSIS — D84.822 IMMUNOCOMPROMISED STATE ASSOCIATED WITH STEM CELL TRANSPLANT (H): ICD-10-CM

## 2024-12-02 DIAGNOSIS — Z94.84 IMMUNOCOMPROMISED STATE ASSOCIATED WITH STEM CELL TRANSPLANT (H): ICD-10-CM

## 2024-12-02 DIAGNOSIS — D46.9 MDS (MYELODYSPLASTIC SYNDROME) (H): Primary | ICD-10-CM

## 2024-12-02 LAB
ALBUMIN SERPL BCG-MCNC: 3.9 G/DL (ref 3.5–5.2)
ALP SERPL-CCNC: 92 U/L (ref 40–150)
ALT SERPL W P-5'-P-CCNC: 31 U/L (ref 0–70)
ANION GAP SERPL CALCULATED.3IONS-SCNC: 9 MMOL/L (ref 7–15)
AST SERPL W P-5'-P-CCNC: 34 U/L (ref 0–45)
BASOPHILS # BLD AUTO: 0 10E3/UL (ref 0–0.2)
BASOPHILS NFR BLD AUTO: 1 %
BILIRUB SERPL-MCNC: 0.2 MG/DL
BUN SERPL-MCNC: 12.3 MG/DL (ref 8–23)
CALCIUM SERPL-MCNC: 9.3 MG/DL (ref 8.8–10.4)
CHLORIDE SERPL-SCNC: 104 MMOL/L (ref 98–107)
CREAT SERPL-MCNC: 0.8 MG/DL (ref 0.67–1.17)
EGFRCR SERPLBLD CKD-EPI 2021: >90 ML/MIN/1.73M2
EOSINOPHIL # BLD AUTO: 0.3 10E3/UL (ref 0–0.7)
EOSINOPHIL NFR BLD AUTO: 6 %
ERYTHROCYTE [DISTWIDTH] IN BLOOD BY AUTOMATED COUNT: 14.7 % (ref 10–15)
GLUCOSE SERPL-MCNC: 105 MG/DL (ref 70–99)
HCO3 SERPL-SCNC: 27 MMOL/L (ref 22–29)
HCT VFR BLD AUTO: 37.4 % (ref 40–53)
HGB BLD-MCNC: 12.1 G/DL (ref 13.3–17.7)
IMM GRANULOCYTES # BLD: 0 10E3/UL
IMM GRANULOCYTES NFR BLD: 0 %
LAB DIRECTOR DISCLAIMER: NORMAL
LAB DIRECTOR INTERPRETATION: NORMAL
LAB DIRECTOR METHODOLOGY: NORMAL
LAB DIRECTOR RESULTS: NORMAL
LYMPHOCYTES # BLD AUTO: 0.3 10E3/UL (ref 0.8–5.3)
LYMPHOCYTES NFR BLD AUTO: 8 %
MCH RBC QN AUTO: 30 PG (ref 26.5–33)
MCHC RBC AUTO-ENTMCNC: 32.4 G/DL (ref 31.5–36.5)
MCV RBC AUTO: 93 FL (ref 78–100)
MONOCYTES # BLD AUTO: 0.6 10E3/UL (ref 0–1.3)
MONOCYTES NFR BLD AUTO: 13 %
NEUTROPHILS # BLD AUTO: 3.3 10E3/UL (ref 1.6–8.3)
NEUTROPHILS NFR BLD AUTO: 73 %
NRBC # BLD AUTO: 0 10E3/UL
NRBC BLD AUTO-RTO: 0 /100
PLATELET # BLD AUTO: 143 10E3/UL (ref 150–450)
POTASSIUM SERPL-SCNC: 3.8 MMOL/L (ref 3.4–5.3)
PROT SERPL-MCNC: 6.4 G/DL (ref 6.4–8.3)
RBC # BLD AUTO: 4.04 10E6/UL (ref 4.4–5.9)
SODIUM SERPL-SCNC: 140 MMOL/L (ref 135–145)
SPECIMEN TYPE: NORMAL
WBC # BLD AUTO: 4.5 10E3/UL (ref 4–11)

## 2024-12-02 PROCEDURE — 85004 AUTOMATED DIFF WBC COUNT: CPT

## 2024-12-02 PROCEDURE — 38222 DX BONE MARROW BX & ASPIR: CPT | Mod: LT | Performed by: PHYSICIAN ASSISTANT

## 2024-12-02 PROCEDURE — 88342 IMHCHEM/IMCYTCHM 1ST ANTB: CPT | Mod: TC

## 2024-12-02 PROCEDURE — 88341 IMHCHEM/IMCYTCHM EA ADD ANTB: CPT | Mod: TC

## 2024-12-02 PROCEDURE — 85048 AUTOMATED LEUKOCYTE COUNT: CPT

## 2024-12-02 PROCEDURE — 88305 TISSUE EXAM BY PATHOLOGIST: CPT | Mod: TC

## 2024-12-02 PROCEDURE — 88189 FLOWCYTOMETRY/READ 16 & >: CPT | Performed by: STUDENT IN AN ORGANIZED HEALTH CARE EDUCATION/TRAINING PROGRAM

## 2024-12-02 PROCEDURE — 88280 CHROMOSOME KARYOTYPE STUDY: CPT

## 2024-12-02 PROCEDURE — 82947 ASSAY GLUCOSE BLOOD QUANT: CPT

## 2024-12-02 PROCEDURE — 88271 CYTOGENETICS DNA PROBE: CPT

## 2024-12-02 PROCEDURE — 88185 FLOWCYTOMETRY/TC ADD-ON: CPT

## 2024-12-02 PROCEDURE — G0452 MOLECULAR PATHOLOGY INTERPR: HCPCS | Mod: 26 | Performed by: PATHOLOGY

## 2024-12-02 PROCEDURE — 88184 FLOWCYTOMETRY/ TC 1 MARKER: CPT | Performed by: STUDENT IN AN ORGANIZED HEALTH CARE EDUCATION/TRAINING PROGRAM

## 2024-12-02 PROCEDURE — G0452 MOLECULAR PATHOLOGY INTERPR: HCPCS | Mod: 26 | Performed by: STUDENT IN AN ORGANIZED HEALTH CARE EDUCATION/TRAINING PROGRAM

## 2024-12-02 PROCEDURE — 88237 TISSUE CULTURE BONE MARROW: CPT

## 2024-12-02 PROCEDURE — 81267 CHIMERISM ANAL NO CELL SELEC: CPT

## 2024-12-02 PROCEDURE — 250N000011 HC RX IP 250 OP 636: Performed by: PHYSICIAN ASSISTANT

## 2024-12-02 PROCEDURE — 88305 TISSUE EXAM BY PATHOLOGIST: CPT | Mod: 26 | Performed by: STUDENT IN AN ORGANIZED HEALTH CARE EDUCATION/TRAINING PROGRAM

## 2024-12-02 PROCEDURE — 82247 BILIRUBIN TOTAL: CPT

## 2024-12-02 PROCEDURE — 88341 IMHCHEM/IMCYTCHM EA ADD ANTB: CPT | Mod: 26 | Performed by: STUDENT IN AN ORGANIZED HEALTH CARE EDUCATION/TRAINING PROGRAM

## 2024-12-02 PROCEDURE — 85097 BONE MARROW INTERPRETATION: CPT | Performed by: STUDENT IN AN ORGANIZED HEALTH CARE EDUCATION/TRAINING PROGRAM

## 2024-12-02 PROCEDURE — 82040 ASSAY OF SERUM ALBUMIN: CPT

## 2024-12-02 PROCEDURE — 88342 IMHCHEM/IMCYTCHM 1ST ANTB: CPT | Mod: 26 | Performed by: STUDENT IN AN ORGANIZED HEALTH CARE EDUCATION/TRAINING PROGRAM

## 2024-12-02 PROCEDURE — 36415 COLL VENOUS BLD VENIPUNCTURE: CPT

## 2024-12-02 PROCEDURE — 88311 DECALCIFY TISSUE: CPT | Mod: 26 | Performed by: STUDENT IN AN ORGANIZED HEALTH CARE EDUCATION/TRAINING PROGRAM

## 2024-12-02 RX ORDER — CLINDAMYCIN PHOSPHATE 10 MG/G
GEL TOPICAL 2 TIMES DAILY
Qty: 30 G | Refills: 0 | Status: SHIPPED | OUTPATIENT
Start: 2024-12-02

## 2024-12-02 RX ORDER — METHOCARBAMOL 500 MG/1
500 TABLET, FILM COATED ORAL PRN
COMMUNITY

## 2024-12-02 RX ADMIN — MIDAZOLAM 1 MG: 1 INJECTION INTRAMUSCULAR; INTRAVENOUS at 08:28

## 2024-12-02 ASSESSMENT — PAIN SCALES - GENERAL: PAINLEVEL_OUTOF10: NO PAIN (0)

## 2024-12-02 NOTE — PROGRESS NOTES
BMT ONC Adult Bone Marrow Biopsy Procedure Note  December 2, 2024  BP (!) 161/89   Pulse 85   Temp 98.5  F (36.9  C) (Oral)   Resp 16   Wt 89.4 kg (197 lb 1.6 oz)   SpO2 98%   BMI 26.41 kg/m       Learning needs assessment complete within 12 months? YES    DIAGNOSIS: MDS EB2     PROCEDURE: Unilateral Bone Marrow Biopsy and Unilateral Aspirate    LOCATION: [facility]    Patient s identification was positively verified by verbal identification and invasive procedure safety checklist was completed. Informed consent was obtained. Following the administration of 1mg IV Midazolam as pre-medication, patient was placed in the prone position and prepped and draped in a sterile manner. Approximately 10 cc of 1% Lidocaine was used over the left posterior iliac spine. Following this a 3 mm incision was made. Trephine bone marrow core(s) was (were) obtained from the IC. Bone marrow aspirates were obtained from the IC. Aspirates were sent for morphology, immunophenotyping, cytogenetics, and molecular diagnostics RFLP. A total of approximately 16 ml of marrow was aspirated. Following this procedure a sterile dressing was applied to the bone marrow biopsy site(s). The patient was placed in the supine position to maintain pressure on the biopsy site. Post-procedure wound care instructions were given.     Complications: NO    Pre-procedural pain: 0 out of 10 on the numeric pain rating scale.     Procedural pain: 6 out of 10 on the numeric pain rating scale.     Post-procedural pain assessment: 0 out of 10 on the numeric pain rating scale.     Interventions: NO    Length of procedure:20 minutes or less      Procedure performed by: Tiki Renner pa-c  [phone]

## 2024-12-02 NOTE — NURSING NOTE
BMBX Teaching and Assessment       Teaching concerns addressed: Bone marrow biopsy and infection prevention.     Person(s) involved in teaching: Patient  Motivation Level  Asks Questions: Yes  Eager to Learn: Yes  Cooperative: Yes  Receptive (willing/able to accept information): Yes    Patient demonstrates understanding of the following:     Reason for the appointment, diagnosis and treatment plan: Yes  Knowledge of proper use of medications and conditions for which they are ordered (with special attention to potential side effects or drug interactions): Yes  Which situations necessitate calling provider and whom to contact: Yes    Teaching concerns addressed:   Reviewed activity restrictions if received premeds, potential for bleeding and actions to take if develops any of the issues below    Pain management techniques: Yes  Patient instructed on hand hygiene: Yes  How and/when to access community resources: Yes    Infection Control:  Patient demonstrates understanding of the following:   Bone marrow procedure site care taught: Yes  Signs and symptoms of infection taught: Yes       Instructional Materials Used/Given: Pt instructed to keep bmbx site clean and dry for 24hrs. Pt educated to monitor site for signs of infection such as redness, rash, oozing, puss, bleeding, pain, and elevated temp. Pt instructed to go to call the [facility] [place] or go to the ER if any signs of infection should occur. Pt educated to not operate machinery if receiving versed. Pt and wife verbalize understanding.       Pre-procedure labs drawn via venipuncture in lab. VSS. Meds and allergies reviewed. Pt confirms last dose of Lovenox was Saturday 11/30/24 in evening. Pt requests Versed premedication. Confirmed has a . PIV placed for Versed administration.    Provider order received to administer Versed 1 mg IVP as premed for BMBX. Procedural consent discussed and pt's signature obtained.  Allergies reviewed.  PT currently alert and  oriented to plan of care.  Pt lying prone in stretcher.  Call light w/in reach.  Provider and  at bedside.    Post procedure: Patient vital signs stable, ambulating, site is clean, dry and intact prior to discharge and line removed. Pt discharged with wife as .        Viola Lloyd RN

## 2024-12-02 NOTE — NURSING NOTE
"Oncology Rooming Note    December 2, 2024 8:35 AM   Leland Pearson is a 69 year old male who presents for:    Chief Complaint   Patient presents with    Blood Draw     Vitals, blood drawn via VPT by LPN. Pt checked into appt.     Bone Marrow Biopsy     Bmbx; hx MDS s/p BMT     Initial Vitals: BP (!) 161/89   Pulse 85   Temp 98.5  F (36.9  C) (Oral)   Resp 16   Wt 89.4 kg (197 lb 1.6 oz)   SpO2 98%   BMI 26.41 kg/m   Estimated body mass index is 26.41 kg/m  as calculated from the following:    Height as of 8/16/24: 1.84 m (6' 0.44\").    Weight as of this encounter: 89.4 kg (197 lb 1.6 oz). Body surface area is 2.14 meters squared.  No Pain (0) Comment: Data Unavailable   No LMP for male patient.  Allergies reviewed: Yes  Medications reviewed: Yes    Medications: MEDICATION REFILLS NEEDED TODAY. Provider was notified.  Pharmacy name entered into [email or web address]:    [id number] [place], [place] - [address] [place] [address] [facility] - [place], [place] - [address] [facility] [place], [place] - [address] [facility] [phone]    Frailty Screening:   Is the patient here for a new oncology consult visit in cancer care? 2. No      Clinical concerns: Clindamycin gel refill needed.  Tiki Renner PA-C was notified.      Viola Lloyd RN              "

## 2024-12-02 NOTE — NURSING NOTE
Chief Complaint   Patient presents with    Blood Draw     Vitals, blood drawn via VPT by LPN. Pt checked into appt.      NIRMAL Yee LPN

## 2024-12-02 NOTE — LETTER
12/2/2024      Leland Pearson  [address] [place] [address]      Dear Colleague,    Thank you for referring your patient, Leland Pearson, to the [facility] BLOOD AND MARROW TRANSPLANT PROGRAM [place]. Please see a copy of my visit note below.    BMT ONC Adult Bone Marrow Biopsy Procedure Note  December 2, 2024  BP (!) 161/89   Pulse 85   Temp 98.5  F (36.9  C) (Oral)   Resp 16   Wt 89.4 kg (197 lb 1.6 oz)   SpO2 98%   BMI 26.41 kg/m       Learning needs assessment complete within 12 months? YES    DIAGNOSIS: MDS EB2     PROCEDURE: Unilateral Bone Marrow Biopsy and Unilateral Aspirate    LOCATION: [facility]    Patient s identification was positively verified by verbal identification and invasive procedure safety checklist was completed. Informed consent was obtained. Following the administration of 1mg IV Midazolam as pre-medication, patient was placed in the prone position and prepped and draped in a sterile manner. Approximately 10 cc of 1% Lidocaine was used over the left posterior iliac spine. Following this a 3 mm incision was made. Trephine bone marrow core(s) was (were) obtained from the IC. Bone marrow aspirates were obtained from the LPIC. Aspirates were sent for morphology, immunophenotyping, cytogenetics, and molecular diagnostics RFLP. A total of approximately 16 ml of marrow was aspirated. Following this procedure a sterile dressing was applied to the bone marrow biopsy site(s). The patient was placed in the supine position to maintain pressure on the biopsy site. Post-procedure wound care instructions were given.     Complications: NO    Pre-procedural pain: 0 out of 10 on the numeric pain rating scale.     Procedural pain: 6 out of 10 on the numeric pain rating scale.     Post-procedural pain assessment: 0 out of 10 on the numeric pain rating scale.     Interventions: NO    Length of procedure:20 minutes or less      Procedure performed by: Tiki  Zurdo lin  [phone]        Again, thank you for allowing me to participate in the care of your patient.        Sincerely,        BMT Advanced Practice Provider

## 2024-12-03 LAB
PATH REPORT.COMMENTS IMP SPEC: NORMAL
PATH REPORT.FINAL DX SPEC: NORMAL
PATH REPORT.FINAL DX SPEC: NORMAL
PATH REPORT.GROSS SPEC: NORMAL
PATH REPORT.MICROSCOPIC SPEC OTHER STN: NORMAL
PATH REPORT.RELEVANT HX SPEC: NORMAL
PATH REPORT.RELEVANT HX SPEC: NORMAL

## 2024-12-04 LAB
CULTURE HARVEST COMPLETE DATE: NORMAL
INTERPRETATION: NORMAL

## 2024-12-06 ENCOUNTER — OFFICE VISIT (OUTPATIENT)
Dept: TRANSPLANT | Facility: CLINIC | Age: 69
End: 2024-12-06
Attending: STUDENT IN AN ORGANIZED HEALTH CARE EDUCATION/TRAINING PROGRAM
Payer: COMMERCIAL

## 2024-12-06 VITALS
WEIGHT: 195.8 LBS | TEMPERATURE: 98 F | OXYGEN SATURATION: 98 % | DIASTOLIC BLOOD PRESSURE: 94 MMHG | BODY MASS INDEX: 26.23 KG/M2 | RESPIRATION RATE: 12 BRPM | SYSTOLIC BLOOD PRESSURE: 153 MMHG | HEART RATE: 96 BPM

## 2024-12-06 DIAGNOSIS — D46.9 MDS (MYELODYSPLASTIC SYNDROME) (H): Primary | ICD-10-CM

## 2024-12-06 DIAGNOSIS — R21 RASH: ICD-10-CM

## 2024-12-06 PROCEDURE — 91320 SARSCV2 VAC 30MCG TRS-SUC IM: CPT | Performed by: STUDENT IN AN ORGANIZED HEALTH CARE EDUCATION/TRAINING PROGRAM

## 2024-12-06 PROCEDURE — 250N000011 HC RX IP 250 OP 636: Performed by: STUDENT IN AN ORGANIZED HEALTH CARE EDUCATION/TRAINING PROGRAM

## 2024-12-06 PROCEDURE — 90480 ADMN SARSCOV2 VAC 1/ONLY CMP: CPT | Performed by: STUDENT IN AN ORGANIZED HEALTH CARE EDUCATION/TRAINING PROGRAM

## 2024-12-06 RX ORDER — TRIAMCINOLONE ACETONIDE 1 MG/G
CREAM TOPICAL 2 TIMES DAILY
Qty: 60 G | Refills: 1 | Status: SHIPPED | OUTPATIENT
Start: 2024-12-06 | End: 2024-12-11

## 2024-12-06 RX ORDER — TRIAMCINOLONE ACETONIDE 1 MG/G
CREAM TOPICAL 2 TIMES DAILY
Qty: 60 G | Refills: 1 | Status: SHIPPED | OUTPATIENT
Start: 2024-12-06 | End: 2024-12-06

## 2024-12-06 RX ADMIN — COVID-19 VACCINE, MRNA 30 MCG: 0.04 INJECTION, SUSPENSION INTRAMUSCULAR at 14:25

## 2024-12-06 ASSESSMENT — PAIN SCALES - GENERAL: PAINLEVEL_OUTOF10: NO PAIN (0)

## 2024-12-06 NOTE — NURSING NOTE
Pt received Pfizer Covid vaccine in right deltoid muscle. VIS provided and all questions answered prior to vaccine administration. Pt tolerated vaccine without incident and was monitored for 15 minutes post vaccine.    Immunizations Administered       Name Date Dose VIS Date Route    COVID-19 12+ (Pfizer) 12/6/24  2:25 PM 30 mcg EUI,10/17/2024,Given today Intramuscular            Viola Lloyd RN

## 2024-12-06 NOTE — Clinical Note
12/6/2024      Leland Pearson  [address] [place] [address]      Dear Colleague,    Thank you for referring your patient, Leland Pearson, to the [facility] BLOOD AND MARROW TRANSPLANT PROGRAM [place]. Please see a copy of my visit note below.    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     Diagnosis MDS-IB2 BMT type Allogenic  CMV  Donor -  Recipient -    Prep GANGA (Flu/Cy/TBI) Donor type  8/8, URD ABO D/R O+    GVHD ppx PTCy, siro, MMF Graft source PBSCT Toxo IgG Negative   Protocol [id number] CD34/kg 6.06E+06    BMT MD Brenda Murphy     Pre-transplant disease history  Referring provider: Dr. Delcid    He started having drenching night sweats and fatigue in Jan 2024. Intentional weight loss. He was found to have thrombocytopenia on routine CBC done prior to back surgery (was planned for laminectomy and fusion). On 4/1/24, WBC 6.4, Hb 13.5, Plts 64, ANC 1.89. LDH elevated 532. He underwent bone marrow biopsy (4/23/24) which showed MDS-EB2. Hypercellular marrow (%) with 10.8% blasts including rare circulating blasts. Flow showed 4% myeloid blasts. Normal karyotype. NGS (peripheral blood) was positive for ASXL1, IDH1, RUNX1, SRSF2 mutations (full report not available). Counts: WBC 6.7, Hb 13.1, Plts 70, ANC 0.7. IPSS-M = High risk (0.85).  He was treated with azacitidine 75mg/m2 for 7 days and venetoclax for 14 days (C1 on 5/20/24). Bone marrow after first cycle (6/13/24) showed persistent MDS, with hypercellular marrow with therapy effect, no increase in blasts. Normal karyotype. NGS: ASXL1 (38%), IDH1 (43%), RUNX1 (41%), SRSF1 (38%).   He received second cycle of aza/ angelia on 7/1/24. His pre-transplant BMBx was done on 8/2/24 and he was cytopenic at the time (CBC with WBC 0.6, Hb 14, platelet  count 24). Hypocellular marrow (5%) with no increase in blasts. Normal karyotype, NGS negative.     Post transplant   - 9/2/24 (around D10) Neutropenic fevers. Strept mitis bacteremia resistant to levaquin from central venous catheter as well as a single culture of MRSE from peripheral blood (contaminant). Treated with cefepime. Repeat blood cultures negative, but continued to have high fevers. TTE (9/6/24) did not show any vegetations. CVC was removed on 9/6/24, defervesced on 9/7/24. PICC placed on 9/9/24. Antibiotics (cefepime followed by augmentin) were continued for a total of 14days from line removal.   - Right lower neck swelling and tenderness near the previous CVC site. US (9/15/24) showed an occlusive right internal jugular thrombus and a non occlusive thrombus in the right axillary vein, PICC associated. He was initially not anticoagulated due to thrombocytopenia but had increased symptoms. Repeat US (9/17/24) showed extension of right internal jugular thrombus into the innominate vein and extension of right axillary thrombus into subclavian vein. He was started on therapeutic anticoagulation with lovenox 1mg/kg BID on 9/18/24 with transfusion support for platelets, with plan to continue for 3 months. Right PICC removal was considered given propagation of clot near the PICC line. However, IR (9/19/24) recommended against removal of R PICC and replacement in the left arm due to risk of contralateral DVT.   - Hemorrhagic cystitis: Dysuria, urgency and hematuria starting  9/11/2024. Infectious workup including adenovirus and BK virus negative. Urology was consulted, recommended outpatient follow up. Now resolved.        INTERVAL HISTORY     Leland reports no improvement of pustule rash - noting some extending down onto his abdomen, see media tab photos. No nausea, vomiting or diarrhea. Taste buds are still gone but he is eating well. His left upper extremity remains slightly more edematous than the right but  US was negative. No new infectious symptoms.    BP at home has been 130s-150s systolically. No headache or chest pain.      Plan  - D100 marrow shows CR, NGS pending. 100% engraftment  - Scalp has gotten better since sirolimus but not chest or back.   - Clindamycin twice a day.   - Hydrocortisone and triamcinolone twice for itching  - Derm referral for skin biopsy  - BP at home 130s/ 80s, continue to monitor, no change  - COVID in clinic today, RSV with PCP. Done with flu   - RTC in 4 weeks with labs        - Sirolimus stopped on 11/29/24 without taper  - Done flu vaccine, go ahead with COVID            ROS: ROS neg other than the symptoms noted above in the HPI.     EXAM      BP (!) 153/94 (BP Location: Right arm, Patient Position: Sitting, Cuff Size: Adult Regular)   Pulse 96   Temp 98  F (36.7  C) (Oral)   Resp 12   Wt 88.8 kg (195 lb 12.8 oz)   SpO2 98%   BMI 26.23 kg/m      Manual BP cuff: R 190/85, repeat on L 158/82    Wt Readings from Last 4 Encounters:   12/02/24 89.4 kg (197 lb 1.6 oz)   11/29/24 88.9 kg (196 lb)   11/22/24 89.2 kg (196 lb 9.6 oz)   11/15/24 89 kg (196 lb 4.8 oz)       KPS: 80% Can perform normal activity with effort, some signs of disease    General: Alert, awake  Eyes: Conjunctiva normal  Mouth and Throat: Scalloped tongue edges, no open sores  Chest: RRR  Respiratory: CTAB  Cardiovascular: RRR  Abd:  several ecchymosis secondary to lovenox injections in various stages of resolution.+bs, soft, NT/ND  Lymph: trace LE edema L slightly >, LUE edema (forearm to hand)   Skin: pustule acne like rash on scalp, face, neck, chest, back, and small portion of abdomen. See media tab photos.  Access: None      LABS     Lab Results   Component Value Date    WBC 4.5 12/02/2024    ANEU 1.0 (L) 09/17/2024    HGB 12.1 (L) 12/02/2024    HCT 37.4 (L) 12/02/2024     (L) 12/02/2024     12/02/2024    POTASSIUM 3.8 12/02/2024    CHLORIDE 104 12/02/2024    CO2 27 12/02/2024     (H)  12/02/2024    BUN 12.3 12/02/2024    CR 0.80 12/02/2024    MAG 1.9 10/15/2024    INR 1.26 (H) 09/16/2024        ASSESSMENT AND PLAN     BMT: D105   - Chemo protocol: MT 2022-5; Flu/Cy/TBI  - Peripheral blood stem cell graft from 8/8 URD donor, ABO matched, Cell dose: 6.06 x10^6 CD34/kg    Disease status:   9/13/24 (D28): Bmbx shows hypocellular marrow, no dysplasia or blasts.  NGS negative    Graft status/chimerism:   9/13/24 (D28): Bone marrow 100%. Peripheral blood CD3 92%, CD33 100%  10/25/24 (D60): Peripheral blood CD3 and CD33 100%    GVHD  - Stop Sirolimus at D100 without taper - discontinued today 11/29, discussed with Dr. Murphy. Rash is currently suspected to be sirolimus related.  # GVHD: NTD    Heme/ Coag  # Right internal jugular occlusive thrombus and right axillary vein non occlusive thrombus, line associated   - Continue lovenox 1mg/kg BID (no DOAC d/t interaction w/posa), to continue for at least 3 months (12/18/24). Has a follow up with Dr. Ochoa to decide on duration.   - Right upper chest/ neck induration has resolved. PICC line was removed on 10/18/24  - Plan to stop posaconazole around D100, can be switched to DOAC after if anticoagulation needs to continue long term: will defer to Dr. Ochoa  11/22: New LUE edema noted, US neg for new DVT. Continue compression sleeve, consider referral to lymph edema in the next couple weeks    # G-CSF last given on 9/18/24. Continue PRN for ANC < 1  # Transfusion parameters: hemoglobin <7, platelets <50     ID  # Prophylaxis  Fungal: Posaconazole.  Pulm nodules present on workup CT.   PJP/ Toxo IgG negative: Bactrim   Viral: Acyclovir 800 mg bid  S/p influenza vacc 10/25/24    # Monitoring  CMV: Monitor weekly till D100. 11/7 - negative  EBV: Monitor every 2 weeks between D30 to D180. 11/7 neg  IgG:  IgG was 708 mg/dL on 9/27/24. Check serum IgG level q3 months, and consider IVIG repletion for IgG levels <400 mg/dL.    GI  GERD: Pantoprazole and  carafate  VOD prophylaxis: Completed     CARDIOVASCULAR  # HTN: PTA lisinopril stopped 8/26 d/t symptomatic orthostasis--he was newly on 15mg at this time and admitted for SCT.   (11/29) BP at home ranges from 130s-150s systolically. In the clinic today, with multiple checks it was still quite elevated. We will start 5 mg of Lisinopril today. Continue to monitor BP at home.  # CAD: Coronary artery calcifications seen on CT, stress test in 2023 negative  - Risk of cardiomyopathy: Baseline EF 55-60%.   - Risk of arrhythmia: Baseline EKG showed 421       RENAL/ELECTROLYTES/  - BPH: Continue Flomax.  - Hemorrhagic Cystitis secondary to cytoxan: Resolved. Oxybutynin 10mg at bedtime PRN. Will need follow up with urology at some point.       MUSCULOSKELETAL  # Gout: continue allopurinol   # History of spinal stenosis, lumbosacral radiculopathy:   - Pain management: Okay to take tylenol or robaxin RPN     SOCIAL DETERMINANTS  - Caregiver: wife, Vani. Lives within the required distance from hospital   - Financial/insurance concerns: None    Post-transplant vaccinations  COVID, RSV after D100    Final plan:  No UE DVT -- continue compression sleeve, consider referral to lymph edema if it persists  Discontinue siro/posa today   Restart lisinopril 5 mg/d  Rash likely acneform -- cont clinda and add on hydrocortisone    RTC  12/2 BM bx restage  12/6 review w/ Dr. Murphy    I spent 45 minutes in the care of this patient today, which included time necessary for preparation for the visit, obtaining history, ordering medications/tests/procedures as medically indicated, review of pertinent medical literature, counseling of the patient, communication of recommendations to the care team, and documentation time.            Again, thank you for allowing me to participate in the care of your patient.        Sincerely,        ONEIL Montero

## 2024-12-06 NOTE — NURSING NOTE
"Oncology Rooming Note    December 6, 2024 12:30 PM   Leland Pearson is a 69 year old male who presents for:    Chief Complaint   Patient presents with    Oncology Clinic Visit     Myelodysplastic syndrome      Initial Vitals: BP (!) 153/94 (BP Location: Right arm, Patient Position: Sitting, Cuff Size: Adult Regular)   Pulse 96   Temp 98  F (36.7  C) (Oral)   Resp 12   Wt 88.8 kg (195 lb 12.8 oz)   SpO2 98%   BMI 26.23 kg/m   Estimated body mass index is 26.23 kg/m  as calculated from the following:    Height as of 8/16/24: 1.84 m (6' 0.44\").    Weight as of this encounter: 88.8 kg (195 lb 12.8 oz). Body surface area is 2.13 meters squared.  No Pain (0) Comment: Data Unavailable   No LMP for male patient.  Allergies reviewed: Yes  Medications reviewed: Yes    Medications: Medication refills not needed today.  Pharmacy name entered into [employer]:    [id number] - [place], [place] - [address] [place] [address] [facility] - [place], [place] - [address] [place] [address] PHARMACY [place], [place] - [address] [facility] [id number]    Frailty Screening:   Is the patient here for a new oncology consult visit in cancer care? 2. No      Clinical concerns: Has not had any visits scheduled for next week     Praveena Hodges"

## 2024-12-06 NOTE — PROGRESS NOTES
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     Diagnosis MDS-IB2 BMT type Allogenic  CMV  Donor -  Recipient -    Prep GANGA (Flu/Cy/TBI) Donor type  8/8, URD ABO D/R O+    GVHD ppx PTCy, siro, MMF Graft source PBSCT Toxo IgG Negative   Protocol [id number] CD34/kg 6.06E+06    BMT MD Brenda Murphy     Pre-transplant disease history  Referring provider: Dr. Delcid    He started having drenching night sweats and fatigue in Jan 2024. Intentional weight loss. He was found to have thrombocytopenia on routine CBC done prior to back surgery (was planned for laminectomy and fusion). On 4/1/24, WBC 6.4, Hb 13.5, Plts 64, ANC 1.89. LDH elevated 532. He underwent bone marrow biopsy (4/23/24) which showed MDS-EB2. Hypercellular marrow (%) with 10.8% blasts including rare circulating blasts. Flow showed 4% myeloid blasts. Normal karyotype. NGS (peripheral blood) was positive for ASXL1, IDH1, RUNX1, SRSF2 mutations (full report not available). Counts: WBC 6.7, Hb 13.1, Plts 70, ANC 0.7. IPSS-M = High risk (0.85).  He was treated with azacitidine 75mg/m2 for 7 days and venetoclax for 14 days (C1 on 5/20/24). Bone marrow after first cycle (6/13/24) showed persistent MDS, with hypercellular marrow with therapy effect, no increase in blasts. Normal karyotype. NGS: ASXL1 (38%), IDH1 (43%), RUNX1 (41%), SRSF1 (38%).   He received second cycle of aza/ angleia on 7/1/24. His pre-transplant BMBx was done on 8/2/24 and he was cytopenic at the time (CBC with WBC 0.6, Hb 14, platelet count 24). Hypocellular marrow (5%) with no increase in blasts. Normal karyotype, NGS negative.     Post transplant   - 9/2/24 (around D10) Neutropenic fevers. Strept mitis bacteremia resistant to levaquin from central venous catheter as well as a single culture of MRSE from peripheral  blood (contaminant). Treated with cefepime. Repeat blood cultures negative, but continued to have high fevers. TTE (9/6/24) did not show any vegetations. CVC was removed on 9/6/24, defervesced on 9/7/24. PICC placed on 9/9/24. Antibiotics (cefepime followed by augmentin) were continued for a total of 14days from line removal.   - Right lower neck swelling and tenderness near the previous CVC site. US (9/15/24) showed an occlusive right internal jugular thrombus and a non occlusive thrombus in the right axillary vein, PICC associated. He was initially not anticoagulated due to thrombocytopenia but had increased symptoms. Repeat US (9/17/24) showed extension of right internal jugular thrombus into the innominate vein and extension of right axillary thrombus into subclavian vein. He was started on therapeutic anticoagulation with lovenox 1mg/kg BID on 9/18/24 with transfusion support for platelets, with plan to continue for 3 months. Right PICC removal was considered given propagation of clot near the PICC line. However, IR (9/19/24) recommended against removal of R PICC and replacement in the left arm due to risk of contralateral DVT.   - Hemorrhagic cystitis: Dysuria, urgency and hematuria starting  9/11/2024. Infectious workup including adenovirus and BK virus negative. Urology was consulted, recommended outpatient follow up. Now resolved.        INTERVAL HISTORY     Leland reports no improvement of pustule rash - noting some extending down onto his abdomen, see media tab photos. No nausea, vomiting or diarrhea. Taste buds are still gone but he is eating well. His left upper extremity remains slightly more edematous than the right but US was negative. No new infectious symptoms.    BP at home has been 130s-150s systolically. No headache or chest pain.      Plan  - D100 marrow shows CR, NGS pending. 100% engraftment  - Scalp has gotten better since sirolimus but not chest or back.   - Clindamycin twice a day.   -  Hydrocortisone and triamcinolone twice for itching  - Derm referral for skin biopsy  - BP at home 130s/ 80s, continue to monitor, no change  - COVID in clinic today, RSV with PCP. Done with flu   - RTC in 4 weeks with labs        - Sirolimus stopped on 11/29/24 without taper  - Done flu vaccine, go ahead with COVID            ROS: ROS neg other than the symptoms noted above in the HPI.     EXAM      BP (!) 153/94 (BP Location: Right arm, Patient Position: Sitting, Cuff Size: Adult Regular)   Pulse 96   Temp 98  F (36.7  C) (Oral)   Resp 12   Wt 88.8 kg (195 lb 12.8 oz)   SpO2 98%   BMI 26.23 kg/m      Manual BP cuff: R 190/85, repeat on L 158/82    Wt Readings from Last 4 Encounters:   12/02/24 89.4 kg (197 lb 1.6 oz)   11/29/24 88.9 kg (196 lb)   11/22/24 89.2 kg (196 lb 9.6 oz)   11/15/24 89 kg (196 lb 4.8 oz)       KPS: 80% Can perform normal activity with effort, some signs of disease    General: Alert, awake  Eyes: Conjunctiva normal  Mouth and Throat: Scalloped tongue edges, no open sores  Chest: RRR  Respiratory: CTAB  Cardiovascular: RRR  Abd:  several ecchymosis secondary to lovenox injections in various stages of resolution.+bs, soft, NT/ND  Lymph: trace LE edema L slightly >, LUE edema (forearm to hand)   Skin: pustule acne like rash on scalp, face, neck, chest, back, and small portion of abdomen. See media tab photos.  Access: None      LABS     Lab Results   Component Value Date    WBC 4.5 12/02/2024    ANEU 1.0 (L) 09/17/2024    HGB 12.1 (L) 12/02/2024    HCT 37.4 (L) 12/02/2024     (L) 12/02/2024     12/02/2024    POTASSIUM 3.8 12/02/2024    CHLORIDE 104 12/02/2024    CO2 27 12/02/2024     (H) 12/02/2024    BUN 12.3 12/02/2024    CR 0.80 12/02/2024    MAG 1.9 10/15/2024    INR 1.26 (H) 09/16/2024        ASSESSMENT AND PLAN     BMT: D105   - Chemo protocol: MT 2022-5; Flu/Cy/TBI  - Peripheral blood stem cell graft from 8/8 URD donor, ABO matched, Cell dose: 6.06 x10^6  CD34/kg    Disease status:   9/13/24 (D28): Bmbx shows hypocellular marrow, no dysplasia or blasts.  NGS negative    Graft status/chimerism:   9/13/24 (D28): Bone marrow 100%. Peripheral blood CD3 92%, CD33 100%  10/25/24 (D60): Peripheral blood CD3 and CD33 100%    GVHD  - Stop Sirolimus at D100 without taper - discontinued today 11/29, discussed with Dr. Murphy. Rash is currently suspected to be sirolimus related.  # GVHD: NTD    Heme/ Coag  # Right internal jugular occlusive thrombus and right axillary vein non occlusive thrombus, line associated   - Continue lovenox 1mg/kg BID (no DOAC d/t interaction w/posa), to continue for at least 3 months (12/18/24). Has a follow up with Dr. Ochoa to decide on duration.   - Right upper chest/ neck induration has resolved. PICC line was removed on 10/18/24  - Plan to stop posaconazole around D100, can be switched to DOAC after if anticoagulation needs to continue long term: will defer to Dr. Ochoa  11/22: New LUE edema noted, US neg for new DVT. Continue compression sleeve, consider referral to lymph edema in the next couple weeks    # G-CSF last given on 9/18/24. Continue PRN for ANC < 1  # Transfusion parameters: hemoglobin <7, platelets <50     ID  # Prophylaxis  Fungal: Posaconazole.  Pulm nodules present on workup CT.   PJP/ Toxo IgG negative: Bactrim   Viral: Acyclovir 800 mg bid  S/p influenza vacc 10/25/24    # Monitoring  CMV: Monitor weekly till D100. 11/7 - negative  EBV: Monitor every 2 weeks between D30 to D180. 11/7 neg  IgG:  IgG was 708 mg/dL on 9/27/24. Check serum IgG level q3 months, and consider IVIG repletion for IgG levels <400 mg/dL.    GI  GERD: Pantoprazole and carafate  VOD prophylaxis: Completed     CARDIOVASCULAR  # HTN: PTA lisinopril stopped 8/26 d/t symptomatic orthostasis--he was newly on 15mg at this time and admitted for SCT.   (11/29) BP at home ranges from 130s-150s systolically. In the clinic today, with multiple checks it was still  quite elevated. We will start 5 mg of Lisinopril today. Continue to monitor BP at home.  # CAD: Coronary artery calcifications seen on CT, stress test in 2023 negative  - Risk of cardiomyopathy: Baseline EF 55-60%.   - Risk of arrhythmia: Baseline EKG showed 421       RENAL/ELECTROLYTES/  - BPH: Continue Flomax.  - Hemorrhagic Cystitis secondary to cytoxan: Resolved. Oxybutynin 10mg at bedtime PRN. Will need follow up with urology at some point.       MUSCULOSKELETAL  # Gout: continue allopurinol   # History of spinal stenosis, lumbosacral radiculopathy:   - Pain management: Okay to take tylenol or robaxin RPN     SOCIAL DETERMINANTS  - Caregiver: wife, Vani. Lives within the required distance from hospital   - Financial/insurance concerns: None    Post-transplant vaccinations  COVID, RSV after D100    Final plan:  No UE DVT -- continue compression sleeve, consider referral to lymph edema if it persists  Discontinue siro/posa today   Restart lisinopril 5 mg/d  Rash likely acneform -- cont clinda and add on hydrocortisone    RTC  12/2 BM bx restage  12/6 review w/ Dr. Murphy    I spent 45 minutes in the care of this patient today, which included time necessary for preparation for the visit, obtaining history, ordering medications/tests/procedures as medically indicated, review of pertinent medical literature, counseling of the patient, communication of recommendations to the care team, and documentation time.

## 2024-12-10 ENCOUNTER — MYC REFILL (OUTPATIENT)
Dept: TRANSPLANT | Facility: CLINIC | Age: 69
End: 2024-12-10
Payer: COMMERCIAL

## 2024-12-10 DIAGNOSIS — D46.9 MDS (MYELODYSPLASTIC SYNDROME) (H): ICD-10-CM

## 2024-12-10 DIAGNOSIS — M10.9 GOUT, UNSPECIFIED CAUSE, UNSPECIFIED CHRONICITY, UNSPECIFIED SITE: ICD-10-CM

## 2024-12-10 DIAGNOSIS — Z94.84 HISTORY OF PERIPHERAL STEM CELL TRANSPLANT (H): ICD-10-CM

## 2024-12-10 RX ORDER — SULFAMETHOXAZOLE AND TRIMETHOPRIM 800; 160 MG/1; MG/1
1 TABLET ORAL
Qty: 48 TABLET | Refills: 1 | Status: SHIPPED | OUTPATIENT
Start: 2024-12-10

## 2024-12-10 RX ORDER — PANTOPRAZOLE SODIUM 40 MG/1
40 TABLET, DELAYED RELEASE ORAL DAILY
Qty: 90 TABLET | Refills: 0 | OUTPATIENT
Start: 2024-12-10

## 2024-12-10 RX ORDER — ALLOPURINOL 300 MG/1
300 TABLET ORAL DAILY
Qty: 90 TABLET | Refills: 1 | Status: SHIPPED | OUTPATIENT
Start: 2024-12-10

## 2024-12-10 RX ORDER — CLINDAMYCIN PHOSPHATE 10 MG/G
GEL TOPICAL 2 TIMES DAILY
Qty: 30 G | Refills: 0 | Status: SHIPPED | OUTPATIENT
Start: 2024-12-10 | End: 2024-12-11

## 2024-12-11 ENCOUNTER — MYC REFILL (OUTPATIENT)
Dept: TRANSPLANT | Facility: CLINIC | Age: 69
End: 2024-12-11
Payer: COMMERCIAL

## 2024-12-11 DIAGNOSIS — D46.9 MDS (MYELODYSPLASTIC SYNDROME) (H): ICD-10-CM

## 2024-12-11 RX ORDER — TRIAMCINOLONE ACETONIDE 1 MG/G
CREAM TOPICAL 2 TIMES DAILY
Qty: 60 G | Refills: 1 | Status: SHIPPED | OUTPATIENT
Start: 2024-12-11

## 2024-12-11 RX ORDER — CLINDAMYCIN PHOSPHATE 10 MG/G
GEL TOPICAL 2 TIMES DAILY
Qty: 60 G | Refills: 0 | Status: SHIPPED | OUTPATIENT
Start: 2024-12-11

## 2024-12-11 RX ORDER — CLINDAMYCIN PHOSPHATE 10 MG/G
GEL TOPICAL 2 TIMES DAILY
Qty: 60 G | Refills: 0 | Status: SHIPPED | OUTPATIENT
Start: 2024-12-11 | End: 2024-12-11

## 2024-12-11 RX ORDER — SULFAMETHOXAZOLE AND TRIMETHOPRIM 800; 160 MG/1; MG/1
1 TABLET ORAL
Qty: 48 TABLET | Refills: 1 | OUTPATIENT
Start: 2024-12-11

## 2024-12-14 ENCOUNTER — MYC REFILL (OUTPATIENT)
Dept: TRANSPLANT | Facility: CLINIC | Age: 69
End: 2024-12-14
Payer: COMMERCIAL

## 2024-12-14 DIAGNOSIS — Z94.84 IMMUNOCOMPROMISED STATE ASSOCIATED WITH STEM CELL TRANSPLANT (H): ICD-10-CM

## 2024-12-14 DIAGNOSIS — D84.822 IMMUNOCOMPROMISED STATE ASSOCIATED WITH STEM CELL TRANSPLANT (H): ICD-10-CM

## 2024-12-14 DIAGNOSIS — D46.9 MDS (MYELODYSPLASTIC SYNDROME) (H): ICD-10-CM

## 2024-12-14 DIAGNOSIS — Z94.84 HISTORY OF PERIPHERAL STEM CELL TRANSPLANT (H): ICD-10-CM

## 2024-12-16 RX ORDER — HYDROCORTISONE 25 MG/G
CREAM TOPICAL DAILY
Qty: 30 G | Refills: 0 | Status: SHIPPED | OUTPATIENT
Start: 2024-12-16

## 2024-12-19 ENCOUNTER — HOSPITAL ENCOUNTER (OUTPATIENT)
Dept: ULTRASOUND IMAGING | Facility: CLINIC | Age: 69
Discharge: HOME | End: 2024-12-19
Attending: INTERNAL MEDICINE
Payer: COMMERCIAL

## 2024-12-19 ENCOUNTER — MYC MEDICAL ADVICE (OUTPATIENT)
Dept: TRANSPLANT | Facility: CLINIC | Age: 69
End: 2024-12-19
Payer: COMMERCIAL

## 2024-12-19 DIAGNOSIS — I82.4Y1 ACUTE VENOUS EMBOLISM AND THROMBOSIS OF DEEP VESSELS OF PROXIMAL END OF RIGHT LOWER EXTREMITY (H): ICD-10-CM

## 2024-12-19 DIAGNOSIS — D46.9 MDS (MYELODYSPLASTIC SYNDROME) (H): ICD-10-CM

## 2024-12-19 PROCEDURE — 93971 EXTREMITY STUDY: CPT | Mod: RT

## 2024-12-30 ENCOUNTER — TELEPHONE (OUTPATIENT)
Dept: DERMATOLOGY | Facility: CLINIC | Age: 69
End: 2024-12-30

## 2024-12-30 ENCOUNTER — THERAPY VISIT (OUTPATIENT)
Dept: OCCUPATIONAL THERAPY | Facility: CLINIC | Age: 69
End: 2024-12-30
Attending: INTERNAL MEDICINE
Payer: COMMERCIAL

## 2024-12-30 DIAGNOSIS — I89.0 LYMPHEDEMA: Primary | ICD-10-CM

## 2024-12-30 DIAGNOSIS — I82.4Y1 ACUTE VENOUS EMBOLISM AND THROMBOSIS OF DEEP VESSELS OF PROXIMAL END OF RIGHT LOWER EXTREMITY (H): ICD-10-CM

## 2024-12-30 DIAGNOSIS — L73.9 FOLLICULITIS: Primary | ICD-10-CM

## 2024-12-30 PROCEDURE — 97165 OT EVAL LOW COMPLEX 30 MIN: CPT | Mod: GO | Performed by: OCCUPATIONAL THERAPIST

## 2024-12-30 PROCEDURE — 97140 MANUAL THERAPY 1/> REGIONS: CPT | Mod: GO | Performed by: OCCUPATIONAL THERAPIST

## 2024-12-30 RX ORDER — DOXYCYCLINE 100 MG/1
CAPSULE ORAL
Qty: 42 CAPSULE | Refills: 0 | Status: SHIPPED | OUTPATIENT
Start: 2024-12-30

## 2024-12-30 RX ORDER — CLINDAMYCIN PHOSPHATE 10 UG/ML
LOTION TOPICAL
Qty: 60 ML | Refills: 3 | Status: SHIPPED | OUTPATIENT
Start: 2024-12-30

## 2024-12-30 NOTE — TELEPHONE ENCOUNTER
[facility] Call Center    Phone Message    May a detailed message be left on voicemail: yes     Reason for Call: Other: Violeta answered in Teams just as I was about to send the TE.      Action Taken: Other: OX Derm    Travel Screening: Not Applicable     Date of Service:

## 2024-12-30 NOTE — TELEPHONE ENCOUNTER
----- Message from Doris Pavon sent at 12/30/2024 10:13 AM CST -----  Pathology showed folliculitis and thankfully not GVHD. For this we recommend topical and oral antibiotics. I will also let his oncologist know.     I'd like to try an oral antibiotic- where can I send  He should wash with OTC Hibiclens daily in the shower

## 2024-12-30 NOTE — TELEPHONE ENCOUNTER
S/w pt and wife and went over Doris's message below and pt states understanding.    Violeta LYNN RN  [facility]  [phone]

## 2024-12-30 NOTE — TELEPHONE ENCOUNTER
Pt called back and went over Doris's message below about path results.  Pt takes bactrim DS on Mondays and Tuesdays BID prescribed by BMT provider.      Pharmacy pended.    Violeta LYNN RN  [facility] [place]  [phone]

## 2024-12-30 NOTE — TELEPHONE ENCOUNTER
Patient Contact    Attempt # 1    Was call answered?  No.  Left message on voicemail with information to call nurse back at [phone].     Left Doris's message on identified vm but advised pt to call back with pharmacy name and location to send rx to and if any questions about message.    Violeta LYNN RN  [facility]  [phone]

## 2024-12-30 NOTE — TELEPHONE ENCOUNTER
I have a message out to his BMT provider but will still plan on doxy and clindamycin + OTC Hibiclens  If he does not have marked improvement in 5-7 days then we will need to do a culture; please have him keep us updated with how he is doing.

## 2024-12-30 NOTE — PROGRESS NOTES
OCCUPATIONAL THERAPY EVALUATION  Type of Visit: Evaluation       Fall Risk Screen:  Fall screen completed by: OT  Have you fallen 2 or more times in the past year?: (Patient-Rptd) No  Have you fallen and had an injury in the past year?: (Patient-Rptd) No  Is patient a fall risk?: No    Subjective   Pt reports swelling in L hand/arm area, PICC line was in R UE. Initially L UE symptoms started as a pain/stretching/strain through L UE and then transitione to puffiness which stuck around a little longer but this is getting better. Forearm/hand was especially swollen. They believe this started around October. Pt has had 2 ultrasounds which didn't show anything. DVT in L jugular from having central line in place-pt got infection from this. Pt is still on a blood thinner. Overall energy is good, uses exercise bike 5x/week, does treadmill, uses dumbells for weight training.      Presenting condition or subjective complaint:    Date of onset: 09/15/24    Relevant medical history:     Past Medical History:   Diagnosis Date    Gastroesophageal reflux disease     Hypertension      Dates & types of surgery:  BMT 8/2/24    Prior diagnostic imaging/testing results:       Prior therapy history for the same diagnosis, illness or injury: (Patient-Rptd) No      Prior Level of Function  Transfers: Independent  Ambulation: Independent  ADL: Independent  IADL: Driving, Finances, Housekeeping, Laundry, Meal preparation, Medication management    Living Environment  Social support: (Patient-Rptd) With a significant other or spouse   Type of home: (Patient-Rptd) House; 2-story; Multi-level; Basement   Stairs to enter the home: (Patient-Rptd) No       Ramp: (Patient-Rptd) No   Stairs inside the home: (Patient-Rptd) Yes (Patient-Rptd) 2 Is there a railing: (Patient-Rptd) Yes     Help at home:    Equipment owned:       Employment: (Patient-Rptd) No    Hobbies/Interests:      Patient goals for therapy:      Pain assessment: Pain denied      Objective       EDEMA EVALUATION  Additional history:  Body part affected by edema: (Patient-Rptd) Left arm  If cancer related, treatment:  BMT  If not cancer related, problems with veins or cause of swelling:  yes-hx of and currently has DVT in internal jugular  Distance able to walk: (Patient-Rptd) ?5-6 mins on treadmill after biking for 20-30 mins; able to easily walk functional distances in community and home  Time able to stand: (Patient-Rptd) 10 minutes-limited by back pain  Sensation problems in hands/feet: (Patient-Rptd) No    Edema etiology: Unknown    FUNCTIONAL SCALES   Lymphedema Life Impact Scale (LLIS): (Patient-Rptd) 2    Cognitive Status Examination  Orientation: Oriented to person, place and time   Level of Consciousness: Alert    EDEMA  Skin Condition: Non-pitting, Pitting  Scar: No  Capillary Refill: Symmetrical  Radial Pulse: Symmetrical  Stemmer Sign: +  Ulceration: No    GIRTH MEASUREMENTS: Refer to separate girth measurement flowsheet for specific measurements.    VOLUME UE  Right UE Total Volume (mL): 2103.37  Left UE Total Volume (mL): 2017.17  UE Limb Comparison:                RANGE OF MOTION:   (Degrees) Left AROM Right AROM    Shoulder Flexion 150 160   Shoulder Abduction 162 162     STRENGTH: UE Strength WFL  POSTURE: WFL  PALPATION: slight pitting dorsum of hand and forearm, thickening at fingers, proximal L UE skin has good mobility, no pitting noted  ACTIVITIES OF DAILY LIVING: I with ADLs, enjoys using bike, treadmill, and uses dumbbells for UB weight training  BED MOBILITY: Independent  TRANSFERS: Independent  GAIT/LOCOMOTION: I without a device  BALANCE: WFL  SENSATION: UE Sensation WNL, LE Sensation WNL  VASCULAR:  DVT R jugular    Assessment & Plan   CLINICAL IMPRESSIONS  Medical Diagnosis: Acute venous embolism and thrombosis of deep vessels of proximal end of right lower extremity    Treatment Diagnosis: LUE edema    Impression/Assessment:  Pt is a 68 yo m referred by  oncology s/p BMT with line associated thrombosis. Pt diagnosed 9/15/24 with provoked thromosis-discharged on anticoagulation. Line was removed 10/18/24. Additional PMH includes MDS-EB2, HTN. Pt currently with DVT in R jugular vein (on anticoagulation medication) but swelling presenting in L UE. He has had an US of LUE which was negative DVT. He reports swelling  has resolved some, cut off compression sock and was wearing on UE which helped. Pt also with rash on superior torso anteriorly and posteriorly-had this biopsied and considering graft vs host disease-biopsy results not back yet. At time of evaluation pt presents with soft tissue dysfunction impacting participation in daily living tasks.     Clinical Decision Making (Complexity):  Assessment of Occupational Performance: 1-3 Performance Deficits  Occupational Performance Limitations: functional mobility and leisure activities  Clinical Decision Making (Complexity): Low complexity    PLAN OF CARE  Treatment Interventions:  Interventions: Self-Care/Home Management, Therapeutic Exercise, Manual Therapy    Long Term Goals   OT Goal 1  Goal Identifier: education  Goal Description: Pt demonstrates awareness of individualized lymphedema precautions based on personal risk factors and when to seek medical attention for assessment of exacerbation of lymphedema or onset of cellulitis of the affected region.  Rationale: In order to maximize safety and independence with ADL/IADLs  Target Date: 03/29/25  OT Goal 2  Goal Identifier: home program  Goal Description: Pt will demonstrate understanding of long term management of edema including manual techniques, exercise, compression and skin care.  Rationale: In order to maximize safety and independence with ADL/IADLs  Target Date: 03/29/25  OT Goal 3  Goal Identifier: GCB  Goal Description: To reduce volume of lymphedema and risk of soft tissue fibrosis, pt/cg will be independent with applying and caring for bandages and pt  will tolerate up to 23hr/day wear gradient compression bandaging (GCB) of LUE.  Rationale: In order to maximize safety and independence with ADL/IADLs  Target Date: 03/29/25  OT Goal 4  Goal Identifier: compression  Goal Description: Pt will be independent in donning/doffing, wearing schedule, and care of appropriately fitted compression garments as clinically indicated.  Rationale: In order to maximize safety and independence with ADL/IADLs  Target Date: 03/29/25  OT Goal 5  Goal Identifier: volume  Goal Description: LUE volume will approximate that of RUE s/p MLD and compression for decreased risk of infection, soft tissue fibrosis.  Rationale: In order to maximize safety and independence with ADL/IADLs  Target Date: 03/29/25      Frequency of Treatment: 1-2x/week  Duration of Treatment: up to 90 days due to scheduling     Recommended Referrals to Other Professionals:  no additional referrals indicated at time of session  Education Assessment: Learner/Method: Patient;Significant Other  Education Comments: Educated on role/scope of lymph therapy and POC/goals     Risks and benefits of evaluation/treatment have been explained.   Patient/Family/caregiver agrees with Plan of Care.     Evaluation Time:    OT Brian Low Complexity Minutes (95517): 25    Signing Clinician: CLEMENCIA Jensen        [facility]                                                                                   OUTPATIENT OCCUPATIONAL THERAPY      PLAN OF TREATMENT FOR OUTPATIENT REHABILITATION   Patient's Last Name, First Name, Leland Hopkins YOB: 1955   Provider's Name   [facility]   Medical Record No.  [id number]     Onset Date: 09/15/24 Start of Care Date: 12/30/24     Medical Diagnosis:  Acute venous embolism and thrombosis of deep vessels of proximal end of right lower extremity      OT Treatment Diagnosis:  LUE edema Plan of  Treatment  Frequency/Duration:1-2x/week/up to 90 days due to scheduling    Certification date from 12/30/24   To 03/29/25        See note for plan of treatment details and functional goals     Jenny Harris, OTR                         I CERTIFY THE NEED FOR THESE SERVICES FURNISHED UNDER        THIS PLAN OF TREATMENT AND WHILE UNDER MY CARE     (Physician attestation of this document indicates review and certification of the therapy plan).              Referring Provider:  Sylvia Price    Initial Assessment  See Epic Evaluation- 12/30/24

## 2025-01-03 ENCOUNTER — APPOINTMENT (OUTPATIENT)
Dept: LAB | Facility: CLINIC | Age: 70
End: 2025-01-03
Attending: STUDENT IN AN ORGANIZED HEALTH CARE EDUCATION/TRAINING PROGRAM
Payer: COMMERCIAL

## 2025-01-03 ENCOUNTER — ONCOLOGY VISIT (OUTPATIENT)
Dept: TRANSPLANT | Facility: CLINIC | Age: 70
End: 2025-01-03
Attending: STUDENT IN AN ORGANIZED HEALTH CARE EDUCATION/TRAINING PROGRAM
Payer: COMMERCIAL

## 2025-01-03 VITALS
OXYGEN SATURATION: 97 % | RESPIRATION RATE: 16 BRPM | SYSTOLIC BLOOD PRESSURE: 144 MMHG | BODY MASS INDEX: 26.74 KG/M2 | DIASTOLIC BLOOD PRESSURE: 81 MMHG | WEIGHT: 199.6 LBS | HEART RATE: 89 BPM | TEMPERATURE: 98.1 F

## 2025-01-03 DIAGNOSIS — Z94.81 STATUS POST BONE MARROW TRANSPLANT (H): Primary | ICD-10-CM

## 2025-01-03 DIAGNOSIS — D84.822 IMMUNOCOMPROMISED STATE ASSOCIATED WITH STEM CELL TRANSPLANT (H): ICD-10-CM

## 2025-01-03 DIAGNOSIS — Z94.84 IMMUNOCOMPROMISED STATE ASSOCIATED WITH STEM CELL TRANSPLANT (H): ICD-10-CM

## 2025-01-03 LAB
ALBUMIN SERPL BCG-MCNC: 4 G/DL (ref 3.5–5.2)
ALP SERPL-CCNC: 89 U/L (ref 40–150)
ALT SERPL W P-5'-P-CCNC: 18 U/L (ref 0–70)
ANION GAP SERPL CALCULATED.3IONS-SCNC: 9 MMOL/L (ref 7–15)
AST SERPL W P-5'-P-CCNC: 24 U/L (ref 0–45)
BASOPHILS # BLD AUTO: 0 10E3/UL (ref 0–0.2)
BASOPHILS NFR BLD AUTO: 0 %
BILIRUB SERPL-MCNC: 0.4 MG/DL
BUN SERPL-MCNC: 21.2 MG/DL (ref 8–23)
CALCIUM SERPL-MCNC: 9.5 MG/DL (ref 8.8–10.4)
CHLORIDE SERPL-SCNC: 102 MMOL/L (ref 98–107)
CREAT SERPL-MCNC: 1.07 MG/DL (ref 0.67–1.17)
EGFRCR SERPLBLD CKD-EPI 2021: 75 ML/MIN/1.73M2
EOSINOPHIL # BLD AUTO: 0.3 10E3/UL (ref 0–0.7)
EOSINOPHIL NFR BLD AUTO: 4 %
ERYTHROCYTE [DISTWIDTH] IN BLOOD BY AUTOMATED COUNT: 15.8 % (ref 10–15)
GLUCOSE SERPL-MCNC: 98 MG/DL (ref 70–99)
HCO3 SERPL-SCNC: 27 MMOL/L (ref 22–29)
HCT VFR BLD AUTO: 40 % (ref 40–53)
HGB BLD-MCNC: 13.3 G/DL (ref 13.3–17.7)
IMM GRANULOCYTES # BLD: 0 10E3/UL
IMM GRANULOCYTES NFR BLD: 0 %
LYMPHOCYTES # BLD AUTO: 1.3 10E3/UL (ref 0.8–5.3)
LYMPHOCYTES NFR BLD AUTO: 17 %
MCH RBC QN AUTO: 30.1 PG (ref 26.5–33)
MCHC RBC AUTO-ENTMCNC: 33.3 G/DL (ref 31.5–36.5)
MCV RBC AUTO: 91 FL (ref 78–100)
MONOCYTES # BLD AUTO: 1 10E3/UL (ref 0–1.3)
MONOCYTES NFR BLD AUTO: 13 %
NEUTROPHILS # BLD AUTO: 5.1 10E3/UL (ref 1.6–8.3)
NEUTROPHILS NFR BLD AUTO: 66 %
NRBC # BLD AUTO: 0 10E3/UL
NRBC BLD AUTO-RTO: 0 /100
PLATELET # BLD AUTO: 185 10E3/UL (ref 150–450)
POTASSIUM SERPL-SCNC: 4.6 MMOL/L (ref 3.4–5.3)
PROT SERPL-MCNC: 6.5 G/DL (ref 6.4–8.3)
RBC # BLD AUTO: 4.42 10E6/UL (ref 4.4–5.9)
SODIUM SERPL-SCNC: 138 MMOL/L (ref 135–145)
WBC # BLD AUTO: 7.7 10E3/UL (ref 4–11)

## 2025-01-03 PROCEDURE — 82040 ASSAY OF SERUM ALBUMIN: CPT | Performed by: STUDENT IN AN ORGANIZED HEALTH CARE EDUCATION/TRAINING PROGRAM

## 2025-01-03 PROCEDURE — 36415 COLL VENOUS BLD VENIPUNCTURE: CPT | Performed by: STUDENT IN AN ORGANIZED HEALTH CARE EDUCATION/TRAINING PROGRAM

## 2025-01-03 PROCEDURE — 87799 DETECT AGENT NOS DNA QUANT: CPT | Performed by: STUDENT IN AN ORGANIZED HEALTH CARE EDUCATION/TRAINING PROGRAM

## 2025-01-03 PROCEDURE — 99215 OFFICE O/P EST HI 40 MIN: CPT | Performed by: STUDENT IN AN ORGANIZED HEALTH CARE EDUCATION/TRAINING PROGRAM

## 2025-01-03 PROCEDURE — 82310 ASSAY OF CALCIUM: CPT | Performed by: STUDENT IN AN ORGANIZED HEALTH CARE EDUCATION/TRAINING PROGRAM

## 2025-01-03 PROCEDURE — 82947 ASSAY GLUCOSE BLOOD QUANT: CPT | Performed by: STUDENT IN AN ORGANIZED HEALTH CARE EDUCATION/TRAINING PROGRAM

## 2025-01-03 PROCEDURE — G0463 HOSPITAL OUTPT CLINIC VISIT: HCPCS | Performed by: STUDENT IN AN ORGANIZED HEALTH CARE EDUCATION/TRAINING PROGRAM

## 2025-01-03 PROCEDURE — 85014 HEMATOCRIT: CPT | Performed by: STUDENT IN AN ORGANIZED HEALTH CARE EDUCATION/TRAINING PROGRAM

## 2025-01-03 PROCEDURE — 85004 AUTOMATED DIFF WBC COUNT: CPT | Performed by: STUDENT IN AN ORGANIZED HEALTH CARE EDUCATION/TRAINING PROGRAM

## 2025-01-03 RX ORDER — CARBOXYMETHYLCELLULOSE SODIUM 5 MG/ML
1 SOLUTION/ DROPS OPHTHALMIC 3 TIMES DAILY PRN
Qty: 15 ML | Refills: 0 | Status: SHIPPED | OUTPATIENT
Start: 2025-01-03

## 2025-01-03 ASSESSMENT — PAIN SCALES - GENERAL: PAINLEVEL_OUTOF10: NO PAIN (0)

## 2025-01-03 NOTE — LETTER
1/3/2025      Leland Pearson  [address] [place] [address]      Dear Colleague,    Thank you for referring your patient, Leland Pearson, to the [facility] BLOOD AND MARROW TRANSPLANT [facility] [place]. Please see a copy of my visit note below.    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     Diagnosis MDS-IB2 BMT type Allogenic  CMV  Donor -  Recipient -    Prep GANGA (Flu/Cy/TBI) Donor type  8/8, URD ABO D/R O+    GVHD ppx PTCy, siro, MMF Graft source PBSCT Toxo IgG Negative   Protocol [id number] CD34/kg 6.06E+06    BMT MD Brenda Murphy     Pre-transplant disease history  Referring provider: Dr. Delcid    He started having drenching night sweats and fatigue in Jan 2024. Intentional weight loss. He was found to have thrombocytopenia on routine CBC done prior to back surgery (was planned for laminectomy and fusion). On 4/1/24, WBC 6.4, Hb 13.5, Plts 64, ANC 1.89. LDH elevated 532. He underwent bone marrow biopsy (4/23/24) which showed MDS-EB2. Hypercellular marrow (%) with 10.8% blasts including rare circulating blasts. Flow showed 4% myeloid blasts. Normal karyotype. NGS (peripheral blood) was positive for ASXL1, IDH1, RUNX1, SRSF2 mutations (full report not available). Counts: WBC 6.7, Hb 13.1, Plts 70, ANC 0.7. IPSS-M = High risk (0.85).  He was treated with azacitidine 75mg/m2 for 7 days and venetoclax for 14 days (C1 on 5/20/24). Bone marrow after first cycle (6/13/24) showed persistent MDS, with hypercellular marrow with therapy effect, no increase in blasts. Normal karyotype. NGS: ASXL1 (38%), IDH1 (43%), RUNX1 (41%), SRSF1 (38%).   He received second cycle of aza/ angelia on 7/1/24. His pre-transplant BMBx was done on 8/2/24 and he was cytopenic at the time (CBC with WBC 0.6, Hb 14, platelet count  24). Hypocellular marrow (5%) with no increase in blasts. Normal karyotype, NGS negative.     Post transplant   - 9/2/24 (around D10) Neutropenic fevers. Strept mitis bacteremia resistant to levaquin from central venous catheter as well as a single culture of MRSE from peripheral blood (contaminant). Treated with cefepime. Repeat blood cultures negative, but continued to have high fevers. TTE (9/6/24) did not show any vegetations. CVC was removed on 9/6/24, defervesced on 9/7/24. PICC placed on 9/9/24. Antibiotics (cefepime followed by augmentin) were continued for a total of 14days from line removal.   - Right lower neck swelling and tenderness near the previous CVC site. US (9/15/24) showed an occlusive right internal jugular thrombus and a non occlusive thrombus in the right axillary vein, PICC associated. He was initially not anticoagulated due to thrombocytopenia but had increased symptoms. Repeat US (9/17/24) showed extension of right internal jugular thrombus into the innominate vein and extension of right axillary thrombus into subclavian vein. He was started on therapeutic anticoagulation with lovenox 1mg/kg BID on 9/18/24 with transfusion support for platelets, with plan to continue for 3 months. Right PICC removal was considered given propagation of clot near the PICC line. However, IR (9/19/24) recommended against removal of R PICC and replacement in the left arm due to risk of contralateral DVT.   - Hemorrhagic cystitis: Dysuria, urgency and hematuria starting  9/11/2024. Infectious workup including adenovirus and BK virus negative. Urology was consulted, recommended outpatient follow up. Now resolved.        INTERVAL HISTORY     Leland presents for a scheduled BMT visit. He has been doing very well.   Has been exercising 5-7 days per week.   Saw lymphedema specialist for left upper extremity swelling last week and has a follow up. He feels that the left arm swelling is better previously, but is still  noticeable  Taste is almost normal now. No nausea, vomiting. Bowel movements are formed, twice daily.  Was seen by dermatology and diagnosed with folliculitis. Has been started on doxycycline. He has a follow up, and if no improvement, they plan on doing a skin culture. No concern for GVHD.    Complains of eyes being puffy, with crusting in the morning and dry/ tired in the evening. Has been using tear drops once daily without much help. Never had dry eyes prior to transplant. He saw his optometrist recently and was told that he needed to get cataract surgery.     BP is slightly elevated in clinic today but record from home shows BP in 110-120s systolic    Plan  - No evidence of GVHD. Doing well overall.   - Has previously discussed maintenance chemo with inqovi and they were interested in proceeding with it. But will hold off until the folliculitis on chest is completely resolved - still looks very red and active right now.   - Refer to ophthalmology to r/o eye GVHD. Use tear drops three times daily.   - Cataract surgery 6 months post transplant   - RTC in 1 month       PMH  Back pain due to spinal stenosis, lumbosacral radiculopathy.   Gout, most recent flare was in April 2024  GERD  HTN  BPH, on tamsulosin   Right knee ACL repair in 2014  Low back surgery in 2004 for herniated disc, complicated by infection requiring prolonged IV abx   Coronary artery calcifications seen on CT, stress test in 2023 negative     SH  . Lives with his wife, Vani. Two daughters, 43 and 36. 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 Was still working part time till last summer at [employer], 2-3 hours per day.     Current Outpatient Medications   Medication Sig Dispense Refill     acyclovir (ZOVIRAX) 800 MG tablet Take 1 tablet (800 mg) by mouth every 12 hours. 90 tablet 3     allopurinol (ZYLOPRIM) 300 MG tablet Take 1 tablet (300 mg) by mouth daily. 90 tablet 1     calcium carbonate-vitamin D (OSCAL) 500-5 MG-MCG  tablet Take 2 tablets by mouth daily. 60 tablet 2     chlorhexidine (PERIDEX) 0.12 % solution Swish and spit 5-10 mLs in mouth 2 times daily. 118 mL 0     clindamycin (CLEOCIN T) 1 % external lotion Apply to chest and back BID x 3 weeks 60 mL 3     clindamycin (CLEOCIN-T) 1 % external gel Apply topically 2 times daily. 60 g 0     doxycycline monohydrate (MONODOX) 100 MG capsule 1 cap PO BID with food and full glass of water 42 capsule 0     enoxaparin ANTICOAGULANT (LOVENOX) 80 MG/0.8ML syringe Inject 0.8 mLs (80 mg) subcutaneously 2 times daily. 48 mL 1     hydrocortisone 2.5 % cream Apply topically daily. 30 g 0     lisinopril (ZESTRIL) 5 MG tablet Take 1 tablet (5 mg) by mouth daily. Hold dose if blood pressure is less than 110/60       methocarbamol (ROBAXIN) 500 MG tablet Take 500 mg by mouth as needed for muscle spasms.       pantoprazole (PROTONIX) 40 MG EC tablet Take 1 tablet (40 mg) by mouth daily. 90 tablet 0     rivaroxaban ANTICOAGULANT (XARELTO) 10 MG TABS tablet Take 1 tablet (10 mg) by mouth daily (with dinner). 90 tablet 0     sulfamethoxazole-trimethoprim (BACTRIM DS) 800-160 MG tablet Take 1 tablet by mouth Every Mon, Tues two times daily. 48 tablet 1     tamsulosin (FLOMAX) 0.4 MG capsule Take 0.4 mg by mouth daily.       triamcinolone (KENALOG) 0.1 % external cream Apply topically 2 times daily. 60 g 1        EXAM      BP (!) 144/81   Pulse 89   Temp 98.1  F (36.7  C) (Oral)   Resp 16   Wt 90.5 kg (199 lb 9.6 oz)   SpO2 97%   BMI 26.74 kg/m      Wt Readings from Last 4 Encounters:   01/03/25 90.5 kg (199 lb 9.6 oz)   12/17/24 89.8 kg (198 lb)   12/06/24 88.8 kg (195 lb 12.8 oz)   12/02/24 89.4 kg (197 lb 1.6 oz)       KPS: 80% Can perform normal activity with effort, some signs of disease    General: Alert, awake  Eyes: Conjunctiva normal  Mouth and Throat: No mucositis   Abd:  Non tender, non distended   Lymph: Left LE edema, LUE edema (forearm to hand)   Skin: Acneform rash on chest and  upper back  Access: None          LABS       Recent Labs   Lab Test 01/03/25  1340 12/02/24  0756 11/29/24  1335 09/18/24  0430 09/17/24  0424 09/16/24  0432 09/13/24  0322 09/12/24  0321   WBC 7.7 4.5 3.8*   < > 1.3* 1.5*   < > 2.9*   HGB 13.3 12.1* 12.5*   < > 7.4* 8.0*   < > 8.4*    143* 162   < > 22* 24*   < > 27*   NEUTROPHIL 66 73 67   < > 77 84   < > 89   ANEU  --   --   --   --  1.0* 1.3*  --  2.6   LYMPH 17 8 13   < > 1 1   < > 1   ALYM  --   --   --   --  0.0* 0.0*  --  0.0*    < > = values in this interval not displayed.       Recent Labs   Lab Test 01/03/25  1340 12/02/24 0756 11/29/24  1335    140 140   POTASSIUM 4.6 3.8 4.3   CHLORIDE 102 104 105   CO2 27 27 27   BUN 21.2 12.3 15.1   CR 1.07 0.80 0.83   GLC 98 105* 112*        Recent Labs   Lab Test 01/03/25  1340 12/02/24 0756 11/29/24  1335 10/18/24  1216 10/15/24  1220 10/03/24  1543 09/30/24  0900 09/27/24  0927 09/23/24  0927 09/19/24  0323 09/18/24  0430 09/17/24 0424   HERBERT 9.5 9.3 9.2   < > 9.1   < > 9.0   < > 9.3   < > 8.7* 9.0   MAG  --   --   --   --  1.9  --  1.9  --   --   --  2.0 2.0   PHOS  --   --   --   --   --   --   --   --  2.5  --  3.0 2.9    < > = values in this interval not displayed.        Recent Labs   Lab Test 01/03/25  1340 12/02/24 0756 11/29/24  1335   ALKPHOS 89 92 110   AST 24 34 37   ALT 18 31 31   BILITOTAL 0.4 0.2 0.2   ALBUMIN 4.0 3.9 4.0       Recent Labs   Lab Test 11/29/24  1335 11/22/24  1300 11/15/24  1114   CMVQNT Not Detected Not Detected Not Detected       Recent Labs   Lab Test 11/29/24  1335 09/27/24  0927   * 708       Recent Labs   Lab Test 08/01/24  0805 05/08/24  1156   ZARIA 460* 1,360*         ASSESSMENT AND PLAN     BMT: D133   - Chemo protocol: MT 2022-5; Flu/Cy/TBI  - Peripheral blood stem cell graft from 8/8 URD donor, ABO matched, Cell dose: 6.06 x10^6 CD34/kg    Disease status:   9/13/24 (D28): Bmbx shows hypocellular marrow, no dysplasia or blasts.  NGS negative  12/2/24  (D100): Bmbx shows hypocellular marrow in CR. NGS negative.     Graft status/chimerism:   9/13/24 (D28): Bone marrow 100%. Peripheral blood CD3 92%, CD33 100%  10/25/24 (D60): Peripheral blood CD3 and CD33 100%  12/2/24 (D100): Bone marrow 100%, Peripheral blood CD3 and CD33 100%    Maintenance   - Discussed the current evidence on maintenance chemotherapy. He is agreeable to proceed, will plan inqovi as long as there is no GVHD or infection     GVHD  # Current systemic immunosuppression is none.   - Sirolimus stopped at D100 without taper on 11/29/24     # GVHD: None till date    Heme/ Coag  # Right internal jugular occlusive thrombus and right axillary/ subclavain vein non occlusive thrombus, line associated, diagnosed on 9/15/24. Line was removed on 10/18/24.  - Follows with Dr. Ochoa, last seen on 12/17/24. Has completed 3 months of anticoagulation but would benefit from prolonged anticoagulation.   - Continue rivaroxaban 10mg daily, duration at least a total of 6 months but to be determined on heme follow up.     MSK  # LUE swelling   - US x2 (11/25) negative for DVT, unclear cause. Has seen lymphedema specialist.     ID  # Prophylaxis  PJP/ Toxo IgG negative: Bactrim   Viral: Acyclovir 800 mg bid  Bacterial/ fungal: None    # Monitoring  CMV: Monitor weekly till D100. 1/3 - negative  EBV: Monitor every 2 weeks between D30 to D180. 1/3 neg  IgG:  IgG was 549 mg/dL on 11/29/24. Check serum IgG level q3 months.     GI  GERD: Pantoprazole daily   VOD prophylaxis: Completed     CARDIOVASCULAR  # HTN: Lisinopril 5mg (was on 15mg daily prior to transplant)    # CAD: Coronary artery calcifications seen on CT, stress test in 2023 negative  - Risk of cardiomyopathy: Baseline EF 55-60%.   - Risk of arrhythmia: Baseline EKG showed 421       RENAL/ELECTROLYTES/  - BPH: Continue Flomax.  - Hemorrhagic Cystitis secondary to cytoxan: Resolved. Oxybutynin 10mg at bedtime PRN. Will need follow up with urology at some  point.       MUSCULOSKELETAL  # Gout: continue allopurinol   # History of spinal stenosis, lumbosacral radiculopathy:   - Pain management: Okay to take tylenol or robaxin RPN     Post-transplant vaccinations  Has received flu, COVID and RSV vaccine.     I spent 45 minutes in the care of this patient today, which included time necessary for preparation for the visit, obtaining history, ordering medications/tests/procedures as medically indicated, review of pertinent medical literature, counseling of the patient, communication of recommendations to the care team, and documentation time.    Brneda Murphy  Department of Hematology, Oncology and Transplantation  Text via docTrackr         Again, thank you for allowing me to participate in the care of your patient.        Sincerely,        ONEIL Montero    Electronically signed

## 2025-01-03 NOTE — NURSING NOTE
"Oncology Rooming Note    January 3, 2025 2:08 PM   Leland Pearson is a 69 year old male who presents for:    Chief Complaint   Patient presents with    Blood Draw     VPT blood draw by lab RN    Oncology Clinic Visit     MDS     Initial Vitals: BP (!) 144/81   Pulse 89   Temp 98.1  F (36.7  C) (Oral)   Resp 16   Wt 90.5 kg (199 lb 9.6 oz)   SpO2 97%   BMI 26.74 kg/m   Estimated body mass index is 26.74 kg/m  as calculated from the following:    Height as of 8/16/24: 1.84 m (6' 0.44\").    Weight as of this encounter: 90.5 kg (199 lb 9.6 oz). Body surface area is 2.15 meters squared.  No Pain (0) Comment: Data Unavailable   No LMP for male patient.  Allergies reviewed: Yes  Medications reviewed: Yes    Medications: Medication refills not needed today.  Pharmacy name entered into Qui[facility]:    [id number] - [place], [place] - [address] [place] [address] [facility] [place] [place] [facility] [address] [facility] [place] [place] [facility] [address] [facility] [phone]    Frailty Screening:   Is the patient here for a new oncology consult visit in cancer care? 2. No      Clinical concerns: would like to discuss low BP readings with Dr Murphy.       Maddy Vanessa, KIRBY  1/3/2025              "

## 2025-01-03 NOTE — PROGRESS NOTES
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     Diagnosis MDS-IB2 BMT type Allogenic  CMV  Donor -  Recipient -    Prep GANGA (Flu/Cy/TBI) Donor type  8/8, URD ABO D/R O+    GVHD ppx PTCy, siro, MMF Graft source PBSCT Toxo IgG Negative   Protocol [id number] CD34/kg 6.06E+06    BMT MD Brenda Murphy     Pre-transplant disease history  Referring provider: Dr. Delcid    He started having drenching night sweats and fatigue in Jan 2024. Intentional weight loss. He was found to have thrombocytopenia on routine CBC done prior to back surgery (was planned for laminectomy and fusion). On 4/1/24, WBC 6.4, Hb 13.5, Plts 64, ANC 1.89. LDH elevated 532. He underwent bone marrow biopsy (4/23/24) which showed MDS-EB2. Hypercellular marrow (%) with 10.8% blasts including rare circulating blasts. Flow showed 4% myeloid blasts. Normal karyotype. NGS (peripheral blood) was positive for ASXL1, IDH1, RUNX1, SRSF2 mutations (full report not available). Counts: WBC 6.7, Hb 13.1, Plts 70, ANC 0.7. IPSS-M = High risk (0.85).  He was treated with azacitidine 75mg/m2 for 7 days and venetoclax for 14 days (C1 on 5/20/24). Bone marrow after first cycle (6/13/24) showed persistent MDS, with hypercellular marrow with therapy effect, no increase in blasts. Normal karyotype. NGS: ASXL1 (38%), IDH1 (43%), RUNX1 (41%), SRSF1 (38%).   He received second cycle of aza/ angelia on 7/1/24. His pre-transplant BMBx was done on 8/2/24 and he was cytopenic at the time (CBC with WBC 0.6, Hb 14, platelet count 24). Hypocellular marrow (5%) with no increase in blasts. Normal karyotype, NGS negative.     Post transplant   - 9/2/24 (around D10) Neutropenic fevers. Strept mitis bacteremia resistant to levaquin from central venous catheter as well as a single culture of MRSE from peripheral  blood (contaminant). Treated with cefepime. Repeat blood cultures negative, but continued to have high fevers. TTE (9/6/24) did not show any vegetations. CVC was removed on 9/6/24, defervesced on 9/7/24. PICC placed on 9/9/24. Antibiotics (cefepime followed by augmentin) were continued for a total of 14days from line removal.   - Right lower neck swelling and tenderness near the previous CVC site. US (9/15/24) showed an occlusive right internal jugular thrombus and a non occlusive thrombus in the right axillary vein, PICC associated. He was initially not anticoagulated due to thrombocytopenia but had increased symptoms. Repeat US (9/17/24) showed extension of right internal jugular thrombus into the innominate vein and extension of right axillary thrombus into subclavian vein. He was started on therapeutic anticoagulation with lovenox 1mg/kg BID on 9/18/24 with transfusion support for platelets, with plan to continue for 3 months. Right PICC removal was considered given propagation of clot near the PICC line. However, IR (9/19/24) recommended against removal of R PICC and replacement in the left arm due to risk of contralateral DVT.   - Hemorrhagic cystitis: Dysuria, urgency and hematuria starting  9/11/2024. Infectious workup including adenovirus and BK virus negative. Urology was consulted, recommended outpatient follow up. Now resolved.        INTERVAL HISTORY     Leland presents for a scheduled BMT visit. He has been doing very well.   Has been exercising 5-7 days per week.   Saw lymphedema specialist for left upper extremity swelling last week and has a follow up. He feels that the left arm swelling is better previously, but is still noticeable  Taste is almost normal now. No nausea, vomiting. Bowel movements are formed, twice daily.  Was seen by dermatology and diagnosed with folliculitis. Has been started on doxycycline. He has a follow up, and if no improvement, they plan on doing a skin culture. No concern  for GVHD.    Complains of eyes being puffy, with crusting in the morning and dry/ tired in the evening. Has been using tear drops once daily without much help. Never had dry eyes prior to transplant. He saw his optometrist recently and was told that he needed to get cataract surgery.     BP is slightly elevated in clinic today but record from home shows BP in 110-120s systolic    Plan  - No evidence of GVHD. Doing well overall.   - Has previously discussed maintenance chemo with inqovi and they were interested in proceeding with it. But will hold off until the folliculitis on chest is completely resolved - still looks very red and active right now.   - Refer to ophthalmology to r/o eye GVHD. Use tear drops three times daily.   - Cataract surgery 6 months post transplant   - RTC in 1 month       PMH  Back pain due to spinal stenosis, lumbosacral radiculopathy.   Gout, most recent flare was in April 2024  GERD  HTN  BPH, on tamsulosin   Right knee ACL repair in 2014  Low back surgery in 2004 for herniated disc, complicated by infection requiring prolonged IV abx   Coronary artery calcifications seen on CT, stress test in 2023 negative     SH  . Lives with his wife, Vani. Two daughters, 43 and 36. 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 Was still working part time till last summer at [employer], 2-3 hours per day.     Current Outpatient Medications   Medication Sig Dispense Refill    acyclovir (ZOVIRAX) 800 MG tablet Take 1 tablet (800 mg) by mouth every 12 hours. 90 tablet 3    allopurinol (ZYLOPRIM) 300 MG tablet Take 1 tablet (300 mg) by mouth daily. 90 tablet 1    calcium carbonate-vitamin D (OSCAL) 500-5 MG-MCG tablet Take 2 tablets by mouth daily. 60 tablet 2    chlorhexidine (PERIDEX) 0.12 % solution Swish and spit 5-10 mLs in mouth 2 times daily. 118 mL 0    clindamycin (CLEOCIN T) 1 % external lotion Apply to chest and back BID x 3 weeks 60 mL 3    clindamycin (CLEOCIN-T) 1 % external  gel Apply topically 2 times daily. 60 g 0    doxycycline monohydrate (MONODOX) 100 MG capsule 1 cap PO BID with food and full glass of water 42 capsule 0    enoxaparin ANTICOAGULANT (LOVENOX) 80 MG/0.8ML syringe Inject 0.8 mLs (80 mg) subcutaneously 2 times daily. 48 mL 1    hydrocortisone 2.5 % cream Apply topically daily. 30 g 0    lisinopril (ZESTRIL) 5 MG tablet Take 1 tablet (5 mg) by mouth daily. Hold dose if blood pressure is less than 110/60      methocarbamol (ROBAXIN) 500 MG tablet Take 500 mg by mouth as needed for muscle spasms.      pantoprazole (PROTONIX) 40 MG EC tablet Take 1 tablet (40 mg) by mouth daily. 90 tablet 0    rivaroxaban ANTICOAGULANT (XARELTO) 10 MG TABS tablet Take 1 tablet (10 mg) by mouth daily (with dinner). 90 tablet 0    sulfamethoxazole-trimethoprim (BACTRIM DS) 800-160 MG tablet Take 1 tablet by mouth Every Mon, Tues two times daily. 48 tablet 1    tamsulosin (FLOMAX) 0.4 MG capsule Take 0.4 mg by mouth daily.      triamcinolone (KENALOG) 0.1 % external cream Apply topically 2 times daily. 60 g 1        EXAM      BP (!) 144/81   Pulse 89   Temp 98.1  F (36.7  C) (Oral)   Resp 16   Wt 90.5 kg (199 lb 9.6 oz)   SpO2 97%   BMI 26.74 kg/m      Wt Readings from Last 4 Encounters:   01/03/25 90.5 kg (199 lb 9.6 oz)   12/17/24 89.8 kg (198 lb)   12/06/24 88.8 kg (195 lb 12.8 oz)   12/02/24 89.4 kg (197 lb 1.6 oz)       KPS: 80% Can perform normal activity with effort, some signs of disease    General: Alert, awake  Eyes: Conjunctiva normal  Mouth and Throat: No mucositis   Abd:  Non tender, non distended   Lymph: Left LE edema, LUE edema (forearm to hand)   Skin: Acneform rash on chest and upper back  Access: None          LABS       Recent Labs   Lab Test 01/03/25  1340 12/02/24  0756 11/29/24  1335 09/18/24  0430 09/17/24  0424 09/16/24  0432 09/13/24  0322 09/12/24  0321   WBC 7.7 4.5 3.8*   < > 1.3* 1.5*   < > 2.9*   HGB 13.3 12.1* 12.5*   < > 7.4* 8.0*   < > 8.4*     143* 162   < > 22* 24*   < > 27*   NEUTROPHIL 66 73 67   < > 77 84   < > 89   ANEU  --   --   --   --  1.0* 1.3*  --  2.6   LYMPH 17 8 13   < > 1 1   < > 1   ALYM  --   --   --   --  0.0* 0.0*  --  0.0*    < > = values in this interval not displayed.       Recent Labs   Lab Test 01/03/25  1340 12/02/24  0756 11/29/24  1335    140 140   POTASSIUM 4.6 3.8 4.3   CHLORIDE 102 104 105   CO2 27 27 27   BUN 21.2 12.3 15.1   CR 1.07 0.80 0.83   GLC 98 105* 112*        Recent Labs   Lab Test 01/03/25  1340 12/02/24  0756 11/29/24  1335 10/18/24  1216 10/15/24  1220 10/03/24  1543 09/30/24  0900 09/27/24  0927 09/23/24  0927 09/19/24  0323 09/18/24  0430 09/17/24  0424   HERBERT 9.5 9.3 9.2   < > 9.1   < > 9.0   < > 9.3   < > 8.7* 9.0   MAG  --   --   --   --  1.9  --  1.9  --   --   --  2.0 2.0   PHOS  --   --   --   --   --   --   --   --  2.5  --  3.0 2.9    < > = values in this interval not displayed.        Recent Labs   Lab Test 01/03/25  1340 12/02/24  0756 11/29/24  1335   ALKPHOS 89 92 110   AST 24 34 37   ALT 18 31 31   BILITOTAL 0.4 0.2 0.2   ALBUMIN 4.0 3.9 4.0       Recent Labs   Lab Test 11/29/24  1335 11/22/24  1300 11/15/24  1114   CMVQNT Not Detected Not Detected Not Detected       Recent Labs   Lab Test 11/29/24  1335 09/27/24  0927   * 708       Recent Labs   Lab Test 08/01/24  0805 05/08/24  1156   ZARIA 460* 1,360*         ASSESSMENT AND PLAN     BMT: D133   - Chemo protocol: MT 2022-5; Flu/Cy/TBI  - Peripheral blood stem cell graft from 8/8 URD donor, ABO matched, Cell dose: 6.06 x10^6 CD34/kg    Disease status:   9/13/24 (D28): Bmbx shows hypocellular marrow, no dysplasia or blasts.  NGS negative  12/2/24 (D100): Bmbx shows hypocellular marrow in CR. NGS negative.     Graft status/chimerism:   9/13/24 (D28): Bone marrow 100%. Peripheral blood CD3 92%, CD33 100%  10/25/24 (D60): Peripheral blood CD3 and CD33 100%  12/2/24 (D100): Bone marrow 100%, Peripheral blood CD3 and CD33 100%    Maintenance    - Discussed the current evidence on maintenance chemotherapy. He is agreeable to proceed, will plan inqovi as long as there is no GVHD or infection     GVHD  # Current systemic immunosuppression is none.   - Sirolimus stopped at D100 without taper on 11/29/24     # GVHD: None till date    Heme/ Coag  # Right internal jugular occlusive thrombus and right axillary/ subclavain vein non occlusive thrombus, line associated, diagnosed on 9/15/24. Line was removed on 10/18/24.  - Follows with Dr. Ochoa, last seen on 12/17/24. Has completed 3 months of anticoagulation but would benefit from prolonged anticoagulation.   - Continue rivaroxaban 10mg daily, duration at least a total of 6 months but to be determined on heme follow up.     MSK  # LUE swelling   - US x2 (11/25) negative for DVT, unclear cause. Has seen lymphedema specialist.     ID  # Prophylaxis  PJP/ Toxo IgG negative: Bactrim   Viral: Acyclovir 800 mg bid  Bacterial/ fungal: None    # Monitoring  CMV: Monitor weekly till D100. 1/3 - negative  EBV: Monitor every 2 weeks between D30 to D180. 1/3 neg  IgG:  IgG was 549 mg/dL on 11/29/24. Check serum IgG level q3 months.     GI  GERD: Pantoprazole daily   VOD prophylaxis: Completed     CARDIOVASCULAR  # HTN: Lisinopril 5mg (was on 15mg daily prior to transplant)    # CAD: Coronary artery calcifications seen on CT, stress test in 2023 negative  - Risk of cardiomyopathy: Baseline EF 55-60%.   - Risk of arrhythmia: Baseline EKG showed 421       RENAL/ELECTROLYTES/  - BPH: Continue Flomax.  - Hemorrhagic Cystitis secondary to cytoxan: Resolved. Oxybutynin 10mg at bedtime PRN. Will need follow up with urology at some point.       MUSCULOSKELETAL  # Gout: continue allopurinol   # History of spinal stenosis, lumbosacral radiculopathy:   - Pain management: Okay to take tylenol or robaxin RPN     Post-transplant vaccinations  Has received flu, COVID and RSV vaccine.     I spent 45 minutes in the care of this patient  today, which included time necessary for preparation for the visit, obtaining history, ordering medications/tests/procedures as medically indicated, review of pertinent medical literature, counseling of the patient, communication of recommendations to the care team, and documentation time.    Brenda Murphy  Department of Hematology, Oncology and Transplantation  Text via [employer]

## 2025-01-04 LAB
CMV DNA SPEC NAA+PROBE-ACNC: NOT DETECTED IU/ML
SPECIMEN TYPE: NORMAL

## 2025-01-05 LAB — EBV DNA SERPL NAA+PROBE-ACNC: NOT DETECTED IU/ML

## 2025-01-06 ENCOUNTER — MYC MEDICAL ADVICE (OUTPATIENT)
Dept: TRANSPLANT | Facility: CLINIC | Age: 70
End: 2025-01-06

## 2025-01-06 ENCOUNTER — TELEPHONE (OUTPATIENT)
Dept: ONCOLOGY | Facility: CLINIC | Age: 70
End: 2025-01-06

## 2025-01-06 ENCOUNTER — THERAPY VISIT (OUTPATIENT)
Dept: OCCUPATIONAL THERAPY | Facility: CLINIC | Age: 70
End: 2025-01-06
Attending: INTERNAL MEDICINE
Payer: COMMERCIAL

## 2025-01-06 DIAGNOSIS — Z79.899 ENCOUNTER FOR LONG-TERM (CURRENT) USE OF MEDICATIONS: ICD-10-CM

## 2025-01-06 DIAGNOSIS — I89.0 LYMPHEDEMA: Primary | ICD-10-CM

## 2025-01-06 DIAGNOSIS — D46.9 MDS (MYELODYSPLASTIC SYNDROME) (H): Primary | ICD-10-CM

## 2025-01-06 PROCEDURE — 97140 MANUAL THERAPY 1/> REGIONS: CPT | Mod: GO | Performed by: OCCUPATIONAL THERAPIST

## 2025-01-06 NOTE — TELEPHONE ENCOUNTER
Prior Authorization Approval    Medication: INQOVI  MG PO TABS  Authorization Effective Date: 1/6/2025  Authorization Expiration Date: 1/6/2026  Approved Dose/Quantity: 5 per 28 DS  Reference #: [id number]   Insurance Company: [employer] - Phone [phone] Fax [phone]  Expected CoPay: $ 1,993  CoPay Card Available:      Financial Assistance Needed: has grants  Which Pharmacy is filling the prescription:    Pharmacy Notified: yes  Patient Notified: yes          Thank you,    No Schumacher  Oncology Pharmacy Liaison II  [email or web address]  Phone: [phone]  Fax: [phone]

## 2025-01-06 NOTE — TELEPHONE ENCOUNTER
PA Initiation    Medication: INQOVI  MG PO TABS  Insurance Company: [employer] - Phone [phone] Fax [phone]  Pharmacy Filling the Rx:    Filling Pharmacy Phone:    Filling Pharmacy Fax:    Start Date: 1/6/2025        Thank you,    No Schumacher  Oncology Pharmacy Liaison II  [email or web address]  Phone: [phone]  Fax: [phone]

## 2025-01-08 ENCOUNTER — TELEPHONE (OUTPATIENT)
Dept: ONCOLOGY | Facility: CLINIC | Age: 70
End: 2025-01-08
Payer: COMMERCIAL

## 2025-01-08 NOTE — ORAL ONC MGMT
Oral Chemotherapy Monitoring Program    [facility] provider: Dr Murphy  Drug: decitabine-cedazuridine (Inqovi)    Lab Monitoring Plan    Subjective/Objective:  Leland Pearson is a 69 year old male contacted by phone for an initial visit for oral chemotherapy education.  I spoke with Leland and wife Vani today.      Assessment:  Patient is appropriate to start therapy once determined by Dr Murphy. Leland will have visit in February 2025 to re-evaluate folliculitis and determine if Inqovi would be appropriate to start at that time.    Plan:  Basic chemotherapy teaching was reviewed with the patient and the patient's family including indication, start date of therapy, dose, administration, adverse effects, missed doses, food and drug interactions, monitoring, side effect management, office contact information, and safe handling. Written materials were provided and all questions answered.    Follow-Up:  1 week following initial start of therapy    Robyn Eason PharmD  Oral Chemotherapy Monitoring Program  [facility]  [phone]  January 8, 2025 7/1/2024     3:00 PM 7/8/2024     3:00 PM 7/11/2024    12:00 PM 7/22/2024     2:00 PM 8/14/2024     2:00 PM 1/6/2025    11:00 AM 1/8/2025     1:00 PM   ORAL CHEMOTHERAPY   Assessment Type Lab Monitoring;Monthly Follow up Lab Monitoring Lab Monitoring Lab Monitoring Discontinuation Initial Work up New Teach   Stop Date     8/14/2024     Reason for Discontinuation     Pursuing stem cell transplant     Diagnosis Code Myelodysplastic Syndrome Myelodysplastic Syndrome Myelodysplastic Syndrome Myelodysplastic Syndrome Myelodysplastic Syndrome Myelodysplastic Syndrome Myelodysplastic Syndrome   Providers Dr Bhavin Murphy   [facility]   Is this patient followed by the [facility] OC team? No No No No No No No   Drug Name Venclexta (venetoclax) Venclexta  "(venetoclax) Venclexta (venetoclax) Venclexta (venetoclax) Venclexta (venetoclax) Inqovi (decitabine/Cedazuridine) Inqovi (decitabine/Cedazuridine)   Dose 100 mg 100 mg 100 mg 100 mg  3,500 mg 3,500 mg   Current Schedule Daily Daily Daily Daily  Daily Daily   Cycle Details 2 weeks on, 2 weeks off 2 weeks on, 2 weeks off 2 weeks on, 2 weeks off 2 weeks on, 2 weeks off  Day 1-3 of a 28 day cycle Day 1-3 of a 28 day cycle   Planned next cycle start date 7/1/2024         Doses missed in last 2 weeks 0         Adherence Assessment Adherent         Adverse Effects    Neutropenia;Thrombocytopenia      Neutropenia    Grade 2      Thrombocytopenia    Grade 1      Any new drug interactions?      No No   Is the dose as ordered appropriate for the patient?      Yes Yes       Last PHQ-2 Score on record:        No data to display                Vitals:  BP:   BP Readings from Last 1 Encounters:   01/03/25 (!) 144/81     Wt Readings from Last 1 Encounters:   01/03/25 90.5 kg (199 lb 9.6 oz)     Estimated body surface area is 2.15 meters squared as calculated from the following:    Height as of 8/16/24: 1.84 m (6' 0.44\").    Weight as of 1/3/25: 90.5 kg (199 lb 9.6 oz).    Labs:  _  Result Component Current Result Ref Range   Sodium 138 (1/3/2025) 135 - 145 mmol/L     _  Result Component Current Result Ref Range   Potassium 4.6 (1/3/2025) 3.4 - 5.3 mmol/L     _  Result Component Current Result Ref Range   Calcium 9.5 (1/3/2025) 8.8 - 10.4 mg/dL     No results found for Mag within last 30 days.     No results found for Phos within last 30 days.     _  Result Component Current Result Ref Range   Albumin 4.0 (1/3/2025) 3.5 - 5.2 g/dL     _  Result Component Current Result Ref Range   Urea Nitrogen 21.2 (1/3/2025) 8.0 - 23.0 mg/dL     _  Result Component Current Result Ref Range   Creatinine 1.07 (1/3/2025) 0.67 - 1.17 mg/dL     _  Result Component Current Result Ref Range   AST 24 (1/3/2025) 0 - 45 U/L     _  Result Component Current " Result Ref Range   ALT 18 (1/3/2025) 0 - 70 U/L     _  Result Component Current Result Ref Range   Bilirubin Total 0.4 (1/3/2025) <=1.2 mg/dL     _  Result Component Current Result Ref Range   WBC Count 7.7 (1/3/2025) 4.0 - 11.0 10e3/uL     _  Result Component Current Result Ref Range   Hemoglobin 13.3 (1/3/2025) 13.3 - 17.7 g/dL     _  Result Component Current Result Ref Range   Platelet Count 185 (1/3/2025) 150 - 450 10e3/uL     No results found for ANC within last 30 days.

## 2025-01-09 ENCOUNTER — PATIENT OUTREACH (OUTPATIENT)
Dept: CARE COORDINATION | Facility: CLINIC | Age: 70
End: 2025-01-09
Payer: COMMERCIAL

## 2025-01-09 NOTE — PROGRESS NOTES
Clinical Product Navigator RN reviewed chart; patient on payer product coverage.  Review results:   CPN Initial Information Gathering  Referral Source: Health Plan    Patient identified by their health plan for care coordination.  Note patient is followed by BMT Care Coordinators.  No additional action by RN Clinical Product Navigator identified at this time.    Melissa Behl BSN, RN, PHN, [facility]  RN Clinical Product Navigator  [phone]

## 2025-01-13 ENCOUNTER — MYC MEDICAL ADVICE (OUTPATIENT)
Dept: DERMATOLOGY | Facility: CLINIC | Age: 70
End: 2025-01-13

## 2025-01-13 ENCOUNTER — THERAPY VISIT (OUTPATIENT)
Dept: OCCUPATIONAL THERAPY | Facility: CLINIC | Age: 70
End: 2025-01-13
Attending: INTERNAL MEDICINE
Payer: COMMERCIAL

## 2025-01-13 DIAGNOSIS — I89.0 LYMPHEDEMA: Primary | ICD-10-CM

## 2025-01-13 DIAGNOSIS — L73.9 FOLLICULITIS: ICD-10-CM

## 2025-01-13 PROCEDURE — 97140 MANUAL THERAPY 1/> REGIONS: CPT | Mod: GO | Performed by: OCCUPATIONAL THERAPIST

## 2025-01-13 RX ORDER — CLINDAMYCIN PHOSPHATE 10 UG/ML
LOTION TOPICAL
Qty: 60 ML | Refills: 3 | Status: SHIPPED | OUTPATIENT
Start: 2025-01-13

## 2025-01-17 DIAGNOSIS — L73.9 FOLLICULITIS: ICD-10-CM

## 2025-01-18 DIAGNOSIS — D46.9 MDS (MYELODYSPLASTIC SYNDROME) (H): ICD-10-CM

## 2025-01-18 DIAGNOSIS — Z94.84 HISTORY OF PERIPHERAL STEM CELL TRANSPLANT (H): ICD-10-CM

## 2025-01-20 RX ORDER — PANTOPRAZOLE SODIUM 40 MG/1
40 TABLET, DELAYED RELEASE ORAL DAILY
Qty: 90 TABLET | Refills: 1 | Status: SHIPPED | OUTPATIENT
Start: 2025-01-20

## 2025-01-20 RX ORDER — DOXYCYCLINE 100 MG/1
CAPSULE ORAL
Qty: 42 CAPSULE | Refills: 0 | OUTPATIENT
Start: 2025-01-20

## 2025-01-20 NOTE — TELEPHONE ENCOUNTER
Patient Contact    Attempt # 1    Was call answered?  No.  Left message on voicemail with information to call back.     Jamila Arguello RN

## 2025-01-20 NOTE — TELEPHONE ENCOUNTER
Clinic RN: Please investigate patient's chart or contact patient if the information cannot be found because this medication was prescribed for an acute condition. Confirm current symptoms/need for medication and possible need for appointment. If necessary, document reason and route refill encounter to provider for approval or denial.

## 2025-01-21 ENCOUNTER — THERAPY VISIT (OUTPATIENT)
Dept: OCCUPATIONAL THERAPY | Facility: CLINIC | Age: 70
End: 2025-01-21
Payer: COMMERCIAL

## 2025-01-21 ENCOUNTER — OFFICE VISIT (OUTPATIENT)
Dept: OPHTHALMOLOGY | Facility: CLINIC | Age: 70
End: 2025-01-21
Attending: STUDENT IN AN ORGANIZED HEALTH CARE EDUCATION/TRAINING PROGRAM
Payer: COMMERCIAL

## 2025-01-21 DIAGNOSIS — I89.0 LYMPHEDEMA: Primary | ICD-10-CM

## 2025-01-21 DIAGNOSIS — H52.13 MYOPIA OF BOTH EYES WITH ASTIGMATISM AND PRESBYOPIA: Primary | ICD-10-CM

## 2025-01-21 DIAGNOSIS — H52.203 MYOPIA OF BOTH EYES WITH ASTIGMATISM AND PRESBYOPIA: Primary | ICD-10-CM

## 2025-01-21 DIAGNOSIS — H04.123 DRY EYE SYNDROME OF BOTH EYES: ICD-10-CM

## 2025-01-21 DIAGNOSIS — H52.4 MYOPIA OF BOTH EYES WITH ASTIGMATISM AND PRESBYOPIA: Primary | ICD-10-CM

## 2025-01-21 DIAGNOSIS — Z94.81 STATUS POST BONE MARROW TRANSPLANT (H): ICD-10-CM

## 2025-01-21 DIAGNOSIS — H25.813 COMBINED FORMS OF AGE-RELATED CATARACT OF BOTH EYES: ICD-10-CM

## 2025-01-21 PROCEDURE — 99203 OFFICE O/P NEW LOW 30 MIN: CPT | Performed by: OPHTHALMOLOGY

## 2025-01-21 PROCEDURE — 97140 MANUAL THERAPY 1/> REGIONS: CPT | Mod: GO | Performed by: OCCUPATIONAL THERAPIST

## 2025-01-21 RX ORDER — CYCLOSPORINE 0.5 MG/ML
1 EMULSION OPHTHALMIC 2 TIMES DAILY
Qty: 60 EACH | Refills: 11 | Status: SHIPPED | OUTPATIENT
Start: 2025-01-21 | End: 2025-01-22

## 2025-01-21 RX ORDER — CARBOXYMETHYLCELLULOSE SODIUM 5 MG/ML
1 SOLUTION/ DROPS OPHTHALMIC 3 TIMES DAILY PRN
Qty: 15 ML | Refills: 11 | Status: SHIPPED | OUTPATIENT
Start: 2025-01-21

## 2025-01-21 ASSESSMENT — CONF VISUAL FIELD
OD_INFERIOR_TEMPORAL_RESTRICTION: 0
METHOD: COUNTING FINGERS
OS_NORMAL: 1
OD_SUPERIOR_NASAL_RESTRICTION: 0
OD_SUPERIOR_TEMPORAL_RESTRICTION: 0
OS_INFERIOR_TEMPORAL_RESTRICTION: 0
OS_SUPERIOR_NASAL_RESTRICTION: 0
OD_INFERIOR_NASAL_RESTRICTION: 0
OS_INFERIOR_NASAL_RESTRICTION: 0
OD_NORMAL: 1
OS_SUPERIOR_TEMPORAL_RESTRICTION: 0

## 2025-01-21 ASSESSMENT — REFRACTION_MANIFEST
OD_CYLINDER: +1.00
OD_AXIS: 030
OS_CYLINDER: +0.50
OD_SPHERE: -3.25
OS_AXIS: 155
OS_SPHERE: -3.50

## 2025-01-21 ASSESSMENT — REFRACTION_WEARINGRX
SPECS_TYPE: PAL
OS_SPHERE: -3.00
OS_CYLINDER: +1.75
OD_AXIS: 002
OD_ADD: +2.75
OD_CYLINDER: +0.75
OS_ADD: +2.75
OD_SPHERE: -3.00
OS_AXIS: 003

## 2025-01-21 ASSESSMENT — VISUAL ACUITY
METHOD_MR: DIAGNOSTIC MR
OD_CC+: +1
OD_PH_CC: 20/20
METHOD: SNELLEN - LINEAR
OS_CC+: -1
OD_PH_CC+: -2
OD_CC: 20/40
OS_CC: 20/30

## 2025-01-21 ASSESSMENT — TONOMETRY
OD_IOP_MMHG: 17
OS_IOP_MMHG: 15
IOP_METHOD: TONOPEN

## 2025-01-21 NOTE — TELEPHONE ENCOUNTER
Teams message sent to Lon RN requesting please review message from provider. Awaiting RN response. Will postpone message until tomorrow 1/22/25.

## 2025-01-21 NOTE — PROGRESS NOTES
"HPI    Patient is here today referred by Dr. Murphy to r/o GVHD since having BMT surgery on August 23rd, 2024. Patient has been experiencing \"grainy and a little itchy\" feeling upon waking. At night it's almost like a foreign body sensation. His symptoms are more in the left eye than the right eye. He has tried using a warm compress at night and in the morning, which does seem to help as well as massaging around the eyes. About a month ago patient's wife mentions that his right eye was \"puffy\", this seems to be mostly resolved. Patient denies both floaters and flashing lights. No other ocular pain or discomfort.  Patient has already had a complete eye exam with Dr. Nicholson with [facility] in [place] about a month ago. He has been diagnosed with cataract and told there is a need to have the cataract surgery but not for at least 9 months to a year  following the transplant. It was suggested he not update his glasses at this time if he is comfortable with what he currently has.   Currently patient is struggling with some folliculitis (chest/head-resolving), blood clot in the right juggler vein (due to pick-line), and lymphedema (left forearm)  Ocular Medications: Carboxymethylcellulose 0.5% 2-3 times per day both eyes   Last edited by Bobbi Rodriguez on 1/21/2025  9:45 AM.         Review of systems for the eyes was negative other than the pertinent positives/negatives listed in the HPI.      Assessment & Plan    HPI:  Leland Pearson is a 69 year old male with history of MDS s/p BMT 8/2/2024, folliculitis, BPH, HTN, GERD, myopia with astigmatism and presbyopia presents for Graft versus host disease (GVHD) evaluation. Has been using at 1-2x/day and warm compress. Notes Foreign body sensation. Denies flashes or floaters. Had cee with Dr. Nicholson 'OD at [facility] in [place] and consider cataract surgery. Per oncology, would prefer to wait at least 1 year post BMT.         POHx: myopia with astigmatism and " presbyopia  PMHx:MDS s/p BMT 8/2/2024, folliculitis, BPH, HTN, GERD  Current Medications:   Current Outpatient Medications   Medication Sig Dispense Refill    acyclovir (ZOVIRAX) 800 MG tablet Take 1 tablet (800 mg) by mouth every 12 hours. 90 tablet 3    allopurinol (ZYLOPRIM) 300 MG tablet Take 1 tablet (300 mg) by mouth daily. 90 tablet 1    calcium carbonate-vitamin D (OSCAL) 500-5 MG-MCG tablet Take 2 tablets by mouth daily. 60 tablet 2    carboxymethylcellulose (REFRESH PLUS) 0.5 % SOLN ophthalmic solution Place 1 drop into both eyes 3 times daily as needed for dry eyes. 15 mL 11    clindamycin (CLEOCIN T) 1 % external lotion Apply to chest and back BID x 3 weeks 60 mL 3    clindamycin (CLEOCIN-T) 1 % external gel Apply topically 2 times daily. 60 g 0    cycloSPORINE (RESTASIS) 0.05 % ophthalmic emulsion Place 1 drop into both eyes 2 times daily. 60 each 11    doxycycline monohydrate (MONODOX) 100 MG capsule 1 cap PO BID with food and full glass of water 42 capsule 0    lisinopril (ZESTRIL) 5 MG tablet Take 1 tablet (5 mg) by mouth daily. Hold dose if blood pressure is less than 110/60      methocarbamol (ROBAXIN) 500 MG tablet Take 500 mg by mouth as needed for muscle spasms.      pantoprazole (PROTONIX) 40 MG EC tablet TAKE 1 TABLET BY MOUTH EVERY DAY 90 tablet 1    rivaroxaban ANTICOAGULANT (XARELTO) 10 MG TABS tablet Take 1 tablet (10 mg) by mouth daily (with dinner). 90 tablet 0    sulfamethoxazole-trimethoprim (BACTRIM DS) 800-160 MG tablet Take 1 tablet by mouth Every Mon, Tues two times daily. 48 tablet 1    tamsulosin (FLOMAX) 0.4 MG capsule Take 0.4 mg by mouth daily.      chlorhexidine (PERIDEX) 0.12 % solution Swish and spit 5-10 mLs in mouth 2 times daily. (Patient not taking: Reported on 1/21/2025) 118 mL 0    hydrocortisone 2.5 % cream Apply topically daily. (Patient not taking: Reported on 1/21/2025) 30 g 0    triamcinolone (KENALOG) 0.1 % external cream Apply topically 2 times daily. (Patient  not taking: Reported on 1/21/2025) 60 g 1     No current facility-administered medications for this visit.     FHx: cataract-mom, brother  PSHx: denies history of ocular surgeries       Current Eye Medications:  Warm compress twice a day     Assessment & Plan:  (H52.13,  H52.203,  H52.4) Myopia of both eyes with astigmatism and presbyopia  (primary encounter diagnosis)  Hold pending Cataract extraction/intraocular lens     (H25.813) Combined forms of age-related cataract of both eyes  Moderate cataracts are present and may account for some of the patient's visual complaints. Reasonable to wait until 1 year post BMT. The patient will monitor for vision changes and contact us with any decrease in vision. Recheck next visit     (Z94.81) Status post bone marrow transplant (H)  (H04.123) Dry eye syndrome of both eyes  Start AT four times a day, if using > four times a day , then change to preservative free   Start cycloSPORINE (RESTASIS) 0.05 % ophthalmic         emulsion   Continue warm compress daily       Return in about 2 months (around 3/21/2025) for v/t/d, IOL calcs, oct mac.        Bal Valdovinos MD     Attending Physician Attestation:  Complete documentation of historical and exam elements from today's encounter can be found in the full encounter summary report (not reduplicated in this progress note).  I personally obtained the chief complaint(s) and history of present illness.  I confirmed and edited as necessary the review of systems, past medical/surgical history, family history, social history, and examination findings as documented by others; and I examined the patient myself.  I personally reviewed the relevant tests, images, and reports as documented above.  I formulated and edited as necessary the assessment and plan and discussed the findings and management plan with the patient and family. - Bal Valdovinos MD

## 2025-01-22 DIAGNOSIS — H04.123 DRY EYE SYNDROME OF BOTH EYES: ICD-10-CM

## 2025-01-22 DIAGNOSIS — Z94.81 STATUS POST BONE MARROW TRANSPLANT (H): ICD-10-CM

## 2025-01-22 RX ORDER — CYCLOSPORINE 0.5 MG/ML
1 EMULSION OPHTHALMIC 2 TIMES DAILY
Qty: 60 ML | Refills: 11 | Status: SHIPPED | OUTPATIENT
Start: 2025-01-22 | End: 2025-01-23

## 2025-01-23 ENCOUNTER — TELEPHONE (OUTPATIENT)
Dept: OPHTHALMOLOGY | Facility: CLINIC | Age: 70
End: 2025-01-23
Payer: COMMERCIAL

## 2025-01-23 DIAGNOSIS — H04.123 DRY EYE SYNDROME OF BOTH EYES: Primary | ICD-10-CM

## 2025-01-23 RX ORDER — CYCLOSPORINE 0.5 MG/ML
1 EMULSION OPHTHALMIC 2 TIMES DAILY
Qty: 60 ML | Refills: 11 | Status: SHIPPED | OUTPATIENT
Start: 2025-01-23

## 2025-01-23 NOTE — TELEPHONE ENCOUNTER
[facility] Prior Authorization Team   Phone: [phone]    PRIOR AUTHORIZATION DENIED    Medication: CYCLOSPORINE 0.05 % OP EMUL  Insurance Company: [employer] [place] - Phone [phone] Fax [phone]  Denial Date: 1/23/2025  Denial Reason(s): MUST TRY/FAIL RESTASIS      Appeal Information: IF THE PROVIDER WOULD LIKE TO APPEAL THIS DECISION PLEASE PROVIDE THE PA TEAM WITH A LETTER OF MEDICAL NECESSITY      Patient Notified: NO

## 2025-01-29 ENCOUNTER — ONCOLOGY VISIT (OUTPATIENT)
Dept: TRANSPLANT | Facility: CLINIC | Age: 70
End: 2025-01-29
Attending: STUDENT IN AN ORGANIZED HEALTH CARE EDUCATION/TRAINING PROGRAM
Payer: COMMERCIAL

## 2025-01-29 ENCOUNTER — APPOINTMENT (OUTPATIENT)
Dept: LAB | Facility: CLINIC | Age: 70
End: 2025-01-29
Attending: STUDENT IN AN ORGANIZED HEALTH CARE EDUCATION/TRAINING PROGRAM
Payer: COMMERCIAL

## 2025-01-29 VITALS
RESPIRATION RATE: 18 BRPM | SYSTOLIC BLOOD PRESSURE: 139 MMHG | DIASTOLIC BLOOD PRESSURE: 83 MMHG | HEART RATE: 78 BPM | TEMPERATURE: 97.7 F | OXYGEN SATURATION: 97 % | WEIGHT: 200.4 LBS | BODY MASS INDEX: 26.85 KG/M2

## 2025-01-29 DIAGNOSIS — D84.822 IMMUNOCOMPROMISED STATE ASSOCIATED WITH STEM CELL TRANSPLANT (H): ICD-10-CM

## 2025-01-29 DIAGNOSIS — Z94.81 STATUS POST BONE MARROW TRANSPLANT (H): Primary | ICD-10-CM

## 2025-01-29 DIAGNOSIS — Z94.84 IMMUNOCOMPROMISED STATE ASSOCIATED WITH STEM CELL TRANSPLANT (H): ICD-10-CM

## 2025-01-29 LAB
ALBUMIN SERPL BCG-MCNC: 4.3 G/DL (ref 3.5–5.2)
ALP SERPL-CCNC: 81 U/L (ref 40–150)
ALT SERPL W P-5'-P-CCNC: 17 U/L (ref 0–70)
ANION GAP SERPL CALCULATED.3IONS-SCNC: 9 MMOL/L (ref 7–15)
AST SERPL W P-5'-P-CCNC: 25 U/L (ref 0–45)
BASOPHILS # BLD AUTO: 0 10E3/UL (ref 0–0.2)
BASOPHILS NFR BLD AUTO: 1 %
BILIRUB SERPL-MCNC: 0.5 MG/DL
BUN SERPL-MCNC: 24.6 MG/DL (ref 8–23)
CALCIUM SERPL-MCNC: 9.6 MG/DL (ref 8.8–10.4)
CHLORIDE SERPL-SCNC: 100 MMOL/L (ref 98–107)
CREAT SERPL-MCNC: 1.2 MG/DL (ref 0.67–1.17)
EGFRCR SERPLBLD CKD-EPI 2021: 65 ML/MIN/1.73M2
EOSINOPHIL # BLD AUTO: 0.1 10E3/UL (ref 0–0.7)
EOSINOPHIL NFR BLD AUTO: 2 %
ERYTHROCYTE [DISTWIDTH] IN BLOOD BY AUTOMATED COUNT: 16.8 % (ref 10–15)
GLUCOSE SERPL-MCNC: 101 MG/DL (ref 70–99)
HCO3 SERPL-SCNC: 28 MMOL/L (ref 22–29)
HCT VFR BLD AUTO: 40 % (ref 40–53)
HGB BLD-MCNC: 13.5 G/DL (ref 13.3–17.7)
IMM GRANULOCYTES # BLD: 0 10E3/UL
IMM GRANULOCYTES NFR BLD: 0 %
LYMPHOCYTES # BLD AUTO: 1.5 10E3/UL (ref 0.8–5.3)
LYMPHOCYTES NFR BLD AUTO: 22 %
MCH RBC QN AUTO: 30.7 PG (ref 26.5–33)
MCHC RBC AUTO-ENTMCNC: 33.8 G/DL (ref 31.5–36.5)
MCV RBC AUTO: 91 FL (ref 78–100)
MONOCYTES # BLD AUTO: 0.7 10E3/UL (ref 0–1.3)
MONOCYTES NFR BLD AUTO: 11 %
NEUTROPHILS # BLD AUTO: 4.4 10E3/UL (ref 1.6–8.3)
NEUTROPHILS NFR BLD AUTO: 65 %
NRBC # BLD AUTO: 0 10E3/UL
NRBC BLD AUTO-RTO: 0 /100
PLATELET # BLD AUTO: 179 10E3/UL (ref 150–450)
POTASSIUM SERPL-SCNC: 4.7 MMOL/L (ref 3.4–5.3)
PROT SERPL-MCNC: 6.9 G/DL (ref 6.4–8.3)
RBC # BLD AUTO: 4.4 10E6/UL (ref 4.4–5.9)
SODIUM SERPL-SCNC: 137 MMOL/L (ref 135–145)
WBC # BLD AUTO: 6.8 10E3/UL (ref 4–11)

## 2025-01-29 PROCEDURE — G0463 HOSPITAL OUTPT CLINIC VISIT: HCPCS | Performed by: STUDENT IN AN ORGANIZED HEALTH CARE EDUCATION/TRAINING PROGRAM

## 2025-01-29 PROCEDURE — 85025 COMPLETE CBC W/AUTO DIFF WBC: CPT | Performed by: STUDENT IN AN ORGANIZED HEALTH CARE EDUCATION/TRAINING PROGRAM

## 2025-01-29 PROCEDURE — 82784 ASSAY IGA/IGD/IGG/IGM EACH: CPT | Performed by: STUDENT IN AN ORGANIZED HEALTH CARE EDUCATION/TRAINING PROGRAM

## 2025-01-29 PROCEDURE — 36415 COLL VENOUS BLD VENIPUNCTURE: CPT | Performed by: STUDENT IN AN ORGANIZED HEALTH CARE EDUCATION/TRAINING PROGRAM

## 2025-01-29 PROCEDURE — 99215 OFFICE O/P EST HI 40 MIN: CPT | Performed by: STUDENT IN AN ORGANIZED HEALTH CARE EDUCATION/TRAINING PROGRAM

## 2025-01-29 PROCEDURE — 80053 COMPREHEN METABOLIC PANEL: CPT | Performed by: STUDENT IN AN ORGANIZED HEALTH CARE EDUCATION/TRAINING PROGRAM

## 2025-01-29 PROCEDURE — 87799 DETECT AGENT NOS DNA QUANT: CPT | Performed by: STUDENT IN AN ORGANIZED HEALTH CARE EDUCATION/TRAINING PROGRAM

## 2025-01-29 RX ORDER — LEVOFLOXACIN 250 MG/1
250 TABLET, FILM COATED ORAL DAILY PRN
Qty: 30 TABLET | Refills: 0 | Status: SHIPPED | OUTPATIENT
Start: 2025-01-29

## 2025-01-29 ASSESSMENT — PAIN SCALES - GENERAL: PAINLEVEL_OUTOF10: NO PAIN (0)

## 2025-01-29 NOTE — Clinical Note
1/29/2025      Leland Pearson  [address] [place] [address]      Dear Colleague,    Thank you for referring your patient, Leland Pearson, to the [facility] BLOOD AND MARROW TRANSPLANT PROGRAM [place]. Please see a copy of my visit note below.    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     Diagnosis MDS-IB2 BMT type Allogenic  CMV  Donor -  Recipient -    Prep GANGA (Flu/Cy/TBI) Donor type  8/8, URD ABO D/R O+    GVHD ppx PTCy, siro, MMF Graft source PBSCT Toxo IgG Negative   Protocol [id number] CD34/kg 6.06E+06    BMT MD Brenda Murphy     Pre-transplant disease history  Referring provider: Dr. Delcid    He started having drenching night sweats and fatigue in Jan 2024. Intentional weight loss. He was found to have thrombocytopenia on routine CBC done prior to back surgery (was planned for laminectomy and fusion). On 4/1/24, WBC 6.4, Hb 13.5, Plts 64, ANC 1.89. LDH elevated 532. He underwent bone marrow biopsy (4/23/24) which showed MDS-EB2. Hypercellular marrow (%) with 10.8% blasts including rare circulating blasts. Flow showed 4% myeloid blasts. Normal karyotype. NGS (peripheral blood) was positive for ASXL1, IDH1, RUNX1, SRSF2 mutations (full report not available). Counts: WBC 6.7, Hb 13.1, Plts 70, ANC 0.7. IPSS-M = High risk (0.85).  He was treated with azacitidine 75mg/m2 for 7 days and venetoclax for 14 days (C1 on 5/20/24). Bone marrow after first cycle (6/13/24) showed persistent MDS, with hypercellular marrow with therapy effect, no increase in blasts. Normal karyotype. NGS: ASXL1 (38%), IDH1 (43%), RUNX1 (41%), SRSF1 (38%).   He received second cycle of aza/ angelia on 7/1/24. His pre-transplant BMBx was done on 8/2/24 and he was cytopenic at the time (CBC with WBC 0.6, Hb 14, platelet  count 24). Hypocellular marrow (5%) with no increase in blasts. Normal karyotype, NGS negative.     Post transplant   - 9/2/24 (around D10) Neutropenic fevers. Strept mitis bacteremia resistant to levaquin from central venous catheter as well as a single culture of MRSE from peripheral blood (contaminant). Treated with cefepime. Repeat blood cultures negative, but continued to have high fevers. TTE (9/6/24) did not show any vegetations. CVC was removed on 9/6/24, defervesced on 9/7/24. PICC placed on 9/9/24. Antibiotics (cefepime followed by augmentin) were continued for a total of 14days from line removal.   - Right lower neck swelling and tenderness near the previous CVC site. US (9/15/24) showed an occlusive right internal jugular thrombus and a non occlusive thrombus in the right axillary vein, PICC associated. He was initially not anticoagulated due to thrombocytopenia but had increased symptoms. Repeat US (9/17/24) showed extension of right internal jugular thrombus into the innominate vein and extension of right axillary thrombus into subclavian vein. He was started on therapeutic anticoagulation with lovenox 1mg/kg BID on 9/18/24 with transfusion support for platelets, with plan to continue for 3 months. Right PICC removal was considered given propagation of clot near the PICC line. However, IR (9/19/24) recommended against removal of R PICC and replacement in the left arm due to risk of contralateral DVT.   - Hemorrhagic cystitis: Dysuria, urgency and hematuria starting  9/11/2024. Infectious workup including adenovirus and BK virus negative. Urology was consulted, recommended outpatient follow up. Now resolved.        INTERVAL HISTORY     Leland presents for a scheduled BMT visit. He has been doing very well.     Has been seen by ophthalmology and diagnosed with dry eye. Has been started on restasis in addition to PF AT.         Has been exercising 5-7 days per week.   Saw lymphedema specialist for left  upper extremity swelling last week and has a follow up. He feels that the left arm swelling is better previously, but is still noticeable  Taste is almost normal now. No nausea, vomiting. Bowel movements are formed, twice daily.  Was seen by dermatology and diagnosed with folliculitis. Has been started on doxycycline. He has a follow up, and if no improvement, they plan on doing a skin culture. No concern for GVHD.    Complains of eyes being puffy, with crusting in the morning and dry/ tired in the evening. Has been using tear drops once daily without much help. Never had dry eyes prior to transplant. He saw his optometrist recently and was told that he needed to get cataract surgery.     BP is slightly elevated in clinic today but record from home shows BP in 110-120s systolic    Plan  - No evidence of GVHD. Doing well overall.   - Has previously discussed maintenance chemo with inqovi and they were interested in proceeding with it. But will hold off until the folliculitis on chest is completely resolved - still looks very red and active right now.   - Refer to ophthalmology to r/o eye GVHD. Use tear drops three times daily.   - Cataract surgery 6 months post transplant   - RTC in 1 month       PMH  Back pain due to spinal stenosis, lumbosacral radiculopathy.   Gout, most recent flare was in April 2024  GERD  HTN  BPH, on tamsulosin   Right knee ACL repair in 2014  Low back surgery in 2004 for herniated disc, complicated by infection requiring prolonged IV abx   Coronary artery calcifications seen on CT, stress test in 2023 negative     SH  . Lives with his wife, Vani. Two daughters, 43 and 36. 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 Was still working part time till last summer at [employer], 2-3 hours per day.     Current Outpatient Medications   Medication Sig Dispense Refill    acyclovir (ZOVIRAX) 800 MG tablet Take 1 tablet (800 mg) by mouth every 12 hours. 90 tablet 3    allopurinol  (ZYLOPRIM) 300 MG tablet Take 1 tablet (300 mg) by mouth daily. 90 tablet 1    calcium carbonate-vitamin D (OSCAL) 500-5 MG-MCG tablet Take 2 tablets by mouth daily. 60 tablet 2    carboxymethylcellulose (REFRESH PLUS) 0.5 % SOLN ophthalmic solution Place 1 drop into both eyes 3 times daily as needed for dry eyes. 15 mL 11    chlorhexidine (PERIDEX) 0.12 % solution Swish and spit 5-10 mLs in mouth 2 times daily. (Patient not taking: Reported on 1/21/2025) 118 mL 0    clindamycin (CLEOCIN T) 1 % external lotion Apply to chest and back BID x 3 weeks 60 mL 3    clindamycin (CLEOCIN-T) 1 % external gel Apply topically 2 times daily. 60 g 0    cycloSPORINE (RESTASIS) 0.05 % ophthalmic emulsion INSTILL 1 DROP INTO BOTH EYES TWICE A DAY 60 mL 11    doxycycline monohydrate (MONODOX) 100 MG capsule 1 cap PO BID with food and full glass of water 42 capsule 0    hydrocortisone 2.5 % cream Apply topically daily. (Patient not taking: Reported on 1/21/2025) 30 g 0    lisinopril (ZESTRIL) 5 MG tablet Take 1 tablet (5 mg) by mouth daily. Hold dose if blood pressure is less than 110/60      methocarbamol (ROBAXIN) 500 MG tablet Take 500 mg by mouth as needed for muscle spasms.      pantoprazole (PROTONIX) 40 MG EC tablet TAKE 1 TABLET BY MOUTH EVERY DAY 90 tablet 1    rivaroxaban ANTICOAGULANT (XARELTO) 10 MG TABS tablet Take 1 tablet (10 mg) by mouth daily (with dinner). 90 tablet 0    sulfamethoxazole-trimethoprim (BACTRIM DS) 800-160 MG tablet Take 1 tablet by mouth Every Mon, Tues two times daily. 48 tablet 1    tamsulosin (FLOMAX) 0.4 MG capsule Take 0.4 mg by mouth daily.      triamcinolone (KENALOG) 0.1 % external cream Apply topically 2 times daily. (Patient not taking: Reported on 1/21/2025) 60 g 1        EXAM      There were no vitals taken for this visit.    Wt Readings from Last 4 Encounters:   01/03/25 90.5 kg (199 lb 9.6 oz)   12/17/24 89.8 kg (198 lb)   12/06/24 88.8 kg (195 lb 12.8 oz)   12/02/24 89.4 kg (197 lb 1.6  oz)       KPS: 80% Can perform normal activity with effort, some signs of disease    General: Alert, awake  Eyes: Conjunctiva normal  Mouth and Throat: No mucositis   Abd:  Non tender, non distended   Lymph: Left LE edema, LUE edema (forearm to hand)   Skin: Acneform rash on chest and upper back  Access: None          LABS       Recent Labs   Lab Test 01/03/25  1340 12/02/24  0756 11/29/24  1335 09/18/24  0430 09/17/24  0424 09/16/24  0432 09/13/24  0322 09/12/24  0321   WBC 7.7 4.5 3.8*   < > 1.3* 1.5*   < > 2.9*   HGB 13.3 12.1* 12.5*   < > 7.4* 8.0*   < > 8.4*    143* 162   < > 22* 24*   < > 27*   NEUTROPHIL 66 73 67   < > 77 84   < > 89   ANEU  --   --   --   --  1.0* 1.3*  --  2.6   LYMPH 17 8 13   < > 1 1   < > 1   ALYM  --   --   --   --  0.0* 0.0*  --  0.0*    < > = values in this interval not displayed.       Recent Labs   Lab Test 01/03/25  1340 12/02/24  0756 11/29/24  1335    140 140   POTASSIUM 4.6 3.8 4.3   CHLORIDE 102 104 105   CO2 27 27 27   BUN 21.2 12.3 15.1   CR 1.07 0.80 0.83   GLC 98 105* 112*        Recent Labs   Lab Test 01/03/25  1340 12/02/24  0756 11/29/24  1335 10/18/24  1216 10/15/24  1220 10/03/24  1543 09/30/24  0900 09/27/24  0927 09/23/24  0927 09/19/24  0323 09/18/24  0430 09/17/24  0424   HERBERT 9.5 9.3 9.2   < > 9.1   < > 9.0   < > 9.3   < > 8.7* 9.0   MAG  --   --   --   --  1.9  --  1.9  --   --   --  2.0 2.0   PHOS  --   --   --   --   --   --   --   --  2.5  --  3.0 2.9    < > = values in this interval not displayed.        Recent Labs   Lab Test 01/03/25  1340 12/02/24  0756 11/29/24  1335   ALKPHOS 89 92 110   AST 24 34 37   ALT 18 31 31   BILITOTAL 0.4 0.2 0.2   ALBUMIN 4.0 3.9 4.0       Recent Labs   Lab Test 01/03/25  1340 11/29/24  1335 11/22/24  1300   CMVQNT Not Detected Not Detected Not Detected       Recent Labs   Lab Test 11/29/24  1335 09/27/24  0927   * 708       Recent Labs   Lab Test 08/01/24  0805 05/08/24  1156   ZARIA 460* 1,360*          ASSESSMENT AND PLAN     BMT: D159   - Chemo protocol: MT 2022-5; Flu/Cy/TBI  - Peripheral blood stem cell graft from 8/8 URD donor, ABO matched, Cell dose: 6.06 x10^6 CD34/kg    Disease status:   9/13/24 (D28): Bmbx shows hypocellular marrow, no dysplasia or blasts.  NGS negative  12/2/24 (D100): Bmbx shows hypocellular marrow in CR. NGS negative.     Graft status/chimerism:   9/13/24 (D28): Bone marrow 100%. Peripheral blood CD3 92%, CD33 100%  10/25/24 (D60): Peripheral blood CD3 and CD33 100%  12/2/24 (D100): Bone marrow 100%, Peripheral blood CD3 and CD33 100%    Maintenance   - Discussed the current evidence on maintenance chemotherapy. He is agreeable to proceed, will plan inqovi as long as there is no GVHD or infection     GVHD  # Current systemic immunosuppression is none.   - Sirolimus stopped at D100 without taper on 11/29/24     # GVHD: None till date    Heme/ Coag  # Right internal jugular occlusive thrombus and right axillary/ subclavain vein non occlusive thrombus, line associated, diagnosed on 9/15/24. Line was removed on 10/18/24.  - Follows with Dr. Ochoa, last seen on 12/17/24. Has completed 3 months of anticoagulation but would benefit from prolonged anticoagulation.   - Continue rivaroxaban 10mg daily, duration at least a total of 6 months but to be determined on heme follow up.     MSK  # LUE swelling   - US x2 (11/25) negative for DVT, unclear cause. Has seen lymphedema specialist.     ID  # Prophylaxis  PJP/ Toxo IgG negative: Bactrim   Viral: Acyclovir 800 mg bid  Bacterial/ fungal: None    # Monitoring  CMV: Monitor weekly till D100. 1/3 - negative  EBV: Monitor every 2 weeks between D30 to D180. 1/3 neg  IgG:  IgG was 549 mg/dL on 11/29/24. Check serum IgG level q3 months.     GI  GERD: Pantoprazole daily   VOD prophylaxis: Completed     CARDIOVASCULAR  # HTN: Lisinopril 5mg (was on 15mg daily prior to transplant)    # CAD: Coronary artery calcifications seen on CT, stress test in 2023  negative  - Risk of cardiomyopathy: Baseline EF 55-60%.   - Risk of arrhythmia: Baseline EKG showed 421       RENAL/ELECTROLYTES/  - BPH: Continue Flomax.  - Hemorrhagic Cystitis secondary to cytoxan: Resolved. Oxybutynin 10mg at bedtime PRN. Will need follow up with urology at some point.       MUSCULOSKELETAL  # Gout: continue allopurinol   # History of spinal stenosis, lumbosacral radiculopathy:   - Pain management: Okay to take tylenol or robaxin RPN     Post-transplant vaccinations  Has received flu, COVID and RSV vaccine.     I spent 45 minutes in the care of this patient today, which included time necessary for preparation for the visit, obtaining history, ordering medications/tests/procedures as medically indicated, review of pertinent medical literature, counseling of the patient, communication of recommendations to the care team, and documentation time.    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     Diagnosis MDS-IB2 BMT type Allogenic  CMV  Donor -  Recipient -    Prep GANGA (Flu/Cy/TBI) Donor type  8/8, URD ABO D/R O+    GVHD ppx PTCy, siro, MMF Graft source PBSCT Toxo IgG Negative   Protocol [id number] CD34/kg 6.06E+06    BMT MD Brenda Murphy     Pre-transplant disease history  Referring provider: Dr. Delcid    He started having drenching night sweats and fatigue in Jan 2024. Intentional weight loss. He was found to have thrombocytopenia on routine CBC done prior to back surgery (was planned for laminectomy and fusion). On 4/1/24, WBC 6.4, Hb 13.5, Plts 64, ANC 1.89. LDH elevated 532. He underwent bone marrow biopsy (4/23/24) which showed MDS-EB2. Hypercellular marrow (%) with 10.8% blasts including rare circulating  blasts. Flow showed 4% myeloid blasts. Normal karyotype. NGS (peripheral blood) was positive for ASXL1, IDH1, RUNX1, SRSF2 mutations (full report not available). Counts: WBC 6.7, Hb 13.1, Plts 70, ANC 0.7. IPSS-M = High risk (0.85).  He was treated with azacitidine 75mg/m2 for 7 days and venetoclax for 14 days (C1 on 5/20/24). Bone marrow after first cycle (6/13/24) showed persistent MDS, with hypercellular marrow with therapy effect, no increase in blasts. Normal karyotype. NGS: ASXL1 (38%), IDH1 (43%), RUNX1 (41%), SRSF1 (38%).   He received second cycle of aza/ angelia on 7/1/24. His pre-transplant BMBx was done on 8/2/24 and he was cytopenic at the time (CBC with WBC 0.6, Hb 14, platelet count 24). Hypocellular marrow (5%) with no increase in blasts. Normal karyotype, NGS negative.     Post transplant   - 9/2/24 (around D10) Neutropenic fevers. Strept mitis bacteremia resistant to levaquin from central venous catheter as well as a single culture of MRSE from peripheral blood (contaminant). Treated with cefepime. Repeat blood cultures negative, but continued to have high fevers. TTE (9/6/24) did not show any vegetations. CVC was removed on 9/6/24, defervesced on 9/7/24. PICC placed on 9/9/24. Antibiotics (cefepime followed by augmentin) were continued for a total of 14days from line removal.   - Right lower neck swelling and tenderness near the previous CVC site. US (9/15/24) showed an occlusive right internal jugular thrombus and a non occlusive thrombus in the right axillary vein, PICC associated. He was initially not anticoagulated due to thrombocytopenia but had increased symptoms. Repeat US (9/17/24) showed extension of right internal jugular thrombus into the innominate vein and extension of right axillary thrombus into subclavian vein. He was started on therapeutic anticoagulation with lovenox 1mg/kg BID on 9/18/24 with transfusion support for platelets, with plan to continue for 3 months. Right PICC removal  was considered given propagation of clot near the PICC line. However, IR (9/19/24) recommended against removal of R PICC and replacement in the left arm due to risk of contralateral DVT.   - Hemorrhagic cystitis: Dysuria, urgency and hematuria starting  9/11/2024. Infectious workup including adenovirus and BK virus negative. Urology was consulted, recommended outpatient follow up. Now resolved.        INTERVAL HISTORY     Leland presents for a scheduled BMT visit. He has been doing very well.     Has been seen by ophthalmology and diagnosed with dry eye. Has been started on restasis in addition to PF AT.     Skin rash - no active pustules but still has red marks.     Lymphedema left arm has resolved.     Pills get stuck, but ahs been long standing issues    Creat is elevated. Drinks at least 45-50oz per day.     Plan  - start inqovi   - CBC and CMP weekly at  weekly [facility]           Has been exercising 5-7 days per week.   Saw lymphedema specialist for left upper extremity swelling last week and has a follow up. He feels that the left arm swelling is better previously, but is still noticeable  Taste is almost normal now. No nausea, vomiting. Bowel movements are formed, twice daily.  Was seen by dermatology and diagnosed with folliculitis. Has been started on doxycycline. He has a follow up, and if no improvement, they plan on doing a skin culture. No concern for GVHD.    Complains of eyes being puffy, with crusting in the morning and dry/ tired in the evening. Has been using tear drops once daily without much help. Never had dry eyes prior to transplant. He saw his optometrist recently and was told that he needed to get cataract surgery.     BP is slightly elevated in clinic today but record from home shows BP in 110-120s systolic    Plan  - No evidence of GVHD. Doing well overall.   - Has previously discussed maintenance chemo with inqovi and they were interested in proceeding with it. But will hold off until  the folliculitis on chest is completely resolved - still looks very red and active right now.   - Refer to ophthalmology to r/o eye GVHD. Use tear drops three times daily.   - Cataract surgery 6 months post transplant   - RTC in 1 month       PMH  Back pain due to spinal stenosis, lumbosacral radiculopathy.   Gout, most recent flare was in April 2024  GERD  HTN  BPH, on tamsulosin   Right knee ACL repair in 2014  Low back surgery in 2004 for herniated disc, complicated by infection requiring prolonged IV abx   Coronary artery calcifications seen on CT, stress test in 2023 negative     SH  . Lives with his wife, Vani. Two daughters, 43 and 36. 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     Current Outpatient Medications   Medication Sig Dispense Refill     acyclovir (ZOVIRAX) 800 MG tablet Take 1 tablet (800 mg) by mouth every 12 hours. 90 tablet 3     allopurinol (ZYLOPRIM) 300 MG tablet Take 1 tablet (300 mg) by mouth daily. 90 tablet 1     calcium carbonate-vitamin D (OSCAL) 500-5 MG-MCG tablet Take 2 tablets by mouth daily. 60 tablet 2     carboxymethylcellulose (REFRESH PLUS) 0.5 % SOLN ophthalmic solution Place 1 drop into both eyes 3 times daily as needed for dry eyes. 15 mL 11     chlorhexidine (PERIDEX) 0.12 % solution Swish and spit 5-10 mLs in mouth 2 times daily. (Patient not taking: Reported on 1/21/2025) 118 mL 0     clindamycin (CLEOCIN T) 1 % external lotion Apply to chest and back BID x 3 weeks 60 mL 3     clindamycin (CLEOCIN-T) 1 % external gel Apply topically 2 times daily. 60 g 0     cycloSPORINE (RESTASIS) 0.05 % ophthalmic emulsion INSTILL 1 DROP INTO BOTH EYES TWICE A DAY 60 mL 11     doxycycline monohydrate (MONODOX) 100 MG capsule 1 cap PO BID with food and full glass of water 42 capsule 0     hydrocortisone 2.5 % cream Apply topically daily. (Patient not taking: Reported on 1/21/2025) 30 g 0      lisinopril (ZESTRIL) 5 MG tablet Take 1 tablet (5 mg) by mouth daily. Hold dose if blood pressure is less than 110/60       methocarbamol (ROBAXIN) 500 MG tablet Take 500 mg by mouth as needed for muscle spasms.       pantoprazole (PROTONIX) 40 MG EC tablet TAKE 1 TABLET BY MOUTH EVERY DAY 90 tablet 1     rivaroxaban ANTICOAGULANT (XARELTO) 10 MG TABS tablet Take 1 tablet (10 mg) by mouth daily (with dinner). 90 tablet 0     sulfamethoxazole-trimethoprim (BACTRIM DS) 800-160 MG tablet Take 1 tablet by mouth Every Mon, Tues two times daily. 48 tablet 1     tamsulosin (FLOMAX) 0.4 MG capsule Take 0.4 mg by mouth daily.       triamcinolone (KENALOG) 0.1 % external cream Apply topically 2 times daily. (Patient not taking: Reported on 1/21/2025) 60 g 1        EXAM      /83 (BP Location: Right arm, Patient Position: Sitting, Cuff Size: Adult Regular)   Pulse 78   Temp 97.7  F (36.5  C) (Oral)   Resp 18   Wt 90.9 kg (200 lb 6.4 oz)   SpO2 97%   BMI 26.85 kg/m      Wt Readings from Last 4 Encounters:   01/03/25 90.5 kg (199 lb 9.6 oz)   12/17/24 89.8 kg (198 lb)   12/06/24 88.8 kg (195 lb 12.8 oz)   12/02/24 89.4 kg (197 lb 1.6 oz)       KPS: 80% Can perform normal activity with effort, some signs of disease    General: Alert, awake  Eyes: Conjunctiva normal  Mouth and Throat: No mucositis   Abd:  Non tender, non distended   Lymph: Left LE edema, LUE edema (forearm to hand)   Skin: Acneform rash on chest and upper back  Access: None          LABS       Recent Labs   Lab Test 01/03/25  1340 12/02/24  0756 11/29/24  1335 09/18/24  0430 09/17/24  0424 09/16/24  0432 09/13/24  0322 09/12/24  0321   WBC 7.7 4.5 3.8*   < > 1.3* 1.5*   < > 2.9*   HGB 13.3 12.1* 12.5*   < > 7.4* 8.0*   < > 8.4*    143* 162   < > 22* 24*   < > 27*   NEUTROPHIL 66 73 67   < > 77 84   < > 89   ANEU  --   --   --   --  1.0* 1.3*  --  2.6   LYMPH 17 8 13   < > 1 1   < > 1   ALYM  --   --   --   --  0.0* 0.0*  --  0.0*    < > =  values in this interval not displayed.       Recent Labs   Lab Test 01/03/25  1340 12/02/24  0756 11/29/24  1335    140 140   POTASSIUM 4.6 3.8 4.3   CHLORIDE 102 104 105   CO2 27 27 27   BUN 21.2 12.3 15.1   CR 1.07 0.80 0.83   GLC 98 105* 112*        Recent Labs   Lab Test 01/03/25  1340 12/02/24  0756 11/29/24  1335 10/18/24  1216 10/15/24  1220 10/03/24  1543 09/30/24  0900 09/27/24  0927 09/23/24  0927 09/19/24  0323 09/18/24  0430 09/17/24  0424   HERBERT 9.5 9.3 9.2   < > 9.1   < > 9.0   < > 9.3   < > 8.7* 9.0   MAG  --   --   --   --  1.9  --  1.9  --   --   --  2.0 2.0   PHOS  --   --   --   --   --   --   --   --  2.5  --  3.0 2.9    < > = values in this interval not displayed.        Recent Labs   Lab Test 01/03/25  1340 12/02/24  0756 11/29/24  1335   ALKPHOS 89 92 110   AST 24 34 37   ALT 18 31 31   BILITOTAL 0.4 0.2 0.2   ALBUMIN 4.0 3.9 4.0       Recent Labs   Lab Test 01/03/25  1340 11/29/24  1335 11/22/24  1300   CMVQNT Not Detected Not Detected Not Detected       Recent Labs   Lab Test 11/29/24  1335 09/27/24  0927   * 708       Recent Labs   Lab Test 08/01/24  0805 05/08/24  1156   ZARIA 460* 1,360*         ASSESSMENT AND PLAN     BMT: D159   - Chemo protocol: MT 2022-5; Flu/Cy/TBI  - Peripheral blood stem cell graft from 8/8 URD donor, ABO matched, Cell dose: 6.06 x10^6 CD34/kg    Disease status:   9/13/24 (D28): Bmbx shows hypocellular marrow, no dysplasia or blasts.  NGS negative  12/2/24 (D100): Bmbx shows hypocellular marrow in CR. NGS negative.     Graft status/chimerism:   9/13/24 (D28): Bone marrow 100%. Peripheral blood CD3 92%, CD33 100%  10/25/24 (D60): Peripheral blood CD3 and CD33 100%  12/2/24 (D100): Bone marrow 100%, Peripheral blood CD3 and CD33 100%    Maintenance   - Discussed the current evidence on maintenance chemotherapy. He is agreeable to proceed, will plan inqovi as long as there is no GVHD or infection     GVHD  # Current systemic immunosuppression is none.   -  Sirolimus stopped at D100 without taper on 11/29/24     # GVHD: None till date    Heme/ Coag  # Right internal jugular occlusive thrombus and right axillary/ subclavain vein non occlusive thrombus, line associated, diagnosed on 9/15/24. Line was removed on 10/18/24.  - Follows with Dr. Ochoa, last seen on 12/17/24. Has completed 3 months of anticoagulation but would benefit from prolonged anticoagulation.   - Continue rivaroxaban 10mg daily, duration at least a total of 6 months but to be determined on heme follow up.     MSK  # LUE swelling   - US x2 (11/25) negative for DVT, unclear cause. Has seen lymphedema specialist.     ID  # Prophylaxis  PJP/ Toxo IgG negative: Bactrim   Viral: Acyclovir 800 mg bid  Bacterial/ fungal: None    # Monitoring  CMV: Monitor weekly till D100. 1/3 - negative  EBV: Monitor every 2 weeks between D30 to D180. 1/3 neg  IgG:  IgG was 549 mg/dL on 11/29/24. Check serum IgG level q3 months.     GI  GERD: Pantoprazole daily   VOD prophylaxis: Completed     CARDIOVASCULAR  # HTN: Lisinopril 5mg (was on 15mg daily prior to transplant)    # CAD: Coronary artery calcifications seen on CT, stress test in 2023 negative  - Risk of cardiomyopathy: Baseline EF 55-60%.   - Risk of arrhythmia: Baseline EKG showed 421       RENAL/ELECTROLYTES/  - BPH: Continue Flomax.  - Hemorrhagic Cystitis secondary to cytoxan: Resolved. Oxybutynin 10mg at bedtime PRN. Will need follow up with urology at some point.       MUSCULOSKELETAL  # Gout: continue allopurinol   # History of spinal stenosis, lumbosacral radiculopathy:   - Pain management: Okay to take tylenol or robaxin RPN     Post-transplant vaccinations  Has received flu, COVID and RSV vaccine.     I spent 45 minutes in the care of this patient today, which included time necessary for preparation for the visit, obtaining history, ordering medications/tests/procedures as medically indicated, review of pertinent medical literature, counseling of the  patient, communication of recommendations to the care team, and documentation time.    Brenda Murphy  Department of Hematology, Oncology and Transplantation  Text via [employer]           Again, thank you for allowing me to participate in the care of your patient.        Sincerely,        ONEIL Montero    Electronically signed

## 2025-01-29 NOTE — NURSING NOTE
Chief Complaint   Patient presents with    Blood Draw     Labs drawn via  by RN. VS taken.     Labs collected from venipuncture by RN. Vitals taken. Checked in for appointment(s).     Hermelinda Cabral RN

## 2025-01-29 NOTE — PROGRESS NOTES
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     Diagnosis MDS-IB2 BMT type Allogenic  CMV  Donor -  Recipient -    Prep GANGA (Flu/Cy/TBI) Donor type  8/8, URD ABO D/R O+    GVHD ppx PTCy, siro, MMF Graft source PBSCT Toxo IgG Negative   Protocol [id number] CD34/kg 6.06E+06    BMT MD Brenda Murphy     Pre-transplant disease history  Referring provider: Dr. Delcid    He started having drenching night sweats and fatigue in Jan 2024. Intentional weight loss. He was found to have thrombocytopenia on routine CBC done prior to back surgery (was planned for laminectomy and fusion). On 4/1/24, WBC 6.4, Hb 13.5, Plts 64, ANC 1.89. LDH elevated 532. He underwent bone marrow biopsy (4/23/24) which showed MDS-EB2. Hypercellular marrow (%) with 10.8% blasts including rare circulating blasts. Flow showed 4% myeloid blasts. Normal karyotype. NGS (peripheral blood) was positive for ASXL1, IDH1, RUNX1, SRSF2 mutations (full report not available). Counts: WBC 6.7, Hb 13.1, Plts 70, ANC 0.7. IPSS-M = High risk (0.85).  He was treated with azacitidine 75mg/m2 for 7 days and venetoclax for 14 days (C1 on 5/20/24). Bone marrow after first cycle (6/13/24) showed persistent MDS, with hypercellular marrow with therapy effect, no increase in blasts. Normal karyotype. NGS: ASXL1 (38%), IDH1 (43%), RUNX1 (41%), SRSF1 (38%).   He received second cycle of aza/ angelia on 7/1/24. His pre-transplant BMBx was done on 8/2/24 and he was cytopenic at the time (CBC with WBC 0.6, Hb 14, platelet count 24). Hypocellular marrow (5%) with no increase in blasts. Normal karyotype, NGS negative.     Post transplant   - 9/2/24 (around D10) Neutropenic fevers. Strept mitis bacteremia resistant to levaquin from central venous catheter as well as a single culture of MRSE from  peripheral blood (contaminant). Treated with cefepime. Repeat blood cultures negative, but continued to have high fevers. TTE (9/6/24) did not show any vegetations. CVC was removed on 9/6/24, defervesced on 9/7/24. PICC placed on 9/9/24. Antibiotics (cefepime followed by augmentin) were continued for a total of 14days from line removal.   - Right lower neck swelling and tenderness near the previous CVC site. US (9/15/24) showed an occlusive right internal jugular thrombus and a non occlusive thrombus in the right axillary vein, PICC associated. He was initially not anticoagulated due to thrombocytopenia but had increased symptoms. Repeat US (9/17/24) showed extension of right internal jugular thrombus into the innominate vein and extension of right axillary thrombus into subclavian vein. He was started on therapeutic anticoagulation with lovenox 1mg/kg BID on 9/18/24 with transfusion support for platelets, with plan to continue for 3 months. Right PICC removal was considered given propagation of clot near the PICC line. However, IR (9/19/24) recommended against removal of R PICC and replacement in the left arm due to risk of contralateral DVT.   - Hemorrhagic cystitis: Dysuria, urgency and hematuria starting  9/11/2024. Infectious workup including adenovirus and BK virus negative. Urology was consulted, recommended outpatient follow up. Now resolved.        INTERVAL HISTORY     Leland presents for a scheduled BMT visit. He has been doing very well.     Has been seen by ophthalmology and diagnosed with dry eye. Has been started on restasis in addition to PF AT.     Skin rash - no active pustules but still has red marks.     Lymphedema left arm has resolved.     Pills get stuck, but ahs been long standing issues    Creat is elevated. Drinks at least 45-50oz per day.     Plan  - start inqovi   - CBC and CMP weekly at  weekly [facility]   - follow up with derm; message       PM  Back pain due to spinal stenosis,  lumbosacral radiculopathy.   Gout, most recent flare was in April 2024  GERD  HTN  BPH, on tamsulosin   Right knee ACL repair in 2014  Low back surgery in 2004 for herniated disc, complicated by infection requiring prolonged IV abx   Coronary artery calcifications seen on CT, stress test in 2023 negative     SH  . Lives with his wife, Vani. Two daughters, 43 and 36. 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 Was still working part time till last summer at [employer] 2-3 hours per day.     Current Outpatient Medications   Medication Sig Dispense Refill    acyclovir (ZOVIRAX) 800 MG tablet Take 1 tablet (800 mg) by mouth every 12 hours. 90 tablet 3    allopurinol (ZYLOPRIM) 300 MG tablet Take 1 tablet (300 mg) by mouth daily. 90 tablet 1    calcium carbonate-vitamin D (OSCAL) 500-5 MG-MCG tablet Take 2 tablets by mouth daily. 60 tablet 2    carboxymethylcellulose (REFRESH PLUS) 0.5 % SOLN ophthalmic solution Place 1 drop into both eyes 3 times daily as needed for dry eyes. 15 mL 11    chlorhexidine (PERIDEX) 0.12 % solution Swish and spit 5-10 mLs in mouth 2 times daily. (Patient not taking: Reported on 1/21/2025) 118 mL 0    clindamycin (CLEOCIN T) 1 % external lotion Apply to chest and back BID x 3 weeks 60 mL 3    clindamycin (CLEOCIN-T) 1 % external gel Apply topically 2 times daily. 60 g 0    cycloSPORINE (RESTASIS) 0.05 % ophthalmic emulsion INSTILL 1 DROP INTO BOTH EYES TWICE A DAY 60 mL 11    doxycycline monohydrate (MONODOX) 100 MG capsule 1 cap PO BID with food and full glass of water 42 capsule 0    hydrocortisone 2.5 % cream Apply topically daily. (Patient not taking: Reported on 1/21/2025) 30 g 0    lisinopril (ZESTRIL) 5 MG tablet Take 1 tablet (5 mg) by mouth daily. Hold dose if blood pressure is less than 110/60      methocarbamol (ROBAXIN) 500 MG tablet Take 500 mg by mouth as needed for muscle spasms.      pantoprazole (PROTONIX) 40 MG EC tablet TAKE 1 TABLET BY MOUTH EVERY DAY  90 tablet 1    rivaroxaban ANTICOAGULANT (XARELTO) 10 MG TABS tablet Take 1 tablet (10 mg) by mouth daily (with dinner). 90 tablet 0    sulfamethoxazole-trimethoprim (BACTRIM DS) 800-160 MG tablet Take 1 tablet by mouth Every Mon, Tues two times daily. 48 tablet 1    tamsulosin (FLOMAX) 0.4 MG capsule Take 0.4 mg by mouth daily.      triamcinolone (KENALOG) 0.1 % external cream Apply topically 2 times daily. (Patient not taking: Reported on 1/21/2025) 60 g 1        EXAM      /83 (BP Location: Right arm, Patient Position: Sitting, Cuff Size: Adult Regular)   Pulse 78   Temp 97.7  F (36.5  C) (Oral)   Resp 18   Wt 90.9 kg (200 lb 6.4 oz)   SpO2 97%   BMI 26.85 kg/m      Wt Readings from Last 4 Encounters:   01/03/25 90.5 kg (199 lb 9.6 oz)   12/17/24 89.8 kg (198 lb)   12/06/24 88.8 kg (195 lb 12.8 oz)   12/02/24 89.4 kg (197 lb 1.6 oz)       KPS: 80% Can perform normal activity with effort, some signs of disease    General: Alert, awake  Eyes: Conjunctiva normal  Mouth and Throat: No mucositis   Abd:  Non tender, non distended   Lymph: Left LE edema, LUE edema (forearm to hand)   Skin: Acneform rash on chest and upper back  Access: None          LABS       Recent Labs   Lab Test 01/03/25  1340 12/02/24  0756 11/29/24  1335 09/18/24  0430 09/17/24  0424 09/16/24  0432 09/13/24  0322 09/12/24  0321   WBC 7.7 4.5 3.8*   < > 1.3* 1.5*   < > 2.9*   HGB 13.3 12.1* 12.5*   < > 7.4* 8.0*   < > 8.4*    143* 162   < > 22* 24*   < > 27*   NEUTROPHIL 66 73 67   < > 77 84   < > 89   ANEU  --   --   --   --  1.0* 1.3*  --  2.6   LYMPH 17 8 13   < > 1 1   < > 1   ALYM  --   --   --   --  0.0* 0.0*  --  0.0*    < > = values in this interval not displayed.       Recent Labs   Lab Test 01/03/25  1340 12/02/24  0756 11/29/24  1335    140 140   POTASSIUM 4.6 3.8 4.3   CHLORIDE 102 104 105   CO2 27 27 27   BUN 21.2 12.3 15.1   CR 1.07 0.80 0.83   GLC 98 105* 112*        Recent Labs   Lab Test 01/03/25  1340  12/02/24  0756 11/29/24  1335 10/18/24  1216 10/15/24  1220 10/03/24  1543 09/30/24  0900 09/27/24  0927 09/23/24  0927 09/19/24  0323 09/18/24  0430 09/17/24  0424   HERBERT 9.5 9.3 9.2   < > 9.1   < > 9.0   < > 9.3   < > 8.7* 9.0   MAG  --   --   --   --  1.9  --  1.9  --   --   --  2.0 2.0   PHOS  --   --   --   --   --   --   --   --  2.5  --  3.0 2.9    < > = values in this interval not displayed.        Recent Labs   Lab Test 01/03/25  1340 12/02/24  0756 11/29/24  1335   ALKPHOS 89 92 110   AST 24 34 37   ALT 18 31 31   BILITOTAL 0.4 0.2 0.2   ALBUMIN 4.0 3.9 4.0       Recent Labs   Lab Test 01/03/25  1340 11/29/24  1335 11/22/24  1300   CMVQNT Not Detected Not Detected Not Detected       Recent Labs   Lab Test 11/29/24  1335 09/27/24  0927   * 708       Recent Labs   Lab Test 08/01/24  0805 05/08/24  1156   ZARIA 460* 1,360*         ASSESSMENT AND PLAN     BMT: D159   - Chemo protocol: MT 2022-5; Flu/Cy/TBI  - Peripheral blood stem cell graft from 8/8 URD donor, ABO matched, Cell dose: 6.06 x10^6 CD34/kg    Disease status:   9/13/24 (D28): Bmbx shows hypocellular marrow, no dysplasia or blasts.  NGS negative  12/2/24 (D100): Bmbx shows hypocellular marrow in CR. NGS negative.     Graft status/chimerism:   9/13/24 (D28): Bone marrow 100%. Peripheral blood CD3 92%, CD33 100%  10/25/24 (D60): Peripheral blood CD3 and CD33 100%  12/2/24 (D100): Bone marrow 100%, Peripheral blood CD3 and CD33 100%    Maintenance   - Discussed the current evidence on maintenance chemotherapy. He is agreeable to proceed, will plan inqovi as long as there is no GVHD or infection     GVHD  # Current systemic immunosuppression is none.   - Sirolimus stopped at D100 without taper on 11/29/24     # GVHD: None till date    Heme/ Coag  # Right internal jugular occlusive thrombus and right axillary/ subclavain vein non occlusive thrombus, line associated, diagnosed on 9/15/24. Line was removed on 10/18/24.  - Follows with Dr. Ochoa,  last seen on 12/17/24. Has completed 3 months of anticoagulation but would benefit from prolonged anticoagulation.   - Continue rivaroxaban 10mg daily, duration at least a total of 6 months but to be determined on heme follow up.     WILLY  # LUE swelling   - US x2 (11/25) negative for DVT, unclear cause. Has seen lymphedema specialist.     ID  # Prophylaxis  PJP/ Toxo IgG negative: Bactrim   Viral: Acyclovir 800 mg bid  Bacterial/ fungal: None    # Monitoring  CMV: Monitor weekly till D100. 1/3 - negative  EBV: Monitor every 2 weeks between D30 to D180. 1/3 neg  IgG:  IgG was 549 mg/dL on 11/29/24. Check serum IgG level q3 months.     GI  GERD: Pantoprazole daily   VOD prophylaxis: Completed     CARDIOVASCULAR  # HTN: Lisinopril 5mg (was on 15mg daily prior to transplant)    # CAD: Coronary artery calcifications seen on CT, stress test in 2023 negative  - Risk of cardiomyopathy: Baseline EF 55-60%.   - Risk of arrhythmia: Baseline EKG showed 421       RENAL/ELECTROLYTES/  - BPH: Continue Flomax.  - Hemorrhagic Cystitis secondary to cytoxan: Resolved. Oxybutynin 10mg at bedtime PRN. Will need follow up with urology at some point.       MUSCULOSKELETAL  # Gout: continue allopurinol   # History of spinal stenosis, lumbosacral radiculopathy:   - Pain management: Okay to take tylenol or robaxin RPN     Post-transplant vaccinations  Has received flu, COVID and RSV vaccine.     I spent 45 minutes in the care of this patient today, which included time necessary for preparation for the visit, obtaining history, ordering medications/tests/procedures as medically indicated, review of pertinent medical literature, counseling of the patient, communication of recommendations to the care team, and documentation time.    Brenda Murphy  Department of Hematology, Oncology and Transplantation  Text via [employer]

## 2025-01-29 NOTE — NURSING NOTE
"Oncology Rooming Note    January 29, 2025 3:23 PM   Leland Pearson is a 69 year old male who presents for:    Chief Complaint   Patient presents with    Blood Draw     Labs drawn via  by RN. VS taken.    Oncology Clinic Visit     Immunocompromised state associated with stem cell transplant     Initial Vitals: /83 (BP Location: Right arm, Patient Position: Sitting, Cuff Size: Adult Regular)   Pulse 78   Temp 97.7  F (36.5  C) (Oral)   Resp 18   Wt 90.9 kg (200 lb 6.4 oz)   SpO2 97%   BMI 26.85 kg/m   Estimated body mass index is 26.85 kg/m  as calculated from the following:    Height as of 8/16/24: 1.84 m (6' 0.44\").    Weight as of this encounter: 90.9 kg (200 lb 6.4 oz). Body surface area is 2.16 meters squared.  No Pain (0) Comment: Data Unavailable   No LMP for male patient.  Allergies reviewed: Yes  Medications reviewed: Yes    Medications: Medication refills not needed today.  Pharmacy name entered into [employer]:    [id number] [place] [facility] - [place], [place] - [address] [place] [address] [facility] - [place], [place] - [address] [place] [address] PHARMACY [place], [place] - [address] [facility] [phone]    Frailty Screening:   Is the patient here for a new oncology consult visit in cancer care? 2. No      Clinical concerns: Patient would like the provider to take a look at his rash and discuss maintenance chemotherapy if that is in his treatment plan. In general, he just wants to talk about future plans.       Adonay Ramos, EMT            "

## 2025-01-30 DIAGNOSIS — D46.9 MDS (MYELODYSPLASTIC SYNDROME) (H): Primary | ICD-10-CM

## 2025-01-30 LAB
CMV DNA SPEC NAA+PROBE-ACNC: NOT DETECTED IU/ML
EBV DNA SERPL NAA+PROBE-ACNC: NOT DETECTED IU/ML
IGG SERPL-MCNC: 618 MG/DL (ref 610–1616)
SPECIMEN TYPE: NORMAL

## 2025-01-31 ENCOUNTER — MYC MEDICAL ADVICE (OUTPATIENT)
Dept: ONCOLOGY | Facility: CLINIC | Age: 70
End: 2025-01-31
Payer: COMMERCIAL

## 2025-02-05 NOTE — ORAL ONC MGMT
Oral Chemotherapy Monitoring Program     Placed call to patient in follow up of Inqovi. Leland confirmed that he received his Inqovi delivery yesterday and took his first dose last night. We discussed the need for weekly lab monitoring, and he confirmed that weekly labs in [place] (on ~Wednesdays) would work well for him. He had no questions at this time.    Gertrude Joshi, PharmD, [facility]  Oral Chemotherapy Monitoring Program  [facility]  [phone]

## 2025-02-06 ENCOUNTER — LAB (OUTPATIENT)
Dept: LAB | Facility: CLINIC | Age: 70
End: 2025-02-06
Payer: COMMERCIAL

## 2025-02-06 DIAGNOSIS — D84.822 IMMUNOCOMPROMISED STATE ASSOCIATED WITH STEM CELL TRANSPLANT (H): ICD-10-CM

## 2025-02-06 DIAGNOSIS — Z94.84 IMMUNOCOMPROMISED STATE ASSOCIATED WITH STEM CELL TRANSPLANT (H): ICD-10-CM

## 2025-02-06 LAB
ALBUMIN SERPL BCG-MCNC: 4.1 G/DL (ref 3.5–5.2)
ALP SERPL-CCNC: 79 U/L (ref 40–150)
ALT SERPL W P-5'-P-CCNC: 17 U/L (ref 0–70)
ANION GAP SERPL CALCULATED.3IONS-SCNC: 9 MMOL/L (ref 7–15)
AST SERPL W P-5'-P-CCNC: 26 U/L (ref 0–45)
BASOPHILS # BLD AUTO: 0 10E3/UL (ref 0–0.2)
BASOPHILS NFR BLD AUTO: 1 %
BILIRUB SERPL-MCNC: 0.6 MG/DL
BUN SERPL-MCNC: 20.4 MG/DL (ref 8–23)
CALCIUM SERPL-MCNC: 9.3 MG/DL (ref 8.8–10.4)
CHLORIDE SERPL-SCNC: 103 MMOL/L (ref 98–107)
CREAT SERPL-MCNC: 1.09 MG/DL (ref 0.67–1.17)
EGFRCR SERPLBLD CKD-EPI 2021: 73 ML/MIN/1.73M2
EOSINOPHIL # BLD AUTO: 0.2 10E3/UL (ref 0–0.7)
EOSINOPHIL NFR BLD AUTO: 3 %
ERYTHROCYTE [DISTWIDTH] IN BLOOD BY AUTOMATED COUNT: 16.6 % (ref 10–15)
GLUCOSE SERPL-MCNC: 104 MG/DL (ref 70–99)
HCO3 SERPL-SCNC: 26 MMOL/L (ref 22–29)
HCT VFR BLD AUTO: 38.8 % (ref 40–53)
HGB BLD-MCNC: 13.1 G/DL (ref 13.3–17.7)
IMM GRANULOCYTES # BLD: 0 10E3/UL
IMM GRANULOCYTES NFR BLD: 0 %
LYMPHOCYTES # BLD AUTO: 1.2 10E3/UL (ref 0.8–5.3)
LYMPHOCYTES NFR BLD AUTO: 21 %
MCH RBC QN AUTO: 31.3 PG (ref 26.5–33)
MCHC RBC AUTO-ENTMCNC: 33.8 G/DL (ref 31.5–36.5)
MCV RBC AUTO: 93 FL (ref 78–100)
MONOCYTES # BLD AUTO: 0.7 10E3/UL (ref 0–1.3)
MONOCYTES NFR BLD AUTO: 12 %
NEUTROPHILS # BLD AUTO: 3.7 10E3/UL (ref 1.6–8.3)
NEUTROPHILS NFR BLD AUTO: 64 %
PLATELET # BLD AUTO: 145 10E3/UL (ref 150–450)
POTASSIUM SERPL-SCNC: 5.2 MMOL/L (ref 3.4–5.3)
PROT SERPL-MCNC: 6.4 G/DL (ref 6.4–8.3)
RBC # BLD AUTO: 4.18 10E6/UL (ref 4.4–5.9)
SODIUM SERPL-SCNC: 138 MMOL/L (ref 135–145)
WBC # BLD AUTO: 5.8 10E3/UL (ref 4–11)

## 2025-02-10 ENCOUNTER — CARE COORDINATION (OUTPATIENT)
Dept: TRANSPLANT | Facility: CLINIC | Age: 70
End: 2025-02-10
Payer: COMMERCIAL

## 2025-02-10 NOTE — PROGRESS NOTES
Called Leland to instruct him to hold his Xarelto on starting 2/18 in anticipation of D180 bmbx on 2/19 per BMT program guidelines. Leland expresses understanding of these instructions and has no further questions at this time.

## 2025-02-13 ENCOUNTER — LAB (OUTPATIENT)
Dept: LAB | Facility: CLINIC | Age: 70
End: 2025-02-13
Payer: COMMERCIAL

## 2025-02-13 DIAGNOSIS — D46.9 MDS (MYELODYSPLASTIC SYNDROME) (H): ICD-10-CM

## 2025-02-13 DIAGNOSIS — D84.822 IMMUNOCOMPROMISED STATE ASSOCIATED WITH STEM CELL TRANSPLANT (H): ICD-10-CM

## 2025-02-13 DIAGNOSIS — Z79.899 ENCOUNTER FOR LONG-TERM (CURRENT) USE OF MEDICATIONS: ICD-10-CM

## 2025-02-13 DIAGNOSIS — Z94.84 IMMUNOCOMPROMISED STATE ASSOCIATED WITH STEM CELL TRANSPLANT (H): ICD-10-CM

## 2025-02-13 LAB
BASOPHILS # BLD AUTO: 0 10E3/UL (ref 0–0.2)
BASOPHILS NFR BLD AUTO: 1 %
EOSINOPHIL # BLD AUTO: 0.2 10E3/UL (ref 0–0.7)
EOSINOPHIL NFR BLD AUTO: 3 %
ERYTHROCYTE [DISTWIDTH] IN BLOOD BY AUTOMATED COUNT: 16.5 % (ref 10–15)
HCT VFR BLD AUTO: 38 % (ref 40–53)
HGB BLD-MCNC: 13.2 G/DL (ref 13.3–17.7)
IMM GRANULOCYTES # BLD: 0 10E3/UL
IMM GRANULOCYTES NFR BLD: 0 %
LYMPHOCYTES # BLD AUTO: 1.3 10E3/UL (ref 0.8–5.3)
LYMPHOCYTES NFR BLD AUTO: 21 %
MCH RBC QN AUTO: 32.1 PG (ref 26.5–33)
MCHC RBC AUTO-ENTMCNC: 34.7 G/DL (ref 31.5–36.5)
MCV RBC AUTO: 93 FL (ref 78–100)
MONOCYTES # BLD AUTO: 0.4 10E3/UL (ref 0–1.3)
MONOCYTES NFR BLD AUTO: 6 %
NEUTROPHILS # BLD AUTO: 4.4 10E3/UL (ref 1.6–8.3)
NEUTROPHILS NFR BLD AUTO: 70 %
PLATELET # BLD AUTO: 127 10E3/UL (ref 150–450)
RBC # BLD AUTO: 4.11 10E6/UL (ref 4.4–5.9)
WBC # BLD AUTO: 6.3 10E3/UL (ref 4–11)

## 2025-02-18 DIAGNOSIS — H04.123 DRY EYE SYNDROME OF BOTH EYES: ICD-10-CM

## 2025-02-19 ENCOUNTER — OFFICE VISIT (OUTPATIENT)
Dept: TRANSPLANT | Facility: CLINIC | Age: 70
End: 2025-02-19
Attending: PHYSICIAN ASSISTANT
Payer: COMMERCIAL

## 2025-02-19 ENCOUNTER — APPOINTMENT (OUTPATIENT)
Dept: LAB | Facility: CLINIC | Age: 70
End: 2025-02-19
Attending: PHYSICIAN ASSISTANT
Payer: COMMERCIAL

## 2025-02-19 VITALS
OXYGEN SATURATION: 99 % | TEMPERATURE: 98.2 F | SYSTOLIC BLOOD PRESSURE: 142 MMHG | DIASTOLIC BLOOD PRESSURE: 80 MMHG | WEIGHT: 202.9 LBS | BODY MASS INDEX: 27.18 KG/M2 | RESPIRATION RATE: 16 BRPM | HEART RATE: 91 BPM

## 2025-02-19 DIAGNOSIS — Z94.84 IMMUNOCOMPROMISED STATE ASSOCIATED WITH STEM CELL TRANSPLANT (H): ICD-10-CM

## 2025-02-19 DIAGNOSIS — D46.9 MDS (MYELODYSPLASTIC SYNDROME) (H): Primary | ICD-10-CM

## 2025-02-19 DIAGNOSIS — D84.822 IMMUNOCOMPROMISED STATE ASSOCIATED WITH STEM CELL TRANSPLANT (H): ICD-10-CM

## 2025-02-19 LAB
ALBUMIN SERPL BCG-MCNC: 4.2 G/DL (ref 3.5–5.2)
ALP SERPL-CCNC: 78 U/L (ref 40–150)
ALT SERPL W P-5'-P-CCNC: 15 U/L (ref 0–70)
ANION GAP SERPL CALCULATED.3IONS-SCNC: 9 MMOL/L (ref 7–15)
AST SERPL W P-5'-P-CCNC: 25 U/L (ref 0–45)
BASOPHILS # BLD AUTO: 0 10E3/UL (ref 0–0.2)
BASOPHILS NFR BLD AUTO: 1 %
BILIRUB SERPL-MCNC: 0.6 MG/DL
BUN SERPL-MCNC: 21 MG/DL (ref 8–23)
CALCIUM SERPL-MCNC: 9.5 MG/DL (ref 8.8–10.4)
CHLORIDE SERPL-SCNC: 103 MMOL/L (ref 98–107)
CMV DNA SPEC NAA+PROBE-ACNC: NOT DETECTED IU/ML
CREAT SERPL-MCNC: 1.24 MG/DL (ref 0.67–1.17)
EGFRCR SERPLBLD CKD-EPI 2021: 62 ML/MIN/1.73M2
EOSINOPHIL # BLD AUTO: 0.1 10E3/UL (ref 0–0.7)
EOSINOPHIL NFR BLD AUTO: 3 %
ERYTHROCYTE [DISTWIDTH] IN BLOOD BY AUTOMATED COUNT: 16.9 % (ref 10–15)
GLUCOSE SERPL-MCNC: 108 MG/DL (ref 70–99)
HCO3 SERPL-SCNC: 26 MMOL/L (ref 22–29)
HCT VFR BLD AUTO: 37.9 % (ref 40–53)
HGB BLD-MCNC: 12.9 G/DL (ref 13.3–17.7)
IMM GRANULOCYTES # BLD: 0 10E3/UL
IMM GRANULOCYTES NFR BLD: 0 %
LAB DIRECTOR DISCLAIMER: NORMAL
LAB DIRECTOR INTERPRETATION: NORMAL
LAB DIRECTOR METHODOLOGY: NORMAL
LAB DIRECTOR RESULTS: NORMAL
LOCATION OF TASK: NORMAL
LYMPHOCYTES # BLD AUTO: 1.1 10E3/UL (ref 0.8–5.3)
LYMPHOCYTES NFR BLD AUTO: 22 %
MCH RBC QN AUTO: 30.9 PG (ref 26.5–33)
MCHC RBC AUTO-ENTMCNC: 34 G/DL (ref 31.5–36.5)
MCV RBC AUTO: 91 FL (ref 78–100)
MONOCYTES # BLD AUTO: 0.6 10E3/UL (ref 0–1.3)
MONOCYTES NFR BLD AUTO: 11 %
NEUTROPHILS # BLD AUTO: 3.1 10E3/UL (ref 1.6–8.3)
NEUTROPHILS NFR BLD AUTO: 63 %
NRBC # BLD AUTO: 0 10E3/UL
NRBC BLD AUTO-RTO: 0 /100
PATH REPORT.COMMENTS IMP SPEC: NORMAL
PATH REPORT.FINAL DX SPEC: NORMAL
PATH REPORT.MICROSCOPIC SPEC OTHER STN: NORMAL
PATH REPORT.RELEVANT HX SPEC: NORMAL
PLATELET # BLD AUTO: 140 10E3/UL (ref 150–450)
POTASSIUM SERPL-SCNC: 4.3 MMOL/L (ref 3.4–5.3)
PROT SERPL-MCNC: 6.5 G/DL (ref 6.4–8.3)
RBC # BLD AUTO: 4.17 10E6/UL (ref 4.4–5.9)
SODIUM SERPL-SCNC: 138 MMOL/L (ref 135–145)
SPECIMEN TYPE: NORMAL
WBC # BLD AUTO: 4.9 10E3/UL (ref 4–11)

## 2025-02-19 PROCEDURE — 88184 FLOWCYTOMETRY/ TC 1 MARKER: CPT | Performed by: PATHOLOGY

## 2025-02-19 PROCEDURE — 81268 CHIMERISM ANAL W/CELL SELECT: CPT

## 2025-02-19 PROCEDURE — 81334 RUNX1 GENE TARGETED SEQ ALYS: CPT

## 2025-02-19 PROCEDURE — 88271 CYTOGENETICS DNA PROBE: CPT

## 2025-02-19 PROCEDURE — 81120 IDH1 COMMON VARIANTS: CPT

## 2025-02-19 PROCEDURE — 250N000011 HC RX IP 250 OP 636: Performed by: NURSE PRACTITIONER

## 2025-02-19 PROCEDURE — 88184 FLOWCYTOMETRY/ TC 1 MARKER: CPT

## 2025-02-19 PROCEDURE — 36415 COLL VENOUS BLD VENIPUNCTURE: CPT

## 2025-02-19 PROCEDURE — 38222 DX BONE MARROW BX & ASPIR: CPT | Performed by: NURSE PRACTITIONER

## 2025-02-19 PROCEDURE — 38222 DX BONE MARROW BX & ASPIR: CPT | Mod: LT | Performed by: NURSE PRACTITIONER

## 2025-02-19 PROCEDURE — 81401 MOPATH PROCEDURE LEVEL 2: CPT

## 2025-02-19 PROCEDURE — 81267 CHIMERISM ANAL NO CELL SELEC: CPT

## 2025-02-19 PROCEDURE — 85025 COMPLETE CBC W/AUTO DIFF WBC: CPT

## 2025-02-19 PROCEDURE — 81175 ASXL1 FULL GENE SEQUENCE: CPT

## 2025-02-19 PROCEDURE — 88341 IMHCHEM/IMCYTCHM EA ADD ANTB: CPT | Mod: TC

## 2025-02-19 PROCEDURE — 81479 UNLISTED MOLECULAR PATHOLOGY: CPT

## 2025-02-19 PROCEDURE — 84132 ASSAY OF SERUM POTASSIUM: CPT

## 2025-02-19 PROCEDURE — G0452 MOLECULAR PATHOLOGY INTERPR: HCPCS | Mod: 26 | Performed by: PATHOLOGY

## 2025-02-19 PROCEDURE — 88264 CHROMOSOME ANALYSIS 20-25: CPT

## 2025-02-19 RX ORDER — LIFITEGRAST 50 MG/ML
1 SOLUTION/ DROPS OPHTHALMIC 2 TIMES DAILY
Qty: 60 EACH | Refills: 11 | Status: SHIPPED | OUTPATIENT
Start: 2025-02-19

## 2025-02-19 RX ADMIN — MIDAZOLAM 1 MG: 1 INJECTION INTRAMUSCULAR; INTRAVENOUS at 08:37

## 2025-02-19 ASSESSMENT — PAIN SCALES - GENERAL: PAINLEVEL_OUTOF10: NO PAIN (0)

## 2025-02-19 NOTE — PROGRESS NOTES
BMT ONC Adult Bone Marrow Biopsy Procedure Note  February 19, 2025  BP (!) 142/80   Pulse 91   Temp 98.2  F (36.8  C) (Oral)   Resp 16   Wt 92 kg (202 lb 14.4 oz)   SpO2 99%   BMI 27.18 kg/m       Learning needs assessment complete within 12 months? YES    DIAGNOSIS: MDS     PROCEDURE: Unilateral Bone Marrow Biopsy and Unilateral Aspirate    LOCATION: [facility] 2nd Floor    Patient s identification was positively verified by verbal identification and invasive procedure safety checklist was completed. Informed consent was obtained. Following the administration of Midazolam as pre-medication, patient was placed in the prone position and prepped and draped in a sterile manner. Approximately 10 cc of 1% Lidocaine was used over the left posterior iliac spine. Following this a 3 mm incision was made. Trephine bone marrow core(s) was (were) obtained from the [facility]. Bone marrow aspirates were obtained from the [facility]. Aspirates were sent for morphology, immunophenotyping, cytogenetics, and molecular diagnostics RFLP. A total of approximately 20 ml of marrow was aspirated. Following this procedure a sterile dressing was applied to the bone marrow biopsy site(s). The patient was placed in the supine position to maintain pressure on the biopsy site. Post-procedure wound care instructions were given.     Complications: NO     Interventions: NO    Length of procedure:21 minutes to 45 minutes    Procedure performed by: Jennie Watkins NP

## 2025-02-19 NOTE — NURSING NOTE
Chief Complaint   Patient presents with    Blood Draw     Labs drawn via PIV     Labs drawn from PIV placed by RN. Line flushed with saline. Vitals taken. Pt checked in for appointment(s).     Camille GARRETT RN PHN BSN  BMT/Oncology Lab

## 2025-02-19 NOTE — NURSING NOTE
"Oncology Rooming Note    February 19, 2025 8:23 AM   Leland Pearson is a 70 year old male who presents for:    Chief Complaint   Patient presents with    Blood Draw     Labs drawn via PIV    Oncology Clinic Visit     Myelodysplastic syndrome     Initial Vitals: BP (!) 142/80   Pulse 91   Temp 98.2  F (36.8  C) (Oral)   Resp 16   Wt 92 kg (202 lb 14.4 oz)   SpO2 99%   BMI 27.18 kg/m   Estimated body mass index is 27.18 kg/m  as calculated from the following:    Height as of 8/16/24: 1.84 m (6' 0.44\").    Weight as of this encounter: 92 kg (202 lb 14.4 oz). Body surface area is 2.17 meters squared.  No Pain (0) Comment: Data Unavailable   No LMP for male patient.  Allergies reviewed: Yes  Medications reviewed: Yes    Medications: Medication refills not needed today.  Pharmacy name entered into [employer]:    [id number] [place] [facility] - [place], [place] - [address] [place] [address] [facility] - [place], [place] - [address] [facility] [place], [place] - [address] [facility] [phone]    Frailty Screening:   Is the patient here for a new oncology consult visit in cancer care? 2. No    PHQ9:  Did this patient require a PHQ9?: No      Clinical concerns:        Mary Padilla              "

## 2025-02-19 NOTE — NURSING NOTE
BMBX Teaching and Assessment       Teaching concerns addressed: Bone marrow biopsy and infection prevention.     Person(s) involved in teaching: Patient  Motivation Level  Asks Questions: Yes  Eager to Learn: Yes  Cooperative: Yes  Receptive (willing/able to accept information): Yes    Patient demonstrates understanding of the following:     Reason for the appointment, diagnosis and treatment plan: Yes  Knowledge of proper use of medications and conditions for which they are ordered (with special attention to potential side effects or drug interactions): Yes  Which situations necessitate calling provider and whom to contact: Yes    Teaching concerns addressed:   Reviewed activity restrictions if received premeds, potential for bleeding and actions to take if develops any of the issues below    Pain management techniques: Yes  Patient instructed on hand hygiene: Yes  How and/when to access community resources: Yes    Infection Control:  Patient demonstrates understanding of the following:   Bone marrow procedure site care taught: Yes  Signs and symptoms of infection taught: Yes       Instructional Materials Used/Given: Pt instructed to keep bmbx site clean and dry for 24hrs. Pt educated to monitor site for signs of infection such as redness, rash, oozing, puss, bleeding, pain, and elevated temp. Pt instructed to go to call the [facility] triage line or go to the ER if any signs of infection should occur. Pt educated to not operate machinery if receiving versed. Pt and wife verbalize understanding.     Pre-procedure labs drawn via venipuncture. Post procedure: Patient vital signs stable, ambulating, site is clean, dry and intact prior to discharge and line removed. Pt discharged with wife as .     Pt requests IV versed pre med

## 2025-02-19 NOTE — LETTER
2/19/2025      Leland Pearson  [address] [place] [address]      Dear Colleague,    Thank you for referring your patient, Lelnad Pearson, to the [facility] BLOOD AND MARROW TRANSPLANT PROGRAM [place]. Please see a copy of my visit note below.    BMT ONC Adult Bone Marrow Biopsy Procedure Note  February 19, 2025  BP (!) 142/80   Pulse 91   Temp 98.2  F (36.8  C) (Oral)   Resp 16   Wt 92 kg (202 lb 14.4 oz)   SpO2 99%   BMI 27.18 kg/m       Learning needs assessment complete within 12 months? YES    DIAGNOSIS: MDS     PROCEDURE: Unilateral Bone Marrow Biopsy and Unilateral Aspirate    LOCATION: [facility] 2nd Floor    Patient s identification was positively verified by verbal identification and invasive procedure safety checklist was completed. Informed consent was obtained. Following the administration of Midazolam as pre-medication, patient was placed in the prone position and prepped and draped in a sterile manner. Approximately 10 cc of 1% Lidocaine was used over the left posterior iliac spine. Following this a 3 mm incision was made. Trephine bone marrow core(s) was (were) obtained from the IC. Bone marrow aspirates were obtained from the [facility]. Aspirates were sent for morphology, immunophenotyping, cytogenetics, and molecular diagnostics RFLP. A total of approximately 20 ml of marrow was aspirated. Following this procedure a sterile dressing was applied to the bone marrow biopsy site(s). The patient was placed in the supine position to maintain pressure on the biopsy site. Post-procedure wound care instructions were given.     Complications: NO     Interventions: NO    Length of procedure:21 minutes to 45 minutes    Procedure performed by: Jennie Watkins NP      Again, thank you for allowing me to participate in the care of your patient.        Sincerely,        BMT Advanced Practice Provider    Electronically signed

## 2025-02-20 LAB
PATH REPORT.COMMENTS IMP SPEC: NORMAL
PATH REPORT.COMMENTS IMP SPEC: NORMAL
PATH REPORT.FINAL DX SPEC: NORMAL
PATH REPORT.GROSS SPEC: NORMAL
PATH REPORT.MICROSCOPIC SPEC OTHER STN: NORMAL
PATH REPORT.MICROSCOPIC SPEC OTHER STN: NORMAL
PATH REPORT.RELEVANT HX SPEC: NORMAL

## 2025-02-24 ENCOUNTER — THERAPY VISIT (OUTPATIENT)
Dept: OCCUPATIONAL THERAPY | Facility: CLINIC | Age: 70
End: 2025-02-24
Payer: COMMERCIAL

## 2025-02-24 DIAGNOSIS — I89.0 LYMPHEDEMA: Primary | ICD-10-CM

## 2025-02-24 PROCEDURE — 97140 MANUAL THERAPY 1/> REGIONS: CPT | Mod: GO | Performed by: OCCUPATIONAL THERAPIST

## 2025-02-24 NOTE — PROGRESS NOTES
02/24/25 0500   Appointment Info   Treating Provider Mindi Cedillo, OTR/L, CLT   Visits Used 5 - [employer] Medicare Advantage   Medical Diagnosis lymphedema of left upper extremity   OT Tx Diagnosis LUE lymphedema   Precautions/Limitations DVT R jugular vein (on anticoagulant medication)   Progress Note/Certification   Start Of Care Date 12/30/24   Onset of Illness/Injury or Date of Surgery 09/15/24   Therapy Frequency 1-2x/week   Predicted Duration up to 90 days due to scheduling   Certification date from 12/30/24   Certification date to 03/29/25   Progress Note Due Date   (every 10th visit)   Progress Note Completed Date 02/24/25   Goals   OT Goals 1;2;3;4;5   OT Goal 1   Goal Identifier education   Goal Description Pt demonstrates awareness of individualized lymphedema precautions based on personal risk factors and when to seek medical attention for assessment of exacerbation of lymphedema or onset of cellulitis of the affected region.   Rationale In order to maximize safety and independence with ADL/IADLs   Goal Progress Goal met   Target Date 03/29/25   Date Met 02/24/25   OT Goal 2   Goal Identifier home program   Goal Description Pt will demonstrate understanding of long term management of edema including manual techniques, exercise, compression and skin care.   Rationale In order to maximize safety and independence with ADL/IADLs   Goal Progress Goal met   Target Date 03/29/25   Date Met 02/24/25   OT Goal 3   Goal Identifier GCB   Goal Description To reduce volume of lymphedema and risk of soft tissue fibrosis, pt/cg will be independent with applying and caring for bandages and pt will tolerate up to 23hr/day wear gradient compression bandaging (GCB) of LUE.   Rationale In order to maximize safety and independence with ADL/IADLs   Goal Progress Goal met   Target Date 03/29/25   Date Met 02/24/25   OT Goal 4   Goal Identifier compression   Goal Description Pt will be independent in donning/doffing, wearing  "schedule, and care of appropriately fitted compression garments as clinically indicated.   Rationale In order to maximize safety and independence with ADL/IADLs   Goal Progress Goal met - Pt IND w/ custom compression sleeve.   Target Date 03/29/25   Date Met 02/24/25   OT Goal 5   Goal Identifier volume   Goal Description LUE volume will approximate that of RUE s/p MLD and compression for decreased risk of infection, soft tissue fibrosis.   Rationale In order to maximize safety and independence with ADL/IADLs   Goal Progress Visually, LUE now lower volume than RUE.   Target Date 03/29/25   Date Met 02/24/25   Subjective Report   Subjective Report Pt and wife with a few follow up questions about CDT, then agreeable to discharge. Pt reporting wearing sleeve is going well.   Objective Measures   Objective Measures Objective Measure 1   Objective Measure 1   Objective Measure UE girth   Details LUE decreased by 67mL from SOC   Treatment Interventions (OT)   Interventions Manual Therapy   Manual Therapy   Manual Therapy Minutes (94127) 23   Manual Therapy 1 - Details Pt arrived w/ sleeve doffed, reporting he typically wears 7am-8pm, but removed for therapy. Pt presenting w/ 2x4\" pocket of doughy lymphedema just distal to L elbow, veins in L hand visible with 1\" pocket of swelling lateral wrist. OT reinforced edu in lymphedema as chronic condition, components CDT to maintain/reduce symptoms and application to home program. Educated pt in UE lymphedema exercises w/ recommendation for exploring \"Cancer Rehab PT\" on YT as good source for exercises and MLD. Educated pt in in avoiding inflammation during strength training by gradually increasing weights; performing exercises proximal > distal and starting/finishing w/ diaphragmatic breathing may better promote lymph flow. Pt's lymph referral from 12/30/2024, OT educated pt in plan to complete episode of care, but pt could return before 12/30/2025 for re-evaluation if he " experiences exacerbation in symptoms.   Skilled Intervention edu, MLD, GCB, garments   Patient Response/Progress Pt/spouse demonstrate understanding of recommendations; LUE volume reduced, stable. goals met   Education   Learner/Method Patient;Significant Other   Education Comments Educated on role/scope of lymph therapy and POC/goals   Plan   Home program skincare, LUE GCB or garments, self/caregiver MLD, exercises   Plan for next session Goals med. DC lymph therapy.   Total Session Time   Timed Code Treatment Minutes 23   Total Treatment Time (sum of timed and untimed services) 23         DISCHARGE  Reason for Discharge: Patient has met all goals.    Equipment Issued: custom 20-30 mmHg compression sleeve    Discharge Plan: Patient to continue home program.    Referring Provider:  Gunnar Lundy

## 2025-02-25 DIAGNOSIS — D46.9 MDS (MYELODYSPLASTIC SYNDROME) (H): Primary | ICD-10-CM

## 2025-02-25 RX ORDER — CEDAZURIDINE AND DECITABINE 100; 35 MG/1; MG/1
1 TABLET, FILM COATED ORAL DAILY
Qty: 5 TABLET | Refills: 0 | Status: SHIPPED | OUTPATIENT
Start: 2025-02-28 | End: 2025-03-03

## 2025-02-26 ENCOUNTER — APPOINTMENT (OUTPATIENT)
Dept: LAB | Facility: CLINIC | Age: 70
End: 2025-02-26
Attending: STUDENT IN AN ORGANIZED HEALTH CARE EDUCATION/TRAINING PROGRAM
Payer: COMMERCIAL

## 2025-02-26 ENCOUNTER — OFFICE VISIT (OUTPATIENT)
Dept: TRANSPLANT | Facility: CLINIC | Age: 70
End: 2025-02-26
Attending: STUDENT IN AN ORGANIZED HEALTH CARE EDUCATION/TRAINING PROGRAM
Payer: COMMERCIAL

## 2025-02-26 VITALS
HEART RATE: 74 BPM | WEIGHT: 202.5 LBS | SYSTOLIC BLOOD PRESSURE: 135 MMHG | TEMPERATURE: 98.2 F | RESPIRATION RATE: 18 BRPM | BODY MASS INDEX: 27.13 KG/M2 | OXYGEN SATURATION: 99 % | DIASTOLIC BLOOD PRESSURE: 81 MMHG

## 2025-02-26 DIAGNOSIS — D46.9 MDS (MYELODYSPLASTIC SYNDROME) (H): ICD-10-CM

## 2025-02-26 DIAGNOSIS — D84.822 IMMUNOCOMPROMISED STATE ASSOCIATED WITH STEM CELL TRANSPLANT (H): ICD-10-CM

## 2025-02-26 DIAGNOSIS — Z94.84 IMMUNOCOMPROMISED STATE ASSOCIATED WITH STEM CELL TRANSPLANT (H): ICD-10-CM

## 2025-02-26 LAB
ALBUMIN SERPL BCG-MCNC: 4.2 G/DL (ref 3.5–5.2)
ALP SERPL-CCNC: 74 U/L (ref 40–150)
ALT SERPL W P-5'-P-CCNC: 16 U/L (ref 0–70)
ANION GAP SERPL CALCULATED.3IONS-SCNC: 8 MMOL/L (ref 7–15)
AST SERPL W P-5'-P-CCNC: 26 U/L (ref 0–45)
BASOPHILS # BLD AUTO: 0 10E3/UL (ref 0–0.2)
BASOPHILS NFR BLD AUTO: 1 %
BILIRUB SERPL-MCNC: 0.7 MG/DL
BUN SERPL-MCNC: 16.6 MG/DL (ref 8–23)
CALCIUM SERPL-MCNC: 9.4 MG/DL (ref 8.8–10.4)
CHLORIDE SERPL-SCNC: 103 MMOL/L (ref 98–107)
CREAT SERPL-MCNC: 1.21 MG/DL (ref 0.67–1.17)
EGFRCR SERPLBLD CKD-EPI 2021: 64 ML/MIN/1.73M2
EOSINOPHIL # BLD AUTO: 0.1 10E3/UL (ref 0–0.7)
EOSINOPHIL NFR BLD AUTO: 2 %
ERYTHROCYTE [DISTWIDTH] IN BLOOD BY AUTOMATED COUNT: 16.9 % (ref 10–15)
GLUCOSE SERPL-MCNC: 98 MG/DL (ref 70–99)
HCO3 SERPL-SCNC: 27 MMOL/L (ref 22–29)
HCT VFR BLD AUTO: 36.6 % (ref 40–53)
HGB BLD-MCNC: 12.3 G/DL (ref 13.3–17.7)
IMM GRANULOCYTES # BLD: 0 10E3/UL
IMM GRANULOCYTES NFR BLD: 0 %
LYMPHOCYTES # BLD AUTO: 1.2 10E3/UL (ref 0.8–5.3)
LYMPHOCYTES NFR BLD AUTO: 32 %
MCH RBC QN AUTO: 31.6 PG (ref 26.5–33)
MCHC RBC AUTO-ENTMCNC: 33.6 G/DL (ref 31.5–36.5)
MCV RBC AUTO: 94 FL (ref 78–100)
MONOCYTES # BLD AUTO: 0.2 10E3/UL (ref 0–1.3)
MONOCYTES NFR BLD AUTO: 5 %
NEUTROPHILS # BLD AUTO: 2.2 10E3/UL (ref 1.6–8.3)
NEUTROPHILS NFR BLD AUTO: 60 %
NRBC # BLD AUTO: 0 10E3/UL
NRBC BLD AUTO-RTO: 0 /100
PLATELET # BLD AUTO: 168 10E3/UL (ref 150–450)
POTASSIUM SERPL-SCNC: 4.4 MMOL/L (ref 3.4–5.3)
PROT SERPL-MCNC: 6.6 G/DL (ref 6.4–8.3)
RBC # BLD AUTO: 3.89 10E6/UL (ref 4.4–5.9)
SODIUM SERPL-SCNC: 138 MMOL/L (ref 135–145)
WBC # BLD AUTO: 3.6 10E3/UL (ref 4–11)

## 2025-02-26 PROCEDURE — 1126F AMNT PAIN NOTED NONE PRSNT: CPT | Performed by: STUDENT IN AN ORGANIZED HEALTH CARE EDUCATION/TRAINING PROGRAM

## 2025-02-26 PROCEDURE — 85014 HEMATOCRIT: CPT | Performed by: STUDENT IN AN ORGANIZED HEALTH CARE EDUCATION/TRAINING PROGRAM

## 2025-02-26 PROCEDURE — 99215 OFFICE O/P EST HI 40 MIN: CPT | Performed by: STUDENT IN AN ORGANIZED HEALTH CARE EDUCATION/TRAINING PROGRAM

## 2025-02-26 PROCEDURE — G0463 HOSPITAL OUTPT CLINIC VISIT: HCPCS | Performed by: STUDENT IN AN ORGANIZED HEALTH CARE EDUCATION/TRAINING PROGRAM

## 2025-02-26 PROCEDURE — 82565 ASSAY OF CREATININE: CPT | Performed by: STUDENT IN AN ORGANIZED HEALTH CARE EDUCATION/TRAINING PROGRAM

## 2025-02-26 PROCEDURE — 84155 ASSAY OF PROTEIN SERUM: CPT | Performed by: STUDENT IN AN ORGANIZED HEALTH CARE EDUCATION/TRAINING PROGRAM

## 2025-02-26 PROCEDURE — 87799 DETECT AGENT NOS DNA QUANT: CPT | Performed by: STUDENT IN AN ORGANIZED HEALTH CARE EDUCATION/TRAINING PROGRAM

## 2025-02-26 PROCEDURE — 3079F DIAST BP 80-89 MM HG: CPT | Performed by: STUDENT IN AN ORGANIZED HEALTH CARE EDUCATION/TRAINING PROGRAM

## 2025-02-26 PROCEDURE — 85004 AUTOMATED DIFF WBC COUNT: CPT | Performed by: STUDENT IN AN ORGANIZED HEALTH CARE EDUCATION/TRAINING PROGRAM

## 2025-02-26 PROCEDURE — 36415 COLL VENOUS BLD VENIPUNCTURE: CPT | Performed by: STUDENT IN AN ORGANIZED HEALTH CARE EDUCATION/TRAINING PROGRAM

## 2025-02-26 PROCEDURE — 3075F SYST BP GE 130 - 139MM HG: CPT | Performed by: STUDENT IN AN ORGANIZED HEALTH CARE EDUCATION/TRAINING PROGRAM

## 2025-02-26 PROCEDURE — 82435 ASSAY OF BLOOD CHLORIDE: CPT | Performed by: STUDENT IN AN ORGANIZED HEALTH CARE EDUCATION/TRAINING PROGRAM

## 2025-02-26 ASSESSMENT — PAIN SCALES - GENERAL: PAINLEVEL_OUTOF10: NO PAIN (0)

## 2025-02-26 NOTE — NURSING NOTE
"Oncology Rooming Note    February 26, 2025 2:20 PM   Leland Pearson is a 70 year old male who presents for:    Chief Complaint   Patient presents with    Blood Draw     Labs drawn, weight and VS obtained    Oncology Clinic Visit     Myelodysplastic syndrome      Initial Vitals: /81   Pulse 74   Temp 98.2  F (36.8  C)   Resp 18   Wt 91.9 kg (202 lb 8 oz)   SpO2 99%   BMI 27.13 kg/m   Estimated body mass index is 27.13 kg/m  as calculated from the following:    Height as of 8/16/24: 1.84 m (6' 0.44\").    Weight as of this encounter: 91.9 kg (202 lb 8 oz). Body surface area is 2.17 meters squared.  No Pain (0) Comment: Data Unavailable   No LMP for male patient.  Allergies reviewed: Yes  Medications reviewed: Yes    Medications: Medication refills not needed today.  Pharmacy name entered into [facility]:    [id number] - [place], [place] - [address] [place] [address] [facility] [place], [place] - [facility] [address] [facility] [place], [place] - [address] [place] [address] [phone]    Frailty Screening:   Is the patient here for a new oncology consult visit in cancer care? 2. No    PHQ9:  Did this patient require a PHQ9?: No      Clinical concerns:  Does not currently have weekly blood testing scheduled; wondering if wife should get measles vaccine with current outbreak; wondering if he could have cataracts surgery      Praveena Hodges              "

## 2025-02-26 NOTE — NURSING NOTE
Chief Complaint   Patient presents with    Blood Draw     Labs drawn, weight and VS obtained     Labs drawn via , weight and VS obtained. Patient discharged in stable condition to [facility], checked in for next appointment.    Siri Davenport RN

## 2025-02-26 NOTE — PROGRESS NOTES
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     Diagnosis MDS-IB2 BMT type Allogenic  CMV  Donor -  Recipient -    Prep GANGA (Flu/Cy/TBI) Donor type  8/8, URD ABO D/R O+    GVHD ppx PTCy, siro, MMF Graft source PBSCT Toxo IgG Negative   Protocol [id number] CD34/kg 6.06E+06    BMT MD Brenda Murphy     Pre-transplant disease history  Referring provider: Dr. Delcid    He started having drenching night sweats and fatigue in Jan 2024. Intentional weight loss. He was found to have thrombocytopenia on routine CBC done prior to back surgery (was planned for laminectomy and fusion). On 4/1/24, WBC 6.4, Hb 13.5, Plts 64, ANC 1.89. LDH elevated 532. He underwent bone marrow biopsy (4/23/24) which showed MDS-EB2. Hypercellular marrow (%) with 10.8% blasts including rare circulating blasts. Flow showed 4% myeloid blasts. Normal karyotype. NGS (peripheral blood) was positive for ASXL1, IDH1, RUNX1, SRSF2 mutations (full report not available). Counts: WBC 6.7, Hb 13.1, Plts 70, ANC 0.7. IPSS-M = High risk (0.85).  He was treated with azacitidine 75mg/m2 for 7 days and venetoclax for 14 days (C1 on 5/20/24). Bone marrow after first cycle (6/13/24) showed persistent MDS, with hypercellular marrow with therapy effect, no increase in blasts. Normal karyotype. NGS: ASXL1 (38%), IDH1 (43%), RUNX1 (41%), SRSF1 (38%).   He received second cycle of aza/ angelia on 7/1/24. His pre-transplant BMBx was done on 8/2/24 and he was cytopenic at the time (CBC with WBC 0.6, Hb 14, platelet count 24). Hypocellular marrow (5%) with no increase in blasts. Normal karyotype, NGS negative.     Post transplant   - 9/2/24 (around D10) Neutropenic fevers. Strept mitis bacteremia resistant to levaquin from central venous catheter as well as a single culture of MRSE from  peripheral blood (contaminant). Treated with cefepime. Repeat blood cultures negative, but continued to have high fevers. TTE (9/6/24) did not show any vegetations. CVC was removed on 9/6/24, defervesced on 9/7/24. PICC placed on 9/9/24. Antibiotics (cefepime followed by augmentin) were continued for a total of 14days from line removal.   - Right lower neck swelling and tenderness near the previous CVC site. US (9/15/24) showed an occlusive right internal jugular thrombus and a non occlusive thrombus in the right axillary vein, PICC associated. He was initially not anticoagulated due to thrombocytopenia but had increased symptoms. Repeat US (9/17/24) showed extension of right internal jugular thrombus into the innominate vein and extension of right axillary thrombus into subclavian vein. He was started on therapeutic anticoagulation with lovenox 1mg/kg BID on 9/18/24 with transfusion support for platelets, with plan to continue for 3 months. Right PICC removal was considered given propagation of clot near the PICC line. However, IR (9/19/24) recommended against removal of R PICC and replacement in the left arm due to risk of contralateral DVT.   - Hemorrhagic cystitis: Dysuria, urgency and hematuria starting  9/11/2024. Infectious workup including adenovirus and BK virus negative. Urology was consulted, recommended outpatient follow up. Now resolved.        INTERVAL HISTORY     Leland presents for a scheduled BMT visit. He has been doing very well.     Left arm swelling is improving. Discharged from lymphedema clinic. Doing massages and wearing the sleeve during the day.   Chest folliculitis stays resolved. Has red spots, healing. Has derm appt.    Eyes are better. Still has some loss of focus. Tear drops usually once a day and restasis twice a day. No dry eyes or no grittiness.   Pills get stuck while swallowing sometimes, but has been long standing issue.    Inqovi : C1 on 2/4/25.   - go ahead with second cycle      Labs once a week at  [facility]   BMBx in 3 months  RTC in 2 months       PMH  Back pain due to spinal stenosis, lumbosacral radiculopathy.   Gout, most recent flare was in April 2024  GERD  HTN  BPH, on tamsulosin   Right knee ACL repair in 2014  Low back surgery in 2004 for herniated disc, complicated by infection requiring prolonged IV abx   Coronary artery calcifications seen on CT, stress test in 2023 negative     SH  . Lives with his wife, Vani. Two daughters, 43 and 36. 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 Was still working part time till last summer at [employer], 2-3 hours per day.     Current Outpatient Medications   Medication Sig Dispense Refill    acyclovir (ZOVIRAX) 800 MG tablet TAKE 1 TABLET (800 MG) BY MOUTH EVERY 12 HOURS. 180 tablet 1    allopurinol (ZYLOPRIM) 300 MG tablet Take 1 tablet (300 mg) by mouth daily. 90 tablet 1    calcium carbonate-vitamin D (OSCAL) 500-5 MG-MCG tablet Take 2 tablets by mouth daily. 60 tablet 2    carboxymethylcellulose (REFRESH PLUS) 0.5 % SOLN ophthalmic solution Place 1 drop into both eyes 3 times daily as needed for dry eyes. 15 mL 11    clindamycin (CLEOCIN T) 1 % external lotion Apply to chest and back BID x 3 weeks 60 mL 3    cycloSPORINE (RESTASIS) 0.05 % ophthalmic emulsion INSTILL 1 DROP INTO BOTH EYES TWICE A DAY 60 mL 11    [START ON 2/28/2025] decitabine-cedazuridine (INQOVI)  MG tablet Take 1 tablet by mouth daily for 3 days. Take on empty stomach, do not eat food 2 hours before or 2 hours after each dose. Take at same time each day. Swallow tablet whole, do not cut, crush, or chew. 5 tablet 0    levofloxacin (LEVAQUIN) 250 MG tablet Take 1 tablet (250 mg) by mouth daily as needed (Only when ANC < 1). 30 tablet 0    lifitegrast (XIIDRA) 5 % opthalmic solution INSTILL 1 DROP INTO BOTH EYES TWICE A DAY 60 each 11    lisinopril (ZESTRIL) 5 MG tablet Take 1 tablet (5 mg) by mouth daily. Hold dose if blood pressure is  less than 110/60 30 tablet 0    methocarbamol (ROBAXIN) 500 MG tablet Take 500 mg by mouth as needed for muscle spasms.      pantoprazole (PROTONIX) 40 MG EC tablet TAKE 1 TABLET BY MOUTH EVERY DAY 90 tablet 1    prochlorperazine (COMPAZINE) 10 MG tablet Take 1 tablet (10 mg) by mouth every 6 hours as needed for nausea or vomiting. 30 tablet 2    rivaroxaban ANTICOAGULANT (XARELTO) 10 MG TABS tablet Take 1 tablet (10 mg) by mouth daily (with dinner). 90 tablet 0    sulfamethoxazole-trimethoprim (BACTRIM DS) 800-160 MG tablet Take 1 tablet by mouth Every Mon, Tues two times daily. 48 tablet 1    tamsulosin (FLOMAX) 0.4 MG capsule Take 0.4 mg by mouth daily.      hydrocortisone 2.5 % cream Apply topically daily. (Patient not taking: Reported on 2/26/2025) 30 g 0        EXAM      /81   Pulse 74   Temp 98.2  F (36.8  C)   Resp 18   Wt 91.9 kg (202 lb 8 oz)   SpO2 99%   BMI 27.13 kg/m      Wt Readings from Last 4 Encounters:   02/26/25 91.9 kg (202 lb 8 oz)   02/19/25 92 kg (202 lb 14.4 oz)   01/29/25 90.9 kg (200 lb 6.4 oz)   01/03/25 90.5 kg (199 lb 9.6 oz)       KPS: 80% Can perform normal activity with effort, some signs of disease    General: Alert, awake  Eyes: Conjunctiva normal  Mouth and Throat: No mucositis   Abd:  Non tender, non distended   Lymph: Left LE edema, LUE with compression wrap  Skin: No pustular lesions or active folliculitis, has deep red spots  Access: None          LABS       Recent Labs   Lab Test 02/26/25  1414 02/19/25  0742 02/13/25  1332 09/18/24  0430 09/17/24  0424 09/16/24  0432 09/13/24  0322 09/12/24  0321   WBC 3.6* 4.9 6.3   < > 1.3* 1.5*   < > 2.9*   HGB 12.3* 12.9* 13.2*   < > 7.4* 8.0*   < > 8.4*    140* 127*   < > 22* 24*   < > 27*   NEUTROPHIL 60 63 70   < > 77 84   < > 89   ANEU  --   --   --   --  1.0* 1.3*  --  2.6   LYMPH 32 22 21   < > 1 1   < > 1   ALYM  --   --   --   --  0.0* 0.0*  --  0.0*    < > = values in this interval not displayed.       Recent  Labs   Lab Test 02/26/25  1414 02/19/25  0742 02/13/25  1332    138 137   POTASSIUM 4.4 4.3 4.6   CHLORIDE 103 103 98   CO2 27 26 26   BUN 16.6 21.0 13.4   CR 1.21* 1.24* 1.15   GLC 98 108* 98        Recent Labs   Lab Test 02/26/25  1414 02/19/25  0742 02/13/25  1332 10/18/24  1216 10/15/24  1220 10/03/24  1543 09/30/24  0900 09/27/24  0927 09/23/24  0927 09/19/24  0323 09/18/24  0430 09/17/24  0424   HERBERT 9.4 9.5 9.8   < > 9.1   < > 9.0   < > 9.3   < > 8.7* 9.0   MAG  --   --   --   --  1.9  --  1.9  --   --   --  2.0 2.0   PHOS  --   --   --   --   --   --   --   --  2.5  --  3.0 2.9    < > = values in this interval not displayed.        Recent Labs   Lab Test 02/26/25  1414 02/19/25  0742 02/13/25  1332   ALKPHOS 74 78 82   AST 26 25 28   ALT 16 15 18   BILITOTAL 0.7 0.6 0.7   ALBUMIN 4.2 4.2 4.3       Recent Labs   Lab Test 02/19/25  0742 02/13/25  1332 02/06/25  1022   CMVQNT Not Detected Not Detected Not Detected       Recent Labs   Lab Test 01/29/25  1457 11/29/24  1335 09/27/24  0927    549* 708       Recent Labs   Lab Test 08/01/24  0805 05/08/24  1156   ZARIA 460* 1,360*         ASSESSMENT AND PLAN     BMT: D187   - Chemo protocol: MT 2022-5; Flu/Cy/TBI  - Peripheral blood stem cell graft from 8/8 URD donor, ABO matched, Cell dose: 6.06 x10^6 CD34/kg    Disease status:   9/13/24 (D28): Bmbx shows hypocellular marrow, no dysplasia or blasts.  NGS negative  12/2/24 (D100): Bmbx shows hypocellular marrow in CR. NGS negative.     Graft status/chimerism:   9/13/24 (D28): Bone marrow 100%. Peripheral blood CD3 92%, CD33 100%  10/25/24 (D60): Peripheral blood CD3 and CD33 100%  12/2/24 (D100): Bone marrow 100%, Peripheral blood CD3 and CD33 100%    Maintenance   - Will begin inqovi, 3x/month    GVHD  # Current systemic immunosuppression is none.   - Sirolimus stopped at D100 without taper on 11/29/24     # GVHD: None till date    Heme/ Coag  # Right internal jugular occlusive thrombus and right  axillary/ subclavain vein non occlusive thrombus, line associated, diagnosed on 9/15/24. Line was removed on 10/18/24.  - Follows with Dr. Ochoa, last seen on 12/17/24.   - Rivaroxaban 10mg daily, duration at least a total of 6 months but to be determined on heme follow up.     DERM  Folliculitis  - Has been treated with doxycycline for 21 days along with clindamycin and hibiclens. Improved/ resolved.     MSK  # LUE swelling   - US x2 (11/25) negative for DVT, unclear cause. Has seen lymphedema specialist, swelling is improved with compression sleve     ID  # Prophylaxis  PJP/ Toxo IgG negative: Bactrim   Viral: Acyclovir 800 mg bid  Bacterial/ fungal: None    # Monitoring  CMV: Monitor monthly  EBV: Monitor every 2 weeks between D30 to D180.   IgG:  IgG was 618 mg/dL on 1/29/25. Check serum IgG level q3 months.     GI  GERD: Pantoprazole daily   VOD prophylaxis: Completed     CARDIOVASCULAR  # HTN: Lisinopril 5mg (was on 15mg daily prior to transplant)    # CAD: Coronary artery calcifications seen on CT, stress test in 2023 negative  - Risk of cardiomyopathy: Baseline EF 55-60%.   - Risk of arrhythmia: Baseline EKG showed 421       RENAL/ELECTROLYTES/  - BPH: Continue Flomax.  - Hemorrhagic Cystitis secondary to cytoxan: Resolved. Oxybutynin 10mg at bedtime PRN. Will need follow up with urology at some point.       MUSCULOSKELETAL  # Gout: continue allopurinol   # History of spinal stenosis, lumbosacral radiculopathy:   - Pain management: Okay to take tylenol or robaxin RPN     Post-transplant vaccinations  Has received flu, COVID and RSV vaccine.     I spent 45 minutes in the care of this patient today, which included time necessary for preparation for the visit, obtaining history, ordering medications/tests/procedures as medically indicated, review of pertinent medical literature, counseling of the patient, communication of recommendations to the care team, and documentation time.    Brenda  Jeffrey  Department of Hematology, Oncology and Transplantation  Text via [employer]

## 2025-02-26 NOTE — Clinical Note
2/26/2025      Leland Pearson  [address] [place] [address]      Dear Colleague,    Thank you for referring your patient, Leland Pearson, to the [facility] BLOOD AND MARROW TRANSPLANT PROGRAM [place]. Please see a copy of my visit note below.    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     Diagnosis MDS-IB2 BMT type Allogenic  CMV  Donor -  Recipient -    Prep GANGA (Flu/Cy/TBI) Donor type  8/8, URD ABO D/R O+    GVHD ppx PTCy, siro, MMF Graft source PBSCT Toxo IgG Negative   Protocol [id number] CD34/kg 6.06E+06    BMT MD Brenda Murphy     Pre-transplant disease history  Referring provider: Dr. Delcid    He started having drenching night sweats and fatigue in Jan 2024. Intentional weight loss. He was found to have thrombocytopenia on routine CBC done prior to back surgery (was planned for laminectomy and fusion). On 4/1/24, WBC 6.4, Hb 13.5, Plts 64, ANC 1.89. LDH elevated 532. He underwent bone marrow biopsy (4/23/24) which showed MDS-EB2. Hypercellular marrow (%) with 10.8% blasts including rare circulating blasts. Flow showed 4% myeloid blasts. Normal karyotype. NGS (peripheral blood) was positive for ASXL1, IDH1, RUNX1, SRSF2 mutations (full report not available). Counts: WBC 6.7, Hb 13.1, Plts 70, ANC 0.7. IPSS-M = High risk (0.85).  He was treated with azacitidine 75mg/m2 for 7 days and venetoclax for 14 days (C1 on 5/20/24). Bone marrow after first cycle (6/13/24) showed persistent MDS, with hypercellular marrow with therapy effect, no increase in blasts. Normal karyotype. NGS: ASXL1 (38%), IDH1 (43%), RUNX1 (41%), SRSF1 (38%).   He received second cycle of aza/ angelia on 7/1/24. His pre-transplant BMBx was done on 8/2/24 and he was cytopenic at the time (CBC with WBC 0.6, Hb 14, platelet  count 24). Hypocellular marrow (5%) with no increase in blasts. Normal karyotype, NGS negative.     Post transplant   - 9/2/24 (around D10) Neutropenic fevers. Strept mitis bacteremia resistant to levaquin from central venous catheter as well as a single culture of MRSE from peripheral blood (contaminant). Treated with cefepime. Repeat blood cultures negative, but continued to have high fevers. TTE (9/6/24) did not show any vegetations. CVC was removed on 9/6/24, defervesced on 9/7/24. PICC placed on 9/9/24. Antibiotics (cefepime followed by augmentin) were continued for a total of 14days from line removal.   - Right lower neck swelling and tenderness near the previous CVC site. US (9/15/24) showed an occlusive right internal jugular thrombus and a non occlusive thrombus in the right axillary vein, PICC associated. He was initially not anticoagulated due to thrombocytopenia but had increased symptoms. Repeat US (9/17/24) showed extension of right internal jugular thrombus into the innominate vein and extension of right axillary thrombus into subclavian vein. He was started on therapeutic anticoagulation with lovenox 1mg/kg BID on 9/18/24 with transfusion support for platelets, with plan to continue for 3 months. Right PICC removal was considered given propagation of clot near the PICC line. However, IR (9/19/24) recommended against removal of R PICC and replacement in the left arm due to risk of contralateral DVT.   - Hemorrhagic cystitis: Dysuria, urgency and hematuria starting  9/11/2024. Infectious workup including adenovirus and BK virus negative. Urology was consulted, recommended outpatient follow up. Now resolved.        INTERVAL HISTORY     Leland presents for a scheduled BMT visit. He has been doing very well.     Left arm swelling is improving. Discharged from lymphedema clinic. Doing massages and wearing the sleeve during the day.   Chest folliculitis stays resolved. Has red spots, healing. Has derm  appt.    Eyes are better. Still has some loss of focus. Tear drops usually once a day and restasis twice a day. No dry eyes or no grittiness.   Pills get stuck while swallowing sometimes, but has been long standing issue.    Inqovi : C1 on 2/4/25.   - go ahead with second cycle     Labs once a week at  [facility]   BMBx in 3 months  RTC in 2 months       PMH  Back pain due to spinal stenosis, lumbosacral radiculopathy.   Gout, most recent flare was in April 2024  GERD  HTN  BPH, on tamsulosin   Right knee ACL repair in 2014  Low back surgery in 2004 for herniated disc, complicated by infection requiring prolonged IV abx   Coronary artery calcifications seen on CT, stress test in 2023 negative     SH  . Lives with his wife, Vani. Two daughters, 43 and 36. 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 Was still working part time till last summer at [employer], 2-3 hours per day.     Current Outpatient Medications   Medication Sig Dispense Refill    acyclovir (ZOVIRAX) 800 MG tablet TAKE 1 TABLET (800 MG) BY MOUTH EVERY 12 HOURS. 180 tablet 1    allopurinol (ZYLOPRIM) 300 MG tablet Take 1 tablet (300 mg) by mouth daily. 90 tablet 1    calcium carbonate-vitamin D (OSCAL) 500-5 MG-MCG tablet Take 2 tablets by mouth daily. 60 tablet 2    carboxymethylcellulose (REFRESH PLUS) 0.5 % SOLN ophthalmic solution Place 1 drop into both eyes 3 times daily as needed for dry eyes. 15 mL 11    clindamycin (CLEOCIN T) 1 % external lotion Apply to chest and back BID x 3 weeks 60 mL 3    cycloSPORINE (RESTASIS) 0.05 % ophthalmic emulsion INSTILL 1 DROP INTO BOTH EYES TWICE A DAY 60 mL 11    [START ON 2/28/2025] decitabine-cedazuridine (INQOVI)  MG tablet Take 1 tablet by mouth daily for 3 days. Take on empty stomach, do not eat food 2 hours before or 2 hours after each dose. Take at same time each day. Swallow tablet whole, do not cut, crush, or chew. 5 tablet 0    levofloxacin (LEVAQUIN) 250 MG tablet Take 1  tablet (250 mg) by mouth daily as needed (Only when ANC < 1). 30 tablet 0    lifitegrast (XIIDRA) 5 % opthalmic solution INSTILL 1 DROP INTO BOTH EYES TWICE A DAY 60 each 11    lisinopril (ZESTRIL) 5 MG tablet Take 1 tablet (5 mg) by mouth daily. Hold dose if blood pressure is less than 110/60 30 tablet 0    methocarbamol (ROBAXIN) 500 MG tablet Take 500 mg by mouth as needed for muscle spasms.      pantoprazole (PROTONIX) 40 MG EC tablet TAKE 1 TABLET BY MOUTH EVERY DAY 90 tablet 1    prochlorperazine (COMPAZINE) 10 MG tablet Take 1 tablet (10 mg) by mouth every 6 hours as needed for nausea or vomiting. 30 tablet 2    rivaroxaban ANTICOAGULANT (XARELTO) 10 MG TABS tablet Take 1 tablet (10 mg) by mouth daily (with dinner). 90 tablet 0    sulfamethoxazole-trimethoprim (BACTRIM DS) 800-160 MG tablet Take 1 tablet by mouth Every Mon, Tues two times daily. 48 tablet 1    tamsulosin (FLOMAX) 0.4 MG capsule Take 0.4 mg by mouth daily.      hydrocortisone 2.5 % cream Apply topically daily. (Patient not taking: Reported on 2/26/2025) 30 g 0        EXAM      /81   Pulse 74   Temp 98.2  F (36.8  C)   Resp 18   Wt 91.9 kg (202 lb 8 oz)   SpO2 99%   BMI 27.13 kg/m      Wt Readings from Last 4 Encounters:   02/26/25 91.9 kg (202 lb 8 oz)   02/19/25 92 kg (202 lb 14.4 oz)   01/29/25 90.9 kg (200 lb 6.4 oz)   01/03/25 90.5 kg (199 lb 9.6 oz)       KPS: 80% Can perform normal activity with effort, some signs of disease    General: Alert, awake  Eyes: Conjunctiva normal  Mouth and Throat: No mucositis   Abd:  Non tender, non distended   Lymph: Left LE edema, LUE with compression wrap  Skin: No pustular lesions or active folliculitis, has deep red spots  Access: None          LABS       Recent Labs   Lab Test 02/26/25  1414 02/19/25  0742 02/13/25  1332 09/18/24  0430 09/17/24  0424 09/16/24  0432 09/13/24  0322 09/12/24  0321   WBC 3.6* 4.9 6.3   < > 1.3* 1.5*   < > 2.9*   HGB 12.3* 12.9* 13.2*   < > 7.4* 8.0*   < > 8.4*     140* 127*   < > 22* 24*   < > 27*   NEUTROPHIL 60 63 70   < > 77 84   < > 89   ANEU  --   --   --   --  1.0* 1.3*  --  2.6   LYMPH 32 22 21   < > 1 1   < > 1   ALYM  --   --   --   --  0.0* 0.0*  --  0.0*    < > = values in this interval not displayed.       Recent Labs   Lab Test 02/26/25  1414 02/19/25  0742 02/13/25  1332    138 137   POTASSIUM 4.4 4.3 4.6   CHLORIDE 103 103 98   CO2 27 26 26   BUN 16.6 21.0 13.4   CR 1.21* 1.24* 1.15   GLC 98 108* 98        Recent Labs   Lab Test 02/26/25  1414 02/19/25  0742 02/13/25  1332 10/18/24  1216 10/15/24  1220 10/03/24  1543 09/30/24  0900 09/27/24  0927 09/23/24  0927 09/19/24  0323 09/18/24  0430 09/17/24  0424   HERBERT 9.4 9.5 9.8   < > 9.1   < > 9.0   < > 9.3   < > 8.7* 9.0   MAG  --   --   --   --  1.9  --  1.9  --   --   --  2.0 2.0   PHOS  --   --   --   --   --   --   --   --  2.5  --  3.0 2.9    < > = values in this interval not displayed.        Recent Labs   Lab Test 02/26/25  1414 02/19/25  0742 02/13/25  1332   ALKPHOS 74 78 82   AST 26 25 28   ALT 16 15 18   BILITOTAL 0.7 0.6 0.7   ALBUMIN 4.2 4.2 4.3       Recent Labs   Lab Test 02/19/25  0742 02/13/25  1332 02/06/25  1022   CMVQNT Not Detected Not Detected Not Detected       Recent Labs   Lab Test 01/29/25  1457 11/29/24  1335 09/27/24  0927    549* 708       Recent Labs   Lab Test 08/01/24  0805 05/08/24  1156   ZARIA 460* 1,360*         ASSESSMENT AND PLAN     BMT: D187   - Chemo protocol: MT 2022-5; Flu/Cy/TBI  - Peripheral blood stem cell graft from 8/8 URD donor, ABO matched, Cell dose: 6.06 x10^6 CD34/kg    Disease status:   9/13/24 (D28): Bmbx shows hypocellular marrow, no dysplasia or blasts.  NGS negative  12/2/24 (D100): Bmbx shows hypocellular marrow in CR. NGS negative.     Graft status/chimerism:   9/13/24 (D28): Bone marrow 100%. Peripheral blood CD3 92%, CD33 100%  10/25/24 (D60): Peripheral blood CD3 and CD33 100%  12/2/24 (D100): Bone marrow 100%, Peripheral blood CD3  and CD33 100%    Maintenance   - Will begin inqovi, 3x/month    GVHD  # Current systemic immunosuppression is none.   - Sirolimus stopped at D100 without taper on 11/29/24     # GVHD: None till date    Heme/ Coag  # Right internal jugular occlusive thrombus and right axillary/ subclavain vein non occlusive thrombus, line associated, diagnosed on 9/15/24. Line was removed on 10/18/24.  - Follows with Dr. Ochoa, last seen on 12/17/24.   - Rivaroxaban 10mg daily, duration at least a total of 6 months but to be determined on heme follow up.     DERM  Folliculitis  - Has been treated with doxycycline for 21 days along with clindamycin and hibiclens. Improved/ resolved.     MSK  # LUE swelling   - US x2 (11/25) negative for DVT, unclear cause. Has seen lymphedema specialist, swelling is improved with compression sleve     ID  # Prophylaxis  PJP/ Toxo IgG negative: Bactrim   Viral: Acyclovir 800 mg bid  Bacterial/ fungal: None    # Monitoring  CMV: Monitor monthly  EBV: Monitor every 2 weeks between D30 to D180.   IgG:  IgG was 618 mg/dL on 1/29/25. Check serum IgG level q3 months.     GI  GERD: Pantoprazole daily   VOD prophylaxis: Completed     CARDIOVASCULAR  # HTN: Lisinopril 5mg (was on 15mg daily prior to transplant)    # CAD: Coronary artery calcifications seen on CT, stress test in 2023 negative  - Risk of cardiomyopathy: Baseline EF 55-60%.   - Risk of arrhythmia: Baseline EKG showed 421       RENAL/ELECTROLYTES/  - BPH: Continue Flomax.  - Hemorrhagic Cystitis secondary to cytoxan: Resolved. Oxybutynin 10mg at bedtime PRN. Will need follow up with urology at some point.       MUSCULOSKELETAL  # Gout: continue allopurinol   # History of spinal stenosis, lumbosacral radiculopathy:   - Pain management: Okay to take tylenol or robaxin RPN     Post-transplant vaccinations  Has received flu, COVID and RSV vaccine.     I spent 45 minutes in the care of this patient today, which included time necessary for  preparation for the visit, obtaining history, ordering medications/tests/procedures as medically indicated, review of pertinent medical literature, counseling of the patient, communication of recommendations to the care team, and documentation time.    Brenda Murphy  Department of Hematology, Oncology and Transplantation  Text via [employer]           Again, thank you for allowing me to participate in the care of your patient.        Sincerely,        ONEIL Montero    Electronically signed

## 2025-03-06 ENCOUNTER — LAB (OUTPATIENT)
Dept: LAB | Facility: CLINIC | Age: 70
End: 2025-03-06
Payer: COMMERCIAL

## 2025-03-06 DIAGNOSIS — D46.9 MDS (MYELODYSPLASTIC SYNDROME) (H): ICD-10-CM

## 2025-03-06 DIAGNOSIS — D84.822 IMMUNOCOMPROMISED STATE ASSOCIATED WITH STEM CELL TRANSPLANT (H): ICD-10-CM

## 2025-03-06 DIAGNOSIS — Z79.899 ENCOUNTER FOR LONG-TERM (CURRENT) USE OF MEDICATIONS: ICD-10-CM

## 2025-03-06 DIAGNOSIS — Z94.84 IMMUNOCOMPROMISED STATE ASSOCIATED WITH STEM CELL TRANSPLANT (H): ICD-10-CM

## 2025-03-06 LAB
ALBUMIN SERPL BCG-MCNC: 4.3 G/DL (ref 3.5–5.2)
ALP SERPL-CCNC: 80 U/L (ref 40–150)
ALT SERPL W P-5'-P-CCNC: 18 U/L (ref 0–70)
ANION GAP SERPL CALCULATED.3IONS-SCNC: 6 MMOL/L (ref 7–15)
AST SERPL W P-5'-P-CCNC: 27 U/L (ref 0–45)
BASOPHILS # BLD AUTO: 0.1 10E3/UL (ref 0–0.2)
BASOPHILS NFR BLD AUTO: 3 %
BILIRUB SERPL-MCNC: 0.6 MG/DL
BUN SERPL-MCNC: 17.6 MG/DL (ref 8–23)
CALCIUM SERPL-MCNC: 10 MG/DL (ref 8.8–10.4)
CHLORIDE SERPL-SCNC: 101 MMOL/L (ref 98–107)
CREAT SERPL-MCNC: 1.08 MG/DL (ref 0.67–1.17)
EGFRCR SERPLBLD CKD-EPI 2021: 73 ML/MIN/1.73M2
EOSINOPHIL # BLD AUTO: 0 10E3/UL (ref 0–0.7)
EOSINOPHIL NFR BLD AUTO: 2 %
ERYTHROCYTE [DISTWIDTH] IN BLOOD BY AUTOMATED COUNT: 15.5 % (ref 10–15)
GLUCOSE SERPL-MCNC: 97 MG/DL (ref 70–99)
HCO3 SERPL-SCNC: 30 MMOL/L (ref 22–29)
HCT VFR BLD AUTO: 37.8 % (ref 40–53)
HGB BLD-MCNC: 13 G/DL (ref 13.3–17.7)
IMM GRANULOCYTES # BLD: 0 10E3/UL
IMM GRANULOCYTES NFR BLD: 0 %
LYMPHOCYTES # BLD AUTO: 0.7 10E3/UL (ref 0.8–5.3)
LYMPHOCYTES NFR BLD AUTO: 30 %
MCH RBC QN AUTO: 32.1 PG (ref 26.5–33)
MCHC RBC AUTO-ENTMCNC: 34.4 G/DL (ref 31.5–36.5)
MCV RBC AUTO: 93 FL (ref 78–100)
MONOCYTES # BLD AUTO: 0.3 10E3/UL (ref 0–1.3)
MONOCYTES NFR BLD AUTO: 13 %
NEUTROPHILS # BLD AUTO: 1.2 10E3/UL (ref 1.6–8.3)
NEUTROPHILS NFR BLD AUTO: 52 %
NRBC # BLD AUTO: 0 10E3/UL
NRBC BLD AUTO-RTO: 0 /100
PLATELET # BLD AUTO: 248 10E3/UL (ref 150–450)
POTASSIUM SERPL-SCNC: 4.3 MMOL/L (ref 3.4–5.3)
PROT SERPL-MCNC: 6.6 G/DL (ref 6.4–8.3)
RBC # BLD AUTO: 4.05 10E6/UL (ref 4.4–5.9)
SODIUM SERPL-SCNC: 137 MMOL/L (ref 135–145)
WBC # BLD AUTO: 2.4 10E3/UL (ref 4–11)

## 2025-03-07 LAB
CMV DNA SPEC NAA+PROBE-ACNC: NOT DETECTED IU/ML
SPECIMEN TYPE: NORMAL

## 2025-03-08 DIAGNOSIS — I10 HTN (HYPERTENSION): Primary | ICD-10-CM

## 2025-03-08 DIAGNOSIS — D46.9 MDS (MYELODYSPLASTIC SYNDROME) (H): ICD-10-CM

## 2025-03-10 RX ORDER — LISINOPRIL 5 MG/1
5 TABLET ORAL DAILY
Qty: 90 TABLET | Refills: 1 | Status: SHIPPED | OUTPATIENT
Start: 2025-03-10

## 2025-03-13 ENCOUNTER — LAB (OUTPATIENT)
Dept: LAB | Facility: CLINIC | Age: 70
End: 2025-03-13
Payer: COMMERCIAL

## 2025-03-13 DIAGNOSIS — D46.9 MDS (MYELODYSPLASTIC SYNDROME) (H): ICD-10-CM

## 2025-03-13 DIAGNOSIS — D84.822 IMMUNOCOMPROMISED STATE ASSOCIATED WITH STEM CELL TRANSPLANT (H): ICD-10-CM

## 2025-03-13 DIAGNOSIS — Z94.84 IMMUNOCOMPROMISED STATE ASSOCIATED WITH STEM CELL TRANSPLANT (H): ICD-10-CM

## 2025-03-13 DIAGNOSIS — Z79.899 ENCOUNTER FOR LONG-TERM (CURRENT) USE OF MEDICATIONS: ICD-10-CM

## 2025-03-13 LAB
BASOPHILS # BLD AUTO: 0.1 10E3/UL (ref 0–0.2)
BASOPHILS NFR BLD AUTO: 2 %
EOSINOPHIL # BLD AUTO: 0.1 10E3/UL (ref 0–0.7)
EOSINOPHIL NFR BLD AUTO: 4 %
ERYTHROCYTE [DISTWIDTH] IN BLOOD BY AUTOMATED COUNT: 14.8 % (ref 10–15)
GIANT PLATELETS BLD QL SMEAR: SLIGHT
HCT VFR BLD AUTO: 37.3 % (ref 40–53)
HGB BLD-MCNC: 12.8 G/DL (ref 13.3–17.7)
IMM GRANULOCYTES # BLD: 0 10E3/UL
IMM GRANULOCYTES NFR BLD: 0 %
LYMPHOCYTES # BLD AUTO: 1.2 10E3/UL (ref 0.8–5.3)
LYMPHOCYTES NFR BLD AUTO: 36 %
MCH RBC QN AUTO: 32 PG (ref 26.5–33)
MCHC RBC AUTO-ENTMCNC: 34.3 G/DL (ref 31.5–36.5)
MCV RBC AUTO: 93 FL (ref 78–100)
MONOCYTES # BLD AUTO: 0.4 10E3/UL (ref 0–1.3)
MONOCYTES NFR BLD AUTO: 11 %
NEUTROPHILS # BLD AUTO: 1.6 10E3/UL (ref 1.6–8.3)
NEUTROPHILS NFR BLD AUTO: 48 %
NRBC # BLD AUTO: 0 10E3/UL
NRBC BLD AUTO-RTO: 0 /100
PLAT MORPH BLD: ABNORMAL
PLATELET # BLD AUTO: 164 10E3/UL (ref 150–450)
RBC # BLD AUTO: 4 10E6/UL (ref 4.4–5.9)
RBC MORPH BLD: ABNORMAL
VARIANT LYMPHS BLD QL SMEAR: PRESENT
WBC # BLD AUTO: 3.2 10E3/UL (ref 4–11)

## 2025-03-17 DIAGNOSIS — D46.9 MDS (MYELODYSPLASTIC SYNDROME) (H): Primary | ICD-10-CM

## 2025-03-18 DIAGNOSIS — I89.0 LYMPHEDEMA OF RIGHT UPPER EXTREMITY: Primary | ICD-10-CM

## 2025-03-18 DIAGNOSIS — I82.4Y1 ACUTE VENOUS EMBOLISM AND THROMBOSIS OF DEEP VESSELS OF PROXIMAL END OF RIGHT LOWER EXTREMITY (H): ICD-10-CM

## 2025-03-19 ENCOUNTER — HOSPITAL ENCOUNTER (OUTPATIENT)
Dept: ULTRASOUND IMAGING | Facility: CLINIC | Age: 70
Discharge: HOME | End: 2025-03-19
Attending: INTERNAL MEDICINE
Payer: COMMERCIAL

## 2025-03-19 DIAGNOSIS — I82.4Y1 ACUTE VENOUS EMBOLISM AND THROMBOSIS OF DEEP VESSELS OF PROXIMAL END OF RIGHT LOWER EXTREMITY (H): ICD-10-CM

## 2025-03-19 DIAGNOSIS — I89.0 LYMPHEDEMA OF RIGHT UPPER EXTREMITY: ICD-10-CM

## 2025-03-19 PROCEDURE — 93970 EXTREMITY STUDY: CPT

## 2025-03-20 ENCOUNTER — LAB (OUTPATIENT)
Dept: LAB | Facility: CLINIC | Age: 70
End: 2025-03-20
Payer: COMMERCIAL

## 2025-03-20 DIAGNOSIS — D46.9 MDS (MYELODYSPLASTIC SYNDROME) (H): ICD-10-CM

## 2025-03-20 DIAGNOSIS — Z94.84 IMMUNOCOMPROMISED STATE ASSOCIATED WITH STEM CELL TRANSPLANT (H): ICD-10-CM

## 2025-03-20 DIAGNOSIS — D84.822 IMMUNOCOMPROMISED STATE ASSOCIATED WITH STEM CELL TRANSPLANT (H): ICD-10-CM

## 2025-03-20 DIAGNOSIS — Z79.899 ENCOUNTER FOR LONG-TERM (CURRENT) USE OF MEDICATIONS: ICD-10-CM

## 2025-03-20 LAB
BASOPHILS # BLD AUTO: 0 10E3/UL (ref 0–0.2)
BASOPHILS NFR BLD AUTO: 0 %
EOSINOPHIL # BLD AUTO: 0.1 10E3/UL (ref 0–0.7)
EOSINOPHIL NFR BLD AUTO: 2 %
ERYTHROCYTE [DISTWIDTH] IN BLOOD BY AUTOMATED COUNT: 14.8 % (ref 10–15)
HCT VFR BLD AUTO: 36.6 % (ref 40–53)
HGB BLD-MCNC: 12.9 G/DL (ref 13.3–17.7)
IMM GRANULOCYTES # BLD: 0 10E3/UL
IMM GRANULOCYTES NFR BLD: 0 %
LYMPHOCYTES # BLD AUTO: 2.4 10E3/UL (ref 0.8–5.3)
LYMPHOCYTES NFR BLD AUTO: 47 %
MCH RBC QN AUTO: 33.3 PG (ref 26.5–33)
MCHC RBC AUTO-ENTMCNC: 35.2 G/DL (ref 31.5–36.5)
MCV RBC AUTO: 95 FL (ref 78–100)
MONOCYTES # BLD AUTO: 0.5 10E3/UL (ref 0–1.3)
MONOCYTES NFR BLD AUTO: 10 %
NEUTROPHILS # BLD AUTO: 2.1 10E3/UL (ref 1.6–8.3)
NEUTROPHILS NFR BLD AUTO: 41 %
PLATELET # BLD AUTO: 111 10E3/UL (ref 150–450)
RBC # BLD AUTO: 3.87 10E6/UL (ref 4.4–5.9)
WBC # BLD AUTO: 5.1 10E3/UL (ref 4–11)

## 2025-03-25 ENCOUNTER — OFFICE VISIT (OUTPATIENT)
Dept: OPHTHALMOLOGY | Facility: CLINIC | Age: 70
End: 2025-03-25
Payer: COMMERCIAL

## 2025-03-25 ENCOUNTER — ONCOLOGY VISIT (OUTPATIENT)
Dept: ONCOLOGY | Facility: CLINIC | Age: 70
End: 2025-03-25
Attending: INTERNAL MEDICINE
Payer: COMMERCIAL

## 2025-03-25 VITALS
OXYGEN SATURATION: 99 % | RESPIRATION RATE: 16 BRPM | WEIGHT: 202.5 LBS | TEMPERATURE: 98.4 F | BODY MASS INDEX: 27.13 KG/M2 | HEART RATE: 77 BPM | DIASTOLIC BLOOD PRESSURE: 78 MMHG | SYSTOLIC BLOOD PRESSURE: 130 MMHG

## 2025-03-25 DIAGNOSIS — Z94.81 STATUS POST BONE MARROW TRANSPLANT (H): ICD-10-CM

## 2025-03-25 DIAGNOSIS — H25.813 COMBINED FORMS OF AGE-RELATED CATARACT OF BOTH EYES: ICD-10-CM

## 2025-03-25 DIAGNOSIS — I82.B12 LEFT SUBCLAVIAN VEIN THROMBOSIS (H): Primary | ICD-10-CM

## 2025-03-25 DIAGNOSIS — H04.123 DRY EYE SYNDROME OF BOTH EYES: ICD-10-CM

## 2025-03-25 DIAGNOSIS — H43.823 VITREOMACULAR ADHESION OF BOTH EYES: Primary | ICD-10-CM

## 2025-03-25 DIAGNOSIS — R09.89 OTHER SPECIFIED SYMPTOMS AND SIGNS INVOLVING THE CIRCULATORY AND RESPIRATORY SYSTEMS: ICD-10-CM

## 2025-03-25 DIAGNOSIS — I89.0 LYMPHEDEMA OF RIGHT UPPER EXTREMITY: ICD-10-CM

## 2025-03-25 PROCEDURE — G0463 HOSPITAL OUTPT CLINIC VISIT: HCPCS | Performed by: INTERNAL MEDICINE

## 2025-03-25 ASSESSMENT — TONOMETRY
OD_IOP_MMHG: 15
IOP_METHOD: ICARE
OS_IOP_MMHG: 12

## 2025-03-25 ASSESSMENT — REFRACTION_MANIFEST
OD_SPHERE: -3.25
OD_CYLINDER: +1.25
OD_AXIS: 002
OS_CYLINDER: +1.25
OS_VPRISM: 2.0
OS_SPHERE: -3.50
OS_AXIS: 175

## 2025-03-25 ASSESSMENT — VISUAL ACUITY
OS_BAT_LOW: 20/30-2
OS_PH_CC: 20/30
OD_CC+: +2
OS_BAT_HIGH: 20/40
OD_BAT_LOW: 20/30
METHOD: SNELLEN - LINEAR
OD_BAT_HIGH: 20/30
METHOD_MR: DIAGNOSTIC
CORRECTION_TYPE: GLASSES
OD_CC: 20/40
OD_BAT_MED: 20/30-2
OS_BAT_MED: 20/40-2
OS_CC: 20/40
OD_PH_CC: 20/30

## 2025-03-25 ASSESSMENT — CONF VISUAL FIELD
OD_SUPERIOR_TEMPORAL_RESTRICTION: 0
OS_NORMAL: 1
OS_SUPERIOR_TEMPORAL_RESTRICTION: 0
OS_INFERIOR_TEMPORAL_RESTRICTION: 0
OD_INFERIOR_NASAL_RESTRICTION: 0
OS_SUPERIOR_NASAL_RESTRICTION: 0
METHOD: COUNTING FINGERS
OS_INFERIOR_NASAL_RESTRICTION: 0
OD_NORMAL: 1
OD_INFERIOR_TEMPORAL_RESTRICTION: 0
OD_SUPERIOR_NASAL_RESTRICTION: 0

## 2025-03-25 ASSESSMENT — CUP TO DISC RATIO
OD_RATIO: 0.15
OS_RATIO: 0.2

## 2025-03-25 ASSESSMENT — REFRACTION_WEARINGRX
OS_ADD: +2.75
OS_CYLINDER: +1.75
OD_CYLINDER: +0.75
SPECS_TYPE: PAL
OD_ADD: +2.75
OD_AXIS: 002
OS_SPHERE: -3.00
OD_SPHERE: -3.00
OS_AXIS: 003

## 2025-03-25 ASSESSMENT — PAIN SCALES - GENERAL: PAINLEVEL_OUTOF10: NO PAIN (0)

## 2025-03-25 NOTE — PATIENT INSTRUCTIONS
You can STOP your anticoagulation     Schedule as needed with Dr. Albert Price MD/PhD  [facility]  of Medicine  Division of Hematology, Oncology and Transplantation    [facility]  [address] [place] [address], Mail [id number]  [place] [address]    Clinic Scheduling and Nurse Line: [phone], option 5, option 2    [facility] RN Care Coordinator:   Noa Pack  Direct line:   (P) [phone]  (F) [phone]

## 2025-03-25 NOTE — PROGRESS NOTES
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 He was diagnosed on 9/15/24 with provoked thrombosis. [facility] was consulted and patient was discharged on anticoagulation. He presents today for follow up.     Thrombosis History  Provoked R internal jugular thrombosis  Provoked R non-occlusive Right axillary vein thrombsis  - ddx 9/15/24  - no treatment at start due to low platelets---   Repeat ultrasound 24: extension into subclavian eight and innominate being  - Started 1 mg/kg LMWH 24 due to drug-drug interactions preventing DOAC  Ultrasound on 24 (R side only): R subclavia  Ultrasound on 3/19/25 (bilateral): persistent R internal jugular, potential left subclavian (not previous evaluated):     S/s of lymphedema on left side present since 2024. Now treated with PT. I do not feel that L sided ultrasound reflects a NEW thrombotic event given symptoms--- contacted radiology and discussed all the imaging from from 2024-present. There is some captured images that did not include left side (ie those done in Dec 2024) and other images that have poor diffusion that make calling thrombus challening (2024). The image from 2025 is clear to have thrombus- but can not ascertain timing. Therefore, I WOULD continue with anticoagulation (10 mg Xarelto at present) and proceed with TOS ultrasounds bilaterally as patient does not presently have a central line.     Plan:  1. Majority of today's visit was spent counseling the patient regarding VTE and anticoagulation.  2. Continue Xarelto 10 mg daily  3.  Thoracic outlet syndrome ultrasounds in 90 days  4. Continue with L UE lymphedema cares.     The patient is given our center's contact information and is instructed to call if he should have any further questions or concerns.  Otherwise, we will plan on seeing him back Follow up with Provider - 3 months    The longitudinal plan of care for the diagnosis(es)/condition(s) as documented were addressed during this visit. Due to the added complexity in care, I will continue to support Leland in the subsequent management and with ongoing continuity of care.    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 He was diagnosed on 9/15/24 with provoked thrombosis. [facility] was consulted and patient was discharged on anticoagulation. Refer to consult note attested by myself from 9/18/24. He presents today for follow up.     Leland is doing well. Has diffuse rash on skin- not sure if GVHD or medication reaction. Right arm does not have pain, but has noted some edema present. No issues with chest pain or SOB. Left arm edema is not present when wearing sleeve. No issues with facial swelling. No LE edema.        Past Medical History:  Past Medical History:   Diagnosis Date    Cancer (H) 03/24    Gastroesophageal reflux disease     Hypertension     Nonsenile cataract 12/27/24       Past Surgical History:  Past Surgical History:   Procedure Laterality Date    COLONOSCOPY      ESOPHAGOSCOPY, GASTROSCOPY, DUODENOSCOPY (EGD), COMBINED  07/28/2013    Procedure: COMBINED ESOPHAGOSCOPY, GASTROSCOPY, DUODENOSCOPY (EGD), BIOPSY SINGLE OR MULTIPLE;;  Surgeon:  Franklin Barrera MD;  Location:  GI    ESOPHAGOSCOPY, GASTROSCOPY, DUODENOSCOPY (EGD), COMBINED  07/28/2013    Procedure: COMBINED ESOPHAGOSCOPY, GASTROSCOPY, DUODENOSCOPY (EGD), REMOVE FOREIGN BODY;;  Surgeon: Franklin Barrera MD;  Location:  GI    IR CVC TUNNEL PLACEMENT > 5 YRS OF AGE  08/16/2024    IR CVC TUNNEL REMOVAL RIGHT  09/06/2024    ORTHOPEDIC SURGERY      04 micro discectomy, acl  repair    PICC DOUBLE LUMEN PLACEMENT Right 09/09/2024    Right basilic vein 49cm total 5cm external.       Medications:  Current Outpatient Medications   Medication Sig Dispense Refill    acyclovir (ZOVIRAX) 800 MG tablet TAKE 1 TABLET (800 MG) BY MOUTH EVERY 12 HOURS. 180 tablet 1    allopurinol (ZYLOPRIM) 300 MG tablet Take 1 tablet (300 mg) by mouth daily. 90 tablet 1    calcium carbonate-vitamin D (OSCAL) 500-5 MG-MCG tablet Take 2 tablets by mouth daily. 60 tablet 2    carboxymethylcellulose (REFRESH PLUS) 0.5 % SOLN ophthalmic solution Place 1 drop into both eyes 3 times daily as needed for dry eyes. 15 mL 11    clindamycin (CLEOCIN T) 1 % external lotion Apply to chest and back BID x 3 weeks 60 mL 3    cycloSPORINE (RESTASIS) 0.05 % ophthalmic emulsion INSTILL 1 DROP INTO BOTH EYES TWICE A DAY 60 mL 11    hydrocortisone 2.5 % cream Apply topically daily. 30 g 0    levofloxacin (LEVAQUIN) 250 MG tablet Take 1 tablet (250 mg) by mouth daily as needed (Only when ANC < 1). 30 tablet 0    lisinopril (ZESTRIL) 5 MG tablet TAKE 1 TABLET (5 MG) BY MOUTH DAILY. HOLD DOSE IF BLOOD PRESSURE IS LESS THAN 110/60 90 tablet 1    methocarbamol (ROBAXIN) 500 MG tablet Take 500 mg by mouth as needed for muscle spasms.      metroNIDAZOLE (METROCREAM) 0.75 % external cream Apply to face, scalp and chest BID 45 g 11    pantoprazole (PROTONIX) 40 MG EC tablet TAKE 1 TABLET BY MOUTH EVERY DAY 90 tablet 1    prochlorperazine (COMPAZINE) 10 MG tablet Take 1 tablet (10 mg) by mouth every 6 hours as needed for nausea or vomiting. 30 tablet 2     sulfamethoxazole-trimethoprim (BACTRIM DS) 800-160 MG tablet Take 1 tablet by mouth Every Mon, Tues two times daily. 48 tablet 1    tamsulosin (FLOMAX) 0.4 MG capsule Take 0.4 mg by mouth daily.      lifitegrast (XIIDRA) 5 % opthalmic solution INSTILL 1 DROP INTO BOTH EYES TWICE A DAY (Patient not taking: No sig reported) 60 each 11    rivaroxaban ANTICOAGULANT (XARELTO) 10 MG TABS tablet Take 1 tablet (10 mg) by mouth daily (with dinner). (Patient not taking: Reported on 3/25/2025) 90 tablet 0        Allergies:  No Known Allergies    ROS:  A 10 point ROS is negative except as stated in the HPI    Objective:  Vitals: B/P: 145/83, T: 98.3, P: 80, R: 12, Wt: 202 lbs 8 oz  Exam:   Constitutional: Appears well, no distress  HEENT: Pupils equal and reactive to light. No scleral icterus or hemorrhage. Nares without evidence of telangiectasia. Mucous membranes moist with no wet purpura. Dentition overall ok with no signs of decay. No pharyngeal exudates. No lymphadenopathy, no thyromeagaly  CV: regular rate and rhythm, no murmurs  Respiratory: clear  GI: abdomen soft, nontender, without guarding or rebound. No hepatomeagaly. No splenomegaly.   Mus/Skele: sleeve on left arm. Right arm with out edema or fullness.    Skin: diffused macular rash on chest present. no petechiae, no ecchymosis.  Neuro: CN II-XII intact. Normal gait. AOx3  Heme/Lymph: no supraclavicular, axillary or umbilical adenopathy.     Labs: personally reviewed with relevant trends annotated below  Recent Results (from the past 720 hours)   Federico Barr Virus Quantitative PCR, Plasma    Collection Time: 02/26/25  2:14 PM    Specimen: Peripheral Blood   Result Value Ref Range    EBV DNA IU/mL Not Detected Not Detected IU/mL   CMV Quantitative, PCR    Collection Time: 02/26/25  2:14 PM    Specimen: Peripheral Blood   Result Value Ref Range    CMV DNA IU/mL Not Detected Not Detected IU/mL    CMV Quantitative PCR Specimen Type Blood    Comprehensive metabolic  panel    Collection Time: 02/26/25  2:14 PM   Result Value Ref Range    Sodium 138 135 - 145 mmol/L    Potassium 4.4 3.4 - 5.3 mmol/L    Carbon Dioxide (CO2) 27 22 - 29 mmol/L    Anion Gap 8 7 - 15 mmol/L    Urea Nitrogen 16.6 8.0 - 23.0 mg/dL    Creatinine 1.21 (H) 0.67 - 1.17 mg/dL    GFR Estimate 64 >60 mL/min/1.73m2    Calcium 9.4 8.8 - 10.4 mg/dL    Chloride 103 98 - 107 mmol/L    Glucose 98 70 - 99 mg/dL    Alkaline Phosphatase 74 40 - 150 U/L    AST 26 0 - 45 U/L    ALT 16 0 - 70 U/L    Protein Total 6.6 6.4 - 8.3 g/dL    Albumin 4.2 3.5 - 5.2 g/dL    Bilirubin Total 0.7 <=1.2 mg/dL   CBC with platelets and differential    Collection Time: 02/26/25  2:14 PM   Result Value Ref Range    WBC Count 3.6 (L) 4.0 - 11.0 10e3/uL    RBC Count 3.89 (L) 4.40 - 5.90 10e6/uL    Hemoglobin 12.3 (L) 13.3 - 17.7 g/dL    Hematocrit 36.6 (L) 40.0 - 53.0 %    MCV 94 78 - 100 fL    MCH 31.6 26.5 - 33.0 pg    MCHC 33.6 31.5 - 36.5 g/dL    RDW 16.9 (H) 10.0 - 15.0 %    Platelet Count 168 150 - 450 10e3/uL    % Neutrophils 60 %    % Lymphocytes 32 %    % Monocytes 5 %    % Eosinophils 2 %    % Basophils 1 %    % Immature Granulocytes 0 %    NRBCs per 100 WBC 0 <1 /100    Absolute Neutrophils 2.2 1.6 - 8.3 10e3/uL    Absolute Lymphocytes 1.2 0.8 - 5.3 10e3/uL    Absolute Monocytes 0.2 0.0 - 1.3 10e3/uL    Absolute Eosinophils 0.1 0.0 - 0.7 10e3/uL    Absolute Basophils 0.0 0.0 - 0.2 10e3/uL    Absolute Immature Granulocytes 0.0 <=0.4 10e3/uL    Absolute NRBCs 0.0 10e3/uL   CMV Quantitative, PCR    Collection Time: 03/06/25  1:06 PM    Specimen: Arm, Right; Blood   Result Value Ref Range    CMV DNA IU/mL Not Detected Not Detected IU/mL    CMV Quantitative PCR Specimen Type Blood    Federico Barr Virus Quantitative PCR, Plasma    Collection Time: 03/06/25  1:06 PM    Specimen: Arm, Right; Blood   Result Value Ref Range    EBV DNA IU/mL Not Detected Not Detected IU/mL   Comprehensive metabolic panel    Collection Time: 03/06/25  1:06 PM    Result Value Ref Range    Sodium 137 135 - 145 mmol/L    Potassium 4.3 3.4 - 5.3 mmol/L    Carbon Dioxide (CO2) 30 (H) 22 - 29 mmol/L    Anion Gap 6 (L) 7 - 15 mmol/L    Urea Nitrogen 17.6 8.0 - 23.0 mg/dL    Creatinine 1.08 0.67 - 1.17 mg/dL    GFR Estimate 73 >60 mL/min/1.73m2    Calcium 10.0 8.8 - 10.4 mg/dL    Chloride 101 98 - 107 mmol/L    Glucose 97 70 - 99 mg/dL    Alkaline Phosphatase 80 40 - 150 U/L    AST 27 0 - 45 U/L    ALT 18 0 - 70 U/L    Protein Total 6.6 6.4 - 8.3 g/dL    Albumin 4.3 3.5 - 5.2 g/dL    Bilirubin Total 0.6 <=1.2 mg/dL   CBC with platelets and differential    Collection Time: 03/06/25  1:06 PM   Result Value Ref Range    WBC Count 2.4 (L) 4.0 - 11.0 10e3/uL    RBC Count 4.05 (L) 4.40 - 5.90 10e6/uL    Hemoglobin 13.0 (L) 13.3 - 17.7 g/dL    Hematocrit 37.8 (L) 40.0 - 53.0 %    MCV 93 78 - 100 fL    MCH 32.1 26.5 - 33.0 pg    MCHC 34.4 31.5 - 36.5 g/dL    RDW 15.5 (H) 10.0 - 15.0 %    Platelet Count 248 150 - 450 10e3/uL    % Neutrophils 52 %    % Lymphocytes 30 %    % Monocytes 13 %    % Eosinophils 2 %    % Basophils 3 %    % Immature Granulocytes 0 %    NRBCs per 100 WBC 0 <1 /100    Absolute Neutrophils 1.2 (L) 1.6 - 8.3 10e3/uL    Absolute Lymphocytes 0.7 (L) 0.8 - 5.3 10e3/uL    Absolute Monocytes 0.3 0.0 - 1.3 10e3/uL    Absolute Eosinophils 0.0 0.0 - 0.7 10e3/uL    Absolute Basophils 0.1 0.0 - 0.2 10e3/uL    Absolute Immature Granulocytes 0.0 <=0.4 10e3/uL    Absolute NRBCs 0.0 10e3/uL   CMV Quantitative, PCR    Collection Time: 03/13/25  1:16 PM    Specimen: Peripheral Blood   Result Value Ref Range    CMV DNA IU/mL Not Detected Not Detected IU/mL    CMV Quantitative PCR Specimen Type Blood    Federico Barr Virus Quantitative PCR, Plasma    Collection Time: 03/13/25  1:16 PM    Specimen: Peripheral Blood   Result Value Ref Range    EBV DNA IU/mL Not Detected Not Detected IU/mL   Comprehensive metabolic panel    Collection Time: 03/13/25  1:16 PM   Result Value Ref Range     Sodium 137 135 - 145 mmol/L    Potassium 4.9 3.4 - 5.3 mmol/L    Carbon Dioxide (CO2) 27 22 - 29 mmol/L    Anion Gap 10 7 - 15 mmol/L    Urea Nitrogen 18.8 8.0 - 23.0 mg/dL    Creatinine 1.11 0.67 - 1.17 mg/dL    GFR Estimate 71 >60 mL/min/1.73m2    Calcium 9.7 8.8 - 10.4 mg/dL    Chloride 100 98 - 107 mmol/L    Glucose 94 70 - 99 mg/dL    Alkaline Phosphatase 83 40 - 150 U/L    AST 35 0 - 45 U/L    ALT 28 0 - 70 U/L    Protein Total 6.7 6.4 - 8.3 g/dL    Albumin 4.5 3.5 - 5.2 g/dL    Bilirubin Total 0.6 <=1.2 mg/dL   CBC with platelets and differential    Collection Time: 03/13/25  1:16 PM   Result Value Ref Range    WBC Count 3.2 (L) 4.0 - 11.0 10e3/uL    RBC Count 4.00 (L) 4.40 - 5.90 10e6/uL    Hemoglobin 12.8 (L) 13.3 - 17.7 g/dL    Hematocrit 37.3 (L) 40.0 - 53.0 %    MCV 93 78 - 100 fL    MCH 32.0 26.5 - 33.0 pg    MCHC 34.3 31.5 - 36.5 g/dL    RDW 14.8 10.0 - 15.0 %    Platelet Count 164 150 - 450 10e3/uL    % Neutrophils 48 %    % Lymphocytes 36 %    % Monocytes 11 %    % Eosinophils 4 %    % Basophils 2 %    % Immature Granulocytes 0 %    NRBCs per 100 WBC 0 <1 /100    Absolute Neutrophils 1.6 1.6 - 8.3 10e3/uL    Absolute Lymphocytes 1.2 0.8 - 5.3 10e3/uL    Absolute Monocytes 0.4 0.0 - 1.3 10e3/uL    Absolute Eosinophils 0.1 0.0 - 0.7 10e3/uL    Absolute Basophils 0.1 0.0 - 0.2 10e3/uL    Absolute Immature Granulocytes 0.0 <=0.4 10e3/uL    Absolute NRBCs 0.0 10e3/uL   RBC and Platelet Morphology    Collection Time: 03/13/25  1:16 PM   Result Value Ref Range    RBC Morphology Confirmed RBC Indices     Platelet Assessment (A) Automated Count Confirmed. Platelet morphology is normal.     Automated Count Confirmed. Giant platelets are present.    Giant Platelets Slight (A) None Seen    Reactive Lymphocytes Present (A) None Seen   Tissue Aerobic Bacterial Culture Routine Without Gram Stain    Collection Time: 03/14/25 11:49 AM    Specimen: Shoulder, Right; Tissue   Result Value Ref Range    Culture No Growth     Acid-Fast Bacilli Culture and Stain    Collection Time: 03/14/25 11:49 AM    Specimen: Shoulder, Right; Tissue   Result Value Ref Range    Acid Fast Stain No acid fast bacilli seen     Acid Fast Stain No acid fast bacilli seen    CMV Quantitative, PCR    Collection Time: 03/20/25  1:07 PM    Specimen: Arm, Right; Blood   Result Value Ref Range    CMV DNA IU/mL Not Detected Not Detected IU/mL    CMV Quantitative PCR Specimen Type Blood    Federico Barr Virus Quantitative PCR, Plasma    Collection Time: 03/20/25  1:07 PM    Specimen: Arm, Right; Blood   Result Value Ref Range    EBV DNA IU/mL Not Detected Not Detected IU/mL   Comprehensive metabolic panel    Collection Time: 03/20/25  1:07 PM   Result Value Ref Range    Sodium 137 135 - 145 mmol/L    Potassium 4.5 3.4 - 5.3 mmol/L    Carbon Dioxide (CO2) 25 22 - 29 mmol/L    Anion Gap 11 7 - 15 mmol/L    Urea Nitrogen 22.2 8.0 - 23.0 mg/dL    Creatinine 1.11 0.67 - 1.17 mg/dL    GFR Estimate 71 >60 mL/min/1.73m2    Calcium 9.4 8.8 - 10.4 mg/dL    Chloride 101 98 - 107 mmol/L    Glucose 111 (H) 70 - 99 mg/dL    Alkaline Phosphatase 87 40 - 150 U/L    AST 35 0 - 45 U/L    ALT 28 0 - 70 U/L    Protein Total 6.5 6.4 - 8.3 g/dL    Albumin 4.4 3.5 - 5.2 g/dL    Bilirubin Total 0.6 <=1.2 mg/dL   CBC with platelets and differential    Collection Time: 03/20/25  1:07 PM   Result Value Ref Range    WBC Count 5.1 4.0 - 11.0 10e3/uL    RBC Count 3.87 (L) 4.40 - 5.90 10e6/uL    Hemoglobin 12.9 (L) 13.3 - 17.7 g/dL    Hematocrit 36.6 (L) 40.0 - 53.0 %    MCV 95 78 - 100 fL    MCH 33.3 (H) 26.5 - 33.0 pg    MCHC 35.2 31.5 - 36.5 g/dL    RDW 14.8 10.0 - 15.0 %    Platelet Count 111 (L) 150 - 450 10e3/uL    % Neutrophils 41 %    % Lymphocytes 47 %    % Monocytes 10 %    % Eosinophils 2 %    % Basophils 0 %    % Immature Granulocytes 0 %    Absolute Neutrophils 2.1 1.6 - 8.3 10e3/uL    Absolute Lymphocytes 2.4 0.8 - 5.3 10e3/uL    Absolute Monocytes 0.5 0.0 - 1.3 10e3/uL    Absolute  Eosinophils 0.1 0.0 - 0.7 10e3/uL    Absolute Basophils 0.0 0.0 - 0.2 10e3/uL    Absolute Immature Granulocytes 0.0 <=0.4 10e3/uL        Imaging  Punctal Closure, Plugs  Sign in/Time Out: Sign in communication completed, Correct patient,   Correct medication, Correct procedure, Correct site   . Preprocedure Medication: Proparacaine 2% solution   .     Punctal Plug Insertion #1  Brand: UltraPLug 0.6   . Location: Right lower   . Ref #: [id number]. Lot #: [id number]   .     Punctal Plug Insertion #2  Brand: UltraPLug 0.6   . Location: Left lower   . Ref #: [id number]   . Lot #: [id number]   .     Punctal Plug Insertion #4  Pre-Procedure Pain: 0   . Post-Procedure Pain: 0   . Sign Out: Sign out discussion completed, Patient counseled on signs and   symptoms for which to call and/or return to clinic, Patient tolerated   procedure well with no complications   .     Notes  A drop of proparacaine 0.5% was instilled to the procedure eye, The puncta   were dilated using the blunt end of the . The punctal plug was   carefully placed in the right and left lower eyelid using the .   The patient tolerated the procedure well, and there were no complications.   Return precautions discussed with patient and patient expressed   understanding

## 2025-03-25 NOTE — PROGRESS NOTES
HPI       Cataract Follow Up    In both eyes.             Comments    Pt returns for follow up of PATRICIO and cataracts (initially question of GVHD). He states that the grainy and itchy sensation upon waking has improved with the addition of the Restasis BID and AT's every day. Has not been using warm compresses with any regularity since improvement.     Pt notes that his vision seems to be a little worse over the last few years. He notes increased difficulty with road signs and reading the TV.     No previous ocular surgeries. On Flomax. Pt is hoping for cataract surgery early September (one year S/P bone marrow transplant at that time).          Last edited by Lorrie Handley on 3/25/2025  8:50 AM.         Review of systems for the eyes was negative other than the pertinent positives/negatives listed in the HPI.      Assessment & Plan    HPI:  Leland Pearson is a 70 year old male with history of MDS s/p BMT 8/2/2024, folliculitis, BPH, HTN, GERD, myopia with astigmatism and presbyopia presents for dry eye followup and cataract calcs. Using Restasis and at         POHx: myopia with astigmatism and presbyopia  PMHx:MDS s/p BMT 8/2/2024, folliculitis, BPH, HTN, GERD  Current Medications:   Current Outpatient Medications   Medication Sig Dispense Refill    acyclovir (ZOVIRAX) 800 MG tablet TAKE 1 TABLET (800 MG) BY MOUTH EVERY 12 HOURS. 180 tablet 1    allopurinol (ZYLOPRIM) 300 MG tablet Take 1 tablet (300 mg) by mouth daily. 90 tablet 1    calcium carbonate-vitamin D (OSCAL) 500-5 MG-MCG tablet Take 2 tablets by mouth daily. 60 tablet 2    carboxymethylcellulose (REFRESH PLUS) 0.5 % SOLN ophthalmic solution Place 1 drop into both eyes 3 times daily as needed for dry eyes. 15 mL 11    clindamycin (CLEOCIN T) 1 % external lotion Apply to chest and back BID x 3 weeks 60 mL 3    cycloSPORINE (RESTASIS) 0.05 % ophthalmic emulsion INSTILL 1 DROP INTO BOTH EYES TWICE A DAY 60 mL 11    hydrocortisone 2.5 % cream Apply  topically daily. 30 g 0    levofloxacin (LEVAQUIN) 250 MG tablet Take 1 tablet (250 mg) by mouth daily as needed (Only when ANC < 1). 30 tablet 0    lisinopril (ZESTRIL) 5 MG tablet TAKE 1 TABLET (5 MG) BY MOUTH DAILY. HOLD DOSE IF BLOOD PRESSURE IS LESS THAN 110/60 90 tablet 1    methocarbamol (ROBAXIN) 500 MG tablet Take 500 mg by mouth as needed for muscle spasms.      metroNIDAZOLE (METROCREAM) 0.75 % external cream Apply to face, scalp and chest BID 45 g 11    pantoprazole (PROTONIX) 40 MG EC tablet TAKE 1 TABLET BY MOUTH EVERY DAY 90 tablet 1    prochlorperazine (COMPAZINE) 10 MG tablet Take 1 tablet (10 mg) by mouth every 6 hours as needed for nausea or vomiting. 30 tablet 2    sulfamethoxazole-trimethoprim (BACTRIM DS) 800-160 MG tablet Take 1 tablet by mouth Every Mon, Tues two times daily. 48 tablet 1    tamsulosin (FLOMAX) 0.4 MG capsule Take 0.4 mg by mouth daily.      lifitegrast (XIIDRA) 5 % opthalmic solution INSTILL 1 DROP INTO BOTH EYES TWICE A DAY (Patient not taking: Reported on 3/25/2025) 60 each 11    rivaroxaban ANTICOAGULANT (XARELTO) 10 MG TABS tablet Take 1 tablet (10 mg) by mouth daily (with dinner). (Patient not taking: Reported on 3/25/2025) 90 tablet 0     No current facility-administered medications for this visit.     FHx: cataract-mom, brother  PSHx: denies history of ocular surgeries       Current Eye Medications:  Warm compress twice a day   AT qday   Restasis twice a day     Assessment & Plan:  (H52.13,  H52.203,  H52.4) Myopia of both eyes with astigmatism and presbyopia  (primary encounter diagnosis)  Hold pending Cataract extraction/intraocular lens     (H25.813) Combined forms of age-related cataract of both eyes  Moderate cataracts are present and may account for some of the patient's visual complaints. Reasonable to wait until 1 year post BMT.    Special equipment/needs:  Eye: right  Anesthesia: MAC topical  Dilates to: 6  Iris expansion:  likely ring, 7.0  Pseudoexfoliation:  No  Trypan Blue: No  Trauma: Yes left eye trauma softball    Able lay to flat: Yes  Blood Thinner: Yes   Tamsulosin: Yes  DM: No  Guttae: No    Dominant Eye:     Plan: likely plano both eyes, may have toric right eye   Repeat calcs     Cataract is believed to be significantly contributing to patient's visual impairment. Cataract surgery recommended and expected to improve vision. Vision is not correctable by glasses or other nonsurgical measures. R/B/A were discussed with the patient. These included, but are not limited to: bleeding, infection, loss of vision, loss of the eye, need for more surgery, glaucoma, retinal detachment, need for glasses, corneal edema. The patient voiced understanding and wishes to proceed. All lens options were discussed with the patient including monofocal lenses, multifocal lenses, and toric lenses. The risks and benefits of each were discussed, including halos, glare, and possible need for glasses. No guarantees about vision after surgery were given. The patient voiced understanding and wishes to proceed    Proceed with CE/IOL right eye followed by left eye .      (Z94.81) Status post bone marrow transplant (H)  (H04.123) Dry eye syndrome of both eyes  AT four times a day, if using > four times a day , then change to preservative free   cycloSPORINE (RESTASIS) 0.05 % ophthalmic   Continue warm compress daily       Return in about 3 months (around 6/25/2025) for Follow Up-v/t, repeat IOL Master.        Bal Valdovinos MD     Attending Physician Attestation:  Complete documentation of historical and exam elements from today's encounter can be found in the full encounter summary report (not reduplicated in this progress note).  I personally obtained the chief complaint(s) and history of present illness.  I confirmed and edited as necessary the review of systems, past medical/surgical history, family history, social history, and examination findings as documented by others; and I  examined the patient myself.  I personally reviewed the relevant tests, images, and reports as documented above.  I formulated and edited as necessary the assessment and plan and discussed the findings and management plan with the patient and family. - Bal Valdovinos MD

## 2025-03-25 NOTE — NURSING NOTE
"Oncology Rooming Note    March 25, 2025 3:09 PM   Leland Pearson is a 70 year old male who presents for:    Chief Complaint   Patient presents with    Oncology Clinic Visit     Thrombosis      Initial Vitals: /78 (BP Location: Right arm, Patient Position: Sitting, Cuff Size: Adult Large)   Pulse 77   Temp 98.4  F (36.9  C) (Oral)   Resp 16   Wt 91.9 kg (202 lb 8 oz)   SpO2 99%   BMI 27.13 kg/m   Estimated body mass index is 27.13 kg/m  as calculated from the following:    Height as of 8/16/24: 1.84 m (6' 0.44\").    Weight as of this encounter: 91.9 kg (202 lb 8 oz). Body surface area is 2.17 meters squared.  No Pain (0) Comment: Data Unavailable   No LMP for male patient.  Allergies reviewed: Yes  Medications reviewed: Yes    Medications: Medication refills not needed today.  Pharmacy name entered into [employer]:    [id number] - [place], [place] - [address] [place] [address] [facility] - [place], [place] - [address] [facility] [place], [place] - [address] [facility] [phone]    Frailty Screening:   Is the patient here for a new oncology consult visit in cancer care? 2. No    PHQ9:  Did this patient require a PHQ9?: no      Clinical concerns: Would like to discuss differences in last two ultrasounds      Praveena Hodges              "

## 2025-03-25 NOTE — NURSING NOTE
Chief Complaints and History of Present Illnesses   Patient presents with    Cataract Follow Up     Chief Complaint(s) and History of Present Illness(es)       Cataract Follow Up              Laterality: both eyes              Comments    Pt returns for follow up of PATRICIO and cataracts (initially question of GVHD). He states that the grainy and itchy sensation upon waking has improved with the addition of the Restasis BID and AT's every day. Has not been using warm compresses with any regularity since improvement.     Pt notes that his vision seems to be a little worse over the last few years. He notes increased difficulty with road signs and reading the TV.     No previous ocular surgeries. On Flomax. Pt is hoping for cataract surgery early September (one year S/P bone marrow transplant at that time).

## 2025-03-25 NOTE — LETTER
3/25/2025      Leland Pearson  [address] [place] [address]      Dear Colleague,    Thank you for referring your patient, Leladn Pearson, to the [facility]. Please see a copy of my visit note below.        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 He was diagnosed on 9/15/24 with provoked thrombosis. [facility] was consulted and patient was discharged on anticoagulation. He presents today for follow up.     Thrombosis History  Provoked R internal jugular thrombosis  Provoked R non-occlusive Right axillary vein thrombsis  - ddx 9/15/24  - no treatment at start due to low platelets---   Repeat ultrasound 24: extension into subclavian eight and innominate being  - Started 1 mg/kg LMWH 24 due to drug-drug interactions preventing DOAC  Ultrasound on 24 (R side only): R subclavia  Ultrasound on 3/19/25 (bilateral): persistent R internal jugular, potential left subclavian (not previous evaluated):     S/s of lymphedema on left side present since 2024. Now treated with PT. I do not feel that L sided ultrasound reflects a NEW thrombotic event given symptoms--- more likely reflects differences in ultrasound techniques. Could consider upper extremity venogram and/or TOS ultrasounds if symptoms were to worsen.    Discussed that those with provoked thrombosis have a very low (~3% or less, Kearon C, et al. Blood. 2014;123(12):6638-0213) chance of recurrent clot within 5 years. Leland's line was removed on 10/18/24 and his ultrasound(s) have progressively improved. At  this juncture he has completed 6 months of anticoagulation with LMWH and/or DOAC. He does not have any active signs of acute GVHD. Therefore, he can stop chronic systemic anticoagulation. We discussed that IF he were to need a PICC, Estrada or another form to indwelling line he SHOULD go back on anticoagulation. Furthermore, if he is taking any prolonged trips (>4h by car, >6h by plane) he should consider taking a dose of Xarelto. Refill provided. Last, if Leland were to need any planned or unplanned surgeries with anticipated convalescence/reduced mobility- would also recommend full dose and prolonged anticoagulation (ie extended prophylaxis if he has joint replacement). Leland does NOT need to follow up with a thrombosis specialist on a routine basis, but I am happy to answer questions and see him back in clinic if acute or new issues arise.   Plan:  1. Majority of today's visit was spent counseling the patient regarding VTE and anticoagulation.  2. STOP Xarelto 10 mg daily  3. Use of Xarelto 10 mg PRN for travel, high risk situations (see discussion above)  4. Continue with [facility] lymphedema cares.     The patient is given our center's contact information and is instructed to call if he should have any further questions or concerns.  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 He was diagnosed on 9/15/24 with provoked thrombosis. [facility] was consulted and patient was discharged on anticoagulation. Refer to consult note attested by myself  from 9/18/24. He presents today for follow up.     Leland is doing well. Has diffuse rash on skin- not sure if GVHD or medication reaction. Right arm does not have pain, but has noted some edema present. No issues with chest pain or SOB. Left arm edema is not present when wearing sleeve. No issues with facial swelling. No LE edema.        Past Medical History:  Past Medical History:   Diagnosis Date     Cancer (H) 03/24     Gastroesophageal reflux disease      Hypertension      Nonsenile cataract 12/27/24       Past Surgical History:  Past Surgical History:   Procedure Laterality Date     COLONOSCOPY       ESOPHAGOSCOPY, GASTROSCOPY, DUODENOSCOPY (EGD), COMBINED  07/28/2013    Procedure: COMBINED ESOPHAGOSCOPY, GASTROSCOPY, DUODENOSCOPY (EGD), BIOPSY SINGLE OR MULTIPLE;;  Surgeon: Franklin Barrera MD;  Location:  GI     ESOPHAGOSCOPY, GASTROSCOPY, DUODENOSCOPY (EGD), COMBINED  07/28/2013    Procedure: COMBINED ESOPHAGOSCOPY, GASTROSCOPY, DUODENOSCOPY (EGD), REMOVE FOREIGN BODY;;  Surgeon: Franklin Barrera MD;  Location:  GI     IR CVC TUNNEL PLACEMENT > 5 YRS OF AGE  08/16/2024     IR CVC TUNNEL REMOVAL RIGHT  09/06/2024     ORTHOPEDIC SURGERY      04 micro discectomy, acl  repair     PICC DOUBLE LUMEN PLACEMENT Right 09/09/2024    Right basilic vein 49cm total 5cm external.       Medications:  Current Outpatient Medications   Medication Sig Dispense Refill     acyclovir (ZOVIRAX) 800 MG tablet TAKE 1 TABLET (800 MG) BY MOUTH EVERY 12 HOURS. 180 tablet 1     allopurinol (ZYLOPRIM) 300 MG tablet Take 1 tablet (300 mg) by mouth daily. 90 tablet 1     calcium carbonate-vitamin D (OSCAL) 500-5 MG-MCG tablet Take 2 tablets by mouth daily. 60 tablet 2     carboxymethylcellulose (REFRESH PLUS) 0.5 % SOLN ophthalmic solution Place 1 drop into both eyes 3 times daily as needed for dry eyes. 15 mL 11     clindamycin (CLEOCIN T) 1 % external lotion Apply to chest and back BID x 3 weeks 60 mL 3     cycloSPORINE (RESTASIS) 0.05  % ophthalmic emulsion INSTILL 1 DROP INTO BOTH EYES TWICE A DAY 60 mL 11     hydrocortisone 2.5 % cream Apply topically daily. 30 g 0     levofloxacin (LEVAQUIN) 250 MG tablet Take 1 tablet (250 mg) by mouth daily as needed (Only when ANC < 1). 30 tablet 0     lisinopril (ZESTRIL) 5 MG tablet TAKE 1 TABLET (5 MG) BY MOUTH DAILY. HOLD DOSE IF BLOOD PRESSURE IS LESS THAN 110/60 90 tablet 1     methocarbamol (ROBAXIN) 500 MG tablet Take 500 mg by mouth as needed for muscle spasms.       metroNIDAZOLE (METROCREAM) 0.75 % external cream Apply to face, scalp and chest BID 45 g 11     pantoprazole (PROTONIX) 40 MG EC tablet TAKE 1 TABLET BY MOUTH EVERY DAY 90 tablet 1     prochlorperazine (COMPAZINE) 10 MG tablet Take 1 tablet (10 mg) by mouth every 6 hours as needed for nausea or vomiting. 30 tablet 2     sulfamethoxazole-trimethoprim (BACTRIM DS) 800-160 MG tablet Take 1 tablet by mouth Every Mon, Tues two times daily. 48 tablet 1     tamsulosin (FLOMAX) 0.4 MG capsule Take 0.4 mg by mouth daily.       lifitegrast (XIIDRA) 5 % opthalmic solution INSTILL 1 DROP INTO BOTH EYES TWICE A DAY (Patient not taking: No sig reported) 60 each 11     rivaroxaban ANTICOAGULANT (XARELTO) 10 MG TABS tablet Take 1 tablet (10 mg) by mouth daily (with dinner). (Patient not taking: Reported on 3/25/2025) 90 tablet 0        Allergies:  No Known Allergies    ROS:  A 10 point ROS is negative except as stated in the HPI    Objective:  Vitals: B/P: 145/83, T: 98.3, P: 80, R: 12, Wt: 202 lbs 8 oz  Exam:   Constitutional: Appears well, no distress  HEENT: Pupils equal and reactive to light. No scleral icterus or hemorrhage. Nares without evidence of telangiectasia. Mucous membranes moist with no wet purpura. Dentition overall ok with no signs of decay. No pharyngeal exudates. No lymphadenopathy, no thyromeagaly  CV: regular rate and rhythm, no murmurs  Respiratory: clear  GI: abdomen soft, nontender, without guarding or rebound. No hepatomeagaly.  No splenomegaly.   Mus/Skele: sleeve on left arm. Right arm with out edema or fullness.    Skin: diffused macular rash on chest present. no petechiae, no ecchymosis.  Neuro: CN II-XII intact. Normal gait. AOx3  Heme/Lymph: no supraclavicular, axillary or umbilical adenopathy.     Labs: personally reviewed with relevant trends annotated below  Recent Results (from the past 720 hours)   Federico Barr Virus Quantitative PCR, Plasma    Collection Time: 02/26/25  2:14 PM    Specimen: Peripheral Blood   Result Value Ref Range    EBV DNA IU/mL Not Detected Not Detected IU/mL   CMV Quantitative, PCR    Collection Time: 02/26/25  2:14 PM    Specimen: Peripheral Blood   Result Value Ref Range    CMV DNA IU/mL Not Detected Not Detected IU/mL    CMV Quantitative PCR Specimen Type Blood    Comprehensive metabolic panel    Collection Time: 02/26/25  2:14 PM   Result Value Ref Range    Sodium 138 135 - 145 mmol/L    Potassium 4.4 3.4 - 5.3 mmol/L    Carbon Dioxide (CO2) 27 22 - 29 mmol/L    Anion Gap 8 7 - 15 mmol/L    Urea Nitrogen 16.6 8.0 - 23.0 mg/dL    Creatinine 1.21 (H) 0.67 - 1.17 mg/dL    GFR Estimate 64 >60 mL/min/1.73m2    Calcium 9.4 8.8 - 10.4 mg/dL    Chloride 103 98 - 107 mmol/L    Glucose 98 70 - 99 mg/dL    Alkaline Phosphatase 74 40 - 150 U/L    AST 26 0 - 45 U/L    ALT 16 0 - 70 U/L    Protein Total 6.6 6.4 - 8.3 g/dL    Albumin 4.2 3.5 - 5.2 g/dL    Bilirubin Total 0.7 <=1.2 mg/dL   CBC with platelets and differential    Collection Time: 02/26/25  2:14 PM   Result Value Ref Range    WBC Count 3.6 (L) 4.0 - 11.0 10e3/uL    RBC Count 3.89 (L) 4.40 - 5.90 10e6/uL    Hemoglobin 12.3 (L) 13.3 - 17.7 g/dL    Hematocrit 36.6 (L) 40.0 - 53.0 %    MCV 94 78 - 100 fL    MCH 31.6 26.5 - 33.0 pg    MCHC 33.6 31.5 - 36.5 g/dL    RDW 16.9 (H) 10.0 - 15.0 %    Platelet Count 168 150 - 450 10e3/uL    % Neutrophils 60 %    % Lymphocytes 32 %    % Monocytes 5 %    % Eosinophils 2 %    % Basophils 1 %    % Immature Granulocytes 0  %    NRBCs per 100 WBC 0 <1 /100    Absolute Neutrophils 2.2 1.6 - 8.3 10e3/uL    Absolute Lymphocytes 1.2 0.8 - 5.3 10e3/uL    Absolute Monocytes 0.2 0.0 - 1.3 10e3/uL    Absolute Eosinophils 0.1 0.0 - 0.7 10e3/uL    Absolute Basophils 0.0 0.0 - 0.2 10e3/uL    Absolute Immature Granulocytes 0.0 <=0.4 10e3/uL    Absolute NRBCs 0.0 10e3/uL   CMV Quantitative, PCR    Collection Time: 03/06/25  1:06 PM    Specimen: Arm, Right; Blood   Result Value Ref Range    CMV DNA IU/mL Not Detected Not Detected IU/mL    CMV Quantitative PCR Specimen Type Blood    Federico Barr Virus Quantitative PCR, Plasma    Collection Time: 03/06/25  1:06 PM    Specimen: Arm, Right; Blood   Result Value Ref Range    EBV DNA IU/mL Not Detected Not Detected IU/mL   Comprehensive metabolic panel    Collection Time: 03/06/25  1:06 PM   Result Value Ref Range    Sodium 137 135 - 145 mmol/L    Potassium 4.3 3.4 - 5.3 mmol/L    Carbon Dioxide (CO2) 30 (H) 22 - 29 mmol/L    Anion Gap 6 (L) 7 - 15 mmol/L    Urea Nitrogen 17.6 8.0 - 23.0 mg/dL    Creatinine 1.08 0.67 - 1.17 mg/dL    GFR Estimate 73 >60 mL/min/1.73m2    Calcium 10.0 8.8 - 10.4 mg/dL    Chloride 101 98 - 107 mmol/L    Glucose 97 70 - 99 mg/dL    Alkaline Phosphatase 80 40 - 150 U/L    AST 27 0 - 45 U/L    ALT 18 0 - 70 U/L    Protein Total 6.6 6.4 - 8.3 g/dL    Albumin 4.3 3.5 - 5.2 g/dL    Bilirubin Total 0.6 <=1.2 mg/dL   CBC with platelets and differential    Collection Time: 03/06/25  1:06 PM   Result Value Ref Range    WBC Count 2.4 (L) 4.0 - 11.0 10e3/uL    RBC Count 4.05 (L) 4.40 - 5.90 10e6/uL    Hemoglobin 13.0 (L) 13.3 - 17.7 g/dL    Hematocrit 37.8 (L) 40.0 - 53.0 %    MCV 93 78 - 100 fL    MCH 32.1 26.5 - 33.0 pg    MCHC 34.4 31.5 - 36.5 g/dL    RDW 15.5 (H) 10.0 - 15.0 %    Platelet Count 248 150 - 450 10e3/uL    % Neutrophils 52 %    % Lymphocytes 30 %    % Monocytes 13 %    % Eosinophils 2 %    % Basophils 3 %    % Immature Granulocytes 0 %    NRBCs per 100 WBC 0 <1 /100     Absolute Neutrophils 1.2 (L) 1.6 - 8.3 10e3/uL    Absolute Lymphocytes 0.7 (L) 0.8 - 5.3 10e3/uL    Absolute Monocytes 0.3 0.0 - 1.3 10e3/uL    Absolute Eosinophils 0.0 0.0 - 0.7 10e3/uL    Absolute Basophils 0.1 0.0 - 0.2 10e3/uL    Absolute Immature Granulocytes 0.0 <=0.4 10e3/uL    Absolute NRBCs 0.0 10e3/uL   CMV Quantitative, PCR    Collection Time: 03/13/25  1:16 PM    Specimen: Peripheral Blood   Result Value Ref Range    CMV DNA IU/mL Not Detected Not Detected IU/mL    CMV Quantitative PCR Specimen Type Blood    Federico Barr Virus Quantitative PCR, Plasma    Collection Time: 03/13/25  1:16 PM    Specimen: Peripheral Blood   Result Value Ref Range    EBV DNA IU/mL Not Detected Not Detected IU/mL   Comprehensive metabolic panel    Collection Time: 03/13/25  1:16 PM   Result Value Ref Range    Sodium 137 135 - 145 mmol/L    Potassium 4.9 3.4 - 5.3 mmol/L    Carbon Dioxide (CO2) 27 22 - 29 mmol/L    Anion Gap 10 7 - 15 mmol/L    Urea Nitrogen 18.8 8.0 - 23.0 mg/dL    Creatinine 1.11 0.67 - 1.17 mg/dL    GFR Estimate 71 >60 mL/min/1.73m2    Calcium 9.7 8.8 - 10.4 mg/dL    Chloride 100 98 - 107 mmol/L    Glucose 94 70 - 99 mg/dL    Alkaline Phosphatase 83 40 - 150 U/L    AST 35 0 - 45 U/L    ALT 28 0 - 70 U/L    Protein Total 6.7 6.4 - 8.3 g/dL    Albumin 4.5 3.5 - 5.2 g/dL    Bilirubin Total 0.6 <=1.2 mg/dL   CBC with platelets and differential    Collection Time: 03/13/25  1:16 PM   Result Value Ref Range    WBC Count 3.2 (L) 4.0 - 11.0 10e3/uL    RBC Count 4.00 (L) 4.40 - 5.90 10e6/uL    Hemoglobin 12.8 (L) 13.3 - 17.7 g/dL    Hematocrit 37.3 (L) 40.0 - 53.0 %    MCV 93 78 - 100 fL    MCH 32.0 26.5 - 33.0 pg    MCHC 34.3 31.5 - 36.5 g/dL    RDW 14.8 10.0 - 15.0 %    Platelet Count 164 150 - 450 10e3/uL    % Neutrophils 48 %    % Lymphocytes 36 %    % Monocytes 11 %    % Eosinophils 4 %    % Basophils 2 %    % Immature Granulocytes 0 %    NRBCs per 100 WBC 0 <1 /100    Absolute Neutrophils 1.6 1.6 - 8.3 10e3/uL     Absolute Lymphocytes 1.2 0.8 - 5.3 10e3/uL    Absolute Monocytes 0.4 0.0 - 1.3 10e3/uL    Absolute Eosinophils 0.1 0.0 - 0.7 10e3/uL    Absolute Basophils 0.1 0.0 - 0.2 10e3/uL    Absolute Immature Granulocytes 0.0 <=0.4 10e3/uL    Absolute NRBCs 0.0 10e3/uL   RBC and Platelet Morphology    Collection Time: 03/13/25  1:16 PM   Result Value Ref Range    RBC Morphology Confirmed RBC Indices     Platelet Assessment (A) Automated Count Confirmed. Platelet morphology is normal.     Automated Count Confirmed. Giant platelets are present.    Giant Platelets Slight (A) None Seen    Reactive Lymphocytes Present (A) None Seen   Tissue Aerobic Bacterial Culture Routine Without Gram Stain    Collection Time: 03/14/25 11:49 AM    Specimen: Shoulder, Right; Tissue   Result Value Ref Range    Culture No Growth    Acid-Fast Bacilli Culture and Stain    Collection Time: 03/14/25 11:49 AM    Specimen: Shoulder, Right; Tissue   Result Value Ref Range    Acid Fast Stain No acid fast bacilli seen     Acid Fast Stain No acid fast bacilli seen    CMV Quantitative, PCR    Collection Time: 03/20/25  1:07 PM    Specimen: Arm, Right; Blood   Result Value Ref Range    CMV DNA IU/mL Not Detected Not Detected IU/mL    CMV Quantitative PCR Specimen Type Blood    Federico Barr Virus Quantitative PCR, Plasma    Collection Time: 03/20/25  1:07 PM    Specimen: Arm, Right; Blood   Result Value Ref Range    EBV DNA IU/mL Not Detected Not Detected IU/mL   Comprehensive metabolic panel    Collection Time: 03/20/25  1:07 PM   Result Value Ref Range    Sodium 137 135 - 145 mmol/L    Potassium 4.5 3.4 - 5.3 mmol/L    Carbon Dioxide (CO2) 25 22 - 29 mmol/L    Anion Gap 11 7 - 15 mmol/L    Urea Nitrogen 22.2 8.0 - 23.0 mg/dL    Creatinine 1.11 0.67 - 1.17 mg/dL    GFR Estimate 71 >60 mL/min/1.73m2    Calcium 9.4 8.8 - 10.4 mg/dL    Chloride 101 98 - 107 mmol/L    Glucose 111 (H) 70 - 99 mg/dL    Alkaline Phosphatase 87 40 - 150 U/L    AST 35 0 - 45 U/L     ALT 28 0 - 70 U/L    Protein Total 6.5 6.4 - 8.3 g/dL    Albumin 4.4 3.5 - 5.2 g/dL    Bilirubin Total 0.6 <=1.2 mg/dL   CBC with platelets and differential    Collection Time: 03/20/25  1:07 PM   Result Value Ref Range    WBC Count 5.1 4.0 - 11.0 10e3/uL    RBC Count 3.87 (L) 4.40 - 5.90 10e6/uL    Hemoglobin 12.9 (L) 13.3 - 17.7 g/dL    Hematocrit 36.6 (L) 40.0 - 53.0 %    MCV 95 78 - 100 fL    MCH 33.3 (H) 26.5 - 33.0 pg    MCHC 35.2 31.5 - 36.5 g/dL    RDW 14.8 10.0 - 15.0 %    Platelet Count 111 (L) 150 - 450 10e3/uL    % Neutrophils 41 %    % Lymphocytes 47 %    % Monocytes 10 %    % Eosinophils 2 %    % Basophils 0 %    % Immature Granulocytes 0 %    Absolute Neutrophils 2.1 1.6 - 8.3 10e3/uL    Absolute Lymphocytes 2.4 0.8 - 5.3 10e3/uL    Absolute Monocytes 0.5 0.0 - 1.3 10e3/uL    Absolute Eosinophils 0.1 0.0 - 0.7 10e3/uL    Absolute Basophils 0.0 0.0 - 0.2 10e3/uL    Absolute Immature Granulocytes 0.0 <=0.4 10e3/uL        Imaging  Punctal Closure, Plugs  Sign in/Time Out: Sign in communication completed, Correct patient,   Correct medication, Correct procedure, Correct site   . Preprocedure Medication: Proparacaine 2% solution   .     Punctal Plug Insertion #1  Brand: UltraPLug 0.6   . Location: Right lower   . Ref #: [id number]. Lot #: [id number]   .     Punctal Plug Insertion #2  Brand: UltraPLug 0.6   . Location: Left lower   . Ref #: [id number]   . Lot #: [id number]   .     Punctal Plug Insertion #4  Pre-Procedure Pain: 0   . Post-Procedure Pain: 0   . Sign Out: Sign out discussion completed, Patient counseled on signs and   symptoms for which to call and/or return to clinic, Patient tolerated   procedure well with no complications   .     Notes  A drop of proparacaine 0.5% was instilled to the procedure eye, The puncta   were dilated using the blunt end of the . The punctal plug was   carefully placed in the right and left lower eyelid using the .   The patient tolerated the procedure well, and  there were no complications.   Return precautions discussed with patient and patient expressed   understanding         Again, thank you for allowing me to participate in the care of your patient.        Sincerely,        Sylvia Price MD    Electronically signed

## 2025-03-26 ENCOUNTER — TELEPHONE (OUTPATIENT)
Dept: OPHTHALMOLOGY | Facility: CLINIC | Age: 70
End: 2025-03-26
Payer: COMMERCIAL

## 2025-03-26 PROBLEM — H25.813 COMBINED FORMS OF AGE-RELATED CATARACT OF BOTH EYES: Status: ACTIVE | Noted: 2025-03-25

## 2025-03-27 ENCOUNTER — LAB (OUTPATIENT)
Dept: LAB | Facility: CLINIC | Age: 70
End: 2025-03-27
Payer: COMMERCIAL

## 2025-03-27 DIAGNOSIS — D84.822 IMMUNOCOMPROMISED STATE ASSOCIATED WITH STEM CELL TRANSPLANT (H): ICD-10-CM

## 2025-03-27 DIAGNOSIS — Z94.84 IMMUNOCOMPROMISED STATE ASSOCIATED WITH STEM CELL TRANSPLANT (H): ICD-10-CM

## 2025-03-27 LAB
ALBUMIN SERPL BCG-MCNC: 4.2 G/DL (ref 3.5–5.2)
ALP SERPL-CCNC: 88 U/L (ref 40–150)
ALT SERPL W P-5'-P-CCNC: 22 U/L (ref 0–70)
ANION GAP SERPL CALCULATED.3IONS-SCNC: 11 MMOL/L (ref 7–15)
AST SERPL W P-5'-P-CCNC: 35 U/L (ref 0–45)
BASOPHILS # BLD AUTO: 0 10E3/UL (ref 0–0.2)
BASOPHILS NFR BLD AUTO: 1 %
BILIRUB SERPL-MCNC: 0.5 MG/DL
BUN SERPL-MCNC: 19 MG/DL (ref 8–23)
CALCIUM SERPL-MCNC: 9.5 MG/DL (ref 8.8–10.4)
CHLORIDE SERPL-SCNC: 102 MMOL/L (ref 98–107)
CREAT SERPL-MCNC: 1.05 MG/DL (ref 0.67–1.17)
EGFRCR SERPLBLD CKD-EPI 2021: 76 ML/MIN/1.73M2
EOSINOPHIL # BLD AUTO: 0.1 10E3/UL (ref 0–0.7)
EOSINOPHIL NFR BLD AUTO: 2 %
ERYTHROCYTE [DISTWIDTH] IN BLOOD BY AUTOMATED COUNT: 14.7 % (ref 10–15)
GLUCOSE SERPL-MCNC: 92 MG/DL (ref 70–99)
HCO3 SERPL-SCNC: 23 MMOL/L (ref 22–29)
HCT VFR BLD AUTO: 38.1 % (ref 40–53)
HGB BLD-MCNC: 13 G/DL (ref 13.3–17.7)
IMM GRANULOCYTES # BLD: 0 10E3/UL
IMM GRANULOCYTES NFR BLD: 0 %
LYMPHOCYTES # BLD AUTO: 1.8 10E3/UL (ref 0.8–5.3)
LYMPHOCYTES NFR BLD AUTO: 43 %
MCH RBC QN AUTO: 33 PG (ref 26.5–33)
MCHC RBC AUTO-ENTMCNC: 34.1 G/DL (ref 31.5–36.5)
MCV RBC AUTO: 97 FL (ref 78–100)
MONOCYTES # BLD AUTO: 0.4 10E3/UL (ref 0–1.3)
MONOCYTES NFR BLD AUTO: 9 %
NEUTROPHILS # BLD AUTO: 1.9 10E3/UL (ref 1.6–8.3)
NEUTROPHILS NFR BLD AUTO: 46 %
PLATELET # BLD AUTO: 132 10E3/UL (ref 150–450)
POTASSIUM SERPL-SCNC: 4.6 MMOL/L (ref 3.4–5.3)
PROT SERPL-MCNC: 6.5 G/DL (ref 6.4–8.3)
RBC # BLD AUTO: 3.94 10E6/UL (ref 4.4–5.9)
SODIUM SERPL-SCNC: 136 MMOL/L (ref 135–145)
WBC # BLD AUTO: 4.1 10E3/UL (ref 4–11)

## 2025-04-03 ENCOUNTER — LAB (OUTPATIENT)
Dept: LAB | Facility: CLINIC | Age: 70
End: 2025-04-03
Payer: COMMERCIAL

## 2025-04-03 DIAGNOSIS — D46.9 MDS (MYELODYSPLASTIC SYNDROME) (H): ICD-10-CM

## 2025-04-03 DIAGNOSIS — Z79.899 ENCOUNTER FOR LONG-TERM (CURRENT) USE OF MEDICATIONS: ICD-10-CM

## 2025-04-03 DIAGNOSIS — D84.822 IMMUNOCOMPROMISED STATE ASSOCIATED WITH STEM CELL TRANSPLANT (H): ICD-10-CM

## 2025-04-03 DIAGNOSIS — Z94.84 IMMUNOCOMPROMISED STATE ASSOCIATED WITH STEM CELL TRANSPLANT (H): ICD-10-CM

## 2025-04-03 LAB
ALBUMIN SERPL BCG-MCNC: 4.3 G/DL (ref 3.5–5.2)
ALP SERPL-CCNC: 78 U/L (ref 40–150)
ALT SERPL W P-5'-P-CCNC: 20 U/L (ref 0–70)
ANION GAP SERPL CALCULATED.3IONS-SCNC: 9 MMOL/L (ref 7–15)
AST SERPL W P-5'-P-CCNC: 29 U/L (ref 0–45)
BASOPHILS # BLD AUTO: 0.1 10E3/UL (ref 0–0.2)
BASOPHILS NFR BLD AUTO: 1 %
BILIRUB SERPL-MCNC: 0.5 MG/DL
BUN SERPL-MCNC: 18.8 MG/DL (ref 8–23)
CALCIUM SERPL-MCNC: 9.8 MG/DL (ref 8.8–10.4)
CHLORIDE SERPL-SCNC: 101 MMOL/L (ref 98–107)
CREAT SERPL-MCNC: 1.13 MG/DL (ref 0.67–1.17)
EGFRCR SERPLBLD CKD-EPI 2021: 69 ML/MIN/1.73M2
EOSINOPHIL # BLD AUTO: 0.1 10E3/UL (ref 0–0.7)
EOSINOPHIL NFR BLD AUTO: 1 %
ERYTHROCYTE [DISTWIDTH] IN BLOOD BY AUTOMATED COUNT: 14.3 % (ref 10–15)
GLUCOSE SERPL-MCNC: 107 MG/DL (ref 70–99)
HCO3 SERPL-SCNC: 27 MMOL/L (ref 22–29)
HCT VFR BLD AUTO: 39 % (ref 40–53)
HGB BLD-MCNC: 13.5 G/DL (ref 13.3–17.7)
IMM GRANULOCYTES # BLD: 0 10E3/UL
IMM GRANULOCYTES NFR BLD: 0 %
LYMPHOCYTES # BLD AUTO: 1.8 10E3/UL (ref 0.8–5.3)
LYMPHOCYTES NFR BLD AUTO: 41 %
MCH RBC QN AUTO: 33.4 PG (ref 26.5–33)
MCHC RBC AUTO-ENTMCNC: 34.6 G/DL (ref 31.5–36.5)
MCV RBC AUTO: 97 FL (ref 78–100)
MONOCYTES # BLD AUTO: 0.4 10E3/UL (ref 0–1.3)
MONOCYTES NFR BLD AUTO: 9 %
NEUTROPHILS # BLD AUTO: 2.2 10E3/UL (ref 1.6–8.3)
NEUTROPHILS NFR BLD AUTO: 48 %
PLATELET # BLD AUTO: 182 10E3/UL (ref 150–450)
POTASSIUM SERPL-SCNC: 4.7 MMOL/L (ref 3.4–5.3)
PROT SERPL-MCNC: 6.6 G/DL (ref 6.4–8.3)
RBC # BLD AUTO: 4.04 10E6/UL (ref 4.4–5.9)
SODIUM SERPL-SCNC: 137 MMOL/L (ref 135–145)
WBC # BLD AUTO: 4.5 10E3/UL (ref 4–11)

## 2025-04-10 ENCOUNTER — LAB (OUTPATIENT)
Dept: LAB | Facility: CLINIC | Age: 70
End: 2025-04-10
Payer: COMMERCIAL

## 2025-04-10 DIAGNOSIS — D46.9 MDS (MYELODYSPLASTIC SYNDROME) (H): ICD-10-CM

## 2025-04-10 DIAGNOSIS — D84.822 IMMUNOCOMPROMISED STATE ASSOCIATED WITH STEM CELL TRANSPLANT (H): ICD-10-CM

## 2025-04-10 DIAGNOSIS — Z94.84 IMMUNOCOMPROMISED STATE ASSOCIATED WITH STEM CELL TRANSPLANT (H): ICD-10-CM

## 2025-04-10 DIAGNOSIS — Z79.899 ENCOUNTER FOR LONG-TERM (CURRENT) USE OF MEDICATIONS: ICD-10-CM

## 2025-04-10 LAB
ALBUMIN SERPL BCG-MCNC: 4.6 G/DL (ref 3.5–5.2)
ALP SERPL-CCNC: 90 U/L (ref 40–150)
ALT SERPL W P-5'-P-CCNC: 21 U/L (ref 0–70)
ANION GAP SERPL CALCULATED.3IONS-SCNC: 7 MMOL/L (ref 7–15)
AST SERPL W P-5'-P-CCNC: 31 U/L (ref 0–45)
BASOPHILS # BLD AUTO: 0 10E3/UL (ref 0–0.2)
BASOPHILS NFR BLD AUTO: 1 %
BILIRUB SERPL-MCNC: 0.5 MG/DL
BUN SERPL-MCNC: 24 MG/DL (ref 8–23)
CALCIUM SERPL-MCNC: 9.8 MG/DL (ref 8.8–10.4)
CHLORIDE SERPL-SCNC: 101 MMOL/L (ref 98–107)
CREAT SERPL-MCNC: 1.08 MG/DL (ref 0.67–1.17)
EGFRCR SERPLBLD CKD-EPI 2021: 73 ML/MIN/1.73M2
EOSINOPHIL # BLD AUTO: 0.2 10E3/UL (ref 0–0.7)
EOSINOPHIL NFR BLD AUTO: 3 %
ERYTHROCYTE [DISTWIDTH] IN BLOOD BY AUTOMATED COUNT: 13.9 % (ref 10–15)
GLUCOSE SERPL-MCNC: 98 MG/DL (ref 70–99)
HCO3 SERPL-SCNC: 30 MMOL/L (ref 22–29)
HCT VFR BLD AUTO: 39.3 % (ref 40–53)
HGB BLD-MCNC: 13.8 G/DL (ref 13.3–17.7)
IMM GRANULOCYTES # BLD: 0 10E3/UL
IMM GRANULOCYTES NFR BLD: 0 %
LYMPHOCYTES # BLD AUTO: 1.6 10E3/UL (ref 0.8–5.3)
LYMPHOCYTES NFR BLD AUTO: 35 %
MCH RBC QN AUTO: 33.7 PG (ref 26.5–33)
MCHC RBC AUTO-ENTMCNC: 35.1 G/DL (ref 31.5–36.5)
MCV RBC AUTO: 96 FL (ref 78–100)
MONOCYTES # BLD AUTO: 0.3 10E3/UL (ref 0–1.3)
MONOCYTES NFR BLD AUTO: 6 %
NEUTROPHILS # BLD AUTO: 2.6 10E3/UL (ref 1.6–8.3)
NEUTROPHILS NFR BLD AUTO: 55 %
PLATELET # BLD AUTO: 164 10E3/UL (ref 150–450)
POTASSIUM SERPL-SCNC: 4.9 MMOL/L (ref 3.4–5.3)
PROT SERPL-MCNC: 6.8 G/DL (ref 6.4–8.3)
RBC # BLD AUTO: 4.1 10E6/UL (ref 4.4–5.9)
SODIUM SERPL-SCNC: 138 MMOL/L (ref 135–145)
WBC # BLD AUTO: 4.7 10E3/UL (ref 4–11)

## 2025-04-15 ENCOUNTER — APPOINTMENT (OUTPATIENT)
Dept: LAB | Facility: CLINIC | Age: 70
End: 2025-04-15
Attending: STUDENT IN AN ORGANIZED HEALTH CARE EDUCATION/TRAINING PROGRAM
Payer: COMMERCIAL

## 2025-04-15 ENCOUNTER — ONCOLOGY VISIT (OUTPATIENT)
Dept: TRANSPLANT | Facility: CLINIC | Age: 70
End: 2025-04-15
Attending: STUDENT IN AN ORGANIZED HEALTH CARE EDUCATION/TRAINING PROGRAM
Payer: COMMERCIAL

## 2025-04-15 VITALS
TEMPERATURE: 97.8 F | OXYGEN SATURATION: 98 % | DIASTOLIC BLOOD PRESSURE: 84 MMHG | WEIGHT: 202.3 LBS | HEART RATE: 72 BPM | RESPIRATION RATE: 16 BRPM | SYSTOLIC BLOOD PRESSURE: 149 MMHG | BODY MASS INDEX: 27.1 KG/M2

## 2025-04-15 DIAGNOSIS — D84.822 IMMUNOCOMPROMISED STATE ASSOCIATED WITH STEM CELL TRANSPLANT (H): ICD-10-CM

## 2025-04-15 DIAGNOSIS — Z94.84 IMMUNOCOMPROMISED STATE ASSOCIATED WITH STEM CELL TRANSPLANT (H): ICD-10-CM

## 2025-04-15 LAB
ALBUMIN SERPL BCG-MCNC: 4.3 G/DL (ref 3.5–5.2)
ALP SERPL-CCNC: 74 U/L (ref 40–150)
ALT SERPL W P-5'-P-CCNC: 18 U/L (ref 0–70)
ANION GAP SERPL CALCULATED.3IONS-SCNC: 10 MMOL/L (ref 7–15)
AST SERPL W P-5'-P-CCNC: 29 U/L (ref 0–45)
BASOPHILS # BLD AUTO: 0 10E3/UL (ref 0–0.2)
BASOPHILS NFR BLD AUTO: 0 %
BILIRUB SERPL-MCNC: 0.4 MG/DL
BUN SERPL-MCNC: 19.7 MG/DL (ref 8–23)
CALCIUM SERPL-MCNC: 9.8 MG/DL (ref 8.8–10.4)
CHLORIDE SERPL-SCNC: 102 MMOL/L (ref 98–107)
CREAT SERPL-MCNC: 1.14 MG/DL (ref 0.67–1.17)
EGFRCR SERPLBLD CKD-EPI 2021: 69 ML/MIN/1.73M2
EOSINOPHIL # BLD AUTO: 0.2 10E3/UL (ref 0–0.7)
EOSINOPHIL NFR BLD AUTO: 5 %
ERYTHROCYTE [DISTWIDTH] IN BLOOD BY AUTOMATED COUNT: 14.2 % (ref 10–15)
GLUCOSE SERPL-MCNC: 108 MG/DL (ref 70–99)
HCO3 SERPL-SCNC: 26 MMOL/L (ref 22–29)
HCT VFR BLD AUTO: 38.6 % (ref 40–53)
HGB BLD-MCNC: 13.3 G/DL (ref 13.3–17.7)
IMM GRANULOCYTES # BLD: 0 10E3/UL
IMM GRANULOCYTES NFR BLD: 0 %
LYMPHOCYTES # BLD AUTO: 1.7 10E3/UL (ref 0.8–5.3)
LYMPHOCYTES NFR BLD AUTO: 33 %
MCH RBC QN AUTO: 32.8 PG (ref 26.5–33)
MCHC RBC AUTO-ENTMCNC: 34.5 G/DL (ref 31.5–36.5)
MCV RBC AUTO: 95 FL (ref 78–100)
MONOCYTES # BLD AUTO: 0.5 10E3/UL (ref 0–1.3)
MONOCYTES NFR BLD AUTO: 9 %
NEUTROPHILS # BLD AUTO: 2.6 10E3/UL (ref 1.6–8.3)
NEUTROPHILS NFR BLD AUTO: 53 %
NRBC # BLD AUTO: 0 10E3/UL
NRBC BLD AUTO-RTO: 0 /100
PLATELET # BLD AUTO: 127 10E3/UL (ref 150–450)
POTASSIUM SERPL-SCNC: 4.4 MMOL/L (ref 3.4–5.3)
PROT SERPL-MCNC: 6.8 G/DL (ref 6.4–8.3)
RBC # BLD AUTO: 4.05 10E6/UL (ref 4.4–5.9)
SODIUM SERPL-SCNC: 138 MMOL/L (ref 135–145)
WBC # BLD AUTO: 5 10E3/UL (ref 4–11)

## 2025-04-15 PROCEDURE — 82040 ASSAY OF SERUM ALBUMIN: CPT

## 2025-04-15 PROCEDURE — 84155 ASSAY OF PROTEIN SERUM: CPT

## 2025-04-15 PROCEDURE — 99417 PROLNG OP E/M EACH 15 MIN: CPT

## 2025-04-15 PROCEDURE — 36415 COLL VENOUS BLD VENIPUNCTURE: CPT

## 2025-04-15 PROCEDURE — G0463 HOSPITAL OUTPT CLINIC VISIT: HCPCS

## 2025-04-15 PROCEDURE — 99215 OFFICE O/P EST HI 40 MIN: CPT

## 2025-04-15 PROCEDURE — 85004 AUTOMATED DIFF WBC COUNT: CPT

## 2025-04-15 ASSESSMENT — PAIN SCALES - GENERAL: PAINLEVEL_OUTOF10: NO PAIN (0)

## 2025-04-15 NOTE — PROGRESS NOTES
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     Diagnosis MDS-IB2 BMT type Allogenic  CMV  Donor -  Recipient -    Prep GANGA (Flu/Cy/TBI) Donor type  8/8, URD ABO D/R O+    GVHD ppx PTCy, siro, MMF Graft source PBSCT Toxo IgG Negative   Protocol [id number] CD34/kg 6.06E+06    BMT MD Brenda Murphy     Pre-transplant disease history  Referring provider: Dr. Delcid    He started having drenching night sweats and fatigue in Jan 2024. Intentional weight loss. He was found to have thrombocytopenia on routine CBC done prior to back surgery (was planned for laminectomy and fusion). On 4/1/24, WBC 6.4, Hb 13.5, Plts 64, ANC 1.89. LDH elevated 532. He underwent bone marrow biopsy (4/23/24) which showed MDS-EB2. Hypercellular marrow (%) with 10.8% blasts including rare circulating blasts. Flow showed 4% myeloid blasts. Normal karyotype. NGS (peripheral blood) was positive for ASXL1, IDH1, RUNX1, SRSF2 mutations (full report not available). Counts: WBC 6.7, Hb 13.1, Plts 70, ANC 0.7. IPSS-M = High risk (0.85).  He was treated with azacitidine 75mg/m2 for 7 days and venetoclax for 14 days (C1 on 5/20/24). Bone marrow after first cycle (6/13/24) showed persistent MDS, with hypercellular marrow with therapy effect, no increase in blasts. Normal karyotype. NGS: ASXL1 (38%), IDH1 (43%), RUNX1 (41%), SRSF1 (38%).   He received second cycle of aza/ angelia on 7/1/24. His pre-transplant BMBx was done on 8/2/24 and he was cytopenic at the time (CBC with WBC 0.6, Hb 14, platelet count 24). Hypocellular marrow (5%) with no increase in blasts. Normal karyotype, NGS negative.     Post transplant   - 9/2/24 (around D10) Neutropenic fevers. Strept mitis bacteremia resistant to levaquin from central venous catheter as well as a single culture of MRSE from  peripheral blood (contaminant). Treated with cefepime. Repeat blood cultures negative, but continued to have high fevers. TTE (9/6/24) did not show any vegetations. CVC was removed on 9/6/24, defervesced on 9/7/24. PICC placed on 9/9/24. Antibiotics (cefepime followed by augmentin) were continued for a total of 14days from line removal.   - Right lower neck swelling and tenderness near the previous CVC site. US (9/15/24) showed an occlusive right internal jugular thrombus and a non occlusive thrombus in the right axillary vein, PICC associated. He was initially not anticoagulated due to thrombocytopenia but had increased symptoms. Repeat US (9/17/24) showed extension of right internal jugular thrombus into the innominate vein and extension of right axillary thrombus into subclavian vein. He was started on therapeutic anticoagulation with lovenox 1mg/kg BID on 9/18/24 with transfusion support for platelets. Right PICC removal was considered given propagation of clot near the PICC line. However, IR (9/19/24) recommended against removal of R PICC and replacement in the left arm due to risk of contralateral DVT.  Line was removed on 10/18/24 and he was transitioned to rivaroxaban on 12/17/24 (one he was off posaconaozle). He has left UE swelling starting Nov 2024 which was treated with lymphedema wraps with PT. US of left upper extremity on 10/18/24 and 11/25/24 were negative for DVT. However, US of b/l UE on 3/19/25 showed known DVT in right internal jugular along with occlusive DVT in the left subclavian vein, which was not thought to be acute.   - Hemorrhagic cystitis: Dysuria, urgency and hematuria starting  9/11/2024. Infectious workup including adenovirus and BK virus negative. Urology was consulted, recommended outpatient follow up. Now resolved.        INTERVAL HISTORY     Leland presents for a scheduled BMT visit. He has been doing very well.     Left arm swelling is improving. Discharged from lymphedema clinic.  Doing massages and wearing the sleeve during the day.   Chest folliculitis stays resolved. Has red spots, healing. Has derm appt.    Eyes are better. Still has some loss of focus. Tear drops usually once a day and restasis twice a day. No dry eyes or grittiness.   Pills get stuck while swallowing sometimes, but has been long standing issue.    Inqovi : C1 on 2/4/25. C2: , C3 on 4/1/25.     Plan  - Reviewed results of bone marrow biopsy. Will get repeat BMBx at short interval of 3 months  - Proceed with C2 inqovi  - Labs once a week at  [facility]. Start levaquin ppx if neutropenic.   - RTC in 2 months       PMH  Back pain due to spinal stenosis, lumbosacral radiculopathy.   Gout, most recent flare was in April 2024  GERD  HTN  BPH, on tamsulosin   Right knee ACL repair in 2014  Low back surgery in 2004 for herniated disc, complicated by infection requiring prolonged IV abx   Coronary artery calcifications seen on CT, stress test in 2023 negative     SH  . Lives with his wife, Vani. Two daughters, 43 and 36. 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 Was still working part time till last summer at [employer], 2-3 hours per day.     Current Outpatient Medications   Medication Sig Dispense Refill    acyclovir (ZOVIRAX) 800 MG tablet TAKE 1 TABLET (800 MG) BY MOUTH EVERY 12 HOURS. 180 tablet 1    allopurinol (ZYLOPRIM) 300 MG tablet Take 1 tablet (300 mg) by mouth daily. 90 tablet 1    calcium carbonate-vitamin D (OSCAL) 500-5 MG-MCG tablet Take 2 tablets by mouth daily. 60 tablet 2    carboxymethylcellulose (REFRESH PLUS) 0.5 % SOLN ophthalmic solution Place 1 drop into both eyes 3 times daily as needed for dry eyes. 15 mL 11    clindamycin (CLEOCIN T) 1 % external lotion Apply to chest and back BID x 3 weeks 60 mL 3    cycloSPORINE (RESTASIS) 0.05 % ophthalmic emulsion INSTILL 1 DROP INTO BOTH EYES TWICE A DAY 60 mL 11    hydrocortisone 2.5 % cream Apply topically daily. 30 g 0    levofloxacin  (LEVAQUIN) 250 MG tablet Take 1 tablet (250 mg) by mouth daily as needed (Only when ANC < 1). 30 tablet 0    lifitegrast (XIIDRA) 5 % opthalmic solution INSTILL 1 DROP INTO BOTH EYES TWICE A DAY (Patient not taking: No sig reported) 60 each 11    lisinopril (ZESTRIL) 5 MG tablet TAKE 1 TABLET (5 MG) BY MOUTH DAILY. HOLD DOSE IF BLOOD PRESSURE IS LESS THAN 110/60 90 tablet 1    methocarbamol (ROBAXIN) 500 MG tablet Take 500 mg by mouth as needed for muscle spasms.      metroNIDAZOLE (METROCREAM) 0.75 % external cream Apply to face, scalp and chest BID 45 g 11    metroNIDAZOLE (METROGEL) 0.75 % external gel Apply 1 g to the chest and 1g to the back  g 5    pantoprazole (PROTONIX) 40 MG EC tablet TAKE 1 TABLET BY MOUTH EVERY DAY 90 tablet 1    prochlorperazine (COMPAZINE) 10 MG tablet Take 1 tablet (10 mg) by mouth every 6 hours as needed for nausea or vomiting. 30 tablet 2    rivaroxaban ANTICOAGULANT (XARELTO) 10 MG TABS tablet Take 1 tablet (10 mg) by mouth daily (with dinner). 30 tablet 3    sulfamethoxazole-trimethoprim (BACTRIM DS) 800-160 MG tablet Take 1 tablet by mouth Every Mon, Tues two times daily. 48 tablet 1    tamsulosin (FLOMAX) 0.4 MG capsule Take 0.4 mg by mouth daily.          EXAM      There were no vitals taken for this visit.    Wt Readings from Last 4 Encounters:   03/25/25 91.9 kg (202 lb 8 oz)   02/26/25 91.9 kg (202 lb 8 oz)   02/19/25 92 kg (202 lb 14.4 oz)   01/29/25 90.9 kg (200 lb 6.4 oz)       KPS: 80% Can perform normal activity with effort, some signs of disease    General: Alert, awake  Eyes: Conjunctiva normal  Mouth and Throat: No mucositis   Abd:  Non tender, non distended   Lymph:  LUE with compression wrap  Skin: No pustular lesions or active folliculitis, has red spots which are healing.   Access: None        LABS       Recent Labs   Lab Test 04/10/25  1308 04/03/25  1310 03/27/25  1312 09/18/24  0430 09/17/24  0424 09/16/24  0432 09/13/24  0322 09/12/24  0321   WBC 4.7  4.5 4.1   < > 1.3* 1.5*   < > 2.9*   HGB 13.8 13.5 13.0*   < > 7.4* 8.0*   < > 8.4*    182 132*   < > 22* 24*   < > 27*   NEUTROPHIL 55 48 46   < > 77 84   < > 89   ANEU  --   --   --   --  1.0* 1.3*  --  2.6   LYMPH 35 41 43   < > 1 1   < > 1   ALYM  --   --   --   --  0.0* 0.0*  --  0.0*    < > = values in this interval not displayed.       Recent Labs   Lab Test 04/10/25  1308 04/03/25  1310 03/27/25  1312    137 136   POTASSIUM 4.9 4.7 4.6   CHLORIDE 101 101 102   CO2 30* 27 23   BUN 24.0* 18.8 19.0   CR 1.08 1.13 1.05   GLC 98 107* 92        Recent Labs   Lab Test 04/10/25  1308 04/03/25  1310 03/27/25  1312 10/18/24  1216 10/15/24  1220 10/03/24  1543 09/30/24  0900 09/27/24  0927 09/23/24  0927 09/19/24  0323 09/18/24  0430 09/17/24  0424   HERBERT 9.8 9.8 9.5   < > 9.1   < > 9.0   < > 9.3   < > 8.7* 9.0   MAG  --   --   --   --  1.9  --  1.9  --   --   --  2.0 2.0   PHOS  --   --   --   --   --   --   --   --  2.5  --  3.0 2.9    < > = values in this interval not displayed.        Recent Labs   Lab Test 04/10/25  1308 04/03/25  1310 03/27/25  1312   ALKPHOS 90 78 88   AST 31 29 35   ALT 21 20 22   BILITOTAL 0.5 0.5 0.5   ALBUMIN 4.6 4.3 4.2       Recent Labs   Lab Test 04/10/25  1308 04/03/25  1310 03/27/25  1312   CMVQNT Not Detected Not Detected Not Detected       Recent Labs   Lab Test 01/29/25  1457 11/29/24  1335 09/27/24  0927    549* 708       Recent Labs   Lab Test 08/01/24  0805 05/08/24  1156   ZARIA 460* 1,360*         ASSESSMENT AND PLAN     BMT: D235   - Chemo protocol: MT 2022-5; Flu/Cy/TBI  - Peripheral blood stem cell graft from 8/8 URD donor, ABO matched, Cell dose: 6.06 x10^6 CD34/kg    Disease status:   9/13/24 (D28): Bmbx shows hypocellular marrow, no dysplasia or blasts.  NGS negative  12/2/24 (D100): Bmbx shows hypocellular marrow in CR. Flow showed NGS negative.   2/19/25 (D180): Bmbx shows hypocellular marrow with morphological CR. Flow shows no increase in myeloid  blasts but myeloid blasts have an unusual immunophenotype with partial CD7. NGS negative.   - Repeat bone marrow biopsy in 3 months (around 9 months)    Graft status/chimerism:   9/13/24 (D28): Bone marrow 100%. Peripheral blood CD3 92%, CD33 100%  10/25/24 (D60): Peripheral blood CD3 and CD33 100%  12/2/24 (D100): Bone marrow 100%, Peripheral blood CD3 and CD33 100%  2/19/25 (D180): Bone marrow 100%, Peripheral blood CD3 and CD33 100%    Maintenance   Inqovi 3x/28 days   - C1 on 2/4/25  - Proceed with C2    GVHD  # Current systemic immunosuppression is none.   - Sirolimus stopped at D100 without taper on 11/29/24     # GVHD: None till date  - Skin: Still has rash on chest, face, neck. Has been using topical metronidazole. Will refer again to dermatology for evaluation of GVHD.   - Eyes: Dry eye syndrome. Has seen ophthalmology. Restasis twice daily and artifical tears once a day. Has undergone punctal plug placement.   - Mouth    Heme/ Coag  # Right internal jugular occlusive thrombus and right axillary/ subclavain vein non occlusive thrombus, line associated, diagnosed on 9/15/24. Line was removed on 10/18/24.   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 Price, with recommendation for thoracic outlet syndrome ultrasound in 3 months (June 2025)    DERM  Folliculitis  - Has been treated with doxycycline for 21 days along with clindamycin and hibiclens. Improved/ resolved.     ID  # Prophylaxis  PJP/ Toxo IgG negative: Bactrim   Viral: Acyclovir 800 mg bid  Bacterial/ fungal: None    # Monitoring  CMV: Monitor monthly  EBV: Monitor every 2 weeks between D30 to D180.   IgG:  IgG was 618 mg/dL on 1/29/25. Check serum IgG level q3 months.     GI  GERD: Pantoprazole daily   VOD prophylaxis: Completed     CARDIOVASCULAR  # HTN: Lisinopril 5mg (was on 15mg daily prior to transplant)    # CAD: Coronary artery calcifications seen on CT, stress test in  2023 negative  - Risk of cardiomyopathy: Baseline EF 55-60%.   - Risk of arrhythmia: Baseline EKG showed 421       RENAL/ELECTROLYTES/  - BPH: Continue Flomax.  - Hemorrhagic Cystitis secondary to cytoxan: Resolved. Oxybutynin 10mg at bedtime PRN. Will need follow up with urology at some point.       MUSCULOSKELETAL  # Gout: continue allopurinol   # History of spinal stenosis, lumbosacral radiculopathy:   - Pain management: Okay to take tylenol or robaxin RPN    MSK  # LUE swelling   - US x2 (11/25/24) negative for DVT, unclear cause. Has seen lymphedema specialist, swelling is improved with compression sleve      Post-transplant vaccinations  Has received flu, COVID and RSV vaccine.     I spent 45 minutes in the care of this patient today, which included time necessary for preparation for the visit, obtaining history, ordering medications/tests/procedures as medically indicated, review of pertinent medical literature, counseling of the patient, communication of recommendations to the care team, and documentation time.    Brenda Murphy  Department of Hematology, Oncology and Transplantation  Text via [employer]        preparation for the visit, obtaining history, ordering medications/tests/procedures as medically indicated, review of pertinent medical literature, counseling of the patient, communication of recommendations to the care team, and documentation time.    Brenda Murphy  Department of Hematology, Oncology and Transplantation  Text via [employer]

## 2025-04-15 NOTE — LETTER
4/15/2025      Leland Pearson  [address] [place] [address]      Dear Colleague,    Thank you for referring your patient, Leland Pearson, to the [facility] BLOOD AND MARROW TRANSPLANT PROGRAM [place]. Please see a copy of my visit note below.    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     Diagnosis MDS-IB2 BMT type Allogenic  CMV  Donor -  Recipient -    Prep GANGA (Flu/Cy/TBI) Donor type  8/8, URD ABO D/R O+    GVHD ppx PTCy, siro, MMF Graft source PBSCT Toxo IgG Negative   Protocol [id number] CD34/kg 6.06E+06    BMT MD Brenda Murphy     Pre-transplant disease history  Referring provider: Dr. Delcid    He started having drenching night sweats and fatigue in Jan 2024. Intentional weight loss. He was found to have thrombocytopenia on routine CBC done prior to back surgery (was planned for laminectomy and fusion). On 4/1/24, WBC 6.4, Hb 13.5, Plts 64, ANC 1.89. LDH elevated 532. He underwent bone marrow biopsy (4/23/24) which showed MDS-EB2. Hypercellular marrow (%) with 10.8% blasts including rare circulating blasts. Flow showed 4% myeloid blasts. Normal karyotype. NGS (peripheral blood) was positive for ASXL1, IDH1, RUNX1, SRSF2 mutations (full report not available). Counts: WBC 6.7, Hb 13.1, Plts 70, ANC 0.7. IPSS-M = High risk (0.85).  He was treated with azacitidine 75mg/m2 for 7 days and venetoclax for 14 days (C1 on 5/20/24). Bone marrow after first cycle (6/13/24) showed persistent MDS, with hypercellular marrow with therapy effect, no increase in blasts. Normal karyotype. NGS: ASXL1 (38%), IDH1 (43%), RUNX1 (41%), SRSF1 (38%).   He received second cycle of aza/ angelia on 7/1/24. His pre-transplant BMBx was done on 8/2/24 and he was cytopenic at the time (CBC with WBC 0.6, Hb 14, platelet  DATE:  07/07/2017

 

Today we got back a report from ANGELA that the compressive tumor removed from her

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 I have therefore informed the

patient of the diagnosis. She will not need surgical intervention, however, will

need medical oncology with chemotherapy. I will plan to put a consult in on

Monday for medical oncology to see her. Her immediate main thrust of treatment is

rehabilitation and mobilization. According to the physiatrist, she is coming

along well and is making good progress. I have stressed to her that this is a

very manageable and treatable malignancy. It does mean that she escapes having a

large thoracic procedure, but will of course commit her to chemotherapy. I have

answered as many questions as she has considering the news I have given her

today. Her  was also present. I have answered his questions. He seems

fairly upbeat. count 24). Hypocellular marrow (5%) with no increase in blasts. Normal karyotype, NGS negative.     Post transplant   - 9/2/24 (around D10) Neutropenic fevers. Strept mitis bacteremia resistant to levaquin from central venous catheter as well as a single culture of MRSE from peripheral blood (contaminant). Treated with cefepime. Repeat blood cultures negative, but continued to have high fevers. TTE (9/6/24) did not show any vegetations. CVC was removed on 9/6/24, defervesced on 9/7/24. PICC placed on 9/9/24. Antibiotics (cefepime followed by augmentin) were continued for a total of 14days from line removal.   - Right lower neck swelling and tenderness near the previous CVC site. US (9/15/24) showed an occlusive right internal jugular thrombus and a non occlusive thrombus in the right axillary vein, PICC associated. He was initially not anticoagulated due to thrombocytopenia but had increased symptoms. Repeat US (9/17/24) showed extension of right internal jugular thrombus into the innominate vein and extension of right axillary thrombus into subclavian vein. He was started on therapeutic anticoagulation with lovenox 1mg/kg BID on 9/18/24 with transfusion support for platelets. Right PICC removal was considered given propagation of clot near the PICC line. However, IR (9/19/24) recommended against removal of R PICC and replacement in the left arm due to risk of contralateral DVT.  Line was removed on 10/18/24 and he was transitioned to rivaroxaban on 12/17/24 (one he was off posaconaozle). He has left UE swelling starting Nov 2024 which was treated with lymphedema wraps with PT. US of left upper extremity on 10/18/24 and 11/25/24 were negative for DVT. However, US of b/l UE on 3/19/25 showed known DVT in right internal jugular along with occlusive DVT in the left subclavian vein, which was not thought to be acute.   - Hemorrhagic cystitis: Dysuria, urgency and hematuria starting  9/11/2024. Infectious workup  including adenovirus and BK virus negative. Urology was consulted, recommended outpatient follow up. Now resolved.   - Maintenance: Inqovi : C1 on 2/4/25. C2: 3/4/25, C3 on 4/1/25.        INTERVAL HISTORY     Leland presents for a scheduled BMT visit.     Overall doing well. Has been taking maintenance inqovi. Counts have been stable, no cytopenias.     He still has a rash on his chest which seems unchanged from prior (see media tab). He has been treated with topical metronidazole. No itching or discomfort. His dermatologist is going to leave soon. The rash is still bright red and does not really seem to be fading. Will refer to dermatology here to r/o GVHD.    Eyes are stable. He visited ophthalmology recently and continues to use restasis twice a day and tear drops once a day as needed. He is planning on getting cataract surgery soon.    Plan  - Continue inqovi.The rash pre-dated the inqovi and is not very likely to be relate to it.  -Labs (CBC, CMP) every other week [facility]   - Will request repeat bone marrow biopsy in late-May   - RTC in 4-5 weeks    PMH  Back pain due to spinal stenosis, lumbosacral radiculopathy.   Gout, most recent flare was in April 2024  GERD  HTN  BPH, on tamsulosin   Right knee ACL repair in 2014  Low back surgery in 2004 for herniated disc, complicated by infection requiring prolonged IV abx   Coronary artery calcifications seen on CT, stress test in 2023 negative     SH  . Lives with his wife, Vani. Two daughters, 43 and 36. 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 Was still working part time till last summer at [employer], 2-3 hours per day.     Current Outpatient Medications   Medication Sig Dispense Refill     acyclovir (ZOVIRAX) 800 MG tablet TAKE 1 TABLET (800 MG) BY MOUTH EVERY 12 HOURS. 180 tablet 1     allopurinol (ZYLOPRIM) 300 MG tablet Take 1 tablet (300 mg) by mouth daily. 90 tablet 1     calcium carbonate-vitamin D (OSCAL) 500-5 MG-MCG tablet  Take 2 tablets by mouth daily. 60 tablet 2     carboxymethylcellulose (REFRESH PLUS) 0.5 % SOLN ophthalmic solution Place 1 drop into both eyes 3 times daily as needed for dry eyes. 15 mL 11     clindamycin (CLEOCIN T) 1 % external lotion Apply to chest and back BID x 3 weeks 60 mL 3     cycloSPORINE (RESTASIS) 0.05 % ophthalmic emulsion INSTILL 1 DROP INTO BOTH EYES TWICE A DAY 60 mL 11     hydrocortisone 2.5 % cream Apply topically daily. 30 g 0     levofloxacin (LEVAQUIN) 250 MG tablet Take 1 tablet (250 mg) by mouth daily as needed (Only when ANC < 1). 30 tablet 0     lifitegrast (XIIDRA) 5 % opthalmic solution INSTILL 1 DROP INTO BOTH EYES TWICE A DAY (Patient not taking: No sig reported) 60 each 11     lisinopril (ZESTRIL) 5 MG tablet TAKE 1 TABLET (5 MG) BY MOUTH DAILY. HOLD DOSE IF BLOOD PRESSURE IS LESS THAN 110/60 90 tablet 1     methocarbamol (ROBAXIN) 500 MG tablet Take 500 mg by mouth as needed for muscle spasms.       metroNIDAZOLE (METROCREAM) 0.75 % external cream Apply to face, scalp and chest BID 45 g 11     metroNIDAZOLE (METROGEL) 0.75 % external gel Apply 1 g to the chest and 1g to the back  g 5     pantoprazole (PROTONIX) 40 MG EC tablet TAKE 1 TABLET BY MOUTH EVERY DAY 90 tablet 1     prochlorperazine (COMPAZINE) 10 MG tablet Take 1 tablet (10 mg) by mouth every 6 hours as needed for nausea or vomiting. 30 tablet 2     rivaroxaban ANTICOAGULANT (XARELTO) 10 MG TABS tablet Take 1 tablet (10 mg) by mouth daily (with dinner). 30 tablet 3     sulfamethoxazole-trimethoprim (BACTRIM DS) 800-160 MG tablet Take 1 tablet by mouth Every Mon, Tues two times daily. 48 tablet 1     tamsulosin (FLOMAX) 0.4 MG capsule Take 0.4 mg by mouth daily.          EXAM      BP (!) 149/84   Pulse 72   Temp 97.8  F (36.6  C) (Oral)   Resp 16   Wt 91.8 kg (202 lb 4.8 oz)   SpO2 98%   BMI 27.10 kg/m      Wt Readings from Last 4 Encounters:   03/25/25 91.9 kg (202 lb 8 oz)   02/26/25 91.9 kg (202 lb 8 oz)    02/19/25 92 kg (202 lb 14.4 oz)   01/29/25 90.9 kg (200 lb 6.4 oz)       KPS: 80% Can perform normal activity with effort, some signs of disease    General: Alert, awake  Eyes: Conjunctiva normal  Mouth and Throat: No mucositis   Abd:  Non tender, non distended   Lymph:  LUE with compression wrap  Skin: Red discreet spots, non blanching, over chest    Access: None        LABS       Recent Labs   Lab Test 04/10/25  1308 04/03/25  1310 03/27/25  1312 09/18/24  0430 09/17/24  0424 09/16/24  0432 09/13/24  0322 09/12/24  0321   WBC 4.7 4.5 4.1   < > 1.3* 1.5*   < > 2.9*   HGB 13.8 13.5 13.0*   < > 7.4* 8.0*   < > 8.4*    182 132*   < > 22* 24*   < > 27*   NEUTROPHIL 55 48 46   < > 77 84   < > 89   ANEU  --   --   --   --  1.0* 1.3*  --  2.6   LYMPH 35 41 43   < > 1 1   < > 1   ALYM  --   --   --   --  0.0* 0.0*  --  0.0*    < > = values in this interval not displayed.       Recent Labs   Lab Test 04/10/25  1308 04/03/25  1310 03/27/25  1312    137 136   POTASSIUM 4.9 4.7 4.6   CHLORIDE 101 101 102   CO2 30* 27 23   BUN 24.0* 18.8 19.0   CR 1.08 1.13 1.05   GLC 98 107* 92        Recent Labs   Lab Test 04/10/25  1308 04/03/25  1310 03/27/25  1312 10/18/24  1216 10/15/24  1220 10/03/24  1543 09/30/24  0900 09/27/24  0927 09/23/24  0927 09/19/24  0323 09/18/24  0430 09/17/24  0424   HERBERT 9.8 9.8 9.5   < > 9.1   < > 9.0   < > 9.3   < > 8.7* 9.0   MAG  --   --   --   --  1.9  --  1.9  --   --   --  2.0 2.0   PHOS  --   --   --   --   --   --   --   --  2.5  --  3.0 2.9    < > = values in this interval not displayed.        Recent Labs   Lab Test 04/10/25  1308 04/03/25  1310 03/27/25  1312   ALKPHOS 90 78 88   AST 31 29 35   ALT 21 20 22   BILITOTAL 0.5 0.5 0.5   ALBUMIN 4.6 4.3 4.2       Recent Labs   Lab Test 04/10/25  1308 04/03/25  1310 03/27/25  1312   CMVQNT Not Detected Not Detected Not Detected       Recent Labs   Lab Test 01/29/25  1457 11/29/24  1335 09/27/24  0927    549* 708       Recent Labs    Lab Test 08/01/24  0805 05/08/24  1156   ZARIA 460* 1,360*         ASSESSMENT AND PLAN     BMT: D235   - Chemo protocol: MT 2022-5; Flu/Cy/TBI  - Peripheral blood stem cell graft from 8/8 URD donor, ABO matched, Cell dose: 6.06 x10^6 CD34/kg    Disease status:   9/13/24 (D28): Bmbx shows hypocellular marrow, no dysplasia or blasts.  NGS negative  12/2/24 (D100): Bmbx shows hypocellular marrow in CR. Flow showed NGS negative.   2/19/25 (D180): Bmbx shows hypocellular marrow with morphological CR. Flow shows no increase in myeloid blasts but myeloid blasts have an unusual immunophenotype with partial CD7. NGS negative.   - Repeat bone marrow biopsy in 3 months (around 9 months)    Graft status/chimerism:   9/13/24 (D28): Bone marrow 100%. Peripheral blood CD3 92%, CD33 100%  10/25/24 (D60): Peripheral blood CD3 and CD33 100%  12/2/24 (D100): Bone marrow 100%, Peripheral blood CD3 and CD33 100%  2/19/25 (D180): Bone marrow 100%, Peripheral blood CD3 and CD33 100%  4/3/25: Peripheral blood CD3 and CD33 100%    Maintenance   Inqovi 3x/28 days   - C3 on 4/1/25, Proceed with C4 on 4/29/25.     GVHD  # Current systemic immunosuppression is none.   - Sirolimus stopped at D100 without taper on 11/29/24     # GVHD: None till date  # Skin: Still has rash on chest, face, neck. Has been using topical metronidazole. Will refer again to dermatology for evaluation of GVHD. Score 2 (BSA around 27%) if related to GVHD.  # Eyes: Dry eye syndrome. Has seen ophthalmology. Restasis twice daily and artifical tears once a day. Has undergone punctal plug placement. Have sent a message to ophthalmology to clarify if this is related to GVHD. Score 2 if related to GVHD  # Mouth: No symptoms  # GI: No symptoms. GI score 0.  # Liver: LFTs normal. Liver score 0.  # Pulm: No symptoms. Lung score 0.  # Joints: No symptoms. Joint score 0.    Heme/ Coag  # Right internal jugular occlusive thrombus and right axillary/ subclavain vein non occlusive  thrombus, line associated, diagnosed on 9/15/24. Line was removed on 10/18/24.   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 [place], with recommendation for thoracic outlet syndrome ultrasound in 3 months (June 2025)    DERM  Initially diagnosed with folliculitis  in Dec 2024. Has been treated with doxycycline for 21 days along with clindamycin and hibiclens with improvement in folliculitis. However, still has discreet red rash on the chest, abdomen and face.      ID  # Prophylaxis  PJP/ Toxo IgG negative: Bactrim   Viral: Acyclovir 800 mg bid  Bacterial/ fungal: None    # Monitoring  CMV: Monitor monthly  EBV: Monitor every 2 weeks between D30 to D180.   IgG:  IgG was 618 mg/dL on 1/29/25. Check serum IgG level q3 months.     GI  GERD: Pantoprazole daily   VOD prophylaxis: Completed     CARDIOVASCULAR  # HTN: Lisinopril 5mg (was on 15mg daily prior to transplant)    # CAD: Coronary artery calcifications seen on CT, stress test in 2023 negative  - Risk of cardiomyopathy: Baseline EF 55-60%.   - Risk of arrhythmia: Baseline EKG showed 421       RENAL/ELECTROLYTES/  - BPH: Continue Flomax.  - Hemorrhagic Cystitis secondary to cytoxan: Resolved. Oxybutynin 10mg at bedtime PRN. Will need follow up with urology at some point.       MUSCULOSKELETAL  # Gout: continue allopurinol   # History of spinal stenosis, lumbosacral radiculopathy:   - Pain management: Okay to take tylenol or robaxin RPN    MSK  # LUE swelling   - US x2 (11/25/24) negative for DVT, unclear cause. Has seen lymphedema specialist, swelling is improved with compression sleve     Health Maintenance   DEXA and Vitamin D: At 1-year post transplant   PFT: At 1 year post transplant. Repeat if symptomatic.   Thyroid: Check TSH and free T4 at 1 year post-transplant, then yearly, through primary physician.   Lipids: Check lipid panel q6-12 months through primary physician.  Skin: Annual skin  checks through Dermatology; d/w patient.  Dental visit at 1 year   Iron overload: Check Ferritin at 1 year     Post-transplant vaccinations  Has received flu, COVID and RSV vaccine.     I spent 60 minutes in the care of this patient today, which included time necessary for preparation for the visit, obtaining history, ordering medications/tests/procedures as medically indicated, review of pertinent medical literature, counseling of the patient, communication of recommendations to the care team, and documentation time.    Brenda Murphy  Department of Hematology, Oncology and Transplantation  Text via [email or web address]         Again, thank you for allowing me to participate in the care of your patient.        Sincerely,         BMT Auto Cell Therapy    Electronically signed

## 2025-04-15 NOTE — NURSING NOTE
"Oncology Rooming Note    April 15, 2025 2:22 PM   Leland Pearson is a 70 year old male who presents for:    Chief Complaint   Patient presents with    Blood Draw     Vpt blood draw by lab RN    Oncology Clinic Visit     myelodysplastic syndrome     Initial Vitals: BP (!) 149/84   Pulse 72   Temp 97.8  F (36.6  C) (Oral)   Resp 16   Wt 91.8 kg (202 lb 4.8 oz)   SpO2 98%   BMI 27.10 kg/m   Estimated body mass index is 27.1 kg/m  as calculated from the following:    Height as of 8/16/24: 1.84 m (6' 0.44\").    Weight as of this encounter: 91.8 kg (202 lb 4.8 oz). Body surface area is 2.17 meters squared.  No Pain (0) Comment: Data Unavailable   No LMP for male patient.  Allergies reviewed: Yes  Medications reviewed: Yes    Medications: Medication refills not needed today.  Pharmacy name entered into [employer]:    [id number] - [place], [place] - [address] [place] [address] [facility] [place], [place] - [address] [place] [address] [facility] [place] [place] [facility] [address] [facility] [phone]    Frailty Screening:   Is the patient here for a new oncology consult visit in cancer care? 2. No    PHQ9:  Did this patient require a PHQ9?: No      Clinical concerns: none.      Galileo Scott"

## 2025-04-16 ENCOUNTER — TELEPHONE (OUTPATIENT)
Dept: DERMATOLOGY | Facility: CLINIC | Age: 70
End: 2025-04-16

## 2025-04-16 ENCOUNTER — TELEPHONE (OUTPATIENT)
Facility: CLINIC | Age: 70
End: 2025-04-16
Payer: COMMERCIAL

## 2025-04-16 LAB
CMV DNA SPEC NAA+PROBE-ACNC: NOT DETECTED IU/ML
SPECIMEN TYPE: NORMAL

## 2025-04-16 NOTE — TELEPHONE ENCOUNTER
4/16 Patient confirmed scheduled appointment:  Date: 5/2/25  Time: 1:40pm  Visit type: New Dermatology  Provider: Eddie  Location: [facility]  Testing/imaging: na  Additional notes: na

## 2025-04-16 NOTE — TELEPHONE ENCOUNTER
[facility] Call Center    Phone Message    May a detailed message be left on voicemail: no     Reason for Call: Appointment Intake    Referring Provider Name: Brenda Murphy MBBS in  BMT  Diagnosis and/or Symptoms: Immunocompromised state associated with stem cell transplant (H) [D84.822, Z94.8...  Reason for Referral: Autoimmune Disorder   Additional Information: 8 months s/p BMT with a red rash on head, neck and chest. initially thought to be folliculitis but has bright red spots. was most recently given metronidazole topically. need to r/o GVHD.   Priority: 1-2 Weeks    Referring provider is specifically requesting Dr. Erinn Morgan and writer unable to schedule with per protocols. Routing to clinic to review and assist patient.    Action Taken: Message routed to:  [facility] ([facility] [place]    Travel Screening: Not Applicable     Date of Service:

## 2025-04-22 DIAGNOSIS — D46.9 MDS (MYELODYSPLASTIC SYNDROME) (H): Primary | ICD-10-CM

## 2025-04-22 RX ORDER — CEDAZURIDINE AND DECITABINE 100; 35 MG/1; MG/1
1 TABLET, FILM COATED ORAL DAILY
Qty: 5 TABLET | Refills: 0 | Status: SHIPPED | OUTPATIENT
Start: 2025-04-22 | End: 2025-04-25

## 2025-04-24 ENCOUNTER — LAB (OUTPATIENT)
Dept: LAB | Facility: CLINIC | Age: 70
End: 2025-04-24
Payer: COMMERCIAL

## 2025-04-24 DIAGNOSIS — Z94.84 IMMUNOCOMPROMISED STATE ASSOCIATED WITH STEM CELL TRANSPLANT (H): ICD-10-CM

## 2025-04-24 DIAGNOSIS — D84.822 IMMUNOCOMPROMISED STATE ASSOCIATED WITH STEM CELL TRANSPLANT (H): ICD-10-CM

## 2025-04-24 LAB
BASOPHILS # BLD AUTO: 0 10E3/UL (ref 0–0.2)
BASOPHILS NFR BLD AUTO: 1 %
EOSINOPHIL # BLD AUTO: 0.1 10E3/UL (ref 0–0.7)
EOSINOPHIL NFR BLD AUTO: 3 %
ERYTHROCYTE [DISTWIDTH] IN BLOOD BY AUTOMATED COUNT: 14.1 % (ref 10–15)
HCT VFR BLD AUTO: 39.2 % (ref 40–53)
HGB BLD-MCNC: 13.7 G/DL (ref 13.3–17.7)
IMM GRANULOCYTES # BLD: 0 10E3/UL
IMM GRANULOCYTES NFR BLD: 0 %
LYMPHOCYTES # BLD AUTO: 1.4 10E3/UL (ref 0.8–5.3)
LYMPHOCYTES NFR BLD AUTO: 42 %
MCH RBC QN AUTO: 33.9 PG (ref 26.5–33)
MCHC RBC AUTO-ENTMCNC: 34.9 G/DL (ref 31.5–36.5)
MCV RBC AUTO: 97 FL (ref 78–100)
MONOCYTES # BLD AUTO: 0.3 10E3/UL (ref 0–1.3)
MONOCYTES NFR BLD AUTO: 9 %
NEUTROPHILS # BLD AUTO: 1.5 10E3/UL (ref 1.6–8.3)
NEUTROPHILS NFR BLD AUTO: 45 %
PLATELET # BLD AUTO: 124 10E3/UL (ref 150–450)
RBC # BLD AUTO: 4.04 10E6/UL (ref 4.4–5.9)
WBC # BLD AUTO: 3.3 10E3/UL (ref 4–11)

## 2025-04-28 ENCOUNTER — LAB (OUTPATIENT)
Dept: LAB | Facility: CLINIC | Age: 70
End: 2025-04-28
Payer: COMMERCIAL

## 2025-04-28 DIAGNOSIS — Z94.84 IMMUNOCOMPROMISED STATE ASSOCIATED WITH STEM CELL TRANSPLANT (H): ICD-10-CM

## 2025-04-28 DIAGNOSIS — D84.822 IMMUNOCOMPROMISED STATE ASSOCIATED WITH STEM CELL TRANSPLANT (H): ICD-10-CM

## 2025-04-28 LAB
ALBUMIN SERPL BCG-MCNC: 4.4 G/DL (ref 3.5–5.2)
ALP SERPL-CCNC: 84 U/L (ref 40–150)
ALT SERPL W P-5'-P-CCNC: 21 U/L (ref 0–70)
ANION GAP SERPL CALCULATED.3IONS-SCNC: 9 MMOL/L (ref 7–15)
AST SERPL W P-5'-P-CCNC: 31 U/L (ref 0–45)
BASOPHILS # BLD AUTO: 0.1 10E3/UL (ref 0–0.2)
BASOPHILS NFR BLD AUTO: 1 %
BILIRUB SERPL-MCNC: 0.6 MG/DL
BUN SERPL-MCNC: 19.3 MG/DL (ref 8–23)
CALCIUM SERPL-MCNC: 9.5 MG/DL (ref 8.8–10.4)
CHLORIDE SERPL-SCNC: 103 MMOL/L (ref 98–107)
CREAT SERPL-MCNC: 1.43 MG/DL (ref 0.67–1.17)
EGFRCR SERPLBLD CKD-EPI 2021: 52 ML/MIN/1.73M2
EOSINOPHIL # BLD AUTO: 0.1 10E3/UL (ref 0–0.7)
EOSINOPHIL NFR BLD AUTO: 2 %
ERYTHROCYTE [DISTWIDTH] IN BLOOD BY AUTOMATED COUNT: 13.9 % (ref 10–15)
GLUCOSE SERPL-MCNC: 105 MG/DL (ref 70–99)
HCO3 SERPL-SCNC: 27 MMOL/L (ref 22–29)
HCT VFR BLD AUTO: 37.5 % (ref 40–53)
HGB BLD-MCNC: 13.2 G/DL (ref 13.3–17.7)
IMM GRANULOCYTES # BLD: 0 10E3/UL
IMM GRANULOCYTES NFR BLD: 0 %
LYMPHOCYTES # BLD AUTO: 1.6 10E3/UL (ref 0.8–5.3)
LYMPHOCYTES NFR BLD AUTO: 41 %
MCH RBC QN AUTO: 34 PG (ref 26.5–33)
MCHC RBC AUTO-ENTMCNC: 35.2 G/DL (ref 31.5–36.5)
MCV RBC AUTO: 97 FL (ref 78–100)
MONOCYTES # BLD AUTO: 0.3 10E3/UL (ref 0–1.3)
MONOCYTES NFR BLD AUTO: 7 %
NEUTROPHILS # BLD AUTO: 1.9 10E3/UL (ref 1.6–8.3)
NEUTROPHILS NFR BLD AUTO: 49 %
PLATELET # BLD AUTO: 173 10E3/UL (ref 150–450)
POTASSIUM SERPL-SCNC: 5 MMOL/L (ref 3.4–5.3)
PROT SERPL-MCNC: 6.5 G/DL (ref 6.4–8.3)
RBC # BLD AUTO: 3.88 10E6/UL (ref 4.4–5.9)
SODIUM SERPL-SCNC: 139 MMOL/L (ref 135–145)
WBC # BLD AUTO: 3.9 10E3/UL (ref 4–11)

## 2025-04-28 PROCEDURE — 36415 COLL VENOUS BLD VENIPUNCTURE: CPT

## 2025-04-28 PROCEDURE — 80053 COMPREHEN METABOLIC PANEL: CPT

## 2025-04-28 PROCEDURE — 85025 COMPLETE CBC W/AUTO DIFF WBC: CPT

## 2025-04-29 ENCOUNTER — TELEPHONE (OUTPATIENT)
Dept: ONCOLOGY | Facility: CLINIC | Age: 70
End: 2025-04-29
Payer: COMMERCIAL

## 2025-04-29 NOTE — ORAL ONC MGMT
Oral Chemotherapy Monitoring Program  Lab Follow Up    Reviewed lab results from 4/28.    Assessment & Plan:  CMP and CBC reviewed. Results are concerning for grade 1 creatinine increase. No renal dose adjustment required, will continue to monitor. All other results are stable with no concerning abnormalities. Plan to continue Inqovi as prescribed and ok to start next cycle today. Called patient to discuss increase in creatinine. Counseled on increasing water intake to make sure he is staying hydrated. Denies any changes in medications in the past week. Sent IB message to Dr. Murphy as fyi, per mychart response to Leland no need to re-check prior to 5/15.    Follow-Up:  5/15 labs and visit with Dr. Jeffrey Cobos, PharmD  Hematology/Oncology Clinical Pharmacist  [facility]  [phone]        3/14/2025     2:00 PM 3/21/2025    12:00 PM 3/28/2025     9:00 AM 4/4/2025     2:00 PM 4/11/2025     8:00 AM 4/22/2025     8:00 AM 4/29/2025     8:00 AM   ORAL CHEMOTHERAPY   Assessment Type Lab Monitoring;Monthly Follow up Lab Monitoring Lab Monitoring Lab Monitoring Lab Monitoring Refill Lab Monitoring   Diagnosis Code Myelodysplastic Syndrome Myelodysplastic Syndrome Myelodysplastic Syndrome Myelodysplastic Syndrome Myelodysplastic Syndrome Myelodysplastic Syndrome Myelodysplastic Syndrome   Providers Dr. Jeffrey Murphy   [facility]   Is this patient followed by the [facility] OC team? No No No No No No No   Drug Name Inqovi (decitabine/Cedazuridine) Inqovi (decitabine/Cedazuridine) Inqovi (decitabine/Cedazuridine) Inqovi (decitabine/Cedazuridine) Inqovi (decitabine/Cedazuridine) Inqovi (decitabine/Cedazuridine) Inqovi (decitabine/Cedazuridine)   Dose 35 mg 35 mg 35 mg 35 mg 35 mg 35 mg 35 mg   Current Schedule Other Other Other Other Other Other Other   Cycle Details Day  1-3 of a 28 day cycle Day 1-3 of a 28 day cycle Day 1-3 of a 28 day cycle Day 1-3 of a 28 day cycle Day 1-3 of a 28 day cycle Day 1-3 of a 28 day cycle Day 1-3 of a 28 day cycle   Start Date of Last Cycle 3/4/2025 3/4/2025 3/4/2025 3/4/2025 4/1/2025 4/1/2025 4/29/2025   Planned next cycle start date 4/1/2025 4/1/2025 4/1/2025 4/1/2025 4/29/2025 4/29/2025 5/27/2025   Doses missed in last 2 weeks 0   0      Adherence Assessment Adherent   Adherent      Adverse Effects No AE identified during assessment   No AE identified during assessment   Increased Creatinine   Increased Creatinine       Grade 1   Pharmacist intervention(Increased creatinine)       Yes   Intervention(s)       Patient education   Home BPs Not applicable   Not applicable      Any new drug interactions? No   No      Is the dose as ordered appropriate for the patient? Yes   Yes          Labs:  _  Result Component Current Result Ref Range   Sodium 139 (4/28/2025) 135 - 145 mmol/L     _  Result Component Current Result Ref Range   Potassium 5.0 (4/28/2025) 3.4 - 5.3 mmol/L     _  Result Component Current Result Ref Range   Calcium 9.5 (4/28/2025) 8.8 - 10.4 mg/dL     No results found for Mag within last 30 days.     No results found for Phos within last 30 days.     _  Result Component Current Result Ref Range   Albumin 4.4 (4/28/2025) 3.5 - 5.2 g/dL     _  Result Component Current Result Ref Range   Urea Nitrogen 19.3 (4/28/2025) 8.0 - 23.0 mg/dL     _  Result Component Current Result Ref Range   Creatinine 1.43 (H) (4/28/2025) 0.67 - 1.17 mg/dL     _  Result Component Current Result Ref Range   AST 31 (4/28/2025) 0 - 45 U/L     _  Result Component Current Result Ref Range   ALT 21 (4/28/2025) 0 - 70 U/L     _  Result Component Current Result Ref Range   Bilirubin Total 0.6 (4/28/2025) <=1.2 mg/dL     _  Result Component Current Result Ref Range   WBC Count 3.9 (L) (4/28/2025) 4.0 - 11.0 10e3/uL     _  Result Component Current Result Ref Range    Hemoglobin 13.2 (L) (4/28/2025) 13.3 - 17.7 g/dL     _  Result Component Current Result Ref Range   Platelet Count 173 (4/28/2025) 150 - 450 10e3/uL     No results found for ANC within last 30 days.

## 2025-05-02 ENCOUNTER — OFFICE VISIT (OUTPATIENT)
Dept: DERMATOLOGY | Facility: CLINIC | Age: 70
End: 2025-05-02
Payer: COMMERCIAL

## 2025-05-02 DIAGNOSIS — D48.9 NEOPLASM OF UNCERTAIN BEHAVIOR: Primary | ICD-10-CM

## 2025-05-02 PROCEDURE — 88312 SPECIAL STAINS GROUP 1: CPT | Mod: TC | Performed by: STUDENT IN AN ORGANIZED HEALTH CARE EDUCATION/TRAINING PROGRAM

## 2025-05-02 PROCEDURE — 88312 SPECIAL STAINS GROUP 1: CPT | Mod: 26 | Performed by: PATHOLOGY

## 2025-05-02 PROCEDURE — 88305 TISSUE EXAM BY PATHOLOGIST: CPT | Mod: 26 | Performed by: PATHOLOGY

## 2025-05-02 NOTE — LETTER
5/2/2025       RE: Leland Pearson  [address] [place] [address]     Dear Colleague,    Thank you for referring your patient, Leland Pearson, to the [facility] [place] [facility]. Please see a copy of my visit note below.         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 8/8, URD; PTCy, siro, MMF.   Acneiform eruption on the head neck and shoulders likely 2/2 sirolimus, now resolved  Biopsy 12/20/2024 consistent with a ruptured folliculitis  Repeat biopsy 5/2/2025 to rule out GVHD given persistence of lesions (versus scar from prior eruption)    ASSESSMENT AND PLAN:   # Dermatitis, NOS  The patient skin shows numerous hypopigmented and lightly erythematous macules on the neck, upper chest, upper back.  There are no areas of active inflammation or folliculitis.  No pustules or inflammatory papules.  The patient's risk of sugux-hzuxkt-tdtq disease is low given that he had an 8 out of 8 donor and got posttransplant cyclophosphamide.  The lesions today appear most consistent with scars from prior folliculitis which may have in fact been due to sirolimus.  However we will do a biopsy to attempt to confirm this and primarily to rule out fnlqk-bwcfwu-aafi disease.    - Punch biopsy procedure note, location(s): chest. After discussion of benefits and risks including but not limited to bleeding, infection, scar, incomplete removal, recurrence, and non-diagnostic biopsy, written consent and photographs were obtained. The area was cleaned with isopropyl alcohol. 0.5mL of 1% lidocaine with epinephrine was injected to obtain adequate anesthesia and a 4 mm punch biopsy was  performed at site(s). 4-0 Prolene sutures were utilized to approximate the epidermal edges. White petrolatum ointment and a bandage was applied to the wound. Explicit verbal and written wound care instructions were provided. The patient left the dermatology clinic in good condition.     The longitudinal plan of care for the diagnosis(es)/condition(s) as documented were addressed during this visit. Due to the added complexity in care, I will continue to support Yo in the subsequent management and with ongoing continuity of care.              Follow-Up:   Return to clinic if rash worsens, or sooner as needed    History of Present Illness     Reason for Referral: 8 months s/p BMT with a red rash on head, neck and chest. initially thought to be folliculitis but has bright red spots. was most recently given metronidazole topically. need to r/o GVHD.     CC: Derm Problem (PT has had persistent rash for several months. Failed multiple prescription and OTC creams. Tried course of antibiotic and failed. Negative culture, and punch biopsy)    Leland Pearson is a 70 year old male referred by  Dr. Brenda Murphy for evaluation of a rash on the head, neck, and chest. 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 8/8, URD; PTCy, siro, MMF.     He developed a rash in October which was acneiform and thought to be due to sirolimus.  Sirolimus was administered 8/28/2024 to 11/29/2024. It started in his head then went on his neck, back, shoulders it was itchy.     He was seen by dermatology 12/20/2024.  At that point he was noted about the rash for 3 weeks.  A biopsy was performed to rule out GVHD and folliculitis versus lichenoid drug eruption.  That biopsy showed what appeared to ruptured folliculitis.    Was slightly improved with clindamycin, perhaps more with the MetroCream.  Nothing particular makes it worse.  Hydrocortisone was not  helpful.    Review of systems is otherwise negative except as noted above.       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 180 tablet 1     allopurinol (ZYLOPRIM) 300 MG tablet Take 1 tablet (300 mg) by mouth daily. 90 tablet 1     calcium carbonate-vitamin D (OSCAL) 500-5 MG-MCG tablet Take 2 tablets by mouth daily. 60 tablet 2     carboxymethylcellulose (REFRESH PLUS) 0.5 % SOLN ophthalmic solution Place 1 drop into both eyes 3 times daily as needed for dry eyes. 15 mL 11     clindamycin (CLEOCIN T) 1 % external lotion Apply to chest and back BID x 3 weeks 60 mL 3     cycloSPORINE (RESTASIS) 0.05 % ophthalmic emulsion INSTILL 1 DROP INTO BOTH EYES TWICE A DAY 60 mL 11     hydrocortisone 2.5 % cream Apply topically daily. 30 g 0     levofloxacin (LEVAQUIN) 250 MG tablet Take 1 tablet (250 mg) by mouth daily as needed (Only when ANC < 1). 30 tablet 0     lisinopril (ZESTRIL) 5 MG tablet TAKE 1 TABLET (5 MG) BY MOUTH DAILY. HOLD DOSE IF BLOOD PRESSURE IS LESS THAN 110/60 90 tablet 1     methocarbamol (ROBAXIN) 500 MG tablet Take 500 mg by mouth as needed for muscle spasms.       metroNIDAZOLE (METROCREAM) 0.75 % external cream Apply to face, scalp and chest BID 45 g 11     metroNIDAZOLE (METROGEL) 0.75 % external gel Apply 1 g to the chest and 1g to the back  g 5     pantoprazole (PROTONIX) 40 MG EC tablet TAKE 1 TABLET BY MOUTH EVERY DAY 90 tablet 1     prochlorperazine (COMPAZINE) 10 MG tablet Take 1 tablet (10 mg) by mouth every 6 hours as needed for nausea or vomiting. 30 tablet 2     rivaroxaban ANTICOAGULANT (XARELTO) 10 MG TABS tablet Take 1 tablet (10 mg) by mouth daily (with  dinner). 30 tablet 3     sulfamethoxazole-trimethoprim (BACTRIM DS) 800-160 MG tablet Take 1 tablet by mouth Every Mon, Tues two times daily. 48 tablet 1     tamsulosin (FLOMAX) 0.4 MG capsule Take 0.4 mg by mouth daily.       lifitegrast (XIIDRA) 5 % opthalmic solution INSTILL 1 DROP INTO BOTH EYES TWICE A DAY (Patient not taking: No sig reported) 60 each 11         Physical Exam:     Vitals: There were no vitals taken for this visit.  SKIN: Focused examination of face and chest was performed.  See pertinent findings in A/P.     Erinn Morgan MD  Adjunct  of Dermatology  Cell: [phone]      Again, thank you for allowing me to participate in the care of your patient.      Sincerely,    Erinn Morgan MD

## 2025-05-02 NOTE — NURSING NOTE
Dermatology Rooming Note    Leland Pearson's goals for this visit include:   Chief Complaint   Patient presents with    Derm Problem     PT has had persistent rash for several months. Failed multiple prescription and OTC creams. Tried course of antibiotic and failed. Negative culture, and punch biopsy     Anu Pelayo - EMT

## 2025-05-02 NOTE — NURSING NOTE
Lidocaine-epinephrine 1-1:158439 % injection   1.5 mL once for one use, starting 5/2/2025 ending 5/2/2025,  2mL disp, R-0, injection  Injected by Levar Choe EMT    Lalito Choe, EMT  Clinic Support  [facility]     [phone]    Employed by [employer]

## 2025-05-02 NOTE — PROGRESS NOTES
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 8/8, URD; PTCy, siro, MMF.   Acneiform eruption on the head neck and shoulders likely 2/2 sirolimus, now resolved  Biopsy 12/20/2024 consistent with a ruptured folliculitis  Repeat biopsy 5/2/2025 to rule out GVHD given persistence of lesions (versus scar from prior eruption)    ASSESSMENT AND PLAN:   # Dermatitis, NOS  The patient skin shows numerous hypopigmented and lightly erythematous macules on the neck, upper chest, upper back.  There are no areas of active inflammation or folliculitis.  No pustules or inflammatory papules.  The patient's risk of kcwlj-rtkspw-cgya disease is low given that he had an 8 out of 8 donor and got posttransplant cyclophosphamide.  The lesions today appear most consistent with scars from prior folliculitis which may have in fact been due to sirolimus.  However we will do a biopsy to attempt to confirm this and primarily to rule out jrsxe-thogis-yuue disease.    - Punch biopsy procedure note, location(s): chest. After discussion of benefits and risks including but not limited to bleeding, infection, scar, incomplete removal, recurrence, and non-diagnostic biopsy, written consent and photographs were obtained. The area was cleaned with isopropyl alcohol. 0.5mL of 1% lidocaine with epinephrine was injected to obtain adequate anesthesia and a 4 mm punch biopsy was performed at site(s). 4-0 Prolene sutures were utilized to approximate the epidermal edges. White petrolatum ointment and a bandage was applied to the wound. Explicit verbal and written wound care instructions were provided. The patient left the dermatology clinic in good condition.     The longitudinal plan of care for the  diagnosis(es)/condition(s) as documented were addressed during this visit. Due to the added complexity in care, I will continue to support Yo in the subsequent management and with ongoing continuity of care.              Follow-Up:   Return to clinic if rash worsens, or sooner as needed    History of Present Illness     Reason for Referral: 8 months s/p BMT with a red rash on head, neck and chest. initially thought to be folliculitis but has bright red spots. was most recently given metronidazole topically. need to r/o GVHD.     CC: Derm Problem (PT has had persistent rash for several months. Failed multiple prescription and OTC creams. Tried course of antibiotic and failed. Negative culture, and punch biopsy)    Leland Pearson is a 70 year old male referred by  Dr. Brenda Murphy for evaluation of a rash on the head, neck, and chest. 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 8/8, URD; PTCy, siro, MMF.     He developed a rash in October which was acneiform and thought to be due to sirolimus.  Sirolimus was administered 8/28/2024 to 11/29/2024. It started in his head then went on his neck, back, shoulders it was itchy.     He was seen by dermatology 12/20/2024.  At that point he was noted about the rash for 3 weeks.  A biopsy was performed to rule out GVHD and folliculitis versus lichenoid drug eruption.  That biopsy showed what appeared to ruptured folliculitis.    Was slightly improved with clindamycin, perhaps more with the MetroCream.  Nothing particular makes it worse.  Hydrocortisone was not helpful.    Review of systems is otherwise negative except as noted above.       History, Allergies, and Medications:         Past Medical History:   Diagnosis Date    Cancer (H) 03/24    Gastroesophageal reflux disease     Hypertension     Nonsenile cataract 12/27/24       No Known Allergies    Current Outpatient Medications   Medication  Sig Dispense Refill    acyclovir (ZOVIRAX) 800 MG tablet TAKE 1 TABLET (800 MG) BY MOUTH EVERY 12 HOURS. 180 tablet 1    allopurinol (ZYLOPRIM) 300 MG tablet Take 1 tablet (300 mg) by mouth daily. 90 tablet 1    calcium carbonate-vitamin D (OSCAL) 500-5 MG-MCG tablet Take 2 tablets by mouth daily. 60 tablet 2    carboxymethylcellulose (REFRESH PLUS) 0.5 % SOLN ophthalmic solution Place 1 drop into both eyes 3 times daily as needed for dry eyes. 15 mL 11    clindamycin (CLEOCIN T) 1 % external lotion Apply to chest and back BID x 3 weeks 60 mL 3    cycloSPORINE (RESTASIS) 0.05 % ophthalmic emulsion INSTILL 1 DROP INTO BOTH EYES TWICE A DAY 60 mL 11    hydrocortisone 2.5 % cream Apply topically daily. 30 g 0    levofloxacin (LEVAQUIN) 250 MG tablet Take 1 tablet (250 mg) by mouth daily as needed (Only when ANC < 1). 30 tablet 0    lisinopril (ZESTRIL) 5 MG tablet TAKE 1 TABLET (5 MG) BY MOUTH DAILY. HOLD DOSE IF BLOOD PRESSURE IS LESS THAN 110/60 90 tablet 1    methocarbamol (ROBAXIN) 500 MG tablet Take 500 mg by mouth as needed for muscle spasms.      metroNIDAZOLE (METROCREAM) 0.75 % external cream Apply to face, scalp and chest BID 45 g 11    metroNIDAZOLE (METROGEL) 0.75 % external gel Apply 1 g to the chest and 1g to the back  g 5    pantoprazole (PROTONIX) 40 MG EC tablet TAKE 1 TABLET BY MOUTH EVERY DAY 90 tablet 1    prochlorperazine (COMPAZINE) 10 MG tablet Take 1 tablet (10 mg) by mouth every 6 hours as needed for nausea or vomiting. 30 tablet 2    rivaroxaban ANTICOAGULANT (XARELTO) 10 MG TABS tablet Take 1 tablet (10 mg) by mouth daily (with dinner). 30 tablet 3    sulfamethoxazole-trimethoprim (BACTRIM DS) 800-160 MG tablet Take 1 tablet by mouth Every Mon, Tues two times daily. 48 tablet 1    tamsulosin (FLOMAX) 0.4 MG capsule Take 0.4 mg by mouth daily.      lifitegrast (XIIDRA) 5 % opthalmic solution INSTILL 1 DROP INTO BOTH EYES TWICE A DAY (Patient not taking: No sig reported) 60 each 11          Physical Exam:     Vitals: There were no vitals taken for this visit.  SKIN: Focused examination of face and chest was performed.  See pertinent findings in A/P.     Erinn Morgan MD  Adjunct  of Dermatology  Cell: [phone]

## 2025-05-07 DIAGNOSIS — D46.9 MDS (MYELODYSPLASTIC SYNDROME) (H): Primary | ICD-10-CM

## 2025-05-07 LAB
PATH REPORT.COMMENTS IMP SPEC: NORMAL
PATH REPORT.FINAL DX SPEC: NORMAL
PATH REPORT.GROSS SPEC: NORMAL
PATH REPORT.MICROSCOPIC SPEC OTHER STN: NORMAL
PATH REPORT.RELEVANT HX SPEC: NORMAL

## 2025-05-09 ENCOUNTER — RESULTS FOLLOW-UP (OUTPATIENT)
Dept: DERMATOLOGY | Facility: CLINIC | Age: 70
End: 2025-05-09
Payer: COMMERCIAL

## 2025-05-09 NOTE — RESULT ENCOUNTER NOTE
No evidence of GVHD on biopsy. Please see below for additional discussion.  __________________    Dear Mr. Pearson,    Good news! The biopsy did not show any evidence of uugci-uupxfi-wfvs disease, which is wonderful.     It did show two things.   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 It's a common rash we see in patients over the age of 60 and it usually looks like red sometimes itchy bumps that come and go on the chest, back, and abdomen. This is not dangerous and if it's not itchy or bothering you, you don't need to do anything about it  There were some changes that look like a mild drug rash. They are subtle. Of the medications on your list, the most common causes of drug rashes are bactrim and allopurinol. If the rash is not bothersome we don't need to make any changes, but if you are bothered by it then I can discuss whether these medications can be stopped with Dr. Murphy.     So good news all around! Please let me know if you have any questions.   Erinn Morgan MD

## 2025-05-13 LAB
PATH REPORT.ADDENDUM SPEC: NORMAL
PATH REPORT.COMMENTS IMP SPEC: NORMAL
PATH REPORT.FINAL DX SPEC: NORMAL
PATH REPORT.GROSS SPEC: NORMAL
PATH REPORT.MICROSCOPIC SPEC OTHER STN: NORMAL
PATH REPORT.RELEVANT HX SPEC: NORMAL

## 2025-05-14 ENCOUNTER — TELEPHONE (OUTPATIENT)
Dept: TRANSPLANT | Facility: CLINIC | Age: 70
End: 2025-05-14
Payer: COMMERCIAL

## 2025-05-15 ENCOUNTER — APPOINTMENT (OUTPATIENT)
Dept: LAB | Facility: CLINIC | Age: 70
End: 2025-05-15
Attending: STUDENT IN AN ORGANIZED HEALTH CARE EDUCATION/TRAINING PROGRAM
Payer: COMMERCIAL

## 2025-05-15 ENCOUNTER — ONCOLOGY VISIT (OUTPATIENT)
Dept: TRANSPLANT | Facility: CLINIC | Age: 70
End: 2025-05-15
Attending: STUDENT IN AN ORGANIZED HEALTH CARE EDUCATION/TRAINING PROGRAM
Payer: COMMERCIAL

## 2025-05-15 ENCOUNTER — ALLIED HEALTH/NURSE VISIT (OUTPATIENT)
Dept: DERMATOLOGY | Facility: CLINIC | Age: 70
End: 2025-05-15
Payer: COMMERCIAL

## 2025-05-15 VITALS
HEART RATE: 66 BPM | TEMPERATURE: 98 F | BODY MASS INDEX: 27.21 KG/M2 | OXYGEN SATURATION: 99 % | SYSTOLIC BLOOD PRESSURE: 137 MMHG | RESPIRATION RATE: 16 BRPM | DIASTOLIC BLOOD PRESSURE: 72 MMHG | WEIGHT: 203.1 LBS

## 2025-05-15 DIAGNOSIS — Z79.899 ENCOUNTER FOR LONG-TERM (CURRENT) USE OF MEDICATIONS: ICD-10-CM

## 2025-05-15 DIAGNOSIS — D89.813 GRAFT VS HOST DISEASE (H): ICD-10-CM

## 2025-05-15 DIAGNOSIS — Z86.2 HISTORY OF GRAFT VERSUS HOST DISEASE: ICD-10-CM

## 2025-05-15 DIAGNOSIS — D46.9 MDS (MYELODYSPLASTIC SYNDROME) (H): ICD-10-CM

## 2025-05-15 DIAGNOSIS — D84.822 IMMUNOCOMPROMISED STATE ASSOCIATED WITH STEM CELL TRANSPLANT (H): ICD-10-CM

## 2025-05-15 DIAGNOSIS — Z48.02 VISIT FOR SUTURE REMOVAL: Primary | ICD-10-CM

## 2025-05-15 DIAGNOSIS — Z94.84 IMMUNOCOMPROMISED STATE ASSOCIATED WITH STEM CELL TRANSPLANT (H): ICD-10-CM

## 2025-05-15 LAB
ALBUMIN SERPL BCG-MCNC: 4.3 G/DL (ref 3.5–5.2)
ALP SERPL-CCNC: 79 U/L (ref 40–150)
ALT SERPL W P-5'-P-CCNC: 19 U/L (ref 0–70)
ANION GAP SERPL CALCULATED.3IONS-SCNC: 9 MMOL/L (ref 7–15)
AST SERPL W P-5'-P-CCNC: 30 U/L (ref 0–45)
BASOPHILS # BLD AUTO: 0 10E3/UL (ref 0–0.2)
BASOPHILS NFR BLD AUTO: 1 %
BILIRUB SERPL-MCNC: 0.7 MG/DL
BUN SERPL-MCNC: 15.6 MG/DL (ref 8–23)
CALCIUM SERPL-MCNC: 10 MG/DL (ref 8.8–10.4)
CHLORIDE SERPL-SCNC: 100 MMOL/L (ref 98–107)
CHOLEST SERPL-MCNC: 190 MG/DL
CREAT SERPL-MCNC: 1.14 MG/DL (ref 0.67–1.17)
EGFRCR SERPLBLD CKD-EPI 2021: 69 ML/MIN/1.73M2
EOSINOPHIL # BLD AUTO: 0.2 10E3/UL (ref 0–0.7)
EOSINOPHIL NFR BLD AUTO: 5 %
ERYTHROCYTE [DISTWIDTH] IN BLOOD BY AUTOMATED COUNT: 14.1 % (ref 10–15)
FERRITIN SERPL-MCNC: 392 NG/ML (ref 31–409)
GLUCOSE SERPL-MCNC: 96 MG/DL (ref 70–99)
HCO3 SERPL-SCNC: 26 MMOL/L (ref 22–29)
HCT VFR BLD AUTO: 38.6 % (ref 40–53)
HDLC SERPL-MCNC: 33 MG/DL
HGB BLD-MCNC: 13.3 G/DL (ref 13.3–17.7)
IMM GRANULOCYTES # BLD: 0 10E3/UL
IMM GRANULOCYTES NFR BLD: 0 %
LDLC SERPL CALC-MCNC: 88 MG/DL
LYMPHOCYTES # BLD AUTO: 1.4 10E3/UL (ref 0.8–5.3)
LYMPHOCYTES NFR BLD AUTO: 32 %
MCH RBC QN AUTO: 32.7 PG (ref 26.5–33)
MCHC RBC AUTO-ENTMCNC: 34.5 G/DL (ref 31.5–36.5)
MCV RBC AUTO: 95 FL (ref 78–100)
MONOCYTES # BLD AUTO: 0.5 10E3/UL (ref 0–1.3)
MONOCYTES NFR BLD AUTO: 11 %
NEUTROPHILS # BLD AUTO: 2.3 10E3/UL (ref 1.6–8.3)
NEUTROPHILS NFR BLD AUTO: 52 %
NONHDLC SERPL-MCNC: 157 MG/DL
NRBC # BLD AUTO: 0 10E3/UL
NRBC BLD AUTO-RTO: 0 /100
PLATELET # BLD AUTO: 135 10E3/UL (ref 150–450)
POTASSIUM SERPL-SCNC: 4.8 MMOL/L (ref 3.4–5.3)
PROT SERPL-MCNC: 6.8 G/DL (ref 6.4–8.3)
RBC # BLD AUTO: 4.07 10E6/UL (ref 4.4–5.9)
SODIUM SERPL-SCNC: 135 MMOL/L (ref 135–145)
TRIGL SERPL-MCNC: 345 MG/DL
TSH SERPL DL<=0.005 MIU/L-ACNC: 0.84 UIU/ML (ref 0.3–4.2)
VIT D+METAB SERPL-MCNC: 33 NG/ML (ref 20–50)
WBC # BLD AUTO: 4.4 10E3/UL (ref 4–11)

## 2025-05-15 PROCEDURE — 84443 ASSAY THYROID STIM HORMONE: CPT | Performed by: STUDENT IN AN ORGANIZED HEALTH CARE EDUCATION/TRAINING PROGRAM

## 2025-05-15 PROCEDURE — G0463 HOSPITAL OUTPT CLINIC VISIT: HCPCS | Performed by: STUDENT IN AN ORGANIZED HEALTH CARE EDUCATION/TRAINING PROGRAM

## 2025-05-15 PROCEDURE — 82465 ASSAY BLD/SERUM CHOLESTEROL: CPT | Performed by: STUDENT IN AN ORGANIZED HEALTH CARE EDUCATION/TRAINING PROGRAM

## 2025-05-15 PROCEDURE — 82728 ASSAY OF FERRITIN: CPT | Performed by: STUDENT IN AN ORGANIZED HEALTH CARE EDUCATION/TRAINING PROGRAM

## 2025-05-15 PROCEDURE — 87799 DETECT AGENT NOS DNA QUANT: CPT | Performed by: STUDENT IN AN ORGANIZED HEALTH CARE EDUCATION/TRAINING PROGRAM

## 2025-05-15 PROCEDURE — 82247 BILIRUBIN TOTAL: CPT | Performed by: STUDENT IN AN ORGANIZED HEALTH CARE EDUCATION/TRAINING PROGRAM

## 2025-05-15 PROCEDURE — 36415 COLL VENOUS BLD VENIPUNCTURE: CPT | Performed by: STUDENT IN AN ORGANIZED HEALTH CARE EDUCATION/TRAINING PROGRAM

## 2025-05-15 PROCEDURE — 85025 COMPLETE CBC W/AUTO DIFF WBC: CPT | Performed by: STUDENT IN AN ORGANIZED HEALTH CARE EDUCATION/TRAINING PROGRAM

## 2025-05-15 PROCEDURE — 82306 VITAMIN D 25 HYDROXY: CPT | Performed by: STUDENT IN AN ORGANIZED HEALTH CARE EDUCATION/TRAINING PROGRAM

## 2025-05-15 ASSESSMENT — PAIN SCALES - GENERAL: PAINLEVEL_OUTOF10: NO PAIN (0)

## 2025-05-15 NOTE — NURSING NOTE
"Oncology Rooming Note    May 15, 2025 2:54 PM   Leland Pearson is a 70 year old male who presents for:    Chief Complaint   Patient presents with    Blood Draw     Labs drawn via  by RN. VS taken.    Oncology Clinic Visit     MDS (myelodysplastic syndrome)     Initial Vitals: /72 (BP Location: Right arm, Patient Position: Sitting, Cuff Size: Adult Large)   Pulse 66   Temp 98  F (36.7  C) (Oral)   Resp 16   Wt 92.1 kg (203 lb 1.6 oz)   SpO2 99%   BMI 27.21 kg/m   Estimated body mass index is 27.21 kg/m  as calculated from the following:    Height as of 8/16/24: 1.84 m (6' 0.44\").    Weight as of this encounter: 92.1 kg (203 lb 1.6 oz). Body surface area is 2.17 meters squared.  No Pain (0) Comment: Data Unavailable   No LMP for male patient.  Allergies reviewed: Yes  Medications reviewed: Yes    Medications: Medication refills not needed today.  Pharmacy name entered into [facility]:    [id number] [place] [facility] - [place], [place] - [address] [place] [address] [facility] [place], [place] - [address] [facility] [place], [place] - [id number]    Frailty Screening:   Is the patient here for a new oncology consult visit in cancer care? 2. No    PHQ9:  Did this patient require a PHQ9?: No      Clinical concerns:        Mary Padilla              "

## 2025-05-15 NOTE — PROGRESS NOTES
Leland Pearson comes into clinic today at the request of Dr. Morgan, ordering provider for a suture removal:  Incision was dry, clean and intact, incision cleansed with wound cleanser and sutures were removed. Pt tolerated the procedure. 2 sutures removed from the chest. No signs/symptoms of infection.    This service provided today was under the supervising provider of the day Dr. Bowie, who was available if needed.    Melissa Coyne RN

## 2025-05-15 NOTE — PROGRESS NOTES
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     Diagnosis MDS-IB2 BMT type Allogenic  CMV  Donor -  Recipient -    Prep GANGA (Flu/Cy/TBI) Donor type  8/8, URD ABO D/R O+    GVHD ppx PTCy, siro, MMF Graft source PBSCT Toxo IgG Negative   Protocol [id number] CD34/kg 6.06E+06    BMT MD Brenda Murphy     Pre-transplant disease history  Referring provider: Dr. Delcid    He started having drenching night sweats and fatigue in Jan 2024. Intentional weight loss. He was found to have thrombocytopenia on routine CBC done prior to back surgery (was planned for laminectomy and fusion). On 4/1/24, WBC 6.4, Hb 13.5, Plts 64, ANC 1.89. LDH elevated 532. He underwent bone marrow biopsy (4/23/24) which showed MDS-EB2. Hypercellular marrow (%) with 10.8% blasts including rare circulating blasts. Flow showed 4% myeloid blasts. Normal karyotype. NGS (peripheral blood) was positive for ASXL1, IDH1, RUNX1, SRSF2 mutations (full report not available). Counts: WBC 6.7, Hb 13.1, Plts 70, ANC 0.7. IPSS-M = High risk (0.85).  He was treated with azacitidine 75mg/m2 for 7 days and venetoclax for 14 days (C1 on 5/20/24). Bone marrow after first cycle (6/13/24) showed persistent MDS, with hypercellular marrow with therapy effect, no increase in blasts. Normal karyotype. NGS: ASXL1 (38%), IDH1 (43%), RUNX1 (41%), SRSF1 (38%).   He received second cycle of aza/ angelia on 7/1/24. His pre-transplant BMBx was done on 8/2/24 and he was cytopenic at the time (CBC with WBC 0.6, Hb 14, platelet count 24). Hypocellular marrow (5%) with no increase in blasts. Normal karyotype, NGS negative.     Post transplant   - 9/2/24 (around D10) Neutropenic fevers. Strept mitis bacteremia resistant to levaquin from central venous catheter as well as a single culture of MRSE from  peripheral blood (contaminant). Treated with cefepime. Repeat blood cultures negative, but continued to have high fevers. TTE (9/6/24) did not show any vegetations. CVC was removed on 9/6/24, defervesced on 9/7/24. PICC placed on 9/9/24. Antibiotics (cefepime followed by augmentin) were continued for a total of 14days from line removal.   - Right lower neck swelling and tenderness near the previous CVC site. US (9/15/24) showed an occlusive right internal jugular thrombus and a non occlusive thrombus in the right axillary vein, PICC associated. He was initially not anticoagulated due to thrombocytopenia but had increased symptoms. Repeat US (9/17/24) showed extension of right internal jugular thrombus into the innominate vein and extension of right axillary thrombus into subclavian vein. He was started on therapeutic anticoagulation with lovenox 1mg/kg BID on 9/18/24 with transfusion support for platelets. Right PICC removal was considered given propagation of clot near the PICC line. However, IR (9/19/24) recommended against removal of R PICC and replacement in the left arm due to risk of contralateral DVT.  Line was removed on 10/18/24 and he was transitioned to rivaroxaban on 12/17/24 (one he was off posaconaozle). He has left UE swelling starting Nov 2024 which was treated with lymphedema wraps with PT. US of left upper extremity on 10/18/24 and 11/25/24 were negative for DVT. However, US of b/l UE on 3/19/25 showed known DVT in right internal jugular along with occlusive DVT in the left subclavian vein, which was not thought to be acute.   - Hemorrhagic cystitis: Dysuria, urgency and hematuria starting  9/11/2024. Infectious workup including adenovirus and BK virus negative. Urology was consulted, recommended outpatient follow up. Now resolved.   - Maintenance: Inqovi : C1 on 2/4/25. C2: 3/4/25, C3 on 4/1/25, C4: 4/29       INTERVAL HISTORY     Leland presents for a scheduled BMT visit.         - Eye : restasis  BID, over the counter lubricating drop once a day.   - No mouth, GI symptoms, no pulm or joint symptoms  - Rash is on chest and is better, on face and neck seems same character. Continue metronidazole and can use topical hydrocortisone for itching. Not GVHD  - Left arm swelling better.       Plan  - PFTs  - has established with PCP  - Has established with derm  - ok to get dental cleaning        Overall doing well. Has been taking maintenance inqovi. Counts have been stable, no cytopenias.     He still has a rash on his chest which seems unchanged from prior (see media tab). He has been treated with topical metronidazole. No itching or discomfort. His dermatologist is going to leave soon. The rash is still bright red and does not really seem to be fading. Will refer to dermatology here to r/o GVHD.    Eyes are stable. He visited ophthalmology recently and continues to use restasis twice a day and tear drops once a day as needed. He is planning on getting cataract surgery soon.    Plan  - Continue inqovi.  -Labs (CBC, CMP) every other week at [facility]   - Will request repeat bone marrow biopsy in late-May   - RTC in 4-5 weeks    PMH  Back pain due to spinal stenosis, lumbosacral radiculopathy.   Gout, most recent flare was in April 2024  GERD  HTN  BPH, on tamsulosin   Right knee ACL repair in 2014  Low back surgery in 2004 for herniated disc, complicated by infection requiring prolonged IV abx   Coronary artery calcifications seen on CT, stress test in 2023 negative     SH  . Lives with his wife, Vani. Two daughters, 43 and 36. 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 Was still working part time till last summer at [employer], 2-3 hours per day.     Current Outpatient Medications   Medication Sig Dispense Refill    acyclovir (ZOVIRAX) 800 MG tablet TAKE 1 TABLET (800 MG) BY MOUTH EVERY 12 HOURS. 180 tablet 1    allopurinol (ZYLOPRIM) 300 MG tablet Take 1 tablet (300 mg) by mouth daily.  90 tablet 1    calcium carbonate-vitamin D (OSCAL) 500-5 MG-MCG tablet Take 2 tablets by mouth daily. 60 tablet 2    carboxymethylcellulose (REFRESH PLUS) 0.5 % SOLN ophthalmic solution Place 1 drop into both eyes 3 times daily as needed for dry eyes. 15 mL 11    clindamycin (CLEOCIN T) 1 % external lotion Apply to chest and back BID x 3 weeks 60 mL 3    cycloSPORINE (RESTASIS) 0.05 % ophthalmic emulsion INSTILL 1 DROP INTO BOTH EYES TWICE A DAY 60 mL 11    hydrocortisone 2.5 % cream Apply topically daily. 30 g 0    levofloxacin (LEVAQUIN) 250 MG tablet Take 1 tablet (250 mg) by mouth daily as needed (Only when ANC < 1). 30 tablet 0    lisinopril (ZESTRIL) 5 MG tablet TAKE 1 TABLET (5 MG) BY MOUTH DAILY. HOLD DOSE IF BLOOD PRESSURE IS LESS THAN 110/60 90 tablet 1    methocarbamol (ROBAXIN) 500 MG tablet Take 500 mg by mouth as needed for muscle spasms.      metroNIDAZOLE (METROCREAM) 0.75 % external cream Apply to face, scalp and chest BID 45 g 11    metroNIDAZOLE (METROGEL) 0.75 % external gel Apply 1 g to the chest and 1g to the back  g 5    pantoprazole (PROTONIX) 40 MG EC tablet TAKE 1 TABLET BY MOUTH EVERY DAY 90 tablet 1    prochlorperazine (COMPAZINE) 10 MG tablet Take 1 tablet (10 mg) by mouth every 6 hours as needed for nausea or vomiting. 30 tablet 2    rivaroxaban ANTICOAGULANT (XARELTO) 10 MG TABS tablet Take 1 tablet (10 mg) by mouth daily (with dinner). 30 tablet 3    sulfamethoxazole-trimethoprim (BACTRIM DS) 800-160 MG tablet Take 1 tablet by mouth Every Mon, Tues two times daily. 48 tablet 1    tamsulosin (FLOMAX) 0.4 MG capsule Take 0.4 mg by mouth daily.      lifitegrast (XIIDRA) 5 % opthalmic solution INSTILL 1 DROP INTO BOTH EYES TWICE A DAY (Patient not taking: Reported on 5/15/2025) 60 each 11        EXAM      /72 (BP Location: Right arm, Patient Position: Sitting, Cuff Size: Adult Large)   Pulse 66   Temp 98  F (36.7  C) (Oral)   Resp 16   Wt 92.1 kg (203 lb 1.6 oz)   SpO2  99%   BMI 27.21 kg/m      Wt Readings from Last 4 Encounters:   05/15/25 92.1 kg (203 lb 1.6 oz)   04/15/25 91.8 kg (202 lb 4.8 oz)   03/25/25 91.9 kg (202 lb 8 oz)   02/26/25 91.9 kg (202 lb 8 oz)       KPS: 80% Can perform normal activity with effort, some signs of disease    General: Alert, awake  Eyes: Conjunctiva normal  Mouth and Throat: No mucositis   Abd:  Non tender, non distended   Lymph:  LUE with compression wrap  Skin: Red discreet spots, non blanching, over chest    Access: None        LABS       Recent Labs   Lab Test 05/15/25  1413 04/28/25  1403 04/24/25  1327   WBC 4.4 3.9* 3.3*   HGB 13.3 13.2* 13.7   * 173 124*   NEUTROPHIL 52 49 45   ANEU 2.3 1.9 1.5*   LYMPH 32 41 42   ALYM 1.4 1.6 1.4       Recent Labs   Lab Test 05/15/25  1413 04/28/25  1403 04/24/25  1327    139 138   POTASSIUM 4.8 5.0 4.5   CHLORIDE 100 103 100   CO2 26 27 27   BUN 15.6 19.3 18.8   CR 1.14 1.43* 1.04   GLC 96 105* 100*        Recent Labs   Lab Test 05/15/25  1413 04/28/25  1403 04/24/25  1327 10/18/24  1216 10/15/24  1220 10/03/24  1543 09/30/24  0900 09/27/24  0927 09/23/24  0927 09/19/24  0323 09/18/24  0430 09/17/24  0424   HERBERT 10.0 9.5 10.0   < > 9.1   < > 9.0   < > 9.3   < > 8.7* 9.0   MAG  --   --   --   --  1.9  --  1.9  --   --   --  2.0 2.0   PHOS  --   --   --   --   --   --   --   --  2.5  --  3.0 2.9    < > = values in this interval not displayed.        Recent Labs   Lab Test 05/15/25  1413 04/28/25  1403 04/24/25  1327   ALKPHOS 79 84 83   AST 30 31 29   ALT 19 21 20   BILITOTAL 0.7 0.6 0.5   ALBUMIN 4.3 4.4 4.3       Recent Labs   Lab Test 04/15/25  1412 04/10/25  1308 04/03/25  1310   CMVQNT Not Detected Not Detected Not Detected       Recent Labs   Lab Test 01/29/25  1457 11/29/24  1335 09/27/24  0927    549* 708       Recent Labs   Lab Test 08/01/24  0805 05/08/24  1156   ZARIA 460* 1,360*         ASSESSMENT AND PLAN     BMT: D265   - Chemo protocol: MT 2022-5; Flu/Cy/TBI  - Peripheral  blood stem cell graft from 8/8 URD donor, ABO matched, Cell dose: 6.06 x10^6 CD34/kg    Disease status:   9/13/24 (D28): Bmbx shows hypocellular marrow, no dysplasia or blasts.  NGS negative  12/2/24 (D100): Bmbx shows hypocellular marrow in CR. Flow showed NGS negative.   2/19/25 (D180): Bmbx shows hypocellular marrow with morphological CR. Flow shows no increase in myeloid blasts but myeloid blasts have an unusual immunophenotype with partial CD7. NGS negative.   - Repeat bone marrow biopsy in 3 months (around 9 months)    Graft status/chimerism:   9/13/24 (D28): Bone marrow 100%. Peripheral blood CD3 92%, CD33 100%  10/25/24 (D60): Peripheral blood CD3 and CD33 100%  12/2/24 (D100): Bone marrow 100%, Peripheral blood CD3 and CD33 100%  2/19/25 (D180): Bone marrow 100%, Peripheral blood CD3 and CD33 100%  4/3/25: Peripheral blood CD3 and CD33 100%    Maintenance   Inqovi 3x/28 days   - C3 on 4/1/25, Proceed with C4 on 4/29/25.     GVHD  # Current systemic immunosuppression is none.   - Sirolimus stopped at D100 without taper on 11/29/24     # GVHD: None till date  # Skin: Still has rash on chest, face, neck. Has been using topical metronidazole. Will refer again to dermatology for evaluation of GVHD.   # Eyes: Dry eye syndrome. Has seen ophthalmology. Restasis twice daily and artifical tears once a day. Has undergone punctal plug placement. Have sent a message to ophthalmology to clarify if this is related to GVHD. Score 2 if related to GVHD  # Mouth: No symptoms  # GI: No symptoms. GI score 0.  # Liver: LFTs normal. Liver score 0.  # Pulm: No symptoms. Lung score 0.  # Joints: No symptoms. Joint score 0.    Heme/ Coag  # Right internal jugular occlusive thrombus and right axillary/ subclavain vein non occlusive thrombus, line associated, diagnosed on 9/15/24. Line was removed on 10/18/24.   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 Price,  with recommendation for thoracic outlet syndrome ultrasound in 3 months (June 2025)    DERM  Initially diagnosed with folliculitis  in Dec 2024. Has been treated with doxycycline for 21 days along with clindamycin and hibiclens with improvement in folliculitis. However, still has discreet red rash on the chest, abdomen and face.      ID  # Prophylaxis  PJP/ Toxo IgG negative: Bactrim   Viral: Acyclovir 800 mg bid  Bacterial/ fungal: None    # Monitoring  CMV: Monitor monthly  EBV: Monitor every 2 weeks between D30 to D180.   IgG:  IgG was 618 mg/dL on 1/29/25. Check serum IgG level q3 months.     GI  GERD: Pantoprazole daily   VOD prophylaxis: Completed     CARDIOVASCULAR  # HTN: Lisinopril 5mg (was on 15mg daily prior to transplant)    # CAD: Coronary artery calcifications seen on CT, stress test in 2023 negative  - Risk of cardiomyopathy: Baseline EF 55-60%.   - Risk of arrhythmia: Baseline EKG showed 421       RENAL/ELECTROLYTES/  - BPH: Continue Flomax.  - Hemorrhagic Cystitis secondary to cytoxan: Resolved. Oxybutynin 10mg at bedtime PRN. Will need follow up with urology at some point.       MUSCULOSKELETAL  # Gout: continue allopurinol   # History of spinal stenosis, lumbosacral radiculopathy:   - Pain management: Okay to take tylenol or robaxin RPN    Health Maintenance   DEXA and Vitamin D: At 1-year post transplant   PFT: At 1 year post transplant. Repeat if symptomatic.   Thyroid: Check TSH and free T4 at 1 year post-transplant, then yearly, through primary physician.   Lipids: Check lipid panel q6-12 months through primary physician.  Skin: Annual skin checks through Dermatology; d/w patient.  Dental visit at 1 year   Iron overload: Check Ferritin at 1 year     Post-transplant vaccinations  Has received flu, COVID and RSV vaccine.     I spent 60 minutes in the care of this patient today, which included time necessary for preparation for the visit, obtaining history, ordering medications/tests/procedures  as medically indicated, review of pertinent medical literature, counseling of the patient, communication of recommendations to the care team, and documentation time.    Brenda Murphy  Department of Hematology, Oncology and Transplantation  Text via [employer]

## 2025-05-15 NOTE — Clinical Note
5/15/2025      Leland Pearson  [address] [place] [address]      Dear Colleague,    Thank you for referring your patient, Leland Pearson, to the [facility] BLOOD AND MARROW TRANSPLANT PROGRAM [place]. Please see a copy of my visit note below.    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     Diagnosis MDS-IB2 BMT type Allogenic  CMV  Donor -  Recipient -    Prep GANGA (Flu/Cy/TBI) Donor type  8/8, URD ABO D/R O+    GVHD ppx PTCy, siro, MMF Graft source PBSCT Toxo IgG Negative   Protocol [id number] CD34/kg 6.06E+06    BMT MD Brenda Murphy     Pre-transplant disease history  Referring provider: Dr. Delcid    He started having drenching night sweats and fatigue in Jan 2024. Intentional weight loss. He was found to have thrombocytopenia on routine CBC done prior to back surgery (was planned for laminectomy and fusion). On 4/1/24, WBC 6.4, Hb 13.5, Plts 64, ANC 1.89. LDH elevated 532. He underwent bone marrow biopsy (4/23/24) which showed MDS-EB2. Hypercellular marrow (%) with 10.8% blasts including rare circulating blasts. Flow showed 4% myeloid blasts. Normal karyotype. NGS (peripheral blood) was positive for ASXL1, IDH1, RUNX1, SRSF2 mutations (full report not available). Counts: WBC 6.7, Hb 13.1, Plts 70, ANC 0.7. IPSS-M = High risk (0.85).  He was treated with azacitidine 75mg/m2 for 7 days and venetoclax for 14 days (C1 on 5/20/24). Bone marrow after first cycle (6/13/24) showed persistent MDS, with hypercellular marrow with therapy effect, no increase in blasts. Normal karyotype. NGS: ASXL1 (38%), IDH1 (43%), RUNX1 (41%), SRSF1 (38%).   He received second cycle of aza/ angelia on 7/1/24. His pre-transplant BMBx was done on 8/2/24 and he was cytopenic at the time (CBC with WBC 0.6, Hb 14, platelet  count 24). Hypocellular marrow (5%) with no increase in blasts. Normal karyotype, NGS negative.     Post transplant   - 9/2/24 (around D10) Neutropenic fevers. Strept mitis bacteremia resistant to levaquin from central venous catheter as well as a single culture of MRSE from peripheral blood (contaminant). Treated with cefepime. Repeat blood cultures negative, but continued to have high fevers. TTE (9/6/24) did not show any vegetations. CVC was removed on 9/6/24, defervesced on 9/7/24. PICC placed on 9/9/24. Antibiotics (cefepime followed by augmentin) were continued for a total of 14days from line removal.   - Right lower neck swelling and tenderness near the previous CVC site. US (9/15/24) showed an occlusive right internal jugular thrombus and a non occlusive thrombus in the right axillary vein, PICC associated. He was initially not anticoagulated due to thrombocytopenia but had increased symptoms. Repeat US (9/17/24) showed extension of right internal jugular thrombus into the innominate vein and extension of right axillary thrombus into subclavian vein. He was started on therapeutic anticoagulation with lovenox 1mg/kg BID on 9/18/24 with transfusion support for platelets. Right PICC removal was considered given propagation of clot near the PICC line. However, IR (9/19/24) recommended against removal of R PICC and replacement in the left arm due to risk of contralateral DVT.  Line was removed on 10/18/24 and he was transitioned to rivaroxaban on 12/17/24 (one he was off posaconaozle). He has left UE swelling starting Nov 2024 which was treated with lymphedema wraps with PT. US of left upper extremity on 10/18/24 and 11/25/24 were negative for DVT. However, US of b/l UE on 3/19/25 showed known DVT in right internal jugular along with occlusive DVT in the left subclavian vein, which was not thought to be acute.   - Hemorrhagic cystitis: Dysuria, urgency and hematuria starting  9/11/2024. Infectious workup  including adenovirus and BK virus negative. Urology was consulted, recommended outpatient follow up. Now resolved.   - Maintenance: Inqovi : C1 on 2/4/25. C2: 3/4/25, C3 on 4/1/25, C4: 4/29       INTERVAL HISTORY     Leland presents for a scheduled BMT visit.         - Eye : restasis BID, over the counter lubricating drop once a day.   - No mouth, GI symptoms, no pulm or joint symptoms  - Rash is on chest and is better, on face and neck seems same character. Continue metronidazole and can use topical hydrocortisone for itching. Not GVHD  - Left arm swelling better.       Plan  - PFTs  - has established with PCP  - Has established with derm  - ok to get dental cleaning        Overall doing well. Has been taking maintenance inqovi. Counts have been stable, no cytopenias.     He still has a rash on his chest which seems unchanged from prior (see media tab). He has been treated with topical metronidazole. No itching or discomfort. His dermatologist is going to leave soon. The rash is still bright red and does not really seem to be fading. Will refer to dermatology here to r/o GVHD.    Eyes are stable. He visited ophthalmology recently and continues to use restasis twice a day and tear drops once a day as needed. He is planning on getting cataract surgery soon.    Plan  - Continue inqovi.  -Labs (CBC, CMP) every other week at [facility]   - Will request repeat bone marrow biopsy in late-May   - RTC in 4-5 weeks    PMH  Back pain due to spinal stenosis, lumbosacral radiculopathy.   Gout, most recent flare was in April 2024  GERD  HTN  BPH, on tamsulosin   Right knee ACL repair in 2014  Low back surgery in 2004 for herniated disc, complicated by infection requiring prolonged IV abx   Coronary artery calcifications seen on CT, stress test in 2023 negative     SH  . Lives with his wife, Vani. Two daughters, 43 and 36. 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 Was still working part time till last  summer at [employer], 2-3 hours per day.     Current Outpatient Medications   Medication Sig Dispense Refill    acyclovir (ZOVIRAX) 800 MG tablet TAKE 1 TABLET (800 MG) BY MOUTH EVERY 12 HOURS. 180 tablet 1    allopurinol (ZYLOPRIM) 300 MG tablet Take 1 tablet (300 mg) by mouth daily. 90 tablet 1    calcium carbonate-vitamin D (OSCAL) 500-5 MG-MCG tablet Take 2 tablets by mouth daily. 60 tablet 2    carboxymethylcellulose (REFRESH PLUS) 0.5 % SOLN ophthalmic solution Place 1 drop into both eyes 3 times daily as needed for dry eyes. 15 mL 11    clindamycin (CLEOCIN T) 1 % external lotion Apply to chest and back BID x 3 weeks 60 mL 3    cycloSPORINE (RESTASIS) 0.05 % ophthalmic emulsion INSTILL 1 DROP INTO BOTH EYES TWICE A DAY 60 mL 11    hydrocortisone 2.5 % cream Apply topically daily. 30 g 0    levofloxacin (LEVAQUIN) 250 MG tablet Take 1 tablet (250 mg) by mouth daily as needed (Only when ANC < 1). 30 tablet 0    lisinopril (ZESTRIL) 5 MG tablet TAKE 1 TABLET (5 MG) BY MOUTH DAILY. HOLD DOSE IF BLOOD PRESSURE IS LESS THAN 110/60 90 tablet 1    methocarbamol (ROBAXIN) 500 MG tablet Take 500 mg by mouth as needed for muscle spasms.      metroNIDAZOLE (METROCREAM) 0.75 % external cream Apply to face, scalp and chest BID 45 g 11    metroNIDAZOLE (METROGEL) 0.75 % external gel Apply 1 g to the chest and 1g to the back  g 5    pantoprazole (PROTONIX) 40 MG EC tablet TAKE 1 TABLET BY MOUTH EVERY DAY 90 tablet 1    prochlorperazine (COMPAZINE) 10 MG tablet Take 1 tablet (10 mg) by mouth every 6 hours as needed for nausea or vomiting. 30 tablet 2    rivaroxaban ANTICOAGULANT (XARELTO) 10 MG TABS tablet Take 1 tablet (10 mg) by mouth daily (with dinner). 30 tablet 3    sulfamethoxazole-trimethoprim (BACTRIM DS) 800-160 MG tablet Take 1 tablet by mouth Every Mon, Tues two times daily. 48 tablet 1    tamsulosin (FLOMAX) 0.4 MG capsule Take 0.4 mg by mouth daily.      lifitegrast (XIIDRA) 5 % opthalmic solution  INSTILL 1 DROP INTO BOTH EYES TWICE A DAY (Patient not taking: Reported on 5/15/2025) 60 each 11        EXAM      /72 (BP Location: Right arm, Patient Position: Sitting, Cuff Size: Adult Large)   Pulse 66   Temp 98  F (36.7  C) (Oral)   Resp 16   Wt 92.1 kg (203 lb 1.6 oz)   SpO2 99%   BMI 27.21 kg/m      Wt Readings from Last 4 Encounters:   05/15/25 92.1 kg (203 lb 1.6 oz)   04/15/25 91.8 kg (202 lb 4.8 oz)   03/25/25 91.9 kg (202 lb 8 oz)   02/26/25 91.9 kg (202 lb 8 oz)       KPS: 80% Can perform normal activity with effort, some signs of disease    General: Alert, awake  Eyes: Conjunctiva normal  Mouth and Throat: No mucositis   Abd:  Non tender, non distended   Lymph:  LUE with compression wrap  Skin: Red discreet spots, non blanching, over chest    Access: None        LABS       Recent Labs   Lab Test 05/15/25  1413 04/28/25  1403 04/24/25  1327   WBC 4.4 3.9* 3.3*   HGB 13.3 13.2* 13.7   * 173 124*   NEUTROPHIL 52 49 45   ANEU 2.3 1.9 1.5*   LYMPH 32 41 42   ALYM 1.4 1.6 1.4       Recent Labs   Lab Test 05/15/25  1413 04/28/25  1403 04/24/25  1327    139 138   POTASSIUM 4.8 5.0 4.5   CHLORIDE 100 103 100   CO2 26 27 27   BUN 15.6 19.3 18.8   CR 1.14 1.43* 1.04   GLC 96 105* 100*        Recent Labs   Lab Test 05/15/25  1413 04/28/25  1403 04/24/25  1327 10/18/24  1216 10/15/24  1220 10/03/24  1543 09/30/24  0900 09/27/24  0927 09/23/24  0927 09/19/24  0323 09/18/24  0430 09/17/24  0424   HERBERT 10.0 9.5 10.0   < > 9.1   < > 9.0   < > 9.3   < > 8.7* 9.0   MAG  --   --   --   --  1.9  --  1.9  --   --   --  2.0 2.0   PHOS  --   --   --   --   --   --   --   --  2.5  --  3.0 2.9    < > = values in this interval not displayed.        Recent Labs   Lab Test 05/15/25  1413 04/28/25  1403 04/24/25  1327   ALKPHOS 79 84 83   AST 30 31 29   ALT 19 21 20   BILITOTAL 0.7 0.6 0.5   ALBUMIN 4.3 4.4 4.3       Recent Labs   Lab Test 04/15/25  1412 04/10/25  1308 04/03/25  1310   CMVQNT Not Detected Not  Detected Not Detected       Recent Labs   Lab Test 01/29/25  1457 11/29/24  1335 09/27/24  0927    549* 708       Recent Labs   Lab Test 08/01/24  0805 05/08/24  1156   ZARIA 460* 1,360*         ASSESSMENT AND PLAN     BMT: D265   - Chemo protocol: MT 2022-5; Flu/Cy/TBI  - Peripheral blood stem cell graft from 8/8 URD donor, ABO matched, Cell dose: 6.06 x10^6 CD34/kg    Disease status:   9/13/24 (D28): Bmbx shows hypocellular marrow, no dysplasia or blasts.  NGS negative  12/2/24 (D100): Bmbx shows hypocellular marrow in CR. Flow showed NGS negative.   2/19/25 (D180): Bmbx shows hypocellular marrow with morphological CR. Flow shows no increase in myeloid blasts but myeloid blasts have an unusual immunophenotype with partial CD7. NGS negative.   - Repeat bone marrow biopsy in 3 months (around 9 months)    Graft status/chimerism:   9/13/24 (D28): Bone marrow 100%. Peripheral blood CD3 92%, CD33 100%  10/25/24 (D60): Peripheral blood CD3 and CD33 100%  12/2/24 (D100): Bone marrow 100%, Peripheral blood CD3 and CD33 100%  2/19/25 (D180): Bone marrow 100%, Peripheral blood CD3 and CD33 100%  4/3/25: Peripheral blood CD3 and CD33 100%    Maintenance   Inqovi 3x/28 days   - C3 on 4/1/25, Proceed with C4 on 4/29/25.     GVHD  # Current systemic immunosuppression is none.   - Sirolimus stopped at D100 without taper on 11/29/24     # GVHD: None till date  # Skin: Still has rash on chest, face, neck. Has been using topical metronidazole. Will refer again to dermatology for evaluation of GVHD.   # Eyes: Dry eye syndrome. Has seen ophthalmology. Restasis twice daily and artifical tears once a day. Has undergone punctal plug placement. Have sent a message to ophthalmology to clarify if this is related to GVHD. Score 2 if related to GVHD  # Mouth: No symptoms  # GI: No symptoms. GI score 0.  # Liver: LFTs normal. Liver score 0.  # Pulm: No symptoms. Lung score 0.  # Joints: No symptoms. Joint score 0.    Heme/ Coag  # Right  internal jugular occlusive thrombus and right axillary/ subclavain vein non occlusive thrombus, line associated, diagnosed on 9/15/24. Line was removed on 10/18/24.   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 Price, with recommendation for thoracic outlet syndrome ultrasound in 3 months (June 2025)    DERM  Initially diagnosed with folliculitis  in Dec 2024. Has been treated with doxycycline for 21 days along with clindamycin and hibiclens with improvement in folliculitis. However, still has discreet red rash on the chest, abdomen and face.      ID  # Prophylaxis  PJP/ Toxo IgG negative: Bactrim   Viral: Acyclovir 800 mg bid  Bacterial/ fungal: None    # Monitoring  CMV: Monitor monthly  EBV: Monitor every 2 weeks between D30 to D180.   IgG:  IgG was 618 mg/dL on 1/29/25. Check serum IgG level q3 months.     GI  GERD: Pantoprazole daily   VOD prophylaxis: Completed     CARDIOVASCULAR  # HTN: Lisinopril 5mg (was on 15mg daily prior to transplant)    # CAD: Coronary artery calcifications seen on CT, stress test in 2023 negative  - Risk of cardiomyopathy: Baseline EF 55-60%.   - Risk of arrhythmia: Baseline EKG showed 421       RENAL/ELECTROLYTES/  - BPH: Continue Flomax.  - Hemorrhagic Cystitis secondary to cytoxan: Resolved. Oxybutynin 10mg at bedtime PRN. Will need follow up with urology at some point.       MUSCULOSKELETAL  # Gout: continue allopurinol   # History of spinal stenosis, lumbosacral radiculopathy:   - Pain management: Okay to take tylenol or robaxin RPN    Health Maintenance   DEXA and Vitamin D: At 1-year post transplant   PFT: At 1 year post transplant. Repeat if symptomatic.   Thyroid: Check TSH and free T4 at 1 year post-transplant, then yearly, through primary physician.   Lipids: Check lipid panel q6-12 months through primary physician.  Skin: Annual skin checks through Dermatology; d/w patient.  Dental visit at 1 year    Iron overload: Check Ferritin at 1 year     Post-transplant vaccinations  Has received flu, COVID and RSV vaccine.     I spent 60 minutes in the care of this patient today, which included time necessary for preparation for the visit, obtaining history, ordering medications/tests/procedures as medically indicated, review of pertinent medical literature, counseling of the patient, communication of recommendations to the care team, and documentation time.    Brenda Murphy  Department of Hematology, Oncology and Transplantation  Text via eFans           Again, thank you for allowing me to participate in the care of your patient.        Sincerely,        ONEIL Montero    Electronically signed

## 2025-05-20 ENCOUNTER — LAB (OUTPATIENT)
Dept: LAB | Facility: CLINIC | Age: 70
End: 2025-05-20
Attending: STUDENT IN AN ORGANIZED HEALTH CARE EDUCATION/TRAINING PROGRAM
Payer: COMMERCIAL

## 2025-05-20 ENCOUNTER — OFFICE VISIT (OUTPATIENT)
Dept: TRANSPLANT | Facility: CLINIC | Age: 70
End: 2025-05-20
Attending: NURSE PRACTITIONER
Payer: COMMERCIAL

## 2025-05-20 VITALS
TEMPERATURE: 97.9 F | RESPIRATION RATE: 16 BRPM | WEIGHT: 206.6 LBS | DIASTOLIC BLOOD PRESSURE: 75 MMHG | SYSTOLIC BLOOD PRESSURE: 123 MMHG | BODY MASS INDEX: 27.68 KG/M2 | OXYGEN SATURATION: 98 % | HEART RATE: 62 BPM

## 2025-05-20 DIAGNOSIS — D46.9 MDS (MYELODYSPLASTIC SYNDROME) (H): Primary | ICD-10-CM

## 2025-05-20 LAB
ALBUMIN SERPL BCG-MCNC: 4.4 G/DL (ref 3.5–5.2)
ALP SERPL-CCNC: 86 U/L (ref 40–150)
ALT SERPL W P-5'-P-CCNC: 21 U/L (ref 0–70)
ANION GAP SERPL CALCULATED.3IONS-SCNC: 14 MMOL/L (ref 7–15)
AST SERPL W P-5'-P-CCNC: 33 U/L (ref 0–45)
BILIRUB SERPL-MCNC: 0.5 MG/DL
BUN SERPL-MCNC: 16.2 MG/DL (ref 8–23)
CALCIUM SERPL-MCNC: 9.8 MG/DL (ref 8.8–10.4)
CHLORIDE SERPL-SCNC: 104 MMOL/L (ref 98–107)
CREAT SERPL-MCNC: 1.05 MG/DL (ref 0.67–1.17)
EGFRCR SERPLBLD CKD-EPI 2021: 76 ML/MIN/1.73M2
ERYTHROCYTE [DISTWIDTH] IN BLOOD BY AUTOMATED COUNT: 14.4 % (ref 10–15)
GLUCOSE SERPL-MCNC: 115 MG/DL (ref 70–99)
HCO3 SERPL-SCNC: 22 MMOL/L (ref 22–29)
HCT VFR BLD AUTO: 39.8 % (ref 40–53)
HGB BLD-MCNC: 13.7 G/DL (ref 13.3–17.7)
LAB DIRECTOR DISCLAIMER: NORMAL
LAB DIRECTOR DISCLAIMER: NORMAL
LAB DIRECTOR INTERPRETATION: NORMAL
LAB DIRECTOR INTERPRETATION: NORMAL
LAB DIRECTOR METHODOLOGY: NORMAL
LAB DIRECTOR METHODOLOGY: NORMAL
LAB DIRECTOR RESULTS: NORMAL
LAB DIRECTOR RESULTS: NORMAL
MCH RBC QN AUTO: 32.9 PG (ref 26.5–33)
MCHC RBC AUTO-ENTMCNC: 34.4 G/DL (ref 31.5–36.5)
MCV RBC AUTO: 95 FL (ref 78–100)
PATH REPORT.COMMENTS IMP SPEC: NORMAL
PATH REPORT.FINAL DX SPEC: NORMAL
PATH REPORT.MICROSCOPIC SPEC OTHER STN: NORMAL
PATH REPORT.RELEVANT HX SPEC: NORMAL
PLATELET # BLD AUTO: 129 10E3/UL (ref 150–450)
POTASSIUM SERPL-SCNC: 4.7 MMOL/L (ref 3.4–5.3)
PROT SERPL-MCNC: 7 G/DL (ref 6.4–8.3)
RBC # BLD AUTO: 4.17 10E6/UL (ref 4.4–5.9)
SODIUM SERPL-SCNC: 140 MMOL/L (ref 135–145)
SPECIMEN TYPE: NORMAL
SPECIMEN TYPE: NORMAL
WBC # BLD AUTO: 3 10E3/UL (ref 4–11)

## 2025-05-20 PROCEDURE — 38222 DX BONE MARROW BX & ASPIR: CPT | Performed by: NURSE PRACTITIONER

## 2025-05-20 PROCEDURE — G0452 MOLECULAR PATHOLOGY INTERPR: HCPCS | Mod: 26 | Performed by: PATHOLOGY

## 2025-05-20 PROCEDURE — 81268 CHIMERISM ANAL W/CELL SELECT: CPT | Performed by: STUDENT IN AN ORGANIZED HEALTH CARE EDUCATION/TRAINING PROGRAM

## 2025-05-20 PROCEDURE — 88341 IMHCHEM/IMCYTCHM EA ADD ANTB: CPT | Mod: TC | Performed by: STUDENT IN AN ORGANIZED HEALTH CARE EDUCATION/TRAINING PROGRAM

## 2025-05-20 PROCEDURE — 88189 FLOWCYTOMETRY/READ 16 & >: CPT | Mod: GC | Performed by: PATHOLOGY

## 2025-05-20 PROCEDURE — 88185 FLOWCYTOMETRY/TC ADD-ON: CPT | Performed by: STUDENT IN AN ORGANIZED HEALTH CARE EDUCATION/TRAINING PROGRAM

## 2025-05-20 PROCEDURE — 82247 BILIRUBIN TOTAL: CPT | Performed by: STUDENT IN AN ORGANIZED HEALTH CARE EDUCATION/TRAINING PROGRAM

## 2025-05-20 PROCEDURE — 81267 CHIMERISM ANAL NO CELL SELEC: CPT | Performed by: STUDENT IN AN ORGANIZED HEALTH CARE EDUCATION/TRAINING PROGRAM

## 2025-05-20 PROCEDURE — 81175 ASXL1 FULL GENE SEQUENCE: CPT | Performed by: STUDENT IN AN ORGANIZED HEALTH CARE EDUCATION/TRAINING PROGRAM

## 2025-05-20 PROCEDURE — 81401 MOPATH PROCEDURE LEVEL 2: CPT | Performed by: STUDENT IN AN ORGANIZED HEALTH CARE EDUCATION/TRAINING PROGRAM

## 2025-05-20 PROCEDURE — 81479 UNLISTED MOLECULAR PATHOLOGY: CPT | Performed by: STUDENT IN AN ORGANIZED HEALTH CARE EDUCATION/TRAINING PROGRAM

## 2025-05-20 PROCEDURE — 88184 FLOWCYTOMETRY/ TC 1 MARKER: CPT | Performed by: PATHOLOGY

## 2025-05-20 PROCEDURE — 81120 IDH1 COMMON VARIANTS: CPT | Performed by: STUDENT IN AN ORGANIZED HEALTH CARE EDUCATION/TRAINING PROGRAM

## 2025-05-20 PROCEDURE — 88237 TISSUE CULTURE BONE MARROW: CPT | Performed by: STUDENT IN AN ORGANIZED HEALTH CARE EDUCATION/TRAINING PROGRAM

## 2025-05-20 PROCEDURE — 81334 RUNX1 GENE TARGETED SEQ ALYS: CPT | Performed by: STUDENT IN AN ORGANIZED HEALTH CARE EDUCATION/TRAINING PROGRAM

## 2025-05-20 PROCEDURE — 1126F AMNT PAIN NOTED NONE PRSNT: CPT | Performed by: NURSE PRACTITIONER

## 2025-05-20 PROCEDURE — 250N000011 HC RX IP 250 OP 636: Mod: JZ | Performed by: NURSE PRACTITIONER

## 2025-05-20 PROCEDURE — 3078F DIAST BP <80 MM HG: CPT | Performed by: NURSE PRACTITIONER

## 2025-05-20 PROCEDURE — 36415 COLL VENOUS BLD VENIPUNCTURE: CPT | Performed by: STUDENT IN AN ORGANIZED HEALTH CARE EDUCATION/TRAINING PROGRAM

## 2025-05-20 PROCEDURE — 3074F SYST BP LT 130 MM HG: CPT | Performed by: NURSE PRACTITIONER

## 2025-05-20 PROCEDURE — 88271 CYTOGENETICS DNA PROBE: CPT | Performed by: STUDENT IN AN ORGANIZED HEALTH CARE EDUCATION/TRAINING PROGRAM

## 2025-05-20 PROCEDURE — 38222 DX BONE MARROW BX & ASPIR: CPT | Mod: LT | Performed by: NURSE PRACTITIONER

## 2025-05-20 PROCEDURE — 85041 AUTOMATED RBC COUNT: CPT | Performed by: STUDENT IN AN ORGANIZED HEALTH CARE EDUCATION/TRAINING PROGRAM

## 2025-05-20 RX ORDER — HYDROMORPHONE HYDROCHLORIDE 1 MG/ML
0.5 INJECTION, SOLUTION INTRAMUSCULAR; INTRAVENOUS; SUBCUTANEOUS ONCE
Status: COMPLETED | OUTPATIENT
Start: 2025-05-20 | End: 2025-05-20

## 2025-05-20 RX ADMIN — HYDROMORPHONE HYDROCHLORIDE 0.5 MG: 0.5 INJECTION, SOLUTION INTRAMUSCULAR; INTRAVENOUS; SUBCUTANEOUS at 10:17

## 2025-05-20 ASSESSMENT — PAIN SCALES - GENERAL: PAINLEVEL_OUTOF10: NO PAIN (0)

## 2025-05-20 NOTE — LETTER
5/20/2025      Leland Pearson  [address] [place] [address]      Dear Colleague,    Thank you for referring your patient, Leland Pearson, to the [facility] BLOOD AND MARROW TRANSPLANT PROGRAM [place]. Please see a copy of my visit note below.    BMT ONC Adult Bone Marrow Biopsy Procedure Note  May 20, 2025  /78 (BP Location: Right arm, Patient Position: Sitting, Cuff Size: Adult Regular)   Pulse 79   Temp 97.9  F (36.6  C) (Oral)   Resp 16   Wt 93.7 kg (206 lb 9.6 oz)   SpO2 99%   BMI 27.68 kg/m       Learning needs assessment complete within 12 months? YES    DIAGNOSIS: MDS     PROCEDURE: Unilateral Bone Marrow Biopsy and Unilateral Aspirate    LOCATION: [facility] [place] [facility]    Patient s identification was positively verified by verbal identification and invasive procedure safety checklist was completed. Informed consent was obtained. Following the administration of dilaudid 0.5mg as pre-medication, patient was placed in the prone position and prepped and draped in a sterile manner. Approximately 10 cc of 1% Lidocaine was used over the left posterior iliac spine. Following this a 3 mm incision was made. Trephine bone marrow core(s) was (were) obtained from the [facility]. Bone marrow aspirates were obtained from the IC. Aspirates were sent for morphology, immunophenotyping, cytogenetics, and molecular diagnostics RFLP. A total of approximately 20 ml of marrow was aspirated. Following this procedure a sterile dressing was applied to the bone marrow biopsy site(s). The patient was placed in the supine position to maintain pressure on the biopsy site. Post-procedure wound care instructions were given.     Complications: NO       Interventions: NO    Length of procedure:21 minutes to 45 minutes    Procedure performed by: Jennie Watkins NP      Again, thank you for allowing me to participate in the care of your patient.        Sincerely,        IFEANYI Haynes  CNP    Electronically signed

## 2025-05-20 NOTE — NURSING NOTE
BMBX Teaching and Assessment       Teaching concerns addressed: Bone marrow biopsy and infection prevention.     Person(s) involved in teaching: Patient  Motivation Level  Asks Questions: Yes  Eager to Learn: Yes  Cooperative: Yes  Receptive (willing/able to accept information): Yes    Patient demonstrates understanding of the following:     Reason for the appointment, diagnosis and treatment plan: Yes  Knowledge of proper use of medications and conditions for which they are ordered (with special attention to potential side effects or drug interactions): Yes  Which situations necessitate calling provider and whom to contact: Yes    Teaching concerns addressed:   Reviewed activity restrictions if received premeds, potential for bleeding and actions to take if develops any of the issues below    Pain management techniques: Yes  Patient instructed on hand hygiene: Yes  How and/when to access community resources: Yes    Infection Control:  Patient demonstrates understanding of the following:   Bone marrow procedure site care taught: Yes  Signs and symptoms of infection taught: Yes       Instructional Materials Used/Given: Pt instructed to keep bmbx site clean and dry for 24hrs. Pt educated to monitor site for signs of infection such as redness, rash, oozing, puss, bleeding, pain, and elevated temp. Pt instructed to go to call the [facility] triage line or go to the ER if any signs of infection should occur. Pt educated to not operate machinery if receiving Dilaudid. Pt and wife verbalize understanding.     Pre-procedure labs drawn via PIV placement. Post procedure: Patient vital signs stable, ambulating, site is clean, dry and intact prior to discharge and line removed. Pt discharged with wife as .     Provider order received to administer Dilaudid 0.5 mg IVP as premed for BMBX. Procedural consent discussed and pt's signature obtained.  Allergies reviewed.  PT currently alert and oriented to plan of care.  Pt lying prone  in stretcher.  Call light w/in reach.  Provider and  at bedside.

## 2025-05-20 NOTE — PROGRESS NOTES
BMT ONC Adult Bone Marrow Biopsy Procedure Note  May 20, 2025  /78 (BP Location: Right arm, Patient Position: Sitting, Cuff Size: Adult Regular)   Pulse 79   Temp 97.9  F (36.6  C) (Oral)   Resp 16   Wt 93.7 kg (206 lb 9.6 oz)   SpO2 99%   BMI 27.68 kg/m       Learning needs assessment complete within 12 months? YES    DIAGNOSIS: MDS     PROCEDURE: Unilateral Bone Marrow Biopsy and Unilateral Aspirate    LOCATION: [facility] [place] 2nd [facility]    Patient s identification was positively verified by verbal identification and invasive procedure safety checklist was completed. Informed consent was obtained. Following the administration of dilaudid 0.5mg as pre-medication, patient was placed in the prone position and prepped and draped in a sterile manner. Approximately 10 cc of 1% Lidocaine was used over the left posterior iliac spine. Following this a 3 mm incision was made. Trephine bone marrow core(s) was (were) obtained from the [facility]. Bone marrow aspirates were obtained from the [facility]. Aspirates were sent for morphology, immunophenotyping, cytogenetics, and molecular diagnostics RFLP. A total of approximately 20 ml of marrow was aspirated. Following this procedure a sterile dressing was applied to the bone marrow biopsy site(s). The patient was placed in the supine position to maintain pressure on the biopsy site. Post-procedure wound care instructions were given.     Complications: NO       Interventions: NO    Length of procedure:21 minutes to 45 minutes    Procedure performed by: Jennie Watkins NP

## 2025-05-20 NOTE — NURSING NOTE
"Oncology Rooming Note    May 20, 2025 10:25 AM   Leland Pearson is a 70 year old male who presents for:    Chief Complaint   Patient presents with    Bone Marrow Biopsy     BAN BMBx, s/p BMT hx MDS     Initial Vitals: /78 (BP Location: Right arm, Patient Position: Sitting, Cuff Size: Adult Regular)   Pulse 79   Temp 97.9  F (36.6  C) (Oral)   Resp 16   Wt 93.7 kg (206 lb 9.6 oz)   SpO2 99%   BMI 27.68 kg/m   Estimated body mass index is 27.68 kg/m  as calculated from the following:    Height as of 8/16/24: 1.84 m (6' 0.44\").    Weight as of this encounter: 93.7 kg (206 lb 9.6 oz). Body surface area is 2.19 meters squared.  No Pain (0) Comment: Data Unavailable   No LMP for male patient.  Allergies reviewed: Yes  Medications reviewed: Yes    Medications: Medication refills not needed today.  Pharmacy name entered into flaveit:    [id number] - [place], [place] - [address] [place] [address] [facility] [place], [place] [facility] [address] [facility] [place], [place] - [address] [facility] [phone]    Frailty Screening:   Is the patient here for a new oncology consult visit in cancer care? 2. No    P    Melania Fernandez RN              "

## 2025-05-27 ENCOUNTER — LAB (OUTPATIENT)
Dept: LAB | Facility: CLINIC | Age: 70
End: 2025-05-27
Payer: COMMERCIAL

## 2025-05-27 DIAGNOSIS — Z79.899 ENCOUNTER FOR LONG-TERM (CURRENT) USE OF MEDICATIONS: ICD-10-CM

## 2025-05-27 DIAGNOSIS — D84.822 IMMUNOCOMPROMISED STATE ASSOCIATED WITH STEM CELL TRANSPLANT (H): ICD-10-CM

## 2025-05-27 DIAGNOSIS — D46.9 MDS (MYELODYSPLASTIC SYNDROME) (H): ICD-10-CM

## 2025-05-27 DIAGNOSIS — Z94.84 IMMUNOCOMPROMISED STATE ASSOCIATED WITH STEM CELL TRANSPLANT (H): ICD-10-CM

## 2025-05-27 LAB
BASOPHILS # BLD AUTO: 0.1 10E3/UL (ref 0–0.2)
BASOPHILS NFR BLD AUTO: 2 %
EOSINOPHIL # BLD AUTO: 0.1 10E3/UL (ref 0–0.7)
EOSINOPHIL NFR BLD AUTO: 2 %
ERYTHROCYTE [DISTWIDTH] IN BLOOD BY AUTOMATED COUNT: 14 % (ref 10–15)
HCT VFR BLD AUTO: 41.5 % (ref 40–53)
HGB BLD-MCNC: 14.6 G/DL (ref 13.3–17.7)
IMM GRANULOCYTES # BLD: 0 10E3/UL
IMM GRANULOCYTES NFR BLD: 0 %
LAB DIRECTOR DISCLAIMER: NORMAL
LAB DIRECTOR DISCLAIMER: NORMAL
LAB DIRECTOR INTERPRETATION: NORMAL
LAB DIRECTOR INTERPRETATION: NORMAL
LAB DIRECTOR METHODOLOGY: NORMAL
LAB DIRECTOR METHODOLOGY: NORMAL
LAB DIRECTOR RESULTS: NORMAL
LAB DIRECTOR RESULTS: NORMAL
LOCATION OF TASK: NORMAL
LOCATION OF TASK: NORMAL
LYMPHOCYTES # BLD AUTO: 1.3 10E3/UL (ref 0.8–5.3)
LYMPHOCYTES NFR BLD AUTO: 38 %
MCH RBC QN AUTO: 33.6 PG (ref 26.5–33)
MCHC RBC AUTO-ENTMCNC: 35.2 G/DL (ref 31.5–36.5)
MCV RBC AUTO: 95 FL (ref 78–100)
MONOCYTES # BLD AUTO: 0.3 10E3/UL (ref 0–1.3)
MONOCYTES NFR BLD AUTO: 7 %
NEUTROPHILS # BLD AUTO: 1.7 10E3/UL (ref 1.6–8.3)
NEUTROPHILS NFR BLD AUTO: 51 %
PLATELET # BLD AUTO: 196 10E3/UL (ref 150–450)
RBC # BLD AUTO: 4.35 10E6/UL (ref 4.4–5.9)
SPECIMEN TYPE: NORMAL
SPECIMEN TYPE: NORMAL
WBC # BLD AUTO: 3.4 10E3/UL (ref 4–11)

## 2025-05-27 PROCEDURE — G0452 MOLECULAR PATHOLOGY INTERPR: HCPCS | Performed by: STUDENT IN AN ORGANIZED HEALTH CARE EDUCATION/TRAINING PROGRAM

## 2025-05-27 PROCEDURE — 85025 COMPLETE CBC W/AUTO DIFF WBC: CPT

## 2025-05-27 PROCEDURE — 81268 CHIMERISM ANAL W/CELL SELECT: CPT | Mod: 59

## 2025-05-27 PROCEDURE — 80053 COMPREHEN METABOLIC PANEL: CPT

## 2025-05-27 PROCEDURE — G0452 MOLECULAR PATHOLOGY INTERPR: HCPCS | Mod: 59 | Performed by: STUDENT IN AN ORGANIZED HEALTH CARE EDUCATION/TRAINING PROGRAM

## 2025-05-27 PROCEDURE — 87799 DETECT AGENT NOS DNA QUANT: CPT

## 2025-05-27 PROCEDURE — 36415 COLL VENOUS BLD VENIPUNCTURE: CPT

## 2025-05-28 ENCOUNTER — TELEPHONE (OUTPATIENT)
Dept: ONCOLOGY | Facility: CLINIC | Age: 70
End: 2025-05-28
Payer: COMMERCIAL

## 2025-05-28 LAB
ALBUMIN SERPL BCG-MCNC: 4.4 G/DL (ref 3.5–5.2)
ALP SERPL-CCNC: 86 U/L (ref 40–150)
ALT SERPL W P-5'-P-CCNC: 19 U/L (ref 0–70)
ANION GAP SERPL CALCULATED.3IONS-SCNC: 10 MMOL/L (ref 7–15)
AST SERPL W P-5'-P-CCNC: 29 U/L (ref 0–45)
BILIRUB SERPL-MCNC: 0.5 MG/DL
BUN SERPL-MCNC: 18.8 MG/DL (ref 8–23)
CALCIUM SERPL-MCNC: 9.5 MG/DL (ref 8.8–10.4)
CHLORIDE SERPL-SCNC: 101 MMOL/L (ref 98–107)
CMV DNA SPEC NAA+PROBE-ACNC: NOT DETECTED IU/ML
CREAT SERPL-MCNC: 1.09 MG/DL (ref 0.67–1.17)
EBV DNA SERPL NAA+PROBE-ACNC: NOT DETECTED IU/ML
EGFRCR SERPLBLD CKD-EPI 2021: 73 ML/MIN/1.73M2
GLUCOSE SERPL-MCNC: 101 MG/DL (ref 70–99)
HCO3 SERPL-SCNC: 27 MMOL/L (ref 22–29)
POTASSIUM SERPL-SCNC: 4.4 MMOL/L (ref 3.4–5.3)
PROT SERPL-MCNC: 6.5 G/DL (ref 6.4–8.3)
SODIUM SERPL-SCNC: 138 MMOL/L (ref 135–145)
SPECIMEN TYPE: NORMAL

## 2025-05-28 NOTE — ORAL ONC MGMT
Oral Chemotherapy Monitoring Program  Lab Follow Up    Reviewed lab results from 5/27/25.    Assessment & Plan:  No new concerning abnormalities. Leland may continue with his next Inqovi cycle as planned.  Called Leland to let him know about these results - left message requesting call back if questions. No drug names were mentioned. Will update if response received.    Follow-Up:  5/30: visit with Dr. Jeffrey Joshi, PharmD, BCOP  Oral Chemotherapy Monitoring Program  [facility]  [phone]          3/21/2025    12:00 PM 3/28/2025     9:00 AM 4/4/2025     2:00 PM 4/11/2025     8:00 AM 4/22/2025     8:00 AM 4/29/2025     8:00 AM 5/28/2025     3:00 PM   ORAL CHEMOTHERAPY   Assessment Type Lab Monitoring Lab Monitoring Lab Monitoring Lab Monitoring Refill Lab Monitoring Lab Monitoring   Diagnosis Code Myelodysplastic Syndrome Myelodysplastic Syndrome Myelodysplastic Syndrome Myelodysplastic Syndrome Myelodysplastic Syndrome Myelodysplastic Syndrome Myelodysplastic Syndrome   Providers Dr. Jeffrey Murphy   [facility]   Is this patient followed by the [facility] OC team? No No No No No No No   Drug Name Inqovi (decitabine/Cedazuridine) Inqovi (decitabine/Cedazuridine) Inqovi (decitabine/Cedazuridine) Inqovi (decitabine/Cedazuridine) Inqovi (decitabine/Cedazuridine) Inqovi (decitabine/Cedazuridine) Inqovi (decitabine/Cedazuridine)   Dose 35 mg 35 mg 35 mg 35 mg 35 mg 35 mg 35 mg   Current Schedule Other Other Other Other Other Other Other   Cycle Details Day 1-3 of a 28 day cycle Day 1-3 of a 28 day cycle Day 1-3 of a 28 day cycle Day 1-3 of a 28 day cycle Day 1-3 of a 28 day cycle Day 1-3 of a 28 day cycle Day 1-3 of a 28 day cycle   Start Date of Last Cycle 3/4/2025 3/4/2025 3/4/2025 4/1/2025 4/1/2025 4/29/2025 5/27/2025   Planned next cycle start date 4/1/2025 4/1/2025 4/1/2025  4/29/2025 4/29/2025 5/27/2025 6/24/2025   Doses missed in last 2 weeks   0       Adherence Assessment   Adherent       Adverse Effects   No AE identified during assessment   Increased Creatinine    Increased Creatinine      Grade 1    Pharmacist intervention(Increased creatinine)      Yes    Intervention(s)      Patient education    Home BPs   Not applicable       Any new drug interactions?   No       Is the dose as ordered appropriate for the patient?   Yes         Labs:  Lab Results   Component Value Date     05/27/2025    POTASSIUM 4.4 05/27/2025    MAG 1.9 10/15/2024    CR 1.09 05/27/2025    HERBERT 9.5 05/27/2025    BILITOTAL 0.5 05/27/2025    ALBUMIN 4.4 05/27/2025    ALT 19 05/27/2025    AST 29 05/27/2025     Lab Results   Component Value Date    URIC 2.1 09/23/2024              Lab Results   Component Value Date    HGB 14.6 05/27/2025    WBC 3.4 (L) 05/27/2025    ANEU 1.7 05/27/2025     05/27/2025

## 2025-05-29 LAB
CULTURE HARVEST COMPLETE DATE: NORMAL
CULTURE HARVEST COMPLETE DATE: NORMAL

## 2025-05-30 ENCOUNTER — OFFICE VISIT (OUTPATIENT)
Dept: TRANSPLANT | Facility: CLINIC | Age: 70
End: 2025-05-30
Attending: STUDENT IN AN ORGANIZED HEALTH CARE EDUCATION/TRAINING PROGRAM
Payer: COMMERCIAL

## 2025-05-30 VITALS
HEART RATE: 70 BPM | TEMPERATURE: 98.4 F | SYSTOLIC BLOOD PRESSURE: 147 MMHG | DIASTOLIC BLOOD PRESSURE: 82 MMHG | BODY MASS INDEX: 27.48 KG/M2 | RESPIRATION RATE: 16 BRPM | WEIGHT: 205.1 LBS | OXYGEN SATURATION: 98 %

## 2025-05-30 DIAGNOSIS — D46.9 MDS (MYELODYSPLASTIC SYNDROME) (H): Primary | ICD-10-CM

## 2025-05-30 PROCEDURE — G0463 HOSPITAL OUTPT CLINIC VISIT: HCPCS | Performed by: STUDENT IN AN ORGANIZED HEALTH CARE EDUCATION/TRAINING PROGRAM

## 2025-05-30 ASSESSMENT — PAIN SCALES - GENERAL: PAINLEVEL_OUTOF10: NO PAIN (0)

## 2025-05-30 NOTE — LETTER
5/30/2025      Leland Pearson  [address] [place] [address]      Dear Colleague,    Thank you for referring your patient, Leland Pearson, to the [facility] BLOOD AND MARROW TRANSPLANT PROGRAM [place]. Please see a copy of my visit note below.    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     Diagnosis MDS-IB2 BMT type Allogenic  CMV  Donor -  Recipient -    Prep GANGA (Flu/Cy/TBI) Donor type  8/8, URD ABO D/R O+    GVHD ppx PTCy, siro, MMF Graft source PBSCT Toxo IgG Negative   Protocol [id number] CD34/kg 6.06E+06    BMT MD Brenda Murphy     Pre-transplant disease history  Referring provider: Dr. Delcid    He started having drenching night sweats and fatigue in Jan 2024. Intentional weight loss. He was found to have thrombocytopenia on routine CBC done prior to back surgery (was planned for laminectomy and fusion). On 4/1/24, WBC 6.4, Hb 13.5, Plts 64, ANC 1.89. LDH elevated 532. He underwent bone marrow biopsy (4/23/24) which showed MDS-EB2. Hypercellular marrow (%) with 10.8% blasts including rare circulating blasts. Flow showed 4% myeloid blasts. Normal karyotype. NGS (peripheral blood) was positive for ASXL1, IDH1, RUNX1, SRSF2 mutations (full report not available). Counts: WBC 6.7, Hb 13.1, Plts 70, ANC 0.7. IPSS-M = High risk (0.85).  He was treated with azacitidine 75mg/m2 for 7 days and venetoclax for 14 days (C1 on 5/20/24). Bone marrow after first cycle (6/13/24) showed persistent MDS, with hypercellular marrow with therapy effect, no increase in blasts. Normal karyotype. NGS: ASXL1 (38%), IDH1 (43%), RUNX1 (41%), SRSF1 (38%).   He received second cycle of aza/ angelia on 7/1/24. His pre-transplant BMBx was done on 8/2/24 and he was cytopenic at the time (CBC with WBC 0.6, Hb 14, platelet  count 24). Hypocellular marrow (5%) with no increase in blasts. Normal karyotype, NGS negative.     Post transplant   - 9/2/24 (around D10) Neutropenic fevers. Strept mitis bacteremia resistant to levaquin from central venous catheter as well as a single culture of MRSE from peripheral blood (contaminant). Treated with cefepime. Repeat blood cultures negative, but continued to have high fevers. TTE (9/6/24) did not show any vegetations. CVC was removed on 9/6/24, defervesced on 9/7/24. PICC placed on 9/9/24. Antibiotics (cefepime followed by augmentin) were continued for a total of 14days from line removal.   - Right lower neck swelling and tenderness near the previous CVC site. US (9/15/24) showed an occlusive right internal jugular thrombus and a non occlusive thrombus in the right axillary vein, PICC associated. He was initially not anticoagulated due to thrombocytopenia but had increased symptoms. Repeat US (9/17/24) showed extension of right internal jugular thrombus into the innominate vein and extension of right axillary thrombus into subclavian vein. He was started on therapeutic anticoagulation with lovenox 1mg/kg BID on 9/18/24 with transfusion support for platelets. Right PICC removal was considered given propagation of clot near the PICC line. However, IR (9/19/24) recommended against removal of R PICC and replacement in the left arm due to risk of contralateral DVT.  Line was removed on 10/18/24 and he was transitioned to rivaroxaban on 12/17/24 (one he was off posaconaozle). He has left UE swelling starting Nov 2024 which was treated with lymphedema wraps with PT. US of left upper extremity on 10/18/24 and 11/25/24 were negative for DVT. However, US of b/l UE on 3/19/25 showed known DVT in right internal jugular along with occlusive DVT in the left subclavian vein, which was not thought to be acute.   - Hemorrhagic cystitis: Dysuria, urgency and hematuria starting  9/11/2024. Infectious workup  including adenovirus and BK virus negative. Urology was consulted, recommended outpatient follow up. Now resolved.   - Maintenance: Inqovi : C1 on 2/4/25. C2: 3/4/25, C3 on 4/1/25, C4: 4/29/25, C5: 5/27/25       INTERVAL HISTORY     Leland presents for a scheduled BMT visit.     Has been doing well. No new complaints. Discussed results of BMBx which showed ongoing remission. No changes in skin or mouth, and eye dryness managed with drops.  - Engaged in regular exercise, maintaining physical activity 5-6 days a week.    Plan  - continue inqovi. No neutropenia. Next cycle around June 24th, then July 22nd, then hold for BMBx. Labs every 2 weeks at [facility].   - Scheduled for PFTs in Aug 2025  - He will discuss DXA scan with PCP along with statin and allopurinol  - Dermatology appointment coming up, will need to continue to follow up yearly for skin checks  - RTC 1 month       PMH  Back pain due to spinal stenosis, lumbosacral radiculopathy.   Gout, most recent flare was in April 2024  GERD  HTN  BPH, on tamsulosin   Right knee ACL repair in 2014  Low back surgery in 2004 for herniated disc, complicated by infection requiring prolonged IV abx   Coronary artery calcifications seen on CT, stress test in 2023 negative     SH  . Lives with his wife, Vani. Two daughters, 43 and 36. 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 Was still working part time till last summer at [employer], 2-3 hours per day.     Current Outpatient Medications   Medication Sig Dispense Refill     acyclovir (ZOVIRAX) 800 MG tablet TAKE 1 TABLET (800 MG) BY MOUTH EVERY 12 HOURS. 180 tablet 1     allopurinol (ZYLOPRIM) 300 MG tablet Take 1 tablet (300 mg) by mouth daily. 90 tablet 1     calcium carbonate-vitamin D (OSCAL) 500-5 MG-MCG tablet Take 2 tablets by mouth daily. 60 tablet 2     carboxymethylcellulose (REFRESH PLUS) 0.5 % SOLN ophthalmic solution Place 1 drop into both eyes 3 times daily as needed for dry eyes. 15 mL  11     clindamycin (CLEOCIN T) 1 % external lotion Apply to chest and back BID x 3 weeks (Patient not taking: Reported on 5/20/2025) 60 mL 3     cycloSPORINE (RESTASIS) 0.05 % ophthalmic emulsion INSTILL 1 DROP INTO BOTH EYES TWICE A DAY 60 mL 11     hydrocortisone 2.5 % cream Apply topically daily. 30 g 0     levofloxacin (LEVAQUIN) 250 MG tablet Take 1 tablet (250 mg) by mouth daily as needed (Only when ANC < 1). (Patient not taking: Reported on 5/20/2025) 30 tablet 0     lifitegrast (XIIDRA) 5 % opthalmic solution INSTILL 1 DROP INTO BOTH EYES TWICE A DAY (Patient not taking: No sig reported) 60 each 11     lisinopril (ZESTRIL) 5 MG tablet TAKE 1 TABLET (5 MG) BY MOUTH DAILY. HOLD DOSE IF BLOOD PRESSURE IS LESS THAN 110/60 90 tablet 1     methocarbamol (ROBAXIN) 500 MG tablet Take 500 mg by mouth as needed for muscle spasms. (Patient not taking: Reported on 5/20/2025)       metroNIDAZOLE (METROCREAM) 0.75 % external cream Apply to face, scalp and chest BID 45 g 11     metroNIDAZOLE (METROGEL) 0.75 % external gel Apply 1 g to the chest and 1g to the back  g 5     pantoprazole (PROTONIX) 40 MG EC tablet TAKE 1 TABLET BY MOUTH EVERY DAY 90 tablet 1     prochlorperazine (COMPAZINE) 10 MG tablet Take 1 tablet (10 mg) by mouth every 6 hours as needed for nausea or vomiting. 30 tablet 2     rivaroxaban ANTICOAGULANT (XARELTO) 10 MG TABS tablet Take 1 tablet (10 mg) by mouth daily (with dinner). (Patient not taking: Reported on 5/20/2025) 30 tablet 3     sulfamethoxazole-trimethoprim (BACTRIM DS) 800-160 MG tablet Take 1 tablet by mouth Every Mon, Tues two times daily. 48 tablet 1     tamsulosin (FLOMAX) 0.4 MG capsule Take 0.4 mg by mouth daily.          EXAM      BP (!) 147/82 (BP Location: Right arm, Patient Position: Sitting, Cuff Size: Adult Regular)   Pulse 70   Temp 98.4  F (36.9  C) (Oral)   Resp 16   Wt 93 kg (205 lb 1.6 oz)   SpO2 98%   BMI 27.48 kg/m      Wt Readings from Last 4 Encounters:    05/30/25 93 kg (205 lb 1.6 oz)   05/20/25 93.7 kg (206 lb 9.6 oz)   05/15/25 92.1 kg (203 lb 1.6 oz)   04/15/25 91.8 kg (202 lb 4.8 oz)       KPS: 80% Can perform normal activity with effort, some signs of disease    General: Alert, awake  Eyes: Conjunctiva normal  Mouth and Throat: No mucositis   Abd:  Non tender, non distended   Lymph:  LUE with compression wrap  Skin: Red discreet spots, non blanching, over chest improving.   Access: None        LABS       Recent Labs   Lab Test 05/27/25  1251 05/20/25  0952 05/15/25  1413 04/28/25  1403   WBC 3.4* 3.0* 4.4 3.9*   HGB 14.6 13.7 13.3 13.2*    129* 135* 173   NEUTROPHIL 51  --  52 49   ANEU 1.7  --  2.3 1.9   LYMPH 38  --  32 41   ALYM 1.3  --  1.4 1.6       Recent Labs   Lab Test 05/27/25  1251 05/20/25  0952 05/15/25  1413    140 135   POTASSIUM 4.4 4.7 4.8   CHLORIDE 101 104 100   CO2 27 22 26   BUN 18.8 16.2 15.6   CR 1.09 1.05 1.14   * 115* 96        Recent Labs   Lab Test 05/27/25  1251 05/20/25  0952 05/15/25  1413 10/18/24  1216 10/15/24  1220 10/03/24  1543 09/30/24  0900 09/27/24  0927 09/23/24  0927 09/19/24  0323 09/18/24  0430 09/17/24  0424   HERBERT 9.5 9.8 10.0   < > 9.1   < > 9.0   < > 9.3   < > 8.7* 9.0   MAG  --   --   --   --  1.9  --  1.9  --   --   --  2.0 2.0   PHOS  --   --   --   --   --   --   --   --  2.5  --  3.0 2.9    < > = values in this interval not displayed.        Recent Labs   Lab Test 05/27/25  1251 05/20/25  0952 05/15/25  1413   ALKPHOS 86 86 79   AST 29 33 30   ALT 19 21 19   BILITOTAL 0.5 0.5 0.7   ALBUMIN 4.4 4.4 4.3       Recent Labs   Lab Test 05/27/25  1251 05/15/25  1413 04/15/25  1412   CMVQNT Not Detected Not Detected Not Detected       Recent Labs   Lab Test 01/29/25  1457 11/29/24  1335 09/27/24  0927    549* 708       Recent Labs   Lab Test 05/15/25  1413 08/01/24  0805 05/08/24  1156   ZARIA 392 460* 1,360*         ASSESSMENT AND PLAN     BMT: D280   - Chemo protocol: MT 2022-5;  Flu/Cy/TBI  - Peripheral blood stem cell graft from 8/8 URD donor, ABO matched, Cell dose: 6.06 x10^6 CD34/kg    Disease status:   9/13/24 (D28): Bmbx shows hypocellular marrow, no dysplasia or blasts.  NGS negative  12/2/24 (D100): Bmbx shows hypocellular marrow in CR. Flow showed NGS negative.   2/19/25 (D180): Bmbx shows hypocellular marrow with morphological CR. Flow shows no increase in myeloid blasts but myeloid blasts have an unusual immunophenotype with partial CD7. NGS negative.   5/20/25 (9 months): BMBx showed CR. Flow showed some CD34 cells with increased CD7 but not at a level diagnostic of aberrant immunophenotype same as prior. Karyotype normal, NGS negative.     Graft status/chimerism:   9/13/24 (D28): Bone marrow 100%. Peripheral blood CD3 92%, CD33 100%  10/25/24 (D60): Peripheral blood CD3 and CD33 100%  12/2/24 (D100): Bone marrow 100%, Peripheral blood CD3 and CD33 100%  2/19/25 (D180): Bone marrow 100%, Peripheral blood CD3 and CD33 100%  4/3/25: Peripheral blood CD3 and CD33 100%  5/27/25: Bone marrow 100%, Peripheral blood CD3 and CD33 100%    Maintenance   Inqovi 3x/28 days   - Continue as above     GVHD  # Current systemic immunosuppression is none.   - Sirolimus stopped at D100 without taper on 11/29/24     # GVHD: None till date  # Skin: Rash on chest, back and neck has improved. Seen by derm, biopsied, no concern for GVHD. Topical metronidazole for itching. Score 0.  # Eyes: Dry eye syndrome. Has seen ophthalmology. Restasis twice daily and artifical tears once a day. Has undergone punctal plug placement. Has moderate dry eye symptoms which could be early cGVHD but more likely due to dry eye.   # Mouth: No symptoms  # GI: No symptoms. GI score 0.  # Liver: LFTs normal. Liver score 0.  # Pulm: No symptoms. Lung score 0.  # Joints: No symptoms. Joint score 0.    Heme/ Coag  # Right internal jugular occlusive thrombus and right axillary/ subclavain vein non occlusive thrombus, line  associated, diagnosed on 9/15/24. Line was removed on 10/18/24.   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 Price, with recommendation for thoracic outlet syndrome ultrasound in 3 months (June 2025)    ID  # Prophylaxis  PJP/ Toxo IgG negative: Bactrim   Viral: Acyclovir 800 mg bid  Bacterial/ fungal: None    # Monitoring  CMV: Monitor monthly  EBV: Monitor every 2 weeks between D30 to D180.   IgG:  IgG was 618 mg/dL on 1/29/25. Check serum IgG level q3 months.     GI  GERD: Pantoprazole daily   VOD prophylaxis: Completed     CARDIOVASCULAR  # HTN: Lisinopril 5mg (was on 15mg daily prior to transplant)    # CAD: Coronary artery calcifications seen on CT, stress test in 2023 negative  - Risk of cardiomyopathy: Baseline EF 55-60%.   - Risk of arrhythmia: Baseline EKG showed 421       RENAL/ELECTROLYTES/  - BPH: Continue Flomax.  - Hemorrhagic Cystitis secondary to cytoxan: Resolved. Oxybutynin 10mg at bedtime PRN. Will need follow up with urology at some point.       MUSCULOSKELETAL  # Gout: continue allopurinol   # History of spinal stenosis, lumbosacral radiculopathy:   - Pain management: Okay to take tylenol or robaxin RPN    Health Maintenance   DEXA and Vitamin D: At 1-year post transplant   PFT: At 1 year post transplant. Repeat if symptomatic.   Thyroid: Check TSH and free T4 at 1 year post-transplant, then yearly, through primary physician.   Lipids: Check lipid panel q6-12 months through primary physician.  Skin: Annual skin checks through Dermatology; d/w patient.  Dental visit at 1 year   Iron overload: Check Ferritin at 1 year     Post-transplant vaccinations  Has received flu, COVID and RSV vaccine.     I spent 30 minutes in the care of this patient today, which included time necessary for preparation for the visit, obtaining history, ordering medications/tests/procedures as medically indicated, review of pertinent medical  literature, counseling of the patient, communication of recommendations to the care team, and documentation time.    Brenda Murphy  Department of Hematology, Oncology and Transplantation  Text via [employer]         Again, thank you for allowing me to participate in the care of your patient.        Sincerely,        ONEIL Montero    Electronically signed

## 2025-05-30 NOTE — NURSING NOTE
"Oncology Rooming Note    May 30, 2025 2:35 PM   Leland Pearson is a 70 year old male who presents for:    Chief Complaint   Patient presents with    Oncology Clinic Visit     myelodysplastic syndrome     Initial Vitals: BP (!) 147/82 (BP Location: Right arm, Patient Position: Sitting, Cuff Size: Adult Regular)   Pulse 70   Temp 98.4  F (36.9  C) (Oral)   Resp 16   Wt 93 kg (205 lb 1.6 oz)   SpO2 98%   BMI 27.48 kg/m   Estimated body mass index is 27.48 kg/m  as calculated from the following:    Height as of 8/16/24: 1.84 m (6' 0.44\").    Weight as of this encounter: 93 kg (205 lb 1.6 oz). Body surface area is 2.18 meters squared.  No Pain (0) Comment: Data Unavailable   No LMP for male patient.  Allergies reviewed: Yes  Medications reviewed: Yes    Medications: MEDICATION REFILLS NEEDED TODAY. Provider was notified.  Pharmacy name entered into [employer]:    [id number] [place] [facility] [place], [place] - [address] [place] [address] [facility] [place], [place] - [address] [facility] [place], [place] - [address] [facility] [phone]    Frailty Screening:   Is the patient here for a new oncology consult visit in cancer care? 2. No    PHQ9:  Did this patient require a PHQ9?: No      Clinical concerns: needs refill for bactrim        Gabe Rima              "

## 2025-05-30 NOTE — PROGRESS NOTES
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     Diagnosis MDS-IB2 BMT type Allogenic  CMV  Donor -  Recipient -    Prep GANGA (Flu/Cy/TBI) Donor type  8/8, URD ABO D/R O+    GVHD ppx PTCy, siro, MMF Graft source PBSCT Toxo IgG Negative   Protocol [id number] CD34/kg 6.06E+06    BMT MD Brenda Murphy     Pre-transplant disease history  Referring provider: Dr. Delcid    He started having drenching night sweats and fatigue in Jan 2024. Intentional weight loss. He was found to have thrombocytopenia on routine CBC done prior to back surgery (was planned for laminectomy and fusion). On 4/1/24, WBC 6.4, Hb 13.5, Plts 64, ANC 1.89. LDH elevated 532. He underwent bone marrow biopsy (4/23/24) which showed MDS-EB2. Hypercellular marrow (%) with 10.8% blasts including rare circulating blasts. Flow showed 4% myeloid blasts. Normal karyotype. NGS (peripheral blood) was positive for ASXL1, IDH1, RUNX1, SRSF2 mutations (full report not available). Counts: WBC 6.7, Hb 13.1, Plts 70, ANC 0.7. IPSS-M = High risk (0.85).  He was treated with azacitidine 75mg/m2 for 7 days and venetoclax for 14 days (C1 on 5/20/24). Bone marrow after first cycle (6/13/24) showed persistent MDS, with hypercellular marrow with therapy effect, no increase in blasts. Normal karyotype. NGS: ASXL1 (38%), IDH1 (43%), RUNX1 (41%), SRSF1 (38%).   He received second cycle of aza/ angelia on 7/1/24. His pre-transplant BMBx was done on 8/2/24 and he was cytopenic at the time (CBC with WBC 0.6, Hb 14, platelet count 24). Hypocellular marrow (5%) with no increase in blasts. Normal karyotype, NGS negative.     Post transplant   - 9/2/24 (around D10) Neutropenic fevers. Strept mitis bacteremia resistant to levaquin from central venous catheter as well as a single culture of MRSE from  peripheral blood (contaminant). Treated with cefepime. Repeat blood cultures negative, but continued to have high fevers. TTE (9/6/24) did not show any vegetations. CVC was removed on 9/6/24, defervesced on 9/7/24. PICC placed on 9/9/24. Antibiotics (cefepime followed by augmentin) were continued for a total of 14days from line removal.   - Right lower neck swelling and tenderness near the previous CVC site. US (9/15/24) showed an occlusive right internal jugular thrombus and a non occlusive thrombus in the right axillary vein, PICC associated. He was initially not anticoagulated due to thrombocytopenia but had increased symptoms. Repeat US (9/17/24) showed extension of right internal jugular thrombus into the innominate vein and extension of right axillary thrombus into subclavian vein. He was started on therapeutic anticoagulation with lovenox 1mg/kg BID on 9/18/24 with transfusion support for platelets. Right PICC removal was considered given propagation of clot near the PICC line. However, IR (9/19/24) recommended against removal of R PICC and replacement in the left arm due to risk of contralateral DVT.  Line was removed on 10/18/24 and he was transitioned to rivaroxaban on 12/17/24 (one he was off posaconaozle). He has left UE swelling starting Nov 2024 which was treated with lymphedema wraps with PT. US of left upper extremity on 10/18/24 and 11/25/24 were negative for DVT. However, US of b/l UE on 3/19/25 showed known DVT in right internal jugular along with occlusive DVT in the left subclavian vein, which was not thought to be acute.   - Hemorrhagic cystitis: Dysuria, urgency and hematuria starting  9/11/2024. Infectious workup including adenovirus and BK virus negative. Urology was consulted, recommended outpatient follow up. Now resolved.   - Maintenance: Inqovi : C1 on 2/4/25. C2: 3/4/25, C3 on 4/1/25, C4: 4/29       INTERVAL HISTORY     Leland presents for a scheduled BMT visit.     - started inqovi on  5/27/25.  - Pfts in August.  - dxa scan with pcp  - will discuss statin andallopurinol with pcp        - Eye : restasis BID, over the counter lubricating drop once a day.   - No mouth, GI symptoms, no pulm or joint symptoms  - Rash is on chest and is better, on face and neck seems same character. Continue metronidazole and can use topical hydrocortisone for itching. Not GVHD  - Left arm swelling better.       Plan  - PFTs  - has established with PCP  - Has established with derm  - ok to get dental cleaning        Overall doing well. Has been taking maintenance inqovi. Counts have been stable, no cytopenias.     He still has a rash on his chest which seems unchanged from prior (see media tab). He has been treated with topical metronidazole. No itching or discomfort. His dermatologist is going to leave soon. The rash is still bright red and does not really seem to be fading. Will refer to dermatology here to r/o GVHD.    Eyes are stable. He visited ophthalmology recently and continues to use restasis twice a day and tear drops once a day as needed. He is planning on getting cataract surgery soon.    Plan  - Continue inqovi.  -Labs (CBC, CMP) every other week at [facility]   - Will request repeat bone marrow biopsy in late-May   - RTC in 4-5 weeks    PMH  Back pain due to spinal stenosis, lumbosacral radiculopathy.   Gout, most recent flare was in April 2024  GERD  HTN  BPH, on tamsulosin   Right knee ACL repair in 2014  Low back surgery in 2004 for herniated disc, complicated by infection requiring prolonged IV abx   Coronary artery calcifications seen on CT, stress test in 2023 negative     SH  . Lives with his wife, Vani. Two daughters, 43 and 36. 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 Was still working part time till last summer at [employer], 2-3 hours per day.     Current Outpatient Medications   Medication Sig Dispense Refill    acyclovir (ZOVIRAX) 800 MG tablet TAKE 1 TABLET (800  MG) BY MOUTH EVERY 12 HOURS. 180 tablet 1    allopurinol (ZYLOPRIM) 300 MG tablet Take 1 tablet (300 mg) by mouth daily. 90 tablet 1    calcium carbonate-vitamin D (OSCAL) 500-5 MG-MCG tablet Take 2 tablets by mouth daily. 60 tablet 2    carboxymethylcellulose (REFRESH PLUS) 0.5 % SOLN ophthalmic solution Place 1 drop into both eyes 3 times daily as needed for dry eyes. 15 mL 11    clindamycin (CLEOCIN T) 1 % external lotion Apply to chest and back BID x 3 weeks (Patient not taking: Reported on 5/20/2025) 60 mL 3    cycloSPORINE (RESTASIS) 0.05 % ophthalmic emulsion INSTILL 1 DROP INTO BOTH EYES TWICE A DAY 60 mL 11    hydrocortisone 2.5 % cream Apply topically daily. 30 g 0    levofloxacin (LEVAQUIN) 250 MG tablet Take 1 tablet (250 mg) by mouth daily as needed (Only when ANC < 1). (Patient not taking: Reported on 5/20/2025) 30 tablet 0    lifitegrast (XIIDRA) 5 % opthalmic solution INSTILL 1 DROP INTO BOTH EYES TWICE A DAY (Patient not taking: No sig reported) 60 each 11    lisinopril (ZESTRIL) 5 MG tablet TAKE 1 TABLET (5 MG) BY MOUTH DAILY. HOLD DOSE IF BLOOD PRESSURE IS LESS THAN 110/60 90 tablet 1    methocarbamol (ROBAXIN) 500 MG tablet Take 500 mg by mouth as needed for muscle spasms. (Patient not taking: Reported on 5/20/2025)      metroNIDAZOLE (METROCREAM) 0.75 % external cream Apply to face, scalp and chest BID 45 g 11    metroNIDAZOLE (METROGEL) 0.75 % external gel Apply 1 g to the chest and 1g to the back  g 5    pantoprazole (PROTONIX) 40 MG EC tablet TAKE 1 TABLET BY MOUTH EVERY DAY 90 tablet 1    prochlorperazine (COMPAZINE) 10 MG tablet Take 1 tablet (10 mg) by mouth every 6 hours as needed for nausea or vomiting. 30 tablet 2    rivaroxaban ANTICOAGULANT (XARELTO) 10 MG TABS tablet Take 1 tablet (10 mg) by mouth daily (with dinner). (Patient not taking: Reported on 5/20/2025) 30 tablet 3    sulfamethoxazole-trimethoprim (BACTRIM DS) 800-160 MG tablet Take 1 tablet by mouth Every Mon, Tues  two times daily. 48 tablet 1    tamsulosin (FLOMAX) 0.4 MG capsule Take 0.4 mg by mouth daily.          EXAM      BP (!) 147/82 (BP Location: Right arm, Patient Position: Sitting, Cuff Size: Adult Regular)   Pulse 70   Temp 98.4  F (36.9  C) (Oral)   Resp 16   Wt 93 kg (205 lb 1.6 oz)   SpO2 98%   BMI 27.48 kg/m      Wt Readings from Last 4 Encounters:   05/30/25 93 kg (205 lb 1.6 oz)   05/20/25 93.7 kg (206 lb 9.6 oz)   05/15/25 92.1 kg (203 lb 1.6 oz)   04/15/25 91.8 kg (202 lb 4.8 oz)       KPS: 80% Can perform normal activity with effort, some signs of disease    General: Alert, awake  Eyes: Conjunctiva normal  Mouth and Throat: No mucositis   Abd:  Non tender, non distended   Lymph:  LUE with compression wrap  Skin: Red discreet spots, non blanching, over chest    Access: None        LABS       Recent Labs   Lab Test 05/27/25  1251 05/20/25  0952 05/15/25  1413 04/28/25  1403   WBC 3.4* 3.0* 4.4 3.9*   HGB 14.6 13.7 13.3 13.2*    129* 135* 173   NEUTROPHIL 51  --  52 49   ANEU 1.7  --  2.3 1.9   LYMPH 38  --  32 41   ALYM 1.3  --  1.4 1.6       Recent Labs   Lab Test 05/27/25  1251 05/20/25  0952 05/15/25  1413    140 135   POTASSIUM 4.4 4.7 4.8   CHLORIDE 101 104 100   CO2 27 22 26   BUN 18.8 16.2 15.6   CR 1.09 1.05 1.14   * 115* 96        Recent Labs   Lab Test 05/27/25  1251 05/20/25  0952 05/15/25  1413 10/18/24  1216 10/15/24  1220 10/03/24  1543 09/30/24  0900 09/27/24  0927 09/23/24  0927 09/19/24  0323 09/18/24  0430 09/17/24  0424   HERBERT 9.5 9.8 10.0   < > 9.1   < > 9.0   < > 9.3   < > 8.7* 9.0   MAG  --   --   --   --  1.9  --  1.9  --   --   --  2.0 2.0   PHOS  --   --   --   --   --   --   --   --  2.5  --  3.0 2.9    < > = values in this interval not displayed.        Recent Labs   Lab Test 05/27/25  1251 05/20/25  0952 05/15/25  1413   ALKPHOS 86 86 79   AST 29 33 30   ALT 19 21 19   BILITOTAL 0.5 0.5 0.7   ALBUMIN 4.4 4.4 4.3       Recent Labs   Lab Test 05/27/25  1254  05/15/25  1413 04/15/25  1412   CMVQNT Not Detected Not Detected Not Detected       Recent Labs   Lab Test 01/29/25  1457 11/29/24  1335 09/27/24  0927    549* 708       Recent Labs   Lab Test 05/15/25  1413 08/01/24  0805 05/08/24  1156   ZARIA 392 460* 1,360*         ASSESSMENT AND PLAN     BMT: D280   - Chemo protocol: MT 2022-5; Flu/Cy/TBI  - Peripheral blood stem cell graft from 8/8 URD donor, ABO matched, Cell dose: 6.06 x10^6 CD34/kg    Disease status:   9/13/24 (D28): Bmbx shows hypocellular marrow, no dysplasia or blasts.  NGS negative  12/2/24 (D100): Bmbx shows hypocellular marrow in CR. Flow showed NGS negative.   2/19/25 (D180): Bmbx shows hypocellular marrow with morphological CR. Flow shows no increase in myeloid blasts but myeloid blasts have an unusual immunophenotype with partial CD7. NGS negative.   - Repeat bone marrow biopsy in 3 months (around 9 months)    Graft status/chimerism:   9/13/24 (D28): Bone marrow 100%. Peripheral blood CD3 92%, CD33 100%  10/25/24 (D60): Peripheral blood CD3 and CD33 100%  12/2/24 (D100): Bone marrow 100%, Peripheral blood CD3 and CD33 100%  2/19/25 (D180): Bone marrow 100%, Peripheral blood CD3 and CD33 100%  4/3/25: Peripheral blood CD3 and CD33 100%    Maintenance   Inqovi 3x/28 days   - C3 on 4/1/25, Proceed with C4 on 4/29/25.     GVHD  # Current systemic immunosuppression is none.   - Sirolimus stopped at D100 without taper on 11/29/24     # GVHD: None till date  # Skin: Still has rash on chest, face, neck. Has been using topical metronidazole. Will refer again to dermatology for evaluation of GVHD.   # Eyes: Dry eye syndrome. Has seen ophthalmology. Restasis twice daily and artifical tears once a day. Has undergone punctal plug placement. Have sent a message to ophthalmology to clarify if this is related to GVHD. Score 2 if related to GVHD  # Mouth: No symptoms  # GI: No symptoms. GI score 0.  # Liver: LFTs normal. Liver score 0.  # Pulm: No symptoms.  Lung score 0.  # Joints: No symptoms. Joint score 0.    Heme/ Coag  # Right internal jugular occlusive thrombus and right axillary/ subclavain vein non occlusive thrombus, line associated, diagnosed on 9/15/24. Line was removed on 10/18/24.   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 Price, with recommendation for thoracic outlet syndrome ultrasound in 3 months (June 2025)    DERM  Initially diagnosed with folliculitis  in Dec 2024. Has been treated with doxycycline for 21 days along with clindamycin and hibiclens with improvement in folliculitis. However, still has discreet red rash on the chest, abdomen and face.      ID  # Prophylaxis  PJP/ Toxo IgG negative: Bactrim   Viral: Acyclovir 800 mg bid  Bacterial/ fungal: None    # Monitoring  CMV: Monitor monthly  EBV: Monitor every 2 weeks between D30 to D180.   IgG:  IgG was 618 mg/dL on 1/29/25. Check serum IgG level q3 months.     GI  GERD: Pantoprazole daily   VOD prophylaxis: Completed     CARDIOVASCULAR  # HTN: Lisinopril 5mg (was on 15mg daily prior to transplant)    # CAD: Coronary artery calcifications seen on CT, stress test in 2023 negative  - Risk of cardiomyopathy: Baseline EF 55-60%.   - Risk of arrhythmia: Baseline EKG showed 421       RENAL/ELECTROLYTES/  - BPH: Continue Flomax.  - Hemorrhagic Cystitis secondary to cytoxan: Resolved. Oxybutynin 10mg at bedtime PRN. Will need follow up with urology at some point.       MUSCULOSKELETAL  # Gout: continue allopurinol   # History of spinal stenosis, lumbosacral radiculopathy:   - Pain management: Okay to take tylenol or robaxin RPN    Health Maintenance   DEXA and Vitamin D: At 1-year post transplant   PFT: At 1 year post transplant. Repeat if symptomatic.   Thyroid: Check TSH and free T4 at 1 year post-transplant, then yearly, through primary physician.   Lipids: Check lipid panel q6-12 months through primary physician.  Skin:  Annual skin checks through Dermatology; d/w patient.  Dental visit at 1 year   Iron overload: Check Ferritin at 1 year     Post-transplant vaccinations  Has received flu, COVID and RSV vaccine.     I spent 60 minutes in the care of this patient today, which included time necessary for preparation for the visit, obtaining history, ordering medications/tests/procedures as medically indicated, review of pertinent medical literature, counseling of the patient, communication of recommendations to the care team, and documentation time.    Brenda Murphy  Department of Hematology, Oncology and Transplantation  Text via [email or web address]

## 2025-05-31 ENCOUNTER — HEALTH MAINTENANCE LETTER (OUTPATIENT)
Age: 70
End: 2025-05-31

## 2025-06-04 DIAGNOSIS — M10.9 GOUT, UNSPECIFIED CAUSE, UNSPECIFIED CHRONICITY, UNSPECIFIED SITE: ICD-10-CM

## 2025-06-05 DIAGNOSIS — D46.9 MDS (MYELODYSPLASTIC SYNDROME) (H): ICD-10-CM

## 2025-06-05 RX ORDER — SULFAMETHOXAZOLE AND TRIMETHOPRIM 800; 160 MG/1; MG/1
1 TABLET ORAL
Qty: 48 TABLET | Refills: 1 | Status: SHIPPED | OUTPATIENT
Start: 2025-06-05

## 2025-06-05 RX ORDER — ALLOPURINOL 300 MG/1
1 TABLET ORAL DAILY
Qty: 90 TABLET | Refills: 1 | Status: SHIPPED | OUTPATIENT
Start: 2025-06-05

## 2025-06-09 ENCOUNTER — LAB (OUTPATIENT)
Dept: LAB | Facility: CLINIC | Age: 70
End: 2025-06-09
Payer: COMMERCIAL

## 2025-06-09 DIAGNOSIS — D46.9 MDS (MYELODYSPLASTIC SYNDROME) (H): ICD-10-CM

## 2025-06-09 DIAGNOSIS — Z79.899 ENCOUNTER FOR LONG-TERM (CURRENT) USE OF MEDICATIONS: ICD-10-CM

## 2025-06-09 DIAGNOSIS — Z94.84 IMMUNOCOMPROMISED STATE ASSOCIATED WITH STEM CELL TRANSPLANT (H): ICD-10-CM

## 2025-06-09 DIAGNOSIS — D84.822 IMMUNOCOMPROMISED STATE ASSOCIATED WITH STEM CELL TRANSPLANT (H): ICD-10-CM

## 2025-06-09 LAB
ALBUMIN SERPL BCG-MCNC: 4.4 G/DL (ref 3.5–5.2)
ALP SERPL-CCNC: 79 U/L (ref 40–150)
ALT SERPL W P-5'-P-CCNC: 24 U/L (ref 0–70)
ANION GAP SERPL CALCULATED.3IONS-SCNC: 11 MMOL/L (ref 7–15)
AST SERPL W P-5'-P-CCNC: 33 U/L (ref 0–45)
BASOPHILS # BLD AUTO: 0 10E3/UL (ref 0–0.2)
BASOPHILS NFR BLD AUTO: 1 %
BILIRUB SERPL-MCNC: 0.5 MG/DL
BUN SERPL-MCNC: 16.8 MG/DL (ref 8–23)
CALCIUM SERPL-MCNC: 9.7 MG/DL (ref 8.8–10.4)
CHLORIDE SERPL-SCNC: 98 MMOL/L (ref 98–107)
CREAT SERPL-MCNC: 0.97 MG/DL (ref 0.67–1.17)
EGFRCR SERPLBLD CKD-EPI 2021: 83 ML/MIN/1.73M2
EOSINOPHIL # BLD AUTO: 0.2 10E3/UL (ref 0–0.7)
EOSINOPHIL NFR BLD AUTO: 4 %
ERYTHROCYTE [DISTWIDTH] IN BLOOD BY AUTOMATED COUNT: 14.4 % (ref 10–15)
GLUCOSE SERPL-MCNC: 101 MG/DL (ref 70–99)
HCO3 SERPL-SCNC: 27 MMOL/L (ref 22–29)
HCT VFR BLD AUTO: 39.8 % (ref 40–53)
HGB BLD-MCNC: 14 G/DL (ref 13.3–17.7)
IMM GRANULOCYTES # BLD: 0 10E3/UL
IMM GRANULOCYTES NFR BLD: 0 %
LYMPHOCYTES # BLD AUTO: 1.5 10E3/UL (ref 0.8–5.3)
LYMPHOCYTES NFR BLD AUTO: 33 %
MCH RBC QN AUTO: 33.7 PG (ref 26.5–33)
MCHC RBC AUTO-ENTMCNC: 35.2 G/DL (ref 31.5–36.5)
MCV RBC AUTO: 96 FL (ref 78–100)
MONOCYTES # BLD AUTO: 0.5 10E3/UL (ref 0–1.3)
MONOCYTES NFR BLD AUTO: 11 %
NEUTROPHILS # BLD AUTO: 2.2 10E3/UL (ref 1.6–8.3)
NEUTROPHILS NFR BLD AUTO: 51 %
PLATELET # BLD AUTO: 138 10E3/UL (ref 150–450)
POTASSIUM SERPL-SCNC: 4.5 MMOL/L (ref 3.4–5.3)
PROT SERPL-MCNC: 6.8 G/DL (ref 6.4–8.3)
RBC # BLD AUTO: 4.15 10E6/UL (ref 4.4–5.9)
SODIUM SERPL-SCNC: 136 MMOL/L (ref 135–145)
WBC # BLD AUTO: 4.4 10E3/UL (ref 4–11)

## 2025-06-09 PROCEDURE — 36415 COLL VENOUS BLD VENIPUNCTURE: CPT

## 2025-06-09 PROCEDURE — 87799 DETECT AGENT NOS DNA QUANT: CPT

## 2025-06-09 PROCEDURE — 85025 COMPLETE CBC W/AUTO DIFF WBC: CPT

## 2025-06-09 PROCEDURE — 80053 COMPREHEN METABOLIC PANEL: CPT

## 2025-06-10 ENCOUNTER — TELEPHONE (OUTPATIENT)
Dept: ONCOLOGY | Facility: CLINIC | Age: 70
End: 2025-06-10
Payer: COMMERCIAL

## 2025-06-10 NOTE — ORAL ONC MGMT
Oral Chemotherapy Monitoring Program  Lab Follow Up    Patient is on Inqovi  Reviewed lab results from 6/9/25.    Assessment & Plan:  CBC and CMP reviewed. Results show no concerning abnormalities.  Plan to continue Inqovi as prescribed. Patient not contacted as Inqovi doses for this cycle are complete.    Follow-Up:  6/23: labs      Robyn Eason PharmD  Hematology/Oncology Clinical Pharmacist  Oral Chemotherapy Monitoring Program  [facility]  [phone]  [place] 10, 2025            3/28/2025     9:00 AM 4/4/2025     2:00 PM 4/11/2025     8:00 AM 4/22/2025     8:00 AM 4/29/2025     8:00 AM 5/28/2025     3:00 PM 6/10/2025     9:00 AM   ORAL CHEMOTHERAPY   Assessment Type Lab Monitoring Lab Monitoring Lab Monitoring Refill Lab Monitoring Lab Monitoring Lab Monitoring   Diagnosis Code Myelodysplastic Syndrome Myelodysplastic Syndrome Myelodysplastic Syndrome Myelodysplastic Syndrome Myelodysplastic Syndrome Myelodysplastic Syndrome Myelodysplastic Syndrome   Providers Dr. Jeffrey Murphy   [facility]   Is this patient followed by the [facility] OC team? No No No No No No No   Drug Name Inqovi (decitabine/Cedazuridine) Inqovi (decitabine/Cedazuridine) Inqovi (decitabine/Cedazuridine) Inqovi (decitabine/Cedazuridine) Inqovi (decitabine/Cedazuridine) Inqovi (decitabine/Cedazuridine) Inqovi (decitabine/Cedazuridine)   Dose 35 mg  35 mg  35 mg  35 mg  35 mg  35 mg  35 mg   Current Schedule Other Other Other Other Other Other Other   Cycle Details Day 1-3 of a 28 day cycle Day 1-3 of a 28 day cycle Day 1-3 of a 28 day cycle Day 1-3 of a 28 day cycle Day 1-3 of a 28 day cycle Day 1-3 of a 28 day cycle Day 1-3 of a 28 day cycle   Start Date of Last Cycle 3/4/2025 3/4/2025 4/1/2025 4/1/2025 4/29/2025 5/27/2025 5/27/2025   Planned next cycle start date 4/1/2025 4/1/2025 4/29/2025 4/29/2025 5/27/2025  6/24/2025 6/24/2025   Doses missed in last 2 weeks  0        Adherence Assessment  Adherent        Adverse Effects  No AE identified during assessment   Increased Creatinine  No AE identified during assessment   Increased Creatinine     Grade 1     Pharmacist intervention(Increased creatinine)     Yes     Intervention(s)     Patient education     Home BPs  Not applicable        Any new drug interactions?  No        Is the dose as ordered appropriate for the patient?  Yes     Yes       Data saved with a previous flowsheet row definition       Labs:  _  Result Component Current Result Ref Range   Sodium 136 (6/9/2025) 135 - 145 mmol/L     _  Result Component Current Result Ref Range   Potassium 4.5 (6/9/2025) 3.4 - 5.3 mmol/L     _  Result Component Current Result Ref Range   Calcium 9.7 (6/9/2025) 8.8 - 10.4 mg/dL     No results found for Mag within last 30 days.     No results found for Phos within last 30 days.     _  Result Component Current Result Ref Range   Albumin 4.4 (6/9/2025) 3.5 - 5.2 g/dL     _  Result Component Current Result Ref Range   Urea Nitrogen 16.8 (6/9/2025) 8.0 - 23.0 mg/dL     _  Result Component Current Result Ref Range   Creatinine 0.97 (6/9/2025) 0.67 - 1.17 mg/dL     _  Result Component Current Result Ref Range   AST 33 (6/9/2025) 0 - 45 U/L     _  Result Component Current Result Ref Range   ALT 24 (6/9/2025) 0 - 70 U/L     _  Result Component Current Result Ref Range   Bilirubin Total 0.5 (6/9/2025) <=1.2 mg/dL     _  Result Component Current Result Ref Range   WBC Count 4.4 (6/9/2025) 4.0 - 11.0 10e3/uL     _  Result Component Current Result Ref Range   Hemoglobin 14.0 (6/9/2025) 13.3 - 17.7 g/dL     _  Result Component Current Result Ref Range   Platelet Count 138 (L) (6/9/2025) 150 - 450 10e3/uL     _  Result Component Current Result Ref Range   Absolute Neutrophils 2.2 (6/9/2025) 1.6 - 8.3 10e3/uL

## 2025-06-16 ENCOUNTER — ANCILLARY PROCEDURE (OUTPATIENT)
Dept: ULTRASOUND IMAGING | Facility: CLINIC | Age: 70
End: 2025-06-16
Attending: INTERNAL MEDICINE
Payer: COMMERCIAL

## 2025-06-16 DIAGNOSIS — R09.89 OTHER SPECIFIED SYMPTOMS AND SIGNS INVOLVING THE CIRCULATORY AND RESPIRATORY SYSTEMS: ICD-10-CM

## 2025-06-16 DIAGNOSIS — I82.B12 LEFT SUBCLAVIAN VEIN THROMBOSIS (H): ICD-10-CM

## 2025-06-16 DIAGNOSIS — I89.0 LYMPHEDEMA OF RIGHT UPPER EXTREMITY: ICD-10-CM

## 2025-06-16 PROCEDURE — 93970 EXTREMITY STUDY: CPT | Mod: GC | Performed by: RADIOLOGY

## 2025-06-16 PROCEDURE — 93922 UPR/L XTREMITY ART 2 LEVELS: CPT | Mod: GC | Performed by: RADIOLOGY

## 2025-06-17 ENCOUNTER — ONCOLOGY VISIT (OUTPATIENT)
Dept: ONCOLOGY | Facility: CLINIC | Age: 70
End: 2025-06-17
Attending: INTERNAL MEDICINE
Payer: COMMERCIAL

## 2025-06-17 VITALS
RESPIRATION RATE: 16 BRPM | WEIGHT: 204.8 LBS | SYSTOLIC BLOOD PRESSURE: 137 MMHG | DIASTOLIC BLOOD PRESSURE: 86 MMHG | HEART RATE: 67 BPM | BODY MASS INDEX: 27.44 KG/M2 | TEMPERATURE: 98.1 F | OXYGEN SATURATION: 98 %

## 2025-06-17 DIAGNOSIS — D46.9 MDS (MYELODYSPLASTIC SYNDROME) (H): Primary | ICD-10-CM

## 2025-06-17 DIAGNOSIS — I89.0 LYMPHEDEMA OF RIGHT UPPER EXTREMITY: ICD-10-CM

## 2025-06-17 DIAGNOSIS — I82.B12 LEFT SUBCLAVIAN VEIN THROMBOSIS (H): ICD-10-CM

## 2025-06-17 DIAGNOSIS — Z94.84 HISTORY OF PERIPHERAL STEM CELL TRANSPLANT (H): ICD-10-CM

## 2025-06-17 DIAGNOSIS — Z79.899 ENCOUNTER FOR LONG-TERM (CURRENT) USE OF MEDICATIONS: ICD-10-CM

## 2025-06-17 PROCEDURE — 250N000011 HC RX IP 250 OP 636: Performed by: STUDENT IN AN ORGANIZED HEALTH CARE EDUCATION/TRAINING PROGRAM

## 2025-06-17 PROCEDURE — 99214 OFFICE O/P EST MOD 30 MIN: CPT | Performed by: INTERNAL MEDICINE

## 2025-06-17 PROCEDURE — G0463 HOSPITAL OUTPT CLINIC VISIT: HCPCS | Performed by: INTERNAL MEDICINE

## 2025-06-17 PROCEDURE — 91320 SARSCV2 VAC 30MCG TRS-SUC IM: CPT | Performed by: STUDENT IN AN ORGANIZED HEALTH CARE EDUCATION/TRAINING PROGRAM

## 2025-06-17 PROCEDURE — G2211 COMPLEX E/M VISIT ADD ON: HCPCS | Performed by: INTERNAL MEDICINE

## 2025-06-17 PROCEDURE — 90480 ADMN SARSCOV2 VAC 1/ONLY CMP: CPT | Performed by: STUDENT IN AN ORGANIZED HEALTH CARE EDUCATION/TRAINING PROGRAM

## 2025-06-17 RX ORDER — HEPARIN SODIUM (PORCINE) LOCK FLUSH IV SOLN 100 UNIT/ML 100 UNIT/ML
5 SOLUTION INTRAVENOUS
OUTPATIENT
Start: 2025-06-17

## 2025-06-17 RX ORDER — HEPARIN SODIUM,PORCINE 10 UNIT/ML
5-20 VIAL (ML) INTRAVENOUS DAILY PRN
OUTPATIENT
Start: 2025-06-17

## 2025-06-17 RX ADMIN — COVID-19 VACCINE, MRNA 30 MCG: 0.04 INJECTION, SUSPENSION INTRAMUSCULAR at 16:23

## 2025-06-17 ASSESSMENT — PAIN SCALES - GENERAL: PAINLEVEL_OUTOF10: NO PAIN (0)

## 2025-06-17 NOTE — NURSING NOTE
Covid immunization is given by IM right deltoid.  Pt advised to wait 15 mins prior to leaving.     Vicky Win RN

## 2025-06-17 NOTE — LETTER
images that have poor diffusion that make calling thrombus challening (Nov 2024). The image from March 2025 is clear to have thrombus- but can not ascertain timing. Therefore, I WOULD continue with anticoagulation (10 mg Xarelto at present) and proceed with TOS ultrasounds bilaterally as patient does not presently have a central line.     Plan:  1. Majority of today's visit was spent counseling the patient regarding VTE and anticoagulation.  2. Continue Xarelto 10 mg daily  3. Referral to Vascular Surgery  4. COVID-19 vaccine today    The patient is given our center's contact information and is instructed to call if he should have any further questions or concerns.  Otherwise, we will plan on seeing him back Follow up with Provider - 3 months    The longitudinal plan of care for the diagnosis(es)/condition(s) as documented were addressed during this visit. Due to the added complexity in care, I will continue to support Leland in the subsequent management and with ongoing continuity of care.    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 He was diagnosed on 9/15/24 with provoked thrombosis. [facility] was consulted and patient was discharged on anticoagulation. Refer to consult note attested by myself from 9/18/24. He presents today for follow up.     Leland is doing well. He is not having any swelling in his upper extremities. He is active. No issues with fevers or chills.     Past Medical History:  Past Medical History:   Diagnosis Date     Cancer (H) 03/24     Gastroesophageal reflux disease      Hypertension      Nonsenile cataract  12/27/24       Past Surgical History:  Past Surgical History:   Procedure Laterality Date     COLONOSCOPY       ESOPHAGOSCOPY, GASTROSCOPY, DUODENOSCOPY (EGD), COMBINED  07/28/2013    Procedure: COMBINED ESOPHAGOSCOPY, GASTROSCOPY, DUODENOSCOPY (EGD), BIOPSY SINGLE OR MULTIPLE;;  Surgeon: Franklin Barrera MD;  Location:  GI     ESOPHAGOSCOPY, GASTROSCOPY, DUODENOSCOPY (EGD), COMBINED  07/28/2013    Procedure: COMBINED ESOPHAGOSCOPY, GASTROSCOPY, DUODENOSCOPY (EGD), REMOVE FOREIGN BODY;;  Surgeon: Franklin Barrera MD;  Location:  GI     IR CVC TUNNEL PLACEMENT > 5 YRS OF AGE  08/16/2024     IR CVC TUNNEL REMOVAL RIGHT  09/06/2024     ORTHOPEDIC SURGERY      04 micro discectomy, acl  repair     PICC DOUBLE LUMEN PLACEMENT Right 09/09/2024    Right basilic vein 49cm total 5cm external.       Medications:  Current Outpatient Medications   Medication Sig Dispense Refill     acyclovir (ZOVIRAX) 800 MG tablet TAKE 1 TABLET (800 MG) BY MOUTH EVERY 12 HOURS. 180 tablet 1     allopurinol (ZYLOPRIM) 300 MG tablet TAKE 1 TABLET BY MOUTH EVERY DAY 90 tablet 1     calcium carbonate-vitamin D (OSCAL) 500-5 MG-MCG tablet Take 2 tablets by mouth daily. 60 tablet 2     carboxymethylcellulose (REFRESH PLUS) 0.5 % SOLN ophthalmic solution Place 1 drop into both eyes 3 times daily as needed for dry eyes. 15 mL 11     cycloSPORINE (RESTASIS) 0.05 % ophthalmic emulsion INSTILL 1 DROP INTO BOTH EYES TWICE A DAY 60 mL 11     hydrocortisone 2.5 % cream Apply topically daily. 30 g 0     lisinopril (ZESTRIL) 5 MG tablet TAKE 1 TABLET (5 MG) BY MOUTH DAILY. HOLD DOSE IF BLOOD PRESSURE IS LESS THAN 110/60 90 tablet 1     metroNIDAZOLE (METROCREAM) 0.75 % external cream Apply to face, scalp and chest BID 45 g 11     metroNIDAZOLE (METROGEL) 0.75 % external gel Apply 1 g to the chest and 1g to the back  g 5     pantoprazole (PROTONIX) 40 MG EC tablet TAKE 1 TABLET BY MOUTH EVERY DAY 90 tablet 1     prochlorperazine (COMPAZINE) 10 MG  tablet Take 1 tablet (10 mg) by mouth every 6 hours as needed for nausea or vomiting. 30 tablet 2     rivaroxaban ANTICOAGULANT (XARELTO) 10 MG TABS tablet Take 1 tablet (10 mg) by mouth daily (with dinner). 30 tablet 3     sulfamethoxazole-trimethoprim (BACTRIM DS) 800-160 MG tablet TAKE 1 TABLET BY MOUTH EVERY MON, TUES TWO TIMES DAILY. 48 tablet 1     tamsulosin (FLOMAX) 0.4 MG capsule Take 0.4 mg by mouth daily.       clindamycin (CLEOCIN T) 1 % external lotion Apply to chest and back BID x 3 weeks (Patient not taking: Reported on 6/17/2025) 60 mL 3     levofloxacin (LEVAQUIN) 250 MG tablet Take 1 tablet (250 mg) by mouth daily as needed (Only when ANC < 1). (Patient not taking: Reported on 6/17/2025) 30 tablet 0     lifitegrast (XIIDRA) 5 % opthalmic solution INSTILL 1 DROP INTO BOTH EYES TWICE A DAY (Patient not taking: Reported on 6/17/2025) 60 each 11     methocarbamol (ROBAXIN) 500 MG tablet Take 500 mg by mouth as needed for muscle spasms. (Patient not taking: Reported on 6/17/2025)          Allergies:  No Known Allergies    ROS:  A 10 point ROS is negative except as stated in the HPI    Objective:  Vitals: /86 (BP Location: Right arm, Patient Position: Sitting, Cuff Size: Adult Large)   Pulse 67   Temp 98.1  F (36.7  C) (Oral)   Resp 16   Wt 92.9 kg (204 lb 12.8 oz)   SpO2 98%   BMI 27.44 kg/m      Exam:   Constitutional: Appears well, no distress  HEENT: Pupils equal and reactive to light. No scleral icterus or hemorrhage. Nares without evidence of telangiectasia. Mucous membranes moist with no wet purpura. Dentition overall ok with no signs of decay. No pharyngeal exudates. No lymphadenopathy, no thyromeagaly  CV: regular rate and rhythm, no murmurs  Respiratory: clear  GI: abdomen soft, nontender, without guarding or rebound. No hepatomeagaly. No splenomegaly.   Mus/Skele: sleeve on left arm. Right arm with out edema or fullness.    Skin: diffused macular rash on chest present. no  petechiae, no ecchymosis.  Neuro: CN II-XII intact. Normal gait. AOx3  Heme/Lymph: no supraclavicular, axillary or umbilical adenopathy.     Labs: personally reviewed with relevant trends annotated below  Recent Results (from the past 720 hours)   Comprehensive metabolic panel    Collection Time: 05/27/25 12:51 PM   Result Value Ref Range    Sodium 138 135 - 145 mmol/L    Potassium 4.4 3.4 - 5.3 mmol/L    Carbon Dioxide (CO2) 27 22 - 29 mmol/L    Anion Gap 10 7 - 15 mmol/L    Urea Nitrogen 18.8 8.0 - 23.0 mg/dL    Creatinine 1.09 0.67 - 1.17 mg/dL    GFR Estimate 73 >60 mL/min/1.73m2    Calcium 9.5 8.8 - 10.4 mg/dL    Chloride 101 98 - 107 mmol/L    Glucose 101 (H) 70 - 99 mg/dL    Alkaline Phosphatase 86 40 - 150 U/L    AST 29 0 - 45 U/L    ALT 19 0 - 70 U/L    Protein Total 6.5 6.4 - 8.3 g/dL    Albumin 4.4 3.5 - 5.2 g/dL    Bilirubin Total 0.5 <=1.2 mg/dL   CMV Quantitative, PCR    Collection Time: 05/27/25 12:51 PM    Specimen: Arm, Right; Blood   Result Value Ref Range    CMV DNA IU/mL Not Detected Not Detected IU/mL    CMV Quantitative PCR Specimen Type Blood    Federico Barr Virus Quantitative PCR, Plasma    Collection Time: 05/27/25 12:51 PM    Specimen: Arm, Right; Blood   Result Value Ref Range    EBV DNA IU/mL Not Detected Not Detected IU/mL   DNA Marker Post BMT Engraftment Blood, T Cell    Collection Time: 05/27/25 12:51 PM   Result Value Ref Range    Specimen Description       CD3+ blood      RESULTS       RESULTS:     POST CD3+ FRACTION  DONOR: (WILLIE, [id number])  100 %     RECIPIENT:  0 %    These results are accurate +/-5%.                INTERPRETATION       INTERPRETATION:  The findings show complete engraftment in this cell lineage. Correlation with clinical and other laboratory findings is recommended.    (Electronically signed by: Destin Cobb MD May 29, 2025 2:29 PM)      METHODOLOGY       Magnetically labeled microbeads were added to above sample.  The labeled sample was  placed in the magnetic field of a [employer]-S Automated Cell sorter. The fraction positive for the target cells was extracted and amplified by PCR using a series of fluorescently labeled oligonucleotide primers specific for highly polymorphic genetic markers (STRs).  Pre-transplant samples from both the bone marrow donor(s) and recipient were previously analyzed to identify informative markers from the following STR markers:   TH01, CSF1PO, E69R130, E0T8417,  W2R8177, vWa, FGA, Amelogenin, C6C6253, D21S11, D18S51, E9P907, V64C930, I02L407, TPOX, and H4Y227.  The resulting products were then analyzed on a Model 3500xl Genescan system, ([employer]) from which the pre and post transplant specimens are compared.The purity for this methodology and current reagents is >90%. Purities are verified with each new lot of reagent used to isolate the cell subset and a minimum of every 6 months. Number of markers (loci) used in calculation of result: 11.           DISCLAIMER       This test was developed and its performance characteristics determined by [email or web address]. It has not been cleared or approved by the FDA. The laboratory is regulated under CLIA as qualified to perform high-complexity testing. This test is used for clinical purposes. It should not be regarded as investigational or for research.    A resident/fellow in an accredited training program was involved in the selection of testing, review of laboratory data, and/or interpretation of this case.  I, as the senior physician, attest that I: (i) confirmed appropriate testing, (ii) examined the relevant raw data for the specimen(s); and (iii) rendered or confirmed the interpretation(s).        Signout Location if Remote Report signed out at:   [email or web address]    DNA Marker Post BMT Engraftment Blood, Myeloid    Collection Time: 05/27/25 12:51 PM   Result Value Ref Range    Specimen Description       CD33/66+  blood      RESULTS       RESULTS:     POST CD33/66b+(Myeloid) FRACTION  DONOR: (WILLIE, [id number])  100 %     RECIPIENT:  0 %    These results are accurate +/-5%.                INTERPRETATION       INTERPRETATION:  The findings show complete engraftment in this cell lineage. Correlation with clinical and other laboratory findings is recommended.    (Electronically signed by: Destin Cobb MD May 29, 2025 2:30 PM)      METHODOLOGY       Magnetically labeled microbeads were added to above sample.  The labeled sample was placed in the magnetic field of a [employer]-S Automated Cell sorter. The fraction positive for the target cells was extracted and amplified by PCR using a series of fluorescently labeled oligonucleotide primers specific for highly polymorphic genetic markers (STRs).  Pre-transplant samples from both the bone marrow donor(s) and recipient were previously analyzed to identify informative markers from the following STR markers:   TH01, CSF1PO, N95R097, O7F0154,  F8K4069, vWa, FGA, Amelogenin, F3J5208, D21S11, D18S51, I7F158, U48D527, Q14X618, TPOX, and C1H712.  The resulting products were then analyzed on a Model 3500xl Genescan system, ([employer]) from which the pre and post transplant specimens are compared. The purity for this methodology and current reagents is >90%. Purities are verified with each new lot of reagent used to isolate the cell subset and a minimum of every 6 months.  Number of markers (loci) used in calculation of result: 11.       DISCLAIMER       This test was developed and its performance characteristics determined by [facility]. It has not been cleared or approved by the FDA. The laboratory is regulated under CLIA as qualified to perform high-complexity testing. This test is used for clinical purposes. It should not be regarded as investigational or for research.    A resident/fellow in an  accredited training program was involved in the selection of testing, review of laboratory data, and/or interpretation of this case.  I, as the senior physician, attest that I: (i) confirmed appropriate testing, (ii) examined the relevant raw data for the specimen(s); and (iii) rendered or confirmed the interpretation(s).        Signout Location if Remote Report signed out at:   [facility]    CBC with platelets and differential    Collection Time: 05/27/25 12:51 PM   Result Value Ref Range    WBC Count 3.4 (L) 4.0 - 11.0 10e3/uL    RBC Count 4.35 (L) 4.40 - 5.90 10e6/uL    Hemoglobin 14.6 13.3 - 17.7 g/dL    Hematocrit 41.5 40.0 - 53.0 %    MCV 95 78 - 100 fL    MCH 33.6 (H) 26.5 - 33.0 pg    MCHC 35.2 31.5 - 36.5 g/dL    RDW 14.0 10.0 - 15.0 %    Platelet Count 196 150 - 450 10e3/uL    % Neutrophils 51 %    % Lymphocytes 38 %    % Monocytes 7 %    % Eosinophils 2 %    % Basophils 2 %    % Immature Granulocytes 0 %    Absolute Neutrophils 1.7 1.6 - 8.3 10e3/uL    Absolute Lymphocytes 1.3 0.8 - 5.3 10e3/uL    Absolute Monocytes 0.3 0.0 - 1.3 10e3/uL    Absolute Eosinophils 0.1 0.0 - 0.7 10e3/uL    Absolute Basophils 0.1 0.0 - 0.2 10e3/uL    Absolute Immature Granulocytes 0.0 <=0.4 10e3/uL   Comprehensive metabolic panel    Collection Time: 06/09/25  1:04 PM   Result Value Ref Range    Sodium 136 135 - 145 mmol/L    Potassium 4.5 3.4 - 5.3 mmol/L    Carbon Dioxide (CO2) 27 22 - 29 mmol/L    Anion Gap 11 7 - 15 mmol/L    Urea Nitrogen 16.8 8.0 - 23.0 mg/dL    Creatinine 0.97 0.67 - 1.17 mg/dL    GFR Estimate 83 >60 mL/min/1.73m2    Calcium 9.7 8.8 - 10.4 mg/dL    Chloride 98 98 - 107 mmol/L    Glucose 101 (H) 70 - 99 mg/dL    Alkaline Phosphatase 79 40 - 150 U/L    AST 33 0 - 45 U/L    ALT 24 0 - 70 U/L    Protein Total 6.8 6.4 - 8.3 g/dL    Albumin 4.4 3.5 - 5.2 g/dL    Bilirubin Total 0.5 <=1.2 mg/dL   CMV Quantitative, PCR    Collection Time: 06/09/25  1:04 PM    Specimen: Arm, Left; Blood   Result Value Ref Range    CMV  DNA IU/mL Not Detected Not Detected IU/mL    CMV Quantitative PCR Specimen Type Blood    Federico Barr Virus Quantitative PCR, Plasma    Collection Time: 06/09/25  1:04 PM    Specimen: Arm, Left; Blood   Result Value Ref Range    EBV DNA IU/mL Not Detected Not Detected IU/mL   CBC with platelets and differential    Collection Time: 06/09/25  1:04 PM   Result Value Ref Range    WBC Count 4.4 4.0 - 11.0 10e3/uL    RBC Count 4.15 (L) 4.40 - 5.90 10e6/uL    Hemoglobin 14.0 13.3 - 17.7 g/dL    Hematocrit 39.8 (L) 40.0 - 53.0 %    MCV 96 78 - 100 fL    MCH 33.7 (H) 26.5 - 33.0 pg    MCHC 35.2 31.5 - 36.5 g/dL    RDW 14.4 10.0 - 15.0 %    Platelet Count 138 (L) 150 - 450 10e3/uL    % Neutrophils 51 %    % Lymphocytes 33 %    % Monocytes 11 %    % Eosinophils 4 %    % Basophils 1 %    % Immature Granulocytes 0 %    Absolute Neutrophils 2.2 1.6 - 8.3 10e3/uL    Absolute Lymphocytes 1.5 0.8 - 5.3 10e3/uL    Absolute Monocytes 0.5 0.0 - 1.3 10e3/uL    Absolute Eosinophils 0.2 0.0 - 0.7 10e3/uL    Absolute Basophils 0.0 0.0 - 0.2 10e3/uL    Absolute Immature Granulocytes 0.0 <=0.4 10e3/uL   CBC with platelets and differential    Collection Time: 06/23/25  1:28 PM   Result Value Ref Range    WBC Count 3.5 (L) 4.0 - 11.0 10e3/uL    RBC Count 3.92 (L) 4.40 - 5.90 10e6/uL    Hemoglobin 13.4 13.3 - 17.7 g/dL    Hematocrit 38.0 (L) 40.0 - 53.0 %    MCV 97 78 - 100 fL    MCH 34.2 (H) 26.5 - 33.0 pg    MCHC 35.3 31.5 - 36.5 g/dL    RDW 14.4 10.0 - 15.0 %    Platelet Count 175 150 - 450 10e3/uL    % Neutrophils 49 %    % Lymphocytes 38 %    % Monocytes 9 %    % Eosinophils 1 %    % Basophils 2 %    % Immature Granulocytes 0 %    Absolute Neutrophils 1.7 1.6 - 8.3 10e3/uL    Absolute Lymphocytes 1.4 0.8 - 5.3 10e3/uL    Absolute Monocytes 0.3 0.0 - 1.3 10e3/uL    Absolute Eosinophils 0.1 0.0 - 0.7 10e3/uL    Absolute Basophils 0.1 0.0 - 0.2 10e3/uL    Absolute Immature Granulocytes 0.0 <=0.4 10e3/uL        Imaging  US Up Ex [employer] Thor  Outlet Syn Bilat  Narrative: ULTRASOUND UPPER EXTREMITY VEIN AND PPG THORACIC OUTLET SYNDROME  BILATERAL 6/16/2025 11:52 AM    CLINICAL HISTORY: History of bilateral upper extremity thrombosis from  September 2024 to present.; Lymphedema of right upper extremity; Left  subclavian vein thrombosis (H); Other specified symptoms and signs  involving the circulatory and respiratory systems.     COMPARISONS: ULTRASOUND UPPER EXTREMITY VENOUS DUPLEX BILATERAL  3/19/2025    REFERRING PROVIDER: OH SANDERSON    TECHNIQUE: Bilateral internal jugular, innominate, subclavian, and  axillary veins were evaluated grayscale, color Doppler, Doppler  waveform ultrasound. Bilateral subclavian veins were evaluated with  Doppler waveform imaging through abduction maneuvers.    Bilateral index finger PPG's obtained at rest and with provocative  positions.    FINDINGS:  RIGHT:       REST:            INTERNAL JUGULAR VEIN: 0 cm/s, occluded, non compressible            INNOMINATE VEIN: 55 cm/s, phasic            SUBCLAVIAN VEIN, medial: 85 cm/s, phasic            SUBCLAVIAN VEIN, mid: 132 cm/s, phasic, fully compressible            SUBCLAVIAN VEIN, lateral: 43 cm/s, phasic, fully  compressible            AXILLARY VEIN: 34 cm/s, phasic, fully compressible         MID SUBCLAVIAN VEIN, sitting erect:            0 degrees (labeled RT IJV UPRIGHT NEUTRAL): 15 cm/s, phasic            90 degrees: 0 cm/s, occluded            135 degrees: 277 cm/s, phasic            180 degrees: 269 cm/s, phasic         PPGs:            Baseline: Normal            Arms 90: ABNORMAL - diminished              Arms 180: ABNORMAL - diminished            : ABNORMAL - OCCLUDED             head right: Normal             head left: Normal    LEFT:       REST:            INTERNAL JUGULAR VEIN: 29 cm/s, phasic, fully compressible             INNOMINATE VEIN: 55 cm/s, phasic            SUBCLAVIAN VEIN, medial: 26 cm/s, phasic            SUBCLAVIAN  VEIN, mid: 29 cm/s, phasic, fully compressible            SUBCLAVIAN VEIN, lateral: 58 cm/s, phasic, fully  compressible            AXILLARY VEIN: 14 cm/s, phasic, fully compressible         MID SUBCLAVIAN VEIN, sitting erect:            0 degrees: 45 cm/s, phasic            90 degrees: 80 cm/s, phasic            135 degrees: 102 cm/s, phasic            180 degrees: 53 cm/s, phasic        PPGs:            Baseline: Normal            Arms 90: ABNORMAL - OCCLUDED            Arms 180: ABNORMAL - diminished - nearly occluded            : ABNORMAL - diminished - nearly occluded             head right: Normal             head left: Normal  Impression: IMPRESSION: Thoracic outlet/inlet physiology suggested. Correlate for  symptoms.  1. RIGHT:       A. Internal jugular vein occlusive deep venous thrombosis or  chronic venous changes, unchanged.       B. 3.07 velocity ratio from the lateral to mid subclavian vein,  etiology not demonstrated.       B. Subclavian vein occludes in 90 degrees.       C. PPG occludes in  position. Diminished in Arms 90 and  Arms 180.    2. LEFT:       A. 4.14 velocity ratio from the axillary to the lateral  subclavian vein, etiology not demonstrated.       B. Previous subclavian non occlusive deep venous thrombosis  cleared.       C. No subclavian venous occlusion demonstrated with maneuvers.       D. PPG occludes in Arms 90. Nearly occludes in Arms 180 and   position.    I have personally reviewed the examination and initial interpretation  and I agree with the findings.    CHARLES MCMANUS MD         SYSTEM ID:  [id number]         Again, thank you for allowing me to participate in the care of your patient.        Sincerely,        Sylvia Price MD    Electronically signed

## 2025-06-17 NOTE — Clinical Note
6/17/2025      Leland Pearson  [address] [place] [address]      Dear Colleague,    Thank you for referring your patient, Leland Pearson, to the [facility]. Please see a copy of my visit note below.    No notes on file    Again, thank you for allowing me to participate in the care of your patient.        Sincerely,        Sylvia Price MD    Electronically signed

## 2025-06-17 NOTE — NURSING NOTE
"Oncology Rooming Note    June 17, 2025 3:15 PM   Leland Pearson is a 70 year old male who presents for:    Chief Complaint   Patient presents with    Oncology Clinic Visit     Thrombosis     Initial Vitals: /86 (BP Location: Right arm, Patient Position: Sitting, Cuff Size: Adult Large)   Pulse 67   Temp 98.1  F (36.7  C) (Oral)   Resp 16   Wt 92.9 kg (204 lb 12.8 oz)   SpO2 98%   BMI 27.44 kg/m   Estimated body mass index is 27.44 kg/m  as calculated from the following:    Height as of 8/16/24: 1.84 m (6' 0.44\").    Weight as of this encounter: 92.9 kg (204 lb 12.8 oz). Body surface area is 2.18 meters squared.  No Pain (0) Comment: Data Unavailable   No LMP for male patient.  Allergies reviewed: Yes  Medications reviewed: Yes    Medications: MEDICATION REFILLS NEEDED TODAY. Provider was notified.  Pharmacy name entered into ADVIZE:    [id number] - [place], [place] - [address] [place] [address] [facility] [place], [place] - [address] [place] [address] PHARMACY [place], [place] - [address] [facility] [phone]    Frailty Screening:   Is the patient here for a new oncology consult visit in cancer care? 2. No    PHQ9:  Did this patient require a PHQ9?: No      Clinical concerns: pt would like xarelto refilled      Robles Hua              "

## 2025-06-18 DIAGNOSIS — D46.9 MDS (MYELODYSPLASTIC SYNDROME) (H): Primary | ICD-10-CM

## 2025-06-19 DIAGNOSIS — D46.9 MDS (MYELODYSPLASTIC SYNDROME) (H): Primary | ICD-10-CM

## 2025-06-19 RX ORDER — CEDAZURIDINE AND DECITABINE 100; 35 MG/1; MG/1
1 TABLET, FILM COATED ORAL DAILY
Qty: 5 TABLET | Refills: 0 | Status: SHIPPED | OUTPATIENT
Start: 2025-06-19 | End: 2025-06-22

## 2025-06-23 ENCOUNTER — LAB (OUTPATIENT)
Dept: LAB | Facility: CLINIC | Age: 70
End: 2025-06-23
Payer: COMMERCIAL

## 2025-06-23 DIAGNOSIS — D46.9 MDS (MYELODYSPLASTIC SYNDROME) (H): ICD-10-CM

## 2025-06-23 LAB
ALBUMIN SERPL BCG-MCNC: 4.4 G/DL (ref 3.5–5.2)
ALP SERPL-CCNC: 77 U/L (ref 40–150)
ALT SERPL W P-5'-P-CCNC: 20 U/L (ref 0–70)
ANION GAP SERPL CALCULATED.3IONS-SCNC: 10 MMOL/L (ref 7–15)
AST SERPL W P-5'-P-CCNC: 30 U/L (ref 0–45)
BASOPHILS # BLD AUTO: 0.1 10E3/UL (ref 0–0.2)
BASOPHILS NFR BLD AUTO: 2 %
BILIRUB SERPL-MCNC: 0.5 MG/DL
BUN SERPL-MCNC: 18.4 MG/DL (ref 8–23)
CALCIUM SERPL-MCNC: 9.7 MG/DL (ref 8.8–10.4)
CHLORIDE SERPL-SCNC: 103 MMOL/L (ref 98–107)
CREAT SERPL-MCNC: 0.91 MG/DL (ref 0.67–1.17)
EGFRCR SERPLBLD CKD-EPI 2021: 90 ML/MIN/1.73M2
EOSINOPHIL # BLD AUTO: 0.1 10E3/UL (ref 0–0.7)
EOSINOPHIL NFR BLD AUTO: 1 %
ERYTHROCYTE [DISTWIDTH] IN BLOOD BY AUTOMATED COUNT: 14.4 % (ref 10–15)
GLUCOSE SERPL-MCNC: 108 MG/DL (ref 70–99)
HCO3 SERPL-SCNC: 26 MMOL/L (ref 22–29)
HCT VFR BLD AUTO: 38 % (ref 40–53)
HGB BLD-MCNC: 13.4 G/DL (ref 13.3–17.7)
IMM GRANULOCYTES # BLD: 0 10E3/UL
IMM GRANULOCYTES NFR BLD: 0 %
LYMPHOCYTES # BLD AUTO: 1.4 10E3/UL (ref 0.8–5.3)
LYMPHOCYTES NFR BLD AUTO: 38 %
MCH RBC QN AUTO: 34.2 PG (ref 26.5–33)
MCHC RBC AUTO-ENTMCNC: 35.3 G/DL (ref 31.5–36.5)
MCV RBC AUTO: 97 FL (ref 78–100)
MONOCYTES # BLD AUTO: 0.3 10E3/UL (ref 0–1.3)
MONOCYTES NFR BLD AUTO: 9 %
NEUTROPHILS # BLD AUTO: 1.7 10E3/UL (ref 1.6–8.3)
NEUTROPHILS NFR BLD AUTO: 49 %
PLATELET # BLD AUTO: 175 10E3/UL (ref 150–450)
POTASSIUM SERPL-SCNC: 4.6 MMOL/L (ref 3.4–5.3)
PROT SERPL-MCNC: 6.6 G/DL (ref 6.4–8.3)
RBC # BLD AUTO: 3.92 10E6/UL (ref 4.4–5.9)
SODIUM SERPL-SCNC: 139 MMOL/L (ref 135–145)
WBC # BLD AUTO: 3.5 10E3/UL (ref 4–11)

## 2025-06-23 PROCEDURE — 36415 COLL VENOUS BLD VENIPUNCTURE: CPT

## 2025-06-23 PROCEDURE — 85025 COMPLETE CBC W/AUTO DIFF WBC: CPT

## 2025-06-23 PROCEDURE — 80053 COMPREHEN METABOLIC PANEL: CPT

## 2025-06-24 ENCOUNTER — TELEPHONE (OUTPATIENT)
Dept: ONCOLOGY | Facility: CLINIC | Age: 70
End: 2025-06-24
Payer: COMMERCIAL

## 2025-06-24 ENCOUNTER — TELEPHONE (OUTPATIENT)
Dept: OTHER | Facility: CLINIC | Age: 70
End: 2025-06-24
Payer: COMMERCIAL

## 2025-06-24 NOTE — TELEPHONE ENCOUNTER
Referral received via Workqueue on 6/24/25.    Referred by Dr. Sylvia Price for TOS with bilateral upper extremity thrombosis in context of BMT     Previous imaging completed (pertinent to referral):  6/16/25 - US up ex art & angelia thor outlet syn bilat    Routing to scheduling to coordinate the following:  NEW VASCULAR PATIENT consult with Dr. Bowers  Please schedule this at next available    Appt note: Referred by Dr. Sylvia Price for TOS with bilateral upper extremity thrombosis in context of BMT     Precious Vizcaino RN  [facility]  Office: [phone]  Fax: [phone]

## 2025-06-24 NOTE — PROGRESS NOTES
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 He was diagnosed on 9/15/24 with provoked thrombosis. [facility] was consulted and patient was discharged on anticoagulation. He presents today for follow up.     Thrombosis History  Provoked R internal jugular thrombosis  Provoked R non-occlusive Right axillary vein thrombsis  - ddx 9/15/24  - no treatment at start due to low platelets---   Repeat ultrasound 24: extension into subclavian eight and innominate being  - Started 1 mg/kg LMWH 24 due to drug-drug interactions preventing DOAC  Ultrasound on 24 (R side only): R subclavia  Ultrasound on 3/19/25 (bilateral): persistent R internal jugular, potential left subclavian (not previous evaluated):   Ultrasound for thoracic outlet syndrome 25--- . RIGHT:  Internal jugular vein occlusive deep venous thrombosis or  chronic venous changes, unchanged. LEFT: Previous subclavian non occlusive deep venous thrombosis cleared    S/s of lymphedema on left side present since 2024. Now treated with PT. I do not feel that L sided ultrasound reflects a NEW thrombotic event given symptoms--- contacted radiology and discussed all the imaging from from 2024-present. There is some captured images that did not include left side (ie those done in Dec 2024) and other images that have poor diffusion that make calling thrombus challening (2024). The image from 2025 is clear to have thrombus- but can not ascertain timing. Therefore, I WOULD continue with anticoagulation (10 mg Xarelto at present) and proceed with TOS  ultrasounds bilaterally as patient does not presently have a central line.     Plan:  1. Majority of today's visit was spent counseling the patient regarding VTE and anticoagulation.  2. Continue Xarelto 10 mg daily  3. Referral to Vascular Surgery  4. COVID-19 vaccine today    The patient is given our center's contact information and is instructed to call if he should have any further questions or concerns.  Otherwise, we will plan on seeing him back Follow up with Provider - 3 months    The longitudinal plan of care for the diagnosis(es)/condition(s) as documented were addressed during this visit. Due to the added complexity in care, I will continue to support Leland in the subsequent management and with ongoing continuity of care.    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 He was diagnosed on 9/15/24 with provoked thrombosis. [facility] was consulted and patient was discharged on anticoagulation. Refer to consult note attested by myself from 9/18/24. He presents today for follow up.     Leland is doing well. He is not having any swelling in his upper extremities. He is active. No issues with fevers or chills.     Past Medical History:  Past Medical History:   Diagnosis Date    Cancer (H) 03/24    Gastroesophageal reflux disease     Hypertension     Nonsenile cataract 12/27/24       Past Surgical History:  Past Surgical History:   Procedure Laterality Date    COLONOSCOPY      ESOPHAGOSCOPY, GASTROSCOPY, DUODENOSCOPY (EGD), COMBINED  07/28/2013    Procedure: COMBINED ESOPHAGOSCOPY, GASTROSCOPY, DUODENOSCOPY (EGD), BIOPSY SINGLE OR  MULTIPLE;;  Surgeon: Franklin Barrera MD;  Location:  GI    ESOPHAGOSCOPY, GASTROSCOPY, DUODENOSCOPY (EGD), COMBINED  07/28/2013    Procedure: COMBINED ESOPHAGOSCOPY, GASTROSCOPY, DUODENOSCOPY (EGD), REMOVE FOREIGN BODY;;  Surgeon: Franklin Barrera MD;  Location:  GI    IR CVC TUNNEL PLACEMENT > 5 YRS OF AGE  08/16/2024    IR CVC TUNNEL REMOVAL RIGHT  09/06/2024    ORTHOPEDIC SURGERY      04 micro discectomy, acl  repair    PICC DOUBLE LUMEN PLACEMENT Right 09/09/2024    Right basilic vein 49cm total 5cm external.       Medications:  Current Outpatient Medications   Medication Sig Dispense Refill    acyclovir (ZOVIRAX) 800 MG tablet TAKE 1 TABLET (800 MG) BY MOUTH EVERY 12 HOURS. 180 tablet 1    allopurinol (ZYLOPRIM) 300 MG tablet TAKE 1 TABLET BY MOUTH EVERY DAY 90 tablet 1    calcium carbonate-vitamin D (OSCAL) 500-5 MG-MCG tablet Take 2 tablets by mouth daily. 60 tablet 2    carboxymethylcellulose (REFRESH PLUS) 0.5 % SOLN ophthalmic solution Place 1 drop into both eyes 3 times daily as needed for dry eyes. 15 mL 11    cycloSPORINE (RESTASIS) 0.05 % ophthalmic emulsion INSTILL 1 DROP INTO BOTH EYES TWICE A DAY 60 mL 11    hydrocortisone 2.5 % cream Apply topically daily. 30 g 0    lisinopril (ZESTRIL) 5 MG tablet TAKE 1 TABLET (5 MG) BY MOUTH DAILY. HOLD DOSE IF BLOOD PRESSURE IS LESS THAN 110/60 90 tablet 1    metroNIDAZOLE (METROCREAM) 0.75 % external cream Apply to face, scalp and chest BID 45 g 11    metroNIDAZOLE (METROGEL) 0.75 % external gel Apply 1 g to the chest and 1g to the back  g 5    pantoprazole (PROTONIX) 40 MG EC tablet TAKE 1 TABLET BY MOUTH EVERY DAY 90 tablet 1    prochlorperazine (COMPAZINE) 10 MG tablet Take 1 tablet (10 mg) by mouth every 6 hours as needed for nausea or vomiting. 30 tablet 2    rivaroxaban ANTICOAGULANT (XARELTO) 10 MG TABS tablet Take 1 tablet (10 mg) by mouth daily (with dinner). 30 tablet 3    sulfamethoxazole-trimethoprim (BACTRIM DS) 800-160 MG tablet TAKE 1  TABLET BY MOUTH EVERY MON, TUES TWO TIMES DAILY. 48 tablet 1    tamsulosin (FLOMAX) 0.4 MG capsule Take 0.4 mg by mouth daily.      clindamycin (CLEOCIN T) 1 % external lotion Apply to chest and back BID x 3 weeks (Patient not taking: Reported on 6/17/2025) 60 mL 3    levofloxacin (LEVAQUIN) 250 MG tablet Take 1 tablet (250 mg) by mouth daily as needed (Only when ANC < 1). (Patient not taking: Reported on 6/17/2025) 30 tablet 0    lifitegrast (XIIDRA) 5 % opthalmic solution INSTILL 1 DROP INTO BOTH EYES TWICE A DAY (Patient not taking: Reported on 6/17/2025) 60 each 11    methocarbamol (ROBAXIN) 500 MG tablet Take 500 mg by mouth as needed for muscle spasms. (Patient not taking: Reported on 6/17/2025)          Allergies:  No Known Allergies    ROS:  A 10 point ROS is negative except as stated in the HPI    Objective:  Vitals: /86 (BP Location: Right arm, Patient Position: Sitting, Cuff Size: Adult Large)   Pulse 67   Temp 98.1  F (36.7  C) (Oral)   Resp 16   Wt 92.9 kg (204 lb 12.8 oz)   SpO2 98%   BMI 27.44 kg/m      Exam:   Constitutional: Appears well, no distress  HEENT: Pupils equal and reactive to light. No scleral icterus or hemorrhage. Nares without evidence of telangiectasia. Mucous membranes moist with no wet purpura. Dentition overall ok with no signs of decay. No pharyngeal exudates. No lymphadenopathy, no thyromeagaly  CV: regular rate and rhythm, no murmurs  Respiratory: clear  GI: abdomen soft, nontender, without guarding or rebound. No hepatomeagaly. No splenomegaly.   Mus/Skele: sleeve on left arm. Right arm with out edema or fullness.    Skin: diffused macular rash on chest present. no petechiae, no ecchymosis.  Neuro: CN II-XII intact. Normal gait. AOx3  Heme/Lymph: no supraclavicular, axillary or umbilical adenopathy.     Labs: personally reviewed with relevant trends annotated below  Recent Results (from the past 720 hours)   Comprehensive metabolic panel    Collection Time: 05/27/25  12:51 PM   Result Value Ref Range    Sodium 138 135 - 145 mmol/L    Potassium 4.4 3.4 - 5.3 mmol/L    Carbon Dioxide (CO2) 27 22 - 29 mmol/L    Anion Gap 10 7 - 15 mmol/L    Urea Nitrogen 18.8 8.0 - 23.0 mg/dL    Creatinine 1.09 0.67 - 1.17 mg/dL    GFR Estimate 73 >60 mL/min/1.73m2    Calcium 9.5 8.8 - 10.4 mg/dL    Chloride 101 98 - 107 mmol/L    Glucose 101 (H) 70 - 99 mg/dL    Alkaline Phosphatase 86 40 - 150 U/L    AST 29 0 - 45 U/L    ALT 19 0 - 70 U/L    Protein Total 6.5 6.4 - 8.3 g/dL    Albumin 4.4 3.5 - 5.2 g/dL    Bilirubin Total 0.5 <=1.2 mg/dL   CMV Quantitative, PCR    Collection Time: 05/27/25 12:51 PM    Specimen: Arm, Right; Blood   Result Value Ref Range    CMV DNA IU/mL Not Detected Not Detected IU/mL    CMV Quantitative PCR Specimen Type Blood    Federico Barr Virus Quantitative PCR, Plasma    Collection Time: 05/27/25 12:51 PM    Specimen: Arm, Right; Blood   Result Value Ref Range    EBV DNA IU/mL Not Detected Not Detected IU/mL   DNA Marker Post BMT Engraftment Blood, T Cell    Collection Time: 05/27/25 12:51 PM   Result Value Ref Range    Specimen Description       CD3+ blood      RESULTS       RESULTS:     POST CD3+ FRACTION  DONOR: (WILLIE, [id number])  100 %     RECIPIENT:  0 %    These results are accurate +/-5%.                INTERPRETATION       INTERPRETATION:  The findings show complete engraftment in this cell lineage. Correlation with clinical and other laboratory findings is recommended.    (Electronically signed by: Destin Cobb MD May 29, 2025 2:29 PM)      METHODOLOGY       Magnetically labeled microbeads were added to above sample.  The labeled sample was placed in the magnetic field of a [employer] Automated Cell sorter. The fraction positive for the target cells was extracted and amplified by PCR using a series of fluorescently labeled oligonucleotide primers specific for highly polymorphic genetic markers (STRs).  Pre-transplant samples  from both the bone marrow donor(s) and recipient were previously analyzed to identify informative markers from the following STR markers:   TH01, CSF1PO, [id number], A9M4870,  X5V9621, vWa, FGA, Amelogenin, A1S4453, D21S11, D18S51, X3L818, M43B702, S47T721, TPOX, and D7I600.  The resulting products were then analyzed on a Model 3500xl Genescan system, ([employer]) from which the pre and post transplant specimens are compared.The purity for this methodology and current reagents is >90%. Purities are verified with each new lot of reagent used to isolate the cell subset and a minimum of every 6 months. Number of markers (loci) used in calculation of result: 11.           DISCLAIMER       This test was developed and its performance characteristics determined by [facility]. It has not been cleared or approved by the FDA. The laboratory is regulated under CLIA as qualified to perform high-complexity testing. This test is used for clinical purposes. It should not be regarded as investigational or for research.    A resident/fellow in an accredited training program was involved in the selection of testing, review of laboratory data, and/or interpretation of this case.  I, as the senior physician, attest that I: (i) confirmed appropriate testing, (ii) examined the relevant raw data for the specimen(s); and (iii) rendered or confirmed the interpretation(s).        Signout Location if Remote Report signed out at:   [email or web address]    DNA Marker Post BMT Engraftment Blood, Myeloid    Collection Time: 05/27/25 12:51 PM   Result Value Ref Range    Specimen Description       CD33/66+ blood      RESULTS       RESULTS:     POST CD33/66b+(Myeloid) FRACTION  DONOR: (WILLIE, [id number])  100 %     RECIPIENT:  0 %    These results are accurate +/-5%.                INTERPRETATION       INTERPRETATION:  The findings show complete engraftment in this cell lineage. Correlation with clinical  and other laboratory findings is recommended.    (Electronically signed by: Destin Cobb MD May 29, 2025 2:30 PM)      METHODOLOGY       Magnetically labeled microbeads were added to above sample.  The labeled sample was placed in the magnetic field of a [employer] Automated Cell sorter. The fraction positive for the target cells was extracted and amplified by PCR using a series of fluorescently labeled oligonucleotide primers specific for highly polymorphic genetic markers (STRs).  Pre-transplant samples from both the bone marrow donor(s) and recipient were previously analyzed to identify informative markers from the following STR markers:   TH01, CSF1PO, F08V415, M0W8579,  L3I1781, vWa, FGA, Amelogenin, R3F7295, D21S11, D18S51, B0P772, Q82P383, L16R696, TPOX, and G0S532.  The resulting products were then analyzed on a Model 3500xl Genescan system, ([employer]) from which the pre and post transplant specimens are compared. The purity for this methodology and current reagents is >90%. Purities are verified with each new lot of reagent used to isolate the cell subset and a minimum of every 6 months.  Number of markers (loci) used in calculation of result: 11.       DISCLAIMER       This test was developed and its performance characteristics determined by [facility]. It has not been cleared or approved by the FDA. The laboratory is regulated under CLIA as qualified to perform high-complexity testing. This test is used for clinical purposes. It should not be regarded as investigational or for research.    A resident/fellow in an accredited training program was involved in the selection of testing, review of laboratory data, and/or interpretation of this case.  I, as the senior physician, attest that I: (i) confirmed appropriate testing, (ii) examined the relevant raw data for the specimen(s); and (iii) rendered or confirmed the  interpretation(s).        Signout Location if Remote Report signed out at:   [email or web address]    CBC with platelets and differential    Collection Time: 05/27/25 12:51 PM   Result Value Ref Range    WBC Count 3.4 (L) 4.0 - 11.0 10e3/uL    RBC Count 4.35 (L) 4.40 - 5.90 10e6/uL    Hemoglobin 14.6 13.3 - 17.7 g/dL    Hematocrit 41.5 40.0 - 53.0 %    MCV 95 78 - 100 fL    MCH 33.6 (H) 26.5 - 33.0 pg    MCHC 35.2 31.5 - 36.5 g/dL    RDW 14.0 10.0 - 15.0 %    Platelet Count 196 150 - 450 10e3/uL    % Neutrophils 51 %    % Lymphocytes 38 %    % Monocytes 7 %    % Eosinophils 2 %    % Basophils 2 %    % Immature Granulocytes 0 %    Absolute Neutrophils 1.7 1.6 - 8.3 10e3/uL    Absolute Lymphocytes 1.3 0.8 - 5.3 10e3/uL    Absolute Monocytes 0.3 0.0 - 1.3 10e3/uL    Absolute Eosinophils 0.1 0.0 - 0.7 10e3/uL    Absolute Basophils 0.1 0.0 - 0.2 10e3/uL    Absolute Immature Granulocytes 0.0 <=0.4 10e3/uL   Comprehensive metabolic panel    Collection Time: 06/09/25  1:04 PM   Result Value Ref Range    Sodium 136 135 - 145 mmol/L    Potassium 4.5 3.4 - 5.3 mmol/L    Carbon Dioxide (CO2) 27 22 - 29 mmol/L    Anion Gap 11 7 - 15 mmol/L    Urea Nitrogen 16.8 8.0 - 23.0 mg/dL    Creatinine 0.97 0.67 - 1.17 mg/dL    GFR Estimate 83 >60 mL/min/1.73m2    Calcium 9.7 8.8 - 10.4 mg/dL    Chloride 98 98 - 107 mmol/L    Glucose 101 (H) 70 - 99 mg/dL    Alkaline Phosphatase 79 40 - 150 U/L    AST 33 0 - 45 U/L    ALT 24 0 - 70 U/L    Protein Total 6.8 6.4 - 8.3 g/dL    Albumin 4.4 3.5 - 5.2 g/dL    Bilirubin Total 0.5 <=1.2 mg/dL   CMV Quantitative, PCR    Collection Time: 06/09/25  1:04 PM    Specimen: Arm, Left; Blood   Result Value Ref Range    CMV DNA IU/mL Not Detected Not Detected IU/mL    CMV Quantitative PCR Specimen Type Blood    Federico Barr Virus Quantitative PCR, Plasma    Collection Time: 06/09/25  1:04 PM    Specimen: Arm, Left; Blood   Result Value Ref Range    EBV DNA IU/mL Not Detected Not Detected IU/mL   CBC with platelets and  differential    Collection Time: 06/09/25  1:04 PM   Result Value Ref Range    WBC Count 4.4 4.0 - 11.0 10e3/uL    RBC Count 4.15 (L) 4.40 - 5.90 10e6/uL    Hemoglobin 14.0 13.3 - 17.7 g/dL    Hematocrit 39.8 (L) 40.0 - 53.0 %    MCV 96 78 - 100 fL    MCH 33.7 (H) 26.5 - 33.0 pg    MCHC 35.2 31.5 - 36.5 g/dL    RDW 14.4 10.0 - 15.0 %    Platelet Count 138 (L) 150 - 450 10e3/uL    % Neutrophils 51 %    % Lymphocytes 33 %    % Monocytes 11 %    % Eosinophils 4 %    % Basophils 1 %    % Immature Granulocytes 0 %    Absolute Neutrophils 2.2 1.6 - 8.3 10e3/uL    Absolute Lymphocytes 1.5 0.8 - 5.3 10e3/uL    Absolute Monocytes 0.5 0.0 - 1.3 10e3/uL    Absolute Eosinophils 0.2 0.0 - 0.7 10e3/uL    Absolute Basophils 0.0 0.0 - 0.2 10e3/uL    Absolute Immature Granulocytes 0.0 <=0.4 10e3/uL   CBC with platelets and differential    Collection Time: 06/23/25  1:28 PM   Result Value Ref Range    WBC Count 3.5 (L) 4.0 - 11.0 10e3/uL    RBC Count 3.92 (L) 4.40 - 5.90 10e6/uL    Hemoglobin 13.4 13.3 - 17.7 g/dL    Hematocrit 38.0 (L) 40.0 - 53.0 %    MCV 97 78 - 100 fL    MCH 34.2 (H) 26.5 - 33.0 pg    MCHC 35.3 31.5 - 36.5 g/dL    RDW 14.4 10.0 - 15.0 %    Platelet Count 175 150 - 450 10e3/uL    % Neutrophils 49 %    % Lymphocytes 38 %    % Monocytes 9 %    % Eosinophils 1 %    % Basophils 2 %    % Immature Granulocytes 0 %    Absolute Neutrophils 1.7 1.6 - 8.3 10e3/uL    Absolute Lymphocytes 1.4 0.8 - 5.3 10e3/uL    Absolute Monocytes 0.3 0.0 - 1.3 10e3/uL    Absolute Eosinophils 0.1 0.0 - 0.7 10e3/uL    Absolute Basophils 0.1 0.0 - 0.2 10e3/uL    Absolute Immature Granulocytes 0.0 <=0.4 10e3/uL        Imaging  US Up Ex Art [employer] Hasmukh Thor Outlet Syn Bilat  Narrative: ULTRASOUND UPPER EXTREMITY VEIN AND PPG THORACIC OUTLET SYNDROME  BILATERAL 6/16/2025 11:52 AM    CLINICAL HISTORY: History of bilateral upper extremity thrombosis from  September 2024 to present.; Lymphedema of right upper extremity; Left  subclavian vein thrombosis  (H); Other specified symptoms and signs  involving the circulatory and respiratory systems.     COMPARISONS: ULTRASOUND UPPER EXTREMITY VENOUS DUPLEX BILATERAL  3/19/2025    REFERRING PROVIDER: OH SANDERSON    TECHNIQUE: Bilateral internal jugular, innominate, subclavian, and  axillary veins were evaluated grayscale, color Doppler, Doppler  waveform ultrasound. Bilateral subclavian veins were evaluated with  Doppler waveform imaging through abduction maneuvers.    Bilateral index finger PPG's obtained at rest and with provocative  positions.    FINDINGS:  RIGHT:       REST:            INTERNAL JUGULAR VEIN: 0 cm/s, occluded, non compressible            INNOMINATE VEIN: 55 cm/s, phasic            SUBCLAVIAN VEIN, medial: 85 cm/s, phasic            SUBCLAVIAN VEIN, mid: 132 cm/s, phasic, fully compressible            SUBCLAVIAN VEIN, lateral: 43 cm/s, phasic, fully  compressible            AXILLARY VEIN: 34 cm/s, phasic, fully compressible         MID SUBCLAVIAN VEIN, sitting erect:            0 degrees (labeled RT IJV UPRIGHT NEUTRAL): 15 cm/s, phasic            90 degrees: 0 cm/s, occluded            135 degrees: 277 cm/s, phasic            180 degrees: 269 cm/s, phasic         PPGs:            Baseline: Normal            Arms 90: ABNORMAL - diminished              Arms 180: ABNORMAL - diminished            : ABNORMAL - OCCLUDED             head right: Normal             head left: Normal    LEFT:       REST:            INTERNAL JUGULAR VEIN: 29 cm/s, phasic, fully compressible             INNOMINATE VEIN: 55 cm/s, phasic            SUBCLAVIAN VEIN, medial: 26 cm/s, phasic            SUBCLAVIAN VEIN, mid: 29 cm/s, phasic, fully compressible            SUBCLAVIAN VEIN, lateral: 58 cm/s, phasic, fully  compressible            AXILLARY VEIN: 14 cm/s, phasic, fully compressible         MID SUBCLAVIAN VEIN, sitting erect:            0 degrees: 45 cm/s, phasic            90 degrees: 80 cm/s,  phasic            135 degrees: 102 cm/s, phasic            180 degrees: 53 cm/s, phasic        PPGs:            Baseline: Normal            Arms 90: ABNORMAL - OCCLUDED            Arms 180: ABNORMAL - diminished - nearly occluded            : ABNORMAL - diminished - nearly occluded             head right: Normal             head left: Normal  Impression: IMPRESSION: Thoracic outlet/inlet physiology suggested. Correlate for  symptoms.  1. RIGHT:       A. Internal jugular vein occlusive deep venous thrombosis or  chronic venous changes, unchanged.       B. 3.07 velocity ratio from the lateral to mid subclavian vein,  etiology not demonstrated.       B. Subclavian vein occludes in 90 degrees.       C. PPG occludes in  position. Diminished in Arms 90 and  Arms 180.    2. LEFT:       A. 4.14 velocity ratio from the axillary to the lateral  subclavian vein, etiology not demonstrated.       B. Previous subclavian non occlusive deep venous thrombosis  cleared.       C. No subclavian venous occlusion demonstrated with maneuvers.       D. PPG occludes in Arms 90. Nearly occludes in Arms 180 and   position.    I have personally reviewed the examination and initial interpretation  and I agree with the findings.    CHARLES MCMANUS MD         SYSTEM ID:  [id number]

## 2025-06-24 NOTE — TELEPHONE ENCOUNTER
Oral Chemotherapy Monitoring Program  Lab Follow Up    Reviewed CBC and CMP lab results from 6/23/25. Patient called to inform lab result and check if the patient started taking medication with 6/24/25 cycle start. Left message to please call back in follow up of therapy. No patient or drug names were mentioned. Patient called back and was able to speak with the patient. Patient understood lab result and verbally confirmed that he will start medication on 6/24/25.        4/22/2025     8:00 AM 4/29/2025     8:00 AM 5/28/2025     3:00 PM 6/10/2025     9:00 AM 6/19/2025     8:00 AM 6/24/2025    12:00 PM 6/24/2025    12:56 PM   ORAL CHEMOTHERAPY   Assessment Type Refill Lab Monitoring Lab Monitoring Lab Monitoring Refill Left Voicemail Lab Monitoring   Diagnosis Code Myelodysplastic Syndrome Myelodysplastic Syndrome Myelodysplastic Syndrome Myelodysplastic Syndrome Myelodysplastic Syndrome Myelodysplastic Syndrome Myelodysplastic Syndrome   Providers Dr. Jeffrey Murphy   [facility]   Is this patient followed by the [facility] OC team? No No No No No No No   Drug Name Inqovi (decitabine/Cedazuridine)  Inqovi (decitabine/Cedazuridine)  Inqovi (decitabine/Cedazuridine)  Inqovi (decitabine/Cedazuridine)  Inqovi (decitabine/Cedazuridine) Inqovi (decitabine/Cedazuridine) Inqovi (decitabine/Cedazuridine)   Dose 35 mg  35 mg  35 mg  35 mg  35 mg 35 mg 35 mg   Current Schedule Other  Other  Other  Other  Other Other Other   Cycle Details Day 1-3 of a 28 day cycle Day 1-3 of a 28 day cycle Day 1-3 of a 28 day cycle Day 1-3 of a 28 day cycle Day 1-3 of a 28 day cycle Day 1-3 of a 28 day cycle Day 1-3 of a 28 day cycle   Start Date of Last Cycle 4/1/2025 4/29/2025 5/27/2025 5/27/2025 5/27/2025 6/24/2025 6/24/2025   Planned next cycle start date 4/29/2025 5/27/2025 6/24/2025 6/24/2025 6/24/2025 7/22/2025 7/22/2025    Adverse Effects  Increased Creatinine  No AE identified during assessment      Increased Creatinine  Grade 1        Pharmacist intervention(Increased creatinine)  Yes        Intervention(s)  Patient education        Is the dose as ordered appropriate for the patient?    Yes          Data saved with a previous flowsheet row definition       Labs:  _  Result Component Current Result Ref Range   Sodium 139 (6/23/2025) 135 - 145 mmol/L     _  Result Component Current Result Ref Range   Potassium 4.6 (6/23/2025) 3.4 - 5.3 mmol/L     _  Result Component Current Result Ref Range   Calcium 9.7 (6/23/2025) 8.8 - 10.4 mg/dL     No results found for Mag within last 30 days.     No results found for Phos within last 30 days.     _  Result Component Current Result Ref Range   Albumin 4.4 (6/23/2025) 3.5 - 5.2 g/dL     _  Result Component Current Result Ref Range   Urea Nitrogen 18.4 (6/23/2025) 8.0 - 23.0 mg/dL     _  Result Component Current Result Ref Range   Creatinine 0.91 (6/23/2025) 0.67 - 1.17 mg/dL     _  Result Component Current Result Ref Range   AST 30 (6/23/2025) 0 - 45 U/L     _  Result Component Current Result Ref Range   ALT 20 (6/23/2025) 0 - 70 U/L     _  Result Component Current Result Ref Range   Bilirubin Total 0.5 (6/23/2025) <=1.2 mg/dL     _  Result Component Current Result Ref Range   WBC Count 3.5 (L) (6/23/2025) 4.0 - 11.0 10e3/uL     _  Result Component Current Result Ref Range   Hemoglobin 13.4 (6/23/2025) 13.3 - 17.7 g/dL     _  Result Component Current Result Ref Range   Platelet Count 175 (6/23/2025) 150 - 450 10e3/uL     _  Result Component Current Result Ref Range   Absolute Neutrophils 1.7 (6/23/2025) 1.6 - 8.3 10e3/uL     No results found for ANC within last 30 days.        Assessment & Plan:  No concerning abnormalities on 6/23/25 CBC and CMP results. All values meet treatment parameter. Continue Inqovi (decitabine and cedazuridine). [employer] message sent to patient with lab  results.    Follow-Up:  -7/7/25 @1300 Lab appointment  -7/16/25 @4505 Dr. Murphy appointment with lab draw    EMILI Rosa (Jeongmin)  Pharmacy Intern  Oral Chemotherapy Monitoring Program  [facility]  [phone]

## 2025-06-26 NOTE — PROGRESS NOTES
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 8/8, URD; PTCy, siro, MMF.   Rosacea, and folliculitis on occipital scalp using metrocream  Acneiform eruption on the head neck and shoulders likely 2/2 sirolimus, now resolved  Biopsy 12/20/2024 consistent with a ruptured folliculitis  Repeat biopsy 5/2/2025 A(1). Skin, Chest, punch: Mild spongiotic and interface dermatitis with focal outer epidermal acantholysis - (see comment) The primary differential diagnosis for these features includes a drug eruption or toxic erythema of chemotherapy. The acantholytic features appear consistent with [place]'s. Clinical correlation and follow up are recommended. --> monitor      ASSESSMENT AND PLAN:    # Full body skin exam performed.   Benign skin lesions: seborrheic keratoses, cherry angiomas, multiple benign nevi, lentigines  - Reassured benign etiology.  - No treatment required today.   - Recommended diligent sun protection with SPF 30 or higher. Handout provided.  - Discussed signs and symptoms of skin cancers and ABCDEs of melanoma.    # Jeffrey's and possible drug rash, without evidence of GVHD on biopsy, in pt with history of alloSCT  - Improving, reassure, monitor    # Rosacea, scalp folliculitis  - Continue metrocream, refill as needed    The longitudinal plan of care for the diagnosis(es)/condition(s) as documented were addressed during this visit. Due to the added complexity in care, I will continue to support the patient in the subsequent management and with ongoing continuity of care.    Follow-Up:   Return to clinic if rash worsens, or sooner as needed    History of Present Illness     Reason for Referral: 8 months s/p BMT with a red rash on head, neck and chest.  initially thought to be folliculitis but has bright red spots. was most recently given metronidazole topically. need to r/o GVHD.     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 8/8, URD; PTCy, siro, MMF. I saw him 5/2/25 at which time a biopsy of the rash on his chest was performed c/w matt's and drug eruption--this has since improved. He presents for FBSE today, no specific areas of concern.    Review of systems is otherwise negative except as noted above.    History, Allergies, and Medications:         Past Medical History:   Diagnosis Date    Cancer (H) 03/24    Gastroesophageal reflux disease     Hypertension     Nonsenile cataract 12/27/24       No Known Allergies    Current Outpatient Medications   Medication Sig Dispense Refill    acyclovir (ZOVIRAX) 800 MG tablet TAKE 1 TABLET (800 MG) BY MOUTH EVERY 12 HOURS. 180 tablet 1    allopurinol (ZYLOPRIM) 300 MG tablet TAKE 1 TABLET BY MOUTH EVERY DAY 90 tablet 1    calcium carbonate-vitamin D (OSCAL) 500-5 MG-MCG tablet Take 2 tablets by mouth daily. 60 tablet 2    carboxymethylcellulose (REFRESH PLUS) 0.5 % SOLN ophthalmic solution Place 1 drop into both eyes 3 times daily as needed for dry eyes. 15 mL 11    cycloSPORINE (RESTASIS) 0.05 % ophthalmic emulsion INSTILL 1 DROP INTO BOTH EYES TWICE A DAY 60 mL 11    hydrocortisone 2.5 % cream Apply topically daily. 30 g 0    lisinopril (ZESTRIL) 5 MG tablet TAKE 1 TABLET (5 MG) BY MOUTH DAILY. HOLD DOSE IF BLOOD PRESSURE IS LESS THAN 110/60 90 tablet 1    metroNIDAZOLE (METROCREAM) 0.75 % external cream Apply to face, scalp and chest BID 45 g 11    metroNIDAZOLE (METROGEL) 0.75 % external gel Apply 1 g to the chest and 1g to the back  g 5    pantoprazole (PROTONIX) 40 MG EC tablet TAKE 1  TABLET BY MOUTH EVERY DAY 90 tablet 1    prochlorperazine (COMPAZINE) 10 MG tablet Take 1 tablet (10 mg) by mouth every 6 hours as needed for nausea or vomiting. 30 tablet 2    rivaroxaban ANTICOAGULANT (XARELTO) 10 MG TABS tablet Take 1 tablet (10 mg) by mouth daily (with dinner). 30 tablet 3    sulfamethoxazole-trimethoprim (BACTRIM DS) 800-160 MG tablet TAKE 1 TABLET BY MOUTH EVERY MON, TUES TWO TIMES DAILY. 48 tablet 1    tamsulosin (FLOMAX) 0.4 MG capsule Take 0.4 mg by mouth daily.      clindamycin (CLEOCIN T) 1 % external lotion Apply to chest and back BID x 3 weeks (Patient not taking: Reported on 6/27/2025) 60 mL 3    decitabine-cedazuridine (INQOVI)  MG tablet Take 1 tablet by mouth daily for 3 days. Take on empty stomach, do not eat food 2 hours before or 2 hours after each dose. Take at same time each day. Swallow tablet whole, do not cut, crush, or chew. 5 tablet 0    levofloxacin (LEVAQUIN) 250 MG tablet Take 1 tablet (250 mg) by mouth daily as needed (Only when ANC < 1). (Patient not taking: Reported on 6/27/2025) 30 tablet 0    lifitegrast (XIIDRA) 5 % opthalmic solution INSTILL 1 DROP INTO BOTH EYES TWICE A DAY (Patient not taking: Reported on 6/27/2025) 60 each 11    methocarbamol (ROBAXIN) 500 MG tablet Take 500 mg by mouth as needed for muscle spasms. (Patient not taking: Reported on 6/27/2025)           Physical Exam:     Vitals: There were no vitals taken for this visit.  SKIN: Focused examination of face and chest was performed.  See pertinent findings in A/P.     Erinn Morgan MD  Adjunct  of Dermatology  Cell: [phone]

## 2025-06-27 ENCOUNTER — OFFICE VISIT (OUTPATIENT)
Dept: DERMATOLOGY | Facility: CLINIC | Age: 70
End: 2025-06-27
Payer: COMMERCIAL

## 2025-06-27 DIAGNOSIS — Z12.83 SKIN EXAM, SCREENING FOR CANCER: Primary | ICD-10-CM

## 2025-06-27 RX ORDER — CEDAZURIDINE AND DECITABINE 100; 35 MG/1; MG/1
TABLET, FILM COATED ORAL
COMMUNITY
Start: 2025-06-19 | End: 2025-07-15

## 2025-06-27 ASSESSMENT — PAIN SCALES - GENERAL: PAINLEVEL_OUTOF10: NO PAIN (0)

## 2025-06-27 NOTE — NURSING NOTE
Dermatology Rooming Note    Leland Pearson's goals for this visit include:   Chief Complaint   Patient presents with    Skin Check     FBSE:  Areas of concern= lots of spots on back, back of head, rash on chest is better     Praveena De La Cruz LPN

## 2025-06-27 NOTE — LETTER
6/27/2025       RE: Leland Pearson  [id number] [place] [id number]     Dear Colleague,    Thank you for referring your patient, Leland Pearson, to the [facility] [place] at [facility]. Please see a copy of my visit note below.         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 8/8, URD; PTCy, siro, MMF.   Rosacea, and folliculitis on occipital scalp using metrocream  Acneiform eruption on the head neck and shoulders likely 2/2 sirolimus, now resolved  Biopsy 12/20/2024 consistent with a ruptured folliculitis  Repeat biopsy 5/2/2025 A(1). Skin, Chest, punch: Mild spongiotic and interface dermatitis with focal outer epidermal acantholysis - (see comment) The primary differential diagnosis for these features includes a drug eruption or toxic erythema of chemotherapy. The acantholytic features appear consistent with [place]'s. Clinical correlation and follow up are recommended. --> monitor      ASSESSMENT AND PLAN:    # Full body skin exam performed.   Benign skin lesions: seborrheic keratoses, cherry angiomas, multiple benign nevi, lentigines  - Reassured benign etiology.  - No treatment required today.   - Recommended diligent sun protection with SPF 30 or higher. Handout provided.  - Discussed signs and symptoms of skin cancers and ABCDEs of melanoma.    # [place]'s and possible drug rash, without evidence of GVHD on biopsy, in pt with history of alloSCT  - Improving, reassure, monitor    # Rosacea, scalp folliculitis  - Continue metrocream, refill as needed    The longitudinal plan of care for the diagnosis(es)/condition(s) as documented were addressed during this  visit. Due to the added complexity in care, I will continue to support the patient in the subsequent management and with ongoing continuity of care.    Follow-Up:   Return to clinic if rash worsens, or sooner as needed    History of Present Illness     Reason for Referral: 8 months s/p BMT with a red rash on head, neck and chest. initially thought to be folliculitis but has bright red spots. was most recently given metronidazole topically. need to r/o GVHD.     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 8/8, URD; PTCy, siro, MMF. I saw him 5/2/25 at which time a biopsy of the rash on his chest was performed c/w matt's and drug eruption--this has since improved. He presents for FBSE today, no specific areas of concern.    Review of systems is otherwise negative except as noted above.    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 180 tablet 1     allopurinol (ZYLOPRIM) 300 MG tablet TAKE 1 TABLET BY MOUTH EVERY DAY 90 tablet 1     calcium carbonate-vitamin D (OSCAL) 500-5 MG-MCG tablet Take 2 tablets by mouth daily. 60 tablet 2     carboxymethylcellulose (REFRESH PLUS) 0.5 % SOLN ophthalmic solution Place 1 drop into both eyes 3 times daily as needed for dry eyes. 15 mL 11     cycloSPORINE (RESTASIS) 0.05 % ophthalmic emulsion INSTILL 1 DROP INTO BOTH EYES TWICE A DAY 60 mL 11     hydrocortisone 2.5 % cream Apply topically daily. 30 g 0     lisinopril (ZESTRIL) 5 MG tablet  TAKE 1 TABLET (5 MG) BY MOUTH DAILY. HOLD DOSE IF BLOOD PRESSURE IS LESS THAN 110/60 90 tablet 1     metroNIDAZOLE (METROCREAM) 0.75 % external cream Apply to face, scalp and chest BID 45 g 11     metroNIDAZOLE (METROGEL) 0.75 % external gel Apply 1 g to the chest and 1g to the back  g 5     pantoprazole (PROTONIX) 40 MG EC tablet TAKE 1 TABLET BY MOUTH EVERY DAY 90 tablet 1     prochlorperazine (COMPAZINE) 10 MG tablet Take 1 tablet (10 mg) by mouth every 6 hours as needed for nausea or vomiting. 30 tablet 2     rivaroxaban ANTICOAGULANT (XARELTO) 10 MG TABS tablet Take 1 tablet (10 mg) by mouth daily (with dinner). 30 tablet 3     sulfamethoxazole-trimethoprim (BACTRIM DS) 800-160 MG tablet TAKE 1 TABLET BY MOUTH EVERY MON, TUES TWO TIMES DAILY. 48 tablet 1     tamsulosin (FLOMAX) 0.4 MG capsule Take 0.4 mg by mouth daily.       clindamycin (CLEOCIN T) 1 % external lotion Apply to chest and back BID x 3 weeks (Patient not taking: Reported on 6/27/2025) 60 mL 3     decitabine-cedazuridine (INQOVI)  MG tablet Take 1 tablet by mouth daily for 3 days. Take on empty stomach, do not eat food 2 hours before or 2 hours after each dose. Take at same time each day. Swallow tablet whole, do not cut, crush, or chew. 5 tablet 0     levofloxacin (LEVAQUIN) 250 MG tablet Take 1 tablet (250 mg) by mouth daily as needed (Only when ANC < 1). (Patient not taking: Reported on 6/27/2025) 30 tablet 0     lifitegrast (XIIDRA) 5 % opthalmic solution INSTILL 1 DROP INTO BOTH EYES TWICE A DAY (Patient not taking: Reported on 6/27/2025) 60 each 11     methocarbamol (ROBAXIN) 500 MG tablet Take 500 mg by mouth as needed for muscle spasms. (Patient not taking: Reported on 6/27/2025)           Physical Exam:     Vitals: There were no vitals taken for this visit.  SKIN: Focused examination of face and chest was performed.  See pertinent findings in A/P.     Erinn Morgan MD  Adjunct  of Dermatology  Cell:  [phone]    Again, thank you for allowing me to participate in the care of your patient.      Sincerely,    Erinn Morgan MD

## 2025-06-30 ENCOUNTER — PREP FOR PROCEDURE (OUTPATIENT)
Dept: OPHTHALMOLOGY | Facility: CLINIC | Age: 70
End: 2025-06-30

## 2025-06-30 ENCOUNTER — OFFICE VISIT (OUTPATIENT)
Dept: OPHTHALMOLOGY | Facility: CLINIC | Age: 70
End: 2025-06-30
Payer: COMMERCIAL

## 2025-06-30 DIAGNOSIS — H25.813 COMBINED FORMS OF AGE-RELATED CATARACT OF BOTH EYES: Primary | ICD-10-CM

## 2025-06-30 PROCEDURE — 99213 OFFICE O/P EST LOW 20 MIN: CPT | Performed by: OPHTHALMOLOGY

## 2025-06-30 PROCEDURE — 76519 ECHO EXAM OF EYE: CPT | Mod: 50 | Performed by: OPHTHALMOLOGY

## 2025-06-30 ASSESSMENT — REFRACTION_WEARINGRX
OS_AXIS: 003
OD_ADD: +2.75
OD_SPHERE: -3.00
OD_CYLINDER: +0.75
SPECS_TYPE: PAL
OS_ADD: +2.75
OD_AXIS: 002
OS_SPHERE: -3.00
OS_CYLINDER: +1.75

## 2025-06-30 ASSESSMENT — TONOMETRY
OS_IOP_MMHG: 08
IOP_METHOD: ICARE
OD_IOP_MMHG: 10

## 2025-06-30 ASSESSMENT — CUP TO DISC RATIO
OS_RATIO: 0.2
OD_RATIO: 0.15

## 2025-06-30 ASSESSMENT — VISUAL ACUITY
METHOD: SNELLEN - LINEAR
OD_CC: 20/20
OS_CC: 20/30-2

## 2025-06-30 NOTE — PROGRESS NOTES
HPI       Cataract Follow Up    In both eyes.             Comments    Pt here prior to cataract sx for repeat IOL master     Pt denies changes/new concerns since LV here.  He states his dryness doesn't feel any different since getting plugs.    His wife reports his eyes seem to look better, less red     Oc meds:  Restasis BID each eye   Refresh every day each eye     CATRACHITA Garcia 11:32 AM 06/30/2025              Last edited by Saloni Sullivan on 6/30/2025 11:32 AM.         Review of systems for the eyes was negative other than the pertinent positives/negatives listed in the HPI.      Assessment & Plan    HPI:  Leland Pearson is a 70 year old male with history of MDS s/p BMT 8/2/2024, folliculitis, BPH, HTN, GERD, myopia with astigmatism and presbyopia presents for repeat calcs prior to surgery and dry eye followup. Using Restasis twice a day and Refresh daily        POHx: myopia with astigmatism and presbyopia  PMHx:MDS s/p BMT 8/2/2024, folliculitis, BPH, HTN, GERD  Current Medications:   Current Outpatient Medications   Medication Sig Dispense Refill    acyclovir (ZOVIRAX) 800 MG tablet TAKE 1 TABLET (800 MG) BY MOUTH EVERY 12 HOURS. 180 tablet 1    allopurinol (ZYLOPRIM) 300 MG tablet TAKE 1 TABLET BY MOUTH EVERY DAY 90 tablet 1    calcium carbonate-vitamin D (OSCAL) 500-5 MG-MCG tablet Take 2 tablets by mouth daily. 60 tablet 2    carboxymethylcellulose (REFRESH PLUS) 0.5 % SOLN ophthalmic solution Place 1 drop into both eyes 3 times daily as needed for dry eyes. 15 mL 11    clindamycin (CLEOCIN T) 1 % external lotion Apply to chest and back BID x 3 weeks (Patient not taking: Reported on 6/27/2025) 60 mL 3    cycloSPORINE (RESTASIS) 0.05 % ophthalmic emulsion INSTILL 1 DROP INTO BOTH EYES TWICE A DAY 60 mL 11    hydrocortisone 2.5 % cream Apply topically daily. 30 g 0    INQOVI  MG tablet       levofloxacin (LEVAQUIN) 250 MG tablet Take 1 tablet (250 mg) by mouth daily as needed (Only  when ANC < 1). (Patient not taking: Reported on 6/27/2025) 30 tablet 0    lifitegrast (XIIDRA) 5 % opthalmic solution INSTILL 1 DROP INTO BOTH EYES TWICE A DAY (Patient not taking: Reported on 6/27/2025) 60 each 11    lisinopril (ZESTRIL) 5 MG tablet TAKE 1 TABLET (5 MG) BY MOUTH DAILY. HOLD DOSE IF BLOOD PRESSURE IS LESS THAN 110/60 90 tablet 1    methocarbamol (ROBAXIN) 500 MG tablet Take 500 mg by mouth as needed for muscle spasms. (Patient not taking: Reported on 6/27/2025)      metroNIDAZOLE (METROCREAM) 0.75 % external cream Apply to face, scalp and chest BID 45 g 11    metroNIDAZOLE (METROGEL) 0.75 % external gel Apply 1 g to the chest and 1g to the back  g 5    pantoprazole (PROTONIX) 40 MG EC tablet TAKE 1 TABLET BY MOUTH EVERY DAY 90 tablet 1    prochlorperazine (COMPAZINE) 10 MG tablet Take 1 tablet (10 mg) by mouth every 6 hours as needed for nausea or vomiting. 30 tablet 2    rivaroxaban ANTICOAGULANT (XARELTO) 10 MG TABS tablet Take 1 tablet (10 mg) by mouth daily (with dinner). 30 tablet 3    sulfamethoxazole-trimethoprim (BACTRIM DS) 800-160 MG tablet TAKE 1 TABLET BY MOUTH EVERY MON, TUES TWO TIMES DAILY. 48 tablet 1    tamsulosin (FLOMAX) 0.4 MG capsule Take 0.4 mg by mouth daily.       No current facility-administered medications for this visit.     FHx: cataract-mom, brother  PSHx: denies history of ocular surgeries       Current Eye Medications:  Warm compress twice a day   AT qday   Restasis twice a day     Assessment & Plan:  (H52.13,  H52.203,  H52.4) Myopia of both eyes with astigmatism and presbyopia  (primary encounter diagnosis)  Hold pending Cataract extraction/intraocular lens     (H25.813) Combined forms of age-related cataract of both eyes  Moderate cataracts are present and may account for some of the patient's visual complaints. Reasonable to wait until 1 year post BMT.    Special equipment/needs:  Eye: right  Anesthesia: MAC topical  Dilates to: 6  Iris expansion:  likely  ring, 7.0  Pseudoexfoliation: No  Trypan Blue: No  Trauma: Yes left eye trauma softball    Able lay to flat: Yes  Blood Thinner: Yes   Tamsulosin: Yes  DM: No  Guttae: No    Dominant Eye: right    Plan: plano toric right eye, -0.5 left eye       Proceed with CE/IOL right eye followed by left eye .  Tecnis toric Eyehance II    (Z94.81) Status post bone marrow transplant (H)  (H04.123) Dry eye syndrome of both eyes  May increase AT to four times a day  cycloSPORINE (RESTASIS) 0.05 % ophthalmic   Continue warm compress daily       Return for as scheduled.        Bal Valdovinos MD     Attending Physician Attestation:  Complete documentation of historical and exam elements from today's encounter can be found in the full encounter summary report (not reduplicated in this progress note).  I personally obtained the chief complaint(s) and history of present illness.  I confirmed and edited as necessary the review of systems, past medical/surgical history, family history, social history, and examination findings as documented by others; and I examined the patient myself.  I personally reviewed the relevant tests, images, and reports as documented above.  I formulated and edited as necessary the assessment and plan and discussed the findings and management plan with the patient and family. - Bal Valdovinos MD

## 2025-07-07 ENCOUNTER — LAB (OUTPATIENT)
Dept: LAB | Facility: CLINIC | Age: 70
End: 2025-07-07
Payer: COMMERCIAL

## 2025-07-07 DIAGNOSIS — D84.822 IMMUNOCOMPROMISED STATE ASSOCIATED WITH STEM CELL TRANSPLANT (H): ICD-10-CM

## 2025-07-07 DIAGNOSIS — D46.9 MDS (MYELODYSPLASTIC SYNDROME) (H): ICD-10-CM

## 2025-07-07 DIAGNOSIS — Z94.84 IMMUNOCOMPROMISED STATE ASSOCIATED WITH STEM CELL TRANSPLANT (H): ICD-10-CM

## 2025-07-07 LAB
BASOPHILS # BLD AUTO: 0 10E3/UL (ref 0–0.2)
BASOPHILS NFR BLD AUTO: 0 %
EOSINOPHIL # BLD AUTO: 0.2 10E3/UL (ref 0–0.7)
EOSINOPHIL NFR BLD AUTO: 4 %
ERYTHROCYTE [DISTWIDTH] IN BLOOD BY AUTOMATED COUNT: 14.3 % (ref 10–15)
HCT VFR BLD AUTO: 37.7 % (ref 40–53)
HGB BLD-MCNC: 13.1 G/DL (ref 13.3–17.7)
IMM GRANULOCYTES # BLD: 0 10E3/UL
IMM GRANULOCYTES NFR BLD: 0 %
LAB DIRECTOR DISCLAIMER: NORMAL
LAB DIRECTOR DISCLAIMER: NORMAL
LAB DIRECTOR INTERPRETATION: NORMAL
LAB DIRECTOR INTERPRETATION: NORMAL
LAB DIRECTOR METHODOLOGY: NORMAL
LAB DIRECTOR METHODOLOGY: NORMAL
LAB DIRECTOR RESULTS: NORMAL
LAB DIRECTOR RESULTS: NORMAL
LOCATION OF TASK: NORMAL
LOCATION OF TASK: NORMAL
LYMPHOCYTES # BLD AUTO: 1.5 10E3/UL (ref 0.8–5.3)
LYMPHOCYTES NFR BLD AUTO: 32 %
MCH RBC QN AUTO: 33.5 PG (ref 26.5–33)
MCHC RBC AUTO-ENTMCNC: 34.7 G/DL (ref 31.5–36.5)
MCV RBC AUTO: 96 FL (ref 78–100)
MONOCYTES # BLD AUTO: 0.4 10E3/UL (ref 0–1.3)
MONOCYTES NFR BLD AUTO: 8 %
NEUTROPHILS # BLD AUTO: 2.7 10E3/UL (ref 1.6–8.3)
NEUTROPHILS NFR BLD AUTO: 55 %
PLATELET # BLD AUTO: 119 10E3/UL (ref 150–450)
RBC # BLD AUTO: 3.91 10E6/UL (ref 4.4–5.9)
SPECIMEN TYPE: NORMAL
SPECIMEN TYPE: NORMAL
WBC # BLD AUTO: 4.8 10E3/UL (ref 4–11)

## 2025-07-07 PROCEDURE — 36415 COLL VENOUS BLD VENIPUNCTURE: CPT

## 2025-07-07 PROCEDURE — 81268 CHIMERISM ANAL W/CELL SELECT: CPT

## 2025-07-07 PROCEDURE — 87799 DETECT AGENT NOS DNA QUANT: CPT | Mod: 59

## 2025-07-07 PROCEDURE — G0452 MOLECULAR PATHOLOGY INTERPR: HCPCS | Mod: 59 | Performed by: PATHOLOGY

## 2025-07-07 PROCEDURE — 85025 COMPLETE CBC W/AUTO DIFF WBC: CPT

## 2025-07-07 PROCEDURE — 80053 COMPREHEN METABOLIC PANEL: CPT

## 2025-07-08 LAB
ALBUMIN SERPL BCG-MCNC: 4.3 G/DL (ref 3.5–5.2)
ALP SERPL-CCNC: 77 U/L (ref 40–150)
ALT SERPL W P-5'-P-CCNC: 19 U/L (ref 0–70)
ANION GAP SERPL CALCULATED.3IONS-SCNC: 9 MMOL/L (ref 7–15)
AST SERPL W P-5'-P-CCNC: 29 U/L (ref 0–45)
BILIRUB SERPL-MCNC: 0.6 MG/DL
BUN SERPL-MCNC: 16.5 MG/DL (ref 8–23)
CALCIUM SERPL-MCNC: 9.4 MG/DL (ref 8.8–10.4)
CHLORIDE SERPL-SCNC: 101 MMOL/L (ref 98–107)
CMV DNA SPEC NAA+PROBE-ACNC: NOT DETECTED IU/ML
CREAT SERPL-MCNC: 1.05 MG/DL (ref 0.67–1.17)
EBV DNA SERPL NAA+PROBE-ACNC: NOT DETECTED IU/ML
EGFRCR SERPLBLD CKD-EPI 2021: 76 ML/MIN/1.73M2
GLUCOSE SERPL-MCNC: 104 MG/DL (ref 70–99)
HCO3 SERPL-SCNC: 26 MMOL/L (ref 22–29)
POTASSIUM SERPL-SCNC: 4.3 MMOL/L (ref 3.4–5.3)
PROT SERPL-MCNC: 6.4 G/DL (ref 6.4–8.3)
SODIUM SERPL-SCNC: 136 MMOL/L (ref 135–145)
SPECIMEN TYPE: NORMAL

## 2025-07-10 DIAGNOSIS — D46.9 MDS (MYELODYSPLASTIC SYNDROME) (H): Primary | ICD-10-CM

## 2025-07-15 DIAGNOSIS — D46.9 MDS (MYELODYSPLASTIC SYNDROME) (H): Primary | ICD-10-CM

## 2025-07-15 RX ORDER — CEDAZURIDINE AND DECITABINE 100; 35 MG/1; MG/1
1 TABLET, FILM COATED ORAL DAILY
Qty: 5 TABLET | Refills: 0 | Status: SHIPPED | OUTPATIENT
Start: 2025-07-15 | End: 2025-07-18

## 2025-07-16 ENCOUNTER — APPOINTMENT (OUTPATIENT)
Dept: LAB | Facility: CLINIC | Age: 70
End: 2025-07-16
Attending: STUDENT IN AN ORGANIZED HEALTH CARE EDUCATION/TRAINING PROGRAM
Payer: COMMERCIAL

## 2025-07-16 ENCOUNTER — ONCOLOGY VISIT (OUTPATIENT)
Dept: TRANSPLANT | Facility: CLINIC | Age: 70
End: 2025-07-16
Attending: STUDENT IN AN ORGANIZED HEALTH CARE EDUCATION/TRAINING PROGRAM
Payer: COMMERCIAL

## 2025-07-16 VITALS
BODY MASS INDEX: 27.3 KG/M2 | HEART RATE: 71 BPM | DIASTOLIC BLOOD PRESSURE: 82 MMHG | TEMPERATURE: 98.2 F | WEIGHT: 203.8 LBS | RESPIRATION RATE: 16 BRPM | OXYGEN SATURATION: 97 % | SYSTOLIC BLOOD PRESSURE: 135 MMHG

## 2025-07-16 DIAGNOSIS — D46.9 MDS (MYELODYSPLASTIC SYNDROME) (H): Primary | ICD-10-CM

## 2025-07-16 DIAGNOSIS — Z79.899 ENCOUNTER FOR LONG-TERM (CURRENT) USE OF MEDICATIONS: ICD-10-CM

## 2025-07-16 LAB
ALBUMIN SERPL BCG-MCNC: 4.3 G/DL (ref 3.5–5.2)
ALP SERPL-CCNC: 78 U/L (ref 40–150)
ALT SERPL W P-5'-P-CCNC: 19 U/L (ref 0–70)
ANION GAP SERPL CALCULATED.3IONS-SCNC: 11 MMOL/L (ref 7–15)
AST SERPL W P-5'-P-CCNC: 31 U/L (ref 0–45)
BASOPHILS # BLD AUTO: 0 10E3/UL (ref 0–0.2)
BASOPHILS NFR BLD AUTO: 1 %
BILIRUB SERPL-MCNC: 0.5 MG/DL
BUN SERPL-MCNC: 20.2 MG/DL (ref 8–23)
CALCIUM SERPL-MCNC: 9.8 MG/DL (ref 8.8–10.4)
CHLORIDE SERPL-SCNC: 102 MMOL/L (ref 98–107)
CREAT SERPL-MCNC: 1.15 MG/DL (ref 0.67–1.17)
EGFRCR SERPLBLD CKD-EPI 2021: 68 ML/MIN/1.73M2
EOSINOPHIL # BLD AUTO: 0.1 10E3/UL (ref 0–0.7)
EOSINOPHIL NFR BLD AUTO: 3 %
ERYTHROCYTE [DISTWIDTH] IN BLOOD BY AUTOMATED COUNT: 14.6 % (ref 10–15)
GLUCOSE SERPL-MCNC: 102 MG/DL (ref 70–99)
HCO3 SERPL-SCNC: 23 MMOL/L (ref 22–29)
HCT VFR BLD AUTO: 37.9 % (ref 40–53)
HGB BLD-MCNC: 13 G/DL (ref 13.3–17.7)
IMM GRANULOCYTES # BLD: 0 10E3/UL
IMM GRANULOCYTES NFR BLD: 0 %
LYMPHOCYTES # BLD AUTO: 1.4 10E3/UL (ref 0.8–5.3)
LYMPHOCYTES NFR BLD AUTO: 37 %
MCH RBC QN AUTO: 32.9 PG (ref 26.5–33)
MCHC RBC AUTO-ENTMCNC: 34.3 G/DL (ref 31.5–36.5)
MCV RBC AUTO: 96 FL (ref 78–100)
MONOCYTES # BLD AUTO: 0.3 10E3/UL (ref 0–1.3)
MONOCYTES NFR BLD AUTO: 8 %
NEUTROPHILS # BLD AUTO: 2 10E3/UL (ref 1.6–8.3)
NEUTROPHILS NFR BLD AUTO: 51 %
NRBC # BLD AUTO: 0 10E3/UL
NRBC BLD AUTO-RTO: 0 /100
PLATELET # BLD AUTO: 142 10E3/UL (ref 150–450)
POTASSIUM SERPL-SCNC: 4.3 MMOL/L (ref 3.4–5.3)
PROT SERPL-MCNC: 6.8 G/DL (ref 6.4–8.3)
RBC # BLD AUTO: 3.95 10E6/UL (ref 4.4–5.9)
SODIUM SERPL-SCNC: 136 MMOL/L (ref 135–145)
WBC # BLD AUTO: 3.8 10E3/UL (ref 4–11)

## 2025-07-16 PROCEDURE — 84155 ASSAY OF PROTEIN SERUM: CPT | Performed by: STUDENT IN AN ORGANIZED HEALTH CARE EDUCATION/TRAINING PROGRAM

## 2025-07-16 PROCEDURE — G0463 HOSPITAL OUTPT CLINIC VISIT: HCPCS | Performed by: STUDENT IN AN ORGANIZED HEALTH CARE EDUCATION/TRAINING PROGRAM

## 2025-07-16 PROCEDURE — 85025 COMPLETE CBC W/AUTO DIFF WBC: CPT | Performed by: STUDENT IN AN ORGANIZED HEALTH CARE EDUCATION/TRAINING PROGRAM

## 2025-07-16 PROCEDURE — 36415 COLL VENOUS BLD VENIPUNCTURE: CPT | Performed by: STUDENT IN AN ORGANIZED HEALTH CARE EDUCATION/TRAINING PROGRAM

## 2025-07-16 ASSESSMENT — PAIN SCALES - GENERAL: PAINLEVEL_OUTOF10: NO PAIN (0)

## 2025-07-16 NOTE — LETTER
7/16/2025      Leland Pearson  [address] [place] [address]      Dear Colleague,    Thank you for referring your patient, Leland Pearson, to the [facility] BLOOD AND MARROW TRANSPLANT PROGRAM [place]. Please see a copy of my visit note below.    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     Diagnosis MDS-IB2 BMT type Allogenic  CMV  Donor -  Recipient -    Prep GANGA (Flu/Cy/TBI) Donor type  8/8, URD ABO D/R O+    GVHD ppx PTCy, siro, MMF Graft source PBSCT Toxo IgG Negative   Protocol [id number] CD34/kg 6.06E+06    BMT MD Brenda Murphy     Pre-transplant disease history  Referring provider: Dr. Delcid    He started having drenching night sweats and fatigue in Jan 2024. Intentional weight loss. He was found to have thrombocytopenia on routine CBC done prior to back surgery (was planned for laminectomy and fusion). On 4/1/24, WBC 6.4, Hb 13.5, Plts 64, ANC 1.89. LDH elevated 532. He underwent bone marrow biopsy (4/23/24) which showed MDS-EB2. Hypercellular marrow (%) with 10.8% blasts including rare circulating blasts. Flow showed 4% myeloid blasts. Normal karyotype. NGS (peripheral blood) was positive for ASXL1, IDH1, RUNX1, SRSF2 mutations (full report not available). Counts: WBC 6.7, Hb 13.1, Plts 70, ANC 0.7. IPSS-M = High risk (0.85).  He was treated with azacitidine 75mg/m2 for 7 days and venetoclax for 14 days (C1 on 5/20/24). Bone marrow after first cycle (6/13/24) showed persistent MDS, with hypercellular marrow with therapy effect, no increase in blasts. Normal karyotype. NGS: ASXL1 (38%), IDH1 (43%), RUNX1 (41%), SRSF1 (38%).   He received second cycle of aza/ angelia on 7/1/24. His pre-transplant BMBx was done on 8/2/24 and he was cytopenic at the time (CBC with WBC 0.6, Hb 14, platelet  count 24). Hypocellular marrow (5%) with no increase in blasts. Normal karyotype, NGS negative.     Post transplant   - 9/2/24 (around D10) Neutropenic fevers. Strept mitis bacteremia resistant to levaquin from central venous catheter as well as a single culture of MRSE from peripheral blood (contaminant). Treated with cefepime. Repeat blood cultures negative, but continued to have high fevers. TTE (9/6/24) did not show any vegetations. CVC was removed on 9/6/24, defervesced on 9/7/24. PICC placed on 9/9/24. Antibiotics (cefepime followed by augmentin) were continued for a total of 14days from line removal.   - Right lower neck swelling and tenderness near the previous CVC site. US (9/15/24) showed an occlusive right internal jugular thrombus and a non occlusive thrombus in the right axillary vein, PICC associated. He was initially not anticoagulated due to thrombocytopenia but had increased symptoms. Repeat US (9/17/24) showed extension of right internal jugular thrombus into the innominate vein and extension of right axillary thrombus into subclavian vein. He was started on therapeutic anticoagulation with lovenox 1mg/kg BID on 9/18/24 with transfusion support for platelets. Right PICC removal was considered given propagation of clot near the PICC line. However, IR (9/19/24) recommended against removal of R PICC and replacement in the left arm due to risk of contralateral DVT.  Line was removed on 10/18/24 and he was transitioned to rivaroxaban on 12/17/24 (one he was off posaconaozle). He has left UE swelling starting Nov 2024 which was treated with lymphedema wraps with PT. US of left upper extremity on 10/18/24 and 11/25/24 were negative for DVT. However, US of b/l UE on 3/19/25 showed known DVT in right internal jugular along with occlusive DVT in the left subclavian vein, which was not thought to be acute.   - Hemorrhagic cystitis: Dysuria, urgency and hematuria starting  9/11/2024. Infectious workup  including adenovirus and BK virus negative. Urology was consulted, recommended outpatient follow up. Now resolved.   - Maintenance: Inqovi : C1 on 2/4/25. C2: 3/4/25, C3 on 4/1/25, C4: 4/29/25, C5: 5/27/25, 6/24/25, 7/22/25.        INTERVAL HISTORY     Leland presents for a scheduled BMT visit.     No mouth sores. No new skin rashes, dermatitis on the chest fading. 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   Eyes: dry eyes, uses restasis twice a day, AT once a day. Scheduled on Aug 28th for first cataract surgery.   [place] chemotherapy[place]     Plan  - Final cycle on inqovi on July 22nd  - Discuss with PCP: statin (due to presence of coronary artery calcifications, ASCVD high using lipid panel from May 2025), allopurinol and DXA scan. Survivorship recommendations at the end of the note  - He would like to write a letter to the donor: will let the BMT office know  - RTC as scheduled       PMH  Back pain due to spinal stenosis, lumbosacral radiculopathy.   Gout, most recent flare was in April 2024  GERD  HTN  BPH, on tamsulosin   Right knee ACL repair in 2014  Low back surgery in 2004 for herniated disc, complicated by infection requiring prolonged IV abx   Coronary artery calcifications seen on CT, stress test in 2023 negative     SH  . Lives with his wife, Vani. Two daughters, 43 and 36. 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 Was still working part time till last summer at [employer], 2-3 hours per day.     Current Outpatient Medications   Medication Sig Dispense Refill     acyclovir (ZOVIRAX) 800 MG tablet TAKE 1 TABLET (800 MG) BY MOUTH EVERY 12 HOURS. 180 tablet 1     allopurinol (ZYLOPRIM) 300 MG tablet TAKE 1 TABLET BY MOUTH EVERY DAY 90 tablet 1     calcium carbonate-vitamin D (OSCAL) 500-5 MG-MCG tablet Take 2 tablets by mouth daily. 60 tablet 2     carboxymethylcellulose (REFRESH PLUS) 0.5 % SOLN ophthalmic solution Place 1 drop into both  eyes 3 times daily as needed for dry eyes. 15 mL 11     cycloSPORINE (RESTASIS) 0.05 % ophthalmic emulsion INSTILL 1 DROP INTO BOTH EYES TWICE A DAY 60 mL 11     decitabine-cedazuridine (INQOVI)  MG tablet Take 1 tablet by mouth daily for 3 days. Take on empty stomach, do not eat food 2 hours before or 2 hours after each dose. Take at same time each day. Swallow tablet whole, do not cut, crush, or chew. 5 tablet 0     hydrocortisone 2.5 % cream Apply topically daily. 30 g 0     lisinopril (ZESTRIL) 5 MG tablet TAKE 1 TABLET (5 MG) BY MOUTH DAILY. HOLD DOSE IF BLOOD PRESSURE IS LESS THAN 110/60 90 tablet 1     metroNIDAZOLE (METROCREAM) 0.75 % external cream Apply to face, scalp and chest BID 45 g 11     metroNIDAZOLE (METROGEL) 0.75 % external gel Apply 1 g to the chest and 1g to the back  g 5     pantoprazole (PROTONIX) 40 MG EC tablet TAKE 1 TABLET BY MOUTH EVERY DAY 90 tablet 1     prochlorperazine (COMPAZINE) 10 MG tablet Take 1 tablet (10 mg) by mouth every 6 hours as needed for nausea or vomiting. 30 tablet 2     rivaroxaban ANTICOAGULANT (XARELTO) 10 MG TABS tablet Take 1 tablet (10 mg) by mouth daily (with dinner). 30 tablet 3     sulfamethoxazole-trimethoprim (BACTRIM DS) 800-160 MG tablet TAKE 1 TABLET BY MOUTH EVERY MON, TUES TWO TIMES DAILY. 48 tablet 1     tamsulosin (FLOMAX) 0.4 MG capsule Take 0.4 mg by mouth daily.          EXAM      /82 (BP Location: Right arm, Patient Position: Sitting, Cuff Size: Adult Large)   Pulse 71   Temp 98.2  F (36.8  C) (Oral)   Resp 16   Wt 92.4 kg (203 lb 12.8 oz)   SpO2 97%   BMI 27.30 kg/m      Wt Readings from Last 4 Encounters:   07/16/25 92.4 kg (203 lb 12.8 oz)   06/17/25 92.9 kg (204 lb 12.8 oz)   05/30/25 93 kg (205 lb 1.6 oz)   05/20/25 93.7 kg (206 lb 9.6 oz)       KPS: 80% Can perform normal activity with effort, some signs of disease    General: Alert, awake  Eyes: Conjunctiva normal  Mouth and Throat: No mucositis   Lymph:  No upper  extremity edema  Skin: Fading rash  Access: None        LABS       Recent Labs   Lab Test 07/16/25  1518 07/07/25  1303 06/23/25  1328   WBC 3.8* 4.8 3.5*   HGB 13.0* 13.1* 13.4   * 119* 175   NEUTROPHIL 51 55 49   ANEU 2.0 2.7 1.7   LYMPH 37 32 38   ALYM 1.4 1.5 1.4       Recent Labs   Lab Test 07/16/25  1518 07/07/25  1303 06/23/25  1328    136 139   POTASSIUM 4.3 4.3 4.6   CHLORIDE 102 101 103   CO2 23 26 26   BUN 20.2 16.5 18.4   CR 1.15 1.05 0.91   * 104* 108*        Recent Labs   Lab Test 07/16/25  1518 07/07/25  1303 06/23/25  1328 10/18/24  1216 10/15/24  1220 10/03/24  1543 09/30/24  0900 09/27/24  0927 09/23/24  0927 09/19/24  0323 09/18/24  0430 09/17/24  0424   HERBERT 9.8 9.4 9.7   < > 9.1   < > 9.0   < > 9.3   < > 8.7* 9.0   MAG  --   --   --   --  1.9  --  1.9  --   --   --  2.0 2.0   PHOS  --   --   --   --   --   --   --   --  2.5  --  3.0 2.9    < > = values in this interval not displayed.        Recent Labs   Lab Test 07/16/25  1518 07/07/25  1303 06/23/25  1328   ALKPHOS 78 77 77   AST 31 29 30   ALT 19 19 20   BILITOTAL 0.5 0.6 0.5   ALBUMIN 4.3 4.3 4.4       Recent Labs   Lab Test 07/07/25  1303 06/09/25  1304 05/27/25  1251   CMVQNT Not Detected Not Detected Not Detected       Recent Labs   Lab Test 01/29/25  1457 11/29/24  1335 09/27/24  0927    549* 708       Recent Labs   Lab Test 05/15/25  1413 08/01/24  0805 05/08/24  1156   ZARIA 392 460* 1,360*         ASSESSMENT AND PLAN     BMT: D327   - Chemo protocol: MT 2022-5; Flu/Cy/TBI  - Peripheral blood stem cell graft from 8/8 URD donor, ABO matched, Cell dose: 6.06 x10^6 CD34/kg    Disease status:   9/13/24 (D28): Bmbx shows hypocellular marrow, no dysplasia or blasts.  NGS negative  12/2/24 (D100): Bmbx shows hypocellular marrow in CR. Flow showed NGS negative.   2/19/25 (D180): Bmbx shows hypocellular marrow with morphological CR. Flow shows no increase in myeloid blasts but myeloid blasts have an unusual  immunophenotype with partial CD7. NGS negative.   5/20/25 (9 months): BMBx showed CR. Flow showed some CD34 cells with increased CD7 but not at a level diagnostic of aberrant immunophenotype same as prior. Karyotype normal, NGS negative.     Graft status/chimerism:   9/13/24 (D28): Bone marrow 100%. Peripheral blood CD3 92%, CD33 100%  10/25/24 (D60): Peripheral blood CD3 and CD33 100%  12/2/24 (D100): Bone marrow 100%, Peripheral blood CD3 and CD33 100%  2/19/25 (D180): Bone marrow 100%, Peripheral blood CD3 and CD33 100%  4/3/25: Peripheral blood CD3 and CD33 100%  5/27/25: Bone marrow 100%, Peripheral blood CD3 and CD33 100%    Maintenance   Inqovi 3x/28 days   - Continue as above     GVHD  # Current systemic immunosuppression is none.   - Sirolimus stopped at D100 without taper on 11/29/24     # GVHD: None till date  # Skin: Rash on chest, back and neck has improved. Seen by derm, biopsied, no concern for GVHD. Topical metronidazole for itching. Score 0.  # Eyes: Dry eye syndrome. Has seen ophthalmology. Restasis twice daily and artifical tears once a day. Has undergone punctal plug placement. Has moderate dry eye symptoms which are more likely due to dry eye.   # Mouth: No symptoms  # GI: No symptoms. GI score 0.  # Liver: LFTs normal. Liver score 0.  # Pulm: No symptoms. Lung score 0.  # Joints: No symptoms. Joint score 0.    Heme/ Coag  # Right internal jugular occlusive thrombus and right axillary/ subclavain vein non occlusive thrombus, line associated, diagnosed on 9/15/24. Line was removed on 10/18/24.   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 Price    ID  # Prophylaxis  PJP/ Toxo IgG negative: Bactrim   Viral: Acyclovir 800 mg bid  Bacterial/ fungal: None    # Monitoring  CMV: Monitor monthly  EBV: Monitor every 2 weeks between D30 to  D180.   IgG:  IgG was 618 mg/dL on 1/29/25. Check serum IgG level q3 months.     GI  GERD: Pantoprazole daily   VOD prophylaxis: Completed     CARDIOVASCULAR  # HTN: Lisinopril 5mg (was on 15mg daily prior to transplant)    # CAD: Coronary artery calcifications seen on CT, stress test in 2023 negative  - Risk of cardiomyopathy: Baseline EF 55-60%.   - Risk of arrhythmia: Baseline EKG showed 421       RENAL/ELECTROLYTES/  - BPH: Continue Flomax.  - Hemorrhagic Cystitis secondary to cytoxan: Resolved. Oxybutynin 10mg at bedtime PRN. Will need follow up with urology at some point.       MUSCULOSKELETAL  # Gout: continue allopurinol   # History of spinal stenosis, lumbosacral radiculopathy:   - Pain management: Okay to take tylenol or robaxin RPN    Health Maintenance   DEXA : At 1-year post transplant. Vitamin D (May 2025) was normal, continue Vitamin D supplement.   PFT: At 1 year and 2 years post transplant.   Thyroid: TSH was normal in May 2025. Check yearly, through primary physician.   Lipids: Check lipid panel q6-12 months through primary physician.  Skin: Annual skin checks through Dermatology; d/w patient.  Dental visit at 1 year   Ferritin was 392 in May 2025     Post-transplant vaccinations  Has received flu, COVID and RSV vaccine.     I spent 60 minutes in the care of this patient today, which included time necessary for preparation for the visit, obtaining history, ordering medications/tests/procedures as medically indicated, review of pertinent medical literature, counseling of the patient, communication of recommendations to the care team, and documentation time.    Brenda Conway  Department of Hematology, Oncology and Transplantation  Text via [employer] 1-Year Post-Allogeneic BMT   Survivorship Care Plan    Date: July 17, 2025    Treatment Team:   Patient Care Team:  Gunnar Lundy MD as PCP - General  Nael Delcid MD as MD (Hematology & Oncology)  Andria Shay, RN as Specialty Care  Coordinator (Hematology & Oncology)  Brenda Murphy MBBS as BMT Physician (Transplant)  Bruno Ramos MD as Physician (Radiation Oncology)  Brenda Murphy MBBS as Assigned Cancer Care Provider  Fadi Tran MD as Assigned Heart and Vascular Provider  Bal Valdovinos MD as Assigned Surgical Provider  Sylvia Price MD as MD (Hematology)  Robyn Morales, RN as BMT Nurse Coordinator  Erinn Morgan MD as Assigned Dermatology Provider    BMT Transplant Date: Allo PBSC Txp; Day 328 (8/23/24)    Survivorship Care Plan:     This individualized care plan is designed to inform you and your healthcare team of the recommended follow-up visits, tests, health maintenance activities, and cancer screening you should receive after transplant.     General Health Maintenance:   Call the BMT clinic if you develop a skin rash, oral sores, eye pain or excessive dryness, shortness of breath, new cough, bleeding, diarrhea, nausea, or vomiting.   Vaccinations should be given at 1 and 2 years after your transplant, these may be given at your annual anniversary visits in the BMT clinic, by your primary care provider, or your local oncologist. An exception is the influenza vaccine, which can be given after day +60 post-transplant during influenza season. See table below for more information.   You can receive the COVID19 vaccine if you have not already, for more information on how to sign up for an appointment you can the [employer] vaccine website at [email or web address] or the        [facility] Vaccine Connector portal at https://[place].gov/covid19/vaccine/connector/connector.jsp  For general health concerns you can be seen by your primary care provider.   If you have questions about your transplant or follow up tests contact your BMT RN coordinator.    Immune System:  Risks Preventative Measures Recommendations   Infections, viral reactivations Continue to take  prescribed medications to prevent infection if you are treated for ahukh-bt-kafj disease  Symptoms of a cold such as fever, cough, congestion, and shortness of breath should be reported to your primary care provider  Immunizations will be administered at 1 & 2 years after transplant. See schedule above. Vaccines at anniversary visit. 12 months, 14 months, 24 months and 26 months.      Eyes:   Risks Preventative Measures Recommendations   Cataracts, dry eyes, GVH of the eyes, viral infections, and other eye changes Yearly eye exam   Screening and treatment for high blood pressure or diabetes  Call if you have eye pain, visual changes, or floaters immediately  Call If you are experiencing dry eyes and symptoms do not improve with Refresh eye drops  None at this time     Mouth:  Risks Preventative Measures Recommendations   Dry mouth, oral GVH, cavities, and oral cancer Report any new symptoms such as dry mouth or painful sores   You can use over the counter Biotene for dry mouth or try sucking on sour candy before meals  Get a dental checkup every year and a cleaning every 6 months  If you have a prosthetic heart valve, central venous catheter or  port , or are still taking immunosuppression you may need to take an antibiotic before your dental visits. None at this time, patient has dental provider and will schedule routine exam       Lungs  Risks Preventative Measures Recommendations   Changes in function from chemotherapy or radiation; lung infections (pneumonia) Tell your provider about difficulty breathing, a cough, or new shortness of breath   Avoid use of tobacco products or smoking  Routine lung exams   Other PFT will be done 1 year post transplant. Repeat at 2 years post transplant.        Heart and Blood Vessels  Heart Disease Risk Score: The 10-year ASCVD risk score (Jason CASTRO, et al., 2019) is: 27.8%    Values used to calculate the score:      Age: 70 years      Sex: Male      Is Non-   American: No      Diabetic: No      Tobacco smoker: No      Systolic Blood Pressure: 135 mmHg      Is BP treated: Yes      HDL Cholesterol: 33 mg/dL      Total Cholesterol: 190 mg/dL  Risks Preventative Measures Recommendations   Damage from chemotherapy and/or radiation; early development of heart valve disease  Ask your provider if you should receive consultation from a cardiologist (heart doctor) or have special screening  Follow a  heart healthy  lifestyle. For more information visit the National Heart, Lung, and Blood [place] website at: https://www.nhlbi.nih.gov/health/health-topics/topics/heart-healthy-lifestyle-changes   Don't smoke. If you currently smoke and are ready to quit, we can help you find ways to quit  Maintain a healthy weight  Exercise regularly  Avoid foods that have high amounts of:  Salt/sodium (less than 2,300 mg of sodium per day)  Saturated and trans fats  Limit alcohol to less than 1 drink for women and 2 drinks for men per day  Sugar such as soft drinks or sugary snacks Fasting Lipid Profile or Direct LDL (Recommended annually) and Other Hemoglobin A1c annually.   ECHO if needed for symptoms.      Hormones  Risks Preventative Measures Recommendations   Low thyroid function, low function of other glands (adrenals and others) Certain endocrine/hormone disorders are more common after transplant. For this reason, talk to your provider about:  Fatigue  Muscle weakness  Changes in cold tolerance  Reduced interest in sex  Erectile dysfunction   Certain tests may be used to monitor for hormone changes if you are experiencing symptoms. These tests are done at 1 year  If you have been on steroids you will need to slowly taper or decrease the dose as recommended by your provider/pharmacist. Do not stop taking steroids without consulting with your provider.   If you have been on steroids in the past, you may need steroids during periods of illness TSH with T4 reflex (Recommended 1 & 2 years  post-transplant)       Liver:  Risks Preventative Measures Recommendations      Damage from chemotherapy or other drugs, buildup of iron from blood transfusions, infections, and GVHD   Certain tests may be ordered at your next survivorship visit to evaluate liver function based on your individual risk factors.  Talk to your provider before taking herbal supplements or over the counter drugs like Tylenol.  Ask your doctor if you should be treated for iron overload  Avoid alcohol in excess     Hepatic Panel/LFTs (Recommended every 3-6 mos the 1st year post-transplant & annually)       Kidneys and Bladder:  Risks Preventative Measures Recommendations   Damage from chemotherapy or other drugs, infections, high blood pressure Monitoring blood tests of kidney function during follow up visits  Treating high blood pressure and diabetes   Drink adequate amounts of water  Report symptoms of infection such as frequent urination, pain with urination, foul odor to urine, or blood in the urine  Talk to your provider before taking herbal supplements or over the counter drugs like Ibuprofen Serum creatinine checked as part of anniversary labs or at least annually by primary provider       Nervous System:  Risks Preventative Measures Recommendations   Neuropathy from chemotherapy, changes in cognitive function Report changes in sensation or discomfort in feet or hands  Tell your provider about ongoing changes in memory, ability to concentrate, or inability to make decisions   None at this time       Muscle & Connective Tissue  Risks Preventative Measures Recommendations   Reduced muscle strength, reduced stamina, GVHD causing hardening of muscle tissues (scleroderma) or inflammation of tissue over muscles (fascitis)  Let your provider know if:  You notice changes in your muscle strength  Require extra assistance with daily activities  Experience pain or difficult bending or moving joints  If you have been on steroids and are  experiencing weakness particularly when standing up from a chair   Need help creating an exercise routine  Notice reduced range of motion of the arms, hips, or legs  Follow general guidelines for physical activity as recommended by the Office of Disease Prevention & Health Promotion:    Avoid Inactivity  Some physical activity is better than none -- any amount has benefits.    Do Aerobic Activity  Do aerobic physical activity in episodes of at least 10 minutes, as many times as possible per day. This could include going for walks or using the elliptical or stationary bike.  Ask your doctor what aerobic activities would be safe and helpful for you, and set a goal for yourself!    Strengthen Muscles  Do muscle-strengthening activities (such as lifting light weights or using resistance bands and/or going up and down stairs) that are moderate or high intensity and involve all major muscle groups at least 4 days a week. Bone Scan (Recommended 1 year) and Calcium & Vitamin D Levels (Recommended annually)     Emotional Health  Risks Preventative Measures Recommendations   Stress, depression, anxiety Talk to your provider about:  Changes in feelings, mood, or emotional wellbeing  Interest in support groups  If you are concerned about your caregivers emotional wellbeing  Desire to speak with a counselor for ongoing support  None at this time       Sexual Health  Risks Preventative Measures Recommendations   Reduced libido due to hormonal changes, erectile dysfunction, vaginal dryness, vaginal fdqlq-in-cefo disease, vaginal infections, sexually transmitted diseases Talk to your provider about:  Reduced libido or concerns regarding your sexual health  Men: Erectile dysfunction   Women: Vaginal dryness, pain during intercourse, vaginal bleeding after intercourse, changes in vaginal discharge that may indicate infection (green/white/foul odor)   General Recommendations:  It is safe to have sex if your platelet count is > 50,000  and you feel physically and emotionally ready   Women can use water-based lubricants to reduce discomfort from dryness. Prescription topical estrogen may help as well.  Use barrier protection such as condoms to prevent sexually transmitted diseases, you are at higher risk for infections due to a weakened immune system  For more information check out the National [employer] Program web page on this topic:  https://bethematch.org/patients-and-families/life-after-transplant/coping-with-life-after-transplant/relationships-and-sexual-health/  FSH/LH/Testosterone (Men experiencing ED or reduced libido)         Fertility (delete if N/A)  Risks Preventative Measures Recommendations   Difficulty with sexual intercourse; difficulty having a baby Women should avoid becoming pregnant for 2 years  Birth control should be used to prevent pregnancy  If you are interested in having a baby, discuss this with your provider None at this time       Cancer Screening:  Risks Preventative Measures Recommendations   Higher risk for the development of solid tumors, PTLD, and blood cancers Preform a full self skin exam monthly to assess for any changes in moles or signs of skin cancer  Minimize excessive sun exposure and wear sunscreen  Women should preform breast-self exams and report any changes in such as a new lump, discharge from nipples, and red or dimpled areas of the breast.   All women should be screened for breast cancer following the guidelines below:  Average Risk (no radiation exposure)   Age 20-40 years: Annual clinical breast exam  Age >40 years: Annual clinical breast exam & annual mammogram  High Risk (radiation exposure including TBI) starting at age 25 years or 8 years after radiation therapy/TBI, whichever comes first, but no later than age 40 years:  Annual clinical breast exam  Annual mammogram  AND Annual breast MRI  Screening for colorectal cancer should begin at age 50.   All women should have a pap smear,  assessment for vaginal GVHD, and HPV DNA test every year beginning at age 21  Men should perform a monthly testicular self-exam if they have been exposed to radiation as part of their cancer treatment.    Colonoscopy (Recommended every 10 years after the age of 50) and Dermatology Referral (Annual skin exam or evaluation of lesion)         Recommended Tests & Follow-Up:  Referrals & Tests Ordered Today:  Your BMT physician will review these tests and referral results. If you require treatment based on the results, a member of the BMT team will contact you.  Orders placed today:  Orders Placed This Encounter   Procedures     CBC with platelets and differential     (Note to providers: After signing orders use the refresh tool in the note window to display results)   Future Tests/Referrals:   All recommendations above should be completed within 2 months either by your primary care provider or local oncologist (cancer doctor).   Recommendations that were not ordered today should be completed by your:  Primary Care Provider   Follow Up Care:  Continue to see your care team members routinely after transplant.  BMT Provider: Brenda Murphy  Hematologist/Oncologist:Nael Delcid  Primary Care Provider:            Information used for these recommendations was obtained from: KANE Wiley., YUAN Morgan, AMY Bell, GERRY Arellano, SHANE Marinelli, SHANE Arroyo.,   Uzma, K. M. (2013). Prevalence of Hematopoietic Cell Transplant Survivors in the [place]. Biology of Blood and Marrow Transplantation?: Journal of the American Society for Blood and Marrow Transplantation, 19(10), 1971-6536. doi [email or web address]/[email or web address]                                                            Again, thank you for allowing me to participate in the care of your patient.        Sincerely,        ONEIL Montero    Electronically signed

## 2025-07-16 NOTE — PROGRESS NOTES
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     Diagnosis MDS-IB2 BMT type Allogenic  CMV  Donor -  Recipient -    Prep GANGA (Flu/Cy/TBI) Donor type  8/8, URD ABO D/R O+    GVHD ppx PTCy, siro, MMF Graft source PBSCT Toxo IgG Negative   Protocol [id number] CD34/kg 6.06E+06    BMT MD Brenda Murphy     Pre-transplant disease history  Referring provider: Dr. Delcid    He started having drenching night sweats and fatigue in Jan 2024. Intentional weight loss. He was found to have thrombocytopenia on routine CBC done prior to back surgery (was planned for laminectomy and fusion). On 4/1/24, WBC 6.4, Hb 13.5, Plts 64, ANC 1.89. LDH elevated 532. He underwent bone marrow biopsy (4/23/24) which showed MDS-EB2. Hypercellular marrow (%) with 10.8% blasts including rare circulating blasts. Flow showed 4% myeloid blasts. Normal karyotype. NGS (peripheral blood) was positive for ASXL1, IDH1, RUNX1, SRSF2 mutations (full report not available). Counts: WBC 6.7, Hb 13.1, Plts 70, ANC 0.7. IPSS-M = High risk (0.85).  He was treated with azacitidine 75mg/m2 for 7 days and venetoclax for 14 days (C1 on 5/20/24). Bone marrow after first cycle (6/13/24) showed persistent MDS, with hypercellular marrow with therapy effect, no increase in blasts. Normal karyotype. NGS: ASXL1 (38%), IDH1 (43%), RUNX1 (41%), SRSF1 (38%).   He received second cycle of aza/ angelia on 7/1/24. His pre-transplant BMBx was done on 8/2/24 and he was cytopenic at the time (CBC with WBC 0.6, Hb 14, platelet count 24). Hypocellular marrow (5%) with no increase in blasts. Normal karyotype, NGS negative.     Post transplant   - 9/2/24 (around D10) Neutropenic fevers. Strept mitis bacteremia resistant to levaquin from central venous catheter as well as a single culture of MRSE from  peripheral blood (contaminant). Treated with cefepime. Repeat blood cultures negative, but continued to have high fevers. TTE (9/6/24) did not show any vegetations. CVC was removed on 9/6/24, defervesced on 9/7/24. PICC placed on 9/9/24. Antibiotics (cefepime followed by augmentin) were continued for a total of 14days from line removal.   - Right lower neck swelling and tenderness near the previous CVC site. US (9/15/24) showed an occlusive right internal jugular thrombus and a non occlusive thrombus in the right axillary vein, PICC associated. He was initially not anticoagulated due to thrombocytopenia but had increased symptoms. Repeat US (9/17/24) showed extension of right internal jugular thrombus into the innominate vein and extension of right axillary thrombus into subclavian vein. He was started on therapeutic anticoagulation with lovenox 1mg/kg BID on 9/18/24 with transfusion support for platelets. Right PICC removal was considered given propagation of clot near the PICC line. However, IR (9/19/24) recommended against removal of R PICC and replacement in the left arm due to risk of contralateral DVT.  Line was removed on 10/18/24 and he was transitioned to rivaroxaban on 12/17/24 (one he was off posaconaozle). He has left UE swelling starting Nov 2024 which was treated with lymphedema wraps with PT. US of left upper extremity on 10/18/24 and 11/25/24 were negative for DVT. However, US of b/l UE on 3/19/25 showed known DVT in right internal jugular along with occlusive DVT in the left subclavian vein, which was not thought to be acute.   - Hemorrhagic cystitis: Dysuria, urgency and hematuria starting  9/11/2024. Infectious workup including adenovirus and BK virus negative. Urology was consulted, recommended outpatient follow up. Now resolved.   - Maintenance: Inqovi : C1 on 2/4/25. C2: 3/4/25, C3 on 4/1/25, C4: 4/29/25, C5: 5/27/25, 6/24/25, 7/22/25.        INTERVAL HISTORY     Leland presents for a scheduled  BMT visit.     No mouth sores. No new skin rashes, dermatitis on the chest fading. 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   Eyes: dry eyes, uses restasis twice a day, AT once a day. Scheduled on Aug 28th for first cataract surgery.   [place] chemotherapy.     Plan  - Final cycle on inqovi on July 22nd  - Discuss with PCP: statin (due to presence of coronary artery calcifications, ASCVD high using lipid panel from May 2025), allopurinol and DXA scan. Survivorship recommendations at the end of the note  - He would like to write a letter to the donor: will let the BMT office know  - RTC as scheduled       PMH  Back pain due to spinal stenosis, lumbosacral radiculopathy.   Gout, most recent flare was in April 2024  GERD  HTN  BPH, on tamsulosin   Right knee ACL repair in 2014  Low back surgery in 2004 for herniated disc, complicated by infection requiring prolonged IV abx   Coronary artery calcifications seen on CT, stress test in 2023 negative     SH  . Lives with his wife, Vani. Two daughters, 43 and 36. 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 Was still working part time till last summer at [employer], 2-3 hours per day.     Current Outpatient Medications   Medication Sig Dispense Refill    acyclovir (ZOVIRAX) 800 MG tablet TAKE 1 TABLET (800 MG) BY MOUTH EVERY 12 HOURS. 180 tablet 1    allopurinol (ZYLOPRIM) 300 MG tablet TAKE 1 TABLET BY MOUTH EVERY DAY 90 tablet 1    calcium carbonate-vitamin D (OSCAL) 500-5 MG-MCG tablet Take 2 tablets by mouth daily. 60 tablet 2    carboxymethylcellulose (REFRESH PLUS) 0.5 % SOLN ophthalmic solution Place 1 drop into both eyes 3 times daily as needed for dry eyes. 15 mL 11    cycloSPORINE (RESTASIS) 0.05 % ophthalmic emulsion INSTILL 1 DROP INTO BOTH EYES TWICE A DAY 60 mL 11    decitabine-cedazuridine (INQOVI)  MG tablet Take 1 tablet by mouth daily for 3 days. Take on empty stomach, do not eat  food 2 hours before or 2 hours after each dose. Take at same time each day. Swallow tablet whole, do not cut, crush, or chew. 5 tablet 0    hydrocortisone 2.5 % cream Apply topically daily. 30 g 0    lisinopril (ZESTRIL) 5 MG tablet TAKE 1 TABLET (5 MG) BY MOUTH DAILY. HOLD DOSE IF BLOOD PRESSURE IS LESS THAN 110/60 90 tablet 1    metroNIDAZOLE (METROCREAM) 0.75 % external cream Apply to face, scalp and chest BID 45 g 11    metroNIDAZOLE (METROGEL) 0.75 % external gel Apply 1 g to the chest and 1g to the back  g 5    pantoprazole (PROTONIX) 40 MG EC tablet TAKE 1 TABLET BY MOUTH EVERY DAY 90 tablet 1    prochlorperazine (COMPAZINE) 10 MG tablet Take 1 tablet (10 mg) by mouth every 6 hours as needed for nausea or vomiting. 30 tablet 2    rivaroxaban ANTICOAGULANT (XARELTO) 10 MG TABS tablet Take 1 tablet (10 mg) by mouth daily (with dinner). 30 tablet 3    sulfamethoxazole-trimethoprim (BACTRIM DS) 800-160 MG tablet TAKE 1 TABLET BY MOUTH EVERY MON, TUES TWO TIMES DAILY. 48 tablet 1    tamsulosin (FLOMAX) 0.4 MG capsule Take 0.4 mg by mouth daily.          EXAM      /82 (BP Location: Right arm, Patient Position: Sitting, Cuff Size: Adult Large)   Pulse 71   Temp 98.2  F (36.8  C) (Oral)   Resp 16   Wt 92.4 kg (203 lb 12.8 oz)   SpO2 97%   BMI 27.30 kg/m      Wt Readings from Last 4 Encounters:   07/16/25 92.4 kg (203 lb 12.8 oz)   06/17/25 92.9 kg (204 lb 12.8 oz)   05/30/25 93 kg (205 lb 1.6 oz)   05/20/25 93.7 kg (206 lb 9.6 oz)       KPS: 80% Can perform normal activity with effort, some signs of disease    General: Alert, awake  Eyes: Conjunctiva normal  Mouth and Throat: No mucositis   Lymph:  No upper extremity edema  Skin: Fading rash  Access: None        LABS       Recent Labs   Lab Test 07/16/25  1518 07/07/25  1303 06/23/25  1328   WBC 3.8* 4.8 3.5*   HGB 13.0* 13.1* 13.4   * 119* 175   NEUTROPHIL 51 55 49   ANEU 2.0 2.7 1.7   LYMPH 37 32 38   ALYM 1.4 1.5 1.4       Recent Labs    Lab Test 07/16/25  1518 07/07/25  1303 06/23/25  1328    136 139   POTASSIUM 4.3 4.3 4.6   CHLORIDE 102 101 103   CO2 23 26 26   BUN 20.2 16.5 18.4   CR 1.15 1.05 0.91   * 104* 108*        Recent Labs   Lab Test 07/16/25  1518 07/07/25  1303 06/23/25  1328 10/18/24  1216 10/15/24  1220 10/03/24  1543 09/30/24  0900 09/27/24  0927 09/23/24  0927 09/19/24  0323 09/18/24  0430 09/17/24  0424   HERBERT 9.8 9.4 9.7   < > 9.1   < > 9.0   < > 9.3   < > 8.7* 9.0   MAG  --   --   --   --  1.9  --  1.9  --   --   --  2.0 2.0   PHOS  --   --   --   --   --   --   --   --  2.5  --  3.0 2.9    < > = values in this interval not displayed.        Recent Labs   Lab Test 07/16/25  1518 07/07/25  1303 06/23/25  1328   ALKPHOS 78 77 77   AST 31 29 30   ALT 19 19 20   BILITOTAL 0.5 0.6 0.5   ALBUMIN 4.3 4.3 4.4       Recent Labs   Lab Test 07/07/25  1303 06/09/25  1304 05/27/25  1251   CMVQNT Not Detected Not Detected Not Detected       Recent Labs   Lab Test 01/29/25  1457 11/29/24  1335 09/27/24  0927    549* 708       Recent Labs   Lab Test 05/15/25  1413 08/01/24  0805 05/08/24  1156   ZARIA 392 460* 1,360*         ASSESSMENT AND PLAN     BMT: D327   - Chemo protocol: MT 2022-5; Flu/Cy/TBI  - Peripheral blood stem cell graft from 8/8 URD donor, ABO matched, Cell dose: 6.06 x10^6 CD34/kg    Disease status:   9/13/24 (D28): Bmbx shows hypocellular marrow, no dysplasia or blasts.  NGS negative  12/2/24 (D100): Bmbx shows hypocellular marrow in CR. Flow showed NGS negative.   2/19/25 (D180): Bmbx shows hypocellular marrow with morphological CR. Flow shows no increase in myeloid blasts but myeloid blasts have an unusual immunophenotype with partial CD7. NGS negative.   5/20/25 (9 months): BMBx showed CR. Flow showed some CD34 cells with increased CD7 but not at a level diagnostic of aberrant immunophenotype same as prior. Karyotype normal, NGS negative.     Graft status/chimerism:   9/13/24 (D28): Bone marrow 100%.  Peripheral blood CD3 92%, CD33 100%  10/25/24 (D60): Peripheral blood CD3 and CD33 100%  12/2/24 (D100): Bone marrow 100%, Peripheral blood CD3 and CD33 100%  2/19/25 (D180): Bone marrow 100%, Peripheral blood CD3 and CD33 100%  4/3/25: Peripheral blood CD3 and CD33 100%  5/27/25: Bone marrow 100%, Peripheral blood CD3 and CD33 100%    Maintenance   Inqovi 3x/28 days   - Continue as above     GVHD  # Current systemic immunosuppression is none.   - Sirolimus stopped at D100 without taper on 11/29/24     # GVHD: None till date  # Skin: Rash on chest, back and neck has improved. Seen by derm, biopsied, no concern for GVHD. Topical metronidazole for itching. Score 0.  # Eyes: Dry eye syndrome. Has seen ophthalmology. Restasis twice daily and artifical tears once a day. Has undergone punctal plug placement. Has moderate dry eye symptoms which are more likely due to dry eye.   # Mouth: No symptoms  # GI: No symptoms. GI score 0.  # Liver: LFTs normal. Liver score 0.  # Pulm: No symptoms. Lung score 0.  # Joints: No symptoms. Joint score 0.    Heme/ Coag  # Right internal jugular occlusive thrombus and right axillary/ subclavain vein non occlusive thrombus, line associated, diagnosed on 9/15/24. Line was removed on 10/18/24.   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 Price    ID  # Prophylaxis  PJP/ Toxo IgG negative: Bactrim   Viral: Acyclovir 800 mg bid  Bacterial/ fungal: None    # Monitoring  CMV: Monitor monthly  EBV: Monitor every 2 weeks between D30 to D180.   IgG:  IgG was 618 mg/dL on 1/29/25. Check serum IgG level q3 months.     GI  GERD: Pantoprazole daily   VOD prophylaxis: Completed     CARDIOVASCULAR  # HTN: Lisinopril 5mg (was on 15mg daily prior to transplant)    # CAD: Coronary artery calcifications seen on CT, stress test in 2023  negative  - Risk of cardiomyopathy: Baseline EF 55-60%.   - Risk of arrhythmia: Baseline EKG showed 421       RENAL/ELECTROLYTES/  - BPH: Continue Flomax.  - Hemorrhagic Cystitis secondary to cytoxan: Resolved. Oxybutynin 10mg at bedtime PRN. Will need follow up with urology at some point.       MUSCULOSKELETAL  # Gout: continue allopurinol   # History of spinal stenosis, lumbosacral radiculopathy:   - Pain management: Okay to take tylenol or robaxin RPN    Health Maintenance   DEXA : At 1-year post transplant. Vitamin D (May 2025) was normal, continue Vitamin D supplement.   PFT: At 1 year and 2 years post transplant.   Thyroid: TSH was normal in May 2025. Check yearly, through primary physician.   Lipids: Check lipid panel q6-12 months through primary physician.  Skin: Annual skin checks through Dermatology; d/w patient.  Dental visit at 1 year   Ferritin was 392 in May 2025     Post-transplant vaccinations  Has received flu, COVID and RSV vaccine.     I spent 60 minutes in the care of this patient today, which included time necessary for preparation for the visit, obtaining history, ordering medications/tests/procedures as medically indicated, review of pertinent medical literature, counseling of the patient, communication of recommendations to the care team, and documentation time.    Brenda Murphy  Department of Hematology, Oncology and Transplantation  Text via [facility] 1-Year Post-Allogeneic BMT   Survivorship Care Plan    Date: July 17, 2025    Treatment Team:   Patient Care Team:  Gunnar Lundy MD as PCP - Nael Underwood MD as MD (Hematology & Oncology)  Andria Shay, RN as Specialty Care Coordinator (Hematology & Oncology)  Brenda Murphy MBBS as BMT Physician (Transplant)  Bruno Ramos MD as Physician (Radiation Oncology)  Brenda Murphy MBBS as Assigned Cancer Care Provider  Fadi Tran MD as Assigned Heart and Vascular Provider  Bal Valdovinos MD as  Assigned Surgical Provider  Sylvia Price MD as MD (Hematology)  Robyn Morales RN as BMT Nurse Coordinator  Erinn Morgan MD as Assigned Dermatology Provider    BMT Transplant Date: Allo PBSC Txp; Day 328 (8/23/24)    Survivorship Care Plan:     This individualized care plan is designed to inform you and your healthcare team of the recommended follow-up visits, tests, health maintenance activities, and cancer screening you should receive after transplant.     General Health Maintenance:   Call the BMT clinic if you develop a skin rash, oral sores, eye pain or excessive dryness, shortness of breath, new cough, bleeding, diarrhea, nausea, or vomiting.   Vaccinations should be given at 1 and 2 years after your transplant, these may be given at your annual anniversary visits in the BMT clinic, by your primary care provider, or your local oncologist. An exception is the influenza vaccine, which can be given after day +60 post-transplant during influenza season. See table below for more information.   You can receive the COVID19 vaccine if you have not already, for more information on how to sign up for an appointment you can the [facility] vaccine website at [email or web address]://[email or web address].[email or web address]/covid19/[email or web address]-vaccine or the        [facility] of [facility] Vaccine Connector portal at https://[place].gov/covid19/vaccine/connector/connector.jsp  For general health concerns you can be seen by your primary care provider.   If you have questions about your transplant or follow up tests contact your BMT RN coordinator.    Immune System:  Risks Preventative Measures Recommendations   Infections, viral reactivations Continue to take prescribed medications to prevent infection if you are treated for [id number] disease  Symptoms of a cold such as fever, cough, congestion, and shortness of breath should be reported to your primary care provider  Immunizations will be administered at 1 & 2 years after transplant. See schedule  above. Vaccines at anniversary visit. 12 months, 14 months, 24 months and 26 months.      Eyes:   Risks Preventative Measures Recommendations   Cataracts, dry eyes, GVH of the eyes, viral infections, and other eye changes Yearly eye exam   Screening and treatment for high blood pressure or diabetes  Call if you have eye pain, visual changes, or floaters immediately  Call If you are experiencing dry eyes and symptoms do not improve with Refresh eye drops  None at this time     Mouth:  Risks Preventative Measures Recommendations   Dry mouth, oral GVH, cavities, and oral cancer Report any new symptoms such as dry mouth or painful sores   You can use over the counter Biotene for dry mouth or try sucking on sour candy before meals  Get a dental checkup every year and a cleaning every 6 months  If you have a prosthetic heart valve, central venous catheter or  port , or are still taking immunosuppression you may need to take an antibiotic before your dental visits. None at this time, patient has dental provider and will schedule routine exam       Lungs  Risks Preventative Measures Recommendations   Changes in function from chemotherapy or radiation; lung infections (pneumonia) Tell your provider about difficulty breathing, a cough, or new shortness of breath   Avoid use of tobacco products or smoking  Routine lung exams   Other PFT will be done 1 year post transplant. Repeat at 2 years post transplant.        Heart and Blood Vessels  Heart Disease Risk Score: The 10-year ASCVD risk score (Jason DK, et al., 2019) is: 27.8%    Values used to calculate the score:      Age: 70 years      Sex: Male      Is Non- : No      Diabetic: No      Tobacco smoker: No      Systolic Blood Pressure: 135 mmHg      Is BP treated: Yes      HDL Cholesterol: 33 mg/dL      Total Cholesterol: 190 mg/dL  Risks Preventative Measures Recommendations   Damage from chemotherapy and/or radiation; early development of heart  valve disease  Ask your provider if you should receive consultation from a cardiologist (heart doctor) or have special screening  Follow a  heart healthy  lifestyle. For more information visit the National Heart, Lung, and Blood [place] website at: https://www.nhlbi.nih.gov/health/health-topics/topics/heart-healthy-lifestyle-changes   Don't smoke. If you currently smoke and are ready to quit, we can help you find ways to quit  Maintain a healthy weight  Exercise regularly  Avoid foods that have high amounts of:  Salt/sodium (less than 2,300 mg of sodium per day)  Saturated and trans fats  Limit alcohol to less than 1 drink for women and 2 drinks for men per day  Sugar such as soft drinks or sugary snacks Fasting Lipid Profile or Direct LDL (Recommended annually) and Other Hemoglobin A1c annually.   ECHO if needed for symptoms.      Hormones  Risks Preventative Measures Recommendations   Low thyroid function, low function of other glands (adrenals and others) Certain endocrine/hormone disorders are more common after transplant. For this reason, talk to your provider about:  Fatigue  Muscle weakness  Changes in cold tolerance  Reduced interest in sex  Erectile dysfunction   Certain tests may be used to monitor for hormone changes if you are experiencing symptoms. These tests are done at 1 year  If you have been on steroids you will need to slowly taper or decrease the dose as recommended by your provider/pharmacist. Do not stop taking steroids without consulting with your provider.   If you have been on steroids in the past, you may need steroids during periods of illness TSH with T4 reflex (Recommended 1 & 2 years post-transplant)       Liver:  Risks Preventative Measures Recommendations      Damage from chemotherapy or other drugs, buildup of iron from blood transfusions, infections, and GVHD   Certain tests may be ordered at your next survivorship visit to evaluate liver function based on your individual risk  factors.  Talk to your provider before taking herbal supplements or over the counter drugs like Tylenol.  Ask your doctor if you should be treated for iron overload  Avoid alcohol in excess     Hepatic Panel/LFTs (Recommended every 3-6 mos the 1st year post-transplant & annually)       Kidneys and Bladder:  Risks Preventative Measures Recommendations   Damage from chemotherapy or other drugs, infections, high blood pressure Monitoring blood tests of kidney function during follow up visits  Treating high blood pressure and diabetes   Drink adequate amounts of water  Report symptoms of infection such as frequent urination, pain with urination, foul odor to urine, or blood in the urine  Talk to your provider before taking herbal supplements or over the counter drugs like Ibuprofen Serum creatinine checked as part of anniversary labs or at least annually by primary provider       Nervous System:  Risks Preventative Measures Recommendations   Neuropathy from chemotherapy, changes in cognitive function Report changes in sensation or discomfort in feet or hands  Tell your provider about ongoing changes in memory, ability to concentrate, or inability to make decisions   None at this time       Muscle & Connective Tissue  Risks Preventative Measures Recommendations   Reduced muscle strength, reduced stamina, GVHD causing hardening of muscle tissues (scleroderma) or inflammation of tissue over muscles (fascitis)  Let your provider know if:  You notice changes in your muscle strength  Require extra assistance with daily activities  Experience pain or difficult bending or moving joints  If you have been on steroids and are experiencing weakness particularly when standing up from a chair   Need help creating an exercise routine  Notice reduced range of motion of the arms, hips, or legs  Follow general guidelines for physical activity as recommended by the Office of Disease Prevention & Health Promotion:    Avoid Inactivity  Some  physical activity is better than none -- any amount has benefits.    Do Aerobic Activity  Do aerobic physical activity in episodes of at least 10 minutes, as many times as possible per day. This could include going for walks or using the elliptical or stationary bike.  Ask your doctor what aerobic activities would be safe and helpful for you, and set a goal for yourself!    Strengthen Muscles  Do muscle-strengthening activities (such as lifting light weights or using resistance bands and/or going up and down stairs) that are moderate or high intensity and involve all major muscle groups at least 4 days a week. Bone Scan (Recommended 1 year) and Calcium & Vitamin D Levels (Recommended annually)     Emotional Health  Risks Preventative Measures Recommendations   Stress, depression, anxiety Talk to your provider about:  Changes in feelings, mood, or emotional wellbeing  Interest in support groups  If you are concerned about your caregivers emotional wellbeing  Desire to speak with a counselor for ongoing support  None at this time       Sexual Health  Risks Preventative Measures Recommendations   Reduced libido due to hormonal changes, erectile dysfunction, vaginal dryness, vaginal rnxum-je-fspu disease, vaginal infections, sexually transmitted diseases Talk to your provider about:  Reduced libido or concerns regarding your sexual health  Men: Erectile dysfunction   Women: Vaginal dryness, pain during intercourse, vaginal bleeding after intercourse, changes in vaginal discharge that may indicate infection (green/white/foul odor)   General Recommendations:  It is safe to have sex if your platelet count is > 50,000 and you feel physically and emotionally ready   Women can use water-based lubricants to reduce discomfort from dryness. Prescription topical estrogen may help as well.  Use barrier protection such as condoms to prevent sexually transmitted diseases, you are at higher risk for infections due to a weakened  immune system  For more information check out the [employer] web page on this topic:  https://bethematch.org/patients-and-families/life-after-transplant/coping-with-life-after-transplant/relationships-and-sexual-health/  FSH/LH/Testosterone (Men experiencing ED or reduced libido)         Fertility (delete if N/A)  Risks Preventative Measures Recommendations   Difficulty with sexual intercourse; difficulty having a baby Women should avoid becoming pregnant for 2 years  Birth control should be used to prevent pregnancy  If you are interested in having a baby, discuss this with your provider None at this time       Cancer Screening:  Risks Preventative Measures Recommendations   Higher risk for the development of solid tumors, PTLD, and blood cancers Preform a full self skin exam monthly to assess for any changes in moles or signs of skin cancer  Minimize excessive sun exposure and wear sunscreen  Women should preform breast-self exams and report any changes in such as a new lump, discharge from nipples, and red or dimpled areas of the breast.   All women should be screened for breast cancer following the guidelines below:  Average Risk (no radiation exposure)   Age 20-40 years: Annual clinical breast exam  Age >40 years: Annual clinical breast exam & annual mammogram  High Risk (radiation exposure including TBI) starting at age 25 years or 8 years after radiation therapy/TBI, whichever comes first, but no later than age 40 years:  Annual clinical breast exam  Annual mammogram  AND Annual breast MRI  Screening for colorectal cancer should begin at age 50.   All women should have a pap smear, assessment for vaginal GVHD, and HPV DNA test every year beginning at age 21  Men should perform a monthly testicular self-exam if they have been exposed to radiation as part of their cancer treatment.    Colonoscopy (Recommended every 10 years after the age of 50) and Dermatology Referral (Annual skin exam or  evaluation of lesion)         Recommended Tests & Follow-Up:  Referrals & Tests Ordered Today:  Your BMT physician will review these tests and referral results. If you require treatment based on the results, a member of the BMT team will contact you.  Orders placed today:  Orders Placed This Encounter   Procedures    CBC with platelets and differential     (Note to providers: After signing orders use the refresh tool in the note window to display results)   Future Tests/Referrals:   All recommendations above should be completed within 2 months either by your primary care provider or local oncologist (cancer doctor).   Recommendations that were not ordered today should be completed by your:  Primary Care Provider   Follow Up Care:  Continue to see your care team members routinely after transplant.  BMT Provider: Brenda Murphy  Hematologist/Oncologist:Nael Delcid  Primary Care Provider:            Information used for these recommendations was obtained from: KANE Wiley., YUAN Morgan, AMY Bell, GERRY Arellano, GUSTABO Marinelli., SHANE Arroyo.,   Uzma, KTrey M. (2013). Prevalence of Hematopoietic Cell Transplant Survivors in the [place]. Biology of Blood and Marrow Transplantation?: Journal of the American Society for Blood and Marrow Transplantation, 19(10), 5295-7261. doi [email or web address]/j.bbmt.2013.07.020

## 2025-07-16 NOTE — NURSING NOTE
"Oncology Rooming Note    July 16, 2025 3:54 PM   Leland Pearson is a 70 year old male who presents for:    Chief Complaint   Patient presents with    Blood Draw     Labs drawn via  by RN. VS taken.    Oncology Clinic Visit     BMT Return     Initial Vitals: /82 (BP Location: Right arm, Patient Position: Sitting, Cuff Size: Adult Large)   Pulse 71   Temp 98.2  F (36.8  C) (Oral)   Resp 16   Wt 92.4 kg (203 lb 12.8 oz)   SpO2 97%   BMI 27.30 kg/m   Estimated body mass index is 27.3 kg/m  as calculated from the following:    Height as of 8/16/24: 1.84 m (6' 0.44\").    Weight as of this encounter: 92.4 kg (203 lb 12.8 oz). Body surface area is 2.17 meters squared.  No Pain (0) Comment: Data Unavailable   No LMP for male patient.  Allergies reviewed: Yes  Medications reviewed: Yes    Medications: Medication refills not needed today.  Pharmacy name entered into [facility]:    [id number] [place] [facility] - [place], [place] - [address] [place] [address] [facility] [place], [place] - [address] [place] [address] [facility] [place] [place] [facility] [phone]    PHQ9:  Did this patient require a PHQ9?: No      Clinical concerns: none      Noe Munoz LPN            "

## 2025-07-22 ENCOUNTER — CARE COORDINATION (OUTPATIENT)
Dept: TRANSPLANT | Facility: CLINIC | Age: 70
End: 2025-07-22
Payer: COMMERCIAL

## 2025-07-29 ENCOUNTER — OFFICE VISIT (OUTPATIENT)
Dept: OTHER | Facility: CLINIC | Age: 70
End: 2025-07-29
Attending: INTERNAL MEDICINE
Payer: COMMERCIAL

## 2025-07-29 VITALS
SYSTOLIC BLOOD PRESSURE: 132 MMHG | DIASTOLIC BLOOD PRESSURE: 82 MMHG | BODY MASS INDEX: 27.28 KG/M2 | HEART RATE: 73 BPM | WEIGHT: 203.6 LBS | OXYGEN SATURATION: 98 %

## 2025-07-29 DIAGNOSIS — I82.B12 LEFT SUBCLAVIAN VEIN THROMBOSIS (H): ICD-10-CM

## 2025-07-29 PROCEDURE — G2211 COMPLEX E/M VISIT ADD ON: HCPCS | Performed by: INTERNAL MEDICINE

## 2025-07-29 PROCEDURE — 3079F DIAST BP 80-89 MM HG: CPT | Performed by: INTERNAL MEDICINE

## 2025-07-29 PROCEDURE — 3075F SYST BP GE 130 - 139MM HG: CPT | Performed by: INTERNAL MEDICINE

## 2025-07-29 PROCEDURE — G0463 HOSPITAL OUTPT CLINIC VISIT: HCPCS | Performed by: INTERNAL MEDICINE

## 2025-07-29 PROCEDURE — 99205 OFFICE O/P NEW HI 60 MIN: CPT | Performed by: INTERNAL MEDICINE

## 2025-07-29 NOTE — PROGRESS NOTES
INITIAL VASCULAR MEDICAL ASSESSMENT  REFERRAL SOURCE: Dr. 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    Comment:     BUE DVTs have resorbed on AC. He has had more than one lifetime DVT. I would be supportive of lifelong AC with Xarelto 10 mg due to this. I explained to the patietn and his wife that this is of multifactorial etiology ( line associated, MDS/BMT Tx associated, and extrinsic compressive due to vTOS). In cases of extrinsic compression associated thrombosis, even if ACd, thrombosis can recur if the extrinsic compressive source is not removed. In his case it is indicated to proceed with BUE first rib resections in a staged fashion. I explained that the recovery form this can be burdensome to patients. As such, since he will need to be on lifelong AC anyway as above, he would rather adopt a wait and see approach, which is not unreasonable. If however he ever has a recurrent thrombosis while on AC, he absolutely must proceed with bilateral first rib resections. He is a low risk patietn for repetitive compression. His only risk factor would be that he plays golf once weekly.     Plan:     RTC prn recurrent arm tightness or asymmetry. If ever experiences a recurrent UE DVT, proceed to first rib resections.    The longitudinal care of plan for Leland was addressed during this visit. Due to added complexity of care, we will continue to support Leland Pearson  and the subsequent management of these conditions and with ongoing continuity of care for these conditions.          61 minutes total medical care on today's date.                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 He was diagnosed on 9/15/24 with provoked thrombosis. Hematology was consulted and the patient was discharged on anticoagulation. Refer to consult note attested by Dr. Sylvia Price dated 9/18/24 as well as her outpatient clinic note of 6/167/2025. He presents today for Vascular Medicine consult to ascertain if sxs are due to venous TOS versus catheter and malignancy associated thrombosis. 6/16/25 TOS study suggests thoracic outlet/inlet physiology.     On that study his Rt IJ has unchanged  occlusive DVTin comparison to previously. There is a 3.07 velocity ratio from the lateral to mid SCV which occludes at 90 degrees.His Rt SCA occludes in the  position and is diminished at 90 and 180 degrees. On the left there is a 4.14 velocity ratio from the axillary to the lateral subclavian vein. His previous non occlusive SCV DVT has cleared. No Lt SCV occlusion is demonstrated with maneuvers. The SCA however occludes with the arms at 90 degrees and nearly occludes at 180 degrees and in the  position.    He has no cervical ribs on 9/15/2024 CXR.     He has no BUE paresthesiae.     Leland is doing well. He is not having any swelling in his upper extremities. He is active. No issues with fevers or chills.     Review Of Systems  Skin: negative  Eyes: negative  Ears/Nose/Throat: negative  Respiratory: No shortness of breath, dyspnea on exertion, cough, or hemoptysis  Cardiovascular: negative  Gastrointestinal: negative  Genitourinary: negative  Musculoskeletal: negative  Neurologic: negative  Psychiatric: negative  Hematologic/Lymphatic/Immunologic: negative  Endocrine: negative      PAST MEDICAL HISTORY:                  Past Medical History:   Diagnosis Date    Cancer (H) 03/24    Gastroesophageal reflux disease     Hypertension     Nonsenile cataract 12/27/24       PAST SURGICAL HISTORY:                  Past Surgical History:   Procedure  Laterality Date    COLONOSCOPY      ESOPHAGOSCOPY, GASTROSCOPY, DUODENOSCOPY (EGD), COMBINED  07/28/2013    Procedure: COMBINED ESOPHAGOSCOPY, GASTROSCOPY, DUODENOSCOPY (EGD), BIOPSY SINGLE OR MULTIPLE;;  Surgeon: Franklin Barrera MD;  Location:  GI    ESOPHAGOSCOPY, GASTROSCOPY, DUODENOSCOPY (EGD), COMBINED  07/28/2013    Procedure: COMBINED ESOPHAGOSCOPY, GASTROSCOPY, DUODENOSCOPY (EGD), REMOVE FOREIGN BODY;;  Surgeon: Franklin Barrera MD;  Location:  GI    IR CVC TUNNEL PLACEMENT > 5 YRS OF AGE  08/16/2024    IR CVC TUNNEL REMOVAL RIGHT  09/06/2024    ORTHOPEDIC SURGERY      04 micro discectomy, acl  repair    PICC DOUBLE LUMEN PLACEMENT Right 09/09/2024    Right basilic vein 49cm total 5cm external.       CURRENT MEDICATIONS:                  Current Outpatient Medications   Medication Sig Dispense Refill    acyclovir (ZOVIRAX) 800 MG tablet TAKE 1 TABLET (800 MG) BY MOUTH EVERY 12 HOURS. 180 tablet 1    allopurinol (ZYLOPRIM) 300 MG tablet TAKE 1 TABLET BY MOUTH EVERY DAY 90 tablet 1    calcium carbonate-vitamin D (OSCAL) 500-5 MG-MCG tablet Take 2 tablets by mouth daily. 60 tablet 2    carboxymethylcellulose (REFRESH PLUS) 0.5 % SOLN ophthalmic solution Place 1 drop into both eyes 3 times daily as needed for dry eyes. 15 mL 11    cycloSPORINE (RESTASIS) 0.05 % ophthalmic emulsion INSTILL 1 DROP INTO BOTH EYES TWICE A DAY 60 mL 11    hydrocortisone 2.5 % cream Apply topically daily. 30 g 0    lisinopril (ZESTRIL) 5 MG tablet TAKE 1 TABLET (5 MG) BY MOUTH DAILY. HOLD DOSE IF BLOOD PRESSURE IS LESS THAN 110/60 90 tablet 1    metroNIDAZOLE (METROCREAM) 0.75 % external cream Apply to face, scalp and chest BID 45 g 11    metroNIDAZOLE (METROGEL) 0.75 % external gel Apply 1 g to the chest and 1g to the back  g 5    pantoprazole (PROTONIX) 40 MG EC tablet TAKE 1 TABLET BY MOUTH EVERY DAY 90 tablet 1    prochlorperazine (COMPAZINE) 10 MG tablet Take 1 tablet (10 mg) by mouth every 6 hours as needed for nausea  or vomiting. 30 tablet 2    rivaroxaban ANTICOAGULANT (XARELTO) 10 MG TABS tablet Take 1 tablet (10 mg) by mouth daily (with dinner). 30 tablet 3    sulfamethoxazole-trimethoprim (BACTRIM DS) 800-160 MG tablet TAKE 1 TABLET BY MOUTH EVERY MON, TUES TWO TIMES DAILY. 48 tablet 1    tamsulosin (FLOMAX) 0.4 MG capsule Take 0.4 mg by mouth daily.         ALLERGIES:                No Known Allergies    SOCIAL HISTORY:                  Social History     Socioeconomic History    Marital status:      Spouse name: Not on file    Number of children: Not on file    Years of education: Not on file    Highest education level: Not on file   Occupational History    Not on file   Tobacco Use    Smoking status: Never     Passive exposure: Never    Smokeless tobacco: Never   Substance and Sexual Activity    Alcohol use: Not Currently     Comment: socially    Drug use: No    Sexual activity: Yes     Partners: Female     Birth control/protection: None   Other Topics Concern    Parent/sibling w/ CABG, MI or angioplasty before 65F 55M? Not Asked   Social History Narrative    Not on file     Social Drivers of Health     Financial Resource Strain: Low Risk  (8/24/2024)    Financial Resource Strain     Within the past 12 months, have you or your family members you live with been unable to get utilities (heat, electricity) when it was really needed?: No   Food Insecurity: Low Risk  (8/24/2024)    Food Insecurity     Within the past 12 months, did you worry that your food would run out before you got money to buy more?: No     Within the past 12 months, did the food you bought just not last and you didn t have money to get more?: No   Transportation Needs: Low Risk  (8/24/2024)    Transportation Needs     Within the past 12 months, has lack of transportation kept you from medical appointments, getting your medicines, non-medical meetings or appointments, work, or from getting things that you need?: No   Physical Activity: Not on file    Stress: Not on file   Social Connections: Not on file   Interpersonal Safety: Low Risk  (8/24/2024)    Interpersonal Safety     Do you feel physically and emotionally safe where you currently live?: Yes     Within the past 12 months, have you been hit, slapped, kicked or otherwise physically hurt by someone?: No     Within the past 12 months, have you been humiliated or emotionally abused in other ways by your partner or ex-partner?: No   Housing Stability: Low Risk  (8/24/2024)    Housing Stability     Do you have housing? : Yes     Are you worried about losing your housing?: No       FAMILY HISTORY:                   Family History   Problem Relation Age of Onset    Basal cell carcinoma Mother     Basal cell carcinoma Sister     No Known Problems Brother     No Known Problems Brother     No Known Problems Brother     No Known Problems Brother     Diabetes Brother     No Known Problems Daughter     No Known Problems Daughter          Physical exam Reveals:    O/P: WNL  HEENT: WNL  NECK: No JVD, thyromegaly, or lymphadenopathy  HEART: RRR, no murmurs, gallops, or rubs  LUNGS: CTA bilaterally without rales, wheezes, or rhonchi  GI: NABS, nondistended, nontender, soft  EXT:without cyanosis, clubbing, or edema  NEURO: nonfocal  : no flank tenderness            Three plus BUE brachial , radial, ulnar pulses. Loss of radial pulses with arms at 180 degrees.     Iam test slightly positive on BUE's     All relevant labs and imaging reviewed by myself on today's date.

## 2025-08-04 ENCOUNTER — LAB (OUTPATIENT)
Dept: LAB | Facility: CLINIC | Age: 70
End: 2025-08-04
Payer: COMMERCIAL

## 2025-08-04 DIAGNOSIS — Z94.84 IMMUNOCOMPROMISED STATE ASSOCIATED WITH STEM CELL TRANSPLANT (H): ICD-10-CM

## 2025-08-04 DIAGNOSIS — Z79.899 ENCOUNTER FOR LONG-TERM (CURRENT) USE OF MEDICATIONS: ICD-10-CM

## 2025-08-04 DIAGNOSIS — D46.9 MDS (MYELODYSPLASTIC SYNDROME) (H): ICD-10-CM

## 2025-08-04 DIAGNOSIS — D84.822 IMMUNOCOMPROMISED STATE ASSOCIATED WITH STEM CELL TRANSPLANT (H): ICD-10-CM

## 2025-08-04 LAB
BASOPHILS # BLD AUTO: 0 10E3/UL (ref 0–0.2)
BASOPHILS NFR BLD AUTO: 1 %
EOSINOPHIL # BLD AUTO: 0.1 10E3/UL (ref 0–0.7)
EOSINOPHIL NFR BLD AUTO: 2 %
ERYTHROCYTE [DISTWIDTH] IN BLOOD BY AUTOMATED COUNT: 13.9 % (ref 10–15)
HCT VFR BLD AUTO: 38.7 % (ref 40–53)
HGB BLD-MCNC: 14.1 G/DL (ref 13.3–17.7)
IMM GRANULOCYTES # BLD: 0 10E3/UL
IMM GRANULOCYTES NFR BLD: 0 %
LYMPHOCYTES # BLD AUTO: 1.3 10E3/UL (ref 0.8–5.3)
LYMPHOCYTES NFR BLD AUTO: 28 %
MCH RBC QN AUTO: 34.5 PG (ref 26.5–33)
MCHC RBC AUTO-ENTMCNC: 36.4 G/DL (ref 31.5–36.5)
MCV RBC AUTO: 95 FL (ref 78–100)
MONOCYTES # BLD AUTO: 0.4 10E3/UL (ref 0–1.3)
MONOCYTES NFR BLD AUTO: 9 %
NEUTROPHILS # BLD AUTO: 2.7 10E3/UL (ref 1.6–8.3)
NEUTROPHILS NFR BLD AUTO: 60 %
PLATELET # BLD AUTO: 139 10E3/UL (ref 150–450)
RBC # BLD AUTO: 4.09 10E6/UL (ref 4.4–5.9)
WBC # BLD AUTO: 4.5 10E3/UL (ref 4–11)

## 2025-08-04 PROCEDURE — 85025 COMPLETE CBC W/AUTO DIFF WBC: CPT

## 2025-08-04 PROCEDURE — 80053 COMPREHEN METABOLIC PANEL: CPT

## 2025-08-04 PROCEDURE — 36415 COLL VENOUS BLD VENIPUNCTURE: CPT

## 2025-08-05 ENCOUNTER — MYC MEDICAL ADVICE (OUTPATIENT)
Dept: ONCOLOGY | Facility: CLINIC | Age: 70
End: 2025-08-05
Payer: COMMERCIAL

## 2025-08-05 LAB
ALBUMIN SERPL BCG-MCNC: 4.4 G/DL (ref 3.5–5.2)
ALP SERPL-CCNC: 78 U/L (ref 40–150)
ALT SERPL W P-5'-P-CCNC: 19 U/L (ref 0–70)
ANION GAP SERPL CALCULATED.3IONS-SCNC: 8 MMOL/L (ref 7–15)
AST SERPL W P-5'-P-CCNC: 29 U/L (ref 0–45)
BILIRUB SERPL-MCNC: 0.6 MG/DL
BUN SERPL-MCNC: 15.3 MG/DL (ref 8–23)
CALCIUM SERPL-MCNC: 9.6 MG/DL (ref 8.8–10.4)
CHLORIDE SERPL-SCNC: 98 MMOL/L (ref 98–107)
CREAT SERPL-MCNC: 0.95 MG/DL (ref 0.67–1.17)
EGFRCR SERPLBLD CKD-EPI 2021: 86 ML/MIN/1.73M2
GLUCOSE SERPL-MCNC: 101 MG/DL (ref 70–99)
HCO3 SERPL-SCNC: 27 MMOL/L (ref 22–29)
POTASSIUM SERPL-SCNC: 4.6 MMOL/L (ref 3.4–5.3)
PROT SERPL-MCNC: 6.6 G/DL (ref 6.4–8.3)
SODIUM SERPL-SCNC: 133 MMOL/L (ref 135–145)

## 2025-08-13 DIAGNOSIS — H25.813 COMBINED FORMS OF AGE-RELATED CATARACT OF BOTH EYES: Primary | ICD-10-CM

## 2025-08-13 RX ORDER — KETOROLAC TROMETHAMINE 5 MG/ML
SOLUTION OPHTHALMIC
Qty: 10 ML | Refills: 0 | Status: SHIPPED | OUTPATIENT
Start: 2025-08-13 | End: 2025-09-12

## 2025-08-13 RX ORDER — PREDNISOLONE ACETATE 10 MG/ML
SUSPENSION/ DROPS OPHTHALMIC
Qty: 10 ML | Refills: 0 | Status: SHIPPED | OUTPATIENT
Start: 2025-08-13 | End: 2025-09-10

## 2025-08-13 RX ORDER — MOXIFLOXACIN 5 MG/ML
1 SOLUTION/ DROPS OPHTHALMIC 4 TIMES DAILY
Qty: 3 ML | Refills: 0 | Status: SHIPPED | OUTPATIENT
Start: 2025-08-13 | End: 2025-08-22

## 2025-08-16 DIAGNOSIS — D46.9 MDS (MYELODYSPLASTIC SYNDROME) (H): ICD-10-CM

## 2025-08-16 DIAGNOSIS — Z94.84 HISTORY OF PERIPHERAL STEM CELL TRANSPLANT (H): ICD-10-CM

## 2025-08-18 RX ORDER — PANTOPRAZOLE SODIUM 40 MG/1
40 TABLET, DELAYED RELEASE ORAL DAILY
Qty: 90 TABLET | Refills: 1 | Status: SHIPPED | OUTPATIENT
Start: 2025-08-18

## 2025-08-20 ENCOUNTER — OFFICE VISIT (OUTPATIENT)
Dept: TRANSPLANT | Facility: CLINIC | Age: 70
End: 2025-08-20
Attending: INTERNAL MEDICINE
Payer: COMMERCIAL

## 2025-08-20 ENCOUNTER — LAB (OUTPATIENT)
Dept: LAB | Facility: CLINIC | Age: 70
End: 2025-08-20
Attending: INTERNAL MEDICINE
Payer: COMMERCIAL

## 2025-08-20 VITALS
OXYGEN SATURATION: 98 % | WEIGHT: 203.6 LBS | RESPIRATION RATE: 14 BRPM | DIASTOLIC BLOOD PRESSURE: 75 MMHG | BODY MASS INDEX: 27.28 KG/M2 | SYSTOLIC BLOOD PRESSURE: 125 MMHG | HEART RATE: 60 BPM | TEMPERATURE: 97.8 F

## 2025-08-20 DIAGNOSIS — D46.9 MDS (MYELODYSPLASTIC SYNDROME) (H): Primary | ICD-10-CM

## 2025-08-20 DIAGNOSIS — Z94.84 IMMUNOCOMPROMISED STATE ASSOCIATED WITH STEM CELL TRANSPLANT (H): ICD-10-CM

## 2025-08-20 DIAGNOSIS — D84.822 IMMUNOCOMPROMISED STATE ASSOCIATED WITH STEM CELL TRANSPLANT (H): ICD-10-CM

## 2025-08-20 DIAGNOSIS — D46.9 MDS (MYELODYSPLASTIC SYNDROME) (H): ICD-10-CM

## 2025-08-20 LAB
ALBUMIN SERPL BCG-MCNC: 4.5 G/DL (ref 3.5–5.2)
ALP SERPL-CCNC: 73 U/L (ref 40–150)
ALT SERPL W P-5'-P-CCNC: 21 U/L (ref 0–70)
ANION GAP SERPL CALCULATED.3IONS-SCNC: 13 MMOL/L (ref 7–15)
AST SERPL W P-5'-P-CCNC: 38 U/L (ref 0–45)
BASOPHILS # BLD AUTO: 0.09 10E3/UL (ref 0–0.2)
BASOPHILS NFR BLD AUTO: 2.7 %
BILIRUB SERPL-MCNC: 0.8 MG/DL
BUN SERPL-MCNC: 17.4 MG/DL (ref 8–23)
CALCIUM SERPL-MCNC: 10 MG/DL (ref 8.8–10.4)
CHLORIDE SERPL-SCNC: 98 MMOL/L (ref 98–107)
CREAT SERPL-MCNC: 1.26 MG/DL (ref 0.67–1.17)
DLCOCOR-%PRED-PRE: 81 %
DLCOCOR-PRE: 22.88 ML/MIN/MMHG
DLCOUNC-%PRED-PRE: 78 %
DLCOUNC-PRE: 21.85 ML/MIN/MMHG
DLCOUNC-PRED: 27.92 ML/MIN/MMHG
EGFRCR SERPLBLD CKD-EPI 2021: 61 ML/MIN/1.73M2
EOSINOPHIL # BLD AUTO: 0.04 10E3/UL (ref 0–0.7)
EOSINOPHIL NFR BLD AUTO: 1.2 %
ERV-%PRED-PRE: 80 %
ERV-PRE: 1.15 L
ERV-PRED: 1.44 L
ERYTHROCYTE [DISTWIDTH] IN BLOOD BY AUTOMATED COUNT: 14.4 % (ref 10–15)
EXPTIME-PRE: 14.04 SEC
FEF2575-%PRED-PRE: 50 %
FEF2575-PRE: 1.22 L/SEC
FEF2575-PRED: 2.42 L/SEC
FEFMAX-%PRED-PRE: 93 %
FEFMAX-PRE: 8.36 L/SEC
FEFMAX-PRED: 8.9 L/SEC
FEV1-%PRED-PRE: 86 %
FEV1-PRE: 2.89 L
FEV1FEV6-PRE: 77 %
FEV1FEV6-PRED: 77 %
FEV1FVC-PRE: 70 %
FEV1FVC-PRED: 76 %
FEV1SVC-PRE: 70 L
FEV1SVC-PRED: 67 L
FIFMAX-PRE: 4.28 L/SEC
FRCPLETH-%PRED-PRE: 70 %
FRCPLETH-PRE: 3.05 L
FRCPLETH-PRED: 4.36 L
FVC-%PRED-PRE: 91 %
FVC-PRE: 4.11 L
FVC-PRED: 4.5 L
GAW-PRED: 1.03 L/S/CMH2O
GLUCOSE SERPL-MCNC: 114 MG/DL (ref 70–99)
HCO3 SERPL-SCNC: 25 MMOL/L (ref 22–29)
HCT VFR BLD AUTO: 39 % (ref 40–53)
HGB BLD-MCNC: 13.1 G/DL (ref 13.3–17.7)
IC-%PRED-PRE: 83 %
IC-PRE: 2.98 L
IC-PRED: 3.55 L
IMM GRANULOCYTES # BLD: <0.03 10E3/UL
IMM GRANULOCYTES NFR BLD: 0.6 %
LAB TESTS NARRATIVE: NORMAL
LAB TESTS NARRATIVE: NORMAL
LYMPHOCYTES # BLD AUTO: 1.15 10E3/UL (ref 0.8–5.3)
LYMPHOCYTES NFR BLD AUTO: 34 %
Lab: 92 %
MCH RBC QN AUTO: 32.3 PG (ref 26.5–33)
MCHC RBC AUTO-ENTMCNC: 33.6 G/DL (ref 31.5–36.5)
MCV RBC AUTO: 96.1 FL (ref 78–100)
MONOCYTES # BLD AUTO: 0.28 10E3/UL (ref 0–1.3)
MONOCYTES NFR BLD AUTO: 8.3 %
NEUTROPHILS # BLD AUTO: 1.8 10E3/UL (ref 1.6–8.3)
NEUTROPHILS NFR BLD AUTO: 53.2 %
NRBC # BLD AUTO: <0.03 10E3/UL
NRBC BLD AUTO-RTO: 0 /100
PATH INTERP SPEC-IMP: NORMAL
PATH INTERP SPEC-IMP: NORMAL
PLATELET # BLD AUTO: 199 10E3/UL (ref 150–450)
POTASSIUM SERPL-SCNC: 4.6 MMOL/L (ref 3.4–5.3)
PROT SERPL-MCNC: 7 G/DL (ref 6.4–8.3)
RBC # BLD AUTO: 4.06 10E6/UL (ref 4.4–5.9)
RVPLETH-%PRED-PRE: 66 %
RVPLETH-PRE: 1.89 L
RVPLETH-PRED: 2.84 L
SEQUENCING METHOD PNL BLD/T: NORMAL
SGAW-PRED: 0.2 1/CMH2O*S
SODIUM SERPL-SCNC: 136 MMOL/L (ref 135–145)
SPECIMEN TYPE: NORMAL
SPECIMEN TYPE: NORMAL
SRAW-PRED: < 4.76 CMH2O*S
TLCPLETH-%PRED-PRE: 76 %
TLCPLETH-PRE: 6.04 L
TLCPLETH-PRED: 7.87 L
VA-%PRED-PRE: 82 %
VA-PRE: 5.88 L
VC-%PRED-PRE: 83 %
VC-PRE: 4.14 L
VC-PRED: 4.97 L
WBC # BLD AUTO: 3.38 10E3/UL (ref 4–11)

## 2025-08-20 PROCEDURE — 81268 CHIMERISM ANAL W/CELL SELECT: CPT | Performed by: STUDENT IN AN ORGANIZED HEALTH CARE EDUCATION/TRAINING PROGRAM

## 2025-08-20 PROCEDURE — 87799 DETECT AGENT NOS DNA QUANT: CPT | Performed by: NURSE PRACTITIONER

## 2025-08-20 PROCEDURE — 81267 CHIMERISM ANAL NO CELL SELEC: CPT | Performed by: STUDENT IN AN ORGANIZED HEALTH CARE EDUCATION/TRAINING PROGRAM

## 2025-08-20 PROCEDURE — 36415 COLL VENOUS BLD VENIPUNCTURE: CPT | Performed by: STUDENT IN AN ORGANIZED HEALTH CARE EDUCATION/TRAINING PROGRAM

## 2025-08-20 PROCEDURE — 84155 ASSAY OF PROTEIN SERUM: CPT | Performed by: STUDENT IN AN ORGANIZED HEALTH CARE EDUCATION/TRAINING PROGRAM

## 2025-08-20 PROCEDURE — 250N000011 HC RX IP 250 OP 636: Performed by: NURSE PRACTITIONER

## 2025-08-20 PROCEDURE — 88280 CHROMOSOME KARYOTYPE STUDY: CPT | Performed by: STUDENT IN AN ORGANIZED HEALTH CARE EDUCATION/TRAINING PROGRAM

## 2025-08-20 PROCEDURE — G0452 MOLECULAR PATHOLOGY INTERPR: HCPCS | Mod: 26 | Performed by: PATHOLOGY

## 2025-08-20 PROCEDURE — 88185 FLOWCYTOMETRY/TC ADD-ON: CPT | Performed by: STUDENT IN AN ORGANIZED HEALTH CARE EDUCATION/TRAINING PROGRAM

## 2025-08-20 PROCEDURE — 38222 DX BONE MARROW BX & ASPIR: CPT | Performed by: NURSE PRACTITIONER

## 2025-08-20 PROCEDURE — 85004 AUTOMATED DIFF WBC COUNT: CPT | Performed by: STUDENT IN AN ORGANIZED HEALTH CARE EDUCATION/TRAINING PROGRAM

## 2025-08-20 PROCEDURE — 88184 FLOWCYTOMETRY/ TC 1 MARKER: CPT | Performed by: STUDENT IN AN ORGANIZED HEALTH CARE EDUCATION/TRAINING PROGRAM

## 2025-08-20 PROCEDURE — 88341 IMHCHEM/IMCYTCHM EA ADD ANTB: CPT | Mod: TC | Performed by: STUDENT IN AN ORGANIZED HEALTH CARE EDUCATION/TRAINING PROGRAM

## 2025-08-20 PROCEDURE — 88271 CYTOGENETICS DNA PROBE: CPT | Performed by: STUDENT IN AN ORGANIZED HEALTH CARE EDUCATION/TRAINING PROGRAM

## 2025-08-20 PROCEDURE — 81401 MOPATH PROCEDURE LEVEL 2: CPT | Performed by: STUDENT IN AN ORGANIZED HEALTH CARE EDUCATION/TRAINING PROGRAM

## 2025-08-20 RX ORDER — ROSUVASTATIN CALCIUM 20 MG/1
20 TABLET, COATED ORAL DAILY
COMMUNITY

## 2025-08-20 RX ORDER — HYDROMORPHONE HYDROCHLORIDE 1 MG/ML
0.5 INJECTION, SOLUTION INTRAMUSCULAR; INTRAVENOUS; SUBCUTANEOUS ONCE
Status: COMPLETED | OUTPATIENT
Start: 2025-08-20 | End: 2025-08-20

## 2025-08-20 RX ADMIN — HYDROMORPHONE HYDROCHLORIDE 0.5 MG: 1 INJECTION, SOLUTION INTRAMUSCULAR; INTRAVENOUS; SUBCUTANEOUS at 12:30

## 2025-08-20 ASSESSMENT — PAIN SCALES - GENERAL: PAINLEVEL_OUTOF10: NO PAIN (0)

## 2025-08-21 LAB
CMV DNA SPEC NAA+PROBE-ACNC: NOT DETECTED IU/ML
EBV DNA SERPL NAA+PROBE-ACNC: NOT DETECTED IU/ML
PATH REPORT.COMMENTS IMP SPEC: NORMAL
PATH REPORT.FINAL DX SPEC: NORMAL
PATH REPORT.FINAL DX SPEC: NORMAL
PATH REPORT.GROSS SPEC: NORMAL
PATH REPORT.MICROSCOPIC SPEC OTHER STN: NORMAL
PATH REPORT.RELEVANT HX SPEC: NORMAL
PATH REPORT.RELEVANT HX SPEC: NORMAL
SPECIMEN TYPE: NORMAL

## 2025-08-27 LAB
CULTURE HARVEST COMPLETE DATE: NORMAL
INTERPRETATION: NORMAL

## 2025-08-28 ENCOUNTER — OFFICE VISIT (OUTPATIENT)
Dept: OPHTHALMOLOGY | Facility: CLINIC | Age: 70
End: 2025-08-28
Payer: COMMERCIAL

## 2025-08-28 DIAGNOSIS — Z96.1 PSEUDOPHAKIA, RIGHT EYE: Primary | ICD-10-CM

## 2025-08-28 ASSESSMENT — VISUAL ACUITY
OD_SC: 20/60
OD_SC+: +1
METHOD: SNELLEN - LINEAR

## 2025-08-28 ASSESSMENT — SLIT LAMP EXAM - LIDS: COMMENTS: NORMAL

## 2025-08-28 ASSESSMENT — TONOMETRY
IOP_METHOD: TONOPEN
OD_IOP_MMHG: 18

## 2025-09-02 ENCOUNTER — VIRTUAL VISIT (OUTPATIENT)
Dept: ONCOLOGY | Facility: CLINIC | Age: 70
End: 2025-09-02
Attending: INTERNAL MEDICINE
Payer: COMMERCIAL

## 2025-09-02 VITALS — WEIGHT: 202 LBS | BODY MASS INDEX: 26.77 KG/M2 | HEIGHT: 73 IN

## 2025-09-02 DIAGNOSIS — D46.9 MDS (MYELODYSPLASTIC SYNDROME) (H): ICD-10-CM

## 2025-09-02 DIAGNOSIS — G54.0 THORACIC OUTLET SYNDROME OF BOTH THORACIC OUTLETS: ICD-10-CM

## 2025-09-02 DIAGNOSIS — Z86.718 HISTORY OF DVT (DEEP VEIN THROMBOSIS): ICD-10-CM

## 2025-09-02 DIAGNOSIS — D46.22: Primary | ICD-10-CM

## 2025-09-02 DIAGNOSIS — I10 HTN (HYPERTENSION): ICD-10-CM

## 2025-09-02 DIAGNOSIS — Z79.01 CHRONIC ANTICOAGULATION: ICD-10-CM

## 2025-09-02 DIAGNOSIS — H25.813 COMBINED FORMS OF AGE-RELATED CATARACT OF BOTH EYES: ICD-10-CM

## 2025-09-02 PROCEDURE — G2211 COMPLEX E/M VISIT ADD ON: HCPCS | Performed by: INTERNAL MEDICINE

## 2025-09-02 PROCEDURE — 98006 SYNCH AUDIO-VIDEO EST MOD 30: CPT | Performed by: INTERNAL MEDICINE

## 2025-09-02 PROCEDURE — 1126F AMNT PAIN NOTED NONE PRSNT: CPT | Performed by: INTERNAL MEDICINE

## 2025-09-02 RX ORDER — KETOROLAC TROMETHAMINE 5 MG/ML
SOLUTION OPHTHALMIC
Qty: 10 ML | Refills: 0 | Status: SHIPPED | OUTPATIENT
Start: 2025-09-02 | End: 2025-10-02

## 2025-09-02 RX ORDER — LISINOPRIL 5 MG/1
5 TABLET ORAL DAILY
Qty: 90 TABLET | Refills: 1 | Status: SHIPPED | OUTPATIENT
Start: 2025-09-02

## 2025-09-02 RX ORDER — MOXIFLOXACIN 5 MG/ML
1 SOLUTION/ DROPS OPHTHALMIC 4 TIMES DAILY
Qty: 3 ML | Refills: 0 | Status: SHIPPED | OUTPATIENT
Start: 2025-09-02 | End: 2025-09-11

## 2025-09-02 RX ORDER — PREDNISOLONE ACETATE 10 MG/ML
SUSPENSION/ DROPS OPHTHALMIC
Qty: 10 ML | Refills: 0 | Status: SHIPPED | OUTPATIENT
Start: 2025-09-02 | End: 2025-09-30

## 2025-09-02 ASSESSMENT — PAIN SCALES - GENERAL: PAINLEVEL_OUTOF10: NO PAIN (0)

## 2025-09-03 PROBLEM — D46.22: Status: ACTIVE | Noted: 2025-09-03

## 2025-09-03 PROBLEM — Z79.01 CHRONIC ANTICOAGULATION: Status: ACTIVE | Noted: 2025-09-03

## 2025-09-03 PROBLEM — G54.0 THORACIC OUTLET SYNDROME OF BOTH THORACIC OUTLETS: Status: ACTIVE | Noted: 2025-09-03

## 2025-09-03 PROBLEM — Z86.718 HISTORY OF DVT (DEEP VEIN THROMBOSIS): Status: ACTIVE | Noted: 2025-09-03

## (undated) RX ORDER — LIDOCAINE HYDROCHLORIDE 10 MG/ML
INJECTION, SOLUTION EPIDURAL; INFILTRATION; INTRACAUDAL; PERINEURAL
Status: DISPENSED
Start: 2024-08-16

## (undated) RX ORDER — FENTANYL CITRATE 50 UG/ML
INJECTION, SOLUTION INTRAMUSCULAR; INTRAVENOUS
Status: DISPENSED
Start: 2024-08-16

## (undated) RX ORDER — CEFAZOLIN SODIUM 2 G/100ML
INJECTION, SOLUTION INTRAVENOUS
Status: DISPENSED
Start: 2024-08-16

## (undated) RX ORDER — LIDOCAINE HYDROCHLORIDE 10 MG/ML
INJECTION, SOLUTION EPIDURAL; INFILTRATION; INTRACAUDAL; PERINEURAL
Status: DISPENSED
Start: 2024-09-06

## (undated) RX ORDER — HEPARIN SODIUM,PORCINE 10 UNIT/ML
VIAL (ML) INTRAVENOUS
Status: DISPENSED
Start: 2024-08-16